# Patient Record
Sex: MALE | Race: WHITE | NOT HISPANIC OR LATINO | Employment: OTHER | URBAN - METROPOLITAN AREA
[De-identification: names, ages, dates, MRNs, and addresses within clinical notes are randomized per-mention and may not be internally consistent; named-entity substitution may affect disease eponyms.]

---

## 2018-09-02 ENCOUNTER — RESOLUTE PROFESSIONAL BILLING HOSPITAL PROF FEE (OUTPATIENT)
Dept: HOSPITALIST | Facility: MEDICAL CENTER | Age: 68
End: 2018-09-02
Payer: MEDICARE

## 2018-09-02 ENCOUNTER — APPOINTMENT (OUTPATIENT)
Dept: RADIOLOGY | Facility: MEDICAL CENTER | Age: 68
DRG: 472 | End: 2018-09-02
Attending: EMERGENCY MEDICINE
Payer: MEDICARE

## 2018-09-02 ENCOUNTER — APPOINTMENT (OUTPATIENT)
Dept: RADIOLOGY | Facility: MEDICAL CENTER | Age: 68
DRG: 472 | End: 2018-09-02
Attending: NEUROLOGICAL SURGERY
Payer: MEDICARE

## 2018-09-02 ENCOUNTER — HOSPITAL ENCOUNTER (INPATIENT)
Facility: MEDICAL CENTER | Age: 68
LOS: 3 days | DRG: 472 | End: 2018-09-05
Attending: EMERGENCY MEDICINE | Admitting: SURGERY
Payer: MEDICARE

## 2018-09-02 DIAGNOSIS — S12.401A CLOSED NONDISPLACED FRACTURE OF FIFTH CERVICAL VERTEBRA, UNSPECIFIED FRACTURE MORPHOLOGY, INITIAL ENCOUNTER (HCC): ICD-10-CM

## 2018-09-02 DIAGNOSIS — S01.81XA LACERATION OF FOREHEAD, INITIAL ENCOUNTER: ICD-10-CM

## 2018-09-02 DIAGNOSIS — S12.9XXA CLOSED FRACTURE OF CERVICAL VERTEBRA, UNSPECIFIED CERVICAL VERTEBRAL LEVEL, INITIAL ENCOUNTER (HCC): ICD-10-CM

## 2018-09-02 DIAGNOSIS — F10.920 ALCOHOLIC INTOXICATION WITHOUT COMPLICATION (HCC): ICD-10-CM

## 2018-09-02 PROBLEM — D68.9 COAGULOPATHY (HCC): Status: ACTIVE | Noted: 2018-09-02

## 2018-09-02 PROBLEM — D64.9 ANEMIA: Status: ACTIVE | Noted: 2018-09-02

## 2018-09-02 PROBLEM — F10.929 ALCOHOL INTOXICATION (HCC): Status: ACTIVE | Noted: 2018-09-02

## 2018-09-02 PROBLEM — E87.1 HYPONATREMIA: Status: ACTIVE | Noted: 2018-09-02

## 2018-09-02 LAB
ABO GROUP BLD: NORMAL
ALBUMIN SERPL BCP-MCNC: 3.3 G/DL (ref 3.2–4.9)
ALBUMIN/GLOB SERPL: 1.2 G/DL
ALP SERPL-CCNC: 50 U/L (ref 30–99)
ALT SERPL-CCNC: 19 U/L (ref 2–50)
ANION GAP SERPL CALC-SCNC: 10 MMOL/L (ref 0–11.9)
APTT PPP: 27.6 SEC (ref 24.7–36)
AST SERPL-CCNC: 58 U/L (ref 12–45)
BILIRUB SERPL-MCNC: 0.4 MG/DL (ref 0.1–1.5)
BLD GP AB SCN SERPL QL: NORMAL
BUN SERPL-MCNC: 4 MG/DL (ref 8–22)
CALCIUM SERPL-MCNC: 8.1 MG/DL (ref 8.5–10.5)
CHLORIDE SERPL-SCNC: 101 MMOL/L (ref 96–112)
CO2 SERPL-SCNC: 22 MMOL/L (ref 20–33)
CREAT SERPL-MCNC: 0.51 MG/DL (ref 0.5–1.4)
ERYTHROCYTE [DISTWIDTH] IN BLOOD BY AUTOMATED COUNT: 46.7 FL (ref 35.9–50)
ETHANOL BLD-MCNC: 0.29 G/DL
GLOBULIN SER CALC-MCNC: 2.7 G/DL (ref 1.9–3.5)
GLUCOSE SERPL-MCNC: 77 MG/DL (ref 65–99)
HCT VFR BLD AUTO: 30.9 % (ref 42–52)
HGB BLD-MCNC: 10.5 G/DL (ref 14–18)
INR PPP: 1.23 (ref 0.87–1.13)
MCH RBC QN AUTO: 34.5 PG (ref 27–33)
MCHC RBC AUTO-ENTMCNC: 34 G/DL (ref 33.7–35.3)
MCV RBC AUTO: 101.6 FL (ref 81.4–97.8)
PLATELET # BLD AUTO: 140 K/UL (ref 164–446)
PMV BLD AUTO: 9.9 FL (ref 9–12.9)
POTASSIUM SERPL-SCNC: 3.9 MMOL/L (ref 3.6–5.5)
PROT SERPL-MCNC: 6 G/DL (ref 6–8.2)
PROTHROMBIN TIME: 15.2 SEC (ref 12–14.6)
RBC # BLD AUTO: 3.04 M/UL (ref 4.7–6.1)
RH BLD: NORMAL
SODIUM SERPL-SCNC: 133 MMOL/L (ref 135–145)
WBC # BLD AUTO: 4.1 K/UL (ref 4.8–10.8)

## 2018-09-02 PROCEDURE — 160002 HCHG RECOVERY MINUTES (STAT): Performed by: NEUROLOGICAL SURGERY

## 2018-09-02 PROCEDURE — 72040 X-RAY EXAM NECK SPINE 2-3 VW: CPT

## 2018-09-02 PROCEDURE — 304999 HCHG REPAIR-SIMPLE/INTERMED LEVEL 1

## 2018-09-02 PROCEDURE — 72141 MRI NECK SPINE W/O DYE: CPT

## 2018-09-02 PROCEDURE — 500371 HCHG DRAIN, BLAKE 10MM: Performed by: NEUROLOGICAL SURGERY

## 2018-09-02 PROCEDURE — 71045 X-RAY EXAM CHEST 1 VIEW: CPT

## 2018-09-02 PROCEDURE — 303747 HCHG EXTRA SUTURE

## 2018-09-02 PROCEDURE — 96375 TX/PRO/DX INJ NEW DRUG ADDON: CPT

## 2018-09-02 PROCEDURE — 99291 CRITICAL CARE FIRST HOUR: CPT

## 2018-09-02 PROCEDURE — 96365 THER/PROPH/DIAG IV INF INIT: CPT

## 2018-09-02 PROCEDURE — 74018 RADEX ABDOMEN 1 VIEW: CPT

## 2018-09-02 PROCEDURE — 86900 BLOOD TYPING SEROLOGIC ABO: CPT

## 2018-09-02 PROCEDURE — 502000 HCHG MISC OR IMPLANTS RC 0278: Performed by: NEUROLOGICAL SURGERY

## 2018-09-02 PROCEDURE — 770022 HCHG ROOM/CARE - ICU (200)

## 2018-09-02 PROCEDURE — 500864 HCHG NEEDLE, SPINAL 18G: Performed by: NEUROLOGICAL SURGERY

## 2018-09-02 PROCEDURE — 0HQ1XZZ REPAIR FACE SKIN, EXTERNAL APPROACH: ICD-10-PCS | Performed by: EMERGENCY MEDICINE

## 2018-09-02 PROCEDURE — C1713 ANCHOR/SCREW BN/BN,TIS/BN: HCPCS | Performed by: NEUROLOGICAL SURGERY

## 2018-09-02 PROCEDURE — 700111 HCHG RX REV CODE 636 W/ 250 OVERRIDE (IP): Performed by: EMERGENCY MEDICINE

## 2018-09-02 PROCEDURE — 86901 BLOOD TYPING SEROLOGIC RH(D): CPT

## 2018-09-02 PROCEDURE — 90471 IMMUNIZATION ADMIN: CPT

## 2018-09-02 PROCEDURE — 86850 RBC ANTIBODY SCREEN: CPT

## 2018-09-02 PROCEDURE — 110454 HCHG SHELL REV 250: Performed by: NEUROLOGICAL SURGERY

## 2018-09-02 PROCEDURE — G0390 TRAUMA RESPONS W/HOSP CRITI: HCPCS

## 2018-09-02 PROCEDURE — 500389 HCHG DRAIN, RESERVOIR SUCT JP 100CC: Performed by: NEUROLOGICAL SURGERY

## 2018-09-02 PROCEDURE — 85027 COMPLETE CBC AUTOMATED: CPT

## 2018-09-02 PROCEDURE — 500445 HCHG HEMOSTAT, SURGICEL 4X8: Performed by: NEUROLOGICAL SURGERY

## 2018-09-02 PROCEDURE — 700101 HCHG RX REV CODE 250

## 2018-09-02 PROCEDURE — 80307 DRUG TEST PRSMV CHEM ANLYZR: CPT

## 2018-09-02 PROCEDURE — 73090 X-RAY EXAM OF FOREARM: CPT | Mod: LT

## 2018-09-02 PROCEDURE — 85610 PROTHROMBIN TIME: CPT

## 2018-09-02 PROCEDURE — 85730 THROMBOPLASTIN TIME PARTIAL: CPT

## 2018-09-02 PROCEDURE — 160009 HCHG ANES TIME/MIN: Performed by: NEUROLOGICAL SURGERY

## 2018-09-02 PROCEDURE — 93005 ELECTROCARDIOGRAM TRACING: CPT | Performed by: EMERGENCY MEDICINE

## 2018-09-02 PROCEDURE — 501838 HCHG SUTURE GENERAL: Performed by: NEUROLOGICAL SURGERY

## 2018-09-02 PROCEDURE — 90715 TDAP VACCINE 7 YRS/> IM: CPT | Performed by: EMERGENCY MEDICINE

## 2018-09-02 PROCEDURE — 72146 MRI CHEST SPINE W/O DYE: CPT

## 2018-09-02 PROCEDURE — 160048 HCHG OR STATISTICAL LEVEL 1-5: Performed by: NEUROLOGICAL SURGERY

## 2018-09-02 PROCEDURE — 700105 HCHG RX REV CODE 258: Performed by: SURGERY

## 2018-09-02 PROCEDURE — 700111 HCHG RX REV CODE 636 W/ 250 OVERRIDE (IP): Performed by: NURSE PRACTITIONER

## 2018-09-02 PROCEDURE — 160041 HCHG SURGERY MINUTES - EA ADDL 1 MIN LEVEL 4: Performed by: NEUROLOGICAL SURGERY

## 2018-09-02 PROCEDURE — 160035 HCHG PACU - 1ST 60 MINS PHASE I: Performed by: NEUROLOGICAL SURGERY

## 2018-09-02 PROCEDURE — 700111 HCHG RX REV CODE 636 W/ 250 OVERRIDE (IP)

## 2018-09-02 PROCEDURE — 0PS304Z REPOSITION CERVICAL VERTEBRA WITH INTERNAL FIXATION DEVICE, OPEN APPROACH: ICD-10-PCS | Performed by: NEUROLOGICAL SURGERY

## 2018-09-02 PROCEDURE — 72125 CT NECK SPINE W/O DYE: CPT

## 2018-09-02 PROCEDURE — 70450 CT HEAD/BRAIN W/O DYE: CPT

## 2018-09-02 PROCEDURE — 160029 HCHG SURGERY MINUTES - 1ST 30 MINS LEVEL 4: Performed by: NEUROLOGICAL SURGERY

## 2018-09-02 PROCEDURE — 700101 HCHG RX REV CODE 250: Performed by: EMERGENCY MEDICINE

## 2018-09-02 PROCEDURE — 0RG10A0 FUSION OF CERVICAL VERTEBRAL JOINT WITH INTERBODY FUSION DEVICE, ANTERIOR APPROACH, ANTERIOR COLUMN, OPEN APPROACH: ICD-10-PCS | Performed by: NEUROLOGICAL SURGERY

## 2018-09-02 PROCEDURE — 304217 HCHG IRRIGATION SYSTEM

## 2018-09-02 PROCEDURE — 80053 COMPREHEN METABOLIC PANEL: CPT

## 2018-09-02 RX ORDER — HYDROMORPHONE HYDROCHLORIDE 2 MG/ML
0.5 INJECTION, SOLUTION INTRAMUSCULAR; INTRAVENOUS; SUBCUTANEOUS
Status: DISCONTINUED | OUTPATIENT
Start: 2018-09-02 | End: 2018-09-05 | Stop reason: HOSPADM

## 2018-09-02 RX ORDER — AMOXICILLIN 250 MG
1 CAPSULE ORAL NIGHTLY
Status: DISCONTINUED | OUTPATIENT
Start: 2018-09-02 | End: 2018-09-05 | Stop reason: HOSPADM

## 2018-09-02 RX ORDER — CEFAZOLIN SODIUM 2 G/100ML
2 INJECTION, SOLUTION INTRAVENOUS ONCE
Status: COMPLETED | OUTPATIENT
Start: 2018-09-02 | End: 2018-09-02

## 2018-09-02 RX ORDER — BUPIVACAINE HYDROCHLORIDE AND EPINEPHRINE 5; 5 MG/ML; UG/ML
INJECTION, SOLUTION PERINEURAL
Status: DISCONTINUED | OUTPATIENT
Start: 2018-09-02 | End: 2018-09-02 | Stop reason: HOSPADM

## 2018-09-02 RX ORDER — ONDANSETRON 2 MG/ML
4 INJECTION INTRAMUSCULAR; INTRAVENOUS EVERY 4 HOURS PRN
Status: DISCONTINUED | OUTPATIENT
Start: 2018-09-02 | End: 2018-09-05 | Stop reason: HOSPADM

## 2018-09-02 RX ORDER — BACITRACIN ZINC AND POLYMYXIN B SULFATE 500; 1000 [USP'U]/G; [USP'U]/G
OINTMENT TOPICAL 3 TIMES DAILY
Status: DISCONTINUED | OUTPATIENT
Start: 2018-09-02 | End: 2018-09-05 | Stop reason: HOSPADM

## 2018-09-02 RX ORDER — CEFAZOLIN SODIUM 2 G/100ML
2 INJECTION, SOLUTION INTRAVENOUS EVERY 8 HOURS
Status: COMPLETED | OUTPATIENT
Start: 2018-09-02 | End: 2018-09-03

## 2018-09-02 RX ORDER — MIDAZOLAM HYDROCHLORIDE 1 MG/ML
INJECTION INTRAMUSCULAR; INTRAVENOUS
Status: COMPLETED
Start: 2018-09-02 | End: 2018-09-02

## 2018-09-02 RX ORDER — POLYETHYLENE GLYCOL 3350 17 G/17G
1 POWDER, FOR SOLUTION ORAL 2 TIMES DAILY
Status: DISCONTINUED | OUTPATIENT
Start: 2018-09-02 | End: 2018-09-05 | Stop reason: HOSPADM

## 2018-09-02 RX ORDER — OXYCODONE HYDROCHLORIDE 10 MG/1
10 TABLET ORAL
Status: DISCONTINUED | OUTPATIENT
Start: 2018-09-02 | End: 2018-09-05 | Stop reason: HOSPADM

## 2018-09-02 RX ORDER — ONDANSETRON 2 MG/ML
4 INJECTION INTRAMUSCULAR; INTRAVENOUS ONCE
Status: COMPLETED | OUTPATIENT
Start: 2018-09-02 | End: 2018-09-02

## 2018-09-02 RX ORDER — ACETAMINOPHEN 650 MG/1
650 SUPPOSITORY RECTAL EVERY 6 HOURS
Status: DISCONTINUED | OUTPATIENT
Start: 2018-09-02 | End: 2018-09-03

## 2018-09-02 RX ORDER — OXYCODONE HYDROCHLORIDE 5 MG/1
5 TABLET ORAL
Status: DISCONTINUED | OUTPATIENT
Start: 2018-09-02 | End: 2018-09-05 | Stop reason: HOSPADM

## 2018-09-02 RX ORDER — DOCUSATE SODIUM 100 MG/1
100 CAPSULE, LIQUID FILLED ORAL 2 TIMES DAILY
Status: DISCONTINUED | OUTPATIENT
Start: 2018-09-02 | End: 2018-09-05 | Stop reason: HOSPADM

## 2018-09-02 RX ORDER — BACITRACIN 50000 [IU]/1
INJECTION, POWDER, FOR SOLUTION INTRAMUSCULAR
Status: DISCONTINUED | OUTPATIENT
Start: 2018-09-02 | End: 2018-09-02 | Stop reason: HOSPADM

## 2018-09-02 RX ORDER — AMOXICILLIN 250 MG
1 CAPSULE ORAL
Status: DISCONTINUED | OUTPATIENT
Start: 2018-09-02 | End: 2018-09-05 | Stop reason: HOSPADM

## 2018-09-02 RX ORDER — SODIUM CHLORIDE 9 MG/ML
INJECTION, SOLUTION INTRAVENOUS CONTINUOUS
Status: DISCONTINUED | OUTPATIENT
Start: 2018-09-02 | End: 2018-09-05

## 2018-09-02 RX ORDER — BISACODYL 10 MG
10 SUPPOSITORY, RECTAL RECTAL
Status: DISCONTINUED | OUTPATIENT
Start: 2018-09-02 | End: 2018-09-05 | Stop reason: HOSPADM

## 2018-09-02 RX ORDER — FAMOTIDINE 20 MG/1
20 TABLET, FILM COATED ORAL 2 TIMES DAILY
Status: DISCONTINUED | OUTPATIENT
Start: 2018-09-02 | End: 2018-09-05

## 2018-09-02 RX ORDER — ENEMA 19; 7 G/133ML; G/133ML
1 ENEMA RECTAL
Status: DISCONTINUED | OUTPATIENT
Start: 2018-09-02 | End: 2018-09-05 | Stop reason: HOSPADM

## 2018-09-02 RX ORDER — LIDOCAINE HYDROCHLORIDE AND EPINEPHRINE 10; 10 MG/ML; UG/ML
20 INJECTION, SOLUTION INFILTRATION; PERINEURAL ONCE
Status: COMPLETED | OUTPATIENT
Start: 2018-09-02 | End: 2018-09-02

## 2018-09-02 RX ADMIN — FENTANYL CITRATE: 50 INJECTION, SOLUTION INTRAMUSCULAR; INTRAVENOUS at 20:45

## 2018-09-02 RX ADMIN — CEFAZOLIN SODIUM 2 G: 2 INJECTION, SOLUTION INTRAVENOUS at 14:30

## 2018-09-02 RX ADMIN — CLOSTRIDIUM TETANI TOXOID ANTIGEN (FORMALDEHYDE INACTIVATED), CORYNEBACTERIUM DIPHTHERIAE TOXOID ANTIGEN (FORMALDEHYDE INACTIVATED), BORDETELLA PERTUSSIS TOXOID ANTIGEN (GLUTARALDEHYDE INACTIVATED), BORDETELLA PERTUSSIS FILAMENTOUS HEMAGGLUTININ ANTIGEN (FORMALDEHYDE INACTIVATED), BORDETELLA PERTUSSIS PERTACTIN ANTIGEN, AND BORDETELLA PERTUSSIS FIMBRIAE 2/3 ANTIGEN 0.5 ML: 5; 2; 2.5; 5; 3; 5 INJECTION, SUSPENSION INTRAMUSCULAR at 14:25

## 2018-09-02 RX ADMIN — FENTANYL CITRATE 50 MCG: 50 INJECTION, SOLUTION INTRAMUSCULAR; INTRAVENOUS at 18:26

## 2018-09-02 RX ADMIN — ONDANSETRON 4 MG: 2 INJECTION INTRAMUSCULAR; INTRAVENOUS at 18:26

## 2018-09-02 RX ADMIN — LIDOCAINE HYDROCHLORIDE,EPINEPHRINE BITARTRATE 20 ML: 10; .01 INJECTION, SOLUTION INFILTRATION; PERINEURAL at 15:00

## 2018-09-02 RX ADMIN — SODIUM CHLORIDE: 9 INJECTION, SOLUTION INTRAVENOUS at 23:27

## 2018-09-02 RX ADMIN — MIDAZOLAM: 1 INJECTION INTRAMUSCULAR; INTRAVENOUS at 20:45

## 2018-09-02 RX ADMIN — FENTANYL CITRATE 50 MCG: 50 INJECTION, SOLUTION INTRAMUSCULAR; INTRAVENOUS at 22:45

## 2018-09-02 RX ADMIN — FENTANYL CITRATE 50 MCG: 50 INJECTION, SOLUTION INTRAMUSCULAR; INTRAVENOUS at 23:00

## 2018-09-02 ASSESSMENT — PAIN SCALES - GENERAL
PAINLEVEL_OUTOF10: 10
PAINLEVEL_OUTOF10: 2
PAINLEVEL_OUTOF10: 4

## 2018-09-02 NOTE — ED NOTES
Traction at bedside for [place] J placement, screening sheet for MRI complete and faxed, ERP at bedside for lac repair

## 2018-09-03 ENCOUNTER — APPOINTMENT (OUTPATIENT)
Dept: RADIOLOGY | Facility: MEDICAL CENTER | Age: 68
DRG: 472 | End: 2018-09-03
Attending: NEUROLOGICAL SURGERY
Payer: MEDICARE

## 2018-09-03 PROBLEM — T14.90XA TRAUMA: Status: ACTIVE | Noted: 2018-09-03

## 2018-09-03 LAB
ABO GROUP BLD: NORMAL
ALBUMIN SERPL BCP-MCNC: 3.4 G/DL (ref 3.2–4.9)
ALBUMIN/GLOB SERPL: 1.2 G/DL
ALP SERPL-CCNC: 63 U/L (ref 30–99)
ALT SERPL-CCNC: 18 U/L (ref 2–50)
ANION GAP SERPL CALC-SCNC: 8 MMOL/L (ref 0–11.9)
AST SERPL-CCNC: 63 U/L (ref 12–45)
BASOPHILS # BLD AUTO: 0 % (ref 0–1.8)
BASOPHILS # BLD: 0 K/UL (ref 0–0.12)
BILIRUB SERPL-MCNC: 0.5 MG/DL (ref 0.1–1.5)
BUN SERPL-MCNC: 4 MG/DL (ref 8–22)
CALCIUM SERPL-MCNC: 8.8 MG/DL (ref 8.5–10.5)
CHLORIDE SERPL-SCNC: 103 MMOL/L (ref 96–112)
CO2 SERPL-SCNC: 24 MMOL/L (ref 20–33)
CREAT SERPL-MCNC: 0.54 MG/DL (ref 0.5–1.4)
EOSINOPHIL # BLD AUTO: 0 K/UL (ref 0–0.51)
EOSINOPHIL NFR BLD: 0 % (ref 0–6.9)
ERYTHROCYTE [DISTWIDTH] IN BLOOD BY AUTOMATED COUNT: 46 FL (ref 35.9–50)
GLOBULIN SER CALC-MCNC: 2.8 G/DL (ref 1.9–3.5)
GLUCOSE SERPL-MCNC: 108 MG/DL (ref 65–99)
HCT VFR BLD AUTO: 33.5 % (ref 42–52)
HGB BLD-MCNC: 11.6 G/DL (ref 14–18)
IMM GRANULOCYTES # BLD AUTO: 0.03 K/UL (ref 0–0.11)
IMM GRANULOCYTES NFR BLD AUTO: 0.6 % (ref 0–0.9)
LYMPHOCYTES # BLD AUTO: 0.2 K/UL (ref 1–4.8)
LYMPHOCYTES NFR BLD: 3.7 % (ref 22–41)
MAGNESIUM SERPL-MCNC: 1.6 MG/DL (ref 1.5–2.5)
MCH RBC QN AUTO: 35 PG (ref 27–33)
MCHC RBC AUTO-ENTMCNC: 34.6 G/DL (ref 33.7–35.3)
MCV RBC AUTO: 101.2 FL (ref 81.4–97.8)
MONOCYTES # BLD AUTO: 0.03 K/UL (ref 0–0.85)
MONOCYTES NFR BLD AUTO: 0.6 % (ref 0–13.4)
NEUTROPHILS # BLD AUTO: 5.12 K/UL (ref 1.82–7.42)
NEUTROPHILS NFR BLD: 95.1 % (ref 44–72)
NRBC # BLD AUTO: 0 K/UL
NRBC BLD-RTO: 0 /100 WBC
PHOSPHATE SERPL-MCNC: 3.3 MG/DL (ref 2.5–4.5)
PLATELET # BLD AUTO: 102 K/UL (ref 164–446)
PMV BLD AUTO: 10.6 FL (ref 9–12.9)
POTASSIUM SERPL-SCNC: 4.2 MMOL/L (ref 3.6–5.5)
PROT SERPL-MCNC: 6.2 G/DL (ref 6–8.2)
RBC # BLD AUTO: 3.31 M/UL (ref 4.7–6.1)
RH BLD: NORMAL
SODIUM SERPL-SCNC: 135 MMOL/L (ref 135–145)
WBC # BLD AUTO: 5.4 K/UL (ref 4.8–10.8)

## 2018-09-03 PROCEDURE — 94760 N-INVAS EAR/PLS OXIMETRY 1: CPT

## 2018-09-03 PROCEDURE — 700101 HCHG RX REV CODE 250: Performed by: SURGERY

## 2018-09-03 PROCEDURE — 94668 MNPJ CHEST WALL SBSQ: CPT

## 2018-09-03 PROCEDURE — 80053 COMPREHEN METABOLIC PANEL: CPT

## 2018-09-03 PROCEDURE — 72125 CT NECK SPINE W/O DYE: CPT

## 2018-09-03 PROCEDURE — A9270 NON-COVERED ITEM OR SERVICE: HCPCS | Performed by: SURGERY

## 2018-09-03 PROCEDURE — 700102 HCHG RX REV CODE 250 W/ 637 OVERRIDE(OP): Performed by: SURGERY

## 2018-09-03 PROCEDURE — 770001 HCHG ROOM/CARE - MED/SURG/GYN PRIV*

## 2018-09-03 PROCEDURE — A9270 NON-COVERED ITEM OR SERVICE: HCPCS | Performed by: PHYSICIAN ASSISTANT

## 2018-09-03 PROCEDURE — 84100 ASSAY OF PHOSPHORUS: CPT

## 2018-09-03 PROCEDURE — 85025 COMPLETE CBC W/AUTO DIFF WBC: CPT

## 2018-09-03 PROCEDURE — 83735 ASSAY OF MAGNESIUM: CPT

## 2018-09-03 PROCEDURE — 94667 MNPJ CHEST WALL 1ST: CPT

## 2018-09-03 PROCEDURE — A9270 NON-COVERED ITEM OR SERVICE: HCPCS | Performed by: NEUROLOGICAL SURGERY

## 2018-09-03 PROCEDURE — 700112 HCHG RX REV CODE 229: Performed by: SURGERY

## 2018-09-03 PROCEDURE — 700102 HCHG RX REV CODE 250 W/ 637 OVERRIDE(OP): Performed by: NEUROLOGICAL SURGERY

## 2018-09-03 PROCEDURE — 700111 HCHG RX REV CODE 636 W/ 250 OVERRIDE (IP): Performed by: NEUROLOGICAL SURGERY

## 2018-09-03 PROCEDURE — 700102 HCHG RX REV CODE 250 W/ 637 OVERRIDE(OP): Performed by: PHYSICIAN ASSISTANT

## 2018-09-03 PROCEDURE — 700111 HCHG RX REV CODE 636 W/ 250 OVERRIDE (IP): Performed by: SURGERY

## 2018-09-03 PROCEDURE — 99233 SBSQ HOSP IP/OBS HIGH 50: CPT | Performed by: SURGERY

## 2018-09-03 RX ORDER — CELECOXIB 100 MG/1
30 CAPSULE ORAL EVERY 8 HOURS PRN
COMMUNITY
End: 2018-10-18

## 2018-09-03 RX ORDER — METHOCARBAMOL 750 MG/1
750 TABLET, FILM COATED ORAL 4 TIMES DAILY
Status: DISCONTINUED | OUTPATIENT
Start: 2018-09-03 | End: 2018-09-05 | Stop reason: HOSPADM

## 2018-09-03 RX ORDER — DEXAMETHASONE 4 MG/1
4 TABLET ORAL EVERY 12 HOURS
Status: DISCONTINUED | OUTPATIENT
Start: 2018-09-03 | End: 2018-09-05

## 2018-09-03 RX ORDER — GABAPENTIN 300 MG/1
300 CAPSULE ORAL 3 TIMES DAILY
Status: DISCONTINUED | OUTPATIENT
Start: 2018-09-03 | End: 2018-09-05 | Stop reason: HOSPADM

## 2018-09-03 RX ORDER — ASPIRIN 81 MG/1
81 TABLET, CHEWABLE ORAL DAILY
Status: ON HOLD | COMMUNITY
End: 2018-09-05

## 2018-09-03 RX ORDER — ALLOPURINOL 300 MG/1
300 TABLET ORAL DAILY
COMMUNITY
End: 2018-12-05 | Stop reason: CLARIF

## 2018-09-03 RX ORDER — LISINOPRIL 20 MG/1
30 TABLET ORAL DAILY
COMMUNITY
End: 2018-12-05 | Stop reason: CLARIF

## 2018-09-03 RX ORDER — GABAPENTIN 100 MG/1
CAPSULE ORAL
COMMUNITY
End: 2018-12-05 | Stop reason: CLARIF

## 2018-09-03 RX ADMIN — HYDROMORPHONE HYDROCHLORIDE 0.5 MG: 2 INJECTION INTRAMUSCULAR; INTRAVENOUS; SUBCUTANEOUS at 11:11

## 2018-09-03 RX ADMIN — CEFAZOLIN SODIUM 2 G: 2 INJECTION, SOLUTION INTRAVENOUS at 05:38

## 2018-09-03 RX ADMIN — CEFAZOLIN SODIUM 2 G: 2 INJECTION, SOLUTION INTRAVENOUS at 15:05

## 2018-09-03 RX ADMIN — GABAPENTIN 300 MG: 300 CAPSULE ORAL at 17:02

## 2018-09-03 RX ADMIN — SENNOSIDES AND DOCUSATE SODIUM 1 TABLET: 8.6; 5 TABLET ORAL at 19:39

## 2018-09-03 RX ADMIN — OXYCODONE HYDROCHLORIDE 5 MG: 5 TABLET ORAL at 05:52

## 2018-09-03 RX ADMIN — DEXAMETHASONE 4 MG: 4 TABLET ORAL at 17:03

## 2018-09-03 RX ADMIN — DEXAMETHASONE 4 MG: 4 TABLET ORAL at 11:12

## 2018-09-03 RX ADMIN — HYDROMORPHONE HYDROCHLORIDE 0.5 MG: 2 INJECTION INTRAMUSCULAR; INTRAVENOUS; SUBCUTANEOUS at 07:33

## 2018-09-03 RX ADMIN — OXYCODONE HYDROCHLORIDE 10 MG: 10 TABLET ORAL at 19:39

## 2018-09-03 RX ADMIN — OXYCODONE HYDROCHLORIDE 10 MG: 10 TABLET ORAL at 16:32

## 2018-09-03 RX ADMIN — GABAPENTIN 300 MG: 300 CAPSULE ORAL at 06:46

## 2018-09-03 RX ADMIN — Medication 1 EACH: at 17:02

## 2018-09-03 RX ADMIN — GABAPENTIN 300 MG: 300 CAPSULE ORAL at 11:11

## 2018-09-03 RX ADMIN — HYDROMORPHONE HYDROCHLORIDE 0.5 MG: 2 INJECTION INTRAMUSCULAR; INTRAVENOUS; SUBCUTANEOUS at 15:04

## 2018-09-03 RX ADMIN — OXYCODONE HYDROCHLORIDE 10 MG: 10 TABLET ORAL at 10:02

## 2018-09-03 RX ADMIN — Medication 1 EACH: at 11:12

## 2018-09-03 RX ADMIN — FAMOTIDINE 20 MG: 20 TABLET ORAL at 17:02

## 2018-09-03 RX ADMIN — Medication 1 EACH: at 05:23

## 2018-09-03 RX ADMIN — DOCUSATE SODIUM 100 MG: 100 CAPSULE, LIQUID FILLED ORAL at 19:39

## 2018-09-03 RX ADMIN — METHOCARBAMOL 750 MG: 750 TABLET, FILM COATED ORAL at 22:31

## 2018-09-03 RX ADMIN — DOCUSATE SODIUM 100 MG: 100 CAPSULE, LIQUID FILLED ORAL at 09:00

## 2018-09-03 RX ADMIN — OXYCODONE HYDROCHLORIDE 10 MG: 10 TABLET ORAL at 13:05

## 2018-09-03 RX ADMIN — CEFAZOLIN SODIUM 2 G: 2 INJECTION, SOLUTION INTRAVENOUS at 00:13

## 2018-09-03 RX ADMIN — METHOCARBAMOL 750 MG: 750 TABLET, FILM COATED ORAL at 11:13

## 2018-09-03 RX ADMIN — OXYCODONE HYDROCHLORIDE 10 MG: 10 TABLET ORAL at 23:59

## 2018-09-03 RX ADMIN — HYDROMORPHONE HYDROCHLORIDE 0.5 MG: 2 INJECTION INTRAMUSCULAR; INTRAVENOUS; SUBCUTANEOUS at 21:24

## 2018-09-03 RX ADMIN — METHOCARBAMOL 750 MG: 750 TABLET, FILM COATED ORAL at 17:02

## 2018-09-03 RX ADMIN — FAMOTIDINE 20 MG: 10 INJECTION, SOLUTION INTRAVENOUS at 05:23

## 2018-09-03 RX ADMIN — POLYETHYLENE GLYCOL 3350 1 PACKET: 17 POWDER, FOR SOLUTION ORAL at 17:02

## 2018-09-03 RX ADMIN — METHOCARBAMOL 750 MG: 750 TABLET, FILM COATED ORAL at 13:05

## 2018-09-03 RX ADMIN — HYDROMORPHONE HYDROCHLORIDE 0.5 MG: 2 INJECTION INTRAMUSCULAR; INTRAVENOUS; SUBCUTANEOUS at 03:00

## 2018-09-03 ASSESSMENT — PAIN SCALES - GENERAL
PAINLEVEL_OUTOF10: 8
PAINLEVEL_OUTOF10: 7
PAINLEVEL_OUTOF10: 8
PAINLEVEL_OUTOF10: 8
PAINLEVEL_OUTOF10: 4
PAINLEVEL_OUTOF10: 6
PAINLEVEL_OUTOF10: 4
PAINLEVEL_OUTOF10: 8
PAINLEVEL_OUTOF10: 6
PAINLEVEL_OUTOF10: 8
PAINLEVEL_OUTOF10: 8
PAINLEVEL_OUTOF10: 4
PAINLEVEL_OUTOF10: 7
PAINLEVEL_OUTOF10: 6
PAINLEVEL_OUTOF10: 6
PAINLEVEL_OUTOF10: 8
PAINLEVEL_OUTOF10: 6
PAINLEVEL_OUTOF10: 6
PAINLEVEL_OUTOF10: 4

## 2018-09-03 ASSESSMENT — COGNITIVE AND FUNCTIONAL STATUS - GENERAL
DRESSING REGULAR UPPER BODY CLOTHING: A LOT
STANDING UP FROM CHAIR USING ARMS: A LOT
TURNING FROM BACK TO SIDE WHILE IN FLAT BAD: A LITTLE
SUGGESTED CMS G CODE MODIFIER MOBILITY: CL
DRESSING REGULAR LOWER BODY CLOTHING: TOTAL
CLIMB 3 TO 5 STEPS WITH RAILING: A LOT
WALKING IN HOSPITAL ROOM: A LOT
TOILETING: A LOT
HELP NEEDED FOR BATHING: A LOT
MOVING FROM LYING ON BACK TO SITTING ON SIDE OF FLAT BED: A LOT
PERSONAL GROOMING: A LOT
EATING MEALS: A LOT
MOBILITY SCORE: 13
MOVING TO AND FROM BED TO CHAIR: A LOT

## 2018-09-03 ASSESSMENT — LIFESTYLE VARIABLES
EVER_SMOKED: YES
DOES PATIENT WANT TO STOP DRINKING: YES
ALCOHOL_USE: YES
TOTAL SCORE: 4
HOW MANY TIMES IN THE PAST YEAR HAVE YOU HAD 5 OR MORE DRINKS IN A DAY: 200
EVER FELT BAD OR GUILTY ABOUT YOUR DRINKING: YES
HAVE PEOPLE ANNOYED YOU BY CRITICIZING YOUR DRINKING: YES
AVERAGE NUMBER OF DAYS PER WEEK YOU HAVE A DRINK CONTAINING ALCOHOL: 4
ON A TYPICAL DAY WHEN YOU DRINK ALCOHOL HOW MANY DRINKS DO YOU HAVE: 4
TOTAL SCORE: 4
SUBSTANCE_ABUSE: 1
DOES PATIENT WANT TO TALK TO SOMEONE ABOUT QUITTING: YES
CONSUMPTION TOTAL: POSITIVE
EVER HAD A DRINK FIRST THING IN THE MORNING TO STEADY YOUR NERVES TO GET RID OF A HANGOVER: YES
HAVE YOU EVER FELT YOU SHOULD CUT DOWN ON YOUR DRINKING: YES
TOTAL SCORE: 4

## 2018-09-03 ASSESSMENT — PATIENT HEALTH QUESTIONNAIRE - PHQ9
9. THOUGHTS THAT YOU WOULD BE BETTER OFF DEAD, OR OF HURTING YOURSELF: SEVERAL DAYS
8. MOVING OR SPEAKING SO SLOWLY THAT OTHER PEOPLE COULD HAVE NOTICED. OR THE OPPOSITE, BEING SO FIGETY OR RESTLESS THAT YOU HAVE BEEN MOVING AROUND A LOT MORE THAN USUAL: SEVERAL DAYS
5. POOR APPETITE OR OVEREATING: NOT AT ALL
4. FEELING TIRED OR HAVING LITTLE ENERGY: NEARLY EVERY DAY
SUM OF ALL RESPONSES TO PHQ9 QUESTIONS 1 AND 2: 4
2. FEELING DOWN, DEPRESSED, IRRITABLE, OR HOPELESS: NEARLY EVERY DAY
SUM OF ALL RESPONSES TO PHQ QUESTIONS 1-9: 17
6. FEELING BAD ABOUT YOURSELF - OR THAT YOU ARE A FAILURE OR HAVE LET YOURSELF OR YOUR FAMILY DOWN: NEARLY EVERY DAY
7. TROUBLE CONCENTRATING ON THINGS, SUCH AS READING THE NEWSPAPER OR WATCHING TELEVISION: MORE THAN HALF THE DAYS
3. TROUBLE FALLING OR STAYING ASLEEP OR SLEEPING TOO MUCH: NEARLY EVERY DAY
1. LITTLE INTEREST OR PLEASURE IN DOING THINGS: SEVERAL DAYS

## 2018-09-03 ASSESSMENT — ENCOUNTER SYMPTOMS
DOUBLE VISION: 0
ABDOMINAL PAIN: 0
FEVER: 0
NECK PAIN: 1
SHORTNESS OF BREATH: 1
HEADACHES: 0
SENSORY CHANGE: 0
SPEECH CHANGE: 0
NAUSEA: 0
MYALGIAS: 1
POLYDIPSIA: 0

## 2018-09-03 ASSESSMENT — COPD QUESTIONNAIRES
HAVE YOU SMOKED AT LEAST 100 CIGARETTES IN YOUR ENTIRE LIFE: YES
DURING THE PAST 4 WEEKS HOW MUCH DID YOU FEEL SHORT OF BREATH: NONE/LITTLE OF THE TIME
DO YOU EVER COUGH UP ANY MUCUS OR PHLEGM?: YES, EVERY DAY
COPD SCREENING SCORE: 6

## 2018-09-03 NOTE — PROGRESS NOTES
Pt arrived on unit. A&Ox4 and on 2L nc. Pt is very unsteady walking and moving. Has complaints of RUE weakness and hypersensitivity. Pt denies n/v and diet was changed to a regular diet. POC reviewed, bed alarm is on and call light within reach.

## 2018-09-03 NOTE — CARE PLAN
Problem: Pain Management  Goal: Pain level will decrease to patient's comfort goal  Outcome: PROGRESSING AS EXPECTED  Assessing pain q2h. Medicated as needed with PRNs.    Problem: Respiratory:  Goal: Respiratory status will improve  Outcome: PROGRESSING AS EXPECTED  Patient on 4L NC SpO2 98%

## 2018-09-03 NOTE — PROGRESS NOTES
Pt arrived to S120 on 4L NC, vs stable. A&Ox4 and denies pain.     2 RN skin check complete. Possible old pressure ulcer to coccyx. Photo taken and uploaded. Mepilex applied. Wound consult initiated.Scattered abrasion throughout rest of body.

## 2018-09-03 NOTE — PROGRESS NOTES
"  Trauma/Surgical Progress Note    Author: Shun Cota Date & Time created: 9/3/2018   11:58 AM     Interval Events:  Admitted after ground-level fall with cervical cord injury  Bilateral upper extremity weakness/paresthesia  ACDF done after admission  No plan for hyperperfusion protocol  Starting diet  Reviewing home medications      Review of Systems   Constitutional: Negative for fever.   Eyes: Negative for double vision.   Respiratory: Positive for shortness of breath.         Supplemental O2   Cardiovascular: Negative for chest pain.   Gastrointestinal: Negative for abdominal pain and nausea.   Genitourinary: Negative for dysuria.   Musculoskeletal: Positive for myalgias and neck pain.   Neurological: Negative for sensory change, speech change and headaches.   Endo/Heme/Allergies: Negative for polydipsia.   Psychiatric/Behavioral: Positive for substance abuse.        ETOH       Hemodynamics:  Blood pressure 113/83, pulse 87, temperature 36.9 °C (98.5 °F), resp. rate 16, height 1.778 m (5' 10\"), weight 101.1 kg (222 lb 14.2 oz), SpO2 95 %.     Respiratory:    Respiration: 16, Pulse Oximetry: 95 %        RUL Breath Sounds: Clear, RML Breath Sounds: Clear, RLL Breath Sounds: Diminished, TOD Breath Sounds: Clear, LLL Breath Sounds: Diminished  Fluids:    Intake/Output Summary (Last 24 hours) at 09/03/18 1158  Last data filed at 09/03/18 1000   Gross per 24 hour   Intake             4400 ml   Output             2585 ml   Net             1815 ml     Admit Weight: 72.6 kg (160 lb)  Current Weight: 101.1 kg (222 lb 14.2 oz)    Physical Exam   Constitutional: He is oriented to person, place, and time. He appears well-developed. He is active and cooperative. No distress. Cervical collar and nasal cannula in place.   HENT:   Head: Normocephalic and atraumatic.   Mouth/Throat: Oropharynx is clear and moist.   Eyes: Pupils are equal, round, and reactive to light. Conjunctivae and EOM are normal.   Neck: No tracheal " deviation present.   C-collar in place   Cardiovascular: Normal rate, regular rhythm and intact distal pulses.   No extrasystoles are present.   Pulmonary/Chest: No stridor. No respiratory distress. He has no decreased breath sounds. He has no wheezes. He has no rhonchi.   Abdominal: Soft. He exhibits no distension. There is no tenderness.   Musculoskeletal: He exhibits no edema or deformity.   Moves all extremities   Neurological: He is alert and oriented to person, place, and time.   Bilateral upper extremity weakness, strength better on right  Lower extremities work against gravity  Some blunting of sensation bilateral upper extremities distally   Skin: Skin is warm and dry. No pallor.       Medical Decision Making/Problem List:    Active Hospital Problems    Diagnosis   • Cervical spine fracture (HCC) [S12.9XXA]     Priority: High     Fractures of the C4 and C5 spinous processes   Fracture of the anterior/superior vertebral body and C6 with a small avulsed fragment. There is also fracture of the right uncinate process.  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 Neurosurgery.       • Anemia [D64.9]     Priority: Medium     Transfusion for Hgb less than 7.0  Trend Hgb   9/3 trending up.  No indication for transfusion.      • Hyponatremia [E87.1]     Priority: Medium     Trend   9/3 135      • Coagulopathy (HCC) [D68.9]     Priority: Medium     Trend labs.  9/3 INR 1.23      • Alcohol intoxication ([facility]) [F10.929]     Priority: Medium     Monitor for signs withdrawal  SBIRT      • Trauma [T14.90XA]     Priority: Low     Trauma Red activation   GLF, ETOH intoxication.  Dr. Velez Trauma physician.          Appropriate for transfer to the neuroscience unit    Core Measures & Quality Metrics:  Labs reviewed and Radiology images reviewed  Roca catheter: No Roca      DVT Prophylaxis: Contraindicated -  High bleeding risk  DVT prophylaxis - mechanical: SCDs      Assessed for rehab: Patient was assess for and/or received rehabilitation services during this hospitalization    DELL Score  Discussed patient condition with RN, RT, Pharmacy and Patient.

## 2018-09-03 NOTE — ED NOTES
Pt resting in bed, c-spine precautions in place, pt is alert to verbal, pt oriented to person, place, time and situation , c-collar in place

## 2018-09-03 NOTE — OR SURGEON
Immediate Post OP Note    PreOp Diagnosis: C5-6 fracture dislocation    PostOp Diagnosis: C5-6 fracture dislocation    Procedure(s):  CERVICAL DISK AND FUSION ANTERIOR C5-C6 - Wound Class: Clean    Surgeon(s):  Varghese Wilkins M.D.    Anesthesiologist/Type of Anesthesia:  Anesthesiologist: South Chavez M.D.  Anesthesia Technician: Arnaud Brandon/General    Surgical Staff:  Circulator: Kamar Oh R.N.  Scrub Person: Saran Melgar R.N.  Radiology Technologist: Emre Lyon    Specimens removed if any:  * No specimens in log *    Estimated Blood Loss: 25    Findings: see dictation    Complications: none noted        9/2/2018 10:10 PM Varghese Wilkins M.D.

## 2018-09-03 NOTE — CONSULTS
Date: 9/2/2018    Requesting physician: Dr. Thompson    Reason for consultation: Cervical spine fractures, upper extremity numbness and weakness    HPI  This is a 68-year-old male that was found down outside on the street. From discussion with him, it seems that he is likely homeless since his brother passed away. He is positive for EtOH. He is complaining of severe bilateral upper extremity burning paresthesias, neck pain and some weakness in his hands.    He had a CT of his cervical spine which showed C4 and C5 spinous process fractures as well as an anterior C6 avulsion fracture and right uncinate process fracture at C6 which could be consistent with a ligamentous injury. 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 Cervical collar/[place] J placed  Neurologic:  strength 3/5 bilaterally. His proximal strength is 4 minus/5 bilaterally. He has moderate to severe hyper paresthetic pain to light touch in his bilateral upper extremities. His bilateral lower extremities seem to be 4/5 and symmetric without dysesthetic pain    Imaging    He had a CT of his cervical spine which showed C4 and C5 spinous process fractures as well as an anterior C6 avulsion fracture and right uncinate process fracture at C6 which could be consistent with a ligamentous injury. 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 He does have clear instability on his CT and cervical MRI as well as neurologic deficits. I recommended an urgent decompression and stabilization and discussed risks, benefits including but not limited to injury to structures in the neck, bleeding, dysphagia, dysphonia, hardware failure, need for further surgery, neurologic worsening and declined, hematoma, infection    He consents and wishes to proceed    Plan  C5-6 anterior cervical discectomy and fusion  He'll be admitted by trauma postoperatively  Given his other traumatic injuries and ligamentous injuries posteriorly from C3-5, he will need to wear his cervical collar for a total of 3 months     I spent a total of 72 minutes evaluating him at bedside, reviewing his history and physical examination, reviewing his electronic medical records and his imaging and arranging for his operative intervention

## 2018-09-03 NOTE — CARE PLAN
Problem: Venous Thromboembolism (VTW)/Deep Vein Thrombosis (DVT) Prevention:  Goal: Patient will participate in Venous Thrombosis (VTE)/Deep Vein Thrombosis (DVT)Prevention Measures    Intervention: Ensure patient wears graduated elastic stockings (BRENDON hose) and/or SCDs, if ordered, when in bed or chair (Remove at least once per shift for skin check)  Intermittent sequential compression device in place 23 hours per day   Patient educated on importance of SCD's. SCD place bilaterally on lower extremities. Alternative interventions in use. PT/OT ordered. Will mobilize patient this shitf. Will continue to assess and monitor.        Problem: Respiratory:  Goal: Respiratory status will improve    Intervention: Educate and encourage coughing and deep breathing  Patient educated on importance of Cough, turn and deep breath.  Assess patient efforts and effectives.  Alternate interventions also in use.  Patient educated on use of IS and physical activity/mobilty

## 2018-09-03 NOTE — PROGRESS NOTES
Neurosurgery Progress Note    Subjective:  POD #1  ACDF C5-6  S/p C6 anterior Avulsion fx with Severe Canal stenosis with C4 and c5 Spinous process fx s/p fall  Drinking and speaking well.   Wound C/D/I  In [place] J Collar--will need collar x 3 months  Both UE 3/5 with Left Bicep and Right hand  slightly better than opposite side  Moving legs well.  Pain in arms and posterior neck--will adjust meds (Robaxin and decadron added)  Drain at 20 cc/8 hours--will D/c drain this AM  OK to transfer to regular floor when ok with Trauma.  Will need full Assist with meals and ADLS  Rehab consult initiated.      Exam:  Wound C/D/I  UE Motor 3/5.  LE 5/5  Sensory intact to hyper in hands  Normal sensation in legs  +trace lopez's noted   Swallowing well    BP  Min: 101/76  Max: 147/83  Pulse  Av.6  Min: 53  Max: 92  Resp  Av.4  Min: 11  Max: 63  Temp  Av.7 °C (98 °F)  Min: 36.2 °C (97.2 °F)  Max: 37.1 °C (98.7 °F)  SpO2  Av.6 %  Min: 92 %  Max: 100 %    No Data Recorded    Recent Labs      18   1359  18   0420   WBC  4.1*  5.4   RBC  3.04*  3.31*   HEMOGLOBIN  10.5*  11.6*   HEMATOCRIT  30.9*  33.5*   MCV  101.6*  101.2*   MCH  34.5*  35.0*   MCHC  34.0  34.6   RDW  46.7  46.0   PLATELETCT  140*  102*   MPV  9.9  10.6     Recent Labs      18   1359  18   0420   SODIUM  133*  135   POTASSIUM  3.9  4.2   CHLORIDE  101  103   CO2  22  24   GLUCOSE  77  108*   BUN  4*  4*   CREATININE  0.51  0.54   CALCIUM  8.1*  8.8     Recent Labs      18   1359   APTT  27.6   INR  1.23*           Intake/Output       18 0700 - 18 0659 18 0700 - 18 0659      4311-3023 6872-2047 Total [id number] 8550-6668 Total       Intake    P.O.  --  150 150  125  -- 125    P.O. -- 150 150 125 -- 125    I.V.  600  3000 3600  200  -- 200    Crystalloid Intake -- 2100 -- -- --    Pre-Hospital Volume 300 -- 300 -- -- --    Trauma Resuscitation Volume 300 -- 300 -- -- --    IV Piggyback  Volume (IV Piggyback) -- 200 200 -- -- --    IV Volume (NS) -- 700 700 200 -- 200    Blood  0  -- 0  --  -- --    PRBC Total Volume (Non-Barcoded) 0 -- 0 -- -- --    FFP Total Volume (Non-Barcoded) 0 -- 0 -- -- --    Platelets Total Volume (Non-Barcoded) 0 -- 0 -- -- --    Cryoprecipitate (Pooled) Total Volume (Non-Barcoded) 0 -- 0 -- -- --    Cryoprecipitate (Single) Total Volume (Non-Barcoded) 0 -- 0 -- -- --    Total Intake 600 3150 3750 325 -- 325       Output    Urine  950  925 1875  300  -- 300    Number of Times Voided -- -- -- 1 x -- 1 x    Urine Void (mL) (non-catheter)  300 -- 300    Drains  --  30 30  --  -- --    Output (ml) (Surgical Drain Group Right;Neck Ananth Carrasco 1 (A)) -- 30 30 -- -- --    Other  0  -- 0  --  -- --    Pre-Hospital Output 0 -- 0 -- -- --    Trauma Resuscitation Output 0 -- 0 -- -- --    Blood  0  80 80  --  -- --    Est. Blood Loss (mL) 0 80 80 -- -- --    Total Output 950 1035 1985 300 -- 300       Net I/O     -350 2115 1765 25 -- 25            Intake/Output Summary (Last 24 hours) at 09/03/18 0915  Last data filed at 09/03/18 0800   Gross per 24 hour   Intake             4075 ml   Output             2285 ml   Net             1790 ml            • gabapentin  300 mg TID   • Respiratory Care per Protocol   Continuous RT   • Pharmacy Consult Request  1 Each PRN   • docusate sodium  100 mg BID   • senna-docusate  1 Tab Nightly   • senna-docusate  1 Tab Q24HRS PRN   • polyethylene glycol/lytes  1 Packet BID   • magnesium hydroxide  30 mL DAILY   • bisacodyl  10 mg Q24HRS PRN   • fleet  1 Each Once PRN   • NS   Continuous   • acetaminophen  650 mg Q6HRS   • oxyCODONE immediate-release  5 mg Q3HRS PRN   • oxyCODONE immediate release  10 mg Q3HRS PRN   • HYDROmorphone  0.5 mg Q3HRS PRN   • ondansetron  4 mg Q4HRS PRN   • famotidine  20 mg BID    Or   • famotidine  20 mg BID   • bacitracin-polymyxin b   TID   • ceFAZolin  2 g Q8HRS       Assessment and Plan:  POD #1  ACDF  C5-6  S/p C6 anterior Avulsion fx with Severe Canal stenosis with C4 and c5 Spinous process fx s/p fall.with Myelopathy.  PT/OT consulted to work on UE weakness.  Meds adjusted for pain. Added Robaxin and Decadron.  On Neurontin and Narcotics.  Advance diet as tolerated.  D/c drain today.  Will Need collar x 3 months at all times except showering and meals  OK to transfer to regular floor when OK with Trauma Service  Prophylactic anticoagulation: yes         Start date/time: after 2200 today

## 2018-09-03 NOTE — OP REPORT
DATE OF SERVICE:  09/02/2018    SURGEON:  Varghese Wilkins MD    ANESTHESIOLOGIST:  South Chavez MD    PREOPERATIVE DIAGNOSIS:  C5-C6 fracture dislocation.    POSTOPERATIVE DIAGNOSIS:  C5-C6 fracture dislocation.    PROCEDURE PERFORMED:  1.  Partial corpectomy, greater than 50% C5.  2.  Partial corpectomy, greater than 50% C6.  3.  Open reduction and internal fixation, C5-C6 fracture dislocation and   correction of hyperextension deformity.  4.  Placement of Titan anterior cervical intervertebral disk spacer, 8 mm at   C5-C6.  5.  [place] local autograft, same incision for the purpose of arthrodesis.  6.  Anterior cervical arthrodesis, C5-C6.  7.  Placement of NeuroStructures anterior cervical plate, Transom, 14 mm   affixed to the anterior vertebral bodies at C5 and C6 and 16 mm self-drilling   screws x4.  8.  Microscope microscopic dissection.  9.  Modifier-22 -- due to the patient's distraction and hyperextension   deformity as well as the very severe anterior arthrosis and very large   anterior osteophytes, the anterior vertebral bodies needed extensive   remodeling and corpectomies in order to access both the disk space in order to   reshape the vertebral bodies in order to accept a graft in place, the   anterior cervical plate and screws to provide a solid surface for arthrodesis.    This complicated the case significantly requiring dramatically extra   surgical expertise, mental concentration, and increasing complexity   significantly.  This added over 45 minutes of time needed for the surgical   procedure, doubling the time needed for surgery.    INDICATION FOR PROCEDURE:  This is a 68-year-old male who was found down near   a homeless shelter and was brought in by EMS complaining of severe neck pain   with bilateral upper extremity weakness and hyperparesthesias in the bilateral   hands.  He was noted to have C4 and C5 spinous process fractures with a right   C6 uncinate process fracture.  MRI showed  anterior longitudinal ligament   rupture along with disk space rupture at C5-C6 and severe central stenosis at   C5-C6 with hyperextension injury.  Surgery was indicated for decompression and   stabilization at C5-C6 due the severity of his fracture dislocation, severity   of the central stenosis and neurologic exam.  He understood the risks,   benefits, alternatives to procedure and agrees to proceed.    PROCEDURE IN DETAIL:  Patient was brought to the operating room and intubated   by the anesthesiologist.  Collar was removed.  His head was placed, resting in   a donut and a gentle bump under his shoulders.  His shoulders were gently   fixed to the operating room table with tape.  Preoperative fluoroscopy was   used to identify the appropriate surgical level.  He was marked, prepped,   draped in the usual sterile fashion.  A preprocedure timeout was performed.    Marcaine 0.5% with epinephrine was infiltrated in the skin.  A template was   used to sharply incise the skin and subcutaneous tissue.  Blunt dissection was   used to create a subplatysmal plane rostrally and caudally.  The fascia   medial to the medial border of sternocleidomastoid was opened with monopolar   electrocautery and superior to the anterior belly of the omohyoid, the   prevertebral plane was found.  The carotid sheath and its contents were   mobilized laterally.  There was dramatic hemorrhagic tissue and this was   bluntly dissected.  The prevertebral fascia had been ruptured from the   patient's trauma and immediate hyperextension distraction was noted at C5-C6.    A needle was placed into this disk space.  This was localized under   fluoroscopy.  Microscope was brought into the field.  Self-retaining   retractors were placed.  The longus colli were stripped off the anterior   lateral borders at C5 and C6 with monopolar electrocautery.  Ezel pins were   distracted at C5 and C6.  There was very dramatic anterior ossification, which   was  noted on his fluoroscopy and this was resected with a Kerrison 3.  Very   generous corpectomies were completed with an 8 mm drilling miguel ángel, greater than   50% at C5 and greater than 50% at C6.  This was necessary in order to access   the disk space without creating too much distraction or further   hyperextension, which placed his spinal cord at further risk, so this was   completed with an 8 mm drilling miguel ángel.  Bone chips were saved.  This was   completed down to the posterior longitudinal ligament, which was opened and   resected with a 1 mm Kerrison and subsequently a 2 mm Kerrison.  Further   posterior corpectomies created with a 2 mm Kerrison, both at C5 and C6.  I was   able to fully resect the posterior longitudinal ligament, creating a good   central and good foraminal decompression bilaterally.  An 8 mm intervertebral   disk spacer was noted to fit this disk space well.  There was a little bit of   laxity and I did not feel like that placing a 9 or 10 mm cage would be   anatomic whatsoever.  Fluoroscopy did confirm appropriate sizing.  An 8 mm   Titan anterior cervical intervertebral disk space was packed with locally   harvested autograft and placed into the C5-C6 disk space.  Surgiflo was used   in the epidural space for hemostasis.  The microscope was removed.  [place] pin   distractors were removed and the void filled with Surgiflo.  X-ray was taken   and there were still significant anterior osteophytes.  I was quite aggressive   for Aries you in removing these, which was necessary in order to place   the anterior cervical plate.  All the extra bone work for the corpectomies as   well as to remodel the anterior body, specifically at C5 added well over 45   minutes of time in the surgical case, dramatically increasing complexity as   well as risks.  This is all completed without evidence of intraoperative   complication.  Eventually, I was able to remodel the anterior vertebral body   at C5 and  placed a 14 mm anterior cervical plate, NeuroStructures, Transom   plate affixed to the anterior vertebral bodies at C5 and C6 with 16 mm   self-drilling screws x4.  Secondary locking mechanisms were utilized.  Final   AP and lateral fluoroscopy confirmed good hardware placement at the   appropriate surgical level.  The wound was copiously irrigated.  Due to the   hemorrhagic tissue, there was significant oozing from the bone and other   surfaces.  Surgiflo and bipolar electrocautery was used for extensive   hemostasis.  A drain was placed in the prevertebral space, tunneled through a   separate stab incision and affixed to the skin.  The wound was closed in   layers.  Steri-Strips were applied to skin edges.  Sterile dressing was   applied.  The patient was placed back in a cervical collar, extubated in the   operating room and taken to PACU in serious but stable condition.    ESTIMATED BLOOD LOSS:  25 mL.    COMPLICATIONS:  None noted.       ____________________________________     Varghese Wilkins MD SA / FILI    DD:  09/02/2018 22:20:02  DT:  09/02/2018 23:06:38    D#:  [id number]  Job#:  [id number]

## 2018-09-03 NOTE — H&P
Trauma  9/2/2018      CC: Trauma The patient was triaged as a Trauma Red in accordance with established pre hospital protols. An expeditious primary and secondary survey with required adjuncts was conducted. See Trauma Narrator for full details.    HPI: This is a male.  Report fall from standing he reports consuming alcohol prior to the fall he states he did not did not lose   consciousness.   He states no chest pain no difficulty breathing no abdominal pain no pain in his extremities  No past medical history on file.    No past surgical history on file.    Current Facility-Administered Medications   Medication Dose Route Frequency Provider Last Rate Last Dose   • Respiratory Care per Protocol   Nebulization Continuous RT Ariel Velez M.D.       • Pharmacy Consult Request ...Pain Management Review 1 Each  1 Each Other PRN Ariel Velez M.D.       • docusate sodium (COLACE) capsule 100 mg  100 mg Oral BID Ariel Velez M.D.       • senna-docusate (PERICOLACE or SENOKOT S) 8.6-50 MG per tablet 1 Tab  1 Tab Oral Nightly Ariel Velez M.D.       • senna-docusate (PERICOLACE or SENOKOT S) 8.6-50 MG per tablet 1 Tab  1 Tab Oral Q24HRS PRN Ariel Velez M.D.       • polyethylene glycol/lytes (MIRALAX) PACKET 1 Packet  1 Packet Oral BID Ariel Velez M.D.       • [START ON 9/3/2018] magnesium hydroxide (MILK OF MAGNESIA) suspension 30 mL  30 mL Oral DAILY Ariel Velez M.D.       • bisacodyl (DULCOLAX) suppository 10 mg  10 mg Rectal Q24HRS PRN Ariel Velez M.D.       • fleet enema 133 mL  1 Each Rectal Once PRN Ariel Velez M.D.       • NS infusion   Intravenous Continuous Ariel Velez M.D.       • acetaminophen (TYLENOL) suppository 650 mg  650 mg Rectal Q6HRS Ariel Velez M.D.       • oxyCODONE immediate-release (ROXICODONE) tablet 5 mg  5 mg Oral Q3HRS PRN Ariel Velez M.D.       • oxyCODONE immediate-release (ROXICODONE) tablet 10 mg  10 mg Oral Q3HRS PRN Ariel HAND  "RODOLFO Velez       • HYDROmorphone (DILAUDID) injection 0.5 mg  0.5 mg Intravenous Q3HRS PRN Ariel Velez M.D.       • ondansetron (ZOFRAN) syringe/vial injection 4 mg  4 mg Intravenous Q4HRS PRN Ariel Velez M.D.       • famotidine (PEPCID) tablet 20 mg  20 mg Oral BID Ariel Velez M.D.        Or   • famotidine (PEPCID) injection 20 mg  20 mg Intravenous BID Ariel Velez M.D.       • bacitracin-polymyxin b (POLYSPORIN) 500-99998 UNIT/GM ointment   Topical TID Ariel Velez M.D.         No current outpatient prescriptions on file.       Social History     Social History   • Marital status: Single     Spouse name: N/A   • Number of children: N/A   • Years of education: N/A     Occupational History   • Not on file.     Social History Main Topics   • Smoking status: Unknown If Ever Smoked   • Smokeless tobacco: Not on file   • Alcohol use Not on file   • Drug use: Unknown   • Sexual activity: Not on file     Other Topics Concern   • Not on file     Social History Narrative   • No narrative on file       No family history on file.    Allergies:  Unable to obtain    Review of Systems:  Positive noted above otherwise negative    Physical Exam:  Blood pressure 114/73, pulse 77, temperature 36.6 °C (97.9 °F), resp. rate 15, height 1.778 m (5' 10\"), weight 72.6 kg (160 lb), SpO2 100 %.    Constitutional: Somnolent but awakens able to give name states he fell after drinking beer No acute distress.   Head: Cephalohematoma laceration. Pupils reactive .  No bleeding ears nose or mouth.   Neck: No tracheal deviation.midline cervical spine tenderness. C-collar in place.   Cardiovascular: Normal rate, skin warm brisk capillary refill   pulmonary/Chest: Clavicles nontender to palpation. There is not any chest wall tenderness bilaterally.  No crepitus. Positive breath sounds bilaterally.   Abdominal: Soft, nondistended. Nontender to palpation. Pelvis is stable to anterior-posterior compression. No guarding or " rebound   musculoskeletal: Warm dry.    States no tenderness at hands wrists elbows shoulders ankles knees or hips   back: Midline thoracic and lumbar spines are nontender to palpation. No step-offs. Mild sacral erythema present.  Neurological:GCS 14. E3 V5 M6.  Skin: Skin is warm and dry.    Psychiatric:  Normal mood and affect.  Behavior is appropriate.       Labs:  Recent Labs      09/02/18   1359   WBC  4.1*   RBC  3.04*   HEMOGLOBIN  10.5*   HEMATOCRIT  30.9*   MCV  101.6*   MCH  34.5*   MCHC  34.0   RDW  46.7   PLATELETCT  140*   MPV  9.9     Recent Labs      09/02/18   1359   SODIUM  133*   POTASSIUM  3.9   CHLORIDE  101   CO2  22   GLUCOSE  77   BUN  4*   CREATININE  0.51   CALCIUM  8.1*     Recent Labs      09/02/18   1359   APTT  27.6   INR  1.23*     Recent Labs      09/02/18   1359   ASTSGOT  58*   ALTSGPT  19   TBILIRUBIN  0.4   ALKPHOSPHAT  50   GLOBULIN  2.7   INR  1.23*       Radiology:  [id number] VIEW   Final Result      No evidence of implanted electronic device.      CT-CSPINE WITHOUT PLUS RECONS   Final Result      1.  Fractures of the C4 and C5 spinous processes which are acute in nature.      2.  Fracture of the anterior/superior vertebral body and C6 with a small avulsed fragment. There is also fracture of the right uncinate process.      3.  Multilevel degenerative disc disease.      This was discussed with ESTEFANY STRONG at 2:30 PM on 9/2/2018.      CT-HEAD W/O   Final Result      1.  No evidence of acute intracranial process.      2.  Cerebral atrophy as well as periventricular chronic small vessel ischemic change.      3.  Nasal fracture.      DX-FOREARM LEFT   Final Result      Limited study due to noninclusion of the proximal forearm on lateral view. No fracture seen within these limitations.      DX-CHEST-LIMITED (1 VIEW)   Final Result      No evidence of acute cardiopulmonary process.      MR-CERVICAL SPINE-W/O    (Results Pending)   MR-THORACIC SPINE-W/O    (Results Pending)    DX-PORTABLE FLUORO > 1 HOUR    (Results Pending)   DX-CERVICAL SPINE-2 OR 3 VIEWS    (Results Pending)         Assessment: This is a male report fall from standing cervical spine injury  Plan:   MRI  neurosurgery requesting admission icu trauma  Planning for intervention  continue spine precautions  Follow up post op needs    Active Hospital Problems    Diagnosis   • Cervical spine fracture (HCC) [S12.9XXA]     Fractures of the C4 and C5 spinous processes   Fracture of the anterior/superior vertebral body and C6 with a small avulsed fragment. There is also fracture of the right uncinate process.    Central cord  Neurosurgery Operative     • Anemia [D64.9]     Monitor signs bleeding  Replace as indicated     • Hyponatremia [E87.1]     Monitor volume  Follow up labs     • Coagulopathy (HCC) [D68.9]     monitor follow up labs     • Alcohol intoxication (HCC) [F10.929]     monitor for signs withdrawal  Counseling  evalution intervetnion         Pain control  IV hydration   pulmonary hygiene  Close monitoring and support    Critical care time Time spent: 55 min  Ariel Velez MD, FACS  [facility]  [phone]

## 2018-09-03 NOTE — ED PROVIDER NOTES
"ED Provider Note    CHIEF COMPLAINT  Chief Complaint   Patient presents with   • Trauma Red       LAKISHA Anglin is a 68 y.o. male who presents to the emergency department brought in by ambulance after a ground-level fall and striking his head.  The patient was reportedly drinking alcohol and fell and struck his head on the ground.  He was found with borderline hypotension and was given intravenous fluids prior to arrival according to the EMS crew.  His initial GCS was 10.  The patient is intoxicated at the time of arrival and is an extremely poor historian and is only able to provide minimal history he complains of head and neck discomfort.  The patient also complained of numbness and tingling in both hands.    REVIEW OF SYSTEMS no shortness of breath no numbness or weakness in the lower extremities he is able to move all his extremities.  There is no other mechanism of injury.  Review of systems is limited due to the patient's intoxication.    PAST MEDICAL HISTORY  No past medical history on file.    FAMILY HISTORY  No family history on file.    Social history  The patient has been drinking alcohol today no additional history of present illness could be obtained due to his intoxication    SURGICAL HISTORY  No past surgical history on file.    CURRENT MEDICATIONS  Home Medications    **Home medications have not yet been reviewed for this encounter**         ALLERGIES  Allergies   Allergen Reactions   • Unable To Obtain        PHYSICAL EXAM  VITAL SIGNS: /73   Pulse 77   Temp 36.6 °C (97.9 °F)   Resp 15   Ht 1.778 m (5' 10\")   Wt 72.6 kg (160 lb)   SpO2 100%   BMI 22.96 kg/m²    Oxygen saturation is interpreted as adequate  Constitutional: The patient is awake but somewhat somnolent and smells strongly of alcohol and is extremely poor historian at the time of arrival  HENT: There is an obvious laceration and contusion to the left side of the forehead  Eyes: Pupils round extraocular motion " present  Neck: Trachea midline no JVD the patient did not arrive with a c-collar in place and he was moving his head and neck extensively at the time of arrival until placed in a cervical collar.  Cardiovascular: Regular rate and rhythm  Lungs: Clear and equal bilaterally  Abdomen/Back: Soft nontender nondistended no rebound guarding or peritoneal findings  Skin: Warm and dry  Musculoskeletal: There is a slight abrasion on the left forearm otherwise no acute bony deformity  Neurologic: The patient is able to verbalize he smells strongly of alcohol he is protecting his airway and he is moving all extremities    Laboratory  CBC shows white blood cell count of 4.1 hemoglobin is adequate at 10.5 basic metabolic panel is unremarkable the AST is minimally elevated at 58 LFTs are otherwise unremarkable blood alcohol level is elevated at 0.29 the INR is elevated at 1.23    EKG interpretation  Twelve-lead EKG shows sinus rhythm 76 bpm there is generally low voltage throughout the cardiogram there is no pathologic ST elevation or depression    Radiology  [id number] VIEW   Final Result      No evidence of implanted electronic device.      CT-CSPINE WITHOUT PLUS RECONS   Final Result      1.  Fractures of the C4 and C5 spinous processes which are acute in nature.      2.  Fracture of the anterior/superior vertebral body and C6 with a small avulsed fragment. There is also fracture of the right uncinate process.      3.  Multilevel degenerative disc disease.      This was discussed with ESTEFANY STRONG at 2:30 PM on 9/2/2018.      CT-HEAD W/O   Final Result      1.  No evidence of acute intracranial process.      2.  Cerebral atrophy as well as periventricular chronic small vessel ischemic change.      3.  Nasal fracture.      DX-FOREARM LEFT   Final Result      Limited study due to noninclusion of the proximal forearm on lateral view. No fracture seen within these limitations.      DX-CHEST-LIMITED (1 VIEW)   Final Result      No  evidence of acute cardiopulmonary process.      MR-CERVICAL SPINE-W/O    (Results Pending)   MR-THORACIC SPINE-W/O    (Results Pending)   DX-PORTABLE FLUORO > 1 HOUR    (Results Pending)         PROCEDURES  The patient had 2 wounds on the face the first is a 2.5 cm laceration to the forehead on the left side which was locally infiltrated with lidocaine to achieve adequate anesthesia and then irrigated and scrubbed with Betadine and using sterile technique and feel that closed the wound with 5, 5-0 nylon sutures.  There was another smaller wound 1.5 cm in length just lateral to the left upper eyelid it was not full-thickness however did require closure and the wound was infiltrated with lidocaine to achieve adequate anesthesia and cleansed and closed with 3, 5-0 nylon sutures.    MEDICAL DECISION MAKING and DISPOSITION  The patient arrived as a trauma red patient was seen by myself and the trauma surgeon.  The patient was given intravenous antibiotics and his tetanus booster was updated.  The patient was placed in a cervical collar in the trauma room and this was later changed to a Reynolds J collar.  The wound on the forehead has been closed.  The patient is intoxicated he is a very difficult historian but remains hemodynamically stable.  Neurosurgical consultation was obtained and the patient has been seen by the neurosurgeon and will be taken to the operating room for further care.  The patient will be admitted to the trauma service    IMPRESSION  1.  Multiple C-spine fractures with spinal cord injury  2.  2.5 cm laceration to the forehead sutured in the emergency department  3.  1.5 cm laceration to the face sutured in the emergency department  4.  Alcohol intoxication  5.  Critical care time with this patient is 35 minutes      Electronically signed by: Cb Thompson, 9/2/2018 7:49 PM

## 2018-09-04 PROCEDURE — 700111 HCHG RX REV CODE 636 W/ 250 OVERRIDE (IP): Performed by: SURGERY

## 2018-09-04 PROCEDURE — G8978 MOBILITY CURRENT STATUS: HCPCS | Mod: CK

## 2018-09-04 PROCEDURE — 97162 PT EVAL MOD COMPLEX 30 MIN: CPT

## 2018-09-04 PROCEDURE — 97166 OT EVAL MOD COMPLEX 45 MIN: CPT

## 2018-09-04 PROCEDURE — A9270 NON-COVERED ITEM OR SERVICE: HCPCS | Performed by: NEUROLOGICAL SURGERY

## 2018-09-04 PROCEDURE — A9270 NON-COVERED ITEM OR SERVICE: HCPCS | Performed by: SURGERY

## 2018-09-04 PROCEDURE — 700102 HCHG RX REV CODE 250 W/ 637 OVERRIDE(OP): Performed by: NEUROLOGICAL SURGERY

## 2018-09-04 PROCEDURE — A9270 NON-COVERED ITEM OR SERVICE: HCPCS | Performed by: PHYSICIAN ASSISTANT

## 2018-09-04 PROCEDURE — 94668 MNPJ CHEST WALL SBSQ: CPT

## 2018-09-04 PROCEDURE — 700112 HCHG RX REV CODE 229: Performed by: SURGERY

## 2018-09-04 PROCEDURE — G8979 MOBILITY GOAL STATUS: HCPCS | Mod: CI

## 2018-09-04 PROCEDURE — 700102 HCHG RX REV CODE 250 W/ 637 OVERRIDE(OP): Performed by: PHYSICIAN ASSISTANT

## 2018-09-04 PROCEDURE — 700111 HCHG RX REV CODE 636 W/ 250 OVERRIDE (IP): Performed by: PHYSICIAN ASSISTANT

## 2018-09-04 PROCEDURE — 700102 HCHG RX REV CODE 250 W/ 637 OVERRIDE(OP): Performed by: SURGERY

## 2018-09-04 PROCEDURE — G8988 SELF CARE GOAL STATUS: HCPCS | Mod: CI

## 2018-09-04 PROCEDURE — 94760 N-INVAS EAR/PLS OXIMETRY 1: CPT

## 2018-09-04 PROCEDURE — 700101 HCHG RX REV CODE 250: Performed by: SURGERY

## 2018-09-04 PROCEDURE — G8987 SELF CARE CURRENT STATUS: HCPCS | Mod: CK

## 2018-09-04 PROCEDURE — 770001 HCHG ROOM/CARE - MED/SURG/GYN PRIV*

## 2018-09-04 RX ADMIN — FAMOTIDINE 20 MG: 20 TABLET ORAL at 04:42

## 2018-09-04 RX ADMIN — OXYCODONE HYDROCHLORIDE 10 MG: 10 TABLET ORAL at 12:00

## 2018-09-04 RX ADMIN — DEXAMETHASONE 4 MG: 4 TABLET ORAL at 17:01

## 2018-09-04 RX ADMIN — HYDROMORPHONE HYDROCHLORIDE 0.5 MG: 2 INJECTION INTRAMUSCULAR; INTRAVENOUS; SUBCUTANEOUS at 01:39

## 2018-09-04 RX ADMIN — POLYETHYLENE GLYCOL 3350 1 PACKET: 17 POWDER, FOR SOLUTION ORAL at 04:42

## 2018-09-04 RX ADMIN — METHOCARBAMOL 750 MG: 750 TABLET, FILM COATED ORAL at 17:01

## 2018-09-04 RX ADMIN — POLYETHYLENE GLYCOL 3350 1 PACKET: 17 POWDER, FOR SOLUTION ORAL at 17:02

## 2018-09-04 RX ADMIN — OXYCODONE HYDROCHLORIDE 10 MG: 10 TABLET ORAL at 08:26

## 2018-09-04 RX ADMIN — ENOXAPARIN SODIUM 40 MG: 100 INJECTION SUBCUTANEOUS at 08:28

## 2018-09-04 RX ADMIN — GABAPENTIN 300 MG: 300 CAPSULE ORAL at 12:00

## 2018-09-04 RX ADMIN — SENNOSIDES AND DOCUSATE SODIUM 1 TABLET: 8.6; 5 TABLET ORAL at 20:57

## 2018-09-04 RX ADMIN — OXYCODONE HYDROCHLORIDE 10 MG: 10 TABLET ORAL at 17:01

## 2018-09-04 RX ADMIN — OXYCODONE HYDROCHLORIDE 10 MG: 10 TABLET ORAL at 20:57

## 2018-09-04 RX ADMIN — METHOCARBAMOL 750 MG: 750 TABLET, FILM COATED ORAL at 20:57

## 2018-09-04 RX ADMIN — METHOCARBAMOL 750 MG: 750 TABLET, FILM COATED ORAL at 12:01

## 2018-09-04 RX ADMIN — DEXAMETHASONE 4 MG: 4 TABLET ORAL at 04:42

## 2018-09-04 RX ADMIN — Medication 1 EACH: at 04:42

## 2018-09-04 RX ADMIN — DOCUSATE SODIUM 100 MG: 100 CAPSULE, LIQUID FILLED ORAL at 08:27

## 2018-09-04 RX ADMIN — GABAPENTIN 300 MG: 300 CAPSULE ORAL at 17:01

## 2018-09-04 RX ADMIN — GABAPENTIN 300 MG: 300 CAPSULE ORAL at 04:42

## 2018-09-04 RX ADMIN — DOCUSATE SODIUM 100 MG: 100 CAPSULE, LIQUID FILLED ORAL at 17:01

## 2018-09-04 RX ADMIN — FAMOTIDINE 20 MG: 20 TABLET ORAL at 17:01

## 2018-09-04 RX ADMIN — Medication: at 18:00

## 2018-09-04 RX ADMIN — METHOCARBAMOL 750 MG: 750 TABLET, FILM COATED ORAL at 08:27

## 2018-09-04 RX ADMIN — Medication: at 12:00

## 2018-09-04 ASSESSMENT — PAIN SCALES - GENERAL
PAINLEVEL_OUTOF10: 7
PAINLEVEL_OUTOF10: 9
PAINLEVEL_OUTOF10: 8
PAINLEVEL_OUTOF10: 7
PAINLEVEL_OUTOF10: 4
PAINLEVEL_OUTOF10: 2
PAINLEVEL_OUTOF10: 7
PAINLEVEL_OUTOF10: 8

## 2018-09-04 ASSESSMENT — COGNITIVE AND FUNCTIONAL STATUS - GENERAL
MOVING FROM LYING ON BACK TO SITTING ON SIDE OF FLAT BED: UNABLE
CLIMB 3 TO 5 STEPS WITH RAILING: A LOT
TURNING FROM BACK TO SIDE WHILE IN FLAT BAD: UNABLE
PERSONAL GROOMING: A LITTLE
SUGGESTED CMS G CODE MODIFIER MOBILITY: CM
MOVING FROM LYING ON BACK TO SITTING ON SIDE OF FLAT BED: A LOT
DAILY ACTIVITIY SCORE: 19
DRESSING REGULAR LOWER BODY CLOTHING: A LITTLE
HELP NEEDED FOR BATHING: A LOT
SUGGESTED CMS G CODE MODIFIER DAILY ACTIVITY: CK
CLIMB 3 TO 5 STEPS WITH RAILING: TOTAL
WALKING IN HOSPITAL ROOM: A LOT
MOVING TO AND FROM BED TO CHAIR: UNABLE
DAILY ACTIVITIY SCORE: 17
TOILETING: A LITTLE
MOVING TO AND FROM BED TO CHAIR: A LITTLE
PERSONAL GROOMING: A LITTLE
DRESSING REGULAR UPPER BODY CLOTHING: A LITTLE
TOILETING: A LITTLE
SUGGESTED CMS G CODE MODIFIER DAILY ACTIVITY: CK
HELP NEEDED FOR BATHING: A LITTLE
STANDING UP FROM CHAIR USING ARMS: A LOT
DRESSING REGULAR UPPER BODY CLOTHING: A LITTLE
EATING MEALS: A LITTLE
WALKING IN HOSPITAL ROOM: TOTAL
STANDING UP FROM CHAIR USING ARMS: A LOT
DRESSING REGULAR LOWER BODY CLOTHING: A LITTLE
MOBILITY SCORE: 7

## 2018-09-04 ASSESSMENT — ENCOUNTER SYMPTOMS
SHORTNESS OF BREATH: 1
ABDOMINAL PAIN: 0
NECK PAIN: 1
DOUBLE VISION: 0
POLYDIPSIA: 0
HEADACHES: 0
SPEECH CHANGE: 0
MYALGIAS: 1
FEVER: 0
NAUSEA: 0
SENSORY CHANGE: 0

## 2018-09-04 ASSESSMENT — LIFESTYLE VARIABLES: SUBSTANCE_ABUSE: 1

## 2018-09-04 ASSESSMENT — ACTIVITIES OF DAILY LIVING (ADL): TOILETING: INDEPENDENT

## 2018-09-04 ASSESSMENT — GAIT ASSESSMENTS: GAIT LEVEL OF ASSIST: UNABLE TO PARTICIPATE

## 2018-09-04 NOTE — PROGRESS NOTES
Pt A/Ox4. Pt complaining of 8/10 pain in arms bilaterally. Pt also stating he has mild back pain and anterior neck pain. Pt is on 2L NC. Pt has 2 IVs present and patent. Pt wearing SCDs. Pt demonstrates that he knows how to us IS and has reached 2500mL. Pt has a mepilex applied to saccrum. Pt has an abrasion to left forearm that is MARGARITO, as well as a laceration to the left eye and forehead that are also MARGARITO. PT is alin a hard collar and has been educated to keep it on at all times. Call light and belongings within reach. Bed locked and in the lowest position, bed alarm is on.

## 2018-09-04 NOTE — DISCHARGE PLANNING
Anticipated Discharge Disposition: SNF    Action: Reviewed available medical records, discussed pt's case with bedside RN. Met with pt at bedside and OT who was working with pt at bedside for therapy. Completed assessment with pt at bedside and discussed d/c planning. Pt is a very pleasant 68 y.o. Man admitted for GLF and underwent C5-6 ACDF. Pt states he originates from [facility] and came to [place] to visit and was staying at the [employer] when he lost his walett but he was able to retrieve it. Pt states [facility] and did not like it there. Pt states he makes approx. $106 month from [employer] and approx. $930 from [employer]. Pt states he has [employer] and [employer] and his prescription coverage is through [employer]. Emailed [facility] requesting they look into secondary insurance. Pt states he has been trying to get into [facility] but there are wait lists. Pt states he drinks alcohol and smokes but is planning on quitting after hospitalization. Pt denies any MH/psych hx and states he has never been in a inpatient psych facility. Pt states he has no more living relatives as his brother just recently . 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 Pt states his mother passed away in a SNF and she had Alzheimer's and pancreatic cancer. Discussed POLST and pt states he might be interested in completing one with his MD. Pt states he was IPTA, does not use any DME, has no PCP currently.     Discussed d/c planning and SNF. Pt states he understands he will need continued therapy and is agreeable to SNF. Provided SNF choice form and pt would like to send to all local SNF's. Faxed choice form to [facility] to process SNF referrals. Reviewed H/P and per pt, no indication of MH/psych dx. Completed PASRR ID#[id number].     Barriers to Discharge: N/A.     Plan: As above, will await acceptance from SNF and medical clearance.           Care Transition Team Assessment    Information  Source  Orientation : Oriented x 4  Information Given By: Patient  Informant's Name: Chris  Who is responsible for making decisions for patient? : Patient    Readmission Evaluation  Is this a readmission?: No    Elopement Risk  Legal Hold: No  Ambulatory or Self Mobile in Wheelchair: No-Not an Elopement Risk  Elopement Risk: Not at Risk for Elopement    Interdisciplinary Discharge Planning  Primary Care Physician: N/A  Lives with - Patient's Self Care Capacity: Alone and Able to Care For Self  Patient or legal guardian wants to designate a caregiver (see row info): No  Support Systems: None  Housing / Facility: Homeless  Prior Services: None  Durable Medical Equipment: Not Applicable    Discharge Preparedness  What is your plan after discharge?: Skilled nursing facility  What are your discharge supports?: Other (comment) (limited support, pt is homeless)  Prior Functional Level: Ambulatory, Independent with Activities of Daily Living, Independent with Medication Management  Difficulity with ADLs: Walking  Difficulty with ADLs Comment: pt had a GLF  Difficulity with IADLs: Cooking, Shopping, Managing medication, Keeping track of finances  Difficulity with IADL Comments: pt had a GLF, underwent surgery and is homeless.     Functional Assesment  Prior Functional Level: Ambulatory, Independent with Activities of Daily Living, Independent with Medication Management    Finances  Financial Barriers to Discharge: No (per pt, he makes $106 from  [employer] and $930 from [employer])  Prescription Coverage: Yes    Vision / Hearing Impairment  Vision Impairment : No  Hearing Impairment : No    Values / Beliefs / Concerns  Values / Beliefs Concerns : No    Advance Directive  Advance Directive?: None  Advance Directive offered?:  (pt is interested in completing a POLST)    Domestic Abuse  Have you ever been the victim of abuse or violence?: No  Physical Abuse or Sexual Abuse: No  Verbal Abuse or Emotional Abuse: No    Psychological  Assessment  History of Substance Abuse: Alcohol  Date Last Used - Alcohol: 09/02/2018  Substance Abuse Comments: pt states ETOH and smoking  History of Psychiatric Problems: No (per pt, he does not have hx of MH or psych dx. )    Discharge Risks or Barriers  Discharge risks or barriers?: No PCP, Substance abuse, Lives alone, no community support, Complex medical needs, Homeless / couch surfing  Patient risk factors: Homeless, Complex medical needs, Lack of outside supports, Lives alone and no community support, No PCP, Recent loss (pt states he lost his brother recently)    Anticipated Discharge Information  Anticipated discharge disposition: SNF  Discharge Address: D

## 2018-09-04 NOTE — THERAPY
"Occupational Therapy Evaluation completed.   Functional Status:    Sup>sit: SBA  Sit><stand: Min A, Mod A to remain standing  Side stepped to chair with FWW: CGA  Seated grooming: Min A     Plan of Care: Will benefit from Occupational Therapy 3 times per week  Discharge Recommendations:  Equipment: Will Continue to Assess for Equipment Needs. Post-acute therapy Discharge to a transitional care facility for continued skilled therapy services.    See \"Rehab Therapy-Acute\" Patient Summary Report for complete documentation.    67 y/o male s/p C5-6 ORIF and partial corpectomy. Pt has c/o pain, BUE altered sensation, and BLE weakness. Pt expresses fear about standing and thinks he will fall. Pt able to stand today, with two trials he was able to remain standing for approx 10-15 seconds each. On third try, he was able to sidestep to chair with mod A and FWW. Brushed teeth in seating, min A for set-up and cues. Pt has diminished coordination with functional tasks and during formal testing. Pt very jittery throughout session, also became emotional when discussing brother's recent death. OT will continue working with pt in this setting, recommend post-acute rehab.   "

## 2018-09-04 NOTE — THERAPY
"Physical Therapy Evaluation completed.   Bed Mobility:  Supine to Sit:  (pt was up in bedside chair)  Transfers: Sit to Stand: Moderate Assist (leaning posterior, \"jerking\" motion/ ataxic)  Gait: Level Of Assist: Unable to Participate with No Equipment Needed       Plan of Care: Will benefit from Physical Therapy 4 times per week  Discharge Recommendations: Equipment: Will Continue to Assess for Equipment Needs. Post-acute therapy Discharge to a transitional care facility for continued skilled therapy services/  SNF    See \"Rehab Therapy-Acute\" Patient Summary Report for complete documentation.     "

## 2018-09-04 NOTE — CARE PLAN
Problem: Safety  Goal: Will remain free from injury  Outcome: PROGRESSING AS EXPECTED  Pt has remained free from injury this visit. Pt calls for help appropriately.    Problem: Infection  Goal: Will remain free from infection  Outcome: PROGRESSING AS EXPECTED  No s/s of infection.

## 2018-09-04 NOTE — PROGRESS NOTES
"  Trauma/Surgical Progress Note    Author: Shun Cota Date & Time created: 9/4/2018   8:11 AM     Interval Events:  No new events over night  Pt states he is unable to walk, he is able to move all extremities    Awaiting PT/OT evaluation  Will need disposition assistance from .  Pt is currently homeless.     Review of Systems   Constitutional: Negative for fever.   Eyes: Negative for double vision.   Respiratory: Positive for shortness of breath.         Supplemental O2   Cardiovascular: Negative for chest pain.   Gastrointestinal: Negative for abdominal pain and nausea.   Genitourinary: Negative for dysuria.   Musculoskeletal: Positive for myalgias and neck pain.   Neurological: Negative for sensory change, speech change and headaches.   Endo/Heme/Allergies: Negative for polydipsia.   Psychiatric/Behavioral: Positive for substance abuse.        ETOH       Hemodynamics:  Blood pressure 134/91, pulse 73, temperature 37.2 °C (98.9 °F), resp. rate 16, height 1.778 m (5' 10\"), weight 101.1 kg (222 lb 14.2 oz), SpO2 96 %.     Respiratory:    Respiration: 16, Pulse Oximetry: 96 %, O2 Daily Delivery Respiratory : Silicone Nasal Cannula     PEP/CPT Method: Positive Airway Pressure Device  RUL Breath Sounds: Clear, RML Breath Sounds: Clear, RLL Breath Sounds: Diminished, TOD Breath Sounds: Clear, LLL Breath Sounds: Diminished  Fluids:    Intake/Output Summary (Last 24 hours) at 09/04/18 0811  Last data filed at 09/04/18 0600   Gross per 24 hour   Intake             3265 ml   Output             4070 ml   Net             -805 ml     Admit Weight: 72.6 kg (160 lb)  Current      Physical Exam   Constitutional: He is oriented to person, place, and time. He appears well-developed. He is active and cooperative. No distress. Cervical collar and nasal cannula in place.   HENT:   Head: Normocephalic and atraumatic.   Mouth/Throat: Oropharynx is clear and moist.   Eyes: Pupils are equal, round, and reactive to light. " Conjunctivae and EOM are normal.   Neck: No tracheal deviation present.   C-collar in place   Cardiovascular: Normal rate, regular rhythm and intact distal pulses.   No extrasystoles are present.   Pulmonary/Chest: No stridor. No respiratory distress. He has no decreased breath sounds. He has no wheezes. He has no rhonchi.   Abdominal: Soft. He exhibits no distension. There is no tenderness.   Musculoskeletal: He exhibits no edema or deformity.   Moves all extremities   Neurological: He is alert and oriented to person, place, and time.   Bilateral upper extremity weakness, strength better on right.  Lower extremities work against gravity  Some blunting of sensation bilateral upper extremities distally   Skin: Skin is warm and dry. No pallor.       Medical Decision Making/Problem List:    Active Hospital Problems    Diagnosis   • Cervical spine fracture (HCC) [S12.9XXA]     Priority: High     Fractures of the C4 and C5 spinous processes   Fracture of the anterior/superior vertebral body and C6 with a small avulsed fragment. There is also fracture of the right uncinate process.  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 Neurosurgery       • Anemia [D64.9]     Priority: Medium     Transfusion for Hgb less than 7.0  Trend Hgb   9/3 trending up.  No indication for transfusion.      • Hyponatremia [E87.1]     Priority: Medium     Trend   9/3 135      • Coagulopathy (HCC) [D68.9]     Priority: Medium     Trend labs.  9/3 INR 1.23      • Alcohol intoxication ([facility]) [F10.929]     Priority: Medium     Monitor for signs withdrawal  SBIRT      • Trauma [T14.90XA]     Priority: Low     Trauma Red activation   GLF, ETOH intoxication.  Dr. Velez Trauma physician.          Core Measures & Quality Metrics:  Labs reviewed, Medications reviewed and Radiology images reviewed  Roca catheter: No Roca      DVT  Prophylaxis: Enoxaparin (Lovenox)    Ulcer prophylaxis: Not indicated    Assessed for rehab: Patient was assess for and/or received rehabilitation services during this hospitalization    DELL Score   ETOH Screening  CAGE Score: 4  Intervention complete date: 9/4/2018  Patient response to intervention: I would like to stop drinking .   Patient demonstrats understanding of intervention.Plan of care: resources for pt     has been contacted.Follow up with: Clinic  Total ETOH intervention time: greater than 30 minutes    Discussed patient condition with RN, Patient and trauma surgery. Dr. Velez.

## 2018-09-04 NOTE — PROGRESS NOTES
2 RN skin check done with Irina TSANG. Abrasions on forehead, left forearm, nose  and surgical incision on neck. No pressure sores noted at this time

## 2018-09-04 NOTE — DISCHARGE PLANNING
Received Choice form at 1120 from Yaneli(LAURA)  Agency/Facility Name: [facility]  Referral sent per Choice form @ 1120

## 2018-09-04 NOTE — PROGRESS NOTES
Neurosurgery Progress Note    Subjective:  POD#2 C5-6 ACDF   C/o persistent pain/ tingling to both arms, diffuse and lesser posterior neck pain  No issues eating/ drinking  Has not worked with PT/OT yet   Drain out     Exam:  Wearing [place] J collar appropriately  Motor:  Right deltoid 3/5, left deltoid 4/5,  Right bicep 3+/5, left bicep 4/5, bilateral triceps 3+/5, pain limited on wrist extensors/ flexors but grossly 3/5, finger extensors 3/5  5/5 in BLEs  Hyperesthetic diffusely in BUEs  Sensory intact in BLEs      BP  Min: 134/91  Max: 150/80  Pulse  Av.1  Min: 61  Max: 105  Resp  Av.3  Min: 9  Max: 118  Temp  Av.9 °C (98.5 °F)  Min: 36.7 °C (98 °F)  Max: 37.2 °C (98.9 °F)  SpO2  Av.4 %  Min: 92 %  Max: 98 %    No Data Recorded    Recent Labs      18   1359  18   0420   WBC  4.1*  5.4   RBC  3.04*  3.31*   HEMOGLOBIN  10.5*  11.6*   HEMATOCRIT  30.9*  33.5*   MCV  101.6*  101.2*   MCH  34.5*  35.0*   MCHC  34.0  34.6   RDW  46.7  46.0   PLATELETCT  140*  102*   MPV  9.9  10.6     Recent Labs      18   1359  18   0420   SODIUM  133*  135   POTASSIUM  3.9  4.2   CHLORIDE  101  103   CO2  22  24   GLUCOSE  77  108*   BUN  4*  4*   CREATININE  0.51  0.54   CALCIUM  8.1*  8.8     Recent Labs      18   1359   APTT  27.6   INR  1.23*           Intake/Output       18 - 18 0659 18 - 18 0659       Total  Total       Intake    P.O.  1250  1440 2690  --  -- --    P.O. 1250 1440 2690 -- -- --    I.V.  900  -- 900  --  -- --    IV Piggyback Volume (IV Piggyback) 100 -- 100 -- -- --    IV Volume (NS) 800 -- 800 -- -- --    Total Intake 2150 1440 3590 -- -- --       Output    Urine  1400  2950 4350  --  -- --    Number of Times Voided 4 x -- 4 x -- -- --    Urine Void (mL) (non-catheter) 1400 2950 4350 -- -- --    Drains  20  -- 20  --  -- --    Output (ml) ([REMOVED] Surgical Drain [facility] Right;Neck Ananth  Luna 1 (A)) 20 -- 20 -- -- --    Total Output 1420 2950 4370 -- -- --       Net I/O     690 -4475 -946 -- -- --            Intake/Output Summary (Last 24 hours) at 09/04/18 0824  Last data filed at 09/04/18 0600   Gross per 24 hour   Intake             3265 ml   Output             4070 ml   Net             -805 ml            • enoxaparin (LOVENOX) injection  40 mg DAILY   • gabapentin  300 mg TID   • methocarbamol  750 mg 4X/DAY   • dexamethasone  4 mg Q12HRS   • Respiratory Care per Protocol   Continuous RT   • Pharmacy Consult Request  1 Each PRN   • docusate sodium  100 mg BID   • senna-docusate  1 Tab Nightly   • senna-docusate  1 Tab Q24HRS PRN   • polyethylene glycol/lytes  1 Packet BID   • magnesium hydroxide  30 mL DAILY   • bisacodyl  10 mg Q24HRS PRN   • fleet  1 Each Once PRN   • NS   Continuous   • oxyCODONE immediate-release  5 mg Q3HRS PRN   • oxyCODONE immediate release  10 mg Q3HRS PRN   • HYDROmorphone  0.5 mg Q3HRS PRN   • ondansetron  4 mg Q4HRS PRN   • famotidine  20 mg BID    Or   • famotidine  20 mg BID   • bacitracin-polymyxin b   TID       Assessment and Plan:  POD #2  Lorraine J collar x 3 months AAT  PT/OT  Encourage PO pain meds (vs IV)    Prophylactic anticoagulation: yes         Start date/time: today

## 2018-09-05 VITALS
BODY MASS INDEX: 31.91 KG/M2 | SYSTOLIC BLOOD PRESSURE: 146 MMHG | OXYGEN SATURATION: 97 % | WEIGHT: 222.88 LBS | HEART RATE: 84 BPM | RESPIRATION RATE: 18 BRPM | HEIGHT: 70 IN | TEMPERATURE: 98.8 F | DIASTOLIC BLOOD PRESSURE: 89 MMHG

## 2018-09-05 PROBLEM — S01.81XA FOREHEAD LACERATION: Status: ACTIVE | Noted: 2018-09-05

## 2018-09-05 PROCEDURE — 700102 HCHG RX REV CODE 250 W/ 637 OVERRIDE(OP): Performed by: PHYSICIAN ASSISTANT

## 2018-09-05 PROCEDURE — 700112 HCHG RX REV CODE 229: Performed by: SURGERY

## 2018-09-05 PROCEDURE — A9270 NON-COVERED ITEM OR SERVICE: HCPCS | Performed by: SURGERY

## 2018-09-05 PROCEDURE — 700101 HCHG RX REV CODE 250: Performed by: SURGERY

## 2018-09-05 PROCEDURE — A9270 NON-COVERED ITEM OR SERVICE: HCPCS | Performed by: PHYSICIAN ASSISTANT

## 2018-09-05 PROCEDURE — A9270 NON-COVERED ITEM OR SERVICE: HCPCS | Performed by: NEUROLOGICAL SURGERY

## 2018-09-05 PROCEDURE — 97530 THERAPEUTIC ACTIVITIES: CPT

## 2018-09-05 PROCEDURE — 700111 HCHG RX REV CODE 636 W/ 250 OVERRIDE (IP): Performed by: PHYSICIAN ASSISTANT

## 2018-09-05 PROCEDURE — 700102 HCHG RX REV CODE 250 W/ 637 OVERRIDE(OP): Performed by: SURGERY

## 2018-09-05 PROCEDURE — 700102 HCHG RX REV CODE 250 W/ 637 OVERRIDE(OP): Performed by: NEUROLOGICAL SURGERY

## 2018-09-05 RX ORDER — METHYLPREDNISOLONE 4 MG/1
TABLET ORAL
Qty: 1 KIT | Refills: 0
Start: 2018-09-05 | End: 2018-12-05 | Stop reason: CLARIF

## 2018-09-05 RX ORDER — METHYLPREDNISOLONE 4 MG/1
8 TABLET ORAL
Status: COMPLETED | OUTPATIENT
Start: 2018-09-05 | End: 2018-09-05

## 2018-09-05 RX ORDER — METHYLPREDNISOLONE 4 MG/1
4 TABLET ORAL
Status: DISCONTINUED | OUTPATIENT
Start: 2018-09-06 | End: 2018-09-05 | Stop reason: HOSPADM

## 2018-09-05 RX ORDER — METHYLPREDNISOLONE 4 MG/1
4 TABLET ORAL
Status: DISCONTINUED | OUTPATIENT
Start: 2018-09-05 | End: 2018-09-05 | Stop reason: HOSPADM

## 2018-09-05 RX ORDER — OXYCODONE HYDROCHLORIDE 10 MG/1
10 TABLET ORAL EVERY 4 HOURS PRN
Qty: 18 TAB | Refills: 0 | Status: SHIPPED | OUTPATIENT
Start: 2018-09-05 | End: 2018-09-05

## 2018-09-05 RX ORDER — OXYCODONE HYDROCHLORIDE 10 MG/1
10 TABLET ORAL EVERY 4 HOURS PRN
Qty: 18 TAB | Refills: 0 | Status: SHIPPED | OUTPATIENT
Start: 2018-09-05 | End: 2018-09-08

## 2018-09-05 RX ORDER — METHOCARBAMOL 750 MG/1
750 TABLET, FILM COATED ORAL 4 TIMES DAILY
Qty: 12 TAB | Refills: 0 | Status: SHIPPED | OUTPATIENT
Start: 2018-09-05 | End: 2018-09-05

## 2018-09-05 RX ORDER — METHYLPREDNISOLONE 4 MG/1
8 TABLET ORAL
Status: DISCONTINUED | OUTPATIENT
Start: 2018-09-05 | End: 2018-09-05 | Stop reason: HOSPADM

## 2018-09-05 RX ORDER — METHOCARBAMOL 750 MG/1
750 TABLET, FILM COATED ORAL 4 TIMES DAILY
Qty: 12 TAB | Refills: 0 | Status: SHIPPED | OUTPATIENT
Start: 2018-09-05 | End: 2018-09-08

## 2018-09-05 RX ORDER — BACITRACIN ZINC AND POLYMYXIN B SULFATE 500; 1000 [USP'U]/G; [USP'U]/G
1 OINTMENT TOPICAL 3 TIMES DAILY
Qty: 1 TUBE | Refills: 0
Start: 2018-09-05 | End: 2018-12-05 | Stop reason: CLARIF

## 2018-09-05 RX ORDER — METHYLPREDNISOLONE 4 MG/1
4 TABLET ORAL
Status: DISCONTINUED | OUTPATIENT
Start: 2018-09-07 | End: 2018-09-05 | Stop reason: HOSPADM

## 2018-09-05 RX ADMIN — GABAPENTIN 300 MG: 300 CAPSULE ORAL at 11:44

## 2018-09-05 RX ADMIN — OXYCODONE HYDROCHLORIDE 10 MG: 10 TABLET ORAL at 11:44

## 2018-09-05 RX ADMIN — DOCUSATE SODIUM 100 MG: 100 CAPSULE, LIQUID FILLED ORAL at 08:27

## 2018-09-05 RX ADMIN — OXYCODONE HYDROCHLORIDE 10 MG: 10 TABLET ORAL at 05:41

## 2018-09-05 RX ADMIN — METHYLPREDNISOLONE 4 MG: 4 TABLET ORAL at 18:00

## 2018-09-05 RX ADMIN — METHOCARBAMOL 750 MG: 750 TABLET, FILM COATED ORAL at 08:27

## 2018-09-05 RX ADMIN — DEXAMETHASONE 4 MG: 4 TABLET ORAL at 05:41

## 2018-09-05 RX ADMIN — METHOCARBAMOL 750 MG: 750 TABLET, FILM COATED ORAL at 17:00

## 2018-09-05 RX ADMIN — METHYLPREDNISOLONE 4 MG: 4 TABLET ORAL at 14:36

## 2018-09-05 RX ADMIN — Medication: at 12:00

## 2018-09-05 RX ADMIN — Medication 1 EACH: at 05:40

## 2018-09-05 RX ADMIN — ENOXAPARIN SODIUM 40 MG: 100 INJECTION SUBCUTANEOUS at 05:41

## 2018-09-05 RX ADMIN — GABAPENTIN 300 MG: 300 CAPSULE ORAL at 18:00

## 2018-09-05 RX ADMIN — GABAPENTIN 300 MG: 300 CAPSULE ORAL at 05:40

## 2018-09-05 RX ADMIN — OXYCODONE HYDROCHLORIDE 10 MG: 10 TABLET ORAL at 17:12

## 2018-09-05 RX ADMIN — METHYLPREDNISOLONE 8 MG: 4 TABLET ORAL at 11:46

## 2018-09-05 RX ADMIN — METHOCARBAMOL 750 MG: 750 TABLET, FILM COATED ORAL at 13:45

## 2018-09-05 RX ADMIN — FAMOTIDINE 20 MG: 20 TABLET ORAL at 05:40

## 2018-09-05 ASSESSMENT — ENCOUNTER SYMPTOMS
CONSTITUTIONAL NEGATIVE: 1
RESPIRATORY NEGATIVE: 1
SENSORY CHANGE: 1
MYALGIAS: 1
GASTROINTESTINAL NEGATIVE: 1
FOCAL WEAKNESS: 1

## 2018-09-05 ASSESSMENT — COGNITIVE AND FUNCTIONAL STATUS - GENERAL
MOVING TO AND FROM BED TO CHAIR: UNABLE
TURNING FROM BACK TO SIDE WHILE IN FLAT BAD: UNABLE
STANDING UP FROM CHAIR USING ARMS: A LOT
MOVING FROM LYING ON BACK TO SITTING ON SIDE OF FLAT BED: UNABLE
SUGGESTED CMS G CODE MODIFIER MOBILITY: CM
CLIMB 3 TO 5 STEPS WITH RAILING: TOTAL
MOBILITY SCORE: 7
WALKING IN HOSPITAL ROOM: TOTAL

## 2018-09-05 ASSESSMENT — GAIT ASSESSMENTS: GAIT LEVEL OF ASSIST: UNABLE TO PARTICIPATE

## 2018-09-05 NOTE — DISCHARGE PLANNING
Anticipated Discharge Disposition: [facility]    Action: LSW met with patient at bedside and assisted patient in calling [facility] [address].  Patient informed that his court date was scheduled for today, 9/5/18 at 0815.  LSW provided patient with current admission letter to provide to court.    Barriers to Discharge: None.    Plan: LSW to continue to assist with d/c as needed.

## 2018-09-05 NOTE — DISCHARGE PLANNING
Received Transport Form @ 3246 from Martin)  Spoke to Latha @ [facility]    Transport is scheduled for 9/5 @ 1730 going to [facility]. Martin) and Latha notified of transport time.

## 2018-09-05 NOTE — DISCHARGE INSTRUCTIONS
Discharge Instructions    Discharged to home by car with escort. Discharged via wheelchair, hospital escort: Yes.  Special equipment needed: Wheelchair    Be sure to schedule a follow-up appointment with your primary care doctor or any specialists as instructed.     Discharge Plan:   Smoking Cessation Offered: Patient Refused  Pneumococcal Vaccine Administered/Refused: Not given - Patient refused pneumococcal vaccine (Pt stated he recieved vaccine prior to admission)  Influenza Vaccine Indication: Not indicated: Previously immunized this influenza season and > 8 years of age    I understand that a diet low in cholesterol, fat, and sodium is recommended for good health. Unless I have been given specific instructions below for another diet, I accept this instruction as my diet prescription.   Other diet: regular    Special Instructions: None    · Is patient discharged on Warfarin / Coumadin?   No     Depression / Suicide Risk    As you are discharged from this [facility] facility, it is important to learn how to keep safe from harming yourself.    Recognize the warning signs:  · Abrupt changes in personality, positive or negative- including increase in energy   · Giving away possessions  · Change in eating patterns- significant weight changes-  positive or negative  · Change in sleeping patterns- unable to sleep or sleeping all the time   · Unwillingness or inability to communicate  · Depression  · Unusual sadness, discouragement and loneliness  · Talk of wanting to die  · Neglect of personal appearance   · Rebelliousness- reckless behavior  · Withdrawal from people/activities they love  · Confusion- inability to concentrate     If you or a loved one observes any of these behaviors or has concerns about self-harm, here's what you can do:  · Talk about it- your feelings and reasons for harming yourself  · Remove any means that you might use to hurt yourself (examples: pills, rope, extension cords, firearm)  · Get  professional help from the community (Mental Health, Substance Abuse, psychological counseling)  · Do not be alone:Call your Safe Contact- someone whom you trust who will be there for you.  · Call your local CRISIS HOTLINE 745-0445 or 385-871-1934  · Call your local Children's Mobile Crisis Response Team [place] (925) 583-0923 or www.Medmonk  · Call the toll free National Suicide Prevention Hotlines   · National Suicide Prevention Lifeline 129-206-XLPH (8142)  · National Hope Line Network 800-SUICIDE (220-7073)    Follow up with neurosurgeon in 1-2 weeks  Remove sutures to forehead 9/7-9/10

## 2018-09-05 NOTE — DISCHARGE PLANNING
Anticipated Discharge Disposition: [facility]    Action: LSW faxed transport communication form to Tiffanie CARSON.    Barriers to Discharge: Transportation.    Plan: LSW to continue to assist with d/c as needed.

## 2018-09-05 NOTE — PROGRESS NOTES
Neurosurgery Progress Note    Subjective:  POD#3 C5-6 ACDF   Continues to report painful paresthesias in upper extremities, and this morning he feels a little more clumsy in left hand  Denies other symptoms  No issues eating/ drinking    Exam:  Wearing [place] J collar appropriately after adjustment  Motor:  3-/5 right deltoid/ bicep/tricep, 3+/5 left deltoid/ bicep/tricep, 3-/5 bilateral wrist extensors, 3-/5 finger extensors  Sensory intact in BUEs  Dressing is c/d/i  Anterior neck is flat on palpation     BP  Min: 142/89  Max: 159/97  Pulse  Av.7  Min: 69  Max: 95  Resp  Av.3  Min: 17  Max: 18  Temp  Av.8 °C (98.3 °F)  Min: 36.5 °C (97.7 °F)  Max: 37.1 °C (98.8 °F)  SpO2  Av.7 %  Min: 94 %  Max: 97 %    No Data Recorded    Recent Labs      18   1359  18   0420   WBC  4.1*  5.4   RBC  3.04*  3.31*   HEMOGLOBIN  10.5*  11.6*   HEMATOCRIT  30.9*  33.5*   MCV  101.6*  101.2*   MCH  34.5*  35.0*   MCHC  34.0  34.6   RDW  46.7  46.0   PLATELETCT  140*  102*   MPV  9.9  10.6     Recent Labs      18   1359  18   0420   SODIUM  133*  135   POTASSIUM  3.9  4.2   CHLORIDE  101  103   CO2  22  24   GLUCOSE  77  108*   BUN  4*  4*   CREATININE  0.51  0.54   CALCIUM  8.1*  8.8     Recent Labs      18   1359   APTT  27.6   INR  1.23*           Intake/Output       18 - 18 0659 18 - 18 0659       Total  Total       Intake    Total Intake -- -- -- -- -- --       Output    Urine  1000  2925 3925  --  -- --    Urine Void (mL) (non-catheter) 1000 2925 3925 -- -- --    Total Output 1000 2925 3925 -- -- --       Net I/O     -1000 -2924 -3925 -- -- --            Intake/Output Summary (Last 24 hours) at 18 0835  Last data filed at 18 0600   Gross per 24 hour   Intake                0 ml   Output             3925 ml   Net            -3925 ml            • enoxaparin (LOVENOX) injection  40 mg DAILY   •  gabapentin  300 mg TID   • methocarbamol  750 mg 4X/DAY   • dexamethasone  4 mg Q12HRS   • Respiratory Care per Protocol   Continuous RT   • Pharmacy Consult Request  1 Each PRN   • docusate sodium  100 mg BID   • senna-docusate  1 Tab Nightly   • senna-docusate  1 Tab Q24HRS PRN   • polyethylene glycol/lytes  1 Packet BID   • magnesium hydroxide  30 mL DAILY   • bisacodyl  10 mg Q24HRS PRN   • fleet  1 Each Once PRN   • NS   Continuous   • oxyCODONE immediate-release  5 mg Q3HRS PRN   • oxyCODONE immediate release  10 mg Q3HRS PRN   • HYDROmorphone  0.5 mg Q3HRS PRN   • ondansetron  4 mg Q4HRS PRN   • famotidine  20 mg BID    Or   • famotidine  20 mg BID   • bacitracin-polymyxin b   TID       Assessment and Plan:    POD #3  Patient will likely need post-acute inpatient placement for continued PT/OT  PT/OT while here  D/c'd decadron and starting medrol dosepak today  Patient should follow up at [facility] for 2 week postoperative visit  Will sign off, please call for questions       Prophylactic anticoagulation: yes         Start date/time: 24 hrs postop

## 2018-09-05 NOTE — DISCHARGE SUMMARY
DATE OF ADMISSION:  09/02/2018    DATE OF TRANSFER:  09/05/2018    ATTENDING PHYSICIAN:  Ariel Velez MD    CONSULTING PHYSICIAN:  Varghese Wilkins MD, neurosurgery.    PROCEDURES:  On 09/02/2018, Dr. Wilkins performed a partial corpectomy   greater than 50% of C5, partial corpectomy greater than 50% of C6, open   reduction and internal fixation of C5-C6 fracture dislocation and correlation   of hyperextension deformity, placement of Titan anterior cervical   intravertebral disk spacer, 8 mm at C5-C6, anterior cervical arthrodesis, C5   through C6, placement of anterior cervical plate.  Please see Epic for full   procedural details.    DISCHARGE DIAGNOSES:  1.  Cervical spine fracture.  2.  Coagulopathy.  3.  Anemia.  4.  Hyponatremia.  5.  Alcohol intoxication.    HISTORY OF PRESENT ILLNESS:  This is a 68-year-old gentleman who was   reportedly standing when he fell.  He does admit to consuming alcohol prior to   his fall.  He denies any loss of consciousness.  He was found to be   borderline hypotensive.  Initial GCS was 10.  He was triaged as a trauma red   in accordance with the pre-established hospital guidelines.    HOSPITAL COURSE:  On arrival, he underwent extensive radiographic and   laboratory studies and was admitted to the critical care team under the   direction and supervision of Dr. Ariel Velez.  He sustained the above   injuries and incurred the above diagnoses during his stay.    He was admitted to the intensive care unit, where he underwent neurosurgical   evaluation and intervention.  He remained in the intensive care unit for   approximately 2 days when he was transferred out to the neurosurgical unit.    He has remained here for his stay undergoing both PT and OT evaluations.    Admit imaging noted fractures of the C4-C5 spinous processes as well as a   fracture of the anterior/superior vertebral body and C6 with a small avulsed   fragment.  There was also a fracture of the right  uncinate process.    Additionally, there is disruption of the anterior longitudinal ligament and   disc at C5-C6 with severe central stenosis.  He underwent a cervical disc   fusion, anterior C5 and C6 with Dr. Wilkins and team.  Please see Epic for   full procedural details.  He currently has weak upper extremities, right   weaker than left.  He has sensory intact to both of these extremities.  He has   general lower extremity weakness; however, this may be more related to   deconditioning.  He is to follow up with [facility] in 2 weeks.  He needs to   wear his collar at all times.  He will need to complete a Medrol Dosepak.    He was noted to have alcohol intoxication upon arrival.  He does admit that he   would like to stop drinking.  He will need to follow up as outpatient with   any clinics.  He has not had any signs or symptoms of detoxing while in the   hospital.    He also suffered coagulopathy as well as hyponatremia.  As of today, his   sodium is 135 and his INR is stable at 1.23.    As of today, he is stable for transfer to a skilled nursing facility where he   will continue to regain strength and then eventually return to his activities   of daily living.  He will benefit from continued physical and occupational   therapy.    DISCHARGE PHYSICAL EXAMINATION:  Please see [facility] tertiary exam dated day of   discharge.    DISCHARGE MEDICATIONS:  1.  Polysporin to abrasions 3 times a day.  2.  Bowel protocol of choice.  3.  Lovenox 40 mg subcutaneous daily.  4.  Neurontin ____ times daily.  5.  Medrol Dosepak taper dose.  6.  Robaxin 750 mg 4 times a day.  7.  Oxycodone 5-10 mg every 3 hours as needed for pain.    DIET:  Regular.    DISPOSITION:  The patient will be transferred to a skilled nursing facility in   stable condition.  He will continue with therapies to become stronger and be   able to return to his normal activities of daily living.  He will need to   follow up with Dr. Wilkins and team in the  next 1-2 weeks.  He will need to   complete the Medrol Dosepak, which he just started today.  He has been   extensively counseled on when to seek emergency treatment such as changing   condition, worsening condition, fever, signs and symptoms of infection, or any   other changes in condition.       ____________________________________     ALEX MILLER DO / FILI    DD:  09/05/2018 13:56:53  DT:  09/05/2018 14:40:46    D#:  [id number]  Job#:  [id number]    cc: Ariel Velez MD, Varghese Wilkins MD

## 2018-09-05 NOTE — PROGRESS NOTES
"  Trauma/Surgical Progress Note    Author: Kailyn Enrique Date & Time created: 9/5/2018   1:57 PM     Interval Events:  Medrol dose pack started per neuro  Neuro signed off  Adequate pain control  BUE weak R>L  Accepted to SNF  Medically cleared to attend  [facility] complete - no further findings  Dictated [id number]    Review of Systems   Constitutional: Negative.    HENT: Negative.    Respiratory: Negative.    Gastrointestinal: Negative.    Genitourinary: Negative.         Voiding   Musculoskeletal: Positive for myalgias.   Neurological: Positive for sensory change and focal weakness.   All other systems reviewed and are negative.    Hemodynamics:  Blood pressure 136/85, pulse 94, temperature 37 °C (98.6 °F), resp. rate 17, height 1.778 m (5' 10\"), weight 101.1 kg (222 lb 14.2 oz), SpO2 94 %.     Respiratory:    Respiration: 17, Pulse Oximetry: 94 %        RUL Breath Sounds: Clear, RML Breath Sounds: Clear, RLL Breath Sounds: Diminished, TOD Breath Sounds: Clear, LLL Breath Sounds: Diminished  Fluids:    Intake/Output Summary (Last 24 hours) at 09/05/18 1357  Last data filed at 09/05/18 0800   Gross per 24 hour   Intake                0 ml   Output             4125 ml   Net            -4125 ml     Admit Weight: 72.6 kg (160 lb)  Current      Physical Exam   Constitutional: He is oriented to person, place, and time. He appears well-developed. No distress.   HENT:   Abrasions noted  Sutures forehead    Eyes: Conjunctivae are normal.   Neck:   Cervical collar in place   Pulmonary/Chest: Effort normal and breath sounds normal. No respiratory distress.   Abdominal: Soft. There is no tenderness.   Musculoskeletal:   BUE weak R>L  Overall reported weakness of LE - gross movement intact, strength equal 4/5   Neurological: He is alert and oriented to person, place, and time.   Skin: Skin is warm and dry.   Psychiatric: He has a normal mood and affect.   Nursing note and vitals reviewed.      Medical Decision Making/Problem " List:    Active Hospital Problems    Diagnosis   • Cervical spine fracture (HCC) [S12.9XXA]     Priority: High     Fractures of the C4 and C5 spinous processes   Fracture of the anterior/superior vertebral body and C6 with a small avulsed fragment. There is also fracture of the right uncinate process.  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 Neurosurgery     • Anemia [D64.9]     Priority: Medium     Transfusion for Hgb less than 7.0  Trend Hgb   No indication for transfusion.      • Coagulopathy (HCC) [D68.9]     Priority: Medium     Trend labs.  9/3 INR 1.23       • Alcohol intoxication (HCC) [F10.929]     Priority: Medium     Monitor for signs withdrawal  SBIRT      • Trauma [T14.90XA]     Priority: Low     Trauma Red activation   GLF, ETOH intoxication.  Dr. Velez Trauma physician.        • Hyponatremia [E87.1]     Priority: Low     Trend   9/3 135         Core Measures & Quality Metrics:  Labs reviewed, Medications reviewed and Radiology images reviewed  Roca catheter: No Roca      DVT Prophylaxis: Enoxaparin (Lovenox)  DVT prophylaxis - mechanical: SCDs  Ulcer prophylaxis: Not indicated    Assessed for rehab: Patient was assess for and/or received rehabilitation services during this hospitalization    Total Score: 9  ETOH Screening  CAGE Score: 4  Intervention complete date: 9/4/2018  Patient response to intervention: I would like to stop drinking .   Patient demonstrats understanding of intervention.Plan of care: resources for pt     has been contacted.Follow up with: Clinic  Total ETOH intervention time: greater than 30 minutes  Discussed patient condition with RN, Patient and trauma surgery. Dr. Velez

## 2018-09-05 NOTE — CARE PLAN
Problem: Communication  Goal: The ability to communicate needs accurately and effectively will improve    Intervention: Use communication aids and/or /Language Line as appropriate  Discussed POC, pt communicates questions and poc well. Pt and family understand poc.      Problem: Safety  Goal: Will remain free from injury  Educated on safety measures, using call light ect

## 2018-09-05 NOTE — DISCHARGE PLANNING
Agency/Facility Name: [facility]  Spoke To: Melina  Outcome: Patient accepted.    Agency/Facility Name: [facility]  Spoke To: Jerry  Outcome: Patient accepted.

## 2018-09-05 NOTE — DISCHARGE PLANNING
Agency/Facility Name: [facility]  Spoke To: Latha(Admissions)  Outcome: Patient accepted.    Agency/Facility Name: [facility]  Outcome: Attempted to follow up, however, no answer. Voicemail left for Donny.    Agency/Facility Name: [facility]  Spoke To: Melina  Outcome: Most recent therapy notes faxed to [facility].    Agency/Facility Name: [facility]  Spoke To: Jerry  Outcome: Most recent therapy notes faxed to [facility].    Agency/Facility Name: [facility]  Outcome: Attempted to follow up, however, no answer. Voicemail left for Anahi.

## 2018-09-05 NOTE — PROGRESS NOTES
Assumed pt care at 0700pm, received bedside report from doris carranza. Pt is alert oriented x4, pain present and pt requests pain med before bedtime, pt resting in bed, on room air, vs stable, pt wearing cervical brace, scds on, education given about poc/pain management/safety measures. Will cont to monitor pt throughout night

## 2018-09-05 NOTE — CARE PLAN
Problem: Bowel/Gastric:  Goal: Normal bowel function is maintained or improved  Outcome: PROGRESSING AS EXPECTED  No BM today, given stool softeners.     Problem: Mobility  Goal: Risk for activity intolerance will decrease  Outcome: PROGRESSING AS EXPECTED  Up to chair with PT.     Problem: Skin Integrity  Goal: Risk for impaired skin integrity will decrease  Outcome: PROGRESSING AS EXPECTED  Wound care saw pt today. Bacitracin to sutures on face and skin tear on left forearm. Redness in buttcrack. Mepliex placed by wound care.     Problem: Urinary Elimination:  Goal: Ability to reestablish a normal urinary elimination pattern will improve  Outcome: PROGRESSING AS EXPECTED  Using urinal. Good UO

## 2018-09-05 NOTE — THERAPY
"Physical Therapy Treatment completed.   Bed Mobility:  Supine to Sit: Minimal Assist  Transfers: Sit to Stand: Moderate Assist (posterior lean \"jerking\" motions )  Gait: Level Of Assist: Unable to Participate     Plan of Care: Will benefit from Physical Therapy 4 times per week  Discharge Recommendations: Equipment: Will Continue to Assess for Equipment Needs. Post-acute therapy Discharge to a transitional care facility for continued skilled therapy services.     See \"Rehab Therapy-Acute\" Patient Summary Report for complete documentation.     Pt is s/p ACDF c5-6 s/p c6 anterior avulsion fx with servere canal stenosis. Pt reports being homeless prior to admissoin. Pt required cecilia for bed mobility and cues for spinal precautions.  Pt required modA for sit to stands with fww. Pt presents with \" jerking\" movements once standing requiring modA for balance but than progresses to Cecilia for static balance. Performed mulstiple times for balance, unable to transfer today due to weakness. Pt would benefit from further skilled therapy to progress toward goals. Recommend post acute services prior to dc home.   "

## 2018-09-09 LAB — EKG IMPRESSION: NORMAL

## 2018-10-18 ENCOUNTER — HOSPITAL ENCOUNTER (EMERGENCY)
Facility: MEDICAL CENTER | Age: 68
End: 2018-10-18
Attending: EMERGENCY MEDICINE
Payer: MEDICARE

## 2018-10-18 ENCOUNTER — APPOINTMENT (OUTPATIENT)
Dept: RADIOLOGY | Facility: MEDICAL CENTER | Age: 68
End: 2018-10-18
Attending: EMERGENCY MEDICINE
Payer: MEDICARE

## 2018-10-18 VITALS
HEART RATE: 52 BPM | RESPIRATION RATE: 16 BRPM | TEMPERATURE: 98.1 F | OXYGEN SATURATION: 95 % | BODY MASS INDEX: 30.94 KG/M2 | WEIGHT: 221 LBS | HEIGHT: 71 IN | DIASTOLIC BLOOD PRESSURE: 95 MMHG | SYSTOLIC BLOOD PRESSURE: 146 MMHG

## 2018-10-18 DIAGNOSIS — S16.1XXA STRAIN OF NECK MUSCLE, INITIAL ENCOUNTER: ICD-10-CM

## 2018-10-18 DIAGNOSIS — M54.10 RADICULOPATHY, UNSPECIFIED SPINAL REGION: ICD-10-CM

## 2018-10-18 PROCEDURE — 700102 HCHG RX REV CODE 250 W/ 637 OVERRIDE(OP): Performed by: EMERGENCY MEDICINE

## 2018-10-18 PROCEDURE — 99285 EMERGENCY DEPT VISIT HI MDM: CPT

## 2018-10-18 PROCEDURE — A9270 NON-COVERED ITEM OR SERVICE: HCPCS | Performed by: EMERGENCY MEDICINE

## 2018-10-18 PROCEDURE — 72125 CT NECK SPINE W/O DYE: CPT

## 2018-10-18 RX ORDER — LIDOCAINE 50 MG/G
1 PATCH TOPICAL EVERY 24 HOURS
Qty: 10 PATCH | Refills: 0 | Status: SHIPPED | OUTPATIENT
Start: 2018-10-18 | End: 2018-12-05 | Stop reason: CLARIF

## 2018-10-18 RX ORDER — CYCLOBENZAPRINE HCL 10 MG
10 TABLET ORAL 3 TIMES DAILY PRN
Qty: 30 TAB | Refills: 0 | Status: SHIPPED | OUTPATIENT
Start: 2018-10-18 | End: 2018-12-05 | Stop reason: CLARIF

## 2018-10-18 RX ORDER — HYDROCODONE BITARTRATE AND ACETAMINOPHEN 5; 325 MG/1; MG/1
2 TABLET ORAL ONCE
Status: COMPLETED | OUTPATIENT
Start: 2018-10-18 | End: 2018-10-18

## 2018-10-18 RX ORDER — NAPROXEN 500 MG/1
500 TABLET ORAL 2 TIMES DAILY WITH MEALS
Qty: 60 TAB | Refills: 0 | Status: SHIPPED | OUTPATIENT
Start: 2018-10-18 | End: 2018-12-05 | Stop reason: CLARIF

## 2018-10-18 RX ADMIN — HYDROCODONE BITARTRATE AND ACETAMINOPHEN 2 TABLET: 5; 325 TABLET ORAL at 04:02

## 2018-10-18 ASSESSMENT — PAIN DESCRIPTION - DESCRIPTORS: DESCRIPTORS: ACHING;SHARP

## 2018-10-18 ASSESSMENT — PAIN SCALES - GENERAL
PAINLEVEL_OUTOF10: 6
PAINLEVEL_OUTOF10: 6

## 2018-10-18 NOTE — ED NOTES
"Pt placed in room BLUE 13. Pt connected to monitor. Pt reports pain and numbness/tingling persists.   Pt states, \"I can't live on the streets anymore, I need to speak to .\" SW notified.   "

## 2018-10-18 NOTE — ED NOTES
Patient able to stand on his own and ambulate to the wheel chair. Patient taken to decon shower for possible bed bugs prior to CT scan. Patient A&O x4 in no apparent distress. VSS. Patient will be transferred to room 13 after shower. Handoff report given to SHARAN Brandon who will assume care of the patient. Patient currently in the shower with SUAD Catherine

## 2018-10-18 NOTE — ED NOTES
ERP okay with patient discharging.   D/C instructions provided to patient at bedside, verbalizes understanding and states plans for follow-up with living facilities as recommended.   Scripts given  All belongings accounted for, all questions answered at this time.   Security called for clothes. Taxi voucher given to patient.

## 2018-10-18 NOTE — ED PROVIDER NOTES
ED Provider Note    CHIEF COMPLAINT  Chief Complaint   Patient presents with   • Low Back Pain     Chronic back pains. NO fall or trauma, pt ran out of pain medications.    • Neck Pain     Sx 1 month ago with fusion. Pt states neck collar broke. Complains of paresthesia in bilat arms.        HPI  Chris Leggett is a 68 y.o. male w/ longstanding hx of neck and back pain.  Patient with recent cervical spine fracture which was managed via internal fixation and reduction of C5-C6 by .  Patient reports that a few days after surgery he was drinking and slipped, he reports he did not fall to the ground or strike his neck but he did turn it rapidly and reports that since that time his pain is worsened.  He reports mild paresthesias bilateral upper extremities but denies tala numbness.  He denies any weakness.  Patient denies any saddle paresthesia or anesthesia or any bowel or bladder incontinence.  Patient denies any worsening low back pain.  Patient reports he ran out of his pain medication and this is why he presented to the emergency department.    REVIEW OF SYSTEMS  Review of Systems   All other systems reviewed and are negative.    See HPI for further details. All other systems are negative.     PAST MEDICAL HISTORY   has a past medical history of Hypertension.    SOCIAL HISTORY  Social History     Social History Main Topics   • Smoking status: Current Every Day Smoker     Packs/day: 0.50     Years: 5.00     Types: Cigarettes   • Smokeless tobacco: Current User   • Alcohol use Yes   • Drug use: No   • Sexual activity: Not on file       SURGICAL HISTORY   has a past surgical history that includes cervical disk and fusion anterior (9/2/2018) and other orthopedic surgery.    CURRENT MEDICATIONS  Home Medications     Reviewed by Naa Almonte R.N. (Registered Nurse) on 10/18/18 at 0241  Med List Status: Partial   Medication Last Dose Status   allopurinol (ZYLOPRIM) 300 MG Tab 10/12/2018 Active    bacitracin-polymyxin b (POLYSPORIN) 500-41739 UNIT/GM Ointment  Active   celecoxib (CELEBREX) 100 MG Cap  Active   enoxaparin (LOVENOX) 40 MG/0.4ML Solution inj 10/12/2018 Active   gabapentin (NEURONTIN) 100 MG Cap 10/12/2018 Active   lisinopril (PRINIVIL) 20 MG Tab 10/12/2018 Active   MethylPREDNISolone (MEDROL DOSEPAK) 4 MG Tablet Therapy Pack  Active                ALLERGIES  No Known Allergies    PHYSICAL EXAM  Physical Exam   Constitutional: He is oriented to person, place, and time. He appears well-developed and well-nourished.   HENT:   Head: Normocephalic.   Eyes: Pupils are equal, round, and reactive to light. Conjunctivae are normal.   Neck: Normal range of motion. Neck supple.   Cardiovascular: Normal rate and regular rhythm.    Pulmonary/Chest: Effort normal and breath sounds normal. No respiratory distress. He has no wheezes. He has no rales.   Abdominal: Soft. Bowel sounds are normal. He exhibits no distension. There is no tenderness. There is no rebound.   Musculoskeletal:   Mild paraspinal pain along the cervical spine C4 through C7, no associated bony abnormality.  Surgical scars are unremarkable.  Bilateral upper extremity strength is 5 out of 5 in distal and proximal muscle groups, reflexes are normal, sensation is intact throughout ulnar radial and median distribution.  Distal pulses are 2+.   Neurological: He is alert and oriented to person, place, and time.   Skin: Skin is warm and dry. No rash noted.   Psychiatric: He has a normal mood and affect. His behavior is normal.     CT-CSPINE WITHOUT PLUS RECONS   Final Result         1.  Multilevel degenerative changes of the cervical spine limit diagnostic sensitivity of this examination, otherwise no acute traumatic bony injury of the cervical spine is apparent.   2.  Atherosclerosis            COURSE & MEDICAL DECISION MAKING  Pertinent Labs & Imaging studies reviewed. (See chart for details)  Patient here with ongoing neck pain in the setting  of a recent fracture.  Will CT patient to ensure that bony alignment is unchanged.  Patient's neurologic exam is unremarkable.  Will discuss the case with Dr. Wilkins.  Patient's exam is not consistent with acute cord compression.  CT is unremarkable.  Patient will be discharged with follow-up with Dr. Lewis.  Patient will be sent home with supportive care.  Patient's creatinine is normal, NSAIDs and muscle relaxers are appropriate.  I discussed the case with patient's neurosurgeon who does not recommend any further testing and will see patient as an outpatient  The patient will not drink alcohol nor drive with prescribed medications. The patient will return for worsening symptoms and is stable at the time of discharge. The patient verbalizes understanding and will comply.  Social work is working with patient to open find sustainable housing, he will be discharged to the homeless shelter with these resources.  I have also provided patient with cash so he can make a call to the primary care visit and evaluate possible housing options.    FINAL IMPRESSION  1.  Cervical strain, homelessness         Electronically signed by: Rohan Prakash, 10/18/2018 3:46 AM

## 2018-10-18 NOTE — ED NOTES
Pt given clothes by security and escorted out. NAD. Belongings and D/C instructions with patient.

## 2018-10-18 NOTE — ED TRIAGE NOTES
"Chris Pruitt Oleksandr  68 y.o.  /95   Pulse 66   Temp 36.7 °C (98.1 °F)   Resp 18   Ht 1.803 m (5' 11\")   Wt 100.2 kg (221 lb)   SpO2 99%   BMI 30.82 kg/m²   Chief Complaint   Patient presents with   • Low Back Pain     Chronic back pains. NO fall or trauma, pt ran out of pain medications.    • Neck Pain     Sx 1 month ago with fusion. Pt states neck collar broke. Complains of paresthesia in bilat arms.        Pt BIB [facility] from drop in shelter for Lower chronic back pains. Pt complains of neck pains 6/10 with paresthesia after 1 month post op. Hx HTN and MI > 10 years ago. Pt VSS. Placed on contact iso for LICE infestation. Chart up for ERP, no PIV placed pta. Pt appears disheveled, denies SI/HI    "

## 2018-10-18 NOTE — DISCHARGE PLANNING
"SW met with Pt. Pt states he needs help with housing.   Pt states he was discharged from [facility] -He was discharged to [facility], he stayed there a few days has \"money\" issues and was kicked out. Pt went to shelter, he does not like it and he says he can not stay there any more.   SW discussed with Pt discharge plans that were made for him at [facility]. Pt states he is trying to get into [facility]-he had an interview and he is waiting to hear from them. Pt encouraged to contact [facility] with phones at Shelter to discuss his placement into the program.   Pt was medically cleared by ERP.  Pt was given a cab voucher [id number] to return to shelter and encouraged to follow up with [facility] and  [place] Medicaid.   "

## 2018-10-18 NOTE — ED NOTES
Report received from Victor Manuel TSANG.   Pt taking a shower. Pt's belongings are double bagged and in the pt's new room, BLUE 13.

## 2018-11-05 ENCOUNTER — HOSPITAL ENCOUNTER (EMERGENCY)
Facility: MEDICAL CENTER | Age: 68
End: 2018-11-06
Attending: EMERGENCY MEDICINE
Payer: MEDICARE

## 2018-11-05 ENCOUNTER — APPOINTMENT (OUTPATIENT)
Dept: RADIOLOGY | Facility: MEDICAL CENTER | Age: 68
End: 2018-11-05
Attending: EMERGENCY MEDICINE
Payer: MEDICARE

## 2018-11-05 DIAGNOSIS — R45.851 SUICIDAL IDEATION: ICD-10-CM

## 2018-11-05 DIAGNOSIS — R19.7 DIARRHEA, UNSPECIFIED TYPE: ICD-10-CM

## 2018-11-05 DIAGNOSIS — J40 BRONCHITIS: ICD-10-CM

## 2018-11-05 DIAGNOSIS — F32.A DEPRESSION, UNSPECIFIED DEPRESSION TYPE: ICD-10-CM

## 2018-11-05 LAB
ALBUMIN SERPL BCP-MCNC: 3.9 G/DL (ref 3.2–4.9)
ALBUMIN/GLOB SERPL: 1.5 G/DL
ALP SERPL-CCNC: 77 U/L (ref 30–99)
ALT SERPL-CCNC: 19 U/L (ref 2–50)
AMPHET UR QL SCN: NEGATIVE
ANION GAP SERPL CALC-SCNC: 7 MMOL/L (ref 0–11.9)
APTT PPP: 28.3 SEC (ref 24.7–36)
AST SERPL-CCNC: 54 U/L (ref 12–45)
BARBITURATES UR QL SCN: NEGATIVE
BASOPHILS # BLD AUTO: 0.4 % (ref 0–1.8)
BASOPHILS # BLD: 0.02 K/UL (ref 0–0.12)
BENZODIAZ UR QL SCN: NEGATIVE
BILIRUB SERPL-MCNC: 0.8 MG/DL (ref 0.1–1.5)
BNP SERPL-MCNC: 121 PG/ML (ref 0–100)
BUN SERPL-MCNC: 6 MG/DL (ref 8–22)
BZE UR QL SCN: NEGATIVE
C DIFF DNA SPEC QL NAA+PROBE: NEGATIVE
C DIFF TOX GENS STL QL NAA+PROBE: NEGATIVE
CALCIUM SERPL-MCNC: 9.2 MG/DL (ref 8.5–10.5)
CANNABINOIDS UR QL SCN: POSITIVE
CHLORIDE SERPL-SCNC: 101 MMOL/L (ref 96–112)
CO2 SERPL-SCNC: 26 MMOL/L (ref 20–33)
CREAT SERPL-MCNC: 0.55 MG/DL (ref 0.5–1.4)
EOSINOPHIL # BLD AUTO: 0.12 K/UL (ref 0–0.51)
EOSINOPHIL NFR BLD: 2.3 % (ref 0–6.9)
ERYTHROCYTE [DISTWIDTH] IN BLOOD BY AUTOMATED COUNT: 46.5 FL (ref 35.9–50)
ETHANOL BLD-MCNC: 0 G/DL
FLUAV RNA SPEC QL NAA+PROBE: NEGATIVE
FLUBV RNA SPEC QL NAA+PROBE: NEGATIVE
GLOBULIN SER CALC-MCNC: 2.6 G/DL (ref 1.9–3.5)
GLUCOSE SERPL-MCNC: 87 MG/DL (ref 65–99)
HCT VFR BLD AUTO: 38 % (ref 42–52)
HGB BLD-MCNC: 12.7 G/DL (ref 14–18)
IMM GRANULOCYTES # BLD AUTO: 0.01 K/UL (ref 0–0.11)
IMM GRANULOCYTES NFR BLD AUTO: 0.2 % (ref 0–0.9)
INR PPP: 1.24 (ref 0.87–1.13)
LACTATE BLD-SCNC: 0.8 MMOL/L (ref 0.5–2)
LIPASE SERPL-CCNC: 32 U/L (ref 11–82)
LYMPHOCYTES # BLD AUTO: 0.68 K/UL (ref 1–4.8)
LYMPHOCYTES NFR BLD: 12.8 % (ref 22–41)
MCH RBC QN AUTO: 32.6 PG (ref 27–33)
MCHC RBC AUTO-ENTMCNC: 33.4 G/DL (ref 33.7–35.3)
MCV RBC AUTO: 97.7 FL (ref 81.4–97.8)
METHADONE UR QL SCN: NEGATIVE
MONOCYTES # BLD AUTO: 0.25 K/UL (ref 0–0.85)
MONOCYTES NFR BLD AUTO: 4.7 % (ref 0–13.4)
NEUTROPHILS # BLD AUTO: 4.24 K/UL (ref 1.82–7.42)
NEUTROPHILS NFR BLD: 79.6 % (ref 44–72)
NRBC # BLD AUTO: 0 K/UL
NRBC BLD-RTO: 0 /100 WBC
OPIATES UR QL SCN: NEGATIVE
OXYCODONE UR QL SCN: NEGATIVE
PCP UR QL SCN: NEGATIVE
PLATELET # BLD AUTO: 105 K/UL (ref 164–446)
PMV BLD AUTO: 11.4 FL (ref 9–12.9)
POC BREATHALIZER: 0 PERCENT (ref 0–0.01)
POTASSIUM SERPL-SCNC: 3.7 MMOL/L (ref 3.6–5.5)
PROPOXYPH UR QL SCN: NEGATIVE
PROT SERPL-MCNC: 6.5 G/DL (ref 6–8.2)
PROTHROMBIN TIME: 15.7 SEC (ref 12–14.6)
RBC # BLD AUTO: 3.89 M/UL (ref 4.7–6.1)
SODIUM SERPL-SCNC: 134 MMOL/L (ref 135–145)
T4 FREE SERPL-MCNC: 1.15 NG/DL (ref 0.53–1.43)
TROPONIN I SERPL-MCNC: 0.01 NG/ML (ref 0–0.04)
TSH SERPL DL<=0.005 MIU/L-ACNC: 0.99 UIU/ML (ref 0.38–5.33)
WBC # BLD AUTO: 5.3 K/UL (ref 4.8–10.8)
WBC STL QL MICRO: NORMAL

## 2018-11-05 PROCEDURE — 90791 PSYCH DIAGNOSTIC EVALUATION: CPT

## 2018-11-05 PROCEDURE — 83605 ASSAY OF LACTIC ACID: CPT

## 2018-11-05 PROCEDURE — 93005 ELECTROCARDIOGRAM TRACING: CPT | Performed by: EMERGENCY MEDICINE

## 2018-11-05 PROCEDURE — 85730 THROMBOPLASTIN TIME PARTIAL: CPT

## 2018-11-05 PROCEDURE — A9270 NON-COVERED ITEM OR SERVICE: HCPCS | Performed by: EMERGENCY MEDICINE

## 2018-11-05 PROCEDURE — 85025 COMPLETE CBC W/AUTO DIFF WBC: CPT

## 2018-11-05 PROCEDURE — 84439 ASSAY OF FREE THYROXINE: CPT

## 2018-11-05 PROCEDURE — 89055 LEUKOCYTE ASSESSMENT FECAL: CPT

## 2018-11-05 PROCEDURE — 87502 INFLUENZA DNA AMP PROBE: CPT

## 2018-11-05 PROCEDURE — 700102 HCHG RX REV CODE 250 W/ 637 OVERRIDE(OP): Performed by: EMERGENCY MEDICINE

## 2018-11-05 PROCEDURE — 80053 COMPREHEN METABOLIC PANEL: CPT

## 2018-11-05 PROCEDURE — 87046 STOOL CULTR AEROBIC BACT EA: CPT

## 2018-11-05 PROCEDURE — 80307 DRUG TEST PRSMV CHEM ANLYZR: CPT

## 2018-11-05 PROCEDURE — 87899 AGENT NOS ASSAY W/OPTIC: CPT

## 2018-11-05 PROCEDURE — 302970 POC BREATHALIZER: Performed by: EMERGENCY MEDICINE

## 2018-11-05 PROCEDURE — 87045 FECES CULTURE AEROBIC BACT: CPT

## 2018-11-05 PROCEDURE — 83690 ASSAY OF LIPASE: CPT

## 2018-11-05 PROCEDURE — 83880 ASSAY OF NATRIURETIC PEPTIDE: CPT

## 2018-11-05 PROCEDURE — 302970 POC BREATHALIZER

## 2018-11-05 PROCEDURE — 84484 ASSAY OF TROPONIN QUANT: CPT

## 2018-11-05 PROCEDURE — 87493 C DIFF AMPLIFIED PROBE: CPT

## 2018-11-05 PROCEDURE — 99285 EMERGENCY DEPT VISIT HI MDM: CPT

## 2018-11-05 PROCEDURE — 84443 ASSAY THYROID STIM HORMONE: CPT

## 2018-11-05 PROCEDURE — 71045 X-RAY EXAM CHEST 1 VIEW: CPT

## 2018-11-05 PROCEDURE — 85610 PROTHROMBIN TIME: CPT

## 2018-11-05 RX ORDER — CYCLOBENZAPRINE HCL 10 MG
10 TABLET ORAL ONCE
Status: COMPLETED | OUTPATIENT
Start: 2018-11-05 | End: 2018-11-05

## 2018-11-05 RX ORDER — IBUPROFEN 600 MG/1
600 TABLET ORAL ONCE
Status: COMPLETED | OUTPATIENT
Start: 2018-11-05 | End: 2018-11-05

## 2018-11-05 RX ADMIN — IBUPROFEN 600 MG: 600 TABLET, FILM COATED ORAL at 22:45

## 2018-11-05 RX ADMIN — CYCLOBENZAPRINE HYDROCHLORIDE 10 MG: 10 TABLET, FILM COATED ORAL at 22:45

## 2018-11-05 ASSESSMENT — PAIN SCALES - GENERAL: PAINLEVEL_OUTOF10: 7

## 2018-11-05 NOTE — ED NOTES
"Pt BIBA due to SI.  Pt states \"Ill jump in front of a train or truck.\"  Pt states hx of depression, recent lost of last sibling has further depressed pt.  Pt also states having chronic back and neck pain.  Pt states having neck procedure done 3 months ago.  Pt states dealing with chronic pain prevents pt from having adequate sleep.  "

## 2018-11-05 NOTE — ED PROVIDER NOTES
"ED Provider Note    CHIEF COMPLAINT  Chief Complaint   Patient presents with   • Suicidal Ideation     [facility]/ [facility] on legal 2000 due to depression and SI.  States \"i will jump in front of a train.\"  Calm and cooperaitve        [facility]  Chris Leggett is a 68 y.o. male who presents for evaluation of suicidal ideation.  The patient was brought in by EMS from [facility] after he exhibited depression with suicidal thoughts.  Patient states he was going to jump in front of a train to kill himself.  Patient states he is frustrated due to the fact that he is having chronic pain from multiple conditions.  The patient was seen in early September a diagnosed and treated for a C5-C6 cervical spine fracture.  The patient states he also lost his brother a few months ago which is making him depressed.  Patient states he has been having: Fever, chills, cough, yellow sputum, chest pain which is sharp over the left side of his chest with coughing, nausea, generalized malaise and weak this, neck pain.  There is been no associated: Vomiting, hematemesis, melena hematochezia, hematuria, dysuria, loss of bowel or bladder function, hemoptysis.  No other acute symptomatology or complaints.    REVIEW OF SYSTEMS  See HPI for further details. All other systems negative.    PAST MEDICAL HISTORY  Past Medical History:   Diagnosis Date   • Hypertension        FAMILY HISTORY  No family history on file.    SOCIAL HISTORY  Positive tobacco use; positive alcohol use; positive marijuana use;    SURGICAL HISTORY  Past Surgical History:   Procedure Laterality Date   • CERVICAL DISK AND FUSION ANTERIOR  9/2/2018    Procedure: CERVICAL DISK AND FUSION ANTERIOR C5-C6;  Surgeon: Varghese Wilkins M.D.;  Location: SURGERY [facility];  Service: Neurosurgery   • OTHER ORTHOPEDIC SURGERY      fision with disc removal       CURRENT MEDICATIONS  See nurse's notes    ALLERGIES  No Known Allergies    PHYSICAL EXAM  VITAL " SIGNS: /98   Pulse 67   Temp 35.9 °C (96.7 °F)   Resp 16   Wt 88.5 kg (195 lb)   SpO2 98%   BMI 27.20 kg/m²    Constitutional: 68-year-old male, appears older than his stated age, mildly weak, oriented x3  HENT: Normocephalic, Atraumatic, Nares:Clear, Oropharynx: moist, well hydrated, posterior pharynx:clear   Eyes: PERRL, EOMI, Conjunctiva normal, No discharge.   Neck: Normal range of motion, No tenderness, Supple, No stridor.   Lymphatic: No lymphadenopathy noted.   Cardiovascular: Regular rate and rhythm without mumurs, gallups, rubs   Thorax & Lungs: Mild bilateral rhonchi, No respiratory distress, No wheezing, no stridor, no rales.  Tenderness to the left anterior lateral chest which will exacerbate his chest pain with gentle palpation;.   Abdomen: Soft, nontender, nondistended, no organomegaly, positive bowel sounds normal in quality. No guarding or rebound.  Skin: Good skin turgor, pink, warm, dry. No rashes, petechiae, purpura. Normal capillary refill.   Back: No tenderness, No CVA tenderness.   Extremities: Intact distal pulses, No edema, No tenderness, No cyanosis,  Vascular: Pulses are 2+, symmetric in the upper and lower extremities.  Musculoskeletal: Diffuse arthritic changes with diffuse muscular atrophy. No tenderness to palpation or major deformities noted.   Neurologic: Alert & oriented x 3,  No gross focal deficits noted.   Psychiatric: Affect flat, Judgment normal, Mood: Depressed with suicidal ideation      RADIOLOGY/PROCEDURES  DX-CHEST-PORTABLE (1 VIEW)   Final Result      No pneumothorax.            COURSE & MEDICAL DECISION MAKING  Pertinent Labs & Imaging studies reviewed. (See chart for details)  1.  Saline lock    Laboratory studies: CBC shows white count 5.3, 79% neutrophils, 12% lymphocytes, 4% monocytes, hemoglobin 12.7, hematocrit 38.0, platelet count 105,000; PT 15.7/INR 1.24, PTT 28; rapid influenza is negative; troponin 0 0.01; lactic acid 0.8; urinalysis negative; urine  drug screen is positive for cannabinoids;    EKG I have interpreted: Rate 60, rhythm sinus, axis normal, WI QRS QT intervals normal, no acute STEMI, 12-lead EKG, no acute change compared to previous tracing of 9/2/18;    Discussion/consultation: At this time, the patient presents for evaluation of depression with suicidal ideation.  The patient is high risk for suicide.  Patient meets criteria for an involuntary committal under legal 2000.  Patient was evaluated by the [facility] counselor who concurs with this assessment.  Patient also had upper respiratory symptoms and workup was performed which showed no evidence of pneumonia.  Patient's findings are consistent with bronchitis.  He has chronic pain syndrome also.  Patient remained stable in the emerge department.  The patient will be transferred to a psychiatric facility for further treatment and care.    FINAL IMPRESSION  1. Suicidal ideation    2. Depression, unspecified depression type    3. Bronchitis    4. Diarrhea, unspecified type           PLAN  1.  The patient will be transferred to a psychiatric facility for further monitoring, treatment, and care.    Electronically signed by: Guy G Gansert, 11/5/2018 11:12 AM

## 2018-11-05 NOTE — CONSULTS
"[facility]   TRIAGE ASSESSMENT    Name: Chris Leggett  MRN: [id number]  : 1950  Age: 68 y.o.  Date of assessment: 2018  PCP: Pcp Pt States None  Persons in attendance: Patient    CHIEF COMPLAINT/PRESENTING ISSUE (as stated by pt):   Chief Complaint   Patient presents with   • Suicidal Ideation     [facility]/ [facility] on legal  due to depression and SI.  States \"i will jump in front of a train.\"  Calm and cooperaitve         CURRENT LIVING SITUATION/SOCIAL SUPPORT: Pt a [place] resident.  Came here to [place] in September with his brother, who was dying.  They returned to [place] and brother .  Pt returned to [place] shortly after to retrieve his wallet he lost when here last.  During this visit, pt was intoxicated on alcohol and fell, fracturing his neck.  Pt had fixation here at [facility] and was sent to [facility]; there for 6 weeks before dc'd to self.  Pt has been here in [place] ever since; homeless here and in [place], though his legal address is [place].  He reports he has tried to go to [facility] and others, but they require the patients be able to do physical work as part of treatment.  Pt reports he can no longer do that, \"I'm on SSI.\"  He repeats \"I'm scared\" several times during the assessment.  He says he hasn't been able to sleep in about a week.  Reports it is due to both physical and psychic pain.  He reports nightmares that his mother, whom he was close to, is trying to kill him.  He is despondent, reporting feeling like \"this might just be it for me.\"  He has no concrete plans for suicide, saying he doesn't think he could, unless he was desperate, but then says \"But I am feeling pretty desperate.\"  He reports auditory hallucinations, but says \"that's normal for me.\"      BEHAVIORAL HEALTH TREATMENT HISTORY  Does patient/parent report a history of prior behavioral health treatment for patient?   No:  Pt denies previous psych treatment or diagnosis, " though states he was put on a antidepressant by his PCP a few times in the past.  Never stayed on for long.    SAFETY ASSESSMENT - SELF  Does patient acknowledge current or past symptoms of dangerousness to self? yes  Does parent/significant other report patient has current or past symptoms of dangerousness to self? N\A  Does presenting problem suggest symptoms of dangerousness to self? Yes:     Past Current    Suicidal Thoughts: [x]  [x]    Suicidal Plans: []  []    Suicidal Intent: []  []    Suicide Attempts: []  []    Self-Injury []  []      For any boxes checked above, provide detail: No previous SA.  Pt reports vague SI.  Pt hopeless and sad, tearful.    History of suicide by family member: no  History of suicide by friend/significant other: no  Recent change in frequency/specificity/intensity of suicidal thoughts or self-harm behavior? yes - increased in past week  Current access to firearms, medications, or other identified means of suicide/self-harm? no  If yes, willing to restrict access to means of suicide/self-harm? n/a  Protective factors present:  Fear of suicide    SAFETY ASSESSMENT - OTHERS  Does patient acknowledge current or past symptoms of aggressive behavior or risk to others? no  Does parent/significant other report patient has current or past symptoms of aggressive behavior or risk to others?  N\A  Does presenting problem suggest symptoms of dangerousness to others? No    Crisis Safety Plan completed and copy given to patient? N\A    ABUSE/NEGLECT SCREENING  Does patient report feeling “unsafe” in his/her home, or afraid of anyone?  no  Does patient report any history of physical, sexual, or emotional abuse?  no  Does parent or significant other report any of the above? N\A  Is there evidence of neglect by self?  no  Is there evidence of neglect by a caregiver? no  Does the patient/parent report any history of CPS/APS/police involvement related to suspected abuse/neglect or domestic violence?  "no  Based on the information provided during the current assessment, is a mandated report of suspected abuse/neglect being made?  No    SUBSTANCE USE SCREENING  Yes:  Darnell all substances used in the past 30 days:      Last Use Amount   [x]   Alcohol Early september    [x]   Marijuana  \"a couple puffs\"   []   Heroin     []   Prescription Opioids  (used without prescription, for    recreation, or in excess of prescribed amount)     []   Other Prescription  (used without prescription, for    recreation, or in excess of prescribed amount)     []   Cocaine      []   Methamphetamine     []   \"\" drugs (ectasy, MDMA)     []   Other substances        UDS results: +canna  Breathalyzer results: 0.0  Pt denies recent etoh use since falling while intoxicated in September.    What consequences does the patient associate with any of the above substance use and or addictive behaviors? Health problems: falls    Risk factors for detox (check all that apply):  []  Seizures   []  Diaphoretic (sweating)   []  Tremors   []  Hallucinations   []  Increased blood pressure   []  Decreased blood pressure   []  Other   []  None      [] Patient education on risk factors for detoxification and instructed to return to ER as needed.      MENTAL STATUS   Participation: Active verbal participation and Attentive  Grooming: Casual  Orientation: Alert and Fully Oriented  Behavior: Calm  Eye contact: Poor  Mood: Depressed  Affect: Flexible, Sad and Tearful  Thought process: Goal-directed  Thought content: Within normal limits  Speech: Rate within normal limits and Volume within normal limits  Perception: Within normal limits  Memory:  No gross evidence of memory deficits  Insight: Adequate  Judgment:  Limited  Other:    Collateral information: past visits  Source:  [] Significant other present in person:   [] Significant other by telephone  [] [facility]   [] [facility] Nursing Staff  [x] [facility]  [] Other:     [] Unable to " complete full assessment due to:  [] Acute intoxication  [] Patient declined to participate/engage  [] Patient verbally unresponsive  [] Significant cognitive deficits  [] Significant perceptual distortions or behavioral disorganization  [] Other:      CLINICAL IMPRESSIONS:  Primary:  SI  Secondary:  Pain       IDENTIFIED NEEDS/PLAN:  [Trigger DISPOSITION list for any items marked]    []  Imminent safety risk - self [] Imminent safety risk - others   []  Acute substance withdrawal []  Psychosis/Impaired reality testing   [x]  Mood/anxiety [x]  Substance use/Addictive behavior   [x]  Maladaptive behaviro []  Parent/child conflict   []  Family/Couples conflict [x]  Biomedical   [x]  Housing []  Financial   []   Legal  Occupational/Educational   []  Domestic violence []  Other:     Disposition: Actively being addressed by Legal Hold and [facility]    Does patient express agreement with the above plan? yes    Referral appointment(s) scheduled? N\A    Alert team only: Pt placed on legal hold for SI.  Pt is elderly, white male with SI, which is one of the highest risk groups.  He feels he has little to live for.  He also has numerous reports of physical pain, which is a significant trigger for his SI.    I have discussed findings and recommendations with Dr. Gansert, who is in agreement with these recommendations.     Referral information sent to the following community providers : all    If applicable : Referred  to : [facility] for legal hold follow up at (time): pending ERP certification.      Sindy Chacon R.N.  11/5/2018

## 2018-11-05 NOTE — ED NOTES
Pt walked to shower hygiene products given, new gown, socks and disposable undergarments given. New linens placed on bed

## 2018-11-06 VITALS
TEMPERATURE: 98.9 F | DIASTOLIC BLOOD PRESSURE: 88 MMHG | OXYGEN SATURATION: 93 % | SYSTOLIC BLOOD PRESSURE: 140 MMHG | HEART RATE: 66 BPM | BODY MASS INDEX: 27.2 KG/M2 | RESPIRATION RATE: 16 BRPM | WEIGHT: 195 LBS

## 2018-11-06 LAB
E COLI SXT1+2 STL IA: NORMAL
SIGNIFICANT IND 70042: NORMAL
SITE SITE: NORMAL
SOURCE SOURCE: NORMAL

## 2018-11-06 NOTE — DISCHARGE PLANNING
Medical Social Work    Referral: Legal Hold    Intervention: Legal Hold Paperwork given to SW by Life Skills RN: Sindy    Legal Hold Initiated: Date: 11/05/2018  Time: 0940    Legal Hold faxed: Date: 11/05/2018  Time: 1925    Patient’s Insurance Listed on Face Sheet: Medicare and [employer]    Referrals sent to: [facility], [facility]    Plan: Patient will transfer to mental health facility once acceptance is obtained.     Confirmation of receipt of referral via fax confirmation

## 2018-11-06 NOTE — ED NOTES
Report to STEVE, pt to be transported to [facility]. PT in hospital gown and [facility] crew given pt's belongings. Pt ambulatory to ambulance with a steady gait.

## 2018-11-06 NOTE — ED NOTES
Pt reporting pain in his back and neck, pt has chronic pain in these areas. ERP contacted for orders and RN received orders for pain medications. Pt medicated per mar.

## 2018-11-06 NOTE — ED NOTES
Pt remains have small but watery/loose stools.  MD aware, however waiting on lab results on stool sample.

## 2018-11-06 NOTE — ED NOTES
Pt updated on plan of care, pt verbalized understanding. Pt stating he has no needs a this time after ambulating to the restroom. Pt back to bed and is resting. Sitter in place.

## 2018-11-06 NOTE — DISCHARGE PLANNING
Medical Social Work    Referral: Legal hold Transfer to Mental Health Facility    Intervention: LAURA received call from Evonne at [facility] stating that Dr. Nixon has accepted the patient for admission.     LAURA arranged for transportation to be set up through [facility] with STEVE.    The pt will be picked up at 0345.     LAURA notified the RN of the departure time as well as accepting facility.     LAURA created transfer packet and placed on chart. Original Legal Hold placed in packet.     Plan: Pt will transfer to [facility] at 0345.

## 2018-11-08 LAB
BACTERIA STL CULT: NORMAL
E COLI SXT1+2 STL IA: NORMAL
SIGNIFICANT IND 70042: NORMAL
SITE SITE: NORMAL
SOURCE SOURCE: NORMAL

## 2018-12-05 ENCOUNTER — HOSPITAL ENCOUNTER (OUTPATIENT)
Facility: MEDICAL CENTER | Age: 68
End: 2018-12-14
Attending: EMERGENCY MEDICINE | Admitting: INTERNAL MEDICINE
Payer: MEDICARE

## 2018-12-05 DIAGNOSIS — M79.89 PAIN AND SWELLING OF RIGHT LOWER EXTREMITY: ICD-10-CM

## 2018-12-05 DIAGNOSIS — R45.851 SUICIDAL IDEATION: ICD-10-CM

## 2018-12-05 DIAGNOSIS — M54.16 LUMBAR RADICULOPATHY: ICD-10-CM

## 2018-12-05 DIAGNOSIS — M54.31 SCIATICA OF RIGHT SIDE: ICD-10-CM

## 2018-12-05 DIAGNOSIS — S12.401A CLOSED NONDISPLACED FRACTURE OF FIFTH CERVICAL VERTEBRA, UNSPECIFIED FRACTURE MORPHOLOGY, INITIAL ENCOUNTER (HCC): ICD-10-CM

## 2018-12-05 DIAGNOSIS — R26.2 UNABLE TO WALK: ICD-10-CM

## 2018-12-05 DIAGNOSIS — M79.604 PAIN AND SWELLING OF RIGHT LOWER EXTREMITY: ICD-10-CM

## 2018-12-05 LAB
ALBUMIN SERPL BCP-MCNC: 3.7 G/DL (ref 3.2–4.9)
ALBUMIN/GLOB SERPL: 1.3 G/DL
ALP SERPL-CCNC: 64 U/L (ref 30–99)
ALT SERPL-CCNC: 17 U/L (ref 2–50)
AMPHET UR QL SCN: NEGATIVE
ANION GAP SERPL CALC-SCNC: 10 MMOL/L (ref 0–11.9)
AST SERPL-CCNC: 46 U/L (ref 12–45)
BARBITURATES UR QL SCN: NEGATIVE
BASOPHILS # BLD AUTO: 0.2 % (ref 0–1.8)
BASOPHILS # BLD: 0.02 K/UL (ref 0–0.12)
BENZODIAZ UR QL SCN: NEGATIVE
BILIRUB SERPL-MCNC: 0.7 MG/DL (ref 0.1–1.5)
BUN SERPL-MCNC: 10 MG/DL (ref 8–22)
BZE UR QL SCN: NEGATIVE
CALCIUM SERPL-MCNC: 9 MG/DL (ref 8.5–10.5)
CANNABINOIDS UR QL SCN: POSITIVE
CHLORIDE SERPL-SCNC: 99 MMOL/L (ref 96–112)
CO2 SERPL-SCNC: 22 MMOL/L (ref 20–33)
CREAT SERPL-MCNC: 0.59 MG/DL (ref 0.5–1.4)
EOSINOPHIL # BLD AUTO: 0 K/UL (ref 0–0.51)
EOSINOPHIL NFR BLD: 0 % (ref 0–6.9)
ERYTHROCYTE [DISTWIDTH] IN BLOOD BY AUTOMATED COUNT: 45.2 FL (ref 35.9–50)
GLOBULIN SER CALC-MCNC: 2.9 G/DL (ref 1.9–3.5)
GLUCOSE SERPL-MCNC: 94 MG/DL (ref 65–99)
HCT VFR BLD AUTO: 36.6 % (ref 42–52)
HGB BLD-MCNC: 12.6 G/DL (ref 14–18)
IMM GRANULOCYTES # BLD AUTO: 0.02 K/UL (ref 0–0.11)
IMM GRANULOCYTES NFR BLD AUTO: 0.2 % (ref 0–0.9)
LYMPHOCYTES # BLD AUTO: 0.83 K/UL (ref 1–4.8)
LYMPHOCYTES NFR BLD: 9.7 % (ref 22–41)
MCH RBC QN AUTO: 32.3 PG (ref 27–33)
MCHC RBC AUTO-ENTMCNC: 34.4 G/DL (ref 33.7–35.3)
MCV RBC AUTO: 93.8 FL (ref 81.4–97.8)
METHADONE UR QL SCN: NEGATIVE
MONOCYTES # BLD AUTO: 0.62 K/UL (ref 0–0.85)
MONOCYTES NFR BLD AUTO: 7.3 % (ref 0–13.4)
NEUTROPHILS # BLD AUTO: 7.04 K/UL (ref 1.82–7.42)
NEUTROPHILS NFR BLD: 82.6 % (ref 44–72)
NRBC # BLD AUTO: 0 K/UL
NRBC BLD-RTO: 0 /100 WBC
OPIATES UR QL SCN: NEGATIVE
OXYCODONE UR QL SCN: NEGATIVE
PCP UR QL SCN: NEGATIVE
PLATELET # BLD AUTO: 136 K/UL (ref 164–446)
PMV BLD AUTO: 10.9 FL (ref 9–12.9)
POC BREATHALIZER: 0.04 PERCENT (ref 0–0.01)
POTASSIUM SERPL-SCNC: 4.2 MMOL/L (ref 3.6–5.5)
PROPOXYPH UR QL SCN: NEGATIVE
PROT SERPL-MCNC: 6.6 G/DL (ref 6–8.2)
RBC # BLD AUTO: 3.9 M/UL (ref 4.7–6.1)
SODIUM SERPL-SCNC: 131 MMOL/L (ref 135–145)
WBC # BLD AUTO: 8.5 K/UL (ref 4.8–10.8)

## 2018-12-05 PROCEDURE — 700102 HCHG RX REV CODE 250 W/ 637 OVERRIDE(OP): Performed by: INTERNAL MEDICINE

## 2018-12-05 PROCEDURE — 80307 DRUG TEST PRSMV CHEM ANLYZR: CPT

## 2018-12-05 PROCEDURE — G0378 HOSPITAL OBSERVATION PER HR: HCPCS

## 2018-12-05 PROCEDURE — 85025 COMPLETE CBC W/AUTO DIFF WBC: CPT

## 2018-12-05 PROCEDURE — A9270 NON-COVERED ITEM OR SERVICE: HCPCS | Performed by: INTERNAL MEDICINE

## 2018-12-05 PROCEDURE — 99285 EMERGENCY DEPT VISIT HI MDM: CPT

## 2018-12-05 PROCEDURE — 80053 COMPREHEN METABOLIC PANEL: CPT

## 2018-12-05 PROCEDURE — 700105 HCHG RX REV CODE 258: Performed by: EMERGENCY MEDICINE

## 2018-12-05 PROCEDURE — 81001 URINALYSIS AUTO W/SCOPE: CPT | Mod: XU

## 2018-12-05 PROCEDURE — 302970 POC BREATHALIZER: Performed by: EMERGENCY MEDICINE

## 2018-12-05 PROCEDURE — 36415 COLL VENOUS BLD VENIPUNCTURE: CPT

## 2018-12-05 RX ORDER — CELECOXIB 100 MG/1
100 CAPSULE ORAL 2 TIMES DAILY
Status: DISCONTINUED | OUTPATIENT
Start: 2018-12-05 | End: 2018-12-14 | Stop reason: HOSPADM

## 2018-12-05 RX ORDER — AMOXICILLIN 250 MG
2 CAPSULE ORAL 2 TIMES DAILY
Status: DISCONTINUED | OUTPATIENT
Start: 2018-12-05 | End: 2018-12-14 | Stop reason: HOSPADM

## 2018-12-05 RX ORDER — GABAPENTIN 300 MG/1
300 CAPSULE ORAL 3 TIMES DAILY
Status: ON HOLD | COMMUNITY
End: 2018-12-14

## 2018-12-05 RX ORDER — QUETIAPINE FUMARATE 100 MG/1
125 TABLET, FILM COATED ORAL
Status: ON HOLD | COMMUNITY
End: 2018-12-14

## 2018-12-05 RX ORDER — OMEPRAZOLE 20 MG/1
20 CAPSULE, DELAYED RELEASE ORAL DAILY
Status: DISCONTINUED | OUTPATIENT
Start: 2018-12-06 | End: 2018-12-14 | Stop reason: HOSPADM

## 2018-12-05 RX ORDER — PRAZOSIN HYDROCHLORIDE 1 MG/1
3 CAPSULE ORAL NIGHTLY
Status: ON HOLD | COMMUNITY
End: 2018-12-14

## 2018-12-05 RX ORDER — LISINOPRIL 20 MG/1
20 TABLET ORAL DAILY
Status: ON HOLD | COMMUNITY
End: 2018-12-14

## 2018-12-05 RX ORDER — ACETAMINOPHEN 500 MG
1000 TABLET ORAL 3 TIMES DAILY
Status: DISCONTINUED | OUTPATIENT
Start: 2018-12-05 | End: 2018-12-13

## 2018-12-05 RX ORDER — OMEPRAZOLE 20 MG/1
20 CAPSULE, DELAYED RELEASE ORAL ONCE
Status: COMPLETED | OUTPATIENT
Start: 2018-12-05 | End: 2018-12-05

## 2018-12-05 RX ORDER — POLYETHYLENE GLYCOL 3350 17 G/17G
1 POWDER, FOR SOLUTION ORAL
Status: DISCONTINUED | OUTPATIENT
Start: 2018-12-05 | End: 2018-12-14 | Stop reason: HOSPADM

## 2018-12-05 RX ORDER — ACETAMINOPHEN 325 MG/1
650 TABLET ORAL EVERY 6 HOURS PRN
Status: DISCONTINUED | OUTPATIENT
Start: 2018-12-05 | End: 2018-12-05

## 2018-12-05 RX ORDER — DULOXETIN HYDROCHLORIDE 30 MG/1
90 CAPSULE, DELAYED RELEASE ORAL DAILY
Status: ON HOLD | COMMUNITY
End: 2018-12-14

## 2018-12-05 RX ORDER — SODIUM CHLORIDE 9 MG/ML
1000 INJECTION, SOLUTION INTRAVENOUS ONCE
Status: COMPLETED | OUTPATIENT
Start: 2018-12-05 | End: 2018-12-05

## 2018-12-05 RX ORDER — BISACODYL 10 MG
10 SUPPOSITORY, RECTAL RECTAL
Status: DISCONTINUED | OUTPATIENT
Start: 2018-12-05 | End: 2018-12-14 | Stop reason: HOSPADM

## 2018-12-05 RX ORDER — OXYCODONE HYDROCHLORIDE 5 MG/1
5 TABLET ORAL EVERY 4 HOURS PRN
Status: DISCONTINUED | OUTPATIENT
Start: 2018-12-05 | End: 2018-12-13

## 2018-12-05 RX ORDER — NICOTINE 21 MG/24HR
14 PATCH, TRANSDERMAL 24 HOURS TRANSDERMAL
Status: DISCONTINUED | OUTPATIENT
Start: 2018-12-06 | End: 2018-12-14 | Stop reason: HOSPADM

## 2018-12-05 RX ADMIN — SODIUM CHLORIDE 1000 ML: 9 INJECTION, SOLUTION INTRAVENOUS at 19:15

## 2018-12-05 RX ADMIN — OXYCODONE HYDROCHLORIDE 5 MG: 5 TABLET ORAL at 23:10

## 2018-12-05 RX ADMIN — OMEPRAZOLE 20 MG: 20 CAPSULE, DELAYED RELEASE ORAL at 23:18

## 2018-12-05 ASSESSMENT — PAIN SCALES - GENERAL
PAINLEVEL_OUTOF10: 10
PAINLEVEL_OUTOF10: 10

## 2018-12-06 ENCOUNTER — HOSPITAL ENCOUNTER (OUTPATIENT)
Dept: RADIOLOGY | Facility: MEDICAL CENTER | Age: 68
End: 2018-12-06
Attending: STUDENT IN AN ORGANIZED HEALTH CARE EDUCATION/TRAINING PROGRAM

## 2018-12-06 ENCOUNTER — APPOINTMENT (OUTPATIENT)
Dept: RADIOLOGY | Facility: MEDICAL CENTER | Age: 68
End: 2018-12-06
Attending: STUDENT IN AN ORGANIZED HEALTH CARE EDUCATION/TRAINING PROGRAM
Payer: MEDICARE

## 2018-12-06 PROBLEM — M54.16 LUMBAR RADICULOPATHY: Status: ACTIVE | Noted: 2018-12-06

## 2018-12-06 PROBLEM — F39 MOOD DISORDER (HCC): Status: ACTIVE | Noted: 2018-12-06

## 2018-12-06 PROBLEM — M79.604 PAIN AND SWELLING OF RIGHT LOWER EXTREMITY: Status: ACTIVE | Noted: 2018-12-06

## 2018-12-06 PROBLEM — R30.0 DYSURIA: Status: ACTIVE | Noted: 2018-12-06

## 2018-12-06 PROBLEM — R45.851 SUICIDAL IDEATION: Status: ACTIVE | Noted: 2018-12-06

## 2018-12-06 PROBLEM — M25.471 RIGHT ANKLE SWELLING: Status: ACTIVE | Noted: 2018-12-06

## 2018-12-06 PROBLEM — I10 HYPERTENSION: Status: ACTIVE | Noted: 2018-12-06

## 2018-12-06 PROBLEM — M79.89 PAIN AND SWELLING OF RIGHT LOWER EXTREMITY: Status: ACTIVE | Noted: 2018-12-06

## 2018-12-06 LAB
ALBUMIN SERPL BCP-MCNC: 3.1 G/DL (ref 3.2–4.9)
ALBUMIN/GLOB SERPL: 1.2 G/DL
ALP SERPL-CCNC: 54 U/L (ref 30–99)
ALT SERPL-CCNC: 14 U/L (ref 2–50)
ANION GAP SERPL CALC-SCNC: 5 MMOL/L (ref 0–11.9)
APPEARANCE UR: ABNORMAL
AST SERPL-CCNC: 38 U/L (ref 12–45)
BACTERIA #/AREA URNS HPF: NEGATIVE /HPF
BASOPHILS # BLD AUTO: 0.3 % (ref 0–1.8)
BASOPHILS # BLD: 0.02 K/UL (ref 0–0.12)
BILIRUB SERPL-MCNC: 0.8 MG/DL (ref 0.1–1.5)
BILIRUB UR QL STRIP.AUTO: NEGATIVE
BUN SERPL-MCNC: 12 MG/DL (ref 8–22)
CALCIUM SERPL-MCNC: 8.3 MG/DL (ref 8.5–10.5)
CAOX CRY #/AREA URNS HPF: ABNORMAL /HPF
CHLORIDE SERPL-SCNC: 101 MMOL/L (ref 96–112)
CO2 SERPL-SCNC: 25 MMOL/L (ref 20–33)
COLOR UR: YELLOW
CREAT SERPL-MCNC: 0.48 MG/DL (ref 0.5–1.4)
EOSINOPHIL # BLD AUTO: 0.13 K/UL (ref 0–0.51)
EOSINOPHIL NFR BLD: 2.1 % (ref 0–6.9)
EPI CELLS #/AREA URNS HPF: NEGATIVE /HPF
ERYTHROCYTE [DISTWIDTH] IN BLOOD BY AUTOMATED COUNT: 46 FL (ref 35.9–50)
ERYTHROCYTE [SEDIMENTATION RATE] IN BLOOD BY WESTERGREN METHOD: 21 MM/HOUR (ref 0–20)
FERRITIN SERPL-MCNC: 224.1 NG/ML (ref 22–322)
GLOBULIN SER CALC-MCNC: 2.5 G/DL (ref 1.9–3.5)
GLUCOSE SERPL-MCNC: 101 MG/DL (ref 65–99)
GLUCOSE UR STRIP.AUTO-MCNC: NEGATIVE MG/DL
HCT VFR BLD AUTO: 31.7 % (ref 42–52)
HEMOCCULT SP1 STL QL: NEGATIVE
HGB BLD-MCNC: 11.1 G/DL (ref 14–18)
HYALINE CASTS #/AREA URNS LPF: >20 /LPF
IMM GRANULOCYTES # BLD AUTO: 0.01 K/UL (ref 0–0.11)
IMM GRANULOCYTES NFR BLD AUTO: 0.2 % (ref 0–0.9)
IRON SATN MFR SERPL: 15 % (ref 15–55)
IRON SERPL-MCNC: 39 UG/DL (ref 50–180)
KETONES UR STRIP.AUTO-MCNC: 15 MG/DL
LEUKOCYTE ESTERASE UR QL STRIP.AUTO: NEGATIVE
LYMPHOCYTES # BLD AUTO: 1.02 K/UL (ref 1–4.8)
LYMPHOCYTES NFR BLD: 16.7 % (ref 22–41)
MAGNESIUM SERPL-MCNC: 1.7 MG/DL (ref 1.5–2.5)
MCH RBC QN AUTO: 32.7 PG (ref 27–33)
MCHC RBC AUTO-ENTMCNC: 35 G/DL (ref 33.7–35.3)
MCV RBC AUTO: 93.5 FL (ref 81.4–97.8)
MICRO URNS: ABNORMAL
MONOCYTES # BLD AUTO: 0.79 K/UL (ref 0–0.85)
MONOCYTES NFR BLD AUTO: 12.9 % (ref 0–13.4)
NEUTROPHILS # BLD AUTO: 4.15 K/UL (ref 1.82–7.42)
NEUTROPHILS NFR BLD: 67.8 % (ref 44–72)
NITRITE UR QL STRIP.AUTO: NEGATIVE
NRBC # BLD AUTO: 0 K/UL
NRBC BLD-RTO: 0 /100 WBC
PH UR STRIP.AUTO: 5.5 [PH]
PLATELET # BLD AUTO: 105 K/UL (ref 164–446)
PMV BLD AUTO: 11.3 FL (ref 9–12.9)
POTASSIUM SERPL-SCNC: 4.2 MMOL/L (ref 3.6–5.5)
PROT SERPL-MCNC: 5.6 G/DL (ref 6–8.2)
PROT UR QL STRIP: NEGATIVE MG/DL
RBC # BLD AUTO: 3.39 M/UL (ref 4.7–6.1)
RBC # URNS HPF: ABNORMAL /HPF
RBC UR QL AUTO: NEGATIVE
SODIUM SERPL-SCNC: 131 MMOL/L (ref 135–145)
SP GR UR STRIP.AUTO: 1.02
TIBC SERPL-MCNC: 255 UG/DL (ref 250–450)
URATE SERPL-MCNC: 5.3 MG/DL (ref 2.5–8.3)
UROBILINOGEN UR STRIP.AUTO-MCNC: 0.2 MG/DL
WBC # BLD AUTO: 6.1 K/UL (ref 4.8–10.8)
WBC #/AREA URNS HPF: ABNORMAL /HPF

## 2018-12-06 PROCEDURE — G0378 HOSPITAL OBSERVATION PER HR: HCPCS

## 2018-12-06 PROCEDURE — 93970 EXTREMITY STUDY: CPT

## 2018-12-06 PROCEDURE — 83540 ASSAY OF IRON: CPT

## 2018-12-06 PROCEDURE — 84550 ASSAY OF BLOOD/URIC ACID: CPT

## 2018-12-06 PROCEDURE — 99220 PR INITIAL OBSERVATION CARE,LEVL III: CPT | Mod: GC | Performed by: INTERNAL MEDICINE

## 2018-12-06 PROCEDURE — 82728 ASSAY OF FERRITIN: CPT

## 2018-12-06 PROCEDURE — 700102 HCHG RX REV CODE 250 W/ 637 OVERRIDE(OP): Performed by: INTERNAL MEDICINE

## 2018-12-06 PROCEDURE — A9270 NON-COVERED ITEM OR SERVICE: HCPCS | Performed by: STUDENT IN AN ORGANIZED HEALTH CARE EDUCATION/TRAINING PROGRAM

## 2018-12-06 PROCEDURE — 73600 X-RAY EXAM OF ANKLE: CPT | Mod: RT

## 2018-12-06 PROCEDURE — 80053 COMPREHEN METABOLIC PANEL: CPT

## 2018-12-06 PROCEDURE — A9270 NON-COVERED ITEM OR SERVICE: HCPCS | Performed by: NURSE PRACTITIONER

## 2018-12-06 PROCEDURE — 96372 THER/PROPH/DIAG INJ SC/IM: CPT

## 2018-12-06 PROCEDURE — 700102 HCHG RX REV CODE 250 W/ 637 OVERRIDE(OP): Performed by: STUDENT IN AN ORGANIZED HEALTH CARE EDUCATION/TRAINING PROGRAM

## 2018-12-06 PROCEDURE — 85025 COMPLETE CBC W/AUTO DIFF WBC: CPT

## 2018-12-06 PROCEDURE — 700111 HCHG RX REV CODE 636 W/ 250 OVERRIDE (IP): Performed by: INTERNAL MEDICINE

## 2018-12-06 PROCEDURE — 700111 HCHG RX REV CODE 636 W/ 250 OVERRIDE (IP): Performed by: STUDENT IN AN ORGANIZED HEALTH CARE EDUCATION/TRAINING PROGRAM

## 2018-12-06 PROCEDURE — 85652 RBC SED RATE AUTOMATED: CPT

## 2018-12-06 PROCEDURE — 99215 OFFICE O/P EST HI 40 MIN: CPT | Performed by: PSYCHIATRY & NEUROLOGY

## 2018-12-06 PROCEDURE — 83550 IRON BINDING TEST: CPT

## 2018-12-06 PROCEDURE — 700102 HCHG RX REV CODE 250 W/ 637 OVERRIDE(OP): Performed by: NURSE PRACTITIONER

## 2018-12-06 PROCEDURE — 700105 HCHG RX REV CODE 258: Performed by: STUDENT IN AN ORGANIZED HEALTH CARE EDUCATION/TRAINING PROGRAM

## 2018-12-06 PROCEDURE — A9270 NON-COVERED ITEM OR SERVICE: HCPCS | Performed by: INTERNAL MEDICINE

## 2018-12-06 PROCEDURE — 83735 ASSAY OF MAGNESIUM: CPT

## 2018-12-06 PROCEDURE — 82270 OCCULT BLOOD FECES: CPT

## 2018-12-06 RX ORDER — LISINOPRIL 20 MG/1
20 TABLET ORAL DAILY
Status: DISCONTINUED | OUTPATIENT
Start: 2018-12-06 | End: 2018-12-14 | Stop reason: HOSPADM

## 2018-12-06 RX ORDER — PREDNISONE 20 MG/1
20 TABLET ORAL DAILY
Status: DISCONTINUED | OUTPATIENT
Start: 2018-12-06 | End: 2018-12-07

## 2018-12-06 RX ORDER — ALLOPURINOL 100 MG/1
100 TABLET ORAL DAILY
Status: DISCONTINUED | OUTPATIENT
Start: 2018-12-06 | End: 2018-12-14 | Stop reason: HOSPADM

## 2018-12-06 RX ORDER — SODIUM CHLORIDE 9 MG/ML
INJECTION, SOLUTION INTRAVENOUS CONTINUOUS
Status: DISCONTINUED | OUTPATIENT
Start: 2018-12-06 | End: 2018-12-08

## 2018-12-06 RX ORDER — PRAZOSIN HYDROCHLORIDE 1 MG/1
3 CAPSULE ORAL NIGHTLY
Status: DISCONTINUED | OUTPATIENT
Start: 2018-12-06 | End: 2018-12-14 | Stop reason: HOSPADM

## 2018-12-06 RX ORDER — GABAPENTIN 300 MG/1
300 CAPSULE ORAL 3 TIMES DAILY
Status: DISCONTINUED | OUTPATIENT
Start: 2018-12-06 | End: 2018-12-13

## 2018-12-06 RX ORDER — QUETIAPINE FUMARATE 25 MG/1
125 TABLET, FILM COATED ORAL
Status: DISCONTINUED | OUTPATIENT
Start: 2018-12-06 | End: 2018-12-14 | Stop reason: HOSPADM

## 2018-12-06 RX ORDER — LORAZEPAM 1 MG/1
1 TABLET ORAL ONCE
Status: COMPLETED | OUTPATIENT
Start: 2018-12-06 | End: 2018-12-06

## 2018-12-06 RX ORDER — DULOXETIN HYDROCHLORIDE 30 MG/1
90 CAPSULE, DELAYED RELEASE ORAL DAILY
Status: DISCONTINUED | OUTPATIENT
Start: 2018-12-06 | End: 2018-12-14 | Stop reason: HOSPADM

## 2018-12-06 RX ADMIN — ACETAMINOPHEN 1000 MG: 500 TABLET ORAL at 18:32

## 2018-12-06 RX ADMIN — CELECOXIB 100 MG: 100 CAPSULE ORAL at 19:28

## 2018-12-06 RX ADMIN — ENOXAPARIN SODIUM 40 MG: 100 INJECTION SUBCUTANEOUS at 06:00

## 2018-12-06 RX ADMIN — LISINOPRIL 20 MG: 20 TABLET ORAL at 06:00

## 2018-12-06 RX ADMIN — DULOXETINE HYDROCHLORIDE 90 MG: 60 CAPSULE, DELAYED RELEASE ORAL at 06:53

## 2018-12-06 RX ADMIN — PREDNISONE 20 MG: 20 TABLET ORAL at 17:45

## 2018-12-06 RX ADMIN — SODIUM CHLORIDE: 9 INJECTION, SOLUTION INTRAVENOUS at 14:27

## 2018-12-06 RX ADMIN — OXYCODONE HYDROCHLORIDE 5 MG: 5 TABLET ORAL at 22:43

## 2018-12-06 RX ADMIN — GABAPENTIN 300 MG: 300 CAPSULE ORAL at 18:31

## 2018-12-06 RX ADMIN — PRAZOSIN HYDROCHLORIDE 3 MG: 1 CAPSULE ORAL at 22:43

## 2018-12-06 RX ADMIN — ALLOPURINOL 100 MG: 100 TABLET ORAL at 18:37

## 2018-12-06 RX ADMIN — CELECOXIB 100 MG: 100 CAPSULE ORAL at 03:12

## 2018-12-06 RX ADMIN — GABAPENTIN 300 MG: 300 CAPSULE ORAL at 07:00

## 2018-12-06 RX ADMIN — LORAZEPAM 1 MG: 1 TABLET ORAL at 12:58

## 2018-12-06 RX ADMIN — GABAPENTIN 300 MG: 300 CAPSULE ORAL at 12:58

## 2018-12-06 RX ADMIN — OMEPRAZOLE 20 MG: 20 CAPSULE, DELAYED RELEASE ORAL at 06:58

## 2018-12-06 RX ADMIN — ACETAMINOPHEN 1000 MG: 500 TABLET ORAL at 12:58

## 2018-12-06 RX ADMIN — STANDARDIZED SENNA CONCENTRATE AND DOCUSATE SODIUM 2 TABLET: 8.6; 5 TABLET, FILM COATED ORAL at 06:57

## 2018-12-06 RX ADMIN — OXYCODONE HYDROCHLORIDE 5 MG: 5 TABLET ORAL at 06:47

## 2018-12-06 RX ADMIN — QUETIAPINE FUMARATE 125 MG: 100 TABLET ORAL at 23:08

## 2018-12-06 RX ADMIN — MAGNESIUM GLUCONATE 500 MG ORAL TABLET 400 MG: 500 TABLET ORAL at 07:00

## 2018-12-06 RX ADMIN — ACETAMINOPHEN 1000 MG: 500 TABLET ORAL at 07:22

## 2018-12-06 ASSESSMENT — ENCOUNTER SYMPTOMS
FEVER: 0
PND: 0
SEIZURES: 0
POLYDIPSIA: 0
TREMORS: 0
BLURRED VISION: 0
HEMOPTYSIS: 0
CHILLS: 0
SPEECH CHANGE: 0
STRIDOR: 0
EYE DISCHARGE: 0
WEAKNESS: 0
NECK PAIN: 1
WHEEZING: 0
COUGH: 0
NAUSEA: 0
SINUS PAIN: 0
CLAUDICATION: 0
VOMITING: 0
CONSTIPATION: 0
SORE THROAT: 0
DOUBLE VISION: 0
EYE REDNESS: 0
SENSORY CHANGE: 1
HEADACHES: 0
LOSS OF CONSCIOUSNESS: 0
MYALGIAS: 0
SENSORY CHANGE: 0
BLOOD IN STOOL: 0
MEMORY LOSS: 0
SPUTUM PRODUCTION: 0
WEIGHT LOSS: 0
NERVOUS/ANXIOUS: 1
DIAPHORESIS: 0
DIARRHEA: 0
HALLUCINATIONS: 0
EYE PAIN: 0
HEARTBURN: 0
BACK PAIN: 1
PALPITATIONS: 0
SHORTNESS OF BREATH: 0
FALLS: 1
FALLS: 0
ORTHOPNEA: 0
TINGLING: 1
INSOMNIA: 1
DEPRESSION: 1
WEAKNESS: 1
FOCAL WEAKNESS: 0
NERVOUS/ANXIOUS: 0
ABDOMINAL PAIN: 0
DIZZINESS: 0

## 2018-12-06 ASSESSMENT — COPD QUESTIONNAIRES
DURING THE PAST 4 WEEKS HOW MUCH DID YOU FEEL SHORT OF BREATH: SOME OF THE TIME
DO YOU EVER COUGH UP ANY MUCUS OR PHLEGM?: NO/ONLY WITH OCCASIONAL COLDS OR INFECTIONS
COPD SCREENING SCORE: 5
HAVE YOU SMOKED AT LEAST 100 CIGARETTES IN YOUR ENTIRE LIFE: YES

## 2018-12-06 ASSESSMENT — PAIN SCALES - GENERAL
PAINLEVEL_OUTOF10: 0
PAINLEVEL_OUTOF10: 4
PAINLEVEL_OUTOF10: 6

## 2018-12-06 ASSESSMENT — LIFESTYLE VARIABLES
SUBSTANCE_ABUSE: 0
EVER_SMOKED: YES

## 2018-12-06 NOTE — ASSESSMENT & PLAN NOTE
Resolved  Patient presenting with a couple day history of urinary frequency and burning with urination.   - Check UA

## 2018-12-06 NOTE — ED NOTES
Working on [place], patient left [facility] 3 days ago and hasn't had any medications since leaving there, he was there for 27 days.  Waiting on MAR

## 2018-12-06 NOTE — ED NOTES
Report rec'd, patient care assumed at this time.  Patient resting comfortably.  Medicated per MAR.  Requested new armband from registration.  All needs met.  Sitter in place for 1:1 observation.

## 2018-12-06 NOTE — ED TRIAGE NOTES
"BIB [facility] from [facility]. C/o chronic R back pain radiating down leg and depression. Was just released from [facility] 3 days ago after 27 day stay. Denies HI or drug use. + etoh use today. Pt states he wants to hurt himself, though states \"I just want the pain to end\". Denies plan  "

## 2018-12-06 NOTE — ASSESSMENT & PLAN NOTE
Right ankle noted to be edematous on presentation with ecchymosis.     Plan  - Assymmetric RLE swelling, TTP  - Right ankle X-Ray- Old appearing transverse fracture through the tip of the distal right fibula. No acute right ankle fracture or dislocation. There is no  soft tissue swelling.  - B/l LE Doppler US- Bilateral lower extremities - No evidence of superficial or deep venous thrombosis  - Pain management: scheduled Tylenol 1g TID and Celebrex 100mg BID with PRN oxycodone 5mg q4 hours  - X-ray left lower extremity negative for acute injury  - Important to r/o other causes such as gout  - Uric acid level within normal.    - ESR 21 elevated  - Prednisone discontinued 12/10/18

## 2018-12-06 NOTE — ED NOTES
IV fluids initiated.  Pt resting comfortably.  Lights dimmed per request.  Sitter remains in place for 1:1 observation.  All needs met.  Will continue to monitor.

## 2018-12-06 NOTE — ASSESSMENT & PLAN NOTE
POC Breathalizer 0.037 in ED. - Patient states he is not a heavy drinker.    Plan  - Low threshold to start CIWA

## 2018-12-06 NOTE — ASSESSMENT & PLAN NOTE
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  He does not have cauda equina syndrome.  I do think it would be best for him to get his medical parameters corrected which will admittedly take some effort on his part.  I am happy to take care of this but with his medical parameters now, his risk is dramatically higher than what is normally acceptable for this surgery. Check L-spine flex/ex and cervical spine flex/ex films.  - Cervical and lumbar spine flexion/extension Xrays - post surgical changes without evidence of hardware failure or loosening, disc space narrowing and uncovertebral joint arthropathy, no evidence of malalignment or abnormal motion  - SW- Discharge to [facility] [address] [facility]  - Neurosurgery office ([phone]) contacted regarding clearance and if patient can be followed outpatient, they scheduled follow-up appointment at [facility] on Wednesday Jan 16 2019 at 2 pm ([address] [facility]) with Dr Wilkins  - Encourage ambulaton as tolerated

## 2018-12-06 NOTE — ED NOTES
Pt up to the bedside commode w/ 1-2 assist.  Pt had large loose light brown BM.  Stool sample collected and sent to the lab.

## 2018-12-06 NOTE — H&P
Internal Medicine Admitting History and Physical    Note Author: Asa Ceballos D.O.       Name Chris Leggett     1950   Age/Sex 68 y.o. male   MRN [id number]   Code Status Full Code     After 5PM or if no immediate response to page, please call for cross-coverage  Attending/Team: Dr. Lundberg / [employer] [facility] See Patient List for primary contact information  Call [phone] to page    1st Call - Day Intern (R1):   Dr. Ramires 2nd Call - Day Sr. Resident (R2/R3):   Dr. Callahan       Chief Complaint:   Back pain with thought to hurt himself    HPI:  Mr. Leggett is a [age] year old female with past medical history of neck pain status post internal fixation and reduction of C5-C6 on 2018, Mood disorder and Hypertension who presents to the hospital with a recent exacerbation of his lower back pain. Patient states a couple of weeks ago he had back pain from his lower lumbar spine radiating down his right leg into his foot for the last couple of weeks. He describes the pain as sharp. He has had similar pain in the past, but to this extent. Of note, he was recently admitted at [facility] where he stayed for about a month and was discharged three days ago and has been off of his medications since then. He stated he stayed at a local motel, but then his pain worsened and he was unable to walk and tripped off the bus. Patient denies any past medical history of cancer, recent trauma, saddle anesthesia or muscle weakness. He does admit to one episode of bowel incontinence which he relates to being unable to get to the bathroom in time with all the stool softeners he's had. Patient denies urinary incontinence and suprapubic tenderness, but states he's had burning with urination for the past couple of days.  Patient's right ankle was noted to be swollen on exam which patient stated has been present since he was at the behavioral facility.     On arrival to the ED, patient stated he has no desire  "to live but denies any current plans. His symptoms have worsened since the death of his brother a couple of months ago. He states \"he has no family.\" He has been having nightmares of his mother trying to kill him.     In the ED, patient was found to be afebrile, tachycardic at 115 with BP of 131/83, and saturating well on room air. Labs were significant for normocytic anemia of 12.6, UDS was positive for cannabinoids and POC Breathalizer was 0.037. Patient received one liter of IV fluid bolus. Legal Hold was initiated in the ED. Patient refused a digital rectal exam, despite informing him of the clinical benefit.       Review of Systems   Constitutional: Negative for chills, diaphoresis, fever, malaise/fatigue and weight loss.   HENT: Negative for congestion, ear discharge, ear pain, hearing loss, nosebleeds, sore throat and tinnitus.    Eyes: Negative for blurred vision and double vision.   Respiratory: Negative for cough, hemoptysis, sputum production, shortness of breath and wheezing.    Cardiovascular: Negative for chest pain, palpitations, orthopnea, claudication, leg swelling and PND.   Gastrointestinal: Negative for abdominal pain, blood in stool, constipation, diarrhea (loose stools recently), heartburn, melena, nausea and vomiting.   Genitourinary: Positive for dysuria, frequency and urgency.   Musculoskeletal: Positive for back pain, falls and neck pain. Negative for joint pain and myalgias.   Skin: Negative for itching and rash.   Neurological: Positive for tingling (in bilateral hands since neck surgery) and weakness. Negative for dizziness, tremors, sensory change, speech change, focal weakness, seizures, loss of consciousness and headaches.        Denies saddle anesthesia or muscle weakness   Psychiatric/Behavioral: Positive for depression and suicidal ideas. Negative for hallucinations, memory loss and substance abuse. The patient is nervous/anxious and has insomnia.              Past Medical History " "(Chronic medical problem, known complications and current treatment)    Status post internal fixation and reduction of C5-C6 on 9/2/2018  Mood disorder   Hypertension     Past Surgical History:  Past Surgical History:   Procedure Laterality Date   • CERVICAL DISK AND FUSION ANTERIOR  9/2/2018    Procedure: CERVICAL DISK AND FUSION ANTERIOR C5-C6;  Surgeon: Varghese Wilkins M.D.;  Location: SURGERY [facility];  Service: Neurosurgery   • OTHER ORTHOPEDIC SURGERY      fision with disc removal       Current Outpatient Medications:  Home Medications     Reviewed by Yusra Tovar (Pharmacy Tech) on 12/05/18 at 2220  Med List Status: Complete   Medication Last Dose Status   DULoxetine (CYMBALTA) 30 MG Cap DR Particles 3 DAYS Active   gabapentin (NEURONTIN) 300 MG Cap 3 DAYS Active   lisinopril (PRINIVIL) 20 MG Tab 3 DAYS Active   prazosin (MINIPRESS) 1 MG Cap 3 DAYS Active   QUEtiapine (SEROQUEL) 100 MG Tab 3 DAYS Active                Medication Allergy/Sensitivities:  No Known Allergies      Family History (mandatory)   History reviewed. No pertinent family history.  Mother: TB   Father Prostate Cancer, Parkinson's and Alzheimers     Social History (mandatory)   Social History     Social History   • Marital status: Single     Spouse name: N/A   • Number of children: N/A   • Years of education: N/A     Occupational History   • Not on file.     Social History Main Topics   • Smoking status: Current Every Day Smoker     Packs/day: 0.50     Years: 5.00     Types: Cigarettes   • Smokeless tobacco: Current User   • Alcohol use Yes   • Drug use: No   • Sexual activity: Not on file     Other Topics Concern   • Not on file     Social History Narrative   • No narrative on file     [facility] Before [facility] at [facility], stayed at a [facility].   Smokes 1 pack per week since 55yo   ETOH: 6 packs \"once in a while\" unable to quantify   Recreational drugs: Marijuana " "    PCP : Pcp Pt States None    Physical Exam     Vitals:    12/05/18 1654 12/05/18 1655 12/05/18 2017   BP:  131/83 113/88   Pulse:  (!) 115 (!) 106   Resp:  18 16   Temp:  37.8 °C (100 °F)    TempSrc:  Oral    SpO2:  98% 98%   Weight: 88.5 kg (195 lb)     Height: 1.803 m (5' 11\")       Body mass index is 27.2 kg/m².  /88   Pulse (!) 106   Temp 37.8 °C (100 °F) (Oral)   Resp 16   Ht 1.803 m (5' 11\")   Wt 88.5 kg (195 lb)   SpO2 98%   BMI 27.20 kg/m²   O2 therapy: Pulse Oximetry: 98 %    Physical Exam   Constitutional: He is oriented to person, place, and time.    male laying in bed, tearful, appears older than stated age   HENT:   Head: Normocephalic and atraumatic.   Right Ear: External ear normal.   Left Ear: External ear normal.   Mouth/Throat: Oropharynx is clear and moist. No oropharyngeal exudate.   Mallampati Score: II    Eyes: EOM are normal. No scleral icterus.   Neck: Normal range of motion.   Cardiovascular: Normal rate, regular rhythm and intact distal pulses.  Exam reveals no friction rub.    No murmur heard.  Pulses:       Dorsalis pedis pulses are 2+ on the right side, and 2+ on the left side.   Pulmonary/Chest: Effort normal and breath sounds normal. No respiratory distress. He has no wheezes. He has no rales. He exhibits no tenderness.   Coarse breath sounds bilaterally   Abdominal: Soft. Bowel sounds are normal. He exhibits no distension. There is no tenderness. There is no rebound and no guarding.   Genitourinary:   Genitourinary Comments: Refused SAVI  External Rectum-normal appearing  No soiling noted   Musculoskeletal: He exhibits no edema.   Right Index finger: stump   Right ankle: swollen with ecchymosis noted on lateral aspect of ankle. No tenderness to palpation     Back: no pinpoint spinal tenderness   Tenderness to palpation in the paravertebral musculature of L4/L5, hypertonicity noted bilaterally  Back range of motion restriction in extension and rotation, normal " in flexion  Positive Piriformis test   No lower extremity edema    Neurological: He is alert and oriented to person, place, and time. He has intact cranial nerves. He displays facial symmetry and normal speech. He exhibits normal muscle tone. He displays no Babinski's sign on the right side. He displays no Babinski's sign on the left side.   Reflex Scores:       Patellar reflexes are 2+ on the right side and 2+ on the left side.       Achilles reflexes are 2+ on the right side and 2+ on the left side.  Muscle strength 4/5 bilaterally      Skin: Skin is warm and dry. No erythema.   Psychiatric: His mood appears anxious. He exhibits a depressed mood. He expresses suicidal ideation. He expresses no homicidal ideation. He expresses no suicidal plans. He has a flat affect.         Data Review       Old Records Request:   Completed  Current Records review/summary: Completed    Lab Data Review:  Recent Results (from the past 24 hour(s))   POC BREATHALIZER    Collection Time: 12/05/18  4:58 PM   Result Value Ref Range    POC Breathalizer 0.037 (A) 0.00 - 0.01 Percent   URINE DRUG SCREEN    Collection Time: 12/05/18  5:22 PM   Result Value Ref Range    Amphetamines Urine Negative Negative    Barbiturates Negative Negative    Benzodiazepines Negative Negative    Cocaine Metabolite Negative Negative    Methadone Negative Negative    Opiates Negative Negative    Oxycodone Negative Negative    Phencyclidine -Pcp Negative Negative    Propoxyphene Negative Negative    Cannabinoid Metab Positive (A) Negative   CBC WITH DIFFERENTIAL    Collection Time: 12/05/18  6:10 PM   Result Value Ref Range    WBC 8.5 4.8 - 10.8 K/uL    RBC 3.90 (L) 4.70 - 6.10 M/uL    Hemoglobin 12.6 (L) 14.0 - 18.0 g/dL    Hematocrit 36.6 (L) 42.0 - 52.0 %    MCV 93.8 81.4 - 97.8 fL    MCH 32.3 27.0 - 33.0 pg    MCHC 34.4 33.7 - 35.3 g/dL    RDW 45.2 35.9 - 50.0 fL    Platelet Count 136 (L) 164 - 446 K/uL    MPV 10.9 9.0 - 12.9 fL    Neutrophils-Polys 82.60  (H) 44.00 - 72.00 %    Lymphocytes 9.70 (L) 22.00 - 41.00 %    Monocytes 7.30 0.00 - 13.40 %    Eosinophils 0.00 0.00 - 6.90 %    Basophils 0.20 0.00 - 1.80 %    Immature Granulocytes 0.20 0.00 - 0.90 %    Nucleated RBC 0.00 /100 WBC    Neutrophils (Absolute) 7.04 1.82 - 7.42 K/uL    Lymphs (Absolute) 0.83 (L) 1.00 - 4.80 K/uL    Monos (Absolute) 0.62 0.00 - 0.85 K/uL    Eos (Absolute) 0.00 0.00 - 0.51 K/uL    Baso (Absolute) 0.02 0.00 - 0.12 K/uL    Immature Granulocytes (abs) 0.02 0.00 - 0.11 K/uL    NRBC (Absolute) 0.00 K/uL   COMP METABOLIC PANEL    Collection Time: 12/05/18  6:10 PM   Result Value Ref Range    Sodium 131 (L) 135 - 145 mmol/L    Potassium 4.2 3.6 - 5.5 mmol/L    Chloride 99 96 - 112 mmol/L    Co2 22 20 - 33 mmol/L    Anion Gap 10.0 0.0 - 11.9    Glucose 94 65 - 99 mg/dL    Bun 10 8 - 22 mg/dL    Creatinine 0.59 0.50 - 1.40 mg/dL    Calcium 9.0 8.5 - 10.5 mg/dL    AST(SGOT) 46 (H) 12 - 45 U/L    ALT(SGPT) 17 2 - 50 U/L    Alkaline Phosphatase 64 30 - 99 U/L    Total Bilirubin 0.7 0.1 - 1.5 mg/dL    Albumin 3.7 3.2 - 4.9 g/dL    Total Protein 6.6 6.0 - 8.2 g/dL    Globulin 2.9 1.9 - 3.5 g/dL    A-G Ratio 1.3 g/dL   ESTIMATED GFR    Collection Time: 12/05/18  6:10 PM   Result Value Ref Range    GFR If African American >60 >60 mL/min/1.73 m 2    GFR If Non African American >60 >60 mL/min/1.73 m 2       Imaging/Procedures Review:    Independant Imaging Review: Completed  MR-LUMBAR SPINE-WITH & W/O    (Results Pending)   DX-ANKLE 2- VIEWS RIGHT    (Results Pending)          Records reviewed and summarized in current documentation :  Yes  [employer] teaching service handout given to patient:  No         Assessment/Plan     * Lumbar radiculopathy   Assessment & Plan    [age] year old male with three week history of worsening right lower sharp back pain radiating into his leg and foot without saddle anesthesia, persistent urinary/bowel incontinence or focal neurological deficits. On exam, DTRs were 2+  bilaterally with 4/5 muscle strength. Low suspicion for cauda equina syndrome. Likely to be secondary to vertebral abnormality.   Plan   - Admit patient   - Obtain Lumbar W/WO Contrast MRI   - Scheduled Tylenol   - Celebrex with PPI   - PRN Oxycodone   - PT/OT consult      Suicidal ideation   Assessment & Plan    Patient currently expressing suicidal thoughts with no active plan in place. Likely to have been triggered from being off of his medications for three days.   - Legal Hold in Place   - Q15 checks   - Consult Psychiatry      Dysuria   Assessment & Plan    Patient presenting with a couple day history of urinary frequency and burning with urination.   - Check UA      Right ankle swelling   Assessment & Plan    Right ankle noted to be edematous on presentation with ecchymosis.   - Right ankle X-Ray     Alcohol intoxication (HCC)- (present on admission)   Assessment & Plan    POC Breathalizer 0.037 in ED  - Patient states he is not a heavy drinker   - Low threshold to start CIWA     Hyponatremia- (present on admission)   Assessment & Plan    On admission, noted to have  Sodium of 131 likely secondary to poor oral intake   - Continue to monitor      Normocytic anemia- (present on admission)   Assessment & Plan    Patient found to have normocytic anemia on admission   - Check Iron studies/Ferritin   - Refused SAVI  - Check FOBT   - Outpatient Colonoscopy, if no recent screening     Cervical spine fracture (HCC)- (present on admission)   Assessment & Plan    History of neck pain status post internal fixation and reduction of C5-C6 on 9/2/2018  - Continue Gabapentin for tingling of hands     Mood disorder (HCC)   Assessment & Plan    - Continue Duloxetine   - Continue Gabapentin   - Continue Prazosin   - Continue Seroquel   - Psychiatry consult      Hypertension   Assessment & Plan    - Currently stable   - Continue Lisinopril          Anticipated Hospital stay:  >2 midnights        Quality Measures  Quality-Core  Measures   Reviewed items::  Labs reviewed and Medications reviewed  Roca catheter::  No Roca  DVT prophylaxis pharmacological::  Enoxaparin (Lovenox)    PCP: Pcp Pt States None

## 2018-12-06 NOTE — PSYCHIATRY
"PSYCHIATRIC CONSULTATION:  Reason for admission: Suicidal ideation  Reason for consult: Suicidal Ideation  Requesting Physician: Asa Ceballos D.O.  Legal status: Not Applicable    Chief Complaint: \" I feel like im Lost\"    HPI: Mr. Leggett is a 68 year old male with past medical history of Mood disorder who presented to the hospital with a recent exacerbation of his lower back pain. He was recently admitted at [facility] where he stayed for about a month and was discharged three days ago and has been off of his medications since then. Patient stated he has no desire to live but denies any current plans. Today the patient is visibly upset and crying during the interview. He states he has been depressed for months now and says he doesn't want to die but he has no purpose anymore. He states he lost his wife, mother, and brother in the last few years and that the holidays get him very depressed. He has no plan to commit suicide. He recently was treated at [facility] for depression and suicidal ideations. He states it went very well and he liked the medication regimen he was on there. He immediately went off his mediations once discharged from [facility]. He also states his medical problems, financial difficulties, and housing issues contribute to his depression. He said that he has never had any history of hallucinations but states that he thought people were in his room earlier. The patient tyra mentions he uses THC and alcohol regularly and was treated for substance abuse a few times decades ago. He denies suicidal ideations, homicidal ideations, and delusions.     Review of Systems:  Psychiatric:  Depression: Positive       Lela: Negative  Anxiety/Panic Attacks Positive  PTSD symptom:Negative  Psychosis: Rule out  Other:  Neurologic: WNL  Eyes: WNL   Skin: WNL   Musculoskeletal:  Right ankle swelling. HX of c5-c6 surgery  CV:  WNL  Lungs: WNL   GI: WNL   :  WNL  Endocrine:  WNL  All " "other systems reviewed and are negative.     Psychiatric Examination: observed phenomenon:  Vitals: /65   Pulse 100   Temp 37.8 °C (100 °F) (Oral)   Resp 16   Ht 1.803 m (5' 11\")   Wt 88.5 kg (195 lb)   SpO2 98%   BMI 27.20 kg/m²  Body mass index is 27.2 kg/m².  Musculoskeletal: Right ankle swelling. HX of c5-c6 surgery  Appearance: Patient is a thin,  man, with missing teeth.   Thought Process: [place]  Thought Content: WNL  Speech:Normal rate rhythm, and tone  Mood: Depressd            Affect: Congruent with mood        SI/HI: Denies  Attention/Alertness: Attentive and alert  Memory: Intact   Orientation: Fully oriented        Cognition:  WNL  Insight/Judgement into symptoms: poor/poor  Neurological Testing: None    Past Psychiatric Hx:   Recent admission to [facility]    Per patient- Major Depressive Disorder    Family Psychiatric Hx:  Denies    Social Hx:  Patient was living a motel prior to arrival. He is unemployed and receives social security. He was recently treated at [facility] for psychiatric issues. He has no immediate family. His mother brother and wife all have passed away over the last several years.    Drug/Alcohol/Tobacco Hx:   Drugs:THC   Alcohol: Current   Tobacco: Smoker    Medical Hx: labs, MARS, medications, etc were reviewed. Only those findings of potential interest to psychiatry are noted below:  Neurological:    TBIs: Denies   SZs: Denies   Strokes: Denies   Other:  Other medical:   Thyroid: Denies   Diabetes:Denies   Cardiovascular disease: Hypertension  Past Medical History:   Diagnosis Date   • Hypertension      Past Surgical History:   Procedure Laterality Date   • CERVICAL DISK AND FUSION ANTERIOR  9/2/2018    Procedure: CERVICAL DISK AND FUSION ANTERIOR C5-C6;  Surgeon: Varghese Wilkins M.D.;  Location: SURGERY [facility];  Service: Neurosurgery   • OTHER ORTHOPEDIC SURGERY      fision with disc removal     Allergies:   No Known " Allergies    Medications:  Current Facility-Administered Medications   Medication Dose Route Frequency Provider Last Rate Last Dose   • DULoxetine (CYMBALTA) capsule 90 mg  90 mg Oral DAILY Asa Ceballos D.O.   90 mg at 12/06/18 0653   • gabapentin (NEURONTIN) capsule 300 mg  300 mg Oral TID Asa Ceballos D.O.   300 mg at 12/06/18 0700   • lisinopril (PRINIVIL) tablet 20 mg  20 mg Oral DAILY Asa Ceballos D.O.   20 mg at 12/06/18 0600   • prazosin (MINIPRESS) capsule 3 mg  3 mg Oral Nightly CINDY Romero.O.       • QUEtiapine (SEROQUEL) tablet 125 mg  125 mg Oral QHS CelytCINDY Vigil.O.       • senna-docusate (PERICOLACE or SENOKOT S) 8.6-50 MG per tablet 2 Tab  2 Tab Oral BID CINDY Romero.O.   2 Tab at 12/06/18 0657    And   • polyethylene glycol/lytes (MIRALAX) PACKET 1 Packet  1 Packet Oral QDAY PRN CINDY Romero.O.        And   • magnesium hydroxide (MILK OF MAGNESIA) suspension 30 mL  30 mL Oral QDAY PRN Asa Ceballos D.O.        And   • bisacodyl (DULCOLAX) suppository 10 mg  10 mg Rectal QDAY PRN Asa Ceballos D.O.       • Respiratory Care per Protocol   Nebulization Continuous RT CINDY Romero.O.       • enoxaparin (LOVENOX) inj 40 mg  40 mg Subcutaneous DAILY Asa Ceballos D.O.   40 mg at 12/06/18 0600   • nicotine (NICODERM) 14 MG/24HR 14 mg  14 mg Transdermal Daily-0600 CINDY Romero.O.       • celecoxib (CELEBREX) capsule 100 mg  100 mg Oral BID Mark Lundberg M.D.   Stopped at 12/06/18 0600   • omeprazole (PRILOSEC) capsule 20 mg  20 mg Oral DAILY Mark Lundberg M.D.   20 mg at 12/06/18 0658   • oxyCODONE immediate-release (ROXICODONE) tablet 5 mg  5 mg Oral Q4HRS PRN Mark Lundberg M.D.   5 mg at 12/06/18 0647   • magnesium oxide (MAG-OX) tablet 400 mg  400 mg Oral DAILY Mark Lundberg M.D.   400 mg at 12/06/18 0700   • acetaminophen (TYLENOL) tablet 1,000 mg  1,000 mg Oral TID Mark Lundberg M.D.   1,000 mg at 12/06/18 0722     Current Outpatient Prescriptions   Medication Sig Dispense Refill   •  gabapentin (NEURONTIN) 300 MG Cap Take 300 mg by mouth 3 times a day.     • QUEtiapine (SEROQUEL) 100 MG Tab Take 125 mg by mouth every bedtime.     • prazosin (MINIPRESS) 1 MG Cap Take 3 mg by mouth every evening.     • DULoxetine (CYMBALTA) 30 MG Cap DR Particles Take 90 mg by mouth every day.     • lisinopril (PRINIVIL) 20 MG Tab Take 20 mg by mouth every day.         Labs/ Testing:  Recent Results (from the past 48 hour(s))   POC BREATHALIZER    Collection Time: 12/05/18  4:58 PM   Result Value Ref Range    POC Breathalizer 0.037 (A) 0.00 - 0.01 Percent   URINE DRUG SCREEN    Collection Time: 12/05/18  5:22 PM   Result Value Ref Range    Amphetamines Urine Negative Negative    Barbiturates Negative Negative    Benzodiazepines Negative Negative    Cocaine Metabolite Negative Negative    Methadone Negative Negative    Opiates Negative Negative    Oxycodone Negative Negative    Phencyclidine -Pcp Negative Negative    Propoxyphene Negative Negative    Cannabinoid Metab Positive (A) Negative   URINALYSIS    Collection Time: 12/05/18  5:22 PM   Result Value Ref Range    Color Yellow     Character Cloudy (A)     Specific Gravity 1.019 <1.035    Ph 5.5 5.0 - 8.0    Glucose Negative Negative mg/dL    Ketones 15 (A) Negative mg/dL    Protein Negative Negative mg/dL    Bilirubin Negative Negative    Urobilinogen, Urine 0.2 Negative    Nitrite Negative Negative    Leukocyte Esterase Negative Negative    Occult Blood Negative Negative    Micro Urine Req Microscopic    URINE MICROSCOPIC (W/UA)    Collection Time: 12/05/18  5:22 PM   Result Value Ref Range    WBC 0-2 (A) /hpf    RBC 0-2 (A) /hpf    Bacteria Negative None /hpf    Epithelial Cells Negative /hpf    Ca Oxalate Crystal Moderate /hpf    Hyaline Cast >20 (A) /lpf   CBC WITH DIFFERENTIAL    Collection Time: 12/05/18  6:10 PM   Result Value Ref Range    WBC 8.5 4.8 - 10.8 K/uL    RBC 3.90 (L) 4.70 - 6.10 M/uL    Hemoglobin 12.6 (L) 14.0 - 18.0 g/dL    Hematocrit 36.6  (L) 42.0 - 52.0 %    MCV 93.8 81.4 - 97.8 fL    MCH 32.3 27.0 - 33.0 pg    MCHC 34.4 33.7 - 35.3 g/dL    RDW 45.2 35.9 - 50.0 fL    Platelet Count 136 (L) 164 - 446 K/uL    MPV 10.9 9.0 - 12.9 fL    Neutrophils-Polys 82.60 (H) 44.00 - 72.00 %    Lymphocytes 9.70 (L) 22.00 - 41.00 %    Monocytes 7.30 0.00 - 13.40 %    Eosinophils 0.00 0.00 - 6.90 %    Basophils 0.20 0.00 - 1.80 %    Immature Granulocytes 0.20 0.00 - 0.90 %    Nucleated RBC 0.00 /100 WBC    Neutrophils (Absolute) 7.04 1.82 - 7.42 K/uL    Lymphs (Absolute) 0.83 (L) 1.00 - 4.80 K/uL    Monos (Absolute) 0.62 0.00 - 0.85 K/uL    Eos (Absolute) 0.00 0.00 - 0.51 K/uL    Baso (Absolute) 0.02 0.00 - 0.12 K/uL    Immature Granulocytes (abs) 0.02 0.00 - 0.11 K/uL    NRBC (Absolute) 0.00 K/uL   COMP METABOLIC PANEL    Collection Time: 12/05/18  6:10 PM   Result Value Ref Range    Sodium 131 (L) 135 - 145 mmol/L    Potassium 4.2 3.6 - 5.5 mmol/L    Chloride 99 96 - 112 mmol/L    Co2 22 20 - 33 mmol/L    Anion Gap 10.0 0.0 - 11.9    Glucose 94 65 - 99 mg/dL    Bun 10 8 - 22 mg/dL    Creatinine 0.59 0.50 - 1.40 mg/dL    Calcium 9.0 8.5 - 10.5 mg/dL    AST(SGOT) 46 (H) 12 - 45 U/L    ALT(SGPT) 17 2 - 50 U/L    Alkaline Phosphatase 64 30 - 99 U/L    Total Bilirubin 0.7 0.1 - 1.5 mg/dL    Albumin 3.7 3.2 - 4.9 g/dL    Total Protein 6.6 6.0 - 8.2 g/dL    Globulin 2.9 1.9 - 3.5 g/dL    A-G Ratio 1.3 g/dL   ESTIMATED GFR    Collection Time: 12/05/18  6:10 PM   Result Value Ref Range    GFR If African American >60 >60 mL/min/1.73 m 2    GFR If Non African American >60 >60 mL/min/1.73 m 2   CBC with Differential    Collection Time: 12/06/18  4:58 AM   Result Value Ref Range    WBC 6.1 4.8 - 10.8 K/uL    RBC 3.39 (L) 4.70 - 6.10 M/uL    Hemoglobin 11.1 (L) 14.0 - 18.0 g/dL    Hematocrit 31.7 (L) 42.0 - 52.0 %    MCV 93.5 81.4 - 97.8 fL    MCH 32.7 27.0 - 33.0 pg    MCHC 35.0 33.7 - 35.3 g/dL    RDW 46.0 35.9 - 50.0 fL    Platelet Count 105 (L) 164 - 446 K/uL    MPV 11.3 9.0  - 12.9 fL    Neutrophils-Polys 67.80 44.00 - 72.00 %    Lymphocytes 16.70 (L) 22.00 - 41.00 %    Monocytes 12.90 0.00 - 13.40 %    Eosinophils 2.10 0.00 - 6.90 %    Basophils 0.30 0.00 - 1.80 %    Immature Granulocytes 0.20 0.00 - 0.90 %    Nucleated RBC 0.00 /100 WBC    Neutrophils (Absolute) 4.15 1.82 - 7.42 K/uL    Lymphs (Absolute) 1.02 1.00 - 4.80 K/uL    Monos (Absolute) 0.79 0.00 - 0.85 K/uL    Eos (Absolute) 0.13 0.00 - 0.51 K/uL    Baso (Absolute) 0.02 0.00 - 0.12 K/uL    Immature Granulocytes (abs) 0.01 0.00 - 0.11 K/uL    NRBC (Absolute) 0.00 K/uL   Magnesium    Collection Time: 12/06/18  4:58 AM   Result Value Ref Range    Magnesium 1.7 1.5 - 2.5 mg/dL   Comp Metabolic Panel (CMP)    Collection Time: 12/06/18  4:58 AM   Result Value Ref Range    Sodium 131 (L) 135 - 145 mmol/L    Potassium 4.2 3.6 - 5.5 mmol/L    Chloride 101 96 - 112 mmol/L    Co2 25 20 - 33 mmol/L    Anion Gap 5.0 0.0 - 11.9    Glucose 101 (H) 65 - 99 mg/dL    Bun 12 8 - 22 mg/dL    Creatinine 0.48 (L) 0.50 - 1.40 mg/dL    Calcium 8.3 (L) 8.5 - 10.5 mg/dL    AST(SGOT) 38 12 - 45 U/L    ALT(SGPT) 14 2 - 50 U/L    Alkaline Phosphatase 54 30 - 99 U/L    Total Bilirubin 0.8 0.1 - 1.5 mg/dL    Albumin 3.1 (L) 3.2 - 4.9 g/dL    Total Protein 5.6 (L) 6.0 - 8.2 g/dL    Globulin 2.5 1.9 - 3.5 g/dL    A-G Ratio 1.2 g/dL   IRON/TOTAL IRON BIND    Collection Time: 12/06/18  4:58 AM   Result Value Ref Range    Iron 39 (L) 50 - 180 ug/dL    Total Iron Binding 255 250 - 450 ug/dL    % Saturation 15 15 - 55 %   FERRITIN    Collection Time: 12/06/18  4:58 AM   Result Value Ref Range    Ferritin 224.1 22.0 - 322.0 ng/mL   ESTIMATED GFR    Collection Time: 12/06/18  4:58 AM   Result Value Ref Range    GFR If African American >60 >60 mL/min/1.73 m 2    GFR If Non African American >60 >60 mL/min/1.73 m 2       OUTSIDE IMAGES-DX LOWER EXTREMITY, RIGHT   Final Result      US-EXTREMITY VENOUS LOWER BILAT   Final Result      DX-ANKLE 2- VIEWS RIGHT   Final  Result      1.  Old appearing transverse fracture through the tip of the distal right fibula.      2.  No acute right ankle fracture or dislocation.      MR-LUMBAR SPINE-WITH & W/O    (Results Pending)   MR-CERVICAL SPINE-W/O    (Results Pending)   MR-THORACIC SPINE-W/O    (Results Pending)   DX-ANKLE 3+ VIEWS RIGHT    (Results Pending)     ECG: [id number]    ASSESSMENT:  Patient is visibly anxious and depressed. He also has camryn using alcohol and THC. The patient denies suicidal intent but states he doesn't see the purpose of life anymore. He stopped taking medications after he left [facility] 3-4 days ago. [facility] restarted his medications from his stay there and re-assess with him tomorrow. I prescribed him a one time dose of Ativan for due to how visibly upset he was.     Depressive Disorder Unspecified  Anxiety Disorder Unspecified  Alcohol use disorder  THC use disorder  RO Major depressive disorder  RO Acute Psychosis    PLAN:  Continue Cymbalta, Prazosin, and Seroquel  One time dose of Ativan  Legal status: Not Applicable   Anticipate F/U within 24 hours.   May have phone.     Héctor Candelaria, PMHNP-BC

## 2018-12-06 NOTE — ASSESSMENT & PLAN NOTE
[age] year old male with three week history of worsening right lower sharp back pain radiating into his leg and foot without saddle anesthesia, persistent urinary/bowel incontinence or focal neurological deficits. On exam, DTRs were 2+ bilaterally with 4/5 muscle strength. Low suspicion for cauda equina syndrome. Likely to be secondary to vertebral abnormality.     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  He does not have cauda equina syndrome.  I do think it would be best for him to get his medical parameters corrected which will admittedly take some effort on his part.  I am happy to take care of this but with his medical parameters now, his risk is dramatically higher than what is normally acceptable for this surgery. Check L-spine flex/ex and cervical spine flex/ex films.  - Cervical and lumbar spine flexion/extension Xrays - post surgical changes without evidence of hardware failure or loosening, disc space narrowing and uncovertebral joint arthropathy, no evidence of malalignment or abnormal motion  - SW- Discharge to [address]  - Neurosurgery office ([phone]) contacted regarding clearance and if patient can be followed outpatient, they scheduled  follow-[facility] appointment at [facility] on Wednesday Jan 16 2019 at 2 pm ([address] [place]) with Dr Wilkins  - Encourage ambulaton as tolerated

## 2018-12-06 NOTE — ASSESSMENT & PLAN NOTE
Patient currently expressing suicidal thoughts with no active plan in place. Likely to have been triggered from being off of his medications for three days.     Plan-  - Psychiatry- Psychotropic medications: start Trazdone 50mg PO at bedtime for adjunct treatment of depression, continue Cymbalta 90mg PO daily for depression, Prazosin 3mg PO at bedtime and seroquel 125mg Po at bedtime. Monitor sodium levels. If continues to drop, will discontinue Trazodone.  Upon discharge, patient should receive an appointment with PCP and outpatient psychiatrist for continue follow up.

## 2018-12-06 NOTE — ED PROVIDER NOTES
"ED Provider Note    Scribed for Stephen Harris M.D. by Shelia Garrett. 12/5/2018  5:03 PM    Primary care provider: Pcp Pt States None  Means of arrival: EMS  History obtained from: Patient  History limited by: None    CHIEF COMPLAINT  Chief Complaint   Patient presents with   • Back Pain   • Leg Pain   • Alcohol Intoxication   • Depression       HPI  Chris Leggett is a 68 y.o. male with a history of hypertension who presents to the Emergency Department via EMS from the [facility] with chronic back pain today. Patient reports associated diffuse pain. Patient is complaining of right sided back pain that radiates down his right leg which is chronic for him. He reports that he is unable to ambulate secondary to his symptoms. Patient was recently released 3 days ago from [facility] after a 27 day stay and states he has been in a hotel room since. His pain is so severe he is unable to ambulate to the restroom, therefore, soiling himself. Patient endorses to drinking alcohol today and reports \"he just wants to die\" secondary to his severe pain. He denies a plan but does report prior suicide attempts.  Additionally, he reports some dysuria and frequent urination. There are no exacerbating or alleviating factors identified at this time. He does smoke marijuana daily. No fever, difficulty breathing, chest pain, and abdominal pain.    REVIEW OF SYSTEMS  Pertinent negatives include no fever, difficulty breathing, chest pain, abdominal pain. As above, all other systems reviewed and are negative.   See HPI for further details.     PAST MEDICAL HISTORY   has a past medical history of Hypertension.    SURGICAL HISTORY   has a past surgical history that includes cervical disk and fusion anterior (9/2/2018) and other orthopedic surgery.    SOCIAL HISTORY  Social History   Substance Use Topics   • Smoking status: Current Every Day Smoker     Packs/day: 0.50     Years: 5.00     Types: Cigarettes   • Smokeless tobacco: " "Current User   • Alcohol use Yes      History   Drug Use No   Marijuana daily.     FAMILY HISTORY  History reviewed. No pertinent family history.    CURRENT MEDICATIONS  Home Medications     Reviewed by Anjali Johnson R.N. (Registered Nurse) on 12/05/18 at 1657  Med List Status: Partial   Medication Last Dose Status   allopurinol (ZYLOPRIM) 300 MG Tab 10/12/2018 Active   bacitracin-polymyxin b (POLYSPORIN) 500-75990 UNIT/GM Ointment  Active   cyclobenzaprine (FLEXERIL) 10 MG Tab  Active   enoxaparin (LOVENOX) 40 MG/0.4ML Solution inj 10/12/2018 Active   gabapentin (NEURONTIN) 100 MG Cap 10/12/2018 Active   lidocaine (LIDODERM) 5 % Patch  Active   lisinopril (PRINIVIL) 20 MG Tab 10/12/2018 Active   MethylPREDNISolone (MEDROL DOSEPAK) 4 MG Tablet Therapy Pack  Active   naproxen (NAPROSYN) 500 MG Tab  Active                ALLERGIES  No Known Allergies    PHYSICAL EXAM  VITAL SIGNS: /83   Pulse (!) 115   Temp 37.8 °C (100 °F) (Oral)   Resp 18   Ht 1.803 m (5' 11\")   Wt 88.5 kg (195 lb)   SpO2 98%   BMI 27.20 kg/m²     Constitutional: Well developed, Well nourished, mild distress, Non-toxic appearance.   HENT: Normocephalic, Atraumatic, Bilateral external ears normal, Oropharynx is clear mucous membranes are moist. No oral exudates or nasal discharge.   Eyes: Pupils are equal round and reactive, EOMI, Conjunctiva normal, No discharge.   Neck: Normal range of motion, No tenderness, Supple, No stridor. No meningismus.  Lymphatic: No lymphadenopathy noted.   Cardiovascular: Tachycardic. Regular rhythm without murmur rub or gallop.  Thorax & Lungs: Clear breath sounds bilaterally without wheezes, rhonchi or rales. There is no chest wall tenderness.   Abdomen: Soft non-tender non-distended. There is no rebound or guarding. No organomegaly is appreciated. Bowel sounds are normal.  Skin: Normal without rash.   Back: No CVA or spinal tenderness.   Extremities: Intact distal pulses, No edema, No cyanosis, No " clubbing. Capillary refill is less than 2 seconds.  Musculoskeletal: Range of motion to bilateral knees and hips are limited due to the patient's pain. No major deformities noted.   Neurologic: Alert & oriented x 3, Normal motor function, Normal sensory function, No focal deficits noted. Reflexes are normal.  Psychiatric: Tearful, states suicidal ideas.     DIAGNOSTIC STUDIES / PROCEDURES    LABS  Labs Reviewed   URINE DRUG SCREEN - Abnormal; Notable for the following:        Result Value    Cannabinoid Metab Positive (*)     All other components within normal limits   CBC WITH DIFFERENTIAL - Abnormal; Notable for the following:     RBC 3.90 (*)     Hemoglobin 12.6 (*)     Hematocrit 36.6 (*)     Platelet Count 136 (*)     Neutrophils-Polys 82.60 (*)     Lymphocytes 9.70 (*)     Lymphs (Absolute) 0.83 (*)     All other components within normal limits   COMP METABOLIC PANEL - Abnormal; Notable for the following:     Sodium 131 (*)     AST(SGOT) 46 (*)     All other components within normal limits   POC BREATHALIZER - Abnormal; Notable for the following:     POC Breathalizer 0.037 (*)     All other components within normal limits   ESTIMATED GFR      All labs reviewed by me.    COURSE & MEDICAL DECISION MAKING  Nursing notes, VS, PMSFHx reviewed in chart.    5:03 PM- 5:46 PM - The patient was initially evaluated by the resident and her findings were discussed. Ordered for labs to evaluate.     6:51 PM- Reviewed the patient's lab results. The patient will be resuscitated with 1L NS IV for his tachycardia.     7:02 PM- Reviewed the patient's lab results, which are shown above.  White blood cell count is unremarkable with 83% neutrophil shift and slightly low sodium but otherwise no electrolyte derangements or renal dysfunction.  The patient is unable to walk.  I am not concerned about cauda equina syndrome but am concerned that he is a significant fall risk.    7:03 PM- Paged [facility] to admit.     At approximately 7:20  PM- Spoke with [facility] concerning patient case. Agreed to admit patient for further treatment and evaluation.     7:30 PM- Patient was reevaluated at bedside. There was a limited response to IV fluids and slight improvement of heart rate.  Discussed lab results with the patient and informed them that he will be admitted for further management and care.  He needs MRI of the lumbar spine, routine.  Patient understands.      HYDRATION: Based on the patient's presentation of Tachycardia the patient was given IV fluids. IV Hydration was used because oral hydration was not adequate alone. Upon recheck following hydration, the patient was improved with stable vital signs.    DISPOSITION:  Patient will be admitted to [facility] in stable condition.    FINAL IMPRESSION  1. Suicidal ideation    2. Unable to walk    3. Sciatica of right side         Shelia PERSAUD (Delilah), am scribing for, and in the presence of, Stephen Harris M.D..    Electronically signed by: Shelia Garrett (Shilpiibe), 12/5/2018    Stephen PERSAUD M.D. personally performed the services described in this documentation, as scribed by Shelia Garrett in my presence, and it is both accurate and complete. C.     The note accurately reflects work and decisions made by me.  Stephen Harris  12/5/2018  7:21 PM

## 2018-12-06 NOTE — ED NOTES
Med rec complete per MAR from [facility]    Patient stated he was discharged 3 days ago and hasn't taken medications since he left facility   Medications listed are from when he was there

## 2018-12-06 NOTE — ASSESSMENT & PLAN NOTE
Patient found to have normocytic anemia on admission     Plan  - Ferritin 224.1  - FOBT negative  - Outpatient Colonoscopy, if no recent screening

## 2018-12-06 NOTE — ASSESSMENT & PLAN NOTE
On admission, noted to have  Sodium of 131 likely secondary to poor oral intake     Plan  - Na 129  - Continue to monitor

## 2018-12-06 NOTE — ED NOTES
Water and snack given prior to medication administration. Pt resting comfortably. Sitter in direct observation of pt

## 2018-12-06 NOTE — ASSESSMENT & PLAN NOTE
- Psychiatry- Psychotropic medications: start Trazdone 50mg PO at bedtime for adjunct treatment of depression, continue Cymbalta 90mg PO daily for depression, Prazosin 3mg PO at bedtime and seroquel 125mg Po at bedtime. Monitor sodium levels. If continues to drop, will discontinue Trazodone.  Upon discharge, patient should receive an appointment with PCP and outpatient psychiatrist for continue follow up.

## 2018-12-06 NOTE — PROGRESS NOTES
Internal Medicine Interval Note  Note Author: Diann Ramires M.D.     Name Chris Leggett     1950   Age/Sex 68 y.o. male   MRN [id number]   Code Status FULL     After 5PM or if no immediate response to page, please call for cross-coverage  Attending/Team: Dr Lundberg / Nii Team See Patient List for primary contact information  Call [phone] to page    1st Call - Day Intern (R1):   Dr Ramires 2nd Call - Day Sr. Resident (R2/R3):   Dr Callahan         Reason for interval visit  (Principal Problem)   Back pain with suicidal thoughts      Interval Problem Daily Status Update  (24 hours, problem oriented, brief subjective history, new lab/imaging data pertinent to that problem)     Patient was seen and examined at bedside is resting comfortably in no acute distress. States he has been through a hard time with the deaths of many of his close family members recently and that he has thoughts of hurting himself, however no plan, and no plan to hurt others. States that he has been going through a tough time and that being homeless doesn't help. In terms of active complaints, he states right lower leg pain on movement and also increased swelling in the right leg, states numbness and shooting electrical like pains into his right leg from his upper back to the right leg and sometimes upwards as well. States numbness and tingling in his arms and hands bilaterally since his cervical spine surgery in September. He states these symptoms have persisted since his cervical spine surgery in September and that he uses a cane to ambulate. Patient also states pain with urination and increased frequency for the past couple days.     - UDS + for cannabinoids  - MRI spine- cervical, throacic, lumbar - pending  - Xray R ankle- Old appearing transverse fracture through the tip of the distal right fibula. No acute right ankle fracture or dislocation. There is no  soft tissue swelling.  - B/l LE Doppler US- Bilateral  lower extremities - No evidence of superficial or deep venous thrombosis  - F/u RLE XRay  - Pain management: scheduled Tylenol 1g TID and Celebrex 100mg BID with PRN oxycodone 5mg q4 hours  - PPI omeprazole 20 mg  - Important to r/o other causes such as gout  - Uric acid level pending  - ESR 21 elevated  - U/A- f/u  - PT/OT f/u  - Per Psych- Continue Cymbalta, Prazosin, and Seroquel. One time dose of Ativan. Legal status: Not Applicable . Anticipate F/U within 24 hours.       Review of Systems   Constitutional: Negative for chills, diaphoresis, fever and weight loss.   HENT: Negative for congestion, sinus pain and sore throat.    Eyes: Negative for pain, discharge and redness.   Respiratory: Negative for cough, hemoptysis, sputum production, shortness of breath, wheezing and stridor.    Cardiovascular: Positive for leg swelling (States RLE swelling). Negative for chest pain, palpitations, orthopnea and claudication.   Gastrointestinal: Negative for abdominal pain, constipation, diarrhea, nausea and vomiting.   Genitourinary: Positive for dysuria, frequency and urgency.   Musculoskeletal: Positive for back pain, joint pain and neck pain. Negative for falls and myalgias.   Skin: Negative for rash.   Neurological: Positive for tingling and sensory change (Numbness in upper extremities bilterally, and RLE). Negative for dizziness, tremors, speech change, focal weakness, seizures, loss of consciousness, weakness and headaches.        States numbness and tingling in his arms/hands bilaterally. Numbness in his RLE/foot.   Endo/Heme/Allergies: Negative for polydipsia.   Psychiatric/Behavioral: Positive for depression and suicidal ideas. Negative for hallucinations, memory loss and substance abuse. The patient has insomnia. The patient is not nervous/anxious.    All other systems reviewed and are negative.        Disposition/Barriers to discharge:   None    Consultants/Specialty  PCP: Pcp Pt States None      Quality  Measures  Quality-Core Measures   Reviewed items::  EKG reviewed, Labs reviewed, Medications reviewed and Radiology images reviewed  Roca catheter::  No Roca  DVT prophylaxis pharmacological::  Enoxaparin (Lovenox)          Physical Exam       Vitals:    12/05/18 2017 12/06/18 0530 12/06/18 0600 12/06/18 1030   BP: 113/88  135/65 115/77   Pulse: (!) 106 100  77   Resp: 16 16  17   Temp:    36.3 °C (97.3 °F)   TempSrc:    Temporal   SpO2: 98% 98%  94%   Weight:       Height:         Body mass index is 27.2 kg/m². Weight: 88.5 kg (195 lb)  Oxygen Therapy:  Pulse Oximetry: 94 %, O2 Delivery: None (Room Air)    Physical Exam   Constitutional: He is oriented to person, place, and time and well-developed, well-nourished, and in no distress. No distress.   HENT:   Head: Normocephalic and atraumatic.   Right Ear: External ear normal.   Left Ear: External ear normal.   Nose: Nose normal.   Mouth/Throat: Oropharynx is clear and moist.   Eyes: Pupils are equal, round, and reactive to light. Conjunctivae and EOM are normal. Right eye exhibits no discharge. Left eye exhibits no discharge.   Neck: Normal range of motion. Neck supple. No tracheal deviation present. No thyromegaly present.   Cardiovascular: Normal rate, regular rhythm, normal heart sounds and intact distal pulses.    No murmur heard.  Pulmonary/Chest: Effort normal and breath sounds normal. No respiratory distress. He has no wheezes. He has no rales.   Abdominal: Soft. Bowel sounds are normal. He exhibits no distension. There is no tenderness. There is no rebound and no guarding.   Musculoskeletal: He exhibits edema (RLE swelling, feels warmer to touch, mildly erythamatous, no evidence of wound) and tenderness (RLE TTP). He exhibits no deformity.   Cannot move arms above shoulder height on active/passive ROM, states since surgery   Neurological: He is alert and oriented to person, place, and time. No cranial nerve deficit. Coordination normal. GCS score is 15.    Numbness in RLE/foot, numbness and tingling in upper extremities, no FNDs,   Skin: No rash noted. He is not diaphoretic. No erythema.   Psychiatric: Mood, memory, affect and judgment normal.   Nursing note and vitals reviewed.            Assessment/Plan     * Lumbar radiculopathy   Assessment & Plan    [age] year old male with three week history of worsening right lower sharp back pain radiating into his leg and foot without saddle anesthesia, persistent urinary/bowel incontinence or focal neurological deficits. On exam, DTRs were 2+ bilaterally with 4/5 muscle strength. Low suspicion for cauda equina syndrome. Likely to be secondary to vertebral abnormality.     Plan   - MRI spine- cervical, throacic, lumbar - pending  - Pain management: scheduled Tylenol 1g TID and Celebrex 100mg BID with PRN oxycodone 5mg q4 hours  - PPI- omeprazole 20mg   - PT/OT consult      Pain and swelling of right lower extremity   Assessment & Plan    Right ankle noted to be edematous on presentation with ecchymosis.     Plan  - Assymmetric RLE swelling, TTP  - Right ankle X-Ray- Old appearing transverse fracture through the tip of the distal right fibula. No acute right ankle fracture or dislocation. There is no  soft tissue swelling.  - B/l LE Doppler US- Bilateral lower extremities - No evidence of superficial or deep venous thrombosis  - Pain management: scheduled Tylenol 1g TID and Celebrex 100mg BID with PRN oxycodone 5mg q4 hours  - RLE Xray- pending  - Important to r/o other causes such as gout  - Uric acid level pending  - ESR 21 elevated          Suicidal ideation   Assessment & Plan    Patient currently expressing suicidal thoughts with no active plan in place. Likely to have been triggered from being off of his medications for three days.     Plan-  - Per Psych- Continue Cymbalta, Prazosin, and Seroquel. One time dose of Ativan. Legal status: Not Applicable . Anticipate F/U within 24 hours.       Dysuria   Assessment &  Plan    Patient presenting with a couple day history of urinary frequency and burning with urination.   - Check UA      Alcohol intoxication (HCC)- (present on admission)   Assessment & Plan    POC Breathalizer 0.037 in ED. - Patient states he is not a heavy drinker.    Plan  - Low threshold to start CIWA     Hyponatremia- (present on admission)   Assessment & Plan    On admission, noted to have  Sodium of 131 likely secondary to poor oral intake   - Continue to monitor      Normocytic anemia- (present on admission)   Assessment & Plan    Patient found to have normocytic anemia on admission     Plan  - Ferritin 224.1  - FOBT negative  - Outpatient Colonoscopy, if no recent screening     Cervical spine fracture (HCC)- (present on admission)   Assessment & Plan    History of neck pain status post internal fixation and reduction of C5-C6 on 9/2/2018  - Continue Gabapentin for tingling of hands     Mood disorder (HCC)   Assessment & Plan    - Continue Duloxetine   - Continue Gabapentin   - Continue Prazosin   - Continue Seroquel   - Psychiatry consult- Continue Cymbalta, Prazosin, and Seroquel. One time dose of Ativan. Legal status: Not Applicable . Anticipate F/U within 24 hours.       Hypertension   Assessment & Plan    - Currently stable   - Continue Lisinopril

## 2018-12-06 NOTE — ASSESSMENT & PLAN NOTE
Right ankle noted to be edematous on presentation with ecchymosis.     Plan  - Right ankle X-Ray- Old appearing transverse fracture through the tip of the distal right fibula. No acute right ankle fracture or dislocation. There is no  soft tissue swelling.

## 2018-12-07 ENCOUNTER — APPOINTMENT (OUTPATIENT)
Dept: RADIOLOGY | Facility: MEDICAL CENTER | Age: 68
End: 2018-12-07
Attending: STUDENT IN AN ORGANIZED HEALTH CARE EDUCATION/TRAINING PROGRAM
Payer: MEDICARE

## 2018-12-07 LAB
ALBUMIN SERPL BCP-MCNC: 2.9 G/DL (ref 3.2–4.9)
ALBUMIN/GLOB SERPL: 1.3 G/DL
ALP SERPL-CCNC: 53 U/L (ref 30–99)
ALT SERPL-CCNC: 15 U/L (ref 2–50)
ANION GAP SERPL CALC-SCNC: 6 MMOL/L (ref 0–11.9)
AST SERPL-CCNC: 35 U/L (ref 12–45)
BASOPHILS # BLD AUTO: 0.3 % (ref 0–1.8)
BASOPHILS # BLD: 0.01 K/UL (ref 0–0.12)
BILIRUB SERPL-MCNC: 0.6 MG/DL (ref 0.1–1.5)
BUN SERPL-MCNC: 10 MG/DL (ref 8–22)
CALCIUM SERPL-MCNC: 8.5 MG/DL (ref 8.5–10.5)
CHLORIDE SERPL-SCNC: 100 MMOL/L (ref 96–112)
CO2 SERPL-SCNC: 24 MMOL/L (ref 20–33)
CREAT SERPL-MCNC: 0.47 MG/DL (ref 0.5–1.4)
EKG IMPRESSION: NORMAL
EOSINOPHIL # BLD AUTO: 0.02 K/UL (ref 0–0.51)
EOSINOPHIL NFR BLD: 0.7 % (ref 0–6.9)
ERYTHROCYTE [DISTWIDTH] IN BLOOD BY AUTOMATED COUNT: 44.6 FL (ref 35.9–50)
FOLATE SERPL-MCNC: 12.4 NG/ML
GLOBULIN SER CALC-MCNC: 2.3 G/DL (ref 1.9–3.5)
GLUCOSE SERPL-MCNC: 130 MG/DL (ref 65–99)
HCT VFR BLD AUTO: 29.9 % (ref 42–52)
HGB BLD-MCNC: 10.2 G/DL (ref 14–18)
IMM GRANULOCYTES # BLD AUTO: 0.01 K/UL (ref 0–0.11)
IMM GRANULOCYTES NFR BLD AUTO: 0.3 % (ref 0–0.9)
LYMPHOCYTES # BLD AUTO: 0.36 K/UL (ref 1–4.8)
LYMPHOCYTES NFR BLD: 11.8 % (ref 22–41)
MAGNESIUM SERPL-MCNC: 1.6 MG/DL (ref 1.5–2.5)
MCH RBC QN AUTO: 32.1 PG (ref 27–33)
MCHC RBC AUTO-ENTMCNC: 34.1 G/DL (ref 33.7–35.3)
MCV RBC AUTO: 94 FL (ref 81.4–97.8)
MONOCYTES # BLD AUTO: 0.18 K/UL (ref 0–0.85)
MONOCYTES NFR BLD AUTO: 5.9 % (ref 0–13.4)
NEUTROPHILS # BLD AUTO: 2.47 K/UL (ref 1.82–7.42)
NEUTROPHILS NFR BLD: 81 % (ref 44–72)
NRBC # BLD AUTO: 0 K/UL
NRBC BLD-RTO: 0 /100 WBC
PHOSPHATE SERPL-MCNC: 3.8 MG/DL (ref 2.5–4.5)
PLATELET # BLD AUTO: 80 K/UL (ref 164–446)
PMV BLD AUTO: 10.2 FL (ref 9–12.9)
POTASSIUM SERPL-SCNC: 4.5 MMOL/L (ref 3.6–5.5)
PROT SERPL-MCNC: 5.2 G/DL (ref 6–8.2)
PSA SERPL-MCNC: 1.69 NG/ML (ref 0–4)
RBC # BLD AUTO: 3.18 M/UL (ref 4.7–6.1)
SODIUM SERPL-SCNC: 130 MMOL/L (ref 135–145)
VIT B12 SERPL-MCNC: 375 PG/ML (ref 211–911)
WBC # BLD AUTO: 3.1 K/UL (ref 4.8–10.8)

## 2018-12-07 PROCEDURE — 72148 MRI LUMBAR SPINE W/O DYE: CPT

## 2018-12-07 PROCEDURE — 700111 HCHG RX REV CODE 636 W/ 250 OVERRIDE (IP): Performed by: INTERNAL MEDICINE

## 2018-12-07 PROCEDURE — G0378 HOSPITAL OBSERVATION PER HR: HCPCS

## 2018-12-07 PROCEDURE — A9270 NON-COVERED ITEM OR SERVICE: HCPCS | Performed by: INTERNAL MEDICINE

## 2018-12-07 PROCEDURE — 72141 MRI NECK SPINE W/O DYE: CPT

## 2018-12-07 PROCEDURE — G8979 MOBILITY GOAL STATUS: HCPCS | Mod: CI

## 2018-12-07 PROCEDURE — 82607 VITAMIN B-12: CPT

## 2018-12-07 PROCEDURE — 93010 ELECTROCARDIOGRAM REPORT: CPT | Performed by: INTERNAL MEDICINE

## 2018-12-07 PROCEDURE — 85025 COMPLETE CBC W/AUTO DIFF WBC: CPT

## 2018-12-07 PROCEDURE — 97161 PT EVAL LOW COMPLEX 20 MIN: CPT

## 2018-12-07 PROCEDURE — 93005 ELECTROCARDIOGRAM TRACING: CPT | Performed by: STUDENT IN AN ORGANIZED HEALTH CARE EDUCATION/TRAINING PROGRAM

## 2018-12-07 PROCEDURE — 83036 HEMOGLOBIN GLYCOSYLATED A1C: CPT

## 2018-12-07 PROCEDURE — A9270 NON-COVERED ITEM OR SERVICE: HCPCS | Performed by: STUDENT IN AN ORGANIZED HEALTH CARE EDUCATION/TRAINING PROGRAM

## 2018-12-07 PROCEDURE — 96372 THER/PROPH/DIAG INJ SC/IM: CPT

## 2018-12-07 PROCEDURE — 700102 HCHG RX REV CODE 250 W/ 637 OVERRIDE(OP): Performed by: INTERNAL MEDICINE

## 2018-12-07 PROCEDURE — 700102 HCHG RX REV CODE 250 W/ 637 OVERRIDE(OP): Performed by: STUDENT IN AN ORGANIZED HEALTH CARE EDUCATION/TRAINING PROGRAM

## 2018-12-07 PROCEDURE — 80053 COMPREHEN METABOLIC PANEL: CPT

## 2018-12-07 PROCEDURE — 700111 HCHG RX REV CODE 636 W/ 250 OVERRIDE (IP): Performed by: STUDENT IN AN ORGANIZED HEALTH CARE EDUCATION/TRAINING PROGRAM

## 2018-12-07 PROCEDURE — 83735 ASSAY OF MAGNESIUM: CPT

## 2018-12-07 PROCEDURE — 72146 MRI CHEST SPINE W/O DYE: CPT

## 2018-12-07 PROCEDURE — 82746 ASSAY OF FOLIC ACID SERUM: CPT

## 2018-12-07 PROCEDURE — 36415 COLL VENOUS BLD VENIPUNCTURE: CPT

## 2018-12-07 PROCEDURE — 700105 HCHG RX REV CODE 258: Performed by: STUDENT IN AN ORGANIZED HEALTH CARE EDUCATION/TRAINING PROGRAM

## 2018-12-07 PROCEDURE — 84153 ASSAY OF PSA TOTAL: CPT

## 2018-12-07 PROCEDURE — G8978 MOBILITY CURRENT STATUS: HCPCS | Mod: CK

## 2018-12-07 PROCEDURE — 99225 PR SUBSEQUENT OBSERVATION CARE,LEVEL II: CPT | Mod: GC | Performed by: INTERNAL MEDICINE

## 2018-12-07 PROCEDURE — 84100 ASSAY OF PHOSPHORUS: CPT

## 2018-12-07 RX ORDER — FERROUS GLUCONATE 324(38)MG
324 TABLET ORAL
Status: DISCONTINUED | OUTPATIENT
Start: 2018-12-07 | End: 2018-12-13

## 2018-12-07 RX ORDER — PREDNISONE 20 MG/1
40 TABLET ORAL DAILY
Status: DISCONTINUED | OUTPATIENT
Start: 2018-12-08 | End: 2018-12-10

## 2018-12-07 RX ADMIN — OXYCODONE HYDROCHLORIDE 5 MG: 5 TABLET ORAL at 15:45

## 2018-12-07 RX ADMIN — CELECOXIB 100 MG: 100 CAPSULE ORAL at 20:00

## 2018-12-07 RX ADMIN — ACETAMINOPHEN 1000 MG: 500 TABLET ORAL at 12:14

## 2018-12-07 RX ADMIN — CELECOXIB 100 MG: 100 CAPSULE ORAL at 05:21

## 2018-12-07 RX ADMIN — OXYCODONE HYDROCHLORIDE 5 MG: 5 TABLET ORAL at 20:07

## 2018-12-07 RX ADMIN — STANDARDIZED SENNA CONCENTRATE AND DOCUSATE SODIUM 2 TABLET: 8.6; 5 TABLET, FILM COATED ORAL at 18:40

## 2018-12-07 RX ADMIN — PREDNISONE 20 MG: 20 TABLET ORAL at 05:11

## 2018-12-07 RX ADMIN — FERROUS GLUCONATE 324 MG: 324 TABLET ORAL at 09:53

## 2018-12-07 RX ADMIN — MAGNESIUM GLUCONATE 500 MG ORAL TABLET 400 MG: 500 TABLET ORAL at 05:10

## 2018-12-07 RX ADMIN — OXYCODONE HYDROCHLORIDE 5 MG: 5 TABLET ORAL at 09:56

## 2018-12-07 RX ADMIN — SODIUM CHLORIDE: 9 INJECTION, SOLUTION INTRAVENOUS at 02:36

## 2018-12-07 RX ADMIN — ACETAMINOPHEN 1000 MG: 500 TABLET ORAL at 05:10

## 2018-12-07 RX ADMIN — GABAPENTIN 300 MG: 300 CAPSULE ORAL at 05:11

## 2018-12-07 RX ADMIN — QUETIAPINE FUMARATE 125 MG: 100 TABLET ORAL at 20:04

## 2018-12-07 RX ADMIN — ENOXAPARIN SODIUM 40 MG: 100 INJECTION SUBCUTANEOUS at 05:12

## 2018-12-07 RX ADMIN — DULOXETINE HYDROCHLORIDE 90 MG: 60 CAPSULE, DELAYED RELEASE ORAL at 05:11

## 2018-12-07 RX ADMIN — ALLOPURINOL 100 MG: 100 TABLET ORAL at 05:11

## 2018-12-07 RX ADMIN — PRAZOSIN HYDROCHLORIDE 3 MG: 1 CAPSULE ORAL at 20:04

## 2018-12-07 RX ADMIN — GABAPENTIN 300 MG: 300 CAPSULE ORAL at 12:14

## 2018-12-07 RX ADMIN — LISINOPRIL 20 MG: 20 TABLET ORAL at 05:12

## 2018-12-07 RX ADMIN — ACETAMINOPHEN 1000 MG: 500 TABLET ORAL at 18:41

## 2018-12-07 RX ADMIN — GABAPENTIN 300 MG: 300 CAPSULE ORAL at 18:41

## 2018-12-07 RX ADMIN — OMEPRAZOLE 20 MG: 20 CAPSULE, DELAYED RELEASE ORAL at 05:12

## 2018-12-07 ASSESSMENT — LIFESTYLE VARIABLES
HOW MANY TIMES IN THE PAST YEAR HAVE YOU HAD 5 OR MORE DRINKS IN A DAY: 0
HAVE PEOPLE ANNOYED YOU BY CRITICIZING YOUR DRINKING: NO
CONSUMPTION TOTAL: INCOMPLETE
HAVE YOU EVER FELT YOU SHOULD CUT DOWN ON YOUR DRINKING: YES
DOES PATIENT WANT TO STOP DRINKING: NO
TOTAL SCORE: 2
TOTAL SCORE: 2
ON A TYPICAL DAY WHEN YOU DRINK ALCOHOL HOW MANY DRINKS DO YOU HAVE: 2
CONSUMPTION TOTAL: INCOMPLETE
EVER HAD A DRINK FIRST THING IN THE MORNING TO STEADY YOUR NERVES TO GET RID OF A HANGOVER: NO
TOTAL SCORE: 2
SUBSTANCE_ABUSE: 0
EVER FELT BAD OR GUILTY ABOUT YOUR DRINKING: YES
ALCOHOL_USE: YES
AVERAGE NUMBER OF DAYS PER WEEK YOU HAVE A DRINK CONTAINING ALCOHOL: 7

## 2018-12-07 ASSESSMENT — ENCOUNTER SYMPTOMS
VOMITING: 0
SPEECH CHANGE: 0
HALLUCINATIONS: 0
HEADACHES: 0
PALPITATIONS: 0
WEAKNESS: 0
EYE REDNESS: 0
SINUS PAIN: 0
MEMORY LOSS: 0
DIZZINESS: 0
DIARRHEA: 0
SORE THROAT: 0
SHORTNESS OF BREATH: 0
INSOMNIA: 1
STRIDOR: 0
DEPRESSION: 1
CHILLS: 0
LOSS OF CONSCIOUSNESS: 0
POLYDIPSIA: 0
WEIGHT LOSS: 0
FEVER: 0
FALLS: 0
NECK PAIN: 1
CLAUDICATION: 0
SEIZURES: 0
NAUSEA: 0
ORTHOPNEA: 0
CONSTIPATION: 0
SPUTUM PRODUCTION: 0
TREMORS: 0
ABDOMINAL PAIN: 0
BACK PAIN: 1
MYALGIAS: 0
DIAPHORESIS: 0
HEMOPTYSIS: 0
WHEEZING: 0
EYE DISCHARGE: 0
EYE PAIN: 0
SENSORY CHANGE: 1
COUGH: 0
NERVOUS/ANXIOUS: 0
TINGLING: 1
FOCAL WEAKNESS: 0

## 2018-12-07 ASSESSMENT — PATIENT HEALTH QUESTIONNAIRE - PHQ9
7. TROUBLE CONCENTRATING ON THINGS, SUCH AS READING THE NEWSPAPER OR WATCHING TELEVISION: NEARLY EVERY DAY
3. TROUBLE FALLING OR STAYING ASLEEP OR SLEEPING TOO MUCH: NEARLY EVERY DAY
SUM OF ALL RESPONSES TO PHQ9 QUESTIONS 1 AND 2: 6
5. POOR APPETITE OR OVEREATING: NEARLY EVERY DAY
9. THOUGHTS THAT YOU WOULD BE BETTER OFF DEAD, OR OF HURTING YOURSELF: NEARLY EVERY DAY
1. LITTLE INTEREST OR PLEASURE IN DOING THINGS: NEARLY EVERY DAY
8. MOVING OR SPEAKING SO SLOWLY THAT OTHER PEOPLE COULD HAVE NOTICED. OR THE OPPOSITE, BEING SO FIGETY OR RESTLESS THAT YOU HAVE BEEN MOVING AROUND A LOT MORE THAN USUAL: MORE THAN HALF THE DAYS
SUM OF ALL RESPONSES TO PHQ QUESTIONS 1-9: 26
2. FEELING DOWN, DEPRESSED, IRRITABLE, OR HOPELESS: NEARLY EVERY DAY
4. FEELING TIRED OR HAVING LITTLE ENERGY: NEARLY EVERY DAY
6. FEELING BAD ABOUT YOURSELF - OR THAT YOU ARE A FAILURE OR HAVE LET YOURSELF OR YOUR FAMILY DOWN: NEARLY EVERY DAY

## 2018-12-07 ASSESSMENT — COGNITIVE AND FUNCTIONAL STATUS - GENERAL
SUGGESTED CMS G CODE MODIFIER DAILY ACTIVITY: CJ
STANDING UP FROM CHAIR USING ARMS: A LITTLE
SUGGESTED CMS G CODE MODIFIER MOBILITY: CK
MOVING FROM LYING ON BACK TO SITTING ON SIDE OF FLAT BED: A LITTLE
DAILY ACTIVITIY SCORE: 22
CLIMB 3 TO 5 STEPS WITH RAILING: A LOT
SUGGESTED CMS G CODE MODIFIER MOBILITY: CK
WALKING IN HOSPITAL ROOM: A LOT
MOBILITY SCORE: 19
MOBILITY SCORE: 16
DRESSING REGULAR UPPER BODY CLOTHING: A LITTLE
WALKING IN HOSPITAL ROOM: A LITTLE
STANDING UP FROM CHAIR USING ARMS: A LITTLE
DRESSING REGULAR LOWER BODY CLOTHING: A LITTLE
CLIMB 3 TO 5 STEPS WITH RAILING: A LITTLE
TURNING FROM BACK TO SIDE WHILE IN FLAT BAD: A LITTLE
MOVING FROM LYING ON BACK TO SITTING ON SIDE OF FLAT BED: UNABLE

## 2018-12-07 ASSESSMENT — GAIT ASSESSMENTS
ASSISTIVE DEVICE: FRONT WHEEL WALKER
GAIT LEVEL OF ASSIST: MODERATE ASSIST
DEVIATION: ANTALGIC;BRADYKINETIC;SHUFFLED GAIT
DISTANCE (FEET): 2

## 2018-12-07 ASSESSMENT — PAIN SCALES - GENERAL
PAINLEVEL_OUTOF10: 8
PAINLEVEL_OUTOF10: 8
PAINLEVEL_OUTOF10: 6
PAINLEVEL_OUTOF10: 8
PAINLEVEL_OUTOF10: 7

## 2018-12-07 NOTE — ED NOTES
1st contact c pt. Resting on cart. Calm & cooperative. Sitter outside of room. Will ctm. Denies any needs. meds per mar.

## 2018-12-07 NOTE — ED NOTES
Pt medicated per MAR.  Missing med note to pharmacy for celebrex.  Pt eating dinner, all needs met.  Sitter remains outside room for 1:1 observation.

## 2018-12-07 NOTE — PROGRESS NOTES
PT A&Ox4, medicated for pain, ambulates very small distance with 1 person assist   PT tearful, upset throughout shift talking about relatives who have passed away recently, sad about being alone for upcoming holidays  PT says he has no family/friends/support system anymore  Emotional support provided   PT w/1:1 safety sitter   Fall precautions in place, hourly rounding done, updated on plan of care

## 2018-12-07 NOTE — THERAPY
"Physical Therapy Evaluation completed.   Bed Mobility:  Supine to Sit: Supervised  Transfers: Sit to Stand: Minimal Assist  Gait: Level Of Assist: Moderate Assist with Front-Wheel Walker       Plan of Care: Will benefit from Physical Therapy 3 times per week and Plan to complete next treatment by Monday 12/10  Discharge Recommendations: Equipment: Will Continue to Assess for Equipment Needs. Post-acute therapy Discharge to a transitional care facility for continued skilled therapy services.    Pt is a 68 year old male admitted to the hospital for Chronic low back pain, sciatica and suicidal ideations. Pt with history of neck surgery in September of this year following fall. Pt also recently DC'd from [facility]. Pt reports multiple falls since DC from [facility] earlier this week. At time of initial evaluation, pt required SBA to moderate assist for mobility tasks. Pt able to come to EOB with SPV. Pt then required minimal assist for sit to stand and moderate assist for side steps to EOB. Pt with R LE swelling, pain and weakness limiting mobility. Pt would benefit from skilled PT intervention while in the acute care setting to address the listed deficits and improve mobility prior to DC home. Based on current level of function, pt would benefit from post acute placement as pt has had multiple falls and has demonstrated an inability to care for self    See \"Rehab Therapy-Acute\" Patient Summary Report for complete documentation.     "

## 2018-12-07 NOTE — CARE PLAN
Problem: Safety  Goal: Will remain free from falls  Outcome: PROGRESSING AS EXPECTED  Pt has c/o chronic back and leg pain and is a one assist when ambulates.  1:1 sitter in direct observation of patient

## 2018-12-07 NOTE — PROGRESS NOTES
Internal Medicine Interval Note  Note Author: Abel Callahan M.D.     Name Chris Leggett 1950   Age/Sex 68 y.o. male   MRN [id number]   Code Status FULL     After 5PM or if no immediate response to page, please call for cross-coverage  Attending/Team: Dr Lundberg / Nii Team See Patient List for primary contact information  Call [phone] to page    1st Call - Day Intern (R1):   Dr Ramires 2nd Call - Day Sr. Resident (R2/R3):   Dr Callahan         Reason for interval visit  (Principal Problem)   Back pain with suicidal thoughts      Interval Problem Daily Status Update  (24 hours, problem oriented, brief subjective history, new lab/imaging data pertinent to that problem)     No events overnight, x-ray left lower extremity did not show any acute injury, negative Doppler study for DVT, ESR elevated, occult blood was negative, iron panel on lower border of normal, started on replacement, ordered folate and B12 level.  -Pending psych evaluation.  -PT OT evaluation ordered today, recommended transitional care facility for continued physical therapy services.  -MRI still pending.  -We will touch base with neurosurgery for further evaluation.    Review of Systems   Constitutional: Negative for chills, diaphoresis, fever and weight loss.   HENT: Negative for congestion, sinus pain and sore throat.    Eyes: Negative for pain, discharge and redness.   Respiratory: Negative for cough, hemoptysis, sputum production, shortness of breath, wheezing and stridor.    Cardiovascular: Positive for leg swelling. Negative for chest pain, palpitations, orthopnea and claudication.   Gastrointestinal: Negative for abdominal pain, constipation, diarrhea, nausea and vomiting.   Genitourinary: Negative for dysuria, frequency and urgency.   Musculoskeletal: Positive for back pain, joint pain and neck pain. Negative for falls and myalgias.   Skin: Negative for rash.   Neurological: Positive for tingling and sensory  change (Numbness in upper extremities bilterally, and RLE). Negative for dizziness, tremors, speech change, focal weakness, seizures, loss of consciousness, weakness and headaches.        States numbness and tingling in his arms/hands bilaterally. Numbness in his RLE/foot.   Endo/Heme/Allergies: Negative for polydipsia.   Psychiatric/Behavioral: Positive for depression. Negative for hallucinations, memory loss, substance abuse and suicidal ideas. The patient has insomnia. The patient is not nervous/anxious.    All other systems reviewed and are negative.        Disposition/Barriers to discharge:   Patient will need significant placement.  We will keep following up PT OT recommendations.      Consultants/Specialty  PCP: Pcp Pt States None      Quality Measures  Quality-Core Measures   Reviewed items::  EKG reviewed, Labs reviewed, Medications reviewed and Radiology images reviewed  Roca catheter::  No Roca  DVT prophylaxis pharmacological::  Enoxaparin (Lovenox)          Physical Exam       Vitals:    12/06/18 2200 12/07/18 0355 12/07/18 0700 12/07/18 1500   BP: 157/94 106/71 152/80 131/80   Pulse: 61 64 65 62   Resp: 16 16 18 20   Temp: 36.2 °C (97.2 °F) 36.1 °C (96.9 °F) 36.2 °C (97.2 °F) 36.3 °C (97.4 °F)   TempSrc: Temporal Temporal Temporal Temporal   SpO2: 97% 95% 94% 96%   Weight: 89 kg (196 lb 3.4 oz)      Height:         Body mass index is 27.37 kg/m². Weight: 89 kg (196 lb 3.4 oz)  Oxygen Therapy:  Pulse Oximetry: 96 %, O2 Delivery: None (Room Air)    Physical Exam   Constitutional: He is oriented to person, place, and time and well-developed, well-nourished, and in no distress. No distress.   HENT:   Head: Normocephalic and atraumatic.   Right Ear: External ear normal.   Left Ear: External ear normal.   Nose: Nose normal.   Mouth/Throat: Oropharynx is clear and moist.   Eyes: Pupils are equal, round, and reactive to light. Conjunctivae and EOM are normal. Right eye exhibits no discharge. Left eye  exhibits no discharge.   Neck: Normal range of motion. Neck supple. No tracheal deviation present. No thyromegaly present.   Cardiovascular: Normal rate, regular rhythm, normal heart sounds and intact distal pulses.    No murmur heard.  Pulmonary/Chest: Effort normal and breath sounds normal. No respiratory distress. He has no wheezes. He has no rales.   Abdominal: Soft. Bowel sounds are normal. He exhibits no distension. There is no tenderness. There is no rebound and no guarding.   Musculoskeletal: He exhibits no tenderness or deformity.   Neurological: He is alert and oriented to person, place, and time. No cranial nerve deficit. Coordination normal. GCS score is 15.   Decreased range of motion in shoulders bilaterally   Skin: No rash noted. He is not diaphoretic. No erythema.   Psychiatric: Mood, memory, affect and judgment normal.   Nursing note and vitals reviewed.            Assessment/Plan     * Lumbar radiculopathy   Assessment & Plan    [age] year old male with three week history of worsening right lower sharp back pain radiating into his leg and foot without saddle anesthesia, persistent urinary/bowel incontinence or focal neurological deficits. On exam, DTRs were 2+ bilaterally with 4/5 muscle strength. Low suspicion for cauda equina syndrome. Likely to be secondary to vertebral abnormality.     Plan   - MRI spine- cervical, throacic, lumbar - pending  - Pain management: scheduled Tylenol 1g TID and Celebrex 100mg BID with PRN oxycodone 5mg q4 hours  - PPI- omeprazole 20mg   - PT/OT consult      Pain and swelling of right lower extremity   Assessment & Plan    Right ankle noted to be edematous on presentation with ecchymosis.     Plan  - Assymmetric RLE swelling, TTP  - Right ankle X-Ray- Old appearing transverse fracture through the tip of the distal right fibula. No acute right ankle fracture or dislocation. There is no  soft tissue swelling.  - B/l LE Doppler US- Bilateral lower extremities - No  evidence of superficial or deep venous thrombosis  - Pain management: scheduled Tylenol 1g TID and Celebrex 100mg BID with PRN oxycodone 5mg q4 hours  - RLE Xray- pending  - Important to r/o other causes such as gout  - Uric acid level within normal.    - ESR 21 elevated  -On  Prednisone day 2 .          Suicidal ideation- (present on admission)   Assessment & Plan    Patient currently expressing suicidal thoughts with no active plan in place. Likely to have been triggered from being off of his medications for three days.     Plan-  - Per Psych- Continue Cymbalta, Prazosin, and Seroquel. One time dose of Ativan. Legal status: Not Applicable . Anticipate F/U within 24 hours.       Dysuria- (present on admission)   Assessment & Plan    Resolved  Patient presenting with a couple day history of urinary frequency and burning with urination.   - Check UA      Alcohol intoxication (HCC)- (present on admission)   Assessment & Plan    POC Breathalizer 0.037 in ED. - Patient states he is not a heavy drinker.    Plan  - Low threshold to start CIWA     Hyponatremia- (present on admission)   Assessment & Plan    On admission, noted to have  Sodium of 131 likely secondary to poor oral intake   - Continue to monitor      Normocytic anemia- (present on admission)   Assessment & Plan    Patient found to have normocytic anemia on admission     Plan  - Ferritin 224.1  - FOBT negative  - Outpatient Colonoscopy, if no recent screening     Cervical spine fracture (HCC)- (present on admission)   Assessment & Plan    History of neck pain status post internal fixation and reduction of C5-C6 on 9/2/2018  - Continue Gabapentin for tingling of hands  -MRI pending, will consult neurosurgery.     Mood disorder (HCC)- (present on admission)   Assessment & Plan    - Continue Duloxetine   - Continue Gabapentin   - Continue Prazosin   - Continue Seroquel   - Psychiatry consult- Continue Cymbalta, Prazosin, and Seroquel. One time dose of Ativan.  Legal status: Not Applicable . Anticipate F/U within 24 hours.       Hypertension- (present on admission)   Assessment & Plan    - Currently stable   - Continue Lisinopril

## 2018-12-08 LAB
ALBUMIN SERPL BCP-MCNC: 3.2 G/DL (ref 3.2–4.9)
ALBUMIN SERPL BCP-MCNC: 3.2 G/DL (ref 3.2–4.9)
ALBUMIN/GLOB SERPL: 1.2 G/DL
ALBUMIN/GLOB SERPL: 1.5 G/DL
ALP SERPL-CCNC: 52 U/L (ref 30–99)
ALP SERPL-CCNC: 53 U/L (ref 30–99)
ALT SERPL-CCNC: 15 U/L (ref 2–50)
ALT SERPL-CCNC: 16 U/L (ref 2–50)
ANION GAP SERPL CALC-SCNC: 6 MMOL/L (ref 0–11.9)
ANION GAP SERPL CALC-SCNC: 6 MMOL/L (ref 0–11.9)
AST SERPL-CCNC: 34 U/L (ref 12–45)
AST SERPL-CCNC: 36 U/L (ref 12–45)
BASOPHILS # BLD AUTO: 0.2 % (ref 0–1.8)
BASOPHILS # BLD AUTO: 0.3 % (ref 0–1.8)
BASOPHILS # BLD: 0.01 K/UL (ref 0–0.12)
BASOPHILS # BLD: 0.01 K/UL (ref 0–0.12)
BILIRUB SERPL-MCNC: 0.4 MG/DL (ref 0.1–1.5)
BILIRUB SERPL-MCNC: 0.5 MG/DL (ref 0.1–1.5)
BUN SERPL-MCNC: 10 MG/DL (ref 8–22)
BUN SERPL-MCNC: 9 MG/DL (ref 8–22)
CALCIUM SERPL-MCNC: 8.8 MG/DL (ref 8.5–10.5)
CALCIUM SERPL-MCNC: 8.8 MG/DL (ref 8.5–10.5)
CHLORIDE SERPL-SCNC: 96 MMOL/L (ref 96–112)
CHLORIDE SERPL-SCNC: 99 MMOL/L (ref 96–112)
CO2 SERPL-SCNC: 26 MMOL/L (ref 20–33)
CO2 SERPL-SCNC: 26 MMOL/L (ref 20–33)
CREAT SERPL-MCNC: 0.57 MG/DL (ref 0.5–1.4)
CREAT SERPL-MCNC: 0.61 MG/DL (ref 0.5–1.4)
EOSINOPHIL # BLD AUTO: 0.02 K/UL (ref 0–0.51)
EOSINOPHIL # BLD AUTO: 0.09 K/UL (ref 0–0.51)
EOSINOPHIL NFR BLD: 0.4 % (ref 0–6.9)
EOSINOPHIL NFR BLD: 3.1 % (ref 0–6.9)
ERYTHROCYTE [DISTWIDTH] IN BLOOD BY AUTOMATED COUNT: 44.5 FL (ref 35.9–50)
ERYTHROCYTE [DISTWIDTH] IN BLOOD BY AUTOMATED COUNT: 45.9 FL (ref 35.9–50)
GLOBULIN SER CALC-MCNC: 2.2 G/DL (ref 1.9–3.5)
GLOBULIN SER CALC-MCNC: 2.6 G/DL (ref 1.9–3.5)
GLUCOSE SERPL-MCNC: 108 MG/DL (ref 65–99)
GLUCOSE SERPL-MCNC: 95 MG/DL (ref 65–99)
HCT VFR BLD AUTO: 29.9 % (ref 42–52)
HCT VFR BLD AUTO: 31.6 % (ref 42–52)
HGB BLD-MCNC: 10.3 G/DL (ref 14–18)
HGB BLD-MCNC: 10.6 G/DL (ref 14–18)
IMM GRANULOCYTES # BLD AUTO: 0 K/UL (ref 0–0.11)
IMM GRANULOCYTES # BLD AUTO: 0.01 K/UL (ref 0–0.11)
IMM GRANULOCYTES NFR BLD AUTO: 0 % (ref 0–0.9)
IMM GRANULOCYTES NFR BLD AUTO: 0.2 % (ref 0–0.9)
LYMPHOCYTES # BLD AUTO: 0.68 K/UL (ref 1–4.8)
LYMPHOCYTES # BLD AUTO: 0.84 K/UL (ref 1–4.8)
LYMPHOCYTES NFR BLD: 14.8 % (ref 22–41)
LYMPHOCYTES NFR BLD: 29.2 % (ref 22–41)
MCH RBC QN AUTO: 31.9 PG (ref 27–33)
MCH RBC QN AUTO: 32.5 PG (ref 27–33)
MCHC RBC AUTO-ENTMCNC: 33.5 G/DL (ref 33.7–35.3)
MCHC RBC AUTO-ENTMCNC: 34.4 G/DL (ref 33.7–35.3)
MCV RBC AUTO: 94.3 FL (ref 81.4–97.8)
MCV RBC AUTO: 95.2 FL (ref 81.4–97.8)
MONOCYTES # BLD AUTO: 0.18 K/UL (ref 0–0.85)
MONOCYTES # BLD AUTO: 0.3 K/UL (ref 0–0.85)
MONOCYTES NFR BLD AUTO: 6.3 % (ref 0–13.4)
MONOCYTES NFR BLD AUTO: 6.5 % (ref 0–13.4)
NEUTROPHILS # BLD AUTO: 1.76 K/UL (ref 1.82–7.42)
NEUTROPHILS # BLD AUTO: 3.59 K/UL (ref 1.82–7.42)
NEUTROPHILS NFR BLD: 61.1 % (ref 44–72)
NEUTROPHILS NFR BLD: 77.9 % (ref 44–72)
NRBC # BLD AUTO: 0 K/UL
NRBC # BLD AUTO: 0 K/UL
NRBC BLD-RTO: 0 /100 WBC
NRBC BLD-RTO: 0 /100 WBC
PLATELET # BLD AUTO: 78 K/UL (ref 164–446)
PLATELET # BLD AUTO: 82 K/UL (ref 164–446)
PMV BLD AUTO: 10.3 FL (ref 9–12.9)
PMV BLD AUTO: 11.3 FL (ref 9–12.9)
POTASSIUM SERPL-SCNC: 3.8 MMOL/L (ref 3.6–5.5)
POTASSIUM SERPL-SCNC: 4.8 MMOL/L (ref 3.6–5.5)
PROT SERPL-MCNC: 5.4 G/DL (ref 6–8.2)
PROT SERPL-MCNC: 5.8 G/DL (ref 6–8.2)
RBC # BLD AUTO: 3.17 M/UL (ref 4.7–6.1)
RBC # BLD AUTO: 3.32 M/UL (ref 4.7–6.1)
SODIUM SERPL-SCNC: 128 MMOL/L (ref 135–145)
SODIUM SERPL-SCNC: 131 MMOL/L (ref 135–145)
WBC # BLD AUTO: 2.9 K/UL (ref 4.8–10.8)
WBC # BLD AUTO: 4.6 K/UL (ref 4.8–10.8)

## 2018-12-08 PROCEDURE — 96372 THER/PROPH/DIAG INJ SC/IM: CPT

## 2018-12-08 PROCEDURE — 700105 HCHG RX REV CODE 258: Performed by: STUDENT IN AN ORGANIZED HEALTH CARE EDUCATION/TRAINING PROGRAM

## 2018-12-08 PROCEDURE — 36415 COLL VENOUS BLD VENIPUNCTURE: CPT

## 2018-12-08 PROCEDURE — 700102 HCHG RX REV CODE 250 W/ 637 OVERRIDE(OP): Performed by: STUDENT IN AN ORGANIZED HEALTH CARE EDUCATION/TRAINING PROGRAM

## 2018-12-08 PROCEDURE — A9270 NON-COVERED ITEM OR SERVICE: HCPCS | Performed by: STUDENT IN AN ORGANIZED HEALTH CARE EDUCATION/TRAINING PROGRAM

## 2018-12-08 PROCEDURE — 85025 COMPLETE CBC W/AUTO DIFF WBC: CPT

## 2018-12-08 PROCEDURE — 700111 HCHG RX REV CODE 636 W/ 250 OVERRIDE (IP): Performed by: STUDENT IN AN ORGANIZED HEALTH CARE EDUCATION/TRAINING PROGRAM

## 2018-12-08 PROCEDURE — A9270 NON-COVERED ITEM OR SERVICE: HCPCS | Performed by: INTERNAL MEDICINE

## 2018-12-08 PROCEDURE — 99225 PR SUBSEQUENT OBSERVATION CARE,LEVEL II: CPT | Mod: GC | Performed by: INTERNAL MEDICINE

## 2018-12-08 PROCEDURE — G0378 HOSPITAL OBSERVATION PER HR: HCPCS

## 2018-12-08 PROCEDURE — 80053 COMPREHEN METABOLIC PANEL: CPT

## 2018-12-08 PROCEDURE — 700102 HCHG RX REV CODE 250 W/ 637 OVERRIDE(OP): Performed by: INTERNAL MEDICINE

## 2018-12-08 RX ORDER — SODIUM CHLORIDE 9 MG/ML
INJECTION, SOLUTION INTRAVENOUS CONTINUOUS
Status: DISCONTINUED | OUTPATIENT
Start: 2018-12-08 | End: 2018-12-09

## 2018-12-08 RX ADMIN — CELECOXIB 100 MG: 100 CAPSULE ORAL at 17:48

## 2018-12-08 RX ADMIN — GABAPENTIN 300 MG: 300 CAPSULE ORAL at 17:49

## 2018-12-08 RX ADMIN — ACETAMINOPHEN 1000 MG: 500 TABLET ORAL at 12:08

## 2018-12-08 RX ADMIN — PREDNISONE 40 MG: 20 TABLET ORAL at 05:20

## 2018-12-08 RX ADMIN — OXYCODONE HYDROCHLORIDE 5 MG: 5 TABLET ORAL at 19:51

## 2018-12-08 RX ADMIN — QUETIAPINE FUMARATE 125 MG: 100 TABLET ORAL at 19:56

## 2018-12-08 RX ADMIN — PRAZOSIN HYDROCHLORIDE 3 MG: 1 CAPSULE ORAL at 19:55

## 2018-12-08 RX ADMIN — OMEPRAZOLE 20 MG: 20 CAPSULE, DELAYED RELEASE ORAL at 05:22

## 2018-12-08 RX ADMIN — SODIUM CHLORIDE: 9 INJECTION, SOLUTION INTRAVENOUS at 09:20

## 2018-12-08 RX ADMIN — OXYCODONE HYDROCHLORIDE 5 MG: 5 TABLET ORAL at 13:54

## 2018-12-08 RX ADMIN — CELECOXIB 100 MG: 100 CAPSULE ORAL at 06:00

## 2018-12-08 RX ADMIN — ACETAMINOPHEN 1000 MG: 500 TABLET ORAL at 05:21

## 2018-12-08 RX ADMIN — OXYCODONE HYDROCHLORIDE 5 MG: 5 TABLET ORAL at 05:21

## 2018-12-08 RX ADMIN — MAGNESIUM GLUCONATE 500 MG ORAL TABLET 400 MG: 500 TABLET ORAL at 05:21

## 2018-12-08 RX ADMIN — ACETAMINOPHEN 1000 MG: 500 TABLET ORAL at 17:49

## 2018-12-08 RX ADMIN — DULOXETINE HYDROCHLORIDE 90 MG: 60 CAPSULE, DELAYED RELEASE ORAL at 05:21

## 2018-12-08 RX ADMIN — LISINOPRIL 20 MG: 20 TABLET ORAL at 05:22

## 2018-12-08 RX ADMIN — ENOXAPARIN SODIUM 40 MG: 100 INJECTION SUBCUTANEOUS at 05:20

## 2018-12-08 RX ADMIN — GABAPENTIN 300 MG: 300 CAPSULE ORAL at 12:08

## 2018-12-08 RX ADMIN — ALLOPURINOL 100 MG: 100 TABLET ORAL at 05:22

## 2018-12-08 RX ADMIN — OXYCODONE HYDROCHLORIDE 5 MG: 5 TABLET ORAL at 09:21

## 2018-12-08 RX ADMIN — FERROUS GLUCONATE 324 MG: 324 TABLET ORAL at 08:54

## 2018-12-08 RX ADMIN — GABAPENTIN 300 MG: 300 CAPSULE ORAL at 05:20

## 2018-12-08 RX ADMIN — OXYCODONE HYDROCHLORIDE 5 MG: 5 TABLET ORAL at 00:12

## 2018-12-08 ASSESSMENT — ENCOUNTER SYMPTOMS
INSOMNIA: 1
SINUS PAIN: 0
SENSORY CHANGE: 1
NERVOUS/ANXIOUS: 0
VOMITING: 0
POLYDIPSIA: 0
HALLUCINATIONS: 0
PALPITATIONS: 0
SPUTUM PRODUCTION: 0
SPEECH CHANGE: 0
STRIDOR: 0
DIAPHORESIS: 0
EYE REDNESS: 0
EYE DISCHARGE: 0
DIARRHEA: 0
ABDOMINAL PAIN: 0
TREMORS: 0
BACK PAIN: 1
NAUSEA: 0
WEAKNESS: 1
WEIGHT LOSS: 0
CONSTIPATION: 0
ORTHOPNEA: 0
EYE PAIN: 0
DEPRESSION: 1
FOCAL WEAKNESS: 0
MYALGIAS: 0
DOUBLE VISION: 0
HEMOPTYSIS: 0
SEIZURES: 0
NECK PAIN: 1
SORE THROAT: 0
COUGH: 0
CHILLS: 0
PHOTOPHOBIA: 0
DIZZINESS: 0
LOSS OF CONSCIOUSNESS: 0
FEVER: 0
HEADACHES: 0
TINGLING: 1
SHORTNESS OF BREATH: 0

## 2018-12-08 ASSESSMENT — PAIN SCALES - GENERAL
PAINLEVEL_OUTOF10: 5
PAINLEVEL_OUTOF10: 6
PAINLEVEL_OUTOF10: 2
PAINLEVEL_OUTOF10: 5
PAINLEVEL_OUTOF10: 8
PAINLEVEL_OUTOF10: 6
PAINLEVEL_OUTOF10: 6

## 2018-12-08 NOTE — PROGRESS NOTES
Internal Medicine Interval Note  Note Author: Diann Ramires M.D.     Name Chris Leggett     1950   Age/Sex 68 y.o. male   MRN [id number]   Code Status FULL     After 5PM or if no immediate response to page, please call for cross-coverage  Attending/Team: Dr Lundberg / Nii Team See Patient List for primary contact information  Call [phone] to page    1st Call - Day Intern (R1):   Dr Ramires 2nd Call - Day Sr. Resident (R2/R3):   Dr Callahan         Reason for interval visit  (Principal Problem)   Back pain with suicidal thoughts      Interval Problem Daily Status Update  (24 hours, problem oriented, brief subjective history, new lab/imaging data pertinent to that problem)     Patient had no events overnight per RN, patient resting comfortably in no acute distress, states same lower left extremity shooting pain from his back into right lower extremity, states numbness and tingling bilaterally in his hands, states suicidal thoughts have mildly improved but still having depressive thoughts due to his personal situation, denies homicidal ideations.    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  - MRI lumbar demonstrated grade 1 spondylolysis of L5 on S1, some bilateral L5 neuroforaminal stenosis  - MRI thoracic demonstrated mild degenerative disease  - On Prednisone day 3   - Hyponatremia sodium  131 increase IV fluid normal saline to 125/hr  - F/U CMP and d/c IVFs if sodium normal, and if patient is tolerating PO well  - Touch base with neurosurgery for further evaluation and any surgical plans- attempted contacting but still waiting to hear back  - Watch for MANUEL if platelets continue to drop for no other apparent reason         Review of Systems   Constitutional: Negative for chills, diaphoresis, fever, malaise/fatigue and weight loss.   HENT: Negative for congestion, sinus pain and sore throat.    Eyes: Negative for double vision, photophobia, pain, discharge and redness.   Respiratory: Negative for cough, hemoptysis, sputum production, shortness of breath and stridor.    Cardiovascular: Positive for leg swelling. Negative for chest pain, palpitations and orthopnea.   Gastrointestinal: Negative for abdominal pain, constipation, diarrhea, nausea and vomiting.   Genitourinary: Negative for dysuria, frequency and urgency.   Musculoskeletal: Positive for back pain, joint pain and neck pain. Negative for myalgias.   Skin: Negative for itching and rash.   Neurological: Positive for tingling, sensory change and weakness. Negative for dizziness, tremors, speech change, focal weakness, seizures, loss of consciousness and headaches.        States numbness and tingling in his arms/hands bilaterally. Numbness in his RLE/foot.    Endo/Heme/Allergies: Negative for polydipsia.   Psychiatric/Behavioral: Positive for depression. Negative for hallucinations and suicidal ideas. The patient has insomnia. The patient is not nervous/anxious.    All other systems reviewed and are negative.      Disposition/Barriers to discharge:   Patient will need significant placement to transitional care facility for continued skilled therapy  Continue to follow-up PT/OT recommendations    Consultants/Specialty  PCP: Pcp Pt States None      Quality Measures  Quality-Core Measures   Reviewed items::  EKG reviewed, Labs reviewed, Medications  reviewed and Radiology images reviewed  Roca catheter::  No Roca  DVT prophylaxis pharmacological::  Enoxaparin (Lovenox)          Physical Exam       Vitals:    12/07/18 1950 12/08/18 0340 12/08/18 0738 12/08/18 1450   BP: 134/85 119/75 139/95 123/96   Pulse: 63 60 65 96   Resp: 16 16 18 20   Temp: 36.1 °C (97 °F) 36.2 °C (97.2 °F) 36.1 °C (97 °F) 36.6 °C (97.8 °F)   TempSrc: Temporal Temporal Temporal Temporal   SpO2: 98% 96% 95% 98%   Weight:       Height:         Body mass index is 27.37 kg/m².    Oxygen Therapy:  Pulse Oximetry: 98 %, O2 Delivery: None (Room Air)    Physical Exam   Constitutional: He is oriented to person, place, and time and well-developed, well-nourished, and in no distress. No distress.   HENT:   Head: Normocephalic and atraumatic.   Right Ear: External ear normal.   Left Ear: External ear normal.   Nose: Nose normal.   Mouth/Throat: Oropharynx is clear and moist. No oropharyngeal exudate.   Eyes: Pupils are equal, round, and reactive to light. Conjunctivae and EOM are normal. Right eye exhibits no discharge. Left eye exhibits no discharge. No scleral icterus.   Neck: Normal range of motion. Neck supple. No tracheal deviation present. No thyromegaly present.   Cardiovascular: Normal rate, regular rhythm, normal heart sounds and intact distal pulses.    No murmur heard.  Pulmonary/Chest: Effort normal and breath sounds normal. No respiratory distress. He has no wheezes. He has no rales. He exhibits no tenderness.   Abdominal: Soft. Bowel sounds are normal. He exhibits no distension. There is no tenderness. There is no rebound.   Musculoskeletal: He exhibits edema (RLE swelling vs LLE asymmetry ). He exhibits no tenderness.   Decreased range of motion in shoulders bilaterally    Neurological: He is alert and oriented to person, place, and time. He has normal reflexes. No cranial nerve deficit. Gait normal. Coordination normal. GCS score is 15.   Skin: No rash noted. He is not diaphoretic. No  erythema.   Psychiatric: Memory, affect and judgment normal.   Nursing note and vitals reviewed.            Assessment/Plan     * Lumbar radiculopathy   Assessment & Plan    [age] year old male with three week history of worsening right lower sharp back pain radiating into his leg and foot without saddle anesthesia, persistent urinary/bowel incontinence or focal neurological deficits. On exam, DTRs were 2+ bilaterally with 4/5 muscle strength. Low suspicion for cauda equina syndrome. Likely to be secondary to vertebral abnormality.     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     Plan  - Assymmetric RLE swelling, TTP  - Right ankle X-Ray- Old appearing transverse fracture through the tip of the distal right fibula. No acute right ankle fracture or dislocation. There is no  soft tissue swelling.  - B/l LE Doppler US- Bilateral lower extremities - No evidence of superficial or deep venous thrombosis  - Pain management: scheduled Tylenol 1g TID and Celebrex 100mg BID with PRN oxycodone 5mg q4 hours  - X-ray left lower extremity negative  for acute injury  - Important to r/o other causes such as gout  - Uric acid level within normal.    - ESR 21 elevated  -On  Prednisone day 3 .          Suicidal ideation- (present on admission)   Assessment & Plan    Patient currently expressing suicidal thoughts with no active plan in place. Likely to have been triggered from being off of his medications for three days.     Plan-  - Per Psych- Continue Cymbalta, Prazosin, and Seroquel. One time dose of Ativan. Legal status: Not Applicable . Anticipate F/U within 24 hours.       Dysuria- (present on admission)   Assessment & Plan    Resolved  Patient presenting with a couple day history of urinary frequency and burning with urination.   - Check UA      Alcohol intoxication (HCC)- (present on admission)   Assessment & Plan    POC Breathalizer 0.037 in ED. - Patient states he is not a heavy drinker.    Plan  - Low threshold to start CIWA     Hyponatremia- (present on admission)   Assessment & Plan    On admission, noted to have  Sodium of 131 likely secondary to poor oral intake   - Increased  cc/hr  - F/U CMP and d/c IVFs if sodium normal, and if patient is tolerating PO well  - Continue to monitor      Normocytic anemia- (present on admission)   Assessment & Plan    Patient found to have normocytic anemia on admission     Plan  - Ferritin 224.1  - FOBT negative  - Outpatient Colonoscopy, if no recent screening     Cervical spine fracture (HCC)- (present on admission)   Assessment & Plan    History of neck pain status post internal fixation and reduction of C5-C6 on 9/2/2018  - Continue Gabapentin for tingling of hands  - MRI cervical demonstrated multilevel degenerative disease, moderate central canal stenosis at C2-3, C5-6, C6-7, intramedullary T2 signal intensity at C5-6 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     Mood disorder (HCC)- (present on admission)   Assessment & Plan    - Continue Duloxetine   - Continue Gabapentin   - Continue Prazosin   - Continue Seroquel   - Psychiatry consult- Continue Cymbalta, Prazosin, and Seroquel. One time dose of Ativan. Legal status: Not Applicable . Anticipate F/U within 24 hours.       Hypertension- (present on admission)   Assessment & Plan    - Currently stable   - Continue Lisinopril

## 2018-12-08 NOTE — CARE PLAN
Problem: Safety  Goal: Will remain free from injury  Outcome: PROGRESSING AS EXPECTED  Fall precautions in place. Treaded socks on pt. Appropriate signs on doorway. IV pole on same side as bathroom. Bedrails up. Bed in lowest position and locked.  Call light and phone within reach. Patient educated on importance of calling nurses before getting out of bed, verbalizes understanding.     Problem: Pain Management  Goal: Pain level will decrease to patient's comfort goal  Outcome: PROGRESSING AS EXPECTED  Educated patient about 0-10 pain scale, regular pain assessments, and available pharmaciological and non-pharmacological pain relief. Medicated per MAR and utilized oxycodone 5mg q.4hr for 8/10 back and leg pain.

## 2018-12-08 NOTE — PROGRESS NOTES
Report received by SHARAN Lambert. Assumed care of pt. Assessment complete. Whiteboard updated. Safety sitter at bedside. Pt A&Ox4, VSS. Pt in no apparent signs of distress. Plan of care discussed. Call light within reach, bed in lowest position, and pt has no further questions at this time.

## 2018-12-08 NOTE — PROGRESS NOTES
PT A&Ox4, medicated for pain, ambulates very small distance with 1 person assist   Moved bowels this shift   Emotional support provided   PT w/1:1 safety sitter   Fall precautions in place, hourly rounding done, updated on plan of care

## 2018-12-09 LAB
ALBUMIN SERPL BCP-MCNC: 2.7 G/DL (ref 3.2–4.9)
ALBUMIN/GLOB SERPL: 1.1 G/DL
ALP SERPL-CCNC: 51 U/L (ref 30–99)
ALT SERPL-CCNC: 15 U/L (ref 2–50)
ANION GAP SERPL CALC-SCNC: 5 MMOL/L (ref 0–11.9)
AST SERPL-CCNC: 31 U/L (ref 12–45)
BASOPHILS # BLD AUTO: 0.7 % (ref 0–1.8)
BASOPHILS # BLD: 0.02 K/UL (ref 0–0.12)
BILIRUB SERPL-MCNC: 0.3 MG/DL (ref 0.1–1.5)
BUN SERPL-MCNC: 10 MG/DL (ref 8–22)
CALCIUM SERPL-MCNC: 8.4 MG/DL (ref 8.5–10.5)
CHLORIDE SERPL-SCNC: 102 MMOL/L (ref 96–112)
CO2 SERPL-SCNC: 26 MMOL/L (ref 20–33)
CREAT SERPL-MCNC: 0.53 MG/DL (ref 0.5–1.4)
EOSINOPHIL # BLD AUTO: 0.08 K/UL (ref 0–0.51)
EOSINOPHIL NFR BLD: 2.8 % (ref 0–6.9)
ERYTHROCYTE [DISTWIDTH] IN BLOOD BY AUTOMATED COUNT: 44 FL (ref 35.9–50)
GLOBULIN SER CALC-MCNC: 2.4 G/DL (ref 1.9–3.5)
GLUCOSE SERPL-MCNC: 106 MG/DL (ref 65–99)
HCT VFR BLD AUTO: 30.4 % (ref 42–52)
HGB BLD-MCNC: 10.5 G/DL (ref 14–18)
IMM GRANULOCYTES # BLD AUTO: 0.01 K/UL (ref 0–0.11)
IMM GRANULOCYTES NFR BLD AUTO: 0.3 % (ref 0–0.9)
LYMPHOCYTES # BLD AUTO: 0.86 K/UL (ref 1–4.8)
LYMPHOCYTES NFR BLD: 29.7 % (ref 22–41)
MAGNESIUM SERPL-MCNC: 1.7 MG/DL (ref 1.5–2.5)
MCH RBC QN AUTO: 32.4 PG (ref 27–33)
MCHC RBC AUTO-ENTMCNC: 34.5 G/DL (ref 33.7–35.3)
MCV RBC AUTO: 93.8 FL (ref 81.4–97.8)
MONOCYTES # BLD AUTO: 0.2 K/UL (ref 0–0.85)
MONOCYTES NFR BLD AUTO: 6.9 % (ref 0–13.4)
NEUTROPHILS # BLD AUTO: 1.73 K/UL (ref 1.82–7.42)
NEUTROPHILS NFR BLD: 59.6 % (ref 44–72)
NRBC # BLD AUTO: 0 K/UL
NRBC BLD-RTO: 0 /100 WBC
OSMOLALITY UR: 289 MOSM/KG H2O (ref 300–900)
PHOSPHATE SERPL-MCNC: 4 MG/DL (ref 2.5–4.5)
PLATELET # BLD AUTO: 82 K/UL (ref 164–446)
PMV BLD AUTO: 10.9 FL (ref 9–12.9)
POTASSIUM SERPL-SCNC: 4.1 MMOL/L (ref 3.6–5.5)
PROT SERPL-MCNC: 5.1 G/DL (ref 6–8.2)
RBC # BLD AUTO: 3.24 M/UL (ref 4.7–6.1)
SODIUM SERPL-SCNC: 133 MMOL/L (ref 135–145)
SP GR UR STRIP.AUTO: 1.01
WBC # BLD AUTO: 2.9 K/UL (ref 4.8–10.8)

## 2018-12-09 PROCEDURE — 84100 ASSAY OF PHOSPHORUS: CPT

## 2018-12-09 PROCEDURE — 85025 COMPLETE CBC W/AUTO DIFF WBC: CPT

## 2018-12-09 PROCEDURE — 700102 HCHG RX REV CODE 250 W/ 637 OVERRIDE(OP): Performed by: STUDENT IN AN ORGANIZED HEALTH CARE EDUCATION/TRAINING PROGRAM

## 2018-12-09 PROCEDURE — 700102 HCHG RX REV CODE 250 W/ 637 OVERRIDE(OP): Performed by: INTERNAL MEDICINE

## 2018-12-09 PROCEDURE — G0378 HOSPITAL OBSERVATION PER HR: HCPCS

## 2018-12-09 PROCEDURE — 700111 HCHG RX REV CODE 636 W/ 250 OVERRIDE (IP): Performed by: STUDENT IN AN ORGANIZED HEALTH CARE EDUCATION/TRAINING PROGRAM

## 2018-12-09 PROCEDURE — A9270 NON-COVERED ITEM OR SERVICE: HCPCS | Performed by: STUDENT IN AN ORGANIZED HEALTH CARE EDUCATION/TRAINING PROGRAM

## 2018-12-09 PROCEDURE — 81002 URINALYSIS NONAUTO W/O SCOPE: CPT

## 2018-12-09 PROCEDURE — A9270 NON-COVERED ITEM OR SERVICE: HCPCS | Performed by: INTERNAL MEDICINE

## 2018-12-09 PROCEDURE — 99225 PR SUBSEQUENT OBSERVATION CARE,LEVEL II: CPT | Mod: GC | Performed by: INTERNAL MEDICINE

## 2018-12-09 PROCEDURE — 80053 COMPREHEN METABOLIC PANEL: CPT

## 2018-12-09 PROCEDURE — 96372 THER/PROPH/DIAG INJ SC/IM: CPT

## 2018-12-09 PROCEDURE — 36415 COLL VENOUS BLD VENIPUNCTURE: CPT

## 2018-12-09 PROCEDURE — 700105 HCHG RX REV CODE 258: Performed by: STUDENT IN AN ORGANIZED HEALTH CARE EDUCATION/TRAINING PROGRAM

## 2018-12-09 PROCEDURE — 83935 ASSAY OF URINE OSMOLALITY: CPT

## 2018-12-09 PROCEDURE — 83735 ASSAY OF MAGNESIUM: CPT

## 2018-12-09 RX ADMIN — GABAPENTIN 300 MG: 300 CAPSULE ORAL at 13:04

## 2018-12-09 RX ADMIN — OXYCODONE HYDROCHLORIDE 5 MG: 5 TABLET ORAL at 21:51

## 2018-12-09 RX ADMIN — OXYCODONE HYDROCHLORIDE 5 MG: 5 TABLET ORAL at 04:32

## 2018-12-09 RX ADMIN — PRAZOSIN HYDROCHLORIDE 3 MG: 1 CAPSULE ORAL at 20:46

## 2018-12-09 RX ADMIN — ENOXAPARIN SODIUM 40 MG: 100 INJECTION SUBCUTANEOUS at 04:32

## 2018-12-09 RX ADMIN — OXYCODONE HYDROCHLORIDE 5 MG: 5 TABLET ORAL at 13:04

## 2018-12-09 RX ADMIN — LISINOPRIL 20 MG: 20 TABLET ORAL at 04:33

## 2018-12-09 RX ADMIN — QUETIAPINE FUMARATE 125 MG: 100 TABLET ORAL at 20:46

## 2018-12-09 RX ADMIN — ACETAMINOPHEN 1000 MG: 500 TABLET ORAL at 17:37

## 2018-12-09 RX ADMIN — SODIUM CHLORIDE: 9 INJECTION, SOLUTION INTRAVENOUS at 04:41

## 2018-12-09 RX ADMIN — OXYCODONE HYDROCHLORIDE 5 MG: 5 TABLET ORAL at 08:34

## 2018-12-09 RX ADMIN — FERROUS GLUCONATE 324 MG: 324 TABLET ORAL at 08:34

## 2018-12-09 RX ADMIN — OXYCODONE HYDROCHLORIDE 5 MG: 5 TABLET ORAL at 17:37

## 2018-12-09 RX ADMIN — STANDARDIZED SENNA CONCENTRATE AND DOCUSATE SODIUM 2 TABLET: 8.6; 5 TABLET, FILM COATED ORAL at 17:37

## 2018-12-09 RX ADMIN — ALLOPURINOL 100 MG: 100 TABLET ORAL at 04:32

## 2018-12-09 RX ADMIN — OMEPRAZOLE 20 MG: 20 CAPSULE, DELAYED RELEASE ORAL at 04:33

## 2018-12-09 RX ADMIN — DULOXETINE HYDROCHLORIDE 90 MG: 60 CAPSULE, DELAYED RELEASE ORAL at 04:32

## 2018-12-09 RX ADMIN — ACETAMINOPHEN 1000 MG: 500 TABLET ORAL at 04:32

## 2018-12-09 RX ADMIN — MAGNESIUM GLUCONATE 500 MG ORAL TABLET 400 MG: 500 TABLET ORAL at 04:33

## 2018-12-09 RX ADMIN — CELECOXIB 100 MG: 100 CAPSULE ORAL at 17:42

## 2018-12-09 RX ADMIN — GABAPENTIN 300 MG: 300 CAPSULE ORAL at 17:37

## 2018-12-09 RX ADMIN — CELECOXIB 100 MG: 100 CAPSULE ORAL at 06:00

## 2018-12-09 RX ADMIN — PREDNISONE 40 MG: 20 TABLET ORAL at 04:33

## 2018-12-09 RX ADMIN — ACETAMINOPHEN 1000 MG: 500 TABLET ORAL at 13:04

## 2018-12-09 RX ADMIN — GABAPENTIN 300 MG: 300 CAPSULE ORAL at 04:32

## 2018-12-09 ASSESSMENT — PAIN SCALES - GENERAL
PAINLEVEL_OUTOF10: 4
PAINLEVEL_OUTOF10: 4
PAINLEVEL_OUTOF10: 6
PAINLEVEL_OUTOF10: 5
PAINLEVEL_OUTOF10: 6
PAINLEVEL_OUTOF10: 6
PAINLEVEL_OUTOF10: 4
PAINLEVEL_OUTOF10: 8

## 2018-12-09 ASSESSMENT — ENCOUNTER SYMPTOMS
FOCAL WEAKNESS: 0
PALPITATIONS: 0
EYE REDNESS: 0
FEVER: 0
HEMOPTYSIS: 0
SHORTNESS OF BREATH: 0
STRIDOR: 0
DIZZINESS: 0
SEIZURES: 0
SPUTUM PRODUCTION: 0
DOUBLE VISION: 0
BLURRED VISION: 0
DIARRHEA: 0
BACK PAIN: 1
CHILLS: 0
TREMORS: 0
SINUS PAIN: 0
WEIGHT LOSS: 0
ORTHOPNEA: 0
WEAKNESS: 0
DEPRESSION: 1
ABDOMINAL PAIN: 0
EYE PAIN: 0
INSOMNIA: 1
VOMITING: 0
CLAUDICATION: 0
HEADACHES: 0
POLYDIPSIA: 0
COUGH: 0
NECK PAIN: 1
HALLUCINATIONS: 0
EYE DISCHARGE: 0
SENSORY CHANGE: 1
SORE THROAT: 0
DIAPHORESIS: 0
TINGLING: 1
NERVOUS/ANXIOUS: 0
CONSTIPATION: 0
NAUSEA: 0

## 2018-12-09 ASSESSMENT — LIFESTYLE VARIABLES: SUBSTANCE_ABUSE: 0

## 2018-12-09 NOTE — PROGRESS NOTES
"A&Ox4, pleasant. Reports moderate pain in RLE, medicated per MAR. Remains on legal hold for SI, sitter at bedside at all times, room assessed: verified no personal belongings, no call light, no unneccessary medical equipment and no sharp objects in room. L PIV appears to be infiltrated, PIV removed. POC discussed with pt, including safety and need to start a new PIV, all questions and concerns were addressed. VSS    Blood pressure 131/89, pulse 72, temperature 36.4 °C (97.6 °F), temperature source Temporal, resp. rate 16, height 1.803 m (5' 11\"), weight 89 kg (196 lb 3.4 oz), SpO2 95 %.      "

## 2018-12-09 NOTE — NON-PROVIDER
Internal Medicine Medical Student Note  Note Author: Munira Cason, Student    Name Chris Leggett     1950   Age/Sex 68 y.o. male   MRN [id number]   Code Status FULL              Reason for interval visit  (Principal Problem)   Lumbar radiculopathy    Interval Problem Daily Status Update  (problem status, last 24 hours, new history, new data )   Patient did not experience significant events overnight. His sodium initially dropped to 128 but was repleted with fluids; however, per patient and physical exam, his right foot is still edematous with new onset swelling of the right. After AM rounds, decision to d/c fluids was made as the fluctuations in sodium levels may have to do with patient's radiculopathy and pain causing CNS disruption. PE revealed unchanged numbness in the right upper extremity and lower extremity, numbness in the left lower leg, and new onset swelling of the left dorsum of the foot with unchanged right dorsum of the foot swelling.     Neurosurgery will provide consultation sometime for today. Their input will be greatly appreciated.         Physical Exam       Vitals:    18 1450 18 1900 18 0252 18 0750   BP: 123/96 143/84 112/64 131/89   Pulse: 96 70 98 72   Resp: 20 18 16 16   Temp: 36.6 °C (97.8 °F) 37.2 °C (98.9 °F) 36.6 °C (97.9 °F) 36.4 °C (97.6 °F)   TempSrc: Temporal Temporal Temporal Temporal   SpO2: 98% 100% 98% 95%   Weight:       Height:         Body mass index is 27.37 kg/m².    Oxygen Therapy:  Pulse Oximetry: 95 %, O2 (LPM): 0, O2 Delivery: None (Room Air)    Physical Exam   Constitutional: He is oriented to person, place, and time and well-developed, well-nourished, and in no distress.   HENT:   Head: Normocephalic and atraumatic.   Eyes: Pupils are equal, round, and reactive to light.   Neck:   Limited ROM due to pain     Cardiovascular: Normal rate, regular rhythm and normal heart sounds.    Pulmonary/Chest: Effort normal and breath  sounds normal.   Abdominal: Soft. Bowel sounds are normal. He exhibits no distension. There is tenderness.   Musculoskeletal: He exhibits edema (right dorsum of foot and some of left dorsum of foot ).   Neurological: He is alert and oriented to person, place, and time. A sensory deficit (right upper extremity, right lower extremity, left lower leg decreased sensation) is present.   Skin: Skin is warm and dry.             Assessment/Plan    This is a 69 y/o male with a 3wk history of worsening right lower leg pain radiating to his back without saddle anesthesia, persistent urinary/bowel incontinence or focal/neurological deficits. There is a low suspcion for cauda equina at this time. This may be secondary to a vertebral abnormality.     # lumbar radiculopathy  # cervical spine fracture  -Pt/OT follow up  -history of neck pain post internal fixation and reduction of C5-6 on 9/2/18; MRI cervical demonstrated multilevel degenerative disease, moderate central canal stenosis at C2-3, C5-6, C6-7, intramedulllary T2 signal intensity at C5-6 indicative of myelomalacia secondary to prior cord injury  -MRI lumbar demonstrated grade 1 spondylolysis of L5 on S1, some bilateral neuroforaminal stenosis  -MRI thoracic showed mild degenerative disease  -will continue gabapentin for neuropathic pain and numbness  -pain management w/scheduled tylenol 1g TID and celebrex 100mg BID w/prn oxycodone 5mg q4hrs  -ppi  -on prednisolone day 3  -neurosurgery will meet with patient     # right lower extremity pain and edema  - Assymmetric RLE swelling, TTP  - Right ankle X-Ray- Old appearing transverse fracture through the tip of the distal right fibula. No acute right ankle fracture or dislocation. There is no  soft tissue swelling.  - B/l LE Doppler US- Bilateral lower extremities - No evidence of superficial or deep venous thrombosis  - Pain management: scheduled Tylenol 1g TID and Celebrex 100mg BID with PRN oxycodone 5mg q4 hours  - X-ray  left lower extremity negative for acute injury  - Important to r/o other causes such as gout  - Uric acid level within normal.    - ESR 21 elevated  -On  Prednisone day 3    # hyponatremia  -Na levels have varied; overnight patient's level was 128 but asymptomatic  -new onset left leg swelling; patient may be getting overloaded with fluid and RAAS system may not be the issue with his Na  -Na levels may be fluctuating due to CNS disruption secondary to the cervical and lumbar abnormalities and pain thereof  -we will D/C fluids and work on pain control for now    # normocytic anemia  -providing ferrous gluconate in the morning    # mood disorder  -continue duloxetine, gabapentin, prazosin, seroquel  -patient had suicidal thoughts on admission; psych consult was appreciated and recommended continuing patient's home meds for management; they will follow up likely Monday    # hypertension   -continue patient's lisinopril as levels have remained stable

## 2018-12-09 NOTE — CARE PLAN
Problem: Safety  Goal: Will remain free from injury  Outcome: PROGRESSING AS EXPECTED  1:1 safety sitter at bedside. Fall precautions in place. Treaded socks on pt. Appropriate signs on doorway. IV pole on same side as bathroom. Bedrails up. Bed in lowest position and locked.  Call light and phone within reach. Patient educated on importance of calling nurses before getting out of bed, verbalizes understanding. Bed alarm on.    Problem: Pain Management  Goal: Pain level will decrease to patient's comfort goal  Outcome: PROGRESSING AS EXPECTED  Educated patient about 0-10 pain scale, regular pain assessments, and available pharmaciological and non-pharmacological pain relief.  Provided PRN pain relief as ordered and utilized oxycodone 5mg PO for right leg pain.

## 2018-12-09 NOTE — PROGRESS NOTES
Internal Medicine Interval Note  Note Author: Diann Ramires M.D.     Name Chris Leggett     1950   Age/Sex 68 y.o. male   MRN [id number]   Code Status FULL     After 5PM or if no immediate response to page, please call for cross-coverage  Attending/Team: Dr Lundberg / [facility] Team See Patient List for primary contact information  Call [phone] to page    1st Call - Day Intern (R1):   Dr Ramires 2nd Call - Day Sr. Resident (R2/R3):   Dr Callahan         Reason for interval visit  (Principal Problem)   Back pain with suicidal thoughts      Interval Problem Daily Status Update  (24 hours, problem oriented, brief subjective history, new lab/imaging data pertinent to that problem)     Patient had no events overnight per RN except that patient kept asking for caffeine drinks, patient resting comfortably in no acute distress, states same lower left extremity shooting pain from his back into right lower extremity consistent with previous days, states numbness and tingling bilaterally in his hands, states suicidal thoughts have mildly improved but still having depressive thoughts due to his personal situation, denies homicidal ideations consistent with previous days.     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   - Watch for MANUEL if platelets continue to drop for no other apparent reason        Review of Systems   Constitutional: Negative for chills, diaphoresis, fever, malaise/fatigue and weight loss.   HENT: Negative for congestion, hearing loss, sinus pain, sore throat and tinnitus.    Eyes: Negative for blurred vision, double vision, pain, discharge and redness.   Respiratory: Negative for cough, hemoptysis, sputum production, shortness of breath and stridor.    Cardiovascular: Positive for leg swelling. Negative for chest pain, palpitations, orthopnea and claudication.   Gastrointestinal: Negative for abdominal pain, constipation, diarrhea, nausea and vomiting.   Genitourinary: Negative for dysuria, frequency, hematuria and urgency.   Musculoskeletal: Positive for back pain, joint pain and neck pain.   Skin: Negative for itching and rash.   Neurological: Positive for tingling and sensory change. Negative for dizziness, tremors, focal weakness, seizures, weakness and headaches.        States numbness and tingling in his arms/hands bilaterally. Numbness in his RLE/foot.     Endo/Heme/Allergies: Negative for polydipsia.   Psychiatric/Behavioral: Positive for depression and suicidal ideas. Negative for hallucinations and substance abuse. The patient has insomnia. The patient is not nervous/anxious.    All other systems reviewed and are negative.      Disposition/Barriers to discharge:   Patient will need significant placement to transitional care facility for continued skilled therapy  Continue to follow-up PT/OT recommendations    Consultants/Specialty  PCP: Pcp Pt States None      Quality Measures  Quality-Core  Measures   Reviewed items::  EKG reviewed, Labs reviewed, Medications reviewed and Radiology images reviewed  Roca catheter::  No Roca  DVT prophylaxis pharmacological::  Enoxaparin (Lovenox)          Physical Exam       Vitals:    12/08/18 1450 12/08/18 1900 12/09/18 0252 12/09/18 0750   BP: 123/96 143/84 112/64 131/89   Pulse: 96 70 98 72   Resp: 20 18 16 16   Temp: 36.6 °C (97.8 °F) 37.2 °C (98.9 °F) 36.6 °C (97.9 °F) 36.4 °C (97.6 °F)   TempSrc: Temporal Temporal Temporal Temporal   SpO2: 98% 100% 98% 95%   Weight:       Height:         Body mass index is 27.37 kg/m².    Oxygen Therapy:  Pulse Oximetry: 95 %, O2 (LPM): 0, O2 Delivery: None (Room Air)    Physical Exam   Constitutional: He is oriented to person, place, and time and well-developed, well-nourished, and in no distress. No distress.   HENT:   Head: Normocephalic and atraumatic.   Right Ear: External ear normal.   Left Ear: External ear normal.   Nose: Nose normal.   Mouth/Throat: Oropharynx is clear and moist.   Eyes: Pupils are equal, round, and reactive to light. Conjunctivae and EOM are normal. Right eye exhibits no discharge. Left eye exhibits no discharge.   Neck: Normal range of motion. Neck supple. No tracheal deviation present. No thyromegaly present.   Cardiovascular: Normal rate, regular rhythm, normal heart sounds and intact distal pulses.    No murmur heard.  Pulmonary/Chest: Effort normal and breath sounds normal. No respiratory distress. He has no wheezes. He has no rales.   Abdominal: Soft. Bowel sounds are normal. He exhibits no distension. There is no tenderness. There is no rebound.   Musculoskeletal: He exhibits edema (RLE swelling vs LLE asymmetry). He exhibits no tenderness or deformity.   Decreased range of motion in shoulders bilaterally  , distal peripheral pulses 2+ bilaterally in all extremities, no evidence of neuro vascular compromise   Neurological: He is alert and oriented to person, place, and time. He has normal  reflexes. No cranial nerve deficit. Gait normal. Coordination normal. GCS score is 15.   Skin: No rash noted. He is not diaphoretic. No erythema.   Psychiatric: Mood, memory, affect and judgment normal.   Nursing note and vitals reviewed.            Assessment/Plan     * Lumbar radiculopathy   Assessment & Plan    [age] old male with three week history of worsening right lower sharp back pain radiating into his leg and foot without saddle anesthesia, persistent urinary/bowel incontinence or focal neurological deficits. On exam, DTRs were 2+ bilaterally with 4/5 muscle strength. Low suspicion for cauda equina syndrome. Likely to be secondary to vertebral abnormality.     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      Pain and swelling of right lower extremity   Assessment & Plan    Right ankle noted to be edematous on presentation with ecchymosis.     Plan  - Assymmetric RLE swelling, TTP  - Right ankle X-Ray- Old appearing transverse fracture through the tip of the distal right fibula. No acute right ankle fracture or dislocation. There is no  soft tissue swelling.  - B/l LE Doppler US- Bilateral lower  extremities - No evidence of superficial or deep venous thrombosis  - Pain management: scheduled Tylenol 1g TID and Celebrex 100mg BID with PRN oxycodone 5mg q4 hours  - X-ray left lower extremity negative for acute injury  - Important to r/o other causes such as gout  - Uric acid level within normal.    - ESR 21 elevated  -On  Prednisone day 3 .          Suicidal ideation- (present on admission)   Assessment & Plan    Patient currently expressing suicidal thoughts with no active plan in place. Likely to have been triggered from being off of his medications for three days.     Plan-  - Per Psych- Continue Cymbalta, Prazosin, and Seroquel. One time dose of Ativan. Legal status: Not Applicable . Anticipate F/U within 24 hours.       Dysuria- (present on admission)   Assessment & Plan    Resolved  Patient presenting with a couple day history of urinary frequency and burning with urination.   - Check UA      Alcohol intoxication (HCC)- (present on admission)   Assessment & Plan    POC Breathalizer 0.037 in ED. - Patient states he is not a heavy drinker.    Plan  - Low threshold to start CIWA     Hyponatremia- (present on admission)   Assessment & Plan    On admission, noted to have  Sodium of 131 likely secondary to poor oral intake     Plan  - D/C'ed IVFs Na 133  - Continue to monitor      Normocytic anemia- (present on admission)   Assessment & Plan    Patient found to have normocytic anemia on admission     Plan  - Ferritin 224.1  - FOBT negative  - Outpatient Colonoscopy, if no recent screening     Cervical spine fracture (HCC)- (present on admission)   Assessment & Plan    History of neck pain status post internal fixation and reduction of C5-C6 on 9/2/2018  - Continue Gabapentin for tingling of hands  - MRI cervical demonstrated multilevel degenerative disease, moderate central canal stenosis at C2-3, C5-6, C6-7, intramedullary T2 signal intensity at C5-6 indicative of myelomalacia likely secondary to previous  cords injury  - MRI lumbar demonstrated grade 1 spondylolysis of L5 on S1, some bilateral L5 neuroforaminal stenosis  - MRI thoracic demonstrated mild degenerative disease  - On Prednisone day 3   - Consult neurosurgery.     Mood disorder (HCC)- (present on admission)   Assessment & Plan    - Continue Duloxetine   - Continue Gabapentin   - Continue Prazosin   - Continue Seroquel   - Psychiatry consult- Continue Cymbalta, Prazosin, and Seroquel. One time dose of Ativan. Legal status: Not Applicable . Anticipate F/U within 24 hours.       Hypertension- (present on admission)   Assessment & Plan    - Currently stable   - Continue Lisinopril

## 2018-12-09 NOTE — PROGRESS NOTES
Notified [facility] blue team regarding pt's recent sodium level of 128 from 131. No orders received at this time. Pt isn't exhibiting any signs of confusion, but to just monitor for now.

## 2018-12-09 NOTE — DISCHARGE SUMMARY
Internal Medicine Discharge Summary  Note Author: Hollie Suárez M.D.       Name Chris Leggett     1950   Age/Sex 68 y.o. male   MRN [id number]       LEFT AGAINST MEDICAL ADVICE (AMA)      Admit Date:  2018       Discharge Date:   2018    Service:   [facility] Internal Medicine Blue Team  Attending Physician(s):  Dr. Lundberg  Senior Resident(s):  Dr. Suárez  Valentino Resident(s):   Dr. Ramires  PCP: Pcp Pt States None      Primary Diagnosis:   Lumbar radioculopathy    Secondary Diagnoses:                Principal Problem:    Lumbar radiculopathy POA: Unknown  Active Problems:    Pain and swelling of right lower extremity POA: Unknown    Cervical spine fracture (HCC) POA: Yes      Overview: Fractures of the C4 and C5 spinous processes       Fracture of the anterior/superior vertebral body and C6 with a small       avulsed fragment. There is also fracture of the right uncinate process.      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 Neurosurgery    Normocytic anemia POA: Yes    Hyponatremia POA: Yes      Overview: Trend       9/3 135      Hypertension POA: Yes    Mood disorder (HCC) POA: Yes  Resolved Problems:    Suicidal ideation POA: Yes    Alcohol intoxication (HCC) POA: Yes      Overview: Monitor for signs withdrawal          Dysuria POA: Yes      Hospital Summary (Brief Narrative):       68 years old male with past medical history of neck pain status post internal fixation recently done in 2018, chronic back pain, chronic right hip pain presented with exacerbation of lower back pain.     Patient was recently discharged from [facility] however was non-adherent to medication management. His lower back pain subsequently worsened. He had a mechanical fall while walking therefore presented to the hospital. On admission patient was noticed to have  "swelling over the right lower extremity. DVT workup was negative. His spine was evaluated. MRI of spine completed and neurosurgery consulted. Per recommendations, plan for symptomatic management and to follow-up as outpatient for potential surgical intervention    During hospitalization, patient endorsed feelings of depression. Psychiatry was consulted. Legal hold was not initated. His psychiatric medications were titrated to effect.     On 12/14/2018 around 9 to 9:30 am, patient became increasing agitated, claiming the \"CNAs are treating me like a dog\". When asked patient to elaborate he refused to answer and requested for his clothes so he can leave. Patient was recommended to continue to stay however patient was adamant for discharge. We then offered patient that he should wait a few hours so we can arrange discharge with psychiatric medications. However patient did not wait for these medications therefore left against medical advice.     At the time that patient left against medical advice, he was ambulatory, had capacity.      Other Medical Issues  - Hyponatremia: appears to be chronic, asymptomatic. May be contributed due to psychotropic medications  - Hypertension: stable on lisinopril  - Normocytic anemia: Fecal occult was negative, will likely need outpatient colonoscopy if not already done      Consultants:     Psychiatry:   Neurosurgery:     Procedures:        None    Imaging/ Testing:      DX-LUMBAR SPINE-BEND OR FLEX-EXT   Final Result      Multilevel degenerative changes.   Grade 1 L5 anterolisthesis.   No instability on lateral flexion-extension views.      DX-CERVICAL SPINE-2 OR 3 VIEWS   Final Result      Postsurgical change without evidence of hardware failure or loosening.      Disc space narrowing and uncovertebral joint arthropathy as described above.      No evidence of malalignment or abnormal motion.      DX-CERVICAL SPINE-FLX-EXT ONLY   Final Result      Postsurgical change without evidence " of hardware failure or loosening.      Disc space narrowing and uncovertebral joint arthropathy as described above.      No evidence of malalignment or abnormal motion.      MR-LUMBAR SPINE-W/O   Final Result      1.  Grade 1 spondylolysis of L5 on S1.   2.  Severe bilateral L5 neural foraminal stenosis.   3.  Multifocal degenerative disease as described above.      MR-THORACIC SPINE-W/O   Final Result      1.  Mild degenerative disease in the thoracic spine as described above.   2.  The previously seen prevertebral hematoma/edema is resolved.      MR-CERVICAL SPINE-W/O   Final Result      1.  Multilevel degenerative disease as described above.   2.  Moderate central canal stenosis at C2-3, C5-6 and C6-7.   3.  There is increased intramedullary T2 signal intensity at the level of C5-6 likely representing myelomalacia. This may be secondary to the previous cord injury.   4.  Post surgical changes as described above.      OUTSIDE IMAGES-DX LOWER EXTREMITY, RIGHT   Final Result      US-EXTREMITY VENOUS LOWER BILAT   Final Result      DX-ANKLE 2- VIEWS RIGHT   Final Result      1.  Old appearing transverse fracture through the tip of the distal right fibula.      2.  No acute right ankle fracture or dislocation.            Discharge Medications:         Medication Reconciliation: Completed       Medication List      START taking these medications      Instructions   acetaminophen 325 MG Tabs  Commonly known as:  TYLENOL   Take 1 Tab by mouth 3 times a day.  Dose:  325 mg     allopurinol 100 MG Tabs  Start taking on:  12/15/2018  Commonly known as:  ZYLOPRIM   Take 1 Tab by mouth every day.  Dose:  100 mg     magnesium oxide 400 (241.3 Mg) MG Tabs tablet  Commonly known as:  MAG-OX   Take 1 Tab by mouth 2 Times a Day.  Dose:  400 mg        CHANGE how you take these medications      Instructions   gabapentin 400 MG Caps  What changed:  · medication strength  · how much to take  · when to take this  Commonly known as:   NEURONTIN   Take 1 Cap by mouth 3 times a day for 7 days.  Dose:  400 mg     prazosin 1 MG Caps  What changed:  when to take this  Commonly known as:  MINIPRESS   Take 3 Caps by mouth every evening for 7 days.  Dose:  3 mg     QUEtiapine 25 MG Tabs  What changed:  medication strength  Commonly known as:  SEROQUEL   Take 5 Tabs by mouth every bedtime for 7 days.  Dose:  125 mg        CONTINUE taking these medications      Instructions   DULoxetine 30 MG Cpep  Start taking on:  12/15/2018  Commonly known as:  CYMBALTA   Take 3 Caps by mouth every day for 7 days.  Dose:  90 mg     lisinopril 20 MG Tabs  Start taking on:  12/15/2018  Commonly known as:  PRINIVIL   Take 1 Tab by mouth every day for 7 days.  Dose:  20 mg            Med rec was completed however patient did not wait for the medications to be provided for him, thus leaving against medical advice       Disposition:  Left against medical advice    Diet:   As tolerated    Activity:   As tolerated    Instructions:      Follow-up with psychiatry and neurosurgery as outpatient    The patient was instructed to return to the ER in the event of worsening symptoms. I have counseled the patient on the importance of compliance and the patient has agreed to proceed with all medical recommendations and follow up plan indicated above.   The patient understands that all medications come with benefits and risks. Risks may include permanent injury or death and these risks can be minimized with close reassessment and monitoring.        Primary Care Provider:   None, unable to establish with primary care as patient left against medical advice  Discharge summary faxed to primary care provider:  Deferred  Copy of discharge summary given to the patient: Deferred      Follow up appointment details :      Patient left against medical advice    Pending Studies:        HIV testing for persistent cytopenias      Summary of follow up issues:   HIV testing for persistent  cytopenias  Follow-up with psychiatry and neurosurgery as outpatient for further management  Establish with primary care provider  Follow-up with gastroenterology for colonoscopy if not yet done for normocytic anemia    Discharge Time (Minutes) :    40 minutes  Hospital Course Type: left against medical advice      Condition on Discharge  Left against medical advice  ______________________________________________________________________    Interval history/exam for day of discharge:    Patient was seen and examined prior to earlier in the morning, prior to patient leaving against medical advice. Patient was resting comfortable with no specific complaints. Continued to endorse ankle pain    Review of Systems  Constitutional: Denies fevers or chills   Cardiovascular: Denies chest pain or palpitations   Respiratory: Denies shortness of breath , Denies cough  Gastrointestinal/Hepatic: Denies abdominal pain, nausea, vomiting  Genitourinary: Denies bladder dysfunction, dysuria or frequency  Musculoskeletal/Rheum: complains of ankle pain, pain is tolerable with current pain regimen  Skin/Breast: Denies rash   Neurological: Denies headache.     Physical Exam: done prior to the time of patient leaving against medical advice  HEENT: grossly normal. Conjunctiva normal. Oropharynx moist, poor dentition  Cardiovascular: Normal heart rate, Normal rhythm   Lungs: Respiratory effort is normal. Normal breath sounds  Abdomen: Bowel sounds normal, Soft, No tenderness  Skin: No erythema, No rash. Continues to have hematoma over the right ankle  Lower limbs: hematoma over right ankle, left ankle is normal   Neurologic: Alert & oriented x 3, Normal motor function, No focal deficits noted, cranial nerves II through XII are normal  PSY: normal affect      Most Recent Labs:    Lab Results   Component Value Date/Time    WBC 2.6 (L) 12/14/2018 01:49 AM    RBC 3.28 (L) 12/14/2018 01:49 AM    HEMOGLOBIN 10.3 (L) 12/14/2018 01:49 AM     HEMATOCRIT 31.6 (L) 12/14/2018 01:49 AM    MCV 96.3 12/14/2018 01:49 AM    MCH 31.4 12/14/2018 01:49 AM    MCHC 32.6 (L) 12/14/2018 01:49 AM    MPV 11.5 12/14/2018 01:49 AM    NEUTSPOLYS 48.40 12/14/2018 01:49 AM    LYMPHOCYTES 32.20 12/14/2018 01:49 AM    MONOCYTES 13.30 12/14/2018 01:49 AM    EOSINOPHILS 5.30 12/14/2018 01:49 AM    BASOPHILS 0.80 12/14/2018 01:49 AM      Lab Results   Component Value Date/Time    SODIUM 130 (L) 12/14/2018 01:49 AM    POTASSIUM 4.5 12/14/2018 01:49 AM    CHLORIDE 99 12/14/2018 01:49 AM    CO2 27 12/14/2018 01:49 AM    GLUCOSE 100 (H) 12/14/2018 01:49 AM    BUN 12 12/14/2018 01:49 AM    CREATININE 0.58 12/14/2018 01:49 AM      Lab Results   Component Value Date/Time    ALTSGPT 20 12/14/2018 01:49 AM    ASTSGOT 39 12/14/2018 01:49 AM    ALKPHOSPHAT 54 12/14/2018 01:49 AM    TBILIRUBIN 0.3 12/14/2018 01:49 AM    LIPASE 32 11/05/2018 12:20 PM    ALBUMIN 3.1 (L) 12/14/2018 01:49 AM    GLOBULIN 2.1 12/14/2018 01:49 AM    INR 1.24 (H) 11/05/2018 12:20 PM     Lab Results   Component Value Date/Time    PROTHROMBTM 15.7 (H) 11/05/2018 12:20 PM    INR 1.24 (H) 11/05/2018 12:20 PM

## 2018-12-10 ENCOUNTER — APPOINTMENT (OUTPATIENT)
Dept: RADIOLOGY | Facility: MEDICAL CENTER | Age: 68
End: 2018-12-10
Attending: PHYSICIAN ASSISTANT
Payer: MEDICARE

## 2018-12-10 ENCOUNTER — APPOINTMENT (OUTPATIENT)
Dept: RADIOLOGY | Facility: MEDICAL CENTER | Age: 68
End: 2018-12-10
Attending: STUDENT IN AN ORGANIZED HEALTH CARE EDUCATION/TRAINING PROGRAM
Payer: MEDICARE

## 2018-12-10 PROBLEM — F10.929 ALCOHOL INTOXICATION (HCC): Status: RESOLVED | Noted: 2018-09-02 | Resolved: 2018-12-10

## 2018-12-10 PROBLEM — R30.0 DYSURIA: Status: RESOLVED | Noted: 2018-12-06 | Resolved: 2018-12-10

## 2018-12-10 LAB
ALBUMIN SERPL BCP-MCNC: 2.9 G/DL (ref 3.2–4.9)
ALBUMIN/GLOB SERPL: 1.2 G/DL
ALP SERPL-CCNC: 50 U/L (ref 30–99)
ALT SERPL-CCNC: 18 U/L (ref 2–50)
ANION GAP SERPL CALC-SCNC: 4 MMOL/L (ref 0–11.9)
AST SERPL-CCNC: 35 U/L (ref 12–45)
BASOPHILS # BLD AUTO: 0.4 % (ref 0–1.8)
BASOPHILS # BLD: 0.01 K/UL (ref 0–0.12)
BILIRUB SERPL-MCNC: 0.3 MG/DL (ref 0.1–1.5)
BUN SERPL-MCNC: 9 MG/DL (ref 8–22)
CALCIUM SERPL-MCNC: 8.8 MG/DL (ref 8.5–10.5)
CHLORIDE SERPL-SCNC: 99 MMOL/L (ref 96–112)
CO2 SERPL-SCNC: 29 MMOL/L (ref 20–33)
CREAT SERPL-MCNC: 0.51 MG/DL (ref 0.5–1.4)
EOSINOPHIL # BLD AUTO: 0.08 K/UL (ref 0–0.51)
EOSINOPHIL NFR BLD: 3.2 % (ref 0–6.9)
ERYTHROCYTE [DISTWIDTH] IN BLOOD BY AUTOMATED COUNT: 44.2 FL (ref 35.9–50)
GLOBULIN SER CALC-MCNC: 2.4 G/DL (ref 1.9–3.5)
GLUCOSE SERPL-MCNC: 105 MG/DL (ref 65–99)
HCT VFR BLD AUTO: 28.5 % (ref 42–52)
HGB BLD-MCNC: 10 G/DL (ref 14–18)
IMM GRANULOCYTES # BLD AUTO: 0.01 K/UL (ref 0–0.11)
IMM GRANULOCYTES NFR BLD AUTO: 0.4 % (ref 0–0.9)
LYMPHOCYTES # BLD AUTO: 0.81 K/UL (ref 1–4.8)
LYMPHOCYTES NFR BLD: 32.4 % (ref 22–41)
MAGNESIUM SERPL-MCNC: 1.7 MG/DL (ref 1.5–2.5)
MCH RBC QN AUTO: 32.9 PG (ref 27–33)
MCHC RBC AUTO-ENTMCNC: 35.1 G/DL (ref 33.7–35.3)
MCV RBC AUTO: 93.8 FL (ref 81.4–97.8)
MONOCYTES # BLD AUTO: 0.22 K/UL (ref 0–0.85)
MONOCYTES NFR BLD AUTO: 8.8 % (ref 0–13.4)
NEUTROPHILS # BLD AUTO: 1.37 K/UL (ref 1.82–7.42)
NEUTROPHILS NFR BLD: 54.8 % (ref 44–72)
NRBC # BLD AUTO: 0 K/UL
NRBC BLD-RTO: 0 /100 WBC
PHOSPHATE SERPL-MCNC: 3.6 MG/DL (ref 2.5–4.5)
PLATELET # BLD AUTO: 84 K/UL (ref 164–446)
PMV BLD AUTO: 11 FL (ref 9–12.9)
POTASSIUM SERPL-SCNC: 3.8 MMOL/L (ref 3.6–5.5)
PROT SERPL-MCNC: 5.3 G/DL (ref 6–8.2)
RBC # BLD AUTO: 3.04 M/UL (ref 4.7–6.1)
SODIUM SERPL-SCNC: 132 MMOL/L (ref 135–145)
WBC # BLD AUTO: 2.7 K/UL (ref 4.8–10.8)

## 2018-12-10 PROCEDURE — A9270 NON-COVERED ITEM OR SERVICE: HCPCS | Performed by: STUDENT IN AN ORGANIZED HEALTH CARE EDUCATION/TRAINING PROGRAM

## 2018-12-10 PROCEDURE — 97165 OT EVAL LOW COMPLEX 30 MIN: CPT

## 2018-12-10 PROCEDURE — 84100 ASSAY OF PHOSPHORUS: CPT

## 2018-12-10 PROCEDURE — 700102 HCHG RX REV CODE 250 W/ 637 OVERRIDE(OP): Performed by: STUDENT IN AN ORGANIZED HEALTH CARE EDUCATION/TRAINING PROGRAM

## 2018-12-10 PROCEDURE — 83735 ASSAY OF MAGNESIUM: CPT

## 2018-12-10 PROCEDURE — G8987 SELF CARE CURRENT STATUS: HCPCS | Mod: CI

## 2018-12-10 PROCEDURE — G0378 HOSPITAL OBSERVATION PER HR: HCPCS

## 2018-12-10 PROCEDURE — 700102 HCHG RX REV CODE 250 W/ 637 OVERRIDE(OP): Performed by: INTERNAL MEDICINE

## 2018-12-10 PROCEDURE — 36415 COLL VENOUS BLD VENIPUNCTURE: CPT

## 2018-12-10 PROCEDURE — 72040 X-RAY EXAM NECK SPINE 2-3 VW: CPT

## 2018-12-10 PROCEDURE — 85025 COMPLETE CBC W/AUTO DIFF WBC: CPT

## 2018-12-10 PROCEDURE — 80053 COMPREHEN METABOLIC PANEL: CPT

## 2018-12-10 PROCEDURE — 99225 PR SUBSEQUENT OBSERVATION CARE,LEVEL II: CPT | Mod: GC | Performed by: INTERNAL MEDICINE

## 2018-12-10 PROCEDURE — 96372 THER/PROPH/DIAG INJ SC/IM: CPT

## 2018-12-10 PROCEDURE — 83036 HEMOGLOBIN GLYCOSYLATED A1C: CPT

## 2018-12-10 PROCEDURE — A9270 NON-COVERED ITEM OR SERVICE: HCPCS | Performed by: INTERNAL MEDICINE

## 2018-12-10 PROCEDURE — 700111 HCHG RX REV CODE 636 W/ 250 OVERRIDE (IP): Performed by: STUDENT IN AN ORGANIZED HEALTH CARE EDUCATION/TRAINING PROGRAM

## 2018-12-10 PROCEDURE — G8988 SELF CARE GOAL STATUS: HCPCS | Mod: CI

## 2018-12-10 PROCEDURE — 72100 X-RAY EXAM L-S SPINE 2/3 VWS: CPT

## 2018-12-10 RX ORDER — HYDRALAZINE HYDROCHLORIDE 10 MG/1
10 TABLET, FILM COATED ORAL EVERY 6 HOURS PRN
Status: DISCONTINUED | OUTPATIENT
Start: 2018-12-10 | End: 2018-12-14 | Stop reason: HOSPADM

## 2018-12-10 RX ORDER — POTASSIUM CHLORIDE 20 MEQ/1
40 TABLET, EXTENDED RELEASE ORAL ONCE
Status: COMPLETED | OUTPATIENT
Start: 2018-12-10 | End: 2018-12-10

## 2018-12-10 RX ADMIN — OXYCODONE HYDROCHLORIDE 5 MG: 5 TABLET ORAL at 05:17

## 2018-12-10 RX ADMIN — ACETAMINOPHEN 1000 MG: 500 TABLET ORAL at 12:06

## 2018-12-10 RX ADMIN — GABAPENTIN 300 MG: 300 CAPSULE ORAL at 12:06

## 2018-12-10 RX ADMIN — OXYCODONE HYDROCHLORIDE 5 MG: 5 TABLET ORAL at 14:05

## 2018-12-10 RX ADMIN — MAGNESIUM GLUCONATE 500 MG ORAL TABLET 400 MG: 500 TABLET ORAL at 06:00

## 2018-12-10 RX ADMIN — OXYCODONE HYDROCHLORIDE 5 MG: 5 TABLET ORAL at 20:24

## 2018-12-10 RX ADMIN — QUETIAPINE FUMARATE 125 MG: 100 TABLET ORAL at 20:24

## 2018-12-10 RX ADMIN — ACETAMINOPHEN 1000 MG: 500 TABLET ORAL at 17:36

## 2018-12-10 RX ADMIN — CELECOXIB 100 MG: 100 CAPSULE ORAL at 17:36

## 2018-12-10 RX ADMIN — ENOXAPARIN SODIUM 40 MG: 100 INJECTION SUBCUTANEOUS at 05:17

## 2018-12-10 RX ADMIN — PRAZOSIN HYDROCHLORIDE 3 MG: 1 CAPSULE ORAL at 20:24

## 2018-12-10 RX ADMIN — POTASSIUM CHLORIDE 40 MEQ: 1500 TABLET, EXTENDED RELEASE ORAL at 08:01

## 2018-12-10 RX ADMIN — FERROUS GLUCONATE 324 MG: 324 TABLET ORAL at 08:00

## 2018-12-10 RX ADMIN — GABAPENTIN 300 MG: 300 CAPSULE ORAL at 05:16

## 2018-12-10 RX ADMIN — OXYCODONE HYDROCHLORIDE 5 MG: 5 TABLET ORAL at 09:45

## 2018-12-10 RX ADMIN — MAGNESIUM GLUCONATE 500 MG ORAL TABLET 400 MG: 500 TABLET ORAL at 17:36

## 2018-12-10 RX ADMIN — DULOXETINE HYDROCHLORIDE 90 MG: 60 CAPSULE, DELAYED RELEASE ORAL at 05:17

## 2018-12-10 RX ADMIN — HYDRALAZINE HYDROCHLORIDE 10 MG: 10 TABLET, FILM COATED ORAL at 17:36

## 2018-12-10 RX ADMIN — CELECOXIB 100 MG: 100 CAPSULE ORAL at 06:00

## 2018-12-10 RX ADMIN — PREDNISONE 40 MG: 20 TABLET ORAL at 06:00

## 2018-12-10 RX ADMIN — ACETAMINOPHEN 1000 MG: 500 TABLET ORAL at 05:17

## 2018-12-10 RX ADMIN — GABAPENTIN 300 MG: 300 CAPSULE ORAL at 17:36

## 2018-12-10 RX ADMIN — OMEPRAZOLE 20 MG: 20 CAPSULE, DELAYED RELEASE ORAL at 06:00

## 2018-12-10 RX ADMIN — ALLOPURINOL 100 MG: 100 TABLET ORAL at 05:17

## 2018-12-10 ASSESSMENT — ACTIVITIES OF DAILY LIVING (ADL): TOILETING: INDEPENDENT

## 2018-12-10 ASSESSMENT — COGNITIVE AND FUNCTIONAL STATUS - GENERAL
DRESSING REGULAR LOWER BODY CLOTHING: A LITTLE
HELP NEEDED FOR BATHING: A LITTLE
DAILY ACTIVITIY SCORE: 21
SUGGESTED CMS G CODE MODIFIER DAILY ACTIVITY: CJ
TOILETING: A LITTLE

## 2018-12-10 ASSESSMENT — PAIN SCALES - GENERAL
PAINLEVEL_OUTOF10: 7
PAINLEVEL_OUTOF10: 5
PAINLEVEL_OUTOF10: 5

## 2018-12-10 NOTE — THERAPY
"Occupational Therapy Evaluation completed.   Functional Status: Pt is a 69 y/o male admitted with low back pain and RLE sciatica and SI, just recently d/c'd from [facility]. Prior to that he had c-spine surgery in Sept 2018, in which he was d/c'd to SNF. He was pleasant and cooperative, with impaired attention but was able to follow direction appropriately. He was able to demonstrate functional mobility to the bathroom with SBA and use of IV pole, and demo toilet txf with SBA. Donned socks and voided with urinal with supv. Groomed in stance with supv. He appears to be at functional baseline, most limited by RLE pain - pt drags it when he walks. He demo'd basic self care tasks today with supv and appears to be near baseline.   Plan of Care: Will not actively follow but will remain available for d/c planning purposes.  Discharge Recommendations:  Equipment: Will Continue to Assess for Equipment Needs, patient owns SPC. May benefit from post acute placement to address higher level IADLs.    See \"Rehab Therapy-Acute\" Patient Summary Report for complete documentation.    "

## 2018-12-10 NOTE — DISCHARGE PLANNING
"Care Transition Team Assessment      Patient was difficult to keep focused initially as all patient wanted to talk about is how bad his CNA was last night.  Stating he wants to call the obdusdman and make a compliant against her. Stating he isn't a dog, no one needs to treat him like that.  Patient would not clarify any of his complaints.  Saying he can't walk and he is tired of living.  When questioned about tired of living, patient would not stay focused.  First stating he wants to get the hell out of here and go home, [place].  Then saying he was thrown out of [facility] and sent to a motel.  SW asked if patient knows why he was \"thrown out of [facility].\"  Stated because they thought he was okay to go, \"I couldn't walk.\"      Patient has [employer] for Rx coverage. Patient is aware that this insurance is very limited in coverage.  \"It wont coverage all the psych medications you want me to take.\"    PT/OT notes indicate patient can walk with moderate assist    Information Source  Information Given By: Patient    Readmission Evaluation  Is this a readmission?: Yes - unplanned readmission  Why do you think you were readmitted?:  (fell)  Was an appointment arranged for you prior to discharge?: No  Were there new prescriptions you were supposed to fill after you were discharged?: No  Did you understand your discharge instructions?: Yes  Did you have enough support after your last discharge?: Yes    Elopement Risk  Legal Hold: No  Ambulatory or Self Mobile in Wheelchair: Yes  Disoriented: No  Psychiatric Symptoms: None  History of Wandering: No  Elopement this Admit: No  Vocalizing Wanting to Leave: No  Displays Behaviors, Body Language Wanting to Leave: No-Not at Risk for Elopement  Elopement Risk: Not at Risk for Elopement    Interdisciplinary Discharge Planning  Lives with - Patient's Self Care Capacity: Alone and Unable to Care For Self  Patient or legal guardian wants to designate a caregiver (see row info): " No  Housing / Facility: [facility]  Prior Services: Other (Comments)    Discharge Preparedness  What is your plan after discharge?: Other (comment) (wants to go back to [facility])  What are your discharge supports?:  (patient states no one, cant trust anyone)  Prior Functional Level: Other (Comments) (states he can't walk)  Difficulity with ADLs: Walking  Difficulty with ADLs Comment:  (broke m y neck)  Difficulity with IADLs: Keeping track of finances, Managing medication    Functional Assesment  Prior Functional Level: Other (Comments) (states he can't walk)    Finances  Financial Barriers to Discharge: No ([employer] , would not say how much he was making.)  Prescription Coverage: Yes ([facility])    Vision / Hearing Impairment  Vision Impairment : Yes  Hearing Impairment : No    Values / Beliefs / Concerns  Values / Beliefs Concerns : No         Domestic Abuse  Have you ever been the victim of abuse or violence?: No  Physical Abuse or Sexual Abuse: No  Verbal Abuse or Emotional Abuse: No  Possible Abuse Reported to:: Not Applicable    Psychological Assessment  History of Substance Abuse: None (patient denied any drug/alcohol issues)  History of Psychiatric Problems: Yes  Non-compliant with Treatment: Yes    Discharge Risks or Barriers  Discharge risks or barriers?: No PCP, Uninsured / underinsured, Mental health, Non-adherence to medication or treatment, Homeless / couch surfing    Anticipated Discharge Information  Anticipated discharge disposition: Shelter  Discharge Address:  ([address] [facility])

## 2018-12-10 NOTE — PROGRESS NOTES
"       Internal Medicine Interval Note  Note Author: Diann Ramires M.D.     Name Chris Leggett     1950   Age/Sex 68 y.o. male   MRN [id number]   Code Status FULL     After 5PM or if no immediate response to page, please call for cross-coverage  Attending/Team: Dr Lundberg / Nii Team See Patient List for primary contact information  Call [phone] to page    1st Call - Day Intern (R1):   Dr Ramires 2nd Call - Day Sr. Resident (R2/R3):   Dr Suárez         Reason for interval visit  (Principal Problem)   Back pain with suicidal thoughts      Interval Problem Daily Status Update  (24 hours, problem oriented, brief subjective history, new lab/imaging data pertinent to that problem)     Patient had no events overnight per RN, patient resting comfortably in no acute distress, states same lower left extremity shooting pain from his back into right lower extremity consistent with previous days, states numbness and tingling bilaterally in his hands. This AM patient reported initially some thoughts of hurting CNA per him who was causing so much stress to patient, but later patient back-tracked and stated \"of course not, I don't have any thoughts of hurting anybody.\" Patient states persistent suicidal thoughts and depressive thoughts due to his personal situation.     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  He does not have cauda equina syndrome.  I do think it would be best for him to get his medical parameters corrected which will admittedly take some effort on his part.  I am happy to take care of this but with his medical parameters now, his risk is dramatically higher than what is normally acceptable for this surgery. Check L-spine flex/ex and cervical spine flex/ex films.  - Cervical and lumbar spine flexion/extension Xrays ordered  - OT- Will continue to assess for equipment Needs, patient owns SPC. May benefit from post acute placement to address higher level IADLs.  - PT- Post-acute therapy Discharge to a transitional care facility for continued skilled therapy services.  - SNF outpatient referral placed  - Will appreciate Psych recommendations on patient's d/c medications  - Encourage ambulaton as tolerated          ROS  Review of Systems   Constitutional: Negative for chills, diaphoresis, fever, malaise/fatigue and weight loss.   HENT: Negative for congestion, hearing loss, sinus pain, sore throat and tinnitus.    Eyes: Negative for blurred vision, double vision, pain, discharge and redness.   Respiratory: Negative for cough, hemoptysis, sputum production, shortness of breath and stridor.    Cardiovascular: Positive for leg swelling. Negative for chest pain, palpitations, orthopnea and claudication.   Gastrointestinal: Negative for abdominal pain, constipation, diarrhea, nausea and vomiting.   Genitourinary: Negative for dysuria, frequency, hematuria and urgency.   Musculoskeletal: Positive for back pain, joint pain and neck pain.   Skin: Negative for itching and rash.   Neurological: Positive for tingling and sensory change. Negative for dizziness,  tremors, focal weakness, seizures, weakness and headaches.        States numbness and tingling in his arms/hands bilaterally. Numbness in his RLE/foot.     Endo/Heme/Allergies: Negative for polydipsia.   Psychiatric/Behavioral: Positive for depression and suicidal ideas. Negative for hallucinations and substance abuse. The patient has insomnia. The patient is not nervous/anxious.    All other systems reviewed and are negative.    Disposition/Barriers to discharge:   Patient will need significant placement to transitional care facility for continued skilled therapy  Continue to follow-up PT/OT recommendations  Appreciated neurosurgery recommendations    Consultants/Specialty  Neurosurgery  PCP: Pcp Pt States None      Quality Measures  Quality-Core Measures  Reviewed items::  EKG reviewed, Labs reviewed, Medications reviewed and Radiology images reviewed  Roca catheter::  No Roca  DVT prophylaxis pharmacological::  Enoxaparin (Lovenox)      Physical Exam       Vitals:    12/09/18 1551 12/09/18 2013 12/10/18 0314 12/10/18 0759   BP: 144/96 141/94 100/65 126/90   Pulse: 74 78 66 72   Resp: 18 20 18 15   Temp: 36.7 °C (98.1 °F) 37 °C (98.6 °F) 36.8 °C (98.2 °F) 36.8 °C (98.3 °F)   TempSrc: Temporal Temporal Temporal Temporal   SpO2: 99% 98% 95% 96%   Weight:       Height:         Body mass index is 27.37 kg/m².    Oxygen Therapy:  Pulse Oximetry: 96 %, O2 (LPM): 0, O2 Delivery: None (Room Air)    Physical Exam  Physical Exam   Constitutional: He is oriented to person, place, and time and well-developed, well-nourished, and in no distress. No distress.   HENT:   Head: Normocephalic and atraumatic.   Right Ear: External ear normal.   Left Ear: External ear normal.   Nose: Nose normal.   Mouth/Throat: Oropharynx is clear and moist.   Eyes: Pupils are equal, round, and reactive to light. Conjunctivae and EOM are normal. Right eye exhibits no discharge. Left eye exhibits no discharge.   Neck: Normal range of motion. Neck  supple. No tracheal deviation present. No thyromegaly present.   Cardiovascular: Normal rate, regular rhythm, normal heart sounds and intact distal pulses.    No murmur heard.  Pulmonary/Chest: Effort normal and breath sounds normal. No respiratory distress. He has no wheezes. He has no rales.   Abdominal: Soft. Bowel sounds are normal. He exhibits no distension. There is no tenderness. There is no rebound.   Musculoskeletal: He exhibits edema (RLE swelling vs LLE asymmetry). He exhibits no tenderness or deformity.   Decreased range of motion in shoulders bilaterally  , distal peripheral pulses 2+ bilaterally in all extremities, no evidence of neuro vascular compromise   Neurological: He is alert and oriented to person, place, and time. He has normal reflexes. No cranial nerve deficit. Gait normal. Coordination normal. GCS score is 15.   Skin: No rash noted. He is not diaphoretic. No erythema.   Psychiatric: Mood, memory, affect and judgment normal.   Nursing note and vitals reviewed.           Assessment/Plan     * Lumbar radiculopathy   Assessment & Plan    [age] year old male with three week history of worsening right lower sharp back pain radiating into his leg and foot without saddle anesthesia, persistent urinary/bowel incontinence or focal neurological deficits. On exam, DTRs were 2+ bilaterally with 4/5 muscle strength. Low suspicion for cauda equina syndrome. Likely to be secondary to vertebral abnormality.     Plan   - Pain management: scheduled Tylenol 1g TID and Celebrex 100mg BID with PRN oxycodone 5mg q4 hours  - PPI- omeprazole 20mg   - MRI cervical demonstrated multilevel degenerative disease, moderate central canal stenosis at C2-3, C5-6, C6-7, intramedullary T2 signal intensity at C5-6 indicative of myelomalacia likely secondary to previous cords injury  - MRI lumbar demonstrated grade 1 spondylolysis of L5 on S1, some bilateral L5 neuroforaminal stenosis  - MRI thoracic demonstrated mild  degenerative disease  - Prednisone discontinued 12/10/18  - Per Neurosurgery, Dr Wilkins, patient could benefit from an L5-S1 ALIF and PSF, he is currently pancytopenic with hyponatremia and having psychiatric symptoms.  He does not have cauda equina syndrome.  I do think it would be best for him to get his medical parameters corrected which will admittedly take some effort on his part.  I am happy to take care of this but with his medical parameters now, his risk is dramatically higher than what is normally acceptable for this surgery. Check L-spine flex/ex and cervical spine flex/ex films.  - Cervical and lumbar spine flexion/extension Xrays ordered  - OT- Will continue to assess for equipment Needs, patient owns SPC. May benefit from post acute placement to address higher level IADLs.  - PT- Post-acute therapy Discharge to a transitional care facility for continued skilled therapy services.  - SNF outpatient referral placed  - Encourage ambulaton as tolerated     Pain and swelling of right lower extremity   Assessment & Plan    Right ankle noted to be edematous on presentation with ecchymosis.     Plan  - Assymmetric RLE swelling, TTP  - Right ankle X-Ray- Old appearing transverse fracture through the tip of the distal right fibula. No acute right ankle fracture or dislocation. There is no  soft tissue swelling.  - B/l LE Doppler US- Bilateral lower extremities - No evidence of superficial or deep venous thrombosis  - Pain management: scheduled Tylenol 1g TID and Celebrex 100mg BID with PRN oxycodone 5mg q4 hours  - X-ray left lower extremity negative for acute injury  - Important to r/o other causes such as gout  - Uric acid level within normal.    - ESR 21 elevated  - Prednisone discontinued 12/10/18          Suicidal ideation- (present on admission)   Assessment & Plan    Patient currently expressing suicidal thoughts with no active plan in place. Likely to have been triggered from being off of his  medications for three days.     Plan-  - Per Psych- Continue Cymbalta, Prazosin, and Seroquel. One time dose of Ativan. Legal status: Not Applicable.  - Will appreciate Psych recommendations on patient's d/c medications     Hyponatremia- (present on admission)   Assessment & Plan    On admission, noted to have  Sodium of 131 likely secondary to poor oral intake     Plan  - Na 132  - Continue to monitor      Normocytic anemia- (present on admission)   Assessment & Plan    Patient found to have normocytic anemia on admission     Plan  - Ferritin 224.1  - FOBT negative  - Outpatient Colonoscopy, if no recent screening     Cervical spine fracture (HCC)- (present on admission)   Assessment & Plan    History of neck pain status post internal fixation and reduction of C5-C6 on 9/2/2018  - Continue Gabapentin for tingling of hands  - MRI cervical demonstrated multilevel degenerative disease, moderate central canal stenosis at C2-3, C5-6, C6-7, intramedullary T2 signal intensity at C5-6 indicative of myelomalacia likely secondary to previous cords injury  - MRI lumbar demonstrated grade 1 spondylolysis of L5 on S1, some bilateral L5 neuroforaminal stenosis  - MRI thoracic demonstrated mild degenerative disease  - Prednisone discontinued 12/10/18  - Per Neurosurgery, Dr Wilkins, patient could benefit from an L5-S1 ALIF and PSF, he is currently pancytopenic with hyponatremia and having psychiatric symptoms.  He does not have cauda equina syndrome.  I do think it would be best for him to get his medical parameters corrected which will admittedly take some effort on his part.  I am happy to take care of this but with his medical parameters now, his risk is dramatically higher than what is normally acceptable for this surgery  - OT- Will continue to assess for equipment Needs, patient owns SPC. May benefit from post acute placement to address higher level IADLs.  - PT- Post-acute therapy Discharge to a transitional care facility  for continued skilled therapy services.  - SNF outpatient referral placed  - Encourage ambulaton as tolerated       Mood disorder (HCC)- (present on admission)   Assessment & Plan    - Continue Duloxetine   - Continue Gabapentin   - Continue Prazosin   - Continue Seroquel   - Psychiatry consult- Continue Cymbalta, Prazosin, and Seroquel. One time dose of Ativan. Legal status: Not Applicable.  - Will appreciate Psych recommendations on patient's d/c medications     Hypertension- (present on admission)   Assessment & Plan    - Currently stable   - Continue Lisinopril

## 2018-12-10 NOTE — PROGRESS NOTES
Bedside report received, pt care assumed, assessment completed, VSS. 1:1 safety sitter at bedside. Pt is requesting PRN oxy for right leg pain 6/10, but is still a bit too early to give. Will reassess at 2130. Bed in lowest position, bed alarm on pt educated on fall risk and verbalized understanding, call light within reach.

## 2018-12-10 NOTE — PROGRESS NOTES
Md at bedside this AM speaking with pt who stated that would like to hurt CNA from last night. MD asking more questions about why and pt backtracked to say does not want to hurt CNA nor himself.

## 2018-12-10 NOTE — CARE PLAN
Problem: Safety  Goal: Will remain free from falls  Outcome: PROGRESSING AS EXPECTED  Pt ambulates with a standby assist.    Problem: Discharge Barriers/Planning  Goal: Patient's continuum of care needs will be met  Outcome: PROGRESSING AS EXPECTED  Awaiting placement.

## 2018-12-10 NOTE — CARE PLAN
Problem: Safety  Goal: Will remain free from injury  Remains on legal hold for SI, sitter at bedside at all times, room assessed: verified no personal belongings, no call light, no unneccessary medical equipment and no sharp objects in room.    Problem: Pain Management  Goal: Pain level will decrease to patient's comfort goal  Outcome: PROGRESSING AS EXPECTED  Pain well controlled at this time with PO pain medication per pt

## 2018-12-10 NOTE — PROGRESS NOTES
Neurosurgery Progress Note    Subjective:  Patient admitted via ER on  with c/o persistent neck and LBP associated with b/l R>L LE pain and weakness  Notes severe pain and swelling in ankle  Has remote h/o C5-6 ACDF with Dr. Wilkins in September for fx/ligamentous injury  Has essentially been in SNF until discharge several days ago  Was also admitted with suicidal ideation  Reports pain that radiates from right side of LB to RLE in L5,S1 distribution  Appears to be homeless  MRI C/T/L reviewed    Exam:  Significant swelling/erythema to right ankle which is TTP  Motor: 5/5 LLE, 4/5 right AT, EHL, gastroc.  UE 4/5 deltoid, 4/5 entire RUE, 5/5 LUE  Sensation diminished in L5, S1 distribution  DTRs 1+    BP  Min: 100/65  Max: 144/96  Pulse  Av.5  Min: 66  Max: 78  Resp  Av.8  Min: 15  Max: 20  Temp  Av.8 °C (98.3 °F)  Min: 36.7 °C (98.1 °F)  Max: 37 °C (98.6 °F)  SpO2  Av %  Min: 95 %  Max: 99 %    No Data Recorded    Recent Labs      188  12/10/18   0233   WBC  4.6*  2.9*  2.7*   RBC  3.32*  3.24*  3.04*   HEMOGLOBIN  10.6*  10.5*  10.0*   HEMATOCRIT  31.6*  30.4*  28.5*   MCV  95.2  93.8  93.8   MCH  31.9  32.4  32.9   MCHC  33.5*  34.5  35.1   RDW  45.9  44.0  44.2   PLATELETCT  82*  82*  84*   MPV  10.3  10.9  11.0     Recent Labs      188  12/10/18   0233   SODIUM  128*  133*  132*   POTASSIUM  4.8  4.1  3.8   CHLORIDE  96  102  99   CO2  26  26  29   GLUCOSE  108*  106*  105*   BUN  10  10  9   CREATININE  0.61  0.53  0.51   CALCIUM  8.8  8.4*  8.8               Intake/Output       18 - 12/10/18 0659 12/10/18 0700 - 1859       Total  Total       Intake    P.O.  2380  400 2780  --  -- --    P.O. 2380 400 2780 -- -- --    Total Intake 2380 400 2780 -- -- --       Output    Urine  3010  300 3310  --  -- --    Number of Times Voided 6 x -- 6 x -- -- --    Urine Void (mL) 3010 300  3310 -- -- --    Stool  --  -- --  --  -- --    Number of Times Stooled 1 x -- 1 x -- -- --    Total Output 3010 300 3310 -- -- --       Net I/O     -630 100 -530 -- -- --            Intake/Output Summary (Last 24 hours) at 12/10/18 0843  Last data filed at 12/09/18 2013   Gross per 24 hour   Intake             2300 ml   Output             2585 ml   Net             -285 ml            • ferrous gluconate  324 mg QDAY with Breakfast   • predniSONE  40 mg DAILY   • DULoxetine  90 mg DAILY   • gabapentin  300 mg TID   • lisinopril  20 mg DAILY   • prazosin  3 mg Nightly   • QUEtiapine  125 mg QHS   • allopurinol  100 mg DAILY   • senna-docusate  2 Tab BID    And   • polyethylene glycol/lytes  1 Packet QDAY PRN    And   • magnesium hydroxide  30 mL QDAY PRN    And   • bisacodyl  10 mg QDAY PRN   • Respiratory Care per Protocol   Continuous RT   • enoxaparin  40 mg DAILY   • nicotine  14 mg Daily-0600   • celecoxib  100 mg BID   • omeprazole  20 mg DAILY   • oxyCODONE immediate-release  5 mg Q4HRS PRN   • magnesium oxide  400 mg DAILY   • acetaminophen  1,000 mg TID       Assessment and Plan:  MRI L-spine shows grade 1 L5-S1 spondylolisthesis with severe right>left foraminal stenosis.  Right L4-5 lateral recess stenosis with right L4-5 foraminal stenosis which is severe.  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 Wilkins  Check L-spine flex/ex and cervical spine flex/ex films.  May require additional imaging of ankle given swelling and erythema without diagnosis.

## 2018-12-11 LAB
ALBUMIN SERPL BCP-MCNC: 2.8 G/DL (ref 3.2–4.9)
ALBUMIN/GLOB SERPL: 1.3 G/DL
ALP SERPL-CCNC: 47 U/L (ref 30–99)
ALT SERPL-CCNC: 17 U/L (ref 2–50)
ANION GAP SERPL CALC-SCNC: 5 MMOL/L (ref 0–11.9)
AST SERPL-CCNC: 33 U/L (ref 12–45)
BASOPHILS # BLD AUTO: 0.7 % (ref 0–1.8)
BASOPHILS # BLD: 0.02 K/UL (ref 0–0.12)
BILIRUB SERPL-MCNC: 0.3 MG/DL (ref 0.1–1.5)
BUN SERPL-MCNC: 10 MG/DL (ref 8–22)
CALCIUM SERPL-MCNC: 8.7 MG/DL (ref 8.5–10.5)
CHLORIDE SERPL-SCNC: 96 MMOL/L (ref 96–112)
CO2 SERPL-SCNC: 28 MMOL/L (ref 20–33)
CREAT SERPL-MCNC: 0.52 MG/DL (ref 0.5–1.4)
EOSINOPHIL # BLD AUTO: 0.06 K/UL (ref 0–0.51)
EOSINOPHIL NFR BLD: 2.1 % (ref 0–6.9)
ERYTHROCYTE [DISTWIDTH] IN BLOOD BY AUTOMATED COUNT: 45 FL (ref 35.9–50)
EST. AVERAGE GLUCOSE BLD GHB EST-MCNC: 108 MG/DL
GLOBULIN SER CALC-MCNC: 2.2 G/DL (ref 1.9–3.5)
GLUCOSE SERPL-MCNC: 119 MG/DL (ref 65–99)
HBA1C MFR BLD: 5.4 % (ref 0–5.6)
HCT VFR BLD AUTO: 29.6 % (ref 42–52)
HGB BLD-MCNC: 10.2 G/DL (ref 14–18)
IMM GRANULOCYTES # BLD AUTO: 0.01 K/UL (ref 0–0.11)
IMM GRANULOCYTES NFR BLD AUTO: 0.4 % (ref 0–0.9)
LYMPHOCYTES # BLD AUTO: 0.84 K/UL (ref 1–4.8)
LYMPHOCYTES NFR BLD: 30 % (ref 22–41)
MAGNESIUM SERPL-MCNC: 1.7 MG/DL (ref 1.5–2.5)
MCH RBC QN AUTO: 32.5 PG (ref 27–33)
MCHC RBC AUTO-ENTMCNC: 34.5 G/DL (ref 33.7–35.3)
MCV RBC AUTO: 94.3 FL (ref 81.4–97.8)
MONOCYTES # BLD AUTO: 0.27 K/UL (ref 0–0.85)
MONOCYTES NFR BLD AUTO: 9.6 % (ref 0–13.4)
NEUTROPHILS # BLD AUTO: 1.6 K/UL (ref 1.82–7.42)
NEUTROPHILS NFR BLD: 57.2 % (ref 44–72)
NRBC # BLD AUTO: 0 K/UL
NRBC BLD-RTO: 0 /100 WBC
PHOSPHATE SERPL-MCNC: 4.3 MG/DL (ref 2.5–4.5)
PLATELET # BLD AUTO: 83 K/UL (ref 164–446)
PMV BLD AUTO: 10.9 FL (ref 9–12.9)
POTASSIUM SERPL-SCNC: 4 MMOL/L (ref 3.6–5.5)
PROT SERPL-MCNC: 5 G/DL (ref 6–8.2)
RBC # BLD AUTO: 3.14 M/UL (ref 4.7–6.1)
SODIUM SERPL-SCNC: 129 MMOL/L (ref 135–145)
WBC # BLD AUTO: 2.8 K/UL (ref 4.8–10.8)

## 2018-12-11 PROCEDURE — 80053 COMPREHEN METABOLIC PANEL: CPT

## 2018-12-11 PROCEDURE — A9270 NON-COVERED ITEM OR SERVICE: HCPCS | Performed by: STUDENT IN AN ORGANIZED HEALTH CARE EDUCATION/TRAINING PROGRAM

## 2018-12-11 PROCEDURE — 96365 THER/PROPH/DIAG IV INF INIT: CPT

## 2018-12-11 PROCEDURE — 99225 PR SUBSEQUENT OBSERVATION CARE,LEVEL II: CPT | Mod: GC | Performed by: INTERNAL MEDICINE

## 2018-12-11 PROCEDURE — 700102 HCHG RX REV CODE 250 W/ 637 OVERRIDE(OP): Performed by: PSYCHIATRY & NEUROLOGY

## 2018-12-11 PROCEDURE — 97530 THERAPEUTIC ACTIVITIES: CPT

## 2018-12-11 PROCEDURE — G8979 MOBILITY GOAL STATUS: HCPCS | Mod: CI

## 2018-12-11 PROCEDURE — 700102 HCHG RX REV CODE 250 W/ 637 OVERRIDE(OP): Performed by: STUDENT IN AN ORGANIZED HEALTH CARE EDUCATION/TRAINING PROGRAM

## 2018-12-11 PROCEDURE — G8980 MOBILITY D/C STATUS: HCPCS | Mod: CI

## 2018-12-11 PROCEDURE — 85025 COMPLETE CBC W/AUTO DIFF WBC: CPT

## 2018-12-11 PROCEDURE — 96366 THER/PROPH/DIAG IV INF ADDON: CPT

## 2018-12-11 PROCEDURE — A9270 NON-COVERED ITEM OR SERVICE: HCPCS | Performed by: PSYCHIATRY & NEUROLOGY

## 2018-12-11 PROCEDURE — 700102 HCHG RX REV CODE 250 W/ 637 OVERRIDE(OP): Performed by: INTERNAL MEDICINE

## 2018-12-11 PROCEDURE — 96372 THER/PROPH/DIAG INJ SC/IM: CPT | Mod: XU

## 2018-12-11 PROCEDURE — 700111 HCHG RX REV CODE 636 W/ 250 OVERRIDE (IP): Performed by: STUDENT IN AN ORGANIZED HEALTH CARE EDUCATION/TRAINING PROGRAM

## 2018-12-11 PROCEDURE — 36415 COLL VENOUS BLD VENIPUNCTURE: CPT

## 2018-12-11 PROCEDURE — 84100 ASSAY OF PHOSPHORUS: CPT

## 2018-12-11 PROCEDURE — 97116 GAIT TRAINING THERAPY: CPT | Mod: XU

## 2018-12-11 PROCEDURE — 83735 ASSAY OF MAGNESIUM: CPT

## 2018-12-11 PROCEDURE — A9270 NON-COVERED ITEM OR SERVICE: HCPCS | Performed by: INTERNAL MEDICINE

## 2018-12-11 PROCEDURE — G0378 HOSPITAL OBSERVATION PER HR: HCPCS

## 2018-12-11 RX ORDER — MAGNESIUM SULFATE HEPTAHYDRATE 40 MG/ML
2 INJECTION, SOLUTION INTRAVENOUS ONCE
Status: COMPLETED | OUTPATIENT
Start: 2018-12-11 | End: 2018-12-11

## 2018-12-11 RX ORDER — TRAZODONE HYDROCHLORIDE 50 MG/1
50 TABLET ORAL
Status: DISCONTINUED | OUTPATIENT
Start: 2018-12-11 | End: 2018-12-14 | Stop reason: HOSPADM

## 2018-12-11 RX ADMIN — OXYCODONE HYDROCHLORIDE 5 MG: 5 TABLET ORAL at 05:23

## 2018-12-11 RX ADMIN — ALLOPURINOL 100 MG: 100 TABLET ORAL at 05:18

## 2018-12-11 RX ADMIN — OXYCODONE HYDROCHLORIDE 5 MG: 5 TABLET ORAL at 09:46

## 2018-12-11 RX ADMIN — DULOXETINE HYDROCHLORIDE 90 MG: 60 CAPSULE, DELAYED RELEASE ORAL at 05:17

## 2018-12-11 RX ADMIN — ENOXAPARIN SODIUM 40 MG: 100 INJECTION SUBCUTANEOUS at 05:18

## 2018-12-11 RX ADMIN — GABAPENTIN 300 MG: 300 CAPSULE ORAL at 17:03

## 2018-12-11 RX ADMIN — PRAZOSIN HYDROCHLORIDE 3 MG: 1 CAPSULE ORAL at 21:19

## 2018-12-11 RX ADMIN — FERROUS GLUCONATE 324 MG: 324 TABLET ORAL at 07:55

## 2018-12-11 RX ADMIN — GABAPENTIN 300 MG: 300 CAPSULE ORAL at 12:20

## 2018-12-11 RX ADMIN — ACETAMINOPHEN 1000 MG: 500 TABLET ORAL at 12:20

## 2018-12-11 RX ADMIN — OXYCODONE HYDROCHLORIDE 5 MG: 5 TABLET ORAL at 17:02

## 2018-12-11 RX ADMIN — LISINOPRIL 20 MG: 20 TABLET ORAL at 05:18

## 2018-12-11 RX ADMIN — OXYCODONE HYDROCHLORIDE 5 MG: 5 TABLET ORAL at 21:26

## 2018-12-11 RX ADMIN — TRAZODONE HYDROCHLORIDE 50 MG: 50 TABLET ORAL at 21:19

## 2018-12-11 RX ADMIN — MAGNESIUM GLUCONATE 500 MG ORAL TABLET 400 MG: 500 TABLET ORAL at 17:02

## 2018-12-11 RX ADMIN — CELECOXIB 100 MG: 100 CAPSULE ORAL at 17:02

## 2018-12-11 RX ADMIN — QUETIAPINE FUMARATE 125 MG: 100 TABLET ORAL at 21:20

## 2018-12-11 RX ADMIN — MAGNESIUM SULFATE 2 G: 2 INJECTION INTRAVENOUS at 09:46

## 2018-12-11 RX ADMIN — ACETAMINOPHEN 1000 MG: 500 TABLET ORAL at 05:18

## 2018-12-11 RX ADMIN — MAGNESIUM GLUCONATE 500 MG ORAL TABLET 400 MG: 500 TABLET ORAL at 05:17

## 2018-12-11 RX ADMIN — ACETAMINOPHEN 1000 MG: 500 TABLET ORAL at 17:02

## 2018-12-11 RX ADMIN — STANDARDIZED SENNA CONCENTRATE AND DOCUSATE SODIUM 2 TABLET: 8.6; 5 TABLET, FILM COATED ORAL at 05:18

## 2018-12-11 RX ADMIN — CELECOXIB 100 MG: 100 CAPSULE ORAL at 05:17

## 2018-12-11 RX ADMIN — GABAPENTIN 300 MG: 300 CAPSULE ORAL at 05:18

## 2018-12-11 RX ADMIN — OMEPRAZOLE 20 MG: 20 CAPSULE, DELAYED RELEASE ORAL at 05:18

## 2018-12-11 ASSESSMENT — ENCOUNTER SYMPTOMS
BACK PAIN: 1
WEAKNESS: 0
WEIGHT LOSS: 0
FEVER: 0
TREMORS: 0
SHORTNESS OF BREATH: 0
MEMORY LOSS: 0
EYE REDNESS: 0
ABDOMINAL PAIN: 0
SEIZURES: 0
NAUSEA: 0
VOMITING: 0
FOCAL WEAKNESS: 0
SPEECH CHANGE: 0
SORE THROAT: 0
BLURRED VISION: 0
ORTHOPNEA: 0
TINGLING: 1
HEADACHES: 0
DOUBLE VISION: 0
SENSORY CHANGE: 1
CLAUDICATION: 0
DEPRESSION: 1
STRIDOR: 0
DIAPHORESIS: 0
DIZZINESS: 0
CHILLS: 0
HEMOPTYSIS: 0
EYE DISCHARGE: 0
NECK PAIN: 1
NERVOUS/ANXIOUS: 0
PALPITATIONS: 0
SINUS PAIN: 0
HALLUCINATIONS: 0
EYE PAIN: 0
SPUTUM PRODUCTION: 0
COUGH: 0
DIARRHEA: 0
LOSS OF CONSCIOUSNESS: 0
POLYDIPSIA: 0

## 2018-12-11 ASSESSMENT — GAIT ASSESSMENTS
DEVIATION: ANTALGIC
DISTANCE (FEET): 150
ASSISTIVE DEVICE: FRONT WHEEL WALKER
GAIT LEVEL OF ASSIST: SUPERVISED

## 2018-12-11 ASSESSMENT — COGNITIVE AND FUNCTIONAL STATUS - GENERAL
MOBILITY SCORE: 24
SUGGESTED CMS G CODE MODIFIER MOBILITY: CH

## 2018-12-11 ASSESSMENT — LIFESTYLE VARIABLES: SUBSTANCE_ABUSE: 0

## 2018-12-11 ASSESSMENT — PAIN SCALES - GENERAL
PAINLEVEL_OUTOF10: 9
PAINLEVEL_OUTOF10: 7

## 2018-12-11 NOTE — PROGRESS NOTES
Alert and able to let his needs known, legal hold and sitter at the bedside 1:1. Denies SI at present. Took his medication without a problem. Cont plan of care, call light within reach and visual checks through the night

## 2018-12-11 NOTE — PROGRESS NOTES
"RN was in patient's room giving pain meds while patient was mumbling to himself about \"Full Code\" being written on his board. Pt stated, \"Full code? I don't want to be a full code. When it's my time to go, I want to go.\" RN questioned pt more about this and stated would call MD to come talk to pt about changing code status and pt immediately backpedaled stating it was fine to stay full code but again repeating that when it was his time to go he'd like to go and was going back and forth while RN left the room to call. MD paged.  "

## 2018-12-11 NOTE — PROGRESS NOTES
Pt A&Ox4. Pt able to ambulate independently. VSS. Bed in lowest position with call light in reach. Pt belongings within reach. Will continue to monitor. Safety sitter at bedside.

## 2018-12-11 NOTE — CARE PLAN
Problem: Safety  Goal: Will remain free from falls  Outcome: PROGRESSING AS EXPECTED  Pt shuffles to bathroom and is safe to walk to bathroom by self.

## 2018-12-11 NOTE — PSYCHIATRY
"PSYCHIATRIC FOLLOW-UP:(established)  *Reason for admission: lumbar radiculopathy  Legal Hold Status on Admission: no on hold      *HPI: Patient reported he is feeling depressed because of the holiday season, and for not having any friends or family. He stated his depression is 7/10 (0 being not depressed, 10 very depressed with SI). Patient reported his brother  in January, and that his mother and wife passed away as well a few years ago. 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 He does not have children. Patient denied any anhedonia, change in appetite or energy. He reported feeling guilty at times (didn't want to specify the reason for his guilty feelings). He has been having poor sleep, but stated sleeping well last night. He reported he wishes he was not alive anymore, however denied any SI, method, plan or intention. Patient denied any AVH. Patient during evaluation was tearful, and stated he has been feeling mistreated by the CNAs, who have yelled at him, and not attended to his needs. Patient also is feeling upset as social workers have referred to him as homeless, and he insisited during evaluation of not being homeless and having a place to live. \"I don;t have to tell where I live, he said\". He stated he wants to get better physically and emotionally to improve his overall life. 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    Medical ROS (as pertinent):      *Psychiatric Examination:  Vitals:   Vitals:    18 0756   BP: 121/71   Pulse: 83   Resp: 15   Temp: 36.3 °C (97.4 °F)   SpO2: 96%       General Appearance:  man, lying down in bed wearing hospital gown, fair hygiene and grooming  Behavior: calm and cooperative  Good eye contact  Abnormal Movements: no psychomotor or agitation   Gait and Posture: not tested  Speech: normal volume, tone and rhythm  Thought process: " "tangential at times  Thought content: perseverant on speaking about being mistreated by CNAs, unclear if paranoia at this time  Judgement and Insight: fair  Orientation: grossly oriented  Attention Span and Concentration: intact  Mood and Affect: mood \"Im depressed\"; affect depressed   SI/HI: denies any SI/HI        Current Facility-Administered Medications   Medication Dose Route Frequency Provider Last Rate Last Dose   • magnesium oxide (MAG-OX) tablet 400 mg  400 mg Oral BID Hollie Suárez M.D.   400 mg at 12/11/18 0517   • hydrALAZINE (APRESOLINE) tablet 10 mg  10 mg Oral Q6HRS PRN Diann Ramires M.D.   10 mg at 12/10/18 1736   • ferrous gluconate (FERGON) tablet 324 mg  324 mg Oral QDAY with Breakfast Abel Callahan M.D.   324 mg at 12/11/18 0755   • DULoxetine (CYMBALTA) capsule 90 mg  90 mg Oral DAILY Irtqa Ilyas, D.O.   90 mg at 12/11/18 0517   • gabapentin (NEURONTIN) capsule 300 mg  300 mg Oral TID Irtqa Ilyas, D.O.   300 mg at 12/11/18 1220   • lisinopril (PRINIVIL) tablet 20 mg  20 mg Oral DAILY Irtqa Ilyas, D.O.   20 mg at 12/11/18 0518   • prazosin (MINIPRESS) capsule 3 mg  3 mg Oral Nightly Irtqa Ilyas, D.O.   3 mg at 12/10/18 2024   • QUEtiapine (SEROQUEL) tablet 125 mg  125 mg Oral QHS Irtqa Ilyas, D.O.   125 mg at 12/10/18 2024   • allopurinol (ZYLOPRIM) tablet 100 mg  100 mg Oral DAILY Mark Lundberg M.D.   100 mg at 12/11/18 0518   • senna-docusate (PERICOLACE or SENOKOT S) 8.6-50 MG per tablet 2 Tab  2 Tab Oral BID Irtqa Ilyas, D.O.   2 Tab at 12/11/18 0518    And   • polyethylene glycol/lytes (MIRALAX) PACKET 1 Packet  1 Packet Oral QDAY PRN Irtqa Ilyas, D.O.        And   • magnesium hydroxide (MILK OF MAGNESIA) suspension 30 mL  30 mL Oral QDAY PRN CINDY Romero.O.        And   • bisacodyl (DULCOLAX) suppository 10 mg  10 mg Rectal QDAY PRN CINDY Romero.O.       • Respiratory Care per Protocol   Nebulization Continuous RT CelytCINDY Vigil.O.       • enoxaparin (LOVENOX) inj " 40 mg  40 mg Subcutaneous DAILY Asa Ceballos D.OChika   40 mg at 12/11/18 0518   • nicotine (NICODERM) 14 MG/24HR 14 mg  14 mg Transdermal Daily-0600 Asa Cbeallos D.O.       • celecoxib (CELEBREX) capsule 100 mg  100 mg Oral BID Mark Lundberg M.D.   100 mg at 12/11/18 0517   • omeprazole (PRILOSEC) capsule 20 mg  20 mg Oral DAILY Mark Lundberg M.D.   20 mg at 12/11/18 0518   • oxyCODONE immediate-release (ROXICODONE) tablet 5 mg  5 mg Oral Q4HRS PRN Mark Lundberg M.D.   5 mg at 12/11/18 0946   • acetaminophen (TYLENOL) tablet 1,000 mg  1,000 mg Oral TID Mark Lundberg M.D.   1,000 mg at 12/11/18 1220         Recent Results (from the past 24 hour(s))   CBC WITH DIFFERENTIAL    Collection Time: 12/11/18 12:57 AM   Result Value Ref Range    WBC 2.8 (L) 4.8 - 10.8 K/uL    RBC 3.14 (L) 4.70 - 6.10 M/uL    Hemoglobin 10.2 (L) 14.0 - 18.0 g/dL    Hematocrit 29.6 (L) 42.0 - 52.0 %    MCV 94.3 81.4 - 97.8 fL    MCH 32.5 27.0 - 33.0 pg    MCHC 34.5 33.7 - 35.3 g/dL    RDW 45.0 35.9 - 50.0 fL    Platelet Count 83 (L) 164 - 446 K/uL    MPV 10.9 9.0 - 12.9 fL    Neutrophils-Polys 57.20 44.00 - 72.00 %    Lymphocytes 30.00 22.00 - 41.00 %    Monocytes 9.60 0.00 - 13.40 %    Eosinophils 2.10 0.00 - 6.90 %    Basophils 0.70 0.00 - 1.80 %    Immature Granulocytes 0.40 0.00 - 0.90 %    Nucleated RBC 0.00 /100 WBC    Neutrophils (Absolute) 1.60 (L) 1.82 - 7.42 K/uL    Lymphs (Absolute) 0.84 (L) 1.00 - 4.80 K/uL    Monos (Absolute) 0.27 0.00 - 0.85 K/uL    Eos (Absolute) 0.06 0.00 - 0.51 K/uL    Baso (Absolute) 0.02 0.00 - 0.12 K/uL    Immature Granulocytes (abs) 0.01 0.00 - 0.11 K/uL    NRBC (Absolute) 0.00 K/uL   COMP METABOLIC PANEL    Collection Time: 12/11/18 12:57 AM   Result Value Ref Range    Sodium 129 (L) 135 - 145 mmol/L    Potassium 4.0 3.6 - 5.5 mmol/L    Chloride 96 96 - 112 mmol/L    Co2 28 20 - 33 mmol/L    Anion Gap 5.0 0.0 - 11.9    Glucose 119 (H) 65 - 99 mg/dL    Bun 10 8 - 22 mg/dL    Creatinine 0.52 0.50 - 1.40  mg/dL    Calcium 8.7 8.5 - 10.5 mg/dL    AST(SGOT) 33 12 - 45 U/L    ALT(SGPT) 17 2 - 50 U/L    Alkaline Phosphatase 47 30 - 99 U/L    Total Bilirubin 0.3 0.1 - 1.5 mg/dL    Albumin 2.8 (L) 3.2 - 4.9 g/dL    Total Protein 5.0 (L) 6.0 - 8.2 g/dL    Globulin 2.2 1.9 - 3.5 g/dL    A-G Ratio 1.3 g/dL   MAGNESIUM    Collection Time: 12/11/18 12:57 AM   Result Value Ref Range    Magnesium 1.7 1.5 - 2.5 mg/dL   PHOSPHORUS    Collection Time: 12/11/18 12:57 AM   Result Value Ref Range    Phosphorus 4.3 2.5 - 4.5 mg/dL   ESTIMATED GFR    Collection Time: 12/11/18 12:57 AM   Result Value Ref Range    GFR If African American >60 >60 mL/min/1.73 m 2    GFR If Non African American >60 >60 mL/min/1.73 m 2        Assessment: Pt with hx of depression, cannabis use dso and alcohol use dso, on psychotropic medications, with hx of 1 remote SA, with recent psychiatric hospitalization at [facility]. Pt was not compliant with his medications after discharge. Patient was initially seen in the ED for depression and medication were restarted. Patient is endorsing worsening of depression int he context of poor social and family support. Patient feels lonely, with depression getting worse due to the holiday season. He is endorsing poor sleep and vague guilty feelings, but no other associative depressive symptoms. Patient denies any SI, method or plan. He is projecting into the future and making plans. Patient at this time is at low risk of imminent danger to self or others.     Dx: adjustment disorder with depressed mood  Anxiety Disorder Unspecified  Alcohol use disorder  THC use disorder  R/o psychosis    Plan:    Legal hold: not applicable  Psychotropic medications: start Trazdone 50mg PO at bedtime for adjunct treatment of depression, continue Cymbalta 90mg PO daily for depression, Prazosin 3mg PO at bedtime and seroquel 125mg Po at bedtime.   Monitor sodium levels. If continues to drop, will discontinue Trazodone.   Upon  discharge, patient should receive an appointment with PCP and outpatient psychiatrist for continue follow up.   Will follow    Thank you for the consult.

## 2018-12-11 NOTE — PROGRESS NOTES
Internal Medicine Interval Note  Note Author: Diann Ramires M.D.     Name Chris Leggett     1950   Age/Sex 68 y.o. male   MRN [id number]   Code Status FULL     After 5PM or if no immediate response to page, please call for cross-coverage  Attending/Team: Dr Lundberg / Nii Team See Patient List for primary contact information  Call [phone] to page    1st Call - Day Intern (R1):   Dr Ramires 2nd Call - Day Sr. Resident (R2/R3):   Dr Suárez         Reason for interval visit  (Principal Problem)   Back pain with suicidal thoughts      Interval Problem Daily Status Update  (24 hours, problem oriented, brief subjective history, new lab/imaging data pertinent to that problem)     Patient had no events overnight per RN, patient resting comfortably in no acute distress, states same lower left extremity shooting pain from his back into right lower extremity consistent with previous days, states numbness and tingling bilaterally in his hands. This AM patient reported no other events overnight. Patient states persistent suicidal thoughts and depressive thoughts due to his personal situation.     - Per Neurosurgery, Dr Wilkins, patient could benefit from an L5-S1 ALIF and PSF, he is currently pancytopenic with hyponatremia and having psychiatric symptoms.  He does not have cauda equina syndrome.  I do think it would be best for him to get his medical parameters corrected which will admittedly take some effort on his part.  I am happy to take care of this but with his medical parameters now, his risk is dramatically higher than what is normally acceptable for this surgery. Check L-spine flex/ex and cervical spine flex/ex films.  - Cervical and lumbar spine flexion/extension Xrays - post surgical changes without evidence of hardware failure or loosening, disc space narrowing and uncovertebral joint arthropathy, no evidence of malalignment or abnormal motion  - SW- Discharge to [address]  [place]  - Neurosurgery office ([phone]) contacted regarding clearance and if patient can be followed outpatient, they scheduled follow-up appointment at [facility] on Wednesday Jan 16 2019 at 2 pm ([address] [place]) with Dr Wilkins  - Psychiatry- Psychotropic medications: start Trazdone 50mg PO at bedtime for adjunct treatment of depression, continue Cymbalta 90mg PO daily for depression, Prazosin 3mg PO at bedtime and seroquel 125mg Po at bedtime. Monitor sodium levels. If continues to drop, will discontinue Trazodone.  Upon discharge, patient should receive an appointment with PCP and outpatient psychiatrist for continue follow up.          Review of Systems   Constitutional: Negative for chills, diaphoresis, fever, malaise/fatigue and weight loss.   HENT: Negative for congestion, hearing loss, sinus pain, sore throat and tinnitus.    Eyes: Negative for blurred vision, double vision, pain, discharge and redness.   Respiratory: Negative for cough, hemoptysis, sputum production, shortness of breath and stridor.    Cardiovascular: Positive for leg swelling. Negative for chest pain, palpitations, orthopnea and claudication.   Gastrointestinal: Negative for abdominal pain, diarrhea, nausea and vomiting.   Genitourinary: Negative for dysuria, frequency, hematuria and urgency.   Musculoskeletal: Positive for back pain, joint pain and neck pain.   Skin: Negative for itching and rash.   Neurological: Positive for tingling and sensory change. Negative for dizziness, tremors, speech change, focal weakness, seizures, loss of consciousness, weakness and headaches.   Endo/Heme/Allergies: Negative for environmental allergies and polydipsia.   Psychiatric/Behavioral: Positive for depression and suicidal ideas. Negative for hallucinations, memory loss and substance abuse. The patient is not nervous/anxious.    All other systems reviewed and are negative.      Disposition/Barriers to discharge:   Clearance per  Neurosurgery, recommendations form Psych for D/C meds, D/c to shelter, medications upon discharge, SW exploring patient's options     Consultants/Specialty  Psychiatry  Neurosurgery  PCP: Pcp Pt States None      Quality Measures  Quality-Core Measures   Reviewed items::  Labs reviewed, EKG reviewed, Medications reviewed and Radiology images reviewed  Roca catheter::  No Roca  DVT prophylaxis pharmacological::  Enoxaparin (Lovenox)          Physical Exam       Vitals:    12/10/18 1935 12/11/18 0350 12/11/18 0756 12/11/18 1600   BP: 144/91 117/71 121/71 123/88   Pulse: 74 76 83 62   Resp: 18 16 15 14   Temp: 36.5 °C (97.7 °F) 36.4 °C (97.5 °F) 36.3 °C (97.4 °F) 37 °C (98.6 °F)   TempSrc: Temporal Temporal Temporal Temporal   SpO2: 97% 94% 96% 97%   Weight:       Height:         Body mass index is 27.37 kg/m².    Oxygen Therapy:  Pulse Oximetry: 97 %, O2 (LPM): 0, O2 Delivery: None (Room Air)    Physical Exam   Constitutional: He is oriented to person, place, and time and well-developed, well-nourished, and in no distress. No distress.   HENT:   Head: Normocephalic and atraumatic.   Right Ear: External ear normal.   Left Ear: External ear normal.   Nose: Nose normal.   Mouth/Throat: Oropharynx is clear and moist.   Eyes: Pupils are equal, round, and reactive to light. Conjunctivae and EOM are normal. Right eye exhibits no discharge. Left eye exhibits no discharge.   Neck: Normal range of motion. Neck supple. No tracheal deviation present. No thyromegaly present.   Cardiovascular: Normal rate, regular rhythm, normal heart sounds and intact distal pulses.    No murmur heard.  Pulmonary/Chest: Effort normal and breath sounds normal. No respiratory distress. He has no wheezes. He has no rales.   Abdominal: Soft. Bowel sounds are normal. He exhibits no distension. There is no tenderness. There is no rebound.   Musculoskeletal: He exhibits edema (RLE vs LLE swelling). He exhibits no tenderness or deformity.   Decreased  range of motion in shoulders bilaterally  , distal peripheral pulses 2+ bilaterally in all extremities, no evidence of neuro vascular compromise    Neurological: He is alert and oriented to person, place, and time. He has normal reflexes. No cranial nerve deficit. Gait normal. Coordination normal. GCS score is 15.   Skin: No rash noted. He is not diaphoretic. No erythema.   Psychiatric: Memory and judgment normal.   Nursing note and vitals reviewed.            Assessment/Plan     * Lumbar radiculopathy   Assessment & Plan    [age] year old male with three week history of worsening right lower sharp back pain radiating into his leg and foot without saddle anesthesia, persistent urinary/bowel incontinence or focal neurological deficits. On exam, DTRs were 2+ bilaterally with 4/5 muscle strength. Low suspicion for cauda equina syndrome. Likely to be secondary to vertebral abnormality.     Plan   - Pain management: scheduled Tylenol 1g TID and Celebrex 100mg BID with PRN oxycodone 5mg q4 hours  - PPI- omeprazole 20mg   - MRI cervical demonstrated multilevel degenerative disease, moderate central canal stenosis at C2-3, C5-6, C6-7, intramedullary T2 signal intensity at C5-6 indicative of myelomalacia likely secondary to previous cords injury  - MRI lumbar demonstrated grade 1 spondylolysis of L5 on S1, some bilateral L5 neuroforaminal stenosis  - MRI thoracic demonstrated mild degenerative disease  - Prednisone discontinued 12/10/18  - Per Neurosurgery, Dr Wilkins, patient could benefit from an L5-S1 ALIF and PSF, he is currently pancytopenic with hyponatremia and having psychiatric symptoms.  He does not have cauda equina syndrome.  I do think it would be best for him to get his medical parameters corrected which will admittedly take some effort on his part.  I am happy to take care of this but with his medical parameters now, his risk is dramatically higher than what is normally acceptable for this surgery.  Check L-spine flex/ex and cervical spine flex/ex films.  - Cervical and lumbar spine flexion/extension Xrays - post surgical changes without evidence of hardware failure or loosening, disc space narrowing and uncovertebral joint arthropathy, no evidence of malalignment or abnormal motion  - SW- Discharge to [facility] [address] [place]  - Neurosurgery office ([phone]) contacted regarding clearance and if patient can be followed outpatient, they scheduled follow-up appointment at [facility] on Wednesday Jan 16 2019 at 2 pm ([address] [place]) with Dr Wilkins  - Encourage ambulaton as tolerated     Pain and swelling of right lower extremity   Assessment & Plan    Right ankle noted to be edematous on presentation with ecchymosis.     Plan  - Assymmetric RLE swelling, TTP  - Right ankle X-Ray- Old appearing transverse fracture through the tip of the distal right fibula. No acute right ankle fracture or dislocation. There is no  soft tissue swelling.  - B/l LE Doppler US- Bilateral lower extremities - No evidence of superficial or deep venous thrombosis  - Pain management: scheduled Tylenol 1g TID and Celebrex 100mg BID with PRN oxycodone 5mg q4 hours  - X-ray left lower extremity negative for acute injury  - Important to r/o other causes such as gout  - Uric acid level within normal.    - ESR 21 elevated  - Prednisone discontinued 12/10/18          Suicidal ideation- (present on admission)   Assessment & Plan    Patient currently expressing suicidal thoughts with no active plan in place. Likely to have been triggered from being off of his medications for three days.     Plan-  - Per Psych- Continue Cymbalta, Prazosin, and Seroquel. One time dose of Ativan. Legal status: Not Applicable.  - Will appreciate Psych recommendations on patient's d/c medications     Hyponatremia- (present on admission)   Assessment & Plan    On admission, noted to have  Sodium of 131 likely secondary to poor oral intake      Plan  - Na 129  - Continue to monitor      Normocytic anemia- (present on admission)   Assessment & Plan    Patient found to have normocytic anemia on admission     Plan  - Ferritin 224.1  - FOBT negative  - Outpatient Colonoscopy, if no recent screening     Cervical spine fracture (HCC)- (present on admission)   Assessment & Plan    History of neck pain status post internal fixation and reduction of C5-C6 on 9/2/2018  - Continue Gabapentin for tingling of hands  - MRI cervical demonstrated multilevel degenerative disease, moderate central canal stenosis at C2-3, C5-6, C6-7, intramedullary T2 signal intensity at C5-6 indicative of myelomalacia likely secondary to previous cords injury  - MRI lumbar demonstrated grade 1 spondylolysis of L5 on S1, some bilateral L5 neuroforaminal stenosis  - MRI thoracic demonstrated mild degenerative disease  - Prednisone discontinued 12/10/18  - Per Neurosurgery, Dr Wilkins, patient could benefit from an L5-S1 ALIF and PSF, he is currently pancytopenic with hyponatremia and having psychiatric symptoms.  He does not have cauda equina syndrome.  I do think it would be best for him to get his medical parameters corrected which will admittedly take some effort on his part.  I am happy to take care of this but with his medical parameters now, his risk is dramatically higher than what is normally acceptable for this surgery. Check L-spine flex/ex and cervical spine flex/ex films.  - Cervical and lumbar spine flexion/extension Xrays - post surgical changes without evidence of hardware failure or loosening, disc space narrowing and uncovertebral joint arthropathy, no evidence of malalignment or abnormal motion  - SW- Discharge to [facility] [address] [facility]  - Neurosurgery office ([phone]) contacted regarding clearance and if patient can be followed outpatient, they scheduled follow-up appointment at [facility] on Wednesday Jan 16 2019 at 2 pm ([address] [place])  with Dr Wilkins  - Encourage ambulaton as tolerated       Mood disorder (HCC)- (present on admission)   Assessment & Plan      - Psychiatry- Psychotropic medications: start Trazdone 50mg PO at bedtime for adjunct treatment of depression, continue Cymbalta 90mg PO daily for depression, Prazosin 3mg PO at bedtime and seroquel 125mg Po at bedtime. Monitor sodium levels. If continues to drop, will discontinue Trazodone.  Upon discharge, patient should receive an appointment with PCP and outpatient psychiatrist for continue follow up.        Hypertension- (present on admission)   Assessment & Plan    - Currently stable   - Continue Lisinopril

## 2018-12-11 NOTE — CARE PLAN
Problem: Discharge Barriers/Planning  Goal: Patient's continuum of care needs will be met  Outcome: PROGRESSING AS EXPECTED  Awaiting placement.

## 2018-12-11 NOTE — THERAPY
PT goals met.  Pt able to get in/out of bed w/o assist.  Able to stand w/o assist.  He initially ambulated while pushing his IV pole.  Then attempted a cane, which was not relieving his pain.  He then was able to ambulate w/ a fww, w/o assist and w/o loss of balance.  His gait is antalgic, except when he is distracted, he can then ambulate w/ a normal gait pattern.  Recommend fww for d/c.  No longer presents w/ a PT need.

## 2018-12-12 LAB
ALBUMIN SERPL BCP-MCNC: 3 G/DL (ref 3.2–4.9)
ALBUMIN/GLOB SERPL: 1.4 G/DL
ALP SERPL-CCNC: 51 U/L (ref 30–99)
ALT SERPL-CCNC: 16 U/L (ref 2–50)
ANION GAP SERPL CALC-SCNC: 3 MMOL/L (ref 0–11.9)
AST SERPL-CCNC: 34 U/L (ref 12–45)
BASOPHILS # BLD AUTO: 0.9 % (ref 0–1.8)
BASOPHILS # BLD: 0.02 K/UL (ref 0–0.12)
BILIRUB SERPL-MCNC: 0.3 MG/DL (ref 0.1–1.5)
BUN SERPL-MCNC: 9 MG/DL (ref 8–22)
CALCIUM SERPL-MCNC: 8.3 MG/DL (ref 8.5–10.5)
CHLORIDE SERPL-SCNC: 96 MMOL/L (ref 96–112)
CO2 SERPL-SCNC: 30 MMOL/L (ref 20–33)
CREAT SERPL-MCNC: 0.68 MG/DL (ref 0.5–1.4)
EOSINOPHIL # BLD AUTO: 0.12 K/UL (ref 0–0.51)
EOSINOPHIL NFR BLD: 5.6 % (ref 0–6.9)
ERYTHROCYTE [DISTWIDTH] IN BLOOD BY AUTOMATED COUNT: 45.3 FL (ref 35.9–50)
EST. AVERAGE GLUCOSE BLD GHB EST-MCNC: 111 MG/DL
GLOBULIN SER CALC-MCNC: 2.2 G/DL (ref 1.9–3.5)
GLUCOSE SERPL-MCNC: 120 MG/DL (ref 65–99)
HBA1C MFR BLD: 5.5 % (ref 0–5.6)
HCT VFR BLD AUTO: 32.1 % (ref 42–52)
HGB BLD-MCNC: 11.2 G/DL (ref 14–18)
IMM GRANULOCYTES # BLD AUTO: 0.01 K/UL (ref 0–0.11)
IMM GRANULOCYTES NFR BLD AUTO: 0.5 % (ref 0–0.9)
LYMPHOCYTES # BLD AUTO: 0.57 K/UL (ref 1–4.8)
LYMPHOCYTES NFR BLD: 26.8 % (ref 22–41)
MAGNESIUM SERPL-MCNC: 2 MG/DL (ref 1.5–2.5)
MCH RBC QN AUTO: 32.9 PG (ref 27–33)
MCHC RBC AUTO-ENTMCNC: 34.9 G/DL (ref 33.7–35.3)
MCV RBC AUTO: 94.4 FL (ref 81.4–97.8)
MONOCYTES # BLD AUTO: 0.16 K/UL (ref 0–0.85)
MONOCYTES NFR BLD AUTO: 7.5 % (ref 0–13.4)
NEUTROPHILS # BLD AUTO: 1.25 K/UL (ref 1.82–7.42)
NEUTROPHILS NFR BLD: 58.7 % (ref 44–72)
NRBC # BLD AUTO: 0 K/UL
NRBC BLD-RTO: 0 /100 WBC
PHOSPHATE SERPL-MCNC: 4.2 MG/DL (ref 2.5–4.5)
PLATELET # BLD AUTO: 87 K/UL (ref 164–446)
PMV BLD AUTO: 11.1 FL (ref 9–12.9)
POTASSIUM SERPL-SCNC: 4.3 MMOL/L (ref 3.6–5.5)
PROT SERPL-MCNC: 5.2 G/DL (ref 6–8.2)
RBC # BLD AUTO: 3.4 M/UL (ref 4.7–6.1)
SODIUM SERPL-SCNC: 129 MMOL/L (ref 135–145)
WBC # BLD AUTO: 2.2 K/UL (ref 4.8–10.8)

## 2018-12-12 PROCEDURE — 83735 ASSAY OF MAGNESIUM: CPT

## 2018-12-12 PROCEDURE — 96372 THER/PROPH/DIAG INJ SC/IM: CPT

## 2018-12-12 PROCEDURE — 700102 HCHG RX REV CODE 250 W/ 637 OVERRIDE(OP): Performed by: STUDENT IN AN ORGANIZED HEALTH CARE EDUCATION/TRAINING PROGRAM

## 2018-12-12 PROCEDURE — 80053 COMPREHEN METABOLIC PANEL: CPT

## 2018-12-12 PROCEDURE — A9270 NON-COVERED ITEM OR SERVICE: HCPCS | Performed by: INTERNAL MEDICINE

## 2018-12-12 PROCEDURE — 700111 HCHG RX REV CODE 636 W/ 250 OVERRIDE (IP): Performed by: STUDENT IN AN ORGANIZED HEALTH CARE EDUCATION/TRAINING PROGRAM

## 2018-12-12 PROCEDURE — 85025 COMPLETE CBC W/AUTO DIFF WBC: CPT

## 2018-12-12 PROCEDURE — 99225 PR SUBSEQUENT OBSERVATION CARE,LEVEL II: CPT | Mod: GC | Performed by: INTERNAL MEDICINE

## 2018-12-12 PROCEDURE — A9270 NON-COVERED ITEM OR SERVICE: HCPCS | Performed by: PSYCHIATRY & NEUROLOGY

## 2018-12-12 PROCEDURE — 700102 HCHG RX REV CODE 250 W/ 637 OVERRIDE(OP): Performed by: INTERNAL MEDICINE

## 2018-12-12 PROCEDURE — A9270 NON-COVERED ITEM OR SERVICE: HCPCS | Performed by: STUDENT IN AN ORGANIZED HEALTH CARE EDUCATION/TRAINING PROGRAM

## 2018-12-12 PROCEDURE — 700102 HCHG RX REV CODE 250 W/ 637 OVERRIDE(OP): Performed by: PSYCHIATRY & NEUROLOGY

## 2018-12-12 PROCEDURE — G0378 HOSPITAL OBSERVATION PER HR: HCPCS

## 2018-12-12 PROCEDURE — 36415 COLL VENOUS BLD VENIPUNCTURE: CPT

## 2018-12-12 PROCEDURE — 84100 ASSAY OF PHOSPHORUS: CPT

## 2018-12-12 RX ADMIN — FERROUS GLUCONATE 324 MG: 324 TABLET ORAL at 07:48

## 2018-12-12 RX ADMIN — GABAPENTIN 300 MG: 300 CAPSULE ORAL at 17:59

## 2018-12-12 RX ADMIN — QUETIAPINE FUMARATE 125 MG: 100 TABLET ORAL at 22:11

## 2018-12-12 RX ADMIN — ALLOPURINOL 100 MG: 100 TABLET ORAL at 05:23

## 2018-12-12 RX ADMIN — CELECOXIB 100 MG: 100 CAPSULE ORAL at 05:24

## 2018-12-12 RX ADMIN — PRAZOSIN HYDROCHLORIDE 3 MG: 1 CAPSULE ORAL at 22:10

## 2018-12-12 RX ADMIN — GABAPENTIN 300 MG: 300 CAPSULE ORAL at 05:23

## 2018-12-12 RX ADMIN — ENOXAPARIN SODIUM 40 MG: 100 INJECTION SUBCUTANEOUS at 06:00

## 2018-12-12 RX ADMIN — ACETAMINOPHEN 1000 MG: 500 TABLET ORAL at 05:23

## 2018-12-12 RX ADMIN — MAGNESIUM GLUCONATE 500 MG ORAL TABLET 400 MG: 500 TABLET ORAL at 18:00

## 2018-12-12 RX ADMIN — ACETAMINOPHEN 1000 MG: 500 TABLET ORAL at 12:18

## 2018-12-12 RX ADMIN — OXYCODONE HYDROCHLORIDE 5 MG: 5 TABLET ORAL at 03:41

## 2018-12-12 RX ADMIN — OXYCODONE HYDROCHLORIDE 5 MG: 5 TABLET ORAL at 22:13

## 2018-12-12 RX ADMIN — TRAZODONE HYDROCHLORIDE 50 MG: 50 TABLET ORAL at 22:12

## 2018-12-12 RX ADMIN — OMEPRAZOLE 20 MG: 20 CAPSULE, DELAYED RELEASE ORAL at 05:24

## 2018-12-12 RX ADMIN — OXYCODONE HYDROCHLORIDE 5 MG: 5 TABLET ORAL at 12:18

## 2018-12-12 RX ADMIN — GABAPENTIN 300 MG: 300 CAPSULE ORAL at 12:18

## 2018-12-12 RX ADMIN — STANDARDIZED SENNA CONCENTRATE AND DOCUSATE SODIUM 2 TABLET: 8.6; 5 TABLET, FILM COATED ORAL at 05:23

## 2018-12-12 RX ADMIN — OXYCODONE HYDROCHLORIDE 5 MG: 5 TABLET ORAL at 07:48

## 2018-12-12 RX ADMIN — OXYCODONE HYDROCHLORIDE 5 MG: 5 TABLET ORAL at 18:00

## 2018-12-12 RX ADMIN — CELECOXIB 100 MG: 100 CAPSULE ORAL at 22:11

## 2018-12-12 RX ADMIN — ACETAMINOPHEN 1000 MG: 500 TABLET ORAL at 17:59

## 2018-12-12 RX ADMIN — DULOXETINE HYDROCHLORIDE 90 MG: 60 CAPSULE, DELAYED RELEASE ORAL at 05:24

## 2018-12-12 RX ADMIN — MAGNESIUM GLUCONATE 500 MG ORAL TABLET 400 MG: 500 TABLET ORAL at 05:24

## 2018-12-12 ASSESSMENT — PAIN SCALES - GENERAL
PAINLEVEL_OUTOF10: 3
PAINLEVEL_OUTOF10: 5
PAINLEVEL_OUTOF10: 6

## 2018-12-12 NOTE — PROGRESS NOTES
Internal Medicine Interval Note  Note Author: Diann Ramires M.D.     Name Chris Leggett     1950   Age/Sex 68 y.o. male   MRN [id number]   Code Status FULL     After 5PM or if no immediate response to page, please call for cross-coverage  Attending/Team: Dr Lundberg / Nii Team See Patient List for primary contact information  Call [phone] to page    1st Call - Day Intern (R1):   Dr Ramires 2nd Call - Day Sr. Resident (R2/R3):   Dr Suárez         Reason for interval visit  (Principal Problem)   Back pain with suicidal thoughts      Interval Problem Daily Status Update  (24 hours, problem oriented, brief subjective history, new lab/imaging data pertinent to that problem)        Patient had no events overnight per RN, patient resting comfortably in no acute distress, states same lower left extremity shooting pain from his back into right lower extremity consistent with previous days, states numbness and tingling bilaterally in his hands. This AM patient reported no other events overnight. Patient states persistent suicidal thoughts and depressive thoughts due to his personal situation. Tolerating PO intake and voiding adequately.       - LAURA- Discharge to [address]  - Neurosurgery office ([phone]) contacted regarding clearance and if patient can be followed outpatient, they scheduled follow-up appointment at [facility] [place] on  at 2 pm ([address] [facility]) with Dr Wilkins  - Psychiatry- Psychotropic medications: start Trazdone 50mg PO at bedtime for adjunct treatment of depression, continue Cymbalta 90mg PO daily for depression, Prazosin 3mg PO at bedtime and seroquel 125mg Po at bedtime. Monitor sodium levels. If continues to drop, will discontinue Trazodone.  Upon discharge, patient should receive an appointment with PCP and outpatient psychiatrist for continue follow up.   - LAURA Baldwin aggressively working on options for patient regarding  insurance options and medication access       ROS   Review of Systems   Constitutional: Negative for chills, diaphoresis, fever, malaise/fatigue and weight loss.   HENT: Negative for congestion, hearing loss, sinus pain, sore throat and tinnitus.    Eyes: Negative for blurred vision, double vision, pain, discharge and redness.   Respiratory: Negative for cough, hemoptysis, sputum production, shortness of breath and stridor.    Cardiovascular: Positive for leg swelling. Negative for chest pain, palpitations, orthopnea and claudication.   Gastrointestinal: Negative for abdominal pain, diarrhea, nausea and vomiting.   Genitourinary: Negative for dysuria, frequency, hematuria and urgency.   Musculoskeletal: Positive for back pain, joint pain and neck pain.   Skin: Negative for itching and rash.   Neurological: Positive for tingling and sensory change. Negative for dizziness, tremors, speech change, focal weakness, seizures, loss of consciousness, weakness and headaches.   Endo/Heme/Allergies: Negative for environmental allergies and polydipsia.   Psychiatric/Behavioral: Positive for depression and suicidal ideas. Negative for hallucinations, memory loss and substance abuse. The patient is not nervous/anxious.    All other systems reviewed and are negative.       Disposition/Barriers to discharge:   - LAURA Baldwin aggressively working on options for patient regarding insurance options and medication access    Consultants/Specialty  Neurosurgery  Psychiatry  PCP: Pcp Pt States None      Quality Measures  Quality-Core Measures  Reviewed items::  Labs reviewed, EKG reviewed, Medications reviewed and Radiology images reviewed  Roca catheter::  No Roca  DVT prophylaxis pharmacological::  Enoxaparin (Lovenox)      Physical Exam       Vitals:    12/11/18 1600 12/11/18 1850 12/12/18 0410 12/12/18 0715   BP: 123/88 145/94 (!) 90/53 120/83   Pulse: 62 60 61    Resp: 14 16 16    Temp: 37 °C (98.6 °F) 36.5 °C (97.7 °F) 36.1 °C (97 °F)     TempSrc: Temporal Temporal Temporal    SpO2: 97% 94% 93%    Weight:       Height:         Body mass index is 27.37 kg/m².    Oxygen Therapy:  Pulse Oximetry: 93 %, O2 (LPM): 0, O2 Delivery: None (Room Air)    Physical Exam  Physical Exam   Constitutional: He is oriented to person, place, and time and well-developed, well-nourished, and in no distress. No distress.   HENT:   Head: Normocephalic and atraumatic.   Right Ear: External ear normal.   Left Ear: External ear normal.   Nose: Nose normal.   Mouth/Throat: Oropharynx is clear and moist.   Eyes: Pupils are equal, round, and reactive to light. Conjunctivae and EOM are normal. Right eye exhibits no discharge. Left eye exhibits no discharge.   Neck: Normal range of motion. Neck supple. No tracheal deviation present. No thyromegaly present.   Cardiovascular: Normal rate, regular rhythm, normal heart sounds and intact distal pulses.    No murmur heard.  Pulmonary/Chest: Effort normal and breath sounds normal. No respiratory distress. He has no wheezes. He has no rales.   Abdominal: Soft. Bowel sounds are normal. He exhibits no distension. There is no tenderness. There is no rebound.   Musculoskeletal: He exhibits edema (RLE vs LLE swelling). He exhibits no tenderness or deformity.   Decreased range of motion in shoulders bilaterally  , distal peripheral pulses 2+ bilaterally in all extremities, no evidence of neuro vascular compromise    Neurological: He is alert and oriented to person, place, and time. He has normal reflexes. No cranial nerve deficit. Gait normal. Coordination normal. GCS score is 15.   Skin: No rash noted. He is not diaphoretic. No erythema.   Psychiatric: Memory and judgment normal.   Nursing note and vitals reviewed.           Assessment/Plan     * Lumbar radiculopathy   Assessment & Plan    [age] year old male with three week history of worsening right lower sharp back pain radiating into his leg and foot without saddle anesthesia,  persistent urinary/bowel incontinence or focal neurological deficits. On exam, DTRs were 2+ bilaterally with 4/5 muscle strength. Low suspicion for cauda equina syndrome. Likely to be secondary to vertebral abnormality.     Plan   - Pain management: scheduled Tylenol 1g TID and Celebrex 100mg BID with PRN oxycodone 5mg q4 hours  - PPI- omeprazole 20mg   - MRI cervical demonstrated multilevel degenerative disease, moderate central canal stenosis at C2-3, C5-6, C6-7, intramedullary T2 signal intensity at C5-6 indicative of myelomalacia likely secondary to previous cords injury  - MRI lumbar demonstrated grade 1 spondylolysis of L5 on S1, some bilateral L5 neuroforaminal stenosis  - MRI thoracic demonstrated mild degenerative disease  - Prednisone discontinued 12/10/18  - Per Neurosurgery, Dr Wilkins, patient could benefit from an L5-S1 ALIF and PSF, he is currently pancytopenic with hyponatremia and having psychiatric symptoms.  He does not have cauda equina syndrome.  I do think it would be best for him to get his medical parameters corrected which will admittedly take some effort on his part.  I am happy to take care of this but with his medical parameters now, his risk is dramatically higher than what is normally acceptable for this surgery. Check L-spine flex/ex and cervical spine flex/ex films.  - Cervical and lumbar spine flexion/extension Xrays - post surgical changes without evidence of hardware failure or loosening, disc space narrowing and uncovertebral joint arthropathy, no evidence of malalignment or abnormal motion  - SW- Discharge to [address]  - Neurosurgery office ([phone]) contacted regarding clearance and if patient can be followed outpatient, they scheduled follow-up appointment at [facility] on Wednesday Jan 16 2019 at 2 pm ([address] [facility]) with Dr Wilkins  - Encourage ambulaton as tolerated     Pain and swelling of right lower extremity   Assessment &  Plan    Right ankle noted to be edematous on presentation with ecchymosis.     Plan  - Assymmetric RLE swelling, TTP  - Right ankle X-Ray- Old appearing transverse fracture through the tip of the distal right fibula. No acute right ankle fracture or dislocation. There is no  soft tissue swelling.  - B/l LE Doppler US- Bilateral lower extremities - No evidence of superficial or deep venous thrombosis  - Pain management: scheduled Tylenol 1g TID and Celebrex 100mg BID with PRN oxycodone 5mg q4 hours  - X-ray left lower extremity negative for acute injury  - Important to r/o other causes such as gout  - Uric acid level within normal.    - ESR 21 elevated  - Prednisone discontinued 12/10/18          Suicidal ideation- (present on admission)   Assessment & Plan    Patient currently expressing suicidal thoughts with no active plan in place. Likely to have been triggered from being off of his medications for three days.     Plan-  - Per Psych- Continue Cymbalta, Prazosin, and Seroquel. One time dose of Ativan. Legal status: Not Applicable.  - Will appreciate Psych recommendations on patient's d/c medications     Hyponatremia- (present on admission)   Assessment & Plan    On admission, noted to have  Sodium of 131 likely secondary to poor oral intake     Plan  - Na 129  - Continue to monitor      Normocytic anemia- (present on admission)   Assessment & Plan    Patient found to have normocytic anemia on admission     Plan  - Ferritin 224.1  - FOBT negative  - Outpatient Colonoscopy, if no recent screening     Cervical spine fracture (HCC)- (present on admission)   Assessment & Plan    History of neck pain status post internal fixation and reduction of C5-C6 on 9/2/2018  - Continue Gabapentin for tingling of hands  - MRI cervical demonstrated multilevel degenerative disease, moderate central canal stenosis at C2-3, C5-6, C6-7, intramedullary T2 signal intensity at C5-6 indicative of myelomalacia likely secondary to previous  cords injury  - MRI lumbar demonstrated grade 1 spondylolysis of L5 on S1, some bilateral L5 neuroforaminal stenosis  - MRI thoracic demonstrated mild degenerative disease  - Prednisone discontinued 12/10/18  - Per Neurosurgery, Dr Wilkins, patient could benefit from an L5-S1 ALIF and PSF, he is currently pancytopenic with hyponatremia and having psychiatric symptoms.  He does not have cauda equina syndrome.  I do think it would be best for him to get his medical parameters corrected which will admittedly take some effort on his part.  I am happy to take care of this but with his medical parameters now, his risk is dramatically higher than what is normally acceptable for this surgery. Check L-spine flex/ex and cervical spine flex/ex films.  - Cervical and lumbar spine flexion/extension Xrays - post surgical changes without evidence of hardware failure or loosening, disc space narrowing and uncovertebral joint arthropathy, no evidence of malalignment or abnormal motion  - SW- Discharge to [address]  - Neurosurgery office ([phone]) contacted regarding clearance and if patient can be followed outpatient, they scheduled follow-up appointment at [facility] on Wednesday Jan 16 2019 at 2 pm ([address] [facility]) with Dr Wilkins  - Encourage ambulaton as tolerated       Mood disorder (HCC)- (present on admission)   Assessment & Plan      - Psychiatry- Psychotropic medications: start Trazdone 50mg PO at bedtime for adjunct treatment of depression, continue Cymbalta 90mg PO daily for depression, Prazosin 3mg PO at bedtime and seroquel 125mg Po at bedtime. Monitor sodium levels. If continues to drop, will discontinue Trazodone.  Upon discharge, patient should receive an appointment with PCP and outpatient psychiatrist for continue follow up.        Hypertension- (present on admission)   Assessment & Plan    - Currently stable   - Continue Lisinopril

## 2018-12-12 NOTE — CARE PLAN
Problem: Communication  Goal: The ability to communicate needs accurately and effectively will improve  Outcome: PROGRESSING AS EXPECTED  Pt able to verbalize needs.     Problem: Pain Management  Goal: Pain level will decrease to patient's comfort goal  Outcome: PROGRESSING AS EXPECTED  PRN pain medication given. Will continue to monitor.

## 2018-12-12 NOTE — PROGRESS NOTES
Pt is irritated. Spoke to pt about why he's in the hospital. Pt states he understands then asks why he's here multiple times. 1:1 in the room. Pt shows no suicidal ideation and states that he does not want to hurt himself. Fall precaution in place. Call light and belongings within reach. PRN medication needed for pain.

## 2018-12-12 NOTE — CARE PLAN
Problem: Safety  Goal: Will remain free from injury  Outcome: PROGRESSING AS EXPECTED  Pt has 1:1 in room. Free from injury, no ideation to harm self.     Problem: Pain Management  Goal: Pain level will decrease to patient's comfort goal  Outcome: PROGRESSING SLOWER THAN EXPECTED  Pt is constantly complaining of back pain, see pain assessment & mar.

## 2018-12-12 NOTE — PROGRESS NOTES
1:1 sitter in place. Pt resting in bed throughout shift. PRN pain medication given for pain. Pt took 2100 meds. No other complaints at this time. Fall precautions in place. Belongings and bedside table within reach. Will continue to monitor.

## 2018-12-12 NOTE — PROGRESS NOTES
Pt pulled out IV at this time. Pt stated that he does not want another IV at this time. He would not allow this nurse to place IV.

## 2018-12-13 LAB
ALBUMIN SERPL BCP-MCNC: 3.1 G/DL (ref 3.2–4.9)
ALBUMIN/GLOB SERPL: 1.5 G/DL
ALP SERPL-CCNC: 51 U/L (ref 30–99)
ALT SERPL-CCNC: 19 U/L (ref 2–50)
ANION GAP SERPL CALC-SCNC: 5 MMOL/L (ref 0–11.9)
AST SERPL-CCNC: 41 U/L (ref 12–45)
BASOPHILS # BLD AUTO: 0.9 % (ref 0–1.8)
BASOPHILS # BLD: 0.02 K/UL (ref 0–0.12)
BILIRUB SERPL-MCNC: 0.3 MG/DL (ref 0.1–1.5)
BUN SERPL-MCNC: 10 MG/DL (ref 8–22)
CALCIUM SERPL-MCNC: 8.3 MG/DL (ref 8.5–10.5)
CHLORIDE SERPL-SCNC: 100 MMOL/L (ref 96–112)
CO2 SERPL-SCNC: 27 MMOL/L (ref 20–33)
CREAT SERPL-MCNC: 0.56 MG/DL (ref 0.5–1.4)
EOSINOPHIL # BLD AUTO: 0.13 K/UL (ref 0–0.51)
EOSINOPHIL NFR BLD: 5.7 % (ref 0–6.9)
ERYTHROCYTE [DISTWIDTH] IN BLOOD BY AUTOMATED COUNT: 44.9 FL (ref 35.9–50)
GLOBULIN SER CALC-MCNC: 2.1 G/DL (ref 1.9–3.5)
GLUCOSE SERPL-MCNC: 102 MG/DL (ref 65–99)
HCT VFR BLD AUTO: 29.5 % (ref 42–52)
HGB BLD-MCNC: 10.3 G/DL (ref 14–18)
IMM GRANULOCYTES # BLD AUTO: 0.02 K/UL (ref 0–0.11)
IMM GRANULOCYTES NFR BLD AUTO: 0.9 % (ref 0–0.9)
LYMPHOCYTES # BLD AUTO: 0.64 K/UL (ref 1–4.8)
LYMPHOCYTES NFR BLD: 27.8 % (ref 22–41)
MAGNESIUM SERPL-MCNC: 1.8 MG/DL (ref 1.5–2.5)
MCH RBC QN AUTO: 32.9 PG (ref 27–33)
MCHC RBC AUTO-ENTMCNC: 34.9 G/DL (ref 33.7–35.3)
MCV RBC AUTO: 94.2 FL (ref 81.4–97.8)
MONOCYTES # BLD AUTO: 0.26 K/UL (ref 0–0.85)
MONOCYTES NFR BLD AUTO: 11.3 % (ref 0–13.4)
NEUTROPHILS # BLD AUTO: 1.23 K/UL (ref 1.82–7.42)
NEUTROPHILS NFR BLD: 53.4 % (ref 44–72)
NRBC # BLD AUTO: 0 K/UL
NRBC BLD-RTO: 0 /100 WBC
PLATELET # BLD AUTO: 92 K/UL (ref 164–446)
PMV BLD AUTO: 11.4 FL (ref 9–12.9)
POTASSIUM SERPL-SCNC: 4.1 MMOL/L (ref 3.6–5.5)
PROT SERPL-MCNC: 5.2 G/DL (ref 6–8.2)
RBC # BLD AUTO: 3.13 M/UL (ref 4.7–6.1)
SODIUM SERPL-SCNC: 132 MMOL/L (ref 135–145)
WBC # BLD AUTO: 2.3 K/UL (ref 4.8–10.8)

## 2018-12-13 PROCEDURE — 96372 THER/PROPH/DIAG INJ SC/IM: CPT

## 2018-12-13 PROCEDURE — A9270 NON-COVERED ITEM OR SERVICE: HCPCS | Performed by: STUDENT IN AN ORGANIZED HEALTH CARE EDUCATION/TRAINING PROGRAM

## 2018-12-13 PROCEDURE — 83735 ASSAY OF MAGNESIUM: CPT

## 2018-12-13 PROCEDURE — 36415 COLL VENOUS BLD VENIPUNCTURE: CPT

## 2018-12-13 PROCEDURE — A9270 NON-COVERED ITEM OR SERVICE: HCPCS | Performed by: PSYCHIATRY & NEUROLOGY

## 2018-12-13 PROCEDURE — G0378 HOSPITAL OBSERVATION PER HR: HCPCS

## 2018-12-13 PROCEDURE — 700102 HCHG RX REV CODE 250 W/ 637 OVERRIDE(OP): Performed by: STUDENT IN AN ORGANIZED HEALTH CARE EDUCATION/TRAINING PROGRAM

## 2018-12-13 PROCEDURE — A9270 NON-COVERED ITEM OR SERVICE: HCPCS | Performed by: INTERNAL MEDICINE

## 2018-12-13 PROCEDURE — 99225 PR SUBSEQUENT OBSERVATION CARE,LEVEL II: CPT | Mod: GC | Performed by: INTERNAL MEDICINE

## 2018-12-13 PROCEDURE — 80053 COMPREHEN METABOLIC PANEL: CPT

## 2018-12-13 PROCEDURE — 700111 HCHG RX REV CODE 636 W/ 250 OVERRIDE (IP): Performed by: STUDENT IN AN ORGANIZED HEALTH CARE EDUCATION/TRAINING PROGRAM

## 2018-12-13 PROCEDURE — 700102 HCHG RX REV CODE 250 W/ 637 OVERRIDE(OP): Performed by: INTERNAL MEDICINE

## 2018-12-13 PROCEDURE — 700102 HCHG RX REV CODE 250 W/ 637 OVERRIDE(OP): Performed by: PSYCHIATRY & NEUROLOGY

## 2018-12-13 PROCEDURE — 85025 COMPLETE CBC W/AUTO DIFF WBC: CPT

## 2018-12-13 RX ORDER — ACETAMINOPHEN 325 MG/1
325 TABLET ORAL 3 TIMES DAILY
Status: DISCONTINUED | OUTPATIENT
Start: 2018-12-13 | End: 2018-12-14 | Stop reason: HOSPADM

## 2018-12-13 RX ORDER — GABAPENTIN 400 MG/1
400 CAPSULE ORAL 3 TIMES DAILY
Status: DISCONTINUED | OUTPATIENT
Start: 2018-12-13 | End: 2018-12-14 | Stop reason: HOSPADM

## 2018-12-13 RX ORDER — HYDROCODONE BITARTRATE AND ACETAMINOPHEN 5; 325 MG/1; MG/1
1 TABLET ORAL EVERY 4 HOURS PRN
Status: DISCONTINUED | OUTPATIENT
Start: 2018-12-13 | End: 2018-12-14 | Stop reason: HOSPADM

## 2018-12-13 RX ADMIN — CELECOXIB 100 MG: 100 CAPSULE ORAL at 17:25

## 2018-12-13 RX ADMIN — TRAZODONE HYDROCHLORIDE 50 MG: 50 TABLET ORAL at 21:32

## 2018-12-13 RX ADMIN — ACETAMINOPHEN 1000 MG: 500 TABLET ORAL at 05:54

## 2018-12-13 RX ADMIN — ACETAMINOPHEN 325 MG: 325 TABLET, FILM COATED ORAL at 12:30

## 2018-12-13 RX ADMIN — GABAPENTIN 400 MG: 400 CAPSULE ORAL at 17:25

## 2018-12-13 RX ADMIN — MAGNESIUM GLUCONATE 500 MG ORAL TABLET 400 MG: 500 TABLET ORAL at 05:55

## 2018-12-13 RX ADMIN — GABAPENTIN 400 MG: 400 CAPSULE ORAL at 12:30

## 2018-12-13 RX ADMIN — STANDARDIZED SENNA CONCENTRATE AND DOCUSATE SODIUM 2 TABLET: 8.6; 5 TABLET, FILM COATED ORAL at 17:25

## 2018-12-13 RX ADMIN — DULOXETINE HYDROCHLORIDE 90 MG: 60 CAPSULE, DELAYED RELEASE ORAL at 05:53

## 2018-12-13 RX ADMIN — HYDROCODONE BITARTRATE AND ACETAMINOPHEN 1 TABLET: 5; 325 TABLET ORAL at 09:24

## 2018-12-13 RX ADMIN — ALLOPURINOL 100 MG: 100 TABLET ORAL at 05:54

## 2018-12-13 RX ADMIN — OXYCODONE HYDROCHLORIDE 5 MG: 5 TABLET ORAL at 05:55

## 2018-12-13 RX ADMIN — PRAZOSIN HYDROCHLORIDE 3 MG: 1 CAPSULE ORAL at 21:32

## 2018-12-13 RX ADMIN — HYDROCODONE BITARTRATE AND ACETAMINOPHEN 1 TABLET: 5; 325 TABLET ORAL at 17:25

## 2018-12-13 RX ADMIN — OMEPRAZOLE 20 MG: 20 CAPSULE, DELAYED RELEASE ORAL at 05:55

## 2018-12-13 RX ADMIN — HYDROCODONE BITARTRATE AND ACETAMINOPHEN 1 TABLET: 5; 325 TABLET ORAL at 13:29

## 2018-12-13 RX ADMIN — LISINOPRIL 20 MG: 20 TABLET ORAL at 05:55

## 2018-12-13 RX ADMIN — MAGNESIUM GLUCONATE 500 MG ORAL TABLET 400 MG: 500 TABLET ORAL at 17:25

## 2018-12-13 RX ADMIN — CELECOXIB 100 MG: 100 CAPSULE ORAL at 06:18

## 2018-12-13 RX ADMIN — ACETAMINOPHEN 325 MG: 325 TABLET, FILM COATED ORAL at 17:25

## 2018-12-13 RX ADMIN — ENOXAPARIN SODIUM 40 MG: 100 INJECTION SUBCUTANEOUS at 05:54

## 2018-12-13 RX ADMIN — HYDROCODONE BITARTRATE AND ACETAMINOPHEN 1 TABLET: 5; 325 TABLET ORAL at 22:15

## 2018-12-13 RX ADMIN — QUETIAPINE FUMARATE 125 MG: 100 TABLET ORAL at 21:32

## 2018-12-13 ASSESSMENT — PAIN SCALES - GENERAL
PAINLEVEL_OUTOF10: 7
PAINLEVEL_OUTOF10: 8
PAINLEVEL_OUTOF10: 7
PAINLEVEL_OUTOF10: 5
PAINLEVEL_OUTOF10: 7

## 2018-12-13 NOTE — CARE PLAN
Problem: Safety  Goal: Will remain free from injury  Outcome: PROGRESSING AS EXPECTED  1:1 safety sitter at bedside. Pt ask for assistance appropriately.     Problem: Pain Management  Goal: Pain level will decrease to patient's comfort goal  Outcome: PROGRESSING AS EXPECTED  Pt medicated per MAR, pt complains of pain in neck and back.

## 2018-12-13 NOTE — PROGRESS NOTES
Spoke to resident and charge about verification of patients order about patient's 1:1 sitter or if pt is on a legal hold. Resident states to clarify with psych. Instructed to call the alert team [phone] to get psych consult. Redirected by [phone] to call Dr. Ruth [phone], left a message by this RN.

## 2018-12-13 NOTE — PROGRESS NOTES
Pt is calm. 1:1 sitter in place. A&Ox4. Pt states pain medication is helping better. Pt ambulates in the kwong. Pt states that the MD looked at his bruised ankle but did not say anything. MD did put new orders for hydrocodone. Clarified with MD & resident about pt's status for 1:1 sitter or legal hold, I was told to call psych but still did not received a call back. No new orders received. Pt continues to be on a 1:1 sitter.

## 2018-12-13 NOTE — CARE PLAN
"Problem: Safety  Goal: Will remain free from injury  Outcome: PROGRESSING AS EXPECTED  Pt tells me no SI. Continues 1:1 sitter.     Problem: Pain Management  Goal: Pain level will decrease to patient's comfort goal  Outcome: PROGRESSING AS EXPECTED  Pt states new medication of hydrocodone is working. Pt states \"i feel slightly better\". Hot packs helps with pain.       "

## 2018-12-13 NOTE — PROGRESS NOTES
Tete from lab called with critical lab result of WBC 2.3. [employer] resident Danuta notified. No new orders received.

## 2018-12-13 NOTE — PROGRESS NOTES
Pt alert and oriented. 1:1 safety sitter at bedside. Pt tolerated night time medications. Pt medicated for pain in back/neck, per MAR. Some bruising and swelling noted to right foot, pt denies pain in foot. Pt ambulates independently, steady gait. All needs met at this time.

## 2018-12-13 NOTE — PROGRESS NOTES
Internal Medicine Interval Note  Note Author: Diann Ramires M.D.     Name Chris Leggett     1950   Age/Sex 68 y.o. male   MRN [id number]   Code Status FULL     After 5PM or if no immediate response to page, please call for cross-coverage  Attending/Team: Dr Lundberg / Nii Team See Patient List for primary contact information  Call [phone] to page    1st Call - Day Intern (R1):   Dr Ramires 2nd Call - Day Sr. Resident (R2/R3):   Dr Suárez         Reason for interval visit  (Principal Problem)   Back pain with suicidal thoughts      Interval Problem Daily Status Update  (24 hours, problem oriented, brief subjective history, new lab/imaging data pertinent to that problem)     Per overnight RN patient was urinating with more frequency but no other events. Patient resting comfortably in no acute distress, states same lower left extremity shooting pain from his back into right lower extremity consistent with previous days, states numbness and tingling bilaterally in his hands. He does state like a worsening pulled muscle (right paraspinal in lower back) and also worsening right foot pain as it appear more red to him, and also state nightmares and suicidal thoughts overnight.Tolerating PO intake and voiding adequately. SW working on placement options for patient.         - SW- Discharge to [address]  - Neurosurgery office ([phone]) contacted regarding clearance and if patient can be followed outpatient, they scheduled follow-up appointment at [facility] on  at 2 pm ([address] [facility]) with Dr Wilkins  - Psychiatry- Psychotropic medications: start Trazdone 50mg PO at bedtime for adjunct treatment of depression, continue Cymbalta 90mg PO daily for depression, Prazosin 3mg PO at bedtime and seroquel 125mg Po at bedtime. Monitor sodium levels. If continues to drop, will discontinue Trazodone.  Upon discharge, patient should receive an  appointment with PCP and outpatient psychiatrist for continue follow up.   - U/A f/u  - Pain management: scheduled Tylenol 1g TID and Celebrex 100mg BID and Norco 5-325 1 tab q4 hours PRN to reduce MME  - Gabapentin increased to 400 mg TID  - F/u with psych regarding legal hold vs 1:1 sitter order  - LAURA Collado ([phone]) to discuss with patient options regarding his plans for residence and then explore d/c options, will likely be limited d/t being homeless, medications options    ROS  Review of Systems   Constitutional: Negative for chills, diaphoresis, fever, malaise/fatigue and weight loss.   HENT: Negative for congestion, hearing loss, sinus pain, sore throat and tinnitus.    Eyes: Negative for blurred vision, double vision, pain, discharge and redness.   Respiratory: Negative for cough, hemoptysis, sputum production, shortness of breath and stridor.    Cardiovascular: Positive for leg swelling. Negative for chest pain, palpitations, orthopnea and claudication.   Gastrointestinal: Negative for abdominal pain, diarrhea, nausea and vomiting.   Genitourinary: Negative for dysuria, frequency, hematuria and urgency.   Musculoskeletal: Positive for back pain, joint pain and neck pain.   Skin: Negative for itching and rash.   Neurological: Positive for tingling and sensory change. Negative for dizziness, tremors, speech change, focal weakness, seizures, loss of consciousness, weakness and headaches.   Endo/Heme/Allergies: Negative for environmental allergies and polydipsia.   Psychiatric/Behavioral: Positive for depression and suicidal ideas. Negative for hallucinations, memory loss and substance abuse. The patient is not nervous/anxious.    All other systems reviewed and are negative.    Disposition/Barriers to discharge:   - LAURA Collado ([phone]) to discuss with patient options regarding his plans for residence and then explore d/c options, will likely be limited d/t being homeless, medications  options    Consultants/Specialty  Neurosurgery  Psychiatry  PCP: Pcp Pt States None      Quality Measures  Quality-Core Measures  Reviewed items::  Labs reviewed, EKG reviewed, Medications reviewed and Radiology images reviewed  Roca catheter::  No Roca  DVT prophylaxis pharmacological::  Enoxaparin (Lovenox)      Physical Exam       Vitals:    12/12/18 1620 12/12/18 2100 12/13/18 0355 12/13/18 0800   BP: 150/99 121/92 103/75 127/91   Pulse: 65 84 78 86   Resp: 16 16 16 15   Temp: 36.4 °C (97.5 °F) 36.2 °C (97.1 °F) 36.6 °C (97.8 °F) 36.7 °C (98.1 °F)   TempSrc: Temporal Temporal Temporal Temporal   SpO2:   96% 96%   Weight:       Height:         Body mass index is 27.37 kg/m².    Oxygen Therapy:  Pulse Oximetry: 96 %, O2 (LPM): 0, O2 Delivery: None (Room Air)    Physical Exam   Constitutional: He is oriented to person, place, and time and well-developed, well-nourished, and in no distress. No distress.   HENT:   Head: Normocephalic and atraumatic.   Right Ear: External ear normal.   Left Ear: External ear normal.   Nose: Nose normal.   Mouth/Throat: Oropharynx is clear and moist.   Eyes: Pupils are equal, round, and reactive to light. Conjunctivae and EOM are normal. Right eye exhibits no discharge. Left eye exhibits no discharge. No scleral icterus.   Neck: Normal range of motion. Neck supple. No tracheal deviation present. No thyromegaly present.   Cardiovascular: Normal rate, regular rhythm, normal heart sounds and intact distal pulses.    No murmur heard.  Pulmonary/Chest: Effort normal and breath sounds normal. No respiratory distress. He has no wheezes. He has no rales. He exhibits no tenderness.   Abdominal: Soft. Bowel sounds are normal. He exhibits no distension. There is no tenderness. There is no rebound and no guarding.   Musculoskeletal: He exhibits edema (RLE swelling vs LLE) and tenderness (RLE foot TTP). He exhibits no deformity.   Decreased range of motion in shoulders bilaterally  , distal  peripheral pulses 2+ bilaterally in all extremities, no evidence of neuro vascular compromise      Right CVA tenderness elicited, right pinpoint paraspinal tenderness to palpation in lumbar region   Neurological: He is alert and oriented to person, place, and time. He has normal reflexes. No cranial nerve deficit. Gait normal. Coordination normal. GCS score is 15.   Skin: He is not diaphoretic. There is erythema (RLE foot hematoma).   Psychiatric: Memory and affect normal.   Nursing note and vitals reviewed.            Assessment/Plan     * Lumbar radiculopathy   Assessment & Plan    [age] year old male with three week history of worsening right lower sharp back pain radiating into his leg and foot without saddle anesthesia, persistent urinary/bowel incontinence or focal neurological deficits. On exam, DTRs were 2+ bilaterally with 4/5 muscle strength. Low suspicion for cauda equina syndrome. Likely to be secondary to vertebral abnormality.     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  He does not have cauda equina syndrome.  I do think it would be best for him to get his medical parameters corrected which will admittedly take some effort on his part.  I am happy to take care of this but with  his medical parameters now, his risk is dramatically higher than what is normally acceptable for this surgery. Check L-spine flex/ex and cervical spine flex/ex films.  - Cervical and lumbar spine flexion/extension Xrays - post surgical changes without evidence of hardware failure or loosening, disc space narrowing and uncovertebral joint arthropathy, no evidence of malalignment or abnormal motion  - SW- Discharge to [place] [address] [address] [facility]  - Neurosurgery office ([phone]) contacted regarding clearance and if patient can be followed outpatient, they scheduled follow-up appointment at [facility] on Wednesday Jan 16 2019 at 2 pm ([address] [place]) with Dr Wilkins  - Encourage ambulaton as tolerated     Pain and swelling of right lower extremity   Assessment & Plan    Right ankle noted to be edematous on presentation with ecchymosis.     Plan  - Assymmetric RLE swelling, TTP  - Right ankle X-Ray- Old appearing transverse fracture through the tip of the distal right fibula. No acute right ankle fracture or dislocation. There is no  soft tissue swelling.  - B/l LE Doppler US- Bilateral lower extremities - No evidence of superficial or deep venous thrombosis  - Pain management: scheduled Tylenol 1g TID and Celebrex 100mg BID with PRN oxycodone 5mg q4 hours  - X-ray left lower extremity negative for acute injury  - Important to r/o other causes such as gout  - Uric acid level within normal.    - ESR 21 elevated  - Prednisone discontinued 12/10/18          Suicidal ideation- (present on admission)   Assessment & Plan    Patient currently expressing suicidal thoughts with no active plan in place. Likely to have been triggered from being off of his medications for three days.     Plan-  - Psychiatry- Psychotropic medications: start Trazdone 50mg PO at bedtime for adjunct treatment of depression, continue Cymbalta 90mg PO daily for depression, Prazosin 3mg PO at bedtime and seroquel 125mg Po at  bedtime. Monitor sodium levels. If continues to drop, will discontinue Trazodone.  Upon discharge, patient should receive an appointment with PCP and outpatient psychiatrist for continue follow up.        Hyponatremia- (present on admission)   Assessment & Plan    On admission, noted to have  Sodium of 131 likely secondary to poor oral intake     Plan  - Na 129  - Continue to monitor      Normocytic anemia- (present on admission)   Assessment & Plan    Patient found to have normocytic anemia on admission     Plan  - Ferritin 224.1  - FOBT negative  - Outpatient Colonoscopy, if no recent screening     Cervical spine fracture (HCC)- (present on admission)   Assessment & Plan    History of neck pain status post internal fixation and reduction of C5-C6 on 9/2/2018  - Continue Gabapentin for tingling of hands  - MRI cervical demonstrated multilevel degenerative disease, moderate central canal stenosis at C2-3, C5-6, C6-7, intramedullary T2 signal intensity at C5-6 indicative of myelomalacia likely secondary to previous cords injury  - MRI lumbar demonstrated grade 1 spondylolysis of L5 on S1, some bilateral L5 neuroforaminal stenosis  - MRI thoracic demonstrated mild degenerative disease  - Prednisone discontinued 12/10/18  - Per Neurosurgery, Dr Wilkins, patient could benefit from an L5-S1 ALIF and PSF, he is currently pancytopenic with hyponatremia and having psychiatric symptoms.  He does not have cauda equina syndrome.  I do think it would be best for him to get his medical parameters corrected which will admittedly take some effort on his part.  I am happy to take care of this but with his medical parameters now, his risk is dramatically higher than what is normally acceptable for this surgery. Check L-spine flex/ex and cervical spine flex/ex films.  - Cervical and lumbar spine flexion/extension Xrays - post surgical changes without evidence of hardware failure or loosening, disc space narrowing and uncovertebral  joint arthropathy, no evidence of malalignment or abnormal motion  - SW- Discharge to [facility] [address] [facility]  - Neurosurgery office ([phone]) contacted regarding clearance and if patient can be followed outpatient, they scheduled [facility] Wednesday Jan 16 2019 at 2 pm [facility] [facility] Dr [facility]kins  - Encourage ambulaton as tolerated       Mood disorder (HCC)- (present on admission)   Assessment & Plan      - Psychiatry- Psychotropic medications: start Trazdone 50mg PO at bedtime for adjunct treatment of depression, continue Cymbalta 90mg PO daily for depression, Prazosin 3mg PO at bedtime and seroquel 125mg Po at bedtime. Monitor sodium levels. If continues to drop, will discontinue Trazodone.  Upon discharge, patient should receive an appointment with PCP and outpatient psychiatrist for continue follow up.        Hypertension- (present on admission)   Assessment & Plan    - Currently stable   - Continue Lisinopril

## 2018-12-14 ENCOUNTER — APPOINTMENT (OUTPATIENT)
Dept: RADIOLOGY | Facility: MEDICAL CENTER | Age: 68
End: 2018-12-14
Attending: EMERGENCY MEDICINE
Payer: MEDICARE

## 2018-12-14 ENCOUNTER — HOSPITAL ENCOUNTER (EMERGENCY)
Facility: MEDICAL CENTER | Age: 68
End: 2018-12-14
Attending: EMERGENCY MEDICINE
Payer: MEDICARE

## 2018-12-14 VITALS
OXYGEN SATURATION: 96 % | BODY MASS INDEX: 27.47 KG/M2 | WEIGHT: 196.21 LBS | HEART RATE: 65 BPM | TEMPERATURE: 97 F | HEIGHT: 71 IN | SYSTOLIC BLOOD PRESSURE: 119 MMHG | DIASTOLIC BLOOD PRESSURE: 85 MMHG | RESPIRATION RATE: 15 BRPM

## 2018-12-14 VITALS
OXYGEN SATURATION: 95 % | HEIGHT: 70 IN | SYSTOLIC BLOOD PRESSURE: 189 MMHG | BODY MASS INDEX: 26.48 KG/M2 | DIASTOLIC BLOOD PRESSURE: 90 MMHG | RESPIRATION RATE: 20 BRPM | WEIGHT: 185 LBS | TEMPERATURE: 98.4 F | HEART RATE: 87 BPM

## 2018-12-14 DIAGNOSIS — Z91.199 POOR COMPLIANCE: ICD-10-CM

## 2018-12-14 DIAGNOSIS — G89.29 OTHER CHRONIC PAIN: ICD-10-CM

## 2018-12-14 DIAGNOSIS — S82.831A OTHER CLOSED FRACTURE OF DISTAL END OF RIGHT FIBULA, INITIAL ENCOUNTER: ICD-10-CM

## 2018-12-14 DIAGNOSIS — F32.A DEPRESSION, UNSPECIFIED DEPRESSION TYPE: ICD-10-CM

## 2018-12-14 PROBLEM — R45.851 SUICIDAL IDEATION: Status: RESOLVED | Noted: 2018-12-06 | Resolved: 2018-12-14

## 2018-12-14 LAB
ALBUMIN SERPL BCP-MCNC: 3.1 G/DL (ref 3.2–4.9)
ALBUMIN/GLOB SERPL: 1.5 G/DL
ALP SERPL-CCNC: 54 U/L (ref 30–99)
ALT SERPL-CCNC: 20 U/L (ref 2–50)
ANION GAP SERPL CALC-SCNC: 4 MMOL/L (ref 0–11.9)
AST SERPL-CCNC: 39 U/L (ref 12–45)
BASOPHILS # BLD AUTO: 0.8 % (ref 0–1.8)
BASOPHILS # BLD: 0.02 K/UL (ref 0–0.12)
BILIRUB SERPL-MCNC: 0.3 MG/DL (ref 0.1–1.5)
BUN SERPL-MCNC: 12 MG/DL (ref 8–22)
CALCIUM SERPL-MCNC: 8.9 MG/DL (ref 8.5–10.5)
CHLORIDE SERPL-SCNC: 99 MMOL/L (ref 96–112)
CO2 SERPL-SCNC: 27 MMOL/L (ref 20–33)
CREAT SERPL-MCNC: 0.58 MG/DL (ref 0.5–1.4)
EOSINOPHIL # BLD AUTO: 0.14 K/UL (ref 0–0.51)
EOSINOPHIL NFR BLD: 5.3 % (ref 0–6.9)
ERYTHROCYTE [DISTWIDTH] IN BLOOD BY AUTOMATED COUNT: 48 FL (ref 35.9–50)
GLOBULIN SER CALC-MCNC: 2.1 G/DL (ref 1.9–3.5)
GLUCOSE SERPL-MCNC: 100 MG/DL (ref 65–99)
HCT VFR BLD AUTO: 31.6 % (ref 42–52)
HGB BLD-MCNC: 10.3 G/DL (ref 14–18)
HIV 1+2 AB+HIV1 P24 AG SERPL QL IA: NON REACTIVE
IMM GRANULOCYTES # BLD AUTO: 0 K/UL (ref 0–0.11)
IMM GRANULOCYTES NFR BLD AUTO: 0 % (ref 0–0.9)
LYMPHOCYTES # BLD AUTO: 0.85 K/UL (ref 1–4.8)
LYMPHOCYTES NFR BLD: 32.2 % (ref 22–41)
MAGNESIUM SERPL-MCNC: 1.9 MG/DL (ref 1.5–2.5)
MCH RBC QN AUTO: 31.4 PG (ref 27–33)
MCHC RBC AUTO-ENTMCNC: 32.6 G/DL (ref 33.7–35.3)
MCV RBC AUTO: 96.3 FL (ref 81.4–97.8)
MONOCYTES # BLD AUTO: 0.35 K/UL (ref 0–0.85)
MONOCYTES NFR BLD AUTO: 13.3 % (ref 0–13.4)
NEUTROPHILS # BLD AUTO: 1.28 K/UL (ref 1.82–7.42)
NEUTROPHILS NFR BLD: 48.4 % (ref 44–72)
NRBC # BLD AUTO: 0 K/UL
NRBC BLD-RTO: 0 /100 WBC
PHOSPHATE SERPL-MCNC: 3.7 MG/DL (ref 2.5–4.5)
PLATELET # BLD AUTO: 100 K/UL (ref 164–446)
PMV BLD AUTO: 11.5 FL (ref 9–12.9)
POTASSIUM SERPL-SCNC: 4.5 MMOL/L (ref 3.6–5.5)
PROT SERPL-MCNC: 5.2 G/DL (ref 6–8.2)
RBC # BLD AUTO: 3.28 M/UL (ref 4.7–6.1)
SODIUM SERPL-SCNC: 130 MMOL/L (ref 135–145)
WBC # BLD AUTO: 2.6 K/UL (ref 4.8–10.8)

## 2018-12-14 PROCEDURE — 36415 COLL VENOUS BLD VENIPUNCTURE: CPT

## 2018-12-14 PROCEDURE — 700102 HCHG RX REV CODE 250 W/ 637 OVERRIDE(OP): Performed by: STUDENT IN AN ORGANIZED HEALTH CARE EDUCATION/TRAINING PROGRAM

## 2018-12-14 PROCEDURE — 85025 COMPLETE CBC W/AUTO DIFF WBC: CPT

## 2018-12-14 PROCEDURE — G0475 HIV COMBINATION ASSAY: HCPCS

## 2018-12-14 PROCEDURE — 700102 HCHG RX REV CODE 250 W/ 637 OVERRIDE(OP): Performed by: INTERNAL MEDICINE

## 2018-12-14 PROCEDURE — 99284 EMERGENCY DEPT VISIT MOD MDM: CPT

## 2018-12-14 PROCEDURE — A9270 NON-COVERED ITEM OR SERVICE: HCPCS | Performed by: STUDENT IN AN ORGANIZED HEALTH CARE EDUCATION/TRAINING PROGRAM

## 2018-12-14 PROCEDURE — 99217 PR OBSERVATION CARE DISCHARGE: CPT | Performed by: INTERNAL MEDICINE

## 2018-12-14 PROCEDURE — 96372 THER/PROPH/DIAG INJ SC/IM: CPT

## 2018-12-14 PROCEDURE — 73610 X-RAY EXAM OF ANKLE: CPT | Mod: RT

## 2018-12-14 PROCEDURE — 700111 HCHG RX REV CODE 636 W/ 250 OVERRIDE (IP): Performed by: STUDENT IN AN ORGANIZED HEALTH CARE EDUCATION/TRAINING PROGRAM

## 2018-12-14 PROCEDURE — 84100 ASSAY OF PHOSPHORUS: CPT

## 2018-12-14 PROCEDURE — 80053 COMPREHEN METABOLIC PANEL: CPT

## 2018-12-14 PROCEDURE — A9270 NON-COVERED ITEM OR SERVICE: HCPCS | Performed by: INTERNAL MEDICINE

## 2018-12-14 PROCEDURE — G0378 HOSPITAL OBSERVATION PER HR: HCPCS

## 2018-12-14 PROCEDURE — 83735 ASSAY OF MAGNESIUM: CPT

## 2018-12-14 RX ORDER — ACETAMINOPHEN 325 MG/1
325 TABLET ORAL 3 TIMES DAILY
Qty: 30 TAB | Refills: 0 | COMMUNITY
Start: 2018-12-14 | End: 2019-01-25 | Stop reason: CLARIF

## 2018-12-14 RX ORDER — QUETIAPINE FUMARATE 25 MG/1
125 TABLET, FILM COATED ORAL
Qty: 35 TAB | Refills: 0 | Status: SHIPPED | OUTPATIENT
Start: 2018-12-14 | End: 2018-12-21

## 2018-12-14 RX ORDER — GABAPENTIN 400 MG/1
400 CAPSULE ORAL 3 TIMES DAILY
Qty: 21 CAP | Refills: 0 | Status: SHIPPED | OUTPATIENT
Start: 2018-12-14 | End: 2018-12-21

## 2018-12-14 RX ORDER — ALLOPURINOL 100 MG/1
100 TABLET ORAL DAILY
Qty: 7 TAB | Refills: 0 | Status: SHIPPED | OUTPATIENT
Start: 2018-12-15 | End: 2019-03-07 | Stop reason: CLARIF

## 2018-12-14 RX ORDER — LISINOPRIL 20 MG/1
20 TABLET ORAL DAILY
Qty: 7 TAB | Refills: 0 | Status: SHIPPED | OUTPATIENT
Start: 2018-12-15 | End: 2018-12-22

## 2018-12-14 RX ORDER — DULOXETIN HYDROCHLORIDE 30 MG/1
90 CAPSULE, DELAYED RELEASE ORAL DAILY
Qty: 21 CAP | Refills: 0 | Status: SHIPPED | OUTPATIENT
Start: 2018-12-15 | End: 2018-12-22

## 2018-12-14 RX ORDER — PRAZOSIN HYDROCHLORIDE 1 MG/1
3 CAPSULE ORAL EVERY EVENING
Qty: 21 CAP | Refills: 0 | Status: SHIPPED | OUTPATIENT
Start: 2018-12-14 | End: 2018-12-21

## 2018-12-14 RX ADMIN — HYDROCODONE BITARTRATE AND ACETAMINOPHEN 1 TABLET: 5; 325 TABLET ORAL at 04:27

## 2018-12-14 RX ADMIN — HYDROCODONE BITARTRATE AND ACETAMINOPHEN 1 TABLET: 5; 325 TABLET ORAL at 08:30

## 2018-12-14 RX ADMIN — ACETAMINOPHEN 325 MG: 325 TABLET, FILM COATED ORAL at 04:27

## 2018-12-14 RX ADMIN — ENOXAPARIN SODIUM 40 MG: 100 INJECTION SUBCUTANEOUS at 04:29

## 2018-12-14 RX ADMIN — ALLOPURINOL 100 MG: 100 TABLET ORAL at 04:27

## 2018-12-14 RX ADMIN — MAGNESIUM GLUCONATE 500 MG ORAL TABLET 400 MG: 500 TABLET ORAL at 04:27

## 2018-12-14 RX ADMIN — GABAPENTIN 400 MG: 400 CAPSULE ORAL at 04:27

## 2018-12-14 RX ADMIN — OMEPRAZOLE 20 MG: 20 CAPSULE, DELAYED RELEASE ORAL at 04:29

## 2018-12-14 RX ADMIN — DULOXETINE HYDROCHLORIDE 90 MG: 60 CAPSULE, DELAYED RELEASE ORAL at 04:28

## 2018-12-14 RX ADMIN — CELECOXIB 100 MG: 100 CAPSULE ORAL at 04:29

## 2018-12-14 ASSESSMENT — PAIN SCALES - GENERAL
PAINLEVEL_OUTOF10: 7
PAINLEVEL_OUTOF10: 6
PAINLEVEL_OUTOF10: 5
PAINLEVEL_OUTOF10: 5

## 2018-12-14 ASSESSMENT — ENCOUNTER SYMPTOMS
SHORTNESS OF BREATH: 0
VOMITING: 0
NAUSEA: 0
BACK PAIN: 1
DEPRESSION: 1
DIARRHEA: 0
CHILLS: 0

## 2018-12-14 NOTE — PROGRESS NOTES
"0945 Pt states he wants to leave, \"Give me my coat, im leaving, I'm done with this place, they treat me like a dog\". I asked the patient where he's going and he says \"You don't need to know, I got a home\". Pt is angry but states he will not hurt himself. Pt was calm as of this morning and was not threatening to leave.     0950 Paged MD Resident.Updated  & charge.     1012 MD called back and they said they'll come and see the patient.     1027 MD is in the room talking to the patient, patient yells at the doctor \"give me my clothes\"    1030 spoke to MD and will follow up with .    1045 Called guest relations about his belongings.     1115 pt is looking for his jacket. Called guest relations again. Pt is wanting to leave now. Pt is frustrated.     1120 safekeeping returned pt's money. Security states they don't have any belongings for this patient.    1130 pt states he still has a jacket and wallet, no documentation was noted on personal belongings.     1207 returned patient belongings.     1215 educated pt on making sure he gets his medication before leaving. Pt asked \"what is it?\" This nurse states that its his blood pressure pills, seroquel, etc\" He states \"i dont care i'll get my own medications\"    1244 pt left, RN unaware. Charge nurse and  informed.   "

## 2018-12-14 NOTE — CARE PLAN
"Problem: Safety  Goal: Will remain free from injury  Outcome: PROGRESSING AS EXPECTED  Pt is free from harm or any injury. Provided safety instruction. Pt is steady on feet.     Problem: Discharge Barriers/Planning  Goal: Patient's continuum of care needs will be met  Outcome: PROGRESSING AS EXPECTED      Problem: Pain Management  Goal: Pain level will decrease to patient's comfort goal  Outcome: PROGRESSING AS EXPECTED  Pt states \" I feel a lot better, better than before\". Medication has been helping with his back pain and ambulation as well.       "

## 2018-12-14 NOTE — DISCHARGE PLANNING
Medical Social Work  PC from [facility]  Gabapentin  Seroquel  Lisinopril  Duloxetine  Prazosin    Faxed approved services to [facility]

## 2018-12-14 NOTE — DISCHARGE PLANNING
Medical Social Work  Patient is wanting to leave now.  Physician is requesting [facility] fill patients Rx  [facility]

## 2018-12-14 NOTE — PROGRESS NOTES
Chris Leggett patient has chosen to leave the hospital against medical advice. The attending physician has not discharged the patient without his medications. Patient is not a risk to himself or others. I have discussed with the patient the following:  Risks and consequences of leaving the hospital too soon.      Discharge against medical advice form has been Patient left abruptly - no chance to sign.      Patient is a greater than 60 years old  and a referral to  was made.      Attending physician has been notified.      RN unaware of patient leaving the floor.

## 2018-12-14 NOTE — PROGRESS NOTES
"Pt alert and oriented. Pt denies suicidal ideation at this time. Pt states \"I feel much better now that no one is staring at me all day\" 1:1 sitter discontinued earlier this evening. Pt medicated for pain per MAR. Pt tolerated night time medications. Call light in reach, all needs met at this time.   "

## 2018-12-14 NOTE — CARE PLAN
Problem: Pain Management  Goal: Pain level will decrease to patient's comfort goal  Outcome: PROGRESSING AS EXPECTED  Pt medicated for pain per MAR. Pt having pain in back, expresses pain relief with medication.     Problem: Psychosocial Needs:  Goal: Level of anxiety will decrease  Outcome: PROGRESSING AS EXPECTED  Pt denies suicidal ideation at this time.

## 2018-12-15 LAB — EKG IMPRESSION: NORMAL

## 2018-12-15 NOTE — ED NOTES
"Pt refusing to take clothes off for gown placement, states \"yawl lost all my close and my marijuana last time I was here \"  "

## 2018-12-15 NOTE — DISCHARGE PLANNING
"LSW received PC from RN that ERP requested LSW to meet w/ pt to discuss dc plan. LSW met w/ pt at bedside and pt immediately started saying, \"I'm tired of living, I have no family, no one fucking cares about me\". LSW asked pt if he had thoughts of suicide and pt stated, \"yes\". LSW asked the pt if he had a plan and pt stated, \"no I don't have a plan but I don't want to live anymore\". Pt repeatedly stated, \"I want to die but I don't want to die\". Pt reports that he watched his family members die as he was their personal caregiver. 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 Pt lived in [facility] all his life and has a nephew, Darío, who is a  but pt requests that Darío not be notified. LSW questioned pt about leaving AMA early today and pt stated he left because \"they weren't treating me right\". Pt admitted to walking out of the hospital but reports that he came back to the [facility] because he couldn't walk. LSW asked the pt where he wanted to go and the pt stated, \"I want to go back to [facility], they treated me good there\". Pt reports a monthly income of $824.00.     LSW provided update to RN and ERP.   "

## 2018-12-15 NOTE — PROGRESS NOTES
See 12/14 resident note  Left AMA    [facility] ATTENDING NOTE:   Mark Lundberg MD      Visit Time:   Attending/resident bedside rounds 9-11:30 AM     PATIENT ID  Name:             Chris Leggett   YOB: 1950  Age:                 68 y.o.  male   MRN:               [id number]  Admit:  12/5/2018     I saw and examined the patient and discussed the management with the resident staff.  I reviewed the resident's note and agree with the resident's findings and plan as documented in the resident's note except as documented in the attending note. Please reference resident daily note for complete information.    The chart was reviewed and summarized.  Available labs, imaging, O2 sats ,  EKGs were reviewed. Available nursing, consultant, and resident notes were reviewed. I am actively involved in the patient's care.                                                                            ______________________________________________________________________            68(   )  INTERVAL:  Chart reviewed/summarized,            Dec 14AM: BP, HSR stable, LEFT AMA , has capacity, recommended further hospitalization while we are working on getting him outpatent meds and followup, plans to return to [place], left w/o waiting for meds or social service help, no immediate life threathing medical problems,  Is NOT on legal hold per psychiatry though meds and outpatient followup recommended   WBC 2.6, HB 10, ANC  1280,       Dec 13AM: AF, H R86, , 96% RA - continued various pain complaints, fucntion is improved, better on current regimen generally, needs psych medds, legal hold (clarify ?) -- ideally SNF as patient will likey decompensate of his current regimen  - ? Urine complaints since admission, flomax trial, no sepsis, UA on admission was normal , doubt this is UTI, prob BPH, back pain likley MSK unless SIRS/sepsis findings   DATA: WBC 2.3, HB 10 (11), PTL 92 (87) , ANC 1230, ALC  640, Na 132, K 4.1, CO2 27, , CR 0.56, LT wnl, ALB 3.1,   NURSING: chronic refractory pain, mobiliyt 24 /24   Plan: alloupurinol, celebex, cymbalta, FE, gabapentin, MG, PPI, prazosin /seroquel /trazodone (PSYCH) , MG , is on oxycodone 5 /day (MSE 35+)/chronic use not an option here w/o f/u (function has improved, not so much pain) , use 8 days now (start reducing) --> hydrocodone Q4h (MSE = 30MSE)          Impression:       * intractable back /diffuse pain, SI, chronic pain, ETOH abuse,  hx Cspine ORIF Sept 2018 - - MRI: diffuse vertebral DJD, myelomalacia Cspine cord, L5 neural forminal narrowing,  not doing well, uncontrolled pain, falls, significant psychosocial stressors not helping  Plan: pain mangagment, PT, OT, Rehab, neurosurg f/u  (per NS no acute options)   * SI: no legal hold per psych, trazadone QHS, cymbalata 90mg, prazosin 3mp PO Qhs, seroquel 125 QHS  * ETOh use disorder, no CHRISSIE      * chronic HTN, ETOH: ACE, volume, lyte repair  - improved   * tobacco, ETOH, THC use disorder   * pancytopenia, prob ETOH /med /nutritional related, observe, no bleeding noted , replace B12, HIV neg  * chronic HypoNa, normal GFR/fluid status, prob pain related ADH, high UOSM c/w SIADH/pain/ETOH, seroquel  ?  , observe if not s/s , free water restriction if tolerated   * Barriers to DC, chronic pain, chronic psych disorder, homeless, no insurance, noncompliant with meds when not in hospital ($$$)      MEDS: , enox LD, MG BID, , RT , Pain/CNS (tylenol, celebrex, PPI, oxycodone, neurontin, prazosin, seroquel ), CV (ACE< nicoderm)     CORE:  Code Status (  FULL  --------------------------------------------------------------------------------------------------  Hospital Summary/ Patient System Review      NP:   *admit(  UTOX: THC, chronic ETOH abuse, SI due to pain? ,  ER:  nonfocal, no myelopathy , BA 0.037 , able to raise arms to 90 degrees but no further, subjective parathesias postop, RLE swollen > LLE, pain related  decreased movement  gabapentin, seroquel, prazosin, duloxetine on recent DC ?  -  apparently of no benefit   Psych: MDD, ANNIE, ETOH/THC, adjustement disorder -- restart meds (CYMBALTA,Prazosin, Seroquel)   Impression: SI, ETOH use disorder (acute/chronic), no CHRISSIE currently   -  radiculomyopathy/surgical needs to be excluded   Plan: legal hold, CHRISSIE precautions, thiamine, restart chronic CNS meds when appropriate      EENT:   *admit(  MP2     MSK/PAIN:   *admit(  chronic gout, DJD spine, hx C5C6 ORKFacute/chronic LBP, acute pain crisis post GLF, Narchex: no data, ER: ROM exam limite by pain, mostly soft tissue reproducible pain, no meylopathy , swelling of right knee, ankle, decreased ROM due to pain, pulses OK , ESR 21, Uric acid 5.3   CT Cspine (Oct 2018: DJD, ASVD) , MRI: no myelopathy  Dec 6 ankle: old nondisplaced transverse fx tip of distal fibula ,  no acute fracture repeat on DEC 14; now displaced/communited  Impression: intractable vertebral, back, leg  pain, failed rehab /pain management, complicated by ETOH use,  RLE knee, leg, ankle edema gout ? vs trauma, repeat films shows reinjury, fracture new or reinjury of old ankle injury , ESR somewhat against gout, low uric acid does not preclude acute gout   Plan: tylenol, celebrex, prn narcotics, chronic allopurinol, orthopedic boot, ortho input      CVS:   *admit(  tach, , no cardiomyopathy  US LE: negative for DVT     PULM:   *admit(  smoker,  RA > 90%< no rales/wheezing  Impression: tobacco use disorder, no CLD apparent     GI:   *admit(  no NVDC/no GIB, abd soft,  AST 46, ALT 17, ALP 64, BR 0.7, ALB 3.7 , ER: abd soft, no cirrhosis, refusing SAVI, no incontience, , stool heme NEGATIVE      '  * admit (dysuria, neg UA, PSA 1.69) , observe     RENAL:   *admit( dysuria,  Na 131, K 4.2, CO2 22, BS 94, BUN 10, c R0.59, CA 9, AlB 3.7, UA: 0-2 WBc, 0-2 RBC  Impression: neg UA, r/o BPH  Plan: BSA, alpha blocker (is on prazosin)      HEME:   *admit(  WBC 8.5,  HB 12, ,  , Ferritn 224, IRON (39, TIBC 255, 15%) , MG 1.7 , B12 375 (replace PO) , folate 12  Impression: NN anemia, is not FE def     ENDO:   *admit(    Impression: preDM?      DERM:   *admit(       ID:   *admit(  no SIRS

## 2018-12-15 NOTE — ED TRIAGE NOTES
Pt to rm 24 per ems, pt found at local [facility] loitering refusing to walk due to chronic pains, pt states AMA from [facility] earlier today, resp even/unlabored, denies c/p, fever, pt noncompliant with htn hx, pt admits to smoking/etoh use/marijuana use

## 2018-12-15 NOTE — ED NOTES
"Pt refusing crutches, states \"i will just use my cane\", amb with steady gait in room to get dressed  "

## 2018-12-15 NOTE — ED PROVIDER NOTES
ED Provider Note    Scribed for Tony Andrade M.D. by Darío Mclain. 2018, 7:38 PM.    Primary care provider: Pcp Pt States None  Means of arrival: EMS  History obtained from: Patient  History limited by: None    CHIEF COMPLAINT  Chief Complaint   Patient presents with   • Leg Pain     chronic       HPI  Chris Leggett is a 68 y.o. male with a history of spinal fracture who presents to the Emergency Department complaining of right leg pain which has been chronic since 2018 after having neck surgery. Patient states he was in the hospital over the last 10 days for leg pain and SI. He reports being treated with trazodone and other medications to help him sleep during the hospitalization. Patient left against medical advice this morning. He was able to walk to the bus and [place]. He is in the ED today complaining of the same chronic right leg pain. He also reports chronic back pain, right ankle pain, and feet pain. Patient states he was put in a nursing home for two months after the neck surgery in September. He notes he has been falling frequently since the surgery. He reports chronic bilateral arm numbness since having the surgery. Negative SI. No complaints of nausea, vomiting, diarrhea, chills, chest pain, or shortness of breath. He currently does not have a home. He reports having ongoing depression from social issues. States his wife and child  in his arms.      REVIEW OF SYSTEMS  Review of Systems   Constitutional: Negative for chills.   Respiratory: Negative for shortness of breath.    Cardiovascular: Negative for chest pain.   Gastrointestinal: Negative for diarrhea, nausea and vomiting.   Musculoskeletal: Positive for back pain.        Positive chronic right leg pain, feet pain, right ankle pain   Neurological:        Positive chronic bilateral arm numbness   Psychiatric/Behavioral: Positive for depression. Negative for suicidal ideas.   All other systems  "reviewed and are negative.      PAST MEDICAL HISTORY   has a past medical history of Hypertension.    SURGICAL HISTORY   has a past surgical history that includes cervical disk and fusion anterior (9/2/2018) and other orthopedic surgery.    SOCIAL HISTORY  Social History   Substance Use Topics   • Smoking status: Current Every Day Smoker     Packs/day: 0.50     Years: 5.00     Types: Cigarettes   • Smokeless tobacco: Current User   • Alcohol use Yes      History   Drug Use No       FAMILY HISTORY  History reviewed. No pertinent family history.    CURRENT MEDICATIONS  No current facility-administered medications on file prior to encounter.      Current Outpatient Prescriptions on File Prior to Encounter   Medication Sig Dispense Refill   • acetaminophen (TYLENOL) 325 MG Tab Take 1 Tab by mouth 3 times a day. 30 Tab 0   • gabapentin (NEURONTIN) 400 MG Cap Take 1 Cap by mouth 3 times a day for 7 days. 21 Cap 0   • [START ON 12/15/2018] DULoxetine (CYMBALTA) 30 MG Cap DR Particles Take 3 Caps by mouth every day for 7 days. 21 Cap 0   • [START ON 12/15/2018] lisinopril (PRINIVIL) 20 MG Tab Take 1 Tab by mouth every day for 7 days. 7 Tab 0   • prazosin (MINIPRESS) 1 MG Cap Take 3 Caps by mouth every evening for 7 days. 21 Cap 0   • QUEtiapine (SEROQUEL) 25 MG Tab Take 5 Tabs by mouth every bedtime for 7 days. 35 Tab 0   • [START ON 12/15/2018] allopurinol (ZYLOPRIM) 100 MG Tab Take 1 Tab by mouth every day. 7 Tab 0   • magnesium oxide (MAG-OX) 400 (241.3 Mg) MG Tab tablet Take 1 Tab by mouth 2 Times a Day. 7 Tab 0      ALLERGIES  No Known Allergies    PHYSICAL EXAM  VITAL SIGNS: BP (!) 182/105   Pulse 88   Temp 36.9 °C (98.4 °F)   Resp (!) 24   Ht 1.778 m (5' 10\")   Wt 83.9 kg (185 lb)   SpO2 96%   BMI 26.54 kg/m²   Constitutional: Well developed, Well nourished, No acute distress, Non-toxic appearance.   HENT: Normocephalic, Atraumatic, Bilateral external ears normal, dry mucus membranes. No oral exudates, Nose " normal.   Eyes: Pupils are equal. conjunctiva is normal, there are no signs of exudate.   Neck: Supple, no meningeal signs.  Lymphatic: No lymphadenopathy noted.   Cardiovascular: Regular rate and rhythm without murmurs gallops or rubs.   Thorax & Lungs: No respiratory distress. Breathing comfortably. Lungs are diminished but clear to auscultation bilaterally, there are no wheezes no rales. Chest wall is nontender.  Abdomen: Soft, nontender, nondistended. Bowel sounds are present.   Skin: Warm, Dry, No erythema, See Musculoskeletal section.  Back: Back tenderness throughout but able to sit up without assistance, No CVA tenderness.  Musculoskeletal: Good range of motion in all major joints. Right ankle tenderness, ecchymosis to anterior aspect of right ankle. 0-1+ pitting edema to bilateral lower extremities. No major deformities noted. Intact distal pulses, no clubbing, no cyanosis  Neurologic: Alert & oriented x 3, Moving all extremities. No gross abnormalities.  Does have strength 5/5 throughout.  Psychiatric: As above      LABS    All labs reviewed by me.    RADIOLOGY  DX-ANKLE 3+ VIEWS RIGHT   Final Result      1.  Comminuted and mildly displaced distal RIGHT fibular fracture involving the metaphysis and lateral malleolus.   2.  Mild widening of medial mortise.   3.  Moderate to severe degenerative changes.        The radiologist's interpretation of all radiological studies have been reviewed by me.    COURSE & MEDICAL DECISION MAKING  Pertinent Labs & Imaging studies reviewed. (See chart for details)    Obtained and reviewed past medical records. Reviewed back xrays, MRI, and other imaging from most recent hospitalization on 12/5/18.    7:38 PM - Patient seen and examined at bedside. Patient presents to the ED with ongoing chronic pain after leaving the hospital against medical advice. Discussed the consequences of leaving against medical advice. Discussed plan of care which includes repeat xray evaluation and  "consult with social work. Patient understands and agrees to plan. Ordered DX ankle right to evaluate his symptoms. The differential diagnoses include but are not limited to: malingering, ankle contusion, fracture.    9:43 PM -social workers evaluated the patient please refer to their note.  Patient states he wants to go to [facility] and when I told him that he needs to be able to walk to go there he states \"I can walk\".      Decision Making:  Patient presents ED for evaluation.  Clinically the patient appears well.  The patient is very depressed states that he is suicidal but then states that he does not want to die.  Patient had spent a long time in the hospital I did evaluate all of the medical records the patient had MRIs of his whole spine there is no acute findings there is nonspecific degenerative changes.  He did have ultrasounds of his legs there is no signs of DVTs.  X-ray does show an old fracture of the distal fibula.  X-ray today is as above.  Comparing it to the prior x-ray did have a fracture on the prior x-ray this is most likely the same fracture.  I will treated as if it is a fresh new fracture placed the patient into a walking boot with crutches.  The patient is to follow-up with orthopedics.  Patient however is very depressed he has \"no place to go.\"  Initially stated that he was suicidal then he stated I do not want to die.  At this point the patient is not suicidal does not have a plan.  I did have social work evaluate the patient he states that he has been at [facility] before and he would like to go there.  Currently I do not feel the patient is a threat to himself because he came to the hospital so we will attempt to transfer the patient to read at [facility] for an evaluation.  The patient is medically cleared for discharge.  Again I reviewed all the patient's laboratory studies MRIs and I see no current major concerns other than his social situation.  I " did have a discussion with him and was very direct with the patient about his situation and in all honesty this is socially does not have a home or a place to stay.  Currently the patient does feel very depressed he does not wish to die at this time he is currently not suicidal does not have a plan but he states that if he cannot find a place or if he is having too much pain he might become so.         FINAL IMPRESSION  1. Other closed fracture of distal end of right fibula, initial encounter    2. Depression, unspecified depression type    3. Other chronic pain          Darío PERSAUD (Shilpiibedna), am scribing for, and in the presence of, Tony Andrade M.D..    Electronically signed by: Darío Mclain (Delilah), 12/14/2018    ITony M.D. personally performed the services described in this documentation, as scribed by Darío Mclain in my presence, and it is both accurate and complete. C    The note accurately reflects work and decisions made by me.  Tony Andrade  12/14/2018  9:59 PM

## 2018-12-20 NOTE — DISCHARGE SUMMARY
DC summary by Hollie Herrera    [facility] [place] INTERNAL MEDICINE ATTENDING NOTE:   Mark Lundberg MD        Visit Time:   Attending/resident bedside rounds 9-11:30 AM     PATIENT ID    Name:             Chris Leggett   YOB: 1950  Age:                 68 y.o.  male     MRN:               [id number]  Admit:              12/5/2018      I saw and examined the patient and discussed the management with the resident staff.  I reviewed the resident's note and agree with the resident's findings and plan as documented in the resident's note except as documented in the attending note. Please reference resident daily note for complete information.     The chart was reviewed and summarized.  Available labs, imaging, O2 sats ,  EKGs were reviewed. Available nursing, consultant, and resident notes were reviewed. I am actively involved in the patient's care.                   DC time 45 minutes                                                           ______________________________________________________________________                 68(   )  INTERVAL:  Chart reviewed/summarized,             Dec 14AM: BP, HSR stable, LEFT AMA , has capacity, recommended further hospitalization while we are working on getting him outpatent meds and followup, plans to return to [place], left w/o waiting for meds or social service help, no immediate life threathing medical problems,  Is NOT on legal hold per psychiatry though meds and outpatient followup recommended   WBC 2.6, HB 10, ANC  1280,       Dec 13AM: AF, H R86, , 96% RA - continued various pain complaints, fucntion is improved, better on current regimen generally, needs psych medds, legal hold (clarify ?) -- ideally SNF as patient will likey decompensate of his current regimen  - ? Urine complaints since admission, flomax trial, no sepsis, UA on admission was normal , doubt this is UTI, prob BPH, back pain likley MSK unless SIRS/sepsis findings   DATA: WBC 2.3,  HB 10 (11), PTL 92 (87) , ANC 1230, , Na 132, K 4.1, CO2 27, , CR 0.56, LT wnl, ALB 3.1,   NURSING: chronic refractory pain, mobiliyt 24 /24   Plan: alloupurinol, celebex, cymbalta, FE, gabapentin, MG, PPI, prazosin /seroquel /trazodone (PSYCH) , MG , is on oxycodone 5 /day (MSE 35+)/chronic use not an option here w/o f/u (function has improved, not so much pain) , use 8 days now (start reducing) --> hydrocodone Q4h (MSE = 30MSE)          Impression:        * intractable back /diffuse pain, SI, chronic pain, ETOH abuse,  hx Cspine ORIF Sept 2018 - - MRI: diffuse vertebral DJD, myelomalacia Cspine cord, L5 neural forminal narrowing,  not doing well, uncontrolled pain, falls, significant psychosocial stressors not helping  Plan: pain mangagment, PT, OT, Rehab, neurosurg f/u  (per NS no acute options)   * SI: no legal hold per psych, trazadone QHS, cymbalata 90mg, prazosin 3mp PO Qhs, seroquel 125 QHS  * ETOh use disorder, no CHRISSIE      * chronic HTN, ETOH: ACE, volume, lyte repair  - improved   * tobacco, ETOH, THC use disorder   * pancytopenia, prob ETOH /med /nutritional related, observe, no bleeding noted , replace B12, HIV neg  * chronic HypoNa, normal GFR/fluid status, prob pain related ADH, high UOSM c/w SIADH/pain/ETOH, seroquel  ?  , observe if not s/s , free water restriction if tolerated   * Barriers to DC, chronic pain, chronic psych disorder, homeless, no insurance, noncompliant with meds when not in hospital ($$$)      MEDS: , enox LD, MG BID, , RT , Pain/CNS (tylenol, celebrex, PPI, oxycodone, neurontin, prazosin, seroquel ), CV (ACE< nicoderm)     CORE:  Code Status (  FULL  --------------------------------------------------------------------------------------------------  Hospital Summary/ Patient System Review      NP:   *admit(  UTOX: THC, chronic ETOH abuse, SI due to pain? ,  ER:  nonfocal, no myelopathy , BA 0.037 , able to raise arms to 90 degrees but no further, subjective parathesias  postop, RLE swollen > LLE, pain related decreased movement  gabapentin, seroquel, prazosin, duloxetine on recent DC ?  -  apparently of no benefit   Psych: MDD, ANNIE, ETOH/THC, adjustement disorder -- restart meds (CYMBALTA,Prazosin, Seroquel)   Impression: SI, ETOH use disorder (acute/chronic), no CHRISSIE currently   -  radiculomyopathy/surgical needs to be excluded   Plan: legal hold, CHRISSIE precautions, thiamine, restart chronic CNS meds when appropriate      EENT:   *admit(  MP2     MSK/PAIN:   *admit(  chronic gout, DJD spine, hx C5C6 ORKFacute/chronic LBP, acute pain crisis post GLF, Narchex: no data, ER: ROM exam limite by pain, mostly soft tissue reproducible pain, no meylopathy , swelling of right knee, ankle, decreased ROM due to pain, pulses OK , ESR 21, Uric acid 5.3   CT Cspine (Oct 2018: DJD, ASVD) , MRI: no myelopathy  Dec 6 ankle: old nondisplaced transverse fx tip of distal fibula ,  no acute fracture repeat on DEC 14; now displaced/communited  Impression: intractable vertebral, back, leg  pain, failed rehab /pain management, complicated by ETOH use,  RLE knee, leg, ankle edema gout ? vs trauma, repeat films shows reinjury, fracture new or reinjury of old ankle injury , ESR somewhat against gout, low uric acid does not preclude acute gout   Plan: tylenol, celebrex, prn narcotics, chronic allopurinol, orthopedic boot, ortho input      CVS:   *admit(  tach, , no cardiomyopathy  US LE: negative for DVT     PULM:   *admit(  smoker,  RA > 90%< no rales/wheezing  Impression: tobacco use disorder, no CLD apparent     GI:   *admit(  no NVDC/no GIB, abd soft,  AST 46, ALT 17, ALP 64, BR 0.7, ALB 3.7 , ER: abd soft, no cirrhosis, refusing SAVI, no incontience, , stool heme NEGATIVE      '  * admit (dysuria, neg UA, PSA 1.69) , observe     RENAL:   *admit( dysuria,  Na 131, K 4.2, CO2 22, BS 94, BUN 10, c R0.59, CA 9, AlB 3.7, UA: 0-2 WBc, 0-2 RBC  Impression: neg UA, r/o BPH  Plan: BSA, alpha blocker (is on  prazosin)      HEME:   *admit(  WBC 8.5, HB 12, ,  , Ferritn 224, IRON (39, TIBC 255, 15%) , MG 1.7 , B12 375 (replace PO) , folate 12  Impression: NN anemia, is not FE def     ENDO:   *admit(    Impression: preDM?      DERM:   *admit(       ID:   *admit(  no SIRS                              Internal Medicine Discharge Summary  Note Author: Mark Lundberg M.D.       Name Chris Leggett     1950   Age/Sex 68 y.o. male   MRN [id number]       LEFT AGAINST MEDICAL ADVICE (AMA)      Admit Date:  2018       Discharge Date:   2018    Service:   [facility] Internal Medicine Blue Team  Attending Physician(s):  Dr. Lundberg  Senior Resident(s):  Dr. Suárez  [place]ent[place] Resident(s):   Dr. Ramires  PCP: Pcp Pt States None      Primary Diagnosis:   Lumbar radioculopathy    Secondary Diagnoses:                Principal Problem:    Lumbar radiculopathy POA: Unknown  Active Problems:    Pain and swelling of right lower extremity POA: Unknown    Cervical spine fracture (HCC) POA: Yes      Overview: Fractures of the C4 and C5 spinous processes       Fracture of the anterior/superior vertebral body and C6 with a small       avulsed fragment. There is also fracture of the right uncinate process.      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 Neurosurgery    Normocytic anemia POA: Yes    Hyponatremia POA: Yes      Overview: Trend       9/3 135      Hypertension POA: Yes    Mood disorder (HCC) POA: Yes  Resolved Problems:    Suicidal ideation POA: Yes    Alcohol intoxication (HCC) POA: Yes      Overview: Monitor for signs withdrawal          Dysuria POA: Yes      Hospital Summary (Brief Narrative):       68 years old male with past medical history of neck pain status post internal fixation recently done in 2018, chronic back pain, chronic right hip pain presented with  "exacerbation of lower back pain.     Patient was recently discharged from [facility] however was non-adherent to medication management. His lower back pain subsequently worsened. He had a mechanical fall while walking therefore presented to the hospital. On admission patient was noticed to have swelling over the right lower extremity. DVT workup was negative. His spine was evaluated. MRI of spine completed and neurosurgery consulted. Per recommendations, plan for symptomatic management and to follow-up as outpatient for potential surgical intervention    During hospitalization, patient endorsed feelings of depression. Psychiatry was consulted. Legal hold was not initated. His psychiatric medications were titrated to effect.     On 12/14/2018 around 9 to 9:30 am, patient became increasing agitated, claiming the \"CNAs are treating me like a dog\". When asked patient to elaborate he refused to answer and requested for his clothes so he can leave. Patient was recommended to continue to stay however patient was adamant for discharge. We then offered patient that he should wait a few hours so we can arrange discharge with psychiatric medications. However patient did not wait for these medications therefore left against medical advice.     At the time that patient left against medical advice, he was ambulatory, had capacity.      Other Medical Issues  - Hyponatremia: appears to be chronic, asymptomatic. May be contributed due to psychotropic medications  - Hypertension: stable on lisinopril  - Normocytic anemia: Fecal occult was negative, will likely need outpatient colonoscopy if not already done      Consultants:     Psychiatry:   Neurosurgery:     Procedures:        None    Imaging/ Testing:      DX-LUMBAR SPINE-BEND OR FLEX-EXT   Final Result      Multilevel degenerative changes.   Grade 1 L5 anterolisthesis.   No instability on lateral flexion-extension views.      DX-CERVICAL SPINE-2 OR 3 VIEWS   Final " Result      Postsurgical change without evidence of hardware failure or loosening.      Disc space narrowing and uncovertebral joint arthropathy as described above.      No evidence of malalignment or abnormal motion.      DX-CERVICAL SPINE-FLX-EXT ONLY   Final Result      Postsurgical change without evidence of hardware failure or loosening.      Disc space narrowing and uncovertebral joint arthropathy as described above.      No evidence of malalignment or abnormal motion.      MR-LUMBAR SPINE-W/O   Final Result      1.  Grade 1 spondylolysis of L5 on S1.   2.  Severe bilateral L5 neural foraminal stenosis.   3.  Multifocal degenerative disease as described above.      MR-THORACIC SPINE-W/O   Final Result      1.  Mild degenerative disease in the thoracic spine as described above.   2.  The previously seen prevertebral hematoma/edema is resolved.      MR-CERVICAL SPINE-W/O   Final Result      1.  Multilevel degenerative disease as described above.   2.  Moderate central canal stenosis at C2-3, C5-6 and C6-7.   3.  There is increased intramedullary T2 signal intensity at the level of C5-6 likely representing myelomalacia. This may be secondary to the previous cord injury.   4.  Post surgical changes as described above.      OUTSIDE IMAGES-DX LOWER EXTREMITY, RIGHT   Final Result      US-EXTREMITY VENOUS LOWER BILAT   Final Result      DX-ANKLE 2- VIEWS RIGHT   Final Result      1.  Old appearing transverse fracture through the tip of the distal right fibula.      2.  No acute right ankle fracture or dislocation.            Discharge Medications:         Medication Reconciliation: Completed       Medication List      START taking these medications      Instructions   acetaminophen 325 MG Tabs  Commonly known as:  TYLENOL   Take 1 Tab by mouth 3 times a day.  Dose:  325 mg     allopurinol 100 MG Tabs  Commonly known as:  ZYLOPRIM   Take 1 Tab by mouth every day.  Dose:  100 mg     magnesium oxide 400 (241.3 Mg) MG  Tabs tablet  Commonly known as:  MAG-OX   Take 1 Tab by mouth 2 Times a Day.  Dose:  400 mg        CHANGE how you take these medications      Instructions   gabapentin 400 MG Caps  What changed:  · medication strength  · how much to take  · when to take this  Commonly known as:  NEURONTIN   Take 1 Cap by mouth 3 times a day for 7 days.  Dose:  400 mg     prazosin 1 MG Caps  What changed:  when to take this  Commonly known as:  MINIPRESS   Take 3 Caps by mouth every evening for 7 days.  Dose:  3 mg     QUEtiapine 25 MG Tabs  What changed:  medication strength  Commonly known as:  SEROQUEL   Take 5 Tabs by mouth every bedtime for 7 days.  Dose:  125 mg        CONTINUE taking these medications      Instructions   DULoxetine 30 MG Cpep  Commonly known as:  CYMBALTA   Take 3 Caps by mouth every day for 7 days.  Dose:  90 mg     lisinopril 20 MG Tabs  Commonly known as:  PRINIVIL   Take 1 Tab by mouth every day for 7 days.  Dose:  20 mg            Med rec was completed however patient did not wait for the medications to be provided for him, thus leaving against medical advice       Disposition:  Left against medical advice    Diet:   As tolerated    Activity:   As tolerated    Instructions:      Follow-up with psychiatry and neurosurgery as outpatient    The patient was instructed to return to the ER in the event of worsening symptoms. I have counseled the patient on the importance of compliance and the patient has agreed to proceed with all medical recommendations and follow up plan indicated above.   The patient understands that all medications come with benefits and risks. Risks may include permanent injury or death and these risks can be minimized with close reassessment and monitoring.        Primary Care Provider:   None, unable to establish with primary care as patient left against medical advice  Discharge summary faxed to primary care provider:  Deferred  Copy of discharge summary given to the patient:  Deferred      Follow up appointment details :      Patient left against medical advice    Pending Studies:        HIV testing for persistent cytopenias      Summary of follow up issues:   HIV testing for persistent cytopenias  Follow-up with psychiatry and neurosurgery as outpatient for further management  Establish with primary care provider  Follow-up with gastroenterology for colonoscopy if not yet done for normocytic anemia    Discharge Time (Minutes) :    40 minutes  Hospital Course Type: left against medical advice      Condition on Discharge  Left against medical advice  ______________________________________________________________________    Interval history/exam for day of discharge:    Patient was seen and examined prior to earlier in the morning, prior to patient leaving against medical advice. Patient was resting comfortable with no specific complaints. Continued to endorse ankle pain    Review of Systems  Constitutional: Denies fevers or chills   Cardiovascular: Denies chest pain or palpitations   Respiratory: Denies shortness of breath , Denies cough  Gastrointestinal/Hepatic: Denies abdominal pain, nausea, vomiting  Genitourinary: Denies bladder dysfunction, dysuria or frequency  Musculoskeletal/Rheum: complains of ankle pain, pain is tolerable with current pain regimen  Skin/Breast: Denies rash   Neurological: Denies headache.     Physical Exam: done prior to the time of patient leaving against medical advice  HEENT: grossly normal. Conjunctiva normal. Oropharynx moist, poor dentition  Cardiovascular: Normal heart rate, Normal rhythm   Lungs: Respiratory effort is normal. Normal breath sounds  Abdomen: Bowel sounds normal, Soft, No tenderness  Skin: No erythema, No rash. Continues to have hematoma over the right ankle  Lower limbs: hematoma over right ankle, left ankle is normal   Neurologic: Alert & oriented x 3, Normal motor function, No focal deficits noted, cranial nerves II through XII are  normal  PSY: normal affect      Most Recent Labs:    Lab Results   Component Value Date/Time    WBC 2.6 (L) 12/14/2018 01:49 AM    RBC 3.28 (L) 12/14/2018 01:49 AM    HEMOGLOBIN 10.3 (L) 12/14/2018 01:49 AM    HEMATOCRIT 31.6 (L) 12/14/2018 01:49 AM    MCV 96.3 12/14/2018 01:49 AM    MCH 31.4 12/14/2018 01:49 AM    MCHC 32.6 (L) 12/14/2018 01:49 AM    MPV 11.5 12/14/2018 01:49 AM    NEUTSPOLYS 48.40 12/14/2018 01:49 AM    LYMPHOCYTES 32.20 12/14/2018 01:49 AM    MONOCYTES 13.30 12/14/2018 01:49 AM    EOSINOPHILS 5.30 12/14/2018 01:49 AM    BASOPHILS 0.80 12/14/2018 01:49 AM      Lab Results   Component Value Date/Time    SODIUM 130 (L) 12/14/2018 01:49 AM    POTASSIUM 4.5 12/14/2018 01:49 AM    CHLORIDE 99 12/14/2018 01:49 AM    CO2 27 12/14/2018 01:49 AM    GLUCOSE 100 (H) 12/14/2018 01:49 AM    BUN 12 12/14/2018 01:49 AM    CREATININE 0.58 12/14/2018 01:49 AM      Lab Results   Component Value Date/Time    ALTSGPT 20 12/14/2018 01:49 AM    ASTSGOT 39 12/14/2018 01:49 AM    ALKPHOSPHAT 54 12/14/2018 01:49 AM    TBILIRUBIN 0.3 12/14/2018 01:49 AM    LIPASE 32 11/05/2018 12:20 PM    ALBUMIN 3.1 (L) 12/14/2018 01:49 AM    GLOBULIN 2.1 12/14/2018 01:49 AM    INR 1.24 (H) 11/05/2018 12:20 PM     Lab Results   Component Value Date/Time    PROTHROMBTM 15.7 (H) 11/05/2018 12:20 PM    INR 1.24 (H) 11/05/2018 12:20 PM

## 2019-01-03 ENCOUNTER — HOSPITAL ENCOUNTER (EMERGENCY)
Facility: MEDICAL CENTER | Age: 69
End: 2019-01-03
Attending: EMERGENCY MEDICINE
Payer: MEDICARE

## 2019-01-03 VITALS
RESPIRATION RATE: 18 BRPM | HEART RATE: 85 BPM | SYSTOLIC BLOOD PRESSURE: 148 MMHG | DIASTOLIC BLOOD PRESSURE: 88 MMHG | OXYGEN SATURATION: 94 % | TEMPERATURE: 98.2 F

## 2019-01-03 DIAGNOSIS — F10.920 ALCOHOLIC INTOXICATION WITHOUT COMPLICATION (HCC): ICD-10-CM

## 2019-01-03 LAB
AMPHET UR QL SCN: NEGATIVE
BARBITURATES UR QL SCN: NEGATIVE
BENZODIAZ UR QL SCN: NEGATIVE
BZE UR QL SCN: NEGATIVE
CANNABINOIDS UR QL SCN: POSITIVE
METHADONE UR QL SCN: NEGATIVE
OPIATES UR QL SCN: NEGATIVE
OXYCODONE UR QL SCN: NEGATIVE
PCP UR QL SCN: NEGATIVE
POC BREATHALIZER: 0.01 PERCENT (ref 0–0.01)
PROPOXYPH UR QL SCN: ABNORMAL

## 2019-01-03 PROCEDURE — 99285 EMERGENCY DEPT VISIT HI MDM: CPT

## 2019-01-03 PROCEDURE — 90791 PSYCH DIAGNOSTIC EVALUATION: CPT

## 2019-01-03 PROCEDURE — 80307 DRUG TEST PRSMV CHEM ANLYZR: CPT

## 2019-01-03 PROCEDURE — 302970 POC BREATHALIZER: Performed by: EMERGENCY MEDICINE

## 2019-01-03 ASSESSMENT — PAIN SCALES - GENERAL: PAINLEVEL_OUTOF10: 0

## 2019-01-04 ENCOUNTER — HOSPITAL ENCOUNTER (EMERGENCY)
Facility: MEDICAL CENTER | Age: 69
End: 2019-01-04
Attending: EMERGENCY MEDICINE
Payer: MEDICARE

## 2019-01-04 VITALS
OXYGEN SATURATION: 100 % | BODY MASS INDEX: 28.7 KG/M2 | HEART RATE: 70 BPM | SYSTOLIC BLOOD PRESSURE: 141 MMHG | TEMPERATURE: 99 F | WEIGHT: 200 LBS | DIASTOLIC BLOOD PRESSURE: 96 MMHG | RESPIRATION RATE: 18 BRPM

## 2019-01-04 DIAGNOSIS — G89.29 OTHER CHRONIC PAIN: ICD-10-CM

## 2019-01-04 DIAGNOSIS — Z59.00 HOMELESSNESS: ICD-10-CM

## 2019-01-04 LAB
AMPHET UR QL SCN: NEGATIVE
BARBITURATES UR QL SCN: NEGATIVE
BENZODIAZ UR QL SCN: NEGATIVE
BZE UR QL SCN: NEGATIVE
CANNABINOIDS UR QL SCN: POSITIVE
METHADONE UR QL SCN: NEGATIVE
OPIATES UR QL SCN: NEGATIVE
OXYCODONE UR QL SCN: NEGATIVE
PCP UR QL SCN: NEGATIVE
POC BREATHALIZER: 0 PERCENT (ref 0–0.01)
PROPOXYPH UR QL SCN: NEGATIVE

## 2019-01-04 PROCEDURE — 80307 DRUG TEST PRSMV CHEM ANLYZR: CPT

## 2019-01-04 PROCEDURE — 302970 POC BREATHALIZER: Performed by: EMERGENCY MEDICINE

## 2019-01-04 PROCEDURE — 302970 POC BREATHALIZER

## 2019-01-04 PROCEDURE — 99285 EMERGENCY DEPT VISIT HI MDM: CPT

## 2019-01-04 SDOH — ECONOMIC STABILITY - HOUSING INSECURITY: HOMELESSNESS UNSPECIFIED: Z59.00

## 2019-01-04 ASSESSMENT — PAIN SCALES - GENERAL: PAINLEVEL_OUTOF10: 4

## 2019-01-04 NOTE — ED NOTES
Pt resting in bed, breathing equal/unlabored. No distress noted. Direct observation from sitter. Will continue to monitor.

## 2019-01-04 NOTE — ED NOTES
Report received from Evonne TSANG.  Pt belongings retrieved from locker 13 to send with patient to [facility].  Pt sitting comfortably on bed awaiting transport.  Sitter outside room. Will continue to monitor.

## 2019-01-04 NOTE — ED NOTES
Patient released form [facility] this am, [place] got intoxicated and then went to security stating SI.  Placed on a legal hold.  Patient is currently intoxicated.

## 2019-01-04 NOTE — CONSULTS
"[facility]   TRIAGE ASSESSMENT    Name: Chris Leggett  MRN: [id number]  : 1950  Age: 68 y.o.  Date of assessment: 1/3/2019  PCP: Pcp Pt States None  Persons in attendance: Patient    CHIEF COMPLAINT/PRESENTING ISSUE (as stated by patient): 68 year old male, BIB EMS today d/t SI, at a casino, drinking ETOH, and verbalizing SI; pt. States \"I had quite a bit to drink today, 8 beers, and I am suicidal\"; pt. States DC 'd from [facility] ([facility]) this AM, did not f/u with DC plan to go to men's shelter; states was chronically suicidal during 16 day stay at [facility], state \"I ain't going to the shelter\"; with inpt.  at [facility] 2018; states he can stay with his nephew in a few weeks; pt denies suicide plan or intent; denies HI or h/o aggressive behaviors; alert, oriented x 4; no delusions, paranoia, or hallucinations present; irritable; resistant to accept feedback or positive encouragement from writer RN; previous [place] ED consult 18 states \"Pt a [place] resident.  Came here to [place] in September with his brother, who was dying.; During this visit, pt was intoxicated on alcohol and fell, fracturing his neck.  Pt had fixation here at [facility] and was sent to SNF 18; there for 6 weeks before dc'd to self\"; currently homeless; collects \"824/monthyl , [facility] does not have money left for the month  writer RN found DC instructions from [facility] dated yesterday which includes referral placement to [facility], med list and f/u at [facility] IOP today at 1300 and psychiatry and therapy phone screening appt with [facility] today at 0900    Chief Complaint   Patient presents with   • Suicidal Ideation   • Alcohol Intoxication        CURRENT LIVING SITUATION/SOCIAL SUPPORT: homeless, has family in [place], [place]    BEHAVIORAL HEALTH TREATMENT HISTORY  Does patient/parent report a history of prior behavioral health treatment for patient?   Yes:    Dates Level of Care Facilty/Provider " Diagnosis/Problem Medications   12/2018 x 16 days inpt [facility], DC's 1/3/19 SI, depression Cymbalta 90 mg PO Q AM, Topamax 50 mg PO BID, Seroquel 100 mg PO QHS   11/5/18 inpt [facility] SI, depression Gabapentin  Seroquel  Lisinopril  Duloxetine  Prazosin         SAFETY ASSESSMENT - SELF  Does patient acknowledge current or past symptoms of dangerousness to self? Yes-states SI, no plan, today, as does not want to go the shelter, with c/o chronic SI for 2 months  Does parent/significant other report patient has current or past symptoms of dangerousness to self? N\A  Does presenting problem suggest symptoms of dangerousness to self? Yes:     Past Current    Suicidal Thoughts: [x]  [x]    Suicidal Plans: []  []    Suicidal Intent: []  []    Suicide Attempts: []  []    Self-Injury []  []      For any boxes checked above, provide detail: states SI, no plan, today, as does not want to go the shelter, with c/o chronic SI for 2 months      History of suicide by family member: no  History of suicide by friend/significant other: no  Recent change in frequency/specificity/intensity of suicidal thoughts or self-harm behavior? no  Current access to firearms, medications, or other identified means of suicide/self-harm? no  If yes, willing to restrict access to means of suicide/self-harm? no  Protective factors present:  Future-oriented and Willing to address in treatment    SAFETY ASSESSMENT - OTHERS  Does patient acknowledge current or past symptoms of aggressive behavior or risk to others? no  Does parent/significant other report patient has current or past symptoms of aggressive behavior or risk to others?  no  Does presenting problem suggest symptoms of dangerousness to others? No    Crisis Safety Plan completed and copy given to patient? No-pt refused    ABUSE/NEGLECT SCREENING  Does patient report feeling “unsafe” in his/her home, or afraid of anyone?  no  Does patient report any history of  "physical, sexual, or emotional abuse?  no  Does parent or significant other report any of the above? no  Is there evidence of neglect by self?  yes  Is there evidence of neglect by a caregiver? no  Does the patient/parent report any history of CPS/APS/police involvement related to suspected abuse/neglect or domestic violence? no  Based on the information provided during the current assessment, is a mandated report of suspected abuse/neglect being made?  No    SUBSTANCE USE SCREENING  Yes:  [place] all substances used in the past 30 days:      Last Use Amount   [x]   Alcohol 1/3/19 8 beers   [x]   Marijuana UDS + for THC Found a cannabis pipe in his pocked   []   Heroin     []   Prescription Opioids  (used without prescription, for    recreation, or in excess of prescribed amount)     []   Other Prescription  (used without prescription, for    recreation, or in excess of prescribed amount)     []   Cocaine      []   Methamphetamine     []   \"\" drugs (ectasy, MDMA)     []   Other substances        UDS results: + THC  Breathalyzer results: 0..013    What consequences does the patient associate with any of the above substance use and or addictive behaviors? None    Risk factors for detox (check all that apply):  []  Seizures   []  Diaphoretic (sweating)   []  Tremors   []  Hallucinations   []  Increased blood pressure   []  Decreased blood pressure   []  Other   []  None      [] Patient education on risk factors for detoxification and instructed to return to ER as needed.      MENTAL STATUS   Participation: Limited verbal participation, Defensive and Resistant  Grooming: Disheveled  Orientation: Alert and Fully Oriented  Behavior: Agitated and resistant  Eye contact: Limited  Mood: Angry and Irritable  Affect: Constricted and Blunted  Thought process: Circumstantial  Thought content: Within normal limits  Speech: Volume within normal limits and slurred  Perception: Within normal limits  Memory:  No gross evidence " of memory deficits  Insight: Adequate  Judgment:  Adequate  Other:    Collateral information:   Source:  [] Significant other present in person:   [] Significant other by telephone  [] [facility]   [] [facility]  [x] [facility]  [x] Other: [facility] DC instructions    [] Unable to complete full assessment due to:  [] Acute intoxication  [] Patient declined to participate/engage  [] Patient verbally unresponsive  [] Significant cognitive deficits  [] Significant perceptual distortions or behavioral disorganization  [] Other:      CLINICAL IMPRESSIONS:  Primary:  Mood disturbance secondary to alcohol use  Secondary:  Alcohol use disorder       IDENTIFIED NEEDS/PLAN:  [Trigger DISPOSITION list for any items marked]    []  Imminent safety risk - self [] Imminent safety risk - others   []  Acute substance withdrawal []  Psychosis/Impaired reality testing   [x]  Mood/anxiety [x]  Substance use/Addictive behavior   [x]  Maladaptive behaviro []  Parent/child conflict   []  Family/Couples conflict []  Biomedical   [x]  Housing [x]  Financial   []   Legal  Occupational/Educational   []  Domestic violence []  Other:     Disposition: Refer to [facility], [facility], 12 Step program: [facility] and homeless shelter ; pt DC's to self with [facility] DC instructions, homeless shelter resource list, and chemical dependency resource list    Does patient express agreement with the above plan? yes    Referral appointment(s) scheduled? No-referral appts made at DC from [facility] 1/2/19 with [facility] IOP 12/3/19 and [facility] phone screening appt 1/3/19 at 0900; writer RN reviewed this with pt    Alert team only:   I have discussed findings and recommendations with Dr. Meyer  who is in agreement with these recommendations.and completed legal hold DC certification form     Referral information sent to the following community providers :NA    If  applicable : Referred  to :  for legal hold follow up at (time): PHIL Smith R.N.  1/3/2019

## 2019-01-04 NOTE — ED PROVIDER NOTES
ED Provider Note    CHIEF COMPLAINT  Chief Complaint   Patient presents with   • Suicidal Ideation     Pt reports he is SI and wants to jump in front of a train. Pt DC from [place] 2 days ago.     Seen at 1:08 PM    HPI  Chris Leggett is a 68 y.o. male who presents with pain, homelessness and possible suicidal ideation patient states he was walking to attempt to get in front of a train today when he lost his balance and fell.  As result of the fall he has exacerbation of his diffuse body pain.    He has been in the [facility] for 6 months and has been in the emergency department or mental health facilities for the majority of that time.  He states he moved here to bring his brother here to [facility].  His brother then since .  He deals with depression.  He lives in [place] prior to moving here.    REVIEW OF SYSTEMS  See HPI  PAST MEDICAL HISTORY   has a past medical history of Hypertension.    SOCIAL HISTORY  Social History     Social History Main Topics   • Smoking status: Current Every Day Smoker     Packs/day: 0.50     Years: 5.00     Types: Cigarettes   • Smokeless tobacco: Current User   • Alcohol use Yes   • Drug use: No   • Sexual activity: Not on file       SURGICAL HISTORY   has a past surgical history that includes cervical disk and fusion anterior (2018) and other orthopedic surgery.    CURRENT MEDICATIONS  Reviewed.  See Encounter Summary.     ALLERGIES  No Known Allergies    PHYSICAL EXAM  VITAL SIGNS: /96   Pulse 70   Temp 37.2 °C (99 °F) (Oral)   Resp 18   Wt 90.7 kg (200 lb)   SpO2 100%   BMI 28.70 kg/m²   Constitutional: Awake, alert in no apparent distress.  HENT: Normocephalic, Bilateral external ears normal. Nose normal.   Eyes: Conjunctiva normal, non-icteric, EOMI.    Thorax & Lungs: Easy unlabored respirations  Cardiovascular:    Abdomen:  No distention  Skin: Visualized skin is  Dry, No erythema, No rash.   Extremities:   atraumatic   Neurologic: Alert, Grossly  non-focal.   Psychiatric: Mildly flat affect, poor eye contact.    RADIOLOGY  No orders to display       Nursing notes and vital signs were reviewed. (See chart for details)    1:08 PM -medical record reviewed, the patient has a history of homelessness, THC dependence, alcohol dependence, chronic pain, depression.  He has been in the hospital multiple times for pain and suicidal ideation.  He left [facility] 48 hours ago and proceeded to get drunk and presented back to the emergency department yesterday.  He was cleared by [facility] yesterday.    2:52 PM-I discussed case with Selina from [employer] [facility].  She evaluated the patient less than 24 hours ago.  She relates a similar history of manipulative behavior and attempt to obtain admission.  He gets $700 a month for assistance, the patient states that he spent all his money.  He is noncompliant with medications and follow-up.  He was because a suicidal on his last 2 weeks day at [place].  He went from discharge immediately to get intoxicated and then presented to the emergency department yesterday.      Decision Making:  This is a 68 y.o. year old male who presents with chronic pain and vague suicidal ideation.  I did extensive chart review on the patient as well as talk with life marlen was from a with this patient.  He has some manipulative behavior.  The patient has been only a resident of [place] for the past 5-6 months and over this time he has frequently used the ER both here and at [place] for residence.  He does not have any specific plan other than his vague attempt to walk in front of a train, this is unchanged from months ago.  He appears refractory to any treatment and does not appear to want any significant psychiatric therapy.  He appears to be manipulative and attempt to obtain a shelter.  At this time I see no indication for legal hold, the patient was discharged, he was given resources and directions to the local  shelter.    DISPOSITION:  Patient will be discharged home in good condition.    Discharge Medications:  New Prescriptions    No medications on file       The patient was discharged home (see d/c instructions) and told to return immediately for any signs or symptoms listed, or any worsening at all.  The patient verbally agreed to the discharge precautions and follow-up plan which is documented in EPIC.    The patient's blood pressure is elevated today. >120/80. I have referred them to primary care for follow up.         FINAL IMPRESSION  1. Homelessness    2. Other chronic pain

## 2019-01-04 NOTE — ED NOTES
Pt reports he is SI and wants to jump in front of a train. Pt DC from [place] 2 days ago. Pt wearing hospital gown on under street clothes. Belongings checked by security and placed in locker #1.

## 2019-01-04 NOTE — ED PROVIDER NOTES
"ED Provider Note    Scribed for Mena Olmos M.D. by Gricel Reynolds. 1/3/2019, 6:01 PM.    Primary care provider: Pcp Pt States None  Means of arrival: EMS  History obtained from: patient  History limited by: patient's level of intoxication    CHIEF COMPLAINT  Chief Complaint   Patient presents with   • Suicidal Ideation   • Alcohol Intoxication       HPI  Chris Leggett is a 68 y.o. male with a history of suicidal ideation who presents to the Emergency Department for evaluation of suicidal ideation onset this morning. He explains that he does not have any family and \"doesn't want to live anymore.\" Patient was released from [facility] this morning and immediately went to a casino and got intoxicated. Patient is currently intoxicated at this time. He proceeded to tell a  at the casino that he was suicidal. Patient does not have a plan to kill himself at this time. Patient has a history of alcohol abuse. He is afebrile at this time.     Further ROS limited secondary to the patient's level of intoxication.    REVIEW OF SYSTEMS  Pertinent positives include suicidal ideation, intoxication. Pertinent negatives include no fever.      Further ROS limited secondary to the patient's level of intoxication.    PAST MEDICAL HISTORY   has a past medical history of Hypertension.    SURGICAL HISTORY   has a past surgical history that includes cervical disk and fusion anterior (9/2/2018) and other orthopedic surgery.    SOCIAL HISTORY  Social History   Substance Use Topics   • Smoking status: Current Every Day Smoker     Packs/day: 0.50     Years: 5.00     Types: Cigarettes   • Smokeless tobacco: Current User   • Alcohol use Yes      History   Drug Use No       FAMILY HISTORY  None noted.    CURRENT MEDICATIONS  No current facility-administered medications for this encounter.     Current Outpatient Prescriptions:   •  acetaminophen (TYLENOL) 325 MG Tab, Take 1 Tab by mouth 3 times a day., Disp: 30 Tab, Rfl: 0  •  " allopurinol (ZYLOPRIM) 100 MG Tab, Take 1 Tab by mouth every day., Disp: 7 Tab, Rfl: 0  •  magnesium oxide (MAG-OX) 400 (241.3 Mg) MG Tab tablet, Take 1 Tab by mouth 2 Times a Day., Disp: 7 Tab, Rfl: 0    ALLERGIES  No Known Allergies    PHYSICAL EXAM  VITAL SIGNS: /101   Pulse 82   Temp 36.8 °C (98.2 °F) (Temporal)   Resp 18   SpO2 98%     Constitutional: Alert in no apparent distress. Disheveled  HENT: Normocephalic, Atraumatic, Bilateral external ears normal. Nose normal. Poor dentition  Eyes:  Conjunctiva normal, non-icteric.   Lungs: Non-labored respirations  Skin: Warm, Dry, No erythema, No rash.   Neurologic: Alert, Grossly non-focal.   Psychiatric: Flat affect, appears intoxicated.     LABS  Labs Reviewed   POC BREATHALIZER - Abnormal; Notable for the following:        Result Value    POC Breathalizer 0.013 (*)     All other components within normal limits   URINE DRUG SCREEN     All labs reviewed by me.    COURSE & MEDICAL DECISION MAKING  Pertinent Labs & Imaging studies reviewed. (See chart for details)    6:01 PM - Patient seen and examined at bedside. Ordered urine drug screen to evaluate his symptoms. Informed patient that he will need to consult with life [facility] and will be reevaluated after he is more sober.     6:59 PM Spoke to [facility] who does not think that he is a danger to himself or other and does not need to be placed on a legal hold at this time.  He has discharge instructions from [place] morning on with him and is agreeable to going there for additional assistance as he needs it as an outpatient.  However at this time he is not at risk of hurting himself.  He will be discharged to the homeless shelter.      The patient will return for new or worsening symptoms and is stable at the time of discharge. Patient was given return precautions. Patient and/or family member verbalizes understanding and will comply.    DISPOSITION:  Patient will be discharged home in stable  condition.    FOLLOW UP:  [facility], Emergency Dept  [address]  [place] [id number] [phone]    Return for worsneing thoughts, depression or other concerns      OUTPATIENT MEDICATIONS:  New Prescriptions    No medications on file       FINAL IMPRESSION  1. Alcoholic intoxication without complication (HCC)        This dictation has been created using voice recognition software and/or scribes. The accuracy of the dictation is limited by the abilities of the software and the expertise of the scribes. I expect there may be some errors of grammar and possibly content. I made every attempt to manually correct the errors within my dictation. However, errors related to voice recognition software and/or scribes may still exist and should be interpreted within the appropriate context.     I, Gricel Reynolds (Scribe), am scribing for, and in the presence of, Mena Olmos M.D..    Electronically signed by: Gricel Reynolds (Scribe), 1/3/2019    IMena M.D. personally performed the services described in this documentation, as scribed by Gricel Reynolds in my presence, and it is both accurate and complete. E    The note accurately reflects work and decisions made by me.  Mena Olmos  1/3/2019  9:37 PM

## 2019-01-05 LAB — TEST NAME 95000: NORMAL

## 2019-01-25 ENCOUNTER — APPOINTMENT (OUTPATIENT)
Dept: RADIOLOGY | Facility: MEDICAL CENTER | Age: 69
End: 2019-01-25
Attending: EMERGENCY MEDICINE
Payer: MEDICARE

## 2019-01-25 ENCOUNTER — HOSPITAL ENCOUNTER (EMERGENCY)
Facility: MEDICAL CENTER | Age: 69
End: 2019-01-25
Attending: EMERGENCY MEDICINE
Payer: MEDICARE

## 2019-01-25 VITALS
BODY MASS INDEX: 27.75 KG/M2 | OXYGEN SATURATION: 97 % | TEMPERATURE: 98.5 F | HEIGHT: 71 IN | HEART RATE: 58 BPM | DIASTOLIC BLOOD PRESSURE: 85 MMHG | RESPIRATION RATE: 16 BRPM | SYSTOLIC BLOOD PRESSURE: 132 MMHG | WEIGHT: 198.19 LBS

## 2019-01-25 DIAGNOSIS — R05.9 COUGH: ICD-10-CM

## 2019-01-25 DIAGNOSIS — R45.851 SUICIDAL IDEATION: ICD-10-CM

## 2019-01-25 LAB
ALBUMIN SERPL BCP-MCNC: 3.1 G/DL (ref 3.2–4.9)
ALBUMIN/GLOB SERPL: 1.2 G/DL
ALP SERPL-CCNC: 58 U/L (ref 30–99)
ALT SERPL-CCNC: 18 U/L (ref 2–50)
AMPHET UR QL SCN: NEGATIVE
ANION GAP SERPL CALC-SCNC: 7 MMOL/L (ref 0–11.9)
APPEARANCE UR: CLEAR
AST SERPL-CCNC: 43 U/L (ref 12–45)
BARBITURATES UR QL SCN: NEGATIVE
BASOPHILS # BLD AUTO: 0.2 % (ref 0–1.8)
BASOPHILS # BLD: 0.01 K/UL (ref 0–0.12)
BENZODIAZ UR QL SCN: NEGATIVE
BILIRUB SERPL-MCNC: 0.2 MG/DL (ref 0.1–1.5)
BILIRUB UR QL STRIP.AUTO: NEGATIVE
BUN SERPL-MCNC: 11 MG/DL (ref 8–22)
BZE UR QL SCN: NEGATIVE
CALCIUM SERPL-MCNC: 8.5 MG/DL (ref 8.5–10.5)
CANNABINOIDS UR QL SCN: NEGATIVE
CHLORIDE SERPL-SCNC: 105 MMOL/L (ref 96–112)
CO2 SERPL-SCNC: 25 MMOL/L (ref 20–33)
COLOR UR: YELLOW
CREAT SERPL-MCNC: 0.65 MG/DL (ref 0.5–1.4)
EOSINOPHIL # BLD AUTO: 0.18 K/UL (ref 0–0.51)
EOSINOPHIL NFR BLD: 4.2 % (ref 0–6.9)
ERYTHROCYTE [DISTWIDTH] IN BLOOD BY AUTOMATED COUNT: 49 FL (ref 35.9–50)
FLUAV RNA SPEC QL NAA+PROBE: NEGATIVE
FLUBV RNA SPEC QL NAA+PROBE: NEGATIVE
GLOBULIN SER CALC-MCNC: 2.5 G/DL (ref 1.9–3.5)
GLUCOSE SERPL-MCNC: 130 MG/DL (ref 65–99)
GLUCOSE UR STRIP.AUTO-MCNC: NEGATIVE MG/DL
HCT VFR BLD AUTO: 29.9 % (ref 42–52)
HGB BLD-MCNC: 10 G/DL (ref 14–18)
IMM GRANULOCYTES # BLD AUTO: 0.01 K/UL (ref 0–0.11)
IMM GRANULOCYTES NFR BLD AUTO: 0.2 % (ref 0–0.9)
KETONES UR STRIP.AUTO-MCNC: NEGATIVE MG/DL
LACTATE BLD-SCNC: 1.4 MMOL/L (ref 0.5–2)
LACTATE BLD-SCNC: 2.2 MMOL/L (ref 0.5–2)
LEUKOCYTE ESTERASE UR QL STRIP.AUTO: NEGATIVE
LYMPHOCYTES # BLD AUTO: 0.73 K/UL (ref 1–4.8)
LYMPHOCYTES NFR BLD: 17.2 % (ref 22–41)
MCH RBC QN AUTO: 32.4 PG (ref 27–33)
MCHC RBC AUTO-ENTMCNC: 33.4 G/DL (ref 33.7–35.3)
MCV RBC AUTO: 96.8 FL (ref 81.4–97.8)
METHADONE UR QL SCN: NEGATIVE
MICRO URNS: NORMAL
MONOCYTES # BLD AUTO: 0.45 K/UL (ref 0–0.85)
MONOCYTES NFR BLD AUTO: 10.6 % (ref 0–13.4)
NEUTROPHILS # BLD AUTO: 2.86 K/UL (ref 1.82–7.42)
NEUTROPHILS NFR BLD: 67.6 % (ref 44–72)
NITRITE UR QL STRIP.AUTO: NEGATIVE
NRBC # BLD AUTO: 0 K/UL
NRBC BLD-RTO: 0 /100 WBC
OPIATES UR QL SCN: NEGATIVE
OXYCODONE UR QL SCN: NEGATIVE
PCP UR QL SCN: NEGATIVE
PH UR STRIP.AUTO: 5.5 [PH]
PLATELET # BLD AUTO: 104 K/UL (ref 164–446)
PMV BLD AUTO: 10.4 FL (ref 9–12.9)
POC BREATHALIZER: 0 PERCENT (ref 0–0.01)
POTASSIUM SERPL-SCNC: 4.1 MMOL/L (ref 3.6–5.5)
PROPOXYPH UR QL SCN: NEGATIVE
PROT SERPL-MCNC: 5.6 G/DL (ref 6–8.2)
PROT UR QL STRIP: NEGATIVE MG/DL
RBC # BLD AUTO: 3.09 M/UL (ref 4.7–6.1)
RBC UR QL AUTO: NEGATIVE
SODIUM SERPL-SCNC: 137 MMOL/L (ref 135–145)
SP GR UR STRIP.AUTO: 1.01
UROBILINOGEN UR STRIP.AUTO-MCNC: 0.2 MG/DL
WBC # BLD AUTO: 4.2 K/UL (ref 4.8–10.8)

## 2019-01-25 PROCEDURE — 80307 DRUG TEST PRSMV CHEM ANLYZR: CPT

## 2019-01-25 PROCEDURE — A9270 NON-COVERED ITEM OR SERVICE: HCPCS | Performed by: NURSE PRACTITIONER

## 2019-01-25 PROCEDURE — 87502 INFLUENZA DNA AMP PROBE: CPT

## 2019-01-25 PROCEDURE — 81003 URINALYSIS AUTO W/O SCOPE: CPT

## 2019-01-25 PROCEDURE — 83605 ASSAY OF LACTIC ACID: CPT | Mod: 91

## 2019-01-25 PROCEDURE — 302970 POC BREATHALIZER

## 2019-01-25 PROCEDURE — 71045 X-RAY EXAM CHEST 1 VIEW: CPT

## 2019-01-25 PROCEDURE — 99284 EMERGENCY DEPT VISIT MOD MDM: CPT | Performed by: PSYCHIATRY & NEUROLOGY

## 2019-01-25 PROCEDURE — 85025 COMPLETE CBC W/AUTO DIFF WBC: CPT

## 2019-01-25 PROCEDURE — 80053 COMPREHEN METABOLIC PANEL: CPT

## 2019-01-25 PROCEDURE — 700102 HCHG RX REV CODE 250 W/ 637 OVERRIDE(OP): Performed by: NURSE PRACTITIONER

## 2019-01-25 PROCEDURE — 87040 BLOOD CULTURE FOR BACTERIA: CPT

## 2019-01-25 PROCEDURE — 90791 PSYCH DIAGNOSTIC EVALUATION: CPT

## 2019-01-25 PROCEDURE — 87086 URINE CULTURE/COLONY COUNT: CPT

## 2019-01-25 PROCEDURE — 700105 HCHG RX REV CODE 258: Performed by: EMERGENCY MEDICINE

## 2019-01-25 PROCEDURE — 99285 EMERGENCY DEPT VISIT HI MDM: CPT

## 2019-01-25 RX ORDER — QUETIAPINE FUMARATE 100 MG/1
100 TABLET, FILM COATED ORAL
COMMUNITY
End: 2019-03-07 | Stop reason: CLARIF

## 2019-01-25 RX ORDER — TRAZODONE HYDROCHLORIDE 100 MG/1
100 TABLET ORAL NIGHTLY
COMMUNITY
End: 2019-03-07 | Stop reason: CLARIF

## 2019-01-25 RX ORDER — SODIUM CHLORIDE 9 MG/ML
30 INJECTION, SOLUTION INTRAVENOUS ONCE
Status: COMPLETED | OUTPATIENT
Start: 2019-01-25 | End: 2019-01-25

## 2019-01-25 RX ORDER — TRAZODONE HYDROCHLORIDE 50 MG/1
50 TABLET ORAL
Status: DISCONTINUED | OUTPATIENT
Start: 2019-01-25 | End: 2019-01-25

## 2019-01-25 RX ADMIN — DULOXETINE HYDROCHLORIDE 90 MG: 60 CAPSULE, DELAYED RELEASE ORAL at 09:21

## 2019-01-25 RX ADMIN — SODIUM CHLORIDE 2697 ML: 9 INJECTION, SOLUTION INTRAVENOUS at 05:15

## 2019-01-25 ASSESSMENT — ENCOUNTER SYMPTOMS
MUSCULOSKELETAL NEGATIVE: 1
GASTROINTESTINAL NEGATIVE: 1
CARDIOVASCULAR NEGATIVE: 1
EYES NEGATIVE: 1
SPUTUM PRODUCTION: 1
DEPRESSION: 1
COUGH: 1
CONSTITUTIONAL NEGATIVE: 1
NEUROLOGICAL NEGATIVE: 1

## 2019-01-25 ASSESSMENT — LIFESTYLE VARIABLES
DO YOU DRINK ALCOHOL: NO
SUBSTANCE_ABUSE: 1

## 2019-01-25 NOTE — ED NOTES
Med rec completed per pt  Allergies reviewed  No PO antibiotics in the last 30 days    Pt states that he gets his meds from a homeless shelter and does not go to a pharmacy here    Pt states that he thinks his Lisinopril dosage is 30 mg but I am unable to verify that information

## 2019-01-25 NOTE — ED PROVIDER NOTES
ED Provider Note    ED Provider Note      Primary care provider: Pcp Pt States None    CHIEF COMPLAINT  Chief Complaint   Patient presents with   • Cough   • Generalized Body Aches       HPI  Chris Leggett is a 68 y.o. male who presents to the Emergency Department with chief complaint of cough.  Patient reports ongoing cough times 2 weeks worse over the last couple days.  Elevate chest pain at night throughout his chest with cough only.  No exertional chest pain he has minor shortness of breath.  Has had subjective chills without measured fever minor nausea without emesis he denies headache altered mental status he denies any patient diarrhea urinary complaints no abdominal pain no skin changes, patient does report diffuse myalgias that he states go from his head.  Pain is rated as moderate no alleviating or does state that he got an influenza vaccine this year.    Per nursing patient stated that due to the symptoms as well as other life stresses he is I requestion the patient about this and states that he has no friends no family is felt extremely down and is thought about jumping in front of a train or taking large amounts of pills.  He denies any homicidal ideation denies any visual or audio hallucination.      REVIEW OF SYSTEMS  10 systems reviewed and otherwise negative, pertinent positives and negatives listed in the history of present illness.    PAST MEDICAL HISTORY   has a past medical history of Hypertension.    SURGICAL HISTORY   has a past surgical history that includes cervical disk and fusion anterior (9/2/2018) and other orthopedic surgery.    SOCIAL HISTORY  Social History   Substance Use Topics   • Smoking status: Former Smoker     Packs/day: 0.50     Years: 5.00     Types: Cigarettes     Quit date: 1/18/2019   • Smokeless tobacco: Never Used   • Alcohol use Yes      Comment: occass      History   Drug Use   • Types: Inhaled     Comment: marijuana       FAMILY  "HISTORY  Non-Contributory    CURRENT MEDICATIONS  Home Medications    **Home medications have not yet been reviewed for this encounter**         ALLERGIES  No Known Allergies    PHYSICAL EXAM  VITAL SIGNS: /92   Pulse (!) 106   Temp 36.2 °C (97.2 °F) (Temporal)   Resp 18   Ht 1.803 m (5' 11\")   Wt 89.9 kg (198 lb 3.1 oz)   SpO2 95%   BMI 27.64 kg/m²   Pulse ox interpretation: I interpret this pulse ox as normal.  Constitutional: Alert and oriented x 3, minimal distress  HEENT: Atraumatic normocephalic, pupils are equal round reactive to light extraocular movements are intact. The nares is clear, external ears are normal, mouth shows dry mucous membranes  Neck: Supple, no JVD no tracheal deviation  Cardiovascular: Tachycardic no murmur rub or gallop 2+ pulses peripherally x4  Thorax & Lungs: No respiratory distress, no wheezes rales or rhonchi, No chest tenderness.   GI: Soft nontender nondistended positive bowel sounds, no peritoneal signs  Skin: Warm dry no acute rash or lesion  Musculoskeletal: Moving all extremities with full range and 5 of 5 strength, no acute  deformity  Neurologic: Cranial nerves III through XII are grossly intact, no sensory deficit, no cerebellar dysfunction   Psychiatric: Flat affect avoids direct eye contact      DIAGNOSTIC STUDIES / PROCEDURES  LABS      Results for orders placed or performed during the hospital encounter of 01/25/19   LACTIC ACID   Result Value Ref Range    Lactic Acid 2.2 (H) 0.5 - 2.0 mmol/L   CBC WITH DIFFERENTIAL   Result Value Ref Range    WBC 4.2 (L) 4.8 - 10.8 K/uL    RBC 3.09 (L) 4.70 - 6.10 M/uL    Hemoglobin 10.0 (L) 14.0 - 18.0 g/dL    Hematocrit 29.9 (L) 42.0 - 52.0 %    MCV 96.8 81.4 - 97.8 fL    MCH 32.4 27.0 - 33.0 pg    MCHC 33.4 (L) 33.7 - 35.3 g/dL    RDW 49.0 35.9 - 50.0 fL    Platelet Count 104 (L) 164 - 446 K/uL    MPV 10.4 9.0 - 12.9 fL    Neutrophils-Polys 67.60 44.00 - 72.00 %    Lymphocytes 17.20 (L) 22.00 - 41.00 %    Monocytes " 10.60 0.00 - 13.40 %    Eosinophils 4.20 0.00 - 6.90 %    Basophils 0.20 0.00 - 1.80 %    Immature Granulocytes 0.20 0.00 - 0.90 %    Nucleated RBC 0.00 /100 WBC    Neutrophils (Absolute) 2.86 1.82 - 7.42 K/uL    Lymphs (Absolute) 0.73 (L) 1.00 - 4.80 K/uL    Monos (Absolute) 0.45 0.00 - 0.85 K/uL    Eos (Absolute) 0.18 0.00 - 0.51 K/uL    Baso (Absolute) 0.01 0.00 - 0.12 K/uL    Immature Granulocytes (abs) 0.01 0.00 - 0.11 K/uL    NRBC (Absolute) 0.00 K/uL   COMP METABOLIC PANEL   Result Value Ref Range    Sodium 137 135 - 145 mmol/L    Potassium 4.1 3.6 - 5.5 mmol/L    Chloride 105 96 - 112 mmol/L    Co2 25 20 - 33 mmol/L    Anion Gap 7.0 0.0 - 11.9    Glucose 130 (H) 65 - 99 mg/dL    Bun 11 8 - 22 mg/dL    Creatinine 0.65 0.50 - 1.40 mg/dL    Calcium 8.5 8.5 - 10.5 mg/dL    AST(SGOT) 43 12 - 45 U/L    ALT(SGPT) 18 2 - 50 U/L    Alkaline Phosphatase 58 30 - 99 U/L    Total Bilirubin 0.2 0.1 - 1.5 mg/dL    Albumin 3.1 (L) 3.2 - 4.9 g/dL    Total Protein 5.6 (L) 6.0 - 8.2 g/dL    Globulin 2.5 1.9 - 3.5 g/dL    A-G Ratio 1.2 g/dL   Influenza A/B By PCR   Result Value Ref Range    Influenza virus A RNA Negative Negative    Influenza virus B, PCR Negative Negative   ESTIMATED GFR   Result Value Ref Range    GFR If African American >60 >60 mL/min/1.73 m 2    GFR If Non African American >60 >60 mL/min/1.73 m 2   POC BREATHALIZER   Result Value Ref Range    POC Breathalizer 0.004 0.00 - 0.01 Percent       All labs reviewed by me.      RADIOLOGY  DX-CHEST-PORTABLE (1 VIEW)   Final Result      No acute cardiac or pulmonary abnormality is noted.        The radiologist's interpretation of all radiological studies have been reviewed by me.    COURSE & MEDICAL DECISION MAKING  Pertinent Labs & Imaging studies reviewed. (See chart for details)    2:51 AM - Patient seen and examined at bedside.         Patient noted to have slightly elevated blood pressure likely circumstantial secondary to presenting complaint. Referred to  "primary care physician for further evaluation.     Patient was given IV fluids based on tachycardia dry mucous membranes concern for possible sepsis, oral hydration was not attempted due to inability to tolerate p.o. and insufficiency for hydration, after fluids had resolution of tachycardia and improvement of symptoms    Medical Decision Making: Patient's medical workup as above grossly unremarkable.  Slight elevation of lactic acid is been corrected with fluids at this time.  Patient reexamined his vital signs are totally normal no voices ongoing suicidal ideation.  States that he wants to jump in front of a train but he is afraid that this may not actually kill him and will leave him in pain he is also voiced ideas of overdosing on prescription medication.  At this point he is medically cleared but will be held on a psychiatric basis for evaluation by behavioral health.  He is pending this evaluation is been discussed with my partner Dr. Aggarwal who will follow up on behavioral health evaluation appropriately disposition.    /78   Pulse 65   Temp 37 °C (98.6 °F) (Temporal)   Resp 16   Ht 1.803 m (5' 11\")   Wt 89.9 kg (198 lb 3.1 oz)   SpO2 93%   BMI 27.64 kg/m²             FINAL IMPRESSION  1. Cough    2. Suicidal ideation    3.  Minor dehydration  4.  History of chronic depression      This dictation has been created using voice recognition software and/or scribes. The accuracy of the dictation is limited by the abilities of the software and the expertise of the scribes. I expect there may be some errors of grammar and possibly content. I made every attempt to manually correct the errors within my dictation. However, errors related to voice recognition software and/or scribes may still exist and should be interpreted within the appropriate context.            "

## 2019-01-25 NOTE — ED PROVIDER NOTES
ED Provider Note    Patient was seen by behavioral health, found to be at risk to self.  Current suicidal ideation with plan includes stepping in front of a train or taking an overdose of medication.

## 2019-01-25 NOTE — ED NOTES
RN providing direct obs to pt due to SI.  All belongings removed from room. Pt unable to provide urine sample at this time.

## 2019-01-25 NOTE — CONSULTS
"[facility]   TRIAGE ASSESSMENT    Name: Chris Leggett  MRN: [id number]  : 1950  Age: 68 y.o.  Date of assessment: 2019  PCP: Pcp Pt States None  Persons in attendance: Patient    CHIEF COMPLAINT/PRESENTING ISSUE (as stated by Chris Leggett):   Chief Complaint   Patient presents with   • Cough   • Generalized Body Aches        CURRENT LIVING SITUATION/SOCIAL SUPPORT: Living in the shelter since approx December; broke his neck is September.  He is suicidal with a plan to overdose on medication or step in front of a train.  Mr Leggett has no family or friends in [place]....he has been saying he was moving back to [place] to possibly live with a nephew who is a .  He has physical pain on a 7 to 8 level and chest congestion with a moderate cough; he is currently wearing a mask.  When his wife  abut 2 years ago, he stats his social security check was not enough to cover the cost of a residence on his own....he is still grieving his wife's death.  Mr Leggett is seeing a psychiatrist at the shelter and is taking Seroquel and Trazodone...\"but I'm still not sleeping\".  This referral is for inpatient hospitalization for his safety and tx. His states he hopes his discharge plan would be moving back to [place] to stay with his nephew.    BEHAVIORAL HEALTH TREATMENT HISTORY  Does patient/parent report a history of prior behavioral health treatment for patient?   Yes:    Dates Level of Care Facilty/Provider Diagnosis/Problem Medications   current OP Shelter psychiatrist R/O MDD Seroquel/Trazodone   Couple of weeks ago [facility]    \"                                      \"                                                                 SAFETY ASSESSMENT - SELF  Does patient acknowledge current or past symptoms of dangerousness to self? yes  Does parent/significant other report patient has current or past symptoms of dangerousness to self? N\A  Does presenting " problem suggest symptoms of dangerousness to self? Yes:     Past Current    Suicidal Thoughts: [x]  [x]    Suicidal Plans: [x]  [x]    Suicidal Intent: [x]  []    Suicide Attempts: [x]  []    Self-Injury []  []      For any boxes checked above, provide detail: Reports overdosing; is thinking the same now                                                                                                                                                                                                                                                                                                                                                                                                                                                                                                                                                                                                                                                                                                                                                                                                                                                                                                                                                                                                                                                                                                                                                      History of suicide by family member: no  History of suicide by friend/significant other: no  Recent change in frequency/specificity/intensity of suicidal thoughts or self-harm behavior? yes - for several months  Current access to firearms, medications, or other identified means of suicide/self-harm? yes - pills, trains  If yes, willing to restrict access to means of suicide/self-harm? no  Protective factors present:  Actively engaged in treatment and Willing to address in treatment    SAFETY ASSESSMENT - OTHERS  Does patient  "acknowledge current or past symptoms of aggressive behavior or risk to others? no  Does parent/significant other report patient has current or past symptoms of aggressive behavior or risk to others?  N\A  Does presenting problem suggest symptoms of dangerousness to others? No    Crisis Safety Plan completed and copy given to patient? no    ABUSE/NEGLECT SCREENING  Does patient report feeling “unsafe” in his/her home, or afraid of anyone?  no  Does patient report any history of physical, sexual, or emotional abuse?  no  Does parent or significant other report any of the above? N\A  Is there evidence of neglect by self?  no  Is there evidence of neglect by a caregiver? no  Does the patient/parent report any history of CPS/APS/police involvement related to suspected abuse/neglect or domestic violence? no  Based on the information provided during the current assessment, is a mandated report of suspected abuse/neglect being made?  No    SUBSTANCE USE SCREENING  Yes:  Darnell all substances used in the past 30 days:      Last Use Amount   [x]   Alcohol Last month 1 beer   [x]   Marijuana 4 to 5 x week \"I have the card, it helps with my pain\"   []   Heroin     []   Prescription Opioids  (used without prescription, for    recreation, or in excess of prescribed amount)     []   Other Prescription  (used without prescription, for    recreation, or in excess of prescribed amount)     []   Cocaine      []   Methamphetamine     []   \"\" drugs (ectasy, MDMA)     []   Other substances        UDS results:   Breathalyzer results: 0.0    What consequences does the patient associate with any of the above substance use and or addictive behaviors? None    Risk factors for detox (check all that apply):  []  Seizures   []  Diaphoretic (sweating)   []  Tremors   []  Hallucinations   []  Increased blood pressure   []  Decreased blood pressure   []  Other   [x]  None      [] Patient education on risk factors for detoxification and " instructed to return to ER as needed.      MENTAL STATUS   Participation: Active verbal participation, Attentive and Engaged  Grooming: Neat  Orientation: Alert and Fully Oriented  Behavior: Tense  Eye contact: Good  Mood: Depressed and Anxious  Affect: Full range, Congruent with content, Anxious and Tearful  Thought process: Logical and Goal-directed  Thought content: Within normal limits  Speech: Rate within normal limits  Perception: Within normal limits  Memory:  No gross evidence of memory deficits  Insight: Adequate  Judgment:  Adequate  Other:    Collateral information:   Source:  [] Significant other present in person:   [] Significant other by telephone  [] [facility]   [x] [facility]  [x] [facility]  [] Other:     [] Unable to complete full assessment due to:  [] Acute intoxication  [] Patient declined to participate/engage  [] Patient verbally unresponsive  [] Significant cognitive deficits  [] Significant perceptual distortions or behavioral disorganization  [] Other:      CLINICAL IMPRESSIONS:  Primary:  R/O MDD  Secondary:  R/O Unresolved grief reaction       IDENTIFIED NEEDS/PLAN:  [Trigger DISPOSITION list for any items marked]    []  Imminent safety risk - self [] Imminent safety risk - others   []  Acute substance withdrawal []  Psychosis/Impaired reality testing   [x]  Mood/anxiety []  Substance use/Addictive behavior   [x]  Maladaptive behaviro []  Parent/child conflict   []  Family/Couples conflict [x]  Biomedical   [x]  Housing [x]  Financial   []   Legal  Occupational/Educational   []  Domestic violence []  Other:     Disposition: Ref[facility] to [facility]    Does patient express agreement with the above plan? yes    Referral appointment(s) scheduled? no    Alert team only:   I have discussed findings and recommendations with Dr. Aggarwal who is in agreement with these recommendations.     Referral information sent to the  following community providers :    If applicable : Referred  to : [facility] for legal hold follow up at (time): 2550      Jazzy Arndt R.N.  1/25/2019

## 2019-01-25 NOTE — ED TRIAGE NOTES
Pt BIB [facility] from drop in shelter, ambulatory to triage with cane. Pt c/o intermittent productive cough/ SOB x 2 weeks and all over body aches x 1 week. Pt wearing mask in triage. Pt denies chest discomfort or sore throat. Educated on triage process, encouraged to inform staff of any changes.

## 2019-01-25 NOTE — PSYCHIATRY
"PSYCHIATRIC INTAKE EVALUATION     *Reason for admission: Cough/Body Aches           *Reason for consult: Suicidal Ideation      *Requesting Physician/APN: Evan Ely M.D.        Supervising Psychiatrist: Mira Johnson MD         Legal Hold on admission: Extended         *Chief Complaint: \"I cant live like this anymore\"    *HPI (includes Psychiatric ROS):       Edmond Leggett is a 68 y.o. male who presents to the Emergency Department with chief complaint of cough. During his stay here he stated that he is thinking about jumping in front of a train or taking a large amount of pills. Psychiatry has seen this patient  two other times in December including myself. He states that because he is so sick he feels that he is useless and weak. He states that he does not want to live like this so he wishes he was dead. He has ideas on how to kill himself but no plan. He  Has been depressed ongoing for several months now. He also has had several close family members pass away over time that makes him feel alone and very depressed that some  painful deaths. He said he does seek out AA for help and that makes him feel better. He says lately his energy has been low, he's been hopeless, and has had difficulty sleeping. He denies delusions, hallucinations, and homicidal ideations.     *Medical Review Of Symptoms (not dx conditions):  Review of Systems   Constitutional: Negative.    HENT: Negative.    Eyes: Negative.    Respiratory: Positive for cough and sputum production.    Cardiovascular: Negative.    Gastrointestinal: Negative.    Genitourinary: Negative.    Musculoskeletal: Negative.    Skin: Negative.    Neurological: Negative.    Endo/Heme/Allergies: Negative.    Psychiatric/Behavioral: Positive for depression, substance abuse and suicidal ideas.     All other systems reviewed and are negative.       *Psychiatric Examination:   Vitals:   0700   0659  0700   1047  Most Recent     Temperature (°C) " 36.2-37    37 (98.6)    Pulse     65    Respiration 16-18    16    Blood Pressure  126//92    126/78    Pulse Oximetry (%) 93-96    93      Musculoskeletal: HX of c5-c6 surgery  Appearance: Patient is a thin,  man, with missing teeth.   Thought Process: [place]  Thought Content: WNL  Speech:Normal rate rhythm, and tone  Mood: Depressd            Affect: Congruent with mood        SI/HI: Denies  Attention/Alertness: Attentive and alert  Memory: Intact   Orientation: Fully oriented        Cognition:  WNL  Insight/Judgement into symptoms: poor/poor  Neurological Testing: None     *PAST MEDICAL/PSYCH/FAMILY/SOCIAL(as reported by patient):      NA     *medical hx:        TBI:Denies    SZ:Denies    Stroke: Denies    Past Medical History:   Diagnosis Date   • Hypertension      Past Surgical History:   Procedure Laterality Date   • CERVICAL DISK AND FUSION ANTERIOR  9/2/2018    Procedure: CERVICAL DISK AND FUSION ANTERIOR C5-C6;  Surgeon: Varghese Wilkins M.D.;  Location: SURGERY [facility];  Service: Neurosurgery   • OTHER ORTHOPEDIC SURGERY      fision with disc removal     *psychiatric hx:   12/6/18    Mr. Leggett is a 68 year old male with past medical history of Mood disorder who presented to the hospital with a recent exacerbation of his lower back pain. He was recently admitted at [facility] where he stayed for about a month and was discharged three days ago and has been off of his medications since then. Patient stated he has no desire to live but denies any current plans. Today the patient is visibly upset and crying during the interview. He states he has been depressed for months now and says he doesn't want to die but he has no purpose anymore. He states he lost his wife, mother, and brother in the last few years and that the holidays get him very depressed. He has no plan to commit suicide. He recently was treated at [facility] for depression and suicidal  "ideations. He states it went very well and he liked the medication regimen he was on there. He immediately went off his mediations once discharged from [facility]. He also states his medical problems, financial difficulties, and housing issues contribute to his depression. He said that he has never had any history of hallucinations but states that he thought people were in his room earlier. The patient tyra mentions he uses THC and alcohol regularly and was treated for substance abuse a few times decades ago. He denies suicidal ideations, homicidal ideations, and delusions.     18  Patient reported he is feeling depressed because of the holiday season, and for not having any friends or family. He stated his depression is 7/10 (0 being not depressed, 10 very depressed with SI). Patient reported his brother  in January, and that his mother and wife passed away as well a few years ago. 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 He does not have children. Patient denied any anhedonia, change in appetite or energy. He reported feeling guilty at times (didn't want to specify the reason for his guilty feelings). He has been having poor sleep, but stated sleeping well last night. He reported he wishes he was not alive anymore, however denied any SI, method, plan or intention. Patient denied any AVH. Patient during evaluation was tearful, and stated he has been feeling mistreated by the CNAs, who have yelled at him, and not attended to his needs. Patient also is feeling upset as social workers have referred to him as homeless, and he insisited during evaluation of not being homeless and having a place to live. \"I don;t have to tell where I live, he said\". He stated he wants to get better physically and emotionally to improve his overall life. 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    SAs: 1 In " the past   Guns: No access  Hx of Violence: Denies    *family Psych hx:     Denies    *social hx:  Patient is from [place]. Since moving here he has been in [facility], [facility], a nursing home, and the shelter. He has no children and wife is . He receives SSI for income.    Alcohol: Past heavy use  Drugs: Past THC       *MEDICAL HX: labs, MARS, medications, etc were reviewed. Only those findings of potential interest to psychiatry are noted below:    *Current Medical issues:      Cough with sputum production     *Allergies:  No Known Allergies  *Current Medications:  No current facility-administered medications for this encounter.     Current Outpatient Prescriptions:   •  acetaminophen (TYLENOL) 325 MG Tab, Take 1 Tab by mouth 3 times a day., Disp: 30 Tab, Rfl: 0  •  allopurinol (ZYLOPRIM) 100 MG Tab, Take 1 Tab by mouth every day., Disp: 7 Tab, Rfl: 0  •  magnesium oxide (MAG-OX) 400 (241.3 Mg) MG Tab tablet, Take 1 Tab by mouth 2 Times a Day., Disp: 7 Tab, Rfl: 0     *EKG: Personally Reviewed from 18  439    *Imaging: NA     EEG:NA     *Labs:  Recent Labs      19   0405   WBC  4.2*   RBC  3.09*   HEMOGLOBIN  10.0*   HEMATOCRIT  29.9*   MCV  96.8   MCH  32.4   RDW  49.0   PLATELETCT  104*   MPV  10.4   NEUTSPOLYS  67.60   LYMPHOCYTES  17.20*   MONOCYTES  10.60   EOSINOPHILS  4.20   BASOPHILS  0.20     Lab Results   Component Value Date/Time    SODIUM 137 2019 04:05 AM    POTASSIUM 4.1 2019 04:05 AM    CHLORIDE 105 2019 04:05 AM    CO2 25 2019 04:05 AM    GLUCOSE 130 (H) 2019 04:05 AM    BUN 11 2019 04:05 AM    CREATININE 0.65 2019 04:05 AM           Lab Results  Component Value Date/Time   BREATHALIZER 0.004 2019 0326     No components found for: BLOODALCOHOL     Lab Results  Component Value Date/Time   AMPHUR Negative 2019 0630   BARBSURINE Negative 2019 0630   BENZODIAZU Negative 2019 0630   COCAINEMET Negative 2019 0630    METHADONE Negative 01/25/2019 0630   OPIATES Negative 01/25/2019 0630   OXYCODN Negative 01/25/2019 0630   PCPURINE Negative 01/25/2019 0630   PROPOXY Negative 01/25/2019 0630   CANNABINOID Negative 01/25/2019 0630       Lab Results  Component Value Date/Time   FREET4 1.15 11/05/2018 1220         *ASSESSMENT/PLAN:  1. Depressive Disorder Unspecified  -   Cymbalta       2. Anxiety Disorder Unspecified     -       3.  HX of THC use       Legal hold: Extended  Recommend psychiatric inpatient treatment  Discussed with Mira Johnson MD  Records reviewed  *Will Follow     KAMRAN WuP-BC

## 2019-01-25 NOTE — ED NOTES
Patient's home medications have been reviewed by the pharmacy team.     Past Medical History:   Diagnosis Date   • Hypertension        Patient's Medications   New Prescriptions    No medications on file   Previous Medications    ALLOPURINOL (ZYLOPRIM) 100 MG TAB    Take 1 Tab by mouth every day.    LISINOPRIL PO    Take 1 Tab by mouth every day.    QUETIAPINE (SEROQUEL) 100 MG TAB    Take 100 mg by mouth every bedtime.    TRAZODONE (DESYREL) 100 MG TAB    Take 100 mg by mouth every evening.   Modified Medications    No medications on file   Discontinued Medications    ACETAMINOPHEN (TYLENOL) 325 MG TAB    Take 1 Tab by mouth 3 times a day.    MAGNESIUM OXIDE (MAG-OX) 400 (241.3 MG) MG TAB TABLET    Take 1 Tab by mouth 2 Times a Day.          A:  The following pharmacotherapy concerns may be contributing to current complaints:    - non-compliance with medications; unclear where/if pt is receiving medications    P:    1. Unknown dose of lisinopril. BP appears stable at this time. No recommendation to start at this time.   2. Allopurinol has been taken in several days, no c/o of gout at this time. No recommendation to start.   2. No further recommendations at this time. Pt has been seen by psychiatry and medications have been started per their order.       Ana Shell, PharmD., BCPS

## 2019-01-25 NOTE — ED NOTES
"Pt reporting to RN that \"He is considering taking an overdose of pills\" as he is not able to control his coughing, having a hard time breathing and is upsetting the other people at the homeless shelter with his coughing.  He does have a history of suicide attempt and reports recent hospital at [facility].  "

## 2019-01-25 NOTE — ED NOTES
Pt transported to [facility].  Urine specimen collected and sent to lab.

## 2019-01-25 NOTE — DISCHARGE PLANNING
MSW received call from Gail at [facility]. Per their doctor, pt has maxed his therapeutic benefit. They are declining at this time.

## 2019-01-25 NOTE — DISCHARGE PLANNING
"68 year old male admitted today, on legal hold, SI, to jump off of a bridge; pt with Medicare insurance and per [facility] [place] [facility], [facility]. \"Per their doctor, pt has maxed his therapeutic benefit. They are declining at this time\"; pt's primary complaint today is medical, c/o increased coughing, URI; states \"I don't want to live anymore, I am really sick, coughing\"; future oriented; states his plan is to return to [place], [place], where he left last year to come to [place]; states he has priced out a one-way [employer] ticket at $51 and has a nephew there he may be able to stay with; pt states he is now sober from alcohol (presented to [facility] [place] 1/3/19 with ETOH use after DC from [facility]), states he does not want to drink anymore; received Duloxetine 90 mg PO this AM; ineffective individual coping with depressed mood, cont; pt to transfer to community in  pt  facility [facility]  "

## 2019-01-25 NOTE — ED NOTES
Alerted Security that pt is being placed on legal hold and will need nbelongings searched.  RN providing direct obs to pt.

## 2019-01-25 NOTE — DISCHARGE PLANNING
Medical Social Work    Referral: Legal Hold    Intervention: Legal Hold Paperwork given to SW by [employer] RN Jazzy    Legal Hold Initiated: Date: 1/25/19 Time: 0520    Patient’s Insurance Listed on Face Sheet: Medicare    Referrals sent to: [facility], [facility], [facility], [facility], [facility]    This referral contains the following information:  1) Face sheet __X__  2) Page 1 and Page 2 of Legal Hold __X__  3) Alert Team Assessment/Psych Assessment _X___  4) Head to toe physical exam X____  5) Urine Drug Screen _X___  6) Blood Alcohol _X___  7) Vital signs _X___  8) Pregnancy test when applicable _NA__  9) Medications list __X__    Plan: Patient will transfer to mental health facility once acceptance is obtained    Reviewed by JERILYN Jurado

## 2019-01-25 NOTE — ED NOTES
Sleeping, awakens appropriately to soft verbal stimuli.  Resp unlabored, IV bolus infusing.  Urinal given.

## 2019-01-26 NOTE — DISCHARGE PLANNING
Medical Social Work    Referral: Legal hold Transfer to Mental Health Facility    Intervention: SW received call from Kali at [facility] stating that they have accepted the patient for admission.     Pt's accepting physcian is Dr. Kathy SANCHEZ arranged for transportation to be set up through [facility]    The pt will be picked up at 2030.     LAURA notified the RN of the departure time as well as accepting facility.     LAURA created transfer packet and placed on chart.     Plan: Pt will transfer back to [facility] at 2030

## 2019-01-26 NOTE — ED NOTES
Pt transported to facility via [facility]. All belongings at bedside and accounted for during transport

## 2019-01-26 NOTE — ED NOTES
Call received from [facility] staff regarding transfer prospect.  Will call back with MD acceptance when received.

## 2019-01-27 LAB
BACTERIA UR CULT: NORMAL
SIGNIFICANT IND 70042: NORMAL
SITE SITE: NORMAL
SOURCE SOURCE: NORMAL

## 2019-01-28 NOTE — DISCHARGE PLANNING
Notice of transfer filed with the court via [employer], scanned copy of verified notice onto .

## 2019-01-30 LAB
BACTERIA BLD CULT: NORMAL
BACTERIA BLD CULT: NORMAL
SIGNIFICANT IND 70042: NORMAL
SIGNIFICANT IND 70042: NORMAL
SITE SITE: NORMAL
SITE SITE: NORMAL
SOURCE SOURCE: NORMAL
SOURCE SOURCE: NORMAL

## 2019-03-01 ENCOUNTER — HOSPITAL ENCOUNTER (EMERGENCY)
Facility: MEDICAL CENTER | Age: 69
End: 2019-03-01
Attending: EMERGENCY MEDICINE
Payer: MEDICARE

## 2019-03-01 VITALS
RESPIRATION RATE: 16 BRPM | DIASTOLIC BLOOD PRESSURE: 64 MMHG | TEMPERATURE: 98.2 F | OXYGEN SATURATION: 97 % | SYSTOLIC BLOOD PRESSURE: 105 MMHG | WEIGHT: 222.66 LBS | HEART RATE: 69 BPM | BODY MASS INDEX: 31.17 KG/M2 | HEIGHT: 71 IN

## 2019-03-01 LAB
POC BREATHALIZER: 0.04 PERCENT (ref 0–0.01)
POC BREATHALIZER: 0.28 PERCENT (ref 0–0.01)

## 2019-03-01 PROCEDURE — 99285 EMERGENCY DEPT VISIT HI MDM: CPT

## 2019-03-01 PROCEDURE — 302970 POC BREATHALIZER: Performed by: EMERGENCY MEDICINE

## 2019-03-01 PROCEDURE — 90791 PSYCH DIAGNOSTIC EVALUATION: CPT

## 2019-03-01 NOTE — ED NOTES
"Pt lying in stretcher, states he drank \"a lot\" last night. States it has been awhile since he has drank alcohol as he was at [facility] for SI. Pt states he signed out AMA because \"all they do is have fun there.\" pt states he is also SI, with no specific plan mentioned to this RN however triage RN states he mentioned jumping off a bridge to commit suicide. When this is mentioned to pt by this RN pt states \"yea but the bridge isn't high enough.\"     Pt a/o x4, speaking in full sentences. Appears drowsy. Pt to move to room 35 d/t moderate risk SI precautions and needing sitter. All belongings removed from pt. Security called to check belongings.     Report to Henry TSANG.   " no

## 2019-03-01 NOTE — ED NOTES
"Received report from RN Vanessa,  Pt transferred to room 35.  Pt denies any SI to this RN.  When asked patient why he cant stay awake, he stated, \"I drank a lot last night.\"  Pt is drowsy, but talking in complete sentences and maintaining airway without difficulty.      Sitter in direct line of sight of patient.  Belongings searched by security and placed in locker.    "

## 2019-03-01 NOTE — ED TRIAGE NOTES
"Chief Complaint   Patient presents with   • Alcohol Intoxication     Pt has been at the [facility] all night, states \" I drank a LOT\".    • Suicidal Ideation     with a plan to jump off of the freeway.      Pt left care from [facility] yesterday, where he was being treated for SI. Pt drank all night, when asked if he had more than 10 drinks, he states \"OH yea.\" With ETOH pt became more suicidal, and eventually developed a plan to jump off of the freeway. Denies any further medical issues or complaints at this time.  "

## 2019-03-01 NOTE — ED NOTES
Pt remains resting with eyes closed.  Continue to monitor.  Sitter remains in direct line of sight of patient. Continue to monitor

## 2019-03-01 NOTE — DISCHARGE PLANNING
Medical Social Work    Referral: Legal Hold    Intervention: Legal Hold Paperwork given to SW by [employer] RN Zandra    Legal Hold Initiated: Date: 3/1/19 Time: 1400    Patient’s Insurance Listed on Face Sheet: Medicare    Referrals sent to: [facility]    This referral contains the following information:  1) Face sheet _X___  2) Page 1 and Page 2 of Legal Hold __X__  3) Alert Team Assessment/Psych Assessment __X__  4) Head to toe physical exam __X__  5) Urine Drug Screen ____  6) Blood Alcohol __N/A__  7) Vital signs __X__  8) Pregnancy test when applicable _N/A__  9) Medications list __X__    Plan: Patient will transfer to mental health facility once acceptance is obtained

## 2019-03-01 NOTE — CONSULTS
"[facility]   TRIAGE ASSESSMENT    Name: Chris Leggett  MRN: [id number]  : 1950  Age: 68 y.o.  Date of assessment: 3/1/2019  PCP: Pcp Pt States None  Persons in attendance: Patient    CHIEF COMPLAINT/PRESENTING ISSUE (as stated by ): Came in drunk threatening suicide. During this assessment his POC is 0.04. And he is still threatening suicide by jumping off the freeway overpass.  He has been to our ED 4 times in the last 2 months with same complaint.  He is verbalizing a desire to get help and states he is willing to stop drinking.  He does have a history of not following up on the help we have given him in the past.    Chief Complaint   Patient presents with   • Alcohol Intoxication     Pt has been at the [place] all night, states \" I drank a LOT\".    • Suicidal Ideation     with a plan to jump off of the freeway.        CURRENT LIVING SITUATION/SOCIAL SUPPORT: homeless    BEHAVIORAL HEALTH TREATMENT HISTORY  Does patient/parent report a history of prior behavioral health treatment for patient?   Yes:    Dates Level of Care Facilty/Provider Diagnosis/Problem Medications   2018 IP [facility] S/I depression etoh Cymbalta 90 mg PO q am Topamaz 50 mg PO Bid Seroquel 100mg PO HS   11/15/2018 IP [facility] S/I etoh Gabapentin and as above.     SAFETY ASSESSMENT - SELF  Does patient acknowledge current or past symptoms of dangerousness to self? yes  Does parent/significant other report patient has current or past symptoms of dangerousness to self? N\A  Does presenting problem suggest symptoms of dangerousness to self? Yes:     Past Current    Suicidal Thoughts: [x]  [x]    Suicidal Plans: [x]  [x]    Suicidal Intent: []  []    Suicide Attempts: []  []    Self-Injury []  []      For any boxes checked above, provide detail: threatening to jump off a freeway overpass.    History of suicide by family member: no  History of suicide by friend/significant other: no  Recent change in frequency/specificity/intensity " "of suicidal thoughts or self-harm behavior? no  Current access to firearms, medications, or other identified means of suicide/self-harm? no  If yes, willing to restrict access to means of suicide/self-harm? no  Protective factors present:  Willing to address in treatment    SAFETY ASSESSMENT - OTHERS  Does patient acknowledge current or past symptoms of aggressive behavior or risk to others? no  Does parent/significant other report patient has current or past symptoms of aggressive behavior or risk to others?  no  Does presenting problem suggest symptoms of dangerousness to others? No    Crisis Safety Plan completed and copy given to patient? no    ABUSE/NEGLECT SCREENING  Does patient report feeling “unsafe” in his/her home, or afraid of anyone?  no  Does patient report any history of physical, sexual, or emotional abuse?  no  Does parent or significant other report any of the above? no  Is there evidence of neglect by self?  no  Is there evidence of neglect by a caregiver? no  Does the patient/parent report any history of CPS/APS/police involvement related to suspected abuse/neglect or domestic violence? no  Based on the information provided during the current assessment, is a mandated report of suspected abuse/neglect being made?  No    SUBSTANCE USE SCREENING  Yes:  D[place]ell all substances used in the past 30 days:      Last Use Amount   []   Alcohol     []   Marijuana     []   Heroin     []   Prescription Opioids  (used without prescription, for    recreation, or in excess of prescribed amount)     []   Other Prescription  (used without prescription, for    recreation, or in excess of prescribed amount)     []   Cocaine      []   Methamphetamine     []   \"\" drugs (ectasy, MDMA)     []   Other substances        UDS results: negative  Breathalyzer results: 0.02    What consequences does the patient associate with any of the above substance use and or addictive behaviors? Work problems or losses: , " Relationship problems: , Family problems: , Health problems:     Risk factors for detox (check all that apply):  []  Seizures   []  Diaphoretic (sweating)   []  Tremors   []  Hallucinations   []  Increased blood pressure   []  Decreased blood pressure   []  Other   []  None      [x] Patient education on risk factors for detoxification and instructed to return to ER as needed.      MENTAL STATUS   Participation: Limited verbal participation  Grooming: Casual  Orientation: Alert  Behavior: Tense  Eye contact: Limited  Mood: Depressed  Affect: Flat and Sad  Thought process: Logical and Goal-directed  Thought content: Within normal limits  Speech: Rate within normal limits  Perception: Within normal limits  Memory:  Recent:  Limited  Insight: Limited  Judgment:  Limited  Other:    Collateral information:   Source:  [] Significant other present in person:   [] Significant other by telephone  [] [facility]   [] [facility]  [x] [facility]  [] Other:     [] Unable to complete full assessment due to:  [] Acute intoxication  [] Patient declined to participate/engage  [] Patient verbally unresponsive  [] Significant cognitive deficits  [] Significant perceptual distortions or behavioral disorganization  [] Other:      CLINICAL IMPRESSIONS:  Primary:  Suicidal ideation with a plan to jump off a freeway overpass.   Secondary:  Alcohol use disorder.       IDENTIFIED NEEDS/PLAN:  [Trigger DISPOSITION list for any items marked]    [x]  Imminent safety risk - self [] Imminent safety risk - others   []  Acute substance withdrawal []  Psychosis/Impaired reality testing   [x]  Mood/anxiety [x]  Substance use/Addictive behavior   []  Maladaptive behaviro []  Parent/child conflict   []  Family/Couples conflict []  Biomedical   []  Housing []  Financial   []   Legal  Occupational/Educational   []  Domestic violence []  Other:     Disposition: Refer to Legal Hold, [facility], [facility]  [facility]    Does patient express agreement with the above plan? yes    Referral appointment(s) scheduled? no    Alert team only:   I have discussed findings and recommendations with Dr. Ogden who is in agreement with these recommendations.     Referral information sent to the following community providers : [facility]    If applicable : Referred  to : [facility] for legal hold follow up at (time): 1400      Zandra Vila R.N.  3/1/2019

## 2019-03-01 NOTE — ED PROVIDER NOTES
"ED Provider Note    CHIEF COMPLAINT  Chief Complaint   Patient presents with   • Alcohol Intoxication     Pt has been at the [place] all night, states \" I drank a LOT\".    • Suicidal Ideation     with a plan to jump off of the freeway.       HPI  Chris Leggett is a 68 y.o. male who presents presents to the ER reporting the knees feeling suicidal.  He thought about jumping off of a bridge.  He has not done anything to harm himself yet however these feelings are severe and gradually getting worse.  He cannot say why he feels this way..  He denies hearing voices or any hallucinations.  He denies drugs, but he has been drinking alcohol all night long.  He denies headache, abdominal pain, chest pain, shortness of breath, fevers.    Medical record was reviewed.  Patient was in this emergency department with suicidal thoughts at the end of last month.  He ultimately was transferred for psychiatric care.    REVIEW OF SYSTEMS  As per HPI, otherwise a 10 point review of systems is negative    PAST MEDICAL HISTORY  Past Medical History:   Diagnosis Date   • Gout    • Hypertension        SOCIAL HISTORY  Social History   Substance Use Topics   • Smoking status: Former Smoker     Packs/day: 0.50     Years: 5.00     Types: Cigarettes     Quit date: 1/18/2019   • Smokeless tobacco: Never Used   • Alcohol use Yes      Comment: many last night       SURGICAL HISTORY  Past Surgical History:   Procedure Laterality Date   • CERVICAL DISK AND FUSION ANTERIOR  9/2/2018    Procedure: CERVICAL DISK AND FUSION ANTERIOR C5-C6;  Surgeon: Varghese Wilkins M.D.;  Location: SURGERY [facility];  Service: Neurosurgery   • OTHER ORTHOPEDIC SURGERY      fision with disc removal       CURRENT MEDICATIONS  Home Medications     Reviewed by Yusra Rey (Pharmacy Tech) on 03/01/19 at 1113  Med List Status: Unable to Obtain   Medication Last Dose Status   allopurinol (ZYLOPRIM) 100 MG Tab  Active   LISINOPRIL PO  Active   QUEtiapine " "(SEROQUEL) 100 MG Tab  Active   traZODone (DESYREL) 100 MG Tab  Active                ALLERGIES  No Known Allergies    PHYSICAL EXAM  VITAL SIGNS: BP (!) 99/65   Pulse 69   Temp 36.4 °C (97.5 °F) (Temporal)   Resp 16   Ht 1.803 m (5' 11\")   Wt 101 kg (222 lb 10.6 oz)   SpO2 97%   BMI 31.06 kg/m²    Constitutional: Awake and alert  HENT:  Atraumatic, Normocephalic.Oropharynx dry mucus membranes, Nose normal inspection.   Eyes: Normal inspection  Neck: Supple  Cardiovascular: Normal heart rate, Normal rhythm.  Symmetric peripheral pulses.   Thorax & Lungs: No respiratory distress, No wheezing, No rales, No rhonchi, No chest tenderness.   Abdomen: Bowel sounds normal, soft, non-distended, nontender, no mass  Skin: Warm, Dry, No rash.   Back: No tenderness, No CVA tenderness.   Extremities: No clubbing, cyanosis, edema, no Homans or cords   Neurologic: Grossly normal   Psychiatric: Anxious appearing        Labs:  Results for orders placed or performed during the hospital encounter of 03/01/19   POC BREATHALIZER   Result Value Ref Range    POC Breathalizer 0.28 (A) 0.00 - 0.01 Percent   POC BREATHALIZER   Result Value Ref Range    POC Breathalizer 0.04 (A) 0.00 - 0.01 Percent          COURSE & MEDICAL DECISION MAKING  Patient presents with suicidal ideation.  He was intoxicated on arrival.  He metabolized in the emergency department.  When he is sober he was persistently suicidal.  I consulted lifeskills.  Legal hold is completed.  He has no medical complaints or alarming findings on examination.  He is medically cleared for psychiatric referral.    FINAL IMPRESSION  1.  Suicidal ideation      This dictation was created using voice recognition software. The accuracy of the dictation is limited to the abilities of the software.  The nursing notes were reviewed and certain aspects of this information were incorporated into this note.      Electronically signed by: Chuck Ogden, 3/1/2019 1:52 PM    "

## 2019-03-01 NOTE — ED NOTES
Pt continues to sleep. NADN.  Pt opens eyes to verbal requests and follows commands.  Continue to monitor

## 2019-03-02 NOTE — ED NOTES
Pt taken by STEVE via ambulance to [facility]. Per day shift RN, report previously called. Pt amb with steady gait.

## 2019-03-02 NOTE — DISCHARGE PLANNING
Medical Social Work    Referral: Legal hold Transfer to Mental Health Facility    Intervention: LAURA received call from Gail at [facility] stating that they have accepted the patient for admission.     Pt's accepting physcian is Dr. Sheldon SANCHEZ arranged for transportation to be set up through [facility]    The pt will be picked up at 2100.     LAURA notified the RN of the departure time as well as accepting facility.     LAURA created transfer packet and placed on chart.     Plan: Pt will transfer back to [facility] at 2100

## 2019-03-02 NOTE — ED NOTES
Report called top SHARAN Flores at [facility].  PT is scheduled to be picked up at 2100 today.

## 2019-03-07 ENCOUNTER — APPOINTMENT (OUTPATIENT)
Dept: RADIOLOGY | Facility: MEDICAL CENTER | Age: 69
DRG: 897 | End: 2019-03-07
Attending: EMERGENCY MEDICINE
Payer: MEDICARE

## 2019-03-07 ENCOUNTER — HOSPITAL ENCOUNTER (INPATIENT)
Facility: MEDICAL CENTER | Age: 69
LOS: 5 days | DRG: 897 | End: 2019-03-12
Attending: EMERGENCY MEDICINE | Admitting: HOSPITALIST
Payer: MEDICARE

## 2019-03-07 DIAGNOSIS — E87.1 HYPONATREMIA: ICD-10-CM

## 2019-03-07 DIAGNOSIS — Z86.59 HISTORY OF SUICIDAL IDEATION: ICD-10-CM

## 2019-03-07 DIAGNOSIS — E86.0 DEHYDRATION: ICD-10-CM

## 2019-03-07 DIAGNOSIS — D64.9 NORMOCYTIC ANEMIA: ICD-10-CM

## 2019-03-07 DIAGNOSIS — R45.89 DEPRESSED MOOD: ICD-10-CM

## 2019-03-07 DIAGNOSIS — F10.20 ALCOHOLISM (HCC): ICD-10-CM

## 2019-03-07 DIAGNOSIS — F10.929 ACUTE ALCOHOLIC INTOXICATION WITH COMPLICATION (HCC): ICD-10-CM

## 2019-03-07 DIAGNOSIS — M54.16 LUMBAR RADICULOPATHY: ICD-10-CM

## 2019-03-07 LAB
ALBUMIN SERPL BCP-MCNC: 3.8 G/DL (ref 3.2–4.9)
ALBUMIN/GLOB SERPL: 1.6 G/DL
ALP SERPL-CCNC: 69 U/L (ref 30–99)
ALT SERPL-CCNC: 26 U/L (ref 2–50)
AMPHET UR QL SCN: NEGATIVE
ANION GAP SERPL CALC-SCNC: 9 MMOL/L (ref 0–11.9)
APAP SERPL-MCNC: <10 UG/ML (ref 10–30)
AST SERPL-CCNC: 77 U/L (ref 12–45)
BARBITURATES UR QL SCN: NEGATIVE
BASOPHILS # BLD AUTO: 0.5 % (ref 0–1.8)
BASOPHILS # BLD: 0.03 K/UL (ref 0–0.12)
BENZODIAZ UR QL SCN: POSITIVE
BILIRUB SERPL-MCNC: 0.5 MG/DL (ref 0.1–1.5)
BUN SERPL-MCNC: 13 MG/DL (ref 8–22)
BZE UR QL SCN: NEGATIVE
CALCIUM SERPL-MCNC: 9.3 MG/DL (ref 8.5–10.5)
CANNABINOIDS UR QL SCN: POSITIVE
CHLORIDE SERPL-SCNC: 92 MMOL/L (ref 96–112)
CO2 SERPL-SCNC: 23 MMOL/L (ref 20–33)
CREAT SERPL-MCNC: 0.77 MG/DL (ref 0.5–1.4)
EKG IMPRESSION: NORMAL
EOSINOPHIL # BLD AUTO: 0.53 K/UL (ref 0–0.51)
EOSINOPHIL NFR BLD: 8.3 % (ref 0–6.9)
ERYTHROCYTE [DISTWIDTH] IN BLOOD BY AUTOMATED COUNT: 50 FL (ref 35.9–50)
ETHANOL BLD-MCNC: 0.18 G/DL
GLOBULIN SER CALC-MCNC: 2.4 G/DL (ref 1.9–3.5)
GLUCOSE SERPL-MCNC: 99 MG/DL (ref 65–99)
HCT VFR BLD AUTO: 34.3 % (ref 42–52)
HGB BLD-MCNC: 11.8 G/DL (ref 14–18)
IMM GRANULOCYTES # BLD AUTO: 0.02 K/UL (ref 0–0.11)
IMM GRANULOCYTES NFR BLD AUTO: 0.3 % (ref 0–0.9)
LYMPHOCYTES # BLD AUTO: 1.43 K/UL (ref 1–4.8)
LYMPHOCYTES NFR BLD: 22.4 % (ref 22–41)
MAGNESIUM SERPL-MCNC: 1.6 MG/DL (ref 1.5–2.5)
MCH RBC QN AUTO: 33 PG (ref 27–33)
MCHC RBC AUTO-ENTMCNC: 34.4 G/DL (ref 33.7–35.3)
MCV RBC AUTO: 95.8 FL (ref 81.4–97.8)
METHADONE UR QL SCN: NEGATIVE
MONOCYTES # BLD AUTO: 0.42 K/UL (ref 0–0.85)
MONOCYTES NFR BLD AUTO: 6.6 % (ref 0–13.4)
NEUTROPHILS # BLD AUTO: 3.96 K/UL (ref 1.82–7.42)
NEUTROPHILS NFR BLD: 61.9 % (ref 44–72)
NRBC # BLD AUTO: 0 K/UL
NRBC BLD-RTO: 0 /100 WBC
OPIATES UR QL SCN: NEGATIVE
OXYCODONE UR QL SCN: NEGATIVE
PCP UR QL SCN: NEGATIVE
PHOSPHATE SERPL-MCNC: 4.3 MG/DL (ref 2.5–4.5)
PLATELET # BLD AUTO: 173 K/UL (ref 164–446)
PMV BLD AUTO: 10 FL (ref 9–12.9)
POTASSIUM SERPL-SCNC: 4.3 MMOL/L (ref 3.6–5.5)
PROPOXYPH UR QL SCN: NEGATIVE
PROT SERPL-MCNC: 6.2 G/DL (ref 6–8.2)
RBC # BLD AUTO: 3.58 M/UL (ref 4.7–6.1)
SALICYLATES SERPL-MCNC: 0 MG/DL (ref 15–25)
SODIUM SERPL-SCNC: 124 MMOL/L (ref 135–145)
WBC # BLD AUTO: 6.4 K/UL (ref 4.8–10.8)

## 2019-03-07 PROCEDURE — 84100 ASSAY OF PHOSPHORUS: CPT

## 2019-03-07 PROCEDURE — 85025 COMPLETE CBC W/AUTO DIFF WBC: CPT

## 2019-03-07 PROCEDURE — 80307 DRUG TEST PRSMV CHEM ANLYZR: CPT

## 2019-03-07 PROCEDURE — 770020 HCHG ROOM/CARE - TELE (206)

## 2019-03-07 PROCEDURE — 99223 1ST HOSP IP/OBS HIGH 75: CPT | Performed by: PSYCHIATRY & NEUROLOGY

## 2019-03-07 PROCEDURE — 83735 ASSAY OF MAGNESIUM: CPT

## 2019-03-07 PROCEDURE — 96372 THER/PROPH/DIAG INJ SC/IM: CPT

## 2019-03-07 PROCEDURE — 700105 HCHG RX REV CODE 258: Performed by: HOSPITALIST

## 2019-03-07 PROCEDURE — A9270 NON-COVERED ITEM OR SERVICE: HCPCS | Performed by: NURSE PRACTITIONER

## 2019-03-07 PROCEDURE — 700102 HCHG RX REV CODE 250 W/ 637 OVERRIDE(OP): Performed by: NURSE PRACTITIONER

## 2019-03-07 PROCEDURE — 99223 1ST HOSP IP/OBS HIGH 75: CPT | Mod: AI | Performed by: HOSPITALIST

## 2019-03-07 PROCEDURE — 71045 X-RAY EXAM CHEST 1 VIEW: CPT

## 2019-03-07 PROCEDURE — 93005 ELECTROCARDIOGRAM TRACING: CPT | Performed by: EMERGENCY MEDICINE

## 2019-03-07 PROCEDURE — 36415 COLL VENOUS BLD VENIPUNCTURE: CPT

## 2019-03-07 PROCEDURE — HZ2ZZZZ DETOXIFICATION SERVICES FOR SUBSTANCE ABUSE TREATMENT: ICD-10-PCS | Performed by: HOSPITALIST

## 2019-03-07 PROCEDURE — 99285 EMERGENCY DEPT VISIT HI MDM: CPT

## 2019-03-07 PROCEDURE — A9270 NON-COVERED ITEM OR SERVICE: HCPCS | Performed by: HOSPITALIST

## 2019-03-07 PROCEDURE — 700111 HCHG RX REV CODE 636 W/ 250 OVERRIDE (IP): Performed by: HOSPITALIST

## 2019-03-07 PROCEDURE — 80053 COMPREHEN METABOLIC PANEL: CPT

## 2019-03-07 PROCEDURE — 700101 HCHG RX REV CODE 250: Performed by: EMERGENCY MEDICINE

## 2019-03-07 PROCEDURE — 700102 HCHG RX REV CODE 250 W/ 637 OVERRIDE(OP): Performed by: HOSPITALIST

## 2019-03-07 PROCEDURE — 96365 THER/PROPH/DIAG IV INF INIT: CPT

## 2019-03-07 RX ORDER — ACETAMINOPHEN 325 MG/1
650 TABLET ORAL EVERY 6 HOURS PRN
Status: DISCONTINUED | OUTPATIENT
Start: 2019-03-07 | End: 2019-03-12 | Stop reason: HOSPADM

## 2019-03-07 RX ORDER — ONDANSETRON 2 MG/ML
4 INJECTION INTRAMUSCULAR; INTRAVENOUS EVERY 4 HOURS PRN
Status: DISCONTINUED | OUTPATIENT
Start: 2019-03-07 | End: 2019-03-12 | Stop reason: HOSPADM

## 2019-03-07 RX ORDER — LORAZEPAM 2 MG/1
2 TABLET ORAL
Status: DISCONTINUED | OUTPATIENT
Start: 2019-03-07 | End: 2019-03-11

## 2019-03-07 RX ORDER — AMOXICILLIN 250 MG
2 CAPSULE ORAL 2 TIMES DAILY
Status: DISCONTINUED | OUTPATIENT
Start: 2019-03-07 | End: 2019-03-12 | Stop reason: HOSPADM

## 2019-03-07 RX ORDER — DULOXETIN HYDROCHLORIDE 30 MG/1
30 CAPSULE, DELAYED RELEASE ORAL DAILY
Status: DISCONTINUED | OUTPATIENT
Start: 2019-03-07 | End: 2019-03-12

## 2019-03-07 RX ORDER — LORAZEPAM 1 MG/1
1 TABLET ORAL EVERY 4 HOURS PRN
Status: DISCONTINUED | OUTPATIENT
Start: 2019-03-07 | End: 2019-03-11

## 2019-03-07 RX ORDER — QUETIAPINE FUMARATE 100 MG/1
100 TABLET, FILM COATED ORAL 2 TIMES DAILY
Status: ON HOLD | COMMUNITY
End: 2019-03-12

## 2019-03-07 RX ORDER — THIAMINE MONONITRATE (VIT B1) 100 MG
100 TABLET ORAL DAILY
Status: COMPLETED | OUTPATIENT
Start: 2019-03-07 | End: 2019-03-10

## 2019-03-07 RX ORDER — LORAZEPAM 2 MG/1
4 TABLET ORAL
Status: DISCONTINUED | OUTPATIENT
Start: 2019-03-07 | End: 2019-03-11

## 2019-03-07 RX ORDER — DULOXETIN HYDROCHLORIDE 60 MG/1
60 CAPSULE, DELAYED RELEASE ORAL DAILY
Status: ON HOLD | COMMUNITY
End: 2019-03-12

## 2019-03-07 RX ORDER — ONDANSETRON 4 MG/1
4 TABLET, ORALLY DISINTEGRATING ORAL EVERY 4 HOURS PRN
Status: DISCONTINUED | OUTPATIENT
Start: 2019-03-07 | End: 2019-03-12 | Stop reason: HOSPADM

## 2019-03-07 RX ORDER — FOLIC ACID 1 MG/1
1 TABLET ORAL DAILY
Status: COMPLETED | OUTPATIENT
Start: 2019-03-07 | End: 2019-03-10

## 2019-03-07 RX ORDER — LORAZEPAM 2 MG/ML
2 INJECTION INTRAMUSCULAR
Status: DISCONTINUED | OUTPATIENT
Start: 2019-03-07 | End: 2019-03-11

## 2019-03-07 RX ORDER — LORAZEPAM 2 MG/ML
1 INJECTION INTRAMUSCULAR
Status: DISCONTINUED | OUTPATIENT
Start: 2019-03-07 | End: 2019-03-11

## 2019-03-07 RX ORDER — LORAZEPAM 2 MG/ML
1.5 INJECTION INTRAMUSCULAR
Status: DISCONTINUED | OUTPATIENT
Start: 2019-03-07 | End: 2019-03-11

## 2019-03-07 RX ORDER — LORAZEPAM 2 MG/ML
0.5 INJECTION INTRAMUSCULAR EVERY 4 HOURS PRN
Status: DISCONTINUED | OUTPATIENT
Start: 2019-03-07 | End: 2019-03-11

## 2019-03-07 RX ORDER — SODIUM CHLORIDE 9 MG/ML
INJECTION, SOLUTION INTRAVENOUS CONTINUOUS
Status: DISCONTINUED | OUTPATIENT
Start: 2019-03-07 | End: 2019-03-08

## 2019-03-07 RX ORDER — LISINOPRIL 20 MG/1
20 TABLET ORAL DAILY
Status: ON HOLD | COMMUNITY
End: 2019-03-12

## 2019-03-07 RX ORDER — QUETIAPINE FUMARATE 25 MG/1
100 TABLET, FILM COATED ORAL
Status: DISCONTINUED | OUTPATIENT
Start: 2019-03-07 | End: 2019-03-12 | Stop reason: HOSPADM

## 2019-03-07 RX ORDER — POLYETHYLENE GLYCOL 3350 17 G/17G
1 POWDER, FOR SOLUTION ORAL
Status: DISCONTINUED | OUTPATIENT
Start: 2019-03-07 | End: 2019-03-12 | Stop reason: HOSPADM

## 2019-03-07 RX ORDER — BISACODYL 10 MG
10 SUPPOSITORY, RECTAL RECTAL
Status: DISCONTINUED | OUTPATIENT
Start: 2019-03-07 | End: 2019-03-12 | Stop reason: HOSPADM

## 2019-03-07 RX ORDER — GABAPENTIN 600 MG/1
600 TABLET ORAL 3 TIMES DAILY
Status: ON HOLD | COMMUNITY
End: 2019-03-12

## 2019-03-07 RX ORDER — TRAZODONE HYDROCHLORIDE 100 MG/1
100 TABLET ORAL NIGHTLY
Status: DISCONTINUED | OUTPATIENT
Start: 2019-03-07 | End: 2019-03-12 | Stop reason: HOSPADM

## 2019-03-07 RX ORDER — LORAZEPAM 1 MG/1
0.5 TABLET ORAL EVERY 4 HOURS PRN
Status: DISCONTINUED | OUTPATIENT
Start: 2019-03-07 | End: 2019-03-11

## 2019-03-07 RX ORDER — TRAZODONE HYDROCHLORIDE 100 MG/1
100 TABLET ORAL NIGHTLY PRN
Status: ON HOLD | COMMUNITY
End: 2019-03-12

## 2019-03-07 RX ORDER — CLONIDINE HYDROCHLORIDE 0.2 MG/1
0.2 TABLET ORAL
Status: ON HOLD | COMMUNITY
End: 2019-04-09

## 2019-03-07 RX ORDER — PRAZOSIN HYDROCHLORIDE 1 MG/1
1 CAPSULE ORAL EVERY EVENING
Status: DISCONTINUED | OUTPATIENT
Start: 2019-03-07 | End: 2019-03-12 | Stop reason: HOSPADM

## 2019-03-07 RX ADMIN — ENOXAPARIN SODIUM 40 MG: 100 INJECTION SUBCUTANEOUS at 06:09

## 2019-03-07 RX ADMIN — QUETIAPINE FUMARATE 100 MG: 25 TABLET ORAL at 21:06

## 2019-03-07 RX ADMIN — SODIUM CHLORIDE: 9 INJECTION, SOLUTION INTRAVENOUS at 06:08

## 2019-03-07 RX ADMIN — LORAZEPAM 1 MG: 1 TABLET ORAL at 12:00

## 2019-03-07 RX ADMIN — FOLIC ACID 1 MG: 1 TABLET ORAL at 06:09

## 2019-03-07 RX ADMIN — SODIUM CHLORIDE: 9 INJECTION, SOLUTION INTRAVENOUS at 12:07

## 2019-03-07 RX ADMIN — Medication 100 MG: at 06:09

## 2019-03-07 RX ADMIN — THIAMINE HYDROCHLORIDE: 100 INJECTION, SOLUTION INTRAMUSCULAR; INTRAVENOUS at 01:54

## 2019-03-07 RX ADMIN — SENNOSIDES AND DOCUSATE SODIUM 2 TABLET: 8.6; 5 TABLET ORAL at 06:12

## 2019-03-07 RX ADMIN — TRAZODONE HYDROCHLORIDE 100 MG: 100 TABLET ORAL at 21:06

## 2019-03-07 RX ADMIN — ACETAMINOPHEN 650 MG: 325 TABLET, FILM COATED ORAL at 18:15

## 2019-03-07 RX ADMIN — THERA TABS 1 TABLET: TAB at 06:09

## 2019-03-07 RX ADMIN — DULOXETINE HYDROCHLORIDE 30 MG: 30 CAPSULE, DELAYED RELEASE ORAL at 11:57

## 2019-03-07 RX ADMIN — SODIUM CHLORIDE: 9 INJECTION, SOLUTION INTRAVENOUS at 21:06

## 2019-03-07 RX ADMIN — LORAZEPAM 1 MG: 1 TABLET ORAL at 18:16

## 2019-03-07 RX ADMIN — PRAZOSIN HYDROCHLORIDE 1 MG: 1 CAPSULE ORAL at 18:17

## 2019-03-07 ASSESSMENT — LIFESTYLE VARIABLES
TREMOR: TREMOR NOT VISIBLE BUT CAN BE FELT, FINGERTIP TO FINGERTIP
TREMOR: NO TREMOR
TOTAL SCORE: 4
EVER_SMOKED: YES
AUDITORY DISTURBANCES: NOT PRESENT
SUBSTANCE_ABUSE: 1
HAVE YOU EVER FELT YOU SHOULD CUT DOWN ON YOUR DRINKING: YES
ANXIETY: MODERATELY ANXIOUS OR GUARDED, SO ANXIETY IS INFERRED
EVER HAD A DRINK FIRST THING IN THE MORNING TO STEADY YOUR NERVES TO GET RID OF A HANGOVER: YES
TREMOR: NO TREMOR
TOTAL SCORE: 7
NAUSEA AND VOMITING: NO NAUSEA AND NO VOMITING
HEADACHE, FULLNESS IN HEAD: VERY MILD
PAROXYSMAL SWEATS: NO SWEAT VISIBLE
AGITATION: NORMAL ACTIVITY
ORIENTATION AND CLOUDING OF SENSORIUM: DATE DISORIENTATION BY MORE THAN TWO CALENDAR DAYS
ANXIETY: NO ANXIETY (AT EASE)
ANXIETY: NO ANXIETY (AT EASE)
HEADACHE, FULLNESS IN HEAD: VERY MILD
PAROXYSMAL SWEATS: BARELY PERCEPTIBLE SWEATING. PALMS MOIST
PAROXYSMAL SWEATS: NO SWEAT VISIBLE
HOW MANY TIMES IN THE PAST YEAR HAVE YOU HAD 5 OR MORE DRINKS IN A DAY: 10
ALCOHOL_USE: YES
TOTAL SCORE: 4
VISUAL DISTURBANCES: NOT PRESENT
DOES PATIENT WANT TO TALK TO SOMEONE ABOUT QUITTING: YES
AVERAGE NUMBER OF DAYS PER WEEK YOU HAVE A DRINK CONTAINING ALCOHOL: 4
TOTAL SCORE: 7
AGITATION: NORMAL ACTIVITY
TOTAL SCORE: 5
AGITATION: NORMAL ACTIVITY
AUDITORY DISTURBANCES: NOT PRESENT
NAUSEA AND VOMITING: NO NAUSEA AND NO VOMITING
TOTAL SCORE: MILD ITCHING, PINS AND NEEDLES SENSATION, BURNING OR NUMBNESS
ALCOHOL_USE: YES
TREMOR: NO TREMOR
AUDITORY DISTURBANCES: NOT PRESENT
NAUSEA AND VOMITING: NO NAUSEA AND NO VOMITING
TOTAL SCORE: 4
CONSUMPTION TOTAL: POSITIVE
DOES PATIENT WANT TO TALK TO SOMEONE ABOUT QUITTING: YES
PAROXYSMAL SWEATS: NO SWEAT VISIBLE
VISUAL DISTURBANCES: NOT PRESENT
TREMOR: TREMOR NOT VISIBLE BUT CAN BE FELT, FINGERTIP TO FINGERTIP
HEADACHE, FULLNESS IN HEAD: MODERATE
AGITATION: NORMAL ACTIVITY
VISUAL DISTURBANCES: NOT PRESENT
HAVE PEOPLE ANNOYED YOU BY CRITICIZING YOUR DRINKING: YES
HEADACHE, FULLNESS IN HEAD: VERY MILD
ON A TYPICAL DAY WHEN YOU DRINK ALCOHOL HOW MANY DRINKS DO YOU HAVE: 6
TOTAL SCORE: MILD ITCHING, PINS AND NEEDLES SENSATION, BURNING OR NUMBNESS
AUDITORY DISTURBANCES: NOT PRESENT
AUDITORY DISTURBANCES: NOT PRESENT
EVER HAD A DRINK FIRST THING IN THE MORNING TO STEADY YOUR NERVES TO GET RID OF A HANGOVER: YES
TOTAL SCORE: 4
ANXIETY: NO ANXIETY (AT EASE)
TOTAL SCORE: 4
DOES PATIENT WANT TO STOP DRINKING: YES
EVER FELT BAD OR GUILTY ABOUT YOUR DRINKING: YES
ORIENTATION AND CLOUDING OF SENSORIUM: DATE DISORIENTATION BY MORE THAN TWO CALENDAR DAYS
VISUAL DISTURBANCES: NOT PRESENT
TOTAL SCORE: 9
HAVE YOU EVER FELT YOU SHOULD CUT DOWN ON YOUR DRINKING: YES
ORIENTATION AND CLOUDING OF SENSORIUM: DATE DISORIENTATION BY MORE THAN TWO CALENDAR DAYS
ANXIETY: NO ANXIETY (AT EASE)
ORIENTATION AND CLOUDING OF SENSORIUM: ORIENTED AND CAN DO SERIAL ADDITIONS
HEADACHE, FULLNESS IN HEAD: MODERATE
NAUSEA AND VOMITING: NO NAUSEA AND NO VOMITING
PAROXYSMAL SWEATS: NO SWEAT VISIBLE
TOTAL SCORE: 4
ORIENTATION AND CLOUDING OF SENSORIUM: ORIENTED AND CAN DO SERIAL ADDITIONS
EVER FELT BAD OR GUILTY ABOUT YOUR DRINKING: YES
TREMOR: NO TREMOR
PAROXYSMAL SWEATS: BARELY PERCEPTIBLE SWEATING. PALMS MOIST
AGITATION: NORMAL ACTIVITY
AUDITORY DISTURBANCES: NOT PRESENT
NAUSEA AND VOMITING: NO NAUSEA AND NO VOMITING
ORIENTATION AND CLOUDING OF SENSORIUM: ORIENTED AND CAN DO SERIAL ADDITIONS
VISUAL DISTURBANCES: NOT PRESENT
AGITATION: NORMAL ACTIVITY
VISUAL DISTURBANCES: NOT PRESENT
TOTAL SCORE: 2
HAVE PEOPLE ANNOYED YOU BY CRITICIZING YOUR DRINKING: YES
ANXIETY: MILDLY ANXIOUS
DOES PATIENT WANT TO STOP DRINKING: YES
NAUSEA AND VOMITING: NO NAUSEA AND NO VOMITING
CONSUMPTION TOTAL: INCOMPLETE
TOTAL SCORE: 4
HEADACHE, FULLNESS IN HEAD: MODERATE

## 2019-03-07 ASSESSMENT — COPD QUESTIONNAIRES
IN THE PAST 12 MONTHS DO YOU DO LESS THAN YOU USED TO BECAUSE OF YOUR BREATHING PROBLEMS: DISAGREE/UNSURE
DO YOU EVER COUGH UP ANY MUCUS OR PHLEGM?: YES, A FEW DAYS A WEEK OR MONTH
COPD SCREENING SCORE: 5
HAVE YOU SMOKED AT LEAST 100 CIGARETTES IN YOUR ENTIRE LIFE: YES
DURING THE PAST 4 WEEKS HOW MUCH DID YOU FEEL SHORT OF BREATH: NONE/LITTLE OF THE TIME

## 2019-03-07 ASSESSMENT — PATIENT HEALTH QUESTIONNAIRE - PHQ9
SUM OF ALL RESPONSES TO PHQ9 QUESTIONS 1 AND 2: 6
2. FEELING DOWN, DEPRESSED, IRRITABLE, OR HOPELESS: NEARLY EVERY DAY
8. MOVING OR SPEAKING SO SLOWLY THAT OTHER PEOPLE COULD HAVE NOTICED. OR THE OPPOSITE, BEING SO FIGETY OR RESTLESS THAT YOU HAVE BEEN MOVING AROUND A LOT MORE THAN USUAL: NEARLY EVERY DAY
9. THOUGHTS THAT YOU WOULD BE BETTER OFF DEAD, OR OF HURTING YOURSELF: NEARLY EVERY DAY
7. TROUBLE CONCENTRATING ON THINGS, SUCH AS READING THE NEWSPAPER OR WATCHING TELEVISION: NEARLY EVERY DAY
SUM OF ALL RESPONSES TO PHQ QUESTIONS 1-9: 25
6. FEELING BAD ABOUT YOURSELF - OR THAT YOU ARE A FAILURE OR HAVE LET YOURSELF OR YOUR FAMILY DOWN: NEARLY EVERY DAY
5. POOR APPETITE OR OVEREATING: SEVERAL DAYS
3. TROUBLE FALLING OR STAYING ASLEEP OR SLEEPING TOO MUCH: NEARLY EVERY DAY
4. FEELING TIRED OR HAVING LITTLE ENERGY: NEARLY EVERY DAY
1. LITTLE INTEREST OR PLEASURE IN DOING THINGS: NEARLY EVERY DAY

## 2019-03-07 ASSESSMENT — COGNITIVE AND FUNCTIONAL STATUS - GENERAL
MOBILITY SCORE: 21
CLIMB 3 TO 5 STEPS WITH RAILING: A LITTLE
STANDING UP FROM CHAIR USING ARMS: A LITTLE
SUGGESTED CMS G CODE MODIFIER MOBILITY: CJ
DAILY ACTIVITIY SCORE: 21
DRESSING REGULAR LOWER BODY CLOTHING: A LITTLE
SUGGESTED CMS G CODE MODIFIER DAILY ACTIVITY: CJ
HELP NEEDED FOR BATHING: A LITTLE
PERSONAL GROOMING: A LITTLE
WALKING IN HOSPITAL ROOM: A LITTLE

## 2019-03-07 ASSESSMENT — ENCOUNTER SYMPTOMS
GASTROINTESTINAL NEGATIVE: 1
DEPRESSION: 1
NEUROLOGICAL NEGATIVE: 1
CARDIOVASCULAR NEGATIVE: 1
CONSTITUTIONAL NEGATIVE: 1
RESPIRATORY NEGATIVE: 1
EYES NEGATIVE: 1
MUSCULOSKELETAL NEGATIVE: 1

## 2019-03-07 NOTE — ED NOTES
Dr. AMEYA Lyles will be this patient's attending physician starting at 0900.  Dr. Crooks will answer pages between now and 0900, after 0900 please page Dr. Lyles

## 2019-03-07 NOTE — PROGRESS NOTES
2 RN skin assessment Completed   Patient's heels are dry/ boggy and flaky   Otherwise skin is intact

## 2019-03-07 NOTE — PSYCHIATRY
"PSYCHIATRIC INTAKE EVALUATION     *Reason for admission: Hyponatremia/Alcohol Abuse          *Reason for consult: Suicidal Ideation        *Requesting Physician/APN: Gaston Crooks M.D.        Supervising Psychiatrist: Mira Johnson MD          Legal Hold on admission: Extended            *Chief Complaint: \" I want to kill myself for sure this time\"    *HPI (includes Psychiatric ROS):      Patient was admitted for hyponatremia and alcohol intoxication. He made suicidal statements about jumping off the bridge. He states he is still suicidal and wants to jump off a bridge. He states he is hopeless and has nothing to live for. This is an ongoing theme with Mr. Leggett who is well known to psychiatry. He states he has no one is his life and is very lonely. He was recently released from [facility] and relapsed yesterday. He states he drank 12 large beers. He denies halluycinations, homicidal ideations, and delusions.     *Medical Review Of Symptoms (not dx conditions):   Review of Systems   Constitutional: Negative.    HENT: Negative.    Eyes: Negative.    Respiratory: Negative.    Cardiovascular: Negative.    Gastrointestinal: Negative.    Genitourinary: Negative.    Musculoskeletal: Negative.    Skin: Negative.    Neurological: Negative.    Endo/Heme/Allergies: Negative.    Psychiatric/Behavioral: Positive for depression, substance abuse and suicidal ideas.       All other systems reviewed and are negative.       *Psychiatric Examination:   Vitals:   Vitals:    03/07/19 1130   BP:    Pulse: 68   Resp: 12   Temp:    SpO2: 96%     General Appearance: Patient is a thin,  man with a bald head.   Abnormal Movements: No tics, tremors, or abnormal psychomotor movements.   Gait and Posture: WNL  Speech: Mumbled   Though Process: Linear and logical   Associations: WNL  Abnormal or Psychotic Thoughts: WNL  Judgement and Insight:Poor/Poor  Orientation: Fully Oriented  Recent and Remote Memory: Intact "   Attention Span and Concentration: Attentive and able to focus   Language: English   Fund of Knowledge: Adequate   Mood and Affect: Depressed, congruent with mood  SI/HI: Denies     *PAST MEDICAL/PSYCH/FAMILY/SOCIAL(as reported by patient):         *medical hx:        TBI: Denies  SZ: Denies  Stroke: Denies  Past Medical History:   Diagnosis Date   • Gout    • Hypertension      Past Surgical History:   Procedure Laterality Date   • CERVICAL DISK AND FUSION ANTERIOR  2018    Procedure: CERVICAL DISK AND FUSION ANTERIOR C5-C6;  Surgeon: Varghese Wilkins M.D.;  Location: SURGERY [facility];  Service: Neurosurgery   • OTHER ORTHOPEDIC SURGERY      fision with disc removal        *psychiatric hx:   19  Edmond Leggett is a 68 y.o. male who presents to the Emergency Department with chief complaint of cough. During his stay here he stated that he is thinking about jumping in front of a train or taking a large amount of pills. Psychiatry has seen this patient  two other times in December including myself. He states that because he is so sick he feels that he is useless and weak. He states that he does not want to live like this so he wishes he was dead. He has ideas on how to kill himself but no plan. He  Has been depressed ongoing for several months now. He also has had several close family members pass away over time that makes him feel alone and very depressed that some  painful deaths. He said he does seek out AA for help and that makes him feel better. He says lately his energy has been low, he's been hopeless, and has had difficulty sleeping. He denies delusions, hallucinations, and homicidal ideations.     18  Patient reported he is feeling depressed because of the holiday season, and for not having any friends or family. He stated his depression is 7/10 (0 being not depressed, 10 very depressed with SI). Patient reported his brother  in January, and that his mother and wife passed away as  "well a few years ago. 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 He does not have children. Patient denied any anhedonia, change in appetite or energy. He reported feeling guilty at times (didn't want to specify the reason for his guilty feelings). He has been having poor sleep, but stated sleeping well last night. He reported he wishes he was not alive anymore, however denied any SI, method, plan or intention. Patient denied any AVH. Patient during evaluation was tearful, and stated he has been feeling mistreated by the CNAs, who have yelled at him, and not attended to his needs. Patient also is feeling upset as social workers have referred to him as homeless, and he insisited during evaluation of not being homeless and having a place to live. \"I don;t have to tell where I live, he said\". He stated he wants to get better physically and emotionally to improve his overall life. 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 Leggett is a 68 year old male with past medical history of Mood disorder who presented to the hospital with a recent exacerbation of his lower back pain. He was recently admitted at [facility] where he stayed for about a month and was discharged three days ago and has been off of his medications since then. Patient stated he has no desire to live but denies any current plans. Today the patient is visibly upset and crying during the interview. He states he has been depressed for months now and says he doesn't want to die but he has no purpose anymore. He states he lost his wife, mother, and brother in the last few years and that the holidays get him very depressed. He has no plan to commit suicide. He recently was treated at [facility] for depression and suicidal ideations. He states it went very well and he liked the medication regimen he was on " there. He immediately went off his mediations once discharged from [facility]. He also states his medical problems, financial difficulties, and housing issues contribute to his depression. He said that he has never had any history of hallucinations but states that he thought people were in his room earlier. The patient tyra mentions he uses THC and alcohol regularly and was treated for substance abuse a few times decades ago. He denies suicidal ideations, homicidal ideations, and delusions    SAs: 1 in past  Guns: Denies  Hx of Violence: denies  Hospitalizations: denies  Med Hx: Cymbalta, Seroquel, Trazodone, Prazosin  Dx Hx: Depression and anxiety   Other:     *family Psych hx:      Denies    *social hx:  Patient is from [place]. Since moving here he has been in [place], [facility], a nursing home, and the shelter. He has no children and wife is . He receives SSI for income.    Alcohol: Heavy chronic use    Drugs: Negative      *MEDICAL HX: labs, MARS, medications, etc were reviewed. Only those findings of potential interest to psychiatry are noted below:    *Current Medical issues:      NA    *Allergies:  No Known Allergies  *Current Medications:    Current Facility-Administered Medications:   •  QUEtiapine (SEROQUEL) tablet 100 mg, 100 mg, Oral, QHS, Gaston Crooks M.D.  •  traZODone (DESYREL) tablet 100 mg, 100 mg, Oral, Nightly, Gaston Crooks M.D., Stopped at 19 0315  •  NS infusion, , Intravenous, Continuous, Gaston Crooks M.D., Last Rate: 100 mL/hr at 19 0608  •  acetaminophen (TYLENOL) tablet 650 mg, 650 mg, Oral, Q6HRS PRN, Gaston Crooks M.D.  •  ondansetron (ZOFRAN) syringe/vial injection 4 mg, 4 mg, Intravenous, Q4HRS PRN, Gaston Crooks M.D.  •  ondansetron (ZOFRAN ODT) dispertab 4 mg, 4 mg, Oral, Q4HRS PRN, Gaston Crooks M.D.  •  senna-docusate (PERICOLACE or SENOKOT S) 8.6-50 MG per tablet 2 Tab, 2 Tab, Oral, BID, 2 Tab at 19 0612 **AND** polyethylene glycol/lytes  (MIRALAX) PACKET 1 Packet, 1 Packet, Oral, QDAY PRN **AND** magnesium hydroxide (MILK OF MAGNESIA) suspension 30 mL, 30 mL, Oral, QDAY PRN **AND** bisacodyl (DULCOLAX) suppository 10 mg, 10 mg, Rectal, QDAY PRN, Gaston Crooks M.D.  •  enoxaparin (LOVENOX) inj 40 mg, 40 mg, Subcutaneous, DAILY, Gaston Crooks M.D., 40 mg at 19  •  LORazepam (ATIVAN) tablet 0.5 mg, 0.5 mg, Oral, Q4HRS PRN, Gaston Crooks M.D.  •  LORazepam (ATIVAN) tablet 1 mg, 1 mg, Oral, Q4HRS PRN **OR** LORazepam (ATIVAN) injection 0.5 mg, 0.5 mg, Intravenous, Q4HRS PRN, Gaston Crooks M.D.  •  LORazepam (ATIVAN) tablet 2 mg, 2 mg, Oral, Q2HRS PRN **OR** LORazepam (ATIVAN) injection 1 mg, 1 mg, Intravenous, Q2HRS PRN, Gaston Crooks M.D.  •  LORazepam (ATIVAN) tablet 3 mg, 3 mg, Oral, Q HOUR PRN **OR** LORazepam (ATIVAN) injection 1.5 mg, 1.5 mg, Intravenous, Q HOUR PRN, Gaston Crooks M.D.  •  LORazepam (ATIVAN) tablet 4 mg, 4 mg, Oral, Q15 MIN PRN **OR** LORazepam (ATIVAN) injection 2 mg, 2 mg, Intravenous, Q15 MIN PRN, Gaston Crooks M.D.  •  thiamine tablet 100 mg, 100 mg, Oral, DAILY, 100 mg at 19 **AND** multivitamin (THERAGRAN) tablet 1 Tab, 1 Tab, Oral, DAILY, 1 Tab at 19 **AND** folic acid (FOLVITE) tablet 1 mg, 1 mg, Oral, DAILY, Gaston Crooks M.D., 1 mg at 19 0609    Current Outpatient Prescriptions:   •  traZODone (DESYREL) 100 MG Tab, Take 100 mg by mouth every evening., Disp: , Rfl:   •  QUEtiapine (SEROQUEL) 100 MG Tab, Take 100 mg by mouth every bedtime., Disp: , Rfl:   •  LISINOPRIL PO, Take 1 Tab by mouth every day., Disp: , Rfl:   •  allopurinol (ZYLOPRIM) 100 MG Tab, Take 1 Tab by mouth every day., Disp: 7 Tab, Rfl: 0     *EK- Personally reviewed     *Imaging: NA     EEG:  NA    *Labs:  Recent Labs      19   0140   WBC  6.4   RBC  3.58*   HEMOGLOBIN  11.8*   HEMATOCRIT  34.3*   MCV  95.8   MCH  33.0   RDW  50.0   PLATELETCT  173   MPV  10.0   NEUTSPOLYS  61.90    LYMPHOCYTES  22.40   MONOCYTES  6.60   EOSINOPHILS  8.30*   BASOPHILS  0.50     Lab Results   Component Value Date/Time    SODIUM 124 (L) 03/07/2019 01:40 AM    POTASSIUM 4.3 03/07/2019 01:40 AM    CHLORIDE 92 (L) 03/07/2019 01:40 AM    CO2 23 03/07/2019 01:40 AM    GLUCOSE 99 03/07/2019 01:40 AM    BUN 13 03/07/2019 01:40 AM    CREATININE 0.77 03/07/2019 01:40 AM           Lab Results  Component Value Date/Time   BREATHALIZER 0.04 (A) 03/01/2019 1313     No components found for: BLOODALCOHOL     Lab Results  Component Value Date/Time   AMPHUR Negative 03/07/2019 0227   BARBSURINE Negative 03/07/2019 0227   BENZODIAZU Positive (A) 03/07/2019 0227   COCAINEMET Negative 03/07/2019 0227   METHADONE Negative 03/07/2019 0227   OPIATES Negative 03/07/2019 0227   OXYCODN Negative 03/07/2019 0227   PCPURINE Negative 03/07/2019 0227   PROPOXY Negative 03/07/2019 0227   CANNABINOID Positive (A) 03/07/2019 0227       Lab Results  Component Value Date/Time   FREET4 1.15 11/05/2018 1220         *ASSESSMENT/PLAN:  1. Depressive Disorder Unspecified  -   Cymbalta       2. Alcohol Use disorder, Severe     -  Inpatient treatment recommended   3. Anxiety Disorder Unspecified       Legal hold: Extended  Discussed with Mira Johnson MD  Records reviewed   *Will Follow     Pernell Candelaria, PMP-BC

## 2019-03-07 NOTE — ED NOTES
Med rec completed per [facility]  Allergies reviewed  No PO antibiotics in the last 30 days    Pt states that he has not taken any medications since her was at [facility]. Pt got discharged on 2/28/19

## 2019-03-07 NOTE — ED PROVIDER NOTES
"ED PROVIDER NOTE     Scribed for Nba Man M.D. by Matthew Hui. 3/7/2019, 1:24 AM.    CHIEF COMPLAINT  Chief Complaint   Patient presents with   • Suicidal Ideation     Reports that he was at a Bar tonight drinking, starting feeling SI, Frequent Hx of the same. recently DC'd from [facility]   • Alcohol Intoxication       HPI    Primary care provider: None  Means of arrival: Ambulance  History obtained from: Patient  History limited by: Patient's altered level of consciousness.    Chris Leggett is a 68 y.o. male who presents for evaluation of suicidal ideations. He states \"I don't want to live anymore\" and reports he has been suicidal since all of his family  one year ago. The patient had previously been seeking help regarding his suicidal ideations at [facility] and was admitted for 6 days, but was recently discharged earlier yesterday. He reports he should have not left the psychiatric facility as his suicidal ideations have continued. The patient currently reports a plan to jump off a bridge, but states he has no prior suicide attempts. He admits to have \"taken a hit of crank\" and consumed alcohol at a bar tonight after missing his train to [place]. EMS was contacted at that time as patient was heard making suicidal comments. The patient is a daily drinker. He states he is currently homeless.     HPI limited secondary to patient's altered level of consciousness.    REVIEW OF SYSTEMS.   Psychiatric/Behavioral: Positive for suicidal ideation and depressed mood.  ROS limited secondary to patient's altered level of consciousness.    PAST MEDICAL HISTORY   has a past medical history of Gout and Hypertension.    PAST FAMILY HISTORY  No pertinent family history noted.    SOCIAL HISTORY  Social History     Social History Main Topics   • Smoking status: Former Smoker     Packs/day: 0.50     Years: 5.00     Types: Cigarettes     Quit date: 2019   • Smokeless tobacco: Never Used   • " "Alcohol use Yes      Comment: many last night   • Drug use: Yes     Types: Inhaled      Comment: marijuana       SURGICAL HISTORY   has a past surgical history that includes cervical disk and fusion anterior (9/2/2018) and other orthopedic surgery.    CURRENT MEDICATIONS  Home Medications     Reviewed by Anamika Ordonez R.N. (Registered Nurse) on 03/07/19 at 1355  Med List Status: Complete   Medication Last Dose Status   cloNIDine (CATAPRESS) 0.2 MG Tab 1 WEEK AGO Active   DULoxetine (CYMBALTA) 60 MG Cap DR Particles delayed-release capsule 1 WEEK AGO Active   gabapentin (NEURONTIN) 600 MG tablet 1 WEEK AGO Active   lisinopril (PRINIVIL) 20 MG Tab 1 WEEK AGO Active   QUEtiapine (SEROQUEL) 100 MG Tab  Active   traZODone (DESYREL) 100 MG Tab 1 WEEK AGO Active                ALLERGIES  No Known Allergies    PHYSICAL EXAM  VITAL SIGNS: BP (!) 96/56   Pulse 78   Temp 36.2 °C (97.2 °F) (Temporal)   Resp 16   Ht 1.803 m (5' 11\")   Wt 99.8 kg (220 lb)   BMI 30.68 kg/m²    Constitutional: Chronically ill appearing. Lying on stretcher in mild distress.   HEENT: Normocephalic, atraumatic. Posterior pharynx clear, mucous membranes dry.  Eyes:  EOMI. Normal sclerae.  Neck: Supple, nontender.  Chest/Pulmonary: Slightly diminished at bases, no wheezes or rhonchi.  Cardiovascular: Regular rate and rhythm, no murmur.   Abdomen: Soft, nontender; no rebound, guarding, or masses.  Back: No CVA or midline tenderness.   Musculoskeletal: No tenderness or deformities, but 1+ symmetric lower extremity edema.  Neuro: Slightly slurred speech but no focal asymmetry  Psych: Sad, suicidal with plan to jump over bridge.   Skin: No rashes, warm and dry.    DIAGNOSTIC STUDIES / PROCEDURES    LABS & EKG  Results for orders placed or performed during the hospital encounter of 03/07/19   CBC WITH DIFFERENTIAL   Result Value Ref Range    WBC 6.4 4.8 - 10.8 K/uL    RBC 3.58 (L) 4.70 - 6.10 M/uL    Hemoglobin 11.8 (L) 14.0 - 18.0 g/dL    " Hematocrit 34.3 (L) 42.0 - 52.0 %    MCV 95.8 81.4 - 97.8 fL    MCH 33.0 27.0 - 33.0 pg    MCHC 34.4 33.7 - 35.3 g/dL    RDW 50.0 35.9 - 50.0 fL    Platelet Count 173 164 - 446 K/uL    MPV 10.0 9.0 - 12.9 fL    Neutrophils-Polys 61.90 44.00 - 72.00 %    Lymphocytes 22.40 22.00 - 41.00 %    Monocytes 6.60 0.00 - 13.40 %    Eosinophils 8.30 (H) 0.00 - 6.90 %    Basophils 0.50 0.00 - 1.80 %    Immature Granulocytes 0.30 0.00 - 0.90 %    Nucleated RBC 0.00 /100 WBC    Neutrophils (Absolute) 3.96 1.82 - 7.42 K/uL    Lymphs (Absolute) 1.43 1.00 - 4.80 K/uL    Monos (Absolute) 0.42 0.00 - 0.85 K/uL    Eos (Absolute) 0.53 (H) 0.00 - 0.51 K/uL    Baso (Absolute) 0.03 0.00 - 0.12 K/uL    Immature Granulocytes (abs) 0.02 0.00 - 0.11 K/uL    NRBC (Absolute) 0.00 K/uL   CMP   Result Value Ref Range    Sodium 124 (L) 135 - 145 mmol/L    Potassium 4.3 3.6 - 5.5 mmol/L    Chloride 92 (L) 96 - 112 mmol/L    Co2 23 20 - 33 mmol/L    Anion Gap 9.0 0.0 - 11.9    Glucose 99 65 - 99 mg/dL    Bun 13 8 - 22 mg/dL    Creatinine 0.77 0.50 - 1.40 mg/dL    Calcium 9.3 8.5 - 10.5 mg/dL    AST(SGOT) 77 (H) 12 - 45 U/L    ALT(SGPT) 26 2 - 50 U/L    Alkaline Phosphatase 69 30 - 99 U/L    Total Bilirubin 0.5 0.1 - 1.5 mg/dL    Albumin 3.8 3.2 - 4.9 g/dL    Total Protein 6.2 6.0 - 8.2 g/dL    Globulin 2.4 1.9 - 3.5 g/dL    A-G Ratio 1.6 g/dL   ACETAMINOPHEN   Result Value Ref Range    Acetaminophen -Tylenol <10 10 - 30 ug/mL   Salicylate   Result Value Ref Range    Salicylates, Quant. 0 (L) 15 - 25 mg/dL   MAGNESIUM   Result Value Ref Range    Magnesium 1.6 1.5 - 2.5 mg/dL   DIAGNOSTIC ALCOHOL   Result Value Ref Range    Diagnostic Alcohol 0.18 (H) 0.00 g/dL   ESTIMATED GFR   Result Value Ref Range    GFR If African American >60 >60 mL/min/1.73 m 2    GFR If Non African American >60 >60 mL/min/1.73 m 2   URINE DRUG SCREEN   Result Value Ref Range    Amphetamines Urine Negative Negative    Barbiturates Negative Negative    Benzodiazepines Positive  (A) Negative    Cocaine Metabolite Negative Negative    Methadone Negative Negative    Opiates Negative Negative    Oxycodone Negative Negative    Phencyclidine -Pcp Negative Negative    Propoxyphene Negative Negative    Cannabinoid Metab Positive (A) Negative   PHOSPHORUS   Result Value Ref Range    Phosphorus 4.3 2.5 - 4.5 mg/dL   EKG   Result Value Ref Range    Report       [facility] Emergency Dept.    Test Date:  2019  Pt Name:    BRANDY HUTTON                 Department: ER  MRN:        [id number]                      Room:       T809  Gender:     Male                         Technician: [id number]  :        1950                   Requested By:NBA LEROY  Order #:    [id number]                    Reading MD: Nba Leroy MD    Measurements  Intervals                                Axis  Rate:       72                           P:          56  ID:         [id number]                          QRS:        51  QRSD:       90                           T:          55  QT:         416  QTc:        456    Interpretive Statements  SINUS RHYTHM  ATRIAL PREMATURE COMPLEX  Compared to ECG 2018 03:12:12  Atrial premature complex(es) now present  No STEMI or dysrhythmia  Electronically Signed On 3-7-2019 17:14:45 PST by Nba Leroy MD         RADIOLOGY  DX-CHEST-PORTABLE (1 VIEW)   Final Result         1.  Hazy reticular bilateral lung base density suggests atelectasis or interstitial infiltrate.          COURSE & MEDICAL DECISION MAKING    This is a 68 y.o. male who presents with alcohol intoxication and suicidal thoughts.    Differential Diagnosis includes but is not limited to:  intoxication, ingestion, suicidal ideation, depression, substance induced mood disorder     ED Course:  1:24 AM Patient presents to the ED with suicidal ideations. Patient will be treated with 1 L Detox IV fluids for intoxication and slightly dry mucous membranes. Hydration required as oral hydration was not  adequate and he needs to be monitored for any possible airway compromise or decline in clinical condition and must be kept nothing by mouth. Ordered for DX-Chest, Magnesium, Diagnostic Alcohol, CBC with differential, CMP, Acetaminophen, Salicylate, and EKG to evaluate his symptoms.      Hyponatremic likely secondary to hypovolemia and alcohol abuse.  This appears acute.  Alcohol level elevated.  Labs otherwise stable.    2:31 AM - Patient reevaluated at bedside. His hydration status appears improved, therefore having positive response to parenteral rehydration. He remains resting in bed. Discussed lab results with patient that indicate electrolyte abnormality and dehydration. He was informed he will be admitted for further rehydration and recheck of labs. The patient is understanding and agreeable to admit.  While he still has a sad mood he denies thoughts of hurting himself at this time and thus since he is still clinically and on lab work intoxicated I will not placed on a mental health hold until he may be reassessed when he is clinically sober.    2:32 AM - Consult with Dr. Crooks, Hospitalist, who kindly agrees to admit the patient.  The patient is hemodynamically stable for admission in guarded condition.  He is at risk for severe alcohol withdrawal given his long history of abuse.    Medications   QUEtiapine (SEROQUEL) tablet 100 mg (not administered)   traZODone (DESYREL) tablet 100 mg (0 mg Oral Held 3/7/19 0315)   NS infusion ( Intravenous New Bag 3/7/19 1207)   acetaminophen (TYLENOL) tablet 650 mg (not administered)   ondansetron (ZOFRAN) syringe/vial injection 4 mg (not administered)   ondansetron (ZOFRAN ODT) dispertab 4 mg (not administered)   senna-docusate (PERICOLACE or SENOKOT S) 8.6-50 MG per tablet 2 Tab (0 Tabs Oral Held 3/7/19 1800)     And   polyethylene glycol/lytes (MIRALAX) PACKET 1 Packet (not administered)     And   magnesium hydroxide (MILK OF MAGNESIA) suspension 30 mL (not administered)      And   bisacodyl (DULCOLAX) suppository 10 mg (not administered)   enoxaparin (LOVENOX) inj 40 mg (40 mg Subcutaneous Given 3/7/19 0609)   LORazepam (ATIVAN) tablet 0.5 mg (0 mg Oral Dose not Required 3/7/19 1159)   LORazepam (ATIVAN) tablet 1 mg (1 mg Oral Given 3/7/19 1200)     Or   LORazepam (ATIVAN) injection 0.5 mg ( Intravenous See Alternative 3/7/19 1200)   LORazepam (ATIVAN) tablet 2 mg (not administered)     Or   LORazepam (ATIVAN) injection 1 mg (not administered)   LORazepam (ATIVAN) tablet 3 mg (not administered)     Or   LORazepam (ATIVAN) injection 1.5 mg (not administered)   LORazepam (ATIVAN) tablet 4 mg (not administered)     Or   LORazepam (ATIVAN) injection 2 mg (not administered)   thiamine tablet 100 mg (100 mg Oral Given 3/7/19 0609)     And   multivitamin (THERAGRAN) tablet 1 Tab (1 Tab Oral Given 3/7/19 0609)     And   folic acid (FOLVITE) tablet 1 mg (1 mg Oral Given 3/7/19 0609)   prazosin (MINIPRESS) capsule 1 mg (not administered)   DULoxetine (CYMBALTA) capsule 30 mg (30 mg Oral Given 3/7/19 1157)   detox IV 1000 mL (D5LR + magnesium 1 g + thiamine 100 mg + folic acid 1 mg) infusion ( Intravenous Stopped 3/7/19 0316)     FINAL IMPRESSION  1. Acute alcoholic intoxication with complication (HCC)    2. Hyponatremia    3. Dehydration    4. Lumbar radiculopathy    5. Normocytic anemia    6. Depressed mood    7. History of suicidal ideation    8. Alcoholism (HCC)        -ADMIT-    Pertinent Labs & Imaging studies reviewed and verified by myself, as well as nursing notes and the patient's past medical, family, and social histories (See chart for details).    Matthew PERSAUD), am scribing for, and in the presence of, Nba Man M.D..    Electronically signed by: Matthew Montelongo), 3/7/2019    Nba PERSAUD M.D. personally performed the services described in this documentation, as scribed by Matthew Hui in my presence, and it is both accurate and  complete.    C.    Portions of this record were made with voice recognition software.  Despite my review, spelling/grammar/context errors may still remain.  Interpretation of this chart should be taken in this context.    The note accurately reflects work and decisions made by me.  Nba Man  3/7/2019  5:16 PM

## 2019-03-07 NOTE — ASSESSMENT & PLAN NOTE
Suspect due to chronic disease without no evidence of bleed. Transfuse if needed for hemoglobin less than 7 gm/dL

## 2019-03-07 NOTE — ED TRIAGE NOTES
Pt reports that he has a Frequent Hx of SI, recently seen DC'd from [facility]. Tonight was drinking and possibly doing Meth and Marijuana as well. At the bar he was at was making statements of wanting to kill himself by jumping off a bridge.

## 2019-03-07 NOTE — ED NOTES
Nurse spoke with patient, pt states at this time wants to jump off a bridge, pt does not care, ed doc notified, nurse placed pt on legal hold.

## 2019-03-07 NOTE — ED NOTES
Hourly rounding performed. Assessed patient complaints and Bathroom/comfort needs.  Patient sleeping. Sitter at bedside

## 2019-03-07 NOTE — H&P
"Hospital Medicine History & Physical Note    Date of Service  3/7/2019    Primary Care Physician  Pcp Pt States None    Consultants  [facility]    Code Status  Full code     Chief Complaint  Dont want to live anymore    History of Presenting Illness  68 y.o. Male with history of chronic alcoholism for which she was recently at a rehabilitation facility, was discharged, and had plans to go to [place], when he stopped by the casino, became drunk.  He reports that he \"does not want to live anymore\" he then repeats various versus from songs about being \"dust in the wind\" etc.  He reports multiple family members passing in the last several months, he denies any headache or vision changes, no chest pain or shortness of breath, no abdominal pain, nausea vomiting, diarrhea or constipation.  He has been drinking large amounts of alcohol.    Review of Systems  Review of Systems   Constitutional: Negative.    HENT: Negative.    Eyes: Negative.    Respiratory: Negative.    Cardiovascular: Negative.    Gastrointestinal: Negative.    Genitourinary: Negative.    Musculoskeletal: Negative.    Skin: Negative.    Neurological: Negative.    Endo/Heme/Allergies: Negative.    Psychiatric/Behavioral: Positive for substance abuse and suicidal ideas.       Past Medical History   has a past medical history of Gout and Hypertension.    Surgical History   has a past surgical history that includes cervical disk and fusion anterior (9/2/2018) and other orthopedic surgery.     Family History  family history includes Alzheimer's Disease in his father; Cancer in his father and mother; Heart Disease in his brother and sister.     Social History   reports that he quit smoking about 6 weeks ago. His smoking use included Cigarettes. He has a 2.50 pack-year smoking history. He has never used smokeless tobacco. He reports that he drinks alcohol. He reports that he uses drugs, including Inhaled.    Allergies  No Known Allergies    Medications  Prior to " Admission Medications   Prescriptions Last Dose Informant Patient Reported? Taking?   LISINOPRIL PO  Patient Yes No   Sig: Take 1 Tab by mouth every day.   QUEtiapine (SEROQUEL) 100 MG Tab  Patient Yes No   Sig: Take 100 mg by mouth every bedtime.   allopurinol (ZYLOPRIM) 100 MG Tab  Patient No No   Sig: Take 1 Tab by mouth every day.   traZODone (DESYREL) 100 MG Tab  Patient Yes No   Sig: Take 100 mg by mouth every evening.      Facility-Administered Medications: None       Physical Exam  Temp:  [36.2 °C (97.2 °F)] 36.2 °C (97.2 °F)  Pulse:  [78] 78  Resp:  [16] 16  BP: (96)/(56) 96/56    Physical Exam   Constitutional: He is oriented to person, place, and time. He appears well-developed and well-nourished. No distress.   HENT:   Head: Normocephalic and atraumatic.   Eyes: Pupils are equal, round, and reactive to light. Conjunctivae are normal.   Neck: Normal range of motion. Neck supple. No tracheal deviation present. No thyromegaly present.   Cardiovascular: Normal rate, regular rhythm and normal heart sounds.  Exam reveals no gallop and no friction rub.    No murmur heard.  Pulmonary/Chest: Effort normal and breath sounds normal. No respiratory distress. He has no wheezes.   Abdominal: Soft. Bowel sounds are normal. He exhibits no distension and no mass. There is no tenderness. There is no rebound and no guarding.   Musculoskeletal: Normal range of motion. He exhibits no edema.   Lymphadenopathy:     He has no cervical adenopathy.   Neurological: He is alert and oriented to person, place, and time. No cranial nerve deficit.   Skin: Skin is warm and dry. He is not diaphoretic.   Psychiatric: He has a normal mood and affect.   Nursing note and vitals reviewed.      Laboratory:  Recent Labs      03/07/19   0140   WBC  6.4   RBC  3.58*   HEMOGLOBIN  11.8*   HEMATOCRIT  34.3*   MCV  95.8   MCH  33.0   MCHC  34.4   RDW  50.0   PLATELETCT  173   MPV  10.0     Recent Labs      03/07/19   0140   SODIUM  124*   POTASSIUM   4.3   CHLORIDE  92*   CO2  23   GLUCOSE  99   BUN  13   CREATININE  0.77   CALCIUM  9.3     Recent Labs      03/07/19   0140   ALTSGPT  26   ASTSGOT  77*   ALKPHOSPHAT  69   TBILIRUBIN  0.5   GLUCOSE  99                 No results for input(s): TROPONINI in the last 72 hours.    Urinalysis:    No results found     Imaging:  DX-CHEST-PORTABLE (1 VIEW)   Final Result         1.  Hazy reticular bilateral lung base density suggests atelectasis or interstitial infiltrate.            Assessment/Plan:  I anticipate this patient will require at least two midnights for appropriate medical management, necessitating inpatient admission.    * Hyponatremia- (present on admission)   Assessment & Plan    Likely due to hypovolemia combined with decreased solute in the setting of chronic alcoholism.  Will monitor with IVF, regular diet.       Alcoholism (HCC)   Assessment & Plan    Ongoing.  Recently discharged from rehabilitation for the same.  High risk for withdrawal, for which patient will be monitored with CIWA protocol.       Normocytic anemia- (present on admission)   Assessment & Plan    Suspect due to chronic disease without no evidence of bleed. Transfuse if needed for hemoglobin less than 7 gm/dL           VTE prophylaxis: SCD, lovenox

## 2019-03-07 NOTE — CARE PLAN
Problem: Safety  Goal: Will remain free from injury    Intervention: Provide assistance with mobility  1:1 sitter in room, patient aware of reasoning

## 2019-03-07 NOTE — ASSESSMENT & PLAN NOTE
Likely secondary to beer proteinemia will monitor with IVF, regular diet.- resolved  Continue to monitor to avoid the risk of osmotic demyelination

## 2019-03-08 ENCOUNTER — APPOINTMENT (OUTPATIENT)
Dept: RADIOLOGY | Facility: MEDICAL CENTER | Age: 69
DRG: 897 | End: 2019-03-08
Attending: HOSPITALIST
Payer: MEDICARE

## 2019-03-08 PROBLEM — F33.2 SEVERE EPISODE OF RECURRENT MAJOR DEPRESSIVE DISORDER, WITHOUT PSYCHOTIC FEATURES (HCC): Status: ACTIVE | Noted: 2019-03-08

## 2019-03-08 PROBLEM — R45.851 SUICIDE IDEATION: Status: ACTIVE | Noted: 2019-03-08

## 2019-03-08 PROBLEM — M79.89 LOCALIZED SWELLING OF LOWER EXTREMITY: Status: ACTIVE | Noted: 2019-03-08

## 2019-03-08 PROBLEM — Z87.81 H/O CERVICAL FRACTURE: Status: ACTIVE | Noted: 2019-03-08

## 2019-03-08 PROBLEM — M54.16 LUMBAR BACK PAIN WITH RADICULOPATHY AFFECTING LOWER EXTREMITY: Status: ACTIVE | Noted: 2019-03-08

## 2019-03-08 LAB
ANION GAP SERPL CALC-SCNC: 6 MMOL/L (ref 0–11.9)
BASOPHILS # BLD AUTO: 1 % (ref 0–1.8)
BASOPHILS # BLD: 0.04 K/UL (ref 0–0.12)
BUN SERPL-MCNC: 16 MG/DL (ref 8–22)
CALCIUM SERPL-MCNC: 8.5 MG/DL (ref 8.5–10.5)
CHLORIDE SERPL-SCNC: 102 MMOL/L (ref 96–112)
CO2 SERPL-SCNC: 25 MMOL/L (ref 20–33)
CREAT SERPL-MCNC: 0.6 MG/DL (ref 0.5–1.4)
EOSINOPHIL # BLD AUTO: 0.33 K/UL (ref 0–0.51)
EOSINOPHIL NFR BLD: 8 % (ref 0–6.9)
ERYTHROCYTE [DISTWIDTH] IN BLOOD BY AUTOMATED COUNT: 50.1 FL (ref 35.9–50)
GLUCOSE SERPL-MCNC: 90 MG/DL (ref 65–99)
HCT VFR BLD AUTO: 33.1 % (ref 42–52)
HGB BLD-MCNC: 11.1 G/DL (ref 14–18)
IMM GRANULOCYTES # BLD AUTO: 0.01 K/UL (ref 0–0.11)
IMM GRANULOCYTES NFR BLD AUTO: 0.2 % (ref 0–0.9)
LYMPHOCYTES # BLD AUTO: 1.21 K/UL (ref 1–4.8)
LYMPHOCYTES NFR BLD: 29.5 % (ref 22–41)
MCH RBC QN AUTO: 32 PG (ref 27–33)
MCHC RBC AUTO-ENTMCNC: 33.5 G/DL (ref 33.7–35.3)
MCV RBC AUTO: 95.4 FL (ref 81.4–97.8)
MONOCYTES # BLD AUTO: 0.42 K/UL (ref 0–0.85)
MONOCYTES NFR BLD AUTO: 10.2 % (ref 0–13.4)
NEUTROPHILS # BLD AUTO: 2.09 K/UL (ref 1.82–7.42)
NEUTROPHILS NFR BLD: 51.1 % (ref 44–72)
NRBC # BLD AUTO: 0 K/UL
NRBC BLD-RTO: 0 /100 WBC
PLATELET # BLD AUTO: 117 K/UL (ref 164–446)
PMV BLD AUTO: 10.3 FL (ref 9–12.9)
POTASSIUM SERPL-SCNC: 4.3 MMOL/L (ref 3.6–5.5)
RBC # BLD AUTO: 3.47 M/UL (ref 4.7–6.1)
SODIUM SERPL-SCNC: 133 MMOL/L (ref 135–145)
WBC # BLD AUTO: 4.1 K/UL (ref 4.8–10.8)

## 2019-03-08 PROCEDURE — 80048 BASIC METABOLIC PNL TOTAL CA: CPT

## 2019-03-08 PROCEDURE — 36415 COLL VENOUS BLD VENIPUNCTURE: CPT

## 2019-03-08 PROCEDURE — A9270 NON-COVERED ITEM OR SERVICE: HCPCS | Performed by: HOSPITALIST

## 2019-03-08 PROCEDURE — 99233 SBSQ HOSP IP/OBS HIGH 50: CPT | Performed by: HOSPITALIST

## 2019-03-08 PROCEDURE — 700102 HCHG RX REV CODE 250 W/ 637 OVERRIDE(OP): Performed by: HOSPITALIST

## 2019-03-08 PROCEDURE — 700102 HCHG RX REV CODE 250 W/ 637 OVERRIDE(OP): Performed by: NURSE PRACTITIONER

## 2019-03-08 PROCEDURE — 302135 SEQUENTIAL COMPRESSION MACHINE: Performed by: HOSPITALIST

## 2019-03-08 PROCEDURE — A9270 NON-COVERED ITEM OR SERVICE: HCPCS | Performed by: NURSE PRACTITIONER

## 2019-03-08 PROCEDURE — 770020 HCHG ROOM/CARE - TELE (206)

## 2019-03-08 PROCEDURE — 93970 EXTREMITY STUDY: CPT

## 2019-03-08 PROCEDURE — 85025 COMPLETE CBC W/AUTO DIFF WBC: CPT

## 2019-03-08 PROCEDURE — 700105 HCHG RX REV CODE 258: Performed by: HOSPITALIST

## 2019-03-08 PROCEDURE — 700111 HCHG RX REV CODE 636 W/ 250 OVERRIDE (IP): Performed by: HOSPITALIST

## 2019-03-08 PROCEDURE — 93970 EXTREMITY STUDY: CPT | Mod: 26 | Performed by: SURGERY

## 2019-03-08 RX ADMIN — THERA TABS 1 TABLET: TAB at 06:12

## 2019-03-08 RX ADMIN — ENOXAPARIN SODIUM 40 MG: 100 INJECTION SUBCUTANEOUS at 06:12

## 2019-03-08 RX ADMIN — FOLIC ACID 1 MG: 1 TABLET ORAL at 06:12

## 2019-03-08 RX ADMIN — DULOXETINE HYDROCHLORIDE 30 MG: 30 CAPSULE, DELAYED RELEASE ORAL at 06:13

## 2019-03-08 RX ADMIN — PRAZOSIN HYDROCHLORIDE 1 MG: 1 CAPSULE ORAL at 17:53

## 2019-03-08 RX ADMIN — Medication 100 MG: at 06:13

## 2019-03-08 RX ADMIN — SODIUM CHLORIDE: 9 INJECTION, SOLUTION INTRAVENOUS at 10:44

## 2019-03-08 RX ADMIN — TRAZODONE HYDROCHLORIDE 100 MG: 100 TABLET ORAL at 22:27

## 2019-03-08 RX ADMIN — ACETAMINOPHEN 650 MG: 325 TABLET, FILM COATED ORAL at 17:53

## 2019-03-08 RX ADMIN — QUETIAPINE FUMARATE 100 MG: 25 TABLET ORAL at 22:27

## 2019-03-08 RX ADMIN — METOPROLOL TARTRATE 25 MG: 25 TABLET, FILM COATED ORAL at 22:27

## 2019-03-08 RX ADMIN — SENNOSIDES AND DOCUSATE SODIUM 2 TABLET: 8.6; 5 TABLET ORAL at 06:12

## 2019-03-08 ASSESSMENT — ENCOUNTER SYMPTOMS
CHILLS: 0
DOUBLE VISION: 0
STRIDOR: 0
HEMOPTYSIS: 0
SINUS PAIN: 0
EYE PAIN: 0
BRUISES/BLEEDS EASILY: 0
CLAUDICATION: 0
POLYDIPSIA: 0
ORTHOPNEA: 0
TINGLING: 0
DIAPHORESIS: 0
COUGH: 0
HALLUCINATIONS: 0
BLURRED VISION: 0
FALLS: 1
DIZZINESS: 0
VOMITING: 1
HEARTBURN: 0
BLOOD IN STOOL: 0
NECK PAIN: 1
PALPITATIONS: 0
PND: 0
TREMORS: 0
NAUSEA: 1
SORE THROAT: 0
SHORTNESS OF BREATH: 0
BACK PAIN: 1
DEPRESSION: 1
SPUTUM PRODUCTION: 0
HEADACHES: 0
PHOTOPHOBIA: 0
CONSTIPATION: 0
FEVER: 0
MYALGIAS: 0
ABDOMINAL PAIN: 0
DIARRHEA: 0
WEAKNESS: 0
WHEEZING: 0
FLANK PAIN: 0

## 2019-03-08 ASSESSMENT — LIFESTYLE VARIABLES
AGITATION: NORMAL ACTIVITY
TOTAL SCORE: 2
PAROXYSMAL SWEATS: NO SWEAT VISIBLE
ORIENTATION AND CLOUDING OF SENSORIUM: ORIENTED AND CAN DO SERIAL ADDITIONS
ORIENTATION AND CLOUDING OF SENSORIUM: ORIENTED AND CAN DO SERIAL ADDITIONS
PAROXYSMAL SWEATS: NO SWEAT VISIBLE
VISUAL DISTURBANCES: NOT PRESENT
NAUSEA AND VOMITING: NO NAUSEA AND NO VOMITING
PAROXYSMAL SWEATS: NO SWEAT VISIBLE
AGITATION: NORMAL ACTIVITY
AGITATION: NORMAL ACTIVITY
NAUSEA AND VOMITING: NO NAUSEA AND NO VOMITING
ORIENTATION AND CLOUDING OF SENSORIUM: ORIENTED AND CAN DO SERIAL ADDITIONS
TOTAL SCORE: 1
HEADACHE, FULLNESS IN HEAD: NOT PRESENT
TREMOR: NO TREMOR
TOTAL SCORE: 1
ORIENTATION AND CLOUDING OF SENSORIUM: ORIENTED AND CAN DO SERIAL ADDITIONS
TOTAL SCORE: 1
AUDITORY DISTURBANCES: NOT PRESENT
NAUSEA AND VOMITING: NO NAUSEA AND NO VOMITING
ANXIETY: MILDLY ANXIOUS
TREMOR: NO TREMOR
TREMOR: NO TREMOR
ANXIETY: NO ANXIETY (AT EASE)
TREMOR: NO TREMOR
PAROXYSMAL SWEATS: BARELY PERCEPTIBLE SWEATING. PALMS MOIST
AUDITORY DISTURBANCES: NOT PRESENT
HEADACHE, FULLNESS IN HEAD: VERY MILD
PAROXYSMAL SWEATS: NO SWEAT VISIBLE
AUDITORY DISTURBANCES: NOT PRESENT
ANXIETY: MILDLY ANXIOUS
AGITATION: NORMAL ACTIVITY
VISUAL DISTURBANCES: NOT PRESENT
VISUAL DISTURBANCES: NOT PRESENT
TOTAL SCORE: 1
VISUAL DISTURBANCES: NOT PRESENT
AUDITORY DISTURBANCES: NOT PRESENT
ANXIETY: MILDLY ANXIOUS
NAUSEA AND VOMITING: NO NAUSEA AND NO VOMITING
ANXIETY: NO ANXIETY (AT EASE)
NAUSEA AND VOMITING: NO NAUSEA AND NO VOMITING
SUBSTANCE_ABUSE: 1
VISUAL DISTURBANCES: NOT PRESENT
AGITATION: NORMAL ACTIVITY
HEADACHE, FULLNESS IN HEAD: NOT PRESENT
ORIENTATION AND CLOUDING OF SENSORIUM: ORIENTED AND CAN DO SERIAL ADDITIONS
HEADACHE, FULLNESS IN HEAD: VERY MILD
HEADACHE, FULLNESS IN HEAD: NOT PRESENT
AUDITORY DISTURBANCES: NOT PRESENT
TREMOR: NO TREMOR

## 2019-03-08 NOTE — PROGRESS NOTES
Seen and examined by me today states that he has headaches.  Psychiatry has extended a legal hold.  Continue [facility] protocol

## 2019-03-08 NOTE — CARE PLAN
Problem: Infection  Goal: Will remain free from infection  Outcome: PROGRESSING AS EXPECTED  Educated patient on hand washing. Standard precautions in place. Assessed patient for s/s of infection.        Problem: Pain Management  Goal: Pain level will decrease to patient's comfort goal  Outcome: PROGRESSING AS EXPECTED  Monitor pain per protocol, no complaints of pain at this time.

## 2019-03-08 NOTE — CARE PLAN
Problem: Communication  Goal: The ability to communicate needs accurately and effectively will improve  Outcome: PROGRESSING AS EXPECTED  Call light within reach. Pt educated to notify sitter/staff for assistance.     Problem: Safety  Goal: Will remain free from falls  Outcome: PROGRESSING AS EXPECTED  Bed locked and in lowest position. Side rails up x 2. Treaded socks on. Call light within reach. Assistive devices out of sight and reach. Sitter in room with pt.

## 2019-03-08 NOTE — PROGRESS NOTES
Assumed care at 1900. Pt A&O x 4. Pt SR 78 on monitor. First assessment completed, call light within reach, bed locked and in low position. All questions asked. Will continue to monitor.

## 2019-03-08 NOTE — PROGRESS NOTES
Assumed care from Steph TSANG. Received bedside report.  Updated patient on daily plan of care on white board. Patient denies any additional needs at this time.  Patient belongings are not at bedside, 1:1 in place.  Vitals stable. Bed alarm on and working appropriately.  Will continue to monitor.

## 2019-03-08 NOTE — DISCHARGE PLANNING
Contacted medical records to try and locate the legal hold for pt.   Legal hold was located at medical records.  Placed legal hold back onto pt's chart along with second page of legal hold. Will page Dr. Lyles regarding certification of legal hold.

## 2019-03-09 LAB
ANION GAP SERPL CALC-SCNC: 8 MMOL/L (ref 0–11.9)
BUN SERPL-MCNC: 10 MG/DL (ref 8–22)
CALCIUM SERPL-MCNC: 8.9 MG/DL (ref 8.5–10.5)
CHLORIDE SERPL-SCNC: 100 MMOL/L (ref 96–112)
CO2 SERPL-SCNC: 24 MMOL/L (ref 20–33)
CREAT SERPL-MCNC: 0.72 MG/DL (ref 0.5–1.4)
GLUCOSE SERPL-MCNC: 125 MG/DL (ref 65–99)
POTASSIUM SERPL-SCNC: 4.4 MMOL/L (ref 3.6–5.5)
SODIUM SERPL-SCNC: 132 MMOL/L (ref 135–145)

## 2019-03-09 PROCEDURE — 700111 HCHG RX REV CODE 636 W/ 250 OVERRIDE (IP): Performed by: HOSPITALIST

## 2019-03-09 PROCEDURE — 770001 HCHG ROOM/CARE - MED/SURG/GYN PRIV*

## 2019-03-09 PROCEDURE — A9270 NON-COVERED ITEM OR SERVICE: HCPCS | Performed by: NURSE PRACTITIONER

## 2019-03-09 PROCEDURE — 700102 HCHG RX REV CODE 250 W/ 637 OVERRIDE(OP): Performed by: NURSE PRACTITIONER

## 2019-03-09 PROCEDURE — 700102 HCHG RX REV CODE 250 W/ 637 OVERRIDE(OP): Performed by: HOSPITALIST

## 2019-03-09 PROCEDURE — A9270 NON-COVERED ITEM OR SERVICE: HCPCS | Performed by: HOSPITALIST

## 2019-03-09 PROCEDURE — 36415 COLL VENOUS BLD VENIPUNCTURE: CPT

## 2019-03-09 PROCEDURE — 80048 BASIC METABOLIC PNL TOTAL CA: CPT

## 2019-03-09 PROCEDURE — 99232 SBSQ HOSP IP/OBS MODERATE 35: CPT | Performed by: HOSPITALIST

## 2019-03-09 RX ORDER — GABAPENTIN 300 MG/1
600 CAPSULE ORAL 3 TIMES DAILY
Status: DISCONTINUED | OUTPATIENT
Start: 2019-03-09 | End: 2019-03-12 | Stop reason: HOSPADM

## 2019-03-09 RX ADMIN — ACETAMINOPHEN 650 MG: 325 TABLET, FILM COATED ORAL at 05:31

## 2019-03-09 RX ADMIN — QUETIAPINE FUMARATE 100 MG: 25 TABLET ORAL at 21:34

## 2019-03-09 RX ADMIN — Medication 100 MG: at 05:32

## 2019-03-09 RX ADMIN — SENNOSIDES AND DOCUSATE SODIUM 2 TABLET: 8.6; 5 TABLET ORAL at 05:28

## 2019-03-09 RX ADMIN — DULOXETINE HYDROCHLORIDE 30 MG: 30 CAPSULE, DELAYED RELEASE ORAL at 05:32

## 2019-03-09 RX ADMIN — THERA TABS 1 TABLET: TAB at 05:31

## 2019-03-09 RX ADMIN — METOPROLOL TARTRATE 12.5 MG: 25 TABLET, FILM COATED ORAL at 17:33

## 2019-03-09 RX ADMIN — METOPROLOL TARTRATE 25 MG: 25 TABLET, FILM COATED ORAL at 05:31

## 2019-03-09 RX ADMIN — PRAZOSIN HYDROCHLORIDE 1 MG: 1 CAPSULE ORAL at 17:34

## 2019-03-09 RX ADMIN — FOLIC ACID 1 MG: 1 TABLET ORAL at 05:31

## 2019-03-09 RX ADMIN — SENNOSIDES AND DOCUSATE SODIUM 2 TABLET: 8.6; 5 TABLET ORAL at 21:33

## 2019-03-09 RX ADMIN — GABAPENTIN 600 MG: 300 CAPSULE ORAL at 13:55

## 2019-03-09 RX ADMIN — GABAPENTIN 600 MG: 300 CAPSULE ORAL at 21:33

## 2019-03-09 RX ADMIN — ENOXAPARIN SODIUM 40 MG: 100 INJECTION SUBCUTANEOUS at 05:31

## 2019-03-09 RX ADMIN — TRAZODONE HYDROCHLORIDE 100 MG: 100 TABLET ORAL at 21:33

## 2019-03-09 ASSESSMENT — ENCOUNTER SYMPTOMS
PALPITATIONS: 0
FEVER: 0
SHORTNESS OF BREATH: 0
EYE PAIN: 0
TREMORS: 0
HALLUCINATIONS: 0
ORTHOPNEA: 0
SPUTUM PRODUCTION: 0
PND: 0
HEARTBURN: 0
DIZZINESS: 0
BLOOD IN STOOL: 0
HEMOPTYSIS: 0
CHILLS: 0
TINGLING: 0
CLAUDICATION: 0
DOUBLE VISION: 0
CONSTIPATION: 0
FLANK PAIN: 0
WHEEZING: 0
BACK PAIN: 1
FALLS: 1
STRIDOR: 0
DIAPHORESIS: 0
PHOTOPHOBIA: 0
BRUISES/BLEEDS EASILY: 0
BLURRED VISION: 0
WEAKNESS: 0
DIARRHEA: 0
NAUSEA: 1
NECK PAIN: 1
VOMITING: 1
MYALGIAS: 0
COUGH: 0
HEADACHES: 0
SORE THROAT: 0
DEPRESSION: 1
POLYDIPSIA: 0
SINUS PAIN: 0
ABDOMINAL PAIN: 0

## 2019-03-09 ASSESSMENT — LIFESTYLE VARIABLES
HEADACHE, FULLNESS IN HEAD: NOT PRESENT
ANXIETY: MILDLY ANXIOUS
TOTAL SCORE: 1
AUDITORY DISTURBANCES: NOT PRESENT
AUDITORY DISTURBANCES: NOT PRESENT
NAUSEA AND VOMITING: NO NAUSEA AND NO VOMITING
TREMOR: NO TREMOR
AGITATION: NORMAL ACTIVITY
PAROXYSMAL SWEATS: NO SWEAT VISIBLE
VISUAL DISTURBANCES: NOT PRESENT
ANXIETY: MILDLY ANXIOUS
VISUAL DISTURBANCES: NOT PRESENT
VISUAL DISTURBANCES: NOT PRESENT
PAROXYSMAL SWEATS: NO SWEAT VISIBLE
TREMOR: NO TREMOR
AGITATION: NORMAL ACTIVITY
ANXIETY: *
NAUSEA AND VOMITING: NO NAUSEA AND NO VOMITING
TREMOR: NO TREMOR
AUDITORY DISTURBANCES: NOT PRESENT
NAUSEA AND VOMITING: NO NAUSEA AND NO VOMITING
ANXIETY: MILDLY ANXIOUS
AUDITORY DISTURBANCES: NOT PRESENT
NAUSEA AND VOMITING: NO NAUSEA AND NO VOMITING
PAROXYSMAL SWEATS: NO SWEAT VISIBLE
AGITATION: NORMAL ACTIVITY
TREMOR: NO TREMOR
AGITATION: NORMAL ACTIVITY
VISUAL DISTURBANCES: NOT PRESENT
ORIENTATION AND CLOUDING OF SENSORIUM: ORIENTED AND CAN DO SERIAL ADDITIONS
ORIENTATION AND CLOUDING OF SENSORIUM: ORIENTED AND CAN DO SERIAL ADDITIONS
AGITATION: NORMAL ACTIVITY
TOTAL SCORE: 2
ORIENTATION AND CLOUDING OF SENSORIUM: ORIENTED AND CAN DO SERIAL ADDITIONS
NAUSEA AND VOMITING: NO NAUSEA AND NO VOMITING
TOTAL SCORE: 1
VISUAL DISTURBANCES: NOT PRESENT
PAROXYSMAL SWEATS: NO SWEAT VISIBLE
AUDITORY DISTURBANCES: NOT PRESENT
NAUSEA AND VOMITING: NO NAUSEA AND NO VOMITING
ORIENTATION AND CLOUDING OF SENSORIUM: ORIENTED AND CAN DO SERIAL ADDITIONS
ANXIETY: MILDLY ANXIOUS
AGITATION: NORMAL ACTIVITY
TOTAL SCORE: 1
TOTAL SCORE: 1
AUDITORY DISTURBANCES: NOT PRESENT
ORIENTATION AND CLOUDING OF SENSORIUM: ORIENTED AND CAN DO SERIAL ADDITIONS
ORIENTATION AND CLOUDING OF SENSORIUM: ORIENTED AND CAN DO SERIAL ADDITIONS
PAROXYSMAL SWEATS: NO SWEAT VISIBLE
SUBSTANCE_ABUSE: 1
HEADACHE, FULLNESS IN HEAD: NOT PRESENT
HEADACHE, FULLNESS IN HEAD: NOT PRESENT
TOTAL SCORE: 1
HEADACHE, FULLNESS IN HEAD: NOT PRESENT
TREMOR: NO TREMOR
TREMOR: NO TREMOR
ANXIETY: MILDLY ANXIOUS
HEADACHE, FULLNESS IN HEAD: NOT PRESENT
PAROXYSMAL SWEATS: NO SWEAT VISIBLE
VISUAL DISTURBANCES: NOT PRESENT
HEADACHE, FULLNESS IN HEAD: NOT PRESENT

## 2019-03-09 NOTE — PROGRESS NOTES
"Hospital Medicine Daily Progress Note    Date of Service  3/8/2019    Chief Complaint  68 y.o. male admitted 3/7/2019 with chronic alcoholism for which she was recently at a rehabilitation facility, was discharged, and had plans to go to [place], when he stopped by [employer], became drunk.  He reports that he \"does not want to live anymore\"    Hospital Course    Patient patient arrived to the hospital with a blood pressure of 96/56, pulse of 78.  He was treated with detox IV fluids and multivitamins.  Patient was placed on CIWA protocol.  He was placed on legal hold secondary to suicidal ideations      Interval Problem Update  3/8: Patient seen this morning seem to have loss of interest, fatigue.  He states that he feels lonely and has no family.  Psychiatry to extend legal hold.  Heart rate was 86 with normal sinus rhythm on telemetry monitor. Patient still requires CIWA protocol requiring IV Ativan.  Patient also states that his neck pain radiating down his arms.  He also states that his back pain radiating down the leg.    Consultants/Specialty  Psychiatry    Code Status  Full    Disposition  TBD    Review of Systems  Review of Systems   Constitutional: Negative for chills, diaphoresis, fever and malaise/fatigue.   HENT: Negative for congestion, ear discharge, ear pain, hearing loss, nosebleeds, sinus pain, sore throat and tinnitus.    Eyes: Negative for blurred vision, double vision, photophobia and pain.   Respiratory: Negative for cough, hemoptysis, sputum production, shortness of breath, wheezing and stridor.    Cardiovascular: Negative for chest pain, palpitations, orthopnea, claudication, leg swelling and PND.   Gastrointestinal: Positive for nausea and vomiting. Negative for abdominal pain, blood in stool, constipation, diarrhea, heartburn and melena.   Genitourinary: Negative for dysuria, flank pain, frequency, hematuria and urgency.   Musculoskeletal: Positive for back pain, falls, joint pain and " neck pain. Negative for myalgias.   Skin: Negative for itching and rash.   Neurological: Negative for dizziness, tingling, tremors, weakness and headaches.   Endo/Heme/Allergies: Negative for environmental allergies and polydipsia. Does not bruise/bleed easily.   Psychiatric/Behavioral: Positive for depression, substance abuse and suicidal ideas. Negative for hallucinations.        Physical Exam  Temp:  [36.2 °C (97.2 °F)-36.8 °C (98.3 °F)] 36.4 °C (97.5 °F)  Pulse:  [61-86] 61  Resp:  [16-18] 18  BP: (115-171)/() 156/95  SpO2:  [92 %-99 %] 92 %    Physical Exam   Constitutional: He is oriented to person, place, and time. He appears well-developed and well-nourished.   HENT:   Head: Normocephalic and atraumatic.   Right Ear: External ear normal.   Left Ear: External ear normal.   Mouth/Throat: No oropharyngeal exudate.   Eyes: Pupils are equal, round, and reactive to light. Conjunctivae and EOM are normal. Right eye exhibits no discharge. Left eye exhibits no discharge. No scleral icterus.   Neck: Normal range of motion. Neck supple. No JVD present. No tracheal deviation present. No thyromegaly present.   History of cervical fracture  Spurling sign is positive   Cardiovascular: Normal rate, regular rhythm and intact distal pulses.  Exam reveals no gallop and no friction rub.    No murmur heard.  Pulmonary/Chest: Effort normal and breath sounds normal. No stridor. No respiratory distress. He has no wheezes. He has no rales. He exhibits no tenderness.   Abdominal: Soft. Bowel sounds are normal. He exhibits no distension and no mass. There is no tenderness. There is no rebound and no guarding.   Musculoskeletal: Normal range of motion. He exhibits edema. He exhibits no tenderness or deformity.   Bilateral lower extremity swelling   Lymphadenopathy:     He has no cervical adenopathy.   Neurological: He is alert and oriented to person, place, and time. He has normal reflexes. No cranial nerve deficit. He exhibits  normal muscle tone. Coordination normal.   Right arm weakness   Skin: No rash noted. No erythema. No pallor.   Nursing note and vitals reviewed.      Fluids    Intake/Output Summary (Last 24 hours) at 03/08/19 2041  Last data filed at 03/08/19 0822   Gross per 24 hour   Intake                0 ml   Output              750 ml   Net             -750 ml       Laboratory  Recent Labs      03/07/19   0140  03/08/19   0049   WBC  6.4  4.1*   RBC  3.58*  3.47*   HEMOGLOBIN  11.8*  11.1*   HEMATOCRIT  34.3*  33.1*   MCV  95.8  95.4   MCH  33.0  32.0   MCHC  34.4  33.5*   RDW  50.0  50.1*   PLATELETCT  173  117*   MPV  10.0  10.3     Recent Labs      03/07/19 0140 03/08/19 0049   SODIUM  124*  133*   POTASSIUM  4.3  4.3   CHLORIDE  92*  102   CO2  23  25   GLUCOSE  99  90   BUN  13  16   CREATININE  0.77  0.60   CALCIUM  9.3  8.5                   Imaging  US-EXTREMITY VENOUS LOWER BILAT         DX-CHEST-PORTABLE (1 VIEW)   Final Result         1.  Hazy reticular bilateral lung base density suggests atelectasis or interstitial infiltrate.           Assessment/Plan  * Hyponatremia- (present on admission)   Assessment & Plan    Likely secondary to beer proteinemia will monitor with IVF, regular diet.  Continue to monitor to avoid the risk of osmotic demyelination     Severe episode of recurrent major depressive disorder, without psychotic features (HCC)   Assessment & Plan    On Cymbalta  Suicide ideation  Psychiatry consulted  Continue legal hold     Alcoholism (HCC)   Assessment & Plan    Ongoing.  Recently discharged from rehabilitation for the same.  High risk for withdrawal, for which patient will be monitored with CIWA protocol.    High risk  Requiring IV Ativan     Normocytic anemia- (present on admission)   Assessment & Plan    Suspect due to chronic disease without no evidence of bleed. Transfuse if needed for hemoglobin less than 7 gm/dL       Lumbar back pain with radiculopathy affecting lower extremity    Assessment & Plan    Complains of back pain radiating down the legs  Patient still has pain about back pain I will order a MRI of the back to assess     H/O cervical fracture   Assessment & Plan    Corpectomy of C5-C6  Pain control     Suicide ideation   Assessment & Plan    Has recurrent history of suicide ideation and currently states that he he wants to jump off a bridge     Localized swelling of lower extremity   Assessment & Plan    History of trauma and fracture lower extremity  Bilateral swelling currently we will order a ultrasound Doppler of lower treatment to rule out DVT          VTE prophylaxis: Lovenox

## 2019-03-09 NOTE — CARE PLAN
Problem: Infection  Goal: Will remain free from infection    Intervention: Implement standard precautions and perform hand washing before and after patient contact  Educated patient on hand washing. Standard precautions in place. Assessed patient for s/s of infection.          Problem: Pain Management  Goal: Pain level will decrease to patient's comfort goal  Outcome: PROGRESSING AS EXPECTED  Monitor pain per protocol, no complaints of pain at this time.

## 2019-03-09 NOTE — PROGRESS NOTES
Assumed care at 1900, bedside report received from day shift RN. Pt. Is SR on the monitor. Initial assessment completed, orders reviewed, call light within reach, bed alarm in use, and hourly rounding in place. POC addressed with patient, no additional questions at this time. Legal hold verified; 1 to 1 sitter in place; only plastic utensils with meals; room free from potentially dangerous items.

## 2019-03-09 NOTE — ASSESSMENT & PLAN NOTE
Has recurrent history of suicide ideation and currently states that he he wants to jump off a bridge

## 2019-03-09 NOTE — CARE PLAN
Problem: Safety  Goal: Will remain free from falls    Intervention: Assess risk factors for falls  Fall precautions in place. Bed in lowest position. Non-skid socks in place. Personal possessions within reach. Mobility sign on door. Call light within reach. Pt educated regarding fall prevention and states understanding. Legal hold verified; 1 to 1 sitter in place; only plastic utensils with meals; room free from potentially dangerous items.       Problem: Knowledge Deficit  Goal: Knowledge of disease process/condition, treatment plan, diagnostic tests, and medications will improve  Pt educated regarding plan of care and medications. All questions answered.

## 2019-03-09 NOTE — PROGRESS NOTES
Assumed care from Levi TSANG. Received bedside report.  Updated patient on daily plan of care on white board. Patient denies any additional needs at this time.  Patient call light with in reach.  Vitals stable. 1:1 at bedside.  Will continue to monitor..

## 2019-03-10 PROCEDURE — A9270 NON-COVERED ITEM OR SERVICE: HCPCS | Performed by: HOSPITALIST

## 2019-03-10 PROCEDURE — A9270 NON-COVERED ITEM OR SERVICE: HCPCS | Performed by: NURSE PRACTITIONER

## 2019-03-10 PROCEDURE — 700102 HCHG RX REV CODE 250 W/ 637 OVERRIDE(OP): Performed by: HOSPITALIST

## 2019-03-10 PROCEDURE — 700111 HCHG RX REV CODE 636 W/ 250 OVERRIDE (IP): Performed by: HOSPITALIST

## 2019-03-10 PROCEDURE — 700102 HCHG RX REV CODE 250 W/ 637 OVERRIDE(OP): Performed by: NURSE PRACTITIONER

## 2019-03-10 PROCEDURE — 99232 SBSQ HOSP IP/OBS MODERATE 35: CPT | Performed by: HOSPITALIST

## 2019-03-10 PROCEDURE — 770001 HCHG ROOM/CARE - MED/SURG/GYN PRIV*

## 2019-03-10 RX ORDER — TRAMADOL HYDROCHLORIDE 50 MG/1
50 TABLET ORAL EVERY 6 HOURS PRN
Status: DISCONTINUED | OUTPATIENT
Start: 2019-03-10 | End: 2019-03-12 | Stop reason: HOSPADM

## 2019-03-10 RX ADMIN — PRAZOSIN HYDROCHLORIDE 1 MG: 1 CAPSULE ORAL at 17:22

## 2019-03-10 RX ADMIN — METOPROLOL TARTRATE 12.5 MG: 25 TABLET, FILM COATED ORAL at 05:38

## 2019-03-10 RX ADMIN — DULOXETINE HYDROCHLORIDE 30 MG: 30 CAPSULE, DELAYED RELEASE ORAL at 05:38

## 2019-03-10 RX ADMIN — TRAMADOL HYDROCHLORIDE 50 MG: 50 TABLET, COATED ORAL at 12:43

## 2019-03-10 RX ADMIN — QUETIAPINE FUMARATE 100 MG: 25 TABLET ORAL at 21:58

## 2019-03-10 RX ADMIN — TRAZODONE HYDROCHLORIDE 100 MG: 100 TABLET ORAL at 21:57

## 2019-03-10 RX ADMIN — FOLIC ACID 1 MG: 1 TABLET ORAL at 05:38

## 2019-03-10 RX ADMIN — ACETAMINOPHEN 650 MG: 325 TABLET, FILM COATED ORAL at 16:23

## 2019-03-10 RX ADMIN — ENOXAPARIN SODIUM 40 MG: 100 INJECTION SUBCUTANEOUS at 05:38

## 2019-03-10 RX ADMIN — GABAPENTIN 600 MG: 300 CAPSULE ORAL at 12:43

## 2019-03-10 RX ADMIN — METOPROLOL TARTRATE 12.5 MG: 25 TABLET, FILM COATED ORAL at 17:22

## 2019-03-10 RX ADMIN — Medication 100 MG: at 05:38

## 2019-03-10 RX ADMIN — THERA TABS 1 TABLET: TAB at 05:38

## 2019-03-10 RX ADMIN — GABAPENTIN 600 MG: 300 CAPSULE ORAL at 21:57

## 2019-03-10 RX ADMIN — GABAPENTIN 600 MG: 300 CAPSULE ORAL at 05:38

## 2019-03-10 ASSESSMENT — LIFESTYLE VARIABLES
VISUAL DISTURBANCES: NOT PRESENT
AUDITORY DISTURBANCES: NOT PRESENT
ORIENTATION AND CLOUDING OF SENSORIUM: ORIENTED AND CAN DO SERIAL ADDITIONS
ANXIETY: NO ANXIETY (AT EASE)
NAUSEA AND VOMITING: NO NAUSEA AND NO VOMITING
PAROXYSMAL SWEATS: NO SWEAT VISIBLE
TOTAL SCORE: 0
AGITATION: NORMAL ACTIVITY
TOTAL SCORE: VERY MILD ITCHING, PINS AND NEEDLES SENSATION, BURNING OR NUMBNESS
AGITATION: SOMEWHAT MORE THAN NORMAL ACTIVITY
PAROXYSMAL SWEATS: NO SWEAT VISIBLE
NAUSEA AND VOMITING: NO NAUSEA AND NO VOMITING
SUBSTANCE_ABUSE: 1
AUDITORY DISTURBANCES: NOT PRESENT
TOTAL SCORE: 1
VISUAL DISTURBANCES: NOT PRESENT
NAUSEA AND VOMITING: NO NAUSEA AND NO VOMITING
PAROXYSMAL SWEATS: NO SWEAT VISIBLE
TOTAL SCORE: 1
TREMOR: NO TREMOR
AUDITORY DISTURBANCES: NOT PRESENT
HEADACHE, FULLNESS IN HEAD: NOT PRESENT
VISUAL DISTURBANCES: NOT PRESENT
ANXIETY: NO ANXIETY (AT EASE)
TREMOR: NO TREMOR
ANXIETY: NO ANXIETY (AT EASE)
HEADACHE, FULLNESS IN HEAD: NOT PRESENT
ORIENTATION AND CLOUDING OF SENSORIUM: ORIENTED AND CAN DO SERIAL ADDITIONS
AGITATION: NORMAL ACTIVITY
TREMOR: NO TREMOR
HEADACHE, FULLNESS IN HEAD: NOT PRESENT
ORIENTATION AND CLOUDING OF SENSORIUM: ORIENTED AND CAN DO SERIAL ADDITIONS

## 2019-03-10 ASSESSMENT — ENCOUNTER SYMPTOMS
HALLUCINATIONS: 0
BRUISES/BLEEDS EASILY: 0
BLURRED VISION: 0
CONSTIPATION: 0
BACK PAIN: 1
BLOOD IN STOOL: 0
FLANK PAIN: 0
HEMOPTYSIS: 0
PND: 0
CHILLS: 0
CLAUDICATION: 0
NAUSEA: 1
HEARTBURN: 0
FEVER: 0
EYE PAIN: 0
SINUS PAIN: 0
DEPRESSION: 1
WEAKNESS: 0
NECK PAIN: 1
SORE THROAT: 0
DOUBLE VISION: 0
PALPITATIONS: 0
HEADACHES: 0
TREMORS: 0
DIZZINESS: 0
TINGLING: 0
DIARRHEA: 0
COUGH: 0
FALLS: 1
SHORTNESS OF BREATH: 0
PHOTOPHOBIA: 0
VOMITING: 1
POLYDIPSIA: 0
ABDOMINAL PAIN: 0
WHEEZING: 0
SPUTUM PRODUCTION: 0
STRIDOR: 0
DIAPHORESIS: 0
MYALGIAS: 0
ORTHOPNEA: 0

## 2019-03-10 NOTE — PROGRESS NOTES
"Hospital Medicine Daily Progress Note    Date of Service  3/9/2019    Chief Complaint  68 y.o. male admitted 3/7/2019 with chronic alcoholism for which she was recently at a rehabilitation facility, was discharged, and had plans to go to [place], when he stopped by the [employer], became drunk.  He reports that he \"does not want to live anymore\"    Hospital Course    Patient patient arrived to the hospital with a blood pressure of 96/56, pulse of 78.  He was treated with detox IV fluids and multivitamins.  Patient was placed on CIWA protocol.  He was placed on legal hold secondary to suicidal ideations      Interval Problem Update  3/8: Patient seen this morning seem to have loss of interest, fatigue.  He states that he feels lonely and has no family.  Psychiatry to extend legal hold.  Heart rate was 86 with normal sinus rhythm on telemetry monitor. Patient still requires CIWA protocol requiring IV Ativan.  Patient also states that his neck pain radiating down his arms.  He also states that his back pain radiating down the leg.  3/9: Patient seen and examined by me today.  CIWA score is below.  Psych is continuing legal hold for now since he has suicidal ideations.    Consultants/Specialty  Psychiatry    Code Status  Full    Disposition  TBD    Review of Systems  Review of Systems   Constitutional: Negative for chills, diaphoresis, fever and malaise/fatigue.   HENT: Negative for congestion, ear discharge, ear pain, hearing loss, nosebleeds, sinus pain, sore throat and tinnitus.    Eyes: Negative for blurred vision, double vision, photophobia and pain.   Respiratory: Negative for cough, hemoptysis, sputum production, shortness of breath, wheezing and stridor.    Cardiovascular: Negative for chest pain, palpitations, orthopnea, claudication, leg swelling and PND.   Gastrointestinal: Positive for nausea and vomiting. Negative for abdominal pain, blood in stool, constipation, diarrhea, heartburn and melena. "   Genitourinary: Negative for dysuria, flank pain, frequency, hematuria and urgency.   Musculoskeletal: Positive for back pain, falls, joint pain and neck pain. Negative for myalgias.   Skin: Negative for itching and rash.   Neurological: Negative for dizziness, tingling, tremors, weakness and headaches.   Endo/Heme/Allergies: Negative for environmental allergies and polydipsia. Does not bruise/bleed easily.   Psychiatric/Behavioral: Positive for depression, substance abuse and suicidal ideas. Negative for hallucinations.        Physical Exam  Temp:  [36.2 °C (97.1 °F)-36.7 °C (98 °F)] 36.7 °C (98 °F)  Pulse:  [51-64] 64  Resp:  [14-20] 18  BP: (115-156)/(74-96) 149/96  SpO2:  [92 %-100 %] 96 %    Physical Exam   Constitutional: He is oriented to person, place, and time. He appears well-developed and well-nourished.   HENT:   Head: Normocephalic and atraumatic.   Right Ear: External ear normal.   Left Ear: External ear normal.   Mouth/Throat: No oropharyngeal exudate.   Eyes: Pupils are equal, round, and reactive to light. Conjunctivae and EOM are normal. Right eye exhibits no discharge. Left eye exhibits no discharge. No scleral icterus.   Neck: Normal range of motion. Neck supple. No JVD present. No tracheal deviation present. No thyromegaly present.   History of cervical fracture  Spurling sign is positive   Cardiovascular: Normal rate, regular rhythm and intact distal pulses.  Exam reveals no gallop and no friction rub.    No murmur heard.  Pulmonary/Chest: Effort normal and breath sounds normal. No stridor. No respiratory distress. He has no wheezes. He has no rales. He exhibits no tenderness.   Abdominal: Soft. Bowel sounds are normal. He exhibits no distension and no mass. There is no tenderness. There is no rebound and no guarding.   Musculoskeletal: Normal range of motion. He exhibits edema. He exhibits no tenderness or deformity.   Bilateral lower extremity swelling   Lymphadenopathy:     He has no  cervical adenopathy.   Neurological: He is alert and oriented to person, place, and time. He has normal reflexes. No cranial nerve deficit. He exhibits normal muscle tone. Coordination normal.   Right arm weakness   Skin: No rash noted. No erythema. No pallor.   Nursing note and vitals reviewed.      Fluids    Intake/Output Summary (Last 24 hours) at 03/1951  Last data filed at 03/09/19 0246   Gross per 24 hour   Intake                0 ml   Output              750 ml   Net             -750 ml       Laboratory  Recent Labs      03/07/19 0140 03/08/19   0049   WBC  6.4  4.1*   RBC  3.58*  3.47*   HEMOGLOBIN  11.8*  11.1*   HEMATOCRIT  34.3*  33.1*   MCV  95.8  95.4   MCH  33.0  32.0   MCHC  34.4  33.5*   RDW  50.0  50.1*   PLATELETCT  173  117*   MPV  10.0  10.3     Recent Labs      03/07/19   0140 03/08/19 0049 03/09/19   1249   SODIUM  124*  133*  132*   POTASSIUM  4.3  4.3  4.4   CHLORIDE  92*  102  100   CO2  23  25  24   GLUCOSE  99  90  125*   BUN  13  16  10   CREATININE  0.77  0.60  0.72   CALCIUM  9.3  8.5  8.9                   Imaging  US-EXTREMITY VENOUS LOWER BILAT   Final Result      DX-CHEST-PORTABLE (1 VIEW)   Final Result         1.  Hazy reticular bilateral lung base density suggests atelectasis or interstitial infiltrate.           Assessment/Plan  * Alcoholism (HCC)   Assessment & Plan    Ongoing.  Recently discharged from rehabilitation for the same.  High risk for withdrawal, for which patient will be monitored with [facility] protocol.    High risk  Requiring IV Ativan     Severe episode of recurrent major depressive disorder, without psychotic features (HCC)   Assessment & Plan    On Cymbalta  Suicide ideation  Psychiatry consulted  Continue legal hold     Hyponatremia- (present on admission)   Assessment & Plan    Likely secondary to beer proteinemia will monitor with IVF, regular diet.- resolved  Continue to monitor to avoid the risk of osmotic demyelination     Normocytic anemia-  (present on admission)   Assessment & Plan    Suspect due to chronic disease without no evidence of bleed. Transfuse if needed for hemoglobin less than 7 gm/dL       Lumbar back pain with radiculopathy affecting lower extremity   Assessment & Plan    Complains of back pain radiating down the legs       H/O cervical fracture   Assessment & Plan    Corpectomy of C5-C6  Pain control     Suicide ideation   Assessment & Plan    Has recurrent history of suicide ideation and currently states that he he wants to jump off a bridge     Localized swelling of lower extremity   Assessment & Plan    History of trauma and fracture lower extremity  US was negative for DVT          VTE prophylaxis: Lovenox

## 2019-03-10 NOTE — PROGRESS NOTES
Assumed care of patient, bedside report received from Levi. Updated on POC, call light within reach and fall precautions in place. Bed locked and in lowest position. Patient instructed to call for assistance before getting out of bed. All questions answered, no other needs at this time. Patient on legal hold, 1:1 sitter at bedside.

## 2019-03-10 NOTE — PROGRESS NOTES
Assumed care at 1900, bedside report received from day shift RN. Pt. Is medical on the monitor. Initial assessment completed, orders reviewed, call light within reach, and hourly rounding in place. POC addressed with patient, no additional questions at this time. Legal hold verified; 1 to 1 sitter in place; only plastic utensils with meals; room free from potentially dangerous items.

## 2019-03-10 NOTE — PROGRESS NOTES
"Patient made comment to this nurse that he was no longer suicidal.  Approx 1 hour later patient approached this nurse and stated \"nevermind I am still suicidal, I don't want to leave and I will be suicidal if I leave\".      Patient made remark to 1:1 safety sitter that if patient we to be discharged that he would throw him self on the floor.      Charge RN notified.   "

## 2019-03-11 PROCEDURE — 700111 HCHG RX REV CODE 636 W/ 250 OVERRIDE (IP): Performed by: HOSPITALIST

## 2019-03-11 PROCEDURE — A9270 NON-COVERED ITEM OR SERVICE: HCPCS | Performed by: HOSPITALIST

## 2019-03-11 PROCEDURE — 700102 HCHG RX REV CODE 250 W/ 637 OVERRIDE(OP): Performed by: HOSPITALIST

## 2019-03-11 PROCEDURE — 770001 HCHG ROOM/CARE - MED/SURG/GYN PRIV*

## 2019-03-11 PROCEDURE — A9270 NON-COVERED ITEM OR SERVICE: HCPCS | Performed by: NURSE PRACTITIONER

## 2019-03-11 PROCEDURE — 700102 HCHG RX REV CODE 250 W/ 637 OVERRIDE(OP): Performed by: NURSE PRACTITIONER

## 2019-03-11 PROCEDURE — 99232 SBSQ HOSP IP/OBS MODERATE 35: CPT | Performed by: HOSPITALIST

## 2019-03-11 RX ADMIN — GABAPENTIN 600 MG: 300 CAPSULE ORAL at 12:19

## 2019-03-11 RX ADMIN — TRAMADOL HYDROCHLORIDE 50 MG: 50 TABLET, COATED ORAL at 12:19

## 2019-03-11 RX ADMIN — ENOXAPARIN SODIUM 40 MG: 100 INJECTION SUBCUTANEOUS at 05:40

## 2019-03-11 RX ADMIN — TRAZODONE HYDROCHLORIDE 100 MG: 100 TABLET ORAL at 20:56

## 2019-03-11 RX ADMIN — TRAMADOL HYDROCHLORIDE 50 MG: 50 TABLET, COATED ORAL at 05:39

## 2019-03-11 RX ADMIN — METOPROLOL TARTRATE 12.5 MG: 25 TABLET, FILM COATED ORAL at 05:36

## 2019-03-11 RX ADMIN — TRAMADOL HYDROCHLORIDE 50 MG: 50 TABLET, COATED ORAL at 20:55

## 2019-03-11 RX ADMIN — QUETIAPINE FUMARATE 100 MG: 25 TABLET ORAL at 20:55

## 2019-03-11 RX ADMIN — METOPROLOL TARTRATE 12.5 MG: 25 TABLET, FILM COATED ORAL at 17:13

## 2019-03-11 RX ADMIN — DULOXETINE HYDROCHLORIDE 30 MG: 30 CAPSULE, DELAYED RELEASE ORAL at 05:34

## 2019-03-11 RX ADMIN — GABAPENTIN 600 MG: 300 CAPSULE ORAL at 05:35

## 2019-03-11 RX ADMIN — PRAZOSIN HYDROCHLORIDE 1 MG: 1 CAPSULE ORAL at 17:13

## 2019-03-11 RX ADMIN — GABAPENTIN 600 MG: 300 CAPSULE ORAL at 20:56

## 2019-03-11 ASSESSMENT — LIFESTYLE VARIABLES
AUDITORY DISTURBANCES: NOT PRESENT
TREMOR: NO TREMOR
ORIENTATION AND CLOUDING OF SENSORIUM: ORIENTED AND CAN DO SERIAL ADDITIONS
ANXIETY: MILDLY ANXIOUS
VISUAL DISTURBANCES: NOT PRESENT
NAUSEA AND VOMITING: NO NAUSEA AND NO VOMITING
TOTAL SCORE: 1
AGITATION: NORMAL ACTIVITY
NAUSEA AND VOMITING: NO NAUSEA AND NO VOMITING
PAROXYSMAL SWEATS: NO SWEAT VISIBLE
PAROXYSMAL SWEATS: NO SWEAT VISIBLE
HEADACHE, FULLNESS IN HEAD: NOT PRESENT
PAROXYSMAL SWEATS: NO SWEAT VISIBLE
AGITATION: NORMAL ACTIVITY
TOTAL SCORE: 0
HEADACHE, FULLNESS IN HEAD: NOT PRESENT
ANXIETY: MILDLY ANXIOUS
AUDITORY DISTURBANCES: NOT PRESENT
HEADACHE, FULLNESS IN HEAD: NOT PRESENT
ANXIETY: NO ANXIETY (AT EASE)
ANXIETY: MILDLY ANXIOUS
TREMOR: NO TREMOR
TOTAL SCORE: 1
ORIENTATION AND CLOUDING OF SENSORIUM: ORIENTED AND CAN DO SERIAL ADDITIONS
AUDITORY DISTURBANCES: NOT PRESENT
TREMOR: NO TREMOR
VISUAL DISTURBANCES: NOT PRESENT
TOTAL SCORE: 1
AGITATION: NORMAL ACTIVITY
TREMOR: NO TREMOR
AGITATION: NORMAL ACTIVITY
TREMOR: NO TREMOR
HEADACHE, FULLNESS IN HEAD: NOT PRESENT
ORIENTATION AND CLOUDING OF SENSORIUM: ORIENTED AND CAN DO SERIAL ADDITIONS
TOTAL SCORE: 0
VISUAL DISTURBANCES: NOT PRESENT
NAUSEA AND VOMITING: NO NAUSEA AND NO VOMITING
ANXIETY: NO ANXIETY (AT EASE)
HEADACHE, FULLNESS IN HEAD: NOT PRESENT
PAROXYSMAL SWEATS: NO SWEAT VISIBLE
NAUSEA AND VOMITING: NO NAUSEA AND NO VOMITING
SUBSTANCE_ABUSE: 1
PAROXYSMAL SWEATS: NO SWEAT VISIBLE
AUDITORY DISTURBANCES: NOT PRESENT
AUDITORY DISTURBANCES: NOT PRESENT
NAUSEA AND VOMITING: NO NAUSEA AND NO VOMITING
AGITATION: NORMAL ACTIVITY

## 2019-03-11 ASSESSMENT — ENCOUNTER SYMPTOMS
MYALGIAS: 0
TREMORS: 0
WEAKNESS: 0
PHOTOPHOBIA: 0
BLOOD IN STOOL: 0
HEMOPTYSIS: 0
VOMITING: 1
EYE PAIN: 0
STRIDOR: 0
TINGLING: 0
HALLUCINATIONS: 0
BACK PAIN: 1
SPUTUM PRODUCTION: 0
ABDOMINAL PAIN: 0
FEVER: 0
DIAPHORESIS: 0
BLURRED VISION: 0
FALLS: 1
NECK PAIN: 1
CONSTIPATION: 0
HEADACHES: 0
SINUS PAIN: 0
PALPITATIONS: 0
HEARTBURN: 0
WHEEZING: 0
NAUSEA: 1
CLAUDICATION: 0
COUGH: 0
DIARRHEA: 0
FLANK PAIN: 0
DOUBLE VISION: 0
SORE THROAT: 0
SHORTNESS OF BREATH: 0
ORTHOPNEA: 0
POLYDIPSIA: 0
DIZZINESS: 0
BRUISES/BLEEDS EASILY: 0
DEPRESSION: 1
CHILLS: 0
PND: 0

## 2019-03-11 NOTE — THERAPY
PT order received; pt viewed by PT up in bathroom shaving. RN reports pt has been up walking ad edenilson without device. Suspect gait instability on admit was more than likely due to his BRYAN of .18. Pt does not have skilled need and PT will DC order.

## 2019-03-11 NOTE — PROGRESS NOTES
Assumed care from Levi TSANG. Received bedside report.  Updated patient on daily plan of care on white board. Patient denies any additional needs at this time.  Patient call light with in reach.  Vitals stable. 1:1 at bedside, will continue to monitor

## 2019-03-11 NOTE — DISCHARGE PLANNING
Anticipated Discharge Disposition: In-Patient Psych    Action: Doctor signed medical clearance on legal hold. LSW provided bedside RN with Medical Clearance Checklist to fill out and give to LSW.     Barriers to Discharge: None    Plan: Fax over Medical Clearance Checklist and medically cleared legal hold to [facility].

## 2019-03-11 NOTE — PROGRESS NOTES
"Hospital Medicine Daily Progress Note    Date of Service  3/10/2019    Chief Complaint  68 y.o. male admitted 3/7/2019 with chronic alcoholism for which she was recently at a rehabilitation facility, was discharged, and had plans to go to [place], when he stopped by the [employer], became drunk.  He reports that he \"does not want to live anymore\"    Hospital Course    Patient patient arrived to the hospital with a blood pressure of 96/56, pulse of 78.  He was treated with detox IV fluids and multivitamins.  Patient was placed on CIWA protocol.  He was placed on legal hold secondary to suicidal ideations      Interval Problem Update  3/8: Patient seen this morning seem to have loss of interest, fatigue.  He states that he feels lonely and has no family.  Psychiatry to extend legal hold.  Heart rate was 86 with normal sinus rhythm on telemetry monitor. Patient still requires CIWA protocol requiring IV Ativan.  Patient also states that his neck pain radiating down his arms.  He also states that his back pain radiating down the leg.  3/9: Patient seen and examined by me today.  CIWA score is below.  Psych is continuing legal hold for now since he has suicidal ideations.  3/10: Seen and examined by me this morning.  Patient still feeling low and depressed.  We will continue legal hold and have psychiatry evaluate.       Consultants/Specialty  Psychiatry    Code Status  Full    Disposition  TBD    Review of Systems  Review of Systems   Constitutional: Negative for chills, diaphoresis, fever and malaise/fatigue.   HENT: Negative for congestion, ear discharge, ear pain, hearing loss, nosebleeds, sinus pain, sore throat and tinnitus.    Eyes: Negative for blurred vision, double vision, photophobia and pain.   Respiratory: Negative for cough, hemoptysis, sputum production, shortness of breath, wheezing and stridor.    Cardiovascular: Negative for chest pain, palpitations, orthopnea, claudication, leg swelling and PND. "   Gastrointestinal: Positive for nausea and vomiting. Negative for abdominal pain, blood in stool, constipation, diarrhea, heartburn and melena.   Genitourinary: Negative for dysuria, flank pain, frequency, hematuria and urgency.   Musculoskeletal: Positive for back pain, falls, joint pain and neck pain. Negative for myalgias.   Skin: Negative for itching and rash.   Neurological: Negative for dizziness, tingling, tremors, weakness and headaches.   Endo/Heme/Allergies: Negative for environmental allergies and polydipsia. Does not bruise/bleed easily.   Psychiatric/Behavioral: Positive for depression, substance abuse and suicidal ideas. Negative for hallucinations.        Physical Exam  Temp:  [36.4 °C (97.6 °F)-37 °C (98.6 °F)] 36.7 °C (98 °F)  Pulse:  [60-89] 63  Resp:  [17-18] 18  BP: (107-148)/(66-89) 107/73  SpO2:  [93 %-96 %] 95 %    Physical Exam   Constitutional: He is oriented to person, place, and time. He appears well-developed and well-nourished.   HENT:   Head: Normocephalic and atraumatic.   Right Ear: External ear normal.   Left Ear: External ear normal.   Mouth/Throat: No oropharyngeal exudate.   Eyes: Pupils are equal, round, and reactive to light. Conjunctivae and EOM are normal. Right eye exhibits no discharge. Left eye exhibits no discharge. No scleral icterus.   Neck: Normal range of motion. Neck supple. No JVD present. No tracheal deviation present. No thyromegaly present.   History of cervical fracture  Spurling sign is positive   Cardiovascular: Normal rate, regular rhythm and intact distal pulses.  Exam reveals no gallop and no friction rub.    No murmur heard.  Pulmonary/Chest: Effort normal and breath sounds normal. No stridor. No respiratory distress. He has no wheezes. He has no rales. He exhibits no tenderness.   Abdominal: Soft. Bowel sounds are normal. He exhibits no distension and no mass. There is no tenderness. There is no rebound and no guarding.   Musculoskeletal: Normal range of  motion. He exhibits edema. He exhibits no tenderness or deformity.   Bilateral lower extremity swelling   Lymphadenopathy:     He has no cervical adenopathy.   Neurological: He is alert and oriented to person, place, and time. He has normal reflexes. No cranial nerve deficit. He exhibits normal muscle tone. Coordination normal.   Right arm weakness   Skin: No rash noted. No erythema. No pallor.   Nursing note and vitals reviewed.      Fluids  No intake or output data in the 24 hours ending 03/10/19 1714    Laboratory  Recent Labs      03/08/19   0049   WBC  4.1*   RBC  3.47*   HEMOGLOBIN  11.1*   HEMATOCRIT  33.1*   MCV  95.4   MCH  32.0   MCHC  33.5*   RDW  50.1*   PLATELETCT  117*   MPV  10.3     Recent Labs      03/08/19 0049 03/09/19   1249   SODIUM  133*  132*   POTASSIUM  4.3  4.4   CHLORIDE  102  100   CO2  25  24   GLUCOSE  90  125*   BUN  16  10   CREATININE  0.60  0.72   CALCIUM  8.5  8.9                   Imaging  US-EXTREMITY VENOUS LOWER BILAT   Final Result      DX-CHEST-PORTABLE (1 VIEW)   Final Result         1.  Hazy reticular bilateral lung base density suggests atelectasis or interstitial infiltrate.           Assessment/Plan  * Alcoholism (HCC)   Assessment & Plan    Ongoing.  Recently discharged from rehabilitation for the same.  High risk for withdrawal, for which patient will be monitored with [facility] protocol.    High risk  Requiring IV Ativan     Severe episode of recurrent major depressive disorder, without psychotic features (HCC)   Assessment & Plan    On Cymbalta  Suicide ideation  Psychiatry consulted  Continue legal hold     Hyponatremia- (present on admission)   Assessment & Plan    Likely secondary to beer proteinemia will monitor with IVF, regular diet.- resolved  Continue to monitor to avoid the risk of osmotic demyelination     Normocytic anemia- (present on admission)   Assessment & Plan    Suspect due to chronic disease without no evidence of bleed. Transfuse if needed for  hemoglobin less than 7 gm/dL       Lumbar back pain with radiculopathy affecting lower extremity   Assessment & Plan    Complains of back pain radiating down the legs  No red flags  PT/OT eval       H/O cervical fracture   Assessment & Plan    Corpectomy of C5-C6  Pain control- started tramadol     Suicide ideation   Assessment & Plan    Has recurrent history of suicide ideation and currently states that he he wants to jump off a bridge     Localized swelling of lower extremity   Assessment & Plan    History of trauma and fracture lower extremity  US was negative for DVT          VTE prophylaxis: Lovenox

## 2019-03-11 NOTE — DISCHARGE PLANNING
Anticipated Discharge Disposition: In-Patient Psych     Action: Assessment completed. Pt is homeless and is on a legal hold. Pt will discharge to an In-Patient Psych facility upon medical clearance.     Barriers to Discharge: None    Plan: Awaiting medical clearance. Send referrals to in-patient psych upon medical clearance.     Care Transition Team Assessment    Information Source  Orientation : Oriented x 4  Information Given By: Other (Comments) (Patient's chart)  Who is responsible for making decisions for patient? : Patient (However, Pt is on legal hold. )    Readmission Evaluation  Is this a readmission?: Yes - unplanned readmission    Elopement Risk  Legal Hold: Elopement Risk  Ambulatory or Self Mobile in Wheelchair: Yes  Disoriented: No  Psychiatric Symptoms: None  History of Wandering: No  Elopement this Admit: No  Vocalizing Wanting to Leave: No  Displays Behaviors, Body Language Wanting to Leave: No-Not at Risk for Elopement  Time of Legal Hold: 0805  Date of Legal Hold: 03/07/19  Elopement Risk: At Risk for Elopement  Wanderguard On: No (See Comments) (1-1 sitter)  Personal Belongings: Hospital Clothing Only, Possessions Checked for Security / Elopement Risk, Anything Considered Risk for Security or Elopement, Removed and Stored in Secure Area (Specify Area)  Environmental Precautions: Sharp or Dangerous Items Removed, Dietary Notified for Plastic Utensils / Paperware Only    Interdisciplinary Discharge Planning  Patient or legal guardian wants to designate a caregiver (see row info): No    Discharge Preparedness  What is your plan after discharge?: Other (comment) (In-Patient Psych)  Prior Functional Level: Independent with Activities of Daily Living, Ambulatory  Difficulity with ADLs: None  Difficulity with IADLs: None    Functional Assesment  Prior Functional Level: Independent with Activities of Daily Living, Ambulatory    Finances  Financial Barriers to Discharge: No    Vision / Hearing  Impairment  Vision Impairment : No  Hearing Impairment : No    Domestic Abuse  Have you ever been the victim of abuse or violence?: No  Physical Abuse or Sexual Abuse: No  Verbal Abuse or Emotional Abuse: No  Possible Abuse Reported to:: Not Applicable    Psychological Assessment  History of Substance Abuse: Alcohol, Other (comment) (Inhaled drugs, unknown of what type)    Discharge Risks or Barriers  Discharge risks or barriers?: No PCP, Substance abuse, Homeless / couch surfing  Patient risk factors: Vulnerable adult, Substance abuse, Lack of outside supports, Homeless    Anticipated Discharge Information  Anticipated discharge disposition: Psychiatric admission - involuntary

## 2019-03-12 ENCOUNTER — PATIENT OUTREACH (OUTPATIENT)
Dept: HEALTH INFORMATION MANAGEMENT | Facility: OTHER | Age: 69
End: 2019-03-12

## 2019-03-12 VITALS
BODY MASS INDEX: 28.24 KG/M2 | HEIGHT: 71 IN | HEART RATE: 57 BPM | OXYGEN SATURATION: 96 % | SYSTOLIC BLOOD PRESSURE: 123 MMHG | WEIGHT: 201.72 LBS | DIASTOLIC BLOOD PRESSURE: 83 MMHG | RESPIRATION RATE: 19 BRPM | TEMPERATURE: 98.1 F

## 2019-03-12 PROBLEM — F33.2 SEVERE EPISODE OF RECURRENT MAJOR DEPRESSIVE DISORDER, WITHOUT PSYCHOTIC FEATURES (HCC): Status: RESOLVED | Noted: 2019-03-08 | Resolved: 2019-03-12

## 2019-03-12 PROBLEM — R45.851 SUICIDE IDEATION: Status: RESOLVED | Noted: 2019-03-08 | Resolved: 2019-03-12

## 2019-03-12 PROBLEM — E87.1 HYPONATREMIA: Status: RESOLVED | Noted: 2018-09-02 | Resolved: 2019-03-12

## 2019-03-12 PROCEDURE — 99239 HOSP IP/OBS DSCHRG MGMT >30: CPT | Performed by: HOSPITALIST

## 2019-03-12 PROCEDURE — A9270 NON-COVERED ITEM OR SERVICE: HCPCS | Performed by: HOSPITALIST

## 2019-03-12 PROCEDURE — 700102 HCHG RX REV CODE 250 W/ 637 OVERRIDE(OP): Performed by: NURSE PRACTITIONER

## 2019-03-12 PROCEDURE — 700102 HCHG RX REV CODE 250 W/ 637 OVERRIDE(OP): Performed by: HOSPITALIST

## 2019-03-12 PROCEDURE — A9270 NON-COVERED ITEM OR SERVICE: HCPCS | Performed by: NURSE PRACTITIONER

## 2019-03-12 PROCEDURE — 99232 SBSQ HOSP IP/OBS MODERATE 35: CPT | Performed by: PSYCHIATRY & NEUROLOGY

## 2019-03-12 PROCEDURE — 700111 HCHG RX REV CODE 636 W/ 250 OVERRIDE (IP): Performed by: HOSPITALIST

## 2019-03-12 RX ORDER — GABAPENTIN 600 MG/1
600 TABLET ORAL 3 TIMES DAILY
Qty: 45 TAB | Refills: 0 | Status: SHIPPED | OUTPATIENT
Start: 2019-03-12 | End: 2019-03-27

## 2019-03-12 RX ORDER — PRAZOSIN HYDROCHLORIDE 1 MG/1
1 CAPSULE ORAL EVERY EVENING
Qty: 30 CAP | Refills: 3 | Status: SHIPPED | OUTPATIENT
Start: 2019-03-12 | End: 2019-03-12

## 2019-03-12 RX ORDER — DULOXETIN HYDROCHLORIDE 60 MG/1
60 CAPSULE, DELAYED RELEASE ORAL DAILY
Qty: 15 CAP | Refills: 0 | Status: SHIPPED | OUTPATIENT
Start: 2019-03-12 | End: 2019-03-27

## 2019-03-12 RX ORDER — TRAZODONE HYDROCHLORIDE 100 MG/1
100 TABLET ORAL NIGHTLY PRN
Qty: 15 TAB | Refills: 0 | Status: SHIPPED | OUTPATIENT
Start: 2019-03-12 | End: 2019-03-27

## 2019-03-12 RX ORDER — QUETIAPINE FUMARATE 100 MG/1
100 TABLET, FILM COATED ORAL 2 TIMES DAILY
Qty: 60 TAB | Refills: 0 | Status: ON HOLD | OUTPATIENT
Start: 2019-03-12 | End: 2019-04-09

## 2019-03-12 RX ORDER — PRAZOSIN HYDROCHLORIDE 1 MG/1
1 CAPSULE ORAL EVERY EVENING
Qty: 30 CAP | Refills: 0 | Status: SHIPPED | OUTPATIENT
Start: 2019-03-12 | End: 2019-04-11

## 2019-03-12 RX ORDER — LISINOPRIL 20 MG/1
20 TABLET ORAL DAILY
Qty: 30 TAB | Refills: 0 | Status: ON HOLD | OUTPATIENT
Start: 2019-03-12 | End: 2019-04-09

## 2019-03-12 RX ORDER — DULOXETIN HYDROCHLORIDE 60 MG/1
60 CAPSULE, DELAYED RELEASE ORAL DAILY
Status: DISCONTINUED | OUTPATIENT
Start: 2019-03-13 | End: 2019-03-12 | Stop reason: HOSPADM

## 2019-03-12 RX ADMIN — GABAPENTIN 600 MG: 300 CAPSULE ORAL at 05:54

## 2019-03-12 RX ADMIN — ENOXAPARIN SODIUM 40 MG: 100 INJECTION SUBCUTANEOUS at 05:54

## 2019-03-12 RX ADMIN — TRAMADOL HYDROCHLORIDE 50 MG: 50 TABLET, COATED ORAL at 05:54

## 2019-03-12 RX ADMIN — DULOXETINE HYDROCHLORIDE 30 MG: 30 CAPSULE, DELAYED RELEASE ORAL at 05:54

## 2019-03-12 RX ADMIN — METOPROLOL TARTRATE 12.5 MG: 25 TABLET, FILM COATED ORAL at 05:54

## 2019-03-12 NOTE — DISCHARGE SUMMARY
"Discharge Summary    CHIEF COMPLAINT ON ADMISSION  Chief Complaint   Patient presents with   • Suicidal Ideation     Reports that he was at a Bar tonight drinking, starting feeling SI, Frequent Hx of the same. recently DC'd from [facility]   • Alcohol Intoxication       Reason for Admission  EMS     Admission Date  3/7/2019    CODE STATUS  Full Code    HPI & HOSPITAL COURSE  68 y.o. Male with history of chronic alcoholism for which she was recently at a rehabilitation facility, was discharged, and had plans to go to [place], when he stopped by the [employer], became drunk.  He reports that he \"does not want to live anymore\" he then repeats various versus from songs about being \"dust in the wind\" etc.  He reports multiple family members passing in the last several months, he denies any headache or vision changes, no chest pain or shortness of breath, no abdominal pain, nausea vomiting, diarrhea or constipation.  He has been drinking large amounts of alcohol.    Patient was admitted for hyponatremia likely due to chronic alcohol use, he was placed on CIWA protocol for alcohol withdrawal.  Patient did have suicidal ideations therefore psychiatry was consulted and patient was placed on legal hold.  Patient was started on Cymbalta for his depressive disorder.   Patient was monitored on CIWA and withdrawal symptoms resolved. VSS.  Psychiatry continued to reassess patient throughout the course of his hospitalization.  Patient has been to [facility] before and it was thought that patient was having behavioral changes and demanding to stay.  He was threatening to hurt himself if he was discharged.  At this time I agree with psychiatry the patient has behavior issues that need long term fu. He denied any SI/HI this morning to me.  Psychiatry has lifted legal hold.  Social work has been involved for discharge planning.  UDS was positive for cannabis.  Hyponatremia improved prior to discharge.  At this time patient is " medically stable for discharge.  Social work to assist with resources for outpatient follow-up.  Patient understood and agreed with above plan         Therefore, he is discharged in good and stable condition to home with close outpatient follow-up.    The patient met 2-midnight criteria for an inpatient stay at the time of discharge.    Discharge Date  3/12/18     FOLLOW UP ITEMS POST DISCHARGE      DISCHARGE DIAGNOSES  Principal Problem:    Alcoholism (HCC) POA: Unknown  Active Problems:    Normocytic anemia POA: Yes    Localized swelling of lower extremity POA: Unknown    H/O cervical fracture POA: Unknown    Lumbar back pain with radiculopathy affecting lower extremity POA: Unknown  Resolved Problems:    Severe episode of recurrent major depressive disorder, without psychotic features (HCC) POA: Unknown    Hyponatremia POA: Yes      Overview: Trend       9/3 135      Suicide ideation POA: Unknown      FOLLOW UP  No future appointments.  [address] [address]  [phone]  Schedule an appointment as soon as possible for a visit        MEDICATIONS ON DISCHARGE     Medication List      START taking these medications      Instructions   metoprolol 25 MG Tabs  Commonly known as:  LOPRESSOR   Take 0.5 Tabs by mouth 2 Times a Day.  Dose:  12.5 mg     prazosin 1 MG Caps  Commonly known as:  MINIPRESS   Take 1 Cap by mouth every evening for 30 days.  Dose:  1 mg        CONTINUE taking these medications      Instructions   cloNIDine 0.2 MG Tabs  Commonly known as:  CATAPRESS   Take 0.2 mg by mouth every bedtime.  Dose:  0.2 mg     DULoxetine 60 MG Cpep delayed-release capsule  Commonly known as:  CYMBALTA   Take 1 Cap by mouth every day for 15 days.  Dose:  60 mg     gabapentin 600 MG tablet  Commonly known as:  NEURONTIN   Take 1 Tab by mouth 3 times a day for 15 days.  Dose:  600 mg     lisinopril 20 MG Tabs  Commonly known as:  PRINIVIL   Take 1 Tab by mouth every day for 30  days.  Dose:  20 mg     QUEtiapine 100 MG Tabs  Commonly known as:  SEROQUEL   Take 1 Tab by mouth 2 times a day for 30 days.  Dose:  100 mg     traZODone 100 MG Tabs  Commonly known as:  DESYREL   Take 1 Tab by mouth at bedtime as needed for Sleep for up to 15 days.  Dose:  100 mg            Allergies  No Known Allergies    DIET  Orders Placed This Encounter   Procedures   • Diet Order Regular     Standing Status:   Standing     Number of Occurrences:   1     Order Specific Question:   Diet:     Answer:   Regular [1]       ACTIVITY  As tolerated.  Weight bearing as tolerated    CONSULTATIONS  psychiatry    PROCEDURES  None    LABORATORY  Lab Results   Component Value Date    SODIUM 132 (L) 03/09/2019    POTASSIUM 4.4 03/09/2019    CHLORIDE 100 03/09/2019    CO2 24 03/09/2019    GLUCOSE 125 (H) 03/09/2019    BUN 10 03/09/2019    CREATININE 0.72 03/09/2019        Lab Results   Component Value Date    WBC 4.1 (L) 03/08/2019    HEMOGLOBIN 11.1 (L) 03/08/2019    HEMATOCRIT 33.1 (L) 03/08/2019    PLATELETCT 117 (L) 03/08/2019        Total time of the discharge process exceeds 54 minutes.

## 2019-03-12 NOTE — PROGRESS NOTES
Assumed care from Levi TSANG. Received bedside report.  Updated patient on daily plan of care on white board. Patient denies any additional needs at this time.   Vitals stable.  Patient call light with in reach. 1:1 sitter at bedside. Will continue to monitor.

## 2019-03-12 NOTE — PROGRESS NOTES
"Hospital Medicine Daily Progress Note    Date of Service  3/11/2019    Chief Complaint  68 y.o. male admitted 3/7/2019 with chronic alcoholism for which she was recently at a rehabilitation facility, was discharged, and had plans to go to [place], when he stopped by the [employer], became drunk.  He reports that he \"does not want to live anymore\"    Hospital Course    Patient patient arrived to the hospital with a blood pressure of 96/56, pulse of 78.  He was treated with detox IV fluids and multivitamins.  Patient was placed on CIWA protocol.  He was placed on legal hold secondary to suicidal ideations      Interval Problem Update  3/8: Patient seen this morning seem to have loss of interest, fatigue.  He states that he feels lonely and has no family.  Psychiatry to extend legal hold.  Heart rate was 86 with normal sinus rhythm on telemetry monitor. Patient still requires CIWA protocol requiring IV Ativan.  Patient also states that his neck pain radiating down his arms.  He also states that his back pain radiating down the leg.  3/9: Patient seen and examined by me today.  CIWA score is below.  Psych is continuing legal hold for now since he has suicidal ideations.  3/10: Seen and examined by me this morning.  Patient still feeling low and depressed.  We will continue legal hold and have psychiatry evaluate.   3/11: CIWA protocol was discontinued since he had low scores on the past 72 hours.  He continues as suicidal ideations.  He is medically cleared and will transfer to psychiatry facility.      Consultants/Specialty  Psychiatry    Code Status  Full    Disposition  TBD    Review of Systems  Review of Systems   Constitutional: Negative for chills, diaphoresis, fever and malaise/fatigue.   HENT: Negative for congestion, ear discharge, ear pain, hearing loss, nosebleeds, sinus pain, sore throat and tinnitus.    Eyes: Negative for blurred vision, double vision, photophobia and pain.   Respiratory: Negative for cough, " hemoptysis, sputum production, shortness of breath, wheezing and stridor.    Cardiovascular: Negative for chest pain, palpitations, orthopnea, claudication, leg swelling and PND.   Gastrointestinal: Positive for nausea and vomiting. Negative for abdominal pain, blood in stool, constipation, diarrhea, heartburn and melena.   Genitourinary: Negative for dysuria, flank pain, frequency, hematuria and urgency.   Musculoskeletal: Positive for back pain, falls, joint pain and neck pain. Negative for myalgias.   Skin: Negative for itching and rash.   Neurological: Negative for dizziness, tingling, tremors, weakness and headaches.   Endo/Heme/Allergies: Negative for environmental allergies and polydipsia. Does not bruise/bleed easily.   Psychiatric/Behavioral: Positive for depression, substance abuse and suicidal ideas. Negative for hallucinations.        Physical Exam  Temp:  [36 °C (96.8 °F)-37.1 °C (98.7 °F)] 36.6 °C (97.8 °F)  Pulse:  [58-67] 58  Resp:  [16-18] 18  BP: (123-129)/(81-87) 129/82  SpO2:  [94 %-96 %] 95 %    Physical Exam   Constitutional: He is oriented to person, place, and time. He appears well-developed and well-nourished.   HENT:   Head: Normocephalic and atraumatic.   Right Ear: External ear normal.   Left Ear: External ear normal.   Mouth/Throat: No oropharyngeal exudate.   Eyes: Pupils are equal, round, and reactive to light. Conjunctivae and EOM are normal. Right eye exhibits no discharge. Left eye exhibits no discharge. No scleral icterus.   Neck: Normal range of motion. Neck supple. No JVD present. No tracheal deviation present. No thyromegaly present.   History of cervical fracture  Spurling sign is positive   Cardiovascular: Normal rate, regular rhythm and intact distal pulses.  Exam reveals no gallop and no friction rub.    No murmur heard.  Pulmonary/Chest: Effort normal and breath sounds normal. No stridor. No respiratory distress. He has no wheezes. He has no rales. He exhibits no  tenderness.   Abdominal: Soft. Bowel sounds are normal. He exhibits no distension and no mass. There is no tenderness. There is no rebound and no guarding.   Musculoskeletal: Normal range of motion. He exhibits edema. He exhibits no tenderness or deformity.   Bilateral lower extremity swelling   Lymphadenopathy:     He has no cervical adenopathy.   Neurological: He is alert and oriented to person, place, and time. He has normal reflexes. No cranial nerve deficit. He exhibits normal muscle tone. Coordination normal.   Right arm weakness   Skin: No rash noted. No erythema. No pallor.   Nursing note and vitals reviewed.      Fluids  No intake or output data in the 24 hours ending 03/11/19 1834    Laboratory      Recent Labs      03/09/19   1249   SODIUM  132*   POTASSIUM  4.4   CHLORIDE  100   CO2  24   GLUCOSE  125*   BUN  10   CREATININE  0.72   CALCIUM  8.9                   Imaging  US-EXTREMITY VENOUS LOWER BILAT   Final Result      DX-CHEST-PORTABLE (1 VIEW)   Final Result         1.  Hazy reticular bilateral lung base density suggests atelectasis or interstitial infiltrate.           Assessment/Plan  * Alcoholism ([facility])   Assessment & Plan    Ongoing.  Recently discharged from rehabilitation for the same.       Severe episode of recurrent major depressive disorder, without psychotic features ([facility])   Assessment & Plan    On Cymbalta  Suicide ideation  Psychiatry consulted  Continue legal hold     Hyponatremia- (present on admission)   Assessment & Plan    Likely secondary to beer proteinemia will monitor with IVF, regular diet.- resolved  Continue to monitor to avoid the risk of osmotic demyelination     Normocytic anemia- (present on admission)   Assessment & Plan    Suspect due to chronic disease without no evidence of bleed. Transfuse if needed for hemoglobin less than 7 gm/dL       Lumbar back pain with radiculopathy affecting lower extremity   Assessment & Plan    Complains of back pain radiating down the  legs  No red flags  PT/OT eval       H/O cervical fracture   Assessment & Plan    Corpectomy of C5-C6  Pain control- started tramadol     Suicide ideation   Assessment & Plan    Has recurrent history of suicide ideation and currently states that he he wants to jump off a bridge     Localized swelling of lower extremity   Assessment & Plan    History of trauma and fracture lower extremity  US was negative for DVT          VTE prophylaxis: Lovenox

## 2019-03-12 NOTE — DISCHARGE INSTRUCTIONS
Discharge Instructions    Discharged to home by car with self. Discharged via walking, hospital escort: Yes.  Special equipment needed: Not Applicable    Be sure to schedule a follow-up appointment with your primary care doctor or any specialists as instructed.     Discharge Plan:   Diet Plan: Discussed  Activity Level: Discussed  Confirmed Follow up Appointment: Patient to Call and Schedule Appointment  Confirmed Symptoms Management: Discussed  Medication Reconciliation Updated: Yes  Influenza Vaccine Indication: Not indicated: Previously immunized this influenza season and > 8 years of age    I understand that a diet low in cholesterol, fat, and sodium is recommended for good health. Unless I have been given specific instructions below for another diet, I accept this instruction as my diet prescription.   Other diet:     Special Instructions: None    · Is patient discharged on Warfarin / Coumadin?   No     Depression / Suicide Risk    As you are discharged from this [facility] facility, it is important to learn how to keep safe from harming yourself.    Recognize the warning signs:  · Abrupt changes in personality, positive or negative- including increase in energy   · Giving away possessions  · Change in eating patterns- significant weight changes-  positive or negative  · Change in sleeping patterns- unable to sleep or sleeping all the time   · Unwillingness or inability to communicate  · Depression  · Unusual sadness, discouragement and loneliness  · Talk of wanting to die  · Neglect of personal appearance   · Rebelliousness- reckless behavior  · Withdrawal from people/activities they love  · Confusion- inability to concentrate     If you or a loved one observes any of these behaviors or has concerns about self-harm, here's what you can do:  · Talk about it- your feelings and reasons for harming yourself  · Remove any means that you might use to hurt yourself (examples: pills, rope, extension cords,  firearm)  · Get professional help from the community (Mental Health, Substance Abuse, psychological counseling)  · Do not be alone:Call your Safe Contact- someone whom you trust who will be there for you.  · Call your local CRISIS HOTLINE 248-3059 or 485-182-0025  · Call your local Children's Mobile Crisis Response Team [place] (264) 231-9850 or www.Webtogs  · Call the toll free National Suicide Prevention Hotlines   · National Suicide Prevention Lifeline 598-628-OGSA (4541)  · [employer] Line Network 800-SUICIDE (654-7082)      Prazosin capsules  What is this medicine?  PRAZOSIN (PRA demetrius sin) is an antihypertensive. It works by relaxing the blood vessels. It is used to treat high blood pressure.  This medicine may be used for other purposes; ask your health care provider or pharmacist if you have questions.  COMMON BRAND NAME(S): Minipress  What should I tell my health care provider before I take this medicine?  They need to know if you have any of the following conditions:  -kidney disease  -an unusual or allergic reaction to prazosin, other medicines, foods, dyes, or preservatives  -pregnant or trying to get pregnant  -breast-feeding  How should I use this medicine?  Take this medicine by mouth with a glass of water. Follow the directions on the prescription label. Take your doses at regular intervals. Do not take your medicine more often than directed. Do not stop taking except on the advice of your doctor or health care professional.  Talk to your pediatrician regarding the use of this medicine in children. Special care may be needed.  Overdosage: If you think you have taken too much of this medicine contact a poison control center or emergency room at once.  NOTE: This medicine is only for you. Do not share this medicine with others.  What if I miss a dose?  If you miss a dose, take it as soon as you can. If it is almost time for your next dose, take only that dose. Do not take double or  extra doses.  What may interact with this medicine?  -diuretics  -medicines for high blood pressure  -sildenafil  -tadalafil  -vardenafil  This list may not describe all possible interactions. Give your health care provider a list of all the medicines, herbs, non-prescription drugs, or dietary supplements you use. Also tell them if you smoke, drink alcohol, or use illegal drugs. Some items may interact with your medicine.  What should I watch for while using this medicine?  Visit your doctor or health care professional for regular checks on your progress. Check your blood pressure regularly. Ask your doctor or health care professional what your blood pressure should be and when you should contact him or her.  Drowsiness and dizziness are more likely to occur after the first dose, after an increase in dose, or during hot weather or exercise. These effects can decrease once your body adjusts to this medicine. Do not drive, use machinery, or do anything that needs mental alertness until you know how this drug affects you. Do not stand or sit up quickly, especially if you are an older patient. This reduces the risk of dizzy or fainting spells. Alcohol can make you more drowsy and dizzy. Avoid alcoholic drinks.  Do not treat yourself for coughs, colds or allergies without asking your doctor or health care professional for advice. Some ingredients can increase your blood pressure.  Your mouth may get dry. Chewing sugarless gum or sucking hard candy, and drinking plenty of water may help. Contact your doctor if the problem does not go away or is severe.  For males, contact your doctor or health care professional right away if you have an erection that lasts longer than 4 hours or if it becomes painful. This may be a sign of a serious problem and must be treated right away to prevent permanent damage.  What side effects may I notice from receiving this medicine?  Side effects that you should report to your doctor or health  care professional as soon as possible:  -blurred vision  -difficulty breathing, shortness of breath  -fainting spells, light headedness  -fast or irregular heartbeat, palpitations or chest pain  -numbness in hands or feet  -prolonged painful erection of the penis  -swelling of the legs or ankles  -unusually weak or tired  Side effects that usually do not require medical attention (report to your doctor or health care professional if they continue or are bothersome):  -constipation or diarrhea  -headache  -nausea  -sexual difficulties  -stomach pain  This list may not describe all possible side effects. Call your doctor for medical advice about side effects. You may report side effects to FDA at 6-610-FDA-5323.  Where should I keep my medicine?  Keep out of the reach of children.  Store at room temperature between 15 and 30 degrees C (59 and 86 degrees F). Protect from light. Keep container tightly closed. Throw away any unused medicine after the expiration date.  NOTE: This sheet is a summary. It may not cover all possible information. If you have questions about this medicine, talk to your doctor, pharmacist, or health care provider.  © 2018 Elsevier/Gold Standard (2015-06-15 09:13:50)    Metoprolol extended-release tablets  What is this medicine?  METOPROLOL (me TOE proe lole) is a beta-blocker. Beta-blockers reduce the workload on the heart and help it to beat more regularly. This medicine is used to treat high blood pressure and to prevent chest pain. It is also used to after a heart attack and to prevent an additional heart attack from occurring.  This medicine may be used for other purposes; ask your health care provider or pharmacist if you have questions.  COMMON BRAND NAME(S): toprol, Toprol XL  What should I tell my health care provider before I take this medicine?  They need to know if you have any of these conditions:  -diabetes  -heart or vessel disease like slow heart rate, worsening heart failure,  heart block, sick sinus syndrome or Raynaud's disease  -kidney disease  -liver disease  -lung or breathing disease, like asthma or emphysema  -pheochromocytoma  -thyroid disease  -an unusual or allergic reaction to metoprolol, other beta-blockers, medicines, foods, dyes, or preservatives  -pregnant or trying to get pregnant  -breast-feeding  How should I use this medicine?  Take this medicine by mouth with a glass of water. Follow the directions on the prescription label. Do not crush or chew. Take this medicine with or immediately after meals. Take your doses at regular intervals. Do not take more medicine than directed. Do not stop taking this medicine suddenly. This could lead to serious heart-related effects.  Talk to your pediatrician regarding the use of this medicine in children. While this drug may be prescribed for children as young as 6 years for selected conditions, precautions do apply.  Overdosage: If you think you have taken too much of this medicine contact a poison control center or emergency room at once.  NOTE: This medicine is only for you. Do not share this medicine with others.  What if I miss a dose?  If you miss a dose, take it as soon as you can. If it is almost time for your next dose, take only that dose. Do not take double or extra doses.  What may interact with this medicine?  This medicine may interact with the following medications:  -certain medicines for blood pressure, heart disease, irregular heart beat  -certain medicines for depression, like monoamine oxidase (MAO) inhibitors, fluoxetine, or paroxetine  -clonidine  -dobutamine  -epinephrine  -isoproterenol  -reserpine  This list may not describe all possible interactions. Give your health care provider a list of all the medicines, herbs, non-prescription drugs, or dietary supplements you use. Also tell them if you smoke, drink alcohol, or use illegal drugs. Some items may interact with your medicine.  What should I watch for while  using this medicine?  Visit your doctor or health care professional for regular check ups. Contact your doctor right away if your symptoms worsen. Check your blood pressure and pulse rate regularly. Ask your health care professional what your blood pressure and pulse rate should be, and when you should contact them.  You may get drowsy or dizzy. Do not drive, use machinery, or do anything that needs mental alertness until you know how this medicine affects you. Do not sit or stand up quickly, especially if you are an older patient. This reduces the risk of dizzy or fainting spells. Contact your doctor if these symptoms continue. Alcohol may interfere with the effect of this medicine. Avoid alcoholic drinks.  What side effects may I notice from receiving this medicine?  Side effects that you should report to your doctor or health care professional as soon as possible:  -allergic reactions like skin rash, itching or hives  -cold or numb hands or feet  -depression  -difficulty breathing  -faint  -fever with sore throat  -irregular heartbeat, chest pain  -rapid weight gain  -swollen legs or ankles  Side effects that usually do not require medical attention (report to your doctor or health care professional if they continue or are bothersome):  -anxiety or nervousness  -change in sex drive or performance  -dry skin  -headache  -nightmares or trouble sleeping  -short term memory loss  -stomach upset or diarrhea  -unusually tired  This list may not describe all possible side effects. Call your doctor for medical advice about side effects. You may report side effects to FDA at 3-916-FDA-0620.  Where should I keep my medicine?  Keep out of the reach of children.  Store at room temperature between 15 and 30 degrees C (59 and 86 degrees F). Throw away any unused medicine after the expiration date.  NOTE: This sheet is a summary. It may not cover all possible information. If you have questions about this medicine, talk to your  doctor, pharmacist, or health care provider.  © 2018 Elsevier/Gold Standard (2014-08-22 14:41:37)

## 2019-03-12 NOTE — DISCHARGE PLANNING
Anticipated Discharge Disposition: Legal hold d/c.    Action: Discussed pt's case with bedside RN, psych team has d/c legal hold, provided bedside RN resources on [facility] and free clinic being held on 3/20/19 to include with d/c paperwork. notified legal hold coordinator Martha [phone].     Barriers to Discharge: N/A.     Plan: As above, pt's legal hold has been d/c. Resources for [facility] provided.

## 2019-03-12 NOTE — PSYCHIATRY
"PSYCHIATRIC FOLLOW-UP:(established)  *Reason for admission:  he was at a Bar tonight drinking, starting feeling SI, Frequent Hx of the same. recently [facility]. He went and became intoxicated and wanted to drink himself \"to death\"      *Legal Hold Status on Admission:  +                   *HPI:   Pt feels better but \"I kind of wish I didn't\". Feels lonely, no family. Was going to live with nephew who requires him to be clean, but nephew going through a divorce now. Pt would like to go to another hospital, he says he can't go to the [facility] [place] because he has a hx of TB and they won't take him (he may or may not have: it is not active now in any case), pt says he is willing to go to a rehab but hasn't called and doesn't know where they are. He expects  to find a place to stay, an apt. When told that will not happen he says he can't do it because he doesn't know how to use computers. What becomes apparent is patient wants to be taken care of. Pt told that unfortunately, this country only takes care of people in particular circumstances which he is not currently in so he will have to learn how to do calls, etc for himself.       Asked why he drinks: \"I don't know I just do\"       Pt is reluctant to define how is better and I suspect this is because he doesn't have any place that he would like, to go to.     *Psychiatric Examination:  Vitals: Blood pressure 123/83, pulse (!) 57, temperature 36.7 °C (98.1 °F), temperature source Temporal, resp. rate 19, height 1.8 m (5' 10.87\"), weight 91.5 kg (201 lb 11.5 oz), SpO2 96 %.  General Appearance: clean, balding, normal habitus, cooperative to a degree  Abnormal Movements: none  Gait and Posture: not observed  Speech: slightly slowed, has a whiny quality to it.  Thought processes: normal rate  Associations: linear  Abnormal or Psychotic Thoughts: none  Judgement and Insight: fair  Orientation: grossly oriented  Recent and Remote Memory: " grossly intact  Attention Span and Concentration: intact  Language: fluid  Fund of Knowledge:not tested  Mood and Affect:depressed but won't define further/blutned  SI/HI: won't answer about SI clearly, not HI      *ASSESSMENT/PLAN:  1. Depressive Disorder Unsep: improved, not SI  - cymbalta increased          2. Alcohol disorder severe     - rehab     3. Anxiety disorder unspc  -     4. THC use disorder      *Legal hold:  Dc'd. Will need scripts when medically ready for dc.  may have been working on substance abuse programs for patient to call. If not they will have to do so and find living options for the patient. Give him referrals for [employer], [facility].   Spoke with nurse, pt has said he will throw himself on the floor if discharged. If pt begins to threaten etc, this is behavioral for a place to stay and be cared for. Call security if needed. He was a [facility], then here. inpt will not resolve his issues: he needs to follow up and use his money to help himself  Instead of drinking further.          *Signing off

## 2019-03-12 NOTE — PROGRESS NOTES
Assumed care at 1900, bedside report received from day shift RN. Pt. Is medical on the monitor. Initial assessment completed, orders reviewed, call light within reach, bed alarm in use, and hourly rounding in place. POC addressed with patient, no additional questions at this time. Legal hold verified; 1 to 1 sitter in place; only plastic utensils with meals; room free from potentially dangerous items.

## 2019-03-12 NOTE — PROGRESS NOTES
Patient has items in safe keeping # [id number]    Unable to get items delivered to floor at this time as they are out to lunch.    Will continue to try for items.

## 2019-03-12 NOTE — PROGRESS NOTES
Spoke to Charge RN to keep 1:1 at patients bedside until we can contact MD for possible discharge orders.

## 2019-03-12 NOTE — CARE PLAN
Problem: Safety  Goal: Will remain free from injury  Outcome: PROGRESSING AS EXPECTED  Fall precautions in place. Bed in lowest position. Non-skid socks in place. Personal possessions within reach. Mobility sign on door. Call light within reach. Pt educated regarding fall prevention and states understanding. Legal hold verified; 1 to 1 sitter in place; only plastic utensils with meals; room free from potentially dangerous items.     Problem: Knowledge Deficit  Goal: Knowledge of disease process/condition, treatment plan, diagnostic tests, and medications will improve  Pt educated regarding plan of care and medications. All questions answered.

## 2019-03-12 NOTE — CARE PLAN
Problem: Infection  Goal: Will remain free from infection  Outcome: PROGRESSING AS EXPECTED  Educated patient on hand washing. Standard precautions in place. Assessed patient for s/s of infection.   In      Problem: Pain Management  Goal: Pain level will decrease to patient's comfort goal  Outcome: PROGRESSING AS EXPECTED  Monitor pain per protocol, no complaints of pain at this time.

## 2019-04-05 ENCOUNTER — APPOINTMENT (OUTPATIENT)
Dept: RADIOLOGY | Facility: MEDICAL CENTER | Age: 69
End: 2019-04-05
Attending: EMERGENCY MEDICINE
Payer: MEDICARE

## 2019-04-05 ENCOUNTER — HOSPITAL ENCOUNTER (OUTPATIENT)
Facility: MEDICAL CENTER | Age: 69
End: 2019-04-09
Attending: EMERGENCY MEDICINE | Admitting: INTERNAL MEDICINE
Payer: MEDICARE

## 2019-04-05 DIAGNOSIS — E87.1 HYPONATREMIA: ICD-10-CM

## 2019-04-05 DIAGNOSIS — M54.2 NECK PAIN: ICD-10-CM

## 2019-04-05 DIAGNOSIS — R45.851 SUICIDAL IDEATION: ICD-10-CM

## 2019-04-05 DIAGNOSIS — M54.50 LUMBAR BACK PAIN: ICD-10-CM

## 2019-04-05 DIAGNOSIS — J18.9 COMMUNITY ACQUIRED PNEUMONIA OF RIGHT LOWER LOBE OF LUNG: ICD-10-CM

## 2019-04-05 PROBLEM — D69.6 THROMBOCYTOPENIA (HCC): Status: ACTIVE | Noted: 2019-04-05

## 2019-04-05 PROBLEM — M10.9 GOUT: Status: ACTIVE | Noted: 2019-04-05

## 2019-04-05 LAB
25(OH)D3 SERPL-MCNC: 30 NG/ML (ref 30–100)
ALBUMIN SERPL BCP-MCNC: 4 G/DL (ref 3.2–4.9)
ALBUMIN/GLOB SERPL: 1.5 G/DL
ALP SERPL-CCNC: 76 U/L (ref 30–99)
ALT SERPL-CCNC: 24 U/L (ref 2–50)
AMPHET UR QL SCN: NEGATIVE
ANION GAP SERPL CALC-SCNC: 4 MMOL/L (ref 0–11.9)
ANION GAP SERPL CALC-SCNC: 7 MMOL/L (ref 0–11.9)
APAP SERPL-MCNC: <10 UG/ML (ref 10–30)
AST SERPL-CCNC: 66 U/L (ref 12–45)
BARBITURATES UR QL SCN: NEGATIVE
BASOPHILS # BLD AUTO: 0.4 % (ref 0–1.8)
BASOPHILS # BLD: 0.03 K/UL (ref 0–0.12)
BENZODIAZ UR QL SCN: NEGATIVE
BILIRUB SERPL-MCNC: 1.2 MG/DL (ref 0.1–1.5)
BUN SERPL-MCNC: 11 MG/DL (ref 8–22)
BUN SERPL-MCNC: 16 MG/DL (ref 8–22)
BZE UR QL SCN: NEGATIVE
CALCIUM SERPL-MCNC: 8.9 MG/DL (ref 8.5–10.5)
CALCIUM SERPL-MCNC: 8.9 MG/DL (ref 8.5–10.5)
CANNABINOIDS UR QL SCN: POSITIVE
CHLORIDE SERPL-SCNC: 94 MMOL/L (ref 96–112)
CHLORIDE SERPL-SCNC: 98 MMOL/L (ref 96–112)
CO2 SERPL-SCNC: 26 MMOL/L (ref 20–33)
CO2 SERPL-SCNC: 26 MMOL/L (ref 20–33)
CREAT SERPL-MCNC: 0.53 MG/DL (ref 0.5–1.4)
CREAT SERPL-MCNC: 0.82 MG/DL (ref 0.5–1.4)
EOSINOPHIL # BLD AUTO: 0.25 K/UL (ref 0–0.51)
EOSINOPHIL NFR BLD: 3.7 % (ref 0–6.9)
ERYTHROCYTE [DISTWIDTH] IN BLOOD BY AUTOMATED COUNT: 46.1 FL (ref 35.9–50)
ERYTHROCYTE [SEDIMENTATION RATE] IN BLOOD BY WESTERGREN METHOD: 12 MM/HOUR (ref 0–20)
ETHANOL BLD-MCNC: 0 G/DL
GLOBULIN SER CALC-MCNC: 2.6 G/DL (ref 1.9–3.5)
GLUCOSE SERPL-MCNC: 90 MG/DL (ref 65–99)
GLUCOSE SERPL-MCNC: 97 MG/DL (ref 65–99)
HCT VFR BLD AUTO: 35 % (ref 42–52)
HGB BLD-MCNC: 11.7 G/DL (ref 14–18)
IMM GRANULOCYTES # BLD AUTO: 0.03 K/UL (ref 0–0.11)
IMM GRANULOCYTES NFR BLD AUTO: 0.4 % (ref 0–0.9)
LYMPHOCYTES # BLD AUTO: 0.64 K/UL (ref 1–4.8)
LYMPHOCYTES NFR BLD: 9.4 % (ref 22–41)
MCH RBC QN AUTO: 31.6 PG (ref 27–33)
MCHC RBC AUTO-ENTMCNC: 33.4 G/DL (ref 33.7–35.3)
MCV RBC AUTO: 94.6 FL (ref 81.4–97.8)
METHADONE UR QL SCN: NEGATIVE
MONOCYTES # BLD AUTO: 0.79 K/UL (ref 0–0.85)
MONOCYTES NFR BLD AUTO: 11.6 % (ref 0–13.4)
NEUTROPHILS # BLD AUTO: 5.05 K/UL (ref 1.82–7.42)
NEUTROPHILS NFR BLD: 74.5 % (ref 44–72)
NRBC # BLD AUTO: 0 K/UL
NRBC BLD-RTO: 0 /100 WBC
OPIATES UR QL SCN: NEGATIVE
OXYCODONE UR QL SCN: NEGATIVE
PCP UR QL SCN: NEGATIVE
PLATELET # BLD AUTO: 116 K/UL (ref 164–446)
PMV BLD AUTO: 10 FL (ref 9–12.9)
POC BREATHALIZER: 0 PERCENT (ref 0–0.01)
POTASSIUM SERPL-SCNC: 3.8 MMOL/L (ref 3.6–5.5)
POTASSIUM SERPL-SCNC: 3.8 MMOL/L (ref 3.6–5.5)
PROCALCITONIN SERPL-MCNC: <0.05 NG/ML
PROPOXYPH UR QL SCN: NEGATIVE
PROT SERPL-MCNC: 6.6 G/DL (ref 6–8.2)
RBC # BLD AUTO: 3.7 M/UL (ref 4.7–6.1)
SALICYLATES SERPL-MCNC: 0 MG/DL (ref 15–25)
SODIUM SERPL-SCNC: 124 MMOL/L (ref 135–145)
SODIUM SERPL-SCNC: 131 MMOL/L (ref 135–145)
SODIUM UR-SCNC: 20 MMOL/L
WBC # BLD AUTO: 6.8 K/UL (ref 4.8–10.8)

## 2019-04-05 PROCEDURE — 700105 HCHG RX REV CODE 258: Performed by: EMERGENCY MEDICINE

## 2019-04-05 PROCEDURE — 99214 OFFICE O/P EST MOD 30 MIN: CPT | Performed by: PSYCHIATRY & NEUROLOGY

## 2019-04-05 PROCEDURE — 700102 HCHG RX REV CODE 250 W/ 637 OVERRIDE(OP): Performed by: STUDENT IN AN ORGANIZED HEALTH CARE EDUCATION/TRAINING PROGRAM

## 2019-04-05 PROCEDURE — 82306 VITAMIN D 25 HYDROXY: CPT

## 2019-04-05 PROCEDURE — 99285 EMERGENCY DEPT VISIT HI MDM: CPT

## 2019-04-05 PROCEDURE — 72040 X-RAY EXAM NECK SPINE 2-3 VW: CPT

## 2019-04-05 PROCEDURE — 96365 THER/PROPH/DIAG IV INF INIT: CPT

## 2019-04-05 PROCEDURE — G0378 HOSPITAL OBSERVATION PER HR: HCPCS

## 2019-04-05 PROCEDURE — 71045 X-RAY EXAM CHEST 1 VIEW: CPT

## 2019-04-05 PROCEDURE — A9270 NON-COVERED ITEM OR SERVICE: HCPCS | Performed by: STUDENT IN AN ORGANIZED HEALTH CARE EDUCATION/TRAINING PROGRAM

## 2019-04-05 PROCEDURE — 87040 BLOOD CULTURE FOR BACTERIA: CPT

## 2019-04-05 PROCEDURE — 36415 COLL VENOUS BLD VENIPUNCTURE: CPT

## 2019-04-05 PROCEDURE — 96368 THER/DIAG CONCURRENT INF: CPT

## 2019-04-05 PROCEDURE — 700111 HCHG RX REV CODE 636 W/ 250 OVERRIDE (IP): Performed by: EMERGENCY MEDICINE

## 2019-04-05 PROCEDURE — 80053 COMPREHEN METABOLIC PANEL: CPT

## 2019-04-05 PROCEDURE — 302970 POC BREATHALIZER

## 2019-04-05 PROCEDURE — 302970 POC BREATHALIZER: Performed by: EMERGENCY MEDICINE

## 2019-04-05 PROCEDURE — 51798 US URINE CAPACITY MEASURE: CPT

## 2019-04-05 PROCEDURE — 92610 EVALUATE SWALLOWING FUNCTION: CPT

## 2019-04-05 PROCEDURE — 99220 PR INITIAL OBSERVATION CARE,LEVL III: CPT | Mod: GC | Performed by: INTERNAL MEDICINE

## 2019-04-05 PROCEDURE — 84443 ASSAY THYROID STIM HORMONE: CPT

## 2019-04-05 PROCEDURE — 84300 ASSAY OF URINE SODIUM: CPT

## 2019-04-05 PROCEDURE — 85652 RBC SED RATE AUTOMATED: CPT

## 2019-04-05 PROCEDURE — 84145 PROCALCITONIN (PCT): CPT

## 2019-04-05 PROCEDURE — 80048 BASIC METABOLIC PNL TOTAL CA: CPT

## 2019-04-05 PROCEDURE — 700111 HCHG RX REV CODE 636 W/ 250 OVERRIDE (IP): Performed by: STUDENT IN AN ORGANIZED HEALTH CARE EDUCATION/TRAINING PROGRAM

## 2019-04-05 PROCEDURE — 80307 DRUG TEST PRSMV CHEM ANLYZR: CPT

## 2019-04-05 PROCEDURE — 85025 COMPLETE CBC W/AUTO DIFF WBC: CPT

## 2019-04-05 PROCEDURE — 72100 X-RAY EXAM L-S SPINE 2/3 VWS: CPT

## 2019-04-05 RX ORDER — AZITHROMYCIN 500 MG/1
500 INJECTION, POWDER, LYOPHILIZED, FOR SOLUTION INTRAVENOUS ONCE
Status: COMPLETED | OUTPATIENT
Start: 2019-04-05 | End: 2019-04-05

## 2019-04-05 RX ORDER — LISINOPRIL 20 MG/1
20 TABLET ORAL DAILY
Status: DISCONTINUED | OUTPATIENT
Start: 2019-04-05 | End: 2019-04-07

## 2019-04-05 RX ORDER — QUETIAPINE FUMARATE 100 MG/1
100 TABLET, FILM COATED ORAL 2 TIMES DAILY
Status: DISCONTINUED | OUTPATIENT
Start: 2019-04-05 | End: 2019-04-05

## 2019-04-05 RX ORDER — ACETAMINOPHEN 325 MG/1
650 TABLET ORAL EVERY 6 HOURS PRN
Status: DISCONTINUED | OUTPATIENT
Start: 2019-04-05 | End: 2019-04-09 | Stop reason: HOSPADM

## 2019-04-05 RX ORDER — BISACODYL 10 MG
10 SUPPOSITORY, RECTAL RECTAL
Status: DISCONTINUED | OUTPATIENT
Start: 2019-04-05 | End: 2019-04-09 | Stop reason: HOSPADM

## 2019-04-05 RX ORDER — HYDRALAZINE HYDROCHLORIDE 20 MG/ML
10 INJECTION INTRAMUSCULAR; INTRAVENOUS EVERY 4 HOURS PRN
Status: DISCONTINUED | OUTPATIENT
Start: 2019-04-05 | End: 2019-04-07

## 2019-04-05 RX ORDER — LIDOCAINE HYDROCHLORIDE 40 MG/ML
SOLUTION TOPICAL 3 TIMES DAILY PRN
Status: DISCONTINUED | OUTPATIENT
Start: 2019-04-05 | End: 2019-04-06

## 2019-04-05 RX ORDER — GABAPENTIN 100 MG/1
100 CAPSULE ORAL 3 TIMES DAILY PRN
Status: DISCONTINUED | OUTPATIENT
Start: 2019-04-05 | End: 2019-04-05

## 2019-04-05 RX ORDER — POLYETHYLENE GLYCOL 3350 17 G/17G
1 POWDER, FOR SOLUTION ORAL
Status: DISCONTINUED | OUTPATIENT
Start: 2019-04-05 | End: 2019-04-09 | Stop reason: HOSPADM

## 2019-04-05 RX ORDER — PRAZOSIN HYDROCHLORIDE 1 MG/1
1 CAPSULE ORAL EVERY EVENING
Status: DISCONTINUED | OUTPATIENT
Start: 2019-04-05 | End: 2019-04-09 | Stop reason: HOSPADM

## 2019-04-05 RX ORDER — AMOXICILLIN 250 MG
2 CAPSULE ORAL 2 TIMES DAILY
Status: DISCONTINUED | OUTPATIENT
Start: 2019-04-05 | End: 2019-04-09 | Stop reason: HOSPADM

## 2019-04-05 RX ORDER — CLONIDINE HYDROCHLORIDE 0.1 MG/1
0.2 TABLET ORAL
Status: DISCONTINUED | OUTPATIENT
Start: 2019-04-05 | End: 2019-04-07

## 2019-04-05 RX ORDER — DULOXETIN HYDROCHLORIDE 30 MG/1
30 CAPSULE, DELAYED RELEASE ORAL DAILY
Status: DISCONTINUED | OUTPATIENT
Start: 2019-04-06 | End: 2019-04-05

## 2019-04-05 RX ORDER — GABAPENTIN 300 MG/1
300 CAPSULE ORAL 3 TIMES DAILY
Status: DISCONTINUED | OUTPATIENT
Start: 2019-04-06 | End: 2019-04-09 | Stop reason: HOSPADM

## 2019-04-05 RX ADMIN — CEFTRIAXONE SODIUM 1 G: 1 INJECTION, POWDER, FOR SOLUTION INTRAMUSCULAR; INTRAVENOUS at 07:45

## 2019-04-05 RX ADMIN — AZITHROMYCIN MONOHYDRATE 500 MG: 500 INJECTION, POWDER, LYOPHILIZED, FOR SOLUTION INTRAVENOUS at 08:00

## 2019-04-05 RX ADMIN — ENOXAPARIN SODIUM 40 MG: 100 INJECTION SUBCUTANEOUS at 15:32

## 2019-04-05 RX ADMIN — SENNOSIDES,DOCUSATE SODIUM 2 TABLET: 8.6; 5 TABLET, FILM COATED ORAL at 21:10

## 2019-04-05 RX ADMIN — ACETAMINOPHEN 650 MG: 325 TABLET, FILM COATED ORAL at 15:32

## 2019-04-05 RX ADMIN — GABAPENTIN 100 MG: 100 CAPSULE ORAL at 15:32

## 2019-04-05 ASSESSMENT — ENCOUNTER SYMPTOMS
NECK PAIN: 1
SPUTUM PRODUCTION: 0
NAUSEA: 1
ABDOMINAL PAIN: 1
TINGLING: 1
HEADACHES: 1
SORE THROAT: 1
FLANK PAIN: 1
EYE PAIN: 1
SHORTNESS OF BREATH: 1
BLURRED VISION: 1
BRUISES/BLEEDS EASILY: 1
FEVER: 0
VOMITING: 0
CHILLS: 0
COUGH: 1
WEIGHT LOSS: 1
BACK PAIN: 1
SEIZURES: 0
LOSS OF CONSCIOUSNESS: 0
SINUS PAIN: 1

## 2019-04-05 ASSESSMENT — COGNITIVE AND FUNCTIONAL STATUS - GENERAL
DRESSING REGULAR LOWER BODY CLOTHING: A LITTLE
CLIMB 3 TO 5 STEPS WITH RAILING: A LITTLE
TOILETING: A LITTLE
DRESSING REGULAR UPPER BODY CLOTHING: A LITTLE
MOVING TO AND FROM BED TO CHAIR: A LOT
TURNING FROM BACK TO SIDE WHILE IN FLAT BAD: A LITTLE
WALKING IN HOSPITAL ROOM: A LITTLE
SUGGESTED CMS G CODE MODIFIER DAILY ACTIVITY: CK
MOVING FROM LYING ON BACK TO SITTING ON SIDE OF FLAT BED: A LITTLE
PERSONAL GROOMING: A LITTLE
DAILY ACTIVITIY SCORE: 19
HELP NEEDED FOR BATHING: A LITTLE
STANDING UP FROM CHAIR USING ARMS: A LOT
SUGGESTED CMS G CODE MODIFIER MOBILITY: CK
MOBILITY SCORE: 16

## 2019-04-05 ASSESSMENT — PATIENT HEALTH QUESTIONNAIRE - PHQ9
SUM OF ALL RESPONSES TO PHQ QUESTIONS 1-9: 27
3. TROUBLE FALLING OR STAYING ASLEEP OR SLEEPING TOO MUCH: NEARLY EVERY DAY
7. TROUBLE CONCENTRATING ON THINGS, SUCH AS READING THE NEWSPAPER OR WATCHING TELEVISION: NEARLY EVERY DAY
9. THOUGHTS THAT YOU WOULD BE BETTER OFF DEAD, OR OF HURTING YOURSELF: NEARLY EVERY DAY
6. FEELING BAD ABOUT YOURSELF - OR THAT YOU ARE A FAILURE OR HAVE LET YOURSELF OR YOUR FAMILY DOWN: NEARLY EVERY DAY
1. LITTLE INTEREST OR PLEASURE IN DOING THINGS: NEARLY EVERY DAY
5. POOR APPETITE OR OVEREATING: NEARLY EVERY DAY
2. FEELING DOWN, DEPRESSED, IRRITABLE, OR HOPELESS: NEARLY EVERY DAY
SUM OF ALL RESPONSES TO PHQ9 QUESTIONS 1 AND 2: 6
8. MOVING OR SPEAKING SO SLOWLY THAT OTHER PEOPLE COULD HAVE NOTICED. OR THE OPPOSITE, BEING SO FIGETY OR RESTLESS THAT YOU HAVE BEEN MOVING AROUND A LOT MORE THAN USUAL: NEARLY EVERY DAY
4. FEELING TIRED OR HAVING LITTLE ENERGY: NEARLY EVERY DAY

## 2019-04-05 ASSESSMENT — LIFESTYLE VARIABLES
AVERAGE NUMBER OF DAYS PER WEEK YOU HAVE A DRINK CONTAINING ALCOHOL: 2
EVER FELT BAD OR GUILTY ABOUT YOUR DRINKING: YES
ALCOHOL_USE: YES
HAVE PEOPLE ANNOYED YOU BY CRITICIZING YOUR DRINKING: NO
HOW MANY TIMES IN THE PAST YEAR HAVE YOU HAD 5 OR MORE DRINKS IN A DAY: 20
ON A TYPICAL DAY WHEN YOU DRINK ALCOHOL HOW MANY DRINKS DO YOU HAVE: 6
DOES PATIENT WANT TO STOP DRINKING: CANNOT ASSESS
TOTAL SCORE: 2
CONSUMPTION TOTAL: POSITIVE
TOTAL SCORE: 2
EVER FELT BAD OR GUILTY ABOUT YOUR DRINKING: YES
HAVE YOU EVER FELT YOU SHOULD CUT DOWN ON YOUR DRINKING: YES
TOTAL SCORE: 2
CONSUMPTION TOTAL: INCOMPLETE
TOTAL SCORE: 2
HAVE YOU EVER FELT YOU SHOULD CUT DOWN ON YOUR DRINKING: YES
HAVE PEOPLE ANNOYED YOU BY CRITICIZING YOUR DRINKING: NO
EVER HAD A DRINK FIRST THING IN THE MORNING TO STEADY YOUR NERVES TO GET RID OF A HANGOVER: NO
HOW MANY TIMES IN THE PAST YEAR HAVE YOU HAD 5 OR MORE DRINKS IN A DAY: 6
EVER HAD A DRINK FIRST THING IN THE MORNING TO STEADY YOUR NERVES TO GET RID OF A HANGOVER: NO
EVER_SMOKED: YES
ALCOHOL_USE: YES
TOTAL SCORE: 2
ON A TYPICAL DAY WHEN YOU DRINK ALCOHOL HOW MANY DRINKS DO YOU HAVE: 6
TOTAL SCORE: 2

## 2019-04-05 NOTE — THERAPY
Speech Therapy Contact Note  Orders received for a clinical swallow evaluation. Per RN, swallow evaluation is not indicated and will clarify order with [facility] team.

## 2019-04-05 NOTE — CARE PLAN
Problem: Safety  Goal: Will remain free from injury    Intervention: Provide a safe environment  Room prepared for pt's legal hold and active SI, 1:1 sitter in place and all unnecessary equipment removed        Problem: Safety:  Goal: Will remain free from injury    Intervention: Provide a safe environment  Room prepared for pt's legal hold and active SI, 1:1 sitter in place and all unnecessary equipment removed        Problem: Psychosocial Needs:  Goal: Ability to identify and develop effective coping behavior will improve  Pt just wishes to rest/sleep right now
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
## 2019-04-05 NOTE — ED NOTES
Med rec complete per pt at bedside   Pt states he has not taken any medications since being discharged from [facility]   Pt states medications on file are what was prescribed to him but he never picked them up   NKDA  No oral ABX within last 30 days
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
## 2019-04-05 NOTE — THERAPY
"Speech Language Therapy Clinical Swallow Evaluation completed.    Functional Status: RN clarified order with MD - SLP to proceed with clinical swallow evaluation. Patient AAOx4 with flat affect and intermittent mumbled speech, CNA/sitter at bedside. Speech was WNL given cues to elevate voice. Patient endorsed pain all over - RN had recently administered pain medication which CNA at bedside confirmed. Oral motor examination revealed no gross asymmetry or weakness. Vocal quality was clear and cough was slightly reduced in strength. PO trials consisted of items from GI soft, thin liquid meal tray. Swallow trigger was timely and laryngeal elevation complete to palpation. Patient had weak delayed cough x1 following PO of pork. Otherwise, appeared to tolerate diet without any overt s/sx of aspiration. Vocal quality remained clear all session. Current CXR indicating \"Pulmonary vascular congestion and right lower lobe infiltrate\" but patient currently on room air and no increase in respiratory distress noted this session. MD updated regarding current status and SLP recs.     Recommendations - Diet: At this time, there is concern for possible aspiration given current CXR and hx of ETOH and ACDF. However, pt appeared to tolerate diet without overt s/sx of aspiration or increase in respiratory distress (pt currently on room air). Discussed case with MD Magdiel FOSTER for patient to continue current diet through the weekend. Patient to be made NPO with any s/sx of aspiration or increased O2 needs. Will consider diagnostic swallow study with continued concerns for aspiration. MD agreeable to SLP recs.                             Strategies: Monitor during meals and Head of Bed at 90 Degrees                            Medication Administration: Whole with Liquid wash     Plan of Care: Will benefit from Speech Therapy 3 times per week    Post-Acute Therapy: Recommend outpatient or home health transitional care services for continued speech " "therapy services    See \"Rehab Therapy-Acute\" Patient Summary Report for complete documentation. Thank you for the consult.         "

## 2019-04-05 NOTE — ED NOTES
Report received from SHARAN Estrada. Pt sleeping on gurney. Observed even unlabored rise and fall of chest. 1:1 sitter within direct observation of patient.

## 2019-04-05 NOTE — PROGRESS NOTES
"Pt transferred from ED via gurney accompanied by transporter.  Pt up with one person assisstance to bedside chair, and a 1:1 safety sitter in in place..  Pt had an unsteady gait and weakness.  Pt assessed, A&O x4, complaints of generalized pain and specific pain to his feet ad back; pain rated as 9/10 to feet.  Left great tow is red and swollen around the toenail.  Pt's affect flat and subdued with reported fatigue.  Pt's rate of speech very slow, and pt talking softly despite this RN's request to speak louder due to difficulty in hearing pt  Pt is still wishing to die at this time.  Pt also a moderately poor historian, as when questioned about things such as \"how many days out of the past year do you think you've had at least one drink one\" pt is unable to answer clearly.  Pt did state \"I've basically been in the hospital for the past year\" and when asked about prescribed medications, pt stated he was given ibuprofen for pain \"doesn't even do anything\", \"tramadol which help out just a little\", and \"cymbalta ad Seroquel and some others I can't think of right now.\"    Updated pt on unit routine including call light system, hourly rounding, white board information, need for bed alarm, and legal hold precautions.  Bed in lowest position, locked, bed alarm active, pt wearing yellow treaded socks, and pt instructed on need to collaborate with 1:1 sitter in obtaining assistance prior to getting out of bed. Personal belonging consisting of 2 patient belongings clear plastic bags sent to S6 locked storage closet.   "

## 2019-04-06 LAB
ALBUMIN SERPL BCP-MCNC: 3.7 G/DL (ref 3.2–4.9)
ALBUMIN/GLOB SERPL: 1.5 G/DL
ALP SERPL-CCNC: 68 U/L (ref 30–99)
ALT SERPL-CCNC: 22 U/L (ref 2–50)
ANION GAP SERPL CALC-SCNC: 4 MMOL/L (ref 0–11.9)
AST SERPL-CCNC: 53 U/L (ref 12–45)
BILIRUB SERPL-MCNC: 0.7 MG/DL (ref 0.1–1.5)
BUN SERPL-MCNC: 14 MG/DL (ref 8–22)
CALCIUM SERPL-MCNC: 8.7 MG/DL (ref 8.5–10.5)
CHLORIDE SERPL-SCNC: 100 MMOL/L (ref 96–112)
CO2 SERPL-SCNC: 26 MMOL/L (ref 20–33)
CREAT SERPL-MCNC: 0.66 MG/DL (ref 0.5–1.4)
GLOBULIN SER CALC-MCNC: 2.4 G/DL (ref 1.9–3.5)
GLUCOSE SERPL-MCNC: 94 MG/DL (ref 65–99)
MAGNESIUM SERPL-MCNC: 1.8 MG/DL (ref 1.5–2.5)
PHOSPHATE SERPL-MCNC: 3.8 MG/DL (ref 2.5–4.5)
POTASSIUM SERPL-SCNC: 3.9 MMOL/L (ref 3.6–5.5)
PROT SERPL-MCNC: 6.1 G/DL (ref 6–8.2)
SODIUM SERPL-SCNC: 130 MMOL/L (ref 135–145)
TSH SERPL DL<=0.005 MIU/L-ACNC: 1.18 UIU/ML (ref 0.38–5.33)

## 2019-04-06 PROCEDURE — A9270 NON-COVERED ITEM OR SERVICE: HCPCS | Performed by: PSYCHIATRY & NEUROLOGY

## 2019-04-06 PROCEDURE — A9270 NON-COVERED ITEM OR SERVICE: HCPCS | Performed by: STUDENT IN AN ORGANIZED HEALTH CARE EDUCATION/TRAINING PROGRAM

## 2019-04-06 PROCEDURE — 700102 HCHG RX REV CODE 250 W/ 637 OVERRIDE(OP): Performed by: PSYCHIATRY & NEUROLOGY

## 2019-04-06 PROCEDURE — 80053 COMPREHEN METABOLIC PANEL: CPT

## 2019-04-06 PROCEDURE — 83735 ASSAY OF MAGNESIUM: CPT

## 2019-04-06 PROCEDURE — 99225 PR SUBSEQUENT OBSERVATION CARE,LEVEL II: CPT | Mod: GC | Performed by: INTERNAL MEDICINE

## 2019-04-06 PROCEDURE — 84100 ASSAY OF PHOSPHORUS: CPT

## 2019-04-06 PROCEDURE — 51798 US URINE CAPACITY MEASURE: CPT

## 2019-04-06 PROCEDURE — G0378 HOSPITAL OBSERVATION PER HR: HCPCS

## 2019-04-06 PROCEDURE — 700111 HCHG RX REV CODE 636 W/ 250 OVERRIDE (IP): Performed by: STUDENT IN AN ORGANIZED HEALTH CARE EDUCATION/TRAINING PROGRAM

## 2019-04-06 PROCEDURE — 700102 HCHG RX REV CODE 250 W/ 637 OVERRIDE(OP): Performed by: STUDENT IN AN ORGANIZED HEALTH CARE EDUCATION/TRAINING PROGRAM

## 2019-04-06 PROCEDURE — 36415 COLL VENOUS BLD VENIPUNCTURE: CPT

## 2019-04-06 PROCEDURE — 700101 HCHG RX REV CODE 250: Performed by: STUDENT IN AN ORGANIZED HEALTH CARE EDUCATION/TRAINING PROGRAM

## 2019-04-06 RX ORDER — LIDOCAINE 50 MG/G
1 PATCH TOPICAL DAILY
Status: DISCONTINUED | OUTPATIENT
Start: 2019-04-06 | End: 2019-04-09 | Stop reason: HOSPADM

## 2019-04-06 RX ADMIN — ACETAMINOPHEN 650 MG: 325 TABLET, FILM COATED ORAL at 11:44

## 2019-04-06 RX ADMIN — ACETAMINOPHEN 650 MG: 325 TABLET, FILM COATED ORAL at 18:26

## 2019-04-06 RX ADMIN — LIDOCAINE HYDROCHLORIDE 2 UNITS: 40 SOLUTION TOPICAL at 11:44

## 2019-04-06 RX ADMIN — GABAPENTIN 300 MG: 300 CAPSULE ORAL at 11:45

## 2019-04-06 RX ADMIN — METOPROLOL TARTRATE 12.5 MG: 25 TABLET, FILM COATED ORAL at 18:26

## 2019-04-06 RX ADMIN — GABAPENTIN 300 MG: 300 CAPSULE ORAL at 05:23

## 2019-04-06 RX ADMIN — LIDOCAINE 1 PATCH: 50 PATCH TOPICAL at 14:27

## 2019-04-06 RX ADMIN — CLONIDINE HYDROCHLORIDE 0.2 MG: 0.1 TABLET ORAL at 20:01

## 2019-04-06 RX ADMIN — ACETAMINOPHEN 650 MG: 325 TABLET, FILM COATED ORAL at 03:32

## 2019-04-06 RX ADMIN — PRAZOSIN HYDROCHLORIDE 1 MG: 1 CAPSULE ORAL at 18:28

## 2019-04-06 RX ADMIN — ENOXAPARIN SODIUM 40 MG: 100 INJECTION SUBCUTANEOUS at 05:23

## 2019-04-06 RX ADMIN — GABAPENTIN 300 MG: 300 CAPSULE ORAL at 18:26

## 2019-04-06 RX ADMIN — METOPROLOL TARTRATE 12.5 MG: 25 TABLET, FILM COATED ORAL at 05:24

## 2019-04-06 RX ADMIN — LISINOPRIL 20 MG: 20 TABLET ORAL at 05:25

## 2019-04-06 ASSESSMENT — ENCOUNTER SYMPTOMS
BRUISES/BLEEDS EASILY: 1
EYE PAIN: 1
SHORTNESS OF BREATH: 1
HALLUCINATIONS: 0
LOSS OF CONSCIOUSNESS: 0
DOUBLE VISION: 0
CHILLS: 1
SORE THROAT: 1
PALPITATIONS: 0
COUGH: 1
BACK PAIN: 1
FEVER: 0
ABDOMINAL PAIN: 1
NECK PAIN: 0
FLANK PAIN: 1
VOMITING: 0
SPUTUM PRODUCTION: 0
NAUSEA: 1
SEIZURES: 0

## 2019-04-06 NOTE — ASSESSMENT & PLAN NOTE
Likely secondary to EtOH intake. Previous hx of this issue, and it improved while in hospital with supportive management.    -STABLE.  -Supportive management.

## 2019-04-06 NOTE — PROGRESS NOTES
· 2 RN skin check complete with SHARAN San.  · Devices in place - NONE.  · Skin assessed under devices N/A.  · Confirmed pressure ulcers found on NONE.      Pt's bilateral toenails yellowed and luli-nail skin red but blanching.  Left great toe notably red and swollen around the toenail, and the 4th toe of each foot has scabbing on the knuckle of the toe.  The skin of the umbilicus area is red and pink when visualized within the abdominal fold (due to pt's anatomy, the luli-umbilicus area is not readily visible).  Bilateral inner gluteals red but blanching; sacral area red to pink and blanching.  Redness to pt's nose and pinkness to pt's chest.  Partially missing digit to right hand; not a new defect.  Groin area not visualized, including penis and scrotum.

## 2019-04-06 NOTE — ASSESSMENT & PLAN NOTE
Patient reports ~4 drinks per day. Last drink 12 hours ago.    -Continues to look stable and without significant vital sign changes nor tremors on exam.  -No signs of withdrawal currently.  -Initiate CIWA w/lorazepam if signs of withdrawal.  -If severe withdrawal transfer to telemetry.

## 2019-04-06 NOTE — ASSESSMENT & PLAN NOTE
Likely secondary to chronic EtOH abuse.     -TSH investigation.  -Supportive management.  -Patient counseled on need to abstain/decrease his EtOH intake.
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
## 2019-04-06 NOTE — ASSESSMENT & PLAN NOTE
Previous hx of this issue. 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    -Not on medications for this per med rec.  -Supportive management.

## 2019-04-06 NOTE — PROGRESS NOTES
Internal Medicine Interval Note  Note Author: Pramod Alarcon M.D.     Name Chris Leggett     1950   Age/Sex 68 y.o. male   MRN [id number]   Code Status FULL     After 5PM or if no immediate response to page, please call for cross-coverage  Attending/Team: Dr. Cuadra / [place] See Patient List for primary contact information  Call [phone] to page    1st Call - Day Intern (R1):   Dr. Alarcon 2nd Call - Day Sr. Resident (R2/R3):   Dr. Phillips         Reason for interval visit  (Principal Problem)   Suicidal ideation        Interval Problem Daily Status Update  (24 hours, problem oriented, brief subjective history, new lab/imaging data pertinent to that problem)     Patient reports no improvement of his back pain. Continues to report SI, but denies HI. Per RN patient declined acetaminophen and lidocaine. Discussed with patient that I will not give him opioid pain medications to which he verbalized understanding.     -Educated patient that he needs to be up and walking throughout the day and that bed rest will make his pain worse. He verbalized understanding.  -Psychiatry consult pending.  -Continues to report pan positive ROS.  -Feet look better this AM. Maintain personal hygiene with warm soap and water. No need for topicals currently.    Review of Systems   Constitutional: Positive for chills and malaise/fatigue. Negative for fever.   HENT: Positive for congestion and sore throat.    Eyes: Positive for pain. Negative for double vision.   Respiratory: Positive for cough and shortness of breath. Negative for sputum production.    Cardiovascular: Positive for chest pain. Negative for palpitations.   Gastrointestinal: Positive for abdominal pain and nausea. Negative for vomiting.   Genitourinary: Positive for dysuria and flank pain. Negative for urgency.   Musculoskeletal: Positive for back pain and joint pain. Negative for neck pain.   Skin: Positive for itching and rash.   Neurological:  Negative for seizures and loss of consciousness.   Endo/Heme/Allergies: Bruises/bleeds easily.   Psychiatric/Behavioral: Positive for suicidal ideas. Negative for hallucinations.       Disposition/Barriers to discharge:   Inpatient for suicidal ideation. Barriers is patient's SI.    Consultants/Specialty  Psychiatry (pending).  PCP: Pcp Pt States None      Quality Measures  Quality-Core Measures   Reviewed items::  Labs reviewed and Medications reviewed  Roca catheter::  No Roca  DVT prophylaxis pharmacological::  Enoxaparin (Lovenox)  DVT prophylaxis - mechanical:  SCDs          Physical Exam       Vitals:    04/05/19 1658 04/05/19 1935 04/06/19 0330 04/06/19 0800   BP: 128/76 (!) 98/62 138/84 117/77   Pulse: 63 70 61 78   Resp: 16 16 16 16   Temp: 36.3 °C (97.4 °F) 36.2 °C (97.1 °F) 36.1 °C (96.9 °F) 36.7 °C (98 °F)   TempSrc: Temporal Temporal Temporal Temporal   SpO2: 94% 99% 93% 93%   Weight: 94.2 kg (207 lb 10.8 oz)        Body mass index is 29.07 kg/m². Weight: 94.2 kg (207 lb 10.8 oz)  Oxygen Therapy:  Pulse Oximetry: 93 %, O2 (LPM): 0, O2 Delivery: None (Room Air)    Physical Exam   Constitutional: He is oriented to person, place, and time and well-developed, well-nourished, and in no distress.   HENT:   Head: Normocephalic and atraumatic.   Poor dentition.   Eyes: EOM are normal. No scleral icterus.   Neck: Normal range of motion. No tracheal deviation present.   Cardiovascular: Normal rate and regular rhythm.  Exam reveals no gallop and no friction rub.    No murmur heard.  Pulmonary/Chest: Effort normal. No stridor. No respiratory distress. He has no wheezes. He has no rales. He exhibits no tenderness.   Abdominal: Soft. Bowel sounds are normal. He exhibits no distension and no mass. There is no tenderness. There is no rebound and no guarding.   Musculoskeletal: Normal range of motion.   Paraspinal/spinous process tender to palpation of lumbar region.   Neurological: He is alert and oriented to person,  place, and time.   Skin: Skin is warm and dry. He is not diaphoretic.   Psychiatric:   Blunted affect. SI. No HI.         Assessment/Plan     * Suicidal ideation- (present on admission)   Assessment & Plan    Patient with previous history of this issue. Reports plan and the was going to go through with it, but pain limited his ability to do so.    -Psychiatry consulted and recommendations pending.     Alcoholism (HCC)- (present on admission)   Assessment & Plan    Patient reports ~4 drinks per day. Last drink 12 hours ago.    -Continues to look stable and without significant vital sign changes nor tremors on exam.  -No signs of withdrawal currently.  -Initiate CIWA w/lorazepam if signs of withdrawal.  -If severe withdrawal transfer to telemetry.     Mood disorder (HCC)- (present on admission)   Assessment & Plan    Chronic. Patient states he is on duloxetine and per chart review was on this medication last time he was here; however, it is not on the discharge summary from last admission (when he was receiving this medication).    -Quetiapine 100 mg po BID.     Hyponatremia- (present on admission)   Assessment & Plan    Likely secondary to EtOH intake. Previous hx of this issue, and it improved while in hospital with supportive management.    -STABLE.  -Supportive management.     Gout   Assessment & Plan    Previous hx of this issue. 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    -Not on medications for this per med rec.  -Supportive management.     Lumbar radiculopathy- (present on admission)   Assessment & Plan    Per patients symptoms would suspect L4 distribution.    -Straight leg test not helpful as patient reports pain as soon as I touch his legs.  -Supportive management.  -Ambulate TID.  -Acetaminophen.  -Lidocaine patch.     Hypertension- (present on admission)   Assessment & Plan    Chronic.    -STABLE, but with one episode of hypotension overnight. Consider titration of meds if continued  hypotension.  -Prazosin  -metoprolol  -lisinopril     Normocytic anemia- (present on admission)   Assessment & Plan    Likely secondary to chronic EtOH abuse.     -TSH investigation.  -Supportive management.  -Patient counseled on need to abstain/decrease his EtOH intake.

## 2019-04-06 NOTE — CARE PLAN
Problem: Safety  Goal: Will remain free from injury  Evaluated pt's room for safety of legal hold and active SI; 1:1 sitter in place and all unnecessary equipment removed.  Completed sitter card with sitter.      Problem: Bowel/Gastric:  Goal: Normal bowel function is maintained or improved  Inform on coming shift of pt's loose BM's so stool softener medications can be held.    Problem: Safety:  Goal: Will remain free from injury  Evaluated pt's room for safety of legal hold and active SI; 1:1 sitter in place and all unnecessary equipment removed.  Completed sitter card with sitter.

## 2019-04-06 NOTE — ASSESSMENT & PLAN NOTE
Per patients symptoms would suspect L4 distribution.    -Straight leg test not helpful as patient reports pain as soon as I touch his legs.  -Supportive management.  -Ambulate TID.  -Acetaminophen.  -Lidocaine patch.

## 2019-04-06 NOTE — ASSESSMENT & PLAN NOTE
Patient with previous history of this issue. Reports plan and the was going to go through with it, but pain limited his ability to do so.    -Psychiatry consulted and recommendations pending.

## 2019-04-06 NOTE — PROGRESS NOTES
Received report and assumed pt care at 1900 change of shift.  Legal hold pt with SI is A&Ox4 and has low volume speech.  Pt on room air.  1:1 safety sitter in room.  Pt up with one person assistance as gait is unsteady.  Pt complains of constant back and leg discomfort but refused medication intervention many times.  Pt wearing non skid treaded socks, environment uncluttered, bed in lowest and locked position, bilat upper bed rails up and bed alarm on.

## 2019-04-06 NOTE — ASSESSMENT & PLAN NOTE
Chronic. Patient states he is on duloxetine and per chart review was on this medication last time he was here; however, it is not on the discharge summary from last admission (when he was receiving this medication).    -Quetiapine 100 mg po BID.

## 2019-04-06 NOTE — CARE PLAN
Problem: Safety  Goal: Will remain free from injury    Intervention: Provide assistance with mobility  Report given to care staff that pt does require a one person assistance; one person assistance provided      Problem: Safety:  Goal: Will remain free from injury    Intervention: Provide assistance with mobility  Report given to care staff that pt does require a one person assistance; one person assistance provided      Problem: Urinary Elimination:  Goal: Ability to reestablish a normal urinary elimination pattern will improve    Intervention: Record post-void residual volumes  Post-void bladder scan ordered

## 2019-04-06 NOTE — ASSESSMENT & PLAN NOTE
Chronic.    -STABLE, but with one episode of hypotension overnight. Consider titration of meds if continued hypotension.  -Prazosin  -metoprolol  -lisinopril

## 2019-04-07 LAB
ANION GAP SERPL CALC-SCNC: 7 MMOL/L (ref 0–11.9)
BUN SERPL-MCNC: 20 MG/DL (ref 8–22)
CALCIUM SERPL-MCNC: 8.6 MG/DL (ref 8.5–10.5)
CHLORIDE SERPL-SCNC: 100 MMOL/L (ref 96–112)
CO2 SERPL-SCNC: 24 MMOL/L (ref 20–33)
CREAT SERPL-MCNC: 1.09 MG/DL (ref 0.5–1.4)
GLUCOSE SERPL-MCNC: 96 MG/DL (ref 65–99)
POTASSIUM SERPL-SCNC: 4.3 MMOL/L (ref 3.6–5.5)
SODIUM SERPL-SCNC: 131 MMOL/L (ref 135–145)

## 2019-04-07 PROCEDURE — A9270 NON-COVERED ITEM OR SERVICE: HCPCS | Performed by: PSYCHIATRY & NEUROLOGY

## 2019-04-07 PROCEDURE — 700102 HCHG RX REV CODE 250 W/ 637 OVERRIDE(OP): Performed by: PSYCHIATRY & NEUROLOGY

## 2019-04-07 PROCEDURE — 80048 BASIC METABOLIC PNL TOTAL CA: CPT

## 2019-04-07 PROCEDURE — 700101 HCHG RX REV CODE 250: Performed by: STUDENT IN AN ORGANIZED HEALTH CARE EDUCATION/TRAINING PROGRAM

## 2019-04-07 PROCEDURE — A9270 NON-COVERED ITEM OR SERVICE: HCPCS | Performed by: STUDENT IN AN ORGANIZED HEALTH CARE EDUCATION/TRAINING PROGRAM

## 2019-04-07 PROCEDURE — A9270 NON-COVERED ITEM OR SERVICE: HCPCS | Performed by: PHYSICAL MEDICINE & REHABILITATION

## 2019-04-07 PROCEDURE — 51798 US URINE CAPACITY MEASURE: CPT

## 2019-04-07 PROCEDURE — 700111 HCHG RX REV CODE 636 W/ 250 OVERRIDE (IP): Performed by: PHYSICAL MEDICINE & REHABILITATION

## 2019-04-07 PROCEDURE — 700102 HCHG RX REV CODE 250 W/ 637 OVERRIDE(OP): Performed by: STUDENT IN AN ORGANIZED HEALTH CARE EDUCATION/TRAINING PROGRAM

## 2019-04-07 PROCEDURE — 36415 COLL VENOUS BLD VENIPUNCTURE: CPT

## 2019-04-07 PROCEDURE — 700102 HCHG RX REV CODE 250 W/ 637 OVERRIDE(OP): Performed by: PHYSICAL MEDICINE & REHABILITATION

## 2019-04-07 PROCEDURE — 700111 HCHG RX REV CODE 636 W/ 250 OVERRIDE (IP): Performed by: STUDENT IN AN ORGANIZED HEALTH CARE EDUCATION/TRAINING PROGRAM

## 2019-04-07 PROCEDURE — G0378 HOSPITAL OBSERVATION PER HR: HCPCS

## 2019-04-07 PROCEDURE — 99225 PR SUBSEQUENT OBSERVATION CARE,LEVEL II: CPT | Performed by: INTERNAL MEDICINE

## 2019-04-07 PROCEDURE — 97161 PT EVAL LOW COMPLEX 20 MIN: CPT

## 2019-04-07 RX ORDER — IBUPROFEN 400 MG/1
200 TABLET ORAL ONCE
Status: COMPLETED | OUTPATIENT
Start: 2019-04-07 | End: 2019-04-07

## 2019-04-07 RX ORDER — QUETIAPINE FUMARATE 25 MG/1
50 TABLET, FILM COATED ORAL EVERY MORNING
Status: DISCONTINUED | OUTPATIENT
Start: 2019-04-07 | End: 2019-04-08

## 2019-04-07 RX ORDER — CLONIDINE HYDROCHLORIDE 0.1 MG/1
0.1 TABLET ORAL
Status: DISCONTINUED | OUTPATIENT
Start: 2019-04-07 | End: 2019-04-09 | Stop reason: HOSPADM

## 2019-04-07 RX ORDER — QUETIAPINE FUMARATE 100 MG/1
100 TABLET, FILM COATED ORAL EVERY EVENING
Status: DISCONTINUED | OUTPATIENT
Start: 2019-04-07 | End: 2019-04-08

## 2019-04-07 RX ORDER — ONDANSETRON 4 MG/1
4 TABLET, ORALLY DISINTEGRATING ORAL EVERY 4 HOURS PRN
Status: DISCONTINUED | OUTPATIENT
Start: 2019-04-07 | End: 2019-04-09 | Stop reason: HOSPADM

## 2019-04-07 RX ADMIN — ACETAMINOPHEN 650 MG: 325 TABLET, FILM COATED ORAL at 01:07

## 2019-04-07 RX ADMIN — GABAPENTIN 300 MG: 300 CAPSULE ORAL at 05:33

## 2019-04-07 RX ADMIN — ENOXAPARIN SODIUM 40 MG: 100 INJECTION SUBCUTANEOUS at 05:33

## 2019-04-07 RX ADMIN — LIDOCAINE 1 PATCH: 50 PATCH TOPICAL at 05:33

## 2019-04-07 RX ADMIN — ACETAMINOPHEN 650 MG: 325 TABLET, FILM COATED ORAL at 10:23

## 2019-04-07 RX ADMIN — QUETIAPINE FUMARATE 100 MG: 100 TABLET ORAL at 18:09

## 2019-04-07 RX ADMIN — QUETIAPINE FUMARATE 50 MG: 25 TABLET ORAL at 10:23

## 2019-04-07 RX ADMIN — GABAPENTIN 300 MG: 300 CAPSULE ORAL at 10:24

## 2019-04-07 RX ADMIN — ONDANSETRON 4 MG: 4 TABLET, ORALLY DISINTEGRATING ORAL at 10:23

## 2019-04-07 RX ADMIN — GABAPENTIN 300 MG: 300 CAPSULE ORAL at 18:09

## 2019-04-07 RX ADMIN — METOPROLOL TARTRATE 12.5 MG: 25 TABLET, FILM COATED ORAL at 18:09

## 2019-04-07 RX ADMIN — PRAZOSIN HYDROCHLORIDE 1 MG: 1 CAPSULE ORAL at 18:08

## 2019-04-07 RX ADMIN — LIDOCAINE HYDROCHLORIDE 15 ML: 20 SOLUTION OROPHARYNGEAL at 02:20

## 2019-04-07 RX ADMIN — ONDANSETRON 4 MG: 4 TABLET, ORALLY DISINTEGRATING ORAL at 02:21

## 2019-04-07 RX ADMIN — IBUPROFEN 200 MG: 400 TABLET, FILM COATED ORAL at 05:31

## 2019-04-07 ASSESSMENT — COGNITIVE AND FUNCTIONAL STATUS - GENERAL
MOVING TO AND FROM BED TO CHAIR: A LITTLE
MOBILITY SCORE: 17
MOVING FROM LYING ON BACK TO SITTING ON SIDE OF FLAT BED: A LITTLE
CLIMB 3 TO 5 STEPS WITH RAILING: A LOT
TURNING FROM BACK TO SIDE WHILE IN FLAT BAD: A LITTLE
SUGGESTED CMS G CODE MODIFIER MOBILITY: CK
STANDING UP FROM CHAIR USING ARMS: A LITTLE
WALKING IN HOSPITAL ROOM: A LITTLE

## 2019-04-07 ASSESSMENT — ENCOUNTER SYMPTOMS
DEPRESSION: 1
CARDIOVASCULAR NEGATIVE: 1
BRUISES/BLEEDS EASILY: 0
CONSTITUTIONAL NEGATIVE: 1
EYES NEGATIVE: 1
ABDOMINAL PAIN: 0
MUSCULOSKELETAL NEGATIVE: 1
HEADACHES: 0
VOMITING: 0
NAUSEA: 0
HEARTBURN: 1
RESPIRATORY NEGATIVE: 1

## 2019-04-07 ASSESSMENT — GAIT ASSESSMENTS
DEVIATION: INCREASED BASE OF SUPPORT
DISTANCE (FEET): 30
GAIT LEVEL OF ASSIST: CONTACT GUARD ASSIST

## 2019-04-07 NOTE — PROGRESS NOTES
Paged Dr. Blackwood at [facility] for pt complaints of generalized pain that   Tylenol is not relieving, nausea and indigestion.

## 2019-04-07 NOTE — PROGRESS NOTES
"                         [facility] Internal Medicine Interval Note    Name Chris Leggett     1950   Age/Sex 68 y.o. male   MRN [id number]   Code Status Full Code     After 5PM or if no immediate response to page, please call for cross-coverage  Attending/Team: Venecia/Piedad Call [phone] to page   1st Call - Day Intern (R1):   Dorian 2nd Call - Day Sr. Resident (R2/R3):   Wilmer Hogan     Chief complaint/ reason for interval visit  Suicidal ideation on legal hold    Interval Problem Update  Complaining of back pain overnight. The patch works, no longer in pain. \"That cream made me break out in a sweat though and I don't like it.\" Sitter at bedside. Per nursing doing okay with pain and the patch.    Evaluated by psychiatry yesterday, hold continued     Blood pressure on the low side, dc'ed lisinopril and reduced clonidine. Continue metoprolol 12.5 for now but not great med for depressed patients long term.     Review of Systems   Constitutional: Negative.    HENT: Negative.    Eyes: Negative.    Respiratory: Negative.    Cardiovascular: Negative.    Gastrointestinal: Positive for heartburn. Negative for abdominal pain, nausea and vomiting.   Genitourinary: Negative.    Musculoskeletal: Negative.    Skin: Negative.    Neurological: Negative for headaches.   Endo/Heme/Allergies: Does not bruise/bleed easily.   Psychiatric/Behavioral: Positive for depression.     Consultants/Specialty  Psychiatry    Disposition  Inpatient on legal hold for severe depression and suicidal ideation.     Quality Measures  Quality-Core Measures   Reviewed items::  Labs reviewed and Medications reviewed          Physical Exam       Vitals:    19 1600 19 1920 19 0355 19 0547   BP: 107/63 113/67 (!) 89/56 103/62   Pulse: 99 62 77    Resp: 16 16 17    Temp: 36.2 °C (97.1 °F) 36.3 °C (97.3 °F) 36.6 °C (97.8 °F)    TempSrc: Temporal Temporal Temporal    SpO2: 98% 100% 91%    Weight: 94.1 kg (207 lb 7.3 oz)        " Body mass index is 29.04 kg/m².  Oxygen Therapy:  Pulse Oximetry: 91 %, O2 Delivery: None (Room Air)    Physical Exam   Constitutional: He is oriented to person, place, and time and well-developed, well-nourished, and in no distress. No distress.   Sitting in chair with 1:1 sitter. Does not look at me upon entering room. Stares straight ahead. Flat. Psychomotor retardation. Indifferent.    HENT:   Head: Normocephalic and atraumatic.   Eyes: No scleral icterus.   Neck: No JVD present.   Cardiovascular: Normal rate.    Pulmonary/Chest: Effort normal.   Abdominal: He exhibits no distension.   Musculoskeletal: He exhibits no edema.   Neurological: He is alert and oriented to person, place, and time.   Skin: Skin is warm and dry. He is not diaphoretic.   Psychiatric:   flat   Nursing note and vitals reviewed.        Lab Data Review:  Recent Results (from the past 24 hour(s))   Basic Metabolic Panel    Collection Time: 04/07/19  2:33 AM   Result Value Ref Range    Sodium 131 (L) 135 - 145 mmol/L    Potassium 4.3 3.6 - 5.5 mmol/L    Chloride 100 96 - 112 mmol/L    Co2 24 20 - 33 mmol/L    Glucose 96 65 - 99 mg/dL    Bun 20 8 - 22 mg/dL    Creatinine 1.09 0.50 - 1.40 mg/dL    Calcium 8.6 8.5 - 10.5 mg/dL    Anion Gap 7.0 0.0 - 11.9   ESTIMATED GFR    Collection Time: 04/07/19  2:33 AM   Result Value Ref Range    GFR If African American >60 >60 mL/min/1.73 m 2    GFR If Non African American >60 >60 mL/min/1.73 m 2       Assessment/Plan   1. Severe depression with psychomotor retardation; Appreciate psychiatric consultation. TSH normal.    - Legal hold extended   - Hyponatremia precludes cymbalta use    - One to one sitter   - no phone, no visitors  2. Lumbar radiculopathy controlled with lidocaine patch, no alarm features (crhonic)  3. Hypotension   - DC lisinopril, reduce clonidine to 0.1   - continue metoprolol 12.5mg daily to avoid rebound tachy  4. Hyponatremia  Stable, doubt due to SIADH from psych meds. Okay with  starting these and monitoring if psychiatry feels it is appropriate. Appreciate input.   5. Normocytic anemia  TSH, b12, folate, iron studies  Multifactorial, no acute bleed    Disposition: Inpatient on legal hold

## 2019-04-07 NOTE — PROGRESS NOTES
Psych paged and Dr. Casillas updated on pt's recent behavior including SI and this RN's concern for self-harm behavior.  MD aware.

## 2019-04-07 NOTE — PSYCHIATRY
PSYCHIATRIC FOLLOW UP:    Reason for Admission: Suicidal ideations  Legal hold status:  Current   Psychiatric Supervising Attending:   Emre Stapleton MD    HPI:  Psychiatry paged due to concerning pt behaviors. Per nursing, pt is picking at arms and feet, causing bleeding, and making suicidal comments. He has made comments regarding using medications to drop BP dangerously, etc.    On exam, pt is calm and guarded, denying doing any of the above. He does report continued SI with plan, however he states he will not do anything while in the hospital. When asked about after he leaves, he states that at this point he would kill himself after leaving. Pt reports being unable to sleep, and he states that before he had been on Trazodone and Seroquel, which were effective.     Psychiatric Examination: observed phenomenon:  Vitals: Blood pressure (!) 92/59, pulse 63, temperature 36.4 °C (97.5 °F), temperature source Temporal, resp. rate 16, weight 94.1 kg (207 lb 7.3 oz), SpO2 92 %.  Musculoskeletal: normal psychomotor activity, no tics or unusual mannerisms noted  Appearance: Mildly disheveled. Behavior is calm, guarded, appropriate eye contact  Thoughts:  Linear, logical, no blocking of thought noted, no delusional content noted, not obviously responding to internal stimuli.  Speech: Normal rate and jamir, no pressuring of speech noted  Mood:  depressed          Affect: Mood congruent, blunted range of affect          SI/HI: Endorses SI with intent to kill himself once he leaves the hospital   Attention/Alertness: Alert  Memory: Recent and remote memory grossly intact     Orientation: A&Ox3     Fund of Knowledge: Fair     Insight/Judgement into symptoms: poor  Neurological Testing: Not formally tested      Lab results/tests:   Recent Results (from the past 24 hour(s))   Basic Metabolic Panel    Collection Time: 04/07/19  2:33 AM   Result Value Ref Range    Sodium 131 (L) 135 - 145 mmol/L    Potassium 4.3 3.6 - 5.5  mmol/L    Chloride 100 96 - 112 mmol/L    Co2 24 20 - 33 mmol/L    Glucose 96 65 - 99 mg/dL    Bun 20 8 - 22 mg/dL    Creatinine 1.09 0.50 - 1.40 mg/dL    Calcium 8.6 8.5 - 10.5 mg/dL    Anion Gap 7.0 0.0 - 11.9   ESTIMATED GFR    Collection Time: 04/07/19  2:33 AM   Result Value Ref Range    GFR If African American >60 >60 mL/min/1.73 m 2    GFR If Non African American >60 >60 mL/min/1.73 m 2          ASSESSMENT:   Mood disorder undpecified  Alcohol Use disorder   THC disorder   Hyponatremia     Discussion: Nursing is reporting agitated and suicidal behaviors. Given current hyponatremia, will continue to hold off on SNRI. Will restart seroquel at a lower dose than he was previously on to address agitation while trying to limit somnolence.     PLAN:  Restart Seroquel at 50 mg QAM and 100 mg QHS, will adjust as appropriate  Continue sitter, will consider security sitter if there continues to be a concern of active self harm  Continue Gabapentin 300 mg TID    Legal status: extended, transfer to inpatient psychiatry when medically stable   No phone, no visitors.   May have books and things like this provided to him.   May not have his own personal belongings due to potential for there to be drugs or items he can self harm with      Will follow, thank you for the consult.

## 2019-04-07 NOTE — PROGRESS NOTES
1:1 security sitter in place; pt still reports SI.  Pt less withdrawn today, and initiates conversation with staff.  Appropriate behavior throughout shift. Education provided on PRN pain medication; pt reported great relief from 1x application of topical lidocaine liquid to lower back, though a lidocaine patch was subsequently applied and pt continues to report great pain relief though he contributes it to topical lidocaine liquid.

## 2019-04-07 NOTE — THERAPY
"67 y/o male adm for GLF and suicidal ideations. Pt reports he vee not have any reason tolive as he does not have family anymore. Pt alsoe states he is miserable due to back pain. Acute PT to address functional mobility, strength, pain, functional tolerance and fall prevention for him to achieve higher level of function to facilitate a safe DC.    Physical Therapy Evaluation completed.   Bed Mobility:  Supine to Sit: Supervised  Transfers: Sit to Stand: Stand by Assist  Gait: Level Of Assist: Contact Guard Assist with Front-Wheel Walker  X 30 ft     Plan of Care: Will benefit from Physical Therapy 3 times per week  Discharge Recommendations: Equipment: Front-Wheel Walker. Post-acute therapy Discharge to a transitional care facility for continued skilled therapy services.    See \"Rehab Therapy-Acute\" Patient Summary Report for complete documentation.     "

## 2019-04-07 NOTE — PROGRESS NOTES
".Assumed care of patient at change of shift.  During change of shift report, patient (pt) sitting in bedside chair,with PIV in place, with actively bleeding right 4th toe, and 1:1 safety sitter in place.  Pt still subdued, but again willing to begin a conversation with this RN.  Per safety sitter, pt is \"fidgety\" and has been picking at his toe causing it to bleed, and has been trying to pick at his arms.  Safety sitter was recently a CNA on this floor, and familiar with the pt population, and per sitter, pt is not responsive to frequent education and reorientation to \"stop picking at yourself\", and per sitter pt stated to himself \"Well, I know my blood pressure is low, and if I can get it to go lower with pain meds then maybe I can die\" and other statements containing suicidal statements. With assessment, pt does continue to state \"I wish to die\" and education regarding safety provided, including the cessation of self-harm behaviors.  Pt does agree that he picked at his toe, but he denies having caused it to bleed.  Upon further education, pt states \"no, I won't try to do any of that while I'm here.\"     [facility] MD paged. Dr. Phillips updated on pt's continued SI and actively wishing to die, and self-harm behaviors and verbalizations, and on hypotension.  New orders received discontinuing blood pressure medication, and MD stated to page psych and to call with any further updates.      "

## 2019-04-07 NOTE — CARE PLAN
Problem: Safety  Goal: Will remain free from injury    Intervention: Assess behavior for possible injury to self  Assessed pt for any marks that could have resulted from self-harm since hospital admission; no noticeable marks       Problem: Safety:  Goal: Will remain free from injury    Intervention: Assess behavior for possible injury to self  Assessed pt for any marks that could have resulted from self-harm since hospital admission; no noticeable marks       Problem: Psychosocial Needs:  Goal: Ability to identify and develop effective coping behavior will improve    Intervention: Provide emotional support  Discussed pt's emotions and provided emotional support; asked pt what could be done to help pt feel better

## 2019-04-07 NOTE — CARE PLAN
Problem: Bowel/Gastric:  Goal: Normal bowel function is maintained or improved  Hold stool softener medications as pt is experiencing loose stools.    Problem: Psychosocial Needs:  Goal: Ability to verbalize positive feelings about self will improve  Encourage pt to voice positive aspects of self and to concentrate on healing pt's body, mind and soul.  Pt displayed more personal interaction and conversation compared to yesterday.

## 2019-04-07 NOTE — PROGRESS NOTES
Legal hold pt with SI is A&Ox4, on room air and 1:1 safety sitter in room.  Pt more talkative today compared to yesterday and also has a slight increase in volume when speaking.  Pt wearing non skid treaded socks, environment uncluttered, bed in lowest and locked position, bilat upper bed rails up and bed alarm on.

## 2019-04-08 PROCEDURE — 99225 PR SUBSEQUENT OBSERVATION CARE,LEVEL II: CPT | Mod: GC | Performed by: INTERNAL MEDICINE

## 2019-04-08 PROCEDURE — A9270 NON-COVERED ITEM OR SERVICE: HCPCS | Performed by: PSYCHIATRY & NEUROLOGY

## 2019-04-08 PROCEDURE — 700102 HCHG RX REV CODE 250 W/ 637 OVERRIDE(OP): Performed by: STUDENT IN AN ORGANIZED HEALTH CARE EDUCATION/TRAINING PROGRAM

## 2019-04-08 PROCEDURE — 700101 HCHG RX REV CODE 250: Performed by: STUDENT IN AN ORGANIZED HEALTH CARE EDUCATION/TRAINING PROGRAM

## 2019-04-08 PROCEDURE — A9270 NON-COVERED ITEM OR SERVICE: HCPCS | Performed by: STUDENT IN AN ORGANIZED HEALTH CARE EDUCATION/TRAINING PROGRAM

## 2019-04-08 PROCEDURE — 700102 HCHG RX REV CODE 250 W/ 637 OVERRIDE(OP): Performed by: PSYCHIATRY & NEUROLOGY

## 2019-04-08 PROCEDURE — G0378 HOSPITAL OBSERVATION PER HR: HCPCS

## 2019-04-08 PROCEDURE — 99214 OFFICE O/P EST MOD 30 MIN: CPT | Performed by: PSYCHIATRY & NEUROLOGY

## 2019-04-08 PROCEDURE — 700111 HCHG RX REV CODE 636 W/ 250 OVERRIDE (IP): Performed by: STUDENT IN AN ORGANIZED HEALTH CARE EDUCATION/TRAINING PROGRAM

## 2019-04-08 PROCEDURE — 97165 OT EVAL LOW COMPLEX 30 MIN: CPT

## 2019-04-08 RX ORDER — TRAZODONE HYDROCHLORIDE 50 MG/1
50 TABLET ORAL NIGHTLY PRN
Status: DISCONTINUED | OUTPATIENT
Start: 2019-04-08 | End: 2019-04-09

## 2019-04-08 RX ORDER — HALOPERIDOL 2 MG/ML
1 SOLUTION ORAL 3 TIMES DAILY
Status: DISCONTINUED | OUTPATIENT
Start: 2019-04-08 | End: 2019-04-09 | Stop reason: HOSPADM

## 2019-04-08 RX ADMIN — ACETAMINOPHEN 650 MG: 325 TABLET, FILM COATED ORAL at 04:15

## 2019-04-08 RX ADMIN — METOPROLOL TARTRATE 12.5 MG: 25 TABLET, FILM COATED ORAL at 17:14

## 2019-04-08 RX ADMIN — METOPROLOL TARTRATE 12.5 MG: 25 TABLET, FILM COATED ORAL at 04:19

## 2019-04-08 RX ADMIN — HALOPERIDOL LACTATE 1 MG: 2 SOLUTION, CONCENTRATE ORAL at 17:14

## 2019-04-08 RX ADMIN — TRAZODONE HYDROCHLORIDE 50 MG: 50 TABLET ORAL at 21:29

## 2019-04-08 RX ADMIN — GABAPENTIN 300 MG: 300 CAPSULE ORAL at 12:07

## 2019-04-08 RX ADMIN — QUETIAPINE FUMARATE 50 MG: 25 TABLET ORAL at 04:15

## 2019-04-08 RX ADMIN — GABAPENTIN 300 MG: 300 CAPSULE ORAL at 04:13

## 2019-04-08 RX ADMIN — LIDOCAINE 1 PATCH: 50 PATCH TOPICAL at 04:16

## 2019-04-08 RX ADMIN — PRAZOSIN HYDROCHLORIDE 1 MG: 1 CAPSULE ORAL at 17:14

## 2019-04-08 RX ADMIN — GABAPENTIN 300 MG: 300 CAPSULE ORAL at 17:14

## 2019-04-08 RX ADMIN — ENOXAPARIN SODIUM 40 MG: 100 INJECTION SUBCUTANEOUS at 04:13

## 2019-04-08 ASSESSMENT — ENCOUNTER SYMPTOMS
CONSTITUTIONAL NEGATIVE: 1
RESPIRATORY NEGATIVE: 1
BRUISES/BLEEDS EASILY: 0
CARDIOVASCULAR NEGATIVE: 1
HALLUCINATIONS: 0
HEADACHES: 0
EYES NEGATIVE: 1
NAUSEA: 0
DEPRESSION: 1
ABDOMINAL PAIN: 0
MUSCULOSKELETAL NEGATIVE: 1
VOMITING: 0
HEARTBURN: 1

## 2019-04-08 ASSESSMENT — COGNITIVE AND FUNCTIONAL STATUS - GENERAL
SUGGESTED CMS G CODE MODIFIER DAILY ACTIVITY: CH
DAILY ACTIVITIY SCORE: 24

## 2019-04-08 ASSESSMENT — ACTIVITIES OF DAILY LIVING (ADL): TOILETING: INDEPENDENT

## 2019-04-08 NOTE — DISCHARGE PLANNING
Sent out referrals and medical clearance to:  [facility], [facility], [facility], [facility] and [facility].

## 2019-04-08 NOTE — CARE PLAN
Problem: Communication  Goal: The ability to communicate needs accurately and effectively will improve    Intervention: Develop alternate methods of communication with patient and significant other/support system  Encourage pt to speak in a higher volume tone to prevent frequent asking of the pt to repeat his verbal communication.  Pt able to maintain an increased volume of speech for limited amount of time before returning to low monotone volume.        Problem: Pain Management  Goal: Pain level will decrease to patient's comfort goal    Intervention: Educate and implement non-pharmacologic comfort measures. Examples: relaxation, distration, play therapy, activity therapy, massage, etc.  Discussed alternative discomfort relieving methods such as distraction with books/TV or looking out window and repositioning/elevation.  Pt stated those methods do not work for him.

## 2019-04-08 NOTE — PROGRESS NOTES
Paged Dr. Rohan Cottrell of [facility] for updating on post void resid bladder scan results.

## 2019-04-08 NOTE — THERAPY
"Occupational Therapy Evaluation completed.   Functional Status:  SPV for functional transfers and BADLs  Plan of Care: Patient with no further skilled OT needs in the acute care setting at this time  Discharge Recommendations:  Equipment: No Equipment Needed. Anticipate that the patient will have no further occupational therapy needs after discharge from the hospital.     See \"Rehab Therapy-Acute\" Patient Summary Report for complete documentation.    OT eval completed on 67 YO M admitted with back pain and S.I. Pt SPV for functional transfers and BADLs. Pt reports no family/friends in [place] however has a nephew in [facility]. Pt does not require acute OT services at this time and anticipate pt will not require post-acute OT services.   "

## 2019-04-08 NOTE — PROGRESS NOTES
Legal hold SI pt A&Ox4, on room air and complains of constant discomfort but pt denies interventions and suggestions for relief.  During RN shift change report, received information on pt's reported behavior during day shift.  So far during this shift, pt has not displayed inappropriate behavior and has been sleeping on rounding checks and per 1:1 in room sitter.  Pt is easily awoken from sleep by calling his name.

## 2019-04-08 NOTE — PROGRESS NOTES
Assumed care of patient at change of shift. Patient is alert and oriented, though irritable. Upset about the diet he was put on and not eating the foods he normally does. Spoke with MD and got diet changed to regular. Patient also stated that he is still having depressing/S.I thoughts. Sitter remains at bedside. Encouraged patient to verbalize feelings, continues to speak in low voice. OOB to chair multiple times this shift. Ambulating in room. Negative bladder scan after voiding. Safety measures in place. Will continue to monitor.

## 2019-04-08 NOTE — ASSESSMENT & PLAN NOTE
"He is still very suicidal and plans to jump off a bridge when \"I get out of here.\"   He affect is flat.   "

## 2019-04-08 NOTE — DISCHARGE PLANNING
Legal Hold    Date of Legal Hold: 04/05/19  Time of Legal Hold: 0533    Medical Clearance Complete: YES, pt is med cleared.     Will patient present to [facility] mental health on Tuesdal morning?: YES    Referral placed to Psychiatric Facility: YES-referrals to be sent now that pt is med cleared.   Faxed copy of legal hold to Martha CARSON.

## 2019-04-08 NOTE — DISCHARGE PLANNING
Pt on legal hold. Reviewed with [facility] MD, who will complete Certification Section of legal hold. He states pt not yet med cleared.

## 2019-04-08 NOTE — ASSESSMENT & PLAN NOTE
Severe depression with psychomotor retardation.    Psychiatry following: appreciate their recs: Seroquel  1:1 sitter  On legal hold.     Medically clear to d/c to mental health facility

## 2019-04-08 NOTE — DIETARY
"Nutrition note  Patient has not had weight loss   Last discharge 1/2019 his weight was 207# with a BMI of 28.93 which is in the overweight range   Patient stated his height as 5'11\"   Current diet is Regular   NR will visit for snacks as desired   His po intake is very good  % of meals   RD will continue to follow for changes     "

## 2019-04-08 NOTE — PSYCHIATRY
"PSYCHIATRIC FOLLOW UP:      Reason for admission:   Suicidal ideation   Reason for consult: suicidal ideation   Requesting Physician: Venecia    HPI:       Chris Leggett is a 68 y.o. year old male with a PMH of back pain, depression, alcohol use disorder who presented to [facility] complaining of back pain and suicidal ideation. He was just discharged from [facility] geriatric psych unit on Monday for si and anxiety. Admitted for ETOH withdrawal, si. meds held due to sedation except for gabapentin. Became agitated over weekend, seen by psych resident and seroquel was restarted. He was picking at his arms and feet causing bleeding. He stated he wanted to use meds to drop his BP. Hyponatremia is resolving. He has been upset today about food. He wants something scheduled for agitation, agreed he doesn' want to be on anything that can lower his sodium.     Psychiatric Examination: observed phenomenon:  Vitals: /83   Pulse (!) 56   Temp 36.1 °C (96.9 °F) (Temporal)   Resp 16   Ht 1.803 m (5' 11\") Comment: Patient stated height   Wt 94.1 kg (207 lb 7.3 oz)   SpO2 100%   BMI 28.93 kg/m²  Body mass index is 28.93 kg/m².  Musculoskeletal psychomotor retardation. Walks with limp.   Appearance: grooming fair   Thoughts Process: Logical and sequential, goal-directed   Thought Content:  Denies avh no evidence delusions. +ruminations about food, dietary..   Speech: slow. Mumbling   Mood:    Depressed   Affect:    blunted  SI/HI:   +si with plan   Attention/Alertness:   Awake, alert   Memory:    Grossly intact.   Orientation:    Grossly intact.   Cognition:Grossly intact.   Insight:poor   Judgement: poor     Neurological Testing:    Medical systems reviewed:   Eyes: denies blurry vision   Skin:denies itching   CV:no cp no palpitation s  Lungs:no sob   Neuro: +numbness/tingling, +back pain   GI:denies issues   :+retention   Endocrine/metabolic hyponatremia resolving   Psych:see hpi  Musculoskeletal:  Walks with limp "   All other systems reviewed and are negative.       Soc history:    [facility]   Lab results/tests:   Recent Results (from the past 48 hour(s))   Basic Metabolic Panel    Collection Time: 04/07/19  2:33 AM   Result Value Ref Range    Sodium 131 (L) 135 - 145 mmol/L    Potassium 4.3 3.6 - 5.5 mmol/L    Chloride 100 96 - 112 mmol/L    Co2 24 20 - 33 mmol/L    Glucose 96 65 - 99 mg/dL    Bun 20 8 - 22 mg/dL    Creatinine 1.09 0.50 - 1.40 mg/dL    Calcium 8.6 8.5 - 10.5 mg/dL    Anion Gap 7.0 0.0 - 11.9   ESTIMATED GFR    Collection Time: 04/07/19  2:33 AM   Result Value Ref Range    GFR If African American >60 >60 mL/min/1.73 m 2    GFR If Non African American >60 >60 mL/min/1.73 m 2     DX-CHEST-PORTABLE (1 VIEW)   Final Result         1.  Pulmonary vascular congestion and right lower lobe infiltrate.      DX-CERVICAL SPINE-2 OR 3 VIEWS   Final Result         1.  Degenerative changes of the spine diminish diagnostic sensitivity of this exam, no acute traumatic bony injury of the cervical spine is apparent.   2.  Hazy right suprahilar density compatible with upper lobe infiltrate, new since prior      DX-LUMBAR SPINE-2 OR 3 VIEWS   Final Result         1.  No acute traumatic bony injury of the lumbar spine.   2.  Grade 1 spondylolisthesis L5 on S1, there appears be associated L5 pars defects.   3.  Retrolisthesis L4 on L5.   4.  Multilevel degenerative changes of the lumbar spine.               Assessment:  Mood disorder undpecified  Alcohol Use disorder   THC disorder   Hyponatremia   Urinary Retention.           Plan:  legal hold:extended    D/c seroquel   Since he appears to need something for agitation, will start haldol which doesn't cause urinary retention or hyponatremia.     Continue gabapentinn

## 2019-04-08 NOTE — PROGRESS NOTES
CROSS COVER: Received a page from RN.  Patient had a postvoid residual of 546, however he voided again.  His second void was large volume and he is not complaining of symptoms right now.  Continue bladder scans, will refrain from straight catheterization until subsequent PVRs over 400.

## 2019-04-08 NOTE — PROGRESS NOTES
Dr. Rohan Cottrell called back and was informed of bladder scan results of 530 mL post residual void and also since the bladder scan pt urinated a large amount in toilet and denies hesitation, dysuria, burning or full bladder feeling.

## 2019-04-08 NOTE — PROGRESS NOTES
"                         [facility] [place] [facility] Interval Note    Name Chris Leggett     1950   Age/Sex 68 y.o. male   MRN [id number]   Code Status Full Code     After 5PM or if no immediate response to page, please call for cross-coverage  Attending/Team: Dr. Valentin/Piedad Call [phone] to page   1st Call - Day Intern (R1):   Dr. Bowling 2nd Call - Day Sr. Resident (R2/R3):   Dr. Phillips     Chief complaint/ reason for interval visit  Suicidal ideation on legal hold    Interval Problem Update  He says he is quite sad because all is family had  and he is alone. He is very worried about obtaining housing because he lost his house when he last went to inpatient psychiatric treatment facility.   He is still very suicidal and plans to jump off a bridge when \"I get out of here.\"   He affect is flat.     He says the lidocaine patch is helping his back pain.   Per RN, he does not like his low fiber diet.     Certification to admit to mental health facility completed. He has no active medical issues preventing discharge to such facility. CM will send referrals out today.       Review of Systems   Constitutional: Negative.    HENT: Negative.    Eyes: Negative.    Respiratory: Negative.    Cardiovascular: Negative.    Gastrointestinal: Positive for heartburn. Negative for abdominal pain, nausea and vomiting.        Heartburn improving   Genitourinary: Negative.    Musculoskeletal: Negative.    Skin: Negative.    Neurological: Negative for headaches.   Endo/Heme/Allergies: Does not bruise/bleed easily.   Psychiatric/Behavioral: Positive for depression and suicidal ideas. Negative for hallucinations.     Consultants/Specialty  Psychiatry    Disposition  Inpatient on legal hold for severe depression and suicidal ideation.     Quality Measures  Quality-Core Measures   Reviewed items::  Labs reviewed and Medications reviewed          Physical Exam       Vitals:    19 1700 19 1904 19 " 0419 04/08/19 0754   BP: 116/64 (!) 93/54 122/72 135/83   Pulse: 77 64 60 (!) 56   Resp: 16 (!) 26  16   Temp: 36.1 °C (97 °F) 37.3 °C (99.2 °F)  36.1 °C (96.9 °F)   TempSrc: Temporal Temporal  Temporal   SpO2: 92% 97%  100%   Weight:       Height:         Body mass index is 28.93 kg/m².  Oxygen Therapy:  Pulse Oximetry: 100 %, O2 Delivery: None (Room Air)    Physical Exam   Constitutional: He is oriented to person, place, and time and well-developed, well-nourished, and in no distress. No distress.   Sitting in chair with 1:1 sitter.    HENT:   Head: Normocephalic and atraumatic.   Eyes: Pupils are equal, round, and reactive to light. Conjunctivae and EOM are normal. No scleral icterus.   Neck: No JVD present. No tracheal deviation present. No thyromegaly present.   Cardiovascular: Normal rate, regular rhythm and normal heart sounds.  Exam reveals no friction rub.    No murmur heard.  Pulmonary/Chest: Effort normal. No respiratory distress. He has no wheezes.   Abdominal: Soft. Bowel sounds are normal. He exhibits no distension. There is no tenderness. There is no rebound.   Musculoskeletal: He exhibits no edema.   Neurological: He is alert and oriented to person, place, and time.   Skin: Skin is warm and dry. He is not diaphoretic.   Psychiatric:   Flat affect  Active suicidal ideation   Nursing note and vitals reviewed.        Lab Data Review:  No results found for this or any previous visit (from the past 24 hour(s)).    Assessment/Plan     Alcoholism (HCC)  Patient reports ~4 drinks per day. Last drink 12 hours ago.    -Continues to look stable and without significant vital sign changes nor tremors on exam.  -No signs of withdrawal currently.  -Initiate CIWA w/lorazepam if signs of withdrawal.  -If severe withdrawal transfer to telemetry.    Lumbar radiculopathy  Per patients symptoms would suspect L4 distribution.    -Straight leg test not helpful as patient reports pain as soon as I touch his  "legs.  -Supportive management.  -Ambulate TID.  -Acetaminophen.  -Lidocaine patch.    Suicidal ideation  Patient with previous history of this issue. Reports plan and the was going to go through with it, but pain limited his ability to do so.    -Psychiatry consulted and recommendations pending.    Gout  Previous hx of this issue. 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    -Not on medications for this per med rec.  -Supportive management.    Hypertension  Chronic.    -STABLE, but with one episode of hypotension overnight. Consider titration of meds if continued hypotension.  -Prazosin  -metoprolol  -lisinopril    Mood disorder (HCC)  Chronic. Patient states he is on duloxetine and per chart review was on this medication last time he was here; however, it is not on the discharge summary from last admission (when he was receiving this medication).    -Quetiapine 100 mg po BID.    Hyponatremia  Likely secondary to EtOH intake. Previous hx of this issue, and it improved while in hospital with supportive management.    -STABLE.  -Supportive management.    Normocytic anemia  Likely secondary to chronic EtOH abuse.     -TSH investigation.  -Supportive management.  -Patient counseled on need to abstain/decrease his EtOH intake.    Suicidal ideation  He is still very suicidal and plans to jump off a bridge when \"I get out of here.\"   He affect is flat.     Mood disorder (HCC)  Severe depression with psychomotor retardation.    Psychiatry following: appreciate their recs: Seroquel  1:1 sitter  On legal hold.     Medically clear to d/c to mental health facility      Lumbar back pain with radiculopathy affecting lower extremity  Well controlled with lidocaine patch  APAP, neurontin    Normocytic anemia  TSH, b12, folate, iron studies  Multifactorial, no acute bleed    Hypertension  Asymptomatic hypotensive episodes.     Holding lisinopril  Clonidine pRN                 "

## 2019-04-08 NOTE — PROGRESS NOTES
While in pt's room to break sitter, pt urinated a large amount into toilet; steady stream heard from outside the door.  Per pt: no hesitation, no dysuria, no burning and bladder does not feel full.

## 2019-04-09 VITALS
WEIGHT: 207.45 LBS | OXYGEN SATURATION: 94 % | HEART RATE: 88 BPM | SYSTOLIC BLOOD PRESSURE: 122 MMHG | HEIGHT: 71 IN | TEMPERATURE: 98.6 F | DIASTOLIC BLOOD PRESSURE: 68 MMHG | BODY MASS INDEX: 29.04 KG/M2 | RESPIRATION RATE: 18 BRPM

## 2019-04-09 PROCEDURE — A9270 NON-COVERED ITEM OR SERVICE: HCPCS | Performed by: PSYCHIATRY & NEUROLOGY

## 2019-04-09 PROCEDURE — 700101 HCHG RX REV CODE 250: Performed by: STUDENT IN AN ORGANIZED HEALTH CARE EDUCATION/TRAINING PROGRAM

## 2019-04-09 PROCEDURE — 92526 ORAL FUNCTION THERAPY: CPT

## 2019-04-09 PROCEDURE — 700111 HCHG RX REV CODE 636 W/ 250 OVERRIDE (IP): Performed by: STUDENT IN AN ORGANIZED HEALTH CARE EDUCATION/TRAINING PROGRAM

## 2019-04-09 PROCEDURE — 99217 PR OBSERVATION CARE DISCHARGE: CPT | Mod: GC | Performed by: INTERNAL MEDICINE

## 2019-04-09 PROCEDURE — A9270 NON-COVERED ITEM OR SERVICE: HCPCS | Performed by: STUDENT IN AN ORGANIZED HEALTH CARE EDUCATION/TRAINING PROGRAM

## 2019-04-09 PROCEDURE — 700102 HCHG RX REV CODE 250 W/ 637 OVERRIDE(OP): Performed by: PSYCHIATRY & NEUROLOGY

## 2019-04-09 PROCEDURE — 700102 HCHG RX REV CODE 250 W/ 637 OVERRIDE(OP): Performed by: STUDENT IN AN ORGANIZED HEALTH CARE EDUCATION/TRAINING PROGRAM

## 2019-04-09 PROCEDURE — G0378 HOSPITAL OBSERVATION PER HR: HCPCS

## 2019-04-09 RX ORDER — GABAPENTIN 300 MG/1
300 CAPSULE ORAL 3 TIMES DAILY
Qty: 90 CAP
Start: 2019-04-09 | End: 2019-05-22

## 2019-04-09 RX ORDER — HALOPERIDOL 2 MG/ML
1 SOLUTION ORAL 3 TIMES DAILY
Qty: 1 BOTTLE
Start: 2019-04-09 | End: 2019-05-22

## 2019-04-09 RX ORDER — CLONIDINE HYDROCHLORIDE 0.2 MG/1
0.1 TABLET ORAL
Start: 2019-04-09 | End: 2019-05-22

## 2019-04-09 RX ADMIN — METOPROLOL TARTRATE 12.5 MG: 25 TABLET, FILM COATED ORAL at 05:06

## 2019-04-09 RX ADMIN — GABAPENTIN 300 MG: 300 CAPSULE ORAL at 05:06

## 2019-04-09 RX ADMIN — SENNOSIDES,DOCUSATE SODIUM 2 TABLET: 8.6; 5 TABLET, FILM COATED ORAL at 05:06

## 2019-04-09 RX ADMIN — LIDOCAINE 1 PATCH: 50 PATCH TOPICAL at 05:06

## 2019-04-09 RX ADMIN — HALOPERIDOL LACTATE 1 MG: 2 SOLUTION, CONCENTRATE ORAL at 05:06

## 2019-04-09 RX ADMIN — ENOXAPARIN SODIUM 40 MG: 100 INJECTION SUBCUTANEOUS at 05:06

## 2019-04-09 ASSESSMENT — ENCOUNTER SYMPTOMS
RESPIRATORY NEGATIVE: 1
HEARTBURN: 1
HALLUCINATIONS: 0
HEADACHES: 0
CONSTITUTIONAL NEGATIVE: 1
NAUSEA: 0
BRUISES/BLEEDS EASILY: 0
VOMITING: 0
CARDIOVASCULAR NEGATIVE: 1
DEPRESSION: 1
ABDOMINAL PAIN: 0
MUSCULOSKELETAL NEGATIVE: 1
EYES NEGATIVE: 1

## 2019-04-09 NOTE — CARE PLAN
Problem: Communication  Goal: The ability to communicate needs accurately and effectively will improve  Outcome: PROGRESSING AS EXPECTED      Problem: Safety  Goal: Will remain free from injury  Outcome: PROGRESSING AS EXPECTED  1:1 sitter at bedside, room stripped down of potentially dangerous items    Problem: Safety:  Goal: Will remain free from injury  Outcome: PROGRESSING AS EXPECTED  1:1 sitter at bedside, room stripped down of potentially dangerous items

## 2019-04-09 NOTE — DISCHARGE PLANNING
Received Transport Form 0800  Spoke to Levi at [facility]  Transport is scheduled for today at 1030 going to [facility].

## 2019-04-09 NOTE — PROGRESS NOTES
Paged [facility] and spoke with MD regarding discharge orders and summary for patient to go to [facility]. LAURA to set up transport.

## 2019-04-09 NOTE — PROGRESS NOTES
1:1 safety sitter at bedside. Patient remains on legal hold with current S.I. All safety measures in place. Will continue to monitor.

## 2019-04-09 NOTE — DISCHARGE PLANNING
Legal Hold     Referral: Legal Hold Court     Intervention: Pt presented for legal hold meeting with  to discuss legal options of contesting hold and meeting with the court doctors tomorrow afternoon, or not contesting legal hold and continuing the hold for one week.  advised that pt's legal hold will be be continued for one week (4/18).       Plan: Pt's legal hold has been continued for one week. Pt awaiting transfer to an in patient psych facility.

## 2019-04-09 NOTE — THERAPY
"Speech Language Therapy dysphagia treatment completed.     Functional Status: Patient was seen on this date for dysphagia treatment with a regular/thin liquid meal tray. Patient currently on room air and reported no issues with current diet. Patient consumed items from meal tray with cough x1 following trials of thin liquids. Otherwise, appeared to tolerate diet without difficulty. Vocal quality remained clear. Patient was impulsive taking large bites/sips and eating at a rapid rate requiring min-mod verbal cues for swallow strategies.     Recommendations: 1) Continue current diet of regular/thin liquids, 2) Consider repeat CXR with continued concerns for aspiration.     Plan of Care: Will benefit from Speech Therapy 3 times per week    Post-Acute Therapy: Currently anticipate no further skilled therapy needs once patient is discharged from the inpatient setting.    See \"Rehab Therapy-Acute\" Patient Summary Report for complete documentation.     "

## 2019-04-09 NOTE — PROGRESS NOTES
A&Ox4, no pain reported initially - but then patient stated a few minutes later that his head is now hurting - was offered tylenol, but pt declined.      Without prompting, patient told this RN that he still wants to kill himself, and there is no point in living.      1:1 safety sitter at bedside. No sharp objects in pt room.

## 2019-04-09 NOTE — DISCHARGE INSTRUCTIONS
Discharge Instructions    Discharged to other by medical transportation with escort. Discharged via ambulance, hospital escort: Yes.  Special equipment needed: Not Applicable    Be sure to schedule a follow-up appointment with your primary care doctor or any specialists as instructed.     Discharge Plan:   Smoking Cessation Offered: Patient Refused  Influenza Vaccine Indication: Not indicated: Previously immunized this influenza season and > 8 years of age    I understand that a diet low in cholesterol, fat, and sodium is recommended for good health. Unless I have been given specific instructions below for another diet, I accept this instruction as my diet prescription.   Other diet: Regular      Special Instructions: None    · Is patient discharged on Warfarin / Coumadin?   No     Depression, Adult  Depression refers to feeling sad, low, down in the dumps, blue, gloomy, or empty. In general, there are two kinds of depression:  1. Normal sadness or normal grief. This kind of depression is one that we all feel from time to time after upsetting life experiences, such as the loss of a job or the ending of a relationship. This kind of depression is considered normal, is short lived, and resolves within a few days to 2 weeks. Depression experienced after the loss of a loved one (bereavement) often lasts longer than 2 weeks but normally gets better with time.  2. Clinical depression. This kind of depression lasts longer than normal sadness or normal grief or interferes with your ability to function at home, at work, and in school. It also interferes with your personal relationships. It affects almost every aspect of your life. Clinical depression is an illness.  Symptoms of depression can also be caused by conditions other than those mentioned above, such as:  · Physical illness. Some physical illnesses, including underactive thyroid gland (hypothyroidism), severe anemia, specific types of cancer, diabetes, uncontrolled  seizures, heart and lung problems, strokes, and chronic pain are commonly associated with symptoms of depression.  · Side effects of some prescription medicine. In some people, certain types of medicine can cause symptoms of depression.  · Substance abuse. Abuse of alcohol and illicit drugs can cause symptoms of depression.  SYMPTOMS  Symptoms of normal sadness and normal grief include the following:  · Feeling sad or crying for short periods of time.  · Not caring about anything (apathy).  · Difficulty sleeping or sleeping too much.  · No longer able to enjoy the things you used to enjoy.  · Desire to be by oneself all the time (social isolation).  · Lack of energy or motivation.  · Difficulty concentrating or remembering.  · Change in appetite or weight.  · Restlessness or agitation.  Symptoms of clinical depression include the same symptoms of normal sadness or normal grief and also the following symptoms:  · Feeling sad or crying all the time.  · Feelings of guilt or worthlessness.  · Feelings of hopelessness or helplessness.  · Thoughts of suicide or the desire to harm yourself (suicidal ideation).  · Loss of touch with reality (psychotic symptoms). Seeing or hearing things that are not real (hallucinations) or having false beliefs about your life or the people around you (delusions and paranoia).  DIAGNOSIS   The diagnosis of clinical depression is usually based on how bad the symptoms are and how long they have lasted. Your health care provider will also ask you questions about your medical history and substance use to find out if physical illness, use of prescription medicine, or substance abuse is causing your depression. Your health care provider may also order blood tests.  TREATMENT   Often, normal sadness and normal grief do not require treatment. However, sometimes antidepressant medicine is given for bereavement to ease the depressive symptoms until they resolve.  The treatment for clinical depression  depends on how bad the symptoms are but often includes antidepressant medicine, counseling with a mental health professional, or both. Your health care provider will help to determine what treatment is best for you.  Depression caused by physical illness usually goes away with appropriate medical treatment of the illness. If prescription medicine is causing depression, talk with your health care provider about stopping the medicine, decreasing the dose, or changing to another medicine.  Depression caused by the abuse of alcohol or illicit drugs goes away when you stop using these substances. Some adults need professional help in order to stop drinking or using drugs.  SEEK IMMEDIATE MEDICAL CARE IF:  · You have thoughts about hurting yourself or others.  · You lose touch with reality (have psychotic symptoms).  · You are taking medicine for depression and have a serious side effect.  FOR MORE INFORMATION  · National [place] on Mental Illness: www.leslee.org   · National [place] of Mental Health: www.nimh.nih.gov      This information is not intended to replace advice given to you by your health care provider. Make sure you discuss any questions you have with your health care provider.     Document Released: 12/15/2001 Document Revised: 01/08/2016 Document Reviewed: 03/18/2013  [employer] Interactive Patient Education ©2016 [employer]        Haloperidol tablets  What is this medicine?  HALOPERIDOL (ha gaviota PER i dole) is used to treat schizophrenia. This medicine is also used to control tics and vocal outbursts in patients with Tourette's syndrome and treat behavioral problems in children with severe conduct disorders. It should only be used in these children if other medicines have not worked.  This medicine may be used for other purposes; ask your health care provider or pharmacist if you have questions.  COMMON BRAND NAME(S): Haldol  What should I tell my health care provider before I take this medicine?  They  need to know if you have any of these conditions:  -dementia  -head injury  -lung disease  -Parkinson's disease  -an unusual or allergic reaction to haloperidol, tartrazine, other medicines, foods, dyes, or preservatives  -pregnant or trying to get pregnant  -breast-feeding  How should I use this medicine?  Take this medicine by mouth with a glass of water. Follow the directions on the prescription label. You can take this medicine with or without food. Take your doses at regular intervals. Do not take your medicine more often than directed. Do not suddenly stop taking this medicine. You may need to gradually reduce the dose.  Talk to your pediatrician regarding the use of this medicine in children. Special care may be needed. While this medicine may be prescribed for children for selected conditions, precautions do apply.  Overdosage: If you think you have taken too much of this medicine contact a poison control center or emergency room at once.  NOTE: This medicine is only for you. Do not share this medicine with others.  What if I miss a dose?  If you miss a dose, take it as soon as you can. If it is almost time for your next dose, take only that dose. Do not take double or extra doses.  What may interact with this medicine?  Do not take this medicine with any of the following medications:  -arsenic trioxide  -certain antibiotics like grepafloxacin, pentamidine, sparfloxacin  -certain medicines for fungal infections like fluconazole, itraconazole, ketoconazole, posaconazole, voriconazole  -certain medicines for malaria like chloroquine, halofantrine  -certain medicines for irregular heart beat like dofetilide, dronedarone  -cisapride  -droperidol  -levomethadyl  -methadone  -pimozide  -ranolazine  -risperidone  -thioridazine  -ziprasidone  This medicine may also interact with the following medications:  -alcohol  -atropine  -benztropine  -cabergoline  -carbamazepine  -certain medicines for depression, anxiety, or  psychotic disturbances  -certain medicines for Parkinson's disease like levodopa  -certain medicines that treat or prevent blood clots like warfarin  -dicyclomine  -lithium  -narcotic medicines for pain  -other medicines that prolong the QT interval (cause an abnormal heart rhythm)  -promethazine  -rifampin  This list may not describe all possible interactions. Give your health care provider a list of all the medicines, herbs, non-prescription drugs, or dietary supplements you use. Also tell them if you smoke, drink alcohol, or use illegal drugs. Some items may interact with your medicine.  What should I watch for while using this medicine?  Visit your doctor or health care professional for regular checks on your progress. It may be a few weeks before you see the full effects of this medicine.  You may get dizzy or drowsy or have blurred vision. Do not drive, use machinery, or do anything that needs mental alertness until you know how this medicine affects you. Do not stand or sit up quickly, especially if you are an older patient. This reduces the risk of dizzy or fainting spells. Alcohol can increase dizziness and drowsiness. Avoid alcoholic drinks.  Do not treat yourself for colds, diarrhea or allergies. Ask your doctor or health care professional for advice, some nonprescription medicines may increase possible side effects.  Your mouth may get dry. Chewing sugarless gum or sucking hard candy, and drinking plenty of water may help. Contact your doctor if the problem does not go away or is severe.  This medicine can reduce the response of your body to heat or cold. Dress warm in cold weather and stay hydrated in hot weather. If possible, avoid extreme temperatures like saunas, hot tubs, very hot or cold showers, or activities that can cause dehydration such as vigorous exercise.  This medicine can make you more sensitive to the sun. Keep out of the sun. If you cannot avoid being in the sun, wear protective  clothing and use sunscreen. Do not use sun lamps or tanning beds/booths.  What side effects may I notice from receiving this medicine?  Side effects that you should report to your doctor or health care professional as soon as possible:  -breast pain or swelling or unusual production of breast milk  -confusion  -difficulty breathing  -difficulty in speaking or swallowing  -difficulty passing urine, or sudden loss of bladder control  -dizziness or light headedness  -fast or irregular heartbeat  -fever, chills, or sore throat  -hot, dry skin or lack of sweating  -loss of balance or difficulty walking  -seizures  -skin rash  -stiffness, spasms, trembling  -uncontrollable tongue or chewing movements, smacking lips or puffing cheeks  -uncontrollable muscle spasms, in the face hands, arms, or legs, twisting body movements  -unusually weak or tired  Side effects that usually do not require medical attention (report to your doctor or health care professional if they continue or are bothersome):  -anxiety or agitation  -constipation or diarrhea  -decreased sexual ability  -menstrual changes  -nausea or vomiting  -weight gain  This list may not describe all possible side effects. Call your doctor for medical advice about side effects. You may report side effects to FDA at 4-313-FDA-7952.  Where should I keep my medicine?  Keep out of the reach of children.  Store at room temperature between 15 and 30 degrees C (59 and 86 degrees F). Protect from light. Keep container tightly closed. Throw away any unused medicine after the expiration date.  NOTE: This sheet is a summary. It may not cover all possible information. If you have questions about this medicine, talk to your doctor, pharmacist, or health care provider.  © 2018 [employer]/Gold Standard (2014-07-13 15:40:55)        Depression / Suicide Risk    As you are discharged from this [facility] facility, it is important to learn how to keep safe from harming  yourself.    Recognize the warning signs:  · Abrupt changes in personality, positive or negative- including increase in energy   · Giving away possessions  · Change in eating patterns- significant weight changes-  positive or negative  · Change in sleeping patterns- unable to sleep or sleeping all the time   · Unwillingness or inability to communicate  · Depression  · Unusual sadness, discouragement and loneliness  · Talk of wanting to die  · Neglect of personal appearance   · Rebelliousness- reckless behavior  · Withdrawal from people/activities they love  · Confusion- inability to concentrate     If you or a loved one observes any of these behaviors or has concerns about self-harm, here's what you can do:  · Talk about it- your feelings and reasons for harming yourself  · Remove any means that you might use to hurt yourself (examples: pills, rope, extension cords, firearm)  · Get professional help from the community (Mental Health, Substance Abuse, psychological counseling)  · Do not be alone:Call your Safe Contact- someone whom you trust who will be there for you.  · Call your local CRISIS HOTLINE 073-5833 or 504-799-4984  · Call your local Children's Mobile Crisis Response Team [place] (187) 004-6225 or www.Omniture  · Call the toll free National Suicide Prevention Hotlines   · National Suicide Prevention Lifeline 477-994-QULT (1834)  · National Hope Line Network 800-SUICIDE (601-6582)

## 2019-04-09 NOTE — PROGRESS NOTES
CROSS COVER: Patient requesting medication for sleep, states he used to take trazodone.  Trazodone added.

## 2019-04-09 NOTE — DISCHARGE PLANNING
Received verified petition from the court. Sent copy to unit [facility] and [facility].

## 2019-04-09 NOTE — CARE PLAN
Problem: Safety  Goal: Will remain free from injury  Outcome: PROGRESSING AS EXPECTED      Problem: Safety:  Goal: Will remain free from injury  Outcome: PROGRESSING AS EXPECTED      Problem: Psychosocial Needs:  Goal: Ability to identify and develop effective coping behavior will improve  Outcome: PROGRESSING AS EXPECTED

## 2019-04-09 NOTE — DISCHARGE PLANNING
Medical Social Work    MSW received a call from Rohan with [facility] who states that pt was accepted to their facility last night however, they were told that there was no SW to arrange transport.  Rohan states that accepting MD is Dr. Chavez and they can accept pt around 10:00 AM.  There are is no D/C order or D/C summary.  MSW contacted Charge RN regarding the above.  She will notify bedside RN to obtain orders for transfer.  MSW spoke with unit [facility] [place] who will follow up.

## 2019-04-09 NOTE — PROGRESS NOTES
[facility] Interval Note    Name Chris Leggett     1950   Age/Sex 68 y.o. male   MRN [id number]   Code Status Full Code     After 5PM or if no immediate response to page, please call for cross-coverage  Attending/Team: Dr. Valentin/Piedad Call [phone] to page   1st Call - Day Intern (R1):   Dr. Bowling 2nd Call - Day Sr. Resident (R2/R3):   Dr. Phillips     Chief complaint/ reason for interval visit  Suicidal ideation on legal hold    Interval Problem Update  NO acute events overnight.   His affect is reactive this morning.     CM: referrals sent out after completion of medical clearance.   Seen by PSYHC: d/c Seroquel, continue gabapentin. Appreciate their recs.      Review of Systems   Constitutional: Negative.    HENT: Negative.    Eyes: Negative.    Respiratory: Negative.    Cardiovascular: Negative.    Gastrointestinal: Positive for heartburn. Negative for abdominal pain, nausea and vomiting.        Heartburn improving   Genitourinary: Negative.    Musculoskeletal: Negative.    Skin: Negative.    Neurological: Negative for headaches.   Endo/Heme/Allergies: Does not bruise/bleed easily.   Psychiatric/Behavioral: Positive for depression and suicidal ideas. Negative for hallucinations.     Consultants/Specialty  Psychiatry    Disposition  Inpatient on legal hold for severe depression and suicidal ideation.     Quality Measures  Quality-Core Measures   Reviewed items::  Labs reviewed and Medications reviewed          Physical Exam       Vitals:    19 0754 19 1540 19 2052 19 0359   BP: 135/83 109/68 101/58 114/66   Pulse: (!) 56 67 63 72   Resp: 16 16 18 20   Temp: 36.1 °C (96.9 °F) 36.3 °C (97.4 °F) 36.7 °C (98 °F) 36.9 °C (98.5 °F)   TempSrc: Temporal Temporal Temporal Temporal   SpO2: 100% 98% 94% 95%   Weight:       Height:         Body mass index is 28.93 kg/m².  Oxygen Therapy:  Pulse Oximetry: 95 %, O2 Delivery: None (Room  Air)    Physical Exam   Constitutional: He is oriented to person, place, and time and well-developed, well-nourished, and in no distress. No distress.   Sitting in chair with 1:1 sitter.    HENT:   Head: Normocephalic and atraumatic.   Eyes: Pupils are equal, round, and reactive to light. Conjunctivae and EOM are normal. No scleral icterus.   Neck: No JVD present. No tracheal deviation present. No thyromegaly present.   Cardiovascular: Normal rate, regular rhythm and normal heart sounds.  Exam reveals no friction rub.    No murmur heard.  Pulmonary/Chest: Effort normal. No respiratory distress. He has no wheezes.   Abdominal: Soft. Bowel sounds are normal. He exhibits no distension. There is no tenderness. There is no rebound.   Musculoskeletal: He exhibits no edema.   Neurological: He is alert and oriented to person, place, and time.   Skin: Skin is warm and dry. He is not diaphoretic.   Psychiatric:   Flat affect  Active suicidal ideation   Nursing note and vitals reviewed.        Lab Data Review:  No results found for this or any previous visit (from the past 24 hour(s)).    Assessment/Plan     Alcoholism (HCC)  Patient reports ~4 drinks per day. Last drink 12 hours ago.    -Continues to look stable and without significant vital sign changes nor tremors on exam.  -No signs of withdrawal currently.  -Initiate CIWA w/lorazepam if signs of withdrawal.  -If severe withdrawal transfer to telemetry.    Lumbar radiculopathy  Per patients symptoms would suspect L4 distribution.    -Straight leg test not helpful as patient reports pain as soon as I touch his legs.  -Supportive management.  -Ambulate TID.  -Acetaminophen.  -Lidocaine patch.    Suicidal ideation  Patient with previous history of this issue. Reports plan and the was going to go through with it, but pain limited his ability to do so.    -Psychiatry consulted and recommendations pending.    Gout  Previous hx of this issue. Metatarsophalangeal joint with some  "likely tophus changes.    -Not on medications for this per med rec.  -Supportive management.    Hypertension  Chronic.    -STABLE, but with one episode of hypotension overnight. Consider titration of meds if continued hypotension.  -Prazosin  -metoprolol  -lisinopril    Mood disorder (HCC)  Chronic. Patient states he is on duloxetine and per chart review was on this medication last time he was here; however, it is not on the discharge summary from last admission (when he was receiving this medication).    -Quetiapine 100 mg po BID.    Hyponatremia  Likely secondary to EtOH intake. Previous hx of this issue, and it improved while in hospital with supportive management.    -STABLE.  -Supportive management.    Normocytic anemia  Likely secondary to chronic EtOH abuse.     -TSH investigation.  -Supportive management.  -Patient counseled on need to abstain/decrease his EtOH intake.    Suicidal ideation  He is still very suicidal and plans to jump off a bridge when \"I get out of here.\"   He affect is flat.     Mood disorder (HCC)  Severe depression with psychomotor retardation.    Psychiatry following: appreciate their recs: Seroquel  1:1 sitter  On legal hold.     Medically clear to d/c to mental health facility      Lumbar back pain with radiculopathy affecting lower extremity  Well controlled with lidocaine patch  APAP, neurontin    Normocytic anemia  TSH, b12, folate, iron studies  Multifactorial, no acute bleed    Hypertension  Asymptomatic hypotensive episodes.     Holding lisinopril  Clonidine pRN                 "

## 2019-04-09 NOTE — PROGRESS NOTES
SHARAN Eaton called from [facility] stating facility has accepted patient and MD Chavez willing to accept. Told Uma SW no longer here and would have to speak with charge RN. After speaking with charge RN, stated patient would have to be DC tomorrow r/t logistics with transport and paperwork and no SW.       Spoke with Denis @ [phone] [facility]   [phone] relay message to Uma that patient will be 10x10 tomorrow discharge.

## 2019-04-09 NOTE — PROGRESS NOTES
Patient discharged to [facility] via [facility]. Discussed AVS with patient and put copy with paperwork. Patient verbalized understanding. Belongings  x 2 bags were given to [facility] with facility packet. Patient escorted off floor.

## 2019-04-09 NOTE — DISCHARGE PLANNING
Medical Social Work  Paged [facility] to request d/c summary order  Faxed transport communication/[facility] to [facility] for a tentative transport time of 10:00 to [facility]

## 2019-04-09 NOTE — PROGRESS NOTES
Assumed care of patient at change of shift. Patient is alert and oriented x 4, still expressing depressive/SI.I thoughts.States that he thinks the numbness in his hands/feet might be better.Flat affect. Plan is for patient to discharge to [facility] around 10:30am. Patient states that he has been before and they are okay,group sessions are okay. Safety sitter at bedside, all safety/S.I.precautions in place. Will continue to monitor.

## 2019-04-09 NOTE — DISCHARGE SUMMARY
[facility] Discharge Summary      Admit Date:  4/5/2019       Discharge Date:   04/09/19      Service:   [facility] Internal Medicine [facility] Team  Attending Physician(s):   Marlen       Senior Resident(s):   Wilmer Hogan  Valentino Resident(s):   Dash    Primary Diagnosis:   Suicidal Ideation    Secondary Diagnoses:                Active Hospital Problems    Diagnosis   • *Suicidal ideation [R45.851]   • Alcoholism (HCC) [F10.20]   • Mood disorder (HCC) [F39]   • Hyponatremia [E87.1]   • Cervical spine fracture (HCC) [S12.9XXA]   • Gout [M10.9]   • Lumbar radiculopathy [M54.16]   • Hypertension [I10]   • Normocytic anemia [D64.9]   • Thrombocytopenia (HCC) [D69.6]   • Lumbar back pain with radiculopathy affecting lower extremity [M54.16]       Hospital Summary (Brief Narrative):       Mr Chris Leggett is a 68-year-old male who presented to [facility] with chronic back pain and suicidal ideation. The back pain was not acute with no alarm features and was managed with gabapentin. He did not require admission for the back pain but did exhibit suicidal intent and was placed on legal hold and admitted to the hospital with close one to one observation. Psychiatry was consulted and extended his legal hold and recommended inpatient transfer to inpatient psychiatry. Due to some mild hyponatremia in the low 130 the treatment team was understandably concerned about making this worse with medications such as SSRI or Seroquel. These medications were discontinued and he was started on haldol for agitation which is not associated with medication induced SIADH.     The patient had mild low blood pressure during his hospital stay and his lisinopril 20mg was reduced to 5mg, and clonidine was reduced to 0.1mg qhs from 0.2mg. Blood pressure returned to normal.    Recommendation on follow up  - Anemia workup per PCP (stable, POA)  - Repeat chemistry panel, follow sodium  - If hypotensive, consider further reduction in blood  pressure medications  - Psychiatry to manage antipsychotics     Patient /Hospital Summary (Details -- Problem Oriented) :          Active Hospital Problems    Diagnosis   • Alcoholism (HCC) [F10.20]     Priority: High   • Suicidal ideation [R45.851]     Priority: High   • Mood disorder (HCC) [F39]     Priority: Medium   • Hyponatremia [E87.1]     Priority: Medium     Chronically low.   Stable, doubt due to SIADH from psych meds. Okay with starting these and monitoring if psychiatry feels it is appropriate. Appreciate input.      • Cervical spine fracture (HCC) [S12.9XXA]     Priority: Medium     Fractures of the C4 and C5 spinous processes   Fracture of the anterior/superior vertebral body and C6 with a small avulsed fragment. There is also fracture of the right uncinate process.  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 Neurosurgery     • Gout [M10.9]     Priority: Low   • Lumbar radiculopathy [M54.16]     Priority: Low   • Hypertension [I10]     Priority: Low   • Normocytic anemia [D64.9]     Priority: Low   • Thrombocytopenia (HCC) [D69.6]   • Lumbar back pain with radiculopathy affecting lower extremity [M54.16]       Consultants:     Psychiatry    Procedures:        None    Imaging/ Testing:      DX-CHEST-PORTABLE (1 VIEW)   Final Result         1.  Pulmonary vascular congestion and right lower lobe infiltrate.      DX-CERVICAL SPINE-2 OR 3 VIEWS   Final Result         1.  Degenerative changes of the spine diminish diagnostic sensitivity of this exam, no acute traumatic bony injury of the cervical spine is apparent.   2.  Hazy right suprahilar density compatible with upper lobe infiltrate, new since prior      DX-LUMBAR SPINE-2 OR 3 VIEWS   Final Result         1.  No acute traumatic bony injury of the lumbar spine.   2.  Grade 1 spondylolisthesis L5 on S1, there appears be associated L5 pars  defects.   3.  Retrolisthesis L4 on L5.   4.  Multilevel degenerative changes of the lumbar spine.            Discharge Medications:         Medication Reconciliation: Completed     Medication List      START taking these medications      Instructions   gabapentin 300 MG Caps  Commonly known as:  NEURONTIN   Take 1 Cap by mouth 3 times a day.  Dose:  300 mg     haloperidol 2 MG/ML Conc  Commonly known as:  HALDOL   Take 0.5 mL by mouth 3 times a day.  Dose:  1 mg        CHANGE how you take these medications      Instructions   cloNIDine 0.2 MG Tabs  What changed:  how much to take  Commonly known as:  CATAPRESS   Take 0.5 Tabs by mouth every bedtime.  Dose:  0.1 mg        CONTINUE taking these medications      Instructions   metoprolol 25 MG Tabs  Commonly known as:  LOPRESSOR   Take 0.5 Tabs by mouth 2 Times a Day.  Dose:  12.5 mg     prazosin 1 MG Caps  Commonly known as:  MINIPRESS   Take 1 Cap by mouth every evening for 30 days.  Dose:  1 mg        STOP taking these medications    lisinopril 20 MG Tabs  Commonly known as:  PRINIVIL     QUEtiapine 100 MG Tabs  Commonly known as:  SEROQUEL            Disposition:   Inpatient psychiatry transfer    Diet:   Healthy    Activity:   As tolerated    Instructions:         The patient was instructed to return to the ER in the event of worsening symptoms. I have counseled the patient on the importance of compliance and the patient has agreed to proceed with all medical recommendations and follow up plan indicated above.   The patient understands that all medications come with benefits and risks. Risks may include permanent injury or death and these risks can be minimized with close reassessment and monitoring.        Primary Care Provider:    Pending  Discharge summary faxed to primary care provider:  Deferred  Copy of discharge summary given to the patient: Deferred    Follow up appointment details :      Pending follow up with pcp and psychiatry    Pending Studies:         None    Time spent on discharge day patient visit, preparing discharge paperwork and arranging for patient follow up.    Discharge Time (Minutes) :    30 minutes      Condition on Discharge    ______________________________________________________________________    Interval history/exam for day of discharge:    Politely declines interview this morning.    Vitals:    04/08/19 0754 04/08/19 1540 04/08/19 2052 04/09/19 0359   BP: 135/83 109/68 101/58 114/66   Pulse: (!) 56 67 63 72   Resp: 16 16 18 20   Temp: 36.1 °C (96.9 °F) 36.3 °C (97.4 °F) 36.7 °C (98 °F) 36.9 °C (98.5 °F)   TempSrc: Temporal Temporal Temporal Temporal   SpO2: 100% 98% 94% 95%   Weight:       Height:         Weight/BMI: Body mass index is 28.93 kg/m².  Pulse Oximetry: 95 %, O2 Delivery: None (Room Air)    General: No acute distress. Breathing unlabored. Up to chair.   CVS: Declines  PULM: Declines      Most Recent Labs:    Lab Results   Component Value Date/Time    WBC 6.8 04/05/2019 05:47 AM    RBC 3.70 (L) 04/05/2019 05:47 AM    HEMOGLOBIN 11.7 (L) 04/05/2019 05:47 AM    HEMATOCRIT 35.0 (L) 04/05/2019 05:47 AM    MCV 94.6 04/05/2019 05:47 AM    MCH 31.6 04/05/2019 05:47 AM    MCHC 33.4 (L) 04/05/2019 05:47 AM    MPV 10.0 04/05/2019 05:47 AM    NEUTSPOLYS 74.50 (H) 04/05/2019 05:47 AM    LYMPHOCYTES 9.40 (L) 04/05/2019 05:47 AM    MONOCYTES 11.60 04/05/2019 05:47 AM    EOSINOPHILS 3.70 04/05/2019 05:47 AM    BASOPHILS 0.40 04/05/2019 05:47 AM      Lab Results   Component Value Date/Time    SODIUM 131 (L) 04/07/2019 02:33 AM    POTASSIUM 4.3 04/07/2019 02:33 AM    CHLORIDE 100 04/07/2019 02:33 AM    CO2 24 04/07/2019 02:33 AM    GLUCOSE 96 04/07/2019 02:33 AM    BUN 20 04/07/2019 02:33 AM    CREATININE 1.09 04/07/2019 02:33 AM      Lab Results   Component Value Date/Time    ALTSGPT 22 04/06/2019 02:13 AM    ASTSGOT 53 (H) 04/06/2019 02:13 AM    ALKPHOSPHAT 68 04/06/2019 02:13 AM    TBILIRUBIN 0.7 04/06/2019 02:13 AM    LIPASE 32 11/05/2018  12:20 PM    ALBUMIN 3.7 04/06/2019 02:13 AM    GLOBULIN 2.4 04/06/2019 02:13 AM    INR 1.24 (H) 11/05/2018 12:20 PM     Lab Results   Component Value Date/Time    PROTHROMBTM 15.7 (H) 11/05/2018 12:20 PM    INR 1.24 (H) 11/05/2018 12:20 PM

## 2019-04-09 NOTE — CARE PLAN
Problem: Safety  Goal: Will remain free from injury  Outcome: PROGRESSING AS EXPECTED      Problem: Safety:  Goal: Will remain free from injury  Outcome: PROGRESSING AS EXPECTED      Problem: Psychosocial Needs:  Goal: Level of anxiety will decrease  Outcome: PROGRESSING AS EXPECTED

## 2019-04-22 ENCOUNTER — HOSPITAL ENCOUNTER (EMERGENCY)
Facility: MEDICAL CENTER | Age: 69
End: 2019-04-24
Attending: EMERGENCY MEDICINE
Payer: MEDICARE

## 2019-04-22 DIAGNOSIS — R45.851 SUICIDAL IDEATION: ICD-10-CM

## 2019-04-22 LAB — POC BREATHALIZER: 0 PERCENT (ref 0–0.01)

## 2019-04-22 PROCEDURE — 302970 POC BREATHALIZER: Performed by: EMERGENCY MEDICINE

## 2019-04-22 PROCEDURE — 99285 EMERGENCY DEPT VISIT HI MDM: CPT

## 2019-04-22 PROCEDURE — 80307 DRUG TEST PRSMV CHEM ANLYZR: CPT

## 2019-04-22 PROCEDURE — 302970 POC BREATHALIZER

## 2019-04-23 LAB
AMPHET UR QL SCN: POSITIVE
BARBITURATES UR QL SCN: NEGATIVE
BENZODIAZ UR QL SCN: NEGATIVE
BZE UR QL SCN: NEGATIVE
CANNABINOIDS UR QL SCN: NEGATIVE
METHADONE UR QL SCN: NEGATIVE
OPIATES UR QL SCN: NEGATIVE
OXYCODONE UR QL SCN: NEGATIVE
PCP UR QL SCN: NEGATIVE
PROPOXYPH UR QL SCN: NEGATIVE

## 2019-04-23 PROCEDURE — 700102 HCHG RX REV CODE 250 W/ 637 OVERRIDE(OP): Performed by: STUDENT IN AN ORGANIZED HEALTH CARE EDUCATION/TRAINING PROGRAM

## 2019-04-23 PROCEDURE — 700102 HCHG RX REV CODE 250 W/ 637 OVERRIDE(OP): Performed by: EMERGENCY MEDICINE

## 2019-04-23 PROCEDURE — A9270 NON-COVERED ITEM OR SERVICE: HCPCS | Performed by: EMERGENCY MEDICINE

## 2019-04-23 PROCEDURE — 99284 EMERGENCY DEPT VISIT MOD MDM: CPT | Mod: GC | Performed by: PSYCHIATRY & NEUROLOGY

## 2019-04-23 PROCEDURE — A9270 NON-COVERED ITEM OR SERVICE: HCPCS | Performed by: STUDENT IN AN ORGANIZED HEALTH CARE EDUCATION/TRAINING PROGRAM

## 2019-04-23 RX ORDER — TRAZODONE HYDROCHLORIDE 50 MG/1
50 TABLET ORAL
Status: DISCONTINUED | OUTPATIENT
Start: 2019-04-23 | End: 2019-04-24 | Stop reason: HOSPADM

## 2019-04-23 RX ORDER — HALOPERIDOL 5 MG/1
5 TABLET ORAL DAILY
Status: DISCONTINUED | OUTPATIENT
Start: 2019-04-23 | End: 2019-04-24 | Stop reason: HOSPADM

## 2019-04-23 RX ORDER — DULOXETIN HYDROCHLORIDE 20 MG/1
40 CAPSULE, DELAYED RELEASE ORAL DAILY
Status: DISCONTINUED | OUTPATIENT
Start: 2019-04-23 | End: 2019-04-24 | Stop reason: HOSPADM

## 2019-04-23 RX ORDER — LORAZEPAM 1 MG/1
1 TABLET ORAL ONCE
Status: COMPLETED | OUTPATIENT
Start: 2019-04-23 | End: 2019-04-23

## 2019-04-23 RX ADMIN — DULOXETINE HYDROCHLORIDE 40 MG: 20 CAPSULE, DELAYED RELEASE ORAL at 11:11

## 2019-04-23 RX ADMIN — LORAZEPAM 1 MG: 1 TABLET ORAL at 00:04

## 2019-04-23 RX ADMIN — HALOPERIDOL 5 MG: 5 TABLET ORAL at 10:34

## 2019-04-23 RX ADMIN — TRAZODONE HYDROCHLORIDE 50 MG: 50 TABLET ORAL at 21:00

## 2019-04-23 ASSESSMENT — ENCOUNTER SYMPTOMS
DEPRESSION: 1
CONSTITUTIONAL NEGATIVE: 1

## 2019-04-23 NOTE — DISCHARGE PLANNING
Medical Social Work    MSW contacted Molly at [facility] to confirm that pt needs a full psych eval and referrals versus just medical clearance.  Molly states that they are completely full and were unable to complete pt's assessment.  MSW updated ERP and bedside RN.  Pt will need Alert Team assessment.

## 2019-04-23 NOTE — ED NOTES
Pt ambulatory to and from restroom with one person assist. Pt provided new underpants. Denies further needs. Calm and cooperative. Will continue to monitor.

## 2019-04-23 NOTE — DISCHARGE PLANNING
Medical Social Work    Referral: Legal Hold    Intervention: Legal Hold Paperwork given to SW by [employer] RN: Emeka    Legal Hold Initiated: Date: 04/22/2019  Time: 2230    Legal Hold faxed: Date: 04/23/2019  Time: 0148    Patient’s Insurance Listed on Face Sheet: Medicare    Referrals sent to: [facility], [facility], [facility], [facility], [facility], [facility] and [facility]    Plan: Patient will transfer to mental health facility once acceptance is obtained.

## 2019-04-23 NOTE — ED PROVIDER NOTES
"ED Provider Note    CHIEF COMPLAINT  Chief Complaint   Patient presents with   • Medical Clearance   • Suicidal Ideation       HPI  Chris Leggett is a 68 y.o. male who presents with a chief complaint of suicidal ideation.  Patient states that he is \"going to jump out in traffic\" or \"jump off a bridge.\"  Duration has been for several days.  Patient is very upset he is been sent here from [facility].  He states that he has been there before for depression and suicidal ideation he is very upset that he is been sent to [facility].  Patient does ramble at times but is coherent.  He denies any pill ingestion or any other ingestion.  He states he is upset because \"they told me I was a drunk\".    Very little information is sent with the patient.  From what I understand it appears that [facility] was extremely busy in the waiting room and did a partial legal 2000 and the patient to send him over here.  They are full at this time.    REVIEW OF SYSTEMS  Review of systems are difficult to obtain as the patient is very tearful and upset    PAST MEDICAL HISTORY  Past Medical History:   Diagnosis Date   • Gout    • Hypertension        FAMILY HISTORY  Family History   Problem Relation Age of Onset   • Cancer Mother    • Alzheimer's Disease Father    • Cancer Father    • Heart Disease Sister    • Heart Disease Brother        SOCIAL HISTORY  Social History     Social History   • Marital status: Single     Spouse name: N/A   • Number of children: N/A   • Years of education: N/A     Social History Main Topics   • Smoking status: Former Smoker     Packs/day: 0.50     Years: 5.00     Types: Cigarettes     Quit date: 1/18/2019   • Smokeless tobacco: Never Used   • Alcohol use Yes   • Drug use: Yes     Types: Inhaled      Comment: marijuana   • Sexual activity: Not on file     Other Topics Concern   • Not on file     Social History Narrative   • No narrative on file       SURGICAL HISTORY  Past Surgical History:   Procedure Laterality Date "   • CERVICAL DISK AND FUSION ANTERIOR  9/2/2018    Procedure: CERVICAL DISK AND FUSION ANTERIOR C5-C6;  Surgeon: Varghese Wilkins M.D.;  Location: SURGERY [facility];  Service: Neurosurgery   • OTHER ORTHOPEDIC SURGERY      fision with disc removal       CURRENT MEDICATIONS  Home Medications    **Home medications have not yet been reviewed for this encounter**         ALLERGIES  No Known Allergies    PHYSICAL EXAM  VITAL SIGNS: BP (!) 163/82   Pulse 85   Temp 36.8 °C (98.2 °F)   Resp 16   Wt 93.9 kg (207 lb)   SpO2 96%   BMI 28.87 kg/m²       Constitutional: Well developed, Well nourished, No acute distress, Non-toxic appearance.   HENT: Normocephalic, Atraumatic, Bilateral external ears normal, Oropharynx moist, No oral exudates, Nose normal.   Eyes: PERRLA, EOMI, Conjunctiva normal, No discharge.   Neck: Normal range of motion, No tenderness, Supple, No stridor.   Cardiovascular: Normal heart rate, Normal rhythm, No murmurs, No rubs, No gallops.   Thorax & Lungs: Normal breath sounds, No respiratory distress, No wheezing, No chest tenderness.  Abdomen: Bowel sounds normal, Soft, No tenderness, No masses, No pulsatile masses.    Skin: Erythematous diffuse rash on the cheeks side of the face, hands.  Extremities: Intact distal pulses, No edema, No tenderness, No cyanosis, No clubbing.   Neurologic: Alert oriented moves all extremities equally  Psychiatric: Slight rambling speech, tearful, occasional flight of ideas        RADIOLOGY/PROCEDURES  Results for orders placed or performed during the hospital encounter of 04/22/19   POC BREATHALIZER   Result Value Ref Range    POC Breathalizer 0.000 0.00 - 0.01 Percent        COURSE & MEDICAL DECISION MAKING  Pertinent Labs & Imaging studies reviewed. (See chart for details)  Patient had been here before with a history of suicidal ideation has been drinking.  Patient at this point has a breathalyzer of 0.  Urine drug screen is pending and the patient appears  stable with normal vital signs and only the patient has an overdose at this time he has been upset and will go ahead and have the alert team evaluate him for placement.  Patient given 1 of Ativan for his agitation.    FINAL IMPRESSION  1.  Suicidal ideation  2.  Agitation  3.      Electronically signed by: Tyrone Crane, 4/23/2019 12:14 AM

## 2019-04-23 NOTE — ED NOTES
Patient assisted to restroom, output includes bowel movement and urination, gait unsteady and requires assistance, unlabored breathing noted during ambulation.

## 2019-04-23 NOTE — ED NOTES
Patient resting in bed, awake alert and oriented x 4, [place] 15, interacts with staff, interactions noted as appropriate, psychiatry speaking with patient at bedside, unlabored breathing noted, on room air, no cough noted, bed in low position.
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
## 2019-04-23 NOTE — ED TRIAGE NOTES
Pt BIBA from [facility].  Pt was sent here for medical clearance due to their lobby being full.  Pt states he was going to jump of a bridge but is feet were hurting after walking for so long.

## 2019-04-23 NOTE — ED NOTES
Patient resting in bed, sleeping, no signs of pain or distress, unlabored breathing noted, repositions self occasionally, designee monitoring , one on one observation in place,  bed in low position, safety room features in place, frequent rounding performed.

## 2019-04-23 NOTE — ED NOTES
Patient resting in bed, awake alert and oriented x 4, Glascow 15, bed in low position, on room air, unlabored breathing noted, interactions noted as appropriate.

## 2019-04-23 NOTE — ED NOTES
Placed call to [facility] @ [phone].  Left message to have recent MARS faxed. Will updated med rec once  They are received.

## 2019-04-23 NOTE — ED NOTES
Assumed care at this time. Patient stable w no ss of distress or agitation at this time. Sitter remains outside of room for 1:1 obs.

## 2019-04-23 NOTE — ED NOTES
Patient resting in bed, attempting to sleep, no signs of pain or distress, unlabored breathing noted, repositions self occasionally, bed in low position, designee in hallway observing patient.

## 2019-04-23 NOTE — ED NOTES
Working on med rec, patient stated he hasn't had medications since leaving [facility], called medical records and waiting for the call back

## 2019-04-23 NOTE — DISCHARGE INSTRUCTIONS
Keep record/journal of your blood pressures (grocery store, fire station, home cuff).  Take this with you to your doctor in 1-2 weeks to discuss the results and any needed intervention.    Follow mental health recommendations.  Return to ER at any time for any concern.

## 2019-04-23 NOTE — CONSULTS
"[facility]   TRIAGE ASSESSMENT    Name: Chris Leggett  MRN: [id number]  : 1950  Age: 68 y.o.  Date of assessment: 2019  PCP: Pcp Pt States None  Persons in attendance: Patient    CHIEF COMPLAINT/PRESENTING ISSUE ((as stated by patient): Patient lying in room.  Calm and cooperative upon approach.  Reports being evicted from his place of residence earlier in the day (3am on 19).   Spent the day at the [employer] [facility] and walking around [place]. Reports he began feeling suicidal and started walking towards the building he planned jump from. When asked which parking structure he was referring to, patient quickly responded, \"I forget the name of the place. It doesn't matter because I walked to [facility] instead.\"  Patient reports he was too tired after walking all day to be able to go through with suicidal plan.  Patient arrived at [facility].  [facility] and was placed on legal hold.  [facility] did not have bed availability and sent patient to ER for medical clearance.  Patient has been seen in this ER for similar complaints on the following dates: 18, 18, 1/3/19,19, 3/1/19, as well as, today.   Chief Complaint   Patient presents with   • Medical Clearance   • Suicidal Ideation        CURRENT LIVING SITUATION/SOCIAL SUPPORT: Homeless. States he paid one month's rent and no longer has any money for the rest of this month.  Will not be receiving funds until 5/3/19.       BEHAVIORAL HEALTH TREATMENT HISTORY  Does patient/parent report a history of prior behavioral health treatment for patient?   Yes:    Dates Level of Care Facilty/Provider Diagnosis/Problem Medications   3/19 inpatient [facility] SI/MDD Cymbalta, topomax    inpatient [facility] SI Cymbalta, Topomax   11/15/19 inpatient [facility] SI MDD Cymbalta, Topomax                                                          SAFETY ASSESSMENT - SELF  Does patient acknowledge current or past symptoms of dangerousness to self? yes  Does " parent/significant other report patient has current or past symptoms of dangerousness to self? N\A  Does presenting problem suggest symptoms of dangerousness to self? Yes:     Past Current    Suicidal Thoughts: [x]  [x]    Suicidal Plans: [x]  [x]    Suicidal Intent: []  []    Suicide Attempts: []  []    Self-Injury []  []      For any boxes checked above, provide detail: Threatening to jump from overpass. Currently threatening to jump from unknown building    History of suicide by family member: no  History of suicide by friend/significant other: no  Recent change in frequency/specificity/intensity of suicidal thoughts or self-harm behavior? yes - increase since being evicted  Current access to firearms, medications, or other identified means of suicide/self-harm? yes - jumping from building  If yes, willing to restrict access to means of suicide/self-harm? no  Protective factors present:  Willing to address in treatment    SAFETY ASSESSMENT - OTHERS  Does patient acknowledge current or past symptoms of aggressive behavior or risk to others? no  Does parent/significant other report patient has current or past symptoms of aggressive behavior or risk to others?  N\A  Does presenting problem suggest symptoms of dangerousness to others? No    Crisis Safety Plan completed and copy given to patient? no    ABUSE/NEGLECT SCREENING  Does patient report feeling “unsafe” in his/her home, or afraid of anyone?  no  Does patient report any history of physical, sexual, or emotional abuse?  no  Does parent or significant other report any of the above? N\A  Is there evidence of neglect by self?  no  Is there evidence of neglect by a caregiver? no  Does the patient/parent report any history of CPS/APS/police involvement related to suspected abuse/neglect or domestic violence? no  Based on the information provided during the current assessment, is a mandated report of suspected abuse/neglect being made?  No    SUBSTANCE USE  "SCREENING  Yes:  Darnell all substances used in the past 30 days:      Last Use Amount   [x]   Alcohol 4/22/19 Couple beers   []   Marijuana     []   Heroin     []   Prescription Opioids  (used without prescription, for    recreation, or in excess of prescribed amount)     []   Other Prescription  (used without prescription, for    recreation, or in excess of prescribed amount)     []   Cocaine      []   Methamphetamine     []   \"\" drugs (ectasy, MDMA)     []   Other substances        UDS results: Pending  Breathalyzer results: 0.0    What consequences does the patient associate with any of the above substance use and or addictive behaviors? None    Risk factors for detox (check all that apply):  []  Seizures   []  Diaphoretic (sweating)   []  Tremors   []  Hallucinations   []  Increased blood pressure   []  Decreased blood pressure   []  Other   []  None      [] Patient education on risk factors for detoxification and instructed to return to ER as needed.      MENTAL STATUS   Participation: Active verbal participation  Grooming: Disheveled  Orientation: Alert and Fully Oriented  Behavior: Calm  Eye contact: Limited  Mood: Euthymic  Affect: broad  Thought process: Goal-directed- requesting inpatient hospitalization  Thought content: Preoccupation-with inpatient pacement  Speech: Rate within normal limits and Volume within normal limits  Perception: Within normal limits  Memory:  No gross evidence of memory deficits  Insight: Adequate  Judgment:  Limited  Other:    Collateral information:   Source:  [] Significant other present in person:   [] Significant other by telephone  [] [facility]   [] [facility]  [x] [facility]  [x] Other: ERP    [] Unable to complete full assessment due to:  [] Acute intoxication  [] Patient declined to participate/engage  [] Patient verbally unresponsive  [] Significant cognitive deficits  [] Significant perceptual distortions or behavioral " disorganization  [x] Other:      CLINICAL IMPRESSIONS:  Primary:  Reports MDD  Secondary:        IDENTIFIED NEEDS/PLAN:  [Trigger DISPOSITION list for any items marked]    [x]  Imminent safety risk - self [] Imminent safety risk - others   [x]  Acute substance withdrawal []  Psychosis/Impaired reality testing   []  Mood/anxiety [x]  Substance use/Addictive behavior   [x]  Maladaptive behaviro []  Parent/child conflict   []  Family/Couples conflict []  Biomedical   [x]  Housing []  Financial   []   Legal  Occupational/Educational   []  Domestic violence []  Other:     Disposition: Refer to Legal Hold    Does patient express agreement with the above plan? yes    Referral appointment(s) scheduled? no    Alert team only: Patient to remain on legal hold.    I have discussed findings and recommendations with Dr. Yanes who is in agreement with these recommendations.     Referral information sent to the following community providers :    If applicable : Referred  to : [facility] for legal hold follow up at (time): Pending ERP certification      Emeka Mathis R.N.  4/23/2019

## 2019-04-23 NOTE — ED NOTES
Patient resting in bed, sleeping, no signs of pain or distress, unlabored breathing noted, repositions self occasionally, sitter at bedside, bed in low position, safety room features in place, frequent rounding performed.

## 2019-04-23 NOTE — ED NOTES
Patient resting in bed, awake alert and oriented x 4, Glascow 15, bed in low position, designee monitoring from hallway, unlabored breathing noted, on room air, eating lunch, interactions noted as appropriate.

## 2019-04-23 NOTE — DISCHARGE PLANNING
[facility] has called and declined Pt. Due to Insurance Coverage. Pt does not have benefit days .

## 2019-04-23 NOTE — DISCHARGE PLANNING
Medical Social Work    MSW received a call from Paulina with [facility] declining pt as pt has reached therapeutic benefits with them.

## 2019-04-24 VITALS
DIASTOLIC BLOOD PRESSURE: 80 MMHG | BODY MASS INDEX: 28.87 KG/M2 | OXYGEN SATURATION: 96 % | TEMPERATURE: 98.1 F | WEIGHT: 207 LBS | HEART RATE: 85 BPM | SYSTOLIC BLOOD PRESSURE: 136 MMHG | RESPIRATION RATE: 16 BRPM

## 2019-04-24 PROCEDURE — 700102 HCHG RX REV CODE 250 W/ 637 OVERRIDE(OP): Performed by: STUDENT IN AN ORGANIZED HEALTH CARE EDUCATION/TRAINING PROGRAM

## 2019-04-24 PROCEDURE — 99283 EMERGENCY DEPT VISIT LOW MDM: CPT | Performed by: PSYCHIATRY & NEUROLOGY

## 2019-04-24 PROCEDURE — A9270 NON-COVERED ITEM OR SERVICE: HCPCS | Performed by: STUDENT IN AN ORGANIZED HEALTH CARE EDUCATION/TRAINING PROGRAM

## 2019-04-24 RX ADMIN — DULOXETINE HYDROCHLORIDE 40 MG: 20 CAPSULE, DELAYED RELEASE ORAL at 06:00

## 2019-04-24 RX ADMIN — HALOPERIDOL 5 MG: 5 TABLET ORAL at 06:00

## 2019-04-24 NOTE — DISCHARGE PLANNING
Liat from [facility] called and stated they will accept Pt. Dr. Stewart will be admitting physician.     PCS completed and faxed to [facility].  Transportation arranged for 1215.    Transfer Packet completed with Original Legal Legal Hold placed inside. RN updated on transfer and transfer time.    Cory at [facility] updated on Pt 1215 [facility] transportation time.

## 2019-04-24 NOTE — ED NOTES
Patient's home medications have been reviewed by the pharmacy team.     Past Medical History:   Diagnosis Date   • Gout    • Hypertension        Patient's Medications   New Prescriptions    No medications on file   Previous Medications    CLONIDINE (CATAPRESS) 0.2 MG TAB    Take 0.5 Tabs by mouth every bedtime.    GABAPENTIN (NEURONTIN) 300 MG CAP    Take 1 Cap by mouth 3 times a day.    HALOPERIDOL (HALDOL) 2 MG/ML CONC    Take 0.5 mL by mouth 3 times a day.    METOPROLOL (LOPRESSOR) 25 MG TAB    Take 0.5 Tabs by mouth 2 Times a Day.   Modified Medications    No medications on file   Discontinued Medications    No medications on file       A/P:  Did not take medications after discharge here on 4/9. Psychiatry has seen patient and started new regimen. Formerly on metoprolol, but BP looks ok, as well as HR, D/W ERP will not restart at this time. No further recommendations.    Surinder Cisneros, PharmD, BCPS

## 2019-04-24 NOTE — ED NOTES
Patient resting in bed, sleeping, no signs of pain or distress, unlabored breathing noted, repositions self occasionally, designee monitoring patient from hallway, one on one observation in place, safety room features in place, bed in low position.

## 2019-04-24 NOTE — PSYCHIATRY
"PSYCHIATRIC FOLLOW-UP:(established)  *Reason for admission: 68 year old male admitted for suicidal ideation       *Legal Hold Status on Admission:   On hold    Supervising Psychiatrist:  Dr. Johnson             *HPI:   68 year old male admitted with suicidal ideation. On interview, patient presents with flat affect stating \"things are just not good\". He states bilateral leg pain and repeatedly states \"I don't know what's wrong, I feel wrong\". Patient also expresses bilateral arm neuropathy. He states he was hospitalized in September following a fall and has had this pain since this time. Patient states in September he was walking towards a bridge with a plan to jump when he fell and fractured his skull. Review of records indicates patient sustained a cervical fracture at the time and did require surgical intervention for this. Patient state suicidal ideation at this time with plan to jump off of a bridge.     Medical ROS (as pertinent):            +headaches  +back pain  +bilateral upper extremity neuropathy  +bilateral lower extremity pain               *Psychiatric Examination:  Vitals:   Vitals:    04/24/19 1200   BP: 136/80   Pulse: 85   Resp: 16   Temp:    SpO2: 96%       General Appearance: 68 year old male appropriate to stated age in hospital attire  Abnormal Movements: no psychomotor agitation or retardation noted  Gait and Posture: not formally assessed  Speech: slow, quiet, no slurring of speech, no stuttering  Thought processes: linear  Associations: no loose associations  Abnormal or Psychotic Thoughts: denies auditory hallucinations, denies visual hallucinations, no delusions noted on interview  Judgement and Insight: limited/limted  Orientation: oriented to self, place and situation   Recent and Remote Memory: grossly in tact  Attention Span and Concentration: appropriate  Language: English  Fund of Knowledge: not formally tested  Mood and Affect: \"I just feel wrong\" Flat  SI/HI:endorses with plan to " jump off bridge/denies      *ASSESSMENT/PLAN:  1. Alcohol Use Disorder, Severe  -        -         2. Stimulant (Methamphetamine) Use Disorder    3. Unspecified Schizophrenia Spectrum and Other Psychotic Disorder  -Continue Haldol 5mg PO daily    4. Unspecified Depressive Disorder  -R/O substance induced Depressive Disorder   - Patient transferred to [place] - will defer treatment due to transfer    Patient has history of cervical fracture in 09/2018 with subsequent neurosurgical intervention. At this time, patient was transferred to SNF with recommendation of continued PT and OT for functionality and pain. It is unclear if patient has had continued participation in therapies. Patient relates pain to many of his depressive symptoms, it is unclear the role each may be playing with the other. Exacerbation of pain symptoms may be contributing to worsening mood symptoms at times.        *Legal hold: on hold, extended       *Signing off  *Thank you for the consult

## 2019-04-24 NOTE — PSYCHIATRY
"PSYCHIATRIC FOLLOW-UP:(established)  *Reason for admission:  suicidal ideation.  Patient states that he is \"going to jump out in traffic\" or \"jump off a bridge.\"  Duration has been for several days.  Patient is very upset he is been sent here from [facility].  He states that he has been there before for depression and suicidal ideation he is very upset that he is been sent to [facility].  Patient does ramble at times but is coherent.  He denies any pill ingestion or any other ingestion.  He states he is upset because \"they told me I was a drunk\". *Legal Hold Status on Admission:+                       *HPI:   Lying in bed covered up. He looks better than yesterday, no twitching noted and physically appears relaxed. However he reports he is still SI and paranoid that he has been poisoned. He talked about neuropathy related to a head injury (see below under medical).    Medical ROS (as pertinent):   9/2018:complaining of severe bilateral upper extremity burning paresthesias, neck pain and some weakness in his hands.  CT of his cervical spine which showed C4 and C5 spinous process fractures as well as an anterior C6 avulsion fracture and right uncinate process fracture at C6 which could be consistent with a ligamentous injury. 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  C5-6 anterior cervical discectomy and fusion He'll be admitted by trauma postoperatively Given his other traumatic injuries and ligamentous injuries posteriorly from C3-5, he will need to wear his cervical collar for a total of 3 months .  He had the surgery.    Whether he has had a TBI is not immediately apparent in the records.                   *Psychiatric Examination:  Vitals: Blood pressure 136/80, pulse 85, temperature 36.7 °C (98.1 °F), temperature source Temporal, resp. rate 16, weight 93.9 kg (207 lb), SpO2 96 %.  General Appearance: as noted " above.  Abnormal Movements: none  Gait and Posture: not observed  Speech: soft, minimal  Thought processes: normal rate  Associations: :linear  Abnormal or Psychotic Thoughts: paranoid which may be chronic  Judgement and Insight: poor  Orientation: grossly intact  Recent and Remote Memory: grossly intact  Attention Span and Concentration: intact  Language: fluid  Fund of Knowledge:not tested  Mood and Affect: depressed/depressed  SI/HI:+SI, negative HI      *ASSESSMENT/PLAN:  1. Alcohol use disorder severe          2. Methamphetamine use disorder     -per pt he does not use this but was + for same.      3. Psychotic disorder unspec  - R/O meth induced  -haldol used in the past     4. Depressive disorder unsepc   - R/O related to environmental stressors, to substances  -will NOT start cymbalta: he has been on it before. Will reassess in am.  -see note from psych from 3/12/2019     5. Hx of THC use    *Legal hold: extended. Being transferred.             *Signing off

## 2019-04-24 NOTE — ED NOTES
Met with pt at bedside. Pt stated that he did not fill his prescriptions when he was discharged from here. Pt reports going straight to [facility]. Contacted [facility]  ( 2nd time). Left message for medical records to call  Or fax [facility]. Pt has not taken any medications since leaving [facility].  Pt stated that he is homeless and has no way to get his prescriptions . Pt is unable  To recall the medications he was receiving at  [facility].

## 2019-05-09 ENCOUNTER — HOSPITAL ENCOUNTER (EMERGENCY)
Facility: MEDICAL CENTER | Age: 69
End: 2019-05-09
Attending: EMERGENCY MEDICINE
Payer: MEDICARE

## 2019-05-09 VITALS
TEMPERATURE: 97.9 F | SYSTOLIC BLOOD PRESSURE: 152 MMHG | BODY MASS INDEX: 26.09 KG/M2 | WEIGHT: 186.35 LBS | HEART RATE: 68 BPM | HEIGHT: 71 IN | RESPIRATION RATE: 18 BRPM | DIASTOLIC BLOOD PRESSURE: 96 MMHG

## 2019-05-09 DIAGNOSIS — F10.920 ALCOHOLIC INTOXICATION WITHOUT COMPLICATION (HCC): ICD-10-CM

## 2019-05-09 DIAGNOSIS — F10.10 ALCOHOL ABUSE: ICD-10-CM

## 2019-05-09 DIAGNOSIS — R45.851 SUICIDAL IDEATION: ICD-10-CM

## 2019-05-09 DIAGNOSIS — F33.1 MODERATE EPISODE OF RECURRENT MAJOR DEPRESSIVE DISORDER (HCC): ICD-10-CM

## 2019-05-09 LAB
AMPHET UR QL SCN: NEGATIVE
BARBITURATES UR QL SCN: NEGATIVE
BENZODIAZ UR QL SCN: NEGATIVE
BZE UR QL SCN: NEGATIVE
CANNABINOIDS UR QL SCN: POSITIVE
ETHANOL BLD-MCNC: 0.14 G/DL
METHADONE UR QL SCN: NEGATIVE
OPIATES UR QL SCN: NEGATIVE
OXYCODONE UR QL SCN: NEGATIVE
PCP UR QL SCN: NEGATIVE
POC BREATHALIZER: 0.08 PERCENT (ref 0–0.01)
PROPOXYPH UR QL SCN: NEGATIVE

## 2019-05-09 PROCEDURE — 80307 DRUG TEST PRSMV CHEM ANLYZR: CPT

## 2019-05-09 PROCEDURE — 302970 POC BREATHALIZER: Performed by: EMERGENCY MEDICINE

## 2019-05-09 PROCEDURE — 90791 PSYCH DIAGNOSTIC EVALUATION: CPT

## 2019-05-09 PROCEDURE — 99285 EMERGENCY DEPT VISIT HI MDM: CPT

## 2019-05-09 PROCEDURE — 99283 EMERGENCY DEPT VISIT LOW MDM: CPT | Performed by: PSYCHIATRY & NEUROLOGY

## 2019-05-09 NOTE — CONSULTS
"[facility]   TRIAGE ASSESSMENT    Name: Chris Leggett  MRN: [id number]  : 1950  Age: 68 y.o.  Date of assessment: 2019  PCP: Pcp Pt States None  Persons in attendance: Patient    CHIEF COMPLAINT/PRESENTING ISSUE (as stated by patient): 68 male BIB self, legal hold, SI, with ETOH intoxication (0.14); pt evaluated after pt ETOH0.078; pt alert, oriented x 4; calm behaviors; quiet; cooperative; guarded; states he drank approx 12 beers last night/this [place] a casino, lost his wallet, now with situational SI, no plan, no intent currently, is future-oriented, and willing to accept MH/CD help; states he ws DC'd from [facility] yesterday and does not know his DC f/u plan; no delusions, paranoia, hallucinations noted; adequate insight, judgement; 1 SA noted in previous C psych eval 3/7/19 but pt denies currently; with h/o psych diagnoses including methamphetamine use disorder, alcohol use disorder, depressive disorder unspecified, psychotic disorder unspecified, mood disorder unspecified, THC disorder, anxiety disorder unspecified, r/o personality disorder traits, adjustment disorder with depressed mood per previous [facility] visits; pt denies current outpt MH providers and has not followed up with inpt MH/CD DC instructions or providers; inpt MH tx includes [facility] 19, 19, 19, 19, 2018 and [facility] 19, 3/1/19, 18; states current psych meds include Trazodone, Seroquel, Cumbalta, last taken at [facility]; current substance use includes ETOH, 12 beers several times/week, last use today, THC daily, last use 19, methamphetamines weekly, last use 2019; denies h/o aggression or arrests; pt is homeless and refused to go the the homeless shelter; unemployed; receives $824/month SSI    Chief Complaint   Patient presents with   • Suicidal Ideation     Pt voluntary calls EMS for SI. Pt was at [employer], reports drinking \"6 packs\" of " negrita, wanting to jump off of a bridge. Pt is cooperative.   • Legal 2000   • Urinary Frequency        CURRENT LIVING SITUATION/SOCIAL SUPPORT: pt is homeless and refused to go the the homeless shelter; does not f/u with outpt MH/CD providers or treatment as instructed at [facility] from [facility] medical and MH/CD facilities    BEHAVIORAL HEALTH TREATMENT HISTORY  Does patient/parent report a history of prior behavioral health treatment for patient?   Yes:    Dates Level of Care Facilty/Provider Diagnosis/Problem Medications    5/8/19, 4/24/19, 1/25/19, 1/2/19, 12/2018 inpt [facility] SI, depression, polysubstance use Cymbalta, Seroquel, Trazadone, Topomax   4/9/19, 3/1/19, 11/6/18 inpt [facility] SI, depression, polysubstance use Gabapentin  Seroquel  Lisinopril  Duloxetine  Prazosin         SAFETY ASSESSMENT - SELF  Does patient acknowledge current or past symptoms of dangerousness to self? yes - today  situational SI, no plan, no intent currently; h/o chronic SI with plan to jump off of a bridge; 1 SA noted during previous [facility] psych eval 3/7/19  Does parent/significant other report patient has current or past symptoms of dangerousness to self? N\A  Does presenting problem suggest symptoms of dangerousness to self? Yes:     Past Current    Suicidal Thoughts: [x]  [x]    Suicidal Plans: [x]  [x]    Suicidal Intent: []  []    Suicide Attempts: [x]  []    Self-Injury []  []      For any boxes checked above, provide detail: today  situational SI, no plan, no intent currently; h/o chronic SI with plan to jump off of a bridge; 1 SA noted during previous C psych eval 3/7/19      History of suicide by family member: no  History of suicide by friend/significant other: no  Recent change in frequency/specificity/intensity of suicidal thoughts or self-harm behavior? no  Current access to firearms, medications, or other identified means of suicide/self-harm? no  If yes, willing to restrict access to  "means of suicide/self-harm? yes - no guns or weapons  Protective factors present:  Willing to address in treatment    SAFETY ASSESSMENT - OTHERS  Does patient acknowledge current or past symptoms of aggressive behavior or risk to others? no  Does parent/significant other report patient has current or past symptoms of aggressive behavior or risk to others?  N\A  Does presenting problem suggest symptoms of dangerousness to others? No    Crisis Safety Plan completed and copy given to patient? No-p t refused    ABUSE/NEGLECT SCREENING  Does patient report feeling “unsafe” in his/her home, or afraid of anyone?  no  Does patient report any history of physical, sexual, or emotional abuse?  no  Does parent or significant other report any of the above? N\A  Is there evidence of neglect by self?  no  Is there evidence of neglect by a caregiver? no  Does the patient/parent report any history of CPS/APS/police involvement related to suspected abuse/neglect or domestic violence? no  Based on the information provided during the current assessment, is a mandated report of suspected abuse/neglect being made?  No    SUBSTANCE USE SCREENING  Yes:  Darnell all substances used in the past 30 days:      Last Use Amount   [x]   Alcohol 5/9/19 12 beers   [x]   Marijuana 5/8/19 daily   []   Heroin     []   Prescription Opioids  (used without prescription, for    recreation, or in excess of prescribed amount)     []   Other Prescription  (used without prescription, for    recreation, or in excess of prescribed amount)     []   Cocaine      [x]   Methamphetamine 4/2019 occassionally   []   \"\" drugs (ectasy, MDMA)     []   Other substances        UDS results: + THC  Breathalyzer results: 0.14    What consequences does the patient associate with any of the above substance use and or addictive behaviors? None    Risk factors for detox (check all that apply):  [x]  Seizures   [x]  Diaphoretic (sweating)   [x]  Tremors   [x]  Hallucinations "   [x]  Increased blood pressure   [x]  Decreased blood pressure   []  Other   []  None      [x] Patient education on risk factors for detoxification and instructed to return to ER as needed.      MENTAL STATUS   Participation: Limited verbal participation, Guarded, Defensive and Resistant  Grooming: Casual and Neat  Orientation: Alert and Fully Oriented  Behavior: Calm  Eye contact: Limited  Mood: Depressed and Irritable  Affect: Constricted, Blunted and Congruent with content  Thought process: Logical and Goal-directed  Thought content: Within normal limits  Speech: Rate within normal limits and Volume within normal limits  Perception: Within normal limits  Memory:  No gross evidence of memory deficits  Insight: Adequate  Judgment:  Adequate  Other:    Collateral information:   Source:  [] Significant other present in person:   [] Significant other by telephone  [] [facility]   [x] [facility]  [x] [facility]  [] Other:     [] Unable to complete full assessment due to:  [] Acute intoxication  [] Patient declined to participate/engage  [] Patient verbally unresponsive  [] Significant cognitive deficits  [] Significant perceptual distortions or behavioral disorganization  [] Other:      CLINICAL IMPRESSIONS:  Primary:  Alcohol use disorder  Secondary:  Cannabis use disorder       IDENTIFIED NEEDS/PLAN:  [Trigger DISPOSITION list for any items marked]    []  Imminent safety risk - self [] Imminent safety risk - others   []  Acute substance withdrawal []  Psychosis/Impaired reality testing   []  Mood/anxiety [x]  Substance use/Addictive behavior   [x]  Maladaptive behaviro []  Parent/child conflict   []  Family/Couples conflict []  Biomedical   [x]  Housing [x]  Financial   []   Legal  Occupational/Educational   []  Domestic violence []  Other:     Disposition: Refer to [facility], [facility], 12 Step program: 12 step/AA mtgs, [facility] and [facility]  homeless shelter, social security office; Medicare insurance plan; writer RN reviewed community MH, CD, housing resources with pt and written information given; pt verbalized understanding; pt to DC to self with bus pass given to pt    Does patient express agreement with the above plan? yes    Referral appointment(s) scheduled? no    Alert team only:   I have discussed findings and recommendations with Dr. Henriquez who is in agreement with these recommendations. And will DC the legal hold    Referral information sent to the following community providers :[facility] as pt called and requested an intake evaluation for CD services    If applicable : Referred  to : for legal hold follow up at (time): PHIL Smith R.N.  5/9/2019

## 2019-05-09 NOTE — PSYCHIATRY
"PSYCHIATRIC FOLLOW-UP:(established)  *Reason for admission: Intoxicated, SI.  Last seen by me 2019      *Legal Hold Status on Admission:  NA      *HPI:  Pt is homeless and has been drinking daily since last seen. He did not call any rehabs. He has been to the ED on  and  , has tried to go to [facility] and [facility] in the meanwhile and been directed here. He is on haldol because sometimes he hears his mother's voice. She is . He is also on Prazocin for HTN per one note, on clonidine and metoprolol for  HTN. Pt is on gabapentin for pain from neck fusions with paraesthesias.     Pt says he does not have any other psychotic symptoms other than above, he \"isn't really SI\" but is   Tired of life\". Pt has a nephew in [place] but he cannot go there until he stops drinking. When asked why he hasn't followed up with alcohol rehabs, he doesn't know why.    Discussed the issues around treatment: that if he continues to drink daily, if an antidepressant doesn't work it may be because of the alcohol. We can try an antidepressant but he will have to go to a doctor to get refills. Therapy might be helpful for him to decrease his drinking. To summarize, and he was also told this, if he doesn't do his part, I am not sure that giving an antidepressant will do anything for him but, I would still consider giving him one. He also has issues with compliance.    Pt is still under the influence currently. However we have had this same discussion in the past when he was sober. He was not very willing, not motivated to anything then.    *Psychiatric Examination:  Vitals:Blood pressure 147/90, pulse 76, temperature 36.6 °C (97.9 °F), temperature source Temporal, resp. rate 20, height 1.803 m (5' 11\"), weight 84.5 kg (186 lb 5.6 oz).   General Appearance: clean, intermittent eye contact, cooperative, overwt.   Abnormal Movements: none noted other than he is sluggish  Gait and Posture: not observed  Speech:slowed, " soft  Thought processes: slowed  Associations: linear  Abnormal or Psychotic Thoughts: none  Judgement and Insight: poor  Orientation: hospital, self, year and thought it was March.   Recent and Remote Memory: grossly intact  Attention Span and Concentration: slightly diminished perhaps  Language: fluid  Fund of Knowledge:not tested  Mood and Affect:depressed/depressed   SI/HI:as noted. Not HI.     Results for BRANDY HUTTON (MRN [id number]) as of 5/9/2019 13:25   Ref. Range 5/9/2019 10:04   Diagnostic Alcohol Latest Ref Range: 0.00 g/dL 0.14 (H)   Results for BRANDY HUTTON (MRN [id number]) as of 5/9/2019 13:25   Ref. Range 5/9/2019 13:16   POC Breathalizer Latest Ref Range: 0.00 - 0.01 Percent 0.078 (A)      *ASSESSMENT/PLAN:  1. Alcohol intoxication  -        2. Methamphetamine use disorder  -    3.Alcohol use disorder: moderate to severe  -        4. Depressive disorder unspec        *Legal hold: does not meet criteria for a legal hold though he does need help. Alert team will give him alcohol rehab information and he was encourage to call. Could consider trying zoloft, not sure he will take it or that in the context of drinking it will help. Will reassess after he  Is sober and if he is still here.     Other:   Sitter: no              *Will Follow  *Thank you for the consult

## 2019-05-09 NOTE — ED PROVIDER NOTES
"ED Provider Note    Scribed for Darnell Henriquez M.D. by Ariel Lou. 2019  10:07 AM    Primary care provider:None noted  Means of arrival: Ambulance  History obtained from: Patient  History limited by: None    CHIEF COMPLAINT  Chief Complaint   Patient presents with   • Suicidal Ideation     Pt voluntary calls EMS for SI. Pt was at [facility], reports drinking \"6 packs\" of beer, wanting to jump off of a bridge. Pt is cooperative.   • Legal    • Urinary Frequency       HPI  Chris Leggett is a 68 y.o. homeless male with a history of alcoholism who presents to the Emergency Department for suicidal ideation.He has been to [facility] in the past year for SI and currently reports that his plan is to jump off of a bridge and states he has done this in the past.  Pt states that his last living relative, his brother,  last year. Pt reports he feels guilty about being the only person alive in his family.Pt denies HI. Has been sleeping 3-4 hours/night. Denies grandiose thoughts, spending sprees, changes in eating habits.  Per patient, no use of meth or opioids and last drink of alcohol was yesterday evening. The patient states he consumes a six pack of beer about every other day. He is not staying in the shelter. He denies cough, diarrhea, or stomach pain. He has been admitted to [facility] in the past. He has a history of hypertension, gout, suicidal ideation, and depression.  Patient has been prescribed antidepressants in the past and was taken off of SSRI's/SNRI's d/t hyponatremia. Pt most recently discharged from [facility] in April on Haldol.  Pt has not tried to OD on his medications.       REVIEW OF SYSTEMS  Pertinent positives include: Suicidal ideation, depression.  Pertinent negatives include: cough, diarrhea, stomach pain .  10+ systems reviewed and negative.      PAST MEDICAL HISTORY  Past Medical History:   Diagnosis Date   • Gout    • Hypertension        FAMILY HISTORY  Family History   Problem " "Relation Age of Onset   • Cancer Mother    • Alzheimer's Disease Father    • Cancer Father    • Heart Disease Sister    • Heart Disease Brother        SOCIAL HISTORY  Social History   Substance Use Topics   • Smoking status: Former Smoker     Packs/day: 0.50     Years: 5.00     Types: Cigarettes     Quit date: 1/18/2019   • Smokeless tobacco: Never Used   • Alcohol use Yes     History   Drug Use   • Types: Inhaled     Comment: marijuana       SURGICAL HISTORY  Past Surgical History:   Procedure Laterality Date   • CERVICAL DISK AND FUSION ANTERIOR  9/2/2018    Procedure: CERVICAL DISK AND FUSION ANTERIOR C5-C6;  Surgeon: Varghese Wilkins M.D.;  Location: SURGERY [facility];  Service: Neurosurgery   • OTHER ORTHOPEDIC SURGERY      fision with disc removal       CURRENT MEDICATIONS  No current facility-administered medications for this encounter.     Current Outpatient Prescriptions:   •  gabapentin (NEURONTIN) 300 MG Cap, Take 1 Cap by mouth 3 times a day., Disp: 90 Cap, Rfl:   •  cloNIDine (CATAPRESS) 0.2 MG Tab, Take 0.5 Tabs by mouth every bedtime., Disp: , Rfl:   •  haloperidol (HALDOL) 2 MG/ML Conc, Take 0.5 mL by mouth 3 times a day., Disp: 1 Bottle, Rfl:   •  metoprolol (LOPRESSOR) 25 MG Tab, Take 0.5 Tabs by mouth 2 Times a Day., Disp: 60 Tab, Rfl: 0      ALLERGIES  No Known Allergies    PHYSICAL EXAM  VITAL SIGNS: /90   Pulse 76   Temp 36.6 °C (97.9 °F) (Temporal)   Resp 20   Ht 1.803 m (5' 11\")   Wt 84.5 kg (186 lb 5.6 oz)   BMI 25.99 kg/m²   Reviewed and elevated blood pressure, afebrile  Constitutional: Well developed, Well nourished, No acute distress.  HENT: Normocephalic, atraumatic, bilateral external ears normal, oropharynx moist, No exudates or erythema.   Eyes: PERRLA, conjunctiva pink, no scleral icterus.   Cardiovascular: Regular rate and rhythm. No murmurs, rubs or gallops.  Respiratory: Lungs clear to auscultation bilaterally. No wheezes, rales, or rhonchi.   Abdominal:  Abdomen " soft, non-tender, non distended. No rebound, or guarding.    Skin: No erythema, no rash.   Genitourinary: No costovertebral angle tenderness.   Musculoskeletal: No edema.   Neurologic: Alert & oriented x 3, cranial nerves 2-12 intact by passive exam.  No focal deficit noted.  Psychiatric: Depressed, flat affect, Judgment normal, .  No delusions or hallucination    DIFFERENTIAL DIAGNOSIS:  Suicidal ideation, depression, alcohol intoxication and abuse, bipolar depression, schizophrenia, doubt overdose.    EKG Interpretation:  Interpreted by me    Rhythm:  Normal sinus rhythm   Rate: 72  Axis: normal  Ectopy: none  Conduction: normal  ST Segments: no acute change  T Waves: no acute change  Q Waves: none  Clinical Impression: Normal EKG without acute changes       LABORATORY:  Results for orders placed or performed during the hospital encounter of 05/09/19   Urine Drug Screen   Result Value Ref Range    Amphetamines Urine Negative Negative    Barbiturates Negative Negative    Benzodiazepines Negative Negative    Cocaine Metabolite Negative Negative    Methadone Negative Negative    Opiates Negative Negative    Oxycodone Negative Negative    Phencyclidine -Pcp Negative Negative    Propoxyphene Negative Negative    Cannabinoid Metab Positive (A) Negative   DIAGNOSTIC ALCOHOL (BA)   Result Value Ref Range    Diagnostic Alcohol 0.14 (H) 0.00 g/dL   POC BREATHALIZER   Result Value Ref Range    POC Breathalizer 0.078 (A) 0.00 - 0.01 Percent       Lab results reviewed by me.     INTERVENTIONS:  Life skills consultation obtained.    ED COURSE:  Nursing notes, VS, PMSFHx reviewed in chart.   Review of past medical records shows the patient was admitted on 4/24 and 4/5. Admission for withdrawal in March.    10:07 AM - Patient seen and examined at bedside. Ordered POC breathalizer, UDS, and diagnostic alcohol to evaluate. Life Skills will evaluate.    MEDICAL DECISION MAKING:  Pt presenting for SI. POC breathalyzer positive at  0.078. Diagnostic alcohol elevated 0.14. UDS positive for MJ only. Pt seen by psych, Dr. Johnson. Per psych, pt does not meet the criteria for a legal hold although he does need help. Pt to be given alcohol rehab information and encouraged to call. Psych will reassess pt when he is sober if he is still here.  This patient presents with frequent visits for suicidal ideation and now yesterday.  He is afraid to go to the shelter because he believes is not safe and he is unwilling to sleep on the street.  I suspect he is presenting so frequently in an attempt to find a safer place to stay.  I will medically clear him for voluntary admission to a medical detox for alcohol.  Patient declines social work consult.  Patient refused shelter.    PLAN:  Continue current medications  Abstain from alcohol  Alcohol problems handout given  Return for severe tremors, seizures, hallucinations  Legal hold discontinued    [facility]  [address] [address]  [phone]  Schedule an appointment as soon as possible for a visit       [address] [address]  [phone]  Schedule an appointment as soon as possible for a visit         CONDITION: Stable.     FINAL IMPRESSION  1. Moderate episode of recurrent major depressive disorder (HCC)    2. Suicidal ideation    3. Alcoholic intoxication without complication (HCC)    4. Alcohol abuse          Ariel PERSAUD (Scribe), am scribing for, and in the presence of, Darnell Henriquez M.D..    Electronically signed by: Ariel Lou (Scribe), 5/9/2019    Darnell PERSAUD M.D. personally performed the services described in this documentation, as scribed by Ariel Lou in my presence, and it is both accurate and complete. C    I independently evaluated the patient and repeated the important components of the history and physical. I discussed the management with the resident. I have  reviewed and agree with the pertinent clinical information above including history, exam, study findings, and recommendations.  I saw this patient with resident Dr. Blackwood    Electronically signed by: Darnell Henriquez, 5/9/2019 2:49 PM       The note accurately reflects work and decisions made by me.  Darnell Henriquez  5/9/2019  2:48 PM

## 2019-05-09 NOTE — ED NOTES
Patient resting with no visible signs or symptoms of distress, appears comfortable.  Equal chest rise, breathing non labored. RN to continue to monitor. Sitter posted.

## 2019-05-09 NOTE — DISCHARGE INSTRUCTIONS
You are medically cleared to be admitted to a detox program inpatient.  Take your medicines as prescribed.  Do not drink alcohol to excess.  Follow-up with AA.  Follow-up with [facility] or your primary psychiatrist or as referred by life skills.    You had a borderline or high normal blood pressure reading today.  This does not necessarily mean you have hypertension.  Please followup with your/a primary physician for comprehensive blood pressure evaluation and yearly fasting cholesterol assessment.  BP Readings from Last 3 Encounters:   05/09/19 147/90   04/24/19 136/80   04/09/19 122/68

## 2019-05-09 NOTE — ED NOTES
Belongings removed, security called for search of belongings. Urinal at bedside (unable to urinate yet) and lab called for straight stick.

## 2019-05-09 NOTE — ED NOTES
Patient sleeping with no visible signs or symptoms of distress, appears comfortable.  Equal chest rise, breathing non labored. RN to continue to monitor. Sitter posted.

## 2019-05-15 NOTE — ED NOTES
"Pt sleeping in NAD, pt arouses easily to voice, states, \"I feel better,\" after the medication. Sitter in place for pt.   " Detail Level: Simple Size Of Lesion: 0.8cm Body Location Override (Optional - Billing Will Still Be Based On Selected Body Map Location If Applicable): right mid  Anterior thigh Size Of Lesion: 1.0cm Body Location Override (Optional - Billing Will Still Be Based On Selected Body Map Location If Applicable): left lateral foot

## 2019-05-21 ENCOUNTER — HOSPITAL ENCOUNTER (EMERGENCY)
Facility: MEDICAL CENTER | Age: 69
End: 2019-05-23
Attending: EMERGENCY MEDICINE
Payer: MEDICARE

## 2019-05-21 DIAGNOSIS — R45.851 SUICIDAL IDEATION: ICD-10-CM

## 2019-05-21 LAB — POC BREATHALIZER: 0 PERCENT (ref 0–0.01)

## 2019-05-21 PROCEDURE — 700102 HCHG RX REV CODE 250 W/ 637 OVERRIDE(OP): Performed by: EMERGENCY MEDICINE

## 2019-05-21 PROCEDURE — 302970 POC BREATHALIZER: Performed by: EMERGENCY MEDICINE

## 2019-05-21 PROCEDURE — A9270 NON-COVERED ITEM OR SERVICE: HCPCS | Performed by: EMERGENCY MEDICINE

## 2019-05-21 PROCEDURE — 99285 EMERGENCY DEPT VISIT HI MDM: CPT

## 2019-05-21 PROCEDURE — 80307 DRUG TEST PRSMV CHEM ANLYZR: CPT

## 2019-05-21 RX ORDER — IBUPROFEN 600 MG/1
600 TABLET ORAL ONCE
Status: COMPLETED | OUTPATIENT
Start: 2019-05-21 | End: 2019-05-21

## 2019-05-21 RX ADMIN — IBUPROFEN 600 MG: 600 TABLET ORAL at 23:33

## 2019-05-21 ASSESSMENT — LIFESTYLE VARIABLES
TOTAL SCORE: 0
ON A TYPICAL DAY WHEN YOU DRINK ALCOHOL HOW MANY DRINKS DO YOU HAVE: 4
HAVE PEOPLE ANNOYED YOU BY CRITICIZING YOUR DRINKING: NO
EVER HAD A DRINK FIRST THING IN THE MORNING TO STEADY YOUR NERVES TO GET RID OF A HANGOVER: NO
TOTAL SCORE: 0
EVER FELT BAD OR GUILTY ABOUT YOUR DRINKING: NO
HAVE YOU EVER FELT YOU SHOULD CUT DOWN ON YOUR DRINKING: NO
DO YOU DRINK ALCOHOL: YES
AVERAGE NUMBER OF DAYS PER WEEK YOU HAVE A DRINK CONTAINING ALCOHOL: 3
CONSUMPTION TOTAL: INCOMPLETE
TOTAL SCORE: 0

## 2019-05-22 LAB
AMPHET UR QL SCN: NEGATIVE
BARBITURATES UR QL SCN: NEGATIVE
BENZODIAZ UR QL SCN: NEGATIVE
BZE UR QL SCN: NEGATIVE
CANNABINOIDS UR QL SCN: NEGATIVE
METHADONE UR QL SCN: NEGATIVE
OPIATES UR QL SCN: NEGATIVE
OXYCODONE UR QL SCN: NEGATIVE
PCP UR QL SCN: NEGATIVE
PROPOXYPH UR QL SCN: NEGATIVE

## 2019-05-22 PROCEDURE — A9270 NON-COVERED ITEM OR SERVICE: HCPCS | Performed by: EMERGENCY MEDICINE

## 2019-05-22 PROCEDURE — 99283 EMERGENCY DEPT VISIT LOW MDM: CPT | Mod: GC | Performed by: PSYCHIATRY & NEUROLOGY

## 2019-05-22 PROCEDURE — 700102 HCHG RX REV CODE 250 W/ 637 OVERRIDE(OP): Performed by: STUDENT IN AN ORGANIZED HEALTH CARE EDUCATION/TRAINING PROGRAM

## 2019-05-22 PROCEDURE — 700102 HCHG RX REV CODE 250 W/ 637 OVERRIDE(OP): Performed by: EMERGENCY MEDICINE

## 2019-05-22 PROCEDURE — 90791 PSYCH DIAGNOSTIC EVALUATION: CPT

## 2019-05-22 PROCEDURE — A9270 NON-COVERED ITEM OR SERVICE: HCPCS | Performed by: STUDENT IN AN ORGANIZED HEALTH CARE EDUCATION/TRAINING PROGRAM

## 2019-05-22 RX ORDER — CLONIDINE HYDROCHLORIDE 0.1 MG/1
0.1 TABLET ORAL
Status: DISCONTINUED | OUTPATIENT
Start: 2019-05-22 | End: 2019-05-23 | Stop reason: HOSPADM

## 2019-05-22 RX ORDER — ACETAMINOPHEN 325 MG/1
650 TABLET ORAL ONCE
Status: COMPLETED | OUTPATIENT
Start: 2019-05-22 | End: 2019-05-22

## 2019-05-22 RX ORDER — GABAPENTIN 300 MG/1
300 CAPSULE ORAL 3 TIMES DAILY
Status: DISCONTINUED | OUTPATIENT
Start: 2019-05-22 | End: 2019-05-23 | Stop reason: HOSPADM

## 2019-05-22 RX ORDER — DULOXETIN HYDROCHLORIDE 30 MG/1
30 CAPSULE, DELAYED RELEASE ORAL DAILY
Status: DISCONTINUED | OUTPATIENT
Start: 2019-05-22 | End: 2019-05-23 | Stop reason: HOSPADM

## 2019-05-22 RX ORDER — QUETIAPINE FUMARATE 25 MG/1
50 TABLET, FILM COATED ORAL EVERY EVENING
Status: DISCONTINUED | OUTPATIENT
Start: 2019-05-22 | End: 2019-05-23 | Stop reason: HOSPADM

## 2019-05-22 RX ADMIN — CLONIDINE HYDROCHLORIDE 0.1 MG: 0.1 TABLET ORAL at 21:47

## 2019-05-22 RX ADMIN — DULOXETINE HYDROCHLORIDE 30 MG: 30 CAPSULE, DELAYED RELEASE ORAL at 14:15

## 2019-05-22 RX ADMIN — GABAPENTIN 300 MG: 300 CAPSULE ORAL at 14:15

## 2019-05-22 RX ADMIN — METOPROLOL TARTRATE 12.5 MG: 25 TABLET ORAL at 07:58

## 2019-05-22 RX ADMIN — ACETAMINOPHEN 650 MG: 325 TABLET, FILM COATED ORAL at 00:44

## 2019-05-22 RX ADMIN — QUETIAPINE FUMARATE 50 MG: 25 TABLET ORAL at 18:37

## 2019-05-22 RX ADMIN — GABAPENTIN 300 MG: 300 CAPSULE ORAL at 18:38

## 2019-05-22 RX ADMIN — GABAPENTIN 300 MG: 300 CAPSULE ORAL at 07:58

## 2019-05-22 RX ADMIN — METOPROLOL TARTRATE 12.5 MG: 25 TABLET ORAL at 18:37

## 2019-05-22 NOTE — ED NOTES
Med Rec completed per Pt at bedside  Allergies reviewed  No ABX in last 30 days    Pt denies taking any RX's or OTC's    Pt states that he has been unable to fill his medications and has not been taking them for > 2 weeks

## 2019-05-22 NOTE — ED NOTES
Pt belongings bagged and taken out of room. 157$ given to safe keeping with patient access. All cords and hazards removed from room

## 2019-05-22 NOTE — ED NOTES
Patient's home medications have been reviewed by the pharmacy team.     Past Medical History:   Diagnosis Date   • Gout    • Hypertension        Patient's Medications   New Prescriptions    No medications on file   Previous Medications    No medications on file   Modified Medications    No medications on file   Discontinued Medications    CLONIDINE (CATAPRESS) 0.2 MG TAB    Take 0.5 Tabs by mouth every bedtime.    GABAPENTIN (NEURONTIN) 300 MG CAP    Take 1 Cap by mouth 3 times a day.    HALOPERIDOL (HALDOL) 2 MG/ML CONC    Take 0.5 mL by mouth 3 times a day.    METOPROLOL (LOPRESSOR) 25 MG TAB    Take 0.5 Tabs by mouth 2 Times a Day.          A:  Medications do not appear to be contributing to current complaints.     P:    Patient has been out of medications for > 2 weeks.  ERP has reordered as appropriate.  Will defer further psychiatric care to psychiatry team.  No additional recommendations at this time.     Renee Durant, ZachD, BCPS

## 2019-05-22 NOTE — ED NOTES
Patient brought in via EMS from homeless shelter for fall and suicidal ideations. Pt said his plan was to jump in front of a truck, but before he got to the street he tripped and fell. States he hit his head but no LOC. Pt states he currently has thoughts of SI

## 2019-05-22 NOTE — ED NOTES
Pt resting in bed at this time. Sitter in direct line of sight. Pt sleeping. Will continue to monitor.

## 2019-05-22 NOTE — ED PROVIDER NOTES
ED Provider Note    Patient reevaluated.  Patient is on a legal hold, waiting transfer to psychiatric facility when a bed is available.  Please refer to initial note for complete details.  No concerns overnight.  Patient ambulates to the bathroom independently and is awaiting breakfast.  Medication reconciliation has been reviewed, routine meds have been reordered, will allow psychiatry to dose and the other appropriate medications.

## 2019-05-22 NOTE — DISCHARGE PLANNING
Medical Social Work    SW received call from Lisa kang/ [facility] stating that the pt's referral has been declined due to ptt having reached maximum therapeutic benefit with [facility].

## 2019-05-22 NOTE — CONSULTS
[facility]   TRIAGE ASSESSMENT    Name: Chris Leggett  MRN: [id number]  : 1950  Age: 68 y.o.  Date of assessment: 2019  PCP: Pcp Pt States None  Persons in attendance: Patient    CHIEF COMPLAINT/PRESENTING ISSUE ((as stated by patient): Patient lying in room.  Calm and cooperative upon approach. Constricted affect. Patient reports he is suicidal with plan to jump in front of oncoming traffic.  Discontinued psychiatric medications for approximately 3 weeks.  Unable to contract for safety.  Patient to remain on legal hold and will be transferred to psychiatric facility for further evaluation and treatment.   Chief Complaint   Patient presents with   • Suicidal Ideation   • Fall        CURRENT LIVING SITUATION/SOCIAL SUPPORT: Homeless. Reports poor social support.      BEHAVIORAL HEALTH TREATMENT HISTORY  Does patient/parent report a history of prior behavioral health treatment for patient?   Yes:    Dates Level of Care Facilty/Provider Diagnosis/Problem Medications   3/19 inpatient [facility] SI/MDD Cymbalta, topomax    inpatient [facility] SI Cymbalta, Topomax   11/15/19 inpatient [facility] SI MDD Cymbalta, Topomax                                                          SAFETY ASSESSMENT - SELF  Does patient acknowledge current or past symptoms of dangerousness to self? yes  Does parent/significant other report patient has current or past symptoms of dangerousness to self? N\A  Does presenting problem suggest symptoms of dangerousness to self? Yes:     Past Current    Suicidal Thoughts: [x]  [x]    Suicidal Plans: [x]  [x]    Suicidal Intent: []  []    Suicide Attempts: []  []    Self-Injury []  []      For any boxes checked above, provide detail: Threatening to jump in front of traffic.  Previous admissions indicate patient had plan to jump from parking garage.     History of suicide by family member: no  History of suicide by friend/significant other: no  Recent change in  "frequency/specificity/intensity of suicidal thoughts or self-harm behavior? yes - increase since being evicted  Current access to firearms, medications, or other identified means of suicide/self-harm? yes - jumping from building  If yes, willing to restrict access to means of suicide/self-harm? no  Protective factors present:  Willing to address in treatment    SAFETY ASSESSMENT - OTHERS  Does patient acknowledge current or past symptoms of aggressive behavior or risk to others? no  Does parent/significant other report patient has current or past symptoms of aggressive behavior or risk to others?  N\A  Does presenting problem suggest symptoms of dangerousness to others? No    Crisis Safety Plan completed and copy given to patient? no    ABUSE/NEGLECT SCREENING  Does patient report feeling “unsafe” in his/her home, or afraid of anyone?  no  Does patient report any history of physical, sexual, or emotional abuse?  no  Does parent or significant other report any of the above? N\A  Is there evidence of neglect by self?  no  Is there evidence of neglect by a caregiver? no  Does the patient/parent report any history of CPS/APS/police involvement related to suspected abuse/neglect or domestic violence? no  Based on the information provided during the current assessment, is a mandated report of suspected abuse/neglect being made?  No    SUBSTANCE USE SCREENING  Yes:  Darnell all substances used in the past 30 days:      Last Use Amount   [x]   Alcohol 5/22/19 Couple beers   []   Marijuana     []   Heroin     []   Prescription Opioids  (used without prescription, for    recreation, or in excess of prescribed amount)     []   Other Prescription  (used without prescription, for    recreation, or in excess of prescribed amount)     []   Cocaine      []   Methamphetamine     []   \"\" drugs (ectasy, MDMA)     []   Other substances        UDS results: Pending  Breathalyzer results: 0.0    What consequences does the patient " associate with any of the above substance use and or addictive behaviors?     Risk factors for detox (check all that apply):  []  Seizures   []  Diaphoretic (sweating)   [x]  Tremors   []  Hallucinations   [x]  Increased blood pressure   []  Decreased blood pressure   []  Other   []  None      [x] Patient education on risk factors for detoxification and instructed to return to ER as needed.      MENTAL STATUS   Participation: Active verbal participation  Grooming: Disheveled  Orientation: Alert and Fully Oriented  Behavior: Calm  Eye contact: Limited  Mood: Depressed  Affect: Constricted  Thought process: Goal-directed- requesting inpatient hospitalization  Thought content: Within normal limits  Speech: Rate within normal limits and Volume within normal limits  Perception: Within normal limits  Memory:  No gross evidence of memory deficits  Insight: Limited  Judgment:  Limited  Other:    Collateral information:   Source:  [] Significant other present in person:   [] Significant other by telephone  [] [facility]   [] [facility]  [x] [facility]  [x] Other: ERP    [] Unable to complete full assessment due to:  [] Acute intoxication  [] Patient declined to participate/engage  [] Patient verbally unresponsive  [] Significant cognitive deficits  [] Significant perceptual distortions or behavioral disorganization  [x] Other:      CLINICAL IMPRESSIONS:  Primary:  Reports MDD  Secondary:        IDENTIFIED NEEDS/PLAN:  [Trigger DISPOSITION list for any items marked]    [x]  Imminent safety risk - self [] Imminent safety risk - others   [x]  Acute substance withdrawal []  Psychosis/Impaired reality testing   []  Mood/anxiety [x]  Substance use/Addictive behavior   [x]  Maladaptive behaviro []  Parent/child conflict   []  Family/Couples conflict []  Biomedical   [x]  Housing []  Financial   []   Legal  Occupational/Educational   []  Domestic violence []  Other:     Disposition: Refer to Legal  Hold    Does patient express agreement with the above plan? yes    Referral appointment(s) scheduled? no    Alert team only: Patient to remain on legal hold.    I have discussed findings and recommendations with Dr. Pina who is in agreement with these recommendations.     Referral information sent to the following community providers :    If applicable : Referred  to : [facility] for legal hold follow up at (time): Pending ERP certification      Emeka Mathis R.N.  5/22/2019

## 2019-05-22 NOTE — ED NOTES
Pt asleep in bed at this time. Equal chest rise and fall. No complaints at this time. Sitter in direct line of site

## 2019-05-22 NOTE — ED NOTES
Pt ambulated to bathroom with this RN. This RN in line of sight at all times. Door kept cracked open. Pt ambulated back to room. cooperative

## 2019-05-22 NOTE — DISCHARGE PLANNING
Medical Social Work    Referral: Legal Hold    Intervention: Legal Hold Paperwork given to SW by Life [employer] RN Emeka Mathis    Legal Hold Initiated: Date: 5/22/19 Time: 0015    Patient’s Insurance Listed on Face Sheet: Medicare / Medi-[employer]ICE[employer]    [facility] to: [facility], [facility], [facility], [facility], [facility], [facility]    This referral contains the following information:  1) Face sheet __x__  2) Page 1 and Page 2 of Legal Hold __x__  3) Alert Team Assessment/Psych Assessment __x__  4) Head to toe physical exam _x___  5) Urine Drug Screen _x___  6) Blood Alcohol _x_  7) Vital signs _x__  8) Pregnancy test when applicable _n/a__  9) Medications list __x__    Plan: Patient will transfer to mental health facility once acceptance is obtained.

## 2019-05-22 NOTE — ED NOTES
Pt ambulating to and from bathroom with steady gait. Pt alert, no distress. Remains in constant 1:1 supervision by sitter. Breakfast tray ordered for patient. Continue to monitor.

## 2019-05-23 VITALS
BODY MASS INDEX: 28 KG/M2 | WEIGHT: 200 LBS | HEIGHT: 71 IN | SYSTOLIC BLOOD PRESSURE: 124 MMHG | OXYGEN SATURATION: 95 % | DIASTOLIC BLOOD PRESSURE: 75 MMHG | RESPIRATION RATE: 16 BRPM | HEART RATE: 66 BPM | TEMPERATURE: 96.6 F

## 2019-05-23 PROCEDURE — 700102 HCHG RX REV CODE 250 W/ 637 OVERRIDE(OP): Performed by: EMERGENCY MEDICINE

## 2019-05-23 PROCEDURE — 700102 HCHG RX REV CODE 250 W/ 637 OVERRIDE(OP): Performed by: STUDENT IN AN ORGANIZED HEALTH CARE EDUCATION/TRAINING PROGRAM

## 2019-05-23 PROCEDURE — 99281 EMR DPT VST MAYX REQ PHY/QHP: CPT | Performed by: PSYCHIATRY & NEUROLOGY

## 2019-05-23 PROCEDURE — A9270 NON-COVERED ITEM OR SERVICE: HCPCS | Performed by: STUDENT IN AN ORGANIZED HEALTH CARE EDUCATION/TRAINING PROGRAM

## 2019-05-23 PROCEDURE — A9270 NON-COVERED ITEM OR SERVICE: HCPCS | Performed by: EMERGENCY MEDICINE

## 2019-05-23 RX ADMIN — GABAPENTIN 300 MG: 300 CAPSULE ORAL at 05:40

## 2019-05-23 RX ADMIN — DULOXETINE HYDROCHLORIDE 30 MG: 30 CAPSULE, DELAYED RELEASE ORAL at 06:23

## 2019-05-23 NOTE — ED NOTES
Pt resting watching TV. Pt has no requests at this time. Pt remains in constant supervision of 1:1 sitter. Continue to monitor.

## 2019-05-23 NOTE — ED NOTES
Received report from SHARAN Oleary, taking over pt care. Pt resting comfortably, no needs at this time, pt maintaining patent airway, bed in lowered position ,side rails up, sitter in front of room

## 2019-05-23 NOTE — ED PROVIDER NOTES
0700 - Patient reevaluated.  Patient is on a legal hold, waiting transfer to psychiatric facility when a bed is available.  Please refer to initial note for complete details.  No concerns overnight.  Patient ambulates to the bathroom independently and tolerated a meal.  Medication reconciliation has been reviewed.    11:54 AM patient has been seen evaluated by psychiatry.  Legal hold is been discontinued.  Please refer their note for complete details.  Patient will be discharge as planned.

## 2019-05-23 NOTE — ED NOTES
Pt received discharge instructions and understands all including follow up, pt ambulated to lobby with no difficulty

## 2019-05-23 NOTE — DISCHARGE PLANNING
ALERT team  note:  68 year old male admitted 5/21/19, legal hold, SI; Medicare insurance plan; pt evaluated by [facility] [place] ED psychiatry team, Dr. Johnson, legal hold DC'd; pt currently denies SI, HI, or self-harm ideation; writer RN reviewed community  and homeless resources with pt, written information given, including [facility], [facility], [facility], [facility], police help desk; pt verbalized understanding; pt to DC to self

## 2019-05-23 NOTE — ED NOTES
Pt resting no distress. Respirations equal and unlabored. 1:1 sitter provides constant supervision. Continue to monitor.

## 2019-05-23 NOTE — ED NOTES
This RN introduced self to patient. Pt sleeping comfortably, still endorses SI. Pt has 1:1 PSA at doorway in view of patient.

## 2019-05-23 NOTE — ED NOTES
Pt sleeping in no apparent distress. Respirations even and unlabored.  1:1 observer in sight of patient.
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
## 2019-05-23 NOTE — ED NOTES
Vitals obtained by this RN. VSS. Pt alert, no distress. 1:1 sitter remains in constant supervision. Pt medicated as ordered.

## 2019-05-23 NOTE — DISCHARGE INSTRUCTIONS
Follow-up with primary care behavioral health next week for reevaluation, continued care, medication management close blood pressure monitoring.    Continue any home medications as previously indicated.    Return to the emergency department for suicidal homicidal ideation,

## 2019-06-04 ENCOUNTER — HOSPITAL ENCOUNTER (OUTPATIENT)
Dept: RADIOLOGY | Facility: MEDICAL CENTER | Age: 69
End: 2019-06-04
Attending: PHYSICIAN ASSISTANT
Payer: OTHER MISCELLANEOUS

## 2019-06-04 DIAGNOSIS — Z22.7 INACTIVE TUBERCULOSIS: ICD-10-CM

## 2019-06-04 DIAGNOSIS — Z11.1 SCREENING EXAMINATION FOR PULMONARY TUBERCULOSIS: ICD-10-CM

## 2019-06-04 PROCEDURE — 71046 X-RAY EXAM CHEST 2 VIEWS: CPT

## 2019-06-14 ENCOUNTER — HOSPITAL ENCOUNTER (EMERGENCY)
Facility: MEDICAL CENTER | Age: 69
End: 2019-06-14
Attending: EMERGENCY MEDICINE | Admitting: EMERGENCY MEDICINE
Payer: MEDICARE

## 2019-06-14 VITALS
BODY MASS INDEX: 27.99 KG/M2 | HEIGHT: 71 IN | HEART RATE: 84 BPM | WEIGHT: 199.96 LBS | RESPIRATION RATE: 16 BRPM | DIASTOLIC BLOOD PRESSURE: 79 MMHG | TEMPERATURE: 99.3 F | OXYGEN SATURATION: 97 % | SYSTOLIC BLOOD PRESSURE: 110 MMHG

## 2019-06-14 DIAGNOSIS — S01.81XA FACIAL LACERATION, INITIAL ENCOUNTER: ICD-10-CM

## 2019-06-14 DIAGNOSIS — F10.920 ALCOHOLIC INTOXICATION WITHOUT COMPLICATION (HCC): ICD-10-CM

## 2019-06-14 PROCEDURE — 99284 EMERGENCY DEPT VISIT MOD MDM: CPT

## 2019-06-14 RX ORDER — LIDOCAINE HYDROCHLORIDE 10 MG/ML
2 INJECTION, SOLUTION INFILTRATION; PERINEURAL ONCE
Status: DISCONTINUED | OUTPATIENT
Start: 2019-06-14 | End: 2019-06-14

## 2019-06-14 NOTE — ED NOTES
"Clear speech and no distress at this time. Continues to demonstrate nonfocal neurologic exam.  States he, \"still thinks needs a little time before he can walk\".  Report to oncoming RN.   "

## 2019-06-14 NOTE — ED NOTES
"Ambulates with steady gait independently. States he has no headache at this time and, \"feels much better\".     The patient has been provided with discharge education and information.  The patient was also provided with instructions on follow up care and return precautions.  The patient verbalizes understanding of discharge instructions, follow up care, and return precautions.  All questions have been answered.  No RX written by ERP.  JANEL, CRISTA/Ox4, good color and appropriate at time of discharge.  Patient ambulated out of department.      "

## 2019-06-14 NOTE — ED TRIAGE NOTES
"Chief Complaint   Patient presents with   • Alcohol Intoxication   • T-5000 Head Injury     Here via EMS after he stumbled into wall. +EtOH.      /79   Pulse 84   Temp 37.4 °C (99.3 °F) (Temporal)   Resp 16   Ht 1.803 m (5' 11\")   Wt 90.7 kg (199 lb 15.3 oz)   SpO2 95%   BMI 27.89 kg/m²     70 y/o male presents to ED via EMS after [facility] where he stumbled into a wall earlier tonight.  Patient admits to drinking\" two six packs\" today.  Currently complains of headache. He is not on any blood thinners. GCS 15. A/OX4. No other complaints at this time.   "

## 2019-06-14 NOTE — ED PROVIDER NOTES
"ED Provider Note    CHIEF COMPLAINT  Chief Complaint   Patient presents with   • Alcohol Intoxication   • T-5000 Head Injury     Here via EMS after he stumbled into wall. +EtOH.        HPI  Chris Leggett is a 69 y.o. male who presents with alcohol intoxication.  He states that he stumbled into a wall earlier tonight and struck his face.  Denies anticoagulation.  No vomiting.  No neck pain.  No chest pain or trouble breathing.  No extremity pain.  Has an abrasion to the left forehead, bridge of the nose.  No nasal deformity.    REVIEW OF SYSTEMS  See HPI for further details. All other systems are negative.     PAST MEDICAL HISTORY   has a past medical history of Gout and Hypertension.    SOCIAL HISTORY  Social History     Social History Main Topics   • Smoking status: Former Smoker     Packs/day: 0.50     Years: 5.00     Types: Cigarettes     Quit date: 1/18/2019   • Smokeless tobacco: Never Used   • Alcohol use Yes   • Drug use: Yes     Types: Inhaled      Comment: marijuana   • Sexual activity: Not on file       SURGICAL HISTORY   has a past surgical history that includes cervical disk and fusion anterior (9/2/2018) and other orthopedic surgery.    CURRENT MEDICATIONS  Home Medications    **Home medications have not yet been reviewed for this encounter**         ALLERGIES  No Known Allergies    PHYSICAL EXAM  VITAL SIGNS: /79   Pulse 84   Temp 37.4 °C (99.3 °F) (Temporal)   Resp 16   Ht 1.803 m (5' 11\")   Wt 90.7 kg (199 lb 15.3 oz)   SpO2 95%   BMI 27.89 kg/m²   Pulse ox interpretation: I interpret this pulse ox as normal.  Constitutional: Alert in no apparent distress.  HENT: Faint abrasion/laceration to the left forehead, Bilateral external ears normal, Nose with abrasion to the bridge of the nose.   Eyes: Pupils are equal and reactive, Conjunctiva normal, Non-icteric.   Neck: Normal range of motion, No tenderness, Supple, No stridor.   Cardiovascular: Regular rate and rhythm.   Thorax & Lungs: " Normal breath sounds, No respiratory distress, No wheezing, No chest tenderness.   Abdomen: Bowel sounds normal, Soft, No tenderness, No masses, No pulsatile masses. No peritoneal signs.  Skin: Warm, Dry, No erythema, No rash.   Back: No bony tenderness, No CVA tenderness.   Extremities: Intact distal pulses, No edema, No tenderness, No cyanosis  Neurologic: Alert, slurred speech and odor of alcohol, normal motor function and sensory function.        DIAGNOSTIC STUDIES / PROCEDURES    Laceration Repair Procedure Note    Indication: Laceration to the left forehead and the bridge of the nose    Procedure: The patient was placed in the appropriate position. The area was then irrigated with normal saline. The laceration was closed with Dermabond. A second laceration was closed with Dermabond.     Total repaired wound length: 3 cm & 1 cm respectively.     Other Items: None    The patient tolerated the procedure well.    Complications: None        COURSE & MEDICAL DECISION MAKING    Medications - No data to display    Pertinent Labs & Imaging studies reviewed. (See chart for details)  69 y.o. male presenting while intoxicated on alcohol after a fall where he struck his face on the ground or a wall.  No neck pain.  No focal neurologic deficits.  Has an abrasion to the left forehead and bridge of the nose.  Wounds were irrigated and explored.  They appear to be superficial abrasions with subtle lacerations..  The wounds were repaired using Dermabond after irrigation.    Patient was allowed to metabolize alcohol in his system.  Will be discharged once he is clinically sober and with steady gait and sound mind.      The patient was instructed to follow-up with primary care physician for further management.  To return immediately for any worsening symptoms or development of any other concerning signs or symptoms. The patient verbalizes understanding in their own words.    /79   Pulse 84   Temp 37.4 °C (99.3 °F)  "(Temporal)   Resp 16   Ht 1.803 m (5' 11\")   Wt 90.7 kg (199 lb 15.3 oz)   SpO2 95%   BMI 27.89 kg/m²     The patient was referred to primary care where they will receive further BP management.      [facility], Emergency Dept  [address] [address] [phone]    As needed, If symptoms worsen    Primary care doctor    Schedule an appointment as soon as possible for a visit         FINAL IMPRESSION  1. Alcoholic intoxication without complication (HCC)    2. Facial laceration, initial encounter            Electronically signed by: Deonte Staples, 6/14/2019 2:00 AM    "

## 2019-06-22 ENCOUNTER — HOSPITAL ENCOUNTER (EMERGENCY)
Facility: MEDICAL CENTER | Age: 69
End: 2019-06-22
Attending: EMERGENCY MEDICINE
Payer: MEDICARE

## 2019-06-22 VITALS
DIASTOLIC BLOOD PRESSURE: 85 MMHG | HEART RATE: 62 BPM | WEIGHT: 210 LBS | BODY MASS INDEX: 29.4 KG/M2 | TEMPERATURE: 97 F | SYSTOLIC BLOOD PRESSURE: 140 MMHG | HEIGHT: 71 IN | RESPIRATION RATE: 18 BRPM | OXYGEN SATURATION: 96 %

## 2019-06-22 DIAGNOSIS — F32.A CHRONIC DEPRESSION: ICD-10-CM

## 2019-06-22 PROCEDURE — 80307 DRUG TEST PRSMV CHEM ANLYZR: CPT

## 2019-06-22 PROCEDURE — 302970 POC BREATHALIZER: Performed by: EMERGENCY MEDICINE

## 2019-06-22 PROCEDURE — 99285 EMERGENCY DEPT VISIT HI MDM: CPT

## 2019-06-22 PROCEDURE — 700102 HCHG RX REV CODE 250 W/ 637 OVERRIDE(OP): Performed by: EMERGENCY MEDICINE

## 2019-06-22 PROCEDURE — A9270 NON-COVERED ITEM OR SERVICE: HCPCS | Performed by: EMERGENCY MEDICINE

## 2019-06-22 RX ORDER — QUETIAPINE FUMARATE 25 MG/1
50 TABLET, FILM COATED ORAL ONCE
Status: COMPLETED | OUTPATIENT
Start: 2019-06-22 | End: 2019-06-22

## 2019-06-22 RX ADMIN — QUETIAPINE 50 MG: 25 TABLET, FILM COATED ORAL at 03:45

## 2019-06-22 NOTE — ED NOTES
All lines, monitors DC'd. Discharge instructions given with bus pass. Pt given time to ask any questions. Pt ambulatory out of department with steady gait. All pt belongings in pt's possession. Pt verbalized understanding.

## 2019-06-22 NOTE — ED PROVIDER NOTES
ED Provider Note    CHIEF COMPLAINT  Chief Complaint   Patient presents with   • Suicidal Ideation   • Alcohol Intoxication       HPI  Chris Leggett is a 69 y.o. male who presents with chief complaint of depression.  Patient with long-standing history of chronic depression and suicidal thoughts.  Patient has been seen multiple times in the emergency department for similar issues.  Earlier today he was heavily intoxicated and tripped and fell striking his face.  He reports that he was walking and thinking about jumping in front of bus, this was when he was heavily intoxicated.  He was seen at  where his lacerations were repaired and he was discharged after being clinically sober.  Patient reports ongoing depression, he reports fleeting suicidality but no active plan and no active suicidal ideation.  He reports he is depressed because he does not have family nearby.  He suddenly takes his medications.    REVIEW OF SYSTEMS  ROS  See HPI for further details. All other systems are negative.     PAST MEDICAL HISTORY   has a past medical history of Gout and Hypertension.    SOCIAL HISTORY  Social History     Social History Main Topics   • Smoking status: Former Smoker     Packs/day: 0.50     Years: 5.00     Types: Cigarettes     Quit date: 1/18/2019   • Smokeless tobacco: Never Used   • Alcohol use Yes   • Drug use: Yes     Types: Inhaled      Comment: marijuana   • Sexual activity: Not on file       SURGICAL HISTORY   has a past surgical history that includes cervical disk and fusion anterior (9/2/2018) and other orthopedic surgery.    CURRENT MEDICATIONS  Home Medications    **Home medications have not yet been reviewed for this encounter**         ALLERGIES  No Known Allergies    PHYSICAL EXAM  Physical Exam   Constitutional: He is oriented to person, place, and time. He appears well-developed and well-nourished.   HENT:   Laceration of the nose which has recently placed sutures.  Mild contusions  of the face.   Eyes: Conjunctivae are normal.   Neck: Normal range of motion. Neck supple.   Pulmonary/Chest: Effort normal.   Neurological: He is alert and oriented to person, place, and time.   Skin: Skin is warm.   Psychiatric:   Depressed affect, reports history of suicidal ideation but denies any active suicidality to me at this point.  He does not have any plan.         COURSE & MEDICAL DECISION MAKING  Pertinent Labs & Imaging studies reviewed. (See chart for details)  Patient here with depression, believe he is very low risk from a suicide standpoint, I do believe there is a malingering component of patient's presentation.  He has been seen here multiple times and discharged from our psychiatric providers.  Will provide patient with outpatient resources.  I given a dose of his Seroquel here.  We have counseled patient to take his medications.  Return precautions discussed.  Patient reports he does not need any refills of the medications.  Counseled patient on chronic alcohol use.      FINAL IMPRESSION  1.  Chronic depression  Chronic alcohol use      Electronically signed by: Rohan Prakash, 6/22/2019 3:18 AM

## 2019-06-22 NOTE — ED TRIAGE NOTES
"Pt bib ems, found down on street after trying to jump in front of bus. Pt instead fell down at that time, hitting his forehead and R hand. No abrasions noted, pt a, o x4 at this time, no neuro deficits noted at this time, ambulating with assistance, states he drank \"A couple six packs late last night\"     Chart up for erp  "

## 2019-06-23 NOTE — DISCHARGE PLANNING
This pt was discharged from the [facility] and [facility] is [facility] with very vague and fleeting si. He had some dubious plan to jump in traffic earlier while he was intoxicated. Presently he has been feed hot food and drink and denies si, hi or psychosis. He has been given a pass to the [facility] shelter. He admits to receiving a monthly social security check but is not sure where the money goes. He has been directed to talk to a  at the [facility] where he is being discharged to for housing options. He has been also given op treatment options.

## 2019-09-26 ENCOUNTER — HOSPITAL ENCOUNTER (INPATIENT)
Facility: MEDICAL CENTER | Age: 69
LOS: 7 days | DRG: 391 | End: 2019-10-05
Attending: EMERGENCY MEDICINE | Admitting: HOSPITALIST
Payer: MEDICARE

## 2019-09-26 DIAGNOSIS — R19.7 DIARRHEA, UNSPECIFIED TYPE: ICD-10-CM

## 2019-09-26 DIAGNOSIS — D64.9 NORMOCYTIC ANEMIA: ICD-10-CM

## 2019-09-26 DIAGNOSIS — E86.0 DEHYDRATION: ICD-10-CM

## 2019-09-26 DIAGNOSIS — F33.2 SEVERE EPISODE OF RECURRENT MAJOR DEPRESSIVE DISORDER, WITHOUT PSYCHOTIC FEATURES (HCC): ICD-10-CM

## 2019-09-26 DIAGNOSIS — R45.851 SUICIDAL IDEATION: ICD-10-CM

## 2019-09-26 DIAGNOSIS — D69.6 THROMBOCYTOPENIA (HCC): ICD-10-CM

## 2019-09-26 DIAGNOSIS — D68.9 COAGULOPATHY (HCC): ICD-10-CM

## 2019-09-26 LAB
ALBUMIN SERPL BCP-MCNC: 4.1 G/DL (ref 3.2–4.9)
ALBUMIN/GLOB SERPL: 1.8 G/DL
ALP SERPL-CCNC: 48 U/L (ref 30–99)
ALT SERPL-CCNC: 12 U/L (ref 2–50)
AMPHET UR QL SCN: NEGATIVE
ANION GAP SERPL CALC-SCNC: 6 MMOL/L (ref 0–11.9)
APPEARANCE UR: CLEAR
APTT PPP: 30.4 SEC (ref 24.7–36)
AST SERPL-CCNC: 39 U/L (ref 12–45)
BARBITURATES UR QL SCN: NEGATIVE
BASOPHILS # BLD AUTO: 0.9 % (ref 0–1.8)
BASOPHILS # BLD: 0.03 K/UL (ref 0–0.12)
BENZODIAZ UR QL SCN: NEGATIVE
BILIRUB SERPL-MCNC: 0.9 MG/DL (ref 0.1–1.5)
BILIRUB UR QL STRIP.AUTO: NEGATIVE
BUN SERPL-MCNC: 12 MG/DL (ref 8–22)
BZE UR QL SCN: NEGATIVE
CALCIUM SERPL-MCNC: 9.1 MG/DL (ref 8.5–10.5)
CANNABINOIDS UR QL SCN: POSITIVE
CHLORIDE SERPL-SCNC: 106 MMOL/L (ref 96–112)
CO2 SERPL-SCNC: 25 MMOL/L (ref 20–33)
COLOR UR: ABNORMAL
CREAT SERPL-MCNC: 0.62 MG/DL (ref 0.5–1.4)
EOSINOPHIL # BLD AUTO: 0.07 K/UL (ref 0–0.51)
EOSINOPHIL NFR BLD: 2.2 % (ref 0–6.9)
ERYTHROCYTE [DISTWIDTH] IN BLOOD BY AUTOMATED COUNT: 53.8 FL (ref 35.9–50)
GLOBULIN SER CALC-MCNC: 2.3 G/DL (ref 1.9–3.5)
GLUCOSE SERPL-MCNC: 78 MG/DL (ref 65–99)
GLUCOSE UR STRIP.AUTO-MCNC: NEGATIVE MG/DL
HCT VFR BLD AUTO: 37.9 % (ref 42–52)
HGB BLD-MCNC: 12.7 G/DL (ref 14–18)
IMM GRANULOCYTES # BLD AUTO: 0.01 K/UL (ref 0–0.11)
IMM GRANULOCYTES NFR BLD AUTO: 0.3 % (ref 0–0.9)
INR PPP: 1.25 (ref 0.87–1.13)
KETONES UR STRIP.AUTO-MCNC: ABNORMAL MG/DL
LEUKOCYTE ESTERASE UR QL STRIP.AUTO: NEGATIVE
LIPASE SERPL-CCNC: 16 U/L (ref 11–82)
LYMPHOCYTES # BLD AUTO: 0.73 K/UL (ref 1–4.8)
LYMPHOCYTES NFR BLD: 22.5 % (ref 22–41)
MCH RBC QN AUTO: 33.7 PG (ref 27–33)
MCHC RBC AUTO-ENTMCNC: 33.5 G/DL (ref 33.7–35.3)
MCV RBC AUTO: 100.5 FL (ref 81.4–97.8)
METHADONE UR QL SCN: NEGATIVE
MICRO URNS: ABNORMAL
MONOCYTES # BLD AUTO: 0.28 K/UL (ref 0–0.85)
MONOCYTES NFR BLD AUTO: 8.6 % (ref 0–13.4)
NEUTROPHILS # BLD AUTO: 2.12 K/UL (ref 1.82–7.42)
NEUTROPHILS NFR BLD: 65.5 % (ref 44–72)
NITRITE UR QL STRIP.AUTO: NEGATIVE
NRBC # BLD AUTO: 0 K/UL
NRBC BLD-RTO: 0 /100 WBC
OPIATES UR QL SCN: NEGATIVE
OXYCODONE UR QL SCN: NEGATIVE
PCP UR QL SCN: NEGATIVE
PH UR STRIP.AUTO: 6 [PH] (ref 5–8)
PLATELET # BLD AUTO: 85 K/UL (ref 164–446)
PMV BLD AUTO: 10.9 FL (ref 9–12.9)
POC BREATHALIZER: 0 PERCENT (ref 0–0.01)
POTASSIUM SERPL-SCNC: 3.9 MMOL/L (ref 3.6–5.5)
PROPOXYPH UR QL SCN: NEGATIVE
PROT SERPL-MCNC: 6.4 G/DL (ref 6–8.2)
PROT UR QL STRIP: NEGATIVE MG/DL
PROTHROMBIN TIME: 16 SEC (ref 12–14.6)
RBC # BLD AUTO: 3.77 M/UL (ref 4.7–6.1)
RBC UR QL AUTO: NEGATIVE
SODIUM SERPL-SCNC: 137 MMOL/L (ref 135–145)
SP GR UR STRIP.AUTO: 1.02
UROBILINOGEN UR STRIP.AUTO-MCNC: 1 MG/DL
WBC # BLD AUTO: 3.2 K/UL (ref 4.8–10.8)

## 2019-09-26 PROCEDURE — 700102 HCHG RX REV CODE 250 W/ 637 OVERRIDE(OP): Performed by: HOSPITALIST

## 2019-09-26 PROCEDURE — G0378 HOSPITAL OBSERVATION PER HR: HCPCS

## 2019-09-26 PROCEDURE — 85610 PROTHROMBIN TIME: CPT

## 2019-09-26 PROCEDURE — 99285 EMERGENCY DEPT VISIT HI MDM: CPT

## 2019-09-26 PROCEDURE — 85730 THROMBOPLASTIN TIME PARTIAL: CPT

## 2019-09-26 PROCEDURE — 700105 HCHG RX REV CODE 258: Performed by: HOSPITALIST

## 2019-09-26 PROCEDURE — 302970 POC BREATHALIZER

## 2019-09-26 PROCEDURE — 80053 COMPREHEN METABOLIC PANEL: CPT

## 2019-09-26 PROCEDURE — 99219 PR INITIAL OBSERVATION CARE,LEVL II: CPT | Performed by: HOSPITALIST

## 2019-09-26 PROCEDURE — 700105 HCHG RX REV CODE 258: Performed by: EMERGENCY MEDICINE

## 2019-09-26 PROCEDURE — A9270 NON-COVERED ITEM OR SERVICE: HCPCS | Performed by: HOSPITALIST

## 2019-09-26 PROCEDURE — 81003 URINALYSIS AUTO W/O SCOPE: CPT

## 2019-09-26 PROCEDURE — 96372 THER/PROPH/DIAG INJ SC/IM: CPT

## 2019-09-26 PROCEDURE — 80307 DRUG TEST PRSMV CHEM ANLYZR: CPT

## 2019-09-26 PROCEDURE — 700111 HCHG RX REV CODE 636 W/ 250 OVERRIDE (IP): Performed by: HOSPITALIST

## 2019-09-26 PROCEDURE — 302970 POC BREATHALIZER: Performed by: EMERGENCY MEDICINE

## 2019-09-26 PROCEDURE — 83690 ASSAY OF LIPASE: CPT

## 2019-09-26 PROCEDURE — 85025 COMPLETE CBC W/AUTO DIFF WBC: CPT

## 2019-09-26 PROCEDURE — 90791 PSYCH DIAGNOSTIC EVALUATION: CPT

## 2019-09-26 RX ORDER — GABAPENTIN 300 MG/1
300 CAPSULE ORAL 3 TIMES DAILY
COMMUNITY
End: 2019-10-10

## 2019-09-26 RX ORDER — LISINOPRIL 20 MG/1
20 TABLET ORAL EVERY MORNING
Status: ON HOLD | COMMUNITY
End: 2019-10-05

## 2019-09-26 RX ORDER — HEPARIN SODIUM 5000 [USP'U]/ML
5000 INJECTION, SOLUTION INTRAVENOUS; SUBCUTANEOUS EVERY 8 HOURS
Status: DISCONTINUED | OUTPATIENT
Start: 2019-09-26 | End: 2019-10-05 | Stop reason: HOSPADM

## 2019-09-26 RX ORDER — ONDANSETRON 2 MG/ML
4 INJECTION INTRAMUSCULAR; INTRAVENOUS EVERY 4 HOURS PRN
Status: DISCONTINUED | OUTPATIENT
Start: 2019-09-26 | End: 2019-10-05 | Stop reason: HOSPADM

## 2019-09-26 RX ORDER — POLYETHYLENE GLYCOL 3350 17 G/17G
1 POWDER, FOR SOLUTION ORAL
Status: DISCONTINUED | OUTPATIENT
Start: 2019-09-26 | End: 2019-09-27

## 2019-09-26 RX ORDER — LOPERAMIDE HYDROCHLORIDE 2 MG/1
2 CAPSULE ORAL 3 TIMES DAILY PRN
Status: DISCONTINUED | OUTPATIENT
Start: 2019-09-26 | End: 2019-10-05 | Stop reason: HOSPADM

## 2019-09-26 RX ORDER — GABAPENTIN 300 MG/1
300 CAPSULE ORAL 3 TIMES DAILY
Status: DISCONTINUED | OUTPATIENT
Start: 2019-09-26 | End: 2019-10-05 | Stop reason: HOSPADM

## 2019-09-26 RX ORDER — QUETIAPINE FUMARATE 100 MG/1
200 TABLET, FILM COATED ORAL EVERY EVENING
Status: DISCONTINUED | OUTPATIENT
Start: 2019-09-26 | End: 2019-09-27

## 2019-09-26 RX ORDER — SODIUM CHLORIDE, SODIUM LACTATE, POTASSIUM CHLORIDE, CALCIUM CHLORIDE 600; 310; 30; 20 MG/100ML; MG/100ML; MG/100ML; MG/100ML
INJECTION, SOLUTION INTRAVENOUS CONTINUOUS
Status: DISCONTINUED | OUTPATIENT
Start: 2019-09-26 | End: 2019-09-29

## 2019-09-26 RX ORDER — DULOXETIN HYDROCHLORIDE 60 MG/1
60 CAPSULE, DELAYED RELEASE ORAL EVERY MORNING
COMMUNITY
End: 2019-10-10

## 2019-09-26 RX ORDER — ACETAMINOPHEN 325 MG/1
650 TABLET ORAL EVERY 6 HOURS PRN
Status: DISCONTINUED | OUTPATIENT
Start: 2019-09-26 | End: 2019-10-05 | Stop reason: HOSPADM

## 2019-09-26 RX ORDER — SODIUM CHLORIDE 9 MG/ML
1000 INJECTION, SOLUTION INTRAVENOUS ONCE
Status: COMPLETED | OUTPATIENT
Start: 2019-09-26 | End: 2019-09-26

## 2019-09-26 RX ORDER — AMOXICILLIN 250 MG
2 CAPSULE ORAL 2 TIMES DAILY
Status: DISCONTINUED | OUTPATIENT
Start: 2019-09-26 | End: 2019-09-27

## 2019-09-26 RX ORDER — LISINOPRIL 20 MG/1
20 TABLET ORAL EVERY MORNING
Status: DISCONTINUED | OUTPATIENT
Start: 2019-09-26 | End: 2019-10-03

## 2019-09-26 RX ORDER — DULOXETIN HYDROCHLORIDE 30 MG/1
60 CAPSULE, DELAYED RELEASE ORAL EVERY MORNING
Status: DISCONTINUED | OUTPATIENT
Start: 2019-09-26 | End: 2019-09-27

## 2019-09-26 RX ORDER — QUETIAPINE FUMARATE 200 MG/1
200 TABLET, FILM COATED ORAL EVERY EVENING
Status: ON HOLD | COMMUNITY
End: 2019-10-05 | Stop reason: SDUPTHER

## 2019-09-26 RX ORDER — BISACODYL 10 MG
10 SUPPOSITORY, RECTAL RECTAL
Status: DISCONTINUED | OUTPATIENT
Start: 2019-09-26 | End: 2019-09-27

## 2019-09-26 RX ORDER — ONDANSETRON 4 MG/1
4 TABLET, ORALLY DISINTEGRATING ORAL EVERY 4 HOURS PRN
Status: DISCONTINUED | OUTPATIENT
Start: 2019-09-26 | End: 2019-10-05 | Stop reason: HOSPADM

## 2019-09-26 RX ORDER — TRAZODONE HYDROCHLORIDE 100 MG/1
100 TABLET ORAL
COMMUNITY
End: 2019-10-10

## 2019-09-26 RX ORDER — TRAZODONE HYDROCHLORIDE 50 MG/1
100 TABLET ORAL
Status: DISCONTINUED | OUTPATIENT
Start: 2019-09-26 | End: 2019-10-05 | Stop reason: HOSPADM

## 2019-09-26 RX ADMIN — GABAPENTIN 300 MG: 300 CAPSULE ORAL at 16:01

## 2019-09-26 RX ADMIN — DULOXETINE HYDROCHLORIDE 60 MG: 60 CAPSULE, DELAYED RELEASE ORAL at 16:01

## 2019-09-26 RX ADMIN — SODIUM CHLORIDE, POTASSIUM CHLORIDE, SODIUM LACTATE AND CALCIUM CHLORIDE: 600; 310; 30; 20 INJECTION, SOLUTION INTRAVENOUS at 17:02

## 2019-09-26 RX ADMIN — TRAZODONE HYDROCHLORIDE 100 MG: 50 TABLET ORAL at 20:57

## 2019-09-26 RX ADMIN — LOPERAMIDE HYDROCHLORIDE 2 MG: 2 CAPSULE ORAL at 18:46

## 2019-09-26 RX ADMIN — HEPARIN SODIUM 5000 UNITS: 5000 INJECTION INTRAVENOUS; SUBCUTANEOUS at 20:57

## 2019-09-26 RX ADMIN — HEPARIN SODIUM 5000 UNITS: 5000 INJECTION INTRAVENOUS; SUBCUTANEOUS at 16:01

## 2019-09-26 RX ADMIN — GABAPENTIN 300 MG: 300 CAPSULE ORAL at 20:57

## 2019-09-26 RX ADMIN — SODIUM CHLORIDE 1000 ML: 9 INJECTION, SOLUTION INTRAVENOUS at 10:50

## 2019-09-26 RX ADMIN — QUETIAPINE FUMARATE 200 MG: 100 TABLET ORAL at 18:46

## 2019-09-26 RX ADMIN — LISINOPRIL 20 MG: 20 TABLET ORAL at 16:01

## 2019-09-26 ASSESSMENT — PATIENT HEALTH QUESTIONNAIRE - PHQ9
SUM OF ALL RESPONSES TO PHQ9 QUESTIONS 1 AND 2: 6
4. FEELING TIRED OR HAVING LITTLE ENERGY: NEARLY EVERY DAY
3. TROUBLE FALLING OR STAYING ASLEEP OR SLEEPING TOO MUCH: NEARLY EVERY DAY
SUM OF ALL RESPONSES TO PHQ QUESTIONS 1-9: 27
5. POOR APPETITE OR OVEREATING: NEARLY EVERY DAY
9. THOUGHTS THAT YOU WOULD BE BETTER OFF DEAD, OR OF HURTING YOURSELF: NEARLY EVERY DAY
2. FEELING DOWN, DEPRESSED, IRRITABLE, OR HOPELESS: NEARLY EVERY DAY
6. FEELING BAD ABOUT YOURSELF - OR THAT YOU ARE A FAILURE OR HAVE LET YOURSELF OR YOUR FAMILY DOWN: NEARLY EVERY DAY
8. MOVING OR SPEAKING SO SLOWLY THAT OTHER PEOPLE COULD HAVE NOTICED. OR THE OPPOSITE, BEING SO FIGETY OR RESTLESS THAT YOU HAVE BEEN MOVING AROUND A LOT MORE THAN USUAL: NEARLY EVERY DAY
1. LITTLE INTEREST OR PLEASURE IN DOING THINGS: NEARLY EVERY DAY
7. TROUBLE CONCENTRATING ON THINGS, SUCH AS READING THE NEWSPAPER OR WATCHING TELEVISION: NEARLY EVERY DAY

## 2019-09-26 ASSESSMENT — LIFESTYLE VARIABLES
DOES PATIENT WANT TO TALK TO SOMEONE ABOUT QUITTING: NO
TOTAL SCORE: 0
EVER HAD A DRINK FIRST THING IN THE MORNING TO STEADY YOUR NERVES TO GET RID OF A HANGOVER: NO
AVERAGE NUMBER OF DAYS PER WEEK YOU HAVE A DRINK CONTAINING ALCOHOL: 1
ON A TYPICAL DAY WHEN YOU DRINK ALCOHOL HOW MANY DRINKS DO YOU HAVE: 2
HAVE PEOPLE ANNOYED YOU BY CRITICIZING YOUR DRINKING: NO
HAVE YOU EVER FELT YOU SHOULD CUT DOWN ON YOUR DRINKING: NO
HOW MANY TIMES IN THE PAST YEAR HAVE YOU HAD 5 OR MORE DRINKS IN A DAY: 0
TOTAL SCORE: 0
EVER_SMOKED: YES
DOES PATIENT WANT TO STOP DRINKING: YES
EVER FELT BAD OR GUILTY ABOUT YOUR DRINKING: NO
ALCOHOL_USE: YES
CONSUMPTION TOTAL: NEGATIVE
TOTAL SCORE: 0

## 2019-09-26 ASSESSMENT — ENCOUNTER SYMPTOMS
SORE THROAT: 0
COUGH: 0
CONSTIPATION: 0
DIZZINESS: 0
DEPRESSION: 1
ABDOMINAL PAIN: 1
DOUBLE VISION: 0
FEVER: 0
BACK PAIN: 0
LOSS OF CONSCIOUSNESS: 0
NAUSEA: 0
HEADACHES: 0
VOMITING: 0
DIARRHEA: 1
MYALGIAS: 1
BLURRED VISION: 0
PALPITATIONS: 0
BLOOD IN STOOL: 0
SHORTNESS OF BREATH: 0
CHILLS: 1

## 2019-09-26 ASSESSMENT — COGNITIVE AND FUNCTIONAL STATUS - GENERAL
STANDING UP FROM CHAIR USING ARMS: A LITTLE
SUGGESTED CMS G CODE MODIFIER DAILY ACTIVITY: CJ
MOVING TO AND FROM BED TO CHAIR: A LITTLE
DAILY ACTIVITIY SCORE: 22
TOILETING: A LITTLE
MOBILITY SCORE: 18
HELP NEEDED FOR BATHING: A LITTLE
SUGGESTED CMS G CODE MODIFIER MOBILITY: CK
MOVING FROM LYING ON BACK TO SITTING ON SIDE OF FLAT BED: A LITTLE
CLIMB 3 TO 5 STEPS WITH RAILING: A LOT
WALKING IN HOSPITAL ROOM: A LITTLE

## 2019-09-26 NOTE — ED NOTES
Pt sitting in stretcher, provided warm blankets for comfort. Updated on poc, acknowledged. No needs at this time. Will cont to monitor.

## 2019-09-26 NOTE — ED NOTES
Medication Reconciliation updated and complete per pt at bedside & pt home pharmacy  Allergies have been verified   No oral ABX within the last 14 days  Pt Home Pharmacy:[facility]

## 2019-09-26 NOTE — ASSESSMENT & PLAN NOTE
Patient complains of diarrhea x1 week.  Is diffuse constitutional symptoms would imply possibly viral origin.  He complains of chills myalgias arthralgias and generally just feeling poorly.  He has not had any fever, there is been no blood,   He has had no recent antibiotic exposures in the last 6 months, he cannot recall in fact the last time he did have any antibiotics.  Check stool O&P and cultures  Repeat labs in the a.m. w leukopenia  Abdominal pain persistent  C-diff neg  CT A/P - ok  Cipro/Flagyl stopped as no evidence of colitis

## 2019-09-26 NOTE — CONSULTS
ALERT team  note:  [facility], pt is not medically cleared d/t GI and ambulatory problems and pt will be admitted to [facility]

## 2019-09-26 NOTE — ED NOTES
Charge RN (Zaynab) notified that pt is high risk on CSSR scale.     All of pt belongings removed, all equipment removed from room.

## 2019-09-26 NOTE — ED NOTES
"Pt brought in by STEVE from [facility], ambulatory to room with steady gait.     Pt states he went to see psychiatrist at [facility] and told her he was feeling SI with plan to jump in front of bus. States he would have done it but states \"I just feel so sick and couldn't get up this morning to actually do it.\" Per EMS pt also c/o nausea and diarrhea x 1 week. Was seen at [facility] and dx with dehydration. Pt denies blood in stool. +body aches/chills.     Pt states he has not taken his depression medications for past couple days, states \"I ran out.\"     Pt cooperative, alert.   "

## 2019-09-26 NOTE — H&P
Hospital Medicine History & Physical Note    Date of Service  9/26/2019    Primary Care Physician  Pcp Pt States None    Consultants  Psychiatry service    Code Status  Full    Chief Complaint  Body aches, chills, diarrhea    History of Presenting Illness  69 y.o. male who presented 9/26/2019.  Patient reports constitutional symptoms began about a week ago.  He states there is been having diarrhea many times a day.  He reports at least 3-4.  Symptoms are worse when he eats, he usually has to go to bathroom shortly thereafter.  He has not had any blood or melanotic stool.  He denies any nausea or vomiting but he reports he is afraid to eat because he has diarrhea immediately afterwards.  He does report some diffuse abdominal pain which began several days after the diarrhea.  In addition he reports some other constitutional symptoms including body chills and body aches and worsening back pain that is normal for him.  He does not think he had a fever but is uncertain.    Here in the emergency room the patient is also admitted to suicidal ideation.  He has been placed on a legal hold, and psychiatry team has been consulted.    Review of Systems  Review of Systems   Constitutional: Positive for chills and malaise/fatigue. Negative for fever.   HENT: Negative for nosebleeds and sore throat.    Eyes: Negative for blurred vision and double vision.   Respiratory: Negative for cough and shortness of breath.    Cardiovascular: Negative for chest pain, palpitations and leg swelling.   Gastrointestinal: Positive for abdominal pain and diarrhea. Negative for blood in stool, constipation, melena, nausea and vomiting.   Genitourinary: Negative for dysuria and urgency.   Musculoskeletal: Positive for joint pain and myalgias. Negative for back pain.   Skin: Negative for rash.   Neurological: Negative for dizziness, loss of consciousness and headaches.   Psychiatric/Behavioral: Positive for depression and suicidal ideas.       Past  Medical History   has a past medical history of Gout, Hypertension, and Psychiatric disorder.    Surgical History   has a past surgical history that includes cervical disk and fusion anterior (9/2/2018) and other orthopedic surgery.     Family History  family history includes Alzheimer's Disease in his father; Cancer in his father and mother; Heart Disease in his brother and sister.     Social History   reports that he quit smoking about 8 months ago. His smoking use included cigarettes. He has a 2.50 pack-year smoking history. He has never used smokeless tobacco. He reports that he drinks alcohol. He reports that he has current or past drug history. Drug: Inhaled.    Allergies  No Known Allergies    Medications  Prior to Admission Medications   Prescriptions Last Dose Informant Patient Reported? Taking?   DULoxetine (CYMBALTA) 60 MG Cap DR Particles delayed-release capsule <1week at [facility] Patient Yes Yes   Sig: Take 60 mg by mouth every morning.   QUEtiapine (SEROQUEL) 200 MG Tab <1week at [facility] Patient Yes Yes   Sig: Take 200 mg by mouth every evening.   gabapentin (NEURONTIN) 300 MG Cap <1week at [facility] Patient Yes Yes   Sig: Take 300 mg by mouth 3 times a day.   lisinopril (PRINIVIL) 20 MG Tab <1week at [facility] Patient Yes Yes   Sig: Take 20 mg by mouth every morning.   traZODone (DESYREL) 100 MG Tab <1week at [facility] Patient Yes Yes   Sig: Take 100 mg by mouth every bedtime.      Facility-Administered Medications: None       Physical Exam  Temp:  [36.4 °C (97.5 °F)-36.6 °C (97.9 °F)] 36.4 °C (97.5 °F)  Pulse:  [54-68] 54  Resp:  [16] 16  BP: (146-148)/(89-90) 148/89    Physical Exam   Constitutional: He is oriented to person, place, and time. He appears well-developed and well-nourished. No distress.   HENT:   Head: Normocephalic and atraumatic.   Nose: Nose normal.   Mouth/Throat: Oropharynx is clear and moist.   Eyes: Conjunctivae are normal.   Neck: No JVD present.   Cardiovascular: Normal rate. Exam reveals no gallop.    No murmur heard.  Pulmonary/Chest: Effort normal. No stridor. No respiratory distress. He has no wheezes. He has no rales.   Abdominal: Soft. He exhibits no distension and no mass. There is tenderness. There is no rebound and no guarding.   Abdomen is soft throughout, there is minimal tenderness palpation diffusely throughout all abdomen.  Again no peritoneal findings, no guarding or rebound, negative Chiu and McBurney's signs   Musculoskeletal: He exhibits no edema.   Neurological: He is alert and oriented to person, place, and time.   Skin: Skin is warm and dry. No rash noted. He is not diaphoretic.   Psychiatric: Thought content normal. His affect is blunt.   Nursing note and vitals reviewed.      Laboratory:  Recent Labs     09/26/19  1042   WBC 3.2*   RBC 3.77*   HEMOGLOBIN 12.7*   HEMATOCRIT 37.9*   .5*   MCH 33.7*   MCHC 33.5*   RDW 53.8*   PLATELETCT 85*   MPV 10.9     Recent Labs     09/26/19  1042   SODIUM 137   POTASSIUM 3.9   CHLORIDE 106   CO2 25   GLUCOSE 78   BUN 12   CREATININE 0.62   CALCIUM 9.1     Recent Labs     09/26/19  1042   ALTSGPT 12   ASTSGOT 39   ALKPHOSPHAT 48   TBILIRUBIN 0.9   LIPASE 16   GLUCOSE 78     Recent Labs     09/26/19  1042   APTT 30.4   INR 1.25*     No results for input(s): NTPROBNP in the last 72 hours.      No results for input(s): TROPONINT in the last 72 hours.    Urinalysis:    Recent Labs     09/26/19  0949   SPECGRAVITY 1.022   GLUCOSEUR Negative   KETONES Trace*   NITRITE Negative   LEUKESTERAS Negative        Imaging:  No orders to display         Assessment/Plan:  I anticipate this patient is appropriate for observation status at this time.    Alcoholism (HCC)- (present on admission)  Assessment & Plan  Patient does carry history of alcoholism, he denies any active drinking to me tonight.  Psychiatry team is following  Monitor closely for evidence of withdrawal.    Suicidal ideation- (present on admission)  Assessment & Plan  Psychiatry team has been  consulted and we look forward to their evaluation and recommendations.  Probable discharge to an inpatient psych bed.  Admit to legal hold    Diarrhea of presumed infectious origin  Assessment & Plan  Patient complains of diarrhea x1 week.  Is diffuse constitutional symptoms would imply possibly viral origin.  He complains of chills myalgias arthralgias and generally just feeling poorly.  He has not had any fever, there is been no blood, he has minimal abdominal pain, his abdominal exam is benign.  His labs are benign as well.  He has had no recent antibiotic exposures in the last 6 months, he cannot recall in fact the last time he did have any antibiotics.  Check stool O&P and cultures  Trial Imodium  IV fluids  Repeat labs in the a.m.  Follow abdominal exam  Consider further work-up if it fails to resolve in the next 24 to 48 hours or worsens in any way.    Lumbar back pain with radiculopathy affecting lower extremity- (present on admission)  Assessment & Plan  Patient has chronic back pain.  We will treat him supportively.    Gout- (present on admission)  Assessment & Plan  Stable at this time      VTE prophylaxis: Heparin

## 2019-09-26 NOTE — ASSESSMENT & PLAN NOTE
Patient does carry history of alcoholism, he denies any active drinking   Psychiatry team is following  Monitor closely for evidence of withdrawal.  Daily magnesium, thiamine supplementation  B12, TSH, folate  Replace vitamins oral, Mag IV daily until >2.0

## 2019-09-26 NOTE — ED PROVIDER NOTES
"ED Provider Note    Scribed for Tony Andrade M.D. by Taisha Vera. 2019, 10:05 AM.    Primary care provider: Pcp Pt States None  Means of arrival: Walk-in  History obtained from: Patient  History limited by: None    CHIEF COMPLAINT  Chief Complaint   Patient presents with   • Suicidal Ideation   • Diarrhea   • Body Aches     HPI  Chris Leggett is a 69 y.o. male who presents to the Emergency Department for body aches that began a week ago. He states he cannot walk because he is so sore. This morning, the patient stated he was feeling suicidal ideation and planned to jump in front of a bus, but he felt too sick to do so. The patient has associated chills and diarrhea when he drinks. He states that he also has painful urination. The patient also states he has depression. He had surgery in the past year and claims he had no will to live following. He denies blood with urination or stool, vomiting, or fever. The patient has not gotten much sleep recently. He sees a psychiatrist and has medication for depression, but \"ran out and has not taken them recently.    REVIEW OF SYSTEMS  As above otherwise all other systems are negative    PAST MEDICAL HISTORY   has a past medical history of Gout, Hypertension, and Psychiatric disorder.    SURGICAL HISTORY   has a past surgical history that includes cervical disk and fusion anterior (2018) and other orthopedic surgery.    SOCIAL HISTORY  Social History     Tobacco Use   • Smoking status: Former Smoker     Packs/day: 0.50     Years: 5.00     Pack years: 2.50     Types: Cigarettes     Last attempt to quit: 2019     Years since quittin.6   • Smokeless tobacco: Never Used   Substance Use Topics   • Alcohol use: Yes   • Drug use: Yes     Types: Inhaled     Comment: marijuana      Social History     Substance and Sexual Activity   Drug Use Yes   • Types: Inhaled    Comment: marijuana     FAMILY HISTORY  Family History   Problem Relation Age of Onset   • " "Cancer Mother    • Alzheimer's Disease Father    • Cancer Father    • Heart Disease Sister    • Heart Disease Brother      CURRENT MEDICATIONS  Home Medications     Reviewed by Yusra Armstrong (Pharmacy Tech) on 09/26/19 at 1207  Med List Status: Complete   Medication Last Dose Status   DULoxetine (CYMBALTA) 60 MG Cap DR Particles delayed-release capsule <1week Active   gabapentin (NEURONTIN) 300 MG Cap <1week Active   lisinopril (PRINIVIL) 20 MG Tab <1week Active   QUEtiapine (SEROQUEL) 200 MG Tab <1week Active   traZODone (DESYREL) 100 MG Tab <1week Active              ALLERGIES  No Known Allergies    PHYSICAL EXAM  VITAL SIGNS: /90   Pulse 68   Temp 36.6 °C (97.9 °F) (Temporal)   Resp 16   Ht 1.803 m (5' 11\")   Wt 95.3 kg (210 lb)   BMI 29.29 kg/m²     Constitutional: Well developed, Well nourished, Mild distress, Non-toxic appearance.   HENT: Normocephalic, Atraumatic, Bilateral external ears normal, dry mucous membranes, No oral exudates, Nose normal.   Eyes:conjunctiva is normal, there are no signs of exudate.   Neck: Supple, no meningeal signs.  Lymphatic: No lymphadenopathy noted.   Cardiovascular: Regular rate and rhythm without murmurs gallops or rubs.   Thorax & Lungs: No respiratory distress. Breathing comfortably. Lungs are diminished, but clear to auscultation bilaterally, there are no wheezes no rales. Chest wall is nontender.  Abdomen: Soft, nontender, nondistended. Bowel sounds are present.   Skin: Warm, Dry, No erythema,   Back: No tenderness, No CVA tenderness.  Musculoskeletal: Good range of motion in all major joints. Extremities normal, Tender in all muscular aspects of back and legs, No major deformities noted. Intact distal pulses, no clubbing, no cyanosis, no edema,   Neurologic: Alert & oriented x 4, Moving all extremities. No gross abnormalities.    Psychiatric: As above, stated in HPI.    LABS  Results for orders placed or performed during the hospital encounter of " 09/26/19   Urine Drug Screen   Result Value Ref Range    Amphetamines Urine Negative Negative    Barbiturates Negative Negative    Benzodiazepines Negative Negative    Cocaine Metabolite Negative Negative    Methadone Negative Negative    Opiates Negative Negative    Oxycodone Negative Negative    Phencyclidine -Pcp Negative Negative    Propoxyphene Negative Negative    Cannabinoid Metab Positive (A) Negative   CBC WITH DIFFERENTIAL   Result Value Ref Range    WBC 3.2 (L) 4.8 - 10.8 K/uL    RBC 3.77 (L) 4.70 - 6.10 M/uL    Hemoglobin 12.7 (L) 14.0 - 18.0 g/dL    Hematocrit 37.9 (L) 42.0 - 52.0 %    .5 (H) 81.4 - 97.8 fL    MCH 33.7 (H) 27.0 - 33.0 pg    MCHC 33.5 (L) 33.7 - 35.3 g/dL    RDW 53.8 (H) 35.9 - 50.0 fL    Platelet Count 85 (L) 164 - 446 K/uL    MPV 10.9 9.0 - 12.9 fL    Neutrophils-Polys 65.50 44.00 - 72.00 %    Lymphocytes 22.50 22.00 - 41.00 %    Monocytes 8.60 0.00 - 13.40 %    Eosinophils 2.20 0.00 - 6.90 %    Basophils 0.90 0.00 - 1.80 %    Immature Granulocytes 0.30 0.00 - 0.90 %    Nucleated RBC 0.00 /100 WBC    Neutrophils (Absolute) 2.12 1.82 - 7.42 K/uL    Lymphs (Absolute) 0.73 (L) 1.00 - 4.80 K/uL    Monos (Absolute) 0.28 0.00 - 0.85 K/uL    Eos (Absolute) 0.07 0.00 - 0.51 K/uL    Baso (Absolute) 0.03 0.00 - 0.12 K/uL    Immature Granulocytes (abs) 0.01 0.00 - 0.11 K/uL    NRBC (Absolute) 0.00 K/uL   COMP METABOLIC PANEL   Result Value Ref Range    Sodium 137 135 - 145 mmol/L    Potassium 3.9 3.6 - 5.5 mmol/L    Chloride 106 96 - 112 mmol/L    Co2 25 20 - 33 mmol/L    Anion Gap 6.0 0.0 - 11.9    Glucose 78 65 - 99 mg/dL    Bun 12 8 - 22 mg/dL    Creatinine 0.62 0.50 - 1.40 mg/dL    Calcium 9.1 8.5 - 10.5 mg/dL    AST(SGOT) 39 12 - 45 U/L    ALT(SGPT) 12 2 - 50 U/L    Alkaline Phosphatase 48 30 - 99 U/L    Total Bilirubin 0.9 0.1 - 1.5 mg/dL    Albumin 4.1 3.2 - 4.9 g/dL    Total Protein 6.4 6.0 - 8.2 g/dL    Globulin 2.3 1.9 - 3.5 g/dL    A-G Ratio 1.8 g/dL   LIPASE   Result Value Ref  Range    Lipase 16 11 - 82 U/L   URINALYSIS CULTURE, IF INDICATED   Result Value Ref Range    Color DK Yellow     Character Clear     Specific Gravity 1.022 <1.035    Ph 6.0 5.0 - 8.0    Glucose Negative Negative mg/dL    Ketones Trace (A) Negative mg/dL    Protein Negative Negative mg/dL    Bilirubin Negative Negative    Urobilinogen, Urine 1.0 Negative    Nitrite Negative Negative    Leukocyte Esterase Negative Negative    Occult Blood Negative Negative    Micro Urine Req see below    APTT   Result Value Ref Range    APTT 30.4 24.7 - 36.0 sec   PROTHROMBIN TIME   Result Value Ref Range    PT 16.0 (H) 12.0 - 14.6 sec    INR 1.25 (H) 0.87 - 1.13   ESTIMATED GFR   Result Value Ref Range    GFR If African American >60 >60 mL/min/1.73 m 2    GFR If Non African American >60 >60 mL/min/1.73 m 2   POC BREATHALIZER   Result Value Ref Range    POC Breathalizer 0.00 0.00 - 0.01 Percent     All labs reviewed by me.    COURSE & MEDICAL DECISION MAKING  Pertinent Labs & Imaging studies reviewed. (See chart for details)    10:05 AM - Patient seen and examined at bedside. I told the patient of the plan for care. He understands and agrees. Patient will be treated with NS Infusion 1000 mL. Ordered Urine Drug Screen and POC Breathalizer to evaluate his symptoms. The differential diagnoses include but are not limited to: diarrhea versus dehydration versus suicidal versus depression     10:42 AM - Ordered Estimated GFR, UA Culture, if Indicated, APTT, Prothrombin Time, CBC with Differential, CMP, and Lipase.    11:40 AM - Reviewed lab results.     11:49 AM - I discussed the patient's case and the above findings with Dr. Johnson (Hospitalist) who agrees to accept the patient for admission.     11:51 AM - I reevaluated the patient at bedside and informed him of the lab findings. I told him of the plan for admission. He understands and agrees.    HYDRATION: Based on the patient's presentation of Acute Diarrhea and Other Dry mucous  membranes the patient was given IV fluids. IV Hydration was used because oral hydration was not adequate alone. Upon recheck following hydration, the patient was improved.    Decision Making:  Patient presents emerged department for evaluation.  Clinically the patient is slightly dehydrated he does have dark urine on initial evaluation.  He was given a liter bolus of normal saline pressures have improved but he still has the above symptoms.  At this point will admit the patient for further IV hydration and inpatient psychiatric evaluation.    DISPOSITION:  Patient will be admitted to Dr. Johnson, Hospitalist, in guarded condition.    FINAL IMPRESSION  1. Diarrhea, unspecified type    2. Dehydration    3. Suicidal ideation    4. Severe episode of recurrent major depressive disorder, without psychotic features (HCC)          Taisha PERSAUD (Scribe), am scribing for, and in the presence of, Tony Andrade M.D..    Electronically signed by: Taisha Vera (Scribe), 9/26/2019    Tony PERSAUD M.D. personally performed the services described in this documentation, as scribed by Taisha Vera in my presence, and it is both accurate and complete. C    The note accurately reflects work and decisions made by me.  Tony Andrade  9/26/2019  4:55 PM

## 2019-09-27 LAB
APPEARANCE UR: CLEAR
BILIRUB UR QL STRIP.AUTO: NEGATIVE
COLOR UR: YELLOW
GLUCOSE UR STRIP.AUTO-MCNC: NEGATIVE MG/DL
KETONES UR STRIP.AUTO-MCNC: NEGATIVE MG/DL
LEUKOCYTE ESTERASE UR QL STRIP.AUTO: NEGATIVE
LIPASE SERPL-CCNC: 29 U/L (ref 11–82)
MAGNESIUM SERPL-MCNC: 1.7 MG/DL (ref 1.5–2.5)
MICRO URNS: NORMAL
NITRITE UR QL STRIP.AUTO: NEGATIVE
PH UR STRIP.AUTO: 5.5 [PH] (ref 5–8)
PROT UR QL STRIP: NEGATIVE MG/DL
RBC UR QL AUTO: NEGATIVE
SP GR UR STRIP.AUTO: 1.02
UROBILINOGEN UR STRIP.AUTO-MCNC: 0.2 MG/DL

## 2019-09-27 PROCEDURE — 83735 ASSAY OF MAGNESIUM: CPT

## 2019-09-27 PROCEDURE — G0378 HOSPITAL OBSERVATION PER HR: HCPCS

## 2019-09-27 PROCEDURE — A9270 NON-COVERED ITEM OR SERVICE: HCPCS | Performed by: HOSPITALIST

## 2019-09-27 PROCEDURE — 700105 HCHG RX REV CODE 258: Performed by: HOSPITALIST

## 2019-09-27 PROCEDURE — 83690 ASSAY OF LIPASE: CPT

## 2019-09-27 PROCEDURE — 87209 SMEAR COMPLEX STAIN: CPT

## 2019-09-27 PROCEDURE — 90792 PSYCH DIAG EVAL W/MED SRVCS: CPT | Mod: GC | Performed by: PSYCHIATRY & NEUROLOGY

## 2019-09-27 PROCEDURE — 87899 AGENT NOS ASSAY W/OPTIC: CPT

## 2019-09-27 PROCEDURE — 87046 STOOL CULTR AEROBIC BACT EA: CPT

## 2019-09-27 PROCEDURE — 87493 C DIFF AMPLIFIED PROBE: CPT

## 2019-09-27 PROCEDURE — 81003 URINALYSIS AUTO W/O SCOPE: CPT

## 2019-09-27 PROCEDURE — 700102 HCHG RX REV CODE 250 W/ 637 OVERRIDE(OP): Performed by: HOSPITALIST

## 2019-09-27 PROCEDURE — 87177 OVA AND PARASITES SMEARS: CPT

## 2019-09-27 PROCEDURE — 99225 PR SUBSEQUENT OBSERVATION CARE,LEVEL II: CPT | Performed by: HOSPITALIST

## 2019-09-27 PROCEDURE — 36415 COLL VENOUS BLD VENIPUNCTURE: CPT

## 2019-09-27 PROCEDURE — 700102 HCHG RX REV CODE 250 W/ 637 OVERRIDE(OP): Performed by: STUDENT IN AN ORGANIZED HEALTH CARE EDUCATION/TRAINING PROGRAM

## 2019-09-27 PROCEDURE — 87045 FECES CULTURE AEROBIC BACT: CPT

## 2019-09-27 PROCEDURE — A9270 NON-COVERED ITEM OR SERVICE: HCPCS | Performed by: STUDENT IN AN ORGANIZED HEALTH CARE EDUCATION/TRAINING PROGRAM

## 2019-09-27 RX ORDER — QUETIAPINE FUMARATE 100 MG/1
200 TABLET, FILM COATED ORAL EVERY EVENING
Status: DISCONTINUED | OUTPATIENT
Start: 2019-09-27 | End: 2019-10-05 | Stop reason: HOSPADM

## 2019-09-27 RX ORDER — DULOXETIN HYDROCHLORIDE 20 MG/1
40 CAPSULE, DELAYED RELEASE ORAL EVERY MORNING
Status: DISCONTINUED | OUTPATIENT
Start: 2019-09-28 | End: 2019-10-05 | Stop reason: HOSPADM

## 2019-09-27 RX ADMIN — GABAPENTIN 300 MG: 300 CAPSULE ORAL at 08:40

## 2019-09-27 RX ADMIN — LOPERAMIDE HYDROCHLORIDE 2 MG: 2 CAPSULE ORAL at 16:01

## 2019-09-27 RX ADMIN — GABAPENTIN 300 MG: 300 CAPSULE ORAL at 15:56

## 2019-09-27 RX ADMIN — TRAZODONE HYDROCHLORIDE 100 MG: 50 TABLET ORAL at 23:45

## 2019-09-27 RX ADMIN — SODIUM CHLORIDE, POTASSIUM CHLORIDE, SODIUM LACTATE AND CALCIUM CHLORIDE: 600; 310; 30; 20 INJECTION, SOLUTION INTRAVENOUS at 04:50

## 2019-09-27 RX ADMIN — GABAPENTIN 300 MG: 300 CAPSULE ORAL at 23:45

## 2019-09-27 RX ADMIN — QUETIAPINE FUMARATE 200 MG: 100 TABLET ORAL at 18:33

## 2019-09-27 RX ADMIN — ACETAMINOPHEN 650 MG: 325 TABLET, FILM COATED ORAL at 08:39

## 2019-09-27 RX ADMIN — DULOXETINE HYDROCHLORIDE 60 MG: 60 CAPSULE, DELAYED RELEASE ORAL at 04:55

## 2019-09-27 ASSESSMENT — ENCOUNTER SYMPTOMS
DIARRHEA: 1
BLURRED VISION: 0
FOCAL WEAKNESS: 0
CHILLS: 0
ABDOMINAL PAIN: 1
TINGLING: 0
FEVER: 0
DIZZINESS: 0
MYALGIAS: 1
BLOOD IN STOOL: 0
WEAKNESS: 1
NECK PAIN: 0
BACK PAIN: 1
PALPITATIONS: 0
VOMITING: 0
FALLS: 0
NAUSEA: 0
CONSTIPATION: 0
DIAPHORESIS: 0
SENSORY CHANGE: 0
HEADACHES: 0

## 2019-09-27 NOTE — PROGRESS NOTES
Patient is AOx4. Patient denies pain. Patient admits to SI but denies a plan. 1-1 PSA sitter at bedside. Safety room checklist in use. Unnecessary equipment removed. Admit profile completed. IV fluids running.

## 2019-09-27 NOTE — PROGRESS NOTES
Assumed care of patient at change of shift. During change of shift report, patient sleeping in bed with 1:1 sitter. During assessment patient A&O x 4 continuing to acknowledge suicidal thoughts and feelings at this time. Patient agrees to not harm himself. Education reinforced for no self-harm behavior. Throughout the shift, patient has napped frequently and remained calm in bed.

## 2019-09-27 NOTE — PROGRESS NOTES
Pt admitted to room S 634 via wheelchair from ED. Pt here for suicide ideation and diarrhea that has been ongoing for 1 week.  Pt oriented to room, 1:1 observation sitter and suicide precautions.  Pt states understanding, has been here before with similar precautions.  Pt out of bed to bathroom independently, continues to have loose stool, medicated with imodium per order.  Pt taking po well, denies pain, states he does not have any family or friends in the area and has been depressed since he broke his neck last year.

## 2019-09-27 NOTE — DIETARY
Nutrition Services: Pt with reports of recent unplanned weight loss and poor appetite per H&P.     Pt is a 70 yo M admitted for diarrhea and suicidal ideation and on day 1 of admit on a Regular diet.    Ht: 180.3 cm  Wt: 95.3 kg (210#)   BMI: 29.29 (overweight)    Per chart review, pt has gained weight in the past few months. Up from 90.7 kg/200# in May - ~10# weight gain. Per H&P pt has been having daily diarrhea x ~1 week PTA - which was exacerbated by eating, therefore pt had began to avoid eating d/t fear of diarrhea. 1 meal recorded in ADLs at this time - % consumed. Daily medical progress note today indicates that diarrhea suspected to be r/t viral gastroenteritis which will be treated conservatively. Expect PO intake to be adequate with resolution of acute illness - full nutrition assessment is not indicated at this time per RD judgment.    RD available prn - and will monitor per dept policy

## 2019-09-27 NOTE — CONSULTS
"PSYCHIATRIC CONSULTATION:  Reason for admission: Body Aches, Chills, Diarrhea   Reason for consult:suicidal   Requesting Physician: Zaynab Dietz M.D.  Supervising Physician: Shanon Boland M.D.    Legal status:  extended    Chief Complaint: \"I am just really depressed.\"    HPI:   Patient is a 69 y.o. male with history of Alcohol Use Disorder, and Unspecified Dpression who presented to the hospital for diarrhea and he had also mentioned to the staff that he was suicidal. He was placed on a legal hold and psychiatry was asked to evaluate him.     On interview, patient complains of low mood due to being homeless and losing all of his immediate family. The low mood has been present for the last 2-3 months. Patient states he has been in [place] for a couple years now. His brother  about a year ago and he has been living at the shelter consistently for the last 3 months. He would like to go to [place] to live with his nephew however, he has not been able to get in touch with him. He has also had a low mood because he has been sick for the last week. He has been laying in his bed at the shelter all day except to use the restroom because of his diarrhea. While laying in bed he has become more suicidal. Yesterday he wanted to jump in front of the bus however he felt so weak that he did not have the energy. He noticed this time his suicidal ideation was different. Normally he has to drink to be suicidal but yesterday he was ready to do it. This scared him. He decided because he was feeling so poor he should talk to the shelter's mental health provider who sent him to the hospital.     Patient reports he was trying to get better. He stopped drinking about 2 weeks ago and has been abstinent from cannabis for 1 week. He admits that he ran out of medications about 2-3 days before admission of his duloxetine and gabapentin. He had ran out of trazodone and seroquel earlier because he was taking 400mg of each " "because he felt like they were not working at their prescribed dose. He feels like the duloxetine is not working and would like to change them.     Psychiatric Review of Systems:current symptoms as reported by pt.  Depression: depressed, anhedonia, wieght/appetite changes, sleep disturbance, psychomotor retardation, fatigue, feelings of guilt, feelings of worthlessness, feelings of hopelessness, difficulty with concentration and suicidal ideation with plan  Lela: No signs or symptoms indicative of lela  Anxiety/Panic Attacks: No signs or symptoms indicative of anxiety  PTSD symptom: Patient reports no signs or symptoms indicative of PTSD  Psychosis: Patient reports no signs or symptoms indicative of psychosis    Medical Review of Systems:  Constitutional: Positive for weight loss. Negative for fever and chills.  HENT: Negative for hearing loss, sore throat, neck pain  Eyes: Negative for blurred vision, pain, redness.   Respiratory: Negative for cough, shortness of breath, wheezing   Cardiovascular: Negative for chest pain, palpitations  Gastrointestinal: Positive for abdominal pain and diarrhea. Negative for melena, constiaption, nausea or vomiting  Genitourinary: Negative for dysuria, urgency, frequency, hematuria  Musculoskeletal: Positive for back pain, joint pain and myalgias  Skin: Negative for itching and rash.  Neurological: positive for weakness. negative for dizziness, tingling, sensory change,   Psychiatric/Behavioral: See above for psych review of systems    TBIs: reports previous TBIs with no lasting effects  SZs: reports seizure disorder that is currently being followed by physician  Strokes: denies  Thyroid: denies  Diabetes: denies  Cardiovascular disease: HTN     Psychiatric Examination:  Vitals: /75   Pulse (!) 47 Comment: RN NOTIFIED  Temp 36.3 °C (97.3 °F) (Temporal)   Resp 18   Ht 1.803 m (5' 11\")   Wt 95.3 kg (210 lb)   SpO2 96%   BMI 29.29 kg/m²   Musculoskeletal: No abnormal " "movements noted  Appearance: well-developed, appears stated age, fair hygiene and no apparent distress, cooperative, engaged, pleasant and poor eye contact  Thoughts: No overt delusions noted and suicidal ideation, linear, coherent, goal-oriented and organized  Speech:  slowed rate. Regular rhythm. Low volume, monotone   Mood: \"depressed\"  Affect: dysthymic, flat and congruent with mood  SI/HI: Patient reports SI with plan  Alert/Oriented: alert and oriented  Memory: no gross impairment in immediate, recent, or remote memory  Fund of Knowledge: adequate  Insight/Judgement into symptoms: fair  Neurological Testing: MMSE not performed during this encounter      Past Psychiatric Hx:  Diagnoses: Patient reports the following previous diagnoses:, MDD  Inpatient: Multiple hospitalizations, most recent was 4-5 months ago at [facility]    Outpatient: Denies   Medications: Currently on duloxetine, gabapentin, quetiapine, and trazodone   cymbalta , seroquel, haldol, and traozdone in the past.   Suicide attempts: About 2 years ago, he tried to jump off a bridge but slipped and fell. Required surgery.   Access to firearms: No     Taken from Dr. Moctezuma note on 5/22/19 and updated        9/2018 fell and broke his neck while drunk. Had surgery here/ was in a SNF and dc'd after 6 weeks, was homeless after that.      11/2018 at [facility] for about one month. Went offf meds as soon as he was discharged.   12/6/2018 seen by psych in our ED.   12/2018 was inpatient at [facility]   1/25/2019 seen by psych in ED for si   3/2019 was seen at [facility] and relapsed as soon as he was discharged.   3/7/2019 seen by psych in ED for wanting to jump off a bridge.   4/7/19: agitated and suicidal behavior- found to be hyponatremic secondary to alcohol abuse/dependence     4/23/19: Presented with suicidal and paranoid ideation, expressed concerns about his roommates poisoning him, had plan to jump off a " bridge    Family Psychiatric Hx:  Patient reports no family history of mental illness    Social Hx:   Patient is a .  Currently homeless.  SSI income of $847 a month.     Drug/Alcohol/Tobacco Hx:  Drugs: Patient denies the use of the following drugs: and marijuana, UDS positive for marijuana   Alcohol: History of alcohol use disorder, reports drinking 2-3 times per week  Tobacco: Pack a day    Medical Hx: labs, MARS, medications, etc were reviewed. Only those findings of potential interest to psychiatry are noted below:    Medical Conditions:   Past Medical History:   Diagnosis Date   • Gout    • Hypertension    • Psychiatric disorder      Allergies:   No Known Allergies  Medications (currently prescribed at [facility]):    Current Facility-Administered Medications:   •  traZODone (DESYREL) tablet 100 mg, 100 mg, Oral, QHS, Chad Romo D.O., 100 mg at 09/26/19 2057  •  QUEtiapine (SEROQUEL) tablet 200 mg, 200 mg, Oral, Q EVENING, Chad Romo D.O., 200 mg at 09/26/19 1846  •  lisinopril (PRINIVIL) tablet 20 mg, 20 mg, Oral, QAM, Chad Romo D.O., Stopped at 09/27/19 0600  •  gabapentin (NEURONTIN) capsule 300 mg, 300 mg, Oral, TID, Chad Romo D.O., 300 mg at 09/26/19 2057  •  DULoxetine (CYMBALTA) capsule 60 mg, 60 mg, Oral, QAM, Chad Romo, D.O., 60 mg at 09/27/19 0455  •  senna-docusate (PERICOLACE or SENOKOT S) 8.6-50 MG per tablet 2 Tab, 2 Tab, Oral, BID, Stopped at 09/26/19 1800 **AND** polyethylene glycol/lytes (MIRALAX) PACKET 1 Packet, 1 Packet, Oral, QDAY PRN **AND** magnesium hydroxide (MILK OF MAGNESIA) suspension 30 mL, 30 mL, Oral, QDAY PRN **AND** bisacodyl (DULCOLAX) suppository 10 mg, 10 mg, Rectal, QDAY PRN, Chad Romo D.O.  •  lactated ringers infusion, , Intravenous, Continuous, Chad Romo D.O., Last Rate: 100 mL/hr at 09/27/19 9774  •  acetaminophen (TYLENOL) tablet 650 mg, 650 mg, Oral, Q6HRS PRN, Chad  BRAXTON Romo  •  heparin injection 5,000 Units, 5,000 Units, Subcutaneous, Q8HRS, Chad Romo D.O., Stopped at 19 0600  •  ondansetron (ZOFRAN) syringe/vial injection 4 mg, 4 mg, Intravenous, Q4HRS PRN, Chad Romo D.O.  •  ondansetron (ZOFRAN ODT) dispertab 4 mg, 4 mg, Oral, Q4HRS PRN, Chad Romo D.O.  •  loperamide (IMODIUM) capsule 2 mg, 2 mg, Oral, TID PRN, Chad Romo D.O., 2 mg at 19 1846  •  Influenza Vaccine High-Dose pf injection 0.5 mL, 0.5 mL, Intramuscular, Once PRN, Raimundo Us D.O.  Labs:  Recent Labs     19  1042   WBC 3.2*   RBC 3.77*   HEMOGLOBIN 12.7*   HEMATOCRIT 37.9*   .5*   MCH 33.7*   MCHC 33.5*   RDW 53.8*   PLATELETCT 85*   MPV 10.9     Recent Labs     19  1042   SODIUM 137   POTASSIUM 3.9   CHLORIDE 106   CO2 25   GLUCOSE 78   BUN 12   CREATININE 0.62   CALCIUM 9.1     Recent Labs     19  1042   APTT 30.4   INR 1.25*             Recent Labs     19  0949   METHADONE Negative   OPIATES Negative   CANNABINOID Positive*   AMPHUR Negative        ECG:     Results for orders placed or performed during the hospital encounter of 19   EKG   Result Value Ref Range    Report       [facility] Emergency Dept.    Test Date:  2019  Pt Name:    BRANDY HUTTON                 Department: ER  MRN:        [id number]                      Room:       T809  Gender:     Male                         Technician: [id number]  :        1950                   Requested By:KORIN MAN  Order #:    [id number]                    Reading MD: Korin Man MD    Measurements  Intervals                                Axis  Rate:       72                           P:          56  WA:         180                          QRS:        51  QRSD:       90                           T:          55  QT:         416  QTc:        456    Interpretive Statements  SINUS RHYTHM  ATRIAL PREMATURE  COMPLEX  Compared to ECG 12/07/2018 03:12:12  Atrial premature complex(es) now present  No STEMI or dysrhythmia  Electronically Signed On 3-7-2019 17:14:45 PST by Nba Man MD         Cranial Imaging: reviewed  No orders to display       ASSESSMENT:   -Alcohol use disorder, severe  -Depressive disorder unspecified  -History of methamphetamine use disorder  -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  He currently is describing symptoms consistent with depression reports noncompliance with his medication regimen for the past week.  Patient does report that the meds have some efficacy and would like to restart them.  Patient is on a legal hold.  Patient believes the duloxetine is not working. He would like to switch to a different SSRI. However, he cannot switch until a taper of the duloxetine occurs and the trazadone is stopped to avoid serotonin syndrome. Patient would like to continue gabapentin and quetiapine.      Discussed the intents, benefits and possible adverse reactions for all medications. Discussed alternatives to treatment including observation and no treatment. Pt verbally consented to medications.       PLAN:  - Decrease duloxetine to 40mg PO daily for mood, will taper   - Continue trazadone 100mg PO nightly for insomnia   - Continue gabapentin 300mg PO TID for neuropathy   - Start seroquel 200mg PO nightly  for insomnia   Thank you for the consult.      Legal status: extended, transfer to inpatient psychiatry when medically stable   Requires a sitter at this time   Patient may have phone at nursing discretion    No visitors.   May have books and things like this provided to him.   May not have his own personal  belongings due to potential for there to be drugs or items he can self harm with

## 2019-09-27 NOTE — DISCHARGE PLANNING
This RN CM faxed pt's Legal Hold Form to Martha Cheng at [phone]. This pt's Legal Hold has been extended.

## 2019-09-27 NOTE — PROGRESS NOTES
Hospital Medicine Daily Progress Note    Date of Service  9/27/2019    Chief Complaint  69 y.o. male admitted 9/26/2019 with diarrhea and SI    Hospital Course    Patient is a 69-year-old male with history of methamphetamine abuse, severe depression, alcohol abuse, chronic low back pain, gout here with suicidal ideation.  Says he was given a jump in front of a train or a bus but decided to talk with a psychiatrist first.  He also relates history of 7 days of diarrhea and generalized malaise body aches.  Suspect to be suffering from a likely viral gastroenteritis and treated conservatively.      Interval Problem Update  Vital signs stable and within normal limits overnight  Evaluated by psychiatry  Laboratory evaluation shows mild leukopenia with normal ANC, microcytic anemia that is mild at 12.7/37.9, .5, mild thrombus cytopenia at 85  ER results reviewed and also shows cannabinoids in the urine    Consultants/Specialty  Psychiatry    Code Status  Full    Disposition  To be determined.  Patient is currently on a legal hold with suicidal ideation precautions    Review of Systems  Review of Systems   Constitutional: Positive for malaise/fatigue. Negative for chills, diaphoresis and fever.   Eyes: Negative for blurred vision.   Cardiovascular: Negative for chest pain and palpitations.   Gastrointestinal: Positive for abdominal pain and diarrhea. Negative for blood in stool, constipation, melena, nausea and vomiting.   Musculoskeletal: Positive for back pain, joint pain and myalgias. Negative for falls and neck pain.   Neurological: Positive for weakness. Negative for dizziness, tingling, sensory change, focal weakness and headaches.   All other systems reviewed and are negative.       Physical Exam  Temp:  [36.1 °C (97 °F)-36.6 °C (97.9 °F)] 36.3 °C (97.3 °F)  Pulse:  [47-68] 47  Resp:  [16-18] 18  BP: (108-148)/(69-90) 108/75  SpO2:  [94 %-97 %] 96 %    Physical Exam   Constitutional: He is oriented to person,  place, and time. He appears well-developed and well-nourished. No distress.   HENT:   Head: Normocephalic and atraumatic.   Eyes: Pupils are equal, round, and reactive to light. EOM are normal.   Neck: Neck supple.   Cardiovascular: Normal rate, regular rhythm and normal heart sounds.   Pulmonary/Chest: Effort normal and breath sounds normal. No respiratory distress. He has no wheezes. He has no rales.   Abdominal: Soft. He exhibits no distension. There is no tenderness.   Neurological: He is alert and oriented to person, place, and time. No cranial nerve deficit.   Skin: Skin is warm and dry.   Psychiatric: His speech is delayed. He is slowed. He exhibits a depressed mood.       Fluids    Intake/Output Summary (Last 24 hours) at 9/27/2019 0815  Last data filed at 9/27/2019 0400  Gross per 24 hour   Intake 1360 ml   Output 700 ml   Net 660 ml       Laboratory  Recent Labs     09/26/19  1042   WBC 3.2*   RBC 3.77*   HEMOGLOBIN 12.7*   HEMATOCRIT 37.9*   .5*   MCH 33.7*   MCHC 33.5*   RDW 53.8*   PLATELETCT 85*   MPV 10.9     Recent Labs     09/26/19  1042   SODIUM 137   POTASSIUM 3.9   CHLORIDE 106   CO2 25   GLUCOSE 78   BUN 12   CREATININE 0.62   CALCIUM 9.1     Recent Labs     09/26/19  1042   APTT 30.4   INR 1.25*               Imaging  No orders to display        Assessment/Plan  Alcoholism (HCC)- (present on admission)  Assessment & Plan  Patient does carry history of alcoholism, he denies any active drinking to me tonight.  Psychiatry team is following  Monitor closely for evidence of withdrawal.  Daily magnesium, thiamine supplementation  B12, TSH, folate    Suicidal ideation- (present on admission)  Assessment & Plan  Psychiatry team has been consulted and we look forward to their evaluation and recommendations.  Probable discharge to an inpatient psych bed.  Admit to legal hold    Diarrhea of presumed infectious origin  Assessment & Plan  Patient complains of diarrhea x1 week.  Is diffuse  constitutional symptoms would imply possibly viral origin.  He complains of chills myalgias arthralgias and generally just feeling poorly.  He has not had any fever, there is been no blood, he has minimal abdominal pain, his abdominal exam is benign.  His labs are benign as well.  He has had no recent antibiotic exposures in the last 6 months, he cannot recall in fact the last time he did have any antibiotics.  Check stool O&P and cultures  Trial Imodium  IV fluids  Repeat labs in the a.m.  Follow abdominal exam  Consider further work-up if it fails to resolve in the next 24 to 48 hours or worsens in any way.    Lumbar back pain with radiculopathy affecting lower extremity- (present on admission)  Assessment & Plan  Patient has chronic back pain.  We will treat him supportively.    Gout- (present on admission)  Assessment & Plan  Stable at this time       VTE prophylaxis: Heparin

## 2019-09-28 ENCOUNTER — APPOINTMENT (OUTPATIENT)
Dept: RADIOLOGY | Facility: MEDICAL CENTER | Age: 69
DRG: 391 | End: 2019-09-28
Attending: NURSE PRACTITIONER
Payer: MEDICARE

## 2019-09-28 PROBLEM — R10.9 ABDOMINAL PAIN: Status: ACTIVE | Noted: 2019-09-28

## 2019-09-28 PROBLEM — D72.819 LEUKOPENIA: Status: ACTIVE | Noted: 2019-09-28

## 2019-09-28 LAB
ANION GAP SERPL CALC-SCNC: 5 MMOL/L (ref 0–11.9)
BASOPHILS # BLD AUTO: 1.1 % (ref 0–1.8)
BASOPHILS # BLD: 0.02 K/UL (ref 0–0.12)
BUN SERPL-MCNC: 12 MG/DL (ref 8–22)
C DIFF DNA SPEC QL NAA+PROBE: NEGATIVE
C DIFF TOX GENS STL QL NAA+PROBE: NEGATIVE
CALCIUM SERPL-MCNC: 8.8 MG/DL (ref 8.5–10.5)
CHLORIDE SERPL-SCNC: 107 MMOL/L (ref 96–112)
CO2 SERPL-SCNC: 27 MMOL/L (ref 20–33)
CREAT SERPL-MCNC: 0.76 MG/DL (ref 0.5–1.4)
E COLI SXT1+2 STL IA: NORMAL
EOSINOPHIL # BLD AUTO: 0.12 K/UL (ref 0–0.51)
EOSINOPHIL NFR BLD: 6.6 % (ref 0–6.9)
ERYTHROCYTE [DISTWIDTH] IN BLOOD BY AUTOMATED COUNT: 54.8 FL (ref 35.9–50)
ERYTHROCYTE [DISTWIDTH] IN BLOOD BY AUTOMATED COUNT: 59.4 FL (ref 35.9–50)
FOLATE SERPL-MCNC: 13.3 NG/ML
GLUCOSE SERPL-MCNC: 80 MG/DL (ref 65–99)
HCT VFR BLD AUTO: 38.3 % (ref 42–52)
HCT VFR BLD AUTO: 40 % (ref 42–52)
HGB BLD-MCNC: 12.5 G/DL (ref 14–18)
HGB BLD-MCNC: 12.6 G/DL (ref 14–18)
HIV 1+2 AB+HIV1 P24 AG SERPL QL IA: NON REACTIVE
IMM GRANULOCYTES # BLD AUTO: 0.01 K/UL (ref 0–0.11)
IMM GRANULOCYTES NFR BLD AUTO: 0.5 % (ref 0–0.9)
LDH SERPL L TO P-CCNC: 115 U/L (ref 107–266)
LYMPHOCYTES # BLD AUTO: 0.65 K/UL (ref 1–4.8)
LYMPHOCYTES NFR BLD: 35.7 % (ref 22–41)
MAGNESIUM SERPL-MCNC: 1.6 MG/DL (ref 1.5–2.5)
MCH RBC QN AUTO: 33.5 PG (ref 27–33)
MCH RBC QN AUTO: 33.7 PG (ref 27–33)
MCHC RBC AUTO-ENTMCNC: 31.3 G/DL (ref 33.7–35.3)
MCHC RBC AUTO-ENTMCNC: 32.9 G/DL (ref 33.7–35.3)
MCV RBC AUTO: 102.4 FL (ref 81.4–97.8)
MCV RBC AUTO: 107.2 FL (ref 81.4–97.8)
MONOCYTES # BLD AUTO: 0.18 K/UL (ref 0–0.85)
MONOCYTES NFR BLD AUTO: 9.9 % (ref 0–13.4)
NEUTROPHILS # BLD AUTO: 0.84 K/UL (ref 1.82–7.42)
NEUTROPHILS NFR BLD: 46.2 % (ref 44–72)
NRBC # BLD AUTO: 0 K/UL
NRBC BLD-RTO: 0 /100 WBC
PHOSPHATE SERPL-MCNC: 3.7 MG/DL (ref 2.5–4.5)
PLATELET # BLD AUTO: 59 K/UL (ref 164–446)
PLATELET # BLD AUTO: 62 K/UL (ref 164–446)
PMV BLD AUTO: 11 FL (ref 9–12.9)
PMV BLD AUTO: 11.3 FL (ref 9–12.9)
POTASSIUM SERPL-SCNC: 4.5 MMOL/L (ref 3.6–5.5)
RBC # BLD AUTO: 3.73 M/UL (ref 4.7–6.1)
RBC # BLD AUTO: 3.74 M/UL (ref 4.7–6.1)
SIGNIFICANT IND 70042: NORMAL
SITE SITE: NORMAL
SODIUM SERPL-SCNC: 139 MMOL/L (ref 135–145)
SOURCE SOURCE: NORMAL
TSH SERPL DL<=0.005 MIU/L-ACNC: 1.37 UIU/ML (ref 0.38–5.33)
VIT B12 SERPL-MCNC: 408 PG/ML (ref 211–911)
WBC # BLD AUTO: 1.6 K/UL (ref 4.8–10.8)
WBC # BLD AUTO: 1.8 K/UL (ref 4.8–10.8)

## 2019-09-28 PROCEDURE — 83735 ASSAY OF MAGNESIUM: CPT

## 2019-09-28 PROCEDURE — 700102 HCHG RX REV CODE 250 W/ 637 OVERRIDE(OP): Performed by: STUDENT IN AN ORGANIZED HEALTH CARE EDUCATION/TRAINING PROGRAM

## 2019-09-28 PROCEDURE — 700111 HCHG RX REV CODE 636 W/ 250 OVERRIDE (IP): Performed by: HOSPITALIST

## 2019-09-28 PROCEDURE — G0475 HIV COMBINATION ASSAY: HCPCS

## 2019-09-28 PROCEDURE — 700102 HCHG RX REV CODE 250 W/ 637 OVERRIDE(OP): Performed by: NURSE PRACTITIONER

## 2019-09-28 PROCEDURE — 84443 ASSAY THYROID STIM HORMONE: CPT

## 2019-09-28 PROCEDURE — A9270 NON-COVERED ITEM OR SERVICE: HCPCS | Performed by: STUDENT IN AN ORGANIZED HEALTH CARE EDUCATION/TRAINING PROGRAM

## 2019-09-28 PROCEDURE — 36415 COLL VENOUS BLD VENIPUNCTURE: CPT

## 2019-09-28 PROCEDURE — 82746 ASSAY OF FOLIC ACID SERUM: CPT

## 2019-09-28 PROCEDURE — 700102 HCHG RX REV CODE 250 W/ 637 OVERRIDE(OP): Performed by: HOSPITALIST

## 2019-09-28 PROCEDURE — A9270 NON-COVERED ITEM OR SERVICE: HCPCS | Performed by: HOSPITALIST

## 2019-09-28 PROCEDURE — 96367 TX/PROPH/DG ADDL SEQ IV INF: CPT

## 2019-09-28 PROCEDURE — 96365 THER/PROPH/DIAG IV INF INIT: CPT

## 2019-09-28 PROCEDURE — 82607 VITAMIN B-12: CPT

## 2019-09-28 PROCEDURE — 80048 BASIC METABOLIC PNL TOTAL CA: CPT

## 2019-09-28 PROCEDURE — 700111 HCHG RX REV CODE 636 W/ 250 OVERRIDE (IP): Performed by: NURSE PRACTITIONER

## 2019-09-28 PROCEDURE — 84100 ASSAY OF PHOSPHORUS: CPT

## 2019-09-28 PROCEDURE — 99232 SBSQ HOSP IP/OBS MODERATE 35: CPT | Performed by: HOSPITALIST

## 2019-09-28 PROCEDURE — 51798 US URINE CAPACITY MEASURE: CPT

## 2019-09-28 PROCEDURE — 770006 HCHG ROOM/CARE - MED/SURG/GYN SEMI*

## 2019-09-28 PROCEDURE — 96366 THER/PROPH/DIAG IV INF ADDON: CPT

## 2019-09-28 PROCEDURE — 700101 HCHG RX REV CODE 250: Performed by: NURSE PRACTITIONER

## 2019-09-28 PROCEDURE — 74176 CT ABD & PELVIS W/O CONTRAST: CPT

## 2019-09-28 PROCEDURE — 96372 THER/PROPH/DIAG INJ SC/IM: CPT

## 2019-09-28 PROCEDURE — A9270 NON-COVERED ITEM OR SERVICE: HCPCS | Performed by: NURSE PRACTITIONER

## 2019-09-28 PROCEDURE — 83615 LACTATE (LD) (LDH) ENZYME: CPT

## 2019-09-28 PROCEDURE — G0378 HOSPITAL OBSERVATION PER HR: HCPCS

## 2019-09-28 PROCEDURE — 85027 COMPLETE CBC AUTOMATED: CPT

## 2019-09-28 PROCEDURE — 85025 COMPLETE CBC W/AUTO DIFF WBC: CPT

## 2019-09-28 RX ORDER — THIAMINE MONONITRATE (VIT B1) 100 MG
100 TABLET ORAL DAILY
Status: DISCONTINUED | OUTPATIENT
Start: 2019-09-28 | End: 2019-10-05 | Stop reason: HOSPADM

## 2019-09-28 RX ORDER — CIPROFLOXACIN 2 MG/ML
400 INJECTION, SOLUTION INTRAVENOUS EVERY 12 HOURS
Status: DISCONTINUED | OUTPATIENT
Start: 2019-09-28 | End: 2019-09-29

## 2019-09-28 RX ORDER — MAGNESIUM SULFATE HEPTAHYDRATE 40 MG/ML
2 INJECTION, SOLUTION INTRAVENOUS ONCE
Status: COMPLETED | OUTPATIENT
Start: 2019-09-28 | End: 2019-09-28

## 2019-09-28 RX ORDER — FOLIC ACID 1 MG/1
1 TABLET ORAL DAILY
Status: DISCONTINUED | OUTPATIENT
Start: 2019-09-28 | End: 2019-10-05 | Stop reason: HOSPADM

## 2019-09-28 RX ADMIN — FOLIC ACID 1 MG: 1 TABLET ORAL at 12:41

## 2019-09-28 RX ADMIN — DULOXETINE HYDROCHLORIDE 40 MG: 20 CAPSULE, DELAYED RELEASE ORAL at 06:02

## 2019-09-28 RX ADMIN — CIPROFLOXACIN 400 MG: 2 INJECTION, SOLUTION INTRAVENOUS at 17:56

## 2019-09-28 RX ADMIN — GABAPENTIN 300 MG: 300 CAPSULE ORAL at 15:11

## 2019-09-28 RX ADMIN — HEPARIN SODIUM 5000 UNITS: 5000 INJECTION INTRAVENOUS; SUBCUTANEOUS at 15:11

## 2019-09-28 RX ADMIN — GABAPENTIN 300 MG: 300 CAPSULE ORAL at 08:18

## 2019-09-28 RX ADMIN — QUETIAPINE FUMARATE 200 MG: 100 TABLET ORAL at 17:56

## 2019-09-28 RX ADMIN — HEPARIN SODIUM 5000 UNITS: 5000 INJECTION INTRAVENOUS; SUBCUTANEOUS at 21:52

## 2019-09-28 RX ADMIN — TRAZODONE HYDROCHLORIDE 100 MG: 50 TABLET ORAL at 21:51

## 2019-09-28 RX ADMIN — METRONIDAZOLE 500 MG: 500 INJECTION, SOLUTION INTRAVENOUS at 21:52

## 2019-09-28 RX ADMIN — METRONIDAZOLE 500 MG: 500 INJECTION, SOLUTION INTRAVENOUS at 15:11

## 2019-09-28 RX ADMIN — GABAPENTIN 300 MG: 300 CAPSULE ORAL at 23:42

## 2019-09-28 RX ADMIN — MAGNESIUM SULFATE 2 G: 2 INJECTION INTRAVENOUS at 11:13

## 2019-09-28 RX ADMIN — THERA TABS 1 TABLET: TAB at 12:41

## 2019-09-28 RX ADMIN — Medication 100 MG: at 11:32

## 2019-09-28 RX ADMIN — ACETAMINOPHEN 650 MG: 325 TABLET, FILM COATED ORAL at 08:22

## 2019-09-28 ASSESSMENT — ENCOUNTER SYMPTOMS
DIAPHORESIS: 0
PALPITATIONS: 0
NAUSEA: 0
WEAKNESS: 1
FALLS: 0
BLURRED VISION: 0
FOCAL WEAKNESS: 0
DIARRHEA: 1
ABDOMINAL PAIN: 1
DIZZINESS: 0
CONSTIPATION: 0
TINGLING: 0
CHILLS: 0
BLOOD IN STOOL: 0
SENSORY CHANGE: 0
NECK PAIN: 0
VOMITING: 0
BACK PAIN: 0
HEADACHES: 0
MYALGIAS: 1
FEVER: 0

## 2019-09-28 NOTE — CARE PLAN
Problem: Safety  Goal: Will remain free from injury  Outcome: PROGRESSING AS EXPECTED  Goal: Will remain free from falls  Outcome: PROGRESSING AS EXPECTED     Problem: Communication  Goal: The ability to communicate needs accurately and effectively will improve  Outcome: PROGRESSING SLOWER THAN EXPECTED     Problem: Bowel/Gastric:  Goal: Normal bowel function is maintained or improved  Outcome: PROGRESSING SLOWER THAN EXPECTED     Problem: Knowledge Deficit  Goal: Knowledge of disease process/condition, treatment plan, diagnostic tests, and medications will improve  Outcome: PROGRESSING SLOWER THAN EXPECTED     Problem: Discharge Barriers/Planning  Goal: Patient's continuum of care needs will be met  Outcome: PROGRESSING SLOWER THAN EXPECTED

## 2019-09-28 NOTE — PROGRESS NOTES
Eros from Lab called with critical result of WBC 1.6 at 0859. Critical lab result read back to Eros.   ROWAN Shelton notified of critical lab result at 0902.  Critical lab result read back by ROWAN Shelton.    New orders placed for a redraw for CBC with differential STAT and thiamine tablet ordered.

## 2019-09-28 NOTE — PROGRESS NOTES
Hospital Medicine Daily Progress Note    Date of Service  9/28/2019    Chief Complaint  69 y.o. male admitted 9/26/2019 with diarrhea and SI    Hospital Course    Patient is a 69-year-old male with history of methamphetamine abuse, severe depression, alcohol abuse, chronic low back pain, gout here with suicidal ideation.  Says he was given a jump in front of a train or a bus but decided to talk with a psychiatrist first.  He also relates history of 7 days of diarrhea and generalized malaise body aches.  Suspect to be suffering from a likely viral gastroenteritis and treated conservatively.      Interval Problem Update  C/O LLQ/RLE abd pain, diarrhea w/o hematochezia or melena and chills.  Leukopenia 1.8 w ANC <1  Thrombocytopenia  HIV normal  VSS and WNL; mild bradycardia at night w normal BP  Bladder scan ok  CT A/P --- OK  O/P, stool cx in progress  Added C-diff to r/o community acquired given leukopenia; running on prior sample  Remains on legal hold     Consultants/Specialty  Psychiatry    Code Status  Full    Disposition  To be determined.  Patient is currently on a legal hold with suicidal ideation precautions    Review of Systems  Review of Systems   Constitutional: Positive for malaise/fatigue. Negative for chills, diaphoresis and fever.   Eyes: Negative for blurred vision.   Cardiovascular: Negative for chest pain and palpitations.   Gastrointestinal: Positive for abdominal pain and diarrhea. Negative for blood in stool, constipation, melena, nausea and vomiting.   Musculoskeletal: Positive for myalgias. Negative for back pain, falls, joint pain and neck pain.   Neurological: Positive for weakness. Negative for dizziness, tingling, sensory change, focal weakness and headaches.   All other systems reviewed and are negative.       Physical Exam  Temp:  [36.5 °C (97.7 °F)-36.8 °C (98.2 °F)] 36.7 °C (98.1 °F)  Pulse:  [48-72] 54  Resp:  [16-17] 16  BP: (104-141)/(67-84) 108/72  SpO2:  [94 %-97 %] 94  %    Physical Exam   Constitutional: He is oriented to person, place, and time. He appears well-developed and well-nourished. No distress.   HENT:   Head: Normocephalic and atraumatic.   Eyes: Pupils are equal, round, and reactive to light. EOM are normal.   Neck: Neck supple.   Cardiovascular: Normal rate, regular rhythm and normal heart sounds.   Pulmonary/Chest: Effort normal and breath sounds normal. No respiratory distress. He has no wheezes. He has no rales.   Abdominal: Soft. He exhibits no distension. There is no tenderness.   Neurological: He is alert and oriented to person, place, and time. No cranial nerve deficit.   Skin: Skin is warm and dry.   Psychiatric: His speech is delayed. He is slowed. He exhibits a depressed mood.       Fluids    Intake/Output Summary (Last 24 hours) at 9/28/2019 0745  Last data filed at 9/28/2019 0600  Gross per 24 hour   Intake 1220 ml   Output 1390 ml   Net -170 ml       Laboratory  Recent Labs     09/26/19  1042   WBC 3.2*   RBC 3.77*   HEMOGLOBIN 12.7*   HEMATOCRIT 37.9*   .5*   MCH 33.7*   MCHC 33.5*   RDW 53.8*   PLATELETCT 85*   MPV 10.9     Recent Labs     09/26/19  1042   SODIUM 137   POTASSIUM 3.9   CHLORIDE 106   CO2 25   GLUCOSE 78   BUN 12   CREATININE 0.62   CALCIUM 9.1     Recent Labs     09/26/19  1042   APTT 30.4   INR 1.25*               Imaging  No orders to display        Assessment/Plan  Alcoholism (HCC)- (present on admission)  Assessment & Plan  Patient does carry history of alcoholism, he denies any active drinking to me tonight.  Psychiatry team is following  Monitor closely for evidence of withdrawal.  Daily magnesium, thiamine supplementation  B12, TSH, folate    Suicidal ideation- (present on admission)  Assessment & Plan  Psychiatry team has been consulted and we look forward to their evaluation and recommendations.  Probable discharge to an inpatient psych bed.  Admit to legal hold    Leukopenia  Assessment & Plan  Stat recheck 9/28 w WBCs  at 1.8  ANC <1  No sepsis criteria/acidosis  Likely immunocompromised  9/28 Start 5d course of Cipro/Flagyl --- transition to PO if stable x 24-48 hours    Abdominal pain  Assessment & Plan  CT A/P      Diarrhea of presumed infectious origin  Assessment & Plan  Patient complains of diarrhea x1 week.  Is diffuse constitutional symptoms would imply possibly viral origin.  He complains of chills myalgias arthralgias and generally just feeling poorly.  He has not had any fever, there is been no blood,   He has had no recent antibiotic exposures in the last 6 months, he cannot recall in fact the last time he did have any antibiotics.  Check stool O&P and cultures  IV fluids  Repeat labs in the a.m. w leukopenia  Abdominal pain persistent  C-diff w iso  CT A/P - ok  Cipro/Flagyl x 5 d - transition to PO if stable    Lumbar back pain with radiculopathy affecting lower extremity- (present on admission)  Assessment & Plan  Patient has chronic back pain.  We will treat him supportively.    Gout- (present on admission)  Assessment & Plan  Stable at this time       VTE prophylaxis: Heparin

## 2019-09-28 NOTE — PROGRESS NOTES
Alert and oriented x4. On legal hold with PSA sitter at bedside. Offered fluids and snacks. Safety precautions in placed. Bed in lowest position. Upper side rails up. Treaded socks on. Reinforced the use of call light when needing assistance.

## 2019-09-28 NOTE — ASSESSMENT & PLAN NOTE
Stat recheck 9/28 w WBCs at 1.8  ANC <1  No sepsis criteria/acidosis  Possible hemodilution; stop fluids and follow  Bone marrow bx  Hematology consult;   Hx of ETOH abuse in the past

## 2019-09-28 NOTE — PROGRESS NOTES
Pt back from CT, new orders placed for magnesium sulfate IV. Bladder scan ordered for pt, pt states cannot urinate, post void bladder scan ordered. Traction called.

## 2019-09-29 LAB
ANION GAP SERPL CALC-SCNC: 3 MMOL/L (ref 0–11.9)
BASOPHILS # BLD AUTO: 1.1 % (ref 0–1.8)
BASOPHILS # BLD: 0.02 K/UL (ref 0–0.12)
BUN SERPL-MCNC: 15 MG/DL (ref 8–22)
CALCIUM SERPL-MCNC: 8.6 MG/DL (ref 8.5–10.5)
CHLORIDE SERPL-SCNC: 106 MMOL/L (ref 96–112)
CO2 SERPL-SCNC: 29 MMOL/L (ref 20–33)
CREAT SERPL-MCNC: 0.89 MG/DL (ref 0.5–1.4)
EOSINOPHIL # BLD AUTO: 0.13 K/UL (ref 0–0.51)
EOSINOPHIL NFR BLD: 6.9 % (ref 0–6.9)
ERYTHROCYTE [DISTWIDTH] IN BLOOD BY AUTOMATED COUNT: 54.4 FL (ref 35.9–50)
GLUCOSE SERPL-MCNC: 78 MG/DL (ref 65–99)
HCT VFR BLD AUTO: 33.7 % (ref 42–52)
HGB BLD-MCNC: 11.1 G/DL (ref 14–18)
IMM GRANULOCYTES # BLD AUTO: 0 K/UL (ref 0–0.11)
IMM GRANULOCYTES NFR BLD AUTO: 0 % (ref 0–0.9)
LYMPHOCYTES # BLD AUTO: 0.66 K/UL (ref 1–4.8)
LYMPHOCYTES NFR BLD: 34.9 % (ref 22–41)
MAGNESIUM SERPL-MCNC: 1.8 MG/DL (ref 1.5–2.5)
MCH RBC QN AUTO: 33.6 PG (ref 27–33)
MCHC RBC AUTO-ENTMCNC: 32.9 G/DL (ref 33.7–35.3)
MCV RBC AUTO: 102.1 FL (ref 81.4–97.8)
MONOCYTES # BLD AUTO: 0.21 K/UL (ref 0–0.85)
MONOCYTES NFR BLD AUTO: 11.1 % (ref 0–13.4)
NEUTROPHILS # BLD AUTO: 0.87 K/UL (ref 1.82–7.42)
NEUTROPHILS NFR BLD: 46 % (ref 44–72)
NRBC # BLD AUTO: 0 K/UL
NRBC BLD-RTO: 0 /100 WBC
PHOSPHATE SERPL-MCNC: 4 MG/DL (ref 2.5–4.5)
PLATELET # BLD AUTO: 61 K/UL (ref 164–446)
PMV BLD AUTO: 11.5 FL (ref 9–12.9)
POTASSIUM SERPL-SCNC: 4.7 MMOL/L (ref 3.6–5.5)
RBC # BLD AUTO: 3.3 M/UL (ref 4.7–6.1)
SODIUM SERPL-SCNC: 138 MMOL/L (ref 135–145)
WBC # BLD AUTO: 1.9 K/UL (ref 4.8–10.8)

## 2019-09-29 PROCEDURE — 770006 HCHG ROOM/CARE - MED/SURG/GYN SEMI*

## 2019-09-29 PROCEDURE — 83735 ASSAY OF MAGNESIUM: CPT

## 2019-09-29 PROCEDURE — 700111 HCHG RX REV CODE 636 W/ 250 OVERRIDE (IP): Performed by: HOSPITALIST

## 2019-09-29 PROCEDURE — A9270 NON-COVERED ITEM OR SERVICE: HCPCS | Performed by: STUDENT IN AN ORGANIZED HEALTH CARE EDUCATION/TRAINING PROGRAM

## 2019-09-29 PROCEDURE — A9270 NON-COVERED ITEM OR SERVICE: HCPCS | Performed by: HOSPITALIST

## 2019-09-29 PROCEDURE — 700102 HCHG RX REV CODE 250 W/ 637 OVERRIDE(OP): Performed by: HOSPITALIST

## 2019-09-29 PROCEDURE — 36415 COLL VENOUS BLD VENIPUNCTURE: CPT

## 2019-09-29 PROCEDURE — 700102 HCHG RX REV CODE 250 W/ 637 OVERRIDE(OP): Performed by: NURSE PRACTITIONER

## 2019-09-29 PROCEDURE — 700102 HCHG RX REV CODE 250 W/ 637 OVERRIDE(OP): Performed by: STUDENT IN AN ORGANIZED HEALTH CARE EDUCATION/TRAINING PROGRAM

## 2019-09-29 PROCEDURE — 85025 COMPLETE CBC W/AUTO DIFF WBC: CPT

## 2019-09-29 PROCEDURE — 84100 ASSAY OF PHOSPHORUS: CPT

## 2019-09-29 PROCEDURE — 700101 HCHG RX REV CODE 250: Performed by: NURSE PRACTITIONER

## 2019-09-29 PROCEDURE — 99232 SBSQ HOSP IP/OBS MODERATE 35: CPT | Performed by: HOSPITALIST

## 2019-09-29 PROCEDURE — 700111 HCHG RX REV CODE 636 W/ 250 OVERRIDE (IP): Performed by: NURSE PRACTITIONER

## 2019-09-29 PROCEDURE — 80048 BASIC METABOLIC PNL TOTAL CA: CPT

## 2019-09-29 PROCEDURE — A9270 NON-COVERED ITEM OR SERVICE: HCPCS | Performed by: NURSE PRACTITIONER

## 2019-09-29 RX ORDER — MAGNESIUM SULFATE HEPTAHYDRATE 40 MG/ML
2 INJECTION, SOLUTION INTRAVENOUS ONCE
Status: COMPLETED | OUTPATIENT
Start: 2019-09-29 | End: 2019-09-29

## 2019-09-29 RX ORDER — CIPROFLOXACIN 500 MG/1
500 TABLET, FILM COATED ORAL EVERY 12 HOURS
Status: DISCONTINUED | OUTPATIENT
Start: 2019-09-29 | End: 2019-09-29

## 2019-09-29 RX ORDER — METRONIDAZOLE 500 MG/1
500 TABLET ORAL EVERY 8 HOURS
Status: DISCONTINUED | OUTPATIENT
Start: 2019-09-29 | End: 2019-09-29

## 2019-09-29 RX ADMIN — HEPARIN SODIUM 5000 UNITS: 5000 INJECTION INTRAVENOUS; SUBCUTANEOUS at 04:33

## 2019-09-29 RX ADMIN — ACETAMINOPHEN 650 MG: 325 TABLET, FILM COATED ORAL at 12:51

## 2019-09-29 RX ADMIN — GABAPENTIN 300 MG: 300 CAPSULE ORAL at 18:24

## 2019-09-29 RX ADMIN — GABAPENTIN 300 MG: 300 CAPSULE ORAL at 09:09

## 2019-09-29 RX ADMIN — DULOXETINE HYDROCHLORIDE 40 MG: 20 CAPSULE, DELAYED RELEASE ORAL at 04:29

## 2019-09-29 RX ADMIN — THERA TABS 1 TABLET: TAB at 04:29

## 2019-09-29 RX ADMIN — QUETIAPINE FUMARATE 200 MG: 100 TABLET ORAL at 18:24

## 2019-09-29 RX ADMIN — Medication 100 MG: at 04:29

## 2019-09-29 RX ADMIN — LOPERAMIDE HYDROCHLORIDE 2 MG: 2 CAPSULE ORAL at 09:09

## 2019-09-29 RX ADMIN — CIPROFLOXACIN 400 MG: 2 INJECTION, SOLUTION INTRAVENOUS at 04:36

## 2019-09-29 RX ADMIN — FOLIC ACID 1 MG: 1 TABLET ORAL at 04:29

## 2019-09-29 RX ADMIN — TRAZODONE HYDROCHLORIDE 100 MG: 50 TABLET ORAL at 21:22

## 2019-09-29 RX ADMIN — LISINOPRIL 20 MG: 20 TABLET ORAL at 04:29

## 2019-09-29 RX ADMIN — METRONIDAZOLE 500 MG: 500 INJECTION, SOLUTION INTRAVENOUS at 05:53

## 2019-09-29 RX ADMIN — MAGNESIUM SULFATE 2 G: 2 INJECTION INTRAVENOUS at 08:04

## 2019-09-29 RX ADMIN — GABAPENTIN 300 MG: 300 CAPSULE ORAL at 23:30

## 2019-09-29 RX ADMIN — ACETAMINOPHEN 650 MG: 325 TABLET, FILM COATED ORAL at 04:28

## 2019-09-29 ASSESSMENT — ENCOUNTER SYMPTOMS
DEPRESSION: 1
DIZZINESS: 0
ABDOMINAL PAIN: 1
SENSORY CHANGE: 0
CONSTIPATION: 0
DIAPHORESIS: 0
FOCAL WEAKNESS: 0
HEADACHES: 0
NECK PAIN: 0
BLURRED VISION: 0
PALPITATIONS: 0
BLOOD IN STOOL: 0
WEAKNESS: 1
CHILLS: 0
BACK PAIN: 0
MYALGIAS: 1
TINGLING: 0
FALLS: 0
FEVER: 0
VOMITING: 0
NAUSEA: 0

## 2019-09-29 NOTE — PROGRESS NOTES
Mena  from Lab called with critical result of WBC=1.9 at 0741. Critical lab result read back to Mena.   Dr. Melendrez notified of critical lab result at 0751.  Critical lab result read back by Dr. Longo.

## 2019-09-29 NOTE — CARE PLAN
Problem: Communication  Goal: The ability to communicate needs accurately and effectively will improve  Outcome: PROGRESSING AS EXPECTED     Problem: Safety  Goal: Will remain free from injury  Outcome: PROGRESSING AS EXPECTED    Pt on legal hold. Safety sitter at bedside.  No sharp or dangerous items in pt room.

## 2019-09-29 NOTE — PROGRESS NOTES
Assumed care of patient at change of shift.  During change of shift report, patient (pt) laying in bed, on room air and continuous IV fluids in place, with 1:1 safety sitter bedside.  During assessment, pt A&Ox4, flat and subdued, but more easily engaged in conversation than previous days.

## 2019-09-29 NOTE — PROGRESS NOTES
Hospital Medicine Daily Progress Note    Date of Service  9/29/2019    Chief Complaint  69 y.o. male admitted 9/26/2019 with diarrhea and SI    Hospital Course    Patient is a 69-year-old male with history of methamphetamine abuse, severe depression, alcohol abuse, chronic low back pain, gout here with suicidal ideation.  Says he was given a jump in front of a train or a bus but decided to talk with a psychiatrist first.  He also relates history of 7 days of diarrhea and generalized malaise body aches.  Suspect to be suffering from a likely viral gastroenteritis and treated conservatively.      Interval Problem Update  Patient complains of generalized malaise, fatigue, abdominal pain and feeling depressed.  CT scan nonacute.  Laboratory work-up was overall stable and with mild microcytic anemia likely due to alcohol and nutritional deficiency  Leukopenia slowly improving; WBCs 1.9; ANC 0.87  This was low normal on admission; likely due to viral enteritis  Continue antibiotics given immunocompromised status  Stool cultures incomplete; no organisms TD  Magnesium still low normal    Consultants/Specialty  Psychiatry    Code Status  Full    Disposition  To be determined.  Patient is currently on a legal hold with suicidal ideation precautions    Review of Systems  Review of Systems   Constitutional: Positive for malaise/fatigue. Negative for chills, diaphoresis and fever.   Eyes: Negative for blurred vision.   Cardiovascular: Negative for chest pain and palpitations.   Gastrointestinal: Positive for abdominal pain. Negative for blood in stool, constipation, melena, nausea and vomiting.   Musculoskeletal: Positive for myalgias. Negative for back pain, falls, joint pain and neck pain.   Neurological: Positive for weakness. Negative for dizziness, tingling, sensory change, focal weakness and headaches.   Psychiatric/Behavioral: Positive for depression.   All other systems reviewed and are negative.       Physical  Exam  Temp:  [36.4 °C (97.5 °F)-37.2 °C (98.9 °F)] 36.4 °C (97.5 °F)  Pulse:  [49-66] 49  Resp:  [17-18] 17  BP: (108-121)/(73-80) 116/77  SpO2:  [92 %-96 %] 94 %    Physical Exam   Constitutional: He is oriented to person, place, and time. He appears well-developed and well-nourished. No distress.   HENT:   Head: Normocephalic and atraumatic.   Eyes: Pupils are equal, round, and reactive to light. EOM are normal.   Neck: Neck supple.   Cardiovascular: Normal rate, regular rhythm and normal heart sounds.   Pulmonary/Chest: Effort normal and breath sounds normal. No respiratory distress. He has no wheezes. He has no rales.   Abdominal: Soft. He exhibits no distension. There is no tenderness.   Neurological: He is alert and oriented to person, place, and time. No cranial nerve deficit.   Skin: Skin is warm and dry.   Psychiatric: His speech is delayed. He is slowed. He exhibits a depressed mood.       Fluids    Intake/Output Summary (Last 24 hours) at 9/29/2019 0823  Last data filed at 9/29/2019 0735  Gross per 24 hour   Intake 1770 ml   Output 2400 ml   Net -630 ml       Laboratory  Recent Labs     09/28/19  0830 09/28/19  0931 09/29/19  0710   WBC 1.6* 1.8* 1.9*   RBC 3.74* 3.73* 3.30*   HEMOGLOBIN 12.6* 12.5* 11.1*   HEMATOCRIT 38.3* 40.0* 33.7*   .4* 107.2* 102.1*   MCH 33.7* 33.5* 33.6*   MCHC 32.9* 31.3* 32.9*   RDW 54.8* 59.4* 54.4*   PLATELETCT 62* 59* 61*   MPV 11.3 11.0 11.5     Recent Labs     09/26/19  1042 09/28/19  0830 09/29/19  0710   SODIUM 137 139 138   POTASSIUM 3.9 4.5 4.7   CHLORIDE 106 107 106   CO2 25 27 29   GLUCOSE 78 80 78   BUN 12 12 15   CREATININE 0.62 0.76 0.89   CALCIUM 9.1 8.8 8.6     Recent Labs     09/26/19  1042   APTT 30.4   INR 1.25*               Imaging  CT-ABDOMEN-PELVIS W/O   Final Result         1.  No evidence of bowel obstruction or bowel inflammation.      2.  Mild splenomegaly.      3.  No abdominal or pelvic adenopathy.      4.  Cholelithiasis. No biliary  dilatation.           Assessment/Plan  Alcoholism (HCC)- (present on admission)  Assessment & Plan  Patient does carry history of alcoholism, he denies any active drinking   Psychiatry team is following  Monitor closely for evidence of withdrawal.  Daily magnesium, thiamine supplementation  B12, TSH, folate  Replace vitamins oral, Mag IV daily until >2.0    Suicidal ideation- (present on admission)  Assessment & Plan  Psychiatry c/s   Probable discharge to an inpatient psych bed.    Remains on legal hold    Leukopenia  Assessment & Plan  Stat recheck 9/28 w WBCs at 1.8  ANC <1  No sepsis criteria/acidosis  Possible hemodilution; stop fluids and follow  Consider bone marrow bx vs hematology consult; hx of ETOH abuse in the past    Abdominal pain  Assessment & Plan  CT A/P ok  Likely viral etiology      Diarrhea of presumed infectious origin  Assessment & Plan  Patient complains of diarrhea x1 week.  Is diffuse constitutional symptoms would imply possibly viral origin.  He complains of chills myalgias arthralgias and generally just feeling poorly.  He has not had any fever, there is been no blood,   He has had no recent antibiotic exposures in the last 6 months, he cannot recall in fact the last time he did have any antibiotics.  Check stool O&P and cultures  Repeat labs in the a.m. w leukopenia  Abdominal pain persistent  C-diff neg  CT A/P - ok  Cipro/Flagyl stopped as no evidence of colitis      Lumbar back pain with radiculopathy affecting lower extremity- (present on admission)  Assessment & Plan  Patient has chronic back pain.  We will treat him supportively.    Gout- (present on admission)  Assessment & Plan  Stable at this time       VTE prophylaxis: Heparin

## 2019-09-29 NOTE — CARE PLAN
Problem: Safety  Goal: Will remain free from injury  Outcome: PROGRESSING AS EXPECTED  Goal: Will remain free from falls  Outcome: PROGRESSING AS EXPECTED     Problem: Infection  Goal: Will remain free from infection  Outcome: PROGRESSING AS EXPECTED     Problem: Communication  Goal: The ability to communicate needs accurately and effectively will improve  Outcome: PROGRESSING SLOWER THAN EXPECTED     Problem: Bowel/Gastric:  Goal: Normal bowel function is maintained or improved  Outcome: PROGRESSING SLOWER THAN EXPECTED     Problem: Psychosocial Needs:  Goal: Level of anxiety will decrease  Outcome: PROGRESSING SLOWER THAN EXPECTED

## 2019-09-29 NOTE — PROGRESS NOTES
A&Ox4  1:1 sitter at bedside for legal hold. Pt still has SI thoughts. No visitors or phone calls allowed at this time. Currently on special contact ISO for c.dif rule out and protective precautions for low WBC.  Pt has not had any bm thus far tonight. LR running @ 100. Pt requesting different dinner tray. Provided pt with food from cafeteria.  All needs met at this time.     Bed is locked and in lowest position, call light and bedside table are within reach, treaded slipper socks are on, and hourly rounding is in place.

## 2019-09-29 NOTE — PROGRESS NOTES
Pt A/O x 4, on a legal hold for SI. Pt states to continue to have SI thoughts. No plan at this time. C/O body aches, medicated with PRN tylenol per MAR. Pt's critical WBC, started on IV antibiotics today of Flagyl and Cipro, pt is aware. POC discussed with pt at bedside with ROWAN Garcia, pt verbalized understanding. Pt went to a CT of abdomen/pelvis today. C. Diff rule out, however results came back negative. Pt is not having loose stool anymore, have been formed today. Pt has been having more Flatus today. Lactated ringers running at 100 ml/hr. Pt received IV magnesium today and multivitamins PO today as well. Pt is urinating better as well after bladder scan, not retaining at this time. 1:1 sitter at bedside. All needs met at this time. Bed in lowest position and locked. Instructed pt to call for assistance if needed. Pt verbalized understanding.

## 2019-09-30 LAB
ANION GAP SERPL CALC-SCNC: 5 MMOL/L (ref 0–11.9)
BACTERIA STL CULT: NORMAL
BASOPHILS # BLD AUTO: 1.2 % (ref 0–1.8)
BASOPHILS # BLD: 0.02 K/UL (ref 0–0.12)
BUN SERPL-MCNC: 15 MG/DL (ref 8–22)
CALCIUM SERPL-MCNC: 8.8 MG/DL (ref 8.5–10.5)
CHLORIDE SERPL-SCNC: 107 MMOL/L (ref 96–112)
CO2 SERPL-SCNC: 27 MMOL/L (ref 20–33)
CREAT SERPL-MCNC: 0.81 MG/DL (ref 0.5–1.4)
E COLI SXT1+2 STL IA: NORMAL
EKG IMPRESSION: NORMAL
EOSINOPHIL # BLD AUTO: 0.12 K/UL (ref 0–0.51)
EOSINOPHIL NFR BLD: 7.4 % (ref 0–6.9)
ERYTHROCYTE [DISTWIDTH] IN BLOOD BY AUTOMATED COUNT: 53.1 FL (ref 35.9–50)
GLUCOSE SERPL-MCNC: 79 MG/DL (ref 65–99)
HCT VFR BLD AUTO: 33.8 % (ref 42–52)
HGB BLD-MCNC: 11.1 G/DL (ref 14–18)
IMM GRANULOCYTES # BLD AUTO: 0.01 K/UL (ref 0–0.11)
IMM GRANULOCYTES NFR BLD AUTO: 0.6 % (ref 0–0.9)
LYMPHOCYTES # BLD AUTO: 0.64 K/UL (ref 1–4.8)
LYMPHOCYTES NFR BLD: 39.5 % (ref 22–41)
MAGNESIUM SERPL-MCNC: 1.9 MG/DL (ref 1.5–2.5)
MCH RBC QN AUTO: 33.3 PG (ref 27–33)
MCHC RBC AUTO-ENTMCNC: 32.8 G/DL (ref 33.7–35.3)
MCV RBC AUTO: 101.5 FL (ref 81.4–97.8)
MONOCYTES # BLD AUTO: 0.19 K/UL (ref 0–0.85)
MONOCYTES NFR BLD AUTO: 11.7 % (ref 0–13.4)
NEUTROPHILS # BLD AUTO: 0.64 K/UL (ref 1.82–7.42)
NEUTROPHILS NFR BLD: 39.6 % (ref 44–72)
NRBC # BLD AUTO: 0 K/UL
NRBC BLD-RTO: 0 /100 WBC
PLATELET # BLD AUTO: 63 K/UL (ref 164–446)
PMV BLD AUTO: 12.1 FL (ref 9–12.9)
POTASSIUM SERPL-SCNC: 4.5 MMOL/L (ref 3.6–5.5)
RBC # BLD AUTO: 3.33 M/UL (ref 4.7–6.1)
SIGNIFICANT IND 70042: NORMAL
SITE SITE: NORMAL
SODIUM SERPL-SCNC: 139 MMOL/L (ref 135–145)
SOURCE SOURCE: NORMAL
WBC # BLD AUTO: 1.6 K/UL (ref 4.8–10.8)

## 2019-09-30 PROCEDURE — 700102 HCHG RX REV CODE 250 W/ 637 OVERRIDE(OP): Performed by: HOSPITALIST

## 2019-09-30 PROCEDURE — 700102 HCHG RX REV CODE 250 W/ 637 OVERRIDE(OP): Performed by: NURSE PRACTITIONER

## 2019-09-30 PROCEDURE — 99233 SBSQ HOSP IP/OBS HIGH 50: CPT | Performed by: HOSPITALIST

## 2019-09-30 PROCEDURE — A9270 NON-COVERED ITEM OR SERVICE: HCPCS | Performed by: STUDENT IN AN ORGANIZED HEALTH CARE EDUCATION/TRAINING PROGRAM

## 2019-09-30 PROCEDURE — 700105 HCHG RX REV CODE 258: Performed by: HOSPITALIST

## 2019-09-30 PROCEDURE — A9270 NON-COVERED ITEM OR SERVICE: HCPCS | Performed by: HOSPITALIST

## 2019-09-30 PROCEDURE — 93010 ELECTROCARDIOGRAM REPORT: CPT | Performed by: INTERNAL MEDICINE

## 2019-09-30 PROCEDURE — 80048 BASIC METABOLIC PNL TOTAL CA: CPT

## 2019-09-30 PROCEDURE — 770021 HCHG ROOM/CARE - ISO PRIVATE

## 2019-09-30 PROCEDURE — A9270 NON-COVERED ITEM OR SERVICE: HCPCS | Performed by: NURSE PRACTITIONER

## 2019-09-30 PROCEDURE — 700102 HCHG RX REV CODE 250 W/ 637 OVERRIDE(OP): Performed by: STUDENT IN AN ORGANIZED HEALTH CARE EDUCATION/TRAINING PROGRAM

## 2019-09-30 PROCEDURE — 83735 ASSAY OF MAGNESIUM: CPT

## 2019-09-30 PROCEDURE — 85025 COMPLETE CBC W/AUTO DIFF WBC: CPT

## 2019-09-30 PROCEDURE — 93005 ELECTROCARDIOGRAM TRACING: CPT | Performed by: NURSE PRACTITIONER

## 2019-09-30 PROCEDURE — 36415 COLL VENOUS BLD VENIPUNCTURE: CPT

## 2019-09-30 RX ORDER — SODIUM CHLORIDE 9 MG/ML
500 INJECTION, SOLUTION INTRAVENOUS ONCE
Status: COMPLETED | OUTPATIENT
Start: 2019-09-30 | End: 2019-09-30

## 2019-09-30 RX ADMIN — THERA TABS 1 TABLET: TAB at 05:57

## 2019-09-30 RX ADMIN — GABAPENTIN 300 MG: 300 CAPSULE ORAL at 08:36

## 2019-09-30 RX ADMIN — GABAPENTIN 300 MG: 300 CAPSULE ORAL at 18:17

## 2019-09-30 RX ADMIN — LISINOPRIL 20 MG: 20 TABLET ORAL at 05:57

## 2019-09-30 RX ADMIN — Medication 100 MG: at 06:01

## 2019-09-30 RX ADMIN — QUETIAPINE FUMARATE 200 MG: 100 TABLET ORAL at 18:17

## 2019-09-30 RX ADMIN — SODIUM CHLORIDE 500 ML: 9 INJECTION, SOLUTION INTRAVENOUS at 20:54

## 2019-09-30 RX ADMIN — ACETAMINOPHEN 650 MG: 325 TABLET, FILM COATED ORAL at 00:06

## 2019-09-30 RX ADMIN — DULOXETINE HYDROCHLORIDE 40 MG: 20 CAPSULE, DELAYED RELEASE ORAL at 05:57

## 2019-09-30 RX ADMIN — FOLIC ACID 1 MG: 1 TABLET ORAL at 05:57

## 2019-09-30 ASSESSMENT — ENCOUNTER SYMPTOMS
VOMITING: 0
FALLS: 0
NAUSEA: 0
CHILLS: 0
BLURRED VISION: 0
BLOOD IN STOOL: 0
TINGLING: 0
NECK PAIN: 0
WEAKNESS: 0
HEADACHES: 0
FEVER: 0
FOCAL WEAKNESS: 0
ABDOMINAL PAIN: 0
PALPITATIONS: 0
BACK PAIN: 0
SENSORY CHANGE: 0
CONSTIPATION: 0
DEPRESSION: 1
MYALGIAS: 0
DIAPHORESIS: 0
DIZZINESS: 0

## 2019-09-30 NOTE — PROGRESS NOTES
Alert and oriented x4 with KAREN Smart at bedside. On special contact and protective isolation. Offered snacks and fluids. Safety precautions in placed. Bed in lowest position. Upper side rails up. Treaded socks on. Reinforced to ask for help when needing assistance.

## 2019-09-30 NOTE — DISCHARGE PLANNING
Anticipated Discharge Disposition: [facility]    Action: Discussed patients needs during IDT rounds.  Patient is not medically clear for discharge.  Pending oncology consult and recommendations    Barriers to Discharge: medical clearance/mental health acceptance    Plan: follow up for medical clearance

## 2019-09-30 NOTE — PROGRESS NOTES
Pt refused to have yellow fall risk band. KAREN Smart is at bedside. Education provided. Still pt refused. Charge nurse aware

## 2019-09-30 NOTE — PROGRESS NOTES
Isidoro from Lab called with critical result of WBC at 1.6. Critical lab result read back to Isidoro.   Dr. Granger notified of critical lab result at 0515.  Critical lab result read back by Dr. Granger.

## 2019-09-30 NOTE — PROGRESS NOTES
Hospital Medicine Daily Progress Note    Date of Service  9/30/2019    Chief Complaint  69 y.o. male admitted 9/26/2019 with diarrhea and SI    Hospital Course    Patient is a 69-year-old male with history of methamphetamine abuse, severe depression, alcohol abuse, chronic low back pain, gout here with suicidal ideation.  Says he was given a jump in front of a train or a bus but decided to talk with a psychiatrist first.  He also relates history of 7 days of diarrhea and generalized malaise body aches.  Suspect to be suffering from a likely viral gastroenteritis and treated conservatively.      Interval Problem Update  VSS and WNL overnight  Leukopenia worsening - 1.6 w ANC 0.64  Anemia stable 11/33  Thrombocytopenia 63  Magnesium 1.9  C/S oncology  Bone marrow bx  Patient without abdominal complaints today.  He says he sometimes gets burning with urination but intermittently.  UA checked x2 and is normal    Consultants/Specialty  Psychiatry    Code Status  Full    Disposition  To be determined.  Patient is currently on a legal hold with suicidal ideation precautions    Review of Systems  Review of Systems   Constitutional: Positive for malaise/fatigue. Negative for chills, diaphoresis and fever.   Eyes: Negative for blurred vision.   Cardiovascular: Negative for chest pain and palpitations.   Gastrointestinal: Negative for abdominal pain, blood in stool, constipation, melena, nausea and vomiting.   Musculoskeletal: Negative for back pain, falls, joint pain, myalgias and neck pain.   Neurological: Negative for dizziness, tingling, sensory change, focal weakness, weakness and headaches.   Psychiatric/Behavioral: Positive for depression.   All other systems reviewed and are negative.       Physical Exam  Temp:  [36.4 °C (97.5 °F)-37.3 °C (99.1 °F)] 36.4 °C (97.6 °F)  Pulse:  [49-75] 60  Resp:  [15-17] 16  BP: (106-139)/(65-92) 139/92  SpO2:  [94 %-99 %] 95 %    Physical Exam   Constitutional: He is oriented to person,  place, and time. He appears well-developed and well-nourished. No distress.   HENT:   Head: Normocephalic and atraumatic.   Eyes: Pupils are equal, round, and reactive to light. EOM are normal.   Neck: Neck supple.   Cardiovascular: Normal rate, regular rhythm and normal heart sounds.   Pulmonary/Chest: Effort normal and breath sounds normal. No respiratory distress. He has no wheezes. He has no rales.   Abdominal: Soft. He exhibits no distension. There is no tenderness.   Neurological: He is alert and oriented to person, place, and time. No cranial nerve deficit.   Skin: Skin is warm and dry.   Psychiatric: He exhibits a depressed mood.       Fluids    Intake/Output Summary (Last 24 hours) at 9/30/2019 0635  Last data filed at 9/30/2019 0600  Gross per 24 hour   Intake 2080 ml   Output 2450 ml   Net -370 ml       Laboratory  Recent Labs     09/28/19  0931 09/29/19  0710 09/30/19  0149   WBC 1.8* 1.9* 1.6*   RBC 3.73* 3.30* 3.33*   HEMOGLOBIN 12.5* 11.1* 11.1*   HEMATOCRIT 40.0* 33.7* 33.8*   .2* 102.1* 101.5*   MCH 33.5* 33.6* 33.3*   MCHC 31.3* 32.9* 32.8*   RDW 59.4* 54.4* 53.1*   PLATELETCT 59* 61* 63*   MPV 11.0 11.5 12.1     Recent Labs     09/28/19  0830 09/29/19  0710 09/30/19  0149   SODIUM 139 138 139   POTASSIUM 4.5 4.7 4.5   CHLORIDE 107 106 107   CO2 27 29 27   GLUCOSE 80 78 79   BUN 12 15 15   CREATININE 0.76 0.89 0.81   CALCIUM 8.8 8.6 8.8                   Imaging  CT-ABDOMEN-PELVIS W/O   Final Result         1.  No evidence of bowel obstruction or bowel inflammation.      2.  Mild splenomegaly.      3.  No abdominal or pelvic adenopathy.      4.  Cholelithiasis. No biliary dilatation.           Assessment/Plan  Alcoholism (HCC)- (present on admission)  Assessment & Plan  Patient does carry history of alcoholism, he denies any active drinking   Psychiatry team is following  Monitor closely for evidence of withdrawal.  Daily magnesium, thiamine supplementation  B12, TSH, folate  Replace  vitamins oral, Mag IV daily until >2.0    Suicidal ideation- (present on admission)  Assessment & Plan  Psychiatry c/s   Probable discharge to an inpatient psych bed.    Remains on legal hold    Leukopenia  Assessment & Plan  Stat recheck 9/28 w WBCs at 1.8  ANC <1  No sepsis criteria/acidosis  Possible hemodilution; stop fluids and follow  Bone marrow bx  Hematology consult;   Hx of ETOH abuse in the past    Abdominal pain  Assessment & Plan  CT A/P ok  Likely viral etiology      Diarrhea of presumed infectious origin  Assessment & Plan  Patient complains of diarrhea x1 week.  Is diffuse constitutional symptoms would imply possibly viral origin.  He complains of chills myalgias arthralgias and generally just feeling poorly.  He has not had any fever, there is been no blood,   He has had no recent antibiotic exposures in the last 6 months, he cannot recall in fact the last time he did have any antibiotics.  Check stool O&P and cultures  Repeat labs in the a.m. w leukopenia  Abdominal pain persistent  C-diff neg  CT A/P - ok  Cipro/Flagyl stopped as no evidence of colitis      Thrombocytopenia (HCC)- (present on admission)  Assessment & Plan  Platelets chronically low  Onc c/s   Follow  Likely d/t ETOH abuse  Monitor for active bleeding  Transfuse if <25    Lumbar back pain with radiculopathy affecting lower extremity- (present on admission)  Assessment & Plan  Patient has chronic back pain.  We will treat him supportively.    Gout- (present on admission)  Assessment & Plan  Stable at this time       VTE prophylaxis: Heparin

## 2019-09-30 NOTE — DISCHARGE PLANNING
"Care Transition Team Assessment      Patient states he has been drinking since he was 18.  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  Patient has lived in [place] since 9/2018, has been homeless most of the time he has been living in [place].  Patient stated that he wants to go live with his nephew in [facility].  The he was planning on going prior to being admitted into the hospital.  Patient has not reached out to his nephew to see if he can live with him.    Information Source  Orientation : Oriented x 4  Information Given By: Patient  Informant's Name: (clara)  Who is responsible for making decisions for patient? : Patient         Elopement Risk  Legal Hold: Elopement Risk  Time of Legal Hold: 0944  Date of Legal Hold: 09/26/19  Wanderguard On: No (See Comments)(1:1 sitter in place)  Personal Belongings: Hospital Clothing Only  Environmental Precautions: Sharp or Dangerous Items Removed, Dietary Notified for Plastic Utensils / Paperware Only    Interdisciplinary Discharge Planning  Patient or legal guardian wants to designate a caregiver (see row info): No    Discharge Preparedness  What is your plan after discharge?: Other (comment)(mental health facility)  What are your discharge supports?: Other (comment)(nephew)  Prior Functional Level: Ambulatory, Independent with Activities of Daily Living, Independent with Medication Management  Difficulity with ADLs: None  Difficulity with IADLs: None    Functional Assesment  Prior Functional Level: Ambulatory, Independent with Activities of Daily Living, Independent with Medication Management    Finances  Financial Barriers to Discharge: No(States he makes about $842)  Prescription Coverage: Yes    Vision / Hearing Impairment  Vision Impairment : Yes  Right Eye Vision: Impaired, Wears Glasses  Left Eye Vision: Impaired, Wears Glasses  Hearing Impairment : No     "     Advance Directive  Advance Directive?: None    Domestic Abuse  Have you ever been the victim of abuse or violence?: No  Physical Abuse or Sexual Abuse: No  Verbal Abuse or Emotional Abuse: No  Possible Abuse Reported to:: Not Applicable    Psychological Assessment  History of Substance Abuse: Alcohol  Date Last Used - Alcohol: (two weeks ago)  Substance Abuse Comments: (been drinking since he was 18)  History of Psychiatric Problems: Yes  Non-compliant with Treatment: Yes  Newly Diagnosed Illness: Yes    Discharge Risks or Barriers  Discharge risks or barriers?: No PCP, Substance abuse, Mental health, Non-adherence to medication or treatment, Homeless / couch surfing  Patient risk factors: Homeless, Lack of outside supports, Mental health, Noncompliance, No PCP, Substance abuse    Anticipated Discharge Information  Anticipated discharge disposition: Detox / sobering, Outpatient mental health follow-up  Discharge Address: (unknown)

## 2019-09-30 NOTE — ASSESSMENT & PLAN NOTE
Platelets chronically low  Onc c/s   Follow  Likely d/t ETOH abuse  Monitor for active bleeding  Transfuse if <25

## 2019-09-30 NOTE — CARE PLAN
Problem: Infection  Goal: Will remain free from infection  Outcome: PROGRESSING AS EXPECTED     Problem: Safety  Goal: Will remain free from injury  Outcome: PROGRESSING AS EXPECTED     Problem: Safety  Goal: Will remain free from injury  Outcome: PROGRESSING AS EXPECTED

## 2019-10-01 LAB
ANION GAP SERPL CALC-SCNC: 4 MMOL/L (ref 0–11.9)
BASOPHILS # BLD AUTO: 1.4 % (ref 0–1.8)
BASOPHILS # BLD: 0.03 K/UL (ref 0–0.12)
BUN SERPL-MCNC: 16 MG/DL (ref 8–22)
CALCIUM SERPL-MCNC: 8.7 MG/DL (ref 8.5–10.5)
CHLORIDE SERPL-SCNC: 106 MMOL/L (ref 96–112)
CO2 SERPL-SCNC: 28 MMOL/L (ref 20–33)
CREAT SERPL-MCNC: 0.8 MG/DL (ref 0.5–1.4)
EOSINOPHIL # BLD AUTO: 0.16 K/UL (ref 0–0.51)
EOSINOPHIL NFR BLD: 7.4 % (ref 0–6.9)
ERYTHROCYTE [DISTWIDTH] IN BLOOD BY AUTOMATED COUNT: 53.1 FL (ref 35.9–50)
GLUCOSE SERPL-MCNC: 79 MG/DL (ref 65–99)
HCT VFR BLD AUTO: 37.8 % (ref 42–52)
HGB BLD-MCNC: 12.3 G/DL (ref 14–18)
IMM GRANULOCYTES # BLD AUTO: 0 K/UL (ref 0–0.11)
IMM GRANULOCYTES NFR BLD AUTO: 0 % (ref 0–0.9)
LYMPHOCYTES # BLD AUTO: 0.69 K/UL (ref 1–4.8)
LYMPHOCYTES NFR BLD: 31.9 % (ref 22–41)
MAGNESIUM SERPL-MCNC: 1.7 MG/DL (ref 1.5–2.5)
MCH RBC QN AUTO: 32.4 PG (ref 27–33)
MCHC RBC AUTO-ENTMCNC: 32.5 G/DL (ref 33.7–35.3)
MCV RBC AUTO: 99.5 FL (ref 81.4–97.8)
MONOCYTES # BLD AUTO: 0.2 K/UL (ref 0–0.85)
MONOCYTES NFR BLD AUTO: 9.3 % (ref 0–13.4)
NEUTROPHILS # BLD AUTO: 1.08 K/UL (ref 1.82–7.42)
NEUTROPHILS NFR BLD: 50 % (ref 44–72)
NRBC # BLD AUTO: 0 K/UL
NRBC BLD-RTO: 0 /100 WBC
PLATELET # BLD AUTO: 64 K/UL (ref 164–446)
PMV BLD AUTO: 11.7 FL (ref 9–12.9)
POTASSIUM SERPL-SCNC: 5.3 MMOL/L (ref 3.6–5.5)
RBC # BLD AUTO: 3.8 M/UL (ref 4.7–6.1)
SODIUM SERPL-SCNC: 138 MMOL/L (ref 135–145)
WBC # BLD AUTO: 2.2 K/UL (ref 4.8–10.8)

## 2019-10-01 PROCEDURE — 99233 SBSQ HOSP IP/OBS HIGH 50: CPT | Performed by: PSYCHIATRY & NEUROLOGY

## 2019-10-01 PROCEDURE — 85025 COMPLETE CBC W/AUTO DIFF WBC: CPT

## 2019-10-01 PROCEDURE — 36415 COLL VENOUS BLD VENIPUNCTURE: CPT

## 2019-10-01 PROCEDURE — A9270 NON-COVERED ITEM OR SERVICE: HCPCS | Performed by: STUDENT IN AN ORGANIZED HEALTH CARE EDUCATION/TRAINING PROGRAM

## 2019-10-01 PROCEDURE — A9270 NON-COVERED ITEM OR SERVICE: HCPCS | Performed by: HOSPITALIST

## 2019-10-01 PROCEDURE — 770021 HCHG ROOM/CARE - ISO PRIVATE

## 2019-10-01 PROCEDURE — 700102 HCHG RX REV CODE 250 W/ 637 OVERRIDE(OP): Performed by: HOSPITALIST

## 2019-10-01 PROCEDURE — 700102 HCHG RX REV CODE 250 W/ 637 OVERRIDE(OP): Performed by: NURSE PRACTITIONER

## 2019-10-01 PROCEDURE — 83735 ASSAY OF MAGNESIUM: CPT

## 2019-10-01 PROCEDURE — 80048 BASIC METABOLIC PNL TOTAL CA: CPT

## 2019-10-01 PROCEDURE — 99232 SBSQ HOSP IP/OBS MODERATE 35: CPT | Performed by: INTERNAL MEDICINE

## 2019-10-01 PROCEDURE — 700102 HCHG RX REV CODE 250 W/ 637 OVERRIDE(OP): Performed by: STUDENT IN AN ORGANIZED HEALTH CARE EDUCATION/TRAINING PROGRAM

## 2019-10-01 PROCEDURE — A9270 NON-COVERED ITEM OR SERVICE: HCPCS | Performed by: NURSE PRACTITIONER

## 2019-10-01 RX ADMIN — THERA TABS 1 TABLET: TAB at 04:32

## 2019-10-01 RX ADMIN — TRAZODONE HYDROCHLORIDE 100 MG: 50 TABLET ORAL at 20:19

## 2019-10-01 RX ADMIN — GABAPENTIN 300 MG: 300 CAPSULE ORAL at 16:03

## 2019-10-01 RX ADMIN — GABAPENTIN 300 MG: 300 CAPSULE ORAL at 07:30

## 2019-10-01 RX ADMIN — Medication 100 MG: at 04:32

## 2019-10-01 RX ADMIN — ACETAMINOPHEN 650 MG: 325 TABLET, FILM COATED ORAL at 16:03

## 2019-10-01 RX ADMIN — ACETAMINOPHEN 650 MG: 325 TABLET, FILM COATED ORAL at 04:38

## 2019-10-01 RX ADMIN — GABAPENTIN 300 MG: 300 CAPSULE ORAL at 20:19

## 2019-10-01 RX ADMIN — FOLIC ACID 1 MG: 1 TABLET ORAL at 04:32

## 2019-10-01 RX ADMIN — DULOXETINE HYDROCHLORIDE 40 MG: 20 CAPSULE, DELAYED RELEASE ORAL at 04:32

## 2019-10-01 RX ADMIN — QUETIAPINE FUMARATE 200 MG: 100 TABLET ORAL at 18:25

## 2019-10-01 ASSESSMENT — ENCOUNTER SYMPTOMS
DIARRHEA: 0
PALPITATIONS: 0
BLURRED VISION: 0
FEVER: 0
WEIGHT LOSS: 0
COUGH: 0
WHEEZING: 0
DIZZINESS: 0
FALLS: 0
CHILLS: 0
ABDOMINAL PAIN: 0
BACK PAIN: 0
SHORTNESS OF BREATH: 0
NECK PAIN: 0
MYALGIAS: 0
BLOOD IN STOOL: 0
SEIZURES: 0
PND: 0
NAUSEA: 0
SENSORY CHANGE: 0
DIAPHORESIS: 0
FOCAL WEAKNESS: 0
WEAKNESS: 1
HEARTBURN: 0
TREMORS: 0
CONSTIPATION: 0
SPUTUM PRODUCTION: 0
SPEECH CHANGE: 0
TINGLING: 0
VOMITING: 0
HEADACHES: 0
DEPRESSION: 1
EYE PAIN: 0

## 2019-10-01 ASSESSMENT — LIFESTYLE VARIABLES: SUBSTANCE_ABUSE: 0

## 2019-10-01 NOTE — CONSULTS
Consult Note: Hematology/Oncology    Date of consultation: 9/30/2019 6:56 PM    Referring provider: Dr LAUREN Breaux    Reason for consultation: Neutropenia      History of presenting illness:     69-year-old gentleman who is a poor historian.  He does admit to being an alcoholic.  He tells me his last drink was probably about 2 weeks ago.  He tells me he drinks about 3-4 sixpacks a day or more.  He tells me is been drinking most of his life.  He says when he was younger he had a scholarship to play baseball at [employer].  He said it all changed when he cut his finger off on his right hand.  He said he became depressed and that is when all the drinking started.  He said most of his family is all passed away now.  He admits that he most likely will go back to drinking because that is all he knows.  He tells me he is not sure what else there is really to live for.    He came to hospital this time because he just was not feeling well and having some abdominal discomfort.  He had some loose stools.  He denies any bloody stools or black stools.  He did have some mild abdominal discomfort.  Patient was brought in also for suicidal ideation.      Past Medical History:    Past Medical History:   Diagnosis Date   • Gout    • Hypertension    • Psychiatric disorder        Past surgical history:    Past Surgical History:   Procedure Laterality Date   • CERVICAL DISK AND FUSION ANTERIOR  9/2/2018    Procedure: CERVICAL DISK AND FUSION ANTERIOR C5-C6;  Surgeon: Varghese Wilkins M.D.;  Location: SURGERY [facility];  Service: Neurosurgery   • OTHER ORTHOPEDIC SURGERY      fision with disc removal       Allergies:  Patient has no known allergies.    Medications:    Current Facility-Administered Medications   Medication Dose Route Frequency Provider Last Rate Last Dose   • thiamine tablet 100 mg  100 mg Oral DAILY Jose Shelton, A.P.N.   100 mg at 09/30/19 0601   • folic acid (FOLVITE) tablet 1 mg  1 mg Oral DAILY Zahida Longo M.D.    1 mg at 09/30/19 0557   • multivitamin (THERAGRAN) tablet 1 Tab  1 Tab Oral DAILY Zaihda Longo M.D.   1 Tab at 09/30/19 0557   • DULoxetine (CYMBALTA) capsule 40 mg  40 mg Oral QAM Bina Zayas M.D.   40 mg at 09/30/19 0557   • QUEtiapine (SEROQUEL) tablet 200 mg  200 mg Oral Q EVENING Bina Zayas M.D.   200 mg at 09/30/19 1817   • traZODone (DESYREL) tablet 100 mg  100 mg Oral QHS Chad Romo, D.O.   100 mg at 09/29/19 2122   • lisinopril (PRINIVIL) tablet 20 mg  20 mg Oral QAM Chad Romo, D.O.   20 mg at 09/30/19 0557   • gabapentin (NEURONTIN) capsule 300 mg  300 mg Oral TID Chad Romo, D.O.   300 mg at 09/30/19 1817   • acetaminophen (TYLENOL) tablet 650 mg  650 mg Oral Q6HRS PRN Chad Romo, D.O.   650 mg at 09/30/19 0006   • heparin injection 5,000 Units  5,000 Units Subcutaneous Q8HRS Chad Romo, D.O.   Stopped at 09/30/19 1400   • ondansetron (ZOFRAN) syringe/vial injection 4 mg  4 mg Intravenous Q4HRS PRN Chad Romo D.O.       • ondansetron (ZOFRAN ODT) dispertab 4 mg  4 mg Oral Q4HRS PRN Chad Romo, D.O.       • loperamide (IMODIUM) capsule 2 mg  2 mg Oral TID PRN Chad Romo, D.O.   2 mg at 09/29/19 0909   • Influenza Vaccine High-Dose pf injection 0.5 mL  0.5 mL Intramuscular Once PRN Raimundo Us D.OChika           Social History:     Social History     Socioeconomic History   • Marital status: Single     Spouse name: Not on file   • Number of children: Not on file   • Years of education: Not on file   • Highest education level: Not on file   Occupational History   • Not on file   Social Needs   • Financial resource strain: Not on file   • Food insecurity:     Worry: Not on file     Inability: Not on file   • Transportation needs:     Medical: Not on file     Non-medical: Not on file   Tobacco Use   • Smoking status: Former Smoker     Packs/day: 0.50     Years: 5.00     Pack  years: 2.50     Types: Cigarettes     Last attempt to quit: 2019     Years since quittin.6   • Smokeless tobacco: Never Used   Substance and Sexual Activity   • Alcohol use: Yes   • Drug use: Yes     Types: Inhaled     Comment: marijuana   • Sexual activity: Not on file   Lifestyle   • Physical activity:     Days per week: Not on file     Minutes per session: Not on file   • Stress: Not on file   Relationships   • Social connections:     Talks on phone: Not on file     Gets together: Not on file     Attends L[employer]-day service: Not on file     Active member of club or organization: Not on file     Attends meetings of clubs or organizations: Not on file     Relationship status: Not on file   • Intimate partner violence:     Fear of current or ex partner: Not on file     Emotionally abused: Not on file     Physically abused: Not on file     Forced sexual activity: Not on file   Other Topics Concern   • Not on file   Social History Narrative   • Not on file       Family History:     Family History   Problem Relation Age of Onset   • Cancer Mother    • Alzheimer's Disease Father    • Cancer Father    • Heart Disease Sister    • Heart Disease Brother        Review of Systems:   All other review of systems are negative except what was mentioned above in the HPI.    Constitutional: No fever, chills, weight loss ,malaise/fatigue.    HEENT: No new auditory or visual complaints. No sore throat and neck pain.     Respiratory:No new cough, sputum production, shortness of breath and wheezing.    Cardiovascular: No new chest pain, palpitations, orthopnea and leg swelling.    Gastrointestinal: No heartburn, nausea, vomiting but abdominal pain, but no hematochezia or melena     Genitourinary: Negative for dysuria, hematuria    Musculoskeletal: No new arthralgias or myalgias   Skin: Negative for rash and itching.    Neurological: Negative for focal weakness or headaches.    Endo/Heme/Allergies: No abnormal bleed/bruise.   "  Psychiatric/Behavioral:  depression/anxiety.    Physical Exam:  Vitals:   /94   Pulse (!) 51 Comment: rn notified  Temp 36.8 °C (98.3 °F) (Temporal)   Resp 19   Ht 1.803 m (5' 11\")   Wt 96.9 kg (213 lb 10 oz)   SpO2 95%   BMI 29.79 kg/m²     General: Not in acute distress, alert and oriented x 3  HEENT: No pallor, icterus. Oropharynx clear.   Neck: Supple, no palpable masses.  Lymph nodes: No palpable cervical, supraclavicular, axillary or inguinal lymphadenopathy.    CVS: regular rate and rhythm, no rubs or gallops  RESP: Clear to auscultate bilaterally, no wheezing or crackles.   ABD: Soft, non tender, non distended, positive bowel sounds, no palpable organomegaly  EXT: No edema or cyanosis  CNS: Alert and oriented x3, No focal deficits.  Skin- No rash      Labs:   Recent Labs     09/28/19  0931 09/29/19  0710 09/30/19  0149   RBC 3.73* 3.30* 3.33*   HEMOGLOBIN 12.5* 11.1* 11.1*   HEMATOCRIT 40.0* 33.7* 33.8*   PLATELETCT 59* 61* 63*     Lab Results   Component Value Date/Time    SODIUM 139 09/30/2019 01:49 AM    POTASSIUM 4.5 09/30/2019 01:49 AM    CHLORIDE 107 09/30/2019 01:49 AM    CO2 27 09/30/2019 01:49 AM    GLUCOSE 79 09/30/2019 01:49 AM    BUN 15 09/30/2019 01:49 AM    CREATININE 0.81 09/30/2019 01:49 AM        Assessment and Plan:    69-year-old white male with alcoholism with chronic thrombocytopenia with some new neutropenia.  He does have splenomegaly on imaging.    I had a long discussion with his about his current situation.  He does not seem that concerned.  I do think that his depression and dependency on alcohol is his main concern.  I told him I think it could be his alcoholism that suppressing his bone marrow as well.  There is always a chance that some of his medications (especially Seroquel) that could be causing some acute toxicity to his white count.  However, I really do not want to change any of his psychiatry medications since he is got significant issues from that " perspective that brought him in.  He did agree to a bone marrow biopsy that had already been ordered prior to me seeing him.  I told him if his bone marrow is normal then I advised the primary team to look at his medications and go over it with psychiatry.  Obviously, his alcoholism is front and center for some of the potential causes of bone marrow suppression as well.  He understands that we are looking to make sure he does not have an MDS or leukemia.    He does not mention any suicidal ideation to me but is extremely depressed.    I will come back once bone marrow results are available    Please note that this dictation was created using voice recognition software. I have made every reasonable attempt to correct obvious errors, but I expect that there are errors of grammar and possibly context that I did not discover before finalizing the note    He agreed and verbalized his agreement and understanding with the current plan.  I answered all questions and concerns he has at this time.    Thank you for allowing me to participate in his care.    Please note that this dictation was created using voice recognition software. I have made every reasonable attempt to correct obvious errors, but I expect that there are errors of grammar and possibly content that I did not discover before finalizing the note.      SIGNATURES:  Chandler Shrestha    CC:  Pcp Pt States None

## 2019-10-01 NOTE — PROGRESS NOTES
Received pt. In bed awake, alert and orientedx4. Still with SI but no plan of action, pt. Feels depress and flat affect. Pt. Has intact PIV, educated about fall risks, POC discussed with the pt. Answered pt. Questions. Safety precautions checked. Due meds given.    1:1 safety sitter at bedside.

## 2019-10-01 NOTE — PROGRESS NOTES
Assumed care of patient at change of shift.  During change of shift report, patient (pt) resting in bed, on room air, with 1:1 security sitter in room.  During assessment, pt A&Ox4.      ALEX Garcia paged now for update on procedure, as pt remains NPO and is complaining of hunger.  Jose called back, and stated OK to resume diet now, and to begin NPO at midnight.  Pt updated.

## 2019-10-01 NOTE — CARE PLAN
Problem: Safety  Goal: Will remain free from injury  Outcome: PROGRESSING AS EXPECTED  Goal: Will remain free from falls  Outcome: PROGRESSING AS EXPECTED     Problem: Bowel/Gastric:  Goal: Normal bowel function is maintained or improved  Outcome: PROGRESSING AS EXPECTED     Problem: Knowledge Deficit  Goal: Knowledge of disease process/condition, treatment plan, diagnostic tests, and medications will improve  Outcome: PROGRESSING AS EXPECTED     Problem: Communication  Goal: The ability to communicate needs accurately and effectively will improve  Outcome: PROGRESSING SLOWER THAN EXPECTED     Problem: Discharge Barriers/Planning  Goal: Patient's continuum of care needs will be met  Outcome: PROGRESSING SLOWER THAN EXPECTED

## 2019-10-01 NOTE — CARE PLAN
Problem: Safety  Goal: Will remain free from injury  Outcome: PROGRESSING AS EXPECTED  Goal: Will remain free from falls  Outcome: PROGRESSING AS EXPECTED  Safety precautions checked done, ensured no harmful objects within reach. 1:1 safety sitter at bedside.    Problem: Pain Management  Goal: Pain level will decrease to patient's comfort goal  Outcome: PROGRESSING AS EXPECTED   Pt. Complaints of generalized body pain, PRN tylenol given.

## 2019-10-01 NOTE — PROGRESS NOTES
Ramos from Lab called with critical result of WBC=2.2 at 1017. Critical lab result read back to Frantz.   Dr. Ornelas notified of critical lab result at 1019.  Critical lab result read back by Dr. Ornelas.

## 2019-10-01 NOTE — PROGRESS NOTES
Updated hospitalist about pt. BP and HR, pt. Denies any complaints of chest pain or SOB. Received order 500cc NS bolus. Rounding rapid RN from CCU also updated and looked into pt. EKG that was done today.

## 2019-10-01 NOTE — PROGRESS NOTES
Updated Dr. Granger of repeat BP after the bolus, MAP is 68. Pt. Is asymptomatic otherwise, sleeping but wakes up to verbal command. Will update MD if MAP <60. No new order received. Will cont to monitor.

## 2019-10-01 NOTE — PSYCHIATRY
"PSYCHIATRIC FOLLOW UP:    Reason for Admission: Body Aches, Chills, and Diarrhea   Requesting Physician: La Ornelas M.D.  Supervising Physician:Marietta Nicholson M.D.    Subjective:  Patient is a 69 y.o. male with history of Alcohol Use Disorder, and Unspecified Dpression who presented to the hospital for diarrhea and he had also mentioned to the staff that he was suicidal. He was placed on a legal hold and psychiatry was asked to evaluate him.    Patient reports his mood continues to be down because he has not eaten in anticipation of a bone marrow biopsy. The medical team is concerned about leukemia given his low white blood cell count. This makes him mood lower. Although he wants to die he does not want to die a slow death from cancer. He has been sleeping fine. He reports no problems with the seroquel and trazadone.  He continues to feel suicidal. He does not feel like the duloxetine is helping.      Medical Review of Systems:  Constitutional: Postive for weight loss   HENT: Negative for hearing loss, sore throat, neck pain  Eyes: Negative for blurred vision, pain, redness.   Respiratory: Negative for cough, shortness of breath, wheezing   Cardiovascular: Negative for chest pain, palpitations  Gastrointestinal: Positive for abdominal pain. Negative for nausea, vomiting, diarrhea or constipation.   Genitourinary: Negative for dysuria, urgency, frequency, hematuria  Musculoskeletal: Negative for myalgias,  joint pain  Skin: Negative for itching and rash.  Neurological: Positive for headaches   Psychiatric/Behavioral: See above for psych review of systems      Psychiatric Examination:   Vitals: /92   Pulse (!) 51 Comment: RN notified  Temp 36.5 °C (97.7 °F) (Temporal)   Resp 18   Ht 1.803 m (5' 11\")   Wt 96.9 kg (213 lb 10 oz)   SpO2 95%   BMI 29.79 kg/m²   Musculoskeletal: No abnormal movements noted  Appearance: appears stated age, fair hygiene, no apparent distress, thin and appropriately dressed, " "cooperative, engaged, friendly and good eye contact  Thoughts: No overt delusions noted and suicidal ideation, linear, coherent, goal-oriented and organized  Speech: slowed rate. Regular rhythm, low volume, monotone,   Mood: \"depressed\"  Affect: dysthymic, flat and congruent with mood  SI/HI: Patient reports SI  Alert/Oriented: alert and oriented  Memory: no gross impairment in immediate, recent, or remote memory  Fund of Knowledge: adequate  Insight/Judgement into symptoms: fair  Neurological Testing: MMSE not performed during this encounter    Medications (currently prescribed at [facility]):    Current Facility-Administered Medications:   •  thiamine tablet 100 mg, 100 mg, Oral, DAILY, Jose Shelton, A.P.N., 100 mg at 10/01/19 0432  •  folic acid (FOLVITE) tablet 1 mg, 1 mg, Oral, DAILY, Zahida Longo M.D., 1 mg at 10/01/19 0432  •  multivitamin (THERAGRAN) tablet 1 Tab, 1 Tab, Oral, DAILY, Zahida Longo M.D., 1 Tab at 10/01/19 0432  •  DULoxetine (CYMBALTA) capsule 40 mg, 40 mg, Oral, QAM, Bina Zayas M.D., 40 mg at 10/01/19 0432  •  QUEtiapine (SEROQUEL) tablet 200 mg, 200 mg, Oral, Q EVENING, Bina Zayas M.D., 200 mg at 09/30/19 1817  •  traZODone (DESYREL) tablet 100 mg, 100 mg, Oral, QHS, Chad Romo D.O., Stopped at 09/30/19 2200  •  lisinopril (PRINIVIL) tablet 20 mg, 20 mg, Oral, QAM, Chad Romo D.O., Stopped at 10/01/19 0600  •  gabapentin (NEURONTIN) capsule 300 mg, 300 mg, Oral, TID, Chad Romo D.O., 300 mg at 10/01/19 1603  •  acetaminophen (TYLENOL) tablet 650 mg, 650 mg, Oral, Q6HRS PRN, Chad Romo D.O., 650 mg at 10/01/19 1603  •  heparin injection 5,000 Units, 5,000 Units, Subcutaneous, Q8HRS, Chad Romo D.O., Stopped at 09/30/19 1400  •  ondansetron (ZOFRAN) syringe/vial injection 4 mg, 4 mg, Intravenous, Q4HRS PRN, Chad Romo D.O.  •  ondansetron (ZOFRAN ODT) dispertab 4 mg, 4 mg, Oral, Q4HRS " PRN, Chad Romo D.O.  •  loperamide (IMODIUM) capsule 2 mg, 2 mg, Oral, TID PRN, Chad Romo D.O., 2 mg at 09/29/19 0909  •  Influenza Vaccine High-Dose pf injection 0.5 mL, 0.5 mL, Intramuscular, Once PRN, Raimundo Us D.O.  Labs:  Recent Labs     09/29/19  0710 09/30/19  0149 10/01/19  0930   WBC 1.9* 1.6* 2.2*   RBC 3.30* 3.33* 3.80*   HEMOGLOBIN 11.1* 11.1* 12.3*   HEMATOCRIT 33.7* 33.8* 37.8*   .1* 101.5* 99.5*   MCH 33.6* 33.3* 32.4   MCHC 32.9* 32.8* 32.5*   RDW 54.4* 53.1* 53.1*   PLATELETCT 61* 63* 64*   MPV 11.5 12.1 11.7     Recent Labs     09/29/19  0710 09/30/19 0149 10/01/19  0930   SODIUM 138 139 138   POTASSIUM 4.7 4.5 5.3   CHLORIDE 106 107 106   CO2 29 27 28   GLUCOSE 78 79 79   BUN 15 15 16   CREATININE 0.89 0.81 0.80   CALCIUM 8.6 8.8 8.7                           Assessment:  -Alcohol use disorder, severe  -Depressive disorder unspecified  -History of methamphetamine use disorder  -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  He currently is describing symptoms consistent with depression reports noncompliance with his medication regimen for the past week.      Given the patients low blood count and the possibility that seroquel is the cause we will discontinue the seroquel and evaluate the patients sleep. There is not concern that the discontinuation will cause psychosis. Patient reports the seroquel was given for sleep. If he does poorly without the seroquel we will trial ambien. We will hold off transitioning the duloxetine to an SSRI as of now.      PLAN:  - Continue  duloxetine to 40mg PO daily for mood, will taper (decreased 9/26)  - Continue trazadone 100mg PO nightly   -  Continue gabapentin 300mg PO TID for neuropathy   - Discontinue seroquel  - We will continue to follow   Thank you for the consult.        Legal status: extended, transfer to inpatient psychiatry when medically stable   Requires a sitter at this time   Patient may have phone at nursing discretion    No visitors.   May have books and things like this provided to him.   May not have his own personal belongings due to potential for there to be drugs or items he can self harm with

## 2019-10-02 LAB
ANION GAP SERPL CALC-SCNC: 7 MMOL/L (ref 0–11.9)
BASOPHILS # BLD AUTO: 0.8 % (ref 0–1.8)
BASOPHILS # BLD: 0.02 K/UL (ref 0–0.12)
BUN SERPL-MCNC: 15 MG/DL (ref 8–22)
CALCIUM SERPL-MCNC: 8.4 MG/DL (ref 8.5–10.5)
CHLORIDE SERPL-SCNC: 107 MMOL/L (ref 96–112)
CO2 SERPL-SCNC: 26 MMOL/L (ref 20–33)
CREAT SERPL-MCNC: 0.78 MG/DL (ref 0.5–1.4)
EOSINOPHIL # BLD AUTO: 0.17 K/UL (ref 0–0.51)
EOSINOPHIL NFR BLD: 6.8 % (ref 0–6.9)
ERYTHROCYTE [DISTWIDTH] IN BLOOD BY AUTOMATED COUNT: 53.4 FL (ref 35.9–50)
GLUCOSE SERPL-MCNC: 76 MG/DL (ref 65–99)
HCT VFR BLD AUTO: 38.4 % (ref 42–52)
HGB BLD-MCNC: 12.7 G/DL (ref 14–18)
IMM GRANULOCYTES # BLD AUTO: 0.01 K/UL (ref 0–0.11)
IMM GRANULOCYTES NFR BLD AUTO: 0.4 % (ref 0–0.9)
LYMPHOCYTES # BLD AUTO: 0.94 K/UL (ref 1–4.8)
LYMPHOCYTES NFR BLD: 37.5 % (ref 22–41)
MAGNESIUM SERPL-MCNC: 1.7 MG/DL (ref 1.5–2.5)
MCH RBC QN AUTO: 33.3 PG (ref 27–33)
MCHC RBC AUTO-ENTMCNC: 33.1 G/DL (ref 33.7–35.3)
MCV RBC AUTO: 100.8 FL (ref 81.4–97.8)
MONOCYTES # BLD AUTO: 0.23 K/UL (ref 0–0.85)
MONOCYTES NFR BLD AUTO: 9.2 % (ref 0–13.4)
NEUTROPHILS # BLD AUTO: 1.14 K/UL (ref 1.82–7.42)
NEUTROPHILS NFR BLD: 45.3 % (ref 44–72)
NRBC # BLD AUTO: 0 K/UL
NRBC BLD-RTO: 0 /100 WBC
O+P STL MICRO: NEGATIVE
O+P STL TRI STN: NEGATIVE
PATHOLOGY CONSULT NOTE: NORMAL
PLATELET # BLD AUTO: 68 K/UL (ref 164–446)
PMV BLD AUTO: 11.6 FL (ref 9–12.9)
POTASSIUM SERPL-SCNC: 4.1 MMOL/L (ref 3.6–5.5)
RBC # BLD AUTO: 3.81 M/UL (ref 4.7–6.1)
SODIUM SERPL-SCNC: 140 MMOL/L (ref 135–145)
WBC # BLD AUTO: 2.5 K/UL (ref 4.8–10.8)

## 2019-10-02 PROCEDURE — 80048 BASIC METABOLIC PNL TOTAL CA: CPT

## 2019-10-02 PROCEDURE — 700102 HCHG RX REV CODE 250 W/ 637 OVERRIDE(OP): Performed by: NURSE PRACTITIONER

## 2019-10-02 PROCEDURE — 38222 DX BONE MARROW BX & ASPIR: CPT | Performed by: INTERNAL MEDICINE

## 2019-10-02 PROCEDURE — 160002 HCHG RECOVERY MINUTES (STAT): Performed by: INTERNAL MEDICINE

## 2019-10-02 PROCEDURE — 88313 SPECIAL STAINS GROUP 2: CPT

## 2019-10-02 PROCEDURE — 770021 HCHG ROOM/CARE - ISO PRIVATE

## 2019-10-02 PROCEDURE — 36415 COLL VENOUS BLD VENIPUNCTURE: CPT

## 2019-10-02 PROCEDURE — 99232 SBSQ HOSP IP/OBS MODERATE 35: CPT | Performed by: INTERNAL MEDICINE

## 2019-10-02 PROCEDURE — 07DR3ZX EXTRACTION OF ILIAC BONE MARROW, PERCUTANEOUS APPROACH, DIAGNOSTIC: ICD-10-PCS | Performed by: INTERNAL MEDICINE

## 2019-10-02 PROCEDURE — 88184 FLOWCYTOMETRY/ TC 1 MARKER: CPT

## 2019-10-02 PROCEDURE — A9270 NON-COVERED ITEM OR SERVICE: HCPCS | Performed by: STUDENT IN AN ORGANIZED HEALTH CARE EDUCATION/TRAINING PROGRAM

## 2019-10-02 PROCEDURE — A9270 NON-COVERED ITEM OR SERVICE: HCPCS | Performed by: HOSPITALIST

## 2019-10-02 PROCEDURE — 88185 FLOWCYTOMETRY/TC ADD-ON: CPT | Mod: 91

## 2019-10-02 PROCEDURE — 85025 COMPLETE CBC W/AUTO DIFF WBC: CPT

## 2019-10-02 PROCEDURE — 700102 HCHG RX REV CODE 250 W/ 637 OVERRIDE(OP): Performed by: HOSPITALIST

## 2019-10-02 PROCEDURE — 160048 HCHG OR STATISTICAL LEVEL 1-5: Performed by: INTERNAL MEDICINE

## 2019-10-02 PROCEDURE — 88237 TISSUE CULTURE BONE MARROW: CPT

## 2019-10-02 PROCEDURE — 83735 ASSAY OF MAGNESIUM: CPT

## 2019-10-02 PROCEDURE — 079T3ZX DRAINAGE OF BONE MARROW, PERCUTANEOUS APPROACH, DIAGNOSTIC: ICD-10-PCS | Performed by: INTERNAL MEDICINE

## 2019-10-02 PROCEDURE — 99152 MOD SED SAME PHYS/QHP 5/>YRS: CPT | Performed by: INTERNAL MEDICINE

## 2019-10-02 PROCEDURE — A9270 NON-COVERED ITEM OR SERVICE: HCPCS | Performed by: NURSE PRACTITIONER

## 2019-10-02 PROCEDURE — 700102 HCHG RX REV CODE 250 W/ 637 OVERRIDE(OP): Performed by: STUDENT IN AN ORGANIZED HEALTH CARE EDUCATION/TRAINING PROGRAM

## 2019-10-02 PROCEDURE — 700111 HCHG RX REV CODE 636 W/ 250 OVERRIDE (IP): Performed by: INTERNAL MEDICINE

## 2019-10-02 PROCEDURE — 160027 HCHG SURGERY MINUTES - 1ST 30 MINS LEVEL 2: Performed by: INTERNAL MEDICINE

## 2019-10-02 PROCEDURE — 160035 HCHG PACU - 1ST 60 MINS PHASE I: Performed by: INTERNAL MEDICINE

## 2019-10-02 PROCEDURE — 88305 TISSUE EXAM BY PATHOLOGIST: CPT

## 2019-10-02 PROCEDURE — 700111 HCHG RX REV CODE 636 W/ 250 OVERRIDE (IP): Performed by: HOSPITALIST

## 2019-10-02 PROCEDURE — 88311 DECALCIFY TISSUE: CPT

## 2019-10-02 PROCEDURE — 88264 CHROMOSOME ANALYSIS 20-25: CPT

## 2019-10-02 RX ORDER — MIDAZOLAM HYDROCHLORIDE 1 MG/ML
INJECTION INTRAMUSCULAR; INTRAVENOUS
Status: DISCONTINUED | OUTPATIENT
Start: 2019-10-02 | End: 2019-10-02 | Stop reason: HOSPADM

## 2019-10-02 RX ORDER — SODIUM CHLORIDE 9 MG/ML
500 INJECTION, SOLUTION INTRAVENOUS
Status: DISCONTINUED | OUTPATIENT
Start: 2019-10-02 | End: 2019-10-02 | Stop reason: HOSPADM

## 2019-10-02 RX ORDER — MIDAZOLAM HYDROCHLORIDE 1 MG/ML
.5-2 INJECTION INTRAMUSCULAR; INTRAVENOUS PRN
Status: DISCONTINUED | OUTPATIENT
Start: 2019-10-02 | End: 2019-10-02 | Stop reason: HOSPADM

## 2019-10-02 RX ADMIN — FOLIC ACID 1 MG: 1 TABLET ORAL at 04:40

## 2019-10-02 RX ADMIN — GABAPENTIN 300 MG: 300 CAPSULE ORAL at 17:23

## 2019-10-02 RX ADMIN — Medication 100 MG: at 04:40

## 2019-10-02 RX ADMIN — GABAPENTIN 300 MG: 300 CAPSULE ORAL at 21:21

## 2019-10-02 RX ADMIN — TRAZODONE HYDROCHLORIDE 100 MG: 50 TABLET ORAL at 21:21

## 2019-10-02 RX ADMIN — QUETIAPINE FUMARATE 200 MG: 100 TABLET ORAL at 17:23

## 2019-10-02 RX ADMIN — HEPARIN SODIUM 5000 UNITS: 5000 INJECTION INTRAVENOUS; SUBCUTANEOUS at 21:21

## 2019-10-02 RX ADMIN — DULOXETINE HYDROCHLORIDE 40 MG: 20 CAPSULE, DELAYED RELEASE ORAL at 04:44

## 2019-10-02 RX ADMIN — THERA TABS 1 TABLET: TAB at 04:40

## 2019-10-02 RX ADMIN — GABAPENTIN 300 MG: 300 CAPSULE ORAL at 08:59

## 2019-10-02 ASSESSMENT — ENCOUNTER SYMPTOMS
HEADACHES: 0
NAUSEA: 0
FALLS: 0
SEIZURES: 0
HEARTBURN: 0
WEAKNESS: 1
MYALGIAS: 0
SPUTUM PRODUCTION: 0
BLURRED VISION: 0
ABDOMINAL PAIN: 0
PND: 0
NECK PAIN: 0
EYE PAIN: 0
FOCAL WEAKNESS: 0
WHEEZING: 0
DIZZINESS: 0
CONSTIPATION: 0
WEIGHT LOSS: 0
TINGLING: 0
COUGH: 0
BACK PAIN: 1
DIAPHORESIS: 0
PALPITATIONS: 0
DEPRESSION: 1
FEVER: 0

## 2019-10-02 ASSESSMENT — LIFESTYLE VARIABLES: SUBSTANCE_ABUSE: 0

## 2019-10-02 NOTE — CARE PLAN
Problem: Safety  Goal: Will remain free from injury  Outcome: PROGRESSING AS EXPECTED   Pt has 1:1 sitter and asks for assistance when needed.

## 2019-10-02 NOTE — PROGRESS NOTES
Hospital Medicine Daily Progress Note    Date of Service  10/1/2019    Chief Complaint  69 y.o. male admitted 9/26/2019 with diarrhea and SI    Hospital Course    Patient is a 69-year-old male with history of methamphetamine abuse, severe depression, alcohol abuse, chronic low back pain, gout here with suicidal ideation.  Says he was given a jump in front of a train or a bus but decided to talk with a psychiatrist first.  He also relates history of 7 days of diarrhea and generalized malaise body aches.  Suspect to be suffering from a likely viral gastroenteritis and treated conservatively.      Interval Problem Update  10/1.  Still pending bone marrow biopsy however not scheduled for today.  Reordered and scheduled for tomorrow.  Patient's WBC improved to 2.2.  Patient complains of general body achiness and depression. Patient's pain is general 2-3/10, intermittent and does not radiate to other location, sharp and with some tingling. Can be controlled by pain meds.      Consultants/Specialty  Psychiatry    Code Status  Full    Disposition  To be determined.  Patient is currently on a legal hold with suicidal ideation precautions    Review of Systems  Review of Systems   Constitutional: Positive for malaise/fatigue. Negative for chills, diaphoresis, fever and weight loss.   HENT: Negative for congestion, ear discharge, ear pain, hearing loss and nosebleeds.    Eyes: Negative for blurred vision and pain.   Respiratory: Negative for cough, sputum production, shortness of breath and wheezing.    Cardiovascular: Negative for chest pain, palpitations, leg swelling and PND.   Gastrointestinal: Negative for abdominal pain, blood in stool, constipation, diarrhea, heartburn, melena, nausea and vomiting.   Genitourinary: Negative for dysuria, frequency and hematuria.   Musculoskeletal: Negative for back pain, falls, joint pain, myalgias and neck pain.   Skin: Negative for rash.   Neurological: Positive for weakness. Negative  for dizziness, tingling, tremors, sensory change, speech change, focal weakness, seizures and headaches.   Psychiatric/Behavioral: Positive for depression. Negative for substance abuse and suicidal ideas.   All other systems reviewed and are negative.       Physical Exam  Temp:  [36.4 °C (97.5 °F)-37 °C (98.6 °F)] 36.5 °C (97.7 °F)  Pulse:  [45-88] 51  Resp:  [16-20] 18  BP: ()/(40-98) 158/92  SpO2:  [94 %-97 %] 95 %    Physical Exam   Constitutional: He is oriented to person, place, and time. He appears well-developed and well-nourished. No distress.   HENT:   Head: Normocephalic and atraumatic.   Eyes: Pupils are equal, round, and reactive to light. EOM are normal.   Neck: Normal range of motion. Neck supple. No JVD present. No thyromegaly present.   Cardiovascular: Normal rate, regular rhythm and normal heart sounds. Exam reveals no gallop and no friction rub.   No murmur heard.  Pulmonary/Chest: Effort normal and breath sounds normal. No respiratory distress. He has no wheezes. He exhibits no tenderness.   Abdominal: Soft. Bowel sounds are normal. He exhibits no distension and no mass. There is no rebound and no guarding.   Musculoskeletal: Normal range of motion. He exhibits no edema.   Lymphadenopathy:     He has no cervical adenopathy.   Neurological: He is alert and oriented to person, place, and time. He displays normal reflexes. No cranial nerve deficit.   Skin: Skin is warm and dry. No rash noted.   Psychiatric: He exhibits a depressed mood.   Patient feels depressed   Nursing note and vitals reviewed.      Fluids    Intake/Output Summary (Last 24 hours) at 10/1/2019 1759  Last data filed at 10/1/2019 1729  Gross per 24 hour   Intake 680 ml   Output 2230 ml   Net -1550 ml       Laboratory  Recent Labs     09/29/19  0710 09/30/19  0149 10/01/19  0930   WBC 1.9* 1.6* 2.2*   RBC 3.30* 3.33* 3.80*   HEMOGLOBIN 11.1* 11.1* 12.3*   HEMATOCRIT 33.7* 33.8* 37.8*   .1* 101.5* 99.5*   MCH 33.6* 33.3*  32.4   MCHC 32.9* 32.8* 32.5*   RDW 54.4* 53.1* 53.1*   PLATELETCT 61* 63* 64*   MPV 11.5 12.1 11.7     Recent Labs     09/29/19  0710 09/30/19  0149 10/01/19  0930   SODIUM 138 139 138   POTASSIUM 4.7 4.5 5.3   CHLORIDE 106 107 106   CO2 29 27 28   GLUCOSE 78 79 79   BUN 15 15 16   CREATININE 0.89 0.81 0.80   CALCIUM 8.6 8.8 8.7                   Imaging  CT-ABDOMEN-PELVIS W/O   Final Result         1.  No evidence of bowel obstruction or bowel inflammation.      2.  Mild splenomegaly.      3.  No abdominal or pelvic adenopathy.      4.  Cholelithiasis. No biliary dilatation.           Assessment/Plan  Alcoholism (HCC)- (present on admission)  Assessment & Plan  Patient does carry history of alcoholism, he denies any active drinking   Psychiatry team is following    Daily magnesium, thiamine supplementation  B12, TSH, folate  Replace vitamins oral, Mag IV daily until >2.0  Currently have no signs of withdrawal continue to monitor    Suicidal ideation- (present on admission)  Assessment & Plan  Psychiatry c/s   Probable discharge to an inpatient psych bed.    Remains on legal hold  Continue medication    Leukopenia  Assessment & Plan  Stat recheck 9/28 w WBCs at 1.8  ANC <1  No sepsis criteria/acidosis  Possible hemodilution; stop fluids and follow  Bone marrow bx reordered  Hematology consult;   Hx of ETOH abuse in the past  Pending bone marrow biopsy    Abdominal pain  Assessment & Plan  CT A/P ok  Likely viral etiology      Diarrhea of presumed infectious origin  Assessment & Plan  Patient complains of diarrhea x1 week.  Is diffuse constitutional symptoms would imply possibly viral origin.  He complains of chills myalgias arthralgias and generally just feeling poorly.  He has not had any fever, there is been no blood,   He has had no recent antibiotic exposures in the last 6 months, he cannot recall in fact the last time he did have any antibiotics.  Check stool O&P and cultures  Repeat labs in the a.m. w  leukopenia  Abdominal pain persistent  C-diff neg  CT A/P - ok  Cipro/Flagyl stopped as no evidence of colitis      Thrombocytopenia (HCC)- (present on admission)  Assessment & Plan  Platelets chronically low  Onc c/s   Follow  Likely d/t ETOH abuse  Monitor for active bleeding  Transfuse if <25    Lumbar back pain with radiculopathy affecting lower extremity- (present on admission)  Assessment & Plan  Patient has chronic back pain.  We will treat him supportively.    Gout- (present on admission)  Assessment & Plan  Stable at this time       VTE prophylaxis: Heparin      Current Facility-Administered Medications:   •  thiamine tablet 100 mg, 100 mg, Oral, DAILY, Jose Shelton, A.P.N., 100 mg at 10/01/19 0432  •  folic acid (FOLVITE) tablet 1 mg, 1 mg, Oral, DAILY, Zahida Longo M.D., 1 mg at 10/01/19 0432  •  multivitamin (THERAGRAN) tablet 1 Tab, 1 Tab, Oral, DAILY, Zahida Longo M.D., 1 Tab at 10/01/19 0432  •  DULoxetine (CYMBALTA) capsule 40 mg, 40 mg, Oral, QAM, Bina Zayas M.D., 40 mg at 10/01/19 0432  •  QUEtiapine (SEROQUEL) tablet 200 mg, 200 mg, Oral, Q EVENING, Bina Zayas M.D., 200 mg at 09/30/19 1817  •  traZODone (DESYREL) tablet 100 mg, 100 mg, Oral, QHS, Chad Romo D.O., Stopped at 09/30/19 2200  •  lisinopril (PRINIVIL) tablet 20 mg, 20 mg, Oral, QAM, Chad Romo D.O., Stopped at 10/01/19 0600  •  gabapentin (NEURONTIN) capsule 300 mg, 300 mg, Oral, TID, Chad Romo D.O., 300 mg at 10/01/19 1603  •  acetaminophen (TYLENOL) tablet 650 mg, 650 mg, Oral, Q6HRS PRN, Chad Romo D.O., 650 mg at 10/01/19 1603  •  heparin injection 5,000 Units, 5,000 Units, Subcutaneous, Q8HRS, Chad Romo D.O., Stopped at 09/30/19 1400  •  ondansetron (ZOFRAN) syringe/vial injection 4 mg, 4 mg, Intravenous, Q4HRS PRN, Chad Romo D.O.  •  ondansetron (ZOFRAN ODT) dispertab 4 mg, 4 mg, Oral, Q4HRS PRN, Chad  BRAXTON Romo  •  loperamide (IMODIUM) capsule 2 mg, 2 mg, Oral, TID PRN, Chad Romo D.O., 2 mg at 09/29/19 0909  •  Influenza Vaccine High-Dose pf injection 0.5 mL, 0.5 mL, Intramuscular, Once PRN, Raimundo Us D.O.

## 2019-10-02 NOTE — PROGRESS NOTES
Hospital Medicine Daily Progress Note    Date of Service  10/2/2019    Chief Complaint  69 y.o. male admitted 9/26/2019 with diarrhea and SI    Hospital Course    Patient is a 69-year-old male with history of methamphetamine abuse, severe depression, alcohol abuse, chronic low back pain, gout here with suicidal ideation.  Says he was given a jump in front of a train or a bus but decided to talk with a psychiatrist first.  He also relates history of 7 days of diarrhea and generalized malaise body aches.  Suspect to be suffering from a likely viral gastroenteritis and treated conservatively.      Interval Problem Update  10/1.  Still pending bone marrow biopsy however not scheduled for today.  Reordered and scheduled for tomorrow.  Patient's WBC improved to 2.2.  Patient complains of general body achiness and depression. Patient's pain is general 2-3/10, intermittent and does not radiate to other location, sharp and with some tingling. Can be controlled by pain meds.  10/2.  Patient overall stable.  No significant overnight event.  Tolerated bone marrow biopsy procedure.  Complains of biopsy site pain. Patient's pain is local, 4-5/10, intermittent and does not radiate to other location, sharp and with some tingling. Can be controlled by pain meds. Dressing in place.    Consultants/Specialty  Psychiatry    Code Status  Full    Disposition  To be determined.  Patient is currently on a legal hold with suicidal ideation precautions    Review of Systems  Review of Systems   Constitutional: Positive for malaise/fatigue. Negative for diaphoresis, fever and weight loss.   HENT: Negative for congestion, ear discharge, ear pain, hearing loss and nosebleeds.    Eyes: Negative for blurred vision and pain.   Respiratory: Negative for cough, sputum production and wheezing.    Cardiovascular: Negative for chest pain, palpitations and PND.   Gastrointestinal: Negative for abdominal pain, constipation, heartburn, melena and nausea.    Genitourinary: Negative for dysuria, frequency and hematuria.   Musculoskeletal: Positive for back pain. Negative for falls, myalgias and neck pain.   Skin: Negative for rash.   Neurological: Positive for weakness. Negative for dizziness, tingling, focal weakness, seizures and headaches.   Psychiatric/Behavioral: Positive for depression. Negative for substance abuse and suicidal ideas.   All other systems reviewed and are negative.       Physical Exam  Temp:  [36.3 °C (97.4 °F)-37 °C (98.6 °F)] 36.3 °C (97.4 °F)  Pulse:  [46-65] 46  Resp:  [9-19] 16  BP: ()/(55-92) 115/78  SpO2:  [93 %-100 %] 94 %    Physical Exam   Constitutional: He is oriented to person, place, and time. He appears well-developed and well-nourished. No distress.   HENT:   Head: Normocephalic and atraumatic.   Eyes: Pupils are equal, round, and reactive to light. EOM are normal. Right eye exhibits no discharge. Left eye exhibits no discharge.   Neck: Normal range of motion. Neck supple. No JVD present. No thyromegaly present.   Cardiovascular: Normal rate, regular rhythm and normal heart sounds. Exam reveals no gallop and no friction rub.   Pulmonary/Chest: Effort normal and breath sounds normal. No respiratory distress. He has no rales. He exhibits no tenderness.   Abdominal: Soft. Bowel sounds are normal. He exhibits no distension and no mass. There is no rebound and no guarding.   Musculoskeletal: He exhibits no edema.   Lymphadenopathy:     He has no cervical adenopathy.   Neurological: He is alert and oriented to person, place, and time. He displays normal reflexes. Coordination normal.   Skin: Skin is warm and dry. No rash noted. He is not diaphoretic.   Psychiatric: He exhibits a depressed mood.   Patient feels depressed   Nursing note and vitals reviewed.      Fluids    Intake/Output Summary (Last 24 hours) at 10/2/2019 1557  Last data filed at 10/2/2019 1330  Gross per 24 hour   Intake 560 ml   Output 2350 ml   Net -1790 ml        Laboratory  Recent Labs     09/30/19  0149 10/01/19  0930 10/02/19  0310   WBC 1.6* 2.2* 2.5*   RBC 3.33* 3.80* 3.81*   HEMOGLOBIN 11.1* 12.3* 12.7*   HEMATOCRIT 33.8* 37.8* 38.4*   .5* 99.5* 100.8*   MCH 33.3* 32.4 33.3*   MCHC 32.8* 32.5* 33.1*   RDW 53.1* 53.1* 53.4*   PLATELETCT 63* 64* 68*   MPV 12.1 11.7 11.6     Recent Labs     09/30/19  0149 10/01/19  0930 10/02/19  0310   SODIUM 139 138 140   POTASSIUM 4.5 5.3 4.1   CHLORIDE 107 106 107   CO2 27 28 26   GLUCOSE 79 79 76   BUN 15 16 15   CREATININE 0.81 0.80 0.78   CALCIUM 8.8 8.7 8.4*                   Imaging  CT-ABDOMEN-PELVIS W/O   Final Result         1.  No evidence of bowel obstruction or bowel inflammation.      2.  Mild splenomegaly.      3.  No abdominal or pelvic adenopathy.      4.  Cholelithiasis. No biliary dilatation.           Assessment/Plan  Alcoholism (HCC)- (present on admission)  Assessment & Plan  Patient does carry history of alcoholism, he denies any active drinking   Psychiatry team is following    Daily magnesium, thiamine supplementation  B12, TSH, folate  Replace vitamins oral, Mag IV daily until >2.0  Currently have no signs of withdrawal continue to monitor    Suicidal ideation- (present on admission)  Assessment & Plan  Psychiatry c/s   Probable discharge to an inpatient psych bed.    Remains on legal hold  Continue medication    Leukopenia  Assessment & Plan  Stat recheck 9/28 w WBCs at 1.8->2.1->2.5  ANC <1->1.1  No sepsis criteria/acidosis  Possible hemodilution; stop fluids and follow  Bone marrow bx reordered  Hematology consult;   Hx of ETOH abuse in the past  Pending bone marrow biopsy result    Abdominal pain  Assessment & Plan  CT A/P ok  Likely viral etiology      Diarrhea of presumed infectious origin  Assessment & Plan  Patient complains of diarrhea x1 week.  Is diffuse constitutional symptoms would imply possibly viral origin.  He complains of chills myalgias arthralgias and generally just feeling  poorly.  He has not had any fever, there is been no blood,   He has had no recent antibiotic exposures in the last 6 months, he cannot recall in fact the last time he did have any antibiotics.  Check stool O&P and cultures  Repeat labs in the a.m. w leukopenia  Abdominal pain persistent  C-diff neg  CT A/P - ok  Cipro/Flagyl stopped as no evidence of colitis      Thrombocytopenia (HCC)- (present on admission)  Assessment & Plan  Platelets chronically low  Onc c/s   Follow  Likely d/t ETOH abuse  Monitor for active bleeding  Transfuse if <25    Lumbar back pain with radiculopathy affecting lower extremity- (present on admission)  Assessment & Plan  Patient has chronic back pain.  We will treat him supportively.    Gout- (present on admission)  Assessment & Plan  Stable at this time       VTE prophylaxis: Heparin      Current Facility-Administered Medications:   •  thiamine tablet 100 mg, 100 mg, Oral, DAILY, Jose Shelton A.P.N., 100 mg at 10/02/19 0440  •  folic acid (FOLVITE) tablet 1 mg, 1 mg, Oral, DAILY, Zahida Longo M.D., 1 mg at 10/02/19 0440  •  multivitamin (THERAGRAN) tablet 1 Tab, 1 Tab, Oral, DAILY, Zahida Longo M.D., 1 Tab at 10/02/19 0440  •  DULoxetine (CYMBALTA) capsule 40 mg, 40 mg, Oral, QAM, Bina Zayas M.D., 40 mg at 10/02/19 0444  •  QUEtiapine (SEROQUEL) tablet 200 mg, 200 mg, Oral, Q EVENING, Bina Zayas M.D., 200 mg at 10/01/19 1825  •  traZODone (DESYREL) tablet 100 mg, 100 mg, Oral, QHS, Chad Romo, D.O., 100 mg at 10/01/19 2019  •  lisinopril (PRINIVIL) tablet 20 mg, 20 mg, Oral, QAM, Chad Romo D.O., Stopped at 10/01/19 0600  •  gabapentin (NEURONTIN) capsule 300 mg, 300 mg, Oral, TID, Chad Romo D.O., 300 mg at 10/02/19 0859  •  acetaminophen (TYLENOL) tablet 650 mg, 650 mg, Oral, Q6HRS PRN, Chad Romo DLE, 650 mg at 10/01/19 1603  •  heparin injection 5,000 Units, 5,000 Units, Subcutaneous, Q8HRS,  Chad Romo D.O., Stopped at 09/30/19 1400  •  ondansetron (ZOFRAN) syringe/vial injection 4 mg, 4 mg, Intravenous, Q4HRS PRN, Chad Romo D.O.  •  ondansetron (ZOFRAN ODT) dispertab 4 mg, 4 mg, Oral, Q4HRS PRN, Chad Romo D.O.  •  loperamide (IMODIUM) capsule 2 mg, 2 mg, Oral, TID PRN, Chad Romo D.O., 2 mg at 09/29/19 0909  •  Influenza Vaccine High-Dose pf injection 0.5 mL, 0.5 mL, Intramuscular, Once PRN, Raimundo Us D.O.

## 2019-10-02 NOTE — PROCEDURES
Bone Marrow Biopsy/Aspiration  Date/Time: 10/2/2019 11:58 AM  Performed by: Tamera Leos M.D.  Authorized by: Tamera Leos M.D.     Consent:     Consent obtained:  Verbal    Consent given by:  Patient    Risks discussed:  Bleeding, infection and pain    Alternatives discussed:  No treatment  Universal protocol:     Procedure explained and questions answered to patient or proxy's satisfaction: yes      Relevant documents present and verified: yes      Test results available and properly labeled: yes      Imaging studies available: yes      Required blood products, implants, devices, and special equipment available: yes      Immediately prior to procedure a time out was called: yes      Site/side marked: yes      Patient identity confirmed:  Verbally with patient  Pre-procedure details:     Procedure type:  Aspiration and biopsy    Requesting physician:  Dr Shrestha / Dr Ornelas     Indications:  Pancytopenia, neutropenia    Position:  Prone    Buttock laterality:  Left    Local anesthetic:  1% Lidocaine    Subcutaneous volume:  1 mL    Periosteum anesthetic volume:  4 mL    Preparation: Patient was prepped and draped in usual sterile fashion    Sedation:     Patient Sedated: Yes      Sedation type: moderate (conscious) sedation      Sedation:  Midazolam    Analgesia:  Fentanyl    Sedation length:  15 minutes  Procedure details:     Aspirate obtained:  5 mL followed by 5 mL    Biopsy performed:  1 core    Number of attempts:  1  Post-procedure:     Puncture site:  Adhesive bandage applied and direct pressure applied    Patient tolerance of procedure:  Tolerated well, no immediate complications

## 2019-10-02 NOTE — PROGRESS NOTES
Assumed care of patient at change of shift.  During change of shift report, patient (pt) sleeping in bed, on room air, with 1:1 safety sitter in place.  During assessment, pt A&Ox4, on NPO diet pending bone marrow aspiration.  Procedure now completed, and pt tolerated procedure well.    Pt asking about his belongings and worried that they have been lost.  This RN located and retrieved this pt's belongings from ED, locker 8, and confirmed with the pt that all of his belongings are present, including his debit card issued by [employer].  Belongings consist of 2 patient belonging bag with clothing.  Belongings now placed into S6 locked closet.

## 2019-10-03 LAB
ANION GAP SERPL CALC-SCNC: 8 MMOL/L (ref 0–11.9)
BASOPHILS # BLD AUTO: 0.8 % (ref 0–1.8)
BASOPHILS # BLD: 0.02 K/UL (ref 0–0.12)
BUN SERPL-MCNC: 20 MG/DL (ref 8–22)
CALCIUM SERPL-MCNC: 8.8 MG/DL (ref 8.5–10.5)
CHLORIDE SERPL-SCNC: 103 MMOL/L (ref 96–112)
CO2 SERPL-SCNC: 26 MMOL/L (ref 20–33)
CREAT SERPL-MCNC: 0.9 MG/DL (ref 0.5–1.4)
EOSINOPHIL # BLD AUTO: 0.16 K/UL (ref 0–0.51)
EOSINOPHIL NFR BLD: 6.1 % (ref 0–6.9)
ERYTHROCYTE [DISTWIDTH] IN BLOOD BY AUTOMATED COUNT: 54.2 FL (ref 35.9–50)
GLUCOSE SERPL-MCNC: 97 MG/DL (ref 65–99)
HCT VFR BLD AUTO: 38.9 % (ref 42–52)
HGB BLD-MCNC: 12.5 G/DL (ref 14–18)
IMM GRANULOCYTES # BLD AUTO: 0.01 K/UL (ref 0–0.11)
IMM GRANULOCYTES NFR BLD AUTO: 0.4 % (ref 0–0.9)
LYMPHOCYTES # BLD AUTO: 0.88 K/UL (ref 1–4.8)
LYMPHOCYTES NFR BLD: 33.3 % (ref 22–41)
MAGNESIUM SERPL-MCNC: 1.7 MG/DL (ref 1.5–2.5)
MCH RBC QN AUTO: 32.8 PG (ref 27–33)
MCHC RBC AUTO-ENTMCNC: 32.1 G/DL (ref 33.7–35.3)
MCV RBC AUTO: 102.1 FL (ref 81.4–97.8)
MONOCYTES # BLD AUTO: 0.28 K/UL (ref 0–0.85)
MONOCYTES NFR BLD AUTO: 10.6 % (ref 0–13.4)
NEUTROPHILS # BLD AUTO: 1.29 K/UL (ref 1.82–7.42)
NEUTROPHILS NFR BLD: 48.8 % (ref 44–72)
NRBC # BLD AUTO: 0 K/UL
NRBC BLD-RTO: 0 /100 WBC
PLATELET # BLD AUTO: 69 K/UL (ref 164–446)
PMV BLD AUTO: 11.3 FL (ref 9–12.9)
POTASSIUM SERPL-SCNC: 4.4 MMOL/L (ref 3.6–5.5)
RBC # BLD AUTO: 3.81 M/UL (ref 4.7–6.1)
SODIUM SERPL-SCNC: 137 MMOL/L (ref 135–145)
WBC # BLD AUTO: 2.6 K/UL (ref 4.8–10.8)

## 2019-10-03 PROCEDURE — 36415 COLL VENOUS BLD VENIPUNCTURE: CPT

## 2019-10-03 PROCEDURE — 85025 COMPLETE CBC W/AUTO DIFF WBC: CPT

## 2019-10-03 PROCEDURE — 99232 SBSQ HOSP IP/OBS MODERATE 35: CPT | Performed by: INTERNAL MEDICINE

## 2019-10-03 PROCEDURE — A9270 NON-COVERED ITEM OR SERVICE: HCPCS | Performed by: HOSPITALIST

## 2019-10-03 PROCEDURE — 700111 HCHG RX REV CODE 636 W/ 250 OVERRIDE (IP): Performed by: HOSPITALIST

## 2019-10-03 PROCEDURE — 700102 HCHG RX REV CODE 250 W/ 637 OVERRIDE(OP): Performed by: HOSPITALIST

## 2019-10-03 PROCEDURE — 700102 HCHG RX REV CODE 250 W/ 637 OVERRIDE(OP): Performed by: STUDENT IN AN ORGANIZED HEALTH CARE EDUCATION/TRAINING PROGRAM

## 2019-10-03 PROCEDURE — 83735 ASSAY OF MAGNESIUM: CPT

## 2019-10-03 PROCEDURE — A9270 NON-COVERED ITEM OR SERVICE: HCPCS | Performed by: STUDENT IN AN ORGANIZED HEALTH CARE EDUCATION/TRAINING PROGRAM

## 2019-10-03 PROCEDURE — 80048 BASIC METABOLIC PNL TOTAL CA: CPT

## 2019-10-03 PROCEDURE — 700102 HCHG RX REV CODE 250 W/ 637 OVERRIDE(OP): Performed by: NURSE PRACTITIONER

## 2019-10-03 PROCEDURE — 700105 HCHG RX REV CODE 258: Performed by: INTERNAL MEDICINE

## 2019-10-03 PROCEDURE — A9270 NON-COVERED ITEM OR SERVICE: HCPCS | Performed by: NURSE PRACTITIONER

## 2019-10-03 PROCEDURE — 770021 HCHG ROOM/CARE - ISO PRIVATE

## 2019-10-03 RX ORDER — SODIUM CHLORIDE 9 MG/ML
500 INJECTION, SOLUTION INTRAVENOUS ONCE
Status: COMPLETED | OUTPATIENT
Start: 2019-10-03 | End: 2019-10-03

## 2019-10-03 RX ADMIN — QUETIAPINE FUMARATE 200 MG: 100 TABLET ORAL at 17:54

## 2019-10-03 RX ADMIN — FOLIC ACID 1 MG: 1 TABLET ORAL at 04:11

## 2019-10-03 RX ADMIN — TRAZODONE HYDROCHLORIDE 100 MG: 50 TABLET ORAL at 21:32

## 2019-10-03 RX ADMIN — GABAPENTIN 300 MG: 300 CAPSULE ORAL at 14:53

## 2019-10-03 RX ADMIN — GABAPENTIN 300 MG: 300 CAPSULE ORAL at 07:39

## 2019-10-03 RX ADMIN — ACETAMINOPHEN 650 MG: 325 TABLET, FILM COATED ORAL at 04:11

## 2019-10-03 RX ADMIN — GABAPENTIN 300 MG: 300 CAPSULE ORAL at 21:32

## 2019-10-03 RX ADMIN — HEPARIN SODIUM 5000 UNITS: 5000 INJECTION INTRAVENOUS; SUBCUTANEOUS at 04:11

## 2019-10-03 RX ADMIN — Medication 100 MG: at 04:12

## 2019-10-03 RX ADMIN — DULOXETINE HYDROCHLORIDE 40 MG: 20 CAPSULE, DELAYED RELEASE ORAL at 04:11

## 2019-10-03 RX ADMIN — SODIUM CHLORIDE 500 ML: 9 INJECTION, SOLUTION INTRAVENOUS at 09:13

## 2019-10-03 RX ADMIN — HEPARIN SODIUM 5000 UNITS: 5000 INJECTION INTRAVENOUS; SUBCUTANEOUS at 21:33

## 2019-10-03 RX ADMIN — HEPARIN SODIUM 5000 UNITS: 5000 INJECTION INTRAVENOUS; SUBCUTANEOUS at 14:53

## 2019-10-03 RX ADMIN — THERA TABS 1 TABLET: TAB at 04:11

## 2019-10-03 RX ADMIN — LISINOPRIL 20 MG: 20 TABLET ORAL at 04:11

## 2019-10-03 ASSESSMENT — ENCOUNTER SYMPTOMS
CHILLS: 0
SPUTUM PRODUCTION: 0
BACK PAIN: 1
PND: 0
MYALGIAS: 0
PALPITATIONS: 0
DIAPHORESIS: 0
WEIGHT LOSS: 0
DEPRESSION: 1
DIZZINESS: 0
EYE PAIN: 0
COUGH: 0
BLURRED VISION: 0
WHEEZING: 0
SEIZURES: 0
DIARRHEA: 0
FALLS: 0
FOCAL WEAKNESS: 0
WEAKNESS: 1
NECK PAIN: 0
TINGLING: 0
CONSTIPATION: 0
NAUSEA: 0
HEARTBURN: 0

## 2019-10-03 ASSESSMENT — LIFESTYLE VARIABLES: SUBSTANCE_ABUSE: 0

## 2019-10-03 NOTE — PROGRESS NOTES
Hospital Medicine Daily Progress Note    Date of Service  10/3/2019    Chief Complaint  69 y.o. male admitted 9/26/2019 with diarrhea and SI    Hospital Course    Patient is a 69-year-old male with history of methamphetamine abuse, severe depression, alcohol abuse, chronic low back pain, gout here with suicidal ideation.  Says he was given a jump in front of a train or a bus but decided to talk with a psychiatrist first.  He also relates history of 7 days of diarrhea and generalized malaise body aches.  Suspect to be suffering from a likely viral gastroenteritis and treated conservatively.      Interval Problem Update  10/1.  Still pending bone marrow biopsy however not scheduled for today.  Reordered and scheduled for tomorrow.  Patient's WBC improved to 2.2.  Patient complains of general body achiness and depression. Patient's pain is general 2-3/10, intermittent and does not radiate to other location, sharp and with some tingling. Can be controlled by pain meds.  10/2.  Patient overall stable.  No significant overnight event.  Tolerated bone marrow biopsy procedure.  Complains of biopsy site pain. Patient's pain is local, 4-5/10, intermittent and does not radiate to other location, sharp and with some tingling. Can be controlled by pain meds. Dressing in place.  10/3.  Patient feels stable overnight.  Tolerated meal.  Patient's bone marrow biopsy report showing no significant signs of primary hematological disorder for pancytopenia.  Patient's pancytopenia likely due to alcohol use chronically.  Patient complains of general body achiness but otherwise remained stable. Patient otherwise denies fever, chills, nausea, vomiting, adb pain, SOB, CP, headache, constipation, diarrhea, cough, or sputum.      Consultants/Specialty  Psychiatry    Code Status  Full    Disposition  Patient is medically stable to be transferred to inpatient psych facility.  patient is currently on a legal hold with suicidal ideation  precautions    Review of Systems  Review of Systems   Constitutional: Positive for malaise/fatigue. Negative for chills, diaphoresis and weight loss.   HENT: Negative for congestion, ear discharge, ear pain, hearing loss and nosebleeds.    Eyes: Negative for blurred vision and pain.   Respiratory: Negative for cough, sputum production and wheezing.    Cardiovascular: Negative for chest pain, palpitations and PND.   Gastrointestinal: Negative for constipation, diarrhea, heartburn, melena and nausea.   Genitourinary: Negative for dysuria, frequency and hematuria.   Musculoskeletal: Positive for back pain. Negative for falls, myalgias and neck pain.   Skin: Negative for rash.   Neurological: Positive for weakness. Negative for dizziness, tingling, focal weakness and seizures.   Psychiatric/Behavioral: Positive for depression. Negative for substance abuse and suicidal ideas.   All other systems reviewed and are negative.       Physical Exam  Temp:  [36.3 °C (97.4 °F)-37.3 °C (99.2 °F)] 36.6 °C (97.9 °F)  Pulse:  [46-84] 51  Resp:  [16-17] 17  BP: ()/(49-78) 116/66  SpO2:  [91 %-95 %] 94 %    Physical Exam   Constitutional: He is oriented to person, place, and time. He appears well-developed.   HENT:   Head: Normocephalic and atraumatic.   Eyes: Pupils are equal, round, and reactive to light. EOM are normal. Right eye exhibits no discharge. Left eye exhibits no discharge.   Neck: Normal range of motion. Neck supple. No JVD present. No thyromegaly present.   Cardiovascular: Normal rate, regular rhythm and normal heart sounds. Exam reveals no gallop.   No murmur heard.  Pulmonary/Chest: Effort normal and breath sounds normal. No respiratory distress. He has no wheezes. He exhibits no tenderness.   Abdominal: Soft. Bowel sounds are normal. He exhibits no distension and no mass. There is no rebound and no guarding.   Musculoskeletal: He exhibits no edema.   Lymphadenopathy:     He has no cervical adenopathy.    Neurological: He is alert and oriented to person, place, and time. He displays normal reflexes. Coordination normal.   Skin: Skin is warm and dry. No rash noted. He is not diaphoretic.   Psychiatric: He exhibits a depressed mood.   Patient feels depressed   Nursing note and vitals reviewed.      Fluids    Intake/Output Summary (Last 24 hours) at 10/3/2019 1327  Last data filed at 10/3/2019 1300  Gross per 24 hour   Intake 1530 ml   Output 1600 ml   Net -70 ml       Laboratory  Recent Labs     10/01/19  0930 10/02/19  0310 10/03/19  0058   WBC 2.2* 2.5* 2.6*   RBC 3.80* 3.81* 3.81*   HEMOGLOBIN 12.3* 12.7* 12.5*   HEMATOCRIT 37.8* 38.4* 38.9*   MCV 99.5* 100.8* 102.1*   MCH 32.4 33.3* 32.8   MCHC 32.5* 33.1* 32.1*   RDW 53.1* 53.4* 54.2*   PLATELETCT 64* 68* 69*   MPV 11.7 11.6 11.3     Recent Labs     10/01/19  0930 10/02/19  0310 10/03/19  0058   SODIUM 138 140 137   POTASSIUM 5.3 4.1 4.4   CHLORIDE 106 107 103   CO2 28 26 26   GLUCOSE 79 76 97   BUN 16 15 20   CREATININE 0.80 0.78 0.90   CALCIUM 8.7 8.4* 8.8                   Imaging  CT-ABDOMEN-PELVIS W/O   Final Result         1.  No evidence of bowel obstruction or bowel inflammation.      2.  Mild splenomegaly.      3.  No abdominal or pelvic adenopathy.      4.  Cholelithiasis. No biliary dilatation.           Assessment/Plan  Alcoholism (HCC)- (present on admission)  Assessment & Plan  Patient does carry history of alcoholism, he denies any active drinking   Psychiatry team is following    Daily magnesium, thiamine supplementation  B12, TSH, folate  Replace vitamins oral, Mag IV daily until >2.0  Currently have no signs of withdrawal continue to monitor    Patient's bone marrow biopsy report showing no significant signs of primary hematological disorder for pancytopenia.  Patient's pancytopenia likely due to alcohol use chronically.      Suicidal ideation- (present on admission)  Assessment & Plan  Psychiatry c/s   Probable discharge to an inpatient psych  bed.    Remains on legal hold  Continue medication    Leukopenia  Assessment & Plan  Stat recheck 9/28 w WBCs at 1.8->2.1->2.5  ANC <1->1.1  No sepsis criteria/acidosis  Possible hemodilution; stop fluids and follow  Bone marrow bx reordered  Hematology consult;   Hx of ETOH abuse in the past  Patient's bone marrow biopsy report showing no significant signs of primary hematological disorder for pancytopenia.  Patient's pancytopenia likely due to alcohol use chronically.      Abdominal pain  Assessment & Plan  CT A/P ok  Likely viral etiology      Diarrhea of presumed infectious origin  Assessment & Plan  Patient complains of diarrhea x1 week.  Is diffuse constitutional symptoms would imply possibly viral origin.  He complains of chills myalgias arthralgias and generally just feeling poorly.  He has not had any fever, there is been no blood,   He has had no recent antibiotic exposures in the last 6 months, he cannot recall in fact the last time he did have any antibiotics.  Check stool O&P and cultures  Repeat labs in the a.m. w leukopenia  Abdominal pain persistent  C-diff neg  CT A/P - ok  Cipro/Flagyl stopped as no evidence of colitis      Thrombocytopenia (HCC)- (present on admission)  Assessment & Plan  Platelets chronically low  Onc c/s   Follow  Likely d/t ETOH abuse  Monitor for active bleeding  Transfuse if <25    Patient's bone marrow biopsy report showing no significant signs of primary hematological disorder for pancytopenia.  Patient's pancytopenia likely due to alcohol use chronically.      Lumbar back pain with radiculopathy affecting lower extremity- (present on admission)  Assessment & Plan  Patient has chronic back pain.  We will treat him supportively.    Gout- (present on admission)  Assessment & Plan  Stable at this time       VTE prophylaxis: Heparin      Current Facility-Administered Medications:   •  thiamine tablet 100 mg, 100 mg, Oral, DAILY, Jose Shelton, A.P.N., 100 mg at 10/03/19 0412  •   folic acid (FOLVITE) tablet 1 mg, 1 mg, Oral, DAILY, Zahida Longo M.D., 1 mg at 10/03/19 0411  •  multivitamin (THERAGRAN) tablet 1 Tab, 1 Tab, Oral, DAILY, Zahida Longo M.D., 1 Tab at 10/03/19 0411  •  DULoxetine (CYMBALTA) capsule 40 mg, 40 mg, Oral, QAM, Bina Zayas M.D., 40 mg at 10/03/19 0411  •  QUEtiapine (SEROQUEL) tablet 200 mg, 200 mg, Oral, Q EVENING, Bina Zayas M.D., 200 mg at 10/02/19 1723  •  traZODone (DESYREL) tablet 100 mg, 100 mg, Oral, QHS, Chad Romo D.O., 100 mg at 10/02/19 2121  •  gabapentin (NEURONTIN) capsule 300 mg, 300 mg, Oral, TID, Chad Romo D.O., 300 mg at 10/03/19 0739  •  acetaminophen (TYLENOL) tablet 650 mg, 650 mg, Oral, Q6HRS PRN, Chad Romo D.O., 650 mg at 10/03/19 0411  •  heparin injection 5,000 Units, 5,000 Units, Subcutaneous, Q8HRS, Chad Romo D.O., 5,000 Units at 10/03/19 0411  •  ondansetron (ZOFRAN) syringe/vial injection 4 mg, 4 mg, Intravenous, Q4HRS PRN, Chad Romo D.O.  •  ondansetron (ZOFRAN ODT) dispertab 4 mg, 4 mg, Oral, Q4HRS PRN, Chad Romo D.O.  •  loperamide (IMODIUM) capsule 2 mg, 2 mg, Oral, TID PRN, Chad Romo D.O., 2 mg at 09/29/19 0909  •  Influenza Vaccine High-Dose pf injection 0.5 mL, 0.5 mL, Intramuscular, Once PRN, Raimundo Us D.O.

## 2019-10-03 NOTE — CARE PLAN
Problem: Safety  Goal: Will remain free from falls  Outcome: PROGRESSING AS EXPECTED  Note:   Pt remains free from injury/falls this shift. Continue to assess for risks for injury/fall and implement prevention measures as needed       Problem: Infection  Goal: Will remain free from infection  Outcome: PROGRESSING AS EXPECTED  Note:   Pt remains free from infection this shift. Continue to assess for signs/symptoms of infection and intervene as needed.

## 2019-10-04 PROCEDURE — 700102 HCHG RX REV CODE 250 W/ 637 OVERRIDE(OP): Performed by: NURSE PRACTITIONER

## 2019-10-04 PROCEDURE — A9270 NON-COVERED ITEM OR SERVICE: HCPCS | Performed by: HOSPITALIST

## 2019-10-04 PROCEDURE — 99232 SBSQ HOSP IP/OBS MODERATE 35: CPT | Performed by: PSYCHIATRY & NEUROLOGY

## 2019-10-04 PROCEDURE — 700102 HCHG RX REV CODE 250 W/ 637 OVERRIDE(OP): Performed by: HOSPITALIST

## 2019-10-04 PROCEDURE — A9270 NON-COVERED ITEM OR SERVICE: HCPCS | Performed by: STUDENT IN AN ORGANIZED HEALTH CARE EDUCATION/TRAINING PROGRAM

## 2019-10-04 PROCEDURE — 99232 SBSQ HOSP IP/OBS MODERATE 35: CPT | Mod: 25 | Performed by: INTERNAL MEDICINE

## 2019-10-04 PROCEDURE — 700111 HCHG RX REV CODE 636 W/ 250 OVERRIDE (IP): Performed by: HOSPITALIST

## 2019-10-04 PROCEDURE — A9270 NON-COVERED ITEM OR SERVICE: HCPCS | Performed by: NURSE PRACTITIONER

## 2019-10-04 PROCEDURE — 770021 HCHG ROOM/CARE - ISO PRIVATE

## 2019-10-04 PROCEDURE — 700102 HCHG RX REV CODE 250 W/ 637 OVERRIDE(OP): Performed by: STUDENT IN AN ORGANIZED HEALTH CARE EDUCATION/TRAINING PROGRAM

## 2019-10-04 RX ADMIN — GABAPENTIN 300 MG: 300 CAPSULE ORAL at 16:40

## 2019-10-04 RX ADMIN — GABAPENTIN 300 MG: 300 CAPSULE ORAL at 23:13

## 2019-10-04 RX ADMIN — THERA TABS 1 TABLET: TAB at 05:42

## 2019-10-04 RX ADMIN — GABAPENTIN 300 MG: 300 CAPSULE ORAL at 05:43

## 2019-10-04 RX ADMIN — DULOXETINE HYDROCHLORIDE 40 MG: 20 CAPSULE, DELAYED RELEASE ORAL at 05:42

## 2019-10-04 RX ADMIN — QUETIAPINE FUMARATE 200 MG: 100 TABLET ORAL at 16:40

## 2019-10-04 RX ADMIN — TRAZODONE HYDROCHLORIDE 100 MG: 50 TABLET ORAL at 21:02

## 2019-10-04 RX ADMIN — FOLIC ACID 1 MG: 1 TABLET ORAL at 05:42

## 2019-10-04 RX ADMIN — HEPARIN SODIUM 5000 UNITS: 5000 INJECTION INTRAVENOUS; SUBCUTANEOUS at 05:41

## 2019-10-04 RX ADMIN — HEPARIN SODIUM 5000 UNITS: 5000 INJECTION INTRAVENOUS; SUBCUTANEOUS at 16:41

## 2019-10-04 RX ADMIN — Medication 100 MG: at 05:42

## 2019-10-04 ASSESSMENT — ENCOUNTER SYMPTOMS
WEIGHT LOSS: 0
PND: 0
DEPRESSION: 1
HEARTBURN: 0
SEIZURES: 0
WHEEZING: 0
NECK PAIN: 0
VOMITING: 0
EYE PAIN: 0
PALPITATIONS: 0
BLURRED VISION: 0
DIAPHORESIS: 0
TINGLING: 0
FEVER: 0
FOCAL WEAKNESS: 0
WEAKNESS: 1
FALLS: 0
DIARRHEA: 0
CLAUDICATION: 0
BACK PAIN: 1
SPUTUM PRODUCTION: 0
DIZZINESS: 0
COUGH: 0
CONSTIPATION: 0

## 2019-10-04 ASSESSMENT — LIFESTYLE VARIABLES: SUBSTANCE_ABUSE: 0

## 2019-10-04 NOTE — PROGRESS NOTES
Hematology/oncology    I discussed that the bone marrow was normal.  I discussed everything with the primary team as well.  He can continue to follow with his medical team.  He was encouraged to stop drinking.  I think this is the most likely cause of his bone marrow suppression and some splenomegaly.  His white count did improve some in the hospital since he has not had any alcohol.    We will sign off for now.  Please reconsult if needed

## 2019-10-04 NOTE — DISCHARGE PLANNING
Agency/Facility Name: [facility], [facility].  Referral sent @[phone]

## 2019-10-04 NOTE — DISCHARGE PLANNING
Anticipated Discharge Disposition: Mental Health Faciltiy    Action: Patient is medically clear to d/c.    Faxed Legal Hold/Medical Clearance Checklist to [facility]   Faxed Legal Hold to [facility]    Barriers to Discharge: acceptance    Plan: follow up with [facility] for acceptance

## 2019-10-04 NOTE — PSYCHIATRY
"PSYCHIATRIC FOLLOW-UP:(established)  *Reason for admission: body aches that began a week ago. He states he cannot walk because he is so sore. This morning, the patient stated he was feeling suicidal ideation and planned to jump in front of a bus, but he felt too sick to do so. The patient has associated chills and diarrhea when he drinks. He states that he also has painful urination. The patient also states he has depression.  He sees a psychiatrist and has medication for depression, but \"ran out and has not taken them recently.     *Legal Hold Status on Admission: +                      *HPI:   Pt reports he has been on seroquel for years. He states he has been told his WBC and platelets are improving and might be due to his alcohol intake (true) which he plans on stopping. He was also told that a bone marrow test was \"normal\" (true). He feels much better and explains his SI as being secondary to \"being really sick\" when he was admitted.     Pt would like to go to [place] to stay with nephew when he is able to leave.  However he ha had this plan for many months and it never materializes. When seen before by me, he has said that his nephew won't take him while he drinks. He looks much better than when I have seen him before.      Medical ROS (as pertinent):                    Results for BRANDY HUTTON (MRN [id number]) as of 10/4/2019 14:58   Ref. Range 9/29/2019 07:10 9/30/2019 01:49 9/30/2019 10:07 10/1/2019 09:30 10/2/2019 03:10 10/2/2019 12:00 10/2/2019 12:22 10/3/2019 00:58   WBC Latest Ref Range: 4.8 - 10.8 K/uL 1.9 (LL) 1.6 (LL)  2.2 (LL) 2.5 (LL)   2.6 (L)   Results for BRANDY HUTTON (MRN [id number]) as of 10/4/2019 14:58   Ref. Range 9/29/2019 07:10 9/30/2019 01:49 9/30/2019 10:07 10/1/2019 09:30 10/2/2019 03:10 10/2/2019 12:00 10/2/2019 12:22 10/3/2019 00:58   Platelet Count Latest Ref Range: 164 - 446 K/uL 61 (L) 63 (L)  64 (L) 68 (L)   69 (L)       *Psychiatric Examination:  Vitals:Blood pressure 116/74, " "pulse (!) 50, temperature 36.2 °C (97.2 °F), temperature source Temporal, resp. rate 17, height 1.803 m (5' 11\"), weight 96.9 kg (213 lb 10 oz), SpO2 95 %.  General Appearance: good eye contact, pleasant  Abnormal Movements: none  Gait and Posture: sitting up in bed   Speech: wnl  Thought processes:normal rate  Associations: linear  Abnormal or Psychotic Thoughts: none  Judgement and Insight: fair  Orientation: grossly oriented  Recent and Remote Memory: grossly intact  Attention Span and Concentration: intact  Language: fluid  Fund of Knowledge:not tested  Mood and Affect: \"good\"/euthymic  SI/HI:denies      *ASSESSMENT/PLAN:  1. Alcohol use disorder severe  - leukopenia and thromocytopenia: bone marrow testing unremarkable.  -pt reports sobriety x 3 weeks.  -historically has used his funds for drinking and has been homeless.  -if he doesn't go to [place], please give him referrals for alcoholism.  -self report in past of \"TB\" (no testing here) and that he is not allowed at the [facility] as a result-    2. Adjustment disorder with depressed mood  - due to feeling \"really sick\"  -continue trazodone and seroquel  -continue cymbalta: he can talk with his psychiatrist as an outpt to continue the taper and make further adjustments as appropriate.   -self report of bipolar disorder  -Hx of depressive disorder unspec.  -hx of non compliance with follow up    3. Methamphetamine use disorder    4. Medical  -gout  -lumbar back pain with radiculopathy of LE: neurontin        -diarrhea: continued workup.  -self report in past of \"TB\" (no testing here)     *Legal hold: dc'd. Pt's affect and words are congruous to each other. He has a clear plan for discharge. Psychiatry had planned on dc of seroquel but this did not occur. He has a hx of bipolar disorder for which it might help though not first line.           *Will Follow while here.       "

## 2019-10-04 NOTE — DISCHARGE PLANNING
MSW received call from Chika at [facility]. Pt is out of his lifetime Medicare days. MSW updated LAURA [place].

## 2019-10-04 NOTE — DISCHARGE PLANNING
Agency/Facility Name: [facility]  Spoke To: Voice message from [facility]  Outcome: Patient referral declined.  Patient has reached maximum therapeutic benefits.    Agency/Facility Name: [facility]  Outcome: No days left.    Agency/Facility Name: [facility]  Spoke To: Tiffanie  Outcome: Will check to see if referral was received.    Agency/Facility Name: [facility]  Spoke To: Keaton  Outcome: Referral received, under review.    Agency/[facility]ility Name: [facility]  Outcome: Left a voice message regarding patients referral.  Requested a call back.

## 2019-10-04 NOTE — PROGRESS NOTES
Assumed care of pt at 1900, sitter at bedside, no c/o pain or other discomforts at that time. Meds given per MAR. Pt resting in bed now, call light in reach, no further needs at this time

## 2019-10-05 VITALS
SYSTOLIC BLOOD PRESSURE: 133 MMHG | OXYGEN SATURATION: 95 % | RESPIRATION RATE: 16 BRPM | WEIGHT: 189.82 LBS | TEMPERATURE: 98.1 F | DIASTOLIC BLOOD PRESSURE: 95 MMHG | HEART RATE: 57 BPM | BODY MASS INDEX: 26.57 KG/M2 | HEIGHT: 71 IN

## 2019-10-05 PROCEDURE — 700102 HCHG RX REV CODE 250 W/ 637 OVERRIDE(OP): Performed by: NURSE PRACTITIONER

## 2019-10-05 PROCEDURE — A9270 NON-COVERED ITEM OR SERVICE: HCPCS | Performed by: NURSE PRACTITIONER

## 2019-10-05 PROCEDURE — 99239 HOSP IP/OBS DSCHRG MGMT >30: CPT | Performed by: INTERNAL MEDICINE

## 2019-10-05 PROCEDURE — A9270 NON-COVERED ITEM OR SERVICE: HCPCS | Performed by: HOSPITALIST

## 2019-10-05 PROCEDURE — 700102 HCHG RX REV CODE 250 W/ 637 OVERRIDE(OP): Performed by: HOSPITALIST

## 2019-10-05 PROCEDURE — A9270 NON-COVERED ITEM OR SERVICE: HCPCS | Performed by: STUDENT IN AN ORGANIZED HEALTH CARE EDUCATION/TRAINING PROGRAM

## 2019-10-05 PROCEDURE — 700102 HCHG RX REV CODE 250 W/ 637 OVERRIDE(OP): Performed by: STUDENT IN AN ORGANIZED HEALTH CARE EDUCATION/TRAINING PROGRAM

## 2019-10-05 RX ORDER — FOLIC ACID 1 MG/1
1 TABLET ORAL DAILY
Qty: 30 TAB | Refills: 0 | Status: SHIPPED | OUTPATIENT
Start: 2019-10-06 | End: 2019-10-05

## 2019-10-05 RX ORDER — FOLIC ACID 1 MG/1
1 TABLET ORAL DAILY
Qty: 30 TAB | Refills: 0 | Status: SHIPPED | OUTPATIENT
Start: 2019-10-06 | End: 2019-10-07

## 2019-10-05 RX ORDER — LANOLIN ALCOHOL/MO/W.PET/CERES
100 CREAM (GRAM) TOPICAL DAILY
Qty: 30 TAB | Refills: 0 | Status: SHIPPED | OUTPATIENT
Start: 2019-10-06 | End: 2019-10-05

## 2019-10-05 RX ORDER — QUETIAPINE FUMARATE 200 MG/1
200 TABLET, FILM COATED ORAL EVERY EVENING
Qty: 60 TAB | Refills: 1 | Status: SHIPPED | OUTPATIENT
Start: 2019-10-05 | End: 2019-10-05 | Stop reason: SDUPTHER

## 2019-10-05 RX ORDER — QUETIAPINE FUMARATE 200 MG/1
200 TABLET, FILM COATED ORAL EVERY EVENING
Qty: 60 TAB | Refills: 1 | Status: SHIPPED | OUTPATIENT
Start: 2019-10-05 | End: 2019-10-07

## 2019-10-05 RX ORDER — LANOLIN ALCOHOL/MO/W.PET/CERES
100 CREAM (GRAM) TOPICAL DAILY
Qty: 30 TAB | Refills: 0 | Status: SHIPPED | OUTPATIENT
Start: 2019-10-06 | End: 2019-10-07

## 2019-10-05 RX ADMIN — ACETAMINOPHEN 650 MG: 325 TABLET, FILM COATED ORAL at 04:50

## 2019-10-05 RX ADMIN — DULOXETINE HYDROCHLORIDE 40 MG: 20 CAPSULE, DELAYED RELEASE ORAL at 04:51

## 2019-10-05 RX ADMIN — FOLIC ACID 1 MG: 1 TABLET ORAL at 04:51

## 2019-10-05 RX ADMIN — Medication 100 MG: at 04:51

## 2019-10-05 RX ADMIN — GABAPENTIN 300 MG: 300 CAPSULE ORAL at 08:56

## 2019-10-05 RX ADMIN — THERA TABS 1 TABLET: TAB at 04:51

## 2019-10-05 NOTE — CARE PLAN
Problem: Communication  Goal: The ability to communicate needs accurately and effectively will improve  Outcome: PROGRESSING AS EXPECTED  Intervention: Educate patient and significant other/support system about the plan of care, procedures, treatments, medications and allow for questions  Flowsheets (Taken 10/5/2019 0959)  Pt & Family Have Been Educated on Methods Available to Report Concerns Related to Care, Treatment, Services, and Patient Safety Issues: Yes  Note:   Educate patient on treatment and steps needed for proper discharge.     Problem: Safety  Goal: Will remain free from injury  Outcome: PROGRESSING AS EXPECTED  Intervention: Provide assistance with mobility  Flowsheets (Taken 10/5/2019 0959)  Assistance: Standby Assist  Ambulation Tolerance: Tolerates Well  Note:   Patients room will be free from clutter and patient will be educated on fall precautions.

## 2019-10-05 NOTE — PROGRESS NOTES
Patient cleared for discharge. Discharge information packet discussed in full with patient. Patient belongs returned. Patient states his friend is going to drive him to the shelter. Told patient to follow up with PCP after discharge. IV removed.

## 2019-10-05 NOTE — DISCHARGE INSTRUCTIONS
Discharge Instructions per La Ornelas M.D.    Please follow-up with PCP as outpatient.    DIET: regular    ACTIVITY: As tolerated    DIAGNOSIS: ETOH, SI    Return to ER if symptoms recur     Discharge Instructions    Discharged to other (Shelter) by car with friend. Discharged via walking, hospital escort: Refused.  Special equipment needed: Not Applicable    Be sure to schedule a follow-up appointment with your primary care doctor or any specialists as instructed.     Discharge Plan:   Diet Plan: Discussed  Activity Level: Discussed  Smoking Cessation Offered: Patient Counseled  Confirmed Symptoms Management: Discussed  Medication Reconciliation Updated: Yes  Influenza Vaccine Indication: Indicated: 65 years and older    I understand that a diet low in cholesterol, fat, and sodium is recommended for good health. Unless I have been given specific instructions below for another diet, I accept this instruction as my diet prescription.   Other diet: regular    Special Instructions: None    · Is patient discharged on Warfarin / Coumadin?   No     Depression / Suicide Risk    As you are discharged from this [facility] facility, it is important to learn how to keep safe from harming yourself.    Recognize the warning signs:  · Abrupt changes in personality, positive or negative- including increase in energy   · Giving away possessions  · Change in eating patterns- significant weight changes-  positive or negative  · Change in sleeping patterns- unable to sleep or sleeping all the time   · Unwillingness or inability to communicate  · Depression  · Unusual sadness, discouragement and loneliness  · Talk of wanting to die  · Neglect of personal appearance   · Rebelliousness- reckless behavior  · Withdrawal from people/activities they love  · Confusion- inability to concentrate     If you or a loved one observes any of these behaviors or has concerns about self-harm, here's what you can do:  · Talk about it- your feelings  and reasons for harming yourself  · Remove any means that you might use to hurt yourself (examples: pills, rope, extension cords, firearm)  · Get professional help from the community (Mental Health, Substance Abuse, psychological counseling)  · Do not be alone:Call your Safe Contact- someone whom you trust who will be there for you.  · Call your local CRISIS HOTLINE 032-1136 or 389-421-4109  · Call your local Children's Mobile Crisis Response Team [place] (678) 217-4774 or wwwSighter  · Call the toll free National Suicide Prevention Hotlines   · National Suicide Prevention Lifeline 464-235-LCXO (5757)  · National Hope Line Network 800-SUICIDE (281-6945)      Suicidal Feelings: How to Help Yourself  Suicide is the taking of one's own life. If you feel as though life is getting too tough to handle and are thinking about suicide, get help right away. To get help:  · Call your local emergency services (449 in the [place]  · Call a suicide hotline to speak with a trained counselor who understands how you are feeling. The following is a list of suicide hotlines in the [place]. For a list of hotlines in [place], visit www.suicide.org/hotlines/international/udqeig-pqahfzv-hqeqkwkk.html.  ¨ 9-222-877-TALK (1-507.601.9934).  ¨ 6-786-BSQNYBM (1-477.984.7976).  ¨ 1-435.812.5136. This is a hotline for [place] speakers.  ¨ 1-031-724-4TTY (1-935.749.2702). This is a hotline for TTY users.  ¨ 0-607-1-U-MINISTERIO (1-316.458.3667). This is a hotline for lesbian, arreguin, bisexual, transgender, or questioning youth.  · Contact a crisis center or a local suicide prevention center. To find a crisis center or suicide prevention center:  ¨ Call your local hospital, clinic, community service organization, mental health center, social service provider, or health department. Ask for assistance in connecting to a crisis center.  ¨ Visit www.suicidepreventionlifeline.org/getinvolved/ for a list of crisis centers in the  [place], or visit www.suicideprevention.ca/dmlbtidg-dglwg-wrdtnpz/find-a-crisis-centre for a list of centers in [place].  · Visit the following websites:  ¨ National Suicide Prevention Lifeline: www.suicidepreventionlifeline.org  ¨ Hopeline: www.hopeline.com  ¨ American Foundation for Suicide Prevention: www.afsp.org  ¨ The [place] Project (for lesbian, arreguin, bisexual, transgender, or questioning youth): www.thetrevorproject.org  How can I help myself feel better?  · Promise yourself that you will not do anything drastic when you have suicidal feelings. Remember, there is hope. Many people have gotten through suicidal thoughts and feelings, and you will, too. You may have gotten through them before, and this proves that you can get through them again.  · Let family, friends, teachers, or counselors know how you are feeling. Try not to isolate yourself from those who care about you. Remember, they will want to help you. Talk with someone every day, even if you do not feel sociable. Face-to-face conversation is best.  · Call a mental health professional and see one regularly.  · Visit your primary health care provider every year.  · Eat a well-balanced diet, and space your meals so you eat regularly.  · Get plenty of rest.  · Avoid alcohol and drugs, and remove them from your home. They will only make you feel worse.  · If you are thinking of taking a lot of medicine, give your medicine to someone who can give it to you one day at a time. If you are on antidepressants and are concerned you will overdose, let your health care provider know so he or she can give you safer medicines. Ask your mental health professional about the possible side effects of any medicines you are taking.  · Remove weapons, poisons, knives, and anything else that could harm you from your home.  · Try to stick to routines. Follow a schedule every day. Put self-care on your schedule.  · Make a list of realistic goals, and cross them off when  you achieve them. Accomplishments give a sense of worth.  · Wait until you are feeling better before doing the things you find difficult or unpleasant.  · Exercise if you are able. You will feel better if you exercise for even a half hour each day.  · Go out in the sun or into nature. This will help you recover from depression faster. If you have a favorite place to walk, go there.  · Do the things that have always given you pleasure. Play your favorite music, read a good book, paint a picture, play your favorite instrument, or do anything else that takes your mind off your depression if it is safe to do.  · Keep your living space well lit.  · When you are feeling well, write yourself a letter about tips and support that you can read when you are not feeling well.  · Remember that life’s difficulties can be sorted out with help. Conditions can be treated. You can work on thoughts and strategies that serve you well.  This information is not intended to replace advice given to you by your health care provider. Make sure you discuss any questions you have with your health care provider.  Document Released: 06/23/2004 Document Revised: 08/16/2017 Document Reviewed: 04/14/2015  [employer] Interactive Patient Education © 2017 [employer]

## 2019-10-05 NOTE — CARE PLAN
Problem: Safety  Goal: Will remain free from injury  Outcome: PROGRESSING AS EXPECTED  Pt. Denies any thoughts of harming himself at the time of assessment     Problem: Psychosocial Needs:  Goal: Level of anxiety will decrease  Outcome: PROGRESSING AS EXPECTED  Dc legal hold, awaiting paperwork to be signed.      Problem: Knowledge Deficit  Goal: Knowledge of disease process/condition, treatment plan, diagnostic tests, and medications will improve  Outcome: PROGRESSING AS EXPECTED   POC discussed with the pt. Plan to dc to shelter.

## 2019-10-05 NOTE — DISCHARGE SUMMARY
Discharge Summary    CHIEF COMPLAINT ON ADMISSION  Chief Complaint   Patient presents with   • Suicidal Ideation   • Diarrhea   • Body Aches       Reason for Admission  SI     Admission Date  9/26/2019    CODE STATUS  Full Code    HPI & HOSPITAL COURSE  Patient is a 69-year-old male with history of methamphetamine abuse, severe depression, alcohol abuse, chronic low back pain, gout here with suicidal ideation.  Says he was given a jump in front of a train or a bus but decided to talk with a psychiatrist first.  He also relates history of 7 days of diarrhea and generalized malaise body aches.  Suspect to be suffering from a likely viral gastroenteritis and treated conservatively.  Patient was admitted for suicidal ideation.  Patient was evaluated by psychiatrist and put on legal hold.  Patient was also close monitored and noticed to have pancytopenia.  Likely due to alcohol use.  Hematologist was consulted and recommend bone marrow biopsy.  Patient underwent bone marrow biopsy and tolerated procedure.  Bone marrow biopsy results showing no signs of malignancy.  Likely due to chronic alcohol use.  Hematologist recommend alcohol abstinence.  Patient agree with above plan.  Psychiatrist further evaluated patient and discontinued patient's legal hold.  Patient currently stable and ready to be discharged home.  Patient does have a good discharge plan and patient has been not drinking alcohol for 3 weeks.  Patient is committed to stop drinking alcohol.    Therefore, he is discharged in fair and stable condition to home with close outpatient follow-up.    The patient met 2-midnight criteria for an inpatient stay at the time of discharge.    Discharge Date  10/5/2019    FOLLOW UP ITEMS POST DISCHARGE  none    DISCHARGE DIAGNOSES      Suicidal ideation POA: Yes    Alcoholism (HCC) POA: Yes    Lumbar back pain with radiculopathy affecting lower extremity POA: Yes    Thrombocytopenia (HCC) POA: Yes    Diarrhea of presumed  infectious origin POA: Yes    Abdominal pain POA: Yes    Leukopenia POA: Yes    Gout POA: Yes      FOLLOW UP  Follow-up with PCP and psychiatry.    MEDICATIONS ON DISCHARGE     Medication List      START taking these medications      Instructions   folic acid 1 MG Tabs  Start taking on:  10/6/2019  Commonly known as:  FOLVITE   Take 1 Tab by mouth every day.  Dose:  1 mg     thiamine 100 MG tablet  Start taking on:  10/6/2019  Commonly known as:  THIAMINE   Take 1 Tab by mouth every day.  Dose:  100 mg        CONTINUE taking these medications      Instructions   DULoxetine 60 MG Cpep delayed-release capsule  Commonly known as:  CYMBALTA   Take 60 mg by mouth every morning.  Dose:  60 mg     gabapentin 300 MG Caps  Commonly known as:  NEURONTIN   Take 300 mg by mouth 3 times a day.  Dose:  300 mg     QUEtiapine 200 MG Tabs  Commonly known as:  SEROQUEL   Take 1 Tab by mouth every evening.  Dose:  200 mg     traZODone 100 MG Tabs  Commonly known as:  DESYREL   Take 100 mg by mouth every bedtime.  Dose:  100 mg        STOP taking these medications    lisinopril 20 MG Tabs  Commonly known as:  PRINIVIL            Allergies  No Known Allergies    DIET  Orders Placed This Encounter   Procedures   • Diet Order Regular (no sharps)     Standing Status:   Standing     Number of Occurrences:   1     Order Specific Question:   Diet:     Answer:   Regular [1]     Comments:   no sharps       ACTIVITY  As tolerated.  Weight bearing as tolerated    CONSULTATIONS  Psychiatrist  Hematologist    PROCEDURES  Marrow biopsy    CT-ABDOMEN-PELVIS W/O   Final Result         1.  No evidence of bowel obstruction or bowel inflammation.      2.  Mild splenomegaly.      3.  No abdominal or pelvic adenopathy.      4.  Cholelithiasis. No biliary dilatation.        PE:  Gen: AAOx3, NAD  Eyes: PELLA  Neck: no JVD, no lymphadenopathy  Cardia: RRR, no mrg  Lungs: CTAB, no rales, rhonci or wheezing  Abd: NABS, soft, non extended, no mass  EXT: No  C/C/E, peripheral pulse 2+ b/l  Neuro: CN II-XII intact, non focal, reflex 2+ symmetrical  Skin: Intact, no lesion, warm  Psych: Appropriate.      LABORATORY  Lab Results   Component Value Date    SODIUM 137 10/03/2019    POTASSIUM 4.4 10/03/2019    CHLORIDE 103 10/03/2019    CO2 26 10/03/2019    GLUCOSE 97 10/03/2019    BUN 20 10/03/2019    CREATININE 0.90 10/03/2019        Lab Results   Component Value Date    WBC 2.6 (L) 10/03/2019    HEMOGLOBIN 12.5 (L) 10/03/2019    HEMATOCRIT 38.9 (L) 10/03/2019    PLATELETCT 69 (L) 10/03/2019        Total time of the discharge process exceeds 38 minutes.

## 2019-10-06 ENCOUNTER — HOSPITAL ENCOUNTER (EMERGENCY)
Facility: MEDICAL CENTER | Age: 69
End: 2019-10-06
Attending: EMERGENCY MEDICINE
Payer: MEDICARE

## 2019-10-06 VITALS
SYSTOLIC BLOOD PRESSURE: 163 MMHG | BODY MASS INDEX: 26.49 KG/M2 | TEMPERATURE: 97.9 F | HEIGHT: 71 IN | RESPIRATION RATE: 18 BRPM | HEART RATE: 71 BPM | OXYGEN SATURATION: 96 % | DIASTOLIC BLOOD PRESSURE: 92 MMHG | WEIGHT: 189.2 LBS

## 2019-10-06 DIAGNOSIS — F32.A DEPRESSION, UNSPECIFIED DEPRESSION TYPE: ICD-10-CM

## 2019-10-06 DIAGNOSIS — F10.10 ALCOHOL ABUSE: ICD-10-CM

## 2019-10-06 LAB
AMPHET UR QL SCN: NEGATIVE
BARBITURATES UR QL SCN: NEGATIVE
BENZODIAZ UR QL SCN: NEGATIVE
BZE UR QL SCN: NEGATIVE
CANNABINOIDS UR QL SCN: NEGATIVE
METHADONE UR QL SCN: NEGATIVE
OPIATES UR QL SCN: NEGATIVE
OXYCODONE UR QL SCN: NEGATIVE
PCP UR QL SCN: NEGATIVE
POC BREATHALIZER: 0 PERCENT (ref 0–0.01)
POC BREATHALIZER: 0.03 PERCENT (ref 0–0.01)
PROPOXYPH UR QL SCN: NEGATIVE

## 2019-10-06 PROCEDURE — 80307 DRUG TEST PRSMV CHEM ANLYZR: CPT

## 2019-10-06 PROCEDURE — 302970 POC BREATHALIZER: Performed by: EMERGENCY MEDICINE

## 2019-10-06 PROCEDURE — 99285 EMERGENCY DEPT VISIT HI MDM: CPT

## 2019-10-06 PROCEDURE — 90791 PSYCH DIAGNOSTIC EVALUATION: CPT

## 2019-10-06 PROCEDURE — 302970 POC BREATHALIZER

## 2019-10-06 NOTE — ED NOTES
Pt able to ambulate to rest room under his own power.  He was slow ambulating but was able to get to restroom and back to room without assistance.

## 2019-10-06 NOTE — ED NOTES
Received report from night shift RN. Pt high SI, 1:1 sitter in doorway. Safety checklist complete. Chest rise and fall noted. Pt resting comfortably.

## 2019-10-06 NOTE — ED NOTES
Report recd and care assumed.  Pt is resting in bed in room 30 with sitting in line of sight.  Safety maintained.  Will continue to monitor.

## 2019-10-06 NOTE — CONSULTS
[facility]   TRIAGE ASSESSMENT    Name: Chris Leggett  MRN: [id number]  : 1950  Age: 69 y.o.  Date of assessment: 10/6/2019  PCP: Pcp Pt States None  Persons in attendance: Patient    CHIEF COMPLAINT/PRESENTING ISSUE (as stated by Patient ): Patient has long history of alcohol dependency and states that he has been drinking since he has been 18 years old and drinks alcohol daily for most of his life.  Patient has gone into [facility] programs here locally for the past ear.  States has been at [facility], [facility] and [facility].  Normal stay is about 2-3 weeks for detox and Suicidal Ideation.  Patient was discharged from [facility] [place] yesterday and returned to drinking last night and has returned here today.  Patient has a long standing history of chronic depression and suicidal thoughts. Denies that he has ever followed through or able to follow through with his plans. Patient states that he stayed in the shelter last night but didn't like it. There appears to be a malingering component of the patient and his history.  He is non-complaint with medical and mental health recommendations.   Chief Complaint   Patient presents with   • Suicidal Ideation     says he has been feeling this way for a long time now, alcoholic, was just admitted on the second for GI issues, was told that if he keeps drinking he will die, said he decided to keep drinking to kill himself. last drink was two hours ago    • N/V     profuse vomiting in triage        CURRENT LIVING SITUATION/SOCIAL SUPPORT: Homeless, but does have $161.00 if motel was needed    BEHAVIORAL HEALTH TREATMENT HISTORY  Does patient/parent report a history of prior behavioral health treatment for patient?   Yes:    Dates Level of Care Facilty/Provider Diagnosis/Problem Medications   2019 Inpt  [facility]  ETOH/ SI     3/2019 Inpt [facility]  ETOH/SI     2019 Inpt [facility] ETOH/SI                                                     "        SAFETY ASSESSMENT - SELF  Does patient acknowledge current or past symptoms of dangerousness to self? yes  Does parent/significant other report patient has current or past symptoms of dangerousness to self? N\A  Does presenting problem suggest symptoms of dangerousness to self? Yes:     Past Current    Suicidal Thoughts: [x]  [x]    Suicidal Plans: [x]  [x]    Suicidal Intent: [x]  [x]    Suicide Attempts: []  []    Self-Injury []  []      For any boxes checked above, provide detail: Patient states that he was \"going to jump in front of a bus but fall before he could.\"    History of suicide by family member: no  History of suicide by friend/significant other: no  Recent change in frequency/specificity/intensity of suicidal thoughts or self-harm behavior? yes - with increased alcohol use  Current access to firearms, medications, or other identified means of suicide/self-harm? no  If yes, willing to restrict access to means of suicide/self-harm? no  Protective factors present:  unknown    SAFETY ASSESSMENT - OTHERS  Does patient acknowledge current or past symptoms of aggressive behavior or risk to others? no  Does parent/significant other report patient has current or past symptoms of aggressive behavior or risk to others?  N\A  Does presenting problem suggest symptoms of dangerousness to others? No    Crisis Safety Plan completed and copy given to patient? N\A    ABUSE/NEGLECT SCREENING  Does patient report feeling “unsafe” in his/her home, or afraid of anyone?  no  Does patient report any history of physical, sexual, or emotional abuse?  no  Does parent or significant other report any of the above? N\A  Is there evidence of neglect by self?  no  Is there evidence of neglect by a caregiver? no  Does the patient/parent report any history of CPS/APS/police involvement related to suspected abuse/neglect or domestic violence? no  Based on the information provided during the current assessment, is a mandated " "report of suspected abuse/neglect being made?  No    SUBSTANCE USE SCREENING  Yes:  [place] all substances used in the past 30 days:      Last Use Amount   [x]   Alcohol 10/5/2019 >12 beers   [x]   Marijuana 30 days ago     []   Heroin     []   Prescription Opioids  (used without prescription, for    recreation, or in excess of prescribed amount)     []   Other Prescription  (used without prescription, for    recreation, or in excess of prescribed amount) \"Have tried all drugs\"     []   Cocaine      []   Methamphetamine     []   \"\" drugs (ectasy, MDMA)     []   Other substances        UDS results: neg  Breathalyzer results: 0.03    What consequences does the patient associate with any of the above substance use and or addictive behaviors? Work problems or losses: , Family problems: , Health problems: , Monetary problems:     Risk factors for detox (check all that apply):  [x]  Seizures   [x]  Diaphoretic (sweating)   [x]  Tremors   [x]  Hallucinations   [x]  Increased blood pressure   []  Decreased blood pressure   []  Other   []  None      [x] Patient education on risk factors for detoxification and instructed to return to ER as needed.      MENTAL STATUS   Participation: Active verbal participation  Grooming: Disheveled  Orientation: Alert  Behavior: Tense  Eye contact: Poor  Mood: Anxious and Irritable  Affect: Labile  Thought process: Circumstantial  Thought content: Within normal limits  Speech: Soft  Perception: Within normal limits  Memory:  Poor memory for chronology of events  Insight: Adequate  Judgment:  Poor  Other:    Collateral information:   Source:  [] Significant other present in person:   [] Significant other by telephone  [] [facility]   [x] [facility]  [x] [facility]  [] Other:     CLINICAL IMPRESSIONS:  Primary:  Alcohol Dependency   Secondary:  Chronic depressive DO        IDENTIFIED NEEDS/PLAN:  [Trigger DISPOSITION list for any items marked]    []  Imminent " safety risk - self [] Imminent safety risk - others   []  Acute substance withdrawal []  Psychosis/Impaired reality testing   [x]  Mood/anxiety [x]  Substance use/Addictive behavior   [x]  Maladaptive behaviro []  Parent/child conflict   []  Family/Couples conflict []  Biomedical   [x]  Housing []  Financial   []   Legal  Occupational/Educational   []  Domestic violence []  Other:     Disposition: Defer    Does patient express agreement with the above plan? yes    Referral appointment(s) scheduled? N\A    Alert team only:   I have discussed findings and recommendations with Dr. Johnson. She will be doing further eval.     Marina Reed R.N.  10/6/2019

## 2019-10-06 NOTE — ED NOTES
Pt given belongings.  Pt able to put clothing on an walked with steady gait to rest room and back under his own power.  Vital sign stable.   Pt given discharge instructions and able to a sign to affirm understanding.  Pt had money secured by security.  That was given back by security.

## 2019-10-06 NOTE — ED TRIAGE NOTES
"Chris Pruitt Oleksandr   69 y.o. male   Chief Complaint   Patient presents with   • Suicidal Ideation     says he has been feeling this way for a long time now, alcoholic, was just admitted on the second for GI issues, was told that if he keeps drinking he will die, said he decided to keep drinking to kill himself. last drink was two hours ago    • N/V     profuse vomiting in triage      Pt to triage in  for above complaint. Was released from the hospital Saturday at 1330 and says he has been drinking since he was released. Charge RN aware of patient.       To room 38 in , line of sight throughout.     /90   Pulse 78   Temp 36.8 °C (98.2 °F) (Temporal)   Resp 18   Ht 1.803 m (5' 11\")   Wt 85.8 kg (189 lb 3.2 oz)   SpO2 96%   BMI 26.39 kg/m²     "

## 2019-10-06 NOTE — ED NOTES
No acute change in condition since last entry.  Pt remains on one on one with sitter in line of sight.  Will continue to monitor.

## 2019-10-06 NOTE — ED PROVIDER NOTES
ED Provider Note    Chief Complaint:   Alcohol abuse    HPI:  Chris Leggett is a 69 y.o. male who presents with chief complaint of alcohol abuse.  He was admitted to the hospital for suicidal thoughts, as well as findings concerning for viral gastroenteritis and pancytopenia.  Symptoms of gastroenteritis improved, pancytopenia was investigated and determined to be due to his chronic alcohol use.  He was discharged yesterday just shortly after noon.  He was evaluated by Dr. Johnson, psychiatrist, and had a plan to return to live with a family member.  He was not endorsing any suicidal thoughts at that time.     Instead of leaving to find alternate living arrangements or contacting his family member, he began drinking again.  He return to the emergency department at 4:00 in the morning today, reporting suicidal thoughts.  He reports that he has nothing to live for, and decided to drink himself to death.  On my assessment he is clinically sober, still making vague statements that he no longer has anything to live for.  He does not have a definite plan to harm himself.  He again repeated having thoughts of walking in front of a bus which is identical to his statements when he last presented to this facility.    Otherwise, he has complaints of feeling generally unwell.  He reports that he has an upset stomach after drinking heavily, and feels tired and run down.  He denies any associated fevers.  He has had some nausea and vomiting after drinking heavily earlier today, though symptoms have since resolved.    Review of Systems:  See HPI for pertinent positives and negatives. All other systems negative.    Past Medical History:   has a past medical history of Gout, Hypertension, and Psychiatric disorder.    Social History:  Social History     Tobacco Use   • Smoking status: Former Smoker     Packs/day: 0.50     Years: 5.00     Pack years: 2.50     Types: Cigarettes     Last attempt to quit: 1/18/2019     Years since  "quittin.7   • Smokeless tobacco: Never Used   Substance and Sexual Activity   • Alcohol use: Yes   • Drug use: Yes     Types: Inhaled     Comment: marijuana   • Sexual activity: Not on file       Surgical History:   has a past surgical history that includes cervical disk and fusion anterior (2018); other orthopedic surgery; dx bone marrow aspirations (Left, 10/2/2019); and dx bone marrow biopsies (10/2/2019).    Current Medications:  Home Medications    **Home medications have not yet been reviewed for this encounter**         Allergies:  No Known Allergies    Physical Exam:  Vital Signs: /94   Pulse 65   Temp 36.5 °C (97.7 °F) (Temporal)   Resp 18   Ht 1.803 m (5' 11\")   Wt 85.8 kg (189 lb 3.2 oz)   SpO2 96%   BMI 26.39 kg/m²   Constitutional: Alert, calm  HENT: Atraumatic, normocephalic  Eyes: Normal conjunctiva, no scleral icterus   Neck: Supple, normal range of motion  Cardiovascular: Extremities are warm and well perfused  Pulmonary: No respiratory distress, normal work of breathing  Abdomen: Non-distended  Skin: Warm, dry, no rashes or lesions  Musculoskeletal: No extremity deformity noted  Neurologic: Alert, awake, no slurred speech, no facial droop, normal gait, normal movements  Psychiatric: Overall appearance well-developed, well-nourished, normal orientation, affect is flat, thought process linear, hallucinations absent, speech normal, no slurred speech, poor insight, poor judgement.    Medical records reviewed for continuity of care.  Discharge summary reviewed from yesterday, 10/5/2019.  Patient has a history of methamphetamine abuse, severe depression, alcohol abuse, chronic low back pain, and gout here with suicidal ideation.  He reported that he wanted to jump in front of a bus but wanted to talk to a psychiatrist first.  He was treated conservatively for presumed viral gastroenteritis.  He does have pancytopenia, hematologist recommended bone marrow biopsy which showed no signs " of malignancy.  Pancytopenia likely due to chronic alcohol use.  Psychiatry evaluated the patient and discontinued his legal hold.  He is currently stable and ready to be discharged home.  Psychiatry note reviewed from 10/4/2019 that also relates suicidal thoughts and plan to jump in front of a bus.  When seen by the psychiatrist he reported that his suicidal ideation was due to feeling sick when he arrived to the emergency department, and he reported feeling much better at time of psychiatric evaluation.  He was continued on trazodone, Seroquel, and Cymbalta.  He has a plan for discharge to live with a family member.    Labs:  Labs Reviewed   POC BREATHALIZER - Abnormal; Notable for the following components:       Result Value    POC Breathalizer 0.03 (*)     All other components within normal limits   POC BREATHALIZER - Normal   URINE DRUG SCREEN     Differential diagnosis:  Alcohol abuse, depression, substance-induced mood disorder    MDM:  History and physical exam as documented above.  Patient presents to the emergency department after drinking heavily with reported suicidal thoughts.  He initially stated that he tried to drink himself to death.  I do lengthy discussion with this patient, and reviewed his medical records.  Additionally I discussed the case with Dr. Johnson, psychiatrist, who evaluated him very recently during his stay.  While he likely has some underlying depression, it seems as though his thoughts of self-harm or directly tied to his alcohol abuse and his feeling generally unwell after binge drinking.  During his most recent presentation to the emergency department he stated that he only felt that he had nothing to live for because he was feeling sick. Dr. Johnson's recommendations remain unchanged as he does not have any new or changed symptoms today.    During my discussion with him today, his presentation seems identical.  He was feeling well, left the hospital, began drinking heavily, and  then became depressed.  On my assessment, he is clinically sober, and now symptoms of a hangover, with a mildly upset stomach, and feeling generally fatigued.  He does have a known history of alcohol dependence, it is unlikely that he drank heavily in an attempt to kill himself, as his established pattern of behavior is to binge drink due to his alcoholism.  We discussed his alcohol problems at length, and discussed the fact that heavy drinking may ultimately lead him to self-harm.  However, we are not able to hospitalize him solely to maintain his sobriety.  He understands, and his outpatient plan for discharge was reviewed.  At this time, he is future oriented, and understands the need for outpatient follow up and support with alcohol cessation.     Plan at this time is for discharge home.  He is counseled to follow-up with all outpatient resources previously provided. Return precautions were discussed with the patient, and provided in written form with the patient's discharge instructions.     Blood pressure today is greater than 120/80, patient is instructed to follow up with primary care provider for blood pressure recheck.    Disposition:  Discharge home in stable condition    Final Impression:  1. Depression, unspecified depression type    2. Alcohol abuse        Electronically signed by: Gricel Ordonez, 10/6/2019 11:33 AM

## 2019-10-06 NOTE — DISCHARGE INSTRUCTIONS
Please follow-up with the behavioral health resources provided at your discharge yesterday.  Please contact family members as previously planned for support.  Return to the emergency department if you develop any new or worsening symptoms including thoughts of harming yourself, worsening depression, or if you have any further concerns.

## 2019-10-06 NOTE — ED NOTES
Patient resting comfortably. Chest rise and fall noted.Patient in direct observation of sitter. Will Continue to monitor.

## 2019-10-07 ENCOUNTER — HOSPITAL ENCOUNTER (EMERGENCY)
Facility: MEDICAL CENTER | Age: 69
End: 2019-10-10
Attending: EMERGENCY MEDICINE
Payer: MEDICARE

## 2019-10-07 DIAGNOSIS — F43.21 SITUATIONAL DEPRESSION: ICD-10-CM

## 2019-10-07 DIAGNOSIS — R45.851 SUICIDAL IDEATION: ICD-10-CM

## 2019-10-07 LAB
AMPHET UR QL SCN: NEGATIVE
BARBITURATES UR QL SCN: NEGATIVE
BENZODIAZ UR QL SCN: NEGATIVE
BZE UR QL SCN: NEGATIVE
CANNABINOIDS UR QL SCN: POSITIVE
METHADONE UR QL SCN: NEGATIVE
OPIATES UR QL SCN: NEGATIVE
OXYCODONE UR QL SCN: NEGATIVE
PCP UR QL SCN: NEGATIVE
POC BREATHALIZER: 0.02 PERCENT (ref 0–0.01)
PROPOXYPH UR QL SCN: NEGATIVE

## 2019-10-07 PROCEDURE — 700102 HCHG RX REV CODE 250 W/ 637 OVERRIDE(OP): Performed by: EMERGENCY MEDICINE

## 2019-10-07 PROCEDURE — 302970 POC BREATHALIZER

## 2019-10-07 PROCEDURE — 302970 POC BREATHALIZER: Performed by: EMERGENCY MEDICINE

## 2019-10-07 PROCEDURE — 80307 DRUG TEST PRSMV CHEM ANLYZR: CPT

## 2019-10-07 PROCEDURE — 99285 EMERGENCY DEPT VISIT HI MDM: CPT

## 2019-10-07 PROCEDURE — A9270 NON-COVERED ITEM OR SERVICE: HCPCS | Performed by: EMERGENCY MEDICINE

## 2019-10-07 PROCEDURE — 90791 PSYCH DIAGNOSTIC EVALUATION: CPT

## 2019-10-07 RX ORDER — TRAZODONE HYDROCHLORIDE 50 MG/1
100 TABLET ORAL ONCE
Status: COMPLETED | OUTPATIENT
Start: 2019-10-07 | End: 2019-10-07

## 2019-10-07 RX ORDER — THIAMINE MONONITRATE (VIT B1) 100 MG
100 TABLET ORAL DAILY
Status: ON HOLD | COMMUNITY
End: 2019-10-14

## 2019-10-07 RX ORDER — FOLIC ACID 1 MG/1
1 TABLET ORAL DAILY
Status: ON HOLD | COMMUNITY
End: 2019-10-14

## 2019-10-07 RX ORDER — QUETIAPINE FUMARATE 200 MG/1
200 TABLET, FILM COATED ORAL
COMMUNITY
End: 2019-10-10

## 2019-10-07 RX ORDER — QUETIAPINE FUMARATE 100 MG/1
200 TABLET, FILM COATED ORAL 3 TIMES DAILY
Status: COMPLETED | OUTPATIENT
Start: 2019-10-07 | End: 2019-10-07

## 2019-10-07 RX ADMIN — TRAZODONE HYDROCHLORIDE 100 MG: 50 TABLET ORAL at 20:35

## 2019-10-07 RX ADMIN — QUETIAPINE FUMARATE 200 MG: 100 TABLET ORAL at 20:35

## 2019-10-07 NOTE — ED PROVIDER NOTES
ED Provider Note    Scribed for Zack Tian D.O. by Rosa Maria Gibson. 10/7/2019  12:55 PM    Primary care provider: Pcp Pt States None  Means of arrival: Walk-In escorted by EMS  History obtained from: Patient  History limited by: None    CHIEF COMPLAINT  Chief Complaint   Patient presents with   • Suicidal Ideation       HPI  Chris Leggett is a 69 y.o. Male, with a history of alcohol abuse, who presents to the Emergency Department for suicidal ideation. Patient denies any alcohol or drug use today. Patient notes he woke up this morning wanting to jump off of a freeway bridge onto oncoming traffic. No additional pain or symptoms noted at this time. Reports being hospitalized at [place].     Patient was seen here yesterday for alcohol abuse and suicidal ideations. He was admitted at that time. At that time, he did not have a definite plan to harm himself.       REVIEW OF SYSTEMS  Pertinent positives include suicidal ideations. Pertinent negatives include: No additional pain or symptoms noted at this time.  All other systems reviewed and negative.    PAST MEDICAL HISTORY  Past Medical History:   Diagnosis Date   • Gout    • Hypertension    • Psychiatric disorder        SURGICAL HISTORY  Past Surgical History:   Procedure Laterality Date   • NM DX BONE MARROW ASPIRATIONS Left 10/2/2019    Procedure: ASPIRATION, BONE MARROW - APARICIO;  Surgeon: Tamera Leos M.D.;  Location: ENDOSCOPY [facility];  Service: Orthopedics   • NM DX BONE MARROW BIOPSIES  10/2/2019    Procedure: BIOPSY, BONE MARROW, USING NEEDLE OR TROCAR;  Surgeon: Tamera Leos M.D.;  Location: ENDOSCOPY [facility];  Service: Orthopedics   • CERVICAL DISK AND FUSION ANTERIOR  9/2/2018    Procedure: CERVICAL DISK AND FUSION ANTERIOR C5-C6;  Surgeon: Varghese Wilkins M.D.;  Location: SURGERY [facility];  Service: Neurosurgery   • OTHER ORTHOPEDIC SURGERY      fision with disc removal        SOCIAL HISTORY  Social History  "    Tobacco Use   • Smoking status: Former Smoker     Packs/day: 0.50     Years: 5.00     Pack years: 2.50     Types: Cigarettes     Last attempt to quit: 2019     Years since quittin.7   • Smokeless tobacco: Never Used   Substance Use Topics   • Alcohol use: Yes   • Drug use: Yes     Types: Inhaled     Comment: marijuana      Social History     Substance and Sexual Activity   Drug Use Yes   • Types: Inhaled    Comment: marijuana       FAMILY HISTORY  Family History   Problem Relation Age of Onset   • Cancer Mother    • Alzheimer's Disease Father    • Cancer Father    • Heart Disease Sister    • Heart Disease Brother        CURRENT MEDICATIONS  Home Medications     Reviewed by Audra Zheng R.N. (Registered Nurse) on 10/07/19 at 1242  Med List Status: <None>   Medication Last Dose Status   DULoxetine (CYMBALTA) 60 MG Cap DR Particles delayed-release capsule  Active   folic acid (FOLVITE) 1 MG Tab  Active   gabapentin (NEURONTIN) 300 MG Cap  Active   QUEtiapine (SEROQUEL) 200 MG Tab  Active   thiamine (THIAMINE) 100 MG tablet  Active   traZODone (DESYREL) 100 MG Tab  Active                ALLERGIES  No Known Allergies    PHYSICAL EXAM  VITAL SIGNS: /90   Pulse 96   Temp 36.4 °C (97.6 °F)   Resp 18   Ht 1.803 m (5' 11\")   Wt 86.2 kg (190 lb)   BMI 26.50 kg/m²     Nursing notes and vitals reviewed.  Constitutional: Well developed, Well nourished, No acute distress, Non-toxic appearance.   Eyes: PERRLA, EOMI, Conjunctiva normal, No discharge.   Cardiovascular: Normal heart rate, Normal rhythm, No murmurs, No rubs, No gallops.   Thorax & Lungs: No respiratory distress, No rales, No rhonchi, No wheezing, No chest tenderness.   Abdomen: Bowel sounds normal, Soft, No tenderness, No guarding, No rebound, No masses, No pulsatile masses.   Skin: Cellulitis and petechia bilaterally on lowe extremities. Warm, Dry, No erythema, No rash.   Musculoskeletal: Intact distal pulses, No edema, No cyanosis, No " clubbing. Good range of motion in all major joints. No tenderness to palpation or major deformities noted, no CVA tenderness, no midline back tenderness.   Neurologic: Alert & oriented x 3, Normal motor function, Normal sensory function, No focal deficits noted.  Psychiatric: Flat affect, suicidal ideations, Affect normal for clinical presentation.    DIAGNOSTIC STUDIES/PROCEDURES    LABS  Results for orders placed or performed during the hospital encounter of 10/07/19   Urine Drug Screen   Result Value Ref Range    Amphetamines Urine Negative Negative    Barbiturates Negative Negative    Benzodiazepines Negative Negative    Cocaine Metabolite Negative Negative    Methadone Negative Negative    Opiates Negative Negative    Oxycodone Negative Negative    Phencyclidine -Pcp Negative Negative    Propoxyphene Negative Negative    Cannabinoid Metab Positive (A) Negative   POC BREATHALIZER   Result Value Ref Range    POC Breathalizer 0.02 (A) 0.00 - 0.01 Percent       All labs reviewed by me.    COURSE & MEDICAL DECISION MAKING  Pertinent Labs & Imaging studies reviewed. (See chart for details)    12:55 PM - Patient seen and examined at bedside. Ordered urine drug screen and POC breathalizer to evaluate his symptoms.   This is a 61-year-old male presents with suicidal ideation.  Here in the emergency department, he has no evidence of endorgan damage.  He was just hospitalized for a thorough work-up for his pancytopenia and they believe is secondary to his chronic alcohol ingestion.  The patient is medically stable.  He has been evaluated by life skills and we do believe the patient is at risk as he stated he is going to jump off a bridge into traffic and kill himself if he leaves the hospital.  We have had long conversations with the patient he adamantly states that he is going to kill himself and wants help.  For this reason, he remains in a legal 2000 and will be transferred to a local psychiatric facility for  evaluation.  The patient does have a slight petechial rash in bilateral lower extremities by secondary to his thrombocytopenia.    FINAL IMPRESSION  Suicidal ideation  Depression with adjustment disorder  Petechiae   Rosa Maria PERSAUD (Delilah), am scribing for, and in the presence of, Zack Tian D.O    Electronically signed by: Rosa Maria Gibson (Delilah), 10/7/2019    IZack D.O. personally performed the services described in this documentation, as scribed by Rosa Maria Gibson in my presence, and it is both accurate and complete. E.    The note accurately reflects work and decisions made by me.  Zack Tian  10/7/2019  6:06 PM

## 2019-10-07 NOTE — ED NOTES
Assumed care of pt at this time. Called security for belongings search now that pt is on legal hold. Sitter is in direct view. Pt is awake calm and cooperative at this time.

## 2019-10-07 NOTE — ED TRIAGE NOTES
JOHN Wilhelm c/o SI.  Pt reports that he wants to jump off an overpass & has had similar thoughts in the past.  Pt was seen here & discharged yesterday.  Reports having hospitalizations at [place].

## 2019-10-07 NOTE — DISCHARGE PLANNING
Notice of withdrawal filed with the court via [employer], scanned copy of verified notice onto .

## 2019-10-07 NOTE — ED NOTES
Med rec updated and complete. Allergies reviewed. Met with pt at bedside and dicussed current medications. No antibiotic use in  Last 14 days.  Home pharmacy [facility].

## 2019-10-07 NOTE — CONSULTS
"[facility]   TRIAGE ASSESSMENT    Name: Chris Leggett  MRN: [id number]  : 1950  Age: 69 y.o.  Date of assessment: 10/7/2019  PCP: Pcp Pt States None  Persons in attendance: Patient    CHIEF COMPLAINT/PRESENTING ISSUE (as stated by pt): Pt BIBA with c/o SI; wants to jump from an overpass.    Pt recently spent 9 days inpatient here at [facility] for GI issues, chronic pain and SI.  He was DC'd to self 10/5/19 at 12:34pm.  He returned to this ER the next day, 10/6/19, at 4 am with etoh intoxcation and c/o SI.  Dc'd to self once sober.  Reported he stayed in the shelter the night before and \"didn't like it.\"  He denied past SAs to the Alert Team .  Chief Complaint   Patient presents with   • Suicidal Ideation        CURRENT LIVING SITUATION/SOCIAL SUPPORT: Pt continues to be homeless.  Address on facesheet is for [facility].  Reports monthly SSI $824.  Per charting from his ER visit yesterday, he still had $161 left at that point.     Pt first presented to this ER last September.  He had come from [place] with his dying brother.  Pt was intoxicated during that trip and fell, fracturing his neck; inpt tx here for 3 days then sent to [facility].  He did not report any psych hx in his PMH.    Pt presented to ER again 10/18/18 with low back pain and neck pain.  Pt reported another intoxicated fall and subsequent pain; no injury though.  Pt was given resources, including cab voucher and some cash for phone calls from the [facility], and discharged.    18 ER for SI and several medical complaints; brought from [facility] after voicing SI to jump in front of train.  Sent to [facility].    18 ER to inpt medical unit for 9 days d/t back pain and vague SI, no legal hold.  Noted to complain about staff treatment of him and left AMA.  Refused to wait for prescriptions.    18 presented to ER after eloping from inpt unit a few hours earlier.  Reported to staff that his wife and child \" in my arms.\"  Vague, on and " "off SI, closely related to not having housing.  DC'd to self.    1/3/19 ER for etoh, SI.  Had just been released from [facility], went to [facility] and drank heavily, then came to this ER.  He had been advised to go to homeless shelter upon DC from [facility].  Noted to state \"I ain't going to the shelter.\"   had also arranged with [facility] to interview pt that day, and he was supposed to start their IOP program that day.  DC'd to self.    1/4/19 ER for SI, chronic pain.  ERP noted him to be manipulative for secondary gain of shelter.  He reported he had already spent his monthly SSI money.  Noted by ERP to also be a frequent utilizer of [facility] ER as well.  DC'd to self.    1/25/19 ER for SI and various medical complaints.  Sent to .    3/1/19 ER for etoh/SI.  Sent to [facility].    3/7/19 ER for etoh, SI.  Noted to threaten to kill self if DC'd.  DC'd to self per psychiatry 3/12/19.    4/5/19 ER for SI/etoh.  4 days on medical inpt unit for hyponatremia and back pain, thrombocytopenia.    4/22/19 ER for SI; sent to .  Noted to have used amphetamines \"occasionally.\"    5/9/19 ER for SI/etoh.  DC'd to self.    5/21/19 ER for SI and a fall.  DC'd to self 2 days later.    6/22/19 ER for SI/etoh/fall.  Went to [facility] first, dc'd, then came here.  DC'd to self.  Then seen 9/26; see above notes.      BEHAVIORAL HEALTH TREATMENT HISTORY  Does patient/parent report a history of prior behavioral health treatment for patient?   Yes:    Dates Level of Care Facilty/Provider Diagnosis/Problem Medications                        11/2018 x1 month, 3/2019, 4/2019 inpt [facility] SI/etoh Went off meds after DC'd and didn't f/u           12/2018, 1/2019 x2, 4/2019, 5/2019 inpt  SI/etoh           4/2019 inpt [facility] SI/etoh                    Pt has been generally noncompliant with f/u recommendations and plans made for him to f/u.    SAFETY ASSESSMENT - SELF  Does patient acknowledge current or past symptoms of " dangerousness to self? yes  Does parent/significant other report patient has current or past symptoms of dangerousness to self? N\A  Does presenting problem suggest symptoms of dangerousness to self? Yes:     Past Current    Suicidal Thoughts: [x]  [x]    Suicidal Plans: []  []    Suicidal Intent: []  []    Suicide Attempts: [x] see notes below []    Self-Injury []  []      For any boxes checked above, provide detail: Reporting on past attempts has been varied.  Pt has reported overdosing once in the past.  To a different practitioner, he reported one past attempt to jump off a bridge but fell and then required surgery.  Pt has had SI multiple times and is currently expressing SI.  He denied any past attempts when assessed yesterday.    History of suicide by family member: no  History of suicide by friend/significant other: no  Recent change in frequency/specificity/intensity of suicidal thoughts or self-harm behavior? no  Current access to firearms, medications, or other identified means of suicide/self-harm? no  If yes, willing to restrict access to means of suicide/self-harm? no  Protective factors present:  Fear of suicide and Willing to address in treatment    SAFETY ASSESSMENT - OTHERS  Does patient acknowledge current or past symptoms of aggressive behavior or risk to others? no  Does parent/significant other report patient has current or past symptoms of aggressive behavior or risk to others?  N\A  Does presenting problem suggest symptoms of dangerousness to others? No    Crisis Safety Plan completed and copy given to patient? N\A    ABUSE/NEGLECT SCREENING  Does patient report feeling “unsafe” in his/her home, or afraid of anyone?  no  Does patient report any history of physical, sexual, or emotional abuse?  no  Does parent or significant other report any of the above? no  Is there evidence of neglect by self?  no  Is there evidence of neglect by a caregiver? no  Does the patient/parent report any history  "of CPS/APS/police involvement related to suspected abuse/neglect or domestic violence? no  Based on the information provided during the current assessment, is a mandated report of suspected abuse/neglect being made?  No    SUBSTANCE USE SCREENING  Yes:  Darnell all substances used in the past 30 days:      Last Use Amount   [x]   Alcohol Yesterday at 10am per pt    []   Marijuana     []   Heroin     []   Prescription Opioids  (used without prescription, for    recreation, or in excess of prescribed amount)     []   Other Prescription  (used without prescription, for    recreation, or in excess of prescribed amount)     []   Cocaine      []   Methamphetamine     []   \"\" drugs (ectasy, MDMA)     []   Other substances        UDS results: neg  Breathalyzer results: 0.0    What consequences does the patient associate with any of the above substance use and or addictive behaviors? None    Risk factors for detox (check all that apply):  []  Seizures   []  Diaphoretic (sweating)   []  Tremors   []  Hallucinations   []  Increased blood pressure   []  Decreased blood pressure   []  Other   []  None      [x] Patient education on risk factors for detoxification and instructed to return to ER as needed.      MENTAL STATUS   Participation: Active verbal participation  Grooming: Casual  Orientation: Alert and Fully Oriented  Behavior: Calm  Eye contact: Limited  Mood: Depressed and Irritable  Affect: Constricted  Thought process: Logical and Goal-directed  Thought content: Within normal limits  Speech: Rate within normal limits and Volume within normal limits  Perception: Within normal limits  Memory:  Poor memory for chronology of events  Insight: Limited  Judgment:  Adequate  Other:    Collateral information: past visits  Source:  [] Significant other present in person:   [] Significant other by telephone  [] [facility]   [] [facility] Nursing Staff  [x] [facility]  [] Other:     [] Unable to complete full " "assessment due to:  [] Acute intoxication  [] Patient declined to participate/engage  [] Patient verbally unresponsive  [] Significant cognitive deficits  [] Significant perceptual distortions or behavioral disorganization  [] Other:      CLINICAL IMPRESSIONS:  Primary:  Alcohol use d/o  Secondary:  SI       IDENTIFIED NEEDS/PLAN:  [Trigger DISPOSITION list for any items marked]    []  Imminent safety risk - self [] Imminent safety risk - others   []  Acute substance withdrawal []  Psychosis/Impaired reality testing   [x]  Mood/anxiety [x]  Substance use/Addictive behavior   [x]  Maladaptive behaviro []  Parent/child conflict   []  Family/Couples conflict []  Biomedical   [x]  Housing [x]  Financial   []   Legal  Occupational/Educational   []  Domestic violence []  Other:     Disposition: [facility] being [facility]    Does patient express agreement with the above plan? yes    Referral appointment(s) scheduled? N\A    Alert team only: Pt placed on  for SI.  He cannot contract for safety and says he will jump off a bridge into the highway if DC'd.  He says this time is different because he is sober and will \"really do it.\"  He is willing to accept treatment and comply with f/u recommendations.  Spoke frankly with pt about burning bridges, \"crying sharp,\" using up his Medicare days, malingering for secondary gain and not complying with f/u.  Pt tearful, \"I really want some help.  I used to be able to drink to deal with these feelings, but now it makes me sick.\"  I have discussed findings and recommendations with Dr. Tian, who is in agreement with these recommendations.     Referral information sent to the following community providers : per     If applicable : Referred  to : [facility] for legal hold follow up at (time): pending ERP certification.      Sindy Chacon R.N.  10/7/2019                "

## 2019-10-08 PROCEDURE — 700102 HCHG RX REV CODE 250 W/ 637 OVERRIDE(OP): Performed by: EMERGENCY MEDICINE

## 2019-10-08 PROCEDURE — A9270 NON-COVERED ITEM OR SERVICE: HCPCS | Performed by: EMERGENCY MEDICINE

## 2019-10-08 PROCEDURE — 99284 EMERGENCY DEPT VISIT MOD MDM: CPT | Mod: GC | Performed by: PSYCHIATRY & NEUROLOGY

## 2019-10-08 RX ORDER — DULOXETIN HYDROCHLORIDE 60 MG/1
60 CAPSULE, DELAYED RELEASE ORAL EVERY MORNING
Status: DISCONTINUED | OUTPATIENT
Start: 2019-10-08 | End: 2019-10-08

## 2019-10-08 RX ORDER — QUETIAPINE FUMARATE 100 MG/1
200 TABLET, FILM COATED ORAL
Status: DISCONTINUED | OUTPATIENT
Start: 2019-10-08 | End: 2019-10-10 | Stop reason: HOSPADM

## 2019-10-08 RX ORDER — TRAZODONE HYDROCHLORIDE 50 MG/1
100 TABLET ORAL
Status: DISCONTINUED | OUTPATIENT
Start: 2019-10-08 | End: 2019-10-10 | Stop reason: HOSPADM

## 2019-10-08 RX ORDER — GABAPENTIN 300 MG/1
300 CAPSULE ORAL 3 TIMES DAILY
Status: DISCONTINUED | OUTPATIENT
Start: 2019-10-08 | End: 2019-10-10 | Stop reason: HOSPADM

## 2019-10-08 RX ORDER — FOLIC ACID 1 MG/1
1 TABLET ORAL DAILY
Status: DISCONTINUED | OUTPATIENT
Start: 2019-10-08 | End: 2019-10-10 | Stop reason: HOSPADM

## 2019-10-08 RX ORDER — DULOXETIN HYDROCHLORIDE 20 MG/1
40 CAPSULE, DELAYED RELEASE ORAL EVERY MORNING
Status: DISCONTINUED | OUTPATIENT
Start: 2019-10-09 | End: 2019-10-10 | Stop reason: HOSPADM

## 2019-10-08 RX ORDER — THIAMINE MONONITRATE (VIT B1) 100 MG
100 TABLET ORAL DAILY
Status: DISCONTINUED | OUTPATIENT
Start: 2019-10-08 | End: 2019-10-10 | Stop reason: HOSPADM

## 2019-10-08 RX ADMIN — FOLIC ACID 1 MG: 1 TABLET ORAL at 10:55

## 2019-10-08 RX ADMIN — DULOXETINE HYDROCHLORIDE 60 MG: 60 CAPSULE, DELAYED RELEASE ORAL at 10:54

## 2019-10-08 RX ADMIN — QUETIAPINE FUMARATE 200 MG: 100 TABLET ORAL at 21:56

## 2019-10-08 RX ADMIN — GABAPENTIN 300 MG: 300 CAPSULE ORAL at 10:55

## 2019-10-08 RX ADMIN — GABAPENTIN 300 MG: 300 CAPSULE ORAL at 18:41

## 2019-10-08 RX ADMIN — Medication 100 MG: at 10:55

## 2019-10-08 RX ADMIN — TRAZODONE HYDROCHLORIDE 100 MG: 50 TABLET ORAL at 21:56

## 2019-10-08 ASSESSMENT — ENCOUNTER SYMPTOMS
MUSCULOSKELETAL NEGATIVE: 1
EYES NEGATIVE: 1
CONSTITUTIONAL NEGATIVE: 1
CARDIOVASCULAR NEGATIVE: 1
NERVOUS/ANXIOUS: 1
RESPIRATORY NEGATIVE: 1
GASTROINTESTINAL NEGATIVE: 1
NEUROLOGICAL NEGATIVE: 1
DEPRESSION: 1

## 2019-10-08 ASSESSMENT — LIFESTYLE VARIABLES: SUBSTANCE_ABUSE: 1

## 2019-10-08 NOTE — ED NOTES
Received report from SHARAN Smith, taking over pt care, pt resting comfortably, maintaining patent airway, no needs at this time, bed in lowered positions side rails up, sitter infront of room.

## 2019-10-08 NOTE — PSYCHIATRY
"PSYCHIATRIC INTAKE EVALUATION    Reason for admission: SI                   Reason for consult: SI       Requesting Physician/APN: Dr. Tian        Supervising Psychiatrist: Dr. Nicholson        Legal Hold status: +            *Chief Complaint:\" I am always depressed and suicidal\" As stated by patient.       *HPI (includes Psychiatric ROS):   Mr. Leggett is a 69-year-old male well-known to this service with a history of depressive disorder unspecified, alcohol use disorder and meth use disorder who is currently on a legal hold for SI with a plan to jump off a bridge into traffic.  This is approximately his 14th visit to the ER since September 2018 for SI/ethanol intoxication or other substances.  He was recently discharged on 10/5/2019 after a  work-up for pancytopenia for 9 days was negative, patient then returned to the ER the next day for SI and was discharged the same day. Per DC summary by Dr. Ornelas on 10/5/19 \"Patient underwent bone marrow biopsy and tolerated procedure.  Bone marrow biopsy results showing no signs of malignancy.  Likely due to chronic alcohol use.  Hematologist recommend alcohol abstinence.\"  He states he stayed in a motel that night and was drinking alcohol alone when he became nauseous and started vomiting.  He started to become suicidal and called a friend whom he had just met here at the hospital and he advised him to call the ambulance and return to the ER.  Patient reports chronic depression that is only mildly relieved by Cymbalta.  He reports the main stressors in his life and has been homeless in the loss of all his family members.  He reports his only protective factor as \"my new friend Anirudh who is going to help me get into rehab\".  His plan before discharge on his last admission was to contact his nephew and live with him in [place], but he did not contact his nephew and has not spoken with him in over a year.  He states \"I really am suicidal at this time, I have not been using " "alcohol and this time feels different\".  He is agreeable with the help of his friend Anirudh, to contact local rehab centers for his chronic alcohol use.    Depression: Positive.  Report depressed mood, sleep disturbances, anhedonia, feelings of guilt, low energy, poor concentration, poor appetite, psychomotor disturbances and suicidality.  Anxiety: Positive.  Reports excessive anxiety more days than not and associated symptoms such as difficulty concentrating, sleep disturbance and irritability.  Psychosis: Negative. Denies AVH, disorganized thinking or speech.  Lela/Bipolar: Negative. Denies periods of decreased need for sleep, distractability, impulsivity, dangerous activities, flight of ideas.  PTSD: Positive for symptoms.  Reports past trauma and associated symptoms such as nightmares, flashbacks..        *Medical Review Of Symptoms (not dx conditions):   Review of Systems   Constitutional: Negative.    HENT: Negative.    Eyes: Negative.    Respiratory: Negative.    Cardiovascular: Negative.    Gastrointestinal: Negative.    Genitourinary: Negative.    Musculoskeletal: Negative.    Skin: Positive for rash.        Petechial rash to b/l lower extremities.   Neurological: Negative.    Endo/Heme/Allergies: Negative.    Psychiatric/Behavioral: Positive for depression, substance abuse and suicidal ideas. The patient is nervous/anxious.        *Psychiatric Examination:   Vitals: Blood pressure 131/70, pulse 82, temperature 37.2 °C (99 °F), temperature source Temporal, resp. rate 16, height 1.803 m (5' 11\"), weight 86.2 kg (190 lb), SpO2 97 %.   General Appearance:69 y.o. male laying in bed in NAD.  Poorly kempt.  Disheveled.  Behavior: Cooperative and engaged with interview.  Appropriate eye contact.  No aggressive behaviors.  Abnormal Movements: No abnormal movements, muscle spasms or choreiform movements.  Gait and Posture: Normal gait and posture.  Speech: Low volume.  Normal tone.  Slowed rate and rhythm.  Mumbles " "at times.  Thought Process: Linear, logical and goal-directed  Associations: None  Abnormal or Psychotic Thoughts: Denies AVH, disorganized thinking or delusional thinking.  Judgement and Insight: Poor/Fair  Orientation: Alert and oriented x4  Recent and Remote Memory: Intact  Attention Span and Concentration: Intact  Language: English  Fund of Knowledge: Appropriate  Mood:\" Depressed\" as stated by patient  Affect: Dysthymic, full range-able to laugh at jokes. Congruent with stated mood.  SI/HI: Reports SI and denies HI.  MMSE score and/or clock drawin/30       *PAST MEDICAL/PSYCH/FAMILY/SOCIAL(as reported by patient):       Medical hx:        TBI: Denies  SZ: Denies  Stroke: Denies  Past Medical History:   Diagnosis Date   • Gout    • Hypertension    • Psychiatric disorder      Past Surgical History:   Procedure Laterality Date   • MI DX BONE MARROW ASPIRATIONS Left 10/2/2019    Procedure: ASPIRATION, BONE MARROW - APARICIO;  Surgeon: Tamera Leos M.D.;  Location: ENDOSCOPY [facility];  Service: Orthopedics   • MI DX BONE MARROW BIOPSIES  10/2/2019    Procedure: BIOPSY, BONE MARROW, USING NEEDLE OR TROCAR;  Surgeon: Tamera Leos M.D.;  Location: ENDOSCOPY [facility];  Service: Orthopedics   • CERVICAL DISK AND FUSION ANTERIOR  2018    Procedure: CERVICAL DISK AND FUSION ANTERIOR C5-C6;  Surgeon: Varghese Wilkins M.D.;  Location: SURGERY [facility];  Service: Neurosurgery   • OTHER ORTHOPEDIC SURGERY      fision with disc removal        Psychiatric hx:   SAs: Reports 4 or 5 attempts, all since moving from [place] to [place] 1 year ago.  Most recent 6 months ago by overdose and required hospitalization.  Guns: Denies  Hx of Violence: Denies  Hospitalizations: Multiple due to SI and ethanol intoxication.  Med Hx: Reports being on Cymbalta 40 mg, Seroquel 200 mg, gabapentin 300 mg 3 times daily and trazodone 100 mg nightly.  Thinks he may have possibly been on Zoloft in the past, but did not take " it for long enough.  Dx Hx: Major depressive disorder, alcohol use disorder and meth use disorder.  Other:     Family Psych Hx: Denies      Social hx: Patient reports he is homeless and alternates between living in the streets or the homeless shelter.  He reports Magdalene at the shelter helps him to organize his medications.  Alcohol: Most recent use 2 days ago,  Drugs: THC: Most recent use 1 week ago, reports years of use.  Meth: Reports most recent use months ago, has used 2 or 3 times in his life.  Cocaine/heroin: One-time use of both approximately 4 years ago.  Tobacco: 4 to 5 cigarettes/day.     *MEDICAL HX: labs, MARS, medications, etc were reviewed. Only those findings of potential interest to psychiatry are noted below:    *Current Medical issues: See Below.     *Allergies:  No Known Allergies  *Current Medications:    Current Facility-Administered Medications:   •  DULoxetine (CYMBALTA) capsule 60 mg, 60 mg, Oral, QAM, Rasheed Davies M.D., 60 mg at 10/08/19 1054  •  folic acid (FOLVITE) tablet 1 mg, 1 mg, Oral, DAILY, Rasheed Davies M.D., 1 mg at 10/08/19 1055  •  gabapentin (NEURONTIN) capsule 300 mg, 300 mg, Oral, TID, Rasheed Davies M.D., 300 mg at 10/08/19 1055  •  QUEtiapine (SEROQUEL) tablet 200 mg, 200 mg, Oral, QHS, Rasheed Davies M.D.  •  thiamine tablet 100 mg, 100 mg, Oral, DAILY, Rasheed Davies M.D., 100 mg at 10/08/19 1055  •  traZODone (DESYREL) tablet 100 mg, 100 mg, Oral, QHS, Rasheed Davies M.D.    Current Outpatient Medications:   •  folic acid (FOLVITE) 1 MG Tab, Take 1 mg by mouth every day., Disp: , Rfl:   •  QUEtiapine (SEROQUEL) 200 MG Tab, Take 200 mg by mouth every bedtime., Disp: , Rfl:   •  thiamine (THIAMINE) 100 MG Tab, Take 100 mg by mouth every day., Disp: , Rfl:   •  DULoxetine (CYMBALTA) 60 MG Cap DR Particles delayed-release capsule, Take 60 mg by mouth every morning., Disp: , Rfl:   •  traZODone (DESYREL) 100 MG Tab, Take 100 mg by mouth every bedtime.,  Disp: , Rfl:   •  gabapentin (NEURONTIN) 300 MG Cap, Take 300 mg by mouth 3 times a day., Disp: , Rfl:   EKG:  from 9/30/2019.  Imaging: CT head without contrast from 9/2/2018:1.  No evidence of acute intracranial process.    2.  Cerebral atrophy as well as periventricular chronic small vessel ischemic change.    3.  Nasal fracture.  EEG: None     *Labs:  No results for input(s): WBC, RBC, HEMOGLOBIN, HEMATOCRIT, MCV, MCH, RDW, PLATELETCT, MPV, NEUTSPOLYS, LYMPHOCYTES, MONOCYTES, EOSINOPHILS, BASOPHILS, RBCMORPHOLO in the last 72 hours.  Lab Results   Component Value Date/Time    SODIUM 137 10/03/2019 12:58 AM    POTASSIUM 4.4 10/03/2019 12:58 AM    CHLORIDE 103 10/03/2019 12:58 AM    CO2 26 10/03/2019 12:58 AM    GLUCOSE 97 10/03/2019 12:58 AM    BUN 20 10/03/2019 12:58 AM    CREATININE 0.90 10/03/2019 12:58 AM         Lab Results   Component Value Date/Time    BREATHALIZER 0.02 (A) 10/07/2019 1246     No components found for: BLOODALCOHOL   Lab Results   Component Value Date/Time    AMPHUR Negative 10/07/2019 1246    BARBSURINE Negative 10/07/2019 1246    BENZODIAZU Negative 10/07/2019 1246    COCAINEMET Negative 10/07/2019 1246    METHADONE Negative 10/07/2019 1246    OPIATES Negative 10/07/2019 1246    OXYCODN Negative 10/07/2019 1246    PCPURINE Negative 10/07/2019 1246    PROPOXY Negative 10/07/2019 1246    CANNABINOID Positive (A) 10/07/2019 1246     Lab Results   Component Value Date/Time    FREET4 1.15 11/05/2018 1220        ASSESSMENT/PLAN:  Formulation:Mr. Leggett is a 69-year-old male well-known to this service with a history of depressive disorder unspecified, alcohol use disorder and meth use disorder who is currently on a legal hold for SI with a plan to jump off a bridge into traffic.  Given patient's ongoing SI, poor living situation and chronic alcoholism we will continue his legal hold.  We will restart his Cymbalta, Seroquel, trazodone and gabapentin as he states he has done well with these  medications in the past.  We will allow him to make phone calls to long-term rehab centers for chronic alcoholism.    1.  Depressive disorder unspecified  -Continue Cymbalta 40 mg daily for depression.  -Continue trazodone 100 mg nightly for sleep.  -Continue Seroquel 200 mg nightly for sleep/mood.  -Continue gabapentin 300 mg 3 times daily.          2.  Polysubstance use disorder      -Patient is allowed to use the phone to contact rehab centers for chronic alcohol use/.      -Continue thiamine and folic acid supplementation.    3.  Medical issues: None currently.  -Defer to medical team for treatment.          Legal hold: +  Other:   Sitter: Yes   Visitors: No   Phone calls: Yes, to contact rehab facilities for chronic alcohol use.   Personal items (specify): Per nursing discretion.  *Medication risk/benefit/expections discussed  *Will Follow  *Thank you for the consult

## 2019-10-08 NOTE — DISCHARGE PLANNING
Medical Social Work    Referral: Legal Hold    Intervention: Legal Hold Paperwork given to SW by Life Skills RN Sindy Chacon    Legal Hold Initiated: Date: 10- Time: 1443    Patient’s Insurance Listed on Face Sheet: Medicare/MediCal HMO    Referrals sent to: [facility], [facility], [place] ,[facility]    This referral contains the following information:  1) Face sheet _x___  2) Page 1 and Page 2 of Legal Hold __x__  3) Alert Team Assessment/Psych Assessment __x__  4) Head to toe physical exam ___x_  5) Urine Drug Screen ___x_  6) Blood Alcohol _x___  7) Vital signs _x___  8) Pregnancy test when applicable __NA_  9) Medications list __x__    Plan: Patient will transfer to mental health facility once acceptance is obtained

## 2019-10-08 NOTE — DISCHARGE PLANNING
:    Legal Hold received from Alert Team Sindy Chacon.  SW will send Mental Health referral upon completion of Psyc/Alert Team Assessment, H&P.

## 2019-10-08 NOTE — ED PROVIDER NOTES
ER Provider Addendum Note     Scribed for KE DAVIES by Bette Diaz on 10/8/2019 at 11:47 AM.     This is an addendum to the note on Chris Leggett.  For further details and full chart entry, see the previously signed ED Provider Note written by Dr. Zack Tian ([facility]).      11:48 AM On evaluation, patient is resting comfortably. He offers no medical complaints. Awaiting transfer at this time.          IBette (Scribe), am scribing for, and in the presence of, Ke Davies M.D.    Electronically signed by: Bette Diaz (Scribe), 10/8/2019    Ke PERSAUD M.D. personally performed the services described in this documentation, as scribed by Bette Diaz in my presence, and it is both accurate and complete.      The note accurately reflects work and decisions made by me.  Ke Davies  10/8/2019  12:07 PM

## 2019-10-08 NOTE — ED NOTES
Pt sitting up comfortably, no needs at this time, bed in lowered position side rails up, sitter infront of room

## 2019-10-08 NOTE — ED NOTES
Patient's home medications have been reviewed by the pharmacy team.     Past Medical History:   Diagnosis Date   • Gout    • Hypertension    • Psychiatric disorder        Patient's Medications   New Prescriptions    No medications on file   Previous Medications    DULOXETINE (CYMBALTA) 60 MG CAP DR PARTICLES DELAYED-RELEASE CAPSULE    Take 60 mg by mouth every morning.    FOLIC ACID (FOLVITE) 1 MG TAB    Take 1 mg by mouth every day.    GABAPENTIN (NEURONTIN) 300 MG CAP    Take 300 mg by mouth 3 times a day.    QUETIAPINE (SEROQUEL) 200 MG TAB    Take 200 mg by mouth every bedtime.    THIAMINE (THIAMINE) 100 MG TAB    Take 100 mg by mouth every day.    TRAZODONE (DESYREL) 100 MG TAB    Take 100 mg by mouth every bedtime.   Modified Medications    No medications on file   Discontinued Medications    FOLIC ACID (FOLVITE) 1 MG TAB    Take 1 Tab by mouth every day.    QUETIAPINE (SEROQUEL) 200 MG TAB    Take 1 Tab by mouth every evening.    THIAMINE (THIAMINE) 100 MG TABLET    Take 1 Tab by mouth every day.          A: Medications do not appear to be contributing to current complaints.       P:  No recommendations at this time. Home medications have been reordered as appropriate.        Keisha Goins, Pharm.D., BCPS

## 2019-10-08 NOTE — ED NOTES
Pt sitting up eating, no other needs at this time,bed inlowered position, side rails up , sitter in front of room

## 2019-10-08 NOTE — DISCHARGE PLANNING
"Medical Social Work    MSW received a call from Mark at [facility] declining pt as he's reached his \"max therapeutic benefits\".  MSW received a call from Tanja at [facility] with the same information and declining pt for transfer.  "

## 2019-10-08 NOTE — DISCHARGE PLANNING
Alert Team  Per psychiatry, pt is to be making phone calls this afternoon to investigate long term rehab programming.  He has a friend at the bedside who is helping/encouraging him to make the calls.  I provided the chemical dependency resource list and highlighted the placed he should place calls to.  Phone provided.

## 2019-10-09 PROCEDURE — A9270 NON-COVERED ITEM OR SERVICE: HCPCS | Performed by: EMERGENCY MEDICINE

## 2019-10-09 PROCEDURE — A9270 NON-COVERED ITEM OR SERVICE: HCPCS | Performed by: STUDENT IN AN ORGANIZED HEALTH CARE EDUCATION/TRAINING PROGRAM

## 2019-10-09 PROCEDURE — 700102 HCHG RX REV CODE 250 W/ 637 OVERRIDE(OP): Performed by: EMERGENCY MEDICINE

## 2019-10-09 PROCEDURE — 99283 EMERGENCY DEPT VISIT LOW MDM: CPT | Performed by: PSYCHIATRY & NEUROLOGY

## 2019-10-09 PROCEDURE — 700102 HCHG RX REV CODE 250 W/ 637 OVERRIDE(OP): Performed by: STUDENT IN AN ORGANIZED HEALTH CARE EDUCATION/TRAINING PROGRAM

## 2019-10-09 RX ADMIN — QUETIAPINE FUMARATE 200 MG: 100 TABLET ORAL at 21:34

## 2019-10-09 RX ADMIN — DULOXETINE HYDROCHLORIDE 40 MG: 20 CAPSULE, DELAYED RELEASE ORAL at 05:45

## 2019-10-09 RX ADMIN — GABAPENTIN 300 MG: 300 CAPSULE ORAL at 19:00

## 2019-10-09 RX ADMIN — FOLIC ACID 1 MG: 1 TABLET ORAL at 05:45

## 2019-10-09 RX ADMIN — GABAPENTIN 300 MG: 300 CAPSULE ORAL at 12:01

## 2019-10-09 RX ADMIN — Medication 100 MG: at 05:45

## 2019-10-09 RX ADMIN — TRAZODONE HYDROCHLORIDE 100 MG: 50 TABLET ORAL at 21:34

## 2019-10-09 RX ADMIN — GABAPENTIN 300 MG: 300 CAPSULE ORAL at 05:45

## 2019-10-09 ASSESSMENT — LIFESTYLE VARIABLES
CONSUMPTION TOTAL: INCOMPLETE
TOTAL SCORE: 0
HAVE PEOPLE ANNOYED YOU BY CRITICIZING YOUR DRINKING: NO
TOTAL SCORE: 0
HAVE YOU EVER FELT YOU SHOULD CUT DOWN ON YOUR DRINKING: NO
EVER HAD A DRINK FIRST THING IN THE MORNING TO STEADY YOUR NERVES TO GET RID OF A HANGOVER: NO
TOTAL SCORE: 0
DO YOU DRINK ALCOHOL: YES
EVER FELT BAD OR GUILTY ABOUT YOUR DRINKING: NO

## 2019-10-09 NOTE — ED PROVIDER NOTES
ED Provider Note    Patient remains on psychiatric hold, no new issues over night per nursing staff.  This morning patient is resting quietly.

## 2019-10-09 NOTE — ED NOTES
Pt sleeping. Even, easy respirations noted. Does not appear to be distressed, will continue to monitor.

## 2019-10-09 NOTE — ED NOTES
Medicated Pt per MAR. Pt requests shower in the morning. Pt ambulate to restroom with steady gait.

## 2019-10-09 NOTE — ED NOTES
Pt appears to be resting quietly. Even, easy respirations noted. Does not appear to be distressed, will continue to monitor. Sitter outside doorway.

## 2019-10-09 NOTE — PSYCHIATRY
"PSYCHIATRIC FOLLOW-UP:(established)   *Reason for admission:     Suicidal ideation               *Legal Hold Status on Admission:    On legal hold         *HPI:   Chris Leggett is a 69 year old male with a  PMH of major depressive disorder and alcohol use disorder who is still currently on a legal hold for SI with a plan to jump off a bridge. This morning, the patient feels \"not too good\". The patient reports currently still feeling suicidal and would still jump off a bridge into traffic if discharged today. He says he feels \"hopeless\" but would be alright if he wasn't homeless which he reports is the biggest stressor for him currently and states is why he feels suicidal. He spoke with his friend Anirudh yesterday who works at the hospital and the patient states they called multiple places in [place] and [place] to find a place for the patient to stay and receive rehabilitation for alcohol use. The patient states they filled out applications for these places. The patient reports he slept off and off last night. He reports a good appetite and states he has been peeing adequately. Reports he has a headache.       Medical ROS (as pertinent):                     Review of Systems   Constitutional: Negative.     HENT: Negative.     Eyes: Negative.     Respiratory: Negative.     Cardiovascular: Negative.     Gastrointestinal: Negative.     Genitourinary: Negative.     Musculoskeletal: Negative.     Skin: Positive for rash.         Petechial rash to b/l lower extremities.    Neurological: Positive for headahce.     Endo/Heme/Allergies: Negative.     Psychiatric/Behavioral: Positive for depression, substance abuse and suicidal ideas. The patient is nervous/anxious.         *Psychiatric Examination:   Vitals: /86   Pulse 88   Temp 37.2 °C (99 °F) (Temporal)   Resp 15   Ht 1.803 m (5' 11\")   Wt 86.2 kg (190 lb)   SpO2 95%   BMI 26.50 kg/m²     General Appearance: Laying in bed, poorly kept, disheveled   Abnormal " "Movements: No abnormal movements   Gait and Posture: Normal gait and posture   Speech: Low volume. Normal tone, slowed rate and rhythm. Mumbles.   Thought processes:Linear, logical and goal directed   Associations: None   Abnormal or Psychotic Thoughts: None   Judgement and Insight: Poor/limited-fair   Orientation: A&Ox4   Recent and Remote Memory: Intact   Attention Span and Concentration: Intact   Language: English   Fund of Knowledge: not tested  Mood and Affect: \"Depressed\"   SI/HI: Reports SI, Denies HI     Current Facility-Administered Medications   Medication Dose Route Frequency Provider Last Rate Last Dose   • folic acid (FOLVITE) tablet 1 mg  1 mg Oral DAILY Rasheed Davies M.D.   1 mg at 10/09/19 0545   • gabapentin (NEURONTIN) capsule 300 mg  300 mg Oral TID Rasheed Davies M.D.   300 mg at 10/09/19 1201   • QUEtiapine (SEROQUEL) tablet 200 mg  200 mg Oral QHS Rasheed Davies M.D.   200 mg at 10/08/19 2156   • thiamine tablet 100 mg  100 mg Oral DAILY Rasheed Davies M.D.   100 mg at 10/09/19 0545   • traZODone (DESYREL) tablet 100 mg  100 mg Oral QHS Rasheed Davies M.D.   100 mg at 10/08/19 2156   • DULoxetine (CYMBALTA) capsule 40 mg  40 mg Oral CAYETANO Hammond M.D.   40 mg at 10/09/19 0545     Current Outpatient Medications   Medication Sig Dispense Refill   • folic acid (FOLVITE) 1 MG Tab Take 1 mg by mouth every day.     • QUEtiapine (SEROQUEL) 200 MG Tab Take 200 mg by mouth every bedtime.     • thiamine (THIAMINE) 100 MG Tab Take 100 mg by mouth every day.     • DULoxetine (CYMBALTA) 60 MG Cap DR Particles delayed-release capsule Take 60 mg by mouth every morning.     • traZODone (DESYREL) 100 MG Tab Take 100 mg by mouth every bedtime.     • gabapentin (NEURONTIN) 300 MG Cap Take 300 mg by mouth 3 times a day.       No results found for this or any previous visit (from the past 24 hour(s)).          *ASSESSMENT/PLAN:   Patient is known to our services. He has a hx of MDD on " psychotropic medications, as well as severe alcohol use dso. Pt was recently discharged from [facility] less than 1 week ago, and soon after discharge pt resuming alcohol drinking and stopped taking his psychiatric medications. Pt continues to endorse conditional suicidal ideations if discharged to a shelter. This provider suspects that he is amplifying or fabricating symptoms in order to prolong his hospital stay for shelter in the hospital. Pt did make calls to rehab programs and applied to a few. Pt was instructed to call his nephew to follow up with his plan of moving with him. Pt affect is very depressed, with low speech volume; however he has been wathicng TV and attending to his basic needs. Will continue to monitor pt's mood and behavior for a safer discharge.       1. Major Depressive Disorder   -Continue Cymbalta 40 mg daily for depression.   -Continue trazodone 100 mg nightly for sleep.   -Continue Seroquel 200 mg nightly for sleep/mood.   -Continue gabapentin 300 mg 3 times daily.   2. Alcohol use disorder   -      Patient is currently seeking a place for rehabilitation and will continue to contact places today with his friend elvin   -      Continue thiamine and folic acid supplementation.                      *Legal hold: On hold, extended   Other:               Sitter: no              Visitors: yes              Phone calls: yes              Personal items (specify):  no             *Will Follow   *Thank you for the consult

## 2019-10-09 NOTE — DISCHARGE PLANNING
Awake and alert. Continues to endorse suicidal thoughts with a plan to jump [place] [address] during rush hour traffic.  States he wants to go to rehab and has put in an application at [facility]. Continue to wait for placement in a psychiatric facility.

## 2019-10-09 NOTE — ED NOTES
Report received from night RN. Pt resting in [facility] with 1:1 sitter in doorway, high SI. Chest rise and fall noted. Safety checklist and sitter card completed.

## 2019-10-10 VITALS
TEMPERATURE: 98.4 F | HEIGHT: 71 IN | BODY MASS INDEX: 26.6 KG/M2 | WEIGHT: 190 LBS | HEART RATE: 73 BPM | SYSTOLIC BLOOD PRESSURE: 145 MMHG | DIASTOLIC BLOOD PRESSURE: 90 MMHG | RESPIRATION RATE: 16 BRPM | OXYGEN SATURATION: 95 %

## 2019-10-10 PROCEDURE — A9270 NON-COVERED ITEM OR SERVICE: HCPCS | Performed by: STUDENT IN AN ORGANIZED HEALTH CARE EDUCATION/TRAINING PROGRAM

## 2019-10-10 PROCEDURE — 700102 HCHG RX REV CODE 250 W/ 637 OVERRIDE(OP): Performed by: STUDENT IN AN ORGANIZED HEALTH CARE EDUCATION/TRAINING PROGRAM

## 2019-10-10 PROCEDURE — 700102 HCHG RX REV CODE 250 W/ 637 OVERRIDE(OP): Performed by: EMERGENCY MEDICINE

## 2019-10-10 PROCEDURE — A9270 NON-COVERED ITEM OR SERVICE: HCPCS | Performed by: EMERGENCY MEDICINE

## 2019-10-10 PROCEDURE — 99283 EMERGENCY DEPT VISIT LOW MDM: CPT | Mod: GC | Performed by: PSYCHIATRY & NEUROLOGY

## 2019-10-10 RX ORDER — GABAPENTIN 300 MG/1
300 CAPSULE ORAL 3 TIMES DAILY
Qty: 90 CAP | Refills: 0 | Status: SHIPPED | OUTPATIENT
Start: 2019-10-10 | End: 2019-12-18

## 2019-10-10 RX ORDER — TRAZODONE HYDROCHLORIDE 100 MG/1
100 TABLET ORAL
Qty: 30 TAB | Refills: 0 | Status: SHIPPED | OUTPATIENT
Start: 2019-10-10 | End: 2019-12-18

## 2019-10-10 RX ORDER — QUETIAPINE FUMARATE 200 MG/1
200 TABLET, FILM COATED ORAL
Qty: 30 TAB | Refills: 0 | Status: SHIPPED | OUTPATIENT
Start: 2019-10-10 | End: 2019-12-18

## 2019-10-10 RX ORDER — DULOXETINE 40 MG/1
40 CAPSULE, DELAYED RELEASE ORAL EVERY MORNING
Qty: 30 CAP | Refills: 0 | Status: ON HOLD | OUTPATIENT
Start: 2019-10-11 | End: 2019-10-14

## 2019-10-10 RX ADMIN — GABAPENTIN 300 MG: 300 CAPSULE ORAL at 05:54

## 2019-10-10 RX ADMIN — FOLIC ACID 1 MG: 1 TABLET ORAL at 05:54

## 2019-10-10 RX ADMIN — GABAPENTIN 300 MG: 300 CAPSULE ORAL at 12:27

## 2019-10-10 RX ADMIN — DULOXETINE HYDROCHLORIDE 40 MG: 20 CAPSULE, DELAYED RELEASE ORAL at 05:54

## 2019-10-10 RX ADMIN — Medication 100 MG: at 05:54

## 2019-10-10 ASSESSMENT — ENCOUNTER SYMPTOMS
EYES NEGATIVE: 1
DEPRESSION: 1
CARDIOVASCULAR NEGATIVE: 1
NEUROLOGICAL NEGATIVE: 1
GASTROINTESTINAL NEGATIVE: 1
MUSCULOSKELETAL NEGATIVE: 1
NERVOUS/ANXIOUS: 1
CONSTITUTIONAL NEGATIVE: 1
RESPIRATORY NEGATIVE: 1

## 2019-10-10 ASSESSMENT — LIFESTYLE VARIABLES: SUBSTANCE_ABUSE: 1

## 2019-10-10 NOTE — DISCHARGE INSTRUCTIONS
Proceed directly to [facility] as arranged by our social workers.    Return to ER for any problems or concerns.

## 2019-10-10 NOTE — ED NOTES
Pt belongings returned to pt at this time.  Md at bedside.  Pending discharge.  Will continue to monitor.

## 2019-10-10 NOTE — ED NOTES
Pt tolerated medication without difficulty.  No vomiting after medications.  No S&S of reactions.  Will continue to monitor.  Pt updated on plan of care.

## 2019-10-10 NOTE — ED NOTES
Patient walked to bathroom, RN to bathroom with 1:1 observation. Patient back to bed. PSA at bedside

## 2019-10-10 NOTE — DISCHARGE PLANNING
"    [facility]  Crisis/Safety Plan    Name:  Chris Leggett  MRN:  [id number]  Date:  10/10/2019    Warning signs that a crisis may be developing for me or I may be at risk:  1) having money  2) being around the wrong people  3)casinos    Coping strategies I can use on my own (relaxation, physical activity, etc):  1) meditate  2) breathe  3) accepting God    Ways I can make my environment safe:  1) stay away from alcohol  2) reach out to others in recovery  3) attend AA    Things I want to tell myself when I feel a crisis developin) \"I don't have to drink\"  2) there is a better way of life  3) Ask God for help    People I can contact for support or distraction (and their phone numbers):  1) Anirudh Drake  [phone]  2)  [phone]  3)    If I’m not able to reach my support people, or the above strategies don’t help, I can contact the following professionals, agencies, or hotlines:  1) Crisis Call Center ():  [phone] -OR- [phone]  2) Crisis Text Line ():  Text CARE TO 941042  3)   4)     Zandra Vila R.N.    "

## 2019-10-10 NOTE — ED PROVIDER NOTES
"ED PROVIDER NOTE    Scribed for Marisa Marie M.D. by Shannan Cárdenas. 10/10/2019, 6:31 AM.    This is an addendum to the note on Chris Leggett. For further details and full chart entry, entry, see the previously signed ED Provider Note written by Dr. Castro (ERP).      5:58 AM I discussed the patient's case with Dr. Brasher (ERP) who will transfer care of the patient to me at this time.        6:30 AM Patient was seen at bedside. Nurse reports patient has been calm and cooperative overnight. Currently, he does not report any issues or medical complaints. He states he is getting enough to eat and drink. He is awaiting transfer to psychiatric facility. Morning vitals were reviewed and are stable: /79   Pulse (!) 57   Temp 36.9 °C (98.4 °F) (Temporal)   Resp 16   Ht 1.803 m (5' 11\")   Wt 86.2 kg (190 lb)   SpO2 97%   BMI 26.50 kg/m²      1550: Patient has been evaluated by , psychiatry, and the alert team.  Psychiatry is lifted the legal 2000 on this patient.  Plan is to get him transferred over to [facility] will he will spend the next week.  After that he likely will go [facility].  Patient is amenable with this plan.  The patient currently has a ride to [facility] and will be transferred there right now.  Vital signs are normal and stable upon discharge.  He has no complaints or concerns upon discharge.  He has been given strict return precautions and discharge instructions and understands treatment plan and follow-up.      FINAL IMPRESSION   1. Suicidal ideation Acute    2. Situational depression Acute        This dictation has been created using voice recognition software. The accuracy of the dictation is limited by the abilities of the software. I expect there may be some errors of grammar and possibly content. I made every attempt to manually correct the errors within my dictation. However, errors related to voice recognition software may still exist and should be interpreted within the " appropriate context.      Shannan PERSAUD (Scribedna), am scribing for, and in the presence of, Marisa Marie M.D..  Electronically signed by: Shannan Cárdenas (Delilah), 10/10/2019  Marisa PERSAUD M.D. personally performed the services described in this documentation, as scribed by Shannan Cárdenas in my presence, and it is both accurate and complete.    The note accurately reflects work and decisions made by me.  Marisa Marie  10/10/2019  3:56 PM

## 2019-10-10 NOTE — ED NOTES
Pt discharged to home.  Pt given discharge paperwork and all applicable prescriptions written by provider.  Pt to follow with PCP, when indicated.  Pt verbalizes understanding of discharge teaching and will return to ED if condition worsens.

## 2019-10-10 NOTE — ED NOTES
Assumed pt care.  Pt sitting up in bed, watching tv at this time.  No needs verbalized.  Will continue to monitor.  Sitter present.  Breathing even and unlabored.

## 2019-10-10 NOTE — ED NOTES
Pt remains resting in bed at this time.  No needs verbalized.  Will continue to monitor.  Sitter present.  Breathing even and unlabored.

## 2019-10-10 NOTE — PSYCHIATRY
"BRIEF PSYCHIATRIC CONSULT NOTE: patient seen, full note to follow.  Patient reports his depression has improved from 10 to 5 and that his chronic suicidal thoughts remains the same. He however feels more hopefull since he has made a \"real friend\" Anirudh, who has been visiting him daily and helping him filling up applications to rehab. Pt says that there is a possible bed available at [facility] in week and that he feels motivated to go there. \"I want to try harder. I want to try to think positive. Call more places and [facility]. I got a friend all of the sudden\". Pt was asked to filled out a form to apply to [facility], and he completed himself. Pt is endorsing conditional suicidal thoughts if he is discharged to the shelter, but I discussed with the patient together with the Alert Team that pt is being given all the resources to improve his mental health such as chemical dependence programs, and outpatient psychiatric services. 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 Anirudh also came later to inform pt that he is working with a [facility] to provide him a temporary place until he can get a bed in a rehab program. Pt has a severe alcohol use disorder and is not compliant with psychiatric treatment in the community. He was been seen by our services during his medical hospitalization less than one week ago, and soon after discharge, he resumed his alcohol drinking and did not go  his psychiatric prescriptions. Pt's main concern is housing at this time, as he is refusing to return to the shelter. Pt has an elevated chronic elevated risk for danger to self and deterioration on discharge due to his severe ongoing alcohol use, past suicide attempt two years ago, chronic mental illness, homelessness, low social economic status, no family support and non compliance with psychiatric treatment. Patient however at this time is, future oriented, has been able to advocate for himself, has been " watching TV and attending to his basic ADLS. He feels that his depression has improved and is more hopefull since he has made a friend this past week that has been motivating and assisting patient on achieving sobriety. Pt appears to be amplifying or fabricating symptoms to obtain and prolong his stay in the hospital for shelter; however is it notable to say that pt is depressed and has a depressed affect. We educated patient on the importance of compliance with psychiatric treatment in the outpatient setting and communication with an outpatient provider regarding the early alin signs of worsening symptoms. Pt was made aware of available resources should his symptoms worsen (return to the ED, call 911). Continue ER stay would not meaningfully mitigate the risks or relapse on discharge. Therefore further inpatient hospital treatment is not warranted or beneficial at this time. Discharge is appropriate.     Legal hold discontinued.

## 2019-10-10 NOTE — ED NOTES
Patient resting in gurney, chest rising and falling. PSA at bedside for 1:1 observation of patient. No needs at this time, will continue to monitor.

## 2019-10-10 NOTE — PSYCHIATRY
"PSYCHIATRIC FOLLOW UP:    Reason for admission: SI                   Reason for consult: SI       Requesting Physician/APN: Dr. Tian     Supervising Psychiatrist: Dr. Nicholson     HPI: Mr. Leggett is a 69-year-old male well-known to this service with a history of depressive disorder unspecified, alcohol use disorder and meth use disorder who is currently on a legal hold for SI with a plan to jump off a bridge into traffic.  He states his mood as \"I am feeling better\" and reports his depression has decreased from a 10 out of 10 to 5 out of 10, with 10 being the worst depression, since admission.He reports SI, but states \"I am really only depressed\" and feels that he is safe to discharge to [facility] for alcohol rehab.  He states he feels more hopeful because \"I have made a real friend\" named Anirudh Drake who works in hospitality here at [employer].  He states \"I want to try harder.  I want to try to think positive\" and states he feels motivated to quit his alcohol abuse.  He reports his friend Anirudh will help him once discharged from our care to get involved with his Gnosticism and to find housing.  Patient appears to be over estimating his symptoms for possible gain to stay in the hospital and not at the men's shelter.  He was made available of resources should his condition worsen.  He affirms that should he feel suicidal he will immediately call 911 or return to the hospital.  Further inpatient hospital treatment is not warranted due to his chronic issues and the need to curb his alcohol use.  He reports he will be going to [facility] for alcohol rehabilitation.       Psychiatric Examination: observed phenomenon:  Vitals: /79   Pulse (!) 57   Temp 36.9 °C (98.4 °F) (Temporal)   Resp 16   Ht 1.803 m (5' 11\")   Wt 86.2 kg (190 lb)   SpO2 97%   BMI 26.50 kg/m²  Body mass index is 26.5 kg/m².  Review of Systems:  Psychiatric:  Depression: Positive.  Report depressed mood, sleep disturbances, anhedonia, feelings " "of guilt, low energy, poor concentration, poor appetite, psychomotor disturbances and suicidality.  Anxiety: Positive.  Reports excessive anxiety more days than not and associated symptoms such as difficulty concentrating, sleep disturbance and irritability.  Psychosis: Negative. Denies AVH, disorganized thinking or speech.  Lela/Bipolar: Negative. Denies periods of decreased need for sleep, distractability, impulsivity, dangerous activities, flight of ideas.  PTSD: Positive for symptoms.  Reports past trauma and associated symptoms such as nightmares, flashbacks..    Review of Systems   Constitutional: Negative.    HENT: Negative.    Eyes: Negative.    Respiratory: Negative.    Cardiovascular: Negative.    Gastrointestinal: Negative.    Genitourinary: Negative.    Musculoskeletal: Negative.    Skin: Negative.    Neurological: Negative.    Endo/Heme/Allergies: Negative.    Psychiatric/Behavioral: Positive for depression, substance abuse and suicidal ideas. The patient is nervous/anxious.         Psychiatric Examination: observed phenomenon:  Vitals: /79   Pulse (!) 57   Temp 36.9 °C (98.4 °F) (Temporal)   Resp 16   Ht 1.803 m (5' 11\")   Wt 86.2 kg (190 lb)   SpO2 97%   BMI 26.50 kg/m²  Body mass index is 26.5 kg/m².  General Appearance:69 y.o. male laying in bed in NAD.   Moderately kempt.   Behavior: Cooperative and engaged with interview.  Appropriate eye contact.  No aggressive behaviors.  Abnormal Movements: No abnormal movements, muscle spasms or choreiform movements.  Gait and Posture: Normal gait and posture.  Speech: Low volume.  Normal tone.  Slowed rate and rhythm.  Mumbles at times.  Thought Process: Linear, logical and goal-directed  Associations: None  Abnormal or Psychotic Thoughts: Denies AVH, disorganized thinking or delusional thinking.  Judgement and Insight: Poor, but improved from admission/Fair  Orientation: Alert and oriented x4  Recent and Remote Memory: Intact  Attention Span " "and Concentration: Intact  Language: English  Fund of Knowledge: Appropriate  Mood:\" I am feeling better\" as stated by patient  Affect: Dysthymic, full range-able to laugh at jokes. Congruent with stated mood.  SI/HI: Reports chronic SI, but states \"I am just depressed and do not need to be in the hospital\" and denies HI.  MMSE score and/or clock drawin/30    Soc history:  Pt reports his friend Anirudh is going to help him go to rehab at [facility].    Lab results/tests:   No results found for this or any previous visit (from the past 48 hour(s)).  No orders to display            Assessment:  1.  Depressive disorder unspecified  -Continue Cymbalta 40 mg daily for depression.  -Continue trazodone 100 mg nightly for sleep.  -Continue Seroquel 200 mg nightly for sleep/mood.  -Continue gabapentin 300 mg 3 times daily.  -Discharge to self and patient to go to rehab at [facility].          2.  Polysubstance use disorder      -Patient to go to rehab at [facility].  -Continue thiamine and folic acid supplementation.             Plan: Legal hold DC'd, patient to go to [facility] for alcohol rehab.  legal hold:  "

## 2019-10-13 ENCOUNTER — APPOINTMENT (OUTPATIENT)
Dept: RADIOLOGY | Facility: MEDICAL CENTER | Age: 69
End: 2019-10-13
Attending: EMERGENCY MEDICINE
Payer: MEDICARE

## 2019-10-13 ENCOUNTER — HOSPITAL ENCOUNTER (OUTPATIENT)
Facility: MEDICAL CENTER | Age: 69
End: 2019-10-15
Attending: EMERGENCY MEDICINE | Admitting: INTERNAL MEDICINE
Payer: MEDICARE

## 2019-10-13 DIAGNOSIS — F10.20 ALCOHOLISM (HCC): ICD-10-CM

## 2019-10-13 DIAGNOSIS — R53.1 GENERALIZED WEAKNESS: ICD-10-CM

## 2019-10-13 PROBLEM — E87.1 HYPONATREMIA: Status: RESOLVED | Noted: 2018-09-02 | Resolved: 2019-10-13

## 2019-10-13 LAB
ALBUMIN SERPL BCP-MCNC: 4.3 G/DL (ref 3.2–4.9)
ALBUMIN/GLOB SERPL: 1.5 G/DL
ALP SERPL-CCNC: 64 U/L (ref 30–99)
ALT SERPL-CCNC: 21 U/L (ref 2–50)
ANION GAP SERPL CALC-SCNC: 13 MMOL/L (ref 0–11.9)
APPEARANCE UR: CLEAR
AST SERPL-CCNC: 46 U/L (ref 12–45)
BASOPHILS # BLD AUTO: 0.6 % (ref 0–1.8)
BASOPHILS # BLD: 0.03 K/UL (ref 0–0.12)
BILIRUB SERPL-MCNC: 0.4 MG/DL (ref 0.1–1.5)
BILIRUB UR QL STRIP.AUTO: NEGATIVE
BUN SERPL-MCNC: 10 MG/DL (ref 8–22)
CALCIUM SERPL-MCNC: 9.2 MG/DL (ref 8.4–10.2)
CHLORIDE SERPL-SCNC: 100 MMOL/L (ref 96–112)
CO2 SERPL-SCNC: 24 MMOL/L (ref 20–33)
COLOR UR: YELLOW
CREAT SERPL-MCNC: 0.72 MG/DL (ref 0.5–1.4)
EKG IMPRESSION: NORMAL
EOSINOPHIL # BLD AUTO: 0.14 K/UL (ref 0–0.51)
EOSINOPHIL NFR BLD: 2.9 % (ref 0–6.9)
ERYTHROCYTE [DISTWIDTH] IN BLOOD BY AUTOMATED COUNT: 46.7 FL (ref 35.9–50)
GLOBULIN SER CALC-MCNC: 2.9 G/DL (ref 1.9–3.5)
GLUCOSE SERPL-MCNC: 99 MG/DL (ref 65–99)
GLUCOSE UR STRIP.AUTO-MCNC: NEGATIVE MG/DL
HCT VFR BLD AUTO: 35.6 % (ref 42–52)
HGB BLD-MCNC: 12.5 G/DL (ref 14–18)
IMM GRANULOCYTES # BLD AUTO: 0.01 K/UL (ref 0–0.11)
IMM GRANULOCYTES NFR BLD AUTO: 0.2 % (ref 0–0.9)
KETONES UR STRIP.AUTO-MCNC: NEGATIVE MG/DL
LEUKOCYTE ESTERASE UR QL STRIP.AUTO: NEGATIVE
LYMPHOCYTES # BLD AUTO: 1.13 K/UL (ref 1–4.8)
LYMPHOCYTES NFR BLD: 23.8 % (ref 22–41)
MCH RBC QN AUTO: 33.2 PG (ref 27–33)
MCHC RBC AUTO-ENTMCNC: 35.1 G/DL (ref 33.7–35.3)
MCV RBC AUTO: 94.7 FL (ref 81.4–97.8)
MICRO URNS: NORMAL
MONOCYTES # BLD AUTO: 0.31 K/UL (ref 0–0.85)
MONOCYTES NFR BLD AUTO: 6.5 % (ref 0–13.4)
NEUTROPHILS # BLD AUTO: 3.13 K/UL (ref 1.82–7.42)
NEUTROPHILS NFR BLD: 66 % (ref 44–72)
NITRITE UR QL STRIP.AUTO: NEGATIVE
NRBC # BLD AUTO: 0 K/UL
NRBC BLD-RTO: 0 /100 WBC
PH UR STRIP.AUTO: 7 [PH] (ref 5–8)
PLATELET # BLD AUTO: 80 K/UL (ref 164–446)
PMV BLD AUTO: 10.6 FL (ref 9–12.9)
POC BREATHALIZER: 0 PERCENT (ref 0–0.01)
POTASSIUM SERPL-SCNC: 4 MMOL/L (ref 3.6–5.5)
PROT SERPL-MCNC: 7.2 G/DL (ref 6–8.2)
PROT UR QL STRIP: NEGATIVE MG/DL
RBC # BLD AUTO: 3.76 M/UL (ref 4.7–6.1)
RBC UR QL AUTO: NEGATIVE
SODIUM SERPL-SCNC: 137 MMOL/L (ref 135–145)
SP GR UR STRIP.AUTO: 1.01
TROPONIN T SERPL-MCNC: 10 NG/L (ref 6–19)
TSH SERPL DL<=0.005 MIU/L-ACNC: 1.78 UIU/ML (ref 0.38–5.33)
WBC # BLD AUTO: 4.8 K/UL (ref 4.8–10.8)

## 2019-10-13 PROCEDURE — A9270 NON-COVERED ITEM OR SERVICE: HCPCS | Performed by: EMERGENCY MEDICINE

## 2019-10-13 PROCEDURE — 99285 EMERGENCY DEPT VISIT HI MDM: CPT

## 2019-10-13 PROCEDURE — 70450 CT HEAD/BRAIN W/O DYE: CPT

## 2019-10-13 PROCEDURE — 700102 HCHG RX REV CODE 250 W/ 637 OVERRIDE(OP)

## 2019-10-13 PROCEDURE — A9270 NON-COVERED ITEM OR SERVICE: HCPCS

## 2019-10-13 PROCEDURE — G0378 HOSPITAL OBSERVATION PER HR: HCPCS

## 2019-10-13 PROCEDURE — 84443 ASSAY THYROID STIM HORMONE: CPT

## 2019-10-13 PROCEDURE — 71045 X-RAY EXAM CHEST 1 VIEW: CPT

## 2019-10-13 PROCEDURE — 81003 URINALYSIS AUTO W/O SCOPE: CPT

## 2019-10-13 PROCEDURE — 82306 VITAMIN D 25 HYDROXY: CPT

## 2019-10-13 PROCEDURE — 93005 ELECTROCARDIOGRAM TRACING: CPT | Performed by: EMERGENCY MEDICINE

## 2019-10-13 PROCEDURE — 302970 POC BREATHALIZER: Performed by: EMERGENCY MEDICINE

## 2019-10-13 PROCEDURE — 84484 ASSAY OF TROPONIN QUANT: CPT

## 2019-10-13 PROCEDURE — 99220 PR INITIAL OBSERVATION CARE,LEVL III: CPT | Performed by: INTERNAL MEDICINE

## 2019-10-13 PROCEDURE — 700102 HCHG RX REV CODE 250 W/ 637 OVERRIDE(OP): Performed by: EMERGENCY MEDICINE

## 2019-10-13 PROCEDURE — 700105 HCHG RX REV CODE 258: Performed by: INTERNAL MEDICINE

## 2019-10-13 PROCEDURE — 85025 COMPLETE CBC W/AUTO DIFF WBC: CPT

## 2019-10-13 PROCEDURE — 80053 COMPREHEN METABOLIC PANEL: CPT

## 2019-10-13 RX ORDER — POLYETHYLENE GLYCOL 3350 17 G/17G
1 POWDER, FOR SOLUTION ORAL
Status: DISCONTINUED | OUTPATIENT
Start: 2019-10-13 | End: 2019-10-15 | Stop reason: HOSPADM

## 2019-10-13 RX ORDER — QUETIAPINE FUMARATE 100 MG/1
200 TABLET, FILM COATED ORAL
Status: DISCONTINUED | OUTPATIENT
Start: 2019-10-13 | End: 2019-10-15 | Stop reason: HOSPADM

## 2019-10-13 RX ORDER — DULOXETIN HYDROCHLORIDE 20 MG/1
40 CAPSULE, DELAYED RELEASE ORAL ONCE
Status: COMPLETED | OUTPATIENT
Start: 2019-10-13 | End: 2019-10-13

## 2019-10-13 RX ORDER — TRAZODONE HYDROCHLORIDE 50 MG/1
TABLET ORAL
Status: COMPLETED
Start: 2019-10-13 | End: 2019-10-13

## 2019-10-13 RX ORDER — BISACODYL 10 MG
10 SUPPOSITORY, RECTAL RECTAL
Status: DISCONTINUED | OUTPATIENT
Start: 2019-10-13 | End: 2019-10-15 | Stop reason: HOSPADM

## 2019-10-13 RX ORDER — ONDANSETRON 4 MG/1
4 TABLET, ORALLY DISINTEGRATING ORAL EVERY 4 HOURS PRN
Status: DISCONTINUED | OUTPATIENT
Start: 2019-10-13 | End: 2019-10-15 | Stop reason: HOSPADM

## 2019-10-13 RX ORDER — FOLIC ACID 1 MG/1
1 TABLET ORAL DAILY
Status: DISCONTINUED | OUTPATIENT
Start: 2019-10-14 | End: 2019-10-15 | Stop reason: HOSPADM

## 2019-10-13 RX ORDER — HEPARIN SODIUM 5000 [USP'U]/ML
5000 INJECTION, SOLUTION INTRAVENOUS; SUBCUTANEOUS EVERY 8 HOURS
Status: DISCONTINUED | OUTPATIENT
Start: 2019-10-13 | End: 2019-10-15 | Stop reason: HOSPADM

## 2019-10-13 RX ORDER — QUETIAPINE FUMARATE 100 MG/1
200 TABLET, FILM COATED ORAL ONCE
Status: COMPLETED | OUTPATIENT
Start: 2019-10-13 | End: 2019-10-13

## 2019-10-13 RX ORDER — GABAPENTIN 300 MG/1
300 CAPSULE ORAL ONCE
Status: COMPLETED | OUTPATIENT
Start: 2019-10-13 | End: 2019-10-13

## 2019-10-13 RX ORDER — AMOXICILLIN 250 MG
2 CAPSULE ORAL 2 TIMES DAILY
Status: DISCONTINUED | OUTPATIENT
Start: 2019-10-13 | End: 2019-10-15 | Stop reason: HOSPADM

## 2019-10-13 RX ORDER — TRAZODONE HYDROCHLORIDE 50 MG/1
100 TABLET ORAL
Status: DISCONTINUED | OUTPATIENT
Start: 2019-10-13 | End: 2019-10-15 | Stop reason: HOSPADM

## 2019-10-13 RX ORDER — QUETIAPINE FUMARATE 100 MG/1
TABLET, FILM COATED ORAL
Status: COMPLETED
Start: 2019-10-13 | End: 2019-10-13

## 2019-10-13 RX ORDER — SODIUM CHLORIDE 9 MG/ML
INJECTION, SOLUTION INTRAVENOUS CONTINUOUS
Status: DISCONTINUED | OUTPATIENT
Start: 2019-10-13 | End: 2019-10-15

## 2019-10-13 RX ORDER — GABAPENTIN 300 MG/1
300 CAPSULE ORAL 3 TIMES DAILY
Status: DISCONTINUED | OUTPATIENT
Start: 2019-10-14 | End: 2019-10-15 | Stop reason: HOSPADM

## 2019-10-13 RX ORDER — TRAZODONE HYDROCHLORIDE 50 MG/1
100 TABLET ORAL ONCE
Status: COMPLETED | OUTPATIENT
Start: 2019-10-13 | End: 2019-10-13

## 2019-10-13 RX ORDER — DULOXETIN HYDROCHLORIDE 20 MG/1
40 CAPSULE, DELAYED RELEASE ORAL EVERY MORNING
Status: DISCONTINUED | OUTPATIENT
Start: 2019-10-14 | End: 2019-10-15 | Stop reason: HOSPADM

## 2019-10-13 RX ORDER — THIAMINE MONONITRATE (VIT B1) 100 MG
100 TABLET ORAL DAILY
Status: DISCONTINUED | OUTPATIENT
Start: 2019-10-14 | End: 2019-10-15 | Stop reason: HOSPADM

## 2019-10-13 RX ORDER — ONDANSETRON 2 MG/ML
4 INJECTION INTRAMUSCULAR; INTRAVENOUS EVERY 4 HOURS PRN
Status: DISCONTINUED | OUTPATIENT
Start: 2019-10-13 | End: 2019-10-15 | Stop reason: HOSPADM

## 2019-10-13 RX ORDER — THIAMINE MONONITRATE (VIT B1) 100 MG
100 TABLET ORAL ONCE
Status: COMPLETED | OUTPATIENT
Start: 2019-10-13 | End: 2019-10-13

## 2019-10-13 RX ADMIN — DULOXETINE 40 MG: 20 CAPSULE, DELAYED RELEASE ORAL at 18:26

## 2019-10-13 RX ADMIN — SODIUM CHLORIDE: 900 INJECTION INTRAVENOUS at 22:15

## 2019-10-13 RX ADMIN — GABAPENTIN 300 MG: 300 CAPSULE ORAL at 18:26

## 2019-10-13 RX ADMIN — Medication 100 MG: at 20:06

## 2019-10-13 RX ADMIN — QUETIAPINE FUMARATE 200 MG: 100 TABLET, FILM COATED ORAL at 19:58

## 2019-10-13 RX ADMIN — QUETIAPINE FUMARATE 200 MG: 100 TABLET ORAL at 19:58

## 2019-10-13 RX ADMIN — TRAZODONE HYDROCHLORIDE 100 MG: 50 TABLET ORAL at 19:58

## 2019-10-13 SDOH — HEALTH STABILITY: MENTAL HEALTH: HOW MANY STANDARD DRINKS CONTAINING ALCOHOL DO YOU HAVE ON A TYPICAL DAY?: 10 OR MORE

## 2019-10-13 ASSESSMENT — COGNITIVE AND FUNCTIONAL STATUS - GENERAL
PERSONAL GROOMING: A LITTLE
DRESSING REGULAR LOWER BODY CLOTHING: A LITTLE
CLIMB 3 TO 5 STEPS WITH RAILING: A LITTLE
MOBILITY SCORE: 21
SUGGESTED CMS G CODE MODIFIER DAILY ACTIVITY: CK
STANDING UP FROM CHAIR USING ARMS: A LITTLE
DAILY ACTIVITIY SCORE: 19
DRESSING REGULAR UPPER BODY CLOTHING: A LITTLE
SUGGESTED CMS G CODE MODIFIER MOBILITY: CJ
TOILETING: A LITTLE
HELP NEEDED FOR BATHING: A LITTLE
WALKING IN HOSPITAL ROOM: A LITTLE

## 2019-10-13 ASSESSMENT — ENCOUNTER SYMPTOMS
NAUSEA: 0
DIZZINESS: 0
COUGH: 0
SHORTNESS OF BREATH: 0
VOMITING: 0
SORE THROAT: 0
CHILLS: 0
BACK PAIN: 1
DEPRESSION: 0
FEVER: 0
HALLUCINATIONS: 0
HEADACHES: 1
DIARRHEA: 0
WEAKNESS: 1
FOCAL WEAKNESS: 0
HEARTBURN: 0
MYALGIAS: 1
ABDOMINAL PAIN: 0
PALPITATIONS: 1
BLOOD IN STOOL: 0

## 2019-10-13 ASSESSMENT — PAIN SCALES - WONG BAKER: WONGBAKER_NUMERICALRESPONSE: HURTS A LITTLE MORE

## 2019-10-14 PROBLEM — D61.818 PANCYTOPENIA (HCC): Status: ACTIVE | Noted: 2019-09-28

## 2019-10-14 LAB
25(OH)D3 SERPL-MCNC: 24 NG/ML (ref 30–100)
ALBUMIN SERPL BCP-MCNC: 3.5 G/DL (ref 3.2–4.9)
ALBUMIN/GLOB SERPL: 1.4 G/DL
ALP SERPL-CCNC: 51 U/L (ref 30–99)
ALT SERPL-CCNC: 15 U/L (ref 2–50)
ANION GAP SERPL CALC-SCNC: 12 MMOL/L (ref 0–11.9)
AST SERPL-CCNC: 36 U/L (ref 12–45)
BASOPHILS # BLD AUTO: 1 % (ref 0–1.8)
BASOPHILS # BLD: 0.02 K/UL (ref 0–0.12)
BILIRUB SERPL-MCNC: 0.3 MG/DL (ref 0.1–1.5)
BUN SERPL-MCNC: 9 MG/DL (ref 8–22)
CALCIUM SERPL-MCNC: 8.7 MG/DL (ref 8.4–10.2)
CHLORIDE SERPL-SCNC: 108 MMOL/L (ref 96–112)
CO2 SERPL-SCNC: 21 MMOL/L (ref 20–33)
CREAT SERPL-MCNC: 0.69 MG/DL (ref 0.5–1.4)
EOSINOPHIL # BLD AUTO: 0.14 K/UL (ref 0–0.51)
EOSINOPHIL NFR BLD: 7 % (ref 0–6.9)
ERYTHROCYTE [DISTWIDTH] IN BLOOD BY AUTOMATED COUNT: 48.7 FL (ref 35.9–50)
GLOBULIN SER CALC-MCNC: 2.5 G/DL (ref 1.9–3.5)
GLUCOSE SERPL-MCNC: 88 MG/DL (ref 65–99)
HCT VFR BLD AUTO: 33.5 % (ref 42–52)
HGB BLD-MCNC: 11.6 G/DL (ref 14–18)
IMM GRANULOCYTES # BLD AUTO: 0 K/UL (ref 0–0.11)
IMM GRANULOCYTES NFR BLD AUTO: 0 % (ref 0–0.9)
LYMPHOCYTES # BLD AUTO: 0.83 K/UL (ref 1–4.8)
LYMPHOCYTES NFR BLD: 41.7 % (ref 22–41)
MCH RBC QN AUTO: 33.8 PG (ref 27–33)
MCHC RBC AUTO-ENTMCNC: 34.6 G/DL (ref 33.7–35.3)
MCV RBC AUTO: 97.7 FL (ref 81.4–97.8)
MONOCYTES # BLD AUTO: 0.2 K/UL (ref 0–0.85)
MONOCYTES NFR BLD AUTO: 10.1 % (ref 0–13.4)
NEUTROPHILS # BLD AUTO: 0.8 K/UL (ref 1.82–7.42)
NEUTROPHILS NFR BLD: 40.2 % (ref 44–72)
NRBC # BLD AUTO: 0 K/UL
NRBC BLD-RTO: 0 /100 WBC
PLATELET # BLD AUTO: 69 K/UL (ref 164–446)
PMV BLD AUTO: 11.1 FL (ref 9–12.9)
POTASSIUM SERPL-SCNC: 3.8 MMOL/L (ref 3.6–5.5)
PROT SERPL-MCNC: 6 G/DL (ref 6–8.2)
RBC # BLD AUTO: 3.43 M/UL (ref 4.7–6.1)
SODIUM SERPL-SCNC: 141 MMOL/L (ref 135–145)
WBC # BLD AUTO: 2.1 K/UL (ref 4.8–10.8)

## 2019-10-14 PROCEDURE — 700111 HCHG RX REV CODE 636 W/ 250 OVERRIDE (IP): Performed by: INTERNAL MEDICINE

## 2019-10-14 PROCEDURE — G0378 HOSPITAL OBSERVATION PER HR: HCPCS

## 2019-10-14 PROCEDURE — 96372 THER/PROPH/DIAG INJ SC/IM: CPT

## 2019-10-14 PROCEDURE — 80053 COMPREHEN METABOLIC PANEL: CPT

## 2019-10-14 PROCEDURE — A9270 NON-COVERED ITEM OR SERVICE: HCPCS | Performed by: INTERNAL MEDICINE

## 2019-10-14 PROCEDURE — 700102 HCHG RX REV CODE 250 W/ 637 OVERRIDE(OP): Performed by: INTERNAL MEDICINE

## 2019-10-14 PROCEDURE — 85025 COMPLETE CBC W/AUTO DIFF WBC: CPT

## 2019-10-14 PROCEDURE — 99225 PR SUBSEQUENT OBSERVATION CARE,LEVEL II: CPT | Performed by: INTERNAL MEDICINE

## 2019-10-14 PROCEDURE — 97162 PT EVAL MOD COMPLEX 30 MIN: CPT

## 2019-10-14 PROCEDURE — 700105 HCHG RX REV CODE 258: Performed by: INTERNAL MEDICINE

## 2019-10-14 PROCEDURE — 97165 OT EVAL LOW COMPLEX 30 MIN: CPT

## 2019-10-14 PROCEDURE — 36415 COLL VENOUS BLD VENIPUNCTURE: CPT

## 2019-10-14 RX ORDER — DULOXETIN HYDROCHLORIDE 60 MG/1
60 CAPSULE, DELAYED RELEASE ORAL DAILY
Status: ON HOLD | COMMUNITY
End: 2020-02-07

## 2019-10-14 RX ORDER — LISINOPRIL 30 MG/1
30 TABLET ORAL EVERY MORNING
Status: ON HOLD | COMMUNITY
End: 2020-02-07

## 2019-10-14 RX ADMIN — HEPARIN SODIUM 5000 UNITS: 5000 INJECTION, SOLUTION INTRAVENOUS; SUBCUTANEOUS at 20:39

## 2019-10-14 RX ADMIN — DULOXETINE 40 MG: 20 CAPSULE, DELAYED RELEASE ORAL at 05:37

## 2019-10-14 RX ADMIN — GABAPENTIN 300 MG: 300 CAPSULE ORAL at 11:39

## 2019-10-14 RX ADMIN — SODIUM CHLORIDE: 900 INJECTION INTRAVENOUS at 07:56

## 2019-10-14 RX ADMIN — GABAPENTIN 300 MG: 300 CAPSULE ORAL at 05:37

## 2019-10-14 RX ADMIN — SENNOSIDES, DOCUSATE SODIUM 2 TABLET: 50; 8.6 TABLET, FILM COATED ORAL at 05:37

## 2019-10-14 RX ADMIN — FOLIC ACID 1 MG: 1 TABLET ORAL at 05:37

## 2019-10-14 RX ADMIN — TRAZODONE HYDROCHLORIDE 100 MG: 50 TABLET ORAL at 20:39

## 2019-10-14 RX ADMIN — Medication 100 MG: at 05:37

## 2019-10-14 RX ADMIN — QUETIAPINE FUMARATE 200 MG: 100 TABLET ORAL at 20:39

## 2019-10-14 RX ADMIN — GABAPENTIN 300 MG: 300 CAPSULE ORAL at 17:12

## 2019-10-14 RX ADMIN — HEPARIN SODIUM 5000 UNITS: 5000 INJECTION, SOLUTION INTRAVENOUS; SUBCUTANEOUS at 05:37

## 2019-10-14 ASSESSMENT — ENCOUNTER SYMPTOMS
CHILLS: 0
DIAPHORESIS: 0
DEPRESSION: 1
VOMITING: 0
ABDOMINAL PAIN: 0
DIARRHEA: 0
SINUS PAIN: 0
FEVER: 0
COUGH: 0
CONSTIPATION: 0
PALPITATIONS: 0
TINGLING: 0
HEADACHES: 0
SPEECH CHANGE: 0
SHORTNESS OF BREATH: 0
NAUSEA: 0
MYALGIAS: 1
DIZZINESS: 0
WHEEZING: 0

## 2019-10-14 ASSESSMENT — LIFESTYLE VARIABLES
ON A TYPICAL DAY WHEN YOU DRINK ALCOHOL HOW MANY DRINKS DO YOU HAVE: 24
DOES PATIENT WANT TO TALK TO SOMEONE ABOUT QUITTING: NO
SUBSTANCE_ABUSE: 1
DOES PATIENT WANT TO STOP DRINKING: YES
HAVE PEOPLE ANNOYED YOU BY CRITICIZING YOUR DRINKING: YES
HAVE YOU EVER FELT YOU SHOULD CUT DOWN ON YOUR DRINKING: YES
EVER HAD A DRINK FIRST THING IN THE MORNING TO STEADY YOUR NERVES TO GET RID OF A HANGOVER: YES
CONSUMPTION TOTAL: POSITIVE
TOTAL SCORE: 3
ALCOHOL_USE: NO
EVER FELT BAD OR GUILTY ABOUT YOUR DRINKING: NO
HOW MANY TIMES IN THE PAST YEAR HAVE YOU HAD 5 OR MORE DRINKS IN A DAY: 5
TOTAL SCORE: 3
AVERAGE NUMBER OF DAYS PER WEEK YOU HAVE A DRINK CONTAINING ALCOHOL: 1
TOTAL SCORE: 3

## 2019-10-14 ASSESSMENT — PATIENT HEALTH QUESTIONNAIRE - PHQ9
7. TROUBLE CONCENTRATING ON THINGS, SUCH AS READING THE NEWSPAPER OR WATCHING TELEVISION: NEARLY EVERY DAY
6. FEELING BAD ABOUT YOURSELF - OR THAT YOU ARE A FAILURE OR HAVE LET YOURSELF OR YOUR FAMILY DOWN: NEARLY EVERY DAY
9. THOUGHTS THAT YOU WOULD BE BETTER OFF DEAD, OR OF HURTING YOURSELF: MORE THAN HALF THE DAYS
3. TROUBLE FALLING OR STAYING ASLEEP OR SLEEPING TOO MUCH: NEARLY EVERY DAY
5. POOR APPETITE OR OVEREATING: SEVERAL DAYS
1. LITTLE INTEREST OR PLEASURE IN DOING THINGS: NEARLY EVERY DAY
8. MOVING OR SPEAKING SO SLOWLY THAT OTHER PEOPLE COULD HAVE NOTICED. OR THE OPPOSITE, BEING SO FIGETY OR RESTLESS THAT YOU HAVE BEEN MOVING AROUND A LOT MORE THAN USUAL: NEARLY EVERY DAY
SUM OF ALL RESPONSES TO PHQ9 QUESTIONS 1 AND 2: 6
2. FEELING DOWN, DEPRESSED, IRRITABLE, OR HOPELESS: NEARLY EVERY DAY
SUM OF ALL RESPONSES TO PHQ QUESTIONS 1-9: 24
4. FEELING TIRED OR HAVING LITTLE ENERGY: NEARLY EVERY DAY

## 2019-10-14 ASSESSMENT — GAIT ASSESSMENTS
DEVIATION: ATAXIC
ASSISTIVE DEVICE: FRONT WHEEL WALKER
GAIT LEVEL OF ASSIST: MINIMAL ASSIST
DISTANCE (FEET): 50

## 2019-10-14 ASSESSMENT — PAIN SCALES - WONG BAKER: WONGBAKER_NUMERICALRESPONSE: DOESN'T HURT AT ALL

## 2019-10-14 ASSESSMENT — ACTIVITIES OF DAILY LIVING (ADL): TOILETING: INDEPENDENT

## 2019-10-14 NOTE — ASSESSMENT & PLAN NOTE
He has not had any alcohol for greater than week   He has peripheral neuropathy symptoms and gait ataxia likely from chronic alcohol use  Pancytopenia is also from alcohol effects

## 2019-10-14 NOTE — ASSESSMENT & PLAN NOTE
He has a history of depression and suicidal ideation in the recent past  Continue Seroquel and duloxetine  Patient is depressed but denies suicidal ideation at this time

## 2019-10-14 NOTE — PROGRESS NOTES
Pt arrived on floor and was helped to the bathroom. Skin swarm completed by myself and Lynne TSANG.

## 2019-10-14 NOTE — ED PROVIDER NOTES
ED Provider Note    CHIEF COMPLAINT  Chief Complaint   Patient presents with   • Weakness     pt BIB friend who picked him up from [facility] for a visit. pt c/o weakness all over 2 hours ago. pt was discharged from [facility] for SI. pt stays at [facility]. pt was prescribed duloxetine and neurontin which he did not fill.    • Medication Refill       HPI  Chris Leggett is a 69 y.o. male who presents with a chief complaint of weakness.  Patient is a poor historian but notes that he was recently admitted to the hospital for diarrhea and was discharged without a diagnosis.  Since that time, he has been seen in the emergency department for suicidal ideation but he was cleared and subsequently discharged.  A new friend helped him to get established with [facility] which is where he has been staying for the past several days.  He was given prescriptions from the ER but [facility] reportedly never filled these and has not been giving them to him.  Today, he reports overall malaise.  He feels fatigued and has had difficulty sleeping.  He has associated shortness of breath, cough productive of yellow sputum, body aches, and is cold.  He has not taken any medications for his symptoms.  His friend brought him to the ER for evaluation and assistance.    REVIEW OF SYSTEMS  See HPI for further details.  Weakness.  Fatigue.  Insomnia.  Body aches.  Cold.  All other systems are negative.     PAST MEDICAL HISTORY   has a past medical history of Gout, Hypertension, and Psychiatric disorder.    SOCIAL HISTORY  Social History     Tobacco Use   • Smoking status: Former Smoker     Packs/day: 0.50     Years: 5.00     Pack years: 2.50     Types: Cigarettes     Last attempt to quit: 2019     Years since quittin.7   • Smokeless tobacco: Never Used   Substance and Sexual Activity   • Alcohol use: Yes     Alcohol/week: 10.8 - 12.0 oz     Types: 18 - 20 Cans of beer per week     Drinks per session: 10 or more     Comment: last drink     •  "Drug use: Yes     Types: Inhaled     Comment: marijuana last dose last month   • Sexual activity: Not on file       SURGICAL HISTORY   has a past surgical history that includes cervical disk and fusion anterior (9/2/2018); other orthopedic surgery; dx bone marrow aspirations (Left, 10/2/2019); and dx bone marrow biopsies (10/2/2019).    CURRENT MEDICATIONS  Home Medications    **Home medications have not yet been reviewed for this encounter**         ALLERGIES  No Known Allergies    PHYSICAL EXAM  VITAL SIGNS: /106   Pulse 69   Temp 36.9 °C (98.5 °F) (Temporal)   Resp 20   Ht 1.803 m (5' 11\")   Wt 98 kg (216 lb 0.8 oz)   SpO2 97%   BMI 30.13 kg/m²    Pulse ox interpretation: I interpret this pulse ox as normal.  Constitutional: Alert in no apparent distress.  HENT: No signs of trauma, Bilateral external ears normal, Nose normal.  Tacky mucous membranes.  Eyes: Pupils are equal and reactive, Conjunctiva normal, Non-icteric.   Neck: Normal range of motion, No tenderness, Supple, No stridor.   Lymphatic: No lymphadenopathy noted.   Cardiovascular: Regular rate and rhythm, no murmurs.   Thorax & Lungs: Normal breath sounds, No respiratory distress, No wheezing, No chest tenderness.   Abdomen: Bowel sounds normal, Soft, No tenderness, No masses, No pulsatile masses. No peritoneal signs.  Skin: Warm, Dry, No erythema, No rash.   Back: Normal alignment.  Extremities: Intact distal pulses, No edema, No tenderness, No cyanosis.  Musculoskeletal: Good range of motion in all major joints. No tenderness to palpation or major deformities noted.   Neurologic: Alert, Normal motor function, Normal sensory function, No focal deficits noted.   Psychiatric: Withdrawn.       DIAGNOSTIC STUDIES / PROCEDURES    EKG  Results for orders placed or performed during the hospital encounter of 10/13/19   EKG   Result Value Ref Range    Report       [facility] Emergency Dept.    Test Date:  2019-10-13  Pt " Name:    BRANDY HUTTON                 Department: [facility]  MRN:        [id number]                      [facility]  Gender:     Male                         Technician: [id number]  :        1950                   Requested By:NARGIS ARRIAGA  Order #:    [id number]                    Reading MD: Nargis Arriaga MD    Measurements  Intervals                                Axis  Rate:       61                           P:          -33  AZ:         144                          QRS:        20  QRSD:       94                           T:          52  QT:         444  QTc:        448    Interpretive Statements  SINUS RHYTHM      Electronically Signed On 10- 19:11:05 PDT by Nargis Arriaga MD         LABS  CBC  CMP  Troponin  Urinalysis    RADIOLOGY  Chest x-ray    COURSE & MEDICAL DECISION MAKING  Pertinent Labs & Imaging studies reviewed. (See chart for details)  This is a 69-year-old male with a history of alcohol and drug abuse as well as underlying psychiatric disorders and suicidal ideation who is here with general malaise, weakness, body aches.  Differential diagnosis includes, but is not limited to, viral syndrome, dehydration, electrolyte abnormality, hypoglycemia, global deconditioning.  Arrives slightly hypertensive but afebrile with otherwise normal vital signs.  Appears well-hydrated and nontoxic.  He is a poor historian but has a friend at bedside who is able to give additional history.  He has been out of his medications as well care is reportedly not giving them to him.  I have reviewed his medications and note that he has been out of his Neurontin and Cymbalta.  He was given both of these medications in the emergency department.  His labs are consistent with prior.  No leukocytosis or leukopenia but he does have thrombocytopenia to 80 which is actually an improvement from prior 10 days ago at which time they were 69.  No active bleeding.  He has an anion gap to 13.  His AST is elevated to 46 but the  remainder of his metabolic panel is normal.  Troponin is normal.  Point-of-care breathalyzer is 0.    Chart reviewed.  Patient was most recently seen in the emergency department 10/10/2019.  At that time he was seen for suicidal ideation but was evaluated by , psychiatry, and the alert team.  Psychiatry lifted the legal 2000 and he was transferred to [facility] where the plan was for him to spend this week.  After that he is supposed to go to [facility].  He had been admitted to the hospital on 9/26 for suicidal ideation and was discharged 10/5.  He was noted to have pancytopenia.  Hematology was consulted and he underwent a bone marrow biopsy.  There were no signs of malignancy.  His lab abnormalities were thought to be due to chronic alcohol use.    Upon reevaluation, the patient was resting comfortably in bed.  I felt that he would be safe for discharge but when we tried to ambulate him to ensure safety, he was unable to ambulate without a two-person assist.  A CT of his head was subsequently ordered to rule out intracranial hemorrhage, mass, or obvious stroke and did not reveal acute abnormality.  I did have some concern for potential Wernicke's given his history of alcohol abuse so I gave him a dose of thiamine.  Because of his complaints of insomnia, I also gave him his Seroquel and trazodone.  He will require admission to the hospital as he is not safe for discharge at this point.  I discussed the patient with the on-call hospitalist, Dr. Iqbal, who will admit the patient to her service.  The patient was admitted in guarded condition.    FINAL IMPRESSION  1.  Ataxia  2.  Weakness    Electronically signed by: Patrick Pina, 10/13/2019 6:04 PM

## 2019-10-14 NOTE — THERAPY
"Occupational Therapy Evaluation completed.   Functional Status:  Admitted with weakness. A&Ox3. A bit groggy, just awoke. Agreeable for activity. Performs sup><sit with Sup. B shoulders- limited AROM, 0-90. STS with close Sup. Walks to br, sink with Issa, FWW. Unsteady. ADL transfers with Issa. Toileting with Sup. Grooming at sink with close Sup. Fatigued; BTB.   Plan of Care: Will benefit from Occupational Therapy 3 times per week  Discharge Recommendations:  Equipment: ?Front-Wheel Walker.  Discharge to home with home health for additional skilled therapy services. Has been at [facility] for 3 days PTA; states he may return here.   See \"Rehab Therapy-Acute\" Patient Summary Report for complete documentation.    "

## 2019-10-14 NOTE — ED NOTES
Patient ambulatory to the bathroom from room; Trial of ambulation = fail; Patient unsteady/lack of coordination; ERP made aware

## 2019-10-14 NOTE — FACE TO FACE
Face to Face Note  -  Durable Medical Equipment    Landy Thurston M.D. - NPI: [id number]  I certify that this patient is under my care and that they had a durable medical equipment(DME)face to face encounter by myself that meets the physician DME face-to-face encounter requirements with this patient on:    Date of encounter:   Patient:                    MRN:                       YOB: 2018  Chris Pruitt [facility]  [id number]  1950     The encounter with the patient was in whole, or in part, for the following medical condition, which is the primary reason for durable medical equipment:  Other - alcoholism with ataxia    I certify that, based on my findings, the following durable medical equipment is medically necessary:  Walkers.    HOME O2 Saturation Measurements:(Values must be present for Home Oxygen orders)         ,     ,         My Clinical findings support the need for the above equipment due to:  Abnormal Gait    Supporting Symptoms: ataxic gait

## 2019-10-14 NOTE — DISCHARGE SUMMARY
Discharge Summary    CHIEF COMPLAINT ON ADMISSION  Chief Complaint   Patient presents with   • Weakness     pt BIB friend who picked him up from [facility] for a visit. pt c/o weakness all over 2 hours ago. pt was discharged from [facility] for SI. pt stays at [facility]. pt was prescribed duloxetine and neurontin which he did not fill.    • Medication Refill       Reason for Admission  medical refill     Admission Date  10/13/2019    CODE STATUS  Full Code    HPI & HOSPITAL COURSE  This is a 69 y.o. male here with ataxic gait. He presented requesting a medication refill as he had recently been prescribed cymbalta and neurontin but did not get them filled. He has a history of alcoholism but had not drank alcohol in a week and was noted to have some unsteadiness when trying to ambulate. He had no focal neurologic deficits on examination. He had a normal head CT scan and physical therapy did evaluate the patient and recomended home health services which were ordered for discharge and will need to be established once the patient follows up with a primary care provider.    Due to the patient having an unsteady gait with physical therapy a walker was ordered and MRI of the lumbar spine was done. I reviewed the images myself and with Neurosurgery Dr. Wilkins's PA Dayron, as well as the patient's symptoms of numbness and ataxia but good bowel control and intact sensation of the perineum. Neurosurgery agreed to see the patient after discharge but found no need for emergent surgery at this time.       Therefore, he is discharged in fair and stable condition to home with close outpatient follow-up.        Discharge Date  10/15/2019    FOLLOW UP ITEMS POST DISCHARGE  Primary care provider follow up as soon as possible  Neurosurgery follow up for worsening symptoms, Dr. Wilkins    DISCHARGE DIAGNOSES  Principal Problem:    Generalized weakness POA: Yes  Active Problems:    Alcoholism (HCC) POA: Yes    Mood disorder (HCC) POA: Yes     Lumbar back pain with radiculopathy affecting lower extremity POA: Yes    Normocytic anemia POA: Yes  Resolved Problems:    * No resolved hospital problems. *      FOLLOW UP  No future appointments.  [address] [id number] [phone]  Schedule an appointment as soon as possible for a visit in 1 day  to establish with a PCP, For recheck    [address] [address]  [phone]  Schedule an appointment as soon as possible for a visit in 1 day  to establish with a PCP, For recheck      MEDICATIONS ON DISCHARGE     Medication List      CONTINUE taking these medications      Instructions   DULoxetine 60 MG Cpep delayed-release capsule  Commonly known as:  CYMBALTA   Take 60 mg by mouth every day.  Dose:  60 mg     gabapentin 300 MG Caps  Commonly known as:  NEURONTIN   Take 1 Cap by mouth 3 times a day.  Dose:  300 mg     lisinopril 20 MG Tabs  Commonly known as:  PRINIVIL   Take 20 mg by mouth every day.  Dose:  20 mg     QUEtiapine 200 MG Tabs  Commonly known as:  SEROQUEL   Take 1 Tab by mouth every bedtime.  Dose:  200 mg     traZODone 100 MG Tabs  Commonly known as:  DESYREL   Take 1 Tab by mouth every bedtime.  Dose:  100 mg            Allergies  No Known Allergies    DIET  Orders Placed This Encounter   Procedures   • Diet Order Regular     Standing Status:   Standing     Number of Occurrences:   1     Order Specific Question:   Diet:     Answer:   Regular [1]       ACTIVITY  As tolerated.  Weight bearing as tolerated    CONSULTATIONS  none    PROCEDURES  none    LABORATORY  Lab Results   Component Value Date    SODIUM 141 10/14/2019    POTASSIUM 3.8 10/14/2019    CHLORIDE 108 10/14/2019    CO2 21 10/14/2019    GLUCOSE 88 10/14/2019    BUN 9 10/14/2019    CREATININE 0.69 10/14/2019        Lab Results   Component Value Date    WBC 2.1 (LL) 10/14/2019    HEMOGLOBIN 11.6 (L) 10/14/2019    HEMATOCRIT 33.5 (L) 10/14/2019     PLATELETCT 69 (L) 10/14/2019        Total time of the discharge process exceeds 33 minutes.

## 2019-10-14 NOTE — H&P
Hospital Medicine History & Physical Note    Date of Service  10/13/2019    Primary Care Physician  None    Consultants  None    Code Status  Full    Chief Complaint  Weakness    History of Presenting Illness  69 y.o. male with a history of alcoholism, depression, hypertension, low back pain with radiculopathy who presented 10/13/2019 with generalized weakness.    The patient has had a recent comp located past medical history including a hospitalization for alcoholism and pancytopenia.  He was discharged and then was hospitalized shortly after, only a few weeks ago for suicidal ideation.  He was discharged to [facility] 3 days ago and was not able to get any of his prescriptions filled therefore has not taken any of them for 3 days.  He is usually on duloxetine, trazodone, gabapentin, lisinopril and is unclear on any others but he believes there may be more.  He said around 3:30 PM today he began feeling generally weak.  He states he has low back pain and bilateral leg weakness.  He says he has been nauseated but has been eating and drinking okay.  Denies any diarrhea.  He has not had any slurred speech or headache.    He is accompanied by a friend who is at the bedside.  He confirms that the patient has not had any alcohol for 1 week.    ER course: Head CT was negative, troponin negative.  He does not have leukocytosis and metabolic panel is normal aside from AST of 46.  He has no focal weakness on exam but does demonstrate some gait instability.  He will be admitted for observation and to evaluate for further etiologies of his generalized weakness    Review of Systems  Review of Systems   Constitutional: Positive for malaise/fatigue. Negative for chills and fever.   HENT: Negative for sore throat.    Respiratory: Negative for cough and shortness of breath.    Cardiovascular: Positive for palpitations. Negative for chest pain.   Gastrointestinal: Negative for abdominal pain, blood in stool, diarrhea, heartburn,  nausea and vomiting.   Genitourinary: Negative for dysuria and frequency.   Musculoskeletal: Positive for back pain and myalgias.   Neurological: Positive for weakness and headaches. Negative for dizziness and focal weakness.        Difficulty walking   Psychiatric/Behavioral: Negative for depression and hallucinations.   All other systems reviewed and are negative.      Past Medical History   has a past medical history of Gout, Hypertension, and Psychiatric disorder.    Surgical History   has a past surgical history that includes cervical disk and fusion anterior (9/2/2018); other orthopedic surgery; pr dx bone marrow aspirations (Left, 10/2/2019); and pr dx bone marrow biopsies (10/2/2019).     Family History  family history includes Alzheimer's Disease in his father; Cancer in his father and mother; Heart Disease in his brother and sister.     Social History   reports that he quit smoking about 8 months ago. His smoking use included cigarettes. He has a 2.50 pack-year smoking history. He has never used smokeless tobacco. He reports that he drinks about 10.8 - 12.0 oz of alcohol per week. He reports that he has current or past drug history. Drug: Inhaled.    Allergies  No Known Allergies    Medications  Prior to Admission Medications   Prescriptions Last Dose Informant Patient Reported? Taking?   DULoxetine 40 MG Cap DR Particles   No No   Sig: Take 40 mg by mouth every morning for 30 days.   QUEtiapine (SEROQUEL) 200 MG Tab   No No   Sig: Take 1 Tab by mouth every bedtime.   folic acid (FOLVITE) 1 MG Tab  Patient Yes No   Sig: Take 1 mg by mouth every day.   gabapentin (NEURONTIN) 300 MG Cap   No No   Sig: Take 1 Cap by mouth 3 times a day.   thiamine (THIAMINE) 100 MG Tab  Patient Yes No   Sig: Take 100 mg by mouth every day.   traZODone (DESYREL) 100 MG Tab   No No   Sig: Take 1 Tab by mouth every bedtime.      Facility-Administered Medications: None       Physical Exam  Temp:  [36.4 °C (97.6 °F)-36.9 °C (98.5  °F)] 36.4 °C (97.6 °F)  Pulse:  [60-83] 83  Resp:  [16-20] 16  BP: (124-161)/() 124/81  SpO2:  [95 %-98 %] 95 %    Physical Exam   Constitutional: He is oriented to person, place, and time. He appears well-developed and well-nourished. No distress.   Lying flat in the bed, flat affect   HENT:   Head: Normocephalic.   Mouth/Throat: No oropharyngeal exudate.   Eyes: Pupils are equal, round, and reactive to light. Conjunctivae are normal.   Neck: Normal range of motion. No JVD present. No tracheal deviation present.   Cardiovascular: Normal rate and regular rhythm.   No murmur heard.  Pulmonary/Chest: Effort normal. No respiratory distress. He has no wheezes. He exhibits no tenderness.   Abdominal: Soft. He exhibits no distension. There is no tenderness. There is no guarding.   Musculoskeletal: Normal range of motion. He exhibits edema (trace). He exhibits no tenderness.   Lymphadenopathy:     He has no cervical adenopathy.   Neurological: He is alert and oriented to person, place, and time. No cranial nerve deficit.   Skin: Skin is warm and dry. No rash noted. There is pallor.   Psychiatric: His affect is blunt. His speech is tangential. He is agitated.   Nursing note and vitals reviewed.      Laboratory:  Recent Labs     10/13/19  1830   WBC 4.8   RBC 3.76*   HEMOGLOBIN 12.5*   HEMATOCRIT 35.6*   MCV 94.7   MCH 33.2*   MCHC 35.1   RDW 46.7   PLATELETCT 80*   MPV 10.6     Recent Labs     10/13/19  1830   SODIUM 137   POTASSIUM 4.0   CHLORIDE 100   CO2 24   GLUCOSE 99   BUN 10   CREATININE 0.72   CALCIUM 9.2     Recent Labs     10/13/19  1830   ALTSGPT 21   ASTSGOT 46*   ALKPHOSPHAT 64   TBILIRUBIN 0.4   GLUCOSE 99         No results for input(s): NTPROBNP in the last 72 hours.      Recent Labs     10/13/19  1830   TROPONINT 10       Urinalysis:    Recent Labs     10/13/19  1625   SPECGRAVITY 1.010   GLUCOSEUR Negative   KETONES Negative   NITRITE Negative   LEUKESTERAS Negative        Imaging:  CT-HEAD W/O    Final Result         1.  No acute intracranial abnormality is identified, there are nonspecific white matter changes, commonly associated with small vessel ischemic disease.  Associated mild cerebral atrophy is noted.   2.  Atherosclerosis.      DX-CHEST-LIMITED (1 VIEW)   Final Result      No acute cardiopulmonary process is identified.            Assessment/Plan:  I anticipate this patient is appropriate for observation status at this time.    * Generalized weakness  Assessment & Plan  States he began feeling generalized weakness today around 3:30 PM.  He has been living at [facility] for the last 3 days and has not had any access to alcohol therefore confirms he has been sober.  He is having difficulty walking due to weakness.  He was given thiamine in the emergency department I will continue thiamine and folate due to his history of alcohol and likely due to nutritional deficiencies.  Head CT was negative, troponin negative and no immediate evidence of infection  Repeat WBC and metabolic panel in the morning  Check TSH and vitamin D  Resume gabapentin (he had been out for three days)  Hydrate    Alcoholism (HCC)- (present on admission)  Assessment & Plan  He has not had any alcohol for greater than week therefore I will not place him on alcohol withdrawal protocol  I suspect he is very nutritionally depleted however, related to long-standing alcohol abuse    Mood disorder (HCC)- (present on admission)  Assessment & Plan  He has a history of depression and suicidal ideation in the recent past  Continue Seroquel and duloxetine  Consider psych eval    Lumbar back pain with radiculopathy affecting lower extremity- (present on admission)  Assessment & Plan  He has known low back pain and radiculopathy.  He has had a hard time walking but has no focal deficits on exam.  And no saddle anesthesia  Resume gabapentin  PT OT    Normocytic anemia- (present on admission)  Assessment & Plan  He denies any blood loss  His  hemoglobin is at baseline  Repeat in the morning        VTE prophylaxis: Lovenox

## 2019-10-14 NOTE — ASSESSMENT & PLAN NOTE
He has known low back pain and radiculopathy.  He has had a hard time walking but has no focal deficits on exam.  And no saddle anesthesia  Resume gabapentin  PT OT evaluations done and walker is ordered  Will order MRI of the lumbar spine as the patient had disease in 2018 that he is not a surgical candidate to undergo at that time, but want to repeat imaging to see if any more emergent need for surgery that would outweigh the surgical risk

## 2019-10-14 NOTE — CARE PLAN
Patient is A&Ox4, VSS with chronic pain issues being well managed with scheduled medications. Patient has flat affect. Friend Anirudh at bedside this evening and supportive. PT/OT worked with patient today, see their notes for further details. Patient is an assist of one and a walker when ambulating. Currently awaiting MRI      Problem: Discharge Barriers/Planning  Goal: Patient's continuum of care needs will be met  Outcome: PROGRESSING AS EXPECTED       12hr Chart Check

## 2019-10-14 NOTE — FACE TO FACE
Face to Face Supporting Documentation - Home Health    The encounter with this patient was in whole or in part the primary reason for home health admission.    Date of encounter:   Patient:                    MRN:                       YOB: 2018  Chris Leggett  [id number]  1950     Home health to see patient for:  Skilled Nursing care for assessment, interventions & education, Physical Therapy evaluation and treatment and Occupational therapy evaluation and treatment    Skilled need for:  New Onset Medical Diagnosis ataxia    Skilled nursing interventions to include:  Comment: home safety and mobility adaptation    Homebound status evidenced by:  Need the aid of supportive devices such as crutches, canes, wheelchairs or walkers. Leaving home requires a considerable and taxing effort. There is a normal inability to leave the home.    Community Physician to provide follow up care: Pcp Pt States None     Optional Interventions? No      I certify the face to face encounter for this home health care referral meets the CMS requirements and the encounter/clinical assessment with the patient was, in whole, or in part, for the medical condition(s) listed above, which is the primary reason for home health care. Based on my clinical findings: the service(s) are medically necessary, support the need for home health care, and the homebound criteria are met.  I certify that this patient has had a face to face encounter by myself.  Landy Thurston M.D. - NPI: [id number]

## 2019-10-14 NOTE — THERAPY
"Physical Therapy Evaluation completed.   Bed Mobility:  Supine to Sit: Supervised   Transfers: Sit to Stand: Minimal Assist(close guarding for safety)  Gait: Level Of Assist: Minimal Assist with Front-Wheel Walker x 50 ft      Plan of Care: Will benefit from Physical Therapy 5 times per week  Discharge Recommendations: Equipment: Front-Wheel Walker. Post-acute therapy Discharge to a transitional care facility for continued skilled therapy services.    Pt is a 68 yo male with diagnosis of generalized weakness, history of ETOH. Pt reports history of \"pinched nerve in my back\" however has noticed a significant increase in B LE weakness and decreased motor control as well as paresthesias R LE> LE over the past week. Pt is unsteady on feet when ambulating and requires use of FWW for balance (normally uses SPC), R LE noted to be buckling with gait with decreased motor control, buckling noted on L but not as significant as R LE. B LE deficits appear to be neurological in nature, d/w MD. Pt would benefit from continued PT at SNF prior to DC home, is not at baseline for mobility. Acute PT to continue to address deficits and progress mobility.    See \"Rehab Therapy-Acute\" Patient Summary Report for complete documentation.     "

## 2019-10-14 NOTE — PROGRESS NOTES
[facility] Medicine Daily Progress Note    Date of Service  10/14/2019    Chief Complaint  69 y.o. male admitted 10/13/2019 with leg weakness.    Hospital Course    69 y.o. male with a history of alcoholism, depression, hypertension, low back pain with radiculopathy who presented 10/13/2019 with generalized weakness. He was evaluated by physical therapy and has known lumbar spine foraminal stenosis from 2018 that he was not a candidate for surgical intervention at that time.      Interval Problem Update  The patient is recommended to have a walker and legs give out when he is ambulating    Consultants/Specialty  none    Code Status  full    Disposition  tbd    Review of Systems  Review of Systems   Constitutional: Negative for chills, diaphoresis, fever and malaise/fatigue.   HENT: Negative for congestion and sinus pain.    Respiratory: Negative for cough, shortness of breath and wheezing.    Cardiovascular: Negative for chest pain, palpitations and leg swelling.   Gastrointestinal: Negative for abdominal pain, constipation, diarrhea, nausea and vomiting.   Genitourinary: Negative for frequency and urgency.   Musculoskeletal: Positive for myalgias.        Pain all over   Skin: Negative for itching and rash.   Neurological: Negative for dizziness, tingling, speech change and headaches.        Bilateral leg weakness   Psychiatric/Behavioral: Positive for depression and substance abuse. Negative for suicidal ideas.        Physical Exam  Temp:  [36.3 °C (97.4 °F)-36.9 °C (98.5 °F)] 36.3 °C (97.4 °F)  Pulse:  [59-84] 61  Resp:  [14-20] 14  BP: ()/() 113/67  SpO2:  [95 %-100 %] 96 %    Physical Exam   Constitutional: He is oriented to person, place, and time. No distress.   HENT:   Head: Atraumatic.   Mouth/Throat: Oropharynx is clear and moist. No oropharyngeal exudate.   Eyes: Conjunctivae are normal. No scleral icterus.   Neck: Neck supple. No tracheal deviation present.   Cardiovascular: Normal rate and  regular rhythm.   No murmur heard.  Pulmonary/Chest: Effort normal and breath sounds normal. No respiratory distress.   Abdominal: Soft. Bowel sounds are normal. He exhibits no distension.   Musculoskeletal: Normal range of motion. He exhibits no edema.   Neurological: He is alert and oriented to person, place, and time. Coordination abnormal.   Skin: Skin is warm and dry. No rash noted. He is not diaphoretic.   Nursing note and vitals reviewed.      Fluids    Intake/Output Summary (Last 24 hours) at 10/14/2019 1535  Last data filed at 10/14/2019 0600  Gross per 24 hour   Intake 240 ml   Output 400 ml   Net -160 ml       Laboratory  Recent Labs     10/13/19  1830 10/14/19  0356   WBC 4.8 2.1*   RBC 3.76* 3.43*   HEMOGLOBIN 12.5* 11.6*   HEMATOCRIT 35.6* 33.5*   MCV 94.7 97.7   MCH 33.2* 33.8*   MCHC 35.1 34.6   RDW 46.7 48.7   PLATELETCT 80* 69*   MPV 10.6 11.1     Recent Labs     10/13/19  1830 10/14/19  0356   SODIUM 137 141   POTASSIUM 4.0 3.8   CHLORIDE 100 108   CO2 24 21   GLUCOSE 99 88   BUN 10 9   CREATININE 0.72 0.69   CALCIUM 9.2 8.7                   Imaging  CT-HEAD W/O   Final Result         1.  No acute intracranial abnormality is identified, there are nonspecific white matter changes, commonly associated with small vessel ischemic disease.  Associated mild cerebral atrophy is noted.   2.  Atherosclerosis.      DX-CHEST-LIMITED (1 VIEW)   Final Result      No acute cardiopulmonary process is identified.      MR-LUMBAR SPINE-W/O    (Results Pending)        Assessment/Plan  * Generalized weakness- (present on admission)  Assessment & Plan  States he began feeling generalized weakness today around 3:30 PM.  He has been living at [facility] for the last 3 days and has not had any access to alcohol therefore confirms he has been sober.  He is having difficulty walking due to weakness.  He was given thiamine in the emergency department I will continue thiamine and folate due to his history of alcohol and  likely due to nutritional deficiencies.  Head CT was negative, troponin negative and no immediate evidence of infection  Repeat WBC and metabolic panel in the morning  Check TSH and vitamin D  Resume gabapentin (he had been out for three days)  Hydrate    Alcoholism (HCC)- (present on admission)  Assessment & Plan  He has not had any alcohol for greater than week   He has peripheral neuropathy symptoms and gait ataxia likely from chronic alcohol use  Pancytopenia is also from alcohol effects    Mood disorder (HCC)- (present on admission)  Assessment & Plan  He has a history of depression and suicidal ideation in the recent past  Continue Seroquel and duloxetine  Patient is depressed but denies suicidal ideation at this time    Pancytopenia (HCC)  Assessment & Plan  Due to alcohol abuse and effects of alcohol, will follow labs    Lumbar back pain with radiculopathy affecting lower extremity- (present on admission)  Assessment & Plan  He has known low back pain and radiculopathy.  He has had a hard time walking but has no focal deficits on exam.  And no saddle anesthesia  Resume gabapentin  PT OT evaluations done and walker is ordered  Will order MRI of the lumbar spine as the patient had disease in 2018 that he is not a surgical candidate to undergo at that time, but want to repeat imaging to see if any more emergent need for surgery that would outweigh the surgical risk    Normocytic anemia- (present on admission)  Assessment & Plan  At patient's baseline, no evidence of bleed         VTE prophylaxis: heparin

## 2019-10-14 NOTE — ED NOTES
Patient resting in bed with family at bedside; Patient/familiy updated on plan of care; Call light within reach; Monitoring continued

## 2019-10-14 NOTE — ED NOTES
"Chief Complaint   Patient presents with   • Weakness     pt BIB friend who picked him up from [facility] for a visit. pt c/o weakness all over 2 hours ago. pt was discharged from [facility] for SI. pt stays at [facility]. pt was prescribed duloxetine and neurontin which he did not fill.      Pt is also out of his lisinopril. Last dose 11/5  /106   Pulse 69   Temp 36.9 °C (98.5 °F) (Temporal)   Resp 20   Ht 1.803 m (5' 11\")   Wt 98 kg (216 lb 0.8 oz)   SpO2 97%   BMI 30.13 kg/m²     "

## 2019-10-14 NOTE — ED NOTES
Med Rec complete per Pt at bedside and Pt's pharmacy  Allergies Reviewed  No ABX in the last 14 days    Pt unsure of medications and strengths.   Pt unsure of last doses. pt states he last took some of his medications about 3 days ago but states he's been out of some for about 2 weeks.

## 2019-10-14 NOTE — ASSESSMENT & PLAN NOTE
States he began feeling generalized weakness today around 3:30 PM.  He has been living at [facility] for the last 3 days and has not had any access to alcohol therefore confirms he has been sober.  He is having difficulty walking due to weakness.  He was given thiamine in the emergency department I will continue thiamine and folate due to his history of alcohol and likely due to nutritional deficiencies.  Head CT was negative, troponin negative and no immediate evidence of infection  Repeat WBC and metabolic panel in the morning  Check TSH and vitamin D  Resume gabapentin (he had been out for three days)  Hydrate

## 2019-10-15 ENCOUNTER — APPOINTMENT (OUTPATIENT)
Dept: RADIOLOGY | Facility: MEDICAL CENTER | Age: 69
End: 2019-10-15
Attending: INTERNAL MEDICINE
Payer: MEDICARE

## 2019-10-15 VITALS
HEIGHT: 71 IN | SYSTOLIC BLOOD PRESSURE: 146 MMHG | OXYGEN SATURATION: 97 % | RESPIRATION RATE: 18 BRPM | TEMPERATURE: 97.6 F | HEART RATE: 57 BPM | WEIGHT: 216.05 LBS | BODY MASS INDEX: 30.25 KG/M2 | DIASTOLIC BLOOD PRESSURE: 92 MMHG

## 2019-10-15 PROCEDURE — 72148 MRI LUMBAR SPINE W/O DYE: CPT

## 2019-10-15 PROCEDURE — A9270 NON-COVERED ITEM OR SERVICE: HCPCS | Performed by: INTERNAL MEDICINE

## 2019-10-15 PROCEDURE — 96372 THER/PROPH/DIAG INJ SC/IM: CPT

## 2019-10-15 PROCEDURE — 99217 PR OBSERVATION CARE DISCHARGE: CPT | Performed by: INTERNAL MEDICINE

## 2019-10-15 PROCEDURE — G0378 HOSPITAL OBSERVATION PER HR: HCPCS

## 2019-10-15 PROCEDURE — 700102 HCHG RX REV CODE 250 W/ 637 OVERRIDE(OP): Performed by: INTERNAL MEDICINE

## 2019-10-15 PROCEDURE — 700111 HCHG RX REV CODE 636 W/ 250 OVERRIDE (IP): Performed by: INTERNAL MEDICINE

## 2019-10-15 RX ADMIN — GABAPENTIN 300 MG: 300 CAPSULE ORAL at 05:51

## 2019-10-15 RX ADMIN — Medication 100 MG: at 05:51

## 2019-10-15 RX ADMIN — FOLIC ACID 1 MG: 1 TABLET ORAL at 05:51

## 2019-10-15 RX ADMIN — DULOXETINE 40 MG: 20 CAPSULE, DELAYED RELEASE ORAL at 05:50

## 2019-10-15 RX ADMIN — GABAPENTIN 300 MG: 300 CAPSULE ORAL at 12:01

## 2019-10-15 RX ADMIN — SENNOSIDES, DOCUSATE SODIUM 2 TABLET: 50; 8.6 TABLET, FILM COATED ORAL at 05:50

## 2019-10-15 RX ADMIN — HEPARIN SODIUM 5000 UNITS: 5000 INJECTION, SOLUTION INTRAVENOUS; SUBCUTANEOUS at 05:51

## 2019-10-15 NOTE — DISCHARGE INSTRUCTIONS
Discharge Instructions    Discharged to [facility] by car with friend. Discharged via wheelchair, hospital escort: Yes.  Special equipment needed: Not Applicable    Be sure to schedule a follow-up appointment with your primary care doctor or any specialists as instructed.     Discharge Plan:   Diet Plan: Discussed  Activity Level: Discussed  Confirmed Follow up Appointment: Patient to Call and Schedule Appointment  Confirmed Symptoms Management: Discussed  Medication Reconciliation Updated: Yes  Influenza Vaccine Indication: Not indicated: Previously immunized this influenza season and > 8 years of age    I understand that a diet low in cholesterol, fat, and sodium is recommended for good health. Unless I have been given specific instructions below for another diet, I accept this instruction as my diet prescription.   Other diet: Regular    Special Instructions: None    · Is patient discharged on Warfarin / Coumadin?   No     Depression / Suicide Risk    As you are discharged from this [facility], it is important to learn how to keep safe from harming yourself.    Recognize the warning signs:  · Abrupt changes in personality, positive or negative- including increase in energy   · Giving away possessions  · Change in eating patterns- significant weight changes-  positive or negative  · Change in sleeping patterns- unable to sleep or sleeping all the time   · Unwillingness or inability to communicate  · Depression  · Unusual sadness, discouragement and loneliness  · Talk of wanting to die  · Neglect of personal appearance   · Rebelliousness- reckless behavior  · Withdrawal from people/activities they love  · Confusion- inability to concentrate     If you or a loved one observes any of these behaviors or has concerns about self-harm, here's what you can do:  · Talk about it- your feelings and reasons for harming yourself  · Remove any means that you might use to hurt yourself (examples: pills, rope, extension  cords, firearm)  · Get professional help from the community (Mental Health, Substance Abuse, psychological counseling)  · Do not be alone:Call your Safe Contact- someone whom you trust who will be there for you.  · Call your local CRISIS HOTLINE 992-1191 or 181-273-1865  · Call your local Children's Mobile Crisis Response Team [place] (918) 989-3586 or www.[employer]  · Call the toll free National Suicide Prevention Hotlines   · National Suicide Prevention Lifeline 637-888-SVMH (8654)  · [employer] Line Network 800-SUICIDE (891-4647)          You were seen in the ER for generalized weakness.  Your labs and imaging did not reveal an acute abnormality and you are safe to go home.  I recommend taking Tylenol and/or ibuprofen as directed on the bottle for discomfort.  I did give you a dose of your medications in the emergency department.  Please discuss your prescriptions with [facility] staff to get them to fill them and give them to you.  Return to the ER with any new or worsening symptoms.      Discharge Instructions per Landy Thurston M.D.    Follow up with primary care provider as son as possible  Neurosurgery follow up with Dr. Wilkins if symptoms worsen    DIET: regular    ACTIVITY: as tolerated    DIAGNOSIS: ataxia    Return to ER if symptoms of unsteadiness worsen

## 2019-10-15 NOTE — CARE PLAN
Problem: Safety  Goal: Will remain free from injury  Outcome: PROGRESSING AS EXPECTED  Goal: Will remain free from falls  Outcome: PROGRESSING AS EXPECTED     Problem: Pain Management  Goal: Pain level will decrease to patient's comfort goal  Outcome: PROGRESSING AS EXPECTED     Problem: Psychosocial Needs:  Goal: Level of anxiety will decrease  Outcome: PROGRESSING AS EXPECTED

## 2019-10-15 NOTE — DISCHARGE PLANNING
LSW notified pt's friend, Anirudh Drake, about pt discharging.  Anirudh confirmed that he will be here to pick pt up at 3:30 today.    Anirudh had also requested LSW to assist pt to complete an JENNIFER for [facility] a [facility] substance use rehab facility.  LSW faxed completed JENNIFER to Joelle [phone] at [facility] and informed Anirudh.

## 2019-10-15 NOTE — PROGRESS NOTES
Patient discharged back to [facility], friend at bedside to take home, discharge instructions given and verbalizes understanding, no personal belongings at bedside, refused wheelchair escort out

## 2019-10-15 NOTE — DISCHARGE PLANNING
Care Transition Team Assessment    Information Source  Orientation : Oriented x 4  Information Given By: Patient  Informant's Name: (Chris)  Who is responsible for making decisions for patient? : Patient         Elopement Risk  Legal Hold: No  Ambulatory or Self Mobile in Wheelchair: Yes  Disoriented: No  Psychiatric Symptoms: None  History of Wandering: No  Elopement this Admit: No  Vocalizing Wanting to Leave: No  Displays Behaviors, Body Language Wanting to Leave: No-Not at Risk for Elopement  Elopement Risk: Not at Risk for Elopement    Interdisciplinary Discharge Planning  Lives with - Patient's Self Care Capacity: Unrelated Adult  Patient or legal guardian wants to designate a caregiver (see row info): No  Housing / Facility: [address] [facility]  Prior Services: Intermittent Physical Support for ADL Per Service    Discharge Preparedness  What is your plan after discharge?: Uncertain - pending medical team collaboration  What are your discharge supports?: Other (comment)(Friend: Anirudh Drake)  Prior Functional Level: Independent with Activities of Daily Living  Difficulity with ADLs: None  Difficulity with IADLs: None    Functional Assesment  Prior Functional Level: Independent with Activities of Daily Living    Finances  Financial Barriers to Discharge: No  Prescription Coverage: Yes    Vision / Hearing Impairment  Vision Impairment : No  Hearing Impairment : No              Domestic Abuse  Have you ever been the victim of abuse or violence?: No  Physical Abuse or Sexual Abuse: No  Verbal Abuse or Emotional Abuse: No  Possible Abuse Reported to:: Not Applicable    Psychological Assessment  Non-compliant with Treatment: No  Newly Diagnosed Illness: No    Discharge Risks or Barriers  Discharge risks or barriers?: Lives alone, no community support, Homeless / couch surfing  Patient risk factors: Homeless, Lack of outside supports, Substance abuse    Anticipated Discharge Information  Anticipated discharge disposition:  Discharge needs currently unknown

## 2019-12-18 ENCOUNTER — HOSPITAL ENCOUNTER (EMERGENCY)
Facility: MEDICAL CENTER | Age: 69
End: 2019-12-18
Attending: EMERGENCY MEDICINE
Payer: MEDICARE

## 2019-12-18 ENCOUNTER — APPOINTMENT (OUTPATIENT)
Dept: RADIOLOGY | Facility: MEDICAL CENTER | Age: 69
End: 2019-12-18
Attending: EMERGENCY MEDICINE
Payer: MEDICARE

## 2019-12-18 VITALS
BODY MASS INDEX: 32.99 KG/M2 | DIASTOLIC BLOOD PRESSURE: 96 MMHG | HEART RATE: 82 BPM | WEIGHT: 235.67 LBS | OXYGEN SATURATION: 94 % | SYSTOLIC BLOOD PRESSURE: 152 MMHG | HEIGHT: 71 IN | TEMPERATURE: 97.3 F | RESPIRATION RATE: 18 BRPM

## 2019-12-18 DIAGNOSIS — K59.01 SLOW TRANSIT CONSTIPATION: ICD-10-CM

## 2019-12-18 DIAGNOSIS — R33.9 URINARY RETENTION: ICD-10-CM

## 2019-12-18 LAB
ALBUMIN SERPL BCP-MCNC: 4.6 G/DL (ref 3.2–4.9)
ALBUMIN/GLOB SERPL: 1.5 G/DL
ALP SERPL-CCNC: 61 U/L (ref 30–99)
ALT SERPL-CCNC: 25 U/L (ref 2–50)
ANION GAP SERPL CALC-SCNC: 12 MMOL/L (ref 0–11.9)
APPEARANCE UR: CLEAR
AST SERPL-CCNC: 66 U/L (ref 12–45)
BASOPHILS # BLD AUTO: 0.2 % (ref 0–1.8)
BASOPHILS # BLD: 0.02 K/UL (ref 0–0.12)
BILIRUB SERPL-MCNC: 0.5 MG/DL (ref 0.1–1.5)
BILIRUB UR QL STRIP.AUTO: NEGATIVE
BUN SERPL-MCNC: 16 MG/DL (ref 8–22)
CALCIUM SERPL-MCNC: 9 MG/DL (ref 8.4–10.2)
CHLORIDE SERPL-SCNC: 98 MMOL/L (ref 96–112)
CO2 SERPL-SCNC: 24 MMOL/L (ref 20–33)
COLOR UR: YELLOW
CREAT SERPL-MCNC: 0.8 MG/DL (ref 0.5–1.4)
EOSINOPHIL # BLD AUTO: 0.04 K/UL (ref 0–0.51)
EOSINOPHIL NFR BLD: 0.4 % (ref 0–6.9)
ERYTHROCYTE [DISTWIDTH] IN BLOOD BY AUTOMATED COUNT: 47.7 FL (ref 35.9–50)
GLOBULIN SER CALC-MCNC: 3 G/DL (ref 1.9–3.5)
GLUCOSE SERPL-MCNC: 98 MG/DL (ref 65–99)
GLUCOSE UR STRIP.AUTO-MCNC: NEGATIVE MG/DL
HCT VFR BLD AUTO: 42 % (ref 42–52)
HGB BLD-MCNC: 14.1 G/DL (ref 14–18)
IMM GRANULOCYTES # BLD AUTO: 0.02 K/UL (ref 0–0.11)
IMM GRANULOCYTES NFR BLD AUTO: 0.2 % (ref 0–0.9)
KETONES UR STRIP.AUTO-MCNC: NEGATIVE MG/DL
LEUKOCYTE ESTERASE UR QL STRIP.AUTO: NEGATIVE
LIPASE SERPL-CCNC: 20 U/L (ref 7–58)
LYMPHOCYTES # BLD AUTO: 0.14 K/UL (ref 1–4.8)
LYMPHOCYTES NFR BLD: 1.6 % (ref 22–41)
MCH RBC QN AUTO: 32.8 PG (ref 27–33)
MCHC RBC AUTO-ENTMCNC: 33.6 G/DL (ref 33.7–35.3)
MCV RBC AUTO: 97.7 FL (ref 81.4–97.8)
MICRO URNS: NORMAL
MONOCYTES # BLD AUTO: 0.17 K/UL (ref 0–0.85)
MONOCYTES NFR BLD AUTO: 1.9 % (ref 0–13.4)
NEUTROPHILS # BLD AUTO: 8.55 K/UL (ref 1.82–7.42)
NEUTROPHILS NFR BLD: 95.7 % (ref 44–72)
NITRITE UR QL STRIP.AUTO: NEGATIVE
NRBC # BLD AUTO: 0 K/UL
NRBC BLD-RTO: 0 /100 WBC
PH UR STRIP.AUTO: 7 [PH] (ref 5–8)
PLATELET # BLD AUTO: 96 K/UL (ref 164–446)
PMV BLD AUTO: 10.7 FL (ref 9–12.9)
POTASSIUM SERPL-SCNC: 4.5 MMOL/L (ref 3.6–5.5)
PROT SERPL-MCNC: 7.6 G/DL (ref 6–8.2)
PROT UR QL STRIP: NEGATIVE MG/DL
RBC # BLD AUTO: 4.3 M/UL (ref 4.7–6.1)
RBC UR QL AUTO: NEGATIVE
SODIUM SERPL-SCNC: 134 MMOL/L (ref 135–145)
SP GR UR STRIP.AUTO: 1.01
WBC # BLD AUTO: 8.9 K/UL (ref 4.8–10.8)

## 2019-12-18 PROCEDURE — A9270 NON-COVERED ITEM OR SERVICE: HCPCS | Performed by: EMERGENCY MEDICINE

## 2019-12-18 PROCEDURE — 96374 THER/PROPH/DIAG INJ IV PUSH: CPT

## 2019-12-18 PROCEDURE — 700101 HCHG RX REV CODE 250: Performed by: EMERGENCY MEDICINE

## 2019-12-18 PROCEDURE — 700111 HCHG RX REV CODE 636 W/ 250 OVERRIDE (IP): Performed by: EMERGENCY MEDICINE

## 2019-12-18 PROCEDURE — 51702 INSERT TEMP BLADDER CATH: CPT

## 2019-12-18 PROCEDURE — 83690 ASSAY OF LIPASE: CPT

## 2019-12-18 PROCEDURE — 36415 COLL VENOUS BLD VENIPUNCTURE: CPT

## 2019-12-18 PROCEDURE — 303105 HCHG CATHETER EXTRA

## 2019-12-18 PROCEDURE — 99284 EMERGENCY DEPT VISIT MOD MDM: CPT

## 2019-12-18 PROCEDURE — 85025 COMPLETE CBC W/AUTO DIFF WBC: CPT

## 2019-12-18 PROCEDURE — 80053 COMPREHEN METABOLIC PANEL: CPT

## 2019-12-18 PROCEDURE — 700112 HCHG RX REV CODE 229: Performed by: EMERGENCY MEDICINE

## 2019-12-18 PROCEDURE — 700102 HCHG RX REV CODE 250 W/ 637 OVERRIDE(OP): Performed by: EMERGENCY MEDICINE

## 2019-12-18 PROCEDURE — 81003 URINALYSIS AUTO W/O SCOPE: CPT

## 2019-12-18 PROCEDURE — 74022 RADEX COMPL AQT ABD SERIES: CPT

## 2019-12-18 RX ORDER — KETOROLAC TROMETHAMINE 30 MG/ML
15 INJECTION, SOLUTION INTRAMUSCULAR; INTRAVENOUS ONCE
Status: COMPLETED | OUTPATIENT
Start: 2019-12-18 | End: 2019-12-18

## 2019-12-18 RX ORDER — DOCUSATE SODIUM 100 MG/1
100 CAPSULE, LIQUID FILLED ORAL ONCE
Status: COMPLETED | OUTPATIENT
Start: 2019-12-18 | End: 2019-12-18

## 2019-12-18 RX ORDER — GABAPENTIN 300 MG/1
300 CAPSULE ORAL 3 TIMES DAILY
Status: ON HOLD | COMMUNITY
End: 2020-02-17 | Stop reason: SDUPTHER

## 2019-12-18 RX ORDER — QUETIAPINE FUMARATE 200 MG/1
200 TABLET, FILM COATED ORAL
Status: ON HOLD | COMMUNITY
End: 2020-02-07 | Stop reason: SDUPTHER

## 2019-12-18 RX ORDER — BISACODYL 10 MG
10 SUPPOSITORY, RECTAL RECTAL ONCE
Status: COMPLETED | OUTPATIENT
Start: 2019-12-18 | End: 2019-12-18

## 2019-12-18 RX ORDER — POLYETHYLENE GLYCOL 3350 17 G/17G
17 POWDER, FOR SOLUTION ORAL DAILY
Qty: 30 EACH | Refills: 0 | Status: SHIPPED | OUTPATIENT
Start: 2019-12-18 | End: 2020-01-17

## 2019-12-18 RX ORDER — AMLODIPINE BESYLATE 5 MG/1
5 TABLET ORAL EVERY MORNING
Status: ON HOLD | COMMUNITY
End: 2020-02-07

## 2019-12-18 RX ORDER — ENEMA 19; 7 G/133ML; G/133ML
1 ENEMA RECTAL ONCE
Status: COMPLETED | OUTPATIENT
Start: 2019-12-18 | End: 2019-12-18

## 2019-12-18 RX ORDER — TRAZODONE HYDROCHLORIDE 150 MG/1
150 TABLET ORAL NIGHTLY
Status: ON HOLD | COMMUNITY
End: 2020-02-07 | Stop reason: SDUPTHER

## 2019-12-18 RX ADMIN — DOCUSATE SODIUM 100 MG: 100 CAPSULE, LIQUID FILLED ORAL at 16:28

## 2019-12-18 RX ADMIN — KETOROLAC TROMETHAMINE 15 MG: 30 INJECTION, SOLUTION INTRAMUSCULAR at 15:49

## 2019-12-18 RX ADMIN — BISACODYL 10 MG: 10 SUPPOSITORY RECTAL at 17:46

## 2019-12-18 RX ADMIN — SODIUM PHOSPHATE 133 ML: 7; 19 ENEMA RECTAL at 16:28

## 2019-12-18 NOTE — ED TRIAGE NOTES
"Chief Complaint   Patient presents with   • Constipation     LBM -4 days.      Pt reports no relief from suppository.  /84   Pulse 99   Temp 36.8 °C (98.2 °F) (Temporal)   Resp 18   Ht 1.803 m (5' 11\")   Wt 106.9 kg (235 lb 10.8 oz)   SpO2 95%       Pt amb to triage with steady gait for above complaint. Pt is alert and oriented, speaking in full sentences, follows commands and responds appropriately to questions. NAD. Resp are even and unlabored. Pt informed of wait times. Educated on triage process.  Asked to return to triage RN for any new or worsening of symptoms. Thanked for patience.       "

## 2019-12-18 NOTE — ED PROVIDER NOTES
ED Provider Note    CHIEF COMPLAINT  Chief Complaint   Patient presents with   • Constipation     LBM -4 days.        HPI  Chris Leggett is a 69 y.o. male who presents complaning of constipation and being unable to have a bowel movement for the last 4 days.  The patient states that normally his stools are hard anyway.  He states that he has tried some over-the-counter suppositories without any improvement in his symptoms.  He describes diffuse pain wrapping around his abdomen with intermittent cramping.  He denies any fevers nausea or vomiting.  He denies any dizziness or lightheadedness.  He denies any pain with urination or blood in his urine.    REVIEW OF SYSTEMS  HEENT:  No ear pain, congestion or sore throat   EYES: no discharge redness or vision changes  CARDIAC: no chest pain, palpitations    PULMONARY: no dyspnea, cough or congestion   GI: See history of present illness  : no dysuria, back pain or hematuria   Neuro: no weakness, numbness aphasia or headache  Musculoskeletal: no swelling deformity or pain no joint swelling  Endocrine: no fevers, sweating, weight loss   SKIN: no rash, erythema or contusions     See history of present illness all other systems are negative      PAST MEDICAL HISTORY  Past Medical History:   Diagnosis Date   • Gout    • Hypertension    • Psychiatric disorder        FAMILY HISTORY  Family History   Problem Relation Age of Onset   • Cancer Mother    • Alzheimer's Disease Father    • Cancer Father    • Heart Disease Sister    • Heart Disease Brother        SOCIAL HISTORY  Social History     Socioeconomic History   • Marital status: Single     Spouse name: Not on file   • Number of children: Not on file   • Years of education: Not on file   • Highest education level: Not on file   Occupational History   • Not on file   Social Needs   • Financial resource strain: Not on file   • Food insecurity:     Worry: Not on file     Inability: Not on file   • Transportation needs:      Medical: Not on file     Non-medical: Not on file   Tobacco Use   • Smoking status: Current Every Day Smoker     Packs/day: 0.50     Years: 5.00     Pack years: 2.50     Types: Cigarettes     Last attempt to quit: 2019     Years since quittin.9   • Smokeless tobacco: Never Used   Substance and Sexual Activity   • Alcohol use: Yes     Alcohol/week: 10.8 - 12.0 oz     Types: 18 - 20 Cans of beer per week     Drinks per session: 10 or more     Comment: last drink     • Drug use: Yes     Types: Inhaled     Comment: marijuana last dose last month   • Sexual activity: Not on file   Lifestyle   • Physical activity:     Days per week: Not on file     Minutes per session: Not on file   • Stress: Not on file   Relationships   • Social connections:     Talks on phone: Not on file     Gets together: Not on file     Attends Yazidi service: Not on file     Active member of club or organization: Not on file     Attends meetings of clubs or organizations: Not on file     Relationship status: Not on file   • Intimate partner violence:     Fear of current or ex partner: Not on file     Emotionally abused: Not on file     Physically abused: Not on file     Forced sexual activity: Not on file   Other Topics Concern   • Not on file   Social History Narrative   • Not on file       SURGICAL HISTORY  Past Surgical History:   Procedure Laterality Date   • MO DX BONE MARROW ASPIRATIONS Left 10/2/2019    Procedure: ASPIRATION, BONE MARROW - APARICIO;  Surgeon: Tamera Leos M.D.;  Location: [facility];  Service: Orthopedics   • MO DX BONE MARROW BIOPSIES  10/2/2019    Procedure: BIOPSY, BONE MARROW, USING NEEDLE OR TROCAR;  Surgeon: Tamera Leos M.D.;  Location: ENDOSCOPY [facility];  Service: Orthopedics   • CERVICAL DISK AND FUSION ANTERIOR  2018    Procedure: CERVICAL DISK AND FUSION ANTERIOR C5-C6;  Surgeon: Varghese Wilkins M.D.;  Location: SURGERY [facility];  Service: Neurosurgery   • OTHER  "ORTHOPEDIC SURGERY      fision with disc removal       CURRENT MEDICATIONS  Home Medications     Reviewed by Yusra Armstrong (Pharmacy Tech) on 12/18/19 at 1700  Med List Status: Complete   Medication Last Dose Status   amLODIPine (NORVASC) 5 MG Tab 12/17/2019 Active   Docusate Calcium (STOOL SOFTENER PO) 12/17/2019 Active   DULoxetine (CYMBALTA) 60 MG Cap DR Particles delayed-release capsule 12/17/2019 Active   gabapentin (NEURONTIN) 300 MG Cap 12/17/2019 Active   lisinopril (PRINIVIL) 30 MG tablet 12/17/2019 Active   QUEtiapine (SEROQUEL) 200 MG Tab 12/17/2019 Active   traZODone (DESYREL) 100 MG Tab 12/17/2019 Active                 ALLERGIES  No Known Allergies    PHYSICAL EXAM  VITAL SIGNS: /95   Pulse 70   Temp 36.8 °C (98.2 °F) (Temporal)   Resp 18   Ht 1.803 m (5' 11\")   Wt 106.9 kg (235 lb 10.8 oz)   SpO2 98%   BMI 32.87 kg/m²       Constitutional: Well developed, Well nourished, No acute distress, Non-toxic appearance.   HENT: Normocephalic, Atraumatic, Bilateral external ears normal, Oropharynx moist, No oral exudates, Nose normal.   Eyes: PERRLA, EOMI, Conjunctiva normal, No discharge.   Neck: Normal range of motion, No tenderness, Supple, No stridor.   Lymphatic: No lymphadenopathy noted.   Cardiovascular: Normal heart rate, Normal rhythm, No murmurs, No rubs, No gallops.   Thorax & Lungs: Normal breath sounds, No respiratory distress, No wheezing, No chest tenderness.   Abdomen: Obese, soft mild diffuse tenderness to palpation no rebound masses or peritoneal signs bowel sounds are normal  Skin: Warm, Dry, No erythema, No rash.   Back: No tenderness, No CVA tenderness.   Extremities: Intact distal pulses, No edema, No tenderness, No cyanosis, No clubbing.   Neurologic: Alert & oriented x 3, Normal motor function, Normal sensory function, No focal deficits noted.       RADIOLOGY/PROCEDURES  DX-ABDOMEN COMPLETE WITH AP OR PA CXR   Final Result      No evidence of bowel obstruction.    "         COURSE & MEDICAL DECISION MAKING  Pertinent Labs & Imaging studies reviewed. (See chart for details)  Differential diagnosis: Constipation, colitis, bowel obstruction, UTI, pancreatitis    3:21 PM  An IV was started and labs were drawn.  I gave the patient some Toradol for the pain.    Results for orders placed or performed during the hospital encounter of 12/18/19   CBC WITH DIFFERENTIAL   Result Value Ref Range    WBC 8.9 4.8 - 10.8 K/uL    RBC 4.30 (L) 4.70 - 6.10 M/uL    Hemoglobin 14.1 14.0 - 18.0 g/dL    Hematocrit 42.0 42.0 - 52.0 %    MCV 97.7 81.4 - 97.8 fL    MCH 32.8 27.0 - 33.0 pg    MCHC 33.6 (L) 33.7 - 35.3 g/dL    RDW 47.7 35.9 - 50.0 fL    Platelet Count 96 (L) 164 - 446 K/uL    MPV 10.7 9.0 - 12.9 fL    Neutrophils-Polys 95.70 (H) 44.00 - 72.00 %    Lymphocytes 1.60 (L) 22.00 - 41.00 %    Monocytes 1.90 0.00 - 13.40 %    Eosinophils 0.40 0.00 - 6.90 %    Basophils 0.20 0.00 - 1.80 %    Immature Granulocytes 0.20 0.00 - 0.90 %    Nucleated RBC 0.00 /100 WBC    Neutrophils (Absolute) 8.55 (H) 1.82 - 7.42 K/uL    Lymphs (Absolute) 0.14 (L) 1.00 - 4.80 K/uL    Monos (Absolute) 0.17 0.00 - 0.85 K/uL    Eos (Absolute) 0.04 0.00 - 0.51 K/uL    Baso (Absolute) 0.02 0.00 - 0.12 K/uL    Immature Granulocytes (abs) 0.02 0.00 - 0.11 K/uL    NRBC (Absolute) 0.00 K/uL   COMP METABOLIC PANEL   Result Value Ref Range    Sodium 134 (L) 135 - 145 mmol/L    Potassium 4.5 3.6 - 5.5 mmol/L    Chloride 98 96 - 112 mmol/L    Co2 24 20 - 33 mmol/L    Anion Gap 12.0 (H) 0.0 - 11.9    Glucose 98 65 - 99 mg/dL    Bun 16 8 - 22 mg/dL    Creatinine 0.80 0.50 - 1.40 mg/dL    Calcium 9.0 8.4 - 10.2 mg/dL    AST(SGOT) 66 (H) 12 - 45 U/L    ALT(SGPT) 25 2 - 50 U/L    Alkaline Phosphatase 61 30 - 99 U/L    Total Bilirubin 0.5 0.1 - 1.5 mg/dL    Albumin 4.6 3.2 - 4.9 g/dL    Total Protein 7.6 6.0 - 8.2 g/dL    Globulin 3.0 1.9 - 3.5 g/dL    A-G Ratio 1.5 g/dL   LIPASE   Result Value Ref Range    Lipase 20 7 - 58 U/L    URINALYSIS,CULTURE IF INDICATED   Result Value Ref Range    Color Yellow     Character Clear     Specific Gravity 1.010 <1.035    Ph 7.0 5.0 - 8.0    Glucose Negative Negative mg/dL    Ketones Negative Negative mg/dL    Protein Negative Negative mg/dL    Bilirubin Negative Negative    Nitrite Negative Negative    Leukocyte Esterase Negative Negative    Occult Blood Negative Negative    Micro Urine Req see below    ESTIMATED GFR   Result Value Ref Range    GFR If African American >60 >60 mL/min/1.73 m 2    GFR If Non African American >60 >60 mL/min/1.73 m 2       5:35 PM the patient was retaining urine because of his amount of constipation.  His labs are otherwise normal.  We placed a Roca catheter and got 600 cc of clear yellow urine out that was not infected.  Repeat outpatient of the patient's abdomen is soft without tenderness.  I offered the patient to go home with the Roca catheter but he does not want to do this.  I will make sure that he has a few more bowel movements prior to his discharge and will discharge him home with MiraLAX to help soften his stools and keep things moving.  If he has further urinary retention he should return to the ER for Orca placement.  Patient will be discharged home in stable improved condition.    The patient will return for new or worsening symptoms and is stable at the time of discharge.    The patient is referred to a primary physician for blood pressure management, diabetic screening, and for all other preventative health concerns.    6:35 PM the patient decided that he will keep his catheter and with a leg bag.  I think this is a great idea.  I will give him the number to Dr. Mayorga to have the catheter removed in a few days.  He should return for any worsening pain or worsening symptoms.    DISPOSITION:  Patient will be discharged home in stable condition.    FOLLOW UP:  [facility]  [address] [place] [id number] [phone]  Call in 2  days  As needed, If symptoms worsen    [facility], Emergency Dept  [address]  [place] [address] [phone]  Call   As needed, If symptoms worsen    Manuelito Mayorga M.D.  [address]  [place] [id number] [phone]    Call in 1 day  to establish care for recheck and campbell removal      OUTPATIENT MEDICATIONS:  New Prescriptions    POLYETHYLENE GLYCOL/LYTES (MIRALAX) PACK    Take 1 Packet by mouth every day for 30 days.       FINAL IMPRESSION  1. Slow transit constipation     2. Urinary retention             Electronically signed by: Oumou Ramires, 12/18/2019 3:15 PM

## 2019-12-19 NOTE — ED NOTES
Pt given written and oral dc instructions. Pt verbalized understanding of all instructions given. All questions answered. VSS. IV removed. Pt given fu instructions and educated on where to  prescription and educated on use. Pt educated on s/s of when to return to the ER. Pt dc'd in stable condition.

## 2019-12-19 NOTE — ED NOTES
Enema given. BSC set up and call light in reach. Pt unable to hold initial half of enema but holding second half. Pt educated to hold as long as he can and to call when he has to go.

## 2019-12-19 NOTE — ED NOTES
Pt reports feeling much improved. Leg bag applied to pt. Pt educated on use. Pt had another moderate sized soft BM. Pt asking for diaper to get him home. Pt provided diaper and helped him in.

## 2019-12-19 NOTE — ED NOTES
Roca placed to drain large amount of urine in pt bladder to be pulled. 1100 drained from bladder. ERP aware.

## 2019-12-20 ENCOUNTER — HOSPITAL ENCOUNTER (EMERGENCY)
Facility: MEDICAL CENTER | Age: 69
End: 2019-12-20
Attending: EMERGENCY MEDICINE
Payer: MEDICARE

## 2019-12-20 VITALS
TEMPERATURE: 98.7 F | SYSTOLIC BLOOD PRESSURE: 171 MMHG | HEART RATE: 87 BPM | WEIGHT: 218.48 LBS | DIASTOLIC BLOOD PRESSURE: 96 MMHG | RESPIRATION RATE: 18 BRPM | OXYGEN SATURATION: 96 % | BODY MASS INDEX: 30.59 KG/M2 | HEIGHT: 71 IN

## 2019-12-20 DIAGNOSIS — R19.7 DIARRHEA, UNSPECIFIED TYPE: ICD-10-CM

## 2019-12-20 LAB
ALBUMIN SERPL BCP-MCNC: 4.1 G/DL (ref 3.2–4.9)
ALBUMIN/GLOB SERPL: 1.3 G/DL
ALP SERPL-CCNC: 47 U/L (ref 30–99)
ALT SERPL-CCNC: 24 U/L (ref 2–50)
ANION GAP SERPL CALC-SCNC: 16 MMOL/L (ref 0–11.9)
AST SERPL-CCNC: 77 U/L (ref 12–45)
BASOPHILS # BLD AUTO: 0.3 % (ref 0–1.8)
BASOPHILS # BLD: 0.02 K/UL (ref 0–0.12)
BILIRUB SERPL-MCNC: 0.6 MG/DL (ref 0.1–1.5)
BUN SERPL-MCNC: 10 MG/DL (ref 8–22)
CALCIUM SERPL-MCNC: 8.6 MG/DL (ref 8.4–10.2)
CHLORIDE SERPL-SCNC: 103 MMOL/L (ref 96–112)
CO2 SERPL-SCNC: 19 MMOL/L (ref 20–33)
CREAT SERPL-MCNC: 0.56 MG/DL (ref 0.5–1.4)
EOSINOPHIL # BLD AUTO: 0.18 K/UL (ref 0–0.51)
EOSINOPHIL NFR BLD: 2.8 % (ref 0–6.9)
ERYTHROCYTE [DISTWIDTH] IN BLOOD BY AUTOMATED COUNT: 46.5 FL (ref 35.9–50)
GLOBULIN SER CALC-MCNC: 3.2 G/DL (ref 1.9–3.5)
GLUCOSE SERPL-MCNC: 104 MG/DL (ref 65–99)
HCT VFR BLD AUTO: 39.3 % (ref 42–52)
HGB BLD-MCNC: 13.3 G/DL (ref 14–18)
IMM GRANULOCYTES # BLD AUTO: 0.02 K/UL (ref 0–0.11)
IMM GRANULOCYTES NFR BLD AUTO: 0.3 % (ref 0–0.9)
LYMPHOCYTES # BLD AUTO: 0.94 K/UL (ref 1–4.8)
LYMPHOCYTES NFR BLD: 14.4 % (ref 22–41)
MCH RBC QN AUTO: 32.4 PG (ref 27–33)
MCHC RBC AUTO-ENTMCNC: 33.8 G/DL (ref 33.7–35.3)
MCV RBC AUTO: 95.6 FL (ref 81.4–97.8)
MONOCYTES # BLD AUTO: 0.67 K/UL (ref 0–0.85)
MONOCYTES NFR BLD AUTO: 10.3 % (ref 0–13.4)
NEUTROPHILS # BLD AUTO: 4.7 K/UL (ref 1.82–7.42)
NEUTROPHILS NFR BLD: 71.9 % (ref 44–72)
NRBC # BLD AUTO: 0 K/UL
NRBC BLD-RTO: 0 /100 WBC
PLATELET # BLD AUTO: 96 K/UL (ref 164–446)
PMV BLD AUTO: 10.2 FL (ref 9–12.9)
POTASSIUM SERPL-SCNC: 3.5 MMOL/L (ref 3.6–5.5)
PROT SERPL-MCNC: 7.3 G/DL (ref 6–8.2)
RBC # BLD AUTO: 4.11 M/UL (ref 4.7–6.1)
SODIUM SERPL-SCNC: 138 MMOL/L (ref 135–145)
WBC # BLD AUTO: 6.5 K/UL (ref 4.8–10.8)

## 2019-12-20 PROCEDURE — 80053 COMPREHEN METABOLIC PANEL: CPT

## 2019-12-20 PROCEDURE — 99284 EMERGENCY DEPT VISIT MOD MDM: CPT

## 2019-12-20 PROCEDURE — A9270 NON-COVERED ITEM OR SERVICE: HCPCS | Performed by: EMERGENCY MEDICINE

## 2019-12-20 PROCEDURE — 85025 COMPLETE CBC W/AUTO DIFF WBC: CPT

## 2019-12-20 PROCEDURE — 700102 HCHG RX REV CODE 250 W/ 637 OVERRIDE(OP): Performed by: EMERGENCY MEDICINE

## 2019-12-20 RX ORDER — TRAZODONE HYDROCHLORIDE 50 MG/1
50 TABLET ORAL ONCE
Status: COMPLETED | OUTPATIENT
Start: 2019-12-20 | End: 2019-12-20

## 2019-12-20 RX ADMIN — TRAZODONE HYDROCHLORIDE 50 MG: 50 TABLET ORAL at 21:15

## 2019-12-20 ASSESSMENT — LIFESTYLE VARIABLES: DO YOU DRINK ALCOHOL: NO

## 2019-12-21 NOTE — DISCHARGE INSTRUCTIONS
You may take over-the-counter antidiarrhea medications for your symptoms.  Return to the emergency department if you develop any new or worsening symptoms, this includes inability to urinate, fevers, abdominal pain, worsening diarrhea, bloody stools, or any further concerns.  Please call the primary care clinic listed above for complete recheck, or call your primary care physician for recheck at the opening of business hours.  Return to the emergency department if your symptoms are not improving over the next 24 to 48 hours.

## 2019-12-21 NOTE — ED TRIAGE NOTES
"Chief Complaint   Patient presents with   • Diarrhea   • Urinary Catheter Problem     /97   Pulse 87   Temp 37.1 °C (98.7 °F) (Temporal)   Resp 18   Ht 1.803 m (5' 11\")   Wt 99.1 kg (218 lb 7.6 oz)   SpO2 96%   BMI 30.47 kg/m²     Pt reports he was here 2 days ago for constipation and has had \"diarrhea nonstop\" since leaving. Pt relates he has stopped eating because he is scared of continued diarrhea. Pt also has campbell catheter and reports the bottom of his bag is leaking and that he is ready for us to take the catheter out.   "

## 2019-12-21 NOTE — ED PROVIDER NOTES
ED Provider Note    Chief Complaint:   Diarrhea, Roca catheter problem    HPI:  Chris Leggett is a 69 y.o. male who presents with chief complaint of diarrhea.  He was seen in this emergency department 3 days ago for constipation.  He was given stool softeners to alleviate his symptoms.  Shortly after that visit, and receiving the medications, he reports a very large firm bowel movement.  Since that time, he has been having loose stools and watery stools.  He denies any bloody stools, denies any dark tarry like stools.  He is not had any associated vomiting, he has no associated abdominal pain.  He was also diagnosed with urinary retention at that visit, and had a Roca catheter placed with leg bag.  He reports that the leg bag is now leaking, and he would like to have the Roca catheter removed.    He has had some decreased food and fluid intake, as he is concerned that eating or drinking will exacerbate his diarrhea.  He is not had any associated fevers, no chest pain, no shortness of breath.  He denies any true incontinence, states that he feels the urge to defecate and is able to make it to the restroom in time, he denies any symptoms concerning for overflow urinary incontinence.  He denies any symptoms of saddle anesthesia.  He reports that he is not been on antibiotics recently.  He is unable to identify any alleviating factors.    Review of Systems:  See HPI for pertinent positives and negatives. All other systems negative.    Past Medical History:   has a past medical history of Gout, Hypertension, and Psychiatric disorder.    Social History:  Social History     Tobacco Use   • Smoking status: Current Every Day Smoker     Packs/day: 0.50     Years: 5.00     Pack years: 2.50     Types: Cigarettes     Last attempt to quit: 2019     Years since quittin.9   • Smokeless tobacco: Never Used   Substance and Sexual Activity   • Alcohol use: Yes     Alcohol/week: 10.8 - 12.0 oz     Types: 18 - 20 Cans of  "beer per week     Drinks per session: 10 or more     Comment: last drink 11/5    • Drug use: Yes     Types: Inhaled     Comment: marijuana last dose last month   • Sexual activity: Not on file       Surgical History:   has a past surgical history that includes cervical disk and fusion anterior (9/2/2018); other orthopedic surgery; dx bone marrow aspirations (Left, 10/2/2019); and dx bone marrow biopsies (10/2/2019).    Current Medications:  Home Medications     Reviewed by Olga Benoit R.N. (Registered Nurse) on 12/20/19 at [employer]  Med List Status: <None>   Medication Last Dose Status   amLODIPine (NORVASC) 5 MG Tab  Active   Docusate Calcium (STOOL SOFTENER PO)  Active   DULoxetine (CYMBALTA) 60 MG Cap DR Particles delayed-release capsule  Active   gabapentin (NEURONTIN) 300 MG Cap  Active   lisinopril (PRINIVIL) 30 MG tablet  Active   polyethylene glycol/lytes (MIRALAX) Pack  Active   QUEtiapine (SEROQUEL) 200 MG Tab  Active   traZODone (DESYREL) 100 MG Tab  Active                Allergies:  No Known Allergies    Physical Exam:  Vital Signs: BP (!) 171/96   Pulse 87   Temp 37.1 °C (98.7 °F) (Temporal)   Resp 18   Ht 1.803 m (5' 11\")   Wt 99.1 kg (218 lb 7.6 oz)   SpO2 96%   BMI 30.47 kg/m²   Constitutional: Alert, no acute distress  HENT: Moist mucus membranes, normal posterior pharynx, no intraoral lesions  Eyes: Pupils equal and reactive, normal conjunctiva  Neck: Supple, normal range of motion, no stridor  Cardiovascular: Extremities are warm and well perfused, no murmur appreciated, normal cardiac auscultation  Pulmonary: No respiratory distress, normal work of breathing, no accessory muscule usage, breath sounds clear and equal bilaterally  Abdomen: Soft, non-distended, non-tender to palpation, no peritoneal signs  : Leg bag in place with clear urine output  Skin: Warm, dry, no rashes or lesions  Musculoskeletal: Normal range of motion in all extremities, no swelling or deformity noted  Neurologic: " Alert, oriented, normal speech, normal motor function  Psychiatric: Normal and appropriate mood and affect    Medical records reviewed for continuity of care.  Patient was seen in this emergency department 12/18/2019 for constipation.  Plain film of the abdomen was negative for evidence of bowel obstruction.  Lab work was reassuring.  He was diagnosed with urinary retention, Roca catheter placed, discharged with a leg bag.  Discharge summary reviewed from 10/13/2019.  He presented with ataxic gait.  He had no focal neurologic deficits on examination.  MRI of the lumbar spine was done.  Images reviewed with neurosurgical PA, neurosurgery agreed to follow the patient after discharge but found no need for emergent surgery at this time.    MRI of the lumbar spine performed 10/15/2019 reviewed.  Moderate multilevel degenerative change of the lumbar spine noted similar to prior.  Mild narrowing of the spinal canal at L4-5.  MRI of the thoracic spine result reviewed from 12/7/2018 which demonstrated mild degenerative disease.  Previously seen prevertebral hematoma/edema is resolved.  MRI of the cervical spine reviewed from 12/7/2018.  Moderate central canal stenosis noted at C2-3, C5-6, and C6-7.  Increased intramedullary T2 signal intensity at C5-6, likely myelomalacia, may be secondary to previous cord injury.    Labs:  Labs Reviewed   CBC WITH DIFFERENTIAL - Abnormal; Notable for the following components:       Result Value    RBC 4.11 (*)     Hemoglobin 13.3 (*)     Hematocrit 39.3 (*)     Platelet Count 96 (*)     Lymphocytes 14.40 (*)     Lymphs (Absolute) 0.94 (*)     All other components within normal limits   COMP METABOLIC PANEL - Abnormal; Notable for the following components:    Potassium 3.5 (*)     Co2 19 (*)     Anion Gap 16.0 (*)     Glucose 104 (*)     AST(SGOT) 77 (*)     All other components within normal limits   ESTIMATED GFR   URINALYSIS,CULTURE IF INDICATED       Radiology:  No orders to display         ED Medications Administered:  Medications   traZODone (DESYREL) tablet 50 mg (50 mg Oral Given 12/20/19 2115)       Differential diagnosis:  Electrolyte abnormality, stool softener and laxative side effect, dehydration, gastroenteritis    MDM:  Patient presents with chief complaint of painless diarrhea.  Symptoms began after he was given medications for constipation.  He has no associated abdominal pain, no fevers, his abdominal exam is completely benign without tenderness to palpation or palpable masses.  Doubt intra-abdominal infection, doubt diverticulitis.  He reports stool is normal in coloration without concern for melena nor hematochezia.  No evidence of GI bleed.    On laboratory evaluation CMP is reassuring, potassium is slightly low at 3.5, no critically low potassium.  White blood count is normal at 6.5.  Hemoglobin is 13.3, consistent with his baseline.  He has a normal platelet count.    His Roca catheter was removed in the emergency department.  He was unable to urinate immediately, that this is likely because his bladder is empty.  He is feeling well, and is requesting discharge home at this time.  He is counseled to take over-the-counter antidiarrheal medication as needed.  I suspect that his diarrhea is due to his recent MiraLAX use.  He is no longer using stool softeners.  Plan at this time is for discharge home, he will follow-up with his primary care physician at the opening of business hours as long as his symptoms continue to improve.  He will return to the emergency department with new or worsening symptoms. Return precautions were discussed with the patient, and provided in written form with the patient's discharge instructions.     Blood pressure today is greater than 120/80, patient is instructed to follow up with primary care provider for blood pressure recheck.    Disposition:  Discharge home in stable condition    Final Impression:  1. Diarrhea, unspecified type         Electronically signed by: Gricel Ordonez MD, 12/21/2019 12:42 AM

## 2019-12-21 NOTE — ED NOTES
"Pt seen for constipation 2 days ago. Given enema and suppositories. Now having diarrhea. Pt also had campbell placed 2 days ago for \"obstructions\". Wants campbell out  "

## 2020-02-04 ENCOUNTER — APPOINTMENT (OUTPATIENT)
Dept: RADIOLOGY | Facility: MEDICAL CENTER | Age: 70
DRG: 641 | End: 2020-02-04
Attending: EMERGENCY MEDICINE
Payer: MEDICARE

## 2020-02-04 ENCOUNTER — HOSPITAL ENCOUNTER (INPATIENT)
Facility: MEDICAL CENTER | Age: 70
LOS: 3 days | DRG: 641 | End: 2020-02-07
Attending: EMERGENCY MEDICINE | Admitting: INTERNAL MEDICINE
Payer: MEDICARE

## 2020-02-04 DIAGNOSIS — S12.401A CLOSED NONDISPLACED FRACTURE OF FIFTH CERVICAL VERTEBRA, UNSPECIFIED FRACTURE MORPHOLOGY, INITIAL ENCOUNTER (HCC): ICD-10-CM

## 2020-02-04 DIAGNOSIS — R00.1 BRADYCARDIA: ICD-10-CM

## 2020-02-04 DIAGNOSIS — S02.2XXB OPEN FRACTURE OF NASAL BONE, INITIAL ENCOUNTER: ICD-10-CM

## 2020-02-04 DIAGNOSIS — M25.561 ACUTE PAIN OF RIGHT KNEE: ICD-10-CM

## 2020-02-04 DIAGNOSIS — S01.81XA LACERATION OF FOREHEAD, INITIAL ENCOUNTER: ICD-10-CM

## 2020-02-04 DIAGNOSIS — S12.601A CLOSED NONDISPLACED FRACTURE OF SEVENTH CERVICAL VERTEBRA, UNSPECIFIED FRACTURE MORPHOLOGY, INITIAL ENCOUNTER (HCC): ICD-10-CM

## 2020-02-04 DIAGNOSIS — R55 SYNCOPE, UNSPECIFIED SYNCOPE TYPE: ICD-10-CM

## 2020-02-04 LAB
ALBUMIN SERPL BCP-MCNC: 3.6 G/DL (ref 3.2–4.9)
ALBUMIN/GLOB SERPL: 1.3 G/DL
ALP SERPL-CCNC: 38 U/L (ref 30–99)
ALT SERPL-CCNC: 18 U/L (ref 2–50)
ANION GAP SERPL CALC-SCNC: 6 MMOL/L (ref 0–11.9)
APTT PPP: 26.6 SEC (ref 24.7–36)
AST SERPL-CCNC: 41 U/L (ref 12–45)
BASOPHILS # BLD AUTO: 0.8 % (ref 0–1.8)
BASOPHILS # BLD: 0.04 K/UL (ref 0–0.12)
BILIRUB SERPL-MCNC: 0.7 MG/DL (ref 0.1–1.5)
BUN SERPL-MCNC: 13 MG/DL (ref 8–22)
CALCIUM SERPL-MCNC: 9.1 MG/DL (ref 8.5–10.5)
CHLORIDE SERPL-SCNC: 99 MMOL/L (ref 96–112)
CO2 SERPL-SCNC: 27 MMOL/L (ref 20–33)
CREAT SERPL-MCNC: 0.77 MG/DL (ref 0.5–1.4)
EKG IMPRESSION: NORMAL
EOSINOPHIL # BLD AUTO: 0.24 K/UL (ref 0–0.51)
EOSINOPHIL NFR BLD: 4.7 % (ref 0–6.9)
ERYTHROCYTE [DISTWIDTH] IN BLOOD BY AUTOMATED COUNT: 48.2 FL (ref 35.9–50)
GLOBULIN SER CALC-MCNC: 2.8 G/DL (ref 1.9–3.5)
GLUCOSE SERPL-MCNC: 121 MG/DL (ref 65–99)
HCT VFR BLD AUTO: 35.8 % (ref 42–52)
HGB BLD-MCNC: 12.1 G/DL (ref 14–18)
IMM GRANULOCYTES # BLD AUTO: 0.01 K/UL (ref 0–0.11)
IMM GRANULOCYTES NFR BLD AUTO: 0.2 % (ref 0–0.9)
INR PPP: 1.28 (ref 0.87–1.13)
LYMPHOCYTES # BLD AUTO: 1.21 K/UL (ref 1–4.8)
LYMPHOCYTES NFR BLD: 23.4 % (ref 22–41)
MCH RBC QN AUTO: 33.1 PG (ref 27–33)
MCHC RBC AUTO-ENTMCNC: 33.8 G/DL (ref 33.7–35.3)
MCV RBC AUTO: 97.8 FL (ref 81.4–97.8)
MONOCYTES # BLD AUTO: 0.36 K/UL (ref 0–0.85)
MONOCYTES NFR BLD AUTO: 7 % (ref 0–13.4)
NEUTROPHILS # BLD AUTO: 3.3 K/UL (ref 1.82–7.42)
NEUTROPHILS NFR BLD: 63.9 % (ref 44–72)
NRBC # BLD AUTO: 0 K/UL
NRBC BLD-RTO: 0 /100 WBC
PLATELET # BLD AUTO: 104 K/UL (ref 164–446)
PMV BLD AUTO: 11.4 FL (ref 9–12.9)
POTASSIUM SERPL-SCNC: 4 MMOL/L (ref 3.6–5.5)
PROT SERPL-MCNC: 6.4 G/DL (ref 6–8.2)
PROTHROMBIN TIME: 16.3 SEC (ref 12–14.6)
RBC # BLD AUTO: 3.66 M/UL (ref 4.7–6.1)
SODIUM SERPL-SCNC: 132 MMOL/L (ref 135–145)
TROPONIN T SERPL-MCNC: 8 NG/L (ref 6–19)
WBC # BLD AUTO: 5.2 K/UL (ref 4.8–10.8)

## 2020-02-04 PROCEDURE — 85025 COMPLETE CBC W/AUTO DIFF WBC: CPT

## 2020-02-04 PROCEDURE — 303747 HCHG EXTRA SUTURE

## 2020-02-04 PROCEDURE — 96374 THER/PROPH/DIAG INJ IV PUSH: CPT

## 2020-02-04 PROCEDURE — 96376 TX/PRO/DX INJ SAME DRUG ADON: CPT

## 2020-02-04 PROCEDURE — 36415 COLL VENOUS BLD VENIPUNCTURE: CPT

## 2020-02-04 PROCEDURE — 700105 HCHG RX REV CODE 258: Performed by: EMERGENCY MEDICINE

## 2020-02-04 PROCEDURE — A9270 NON-COVERED ITEM OR SERVICE: HCPCS | Performed by: INTERNAL MEDICINE

## 2020-02-04 PROCEDURE — 72125 CT NECK SPINE W/O DYE: CPT

## 2020-02-04 PROCEDURE — 84484 ASSAY OF TROPONIN QUANT: CPT

## 2020-02-04 PROCEDURE — 700111 HCHG RX REV CODE 636 W/ 250 OVERRIDE (IP): Performed by: EMERGENCY MEDICINE

## 2020-02-04 PROCEDURE — 80053 COMPREHEN METABOLIC PANEL: CPT

## 2020-02-04 PROCEDURE — 700102 HCHG RX REV CODE 250 W/ 637 OVERRIDE(OP): Performed by: INTERNAL MEDICINE

## 2020-02-04 PROCEDURE — 70486 CT MAXILLOFACIAL W/O DYE: CPT

## 2020-02-04 PROCEDURE — 700101 HCHG RX REV CODE 250: Performed by: EMERGENCY MEDICINE

## 2020-02-04 PROCEDURE — 73552 X-RAY EXAM OF FEMUR 2/>: CPT | Mod: RT

## 2020-02-04 PROCEDURE — 85610 PROTHROMBIN TIME: CPT

## 2020-02-04 PROCEDURE — 99285 EMERGENCY DEPT VISIT HI MDM: CPT

## 2020-02-04 PROCEDURE — 304217 HCHG IRRIGATION SYSTEM

## 2020-02-04 PROCEDURE — 70450 CT HEAD/BRAIN W/O DYE: CPT

## 2020-02-04 PROCEDURE — 99407 BEHAV CHNG SMOKING > 10 MIN: CPT | Performed by: INTERNAL MEDICINE

## 2020-02-04 PROCEDURE — 96375 TX/PRO/DX INJ NEW DRUG ADDON: CPT

## 2020-02-04 PROCEDURE — 303485 HCHG DRESSING MEDIUM

## 2020-02-04 PROCEDURE — 93005 ELECTROCARDIOGRAM TRACING: CPT | Performed by: EMERGENCY MEDICINE

## 2020-02-04 PROCEDURE — 0HQ1XZZ REPAIR FACE SKIN, EXTERNAL APPROACH: ICD-10-PCS | Performed by: EMERGENCY MEDICINE

## 2020-02-04 PROCEDURE — 700105 HCHG RX REV CODE 258: Performed by: INTERNAL MEDICINE

## 2020-02-04 PROCEDURE — 73562 X-RAY EXAM OF KNEE 3: CPT | Mod: RT

## 2020-02-04 PROCEDURE — 94760 N-INVAS EAR/PLS OXIMETRY 1: CPT

## 2020-02-04 PROCEDURE — 770020 HCHG ROOM/CARE - TELE (206)

## 2020-02-04 PROCEDURE — 85730 THROMBOPLASTIN TIME PARTIAL: CPT

## 2020-02-04 PROCEDURE — 304999 HCHG REPAIR-SIMPLE/INTERMED LEVEL 1

## 2020-02-04 PROCEDURE — 99223 1ST HOSP IP/OBS HIGH 75: CPT | Mod: AI,25 | Performed by: INTERNAL MEDICINE

## 2020-02-04 PROCEDURE — 700111 HCHG RX REV CODE 636 W/ 250 OVERRIDE (IP): Performed by: INTERNAL MEDICINE

## 2020-02-04 PROCEDURE — 71045 X-RAY EXAM CHEST 1 VIEW: CPT

## 2020-02-04 RX ORDER — NAPROXEN 500 MG/1
1 TABLET ORAL 2 TIMES DAILY PRN
Status: ON HOLD | COMMUNITY
End: 2020-02-07

## 2020-02-04 RX ORDER — MORPHINE SULFATE 4 MG/ML
4 INJECTION, SOLUTION INTRAMUSCULAR; INTRAVENOUS ONCE
Status: COMPLETED | OUTPATIENT
Start: 2020-02-04 | End: 2020-02-04

## 2020-02-04 RX ORDER — SODIUM CHLORIDE 9 MG/ML
INJECTION, SOLUTION INTRAVENOUS CONTINUOUS
Status: DISCONTINUED | OUTPATIENT
Start: 2020-02-04 | End: 2020-02-06

## 2020-02-04 RX ORDER — QUETIAPINE FUMARATE 200 MG/1
200 TABLET, FILM COATED ORAL
Status: DISCONTINUED | OUTPATIENT
Start: 2020-02-04 | End: 2020-02-07 | Stop reason: HOSPADM

## 2020-02-04 RX ORDER — LISINOPRIL 20 MG/1
30 TABLET ORAL EVERY MORNING
Status: DISCONTINUED | OUTPATIENT
Start: 2020-02-05 | End: 2020-02-05

## 2020-02-04 RX ORDER — ENALAPRILAT 1.25 MG/ML
1.25 INJECTION INTRAVENOUS EVERY 6 HOURS PRN
Status: DISCONTINUED | OUTPATIENT
Start: 2020-02-04 | End: 2020-02-07 | Stop reason: HOSPADM

## 2020-02-04 RX ORDER — OXYCODONE HYDROCHLORIDE 5 MG/1
5 TABLET ORAL
Status: DISCONTINUED | OUTPATIENT
Start: 2020-02-04 | End: 2020-02-07 | Stop reason: HOSPADM

## 2020-02-04 RX ORDER — ONDANSETRON 2 MG/ML
4 INJECTION INTRAMUSCULAR; INTRAVENOUS EVERY 4 HOURS PRN
Status: DISCONTINUED | OUTPATIENT
Start: 2020-02-04 | End: 2020-02-07 | Stop reason: HOSPADM

## 2020-02-04 RX ORDER — ACETAMINOPHEN 325 MG/1
650 TABLET ORAL EVERY 6 HOURS PRN
Status: DISCONTINUED | OUTPATIENT
Start: 2020-02-04 | End: 2020-02-07 | Stop reason: HOSPADM

## 2020-02-04 RX ORDER — AMLODIPINE BESYLATE 5 MG/1
5 TABLET ORAL EVERY MORNING
Status: DISCONTINUED | OUTPATIENT
Start: 2020-02-05 | End: 2020-02-05

## 2020-02-04 RX ORDER — OXYCODONE HYDROCHLORIDE 10 MG/1
10 TABLET ORAL
Status: DISCONTINUED | OUTPATIENT
Start: 2020-02-04 | End: 2020-02-07 | Stop reason: HOSPADM

## 2020-02-04 RX ORDER — HYDROMORPHONE HYDROCHLORIDE 1 MG/ML
0.5 INJECTION, SOLUTION INTRAMUSCULAR; INTRAVENOUS; SUBCUTANEOUS
Status: DISCONTINUED | OUTPATIENT
Start: 2020-02-04 | End: 2020-02-07 | Stop reason: HOSPADM

## 2020-02-04 RX ORDER — LIDOCAINE HYDROCHLORIDE 10 MG/ML
20 INJECTION, SOLUTION INFILTRATION; PERINEURAL ONCE
Status: COMPLETED | OUTPATIENT
Start: 2020-02-04 | End: 2020-02-04

## 2020-02-04 RX ORDER — AMOXICILLIN 250 MG
2 CAPSULE ORAL 2 TIMES DAILY
Status: DISCONTINUED | OUTPATIENT
Start: 2020-02-04 | End: 2020-02-07 | Stop reason: HOSPADM

## 2020-02-04 RX ORDER — SODIUM CHLORIDE 9 MG/ML
1000 INJECTION, SOLUTION INTRAVENOUS ONCE
Status: COMPLETED | OUTPATIENT
Start: 2020-02-04 | End: 2020-02-04

## 2020-02-04 RX ORDER — BISACODYL 10 MG
10 SUPPOSITORY, RECTAL RECTAL
Status: DISCONTINUED | OUTPATIENT
Start: 2020-02-04 | End: 2020-02-07 | Stop reason: HOSPADM

## 2020-02-04 RX ORDER — GABAPENTIN 300 MG/1
300 CAPSULE ORAL 3 TIMES DAILY
Status: DISCONTINUED | OUTPATIENT
Start: 2020-02-04 | End: 2020-02-05

## 2020-02-04 RX ORDER — ONDANSETRON 4 MG/1
4 TABLET, ORALLY DISINTEGRATING ORAL EVERY 4 HOURS PRN
Status: DISCONTINUED | OUTPATIENT
Start: 2020-02-04 | End: 2020-02-07 | Stop reason: HOSPADM

## 2020-02-04 RX ORDER — DULOXETIN HYDROCHLORIDE 60 MG/1
60 CAPSULE, DELAYED RELEASE ORAL DAILY
Status: DISCONTINUED | OUTPATIENT
Start: 2020-02-05 | End: 2020-02-07 | Stop reason: HOSPADM

## 2020-02-04 RX ORDER — TRAZODONE HYDROCHLORIDE 50 MG/1
150 TABLET ORAL NIGHTLY
Status: DISCONTINUED | OUTPATIENT
Start: 2020-02-04 | End: 2020-02-05

## 2020-02-04 RX ORDER — POLYETHYLENE GLYCOL 3350 17 G/17G
1 POWDER, FOR SOLUTION ORAL
Status: DISCONTINUED | OUTPATIENT
Start: 2020-02-04 | End: 2020-02-07 | Stop reason: HOSPADM

## 2020-02-04 RX ORDER — ONDANSETRON 2 MG/ML
4 INJECTION INTRAMUSCULAR; INTRAVENOUS ONCE
Status: COMPLETED | OUTPATIENT
Start: 2020-02-04 | End: 2020-02-04

## 2020-02-04 RX ADMIN — LIDOCAINE HYDROCHLORIDE 20 ML: 10 INJECTION, SOLUTION INFILTRATION; PERINEURAL at 18:30

## 2020-02-04 RX ADMIN — MORPHINE SULFATE 4 MG: 4 INJECTION INTRAVENOUS at 16:39

## 2020-02-04 RX ADMIN — HYDROMORPHONE HYDROCHLORIDE 0.5 MG: 1 INJECTION, SOLUTION INTRAMUSCULAR; INTRAVENOUS; SUBCUTANEOUS at 20:43

## 2020-02-04 RX ADMIN — GABAPENTIN 300 MG: 300 CAPSULE ORAL at 20:43

## 2020-02-04 RX ADMIN — TRAZODONE HYDROCHLORIDE 150 MG: 50 TABLET ORAL at 20:44

## 2020-02-04 RX ADMIN — MORPHINE SULFATE 4 MG: 4 INJECTION INTRAVENOUS at 18:31

## 2020-02-04 RX ADMIN — ONDANSETRON 4 MG: 2 INJECTION INTRAMUSCULAR; INTRAVENOUS at 16:39

## 2020-02-04 RX ADMIN — SENNOSIDES AND DOCUSATE SODIUM 2 TABLET: 8.6; 5 TABLET ORAL at 20:44

## 2020-02-04 RX ADMIN — SODIUM CHLORIDE: 9 INJECTION, SOLUTION INTRAVENOUS at 23:16

## 2020-02-04 RX ADMIN — SODIUM CHLORIDE 1000 ML: 9 INJECTION, SOLUTION INTRAVENOUS at 16:01

## 2020-02-04 RX ADMIN — OXYCODONE HYDROCHLORIDE 10 MG: 10 TABLET ORAL at 23:13

## 2020-02-04 ASSESSMENT — ENCOUNTER SYMPTOMS
LOSS OF CONSCIOUSNESS: 1
HEADACHES: 1
CHILLS: 0
DIZZINESS: 0
FEVER: 0
PALPITATIONS: 0
VOMITING: 0
NAUSEA: 0
COUGH: 0
DEPRESSION: 0
MYALGIAS: 0
FALLS: 1
TINGLING: 0
ABDOMINAL PAIN: 0
SHORTNESS OF BREATH: 0
STRIDOR: 0
DIARRHEA: 0
WEAKNESS: 0
CONSTIPATION: 0
SPUTUM PRODUCTION: 0

## 2020-02-04 ASSESSMENT — LIFESTYLE VARIABLES
HOW MANY TIMES IN THE PAST YEAR HAVE YOU HAD 5 OR MORE DRINKS IN A DAY: 0
HAVE PEOPLE ANNOYED YOU BY CRITICIZING YOUR DRINKING: NO
CONSUMPTION TOTAL: NEGATIVE
ALCOHOL_USE: NO
HAVE YOU EVER FELT YOU SHOULD CUT DOWN ON YOUR DRINKING: NO
EVER HAD A DRINK FIRST THING IN THE MORNING TO STEADY YOUR NERVES TO GET RID OF A HANGOVER: NO
AVERAGE NUMBER OF DAYS PER WEEK YOU HAVE A DRINK CONTAINING ALCOHOL: 0
DOES PATIENT WANT TO STOP DRINKING: NO
TOTAL SCORE: 0
EVER FELT BAD OR GUILTY ABOUT YOUR DRINKING: NO
ON A TYPICAL DAY WHEN YOU DRINK ALCOHOL HOW MANY DRINKS DO YOU HAVE: 0
TOTAL SCORE: 0
TOTAL SCORE: 0
EVER_SMOKED: YES

## 2020-02-04 ASSESSMENT — COGNITIVE AND FUNCTIONAL STATUS - GENERAL
CLIMB 3 TO 5 STEPS WITH RAILING: A LOT
TURNING FROM BACK TO SIDE WHILE IN FLAT BAD: A LITTLE
SUGGESTED CMS G CODE MODIFIER MOBILITY: CK
EATING MEALS: A LITTLE
DAILY ACTIVITIY SCORE: 18
DRESSING REGULAR LOWER BODY CLOTHING: A LITTLE
STANDING UP FROM CHAIR USING ARMS: A LITTLE
MOVING FROM LYING ON BACK TO SITTING ON SIDE OF FLAT BED: A LITTLE
MOBILITY SCORE: 16
WALKING IN HOSPITAL ROOM: A LOT
PERSONAL GROOMING: A LITTLE
DRESSING REGULAR UPPER BODY CLOTHING: A LITTLE
SUGGESTED CMS G CODE MODIFIER DAILY ACTIVITY: CK
TOILETING: A LITTLE
HELP NEEDED FOR BATHING: A LITTLE
MOVING TO AND FROM BED TO CHAIR: A LITTLE

## 2020-02-04 ASSESSMENT — PATIENT HEALTH QUESTIONNAIRE - PHQ9
SUM OF ALL RESPONSES TO PHQ9 QUESTIONS 1 AND 2: 0
1. LITTLE INTEREST OR PLEASURE IN DOING THINGS: NOT AT ALL
2. FEELING DOWN, DEPRESSED, IRRITABLE, OR HOPELESS: NOT AT ALL

## 2020-02-04 NOTE — ED PROVIDER NOTES
ED Provider Note    Scribed for Drea Vargas M.D. by Darío Mclain. 2/4/2020, 3:19 PM.    Primary care provider: Pcp Pt States None  Means of arrival: EMS  History obtained from: Patient  History limited by: None    CHIEF COMPLAINT  Chief Complaint   Patient presents with   • Syncope   • Nausea       HPI  Chris Leggett is a 69 y.o. male who presents to the Emergency Department via EMS for evaluation after a syncopal episode that occurred just prior to arrival. Patient states he was at home sitting on the couch when he started to feel nauseous. After getting up, he passed out. He does not remember much of the event at that point. He reported hitting his head/face on tile. He sustained an associated laceration on his head. He denies having chest pain prior to the event. He has had right leg pain, right knee pain, some shortness of breath, head pain, and chest wall discomfort after the event. He has had bilateral arm tingling since having neck surgery last year. Denies any new abdominal pain. His roommate called the ambulance for him. He reports history of hypertension. States he also has prior history of low heart rate. Denies being on any blood thinners currently. States he is only on sleeping pills.    REVIEW OF SYSTEMS  Pertinent positives include syncope, nausea, head laceration, right leg pain, right knee pain, some shortness of breath, head pain, chest wall discomfort, bilateral arm tingling (chronic, since neck surgery last year). Pertinent negatives include no new abdominal pain, no lumbar pain, no weakness in his arms, no anticoagulation. All other systems reviewed and negative.     PAST MEDICAL HISTORY   has a past medical history of Gout, Hypertension, and Psychiatric disorder.    SURGICAL HISTORY   has a past surgical history that includes cervical disk and fusion anterior (9/2/2018); other orthopedic surgery; dx bone marrow aspirations (Left, 10/2/2019); and dx bone marrow biopsies  "(10/2/2019).    SOCIAL HISTORY  Social History     Tobacco Use   • Smoking status: Current Every Day Smoker     Packs/day: 0.50     Years: 5.00     Pack years: 2.50     Types: Cigarettes     Last attempt to quit: 2019     Years since quittin.0   • Smokeless tobacco: Never Used   Substance Use Topics   • Alcohol use: Yes     Alcohol/week: 10.8 - 12.0 oz     Types: 18 - 20 Cans of beer per week     Drinks per session: 10 or more     Comment: last drink     • Drug use: Yes     Types: Inhaled     Comment: marijuana last dose last month      Social History     Substance and Sexual Activity   Drug Use Yes   • Types: Inhaled    Comment: marijuana last dose last month       FAMILY HISTORY  Family History   Problem Relation Age of Onset   • Cancer Mother    • Alzheimer's Disease Father    • Cancer Father    • Heart Disease Sister    • Heart Disease Brother        CURRENT MEDICATIONS  No current facility-administered medications on file prior to encounter.      Current Outpatient Medications on File Prior to Encounter   Medication Sig Dispense Refill   • gabapentin (NEURONTIN) 300 MG Cap Take 300 mg by mouth 3 times a day.     • QUEtiapine (SEROQUEL) 200 MG Tab Take 200 mg by mouth every bedtime.     • traZODone (DESYREL) 100 MG Tab Take 100 mg by mouth every evening.     • Docusate Calcium (STOOL SOFTENER PO) Take 2 Caps by mouth every day.     • amLODIPine (NORVASC) 5 MG Tab Take 5 mg by mouth every morning.     • DULoxetine (CYMBALTA) 60 MG Cap DR Particles delayed-release capsule Take 60 mg by mouth every day.     • lisinopril (PRINIVIL) 30 MG tablet Take 30 mg by mouth every morning.          ALLERGIES  No Known Allergies    PHYSICAL EXAM  VITAL SIGNS: /70   Pulse (!) 51   Temp 36.2 °C (97.1 °F) (Temporal)   Resp 16   Ht 1.803 m (5' 11\")   Wt 99.8 kg (220 lb)   SpO2 100%   BMI 30.68 kg/m²   Constitutional: Patient laying on gurney with C-collar in place. Well developed, mild acute distress, " Non-toxic appearance.   HENT: Normocephalic, See Skin section. Right jaw pain, front two teeth missing which is old, no other oral trauma. Bilateral external ears normal, Oropharynx moist, No oral exudates, .  Nasal bridge with deformity and laceration.  No evidence of septal hematoma  Eyes: PERRL, EOMI, Conjunctiva normal,  Neck: In C-collar.   Cardiovascular: Bradycardic, Normal rhythm,   Thorax & Lungs: Normal breath sounds, No respiratory distress, Diffuse chest wall tenderness  Abdomen: Benign abdominal exam, no guarding no rebound, diffuse abdominal tenderness, no distention  Skin: Warm, Dry, No rash. On medial aspect of left eyebrow there is a 4 cm laceration into the subcutaneous tissue with minimal bleeding. 1 cm laceration to nasal bridge. Two superficial abrasions to the right temple which do not require sutures.  Back: No midline tenderness to palpation, no obvious deformities or step-offs   extremities: Intact distal pulses, Pain to right knee with some swelling and pain with range of movement, right leg is neurovascularly intact, no hip pain.  Neurologic: Alert & oriented x 3, Normal motor function, Normal sensory function but complains of a tingling in bilateral hands which is chronic, good  strength bilaterally, no focal deficits noted.  Psychiatric: Appropriate                                             DIAGNOSTIC STUDIES / PROCEDURES\    LABS  Results for orders placed or performed during the hospital encounter of 02/04/20   CBC WITH DIFFERENTIAL   Result Value Ref Range    WBC 5.2 4.8 - 10.8 K/uL    RBC 3.66 (L) 4.70 - 6.10 M/uL    Hemoglobin 12.1 (L) 14.0 - 18.0 g/dL    Hematocrit 35.8 (L) 42.0 - 52.0 %    MCV 97.8 81.4 - 97.8 fL    MCH 33.1 (H) 27.0 - 33.0 pg    MCHC 33.8 33.7 - 35.3 g/dL    RDW 48.2 35.9 - 50.0 fL    Platelet Count 104 (L) 164 - 446 K/uL    MPV 11.4 9.0 - 12.9 fL    Neutrophils-Polys 63.90 44.00 - 72.00 %    Lymphocytes 23.40 22.00 - 41.00 %    Monocytes 7.00 0.00 - 13.40  %    Eosinophils 4.70 0.00 - 6.90 %    Basophils 0.80 0.00 - 1.80 %    Immature Granulocytes 0.20 0.00 - 0.90 %    Nucleated RBC 0.00 /100 WBC    Neutrophils (Absolute) 3.30 1.82 - 7.42 K/uL    Lymphs (Absolute) 1.21 1.00 - 4.80 K/uL    Monos (Absolute) 0.36 0.00 - 0.85 K/uL    Eos (Absolute) 0.24 0.00 - 0.51 K/uL    Baso (Absolute) 0.04 0.00 - 0.12 K/uL    Immature Granulocytes (abs) 0.01 0.00 - 0.11 K/uL    NRBC (Absolute) 0.00 K/uL   COMP METABOLIC PANEL   Result Value Ref Range    Sodium 132 (L) 135 - 145 mmol/L    Potassium 4.0 3.6 - 5.5 mmol/L    Chloride 99 96 - 112 mmol/L    Co2 27 20 - 33 mmol/L    Anion Gap 6.0 0.0 - 11.9    Glucose 121 (H) 65 - 99 mg/dL    Bun 13 8 - 22 mg/dL    Creatinine 0.77 0.50 - 1.40 mg/dL    Calcium 9.1 8.5 - 10.5 mg/dL    AST(SGOT) 41 12 - 45 U/L    ALT(SGPT) 18 2 - 50 U/L    Alkaline Phosphatase 38 30 - 99 U/L    Total Bilirubin 0.7 0.1 - 1.5 mg/dL    Albumin 3.6 3.2 - 4.9 g/dL    Total Protein 6.4 6.0 - 8.2 g/dL    Globulin 2.8 1.9 - 3.5 g/dL    A-G Ratio 1.3 g/dL   TROPONIN   Result Value Ref Range    Troponin T 8 6 - 19 ng/L   PRTOTHROMBIN TIME (INR)   Result Value Ref Range    PT 16.3 (H) 12.0 - 14.6 sec    INR 1.28 (H) 0.87 - 1.13   APTT   Result Value Ref Range    APTT 26.6 24.7 - 36.0 sec   ESTIMATED GFR   Result Value Ref Range    GFR If African American >60 >60 mL/min/1.73 m 2    GFR If Non African American >60 >60 mL/min/1.73 m 2   EKG   Result Value Ref Range    Report       [facility] Emergency Dept.    Test Date:  2020  Pt Name:    BRANDY HUTTON                 Department: ER  MRN:        [id number]                      Room:       [facility]  Gender:     Male                         Technician: [id number]  :        1950                   Requested By:ER TRIAGE PROTOCOL  Order #:    [id number]                    Pilar MD: Drea Pedersen    Measurements  Intervals                                Axis  Rate:       47                           P:           48  ND:         172                          QRS:        62  QRSD:       98                           T:          64  QT:         456  QTc:        404    Interpretive Statements  SINUS BRADYCARDIA  MINIMAL ST ELEVATION, INFERIOR LEADS  Compared to ECG 10/13/2019 19:07:56  ST (T wave) deviation now present  Sinus rhythm no longer present  Electronically Signed On 2-4-2020 18:08:10 PST by Drea Pedersen        All labs reviewed by me.    EKG  See Labs section above. Interpreted by me.    RADIOLOGY  CT-HEAD W/O   Final Result      1.  No acute intracranial findings.      2.  Diffuse atrophy and periventricular white matter changes consistent with chronic small vessel disease.         CT-MAXILLOFACIAL W/O   Final Result         1.  Bilateral nasal bone fractures      CT-CSPINE WITHOUT PLUS RECONS   Final Result      1.  New transverse lucency within C7 suspicious for a fracture      2.  MRI recommended for further assessment      3.  Otherwise, no significant changes      Findings were discussed with DREA BIRD on 2/4/2020 5:52 PM.      DX-FEMUR-2+ RIGHT   Final Result      Negative femur series.      DX-KNEE 3 VIEWS RIGHT   Final Result         1.  Severe osteoarthritic changes of the lateral compartment of the right knee.         2. No evidence of acute right knee fracture.      DX-CHEST-PORTABLE (1 VIEW)   Final Result      No acute cardiopulmonary abnormality identified.        The radiologist's interpretation of all radiological studies have been reviewed by me.      Laceration Repair Procedure Note    Indication: Lacerations to forehead and to the nose    Procedure: The patient was placed in the appropriate position and anesthesia around the lacerations were obtained by infiltration using 1% Lidocaine without epinephrine. The areas were then irrigated with normal saline. The forehead laceration was closed with 5-0 Prolene using interrupted sutures. A second laceration on the nose was closed with  chromic gut sutures. The wound areas were then dressed with a bandage.      Total repaired wound length: Forehead laceration was 4 cm, nose laceration was 1 cm    Other Items: Suture count: 9 on forehead, 2 on nose    The patient tolerated the procedure well.    Complications: None      COURSE & MEDICAL DECISION MAKING  Nursing notes, VS, PMSFHx reviewed in chart.     3:19 PM Patient seen and examined at bedside. The patient presents for evaluation after a syncopal episode and the differential diagnosis includes but is not limited to syncope due to bradycardia, other cardiac event, dehydration. Evidence of trauma to face and knee after syncope, will rule out intracranial bleed secondary to syncope and extremity fracture.  Patient is c-collar with history of previous C-spine surgery.  Will obtain a CT to further evaluate possible new injury.  Complaining of chronic tingling in his hands without any significant change after the fall.  No appreciable weakness on exam.  Informed patient he will need to undergo laceration repair procedure. He understands and agrees to plan. Ordered for DX femur right, DX knee right, CT head, DX chest, CT maxillofacial, CT C spine, CBC with differential, CMP, troponin, INR, APTT, EKG to evaluate.     4:10 PM Patient was treated with zofran 4 mg, morphine 4 mg for his symptoms.    CT has returned and shows evidence of a possible C spine fracture.  Therefore trauma surgery will be consulted as well as neurosurgery.  Patient has not had any further episodes of bradycardia here.  He was initially bradycardic when found by paramedics which I suspect is the etiology of his syncope.  Labs do not show any evidence of of elevated troponin and EKG shows bradycardia with some nonspecific ST changes without evidence of acute MI.  I do feel the patient needs admission for further evaluation of C-spine fracture as well as further evaluation of his syncope.    6:17 PM Paged trauma    6:18 PM Patient  reevaluated at bedside. Performed laceration repair procedure, as outlined above.    6:23 PM - I discussed the patient's case and the above findings with Dr. Nazario (surgery) who advised consulting with neurosurgery. If they feel that patient should go to the ICU, then Dr. Nazario will admit. If not, then she advised admitting patient to medicine.     6:26 PM Paged neurosurgery    6:30 PM - I discussed the patient's case and the above findings with Dr. Akins (neurosurgery) who advised consulting with Dr. Cee who patient normally sees.     6:48 PM - I discussed the patient's case and the above findings with Dr. Wilkins (neurosurgery) who will review the films and will call back.     6:54 PM - I discussed the patient's case and the above findings with Dr. Wilkins (neurosurgery) who advised no surgery. He will see him as an outpatient. He states patient will need a [place] J collar.    7:23 PM  I discussed the patient's case and the above findings with Dr. Arnold (hospitalist) who will admit the patient.     HYDRATION: Based on the patient's presentation of Dehydration the patient was given IV fluids. IV Hydration was used because oral hydration was not adequate alone. Upon recheck following hydration, the patient was improved.     DISPOSITION:  Patient will be hospitalized by Dr. Arnold in guarded condition.      FINAL IMPRESSION  1. Syncope, unspecified syncope type     2. Bradycardia     3. Laceration of forehead, initial encounter     4. Open fracture of nasal bone, initial encounter     5. Acute pain of right knee     6. Closed nondisplaced fracture of seventh cervical vertebra, unspecified fracture morphology, initial encounter ([facility])       Laceration repair procedure, see above.     Darío PERSAUD (Delilah), am scribing for, and in the presence of, Drea Vargas M.D..    Electronically signed by: Darío Mclain (Delilah), 2/4/2020    Drea PERSAUD M.D. personally performed the services described  in this documentation, as scribed by Darío Mclain in my presence, and it is both accurate and complete. C    The note accurately reflects work and decisions made by me.  Drea Vargas M.D.  2/4/2020  9:55 PM

## 2020-02-04 NOTE — ED TRIAGE NOTES
Name and  verified for triage    Pt here via medic from home with syncopal episode. States he hasnt felt well all day. States he got up to go to bathroom to vomit and passed out. Hit head/face on tile. Laceration noted to left brow. UTD on tetanus. Denies use of blood thinners.

## 2020-02-04 NOTE — ED NOTES
Med rec complete per pt at bedside  I contacted [employer] for Naprosyn strength.  Allergies reviewed and updated.

## 2020-02-05 ENCOUNTER — APPOINTMENT (OUTPATIENT)
Dept: CARDIOLOGY | Facility: MEDICAL CENTER | Age: 70
DRG: 641 | End: 2020-02-05
Attending: INTERNAL MEDICINE
Payer: MEDICARE

## 2020-02-05 PROBLEM — Z72.0 TOBACCO ABUSE: Status: ACTIVE | Noted: 2020-02-05

## 2020-02-05 PROBLEM — R55 SYNCOPE: Status: ACTIVE | Noted: 2020-02-05

## 2020-02-05 LAB
CK SERPL-CCNC: 212 U/L (ref 0–154)
CORTIS SERPL-MCNC: 5.6 UG/DL (ref 0–23)
LV EJECT FRACT  99904: 55
LV EJECT FRACT MOD 4C 99902: 49.98
TSH SERPL DL<=0.005 MIU/L-ACNC: 1.11 UIU/ML (ref 0.38–5.33)
VIT B12 SERPL-MCNC: 439 PG/ML (ref 211–911)

## 2020-02-05 PROCEDURE — 84443 ASSAY THYROID STIM HORMONE: CPT

## 2020-02-05 PROCEDURE — 99233 SBSQ HOSP IP/OBS HIGH 50: CPT | Performed by: INTERNAL MEDICINE

## 2020-02-05 PROCEDURE — 97162 PT EVAL MOD COMPLEX 30 MIN: CPT

## 2020-02-05 PROCEDURE — 700111 HCHG RX REV CODE 636 W/ 250 OVERRIDE (IP): Performed by: INTERNAL MEDICINE

## 2020-02-05 PROCEDURE — 700105 HCHG RX REV CODE 258: Performed by: INTERNAL MEDICINE

## 2020-02-05 PROCEDURE — A9270 NON-COVERED ITEM OR SERVICE: HCPCS | Performed by: INTERNAL MEDICINE

## 2020-02-05 PROCEDURE — 36415 COLL VENOUS BLD VENIPUNCTURE: CPT

## 2020-02-05 PROCEDURE — 93306 TTE W/DOPPLER COMPLETE: CPT

## 2020-02-05 PROCEDURE — 700102 HCHG RX REV CODE 250 W/ 637 OVERRIDE(OP): Performed by: INTERNAL MEDICINE

## 2020-02-05 PROCEDURE — 82607 VITAMIN B-12: CPT

## 2020-02-05 PROCEDURE — 82533 TOTAL CORTISOL: CPT

## 2020-02-05 PROCEDURE — 82550 ASSAY OF CK (CPK): CPT

## 2020-02-05 PROCEDURE — 770020 HCHG ROOM/CARE - TELE (206)

## 2020-02-05 PROCEDURE — 93306 TTE W/DOPPLER COMPLETE: CPT | Mod: 26 | Performed by: INTERNAL MEDICINE

## 2020-02-05 RX ORDER — GABAPENTIN 100 MG/1
200 CAPSULE ORAL 3 TIMES DAILY
Status: DISCONTINUED | OUTPATIENT
Start: 2020-02-05 | End: 2020-02-06

## 2020-02-05 RX ORDER — TRAZODONE HYDROCHLORIDE 50 MG/1
50 TABLET ORAL NIGHTLY
Status: DISCONTINUED | OUTPATIENT
Start: 2020-02-05 | End: 2020-02-07 | Stop reason: HOSPADM

## 2020-02-05 RX ORDER — LISINOPRIL 20 MG/1
20 TABLET ORAL EVERY MORNING
Status: DISCONTINUED | OUTPATIENT
Start: 2020-02-06 | End: 2020-02-07 | Stop reason: HOSPADM

## 2020-02-05 RX ADMIN — SODIUM CHLORIDE: 9 INJECTION, SOLUTION INTRAVENOUS at 11:57

## 2020-02-05 RX ADMIN — QUETIAPINE FUMARATE 200 MG: 200 TABLET ORAL at 00:51

## 2020-02-05 RX ADMIN — DULOXETINE HYDROCHLORIDE 60 MG: 60 CAPSULE, DELAYED RELEASE ORAL at 05:12

## 2020-02-05 RX ADMIN — OXYCODONE HYDROCHLORIDE 10 MG: 10 TABLET ORAL at 13:32

## 2020-02-05 RX ADMIN — ENOXAPARIN SODIUM 40 MG: 100 INJECTION SUBCUTANEOUS at 13:30

## 2020-02-05 RX ADMIN — GABAPENTIN 300 MG: 300 CAPSULE ORAL at 05:12

## 2020-02-05 RX ADMIN — OXYCODONE HYDROCHLORIDE 10 MG: 10 TABLET ORAL at 08:24

## 2020-02-05 RX ADMIN — OXYCODONE HYDROCHLORIDE 10 MG: 10 TABLET ORAL at 17:02

## 2020-02-05 RX ADMIN — GABAPENTIN 200 MG: 100 CAPSULE ORAL at 17:01

## 2020-02-05 RX ADMIN — SODIUM CHLORIDE: 9 INJECTION, SOLUTION INTRAVENOUS at 23:31

## 2020-02-05 RX ADMIN — SENNOSIDES AND DOCUSATE SODIUM 2 TABLET: 8.6; 5 TABLET ORAL at 05:11

## 2020-02-05 RX ADMIN — OXYCODONE HYDROCHLORIDE 10 MG: 10 TABLET ORAL at 20:46

## 2020-02-05 RX ADMIN — SENNOSIDES AND DOCUSATE SODIUM 2 TABLET: 8.6; 5 TABLET ORAL at 17:01

## 2020-02-05 RX ADMIN — HYDROMORPHONE HYDROCHLORIDE 0.5 MG: 1 INJECTION, SOLUTION INTRAMUSCULAR; INTRAVENOUS; SUBCUTANEOUS at 00:51

## 2020-02-05 RX ADMIN — OXYCODONE HYDROCHLORIDE 10 MG: 10 TABLET ORAL at 05:12

## 2020-02-05 RX ADMIN — TRAZODONE HYDROCHLORIDE 50 MG: 50 TABLET ORAL at 21:50

## 2020-02-05 ASSESSMENT — COGNITIVE AND FUNCTIONAL STATUS - GENERAL
DRESSING REGULAR UPPER BODY CLOTHING: A LITTLE
CLIMB 3 TO 5 STEPS WITH RAILING: A LITTLE
DRESSING REGULAR LOWER BODY CLOTHING: A LITTLE
MOBILITY SCORE: 17
SUGGESTED CMS G CODE MODIFIER DAILY ACTIVITY: CK
EATING MEALS: A LITTLE
PERSONAL GROOMING: A LITTLE
SUGGESTED CMS G CODE MODIFIER MOBILITY: CK
STANDING UP FROM CHAIR USING ARMS: A LITTLE
TOILETING: A LITTLE
SUGGESTED CMS G CODE MODIFIER MOBILITY: CK
MOVING FROM LYING ON BACK TO SITTING ON SIDE OF FLAT BED: A LOT
WALKING IN HOSPITAL ROOM: A LITTLE
TURNING FROM BACK TO SIDE WHILE IN FLAT BAD: A LITTLE
STANDING UP FROM CHAIR USING ARMS: A LITTLE
HELP NEEDED FOR BATHING: A LITTLE
MOVING TO AND FROM BED TO CHAIR: A LITTLE
MOVING TO AND FROM BED TO CHAIR: A LITTLE
MOVING FROM LYING ON BACK TO SITTING ON SIDE OF FLAT BED: A LOT
WALKING IN HOSPITAL ROOM: A LOT
TURNING FROM BACK TO SIDE WHILE IN FLAT BAD: A LITTLE
MOBILITY SCORE: 15
DAILY ACTIVITIY SCORE: 18
CLIMB 3 TO 5 STEPS WITH RAILING: A LOT

## 2020-02-05 ASSESSMENT — ENCOUNTER SYMPTOMS
HEARTBURN: 0
BRUISES/BLEEDS EASILY: 0
WEIGHT LOSS: 0
VOMITING: 0
PHOTOPHOBIA: 0
NECK PAIN: 1
NAUSEA: 0
FEVER: 0
ORTHOPNEA: 0
PALPITATIONS: 0
HEMOPTYSIS: 0
COUGH: 0
SPUTUM PRODUCTION: 0
SPEECH CHANGE: 0
FLANK PAIN: 0
BLURRED VISION: 0
POLYDIPSIA: 0
CHILLS: 0
NERVOUS/ANXIOUS: 0
HALLUCINATIONS: 0
DOUBLE VISION: 0
HEADACHES: 0
BACK PAIN: 0
TREMORS: 0
FOCAL WEAKNESS: 0

## 2020-02-05 ASSESSMENT — LIFESTYLE VARIABLES: SUBSTANCE_ABUSE: 0

## 2020-02-05 ASSESSMENT — GAIT ASSESSMENTS
DISTANCE (FEET): 6
GAIT LEVEL OF ASSIST: MINIMAL ASSIST
ASSISTIVE DEVICE: FRONT WHEEL WALKER

## 2020-02-05 NOTE — PROGRESS NOTES
PT arrived to unit via gurney escorted by transport. VSS Pt is in sinus bradycardia. PT A&O4. Monitor leads in place. Monitor room notified. PT c/o 8/10 generalized pain. Medicated per MAR. PT is orientated to the unit, call light, phone system, fall precautions in place, appropriate signs in place, bed in low and locked positions, bed alarm on. Pt educated regarded fall precautions and importance of calling staff for assistance. Pt denies further needs at the time. Will continue to monitor.

## 2020-02-05 NOTE — PROGRESS NOTES
Hospital Medicine Daily Progress Note    Date of Service  2/5/2020    Chief Complaint/Hospital Course  69 y.o. male admitted 2/4/2020 with C7 fracture after syncopal episode and fall        Interval Problem Update  Patient stated he has some pain in the right jaw when he is trying to chew and having difficulty chewing food.  I changed diet to dysphagia 3.  Maxillofacial CT was done on admission, consistent with nasal bones fracture  He is complaining of moderate right knee pain and swelling.  X-ray of the right knee on admission showed severe osteoarthritis, no acute fracture  Transthoracic echo is normal.  PT OT evaluation pending.  On telemetry he noted to be mildly bradycardic with sinus rhythm, heart rate down to 40 at night, frequent PAC and rare PVC  Patient has occasional nausea  His blood pressure remains soft.  I DC'd amlodipine and decrease dose of lisinopril from 30 to 20 mg.  I reduced dose of trazodone at night from 150 to 50 mg.  I ordered cortisol and CPK    Consultants/Specialty  None    Code Status  Full code  Disposition  PT OT evaluation pending.    Review of Systems  Review of Systems   Constitutional: Negative for chills, fever and weight loss.   HENT: Negative for ear pain, hearing loss and tinnitus.    Eyes: Negative for blurred vision, double vision and photophobia.   Respiratory: Negative for cough, hemoptysis and sputum production.    Cardiovascular: Negative for chest pain, palpitations and orthopnea.   Gastrointestinal: Negative for heartburn, nausea and vomiting.   Genitourinary: Negative for dysuria, flank pain, frequency and hematuria.   Musculoskeletal: Positive for joint pain and neck pain. Negative for back pain.   Skin: Negative for itching and rash.   Neurological: Negative for tremors, speech change, focal weakness and headaches.   Endo/Heme/Allergies: Negative for environmental allergies and polydipsia. Does not bruise/bleed easily.   Psychiatric/Behavioral: Negative for  hallucinations and substance abuse. The patient is not nervous/anxious.         Physical Exam  Temp:  [36.2 °C (97.1 °F)-36.6 °C (97.8 °F)] 36.6 °C (97.8 °F)  Pulse:  [48-65] 65  Resp:  [14-20] 17  BP: ()/(57-78) 114/75  SpO2:  [91 %-100 %] 97 %    Physical Exam  Vitals signs and nursing note reviewed.   Constitutional:       General: He is not in acute distress.     Appearance: Normal appearance.   HENT:      Head: Normocephalic.      Comments: Bruising and laceration over the forehead on the left side     Nose: Nose normal.      Mouth/Throat:      Mouth: Mucous membranes are moist.   Eyes:      Extraocular Movements: Extraocular movements intact.      Pupils: Pupils are equal, round, and reactive to light.   Neck:      Musculoskeletal: Normal range of motion and neck supple.      Comments: Rigid neck collar  Cardiovascular:      Rate and Rhythm: Normal rate and regular rhythm.   Pulmonary:      Effort: Pulmonary effort is normal.      Breath sounds: Normal breath sounds.   Abdominal:      General: Abdomen is flat. There is no distension.      Tenderness: There is no tenderness.   Musculoskeletal: Normal range of motion.         General: No swelling or deformity.      Comments: Right knee swelling, tenderness   Skin:     General: Skin is warm and dry.   Neurological:      General: No focal deficit present.      Mental Status: He is alert and oriented to person, place, and time.   Psychiatric:         Mood and Affect: Mood normal.         Behavior: Behavior normal.         Fluids    Intake/Output Summary (Last 24 hours) at 2/5/2020 1214  Last data filed at 2/5/2020 0800  Gross per 24 hour   Intake 1480 ml   Output --   Net 1480 ml       Laboratory  Recent Labs     02/04/20  1520   WBC 5.2   RBC 3.66*   HEMOGLOBIN 12.1*   HEMATOCRIT 35.8*   MCV 97.8   MCH 33.1*   MCHC 33.8   RDW 48.2   PLATELETCT 104*   MPV 11.4     Recent Labs     02/04/20  1520   SODIUM 132*   POTASSIUM 4.0   CHLORIDE 99   CO2 27   GLUCOSE  121*   BUN 13   CREATININE 0.77   CALCIUM 9.1     Recent Labs     02/04/20  1520   APTT 26.6   INR 1.28*               Imaging  EC-ECHOCARDIOGRAM COMPLETE W/O CONT   Final Result      CT-HEAD W/O   Final Result      1.  No acute intracranial findings.      2.  Diffuse atrophy and periventricular white matter changes consistent with chronic small vessel disease.         CT-MAXILLOFACIAL W/O   Final Result         1.  Bilateral nasal bone fractures      CT-CSPINE WITHOUT PLUS RECONS   Final Result      1.  New transverse lucency within C7 suspicious for a fracture      2.  MRI recommended for further assessment      3.  Otherwise, no significant changes      Findings were discussed with RAFA BIRD on 2/4/2020 5:52 PM.      DX-FEMUR-2+ RIGHT   Final Result      Negative femur series.      DX-KNEE 3 VIEWS RIGHT   Final Result         1.  Severe osteoarthritic changes of the lateral compartment of the right knee.         2. No evidence of acute right knee fracture.      DX-CHEST-PORTABLE (1 VIEW)   Final Result      No acute cardiopulmonary abnormality identified.           Assessment/Plan  * Syncope- (present on admission)  Assessment & Plan  Appears to be orthostatic per history, consistent with hypovolemia  -Resulting in significant trauma  -Patient currently complaining of headache, neck pain and right knee pain  -  CT head:  no acute intracranial abnormality but did note atrophy and chronic changes of ischemia  -Telemetry showed mild sinus bradycardia  -Transthoracic echo is normal  -Continue monitoring on telemetry  Dose of lisinopril reduced, amlodipine held  IV hydration  PT OT evaluation  We will check level of cortisol, TSH, B12, CPK    Alcoholism (HCC)- (present on admission)  Assessment & Plan  History of.  Denies recent alcohol intake.  We will start B1 supplementation    Cervical spine fracture (HCC)- (present on admission)  Assessment & Plan  Patient has prior history of fracture in C4, C5 spinous  processes, status post anterior fusion C5-C6 by Dr. Wilkins  -Post syncopal episode  -Neurosurgery was consulted, patient is currently in [place] J collar  -Recommended conservative treatment, follow-up with neurosurgery after discharge with repeat x-ray  Pain control  PT OT evaluation    Tobacco abuse  Assessment & Plan  Patient received smoking cessation counseling on admission    Thrombocytopenia (HCC)- (present on admission)  Assessment & Plan  -No sign of gross bleeding  -Repeat CBC in the morning    Hypertension- (present on admission)  Assessment & Plan  Blood pressure is soft.  I held amlodipine and reduce dose of lisinopril from 30 to 20 mg once a day  -PRN enalapril  -Adjust as needed    Normocytic anemia- (present on admission)  Assessment & Plan  -No sign of gross bleeding  -Repeat CBC in the morning       VTE prophylaxis: Heparin

## 2020-02-05 NOTE — PROGRESS NOTES
2 RN skin check completed w/ Shannan TSANG.   Devices in place J collar.  Skin assessed under devices intact.  Confirmed pressure ulcers found on n/a.  New potential pressure ulcers noted on n/a. Wound consult placed n/a.  The following interventions in place: pillows for support and positioning, linen changes as needed.     Abrasions on face and nose  9 forehead sutures, 2 nose sutures  J collar in place.   Swelling to R knee.     All other skin clean, dry and intact.

## 2020-02-05 NOTE — PROGRESS NOTES
Spoke with Dr Vargas re C7 fx, s/p syncopal fall.  He is at his neurological baseline with some BUE numbness and tingling and neck pain    I reviewed CT.    Patient needs [place] J and he should follow up in Dr Cee clinic 2 weeks after DC from hospital with Cervical 4 view xrays    Please call [facility] if any questions or concerns

## 2020-02-05 NOTE — H&P
Hospital Medicine History & Physical Note    Date of Service  2/4/2020    Primary Care Physician  Pcp Pt States None    Consultants  Neurosurgery    Code Status  Full    Chief Complaint  Loss of consciousness    History of Presenting Illness  69 y.o. male who presented 2/4/2020 with syncope with resultant pain.  Patient states he had not felt well today, had fatigue.  He states that he was sitting on his couch and developed nausea.  He states he got up to go to the bathroom because he was afraid he may throw up, next thing he knew he was on the ground with blood surrounding him.  He did have multiple lacerations as well as pain.  He states he did not instantly have pain but later developed headache, neck pain as well as right knee pain.  He states he had no dizziness prior to syncope, no warning.  He denied any palpitations.  Upon arrival, he was noted to be bradycardic in the 40s, he also was noted to have a C7 fracture.  I did discuss the case including labs and imaging with the ER physician.    Review of Systems  Review of Systems   Constitutional: Negative for chills, fever and malaise/fatigue.   HENT: Negative for congestion.    Respiratory: Negative for cough, sputum production, shortness of breath and stridor.    Cardiovascular: Negative for chest pain, palpitations and leg swelling.   Gastrointestinal: Negative for abdominal pain, constipation, diarrhea, nausea and vomiting.   Genitourinary: Negative for dysuria and urgency.   Musculoskeletal: Positive for falls and joint pain (right knee, neck). Negative for myalgias.   Neurological: Positive for loss of consciousness and headaches. Negative for dizziness, tingling and weakness.   Psychiatric/Behavioral: Negative for depression and suicidal ideas.   All other systems reviewed and are negative.      Past Medical History   has a past medical history of Gout, Hypertension, and Psychiatric disorder.    Surgical History   has a past surgical history that  includes cervical disk and fusion anterior (9/2/2018); other orthopedic surgery; pr dx bone marrow aspirations (Left, 10/2/2019); and pr dx bone marrow biopsies (10/2/2019).     Family History  family history includes Alzheimer's Disease in his father; Cancer in his father and mother; Heart Disease in his brother and sister.     Social History   reports that he has been smoking cigarettes. He has a 2.50 pack-year smoking history. He has never used smokeless tobacco. He reports current alcohol use of about 10.8 - 12.0 oz of alcohol per week. He reports current drug use. Drug: Inhaled.    Allergies  No Known Allergies    Medications  Prior to Admission Medications   Prescriptions Last Dose Informant Patient Reported? Taking?   DULoxetine (CYMBALTA) 60 MG Cap DR Particles delayed-release capsule 2/2/2020 at ran out Patient Yes No   Sig: Take 60 mg by mouth every day.   QUEtiapine (SEROQUEL) 200 MG Tab 1/28/2020 at ran out Patient Yes No   Sig: Take 200 mg by mouth every bedtime.   amLODIPine (NORVASC) 5 MG Tab 2/4/2020 at 0700 Patient Yes No   Sig: Take 5 mg by mouth every morning.   gabapentin (NEURONTIN) 300 MG Cap 2/2/2020 at ran out Patient Yes No   Sig: Take 300 mg by mouth 3 times a day.   lisinopril (PRINIVIL) 30 MG tablet 2/4/2020 at 0700 Patient Yes No   Sig: Take 30 mg by mouth every morning.   naproxen (NAPROSYN) 500 MG Tab 1/28/2020 at Unknown time Patient's Home Pharmacy Yes Yes   Sig: Take 1 Tab by mouth 2 times a day as needed.   traZODone (DESYREL) 150 MG Tab 1/28/2020 at ran out Patient Yes No   Sig: Take 150 mg by mouth every evening.      Facility-Administered Medications: None       Physical Exam  Temp:  [36.2 °C (97.1 °F)] 36.2 °C (97.1 °F)  Pulse:  [48-62] 54  Resp:  [16-20] 16  BP: ()/(58-78) 126/76  SpO2:  [91 %-100 %] 98 %    Physical Exam  Vitals signs and nursing note reviewed.   Constitutional:       General: He is not in acute distress.     Appearance: He is well-developed. He is not  toxic-appearing or diaphoretic.   HENT:      Head: Normocephalic.      Comments: Bruising and laceration above left eye     Right Ear: External ear normal.      Left Ear: External ear normal.      Mouth/Throat:      Mouth: Mucous membranes are dry.      Pharynx: No oropharyngeal exudate.   Eyes:      General: No scleral icterus.        Right eye: No discharge.         Left eye: No discharge.      Extraocular Movements: Extraocular movements intact.   Neck:      Musculoskeletal: Neck supple. No edema or erythema.      Trachea: No tracheal deviation.      Comments: In [place] J collar  Cardiovascular:      Rate and Rhythm: Normal rate and regular rhythm.      Heart sounds: No murmur. No friction rub. No gallop.    Pulmonary:      Effort: Pulmonary effort is normal. No respiratory distress.      Breath sounds: Normal breath sounds. No stridor. No wheezing or rales.   Chest:      Chest wall: No tenderness.   Abdominal:      General: Bowel sounds are normal. There is no distension.      Palpations: Abdomen is soft.      Tenderness: There is no tenderness.   Musculoskeletal: Normal range of motion.         General: Swelling (right knee ) present. No tenderness.      Right lower leg: No edema.      Left lower leg: No edema.   Lymphadenopathy:      Cervical: No cervical adenopathy.   Skin:     General: Skin is warm and dry.      Findings: No erythema or rash.   Neurological:      General: No focal deficit present.      Mental Status: He is alert and oriented to person, place, and time.      Cranial Nerves: No cranial nerve deficit.   Psychiatric:         Mood and Affect: Mood normal.         Behavior: Behavior normal.         Thought Content: Thought content normal.         Judgment: Judgment normal.         Laboratory:  Recent Labs     02/04/20  1520   WBC 5.2   RBC 3.66*   HEMOGLOBIN 12.1*   HEMATOCRIT 35.8*   MCV 97.8   MCH 33.1*   MCHC 33.8   RDW 48.2   PLATELETCT 104*   MPV 11.4     Recent Labs     02/04/20  1520    SODIUM 132*   POTASSIUM 4.0   CHLORIDE 99   CO2 27   GLUCOSE 121*   BUN 13   CREATININE 0.77   CALCIUM 9.1     Recent Labs     02/04/20  1520   ALTSGPT 18   ASTSGOT 41   ALKPHOSPHAT 38   TBILIRUBIN 0.7   GLUCOSE 121*     Recent Labs     02/04/20  1520   APTT 26.6   INR 1.28*     No results for input(s): NTPROBNP in the last 72 hours.      Recent Labs     02/04/20  1520   TROPONINT 8       Urinalysis:    No results found     Imaging:  CT-HEAD W/O   Final Result      1.  No acute intracranial findings.      2.  Diffuse atrophy and periventricular white matter changes consistent with chronic small vessel disease.         CT-MAXILLOFACIAL W/O   Final Result         1.  Bilateral nasal bone fractures      CT-CSPINE WITHOUT PLUS RECONS   Final Result      1.  New transverse lucency within C7 suspicious for a fracture      2.  MRI recommended for further assessment      3.  Otherwise, no significant changes      Findings were discussed with RAFA BIRD on 2/4/2020 5:52 PM.      DX-FEMUR-2+ RIGHT   Final Result      Negative femur series.      DX-KNEE 3 VIEWS RIGHT   Final Result         1.  Severe osteoarthritic changes of the lateral compartment of the right knee.         2. No evidence of acute right knee fracture.      DX-CHEST-PORTABLE (1 VIEW)   Final Result      No acute cardiopulmonary abnormality identified.            Assessment/Plan:  I anticipate this patient will require at least two midnights for appropriate medical management, necessitating inpatient admission.    * Syncope- (present on admission)  Assessment & Plan  -Resulting in significant trauma  -Patient currently complaining of headache, neck pain and right knee pain  -I did personally review her CT head, noted no acute intracranial abnormality but did note atrophy and chronic changes of ischemia  -He continues to have significant right knee pain, swelling noted on exam, if no improvement, consider CT scan  -Concern for bradycardia or arrhythmia  considering the suddenness without warning  -Monitor patient on telemetry  -Obtain echocardiogram    Cervical spine fracture (HCC)- (present on admission)  Assessment & Plan  -Post syncopal episode  -Neurosurgery was consulted, patient is currently in [facility]  -Await additional recommendations    Tobacco abuse  Assessment & Plan  -Tobacco cessation counseling and education provided for more than 11 minutes. Nicotine replacement options provided including patch, and further medical treatments including Wellbutrin and chantix.  As well as over the counter options of lozenges and gum    Thrombocytopenia (HCC)- (present on admission)  Assessment & Plan  -No sign of gross bleeding  -Repeat CBC in the morning    Hypertension- (present on admission)  Assessment & Plan  -Continue home lisinopril and amlodipine  -Start PRN enalapril  -Adjust as needed    Normocytic anemia- (present on admission)  Assessment & Plan  -No sign of gross bleeding  -Repeat CBC in the morning      VTE prophylaxis: SCDs

## 2020-02-05 NOTE — PROGRESS NOTES
[place] J collar applied to pt. Cervical precautions maintained during application. Any further questions or assistance please call ortho tech at [phone].

## 2020-02-06 PROBLEM — T79.6XXA TRAUMATIC RHABDOMYOLYSIS (HCC): Status: ACTIVE | Noted: 2020-02-06

## 2020-02-06 LAB
ANION GAP SERPL CALC-SCNC: 4 MMOL/L (ref 0–11.9)
APPEARANCE FLD: NORMAL
BODY FLD TYPE: NORMAL
BUN SERPL-MCNC: 8 MG/DL (ref 8–22)
CALCIUM SERPL-MCNC: 8 MG/DL (ref 8.5–10.5)
CHLORIDE SERPL-SCNC: 104 MMOL/L (ref 96–112)
CO2 SERPL-SCNC: 25 MMOL/L (ref 20–33)
COLOR FLD: NORMAL
CREAT SERPL-MCNC: 0.62 MG/DL (ref 0.5–1.4)
EOSINOPHIL NFR FLD: 1 %
ERYTHROCYTE [DISTWIDTH] IN BLOOD BY AUTOMATED COUNT: 48.6 FL (ref 35.9–50)
GLUCOSE SERPL-MCNC: 87 MG/DL (ref 65–99)
GRAM STN SPEC: NORMAL
HCT VFR BLD AUTO: 32.1 % (ref 42–52)
HGB BLD-MCNC: 11 G/DL (ref 14–18)
LYMPHOCYTES NFR FLD: 5 %
MCH RBC QN AUTO: 33.8 PG (ref 27–33)
MCHC RBC AUTO-ENTMCNC: 34.3 G/DL (ref 33.7–35.3)
MCV RBC AUTO: 98.8 FL (ref 81.4–97.8)
MONONUC CELLS NFR FLD: 1 %
NEUTROPHILS NFR FLD: 93 %
PLATELET # BLD AUTO: 66 K/UL (ref 164–446)
PMV BLD AUTO: 11.4 FL (ref 9–12.9)
POTASSIUM SERPL-SCNC: 4.2 MMOL/L (ref 3.6–5.5)
RBC # BLD AUTO: 3.25 M/UL (ref 4.7–6.1)
RBC # FLD: NORMAL CELLS/UL
SIGNIFICANT IND 70042: NORMAL
SITE SITE: NORMAL
SODIUM SERPL-SCNC: 133 MMOL/L (ref 135–145)
SOURCE SOURCE: NORMAL
WBC # BLD AUTO: 3.5 K/UL (ref 4.8–10.8)
WBC # FLD: 5460 CELLS/UL

## 2020-02-06 PROCEDURE — 700102 HCHG RX REV CODE 250 W/ 637 OVERRIDE(OP): Performed by: INTERNAL MEDICINE

## 2020-02-06 PROCEDURE — 99222 1ST HOSP IP/OBS MODERATE 55: CPT | Mod: 25 | Performed by: PHYSICAL MEDICINE & REHABILITATION

## 2020-02-06 PROCEDURE — 36415 COLL VENOUS BLD VENIPUNCTURE: CPT

## 2020-02-06 PROCEDURE — 700111 HCHG RX REV CODE 636 W/ 250 OVERRIDE (IP): Performed by: INTERNAL MEDICINE

## 2020-02-06 PROCEDURE — 87205 SMEAR GRAM STAIN: CPT

## 2020-02-06 PROCEDURE — 97165 OT EVAL LOW COMPLEX 30 MIN: CPT

## 2020-02-06 PROCEDURE — 80048 BASIC METABOLIC PNL TOTAL CA: CPT

## 2020-02-06 PROCEDURE — 89051 BODY FLUID CELL COUNT: CPT

## 2020-02-06 PROCEDURE — 700105 HCHG RX REV CODE 258: Performed by: INTERNAL MEDICINE

## 2020-02-06 PROCEDURE — 770020 HCHG ROOM/CARE - TELE (206)

## 2020-02-06 PROCEDURE — 85027 COMPLETE CBC AUTOMATED: CPT

## 2020-02-06 PROCEDURE — 99232 SBSQ HOSP IP/OBS MODERATE 35: CPT | Performed by: INTERNAL MEDICINE

## 2020-02-06 PROCEDURE — 0S9C3ZZ DRAINAGE OF RIGHT KNEE JOINT, PERCUTANEOUS APPROACH: ICD-10-PCS | Performed by: PHYSICAL MEDICINE & REHABILITATION

## 2020-02-06 PROCEDURE — A9270 NON-COVERED ITEM OR SERVICE: HCPCS | Performed by: INTERNAL MEDICINE

## 2020-02-06 PROCEDURE — 700102 HCHG RX REV CODE 250 W/ 637 OVERRIDE(OP): Performed by: PHYSICAL MEDICINE & REHABILITATION

## 2020-02-06 PROCEDURE — 20610 DRAIN/INJ JOINT/BURSA W/O US: CPT | Performed by: PHYSICAL MEDICINE & REHABILITATION

## 2020-02-06 PROCEDURE — 87070 CULTURE OTHR SPECIMN AEROBIC: CPT

## 2020-02-06 PROCEDURE — A9270 NON-COVERED ITEM OR SERVICE: HCPCS | Performed by: PHYSICAL MEDICINE & REHABILITATION

## 2020-02-06 RX ORDER — GABAPENTIN 300 MG/1
300 CAPSULE ORAL 3 TIMES DAILY
Status: DISCONTINUED | OUTPATIENT
Start: 2020-02-06 | End: 2020-02-07 | Stop reason: HOSPADM

## 2020-02-06 RX ADMIN — ENOXAPARIN SODIUM 40 MG: 100 INJECTION SUBCUTANEOUS at 05:21

## 2020-02-06 RX ADMIN — TRAZODONE HYDROCHLORIDE 50 MG: 50 TABLET ORAL at 20:42

## 2020-02-06 RX ADMIN — SENNOSIDES AND DOCUSATE SODIUM 2 TABLET: 8.6; 5 TABLET ORAL at 16:13

## 2020-02-06 RX ADMIN — GABAPENTIN 200 MG: 100 CAPSULE ORAL at 10:28

## 2020-02-06 RX ADMIN — OXYCODONE HYDROCHLORIDE 10 MG: 10 TABLET ORAL at 16:14

## 2020-02-06 RX ADMIN — OXYCODONE HYDROCHLORIDE 10 MG: 10 TABLET ORAL at 10:28

## 2020-02-06 RX ADMIN — OXYCODONE HYDROCHLORIDE 10 MG: 10 TABLET ORAL at 01:52

## 2020-02-06 RX ADMIN — OXYCODONE HYDROCHLORIDE 10 MG: 10 TABLET ORAL at 05:21

## 2020-02-06 RX ADMIN — QUETIAPINE FUMARATE 200 MG: 200 TABLET ORAL at 20:42

## 2020-02-06 RX ADMIN — SODIUM CHLORIDE: 9 INJECTION, SOLUTION INTRAVENOUS at 10:30

## 2020-02-06 RX ADMIN — SENNOSIDES AND DOCUSATE SODIUM 2 TABLET: 8.6; 5 TABLET ORAL at 05:21

## 2020-02-06 RX ADMIN — GABAPENTIN 200 MG: 100 CAPSULE ORAL at 05:21

## 2020-02-06 RX ADMIN — DULOXETINE HYDROCHLORIDE 60 MG: 60 CAPSULE, DELAYED RELEASE ORAL at 05:21

## 2020-02-06 RX ADMIN — GABAPENTIN 300 MG: 300 CAPSULE ORAL at 16:13

## 2020-02-06 RX ADMIN — OXYCODONE HYDROCHLORIDE 10 MG: 10 TABLET ORAL at 20:42

## 2020-02-06 RX ADMIN — LISINOPRIL 20 MG: 20 TABLET ORAL at 05:21

## 2020-02-06 ASSESSMENT — COGNITIVE AND FUNCTIONAL STATUS - GENERAL
HELP NEEDED FOR BATHING: A LOT
DRESSING REGULAR UPPER BODY CLOTHING: A LITTLE
TOILETING: A LITTLE
DRESSING REGULAR LOWER BODY CLOTHING: A LOT
SUGGESTED CMS G CODE MODIFIER DAILY ACTIVITY: CK
PERSONAL GROOMING: A LITTLE
DAILY ACTIVITIY SCORE: 17

## 2020-02-06 ASSESSMENT — ACTIVITIES OF DAILY LIVING (ADL): TOILETING: INDEPENDENT

## 2020-02-06 ASSESSMENT — ENCOUNTER SYMPTOMS
HEMOPTYSIS: 0
HALLUCINATIONS: 0
DOUBLE VISION: 0
SPEECH CHANGE: 0
PALPITATIONS: 0
PHOTOPHOBIA: 0
HEADACHES: 0
SPUTUM PRODUCTION: 0
FOCAL WEAKNESS: 0
HEARTBURN: 0
NERVOUS/ANXIOUS: 0
NECK PAIN: 1
BACK PAIN: 0
CHILLS: 0
POLYDIPSIA: 0
VOMITING: 0
NAUSEA: 0
FLANK PAIN: 0
COUGH: 0
TREMORS: 0
ORTHOPNEA: 0
WEIGHT LOSS: 0
FEVER: 0
BLURRED VISION: 0
BRUISES/BLEEDS EASILY: 0

## 2020-02-06 ASSESSMENT — LIFESTYLE VARIABLES: SUBSTANCE_ABUSE: 0

## 2020-02-06 NOTE — THERAPY
"Occupational Therapy Evaluation completed.   Functional Status:  Min A for log roll to EOB, mod A w/LB dressing, min A sit>Stand, min A w/txf to chair, on going c/o of RLE knee pain notable edema, max A for collar management mild c/o dizziness but reports it resolved. Roommate was present at start of session and reported he is at work all day unable to assist pt. Pt remained up in chair w/alarm on and call light w/in reach   Plan of Care: Will benefit from Occupational Therapy 4 times per week  Discharge Recommendations:  Equipment: Will Continue to Assess for Equipment Needs. Post-acute therapy Recommend post-acute placement for additional occupational therapy services prior to discharge home.      See \"Rehab Therapy-Acute\" Patient Summary Report for complete documentation.        69 yr old male admitted for fall after syncopal event, PMHX: Gout, HTN, psychiatric d/o, and cervical fusion. This admission pt is dx w/syncope appears to be consistent w/hypovolemia, thrombocytopenia, cervical spine fx being managed non-op in a collar, and alcoholism. Pt is demonstrating impaired mobility d/t R-knee pain, decreased balance and activity tolerance and remains a high fall risk. Pt will benefit from acute OT and currently recommend post acute placement prior to d/c home   "

## 2020-02-06 NOTE — CONSULTS
Physical Medicine and Rehabilitation Consultation         Initial Consult      Date of Consultation: 2/6/2020  Consulting provider: Dewayne Miner MD  Reason for consultation: assess for acute inpatient rehab appropriateness  LOS: 2 Day(s)    Chief complaint: C7 fx s/p GLF    HPI: The patient is a 69 y.o. right hand dominant male with a past medical history of gout, hypertension, psychiatric disorder;  who presented on 2/4/2020  3:10 PM with neck pain status post ground-level fall.  Patient was sitting on the couch in felt nauseous and got up to go to the bathroom.  Next thing he knew he was on the ground with blood surrounding him.  Work-up in the emergency department discovered a C7 fracture, seen by neurosurgery and determined not to be surgical.  Patient is placed in a [facility] brace.    The patient currently reports overall doing well.  There is a visibly swollen right knee without signs of ecchymosis or erythema.  He is missing his right index finger at the PIP.  Patient endorses numbness and tingling in his bilateral hands and feet.  Patient endorses constipation      ROS:  Pertinent positives are listed in HPI, all other systems reviewed and are negative      Social Hx:  Pre-morbidly, this patient lived in a single level home with One steps to enter, with friend.   Employment: Retired  Tobacco: Smokes 1 pack a week  Alcohol: Denies, quit 6 months ago  Drugs: Denies    Current level of function:  The patient was evaluated by acute care Physical Therapy and Occupational Therapy; currently requiring moderate assistance for mobility and minimal assistance for ADLs,    PMH:  Past Medical History:   Diagnosis Date   • Gout    • Hypertension    • Psychiatric disorder        PSH:  Past Surgical History:   Procedure Laterality Date   • [place] DX BONE MARROW ASPIRATIONS Left 10/2/2019    Procedure: ASPIRATION, BONE MARROW - APARICIO;  Surgeon: Tamera Leos M.D.;  Location: [facility];  Service:  "Orthopedics   • AL DX BONE MARROW BIOPSIES  10/2/2019    Procedure: BIOPSY, BONE MARROW, USING NEEDLE OR TROCAR;  Surgeon: Tamera Leos M.D.;  Location: ENDOSCOPY [facility];  Service: Orthopedics   • CERVICAL DISK AND FUSION ANTERIOR  9/2/2018    Procedure: CERVICAL DISK AND FUSION ANTERIOR C5-C6;  Surgeon: Varghese Wilkins M.D.;  Location: SURGERY [facility];  Service: Neurosurgery   • OTHER ORTHOPEDIC SURGERY      fision with disc removal       FHX:    Family History   Problem Relation Age of Onset   • Cancer Mother    • Alzheimer's Disease Father    • Cancer Father    • Heart Disease Sister    • Heart Disease Brother        Medications:  Current Facility-Administered Medications   Medication Dose   • lisinopril (PRINIVIL) tablet 20 mg  20 mg   • traZODone (DESYREL) tablet 50 mg  50 mg   • enoxaparin (LOVENOX) inj 40 mg  40 mg   • gabapentin (NEURONTIN) capsule 200 mg  200 mg   • DULoxetine (CYMBALTA) capsule 60 mg  60 mg   • QUEtiapine (SEROQUEL) tablet 200 mg  200 mg   • senna-docusate (PERICOLACE or SENOKOT S) 8.6-50 MG per tablet 2 Tab  2 Tab    And   • polyethylene glycol/lytes (MIRALAX) PACKET 1 Packet  1 Packet    And   • magnesium hydroxide (MILK OF MAGNESIA) suspension 30 mL  30 mL    And   • bisacodyl (DULCOLAX) suppository 10 mg  10 mg   • acetaminophen (TYLENOL) tablet 650 mg  650 mg   • enalaprilat (VASOTEC) injection 1.25 mg  1.25 mg   • ondansetron (ZOFRAN) syringe/vial injection 4 mg  4 mg   • ondansetron (ZOFRAN ODT) dispertab 4 mg  4 mg   • Pharmacy Consult Request ...Pain Management Review 1 Each  1 Each    And   • oxyCODONE immediate-release (ROXICODONE) tablet 5 mg  5 mg    And   • oxyCODONE immediate-release (ROXICODONE) tablet 10 mg  10 mg    And   • HYDROmorphone pf (DILAUDID) injection 0.5 mg  0.5 mg       Allergies:  No Known Allergies    Physical Exam:  Vitals: /67   Pulse 62   Temp 36.5 °C (97.7 °F) (Temporal)   Resp 16   Ht 1.803 m (5' 11\")   Wt 100.8 kg (222 lb " 3.6 oz)   SpO2 96%   Gen: NAD  Head: Multiple lacerations on the head and face, closed with surgical glue and sutures  Eyes/ Nose/ Mouth: PERRLA, moist mucous membranes  Cardio: RRR, no mumurs  Pulm: CTAB, with normal respiratory effort  Abd: Soft NTND, active bowel sounds,   Ext: No peripheral edema. No calf tenderness. No clubbing/cyanosis.  Right knee swollen and tense without signs of erythema or ecchymosis    Mental status:  A&Ox4 (person, place, date, situation) answers questions appropriately follows commands  Speech: fluent, no aphasia or dysarthria    CRANIAL NERVES:  2,3: visual acuity grossly intact, PERRL  3,4,6: EOMI bilaterally, no nystagmus or diplopia  5: sensation intact to light touch bilaterally and symmetric  7: no facial asymmetry  8: hearing grossly intact  9,10: symmetric palate elevation  11: SCM/Trapezius strength 5/5 bilaterally  12: tongue protrudes midline    Motor:      Shoulder flexors:  Bilateral -  5/5  Elbow flexors:  Bilateral -  5/5  Wrist extensors:  Bilateral -  5/5  Elbow extensors:  Bilateral -  5/5  Finger flexors:  Bilateral -  5/5  Finger abductors:  Bilateral -  4/5  Hip flexors:  Bilateral -  4/5  Knee ext:  Bilateral -  4/5  Dorsiflexors:  Bilateral -  5/5  EHL:  Bilateral -  5/5  Plantar flexors:  Bilateral -  5/5     Sensory:   Altered to light touch through out bilateral lower extremities    DTRs: 2+ in bilateral biceps, triceps, brachioradialis, 2+ in bilateral patellar and achilles tendons    Tone: no spasticity noted, no cogwheeling noted    Labs: Reviewed and significant for   Recent Labs     02/04/20  1520 02/06/20  0420   RBC 3.66* 3.25*   HEMOGLOBIN 12.1* 11.0*   HEMATOCRIT 35.8* 32.1*   PLATELETCT 104* 66*   PROTHROMBTM 16.3*  --    APTT 26.6  --    INR 1.28*  --      Recent Labs     02/04/20  1520 02/06/20  0420   SODIUM 132* 133*   POTASSIUM 4.0 4.2   CHLORIDE 99 104   CO2 27 25   GLUCOSE 121* 87   BUN 13 8   CREATININE 0.77 0.62   CALCIUM 9.1 8.0*      Recent Results (from the past 24 hour(s))   Basic Metabolic Panel (BMP)    Collection Time: 02/06/20  4:20 AM   Result Value Ref Range    Sodium 133 (L) 135 - 145 mmol/L    Potassium 4.2 3.6 - 5.5 mmol/L    Chloride 104 96 - 112 mmol/L    Co2 25 20 - 33 mmol/L    Glucose 87 65 - 99 mg/dL    Bun 8 8 - 22 mg/dL    Creatinine 0.62 0.50 - 1.40 mg/dL    Calcium 8.0 (L) 8.5 - 10.5 mg/dL    Anion Gap 4.0 0.0 - 11.9   CBC without Differential    Collection Time: 02/06/20  4:20 AM   Result Value Ref Range    WBC 3.5 (L) 4.8 - 10.8 K/uL    RBC 3.25 (L) 4.70 - 6.10 M/uL    Hemoglobin 11.0 (L) 14.0 - 18.0 g/dL    Hematocrit 32.1 (L) 42.0 - 52.0 %    MCV 98.8 (H) 81.4 - 97.8 fL    MCH 33.8 (H) 27.0 - 33.0 pg    MCHC 34.3 33.7 - 35.3 g/dL    RDW 48.6 35.9 - 50.0 fL    Platelet Count 66 (L) 164 - 446 K/uL    MPV 11.4 9.0 - 12.9 fL   ESTIMATED GFR    Collection Time: 02/06/20  4:20 AM   Result Value Ref Range    GFR If African American >60 >60 mL/min/1.73 m 2    GFR If Non African American >60 >60 mL/min/1.73 m 2       Imaging:   CT head 2/4/2020  1.  No acute intracranial findings.  2.  Diffuse atrophy and periventricular white matter changes consistent with chronic small vessel disease.    ASSESSMENT:  Patient is a 69 y.o. male admitted with C7 fracture and swollen right knee and multiple head and face lacerations after a ground-level fall.  Arthrocentesis for hemarthrosis done today, see separate procedure note.    Rehabilitation: Impaired ADLs and mobility  Patient is a good candidate for inpatient rehab based on needs for PT, OT.  Patient has a good discharge situation which will be home with roommate.   Barriers to transfer include: Insurance authorization, TCCs to verify disposition, medical clearance and bed availability     [facility] Code / Diagnosis to Support: 16 Debility (Non Cardiac, Non Pulmonary)    Syncope resulting in ground-level fall and C7 fracture  -Etiology thought to be orthostatic hypovolemic  -CT head  negative, TTE normal, telemetry negative for arrhythmia  -Surgical history of anterior fusion C5-C6 by Dr. lucas  -Neurosurgery recommended conservative treatment with [place] J collar  -Continue PT OT while in-house  -Anticipate patient will benefit from IPR services, assuming he has stable discharge situation with supervision.  This patient will require 24/7 supervision due to high risk of falls.    Hemarthrosis  - 100 mL of bloody knee aspirate removed today  -Patient experienced immediate relief of pain, as well as increase strength and range of motion after the procedure  - Blood in knee joint likely traumatic secondary to ground-level fall   - Fluid sent to lab for cell count and culture if needed  - Moderate risk for reaccumulation if vessel in knee joint area is still bleeding    Hypertension  -Vasotec injection as needed  -Lisinopril 20 mg tab every morning    Sleep  -Seroquel 200 mg nightly  -Trazodone 50 mg nightly    Pain  -Tylenol 650 every 6 hours as needed  -Roxicodone 5 to 10 mg every 3 hours as needed    Neuropathic pain  - Cymbalta 60 mg daily  - Gabapentin 200 mg 3 times daily -increasing to 300 mg 3 times daily    Bowel program-no documented stool since admission.  Recommend aggressive use of PRN stool softeners.  Titrate to 1 soft stool per day.  - Patient is constipated, likely related to immobility and opioid use, recommend the following:  - Senokot 1 tab nightly  - MiraLAX 1 packet twice daily PRN  - Milk of magnesia 30mL daily if no bowel movement in greater than 24 hours  - Dulcolax suppository 10 mg if patient goes more than 48 hours without a bowel movement  - Fleet enema if patient goes more than 72 hours without a bowel movement    DVT Prophylaxis:   -Lovenox 40 mg injection daily    Discussed with pt, summarized hospitalization and care, options for next step of care  Labs reviewed   Imaging personally reviewed and head CT shows no evidence of subarachnoid or subdural  hematoma  Discussed with team about recommendations     Thank you for allowing us to participate in the care of this patient.       Discussed with patient about recommendations for and plan for rehabilitation as follows. Patient with multiple co-morbidities(including but not limited to hypertension, anemia, neuropathic pain); with functional deficits in mobility/self-care, and Moderate de-conditioning.     Pre-morbidly, this patient lived in a single level home with One steps to enter, with roommate. The patient was evaluated by acute care Physical Therapy and Occupational Therapy; currently requiring moderate assistance for mobility and minimal assistance for ADLs    The patient is a(n) Very Good candidate for an acute inpatient rehabilitation program with a coordinated program of care at an intensity and frequency not available at a lower level of care.     Note: if patient continues to progress while waiting for medical clearance, and no longer requires 2 of of 3 therapy services (PT/OT/SLP) at a CGA/Minimal assistance level, patient will no longer need acute inpatient rehabilitation.    This recommendation is substantiated by the patient's current medical condition with intervention and assessment of medical issues requiring an acute level of care for patient's safety and maximum outcome. A coordinated program of care will be provided by an interdisciplinary team including physical therapy, occupational therapy, physiatry and rehab nursing. Rehab goals include improved mobility, self-care management, strength and conditioning/endurance, pain management, bowel and bladder management, mood and affect, and safety with independent home management including caregiver training.     Estimated length of stay is approximately 12 days. Rehab potential: Very Good. Disposition: to pre-morbid independent living setting with supportive care of patient's community resources. We will continue to follow with you in anticipation  of discharge to acute inpatient rehabilitation when medically stable to do so at the discretion of his  attending physician. Thank you for allowing us to participate in his care. Please call with any questions regarding this recommendation.    Chris Brown, DO   Physical Medicine and Rehabilitation

## 2020-02-06 NOTE — PROGRESS NOTES
Attempted to do orthostatics while PT was working with patient. Patient could not tolerate and denied. Will attempt again tomorrow.

## 2020-02-06 NOTE — PREADMISSION SCREENING NOTE
Pre-Admission Screening Form    Patient Information:   Name: Chris Leggett     MRN: [id number]       : 1950      Age: 69 y.o.   Gender: male      Race: White [7]       Marital Status: Single [1]  Family Contact: Anirudh Drake        Relationship: Friend [5]  Home Phone:            Cell Phone: [phone]  Advanced Directives: None  Code Status:  FULL  Current Attending Provider: Dewayne Miner M.D.  Referring Physician: Dr. Miner      Physiatrist Consult: London Shields       Referral Date: 2020  Primary Payor Source:  MEDICARE  Secondary Payor Source:  [employer]    Medical Information:   Date of Admission to Acute Care [place]  Room Number: T707/01  Rehabilitation Diagnosis: 16 Debility (Non Cardiac, Non Pulmonary)    Immunization History   Administered Date(s) Administered   • Hep A/HEP B Combined Vaccine (TwinRix) 2019   • Influenza Vaccine Adult HD 2018   • Pneumococcal Conjugate Vaccine (Prevnar/PCV-13) 2018   • Tdap Vaccine 2018     No Known Allergies  Past Medical History:   Diagnosis Date   • Gout    • Hypertension    • Psychiatric disorder      Past Surgical History:   Procedure Laterality Date   • TX DX BONE MARROW ASPIRATIONS Left 10/2/2019    Procedure: ASPIRATION, BONE MARROW - APARICIO;  Surgeon: Tamera Leos M.D.;  Location: [facility];  Service: Orthopedics   • TX DX BONE MARROW BIOPSIES  10/2/2019    Procedure: BIOPSY, BONE MARROW, USING NEEDLE OR TROCAR;  Surgeon: Tamera Leos M.D.;  Location: ENDOSCOPY [facility];  Service: Orthopedics   • CERVICAL DISK AND FUSION ANTERIOR  2018    Procedure: CERVICAL DISK AND FUSION ANTERIOR C5-C6;  Surgeon: Varghese Wilkins M.D.;  Location: SURGERY [facility];  Service: Neurosurgery   • OTHER ORTHOPEDIC SURGERY      fision with disc removal       History Leading to Admission, Conditions that Caused the Need for Rehab (CMS):     Tyrone Arnold D.O.   [facility] Medicine History & Physical Note     Date of Service  2/4/2020     Primary Care Physician  Pcp Pt States None     Consultants  Neurosurgery     Code Status  Full     Chief Complaint  Loss of consciousness     History of Presenting Illness  69 y.o. male who presented 2/4/2020 with syncope with resultant pain.  Patient states he had not felt well today, had fatigue.  He states that he was sitting on his couch and developed nausea.  He states he got up to go to the bathroom because he was afraid he may throw up, next thing he knew he was on the ground with blood surrounding him.  He did have multiple lacerations as well as pain.  He states he did not instantly have pain but later developed headache, neck pain as well as right knee pain.  He states he had no dizziness prior to syncope, no warning.  He denied any palpitations.  Upon arrival, he was noted to be bradycardic in the 40s, he also was noted to have a C7 fracture.  I did discuss the case including labs and imaging with the ER physician.           Assessment/Plan:  I anticipate this patient will require at least two midnights for appropriate medical management, necessitating inpatient admission.     * Syncope- (present on admission)  Assessment & Plan  -Resulting in significant trauma  -Patient currently complaining of headache, neck pain and right knee pain  -I did personally review her CT head, noted no acute intracranial abnormality but did note atrophy and chronic changes of ischemia  -He continues to have significant right knee pain, swelling noted on exam, if no improvement, consider CT scan  -Concern for bradycardia or arrhythmia considering the suddenness without warning  -Monitor patient on telemetry  -Obtain echocardiogram     Cervical spine fracture (HCC)- (present on admission)  Assessment & Plan  -Post syncopal episode  -Neurosurgery was consulted, patient is currently in [facility]  -Await additional  recommendations     Tobacco abuse  Assessment & Plan  -Tobacco cessation counseling and education provided for more than 11 minutes. Nicotine replacement options provided including patch, and further medical treatments including Wellbutrin and chantix.  As well as over the counter options of lozenges and gum     Thrombocytopenia (HCC)- (present on admission)  Assessment & Plan  -No sign of gross bleeding  -Repeat CBC in the morning     Hypertension- (present on admission)  Assessment & Plan  -Continue home lisinopril and amlodipine  -Start PRN enalapril  -Adjust as needed     Normocytic anemia- (present on admission)  Assessment & Plan  -No sign of gross bleeding  -Repeat CBC in the morning        VTE prophylaxis: SCDs          Chris Brown D.O.   Physician   Physical Medicine & Rehab     Date of Consultation: 2/6/2020  Consulting provider: Dewayne Miner MD  Reason for consultation: assess for acute inpatient rehab appropriateness  LOS: 2 Day(s)     Chief complaint: C7 fx s/p GLF     HPI: The patient is a 69 y.o. right hand dominant male with a past medical history of gout, hypertension, psychiatric disorder;  who presented on 2/4/2020  3:10 PM with neck pain status post ground-level fall.  Patient was sitting on the couch in felt nauseous and got up to go to the bathroom.  Next thing he knew he was on the ground with blood surrounding him.  Work-up in the emergency department discovered a C7 fracture, seen by neurosurgery and determined not to be surgical.  Patient is placed in a [facility] brace.     The patient currently reports overall doing well.  There is a visibly swollen right knee without signs of ecchymosis or erythema.  He is missing his right index finger at the PIP.  Patient endorses numbness and tingling in his bilateral hands and feet.  Patient endorses constipation        ROS:  Pertinent positives are listed in HPI, all other systems reviewed and are negative        Social Hx:  Pre-morbidly, this  patient lived in a single level home with One steps to enter, with friend.   Employment: Retired  Tobacco: Smokes 1 pack a week  Alcohol: Denies, quit 6 months ago  Drugs: Denies     Current level of function:  The patient was evaluated by acute care Physical Therapy and Occupational Therapy; currently requiring moderate assistance for mobility and minimal assistance for ADLs,     PMH:  Past Medical History        Past Medical History:   Diagnosis Date   • Gout     • Hypertension     • Psychiatric disorder              PSH:  Past Surgical History   Past Surgical History:   Procedure Laterality Date   • CA DX BONE MARROW ASPIRATIONS Left 10/2/2019     Procedure: ASPIRATION, BONE MARROW - APARICIO;  Surgeon: Tamera Leos M.D.;  Location: ENDOSCOPY [facility];  Service: Orthopedics   • CA DX BONE MARROW BIOPSIES   10/2/2019     Procedure: BIOPSY, BONE MARROW, USING NEEDLE OR TROCAR;  Surgeon: Tamera Leos M.D.;  Location: ENDOSCOPY [facility];  Service: Orthopedics   • CERVICAL DISK AND FUSION ANTERIOR   9/2/2018     Procedure: CERVICAL DISK AND FUSION ANTERIOR C5-C6;  Surgeon: Varghese Wilkins M.D.;  Location: SURGERY [facility];  Service: Neurosurgery   • OTHER ORTHOPEDIC SURGERY         fision with disc removal            FHX:     Family History         Family History   Problem Relation Age of Onset   • Cancer Mother     • Alzheimer's Disease Father     • Cancer Father     • Heart Disease Sister     • Heart Disease Brother              Medications:       Current Facility-Administered Medications   Medication Dose   • lisinopril (PRINIVIL) tablet 20 mg  20 mg   • traZODone (DESYREL) tablet 50 mg  50 mg   • enoxaparin (LOVENOX) inj 40 mg  40 mg   • gabapentin (NEURONTIN) capsule 200 mg  200 mg   • DULoxetine (CYMBALTA) capsule 60 mg  60 mg   • QUEtiapine (SEROQUEL) tablet 200 mg  200 mg   • senna-docusate (PERICOLACE or SENOKOT S) 8.6-50 MG per tablet 2 Tab  2 Tab     And   • polyethylene glycol/lytes  "(MIRALAX) PACKET 1 Packet  1 Packet     And   • magnesium hydroxide (MILK OF MAGNESIA) suspension 30 mL  30 mL     And   • bisacodyl (DULCOLAX) suppository 10 mg  10 mg   • acetaminophen (TYLENOL) tablet 650 mg  650 mg   • enalaprilat (VASOTEC) injection 1.25 mg  1.25 mg   • ondansetron (ZOFRAN) syringe/vial injection 4 mg  4 mg   • ondansetron (ZOFRAN ODT) dispertab 4 mg  4 mg   • Pharmacy Consult Request ...Pain Management Review 1 Each  1 Each     And   • oxyCODONE immediate-release (ROXICODONE) tablet 5 mg  5 mg     And   • oxyCODONE immediate-release (ROXICODONE) tablet 10 mg  10 mg     And   • HYDROmorphone pf (DILAUDID) injection 0.5 mg  0.5 mg         Allergies:  No Known Allergies     Physical Exam:  Vitals: /67   Pulse 62   Temp 36.5 °C (97.7 °F) (Temporal)   Resp 16   Ht 1.803 m (5' 11\")   Wt 100.8 kg (222 lb 3.6 oz)   SpO2 96%   Gen: NAD  Head: Multiple lacerations on the head and face, closed with surgical glue and sutures  Eyes/ Nose/ Mouth: PERRLA, moist mucous membranes  Cardio: RRR, no mumurs  Pulm: CTAB, with normal respiratory effort  Abd: Soft NTND, active bowel sounds,   Ext: No peripheral edema. No calf tenderness. No clubbing/cyanosis.  Right knee swollen and tense without signs of erythema or ecchymosis     Mental status:  A&Ox4 (person, place, date, situation) answers questions appropriately follows commands  Speech: fluent, no aphasia or dysarthria     CRANIAL NERVES:  2,3: visual acuity grossly intact, PERRL  3,4,6: EOMI bilaterally, no nystagmus or diplopia  5: sensation intact to light touch bilaterally and symmetric  7: no facial asymmetry  8: hearing grossly intact  9,10: symmetric palate elevation  11: SCM/Trapezius strength 5/5 bilaterally  12: tongue protrudes midline     Motor:                            Shoulder flexors:  Bilateral -  5/5  Elbow flexors:  Bilateral -  5/5  Wrist extensors:  Bilateral -  5/5  Elbow extensors:  Bilateral -  5/5  Finger flexors:  " Bilateral -  5/5  Finger abductors:  Bilateral -  4/5  Hip flexors:  Bilateral -  4/5  Knee ext:  Bilateral -  4/5  Dorsiflexors:  Bilateral -  5/5  EHL:  Bilateral -  5/5  Plantar flexors:  Bilateral -  5/5      Sensory:   Altered to light touch through out bilateral lower extremities     DTRs: 2+ in bilateral biceps, triceps, brachioradialis, 2+ in bilateral patellar and achilles tendons     Tone: no spasticity noted, no cogwheeling noted     Labs: Reviewed and significant for        Recent Labs     02/04/20 1520 02/06/20  0420   RBC 3.66* 3.25*   HEMOGLOBIN 12.1* 11.0*   HEMATOCRIT 35.8* 32.1*   PLATELETCT 104* 66*   PROTHROMBTM 16.3*  --    APTT 26.6  --    INR 1.28*  --            Recent Labs     02/04/20 1520 02/06/20  0420   SODIUM 132* 133*   POTASSIUM 4.0 4.2   CHLORIDE 99 104   CO2 27 25   GLUCOSE 121* 87   BUN 13 8   CREATININE 0.77 0.62   CALCIUM 9.1 8.0*      Recent Results         Recent Results (from the past 24 hour(s))   Basic Metabolic Panel (BMP)     Collection Time: 02/06/20  4:20 AM   Result Value Ref Range     Sodium 133 (L) 135 - 145 mmol/L     Potassium 4.2 3.6 - 5.5 mmol/L     Chloride 104 96 - 112 mmol/L     Co2 25 20 - 33 mmol/L     Glucose 87 65 - 99 mg/dL     Bun 8 8 - 22 mg/dL     Creatinine 0.62 0.50 - 1.40 mg/dL     Calcium 8.0 (L) 8.5 - 10.5 mg/dL     Anion Gap 4.0 0.0 - 11.9   CBC without Differential     Collection Time: 02/06/20  4:20 AM   Result Value Ref Range     WBC 3.5 (L) 4.8 - 10.8 K/uL     RBC 3.25 (L) 4.70 - 6.10 M/uL     Hemoglobin 11.0 (L) 14.0 - 18.0 g/dL     Hematocrit 32.1 (L) 42.0 - 52.0 %     MCV 98.8 (H) 81.4 - 97.8 fL     MCH 33.8 (H) 27.0 - 33.0 pg     MCHC 34.3 33.7 - 35.3 g/dL     RDW 48.6 35.9 - 50.0 fL     Platelet Count 66 (L) 164 - 446 K/uL     MPV 11.4 9.0 - 12.9 fL   ESTIMATED GFR     Collection Time: 02/06/20  4:20 AM   Result Value Ref Range     GFR If African American >60 >60 mL/min/1.73 m 2     GFR If Non African American >60 >60 mL/min/1.73 m 2             Imaging:   CT head 2/4/2020  1.  No acute intracranial findings.  2.  Diffuse atrophy and periventricular white matter changes consistent with chronic small vessel disease.     ASSESSMENT:  Patient is a 69 y.o. male admitted with C7 fracture and swollen right knee and multiple head and face lacerations after a ground-level fall.  Arthrocentesis for hemarthrosis done today, see separate procedure note.     Rehabilitation: Impaired ADLs and mobility  Patient is a good candidate for inpatient rehab based on needs for PT, OT.  Patient has a good discharge situation which will be home with roommate.   Barriers to transfer include: Insurance authorization, TCCs to verify disposition, medical clearance and bed availability      [facility] Code / Diagnosis to Support: 16 Debility (Non Cardiac, Non Pulmonary)     Syncope resulting in ground-level fall and C7 fracture  -Etiology thought to be orthostatic hypovolemic  -CT head negative, TTE normal, telemetry negative for arrhythmia  -Surgical history of anterior fusion C5-C6 by Dr. lucas  -Neurosurgery recommended conservative treatment with [place] J collar  -Continue PT OT while in-house  -Anticipate patient will benefit from IPR services, assuming he has stable discharge situation with supervision.  This patient will require 24/7 supervision due to high risk of falls.     Hemarthrosis  - 100 mL of bloody knee aspirate removed today  -Patient experienced immediate relief of pain, as well as increase strength and range of motion after the procedure  - Blood in knee joint likely traumatic secondary to ground-level fall   - Fluid sent to lab for cell count and culture if needed  - Moderate risk for reaccumulation if vessel in knee joint area is still bleeding     Hypertension  -Vasotec injection as needed  -Lisinopril 20 mg tab every morning     Sleep  -Seroquel 200 mg nightly  -Trazodone 50 mg nightly     Pain  -Tylenol 650 every 6 hours as needed  -Roxicodone 5 to 10 mg  every 3 hours as needed     Neuropathic pain  - Cymbalta 60 mg daily  - Gabapentin 200 mg 3 times daily -increasing to 300 mg 3 times daily     Bowel program-no documented stool since admission.  Recommend aggressive use of PRN stool softeners.  Titrate to 1 soft stool per day.  - Patient is constipated, likely related to immobility and opioid use, recommend the following:  - Senokot 1 tab nightly  - MiraLAX 1 packet twice daily PRN  - Milk of magnesia 30mL daily if no bowel movement in greater than 24 hours  - Dulcolax suppository 10 mg if patient goes more than 48 hours without a bowel movement  - Fleet enema if patient goes more than 72 hours without a bowel movement     DVT Prophylaxis:   -Lovenox 40 mg injection daily     Discussed with pt, summarized hospitalization and care, options for next step of care  Labs reviewed   Imaging personally reviewed and head CT shows no evidence of subarachnoid or subdural hematoma  Discussed with team about recommendations      Thank you for allowing us to participate in the care of this patient.         Discussed with patient about recommendations for and plan for rehabilitation as follows. Patient with multiple co-morbidities(including but not limited to hypertension, anemia, neuropathic pain); with functional deficits in mobility/self-care, and Moderate de-conditioning.      Pre-morbidly, this patient lived in a single level home with One steps to enter, with roommate. The patient was evaluated by acute care Physical Therapy and Occupational Therapy; currently requiring moderate assistance for mobility and minimal assistance for ADLs     The patient is a(n) Very Good candidate for an acute inpatient rehabilitation program with a coordinated program of care at an intensity and frequency not available at a lower level of care.      Note: if patient continues to progress while waiting for medical clearance, and no longer requires 2 of of 3 therapy services (PT/OT/SLP) at a  CGA/Minimal assistance level, patient will no longer need acute inpatient rehabilitation.     This recommendation is substantiated by the patient's current medical condition with intervention and assessment of medical issues requiring an acute level of care for patient's safety and maximum outcome. A coordinated program of care will be provided by an interdisciplinary team including physical therapy, occupational therapy, physiatry and rehab nursing. Rehab goals include improved mobility, self-care management, strength and conditioning/endurance, pain management, bowel and bladder management, mood and affect, and safety with independent home management including caregiver training.      Estimated length of stay is approximately 12 days. Rehab potential: Very Good. Disposition: to pre-morbid independent living setting with supportive care of patient's community resources. We will continue to follow with you in anticipation of discharge to acute inpatient rehabilitation when medically stable to do so at the discretion of his  attending physician. Thank you for allowing us to participate in his care. Please call with any questions regarding this recommendation.     Chris Brown, DO   Physical Medicine and Rehabilitation                          Imaging:  CT-HEAD W/O   Final Result       1.  No acute intracranial findings.       2.  Diffuse atrophy and periventricular white matter changes consistent with chronic small vessel disease.           CT-MAXILLOFACIAL W/O   Final Result           1.  Bilateral nasal bone fractures       CT-CSPINE WITHOUT PLUS RECONS   Final Result       1.  New transverse lucency within C7 suspicious for a fracture       2.  MRI recommended for further assessment       3.  Otherwise, no significant changes       Findings were discussed with RAFA BIRD on 2/4/2020 5:52 PM.       DX-FEMUR-2+ RIGHT   Final Result       Negative femur series.       DX-KNEE 3 VIEWS RIGHT   Final Result          "  1.  Severe osteoarthritic changes of the lateral compartment of the right knee.           2. No evidence of acute right knee fracture.       DX-CHEST-PORTABLE (1 VIEW)   Final Result       No acute cardiopulmonary abnormality identified.          Co-morbidities: Please see below  Potential Risk - Complications: Pain, Pneumonia and Urinary Tract Infection  Level of Risk: High    Ongoing Medical Management Needed (Medical/Nursing Needs):   Patient Active Problem List    Diagnosis Date Noted   • Syncope 02/05/2020     Priority: High   • Alcoholism (HCC) 03/07/2019     Priority: High   • Suicidal ideation 12/06/2018     Priority: High   • Pain and swelling of right lower extremity 12/06/2018     Priority: High   • Mood disorder (HCC) 12/06/2018     Priority: Medium   • Cervical spine fracture ([facility]) 09/02/2018     Priority: Medium   • Coagulopathy ([facility]) 09/02/2018     Priority: Medium   • Gout 04/05/2019     Priority: Low   • Hypertension 12/06/2018     Priority: Low   • Lumbar radiculopathy 12/06/2018     Priority: Low   • Forehead laceration 09/05/2018     Priority: Low   • Trauma 09/03/2018     Priority: Low   • Normocytic anemia 09/02/2018     Priority: Low   • Tobacco abuse 02/05/2020   • Generalized weakness 10/13/2019   • Abdominal pain 09/28/2019   • Pancytopenia ([facility]) 09/28/2019   • Diarrhea of presumed infectious origin 09/26/2019   • Thrombocytopenia ([facility]) 04/05/2019   • Localized swelling of lower extremity 03/08/2019   • H/O cervical fracture 03/08/2019   • Lumbar back pain with radiculopathy affecting lower extremity 03/08/2019     Current Vital Signs:   /68   Pulse (!) 102   Temp 37 °C (98.6 °F) (Temporal)   Resp 16   Ht 1.803 m (5' 11\")   Wt 100.8 kg (222 lb 3.6 oz)   SpO2 91%       Completed Laboratory Reports:  Recent Labs     02/04/20  1520 02/06/20  0420   WBC 5.2 3.5*   HEMOGLOBIN 12.1* 11.0*   HEMATOCRIT 35.8* 32.1*   PLATELETCT 104* 66*   SODIUM 132* 133*   POTASSIUM 4.0 4.2   BUN 13 " 8   CREATININE 0.77 0.62   ALBUMIN 3.6  --    GLUCOSE 121* 87   INR 1.28*  --      Additional Labs: Not Applicable    Prior Living Situation:   Housing / Facility: 1 Story Apartment / Condo  Steps Into Home: 0  Steps In Home: 0  Lives with - Patient's Self Care Capacity: Friends, Other (Comments)(lives with roomate)  Equipment Owned: None    Prior Level of Function / Living Situation:   Physical Therapy: Prior Services: None  Housing / Facility: 1 Story Apartment / Condo  Steps Into Home: 0  Steps In Home: 0  Equipment Owned: None  Lives with - Patient's Self Care Capacity: Friends, Other (Comments)(lives with roomate)  Bed Mobility: Independent  Transfer Status: Independent  Ambulation: Independent  Distance Ambulation (Feet): (community)  Assistive Devices Used: None  Stairs: Independent  Current Level of Function:   Level Of Assist: Minimal Assist(CGA to min A dependnet on cueing and pain)  Assistive Device: Front Wheel Walker  Distance (Feet): 6  Deviation: Other (Comment), Step To, Decreased Base Of Support, Decreased Heel Strike, Decreased Toe Off(dec jamir/clearance; guarded; see balance)  # of Stairs Climbed: 0  Weight Bearing Status: no restrictions  Supine to Sit: Modified Independent  Sit to Supine: Modified Independent  Sit to Stand: Contact Guard Assist(CGA 2' to concerns with RLE knee pain/possible buckling)  Bed, Chair, Wheelchair Transfer: Minimal Assist  Transfer Method: Other (Comments)(walks to EOB)  Sitting in Chair: NT  Sitting Edge of Bed: at least 5-6 mins  Standing: ~3 mins total  Occupational Therapy:      Prior Services: None  Housing / Facility: 1 Story Apartment / Condo  Current Level of Function:      Speech Language Pathology:      Rehabilitation Prognosis/Potential: Good  Estimated Length of Stay: 7 - 10  days    Nursing:   Orientation : Oriented x 4  Continent    Scope/Intensity of Services Recommended:  Physical Therapy: 1.5 hr / day  5 days / week. Therapeutic Interventions  Required: Maximize Endurance, Mobility, Strength, Safety and education/training   Occupational Therapy: 1.5 hr / day 5 days / week. Therapeutic Interventions Required: Maximize Self Care, ADLs, IADLs and Energy Conservation  Rehabilitation Nursin/7. Therapeutic Interventions Required: Monitor Pain, Skin, Vital Signs, Intake and Output, Labs, Safety and Family Training  Rehabilitation Physician: 3 - 5 days / week. Therapeutic Interventions Required: Medical Management  Respiratory Care: Eval and tx. Therapeutic Interventions Required: Per protocol  Dietician: Eval and Treat . Therapeutic Interventions Required: Per protocol.    Rehabilitation Goals and Plan (Expected frequency & duration of treatment in the IRF):   Return to the Community  Anticipated Date of Rehabilitation Admission: 2020  Patient/Family oriented IRF level of care/facility/plan: Yes  Patient/Family willing to participate in IRF care/facility/plan: Yes  Patient able to tolerate IRF level of care proposed: Yes  Patient has potential to benefit IRF level of care proposed: Yes  Comments: Not Applicable    Special Needs or Precautions - Medical Necessity:  [place] J Collar  Diet:   DIET ORDERS (From admission to next 24h)     Start     Ordered    20 0951  Diet Order Regular (after passing bedside swallow eval )  ALL MEALS     Question Answer Comment   Diet: Regular after passing bedside swallow eval    Texture/Fiber modifications: Dysphagia 3(Mechanical Soft)specify fluid consistency(question 6)        20 0950                Anticipated Discharge Destination / Patient/Family Goal:  Destination: Home with Supervision/ Pt will be alone during day as roommate works full time. Single story apartment. No stairs/  +walk in shower.   Support System:  No family.  Roommate Anirudh Drake /   Anticipated home health services: OT, PT and Nursing  Previously used HH service/ provider: Not Applicable  Anticipated DME Needs: Walker. Pt did  "have a SPC. Indicates it 'broke\".   Outpatient Services: Possible Out PT therapy.  Also, ED MD consulted with Neurosurgery, and recommendations made for out pt tollow up.   Alternative resources to address additional identified needs:   Follow up with PCP at [facility]-pt reports he is already established with a provider.   Maintain sobriety. HX of alcoholism. Sober for 6 months.    PT to follow up with social security / applying for [place] medicaid.   Pt has been at [facility] [place] in the past.     Pre-Screen Completed: 2/6/2020 11:17 AM LAURA Sal< CCM   "

## 2020-02-06 NOTE — PROCEDURES
Knee Aspiration Procedure Note     Knee aspiration consent reviewed with patient prior to beginning the procedure.  Time out was performed and witnessed by the nurse.     The right knee was marked. This area was then cleaned with alcohol. Using the superior medial approach, a 21 G 1.5 inch needle was inserted into the joint space without difficulty. There was immediate bloody aspirate upon entering the joint space. About 100ml of bloody knee aspirate was removed from the knee. Hemostasis was ensured after removal of the needle. The patient tolerated the procedure well. Fluid was collected in a sterile cup and sent for cytology and culture. There were no immediate complications.     The patient reported immediate relief of discomfort with removal of the fluid and had increased range of motion as well as increased strength upon post procedure testing. No complaints.      Chris Brown, DO   Physical Medicine and Rehabilitation   2/6/2020

## 2020-02-06 NOTE — DISCHARGE PLANNING
Care Transition Team Assessment    Met with pt at bedside to complete assessment. confirmed information listed on facesheet. Pt reports he lives with a roommate. Prior to admission pt was completely independent and didn't require any assistive devices.   Pt agreeable to acute rehab placement.     Information Source  Orientation : Oriented x 4  Information Given By: Patient         Elopement Risk  Legal Hold: No  Ambulatory or Self Mobile in Wheelchair: No-Not an Elopement Risk  Elopement Risk: Not at Risk for Elopement    Interdisciplinary Discharge Planning  Lives with - Patient's Self Care Capacity: Friends, Other (Comments)  Patient or legal guardian wants to designate a caregiver (see row info): No  Housing / Facility: 1 Story Apartment / Condo  Prior Services: None    Discharge Preparedness  What is your plan after discharge?: (acute rehab)  What are your discharge supports?: (roommate)  Prior Functional Level: Ambulatory, Independent with Activities of Daily Living, Independent with Medication Management  Difficulity with ADLs: None  Difficulity with IADLs: None    Functional Assesment  Prior Functional Level: Ambulatory, Independent with Activities of Daily Living, Independent with Medication Management    Finances  Financial Barriers to Discharge: No  Prescription Coverage: Yes    Vision / Hearing Impairment  Vision Impairment : Yes  Right Eye Vision: Impaired, Wears Glasses  Left Eye Vision: Impaired, Wears Glasses  Hearing Impairment : No              Domestic Abuse  Have you ever been the victim of abuse or violence?: No  Physical Abuse or Sexual Abuse: No  Verbal Abuse or Emotional Abuse: No  Possible Abuse Reported to:: Not Applicable    Psychological Assessment  History of Substance Abuse: None  History of Psychiatric Problems: No  Non-compliant with Treatment: No  Newly Diagnosed Illness: Yes    Discharge Risks or Barriers  Discharge risks or barriers?: No PCP    Anticipated Discharge  Information  Anticipated discharge disposition: (rehab)

## 2020-02-06 NOTE — PROGRESS NOTES
Hospital Medicine Daily Progress Note    Date of Service  2/6/2020    Chief Complaint/Hospital Course  69 y.o. male admitted 2/4/2020 with C7 fracture after syncopal episode and fall        Interval Problem Update  Patient is up to chair.  States he is feeling better today.  Still complaining of occasional orthostatic dizziness.  Complaining of swollen and painful right knee.  Telemetry showed sinus rhythm 60-75, no significant ectopy, except for rare PAC and PVC  PT OT evaluated, recommended inpatient rehabilitation.  I placed referral for rehab consult.    Consultants/Specialty  Rehab medicine    Code Status  Full code  Disposition  Rehab inpatient    Review of Systems  Review of Systems   Constitutional: Negative for chills, fever and weight loss.   HENT: Negative for ear pain, hearing loss and tinnitus.    Eyes: Negative for blurred vision, double vision and photophobia.   Respiratory: Negative for cough, hemoptysis and sputum production.    Cardiovascular: Negative for chest pain, palpitations and orthopnea.   Gastrointestinal: Negative for heartburn, nausea and vomiting.   Genitourinary: Negative for dysuria, flank pain, frequency and hematuria.   Musculoskeletal: Positive for joint pain and neck pain. Negative for back pain.   Skin: Negative for itching and rash.   Neurological: Negative for tremors, speech change, focal weakness and headaches.   Endo/Heme/Allergies: Negative for environmental allergies and polydipsia. Does not bruise/bleed easily.   Psychiatric/Behavioral: Negative for hallucinations and substance abuse. The patient is not nervous/anxious.         Physical Exam  Temp:  [36.4 °C (97.6 °F)-37.3 °C (99.2 °F)] 36.5 °C (97.7 °F)  Pulse:  [62-70] 62  Resp:  [16-18] 16  BP: (100-145)/(67-93) 108/67  SpO2:  [91 %-98 %] 96 %    Physical Exam  Vitals signs and nursing note reviewed.   Constitutional:       General: He is not in acute distress.     Appearance: Normal appearance.   HENT:      Head:  Normocephalic.      Comments: Bruising and laceration over the forehead on the left side     Nose: Nose normal.      Mouth/Throat:      Mouth: Mucous membranes are moist.   Eyes:      Extraocular Movements: Extraocular movements intact.      Pupils: Pupils are equal, round, and reactive to light.   Neck:      Musculoskeletal: Normal range of motion and neck supple.      Comments: Rigid neck collar  Cardiovascular:      Rate and Rhythm: Normal rate and regular rhythm.   Pulmonary:      Effort: Pulmonary effort is normal.      Breath sounds: Normal breath sounds.   Abdominal:      General: Abdomen is flat. There is no distension.      Tenderness: There is no tenderness.   Musculoskeletal: Normal range of motion.         General: No swelling or deformity.      Comments: Right knee swelling, tenderness   Skin:     General: Skin is warm and dry.   Neurological:      General: No focal deficit present.      Mental Status: He is alert and oriented to person, place, and time.   Psychiatric:         Mood and Affect: Mood normal.         Behavior: Behavior normal.         Fluids    Intake/Output Summary (Last 24 hours) at 2/6/2020 1221  Last data filed at 2/6/2020 1030  Gross per 24 hour   Intake 1680 ml   Output 1250 ml   Net 430 ml       Laboratory  Recent Labs     02/04/20  1520 02/06/20  0420   WBC 5.2 3.5*   RBC 3.66* 3.25*   HEMOGLOBIN 12.1* 11.0*   HEMATOCRIT 35.8* 32.1*   MCV 97.8 98.8*   MCH 33.1* 33.8*   MCHC 33.8 34.3   RDW 48.2 48.6   PLATELETCT 104* 66*   MPV 11.4 11.4     Recent Labs     02/04/20  1520 02/06/20  0420   SODIUM 132* 133*   POTASSIUM 4.0 4.2   CHLORIDE 99 104   CO2 27 25   GLUCOSE 121* 87   BUN 13 8   CREATININE 0.77 0.62   CALCIUM 9.1 8.0*     Recent Labs     02/04/20  1520   APTT 26.6   INR 1.28*               Imaging  EC-ECHOCARDIOGRAM COMPLETE W/O CONT   Final Result      CT-HEAD W/O   Final Result      1.  No acute intracranial findings.      2.  Diffuse atrophy and periventricular white matter  changes consistent with chronic small vessel disease.         CT-MAXILLOFACIAL W/O   Final Result         1.  Bilateral nasal bone fractures      CT-CSPINE WITHOUT PLUS RECONS   Final Result      1.  New transverse lucency within C7 suspicious for a fracture      2.  MRI recommended for further assessment      3.  Otherwise, no significant changes      Findings were discussed with RAFA BIRD on 2/4/2020 5:52 PM.      DX-FEMUR-2+ RIGHT   Final Result      Negative femur series.      DX-KNEE 3 VIEWS RIGHT   Final Result         1.  Severe osteoarthritic changes of the lateral compartment of the right knee.         2. No evidence of acute right knee fracture.      DX-CHEST-PORTABLE (1 VIEW)   Final Result      No acute cardiopulmonary abnormality identified.           Assessment/Plan  * Syncope- (present on admission)  Assessment & Plan  Appears to be orthostatic per history, consistent with hypovolemia  -Resulting in significant trauma  -Patient currently complaining of headache, neck pain and right knee pain  -  CT head:  no acute intracranial abnormality but did note atrophy and chronic changes of ischemia  -Transthoracic echo is normal  -Telemetry negative for arrhythmia or significant bradycardia  Suspect polypharmacy plays a role.  Dose of lisinopril reduced, amlodipine held, dose of trazodone reduced  He received sufficient IV hydration  Repeat orthostatic    Alcoholism (HCC)- (present on admission)  Assessment & Plan  History of.  Denies recent alcohol intake.  We will start B1 supplementation    Cervical spine fracture (HCC)- (present on admission)  Assessment & Plan  Patient has prior history of fracture in C4, C5 spinous processes, status post anterior fusion C5-C6 by Dr. Wilkins  -Post syncopal episode  -Neurosurgery was consulted, patient is currently in [place] J collar  -Recommended conservative treatment, follow-up with neurosurgery after discharge with repeat x-ray  Pain control  Inpatient rehab is  planned    Traumatic rhabdomyolysis (HCC)  Assessment & Plan  Mild  Secondary to fall and injury  IV normal saline hydration  Monitor and replace electrolytes    Tobacco abuse  Assessment & Plan  Patient received smoking cessation counseling on admission    Thrombocytopenia (HCC)- (present on admission)  Assessment & Plan  Chronic, likely secondary to alcoholic liver cirrhosis  -No sign of gross bleeding      Hypertension- (present on admission)  Assessment & Plan  Blood pressure is soft.  I held amlodipine and reduce dose of lisinopril from 30 to 20 mg once a day  -PRN enalapril  -Adjust as needed    Normocytic anemia- (present on admission)  Assessment & Plan  -No sign of gross bleeding         VTE prophylaxis: Heparin

## 2020-02-06 NOTE — PROGRESS NOTES
Assumed care of PT A&O 4. Pt resting in bed with no signs of labored breathing. On RA. Tele monitor in place, cardiac rhythm being monitored. [place] J collar in place. Call light within reach, bed in lowest position, upper bed rails up. Pt was updated on plan of care for the day. Will continue to monitor.

## 2020-02-06 NOTE — DISCHARGE PLANNING
[facility] Transitional Care Coordination     Referral from:  Dr. Miner    Facesheet indicates: Medicare    Potential Rehab Diagnosis: Debility.    Admitted due to syncopal event; injury to RLE knee, and cervical fracture (no surgical intervention-follow up w/ Neurosurgery as an out pt recommended)     Chart review indicates patient does have on going medical management and has therapy needs to possibly meet inpatient rehab facility criteria with the goal of returning to community.     D/C support: PT is retired; lives in [place]  w/ friend who works full time.     Physiatry consultation FORWARDED per protocol.    Will continue to follow.     Thank you for the referral.

## 2020-02-07 ENCOUNTER — PATIENT OUTREACH (OUTPATIENT)
Dept: HEALTH INFORMATION MANAGEMENT | Facility: OTHER | Age: 70
End: 2020-02-07

## 2020-02-07 ENCOUNTER — HOSPITAL ENCOUNTER (INPATIENT)
Facility: REHABILITATION | Age: 70
LOS: 10 days | DRG: 560 | End: 2020-02-17
Attending: PHYSICAL MEDICINE & REHABILITATION | Admitting: PHYSICAL MEDICINE & REHABILITATION
Payer: MEDICARE

## 2020-02-07 VITALS
BODY MASS INDEX: 31.11 KG/M2 | SYSTOLIC BLOOD PRESSURE: 116 MMHG | HEART RATE: 102 BPM | DIASTOLIC BLOOD PRESSURE: 68 MMHG | TEMPERATURE: 98.6 F | WEIGHT: 222.22 LBS | OXYGEN SATURATION: 91 % | RESPIRATION RATE: 16 BRPM | HEIGHT: 71 IN

## 2020-02-07 DIAGNOSIS — M54.16 LUMBAR RADICULOPATHY: ICD-10-CM

## 2020-02-07 PROCEDURE — A9270 NON-COVERED ITEM OR SERVICE: HCPCS | Performed by: PHYSICAL MEDICINE & REHABILITATION

## 2020-02-07 PROCEDURE — 700102 HCHG RX REV CODE 250 W/ 637 OVERRIDE(OP): Performed by: PHYSICAL MEDICINE & REHABILITATION

## 2020-02-07 PROCEDURE — 700111 HCHG RX REV CODE 636 W/ 250 OVERRIDE (IP): Performed by: INTERNAL MEDICINE

## 2020-02-07 PROCEDURE — A9270 NON-COVERED ITEM OR SERVICE: HCPCS | Performed by: INTERNAL MEDICINE

## 2020-02-07 PROCEDURE — 99223 1ST HOSP IP/OBS HIGH 75: CPT | Mod: AI | Performed by: PHYSICAL MEDICINE & REHABILITATION

## 2020-02-07 PROCEDURE — 770010 HCHG ROOM/CARE - REHAB SEMI PRIVAT*

## 2020-02-07 PROCEDURE — 99239 HOSP IP/OBS DSCHRG MGMT >30: CPT | Performed by: INTERNAL MEDICINE

## 2020-02-07 PROCEDURE — 700102 HCHG RX REV CODE 250 W/ 637 OVERRIDE(OP): Performed by: INTERNAL MEDICINE

## 2020-02-07 RX ORDER — DULOXETIN HYDROCHLORIDE 30 MG/1
60 CAPSULE, DELAYED RELEASE ORAL DAILY
Status: DISCONTINUED | OUTPATIENT
Start: 2020-02-08 | End: 2020-02-17 | Stop reason: HOSPADM

## 2020-02-07 RX ORDER — ONDANSETRON 4 MG/1
4 TABLET, ORALLY DISINTEGRATING ORAL 4 TIMES DAILY PRN
Status: DISCONTINUED | OUTPATIENT
Start: 2020-02-07 | End: 2020-02-17 | Stop reason: HOSPADM

## 2020-02-07 RX ORDER — QUETIAPINE FUMARATE 200 MG/1
100 TABLET, FILM COATED ORAL
Qty: 60 TAB | Refills: 3 | Status: ON HOLD | OUTPATIENT
Start: 2020-02-07 | End: 2020-02-17 | Stop reason: SDUPTHER

## 2020-02-07 RX ORDER — PRAZOSIN HYDROCHLORIDE 1 MG/1
1 CAPSULE ORAL EVERY EVENING
Status: DISCONTINUED | OUTPATIENT
Start: 2020-02-07 | End: 2020-02-08

## 2020-02-07 RX ORDER — POLYVINYL ALCOHOL 14 MG/ML
1 SOLUTION/ DROPS OPHTHALMIC PRN
Status: DISCONTINUED | OUTPATIENT
Start: 2020-02-07 | End: 2020-02-07

## 2020-02-07 RX ORDER — ONDANSETRON 2 MG/ML
4 INJECTION INTRAMUSCULAR; INTRAVENOUS 4 TIMES DAILY PRN
Status: DISCONTINUED | OUTPATIENT
Start: 2020-02-07 | End: 2020-02-17 | Stop reason: HOSPADM

## 2020-02-07 RX ORDER — OXYCODONE HYDROCHLORIDE 5 MG/1
5 TABLET ORAL
Status: DISCONTINUED | OUTPATIENT
Start: 2020-02-07 | End: 2020-02-17 | Stop reason: HOSPADM

## 2020-02-07 RX ORDER — QUETIAPINE FUMARATE 200 MG/1
200 TABLET, FILM COATED ORAL
Status: CANCELLED | OUTPATIENT
Start: 2020-02-07

## 2020-02-07 RX ORDER — AMOXICILLIN 250 MG
2 CAPSULE ORAL 2 TIMES DAILY
Status: DISCONTINUED | OUTPATIENT
Start: 2020-02-07 | End: 2020-02-17 | Stop reason: HOSPADM

## 2020-02-07 RX ORDER — OXYCODONE HYDROCHLORIDE 5 MG/1
5 TABLET ORAL
Qty: 24 TAB | Refills: 0 | Status: ON HOLD
Start: 2020-02-07 | End: 2020-02-17

## 2020-02-07 RX ORDER — LISINOPRIL 20 MG/1
20 TABLET ORAL EVERY MORNING
Status: DISCONTINUED | OUTPATIENT
Start: 2020-02-08 | End: 2020-02-09

## 2020-02-07 RX ORDER — TRAZODONE HYDROCHLORIDE 50 MG/1
50 TABLET ORAL NIGHTLY
Status: DISCONTINUED | OUTPATIENT
Start: 2020-02-07 | End: 2020-02-17 | Stop reason: HOSPADM

## 2020-02-07 RX ORDER — GABAPENTIN 300 MG/1
300 CAPSULE ORAL 3 TIMES DAILY
Status: DISCONTINUED | OUTPATIENT
Start: 2020-02-07 | End: 2020-02-12

## 2020-02-07 RX ORDER — ALUMINA, MAGNESIA, AND SIMETHICONE 2400; 2400; 240 MG/30ML; MG/30ML; MG/30ML
20 SUSPENSION ORAL
Status: DISCONTINUED | OUTPATIENT
Start: 2020-02-07 | End: 2020-02-17 | Stop reason: HOSPADM

## 2020-02-07 RX ORDER — GABAPENTIN 300 MG/1
300 CAPSULE ORAL 3 TIMES DAILY
Status: CANCELLED | OUTPATIENT
Start: 2020-02-07

## 2020-02-07 RX ORDER — TRAZODONE HYDROCHLORIDE 50 MG/1
50 TABLET ORAL NIGHTLY
Status: CANCELLED | OUTPATIENT
Start: 2020-02-07

## 2020-02-07 RX ORDER — TRAZODONE HYDROCHLORIDE 50 MG/1
50 TABLET ORAL
Status: DISCONTINUED | OUTPATIENT
Start: 2020-02-07 | End: 2020-02-17 | Stop reason: HOSPADM

## 2020-02-07 RX ORDER — QUETIAPINE FUMARATE 100 MG/1
200 TABLET, FILM COATED ORAL
Status: DISCONTINUED | OUTPATIENT
Start: 2020-02-07 | End: 2020-02-17 | Stop reason: HOSPADM

## 2020-02-07 RX ORDER — DULOXETIN HYDROCHLORIDE 60 MG/1
60 CAPSULE, DELAYED RELEASE ORAL DAILY
Qty: 30 CAP | Status: ON HOLD
Start: 2020-02-08 | End: 2020-02-17 | Stop reason: SDUPTHER

## 2020-02-07 RX ORDER — POLYETHYLENE GLYCOL 3350 17 G/17G
1 POWDER, FOR SOLUTION ORAL
Status: DISCONTINUED | OUTPATIENT
Start: 2020-02-07 | End: 2020-02-17 | Stop reason: HOSPADM

## 2020-02-07 RX ORDER — HYDRALAZINE HYDROCHLORIDE 25 MG/1
25 TABLET, FILM COATED ORAL EVERY 8 HOURS PRN
Status: DISCONTINUED | OUTPATIENT
Start: 2020-02-07 | End: 2020-02-17 | Stop reason: HOSPADM

## 2020-02-07 RX ORDER — ACETAMINOPHEN 325 MG/1
650 TABLET ORAL EVERY 4 HOURS PRN
Status: DISCONTINUED | OUTPATIENT
Start: 2020-02-07 | End: 2020-02-17 | Stop reason: HOSPADM

## 2020-02-07 RX ORDER — LISINOPRIL 20 MG/1
20 TABLET ORAL EVERY MORNING
Status: CANCELLED | OUTPATIENT
Start: 2020-02-08

## 2020-02-07 RX ORDER — LISINOPRIL 20 MG/1
20 TABLET ORAL EVERY MORNING
Qty: 30 TAB | Refills: 0 | Status: ON HOLD | OUTPATIENT
Start: 2020-02-08 | End: 2020-02-17

## 2020-02-07 RX ORDER — OXYCODONE HYDROCHLORIDE 10 MG/1
10 TABLET ORAL
Status: DISCONTINUED | OUTPATIENT
Start: 2020-02-07 | End: 2020-02-17 | Stop reason: HOSPADM

## 2020-02-07 RX ORDER — DULOXETIN HYDROCHLORIDE 60 MG/1
60 CAPSULE, DELAYED RELEASE ORAL DAILY
Status: CANCELLED | OUTPATIENT
Start: 2020-02-08

## 2020-02-07 RX ORDER — TRAZODONE HYDROCHLORIDE 50 MG/1
50 TABLET ORAL NIGHTLY PRN
Qty: 30 TAB | Refills: 0 | Status: ON HOLD | OUTPATIENT
Start: 2020-02-07 | End: 2020-02-17 | Stop reason: SDUPTHER

## 2020-02-07 RX ORDER — ECHINACEA PURPUREA EXTRACT 125 MG
2 TABLET ORAL PRN
Status: DISCONTINUED | OUTPATIENT
Start: 2020-02-07 | End: 2020-02-17 | Stop reason: HOSPADM

## 2020-02-07 RX ORDER — BISACODYL 10 MG
10 SUPPOSITORY, RECTAL RECTAL
Status: DISCONTINUED | OUTPATIENT
Start: 2020-02-07 | End: 2020-02-17 | Stop reason: HOSPADM

## 2020-02-07 RX ADMIN — QUETIAPINE FUMARATE 200 MG: 100 TABLET ORAL at 20:02

## 2020-02-07 RX ADMIN — OXYCODONE HYDROCHLORIDE 10 MG: 10 TABLET ORAL at 05:25

## 2020-02-07 RX ADMIN — OXYCODONE HYDROCHLORIDE 10 MG: 10 TABLET ORAL at 17:43

## 2020-02-07 RX ADMIN — SENNOSIDES AND DOCUSATE SODIUM 2 TABLET: 8.6; 5 TABLET ORAL at 20:00

## 2020-02-07 RX ADMIN — SENNOSIDES AND DOCUSATE SODIUM 2 TABLET: 8.6; 5 TABLET ORAL at 05:25

## 2020-02-07 RX ADMIN — LISINOPRIL 20 MG: 20 TABLET ORAL at 05:25

## 2020-02-07 RX ADMIN — OXYCODONE HYDROCHLORIDE 10 MG: 10 TABLET ORAL at 21:11

## 2020-02-07 RX ADMIN — MAGNESIUM HYDROXIDE 30 ML: 400 SUSPENSION ORAL at 13:13

## 2020-02-07 RX ADMIN — GABAPENTIN 300 MG: 300 CAPSULE ORAL at 05:25

## 2020-02-07 RX ADMIN — DULOXETINE HYDROCHLORIDE 60 MG: 60 CAPSULE, DELAYED RELEASE ORAL at 05:25

## 2020-02-07 RX ADMIN — GABAPENTIN 300 MG: 300 CAPSULE ORAL at 15:24

## 2020-02-07 RX ADMIN — TRAZODONE HYDROCHLORIDE 50 MG: 50 TABLET ORAL at 20:03

## 2020-02-07 RX ADMIN — GABAPENTIN 300 MG: 300 CAPSULE ORAL at 20:02

## 2020-02-07 RX ADMIN — OXYCODONE HYDROCHLORIDE 10 MG: 10 TABLET ORAL at 13:04

## 2020-02-07 RX ADMIN — ENOXAPARIN SODIUM 40 MG: 100 INJECTION SUBCUTANEOUS at 05:24

## 2020-02-07 ASSESSMENT — LIFESTYLE VARIABLES
EVER HAD A DRINK FIRST THING IN THE MORNING TO STEADY YOUR NERVES TO GET RID OF A HANGOVER: YES
TOTAL SCORE: 4
CONSUMPTION TOTAL: INCOMPLETE
HAVE PEOPLE ANNOYED YOU BY CRITICIZING YOUR DRINKING: YES
ALCOHOL_USE: NO
DOES PATIENT WANT TO STOP DRINKING: NO
TOTAL SCORE: 4
HAVE YOU EVER FELT YOU SHOULD CUT DOWN ON YOUR DRINKING: YES
TOTAL SCORE: 4
EVER FELT BAD OR GUILTY ABOUT YOUR DRINKING: YES

## 2020-02-07 ASSESSMENT — PATIENT HEALTH QUESTIONNAIRE - PHQ9
2. FEELING DOWN, DEPRESSED, IRRITABLE, OR HOPELESS: NOT AT ALL
1. LITTLE INTEREST OR PLEASURE IN DOING THINGS: NOT AT ALL
SUM OF ALL RESPONSES TO PHQ9 QUESTIONS 1 AND 2: 0

## 2020-02-07 NOTE — PROGRESS NOTES
Patient admitted to facility at 1200 via WC; accompanied by hospital transport.  Patient assisted to room and positioned in bed for comfort and safety; call light within reach.  Patient assisted with stowing belongings and oriented to room and facility.  Admission assessment performed and documented in computer.  Admission paperwork completed; signed copies placed in chart.

## 2020-02-07 NOTE — DISCHARGE SUMMARY
Discharge Summary    CHIEF COMPLAINT ON ADMISSION  Chief Complaint   Patient presents with   • Syncope   • Nausea       Reason for Admission  EMS 20     CODE STATUS  Full Code    HPI & HOSPITAL COURSE  This is a 69 y.o. male with history of gout, hypertension, depression, was admitted on 2/4 with complaints of neck and the right knee pain after orthostatic syncopal event ground-level fall.  Evaluation revealed cervical spine fracture at C7 and traumatic right knee hemarthrosis.  Neurosurgery Dr Wilkins/[facility] consulted and recommended conservative management.  Work-up for syncopal was negative, including unremarkable transthoracic echo and telemetry monitoring.  It was felt that syncopal episode was related to poor oral intake and polypharmacy.  Doses of antihypertensive and psychiatric medications reduced.  Patient was evaluated by PT and recommended inpatient rehab    Therefore, he is discharged in fair and stable condition to an inpatient rehabilitation hospital.    The patient met 2-midnight criteria for an inpatient stay at the time of discharge.      FOLLOW UP ITEMS POST DISCHARGE  Patient needs [place] and he should follow up in Dr Cee clinic 2 weeks after DC from hospital with Cervical 4 view xrays    DISCHARGE DIAGNOSES  Principal Problem:    Syncope POA: Yes  Active Problems:    Alcoholism (HCC) POA: Yes    Cervical spine fracture ([facility]) POA: Yes      Overview: Fractures of the C4 and C5 spinous processes       Fracture of the anterior/superior vertebral body and C6 with a small       avulsed fragment. There is also fracture of the right uncinate process.      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 Neurosurgery    Thrombocytopenia ([facility]) POA: Yes    Tobacco abuse POA: Unknown    Traumatic rhabdomyolysis (HCC) POA: Unknown    Normocytic anemia POA: Yes    Hypertension  POA: Yes  Resolved Problems:    * No resolved hospital problems. *      FOLLOW UP  No future appointments.  Jimi Cee M.D.  [address]  [facility] [address]  [phone]      [facility]  called office of [facility] and left a voicemail requesting office to call to schedule your appointment . If you do not hear from them please call to schedule your appointment. Thank you      MEDICATIONS ON DISCHARGE     Medication List      START taking these medications      Instructions   oxyCODONE immediate-release 5 MG Tabs  Commonly known as:  ROXICODONE   Take 1 Tab by mouth every 3 hours as needed for up to 3 days.  Dose:  5 mg        CHANGE how you take these medications      Instructions   lisinopril 20 MG Tabs  Start taking on:  February 8, 2020  What changed:    · medication strength  · how much to take  Commonly known as:  PRINIVIL   Take 1 Tab by mouth every morning.  Dose:  20 mg     QUEtiapine 200 MG Tabs  What changed:  how much to take  Commonly known as:  SEROQUEL   Take 0.5 Tabs by mouth every bedtime.  Dose:  100 mg     traZODone 50 MG Tabs  What changed:    · medication strength  · how much to take  · when to take this  · reasons to take this  Commonly known as:  DESYREL   Take 1 Tab by mouth at bedtime as needed for Sleep.  Dose:  50 mg        CONTINUE taking these medications      Instructions   DULoxetine 60 MG Cpep delayed-release capsule  Start taking on:  February 8, 2020  Commonly known as:  CYMBALTA   Take 1 Cap by mouth every day.  Dose:  60 mg     gabapentin 300 MG Caps  Commonly known as:  NEURONTIN   Take 300 mg by mouth 3 times a day.  Dose:  300 mg        STOP taking these medications    amLODIPine 5 MG Tabs  Commonly known as:  NORVASC     naproxen 500 MG Tabs  Commonly known as:  NAPROSYN            Allergies  No Known Allergies    DIET  No orders of the defined types were placed in this encounter.      ACTIVITY  As tolerated.  Weight bearing as tolerated    LINES, DRAINS, AND  WOUNDS  This is an automated list. Peripheral IVs will be removed prior to discharge.                     MENTAL STATUS ON TRANSFER  Level of Consciousness: Alert  Orientation : Oriented x 4  Speech: Speech Clear    CONSULTATIONS  Neurosurgery  Rehab medicine    PROCEDURES  Right knee arthrocentesis    LABORATORY  Lab Results   Component Value Date    SODIUM 133 (L) 02/06/2020    POTASSIUM 4.2 02/06/2020    CHLORIDE 104 02/06/2020    CO2 25 02/06/2020    GLUCOSE 87 02/06/2020    BUN 8 02/06/2020    CREATININE 0.62 02/06/2020        Lab Results   Component Value Date    WBC 3.5 (L) 02/06/2020    HEMOGLOBIN 11.0 (L) 02/06/2020    HEMATOCRIT 32.1 (L) 02/06/2020    PLATELETCT 66 (L) 02/06/2020      EC-ECHOCARDIOGRAM COMPLETE W/O CONT   Final Result      CT-HEAD W/O   Final Result      1.  No acute intracranial findings.      2.  Diffuse atrophy and periventricular white matter changes consistent with chronic small vessel disease.         CT-MAXILLOFACIAL W/O   Final Result         1.  Bilateral nasal bone fractures      CT-CSPINE WITHOUT PLUS RECONS   Final Result      1.  New transverse lucency within C7 suspicious for a fracture      2.  MRI recommended for further assessment      3.  Otherwise, no significant changes      Findings were discussed with RAFA BIRD on 2/4/2020 5:52 PM.      DX-FEMUR-2+ RIGHT   Final Result      Negative femur series.      DX-KNEE 3 VIEWS RIGHT   Final Result         1.  Severe osteoarthritic changes of the lateral compartment of the right knee.         2. No evidence of acute right knee fracture.      DX-CHEST-PORTABLE (1 VIEW)   Final Result      No acute cardiopulmonary abnormality identified.            Total time of the discharge process exceeds 37 minutes.

## 2020-02-07 NOTE — PROGRESS NOTES
Pt. D/c'd [facility] with escort. Discharge teaching completed, no further questions or concerns. All personal belongings with pt. Pt is A&OX4. No signs of distress. All lines removed, tele box removed and placed in monitor room, and monitors notified.

## 2020-02-07 NOTE — H&P
REHABILITATION HISTORY AND PHYSICAL/POST ADMISSION PHYSICAL EVALUATION    Date of Admission: 2/7/2020  Chris Leggett  RH13/02    [facility] Code / Diagnosis to Support: 08.9 Other Orthopedic (listed incorrectly on PAS as other debillity)  Etiologic Diagnosis: Cervical spine fracture (HCC)    CC: Neck pain, hand numbness, right knee pain    HPI:  The patient is a 69 y.o. right hand dominant male with a past medical history of gout, hypertension, psychiatric disorder;  who presented on 2/4/2020  3:10 PM with neck pain status post ground-level fall.  Patient was sitting on the couch in felt nauseous and got up to go to the bathroom.  Next thing he knew he was on the ground with blood surrounding him.  Work-up in the emergency department discovered a C7 fracture, seen by neurosurgery and determined not to be surgical.  Patient is placed in a [place] J brace.  Patient also with right swollen knee post-fall. Patient was evaluated by PM&R and Dr. Brown aspirated blood aspirate on 2/6/20.    Patient tolerated the transfer. He reports the knee aspiration worked very well. He reports that he has less weakness, more ROM, and less pain when walking. He reports otherwise he has baseline numbness in his hands from his fall in 2018 where he had C5-C6 fractures with anterior cervical fusion with Dr. Wilkins. He reports he is doing better after having first BM in a few days after multiple medications. He denies NVD. Denies SOB.     REVIEW OF SYSTEMS:     Comprehensive 14 point ROS was reviewed and all were negative except as noted elsewhere in this document.     PMH:  Past Medical History:   Diagnosis Date   • Gout    • Hypertension    • Psychiatric disorder        PSH:  Past Surgical History:   Procedure Laterality Date   • MT DX BONE MARROW ASPIRATIONS Left 10/2/2019    Procedure: ASPIRATION, BONE MARROW - APARICIO;  Surgeon: Tamera Leos M.D.;  Location: [facility];  Service: Orthopedics   • MT DX BONE MARROW BIOPSIES   10/2/2019    Procedure: BIOPSY, BONE MARROW, USING NEEDLE OR TROCAR;  Surgeon: Tamera Leos M.D.;  Location: ENDOSCOPY [facility];  Service: Orthopedics   • CERVICAL DISK AND FUSION ANTERIOR  9/2/2018    Procedure: CERVICAL DISK AND FUSION ANTERIOR C5-C6;  Surgeon: Varghese Wilkins M.D.;  Location: SURGERY [facility];  Service: Neurosurgery   • OTHER ORTHOPEDIC SURGERY      fision with disc removal       FAMILY HISTORY:  Family History   Problem Relation Age of Onset   • Cancer Mother    • Alzheimer's Disease Father    • Cancer Father    • Heart Disease Sister    • Heart Disease Brother          MEDICATIONS:  Current Facility-Administered Medications   Medication Dose   • Respiratory Care per Protocol     • oxyCODONE immediate-release (ROXICODONE) tablet 5 mg  5 mg   • oxyCODONE immediate release (ROXICODONE) tablet 10 mg  10 mg   • hydrALAZINE (APRESOLINE) tablet 25 mg  25 mg   • acetaminophen (TYLENOL) tablet 650 mg  650 mg   • senna-docusate (PERICOLACE or SENOKOT S) 8.6-50 MG per tablet 2 Tab  2 Tab    And   • polyethylene glycol/lytes (MIRALAX) PACKET 1 Packet  1 Packet    And   • magnesium hydroxide (MILK OF MAGNESIA) suspension 30 mL  30 mL    And   • bisacodyl (DULCOLAX) suppository 10 mg  10 mg   • benzocaine-menthol (CEPACOL) lozenge 1 Lozenge  1 Lozenge   • mag hydrox-al hydrox-simeth (MAALOX PLUS ES or MYLANTA DS) suspension 20 mL  20 mL   • ondansetron (ZOFRAN ODT) dispertab 4 mg  4 mg    Or   • ondansetron (ZOFRAN) syringe/vial injection 4 mg  4 mg   • traZODone (DESYREL) tablet 50 mg  50 mg   • sodium chloride (OCEAN) 0.65 % nasal spray 2 Spray  2 Spray   • [START ON 2/8/2020] DULoxetine (CYMBALTA) capsule 60 mg  60 mg   • [START ON 2/8/2020] enoxaparin (LOVENOX) inj 40 mg  40 mg   • gabapentin (NEURONTIN) capsule 300 mg  300 mg   • [START ON 2/8/2020] lisinopril (PRINIVIL) tablet 20 mg  20 mg   • QUEtiapine (SEROQUEL) tablet 200 mg  200 mg   • traZODone (DESYREL) tablet 50 mg  50 mg   •  polyethyl glycol-propyl glycol (SYSTANE) 0.4-0.3 % ophth drops 1 Drop  1 Drop       ALLERGIES:  Patient has no known allergies.    PSYCHOSOCIAL HISTORY:  Housing / Facility: 1 Story Apartment / Condo  Steps Into Home: 0  Steps In Home: 0  Lives with - Patient's Self Care Capacity: Friends, Other (Comments)(lives with roomate)  Equipment Owned: None     Prior Level of Function / Living Situation:   Physical Therapy: Prior Services: None  Housing / Facility: 1 Story Apartment / Condo  Steps Into Home: 0  Steps In Home: 0  Equipment Owned: None  Lives with - Patient's Self Care Capacity: Friends, Other (Comments)(lives with roomate)  Bed Mobility: Independent  Transfer Status: Independent  Ambulation: Independent  Distance Ambulation (Feet): (community)  Assistive Devices Used: None  Stairs: Independent  Current Level of Function:   Level Of Assist: Minimal Assist(CGA to min A dependnet on cueing and pain)  Assistive Device: Front Wheel Walker  Distance (Feet): 6  Deviation: Other (Comment), Step To, Decreased Base Of Support, Decreased Heel Strike, Decreased Toe Off(dec jamir/clearance; guarded; see balance)  # of Stairs Climbed: 0  Weight Bearing Status: no restrictions  Supine to Sit: Modified Independent  Sit to Supine: Modified Independent  Sit to Stand: Contact Guard Assist(CGA 2' to concerns with RLE knee pain/possible buckling)  Bed, Chair, Wheelchair Transfer: Minimal Assist  Transfer Method: Other (Comments)(walks to EOB)  Sitting in Chair: NT  Sitting Edge of Bed: at least 5-6 mins  Standing: ~3 mins total  Occupational Therapy:      Prior Services: None  Housing / Facility: 1 Story Apartment / Condo  Current Level of Function:      Speech Language Pathology:      Rehabilitation Prognosis/Potential: Good  Estimated Length of Stay: 7 - 10  days    CURRENT LEVEL OF FUNCTION:   Same as level of function prior to admission to [facility]    PHYSICAL EXAM:     VITAL SIGNS:   height is 1.803 m  "(5' 11\") and weight is 100.6 kg (221 lb 12.5 oz). His oral temperature is 36.7 °C (98.1 °F). His blood pressure is 106/74 and his pulse is 64. His respiration is 16.     GENERAL: No apparent distress  HEENT: Normocephalic/atraumatic, EOMI and PERRL; in [place] collar  CARDIAC: Regular rate and rhythm, normal S1, S2   LUNGS: Clear to auscultation   ABDOMINAL: bowel sounds present, soft, nontender and nondistended    EXTREMITIES: no contractures, spasticity. Right knee swollen, limited ROM, injection site on medial surface without erythema, bandaid removed.  No calf tenderness bilaterally  NEURO:  Mental status:  A&Ox4 (person, place, date, situation) answers questions appropriately  Speech: fluent, no aphasia or dysarthria  CRANIAL NERVES: CN 2-12 intact  Motor: Missing Right 2nd DIP  5/5 BUE except bilateral 3/5 finger abduction  5/5 LLE  4/5 R HF, 3/5 R KE due pain otherwise 5/5 distal  Sensory: decreased BUE  DTRs: 1+ in bilateral biceps    RADIOLOGY:  TTE   2/5/20  CONCLUSIONS  Normal left ventricular systolic function.  Left ventricular ejection fraction is visually estimated to be 55%.  Normal transthoracic echocardiogram.   BEcat     CT Head 2/4/20  IMPRESSION:     1.  No acute intracranial findings.     2.  Diffuse atrophy and periventricular white matter changes consistent with chronic small vessel disease.    CT C spine 2/4/20  IMPRESSION:     1.  New transverse lucency within C7 suspicious for a fracture     2.  MRI recommended for further assessment     3.  Otherwise, no significant changes    LABS:  Recent Labs     02/06/20  0420   SODIUM 133*   POTASSIUM 4.2   CHLORIDE 104   CO2 25   GLUCOSE 87   BUN 8   CREATININE 0.62   CALCIUM 8.0*     Recent Labs     02/06/20  0420   WBC 3.5*   RBC 3.25*   HEMOGLOBIN 11.0*   HEMATOCRIT 32.1*   MCV 98.8*   MCH 33.8*   MCHC 34.3   RDW 48.6   PLATELETCT 66*   MPV 11.4             PRIMARY REHAB DIAGNOSIS:    This patient is a 69 y.o. male admitted for acute inpatient " rehabilitation with Cervical spine fracture (HCC).    IMPAIRMENTS:   ADLs/IADLs  Mobility    SECONDARY DIAGNOSIS/MEDICAL CO-MORBIDITIES AFFECTING FUNCTION:  Anemia  Hyponatremia  Right knee hemarthrosis   Depression    RELEVANT CHANGES SINCE PREADMISSION EVALUATION:    Status unchanged    The patient's rehabilitation potential is Very Good  The patient's medical prognosis is good    PLAN:   Discussion and Recommendations:   1. The patient requires an acute inpatient rehabilitation program with a coordinated program of care at an intensity and frequency not available at a lower level of care. This recommendation is substantiated by the patient's medical physicians who recommend that the patient's intervention and assessment of medical issues needs to be done at an acute level of care for patient's safety and maximum outcome.   2. A coordinated program of care will be supplied by an interdisciplinary team of physical therapy, occupational therapy, rehab physician, rehab nursing, and, if needed, speech therapy and rehab psychology. Rehab team presents a patient-specific rehabilitation and education program concentrating on prevention of future problems related to accessibility, mobility, skin, bowel, bladder, sexuality, and psychosocial and medical/surgical problems.   3. Need for Rehabilitation Physician: The rehab physician will be evaluating the patient on a multi-weekly basis to help coordinate the program of care. The rehab physician communicates between medical physicians, therapists, and nurses to maximize the patient's potential outcome. Specific areas in which the rehab physician will be providing daily assessment include the following:   A. Assessing the patient's heart rate and blood pressure response (vitals monitoring) to activity and making adjustments in medications or conservative measures as needed.   B. The rehab physician will be assessing the frequency at which the program can be increased to allow  the patient to reach optimal functional outcome.   C. The rehab physician will also provide assessments in daily skin care, especially in light of patient's impairments in mobility.   D. The rehab physician will provide special expertise in understanding how to work with functional impairment and recommend appropriate interventions, compensatory techniques, and education that will facilitate the patient's outcome.   4. Rehab R.N.   The rehab RN will be working with patient to carry over in room mobility and activities of daily living when the patient is not in 3 hours of skilled therapy. Rehab nursing will be working in conjunction with rehab physician to address all the medical issues above and continue to assess laboratory work and discuss abnormalities with the treating physicians, assess vitals, and response to activity, and discuss and report abnormalities with the rehab physician. Rehab RN will also continue daily skin care, supervise bladder/bowel program, instruct in medication administration, and ensure patient safety.   5. Rehab Therapy: Therapies to treat at intensity and frequency of (may change after completion of evaluation by all therapeutic disciplines):       PT:  Physical therapy to address mobility, transfer, gait training and evaluation for adaptive equipment needs 1 and 1/2 hour/day at least 5 days/week for the duration of the ELOS (see below)       OT:  Occupational therapy to address ADLs, self-care, home management training, functional mobility/transfers and assistive device evaluation, and community re-integration 1 and 1/2 hour/day at least 5 days/week for the duration of the ELOS (see below).     Medical management / Rehabilitation Issues/ Adverse Potential as part of rehabilitation plan     REHABILITATION ISSUES/ADVERSE POTENTIAL:  1.  C7 Fracture - Patient with syncopal event with C7 fracture managed non-operatively. In [place] collar. Patient demonstrates functional deficits in strength,  balance, coordination, and ADL's. Patient is admitted to [facility] for comprehensive rehabilitation therapy as described below.   Rehabilitation nursing monitors bowel and bladder control, educates on medication administration, co-morbidities and monitors patient safety.    2.  Neurostimulants: None at this time but continue to assess daily for need to initiate should status change.    3.  DVT prophylaxis:  Patient is on Lovenox for anticoagulation upon transfer. Encourage OOB. Monitor daily for signs and symptoms of DVT including but not limited to swelling and pain to prevent the development of DVT that may interfere with therapies.    4.  GI prophylaxis:  On prilosec to prevent gastritis/dyspepsia which may interfere with therapies.    5.  Pain: No issues with pain currently / Controlled with APAP/Oxycodone    6.  Nutrition/Dysphagia: Dietician monitors nutrient intake, recommend supplements prn and provide nutrition education to pt/family to promote optimal nutrition for wound healing/recovery.     7.  Bladder/bowel:  Start bowel and bladder program as described below, to prevent constipation, urinary retention (which may lead to UTI), and urinary incontinence (which will impact upon pt's functional independence).   - Post void bladder scans, I&O cath for PVRs >400  - up to commode after meal     8.  Skin/dermal ulcer prophylaxis: Monitor for new skin conditions with q.2 h. turns as required to prevent the development of skin breakdown.     9.  Cognition/Behavior:  Psychologist Dr. Norwood provides adjustment counseling to illness and psychosocial barriers that may be potential barriers to rehabilitation.     10. Respiratory therapy: RT performs O2 management prn, breathing retraining, pulmonary hygiene and bronchospasm management prn to optimize participation in therapies.     MEDICAL CO-MORBIDITIES/ADVERSE POTENTIAL AFFECTING FUNCTION:   C7 Fracture - Patient with syncopal event with C7  fracture managed non-operatively. In [place] collar.   -PT and OT for mobility and ADLs    HTN - Patient on Lisinopril 20 mg. 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 Continue Gabapentin 300 mg TID    R Knee pain - s/p aspiration of blood products. With improved ROM and pain control  -PRN Oxycodone.     Depression - Patient on Duloxetine 60 mg daily, Seroquel 200 mg QHS, and Trazodone 50 mg QHS    Constipation - Patient without BM in 3 days. Start Senna-docusate.     DVT Ppx - Patient on Lovenox on transfer.    I personally performed a complete drug regimen review and no potential clinically significant medication issues were identified.     Pt was seen today for 77 min, and entire time spent in face-to-face contact was >50% in counseling and coordination of care as detailed in A/P above.        GOALS/EXPECTED LEVEL OF FUNCTION BASED ON CURRENT MEDICAL AND FUNCTIONAL STATUS (may change based on patient's medical status and rate of impairment recovery):  Transfers:   Modified Independent  Mobility/Gait:   Modified Independent  ADL's:   Modified Independent    DISPOSITION: Discharge to pre-morbid independent living setting with the supportive care of patient's family.    ELOS: 7-10 days

## 2020-02-07 NOTE — DISCHARGE PLANNING
Anticipated Discharge Disposition: [facility]    Action: CM received call from [facility] with Rehab stating that they have a bed today and can accept the pt and will transport pt today at 1130 am. CM updated Dr. Miner and bedside SHARAN Diez.   CM met with pt at bedside and completed COBRA and pt is in agreement with transfer. He states that there is not anyone he needs this CM to call.     Barriers to Discharge:     Plan: [facility] to [facility] today at 1130 am.

## 2020-02-07 NOTE — DISCHARGE INSTRUCTIONS
Discharge Instructions    Discharged to other by medical transportation with escort. Discharged via wheelchair, hospital escort: Yes.  Special equipment needed: C-Collar    Be sure to schedule a follow-up appointment with your primary care doctor or any specialists as instructed.     Discharge Plan:   Diet Plan: Discussed  Activity Level: Discussed  Smoking Cessation Offered: Patient Refused  Confirmed Follow up Appointment: Appointment Scheduled  Confirmed Symptoms Management: Discussed  Medication Reconciliation Updated: Yes  Influenza Vaccine Indication: Not indicated: Previously immunized this influenza season and > 8 years of age    I understand that a diet low in cholesterol, fat, and sodium is recommended for good health. Unless I have been given specific instructions below for another diet, I accept this instruction as my diet prescription.   Other diet:     Special Instructions: None    · Is patient discharged on Warfarin / Coumadin?   No         Syncope  Introduction  Syncope is when you lose temporarily pass out (faint). Signs that you may be about to pass out include:  · Feeling dizzy or light-headed.  · Feeling sick to your stomach (nauseous).  · Seeing all white or all black.  · Having cold, clammy skin.  If you passed out, get help right away. Call your local emergency services (911 in the [place] Do not drive yourself to the hospital.  Follow these instructions at home:  Pay attention to any changes in your symptoms. Take these actions to help with your condition:  · Have someone stay with you until you feel stable.  · Do not drive, use machinery, or play sports until your doctor says it is okay.  · Keep all follow-up visits as told by your doctor. This is important.  · If you start to feel like you might pass out, lie down right away and raise (elevate) your feet above the level of your heart. Breathe deeply and steadily. Wait until all of the symptoms are gone.  · Drink enough fluid to keep your pee  (urine) clear or pale yellow.  · If you are taking blood pressure or heart medicine, get up slowly and spend many minutes getting ready to sit and then stand. This can help with dizziness.  · Take over-the-counter and prescription medicines only as told by your doctor.  Get help right away if:  · You have a very bad headache.  · You have unusual pain in your chest, tummy, or back.  · You are bleeding from your mouth or rectum.  · You have black or tarry poop (stool).  · You have a very fast or uneven heartbeat (palpitations).  · It hurts to breathe.  · You pass out once or more than once.  · You have jerky movements that you cannot control (seizure).  · You are confused.  · You have trouble walking.  · You are very weak.  · You have vision problems.  These symptoms may be an emergency. Do not wait to see if the symptoms will go away. Get medical help right away. Call your local emergency services (911 in the [place] Do not drive yourself to the hospital.   This information is not intended to replace advice given to you by your health care provider. Make sure you discuss any questions you have with your health care provider.  Document Released: 06/05/2009 Document Revised: 05/25/2017 Document Reviewed: 08/31/2016  © 2017 [employer]      Cervical Spine Fracture, Stable  A cervical spine fracture is a break or crack in one of the bones of the neck. If there is a very low risk of problems happening during healing, the fracture is considered stable.  What are the causes?  This condition may be caused by:  · Motor vehicle accidents.  · Injuries from sports such as diving, football, biking, wrestling, or skiing.  · Severe osteoporosis or other bone diseases, such as cancers that spread to bone or metabolic abnormalities that cause bone weakness.  What are the signs or symptoms?  Symptoms of this condition include:  · Severe neck pain after an accident or fall. Pain may spread down the shoulders or arms.  · Bruising or  swelling on the back of the neck.  · Numbness, tingling, sudden muscle tightening (spasms), or weakness in the arms, legs, or both.  How is this diagnosed?  This condition may be diagnosed based on:  · Your medical history.  · A physical exam of your neck, arms, and legs.  · Imaging studies of the neck, such as:  ¨ X-rays.  ¨ CT scan.  ¨ MRI.  How is this treated?  This condition is treated with a neck brace or cervical collar to keep your neck from moving during the healing process. A cervical collar is a device that supports your chin and the back of your head. You may also be given medicine to help relieve pain.  Follow these instructions at home:  If you have a neck brace:  · Wear the brace as told by your health care provider. Remove it only as told by your health care provider.  · If you experience numbness or tingling, loosen the brace.  · Keep the brace clean.  · If the brace is not waterproof:  ¨ Do not let it get wet.  ¨ Cover it with a watertight covering when you take a bath or a shower.  If you have a cervical collar:  · Do not remove the collar unless your health care provider tells you to do this. If you are allowed to remove the collar for cleaning and bathing:  ¨ Follow your health care provider’s instructions about how to safely take off the collar.  ¨ Wash and thoroughly dry the skin on your neck. Check your skin for irritation or sores. If you see any, tell your health care provider.  · Ask your health care provider before making any adjustments to your collar. Small adjustments may be needed over time to improve comfort and reduce pressure on your chin or on the back of your head.  · Keep long hair outside of the collar.  · Keep your collar clean by wiping it with mild soap and water and letting it air-dry completely. The pads can be hand-washed with soap and water and air-dried completely.  Managing pain, stiffness, and swelling  · If directed, put ice on the injured area:  ¨ If you have a  removable brace or cervical collar, remove it as told by your health care provider.  ¨ Put ice in a plastic bag.  ¨ Place a towel between your skin and the bag.  ¨ Leave the ice on for 20 minutes, 2-3 times a day.  Activity  · Do not drive a car until your health care provider approves.  · Do not drive or use heavy machinery while taking prescription pain medicine.  · Avoid physical activity for as long as directed. Ask your health care provider what activities are safe for you.  General instructions  · Take over-the-counter and prescription medicines only as told by your health care provider.  · Do not take baths, swim, or use a hot tub until your health care provider approves. Ask your health care provider if you can take showers. You may only be allowed to take sponge baths for bathing.  · Do not use any products that contain nicotine or tobacco, such as cigarettes and e-cigarettes. These can delay bone healing. If you need help quitting, ask your health care provider.  · Keep all follow-up visits as told by your health care provider. This is important to help prevent long-term (chronic) or permanent injury, pain, and disability. You may need to have follow-up X-rays or MRI 1-3 weeks after your injury.  Contact a health care provider if:  · You have irritation or sores on your skin from your brace or cervical collar.  Get help right away if:  · You have neck pain that gets worse.  · You develop difficulties swallowing or breathing.  · You develop swelling in your neck.  · You have any of the following problems in your arms, legs, or both:  ¨ Numbness.  ¨ Weakness.  ¨ Burning pain.  ¨ Movement problems.  · You are unable to control when you urinate or have a bowel movement (incontinence).  · You have problems with coordination or difficulty walking.  This information is not intended to replace advice given to you by your health care provider. Make sure you discuss any questions you have with your health care  provider.  Document Released: 11/04/2005 Document Revised: 09/21/2017 Document Reviewed: 09/21/2017  [employer] Patient Education © 2017 [employer]      Nasal Fracture  Introduction  A fracture is a break in a bone. A nasal fracture is a broken nose. Minor breaks do not need treatment. Serious breaks may need surgery.  Follow these instructions at home:  · If directed, put ice on the injured area:  ¨ Put ice in a plastic bag.  ¨ Place a towel between your skin and the bag.  ¨ Leave the ice on for 20 minutes, 2-3 times per day.  · Take over-the-counter and prescription medicines only as told by your doctor.  · If your nose bleeds, sit up while you gently squeeze your nose shut for 10 minutes.  · Try to not blow your nose.  · Return to your normal activities as told by your doctor. Ask your doctor what activities are safe for you.  · Do not play contact sports for 3-4 weeks or as told by your doctor.  · Keep all follow-up visits as told by your doctor. This is important.  Contact a doctor if:  · You have more pain or very bad pain.  · You keep having nosebleeds.  · The shape of your nose does not return to normal after 5 days.  · You have pus coming out of your nose.  Get help right away if:  · Your nose bleeds for more than 20 minutes.  · You have clear fluid draining out of your nose.  · You have a grape-like swelling on the inside of your nose.  · You have trouble moving your eyes.  · You keep throwing up (vomiting).  This information is not intended to replace advice given to you by your health care provider. Make sure you discuss any questions you have with your health care provider.  Document Released: 09/26/2009 Document Revised: 05/25/2017 Document Reviewed: 01/25/2016  © 2017 [employer]    Depression / Suicide Risk    As you are discharged from this [facility] facility, it is important to learn how to keep safe from harming yourself.    Recognize the warning signs:  · Abrupt changes in  personality, positive or negative- including increase in energy   · Giving away possessions  · Change in eating patterns- significant weight changes-  positive or negative  · Change in sleeping patterns- unable to sleep or sleeping all the time   · Unwillingness or inability to communicate  · Depression  · Unusual sadness, discouragement and loneliness  · Talk of wanting to die  · Neglect of personal appearance   · Rebelliousness- reckless behavior  · Withdrawal from people/activities they love  · Confusion- inability to concentrate     If you or a loved one observes any of these behaviors or has concerns about self-harm, here's what you can do:  · Talk about it- your feelings and reasons for harming yourself  · Remove any means that you might use to hurt yourself (examples: pills, rope, extension cords, firearm)  · Get professional help from the community (Mental Health, Substance Abuse, psychological counseling)  · Do not be alone:Call your Safe Contact- someone whom you trust who will be there for you.  · Call your local CRISIS HOTLINE 942-1342 or 652-986-3744  · Call your local Children's Mobile Crisis Response Team [place] (923) 411-7862 or www.[employer]  · Call the toll free National Suicide Prevention Hotlines   · National Suicide Prevention Lifeline 856-044-DAAY (4815)  · National Hope Line Network 800-SUICIDE (718-3984)

## 2020-02-07 NOTE — DISCHARGE PLANNING
[facility]/ Transitional Care Coordination  Rehab can accept today  Medical clearance from Dr. Miner obtained.    Accepting MD @ [facility]  is [place] [facility].   Transport set up for 1130.   Tc to [facility]/ informing her of above.    I did speak w/ pt early today, and he is in agreement w/ [facility].    Plan: transfer to [facility] today.

## 2020-02-07 NOTE — PROGRESS NOTES
Assumed care of patient A&O x4. Patient resting in bed with no signs of labored breathing, on room air. Tele monitor in place, cardiac rhythm being monitored. Call light within reach, bed in lowest position. Patient was updated with plan for the night. Will continue to monitor.

## 2020-02-07 NOTE — DISCHARGE PLANNING
Acute Rehab Hospital/ Transitional Care Coordination  I met with pt this am  Explained rehab process, and informed rehab has accepted him.  He is in agreement w/ plan    He provided me with following information:  Dc plan- return to his friend's home in [place].   Singe story apartment w/ no stairs. +walk in shower.  Friend works full time-pt will be home alone during the day.   No other family/freinds.    PT shares hx of alcoholism/, sober for 6 months after completing  treatment program. .  He has also been at [facility] in the past.  Hx of homelessness.   Pt confirms he is a resident of [place]- encouraged him to apply for [place] Medicaid.   He reports he has tried in the past, and needs to supply documentation of his residence to Social Security.   Pt did have a single point cane, but he report it is broken.   PCP @  [facility] - unable to remember name of provider.  Pharmacy:  [facility].   Had previous spine surgery with Dr. Cee in the past.     Plan:  Assist w/ transition to [facility].

## 2020-02-08 LAB
25(OH)D3 SERPL-MCNC: 30 NG/ML (ref 30–100)
ALBUMIN SERPL BCP-MCNC: 3.3 G/DL (ref 3.2–4.9)
ALBUMIN/GLOB SERPL: 1.5 G/DL
ALP SERPL-CCNC: 38 U/L (ref 30–99)
ALT SERPL-CCNC: 16 U/L (ref 2–50)
ANION GAP SERPL CALC-SCNC: 7 MMOL/L (ref 0–11.9)
AST SERPL-CCNC: 29 U/L (ref 12–45)
BASOPHILS # BLD AUTO: 0.8 % (ref 0–1.8)
BASOPHILS # BLD: 0.02 K/UL (ref 0–0.12)
BILIRUB SERPL-MCNC: 0.8 MG/DL (ref 0.1–1.5)
BUN SERPL-MCNC: 6 MG/DL (ref 8–22)
CALCIUM SERPL-MCNC: 8.3 MG/DL (ref 8.5–10.5)
CHLORIDE SERPL-SCNC: 100 MMOL/L (ref 96–112)
CO2 SERPL-SCNC: 29 MMOL/L (ref 20–33)
CREAT SERPL-MCNC: 0.65 MG/DL (ref 0.5–1.4)
EOSINOPHIL # BLD AUTO: 0.29 K/UL (ref 0–0.51)
EOSINOPHIL NFR BLD: 11.3 % (ref 0–6.9)
ERYTHROCYTE [DISTWIDTH] IN BLOOD BY AUTOMATED COUNT: 47.5 FL (ref 35.9–50)
EST. AVERAGE GLUCOSE BLD GHB EST-MCNC: 103 MG/DL
GLOBULIN SER CALC-MCNC: 2.2 G/DL (ref 1.9–3.5)
GLUCOSE SERPL-MCNC: 81 MG/DL (ref 65–99)
HBA1C MFR BLD: 5.2 % (ref 0–5.6)
HCT VFR BLD AUTO: 31.9 % (ref 42–52)
HGB BLD-MCNC: 10.8 G/DL (ref 14–18)
IMM GRANULOCYTES # BLD AUTO: 0.01 K/UL (ref 0–0.11)
IMM GRANULOCYTES NFR BLD AUTO: 0.4 % (ref 0–0.9)
LYMPHOCYTES # BLD AUTO: 0.72 K/UL (ref 1–4.8)
LYMPHOCYTES NFR BLD: 28 % (ref 22–41)
MCH RBC QN AUTO: 33.1 PG (ref 27–33)
MCHC RBC AUTO-ENTMCNC: 33.9 G/DL (ref 33.7–35.3)
MCV RBC AUTO: 97.9 FL (ref 81.4–97.8)
MONOCYTES # BLD AUTO: 0.27 K/UL (ref 0–0.85)
MONOCYTES NFR BLD AUTO: 10.5 % (ref 0–13.4)
NEUTROPHILS # BLD AUTO: 1.26 K/UL (ref 1.82–7.42)
NEUTROPHILS NFR BLD: 49 % (ref 44–72)
NRBC # BLD AUTO: 0 K/UL
NRBC BLD-RTO: 0 /100 WBC
PLATELET # BLD AUTO: 54 K/UL (ref 164–446)
PMV BLD AUTO: 10.7 FL (ref 9–12.9)
POTASSIUM SERPL-SCNC: 4.2 MMOL/L (ref 3.6–5.5)
PROT SERPL-MCNC: 5.5 G/DL (ref 6–8.2)
RBC # BLD AUTO: 3.26 M/UL (ref 4.7–6.1)
SODIUM SERPL-SCNC: 136 MMOL/L (ref 135–145)
TSH SERPL DL<=0.005 MIU/L-ACNC: 1.2 UIU/ML (ref 0.38–5.33)
WBC # BLD AUTO: 2.6 K/UL (ref 4.8–10.8)

## 2020-02-08 PROCEDURE — 97112 NEUROMUSCULAR REEDUCATION: CPT

## 2020-02-08 PROCEDURE — 94760 N-INVAS EAR/PLS OXIMETRY 1: CPT

## 2020-02-08 PROCEDURE — 97162 PT EVAL MOD COMPLEX 30 MIN: CPT

## 2020-02-08 PROCEDURE — 84443 ASSAY THYROID STIM HORMONE: CPT

## 2020-02-08 PROCEDURE — 80053 COMPREHEN METABOLIC PANEL: CPT

## 2020-02-08 PROCEDURE — 97166 OT EVAL MOD COMPLEX 45 MIN: CPT

## 2020-02-08 PROCEDURE — 83036 HEMOGLOBIN GLYCOSYLATED A1C: CPT

## 2020-02-08 PROCEDURE — 36415 COLL VENOUS BLD VENIPUNCTURE: CPT

## 2020-02-08 PROCEDURE — A9270 NON-COVERED ITEM OR SERVICE: HCPCS | Performed by: PHYSICAL MEDICINE & REHABILITATION

## 2020-02-08 PROCEDURE — 99222 1ST HOSP IP/OBS MODERATE 55: CPT | Performed by: HOSPITALIST

## 2020-02-08 PROCEDURE — 700111 HCHG RX REV CODE 636 W/ 250 OVERRIDE (IP): Performed by: PHYSICAL MEDICINE & REHABILITATION

## 2020-02-08 PROCEDURE — 85025 COMPLETE CBC W/AUTO DIFF WBC: CPT

## 2020-02-08 PROCEDURE — 97110 THERAPEUTIC EXERCISES: CPT

## 2020-02-08 PROCEDURE — 700102 HCHG RX REV CODE 250 W/ 637 OVERRIDE(OP): Performed by: PHYSICAL MEDICINE & REHABILITATION

## 2020-02-08 PROCEDURE — 770010 HCHG ROOM/CARE - REHAB SEMI PRIVAT*

## 2020-02-08 PROCEDURE — 82306 VITAMIN D 25 HYDROXY: CPT

## 2020-02-08 RX ADMIN — OXYCODONE HYDROCHLORIDE 5 MG: 5 TABLET ORAL at 07:46

## 2020-02-08 RX ADMIN — SENNOSIDES AND DOCUSATE SODIUM 2 TABLET: 8.6; 5 TABLET ORAL at 21:08

## 2020-02-08 RX ADMIN — GABAPENTIN 300 MG: 300 CAPSULE ORAL at 08:29

## 2020-02-08 RX ADMIN — DULOXETINE HYDROCHLORIDE 60 MG: 30 CAPSULE, DELAYED RELEASE ORAL at 08:29

## 2020-02-08 RX ADMIN — OXYCODONE HYDROCHLORIDE 5 MG: 5 TABLET ORAL at 21:06

## 2020-02-08 RX ADMIN — LISINOPRIL 20 MG: 20 TABLET ORAL at 08:28

## 2020-02-08 RX ADMIN — GABAPENTIN 300 MG: 300 CAPSULE ORAL at 21:07

## 2020-02-08 RX ADMIN — OXYCODONE HYDROCHLORIDE 5 MG: 5 TABLET ORAL at 17:53

## 2020-02-08 RX ADMIN — OXYCODONE HYDROCHLORIDE 5 MG: 5 TABLET ORAL at 11:53

## 2020-02-08 RX ADMIN — GABAPENTIN 300 MG: 300 CAPSULE ORAL at 14:45

## 2020-02-08 RX ADMIN — QUETIAPINE FUMARATE 200 MG: 100 TABLET ORAL at 21:06

## 2020-02-08 RX ADMIN — ENOXAPARIN SODIUM 40 MG: 40 INJECTION, SOLUTION INTRAVENOUS; SUBCUTANEOUS at 08:30

## 2020-02-08 RX ADMIN — TRAZODONE HYDROCHLORIDE 50 MG: 50 TABLET ORAL at 21:07

## 2020-02-08 RX ADMIN — SENNOSIDES AND DOCUSATE SODIUM 2 TABLET: 8.6; 5 TABLET ORAL at 08:28

## 2020-02-08 RX ADMIN — TRAZODONE HYDROCHLORIDE 50 MG: 50 TABLET ORAL at 22:33

## 2020-02-08 ASSESSMENT — ENCOUNTER SYMPTOMS
NAUSEA: 0
SHORTNESS OF BREATH: 0
BRUISES/BLEEDS EASILY: 0
NECK PAIN: 1
ABDOMINAL PAIN: 0
CHILLS: 0
COUGH: 0
VOMITING: 0
EYES NEGATIVE: 1
POLYDIPSIA: 0
FEVER: 0
PALPITATIONS: 0
DEPRESSION: 1

## 2020-02-08 ASSESSMENT — LIFESTYLE VARIABLES
HAVE YOU EVER FELT YOU SHOULD CUT DOWN ON YOUR DRINKING: YES
HAVE PEOPLE ANNOYED YOU BY CRITICIZING YOUR DRINKING: YES
TOTAL SCORE: 4
TOTAL SCORE: 4
DOES PATIENT WANT TO STOP DRINKING: NO
ALCOHOL_USE: NO
EVER_SMOKED: YES
ON A TYPICAL DAY WHEN YOU DRINK ALCOHOL HOW MANY DRINKS DO YOU HAVE: 0
HOW MANY TIMES IN THE PAST YEAR HAVE YOU HAD 5 OR MORE DRINKS IN A DAY: 5
TOTAL SCORE: 4
CONSUMPTION TOTAL: POSITIVE
AVERAGE NUMBER OF DAYS PER WEEK YOU HAVE A DRINK CONTAINING ALCOHOL: 0
EVER FELT BAD OR GUILTY ABOUT YOUR DRINKING: YES
EVER HAD A DRINK FIRST THING IN THE MORNING TO STEADY YOUR NERVES TO GET RID OF A HANGOVER: YES

## 2020-02-08 ASSESSMENT — COPD QUESTIONNAIRES
COPD SCREENING SCORE: 4
DURING THE PAST 4 WEEKS HOW MUCH DID YOU FEEL SHORT OF BREATH: NONE/LITTLE OF THE TIME
HAVE YOU SMOKED AT LEAST 100 CIGARETTES IN YOUR ENTIRE LIFE: YES
DO YOU EVER COUGH UP ANY MUCUS OR PHLEGM?: NO/ONLY WITH OCCASIONAL COLDS OR INFECTIONS

## 2020-02-08 ASSESSMENT — BRIEF INTERVIEW FOR MENTAL STATUS (BIMS)
BIMS SUMMARY SCORE: 15
WHAT MONTH IS IT: ACCURATE WITHIN 5 DAYS
ASKED TO RECALL BED: YES, NO CUE REQUIRED
INITIAL REPETITION OF BED BLUE SOCK - FIRST ATTEMPT: 3
WHAT YEAR IS IT: CORRECT
ASKED TO RECALL SOCK: YES, NO CUE REQUIRED
ASKED TO RECALL BLUE: YES, NO CUE REQUIRED
WHAT DAY OF THE WEEK IS IT: CORRECT

## 2020-02-08 ASSESSMENT — ACTIVITIES OF DAILY LIVING (ADL): TOILETING: INDEPENDENT

## 2020-02-08 NOTE — ASSESSMENT & PLAN NOTE
2nd to [facility] from syncopal episode  Sustained acute C7 fracture -- non-surgical mangement  Cervical collar  Note: has prior hx of cervical fusion

## 2020-02-08 NOTE — THERAPY
"Occupational Therapy   Initial Evaluation     Patient Name: Chris Leggett  Age:  69 y.o., Sex:  male  Medical Record #: [id number]  Today's Date: 2/8/2020     Subjective    \"Well, I was homeless and an alcoholic. I've been sober the past 6 months and I have no intention of drinking ever again. My roommate is very spiritual. He found me at the hospital when I had bronchitis and he knew I had nowhere to go so he took me in. I hadn't met him before. I think he is trying to convert me. He is really nice though. He's why I'm sober. I had no real reason to be sober before and now he's my reason I guess. He has a wife, but its also kind of weird. He hugs me and tells me he loves me.\"    \"I have horrendous nightmares every night so I don't like to sleep much. I ran out of my medication and they think that is why this all happened.\"     Objective       02/08/20 0831   Prior Living Situation   Prior Services Home-Independent  (roommate )   Housing / Facility 1 Story Apartment / Condo   Steps Into Home 0   Steps In Home 0   Rail None   Bathroom Set up Walk In Shower   Lives with - Patient's Self Care Capacity Friends  (able to care for self)   Prior Level of ADL Function   Self Feeding Independent   Grooming / Hygiene Independent   Bathing Independent   Dressing Independent   Toileting Independent   Prior Level of IADL Function   Medication Management Independent   Laundry Independent   Kitchen Mobility Independent   Finances Independent   Home Management Independent   Shopping Independent   Prior Level Of Mobility Independent Without Device in Community   Driving / Transportation Walks;Utilizes Public Transportation   Occupation (Pre-Hospital Vocational) Not Employed   Leisure Interests Exercise  (walks 5 miles a day; enjoys sit ups and other core exercises)   IRF-KAYLEIGH:  Prior Functioning: Everyday Activities   Self Care Independent   Indoor Mobility (Ambulation) Independent   Stairs Independent   Functional Cognition " "Independent   Prior Device Use None of the given options   IRF-KAYLEIGH:  Cognitive Pattern Assessment   Cognitive Pattern Assessment Used BIMS   IRF-KAYLEIGH:  Brief Interview for Mental Status (BIMS)   Repetition of Three Words (First Attempt) 3   Temporal Orientation: Able to Report the Correct Year Correct   Temporal Orientation: Able to Report the Correct Month Accurate within 5 days   Temporal Orientation: Able to Report the Correct Day of the Week Correct   Able to Recall \"Sock\" Yes, no cue required   Able to Recall \"Blue\" Yes, no cue required   Able to Recall \"Bed\" Yes, no cue required   BIMS Summary Score 15   Vision Screen   Vision Not tested   Active ROM Upper Body   Active ROM Upper Body  X   Comments Pt with slight limitation to abduction and adduction. Pt states this was present since his last cervical fracture.   Strength Upper Body   Upper Body Strength  WDL   Comments 5/5 bilaterally   Sensation Upper Body   Comments altered sensation in hands at baseline from previous spinal fracture.   Balance Assessment   Sitting Balance (Static) Fair +   Sitting Balance (Dynamic) Fair +   Standing Balance (Static) Fair -   Standing Balance (Dynamic) Fair -   Bed Mobility    Supine to Sit Stand by Assist   Sit to Supine Stand by Assist   Sit to Stand Contact Guard Assist   Coordination Upper Body   Coordination WDL   IRF-KAYLEIGH:  Eating   Assistance Needed Set-up / clean-up   [place] Physical Assistance Level No physical assistance or only touching/steadying assist   CARE Score 5   Discharge Goal:  Assistance Needed Independent   Discharge Goal:  Physical Assistance Level No physical assistance or only touching/steadying assist   Discharge Goal:  Score 6   IRF-KAYLEIGH:  Oral Hygiene   Assistance Needed Supervision   Physical Assistance Level No physical assistance   CARE Score 4   Discharge Goal:  Assistance Needed Independent   Discharge Goal:  Physical Assistance Level No physical assistance or only touching/steadying assist "   Discharge Goal:  Score 6   IRF-KAYLEIGH:  Shower/Bathe Self   Assistance Needed Physical assistance   Physical Assistance Level Less than 25%   CARE Score 3   Discharge Goal:  Assistance Needed Independent;Adaptive equipment   Discharge Goal:  Physical Assistance Level No physical assistance or only touching/steadying assist   Discharge Goal Score 6   IRF-KAYLEIGH:  Upper Body Dressing   Assistance Needed Supervision   Physical Assistance Level No physical assistance or only touching/steadying assist   CARE Score 4   Discharge Goal:  Assistance Needed Independent   Discharge Goal:  Physical Assistance Level No physical assistance or only touching/steadying assist   Dischage Goal:  Score 6   IRF-KAYLEIGH:  Lower Body Dressing   Assistance Needed Incidental touching   Physical Assistance Level No physical assistance or only touching/steadying assist   CARE Score 4   Discharge Goal:  Assistance Needed Independent   Discharge Goal:  Physical Assistance Level No physical assistance or only touching/steadying assist   Discharge Goal:  Score 6   IRF KAYLEIGH:  Putting On/Taking Off Footwear   Assistance Needed Supervision;Verbal cues   Physical Assistance Level No physical assistance or only touching/steadying assist   CARE Score 4   Discharge Goal:  Assistance Needed Independent   Discharge Goal:  Physical Assistance Level No physical assistance or only touching/steadying assist   Discharge Goal:  Score 6   IRF-KAYLEIGH:  Toileting Hygiene   Assistance Needed Physical assistance   Physical Assistance Level Less than 25%   CARE Score 3   Discharge Goal:  Assistance Needed Independent   Discharge Goal:  Physical Assistance Level No physical assistance or only touching/steadying assist   Discharge Goal:  Score 6   IRF-KAYLEIGH:  Toilet Transfer   Assistance Needed Incidental touching   Physical Assistance Level No physical assistance or only touching/steadying assist   CARE Score 4   Discharge Goal:  Assistance Needed Independent   Discharge Goal:   Physical Assistance Level No physical assistance or only touching/steadying assist   Discahrge Goal:  Score 6   IRF-KAYLEIGH:  Hearing, Speech, and Vision   Expression of Ideas and Wants 4   Understanding Verbal and Non-Verbal Content 4   Problem List   Problem List Decreased Active Daily Living Skills;Decreased Homemaking Skills;Decreased Functional Mobility;Impaired Postural Control / Balance   Precautions   Precautions Cervical Collar  ;Fall Risk;Spinal / Back Precautions    Current Discharge Plan   Current Discharge Plan Return to Prior Living Situation   Benefit    Therapy Benefit Patient Would Benefit from Inpatient Rehab Occupational Therapy to Maximize [place] with ADLs, IADLs and Functional Mobility.   Interdisciplinary Plan of Care Collaboration   IDT Collaboration with  Physical Therapist   Patient Position at End of Therapy Seated;Call Light within Reach;Tray Table within Reach;Phone within Reach;Self Releasing Lap Belt Applied   Collaboration Comments re: CLOF   Equipment Needs   Assistive Device / DME Front-Wheel Walker   Adaptive Equipment Reacher;Long Handled Shoe Horn;Long Handled Sponge   OT Total Time Spent   OT Individual Total Time Spent (Mins) 60   OT Charge Group   OT Evaluation OT Evaluation Mod       FIM Eating Score:  6 - Modified Independent  Eating Description:  Increased time    FIM Grooming Score:  6 - Modified Independent  Grooming Description:  Increased time    FIM Bathing Score:  4 - Minimal Assistance  Bathing Description:  Grab bar, Tub bench, Hand held shower(Assist for back due to spinal precautions. Pt can reach his feet by bringing them up to his knee.)    FIM Upper Body Dressin - Standby Prompting/Supervision or Set-up  Upper Body Dressing Description:  Supervision for safety(assist for Casey J collar)    FIM Lower Body Dressing Score:  4 - Minimal Assistance  Lower Body Dressing Description:  Verbal cueing(CGA; VC to follow precautions)    FIM Toileting Body Dressing:   4 - Minimal Assistance  Toileting Description:  Grab bar, Increased time, Supervision for safety(CGA)    FIM Bed/Chair/Wheelchair Transfers Score: 4 - Minimal Assistance  Bed/Chair/Wheelchair Transfers Description:  Increased time, Supervision for safety, Verbal cueing, Set-up of equipment(CGA; VC for precautions)    FIM Toilet Transfer Score:  4 - Minimal Assistance  Toilet Transfer Description:  Grab bar, Increased time, Supervision for safety, Verbal cueing(CGA; Grab bar)    FIM Tub/Shower Transfers Score:  4 - Minimal Assistance  Tub/Shower Transfers Description:  Grab bar, Increased time, Supervision for safety, Set-up of equipment(CGA)    FIM Comprehension Score:  6 - Modified Independent  Comprehension Description:       FIM Expression Score:  6 - Modified Independent  Expression Description:       FIM Social Interaction Score:  6 - Modified Independent  Social Interaction Description:       FIM Problem Solving Score:  5 - Standby Prompting/Supervision or Set-up  Problem Solving Description:       Assessment  Patient is 69 y.o. male with a diagnosis of C7 fracture. Pt was not feeling well at home and next thing he knew he woke up in a puddle of his own blood. Additional factors influencing patient status / progress (ie: cognitive factors, co-morbidities, social support, etc): past medical history of gout, hypertension, psychiatric disorder and alcoholism. Pt also had a C5-C6 fusion in the past. Pt with impairments to endurance, balance, and limited knowledge of spinal precautions impacting safety and independence with I/ADLs.     Plan  Recommend Occupational Therapy  minutes per day 5-7 days per week for 7-10 days for the following treatments:  OT Orthotics Training, OT E Stim Attended, OT Group Therapy, OT Self Care/ADL, OT Community Reintegration, OT Manual Ther Technique, OT Neuro Re-Ed/Balance, OT Sensory Int Techniques, OT Therapeutic Activity, OT Ultrasound, OT Evaluation and OT Therapeutic  Exercise.    Goals:  Long term and short term goals have been discussed with patient and they are in agreement.    Occupational Therapy Goals     Problem: Bathing     Dates: Start: 02/08/20       Description:     Goal: STG-Within one week, patient will bathe     Dates: Start: 02/08/20   Expected End: 02/15/20       Description: 1) Individualized Goal:  With supervision and AE  2) Interventions:  OT Orthotics Training, OT E Stim Attended, OT Group Therapy, OT Self Care/ADL, OT Cognitive Skill Dev, OT Community Reintegration, OT Manual Ther Technique, OT Neuro Re-Ed/Balance, OT Sensory Int Techniques, OT Therapeutic Activity, OT Ultrasound, OT Evaluation and OT Therapeutic Exercise                   Problem: Dressing     Dates: Start: 02/08/20       Description:     Goal: STG-Within one week, patient will dress LB     Dates: Start: 02/08/20   Expected End: 02/15/20       Description: 1) Individualized Goal:  With supervision and AE  2) Interventions:  OT Orthotics Training, OT E Stim Attended, OT Group Therapy, OT Self Care/ADL, OT Cognitive Skill Dev, OT Community Reintegration, OT Manual Ther Technique, OT Neuro Re-Ed/Balance, OT Sensory Int Techniques, OT Therapeutic Activity, OT Ultrasound, OT Evaluation and OT Therapeutic Exercise                   Problem: Functional Transfers     Dates: Start: 02/08/20       Description:     Goal: STG-Within one week, patient will transfer to toilet     Dates: Start: 02/08/20   Expected End: 02/15/20       Description: 1) Individualized Goal:  With supervision and AE  2) Interventions:  OT Orthotics Training, OT E Stim Attended, OT Group Therapy, OT Self Care/ADL, OT Cognitive Skill Dev, OT Community Reintegration, OT Manual Ther Technique, OT Neuro Re-Ed/Balance, OT Sensory Int Techniques, OT Therapeutic Activity, OT Ultrasound, OT Evaluation and OT Therapeutic Exercise                   Problem: OT Long Term Goals     Dates: Start: 02/08/20       Description:     Goal: LTG-By  discharge, patient will complete basic self care tasks     Dates: Start: 02/08/20   Expected End: 02/22/20       Description: 1) Individualized Goal:  With mod I  2) Interventions:  OT Orthotics Training, OT E Stim Attended, OT Group Therapy, OT Self Care/ADL, OT Cognitive Skill Dev, OT Community Reintegration, OT Manual Ther Technique, OT Neuro Re-Ed/Balance, OT Sensory Int Techniques, OT Therapeutic Activity, OT Ultrasound, OT Evaluation and OT Therapeutic Exercise             Goal: LTG-By discharge, patient will complete basic home management     Dates: Start: 02/08/20   Expected End: 02/22/20       Description: 1) Individualized Goal:  With mod I  2) Interventions:  OT Orthotics Training, OT E Stim Attended, OT Group Therapy, OT Self Care/ADL, OT Cognitive Skill Dev, OT Community Reintegration, OT Manual Ther Technique, OT Neuro Re-Ed/Balance, OT Sensory Int Techniques, OT Therapeutic Activity, OT Ultrasound, OT Evaluation and OT Therapeutic Exercise

## 2020-02-08 NOTE — ASSESSMENT & PLAN NOTE
WBC's: 1.9 (2.9) --> 7.5 (2/10) --> 2.2 (2/14) --> 3.5 (2/17)  H&H: mild stable  Platelets stable   Appears chronic and stable since at least September 2018  Vit B12, Folate, and TSH levels all normal  S/P Granix (2/9)  S/P Granix (2/14)  Will give Granix (2/17)  D/W CM -- set up Hematology consult for out patient  Monitor levels

## 2020-02-08 NOTE — THERAPY
"Occupational Therapy  Daily Treatment     Patient Name: Chris Leggett  Age:  69 y.o., Sex:  male  Medical Record #: [id number]  Today's Date: 2020     Precautions  Precautions: Cervical Collar  , Fall Risk, Spinal / Back Precautions          Subjective    \"I could do this all day.\" re: NuStep     Objective       20 1331   Pain 0 - 10 Group   Pain Rating Scale (NPRS) 1   Comfort Goal Comfort at Rest;Perform Activity;Comfort with Movement   Cognition    Level of Consciousness Alert   Ability To Follow Commands 3 Step   Sitting Lower Body Exercises   Nustep Resistance Level 5;Time (See Comments)  (10 minutes)   Balance   Sitting Balance (Static) Fair +   Sitting Balance (Dynamic) Fair +   Standing Balance (Static) Fair -   Standing Balance (Dynamic) Fair -   Comments walked from one gym to another and around east gym with FWW. No LOB.    OT Total Time Spent   OT Individual Total Time Spent (Mins) 30   OT Charge Group   OT Neuromuscular Re-education / Balance 1   OT Therapeutic Exercise  1       FIM Toileting Body Dressin - Minimal Assistance  Toileting Description:  (CGA)    FIM Bed/Chair/Wheelchair Transfers Score: 4 - Minimal Assistance  Bed/Chair/Wheelchair Transfers Description:  Increased time, Supervision for safety, Verbal cueing, Set-up of equipment(CGA; VC for precautions)    FIM Toilet Transfer Score:  4 - Minimal Assistance  Toilet Transfer Description:  (CGA)        Assessment    Pt with bright affect. Pt motivated to return to baseline. Pt tolerated endurance training on NuStep and Functional Mobility without LOB.     Plan    I/ADLs with spinal precautions, Endurance, Balance    "

## 2020-02-08 NOTE — CARE PLAN
Problem: SKIN INTEGRITY  Goal: LTG - PATIENT WILL MAINTAIN/IMPROVE SKIN INTEGRITY THROUGH PROPER SKIN CARE TECHNIQUES.  Outcome: PROGRESSING SLOWER THAN EXPECTED  Note:   2 RN Skin check completed with Maria Del Rosario TSANG. Moisture fissure noted on pt's sacrum. Barrier past applied. Photos uploaded to Epic.

## 2020-02-08 NOTE — CARE PLAN
Problem: Safety  Goal: Will remain free from injury  Outcome: PROGRESSING AS EXPECTED  Note:   Call light with in reach. Redirection to use call light for assistance. Non skid socks. Upper siderails up x2 while in bed.      Problem: Pain Management  Goal: Pain level will decrease to patient's comfort goal  Outcome: PROGRESSING AS EXPECTED  Note:   Educate patient of non-pharmacological comfort measures: repositioning, relaxation/breathing technique, cold compress and activities. Complains of right knee pain, repositioned self in bed and as needed medication given with relief. No respiratory distress. Able to make needs known. Assisted as needed. Uses urinal. Will continue to monitor.

## 2020-02-08 NOTE — PROGRESS NOTES
Labs reviewed.  Not of progression of thrombocytopenia.  Platelets 54 today.  Consult to hospitalist placed.    Joseph Smith MD

## 2020-02-08 NOTE — CARE PLAN
Problem: Communication  Goal: The ability to communicate needs accurately and effectively will improve  Outcome: PROGRESSING AS EXPECTED  Note:   Patient having regular bowel movements; last BM 2/8/20  Denies s/s constipation; bowel meds available if needed.       Problem: Safety  Goal: Will remain free from injury  Outcome: PROGRESSING AS EXPECTED  Note:   Patient demonstrates good safety technique this shift.  Asks for assistance when needed and does not attempt self transfer.  Able to verbalize needs.

## 2020-02-08 NOTE — PROGRESS NOTES
Received shift report and assumed care of patient.  Patient awake, calm and stable, currently positioned in bed for comfort and safety; call light within reach.  5/10 HA pain at this time, see mar for pain medication.  Will continue to monitor.

## 2020-02-08 NOTE — CONSULTS
HOSPITAL MEDICINE CONSULTATION    Requesting Physician:  Dr. Smith    Reason for Consult:  Hypertension, Pancytopenia    History of Present Illness:  The patient is a 69-year-old  male with past medical history significant for hypertension, gout, and mood disorder.  He was admitted to [facility] on 2/4/20 after sustaining a ground level fall secondary to a syncopal episode.  He reportedly had a negative syncope work-up.  His injuries include a C7 fracture, which was conservatively managed, and right knee traumatic hemarthrosis, for which he underwent joint aspiration.  Due to his ongoing functional debility, the patient was transferred to [facility] on 27/20.  Hospital Medicine consultation is requested to assist in the management of this patient's HTN, leukopenia, anemia, and thrombocytopenia.    Review of Systems:  Review of Systems   Constitutional: Negative for chills and fever.   HENT: Negative.    Eyes: Negative.    Respiratory: Negative for cough and shortness of breath.    Cardiovascular: Negative for chest pain and palpitations.   Gastrointestinal: Negative for abdominal pain, nausea and vomiting.   Musculoskeletal: Positive for neck pain.   Skin: Negative for itching and rash.   Endo/Heme/Allergies: Negative for polydipsia. Does not bruise/bleed easily.   Psychiatric/Behavioral: Positive for depression.   All other systems reviewed and are negative.      Allergies:  No Known Allergies    Medications:    Current Facility-Administered Medications:   •  Respiratory Care per Protocol, , Nebulization, Continuous RT, Talia Shields M.D.  •  oxyCODONE immediate-release (ROXICODONE) tablet 5 mg, 5 mg, Oral, Q3HRS PRN, Talia Shields M.D., 5 mg at 02/08/20 1153  •  oxyCODONE immediate release (ROXICODONE) tablet 10 mg, 10 mg, Oral, Q3HRS PRN, Talia Shields M.D., 10 mg at 02/07/20 2111  •  hydrALAZINE (APRESOLINE) tablet 25 mg, 25 mg,  Oral, Q8HRS PRN, Talia Shields M.D.  •  acetaminophen (TYLENOL) tablet 650 mg, 650 mg, Oral, Q4HRS PRN, Talia Shields M.D.  •  senna-docusate (PERICOLACE or SENOKOT S) 8.6-50 MG per tablet 2 Tab, 2 Tab, Oral, BID, 2 Tab at 02/08/20 0828 **AND** polyethylene glycol/lytes (MIRALAX) PACKET 1 Packet, 1 Packet, Oral, QDAY PRN **AND** magnesium hydroxide (MILK OF MAGNESIA) suspension 30 mL, 30 mL, Oral, QDAY PRN, 30 mL at 02/07/20 1313 **AND** bisacodyl (DULCOLAX) suppository 10 mg, 10 mg, Rectal, QDAY PRN, Talia Shields M.D.  •  benzocaine-menthol (CEPACOL) lozenge 1 Lozenge, 1 Lozenge, Mouth/Throat, Q2HRS PRN, Talia Shields M.D.  •  mag hydrox-al hydrox-simeth (MAALOX PLUS ES or MYLANTA DS) suspension 20 mL, 20 mL, Oral, Q2HRS PRN, Talia Shields M.D.  •  ondansetron (ZOFRAN ODT) dispertab 4 mg, 4 mg, Oral, 4X/DAY PRN **OR** ondansetron (ZOFRAN) syringe/vial injection 4 mg, 4 mg, Intramuscular, 4X/DAY PRN, Talia Shields M.D.  •  traZODone (DESYREL) tablet 50 mg, 50 mg, Oral, QHS PRN, Talia Shields M.D.  •  sodium chloride (OCEAN) 0.65 % nasal spray 2 Spray, 2 Spray, Nasal, PRN, Talia Shields M.D.  •  DULoxetine (CYMBALTA) capsule 60 mg, 60 mg, Oral, DAILY, Talia Shields M.D., 60 mg at 02/08/20 0829  •  enoxaparin (LOVENOX) inj 40 mg, 40 mg, Subcutaneous, DAILY, Talia Shields M.D., 40 mg at 02/08/20 0830  •  gabapentin (NEURONTIN) capsule 300 mg, 300 mg, Oral, TID, Talia Shields M.D., 300 mg at 02/08/20 0829  •  lisinopril (PRINIVIL) tablet 20 mg, 20 mg, Oral, QAM, Talia Shields M.D., 20 mg at 02/08/20 0828  •  QUEtiapine (SEROQUEL) tablet 200 mg, 200 mg, Oral, QHS, Talia Shields M.D., 200 mg at 02/07/20 2002  •  traZODone (DESYREL) tablet 50 mg, 50 mg, Oral, Nightly, Talia Shields M.D., 50 mg at 02/07/20 2003  •  polyethyl glycol-propyl glycol (SYSTANE) 0.4-0.3 % ophth  drops 1 Drop, 1 Drop, Both Eyes, PRN, Talia Shields M.D.    Past Medical/Surgical History:  Past Medical History:   Diagnosis Date   • Arthritis    • Gout    • Hypertension    • Psychiatric disorder      Past Surgical History:   Procedure Laterality Date   • OR DX BONE MARROW ASPIRATIONS Left 10/2/2019    Procedure: ASPIRATION, BONE MARROW - APARICIO;  Surgeon: Tamera Leos M.D.;  Location: [facility];  Service: Orthopedics   • OR DX BONE MARROW BIOPSIES  10/2/2019    Procedure: BIOPSY, BONE MARROW, USING NEEDLE OR TROCAR;  Surgeon: Tamera Leos M.D.;  Location: ENDOSCOPY [facility];  Service: Orthopedics   • CERVICAL DISK AND FUSION ANTERIOR  2018    Procedure: CERVICAL DISK AND FUSION ANTERIOR C5-C6;  Surgeon: Varghese Wilkins M.D.;  Location: SURGERY [facility];  Service: Neurosurgery   • OTHER ORTHOPEDIC SURGERY      fision with disc removal       Social History:  Social History     Socioeconomic History   • Marital status: Single     Spouse name: Not on file   • Number of children: Not on file   • Years of education: Not on file   • Highest education level: Not on file   Occupational History   • Not on file   Social Needs   • Financial resource strain: Not on file   • Food insecurity:     Worry: Not on file     Inability: Not on file   • Transportation needs:     Medical: Not on file     Non-medical: Not on file   Tobacco Use   • Smoking status: Current Every Day Smoker     Packs/day: 0.50     Years: 5.00     Pack years: 2.50     Types: Cigarettes     Last attempt to quit: 2019     Years since quittin.0   • Smokeless tobacco: Never Used   Substance and Sexual Activity   • Alcohol use: Yes     Alcohol/week: 10.8 - 12.0 oz     Types: 18 - 20 Cans of beer per week     Drinks per session: 10 or more     Comment: last drink     • Drug use: Yes     Types: Inhaled     Comment: marijuana last dose last month   • Sexual activity: Not on file   Lifestyle   • Physical  activity:     Days per week: Not on file     Minutes per session: Not on file   • Stress: Not on file   Relationships   • Social connections:     Talks on phone: Not on file     Gets together: Not on file     Attends Alevism service: Not on file     Active member of club or organization: Not on file     Attends meetings of clubs or organizations: Not on file     Relationship status: Not on file   • Intimate partner violence:     Fear of current or ex partner: Not on file     Emotionally abused: Not on file     Physically abused: Not on file     Forced sexual activity: Not on file   Other Topics Concern   • Not on file   Social History Narrative   • Not on file       Family History  Family History   Problem Relation Age of Onset   • Cancer Mother    • Alzheimer's Disease Father    • Cancer Father    • Heart Disease Sister    • Heart Disease Brother        Physical Examination:   Vitals:    02/07/20 1835 02/07/20 2001 02/07/20 2111 02/08/20 0700   BP: (!) 94/64 (!) 94/64 100/64 104/70   Pulse: 68 68  60   Resp: 18   18   Temp: 36.7 °C (98 °F)   36.6 °C (97.8 °F)   TempSrc: Oral   Oral   Weight:       Height:           Physical Exam   Constitutional: He is oriented to person, place, and time. No distress.   HENT:   Head: Normocephalic and atraumatic.   Right Ear: External ear normal.   Left Ear: External ear normal.   Eyes: Conjunctivae and EOM are normal. Right eye exhibits no discharge. Left eye exhibits no discharge.   Neck:   C-collar   Cardiovascular: Normal rate and regular rhythm.   Pulmonary/Chest: No respiratory distress. He has no wheezes.   Decreased BS   Abdominal: Soft. Bowel sounds are normal. He exhibits no distension. There is no tenderness.   Musculoskeletal:         General: Tenderness, deformity and edema present.      Comments: Right knee swelling   Neurological: He is alert and oriented to person, place, and time.   Skin: Skin is warm and dry. He is not diaphoretic.   Vitals  reviewed.      Laboratory Data:  Recent Labs     02/06/20  0420 02/08/20  0525   WBC 3.5* 2.6*   RBC 3.25* 3.26*   HEMOGLOBIN 11.0* 10.8*   HEMATOCRIT 32.1* 31.9*   MCV 98.8* 97.9*   MCH 33.8* 33.1*   MCHC 34.3 33.9   RDW 48.6 47.5   PLATELETCT 66* 54*   MPV 11.4 10.7     Recent Labs     02/06/20  0420 02/08/20  0525   SODIUM 133* 136   POTASSIUM 4.2 4.2   CHLORIDE 104 100   CO2 25 29   GLUCOSE 87 81   BUN 8 6*   CREATININE 0.62 0.65   CALCIUM 8.0* 8.3*       Imaging:  No orders to display       Impressions/Recommendations:  Syncope  Work-up negative per [facility] Discharge Summary by Dr. Miner    Hypertension  On Lisinopril and Prazosin  Will discontinue Prazosin for hypotension    Cervical spine fracture (HCC)  2/2 GLF from syncopal episode  S/P acute C7 fx, non-surgical mangement  Cervical collar  Note prior h/o cervical fusion    Gout  On no meds  Monitor for acute flare-up    Mood disorder (HCC)  On Seroquel    Pain and swelling of right lower extremity  Right knee traumatic hemarthrosis  S/P joint aspiration w/ removal of blood    Pancytopenia (HCC)  CBC appears chronic and stable since at least September 2018  Check Fe Panel, Vit B12, Folate, and TFT's  Avoid meds w/ bone marrow toxicity  Follow WBC/ANC, H/H, and Platelet counts  Monitor need for G-CSF, PRBC, and Platelet transfusion    Full Code    Thank you for the opportunity to assist in this patient's care.  We will continue to follow along with you.

## 2020-02-09 LAB
ANION GAP SERPL CALC-SCNC: 6 MMOL/L (ref 0–11.9)
BACTERIA FLD AEROBE CULT: NORMAL
BASOPHILS # BLD AUTO: 1 % (ref 0–1.8)
BASOPHILS # BLD: 0.02 K/UL (ref 0–0.12)
BUN SERPL-MCNC: 7 MG/DL (ref 8–22)
CALCIUM SERPL-MCNC: 8.6 MG/DL (ref 8.5–10.5)
CHLORIDE SERPL-SCNC: 102 MMOL/L (ref 96–112)
CO2 SERPL-SCNC: 29 MMOL/L (ref 20–33)
CREAT SERPL-MCNC: 0.73 MG/DL (ref 0.5–1.4)
EOSINOPHIL # BLD AUTO: 0.26 K/UL (ref 0–0.51)
EOSINOPHIL NFR BLD: 13.5 % (ref 0–6.9)
ERYTHROCYTE [DISTWIDTH] IN BLOOD BY AUTOMATED COUNT: 46.7 FL (ref 35.9–50)
FOLATE SERPL-MCNC: 14.9 NG/ML
GLUCOSE SERPL-MCNC: 80 MG/DL (ref 65–99)
GRAM STN SPEC: NORMAL
HCT VFR BLD AUTO: 31.5 % (ref 42–52)
HGB BLD-MCNC: 10.9 G/DL (ref 14–18)
IMM GRANULOCYTES # BLD AUTO: 0 K/UL (ref 0–0.11)
IMM GRANULOCYTES NFR BLD AUTO: 0 % (ref 0–0.9)
IRON SATN MFR SERPL: 6 % (ref 15–55)
IRON SERPL-MCNC: 15 UG/DL (ref 50–180)
LYMPHOCYTES # BLD AUTO: 0.58 K/UL (ref 1–4.8)
LYMPHOCYTES NFR BLD: 30.1 % (ref 22–41)
MCH RBC QN AUTO: 33.9 PG (ref 27–33)
MCHC RBC AUTO-ENTMCNC: 34.6 G/DL (ref 33.7–35.3)
MCV RBC AUTO: 97.8 FL (ref 81.4–97.8)
MONOCYTES # BLD AUTO: 0.26 K/UL (ref 0–0.85)
MONOCYTES NFR BLD AUTO: 13.5 % (ref 0–13.4)
NEUTROPHILS # BLD AUTO: 0.81 K/UL (ref 1.82–7.42)
NEUTROPHILS NFR BLD: 41.9 % (ref 44–72)
NRBC # BLD AUTO: 0.03 K/UL
NRBC BLD-RTO: 1.6 /100 WBC
PLATELET # BLD AUTO: 63 K/UL (ref 164–446)
PMV BLD AUTO: 11.3 FL (ref 9–12.9)
POTASSIUM SERPL-SCNC: 3.9 MMOL/L (ref 3.6–5.5)
RBC # BLD AUTO: 3.22 M/UL (ref 4.7–6.1)
SIGNIFICANT IND 70042: NORMAL
SITE SITE: NORMAL
SODIUM SERPL-SCNC: 137 MMOL/L (ref 135–145)
SOURCE SOURCE: NORMAL
TIBC SERPL-MCNC: 235 UG/DL (ref 250–450)
TSH SERPL DL<=0.005 MIU/L-ACNC: 1.49 UIU/ML (ref 0.38–5.33)
VIT B12 SERPL-MCNC: 394 PG/ML (ref 211–911)
WBC # BLD AUTO: 1.9 K/UL (ref 4.8–10.8)

## 2020-02-09 PROCEDURE — 700102 HCHG RX REV CODE 250 W/ 637 OVERRIDE(OP): Performed by: PHYSICAL MEDICINE & REHABILITATION

## 2020-02-09 PROCEDURE — 36415 COLL VENOUS BLD VENIPUNCTURE: CPT

## 2020-02-09 PROCEDURE — 82607 VITAMIN B-12: CPT

## 2020-02-09 PROCEDURE — 83550 IRON BINDING TEST: CPT

## 2020-02-09 PROCEDURE — 700111 HCHG RX REV CODE 636 W/ 250 OVERRIDE (IP): Performed by: PHYSICAL MEDICINE & REHABILITATION

## 2020-02-09 PROCEDURE — 85025 COMPLETE CBC W/AUTO DIFF WBC: CPT

## 2020-02-09 PROCEDURE — 80048 BASIC METABOLIC PNL TOTAL CA: CPT

## 2020-02-09 PROCEDURE — 770010 HCHG ROOM/CARE - REHAB SEMI PRIVAT*

## 2020-02-09 PROCEDURE — 84443 ASSAY THYROID STIM HORMONE: CPT

## 2020-02-09 PROCEDURE — 99232 SBSQ HOSP IP/OBS MODERATE 35: CPT | Performed by: PHYSICAL MEDICINE & REHABILITATION

## 2020-02-09 PROCEDURE — 700111 HCHG RX REV CODE 636 W/ 250 OVERRIDE (IP): Performed by: HOSPITALIST

## 2020-02-09 PROCEDURE — 99232 SBSQ HOSP IP/OBS MODERATE 35: CPT | Performed by: HOSPITALIST

## 2020-02-09 PROCEDURE — 82746 ASSAY OF FOLIC ACID SERUM: CPT

## 2020-02-09 PROCEDURE — A9270 NON-COVERED ITEM OR SERVICE: HCPCS | Performed by: PHYSICAL MEDICINE & REHABILITATION

## 2020-02-09 PROCEDURE — 83540 ASSAY OF IRON: CPT

## 2020-02-09 RX ORDER — LISINOPRIL 5 MG/1
10 TABLET ORAL EVERY MORNING
Status: DISCONTINUED | OUTPATIENT
Start: 2020-02-10 | End: 2020-02-10

## 2020-02-09 RX ORDER — ASCORBIC ACID 500 MG
500 TABLET ORAL
Status: DISCONTINUED | OUTPATIENT
Start: 2020-02-10 | End: 2020-02-17 | Stop reason: HOSPADM

## 2020-02-09 RX ORDER — ACETAMINOPHEN 325 MG/1
650 TABLET ORAL EVERY 6 HOURS
Status: DISCONTINUED | OUTPATIENT
Start: 2020-02-09 | End: 2020-02-13

## 2020-02-09 RX ORDER — FERROUS SULFATE 325(65) MG
325 TABLET ORAL
Status: DISCONTINUED | OUTPATIENT
Start: 2020-02-10 | End: 2020-02-17 | Stop reason: HOSPADM

## 2020-02-09 RX ADMIN — TRAZODONE HYDROCHLORIDE 50 MG: 50 TABLET ORAL at 20:30

## 2020-02-09 RX ADMIN — TRAZODONE HYDROCHLORIDE 50 MG: 50 TABLET ORAL at 22:23

## 2020-02-09 RX ADMIN — SENNOSIDES AND DOCUSATE SODIUM 2 TABLET: 8.6; 5 TABLET ORAL at 08:06

## 2020-02-09 RX ADMIN — DULOXETINE HYDROCHLORIDE 60 MG: 30 CAPSULE, DELAYED RELEASE ORAL at 08:06

## 2020-02-09 RX ADMIN — GABAPENTIN 300 MG: 300 CAPSULE ORAL at 15:10

## 2020-02-09 RX ADMIN — GABAPENTIN 300 MG: 300 CAPSULE ORAL at 20:29

## 2020-02-09 RX ADMIN — OXYCODONE HYDROCHLORIDE 5 MG: 5 TABLET ORAL at 10:37

## 2020-02-09 RX ADMIN — OXYCODONE HYDROCHLORIDE 5 MG: 5 TABLET ORAL at 13:33

## 2020-02-09 RX ADMIN — TBO-FILGRASTIM 480 MCG: 300 INJECTION, SOLUTION SUBCUTANEOUS at 10:34

## 2020-02-09 RX ADMIN — OXYCODONE HYDROCHLORIDE 5 MG: 5 TABLET ORAL at 20:29

## 2020-02-09 RX ADMIN — QUETIAPINE FUMARATE 200 MG: 100 TABLET ORAL at 20:30

## 2020-02-09 RX ADMIN — LISINOPRIL 20 MG: 20 TABLET ORAL at 08:06

## 2020-02-09 RX ADMIN — ACETAMINOPHEN 650 MG: 325 TABLET, FILM COATED ORAL at 17:29

## 2020-02-09 RX ADMIN — OXYCODONE HYDROCHLORIDE 5 MG: 5 TABLET ORAL at 17:29

## 2020-02-09 RX ADMIN — GABAPENTIN 300 MG: 300 CAPSULE ORAL at 08:06

## 2020-02-09 RX ADMIN — SENNOSIDES AND DOCUSATE SODIUM 2 TABLET: 8.6; 5 TABLET ORAL at 20:31

## 2020-02-09 RX ADMIN — ACETAMINOPHEN 650 MG: 325 TABLET, FILM COATED ORAL at 13:18

## 2020-02-09 RX ADMIN — OXYCODONE HYDROCHLORIDE 5 MG: 5 TABLET ORAL at 06:57

## 2020-02-09 RX ADMIN — ENOXAPARIN SODIUM 40 MG: 40 INJECTION, SOLUTION INTRAVENOUS; SUBCUTANEOUS at 08:07

## 2020-02-09 ASSESSMENT — ENCOUNTER SYMPTOMS
VOMITING: 0
CHILLS: 0
FEVER: 0
SHORTNESS OF BREATH: 0
EYES NEGATIVE: 1
DEPRESSION: 1
NAUSEA: 0
COUGH: 0
ABDOMINAL PAIN: 0
POLYDIPSIA: 0
NECK PAIN: 1
PALPITATIONS: 0
BRUISES/BLEEDS EASILY: 0

## 2020-02-09 NOTE — PROGRESS NOTES
"Rehab Progress Note     Interval Events (Subjective)  Neck pain, right jaw pain and right knee pain, hand numbness    Mr. Leggett reports that he has slept well.  No particular complaints regarding the right knee.  It is still painful, but moving better since arthrocentesis.  Hand numbness is unchanged.    He has been having more pain in his right jaw that bothers him more when he is talking, chewing and trying to open his mouth wide.    No chest pain, no shortness of breath.    He reports that he thinks that he has had a bone biopsy in the past for work-up of pancytopenia.  Bone biopsy was done 10/02/2019    Objective:  VITAL SIGNS: /75   Pulse 61   Temp 36.4 °C (97.6 °F) (Oral)   Resp 17   Ht 1.803 m (5' 11\")   Wt 99.9 kg (220 lb 3.8 oz)   SpO2 96%   BMI 30.72 kg/m²   Gen: Patient is alert and cooperative and in no acute distress  HEENT: multiple lacerations over right anterior scalp, small amount of irritation over right ear, left-sided facial lacerations.  None with erythema  Tenderness to palpation over the right TMJ and mandible.  Palpation over the left mandible causes referred pain on the right.  CV: RRR, nl S1, S2, no edema  Lungs: CTA bilaterally, normal respiratory effort  Abd: positive  Bowel sounds, soft, non-tender and non-distended  MSK: right knee with mild effusion.  No warmth, no erythema  Ext: No calf tenderness bilaterlally    Recent Results (from the past 72 hour(s))   Fluid Cell Count    Collection Time: 02/06/20  2:20 PM   Result Value Ref Range    Fluid Type Synovial     Color-Body Fluid Red     Character-Body Fluid Bloody     Total RBC Count [id number] cells/uL    Total WBC 5460 cells/uL    Polys 93 %    Lymphs 5 %    Mononuclear Cells - Fluid 1 %    Eosinophils - CSF 1 %   FLUID CULTURE W/GRAM STAIN    Collection Time: 02/06/20  2:36 PM   Result Value Ref Range    Significant Indicator NEG     Source SYNO     Site Knee     Culture Result No growth at 72 hours.     Gram Stain Result " Few WBCs.  No organisms seen.      GRAM STAIN    Collection Time: 02/06/20  2:36 PM   Result Value Ref Range    Significant Indicator .     Source SYNO     Site Knee     Gram Stain Result Few WBCs.  No organisms seen.      CBC with Differential    Collection Time: 02/08/20  5:25 AM   Result Value Ref Range    WBC 2.6 (L) 4.8 - 10.8 K/uL    RBC 3.26 (L) 4.70 - 6.10 M/uL    Hemoglobin 10.8 (L) 14.0 - 18.0 g/dL    Hematocrit 31.9 (L) 42.0 - 52.0 %    MCV 97.9 (H) 81.4 - 97.8 fL    MCH 33.1 (H) 27.0 - 33.0 pg    MCHC 33.9 33.7 - 35.3 g/dL    RDW 47.5 35.9 - 50.0 fL    Platelet Count 54 (L) 164 - 446 K/uL    MPV 10.7 9.0 - 12.9 fL    Neutrophils-Polys 49.00 44.00 - 72.00 %    Lymphocytes 28.00 22.00 - 41.00 %    Monocytes 10.50 0.00 - 13.40 %    Eosinophils 11.30 (H) 0.00 - 6.90 %    Basophils 0.80 0.00 - 1.80 %    Immature Granulocytes 0.40 0.00 - 0.90 %    Nucleated RBC 0.00 /100 WBC    Neutrophils (Absolute) 1.26 (L) 1.82 - 7.42 K/uL    Lymphs (Absolute) 0.72 (L) 1.00 - 4.80 K/uL    Monos (Absolute) 0.27 0.00 - 0.85 K/uL    Eos (Absolute) 0.29 0.00 - 0.51 K/uL    Baso (Absolute) 0.02 0.00 - 0.12 K/uL    Immature Granulocytes (abs) 0.01 0.00 - 0.11 K/uL    NRBC (Absolute) 0.00 K/uL   Comp Metabolic Panel (CMP)    Collection Time: 02/08/20  5:25 AM   Result Value Ref Range    Sodium 136 135 - 145 mmol/L    Potassium 4.2 3.6 - 5.5 mmol/L    Chloride 100 96 - 112 mmol/L    Co2 29 20 - 33 mmol/L    Anion Gap 7.0 0.0 - 11.9    Glucose 81 65 - 99 mg/dL    Bun 6 (L) 8 - 22 mg/dL    Creatinine 0.65 0.50 - 1.40 mg/dL    Calcium 8.3 (L) 8.5 - 10.5 mg/dL    AST(SGOT) 29 12 - 45 U/L    ALT(SGPT) 16 2 - 50 U/L    Alkaline Phosphatase 38 30 - 99 U/L    Total Bilirubin 0.8 0.1 - 1.5 mg/dL    Albumin 3.3 3.2 - 4.9 g/dL    Total Protein 5.5 (L) 6.0 - 8.2 g/dL    Globulin 2.2 1.9 - 3.5 g/dL    A-G Ratio 1.5 g/dL   HEMOGLOBIN A1C    Collection Time: 02/08/20  5:25 AM   Result Value Ref Range    Glycohemoglobin 5.2 0.0 - 5.6 %    Est Avg  Glucose 103 mg/dL   TSH with Reflex to FT4    Collection Time: 02/08/20  5:25 AM   Result Value Ref Range    TSH 1.200 0.380 - 5.330 uIU/mL   Vitamin D, 25-hydroxy (blood)    Collection Time: 02/08/20  5:25 AM   Result Value Ref Range    25-Hydroxy   Vitamin D 25 30 30 - 100 ng/mL   ESTIMATED GFR    Collection Time: 02/08/20  5:25 AM   Result Value Ref Range    GFR If African American >60 >60 mL/min/1.73 m 2    GFR If Non African American >60 >60 mL/min/1.73 m 2   CBC WITH DIFFERENTIAL    Collection Time: 02/09/20  5:07 AM   Result Value Ref Range    WBC 1.9 (LL) 4.8 - 10.8 K/uL    RBC 3.22 (L) 4.70 - 6.10 M/uL    Hemoglobin 10.9 (L) 14.0 - 18.0 g/dL    Hematocrit 31.5 (L) 42.0 - 52.0 %    MCV 97.8 81.4 - 97.8 fL    MCH 33.9 (H) 27.0 - 33.0 pg    MCHC 34.6 33.7 - 35.3 g/dL    RDW 46.7 35.9 - 50.0 fL    Platelet Count 63 (L) 164 - 446 K/uL    MPV 11.3 9.0 - 12.9 fL    Neutrophils-Polys 41.90 (L) 44.00 - 72.00 %    Lymphocytes 30.10 22.00 - 41.00 %    Monocytes 13.50 (H) 0.00 - 13.40 %    Eosinophils 13.50 (H) 0.00 - 6.90 %    Basophils 1.00 0.00 - 1.80 %    Immature Granulocytes 0.00 0.00 - 0.90 %    Nucleated RBC 1.60 /100 WBC    Neutrophils (Absolute) 0.81 (L) 1.82 - 7.42 K/uL    Lymphs (Absolute) 0.58 (L) 1.00 - 4.80 K/uL    Monos (Absolute) 0.26 0.00 - 0.85 K/uL    Eos (Absolute) 0.26 0.00 - 0.51 K/uL    Baso (Absolute) 0.02 0.00 - 0.12 K/uL    Immature Granulocytes (abs) 0.00 0.00 - 0.11 K/uL    NRBC (Absolute) 0.03 K/uL   Basic Metabolic Panel    Collection Time: 02/09/20  5:07 AM   Result Value Ref Range    Sodium 137 135 - 145 mmol/L    Potassium 3.9 3.6 - 5.5 mmol/L    Chloride 102 96 - 112 mmol/L    Co2 29 20 - 33 mmol/L    Glucose 80 65 - 99 mg/dL    Bun 7 (L) 8 - 22 mg/dL    Creatinine 0.73 0.50 - 1.40 mg/dL    Calcium 8.6 8.5 - 10.5 mg/dL    Anion Gap 6.0 0.0 - 11.9   IRON/TOTAL IRON BIND    Collection Time: 02/09/20  5:07 AM   Result Value Ref Range    Iron 15 (L) 50 - 180 ug/dL    Total Iron Binding  235 (L) 250 - 450 ug/dL    % Saturation 6 (L) 15 - 55 %   FOLATE    Collection Time: 02/09/20  5:07 AM   Result Value Ref Range    Folate -Folic Acid 14.9 >4.0 ng/mL   VITAMIN B12    Collection Time: 02/09/20  5:07 AM   Result Value Ref Range    Vitamin B12 -True Cobalamin 394 211 - 911 pg/mL   TSH WITH REFLEX TO FT4    Collection Time: 02/09/20  5:07 AM   Result Value Ref Range    TSH 1.490 0.380 - 5.330 uIU/mL   ESTIMATED GFR    Collection Time: 02/09/20  5:07 AM   Result Value Ref Range    GFR If African American >60 >60 mL/min/1.73 m 2    GFR If Non African American >60 >60 mL/min/1.73 m 2       Current Facility-Administered Medications   Medication Frequency   • Respiratory Care per Protocol Continuous RT   • oxyCODONE immediate-release (ROXICODONE) tablet 5 mg Q3HRS PRN   • oxyCODONE immediate release (ROXICODONE) tablet 10 mg Q3HRS PRN   • hydrALAZINE (APRESOLINE) tablet 25 mg Q8HRS PRN   • acetaminophen (TYLENOL) tablet 650 mg Q4HRS PRN   • senna-docusate (PERICOLACE or SENOKOT S) 8.6-50 MG per tablet 2 Tab BID    And   • polyethylene glycol/lytes (MIRALAX) PACKET 1 Packet QDAY PRN    And   • magnesium hydroxide (MILK OF MAGNESIA) suspension 30 mL QDAY PRN    And   • bisacodyl (DULCOLAX) suppository 10 mg QDAY PRN   • benzocaine-menthol (CEPACOL) lozenge 1 Lozenge Q2HRS PRN   • mag hydrox-al hydrox-simeth (MAALOX PLUS ES or MYLANTA DS) suspension 20 mL Q2HRS PRN   • ondansetron (ZOFRAN ODT) dispertab 4 mg 4X/DAY PRN    Or   • ondansetron (ZOFRAN) syringe/vial injection 4 mg 4X/DAY PRN   • traZODone (DESYREL) tablet 50 mg QHS PRN   • sodium chloride (OCEAN) 0.65 % nasal spray 2 Spray PRN   • DULoxetine (CYMBALTA) capsule 60 mg DAILY   • enoxaparin (LOVENOX) inj 40 mg DAILY   • gabapentin (NEURONTIN) capsule 300 mg TID   • lisinopril (PRINIVIL) tablet 20 mg QAM   • QUEtiapine (SEROQUEL) tablet 200 mg QHS   • traZODone (DESYREL) tablet 50 mg Nightly   • polyethyl glycol-propyl glycol (SYSTANE) 0.4-0.3 % ophth  drops 1 Drop PRN       Orders Placed This Encounter   Procedures   • Diet Order Regular (after passing bedside swallow eval )     Standing Status:   Standing     Number of Occurrences:   1     Order Specific Question:   Diet:     Answer:   Regular [1]     Comments:   after passing bedside swallow eval      Order Specific Question:   Texture/Fiber modifications:     Answer:   Dysphagia 3(Mechanical Soft)specify fluid consistency(question 6) [3]       Assessment:  Active Hospital Problems    Diagnosis   • *Cervical spine fracture (HCC)   • Syncope   • Pain and swelling of right lower extremity   • Mood disorder (HCC)   • Gout   • Hypertension   • Forehead laceration   • Normocytic anemia   • Tobacco abuse   • Pancytopenia (HCC)   • H/O cervical fracture       Medical Decision Making and Plan:  Cervical spine fracture  Continue full rehab program with PT/OT, 5 days a week  Continue [place] J collar    Trauma  Reviewed CT maxillofacial study.  It does not appear that he has an acute fracture.  Could consider repeat imaging if symptoms persist.    Resume scheduled tylenol    Right knee pain  Synovial fluid analysis consistent with hemarthrosis.  Culture negative at 72hours  Continue to monitor.    Forehead laceration  Continue to monitor, healing well    Gout  Currently asymptomatic    Hypertension  Continue lisinopril    Pancytopenia  Discussed with patient and Hospitalist.  Granix x1 ordered.  Appreciate Hospitalist input.    Anemia  Iron deficiency noted.  Fe and vitamin C started. Appreciate Hospitalist input.    Total time: 26 minutes.  I spent greater than 50% of the time for patient care and coordination on this date, including unit/floor time, and face-to-face time with the patient as per assessment and plan above.  Discussed case with Hospitalist.     Joseph Smith M.D.

## 2020-02-09 NOTE — THERAPY
Physical Therapy   Daily Treatment     Patient Name: Chris Leggett  Age:  69 y.o., Sex:  male  Medical Record #: [id number]  Today's Date: 2/8/2020     Precautions  Precautions: (P) Other (See Comments), Fall Risk, Cervical Collar    Comments: (P) Monitor BP    Subjective    Pt agreeable to HEP practice. No complaints of dizziness or pain.     Objective       02/08/20 1501   Precautions   Precautions Other (See Comments);Fall Risk;Cervical Collar     Comments Monitor BP   Vitals   Vitals Comments PT monitored for signs and symptoms of dizziness, dyspnea, and nausea throughout therapy sessoin. None noted.   Pain 0 - 10 Group   Therapist Pain Assessment 0;Post Activity Pain Same as Prior to Activity   Cognition    Level of Consciousness Alert   Ability To Follow Commands 3 Step   Supine Lower Body Exercise   Bridges 1 set of 10   Hip Flexion 1 set of 10;Bilateral   Hip Abduction 1 set of 10;Side Lying;Bilateral   Straight Leg Raises 1 set of 10;Bilateral   Heel Slide 1 set of 10;Bilateral   Ankle Pumps 1 set of 15   Interdisciplinary Plan of Care Collaboration   Patient Position at End of Therapy In Bed;Phone within Reach;Tray Table within Reach;Call Light within Reach   PT Total Time Spent   PT Individual Total Time Spent (Mins) 30   PT Charge Group   PT Therapeutic Exercise 2       HEP distributed handout of above    Assessment    Pt was pleasant and agreeable to HEP. Easily fatigued, but enthusiastic about completing when not in therapy.    Plan    Gait training endurance, Dynamic balance, LE strengthening, Stair training

## 2020-02-09 NOTE — FLOWSHEET NOTE
02/08/20 4180   Type of Assessment   Assessment Yes   Patient History   Pulmonary Diagnosis None   Surgical Procedures None   Home O2 No   Home Treatments/Frequency No   COPD Risk Screening   Do you have a history of COPD? No   COPD Population Screener   During the past 4 weeks, how much did you feel short of breath? 0   Do you ever cough up any mucus or phlegm? 0   In the past 12 months, you do less than you used to because of your breathing problems 0   Have you smoked at least 100 cigarettes in your entire life? 2   How old are you? 2   COPD Screening Score 4   Smoking History   Have you ever smoked Yes   Have you smoked in the last 12 months Yes   Have you quit smoking No   Smoking Cessation Offered Patient Counseled   Level Of Consciousness   Level of Consciousness Alert   Chest Exam   Respiration 18   Pulse 70   Oximetry   #Pulse Oximetry (Single Determination) Yes   Oxygen   Home O2 Use Prior To Admission? No   Pulse Oximetry 96 %   O2 Daily Delivery Respiratory  Room Air with O2 Available

## 2020-02-09 NOTE — CARE PLAN
Problem: Safety  Goal: Will remain free from injury  Note:   Patient uses call light appropriately. Bed locked and in the lowest position. Non skid socks in place. Hourly rounding in place.       Problem: Discharge Barriers/Planning  Goal: Patient's continuum of care needs will be met  Note:   WBC 1.9 this morning. Hospitalist ordered Granix 480 mcg was applied as ordered by hospitalist. Pt was placed on reverse isolation to decrease the risk of infection.

## 2020-02-09 NOTE — PROGRESS NOTES
Received call from Lab at 06.30 with critical wbc of 1.9, result read back to Reina Murillo charge  Nurse will follow up with doctor.

## 2020-02-09 NOTE — PROGRESS NOTES
Hospital Medicine Daily Progress Note      Chief Complaint  HTN, Pancytopenia    Interval Problem Update  No new complaints.  Labs reviewed.    Review of Systems  Review of Systems   Constitutional: Negative for chills and fever.   HENT: Negative.    Eyes: Negative.    Respiratory: Negative for cough and shortness of breath.    Cardiovascular: Negative for chest pain and palpitations.   Gastrointestinal: Negative for abdominal pain, nausea and vomiting.   Musculoskeletal: Positive for neck pain.   Skin: Negative for itching and rash.   Endo/Heme/Allergies: Negative for polydipsia. Does not bruise/bleed easily.   Psychiatric/Behavioral: Positive for depression.        Physical Exam  Temp:  [36.4 °C (97.6 °F)-36.7 °C (98.1 °F)] 36.4 °C (97.6 °F)  Pulse:  [61-75] 75  Resp:  [16-18] 16  BP: ()/(62-76) 109/76  SpO2:  [96 %] 96 %    Physical Exam  Vitals signs reviewed.   Constitutional:       General: He is not in acute distress.     Appearance: Normal appearance. He is not ill-appearing.   HENT:      Head: Normocephalic and atraumatic.      Right Ear: External ear normal.      Left Ear: External ear normal.      Nose: Nose normal.   Eyes:      General:         Right eye: No discharge.         Left eye: No discharge.      Extraocular Movements: Extraocular movements intact.      Conjunctiva/sclera: Conjunctivae normal.   Neck:      Comments: C-collar  Cardiovascular:      Rate and Rhythm: Normal rate and regular rhythm.   Pulmonary:      Effort: No respiratory distress.      Breath sounds: No wheezing.      Comments: Decreased BS  Abdominal:      General: Bowel sounds are normal. There is no distension.      Palpations: Abdomen is soft.      Tenderness: There is no tenderness.   Musculoskeletal:      Right lower leg: No edema.      Left lower leg: No edema.   Skin:     General: Skin is warm and dry.   Neurological:      Mental Status: He is alert and oriented to person, place, and time.          Fluids    Intake/Output Summary (Last 24 hours) at 2/9/2020 1520  Last data filed at 2/9/2020 1300  Gross per 24 hour   Intake 2300 ml   Output 1650 ml   Net 650 ml       Laboratory  Recent Labs     02/08/20  0525 02/09/20  0507   WBC 2.6* 1.9*   RBC 3.26* 3.22*   HEMOGLOBIN 10.8* 10.9*   HEMATOCRIT 31.9* 31.5*   MCV 97.9* 97.8   MCH 33.1* 33.9*   MCHC 33.9 34.6   RDW 47.5 46.7   PLATELETCT 54* 63*   MPV 10.7 11.3     Recent Labs     02/08/20  0525 02/09/20  0507   SODIUM 136 137   POTASSIUM 4.2 3.9   CHLORIDE 100 102   CO2 29 29   GLUCOSE 81 80   BUN 6* 7*   CREATININE 0.65 0.73   CALCIUM 8.3* 8.6                 Assessment/Plan  * Cervical spine fracture (HCC)- (present on admission)  Assessment & Plan  2/2 GLF from syncopal episode  S/P acute C7 fx, non-surgical mangement  Cervical collar  Note prior h/o cervical fusion    Syncope- (present on admission)  Assessment & Plan  Work-up negative per [facility] Discharge Summary by Dr. Miner    Pain and swelling of right lower extremity- (present on admission)  Assessment & Plan  Right knee traumatic hemarthrosis, 2/2 fall and thrombocytopenia  S/P joint aspiration w/ removal of blood    Mood disorder (HCC)- (present on admission)  Assessment & Plan  On Seroquel    Pancytopenia (HCC)- (present on admission)  Assessment & Plan  CBC appears chronic and stable since at least September 2018  Avoid meds w/ bone marrow toxicity  Monitor need for G-CSF, PRBC, and Platelet transfusion  Follow WBC/ANC, H/H, and Platelet counts  Vit B12, Folate, and TSH levels all normal  Fe 15, start Fe and Vit C  WBC 1.9 and ANC 0.81, will give Granix x 1    Gout- (present on admission)  Assessment & Plan  On no meds  Monitor for acute flare-up    Hypertension- (present on admission)  Assessment & Plan  On Lisinopril  Discontinued Prazosin for hypotension  Will decrease Lisinopril for continued low blood pressures     Full Code    Reviewed w/ pt and RN

## 2020-02-09 NOTE — CARE PLAN
Problem: Safety  Goal: Will remain free from injury  Outcome: PROGRESSING AS EXPECTED  Goal: Will remain free from falls  Outcome: PROGRESSING AS EXPECTED  Pt uses call light consistently and appropriately. Waits for assistance does not attempt self transfer this shift. Able to verbalize needs.      Problem: Pain Management  Goal: Pain level will decrease to patient's comfort goal  Outcome: PROGRESSING AS EXPECTED  Patient complains of neck pain.  Medicated with Roxicodone 5 mg per prn order.

## 2020-02-09 NOTE — THERAPY
Physical Therapy   Initial Evaluation     Patient Name: Chris Leggett  Age:  69 y.o., Sex:  male  Medical Record #: [id number]  Today's Date: 2/8/2020     Subjective    Pt found seated in back gym after completing OT. Roommate present and leaving upon arrival.       Objective       02/08/20 1401   Prior Living Situation   Prior Services None   Housing / Facility 1 Story Apartment / Condo   Steps Into Home 0   Steps In Home 0   Bathroom Set up Bathtub / Shower Combination   Equipment Owned None   Lives with - Patient's Self Care Capacity Unrelated Adult   Comments Pt homeless up until 6 months ago. Roommate took into home to help with soberity. Patient sober from ETOH abuse for 6 months.   Prior Level of Functional Mobility   Bed Mobility Independent   Transfer Status Independent   Ambulation Independent   Assistive Devices Used None   Wheelchair Other (Comments)   Stairs Independent   IRF-KAYLEIGH:  Prior Functioning: Everyday Activities   Self Care Independent   Indoor Mobility (Ambulation) Independent   Stairs Independent   Functional Cognition Independent   Prior Device Use None of the given options   Vitals   Pulse (!) 54   Patient BP Position Sitting   Blood Pressure  148/72   Pulse Oximetry (!) 87 %   Vitals Comments PT monitored for signs and symptoms of dizziness, dyspnea, and nausea throughout therapy session.   Pain 0 - 10 Group   Therapist Pain Assessment 0;Post Activity Pain Same as Prior to Activity   Cognition    Level of Consciousness Alert   Passive ROM Lower Body   Passive ROM Lower Body X   Comments Right knee injured with edema, drained once. WNL, except painful AROM/PROM. xray show no fx   Active ROM Lower Body    Comments Right knee injured with edema, drained once. WNL, except painful AROM/PROM. xray show no fx   Strength Lower Body   Lower Body Strength  X   Comments Gross LE 4-/5, accept 3+/5 for right knee flex/ext d/t pain   Sensation Lower Body   Lower Extremity Sensation   X   Comments LE  WNL, except premobid bilateral hand parasthesias   Balance Assessment   Sitting Balance (Static) Normal   Sitting Balance (Dynamic) Good   Standing Balance (Static) Fair +   Standing Balance (Dynamic) Fair   Weight Shift Sitting Good   Weight Shift Standing Good   Bed Mobility    Supine to Sit Stand by Assist   Sit to Supine Stand by Assist   Sit to Stand Stand by Assist   Scooting Stand by Assist   Rolling Supervised   Neurological Concerns   Neurological Concerns No   Coordination Lower Body    Coordination Lower Body  WDL   IRF-KAYLEIGH:  Roll Left and Right   Assistance Needed Adaptive equipment   Physical Assistance Level No physical assistance or only touching/steadying assist   CARE Score 6   Discharge Goal:  Assistance Needed Independent   Discharge Goal:  Physical Assistance Level No physical assistance or only touching/steadying assist   Discharge Goal:  Score 6   IRF-KAYLEIGH:  Sit to Lying   Assistance Needed Supervision   Physical Assistance Level No physical assistance or only touching/steadying assist   CARE Score 4   Discharge Goal:  Assistance Needed Independent   Discharge Goal:  Physical Assistance Level No physical assistance or only touching/steadying assist   Discharge Goal:  Score 6   IRF-KAYLEIGH:  Lying to Sitting on Side of Bed   Assistance Needed Supervision   Physical Assistance Level No physical assistance or only touching/steadying assist   CARE Score 4   Discharge Goal:  Assistance Needed Independent   Discharge Goal:  Physical Assistance Level No physical assistance or only touching/steadying assist   Discharge Goal:  Score 6   IRF-KAYLEIGH:  Sit to Stand   Assistance Needed Adaptive equipment;Supervision   Physical Assistance Level No physical assistance or only touching/steadying assist   CARE Score 4   Discharge Goal:  Assistance Needed Independent   Discharge Goal:  Physical Assistance Level No physical assistance or only touching/steadying assist   Discahrge Goal:  Score 6   IRF-KAYLEIGH:   Chair/Bed-to-Chair Transfer   Assistance Needed Adaptive equipment;Supervision   Physical Assistance Level No physical assistance or only touching/steadying assist   CARE Score 4   Discharge Goal:  Assistance Needed Independent   Discharge Goal:  Physical Assistance Level No physical assistance or only touching/steadying assist   Discharge Goal:  Score 6   IRF-KAYLEIGH:  Toilet Transfer   Assistance Needed Adaptive equipment;Set-up / clean-up;Supervision   Physical Assistance Level No physical assistance or only touching/steadying assist   CARE Score 4   Discharge Goal:  Assistance Needed Independent   Discharge Goal:  Physical Assistance Level No physical assistance or only touching/steadying assist   Discahrge Goal:  Score 6   IRF-KAYLEIGH:  Car Transfer   Assistance Needed Adaptive equipment;Set-up / clean-up;Supervision;Verbal cues;Incidental touching   Physical Assistance Level No physical assistance or only touching/steadying assist   CARE Score 4   Discharge Goal:  Assistance Needed Independent;Adaptive equipment   Discharge Goal:  Physical Assistance Level No physical assistance or only touching/steadying assist   Discharge Goal:  Score 6   IRF KAYLEIGH:  Walking   Does the Patient Walk? Yes   IRF KAYLEIGH:  Walk 10 Feet   Assistance Needed Adaptive equipment;Supervision   Physical Assistance Level No physical assistance or only touching/steadying assist   CARE Score 4   Discharge Goal:  Assistance Needed Independent   Discharge Goal:  Physical Assistance Level No physical assistance or only touching/steadying assist   Discharge Goal:  Score 6   IRF-KAYLEIGH:  Walk 50 Feet with Two Turns   Assistance Needed Adaptive equipment;Supervision   Physical Assistance Level No physical assistance or only touching/steadying assist   CARE Score 4   Discharge Goal:  Assistance Needed Independent   Discharge Goal:  Physical Assistance Level No physical assistance or only touching/steadying assist   Discharge Goal:  Score 6   IRF-KAYLEIGH:  Walk 150  Feet   Assistance Needed Adaptive equipment;Supervision   Physical Assistance Level No physical assistance or only touching/steadying assist   CARE Score 4   Discharge Goal:  Assistance Needed Independent   Discharge Goal:  Physical Assistance Level No physical assistance or only touching/steadying assist   Discharge Goal:  Score 6   IRF KAYLEIGH:  Walking 10 Feet on Uneven Surfaces   Assistance Needed Adaptive equipment;Supervision;Verbal cues;Incidental touching   Physical Assistance Level No physical assistance or only touching/steadying assist   CARE Score 4   Discharge Goal:  Assistance Needed Independent   Discharge Goal:  Physical Assistance Level No physical assistance or only touching/steadying assist   Discharge Goal:  Score 6   IRF KAYLEIGH:  1 Step (Curb)   Assistance Needed Incidental touching;Supervision;Adaptive equipment   Physical Assistance Level No physical assistance or only touching/steadying assist   CARE Score 4   Discharge Goal:  Assistance Needed Independent   Discharge Goal:  Physical Assistance Level No physical assistance or only touching/steadying assist   Discharge Goal:  Score 6   IRF-KAYLEIGH:  4 Steps   Assistance Needed Adaptive equipment;Supervision;Physical assistance   Physical Assistance Level Less than 25%   CARE Score 3   Discharge Goal:  Assistance Needed Independent;Adaptive equipment   Discharge Goal:  Physical Assistance Level No physical assistance or only touching/steadying assist   Discharge Goal:  Score 6   IRF KAYLEIHG:  12 Steps   Assistance Needed Physical assistance;Adaptive equipment   Physical Assistance Level Less than 25%   CARE Score 3   Discharge Goal:  Assistance Needed Independent;Adaptive equipment   Discharge Goal:  Physical Assistance Level No physical assistance or only touching/steadying assist   Discharge Goal:  Score 6   IRF KAYLEIGH:  Picking Up Object   Assistance Needed Supervision   Physical Assistance Level No physical assistance or only touching/steadying assist   CARE  Score 4   Discharge Goal:  Assistance Needed Independent   Discharge Goal:  Physical Assistance Level No physical assistance or only touching/steadying assist   Discharge Goal:  Score 6   IRF-KAYLEIGH:  Wheel 50 Feet with Two Turns   Indicate the Type of Wheelchair or Scooter Used Manual   Assistance Needed Supervision;Verbal cues   Physical Assistance Level No physical assistance or only touching/steadying assist   CARE Score 4   Discharge Goal:  Assistance Needed Independent   Discharge Goal:  Physical Assistance Level No physical assistance or only touching/steadying assist   Discharge Goal:  Score 6   IRF-KAYLEIGH:  Wheel 150 Feet   Indicate the Type of Wheelchair or Scooter Used Manual   Assistance Needed Supervision   Physical Assistance Level No physical assistance or only touching/steadying assist   CARE Score 4   Discharge Goal:  Assistance Needed Independent   Discharge Goal:  Physical Assistance Level No physical assistance or only touching/steadying assist   Discharge Goal:  Score 6   Problem List    Problems Impaired Balance;Impaired Coordination;Functional Strength Deficit;Functional ROM Deficit;Pain;Decreased Activity Tolerance;Motor Planning / Sequencing   Precautions   Precautions Cervical Collar  ;Spinal / Back Precautions    Comments Monitor BP   Current Discharge Plan   Current Discharge Plan Return to Prior Living Situation   Interdisciplinary Plan of Care Collaboration   IDT Collaboration with  Nursing   Patient Position at End of Therapy In Bed;Phone within Reach;Tray Table within Reach;Call Light within Reach   Collaboration Comments Re: medication   Benefit   Therapy Benefit Patient Would Benefit from Inpatient Rehabilitation Physical Therapy to Maximize Functional [place] with ADLs, IADLs and Mobility.   PT Total Time Spent   PT Individual Total Time Spent (Mins) 60   PT Charge Group   PT Evaluation PT Evaluation Mod       FIM Bed/Chair/Wheelchair Transfers Score: 5 - Standby  Prompting/Supervision or Set-up  Bed/Chair/Wheelchair Transfers Description:  (Pt completes SPT within FWW SBA from bed<>WC.)    FIM Walking Score:  5 - Standby Prompting/Supervision or Set-up  Walking Description:  (Pt ambulates x220' in FWW SBA with extra time needed throughout.)    FIM Wheelchair Score:  6 - Modified Independent  Wheelchair Description:  (Pt propells WC x150' with BUEs.)    FIM Stairs Score:  4 - Minimal Assistance  Stairs Description:  (Pt ascends/descends 16 standard steps with BHR, step through gait pattern and CGA needed for descent.)    FIM Toilet Transfer Score:  5 - Standby Prompting/Supervision or Set-up  Toilet Transfer Description:  (Pt completed SPT on/off toilet SBA with use of handrail)          Assessment  Patient is 69 y.o. male with a diagnosis of non surgical C7 fracture due to ground level fall with facial lacerations and right swollen knee.  Additional factors influencing patient status / progress (ie: cognitive factors, co-morbidities, social support, etc): past medical history of gout, hypertension, psychiatric disorder, and history of C5-6 spinal fusion.      Plan  Recommend Physical Therapy  minutes per day 5-6 days per week for 2 weeks for the following treatments:  PT Gait Training, PT Therapeutic Exercises, PT Neuro Re-Ed/Balance, PT Therapeutic Activity, PT Manual Therapy and PT Evaluation.    Goals:  Long term and short term goals have been discussed with patient and they are in agreement.    Physical Therapy Problems     Problem: Balance     Dates: Start: 02/08/20       Description:     Goal: STG-Within one week, patient will maintain dynamic standing     Dates: Start: 02/08/20   Expected End: 02/15/20       Description: 1) Individualized goal:  Pt will maintain >10 minutes of balance activities with minimal UE support to improve independence with ADLs & IADLs within one week.    2) Interventions:  PT Gait Training, PT Therapeutic Exercises, PT Neuro  Re-Ed/Balance, PT Therapeutic Activity, PT Manual Therapy and PT Evaluation                   Problem: Mobility     Dates: Start: 02/08/20       Description:     Goal: STG-Within one week, patient will ambulate community distances     Dates: Start: 02/08/20   Expected End: 02/15/20       Description: 1) Individualized goal:  Pt will ambulate >200' Ez in FWW to become Ez in facility within one week.    2) Interventions:  PT Gait Training, PT Therapeutic Exercises, PT Neuro Re-Ed/Balance, PT Therapeutic Activity, PT Manual Therapy and PT Evaluation           Goal: STG-Within one week, patient will ambulate up/down flight of stairs     Dates: Start: 02/08/20   Expected End: 02/15/20       Description: 1) Individualized goal:  Pt will ascend/descend 12 standard steps SBA with use of single HR to improve independence and safety with flight of stairs within one week.    2) Interventions:  PT Gait Training, PT Therapeutic Exercises, PT Neuro Re-Ed/Balance, PT Therapeutic Activity, PT Manual Therapy and PT Evaluation                 Problem: Mobility Transfers     Dates: Start: 02/08/20       Description:     Goal: STG-Within one week, patient will sit to stand     Dates: Start: 02/08/20   Expected End: 02/15/20       Description: 1) Individualized goal:  Pt will completed sit to stand Ez with FWW to become Ez within room within one week.    2) Interventions:  PT Gait Training, PT Therapeutic Exercises, PT Neuro Re-Ed/Balance, PT Therapeutic Activity, PT Manual Therapy and PT Evaluation                 Problem: PT-Long Term Goals     Dates: Start: 02/08/20       Description:     Goal: LTG-By discharge, patient will ambulate     Dates: Start: 02/08/20   Expected End: 02/22/20       Description: 1) Individualized goal:  Pt will ambulate >500' Ez with least restrictive device to safely manage community distances to return to prior living environment.     2) Interventions:  PT Gait Training, PT Therapeutic Exercises,  PT Neuro Re-Ed/Balance, PT Therapeutic Activity, PT Manual Therapy and PT Evaluation           Goal: LTG-By discharge, patient will perform home exercise program     Dates: Start: 02/08/20   Expected End: 02/22/20       Description: 1) Individualized goal:  Pt will demonstrate independence with home exercise program to return to prior living environment.     2) Interventions:  PT Gait Training, PT Therapeutic Exercises, PT Neuro Re-Ed/Balance, PT Therapeutic Activity, PT Manual Therapy and PT Evaluation           Goal: LTG-By discharge, patient will ambulate up/down flight of stairs     Dates: Start: 02/08/20   Expected End: 02/22/20       Description: 1) Individualized goal:  Pt will ascend/descend 12 standard steps with one HR Ez to prepare for discharge.    2) Interventions:  PT Gait Training, PT Therapeutic Exercises, PT Neuro Re-Ed/Balance, PT Therapeutic Activity, PT Manual Therapy and PT Evaluation

## 2020-02-10 LAB
BASOPHILS # BLD AUTO: 0.9 % (ref 0–1.8)
BASOPHILS # BLD: 0.07 K/UL (ref 0–0.12)
EOSINOPHIL # BLD AUTO: 0.42 K/UL (ref 0–0.51)
EOSINOPHIL NFR BLD: 5.6 % (ref 0–6.9)
ERYTHROCYTE [DISTWIDTH] IN BLOOD BY AUTOMATED COUNT: 48.2 FL (ref 35.9–50)
HCT VFR BLD AUTO: 34.7 % (ref 42–52)
HGB BLD-MCNC: 11.7 G/DL (ref 14–18)
IMM GRANULOCYTES # BLD AUTO: 0.04 K/UL (ref 0–0.11)
IMM GRANULOCYTES NFR BLD AUTO: 0.5 % (ref 0–0.9)
LYMPHOCYTES # BLD AUTO: 0.78 K/UL (ref 1–4.8)
LYMPHOCYTES NFR BLD: 10.5 % (ref 22–41)
MCH RBC QN AUTO: 33.7 PG (ref 27–33)
MCHC RBC AUTO-ENTMCNC: 33.7 G/DL (ref 33.7–35.3)
MCV RBC AUTO: 100 FL (ref 81.4–97.8)
MONOCYTES # BLD AUTO: 0.41 K/UL (ref 0–0.85)
MONOCYTES NFR BLD AUTO: 5.5 % (ref 0–13.4)
NEUTROPHILS # BLD AUTO: 5.73 K/UL (ref 1.82–7.42)
NEUTROPHILS NFR BLD: 77 % (ref 44–72)
NRBC # BLD AUTO: 0 K/UL
NRBC BLD-RTO: 0 /100 WBC
PLATELET # BLD AUTO: 66 K/UL (ref 164–446)
PMV BLD AUTO: 11.3 FL (ref 9–12.9)
RBC # BLD AUTO: 3.47 M/UL (ref 4.7–6.1)
WBC # BLD AUTO: 7.5 K/UL (ref 4.8–10.8)

## 2020-02-10 PROCEDURE — 97110 THERAPEUTIC EXERCISES: CPT

## 2020-02-10 PROCEDURE — 770010 HCHG ROOM/CARE - REHAB SEMI PRIVAT*

## 2020-02-10 PROCEDURE — 36415 COLL VENOUS BLD VENIPUNCTURE: CPT

## 2020-02-10 PROCEDURE — A9270 NON-COVERED ITEM OR SERVICE: HCPCS | Performed by: HOSPITALIST

## 2020-02-10 PROCEDURE — 97530 THERAPEUTIC ACTIVITIES: CPT

## 2020-02-10 PROCEDURE — 700102 HCHG RX REV CODE 250 W/ 637 OVERRIDE(OP): Performed by: PHYSICAL MEDICINE & REHABILITATION

## 2020-02-10 PROCEDURE — 97116 GAIT TRAINING THERAPY: CPT

## 2020-02-10 PROCEDURE — 97112 NEUROMUSCULAR REEDUCATION: CPT

## 2020-02-10 PROCEDURE — 99232 SBSQ HOSP IP/OBS MODERATE 35: CPT | Performed by: HOSPITALIST

## 2020-02-10 PROCEDURE — A9270 NON-COVERED ITEM OR SERVICE: HCPCS | Performed by: PHYSICAL MEDICINE & REHABILITATION

## 2020-02-10 PROCEDURE — 97535 SELF CARE MNGMENT TRAINING: CPT

## 2020-02-10 PROCEDURE — 700102 HCHG RX REV CODE 250 W/ 637 OVERRIDE(OP): Performed by: HOSPITALIST

## 2020-02-10 PROCEDURE — 99232 SBSQ HOSP IP/OBS MODERATE 35: CPT | Performed by: PHYSICAL MEDICINE & REHABILITATION

## 2020-02-10 PROCEDURE — 700111 HCHG RX REV CODE 636 W/ 250 OVERRIDE (IP): Performed by: PHYSICAL MEDICINE & REHABILITATION

## 2020-02-10 PROCEDURE — 85025 COMPLETE CBC W/AUTO DIFF WBC: CPT

## 2020-02-10 RX ORDER — LISINOPRIL 5 MG/1
5 TABLET ORAL EVERY MORNING
Status: DISCONTINUED | OUTPATIENT
Start: 2020-02-11 | End: 2020-02-12

## 2020-02-10 RX ADMIN — GABAPENTIN 300 MG: 300 CAPSULE ORAL at 15:19

## 2020-02-10 RX ADMIN — ACETAMINOPHEN 650 MG: 325 TABLET, FILM COATED ORAL at 17:46

## 2020-02-10 RX ADMIN — ACETAMINOPHEN 650 MG: 325 TABLET, FILM COATED ORAL at 11:58

## 2020-02-10 RX ADMIN — QUETIAPINE FUMARATE 200 MG: 100 TABLET ORAL at 19:59

## 2020-02-10 RX ADMIN — GABAPENTIN 300 MG: 300 CAPSULE ORAL at 19:59

## 2020-02-10 RX ADMIN — OXYCODONE HYDROCHLORIDE AND ACETAMINOPHEN 500 MG: 500 TABLET ORAL at 08:40

## 2020-02-10 RX ADMIN — TRAZODONE HYDROCHLORIDE 50 MG: 50 TABLET ORAL at 19:59

## 2020-02-10 RX ADMIN — OXYCODONE HYDROCHLORIDE 5 MG: 5 TABLET ORAL at 05:50

## 2020-02-10 RX ADMIN — OXYCODONE HYDROCHLORIDE 5 MG: 5 TABLET ORAL at 20:01

## 2020-02-10 RX ADMIN — DULOXETINE HYDROCHLORIDE 60 MG: 30 CAPSULE, DELAYED RELEASE ORAL at 08:40

## 2020-02-10 RX ADMIN — FERROUS SULFATE TAB 325 MG (65 MG ELEMENTAL FE) 325 MG: 325 (65 FE) TAB at 08:40

## 2020-02-10 RX ADMIN — GABAPENTIN 300 MG: 300 CAPSULE ORAL at 08:40

## 2020-02-10 RX ADMIN — OXYCODONE HYDROCHLORIDE 5 MG: 5 TABLET ORAL at 15:21

## 2020-02-10 RX ADMIN — SENNOSIDES AND DOCUSATE SODIUM 2 TABLET: 8.6; 5 TABLET ORAL at 08:40

## 2020-02-10 RX ADMIN — ENOXAPARIN SODIUM 40 MG: 40 INJECTION, SOLUTION INTRAVENOUS; SUBCUTANEOUS at 08:39

## 2020-02-10 RX ADMIN — SENNOSIDES AND DOCUSATE SODIUM 2 TABLET: 8.6; 5 TABLET ORAL at 19:59

## 2020-02-10 RX ADMIN — ACETAMINOPHEN 650 MG: 325 TABLET, FILM COATED ORAL at 05:51

## 2020-02-10 RX ADMIN — LISINOPRIL 10 MG: 5 TABLET ORAL at 08:40

## 2020-02-10 ASSESSMENT — ENCOUNTER SYMPTOMS
NECK PAIN: 1
VOMITING: 0
PALPITATIONS: 0
BRUISES/BLEEDS EASILY: 0
POLYDIPSIA: 0
NAUSEA: 0
CHILLS: 0
EYES NEGATIVE: 1
COUGH: 0
SHORTNESS OF BREATH: 0
ABDOMINAL PAIN: 0
DEPRESSION: 1
FEVER: 0

## 2020-02-10 ASSESSMENT — 10 METER WALK TEST (10METWT)
TRIAL 1: TIME TO WALK 10 METERS: 9.66
AVERAGE VELOCITY - METERS PER SECOND: .64
TRIAL 3: TIME TO WALK 10 METERS: 9.12
AVERAGE TIME - SECONDS: 9.4
TRIAL 2: TIME TO WALK 10 METERS: 9.43

## 2020-02-10 ASSESSMENT — GAIT ASSESSMENTS: ASSISTIVE DEVICE: FRONT WHEEL WALKER

## 2020-02-10 NOTE — PROGRESS NOTES
Received shift report and assumed care of patient.  Patient awake, calm and stable, currently positioned in bed for comfort and safety; call light within reach.  2/10 HA pain at this time medication given prior to shift change, see mar.  Will continue to monitor.

## 2020-02-10 NOTE — CARE PLAN
Problem: Pain Management  Goal: Pain level will decrease to patient's comfort goal  Outcome: PROGRESSING AS EXPECTED   2030 Patient complained of neck and jaw pain.  Medicated with Roxicodone 5 mg per prn order.

## 2020-02-10 NOTE — PROGRESS NOTES
"Rehab Progress Note     Encounter Date: 2/10/2020    CC: Headache, head lacerations    Interval Events (Subjective)  Patient sitting up in therapy gym. He reports therapy is going very well. Denies NVD. Denies SOB. Hospitalist consulted over the weekend for leukopenia and HTN. His leukopenia has resolved on this morning labs. Discussed with patient as well as his improving anemia. Anemia was found to be low iron.      Objective:  VITAL SIGNS: /61   Pulse 74   Temp 36.6 °C (97.8 °F) (Oral)   Resp 20   Ht 1.803 m (5' 11\")   Wt 99.9 kg (220 lb 3.8 oz)   SpO2 94%   BMI 30.72 kg/m²   Gen: NAD  Psych: Mood and affect appropriate  CV: RRR, no edema  Resp: CTAB, no upper airway sounds  Abd: NTND  Neuro: AOx4, 5/5 BUE    Recent Results (from the past 72 hour(s))   CBC with Differential    Collection Time: 02/08/20  5:25 AM   Result Value Ref Range    WBC 2.6 (L) 4.8 - 10.8 K/uL    RBC 3.26 (L) 4.70 - 6.10 M/uL    Hemoglobin 10.8 (L) 14.0 - 18.0 g/dL    Hematocrit 31.9 (L) 42.0 - 52.0 %    MCV 97.9 (H) 81.4 - 97.8 fL    MCH 33.1 (H) 27.0 - 33.0 pg    MCHC 33.9 33.7 - 35.3 g/dL    RDW 47.5 35.9 - 50.0 fL    Platelet Count 54 (L) 164 - 446 K/uL    MPV 10.7 9.0 - 12.9 fL    Neutrophils-Polys 49.00 44.00 - 72.00 %    Lymphocytes 28.00 22.00 - 41.00 %    Monocytes 10.50 0.00 - 13.40 %    Eosinophils 11.30 (H) 0.00 - 6.90 %    Basophils 0.80 0.00 - 1.80 %    Immature Granulocytes 0.40 0.00 - 0.90 %    Nucleated RBC 0.00 /100 WBC    Neutrophils (Absolute) 1.26 (L) 1.82 - 7.42 K/uL    Lymphs (Absolute) 0.72 (L) 1.00 - 4.80 K/uL    Monos (Absolute) 0.27 0.00 - 0.85 K/uL    Eos (Absolute) 0.29 0.00 - 0.51 K/uL    Baso (Absolute) 0.02 0.00 - 0.12 K/uL    Immature Granulocytes (abs) 0.01 0.00 - 0.11 K/uL    NRBC (Absolute) 0.00 K/uL   Comp Metabolic Panel (CMP)    Collection Time: 02/08/20  5:25 AM   Result Value Ref Range    Sodium 136 135 - 145 mmol/L    Potassium 4.2 3.6 - 5.5 mmol/L    Chloride 100 96 - 112 mmol/L    Co2 " 29 20 - 33 mmol/L    Anion Gap 7.0 0.0 - 11.9    Glucose 81 65 - 99 mg/dL    Bun 6 (L) 8 - 22 mg/dL    Creatinine 0.65 0.50 - 1.40 mg/dL    Calcium 8.3 (L) 8.5 - 10.5 mg/dL    AST(SGOT) 29 12 - 45 U/L    ALT(SGPT) 16 2 - 50 U/L    Alkaline Phosphatase 38 30 - 99 U/L    Total Bilirubin 0.8 0.1 - 1.5 mg/dL    Albumin 3.3 3.2 - 4.9 g/dL    Total Protein 5.5 (L) 6.0 - 8.2 g/dL    Globulin 2.2 1.9 - 3.5 g/dL    A-G Ratio 1.5 g/dL   HEMOGLOBIN A1C    Collection Time: 02/08/20  5:25 AM   Result Value Ref Range    Glycohemoglobin 5.2 0.0 - 5.6 %    Est Avg Glucose 103 mg/dL   TSH with Reflex to FT4    Collection Time: 02/08/20  5:25 AM   Result Value Ref Range    TSH 1.200 0.380 - 5.330 uIU/mL   Vitamin D, 25-hydroxy (blood)    Collection Time: 02/08/20  5:25 AM   Result Value Ref Range    25-Hydroxy   Vitamin D 25 30 30 - 100 ng/mL   ESTIMATED GFR    Collection Time: 02/08/20  5:25 AM   Result Value Ref Range    GFR If African American >60 >60 mL/min/1.73 m 2    GFR If Non African American >60 >60 mL/min/1.73 m 2   CBC WITH DIFFERENTIAL    Collection Time: 02/09/20  5:07 AM   Result Value Ref Range    WBC 1.9 (LL) 4.8 - 10.8 K/uL    RBC 3.22 (L) 4.70 - 6.10 M/uL    Hemoglobin 10.9 (L) 14.0 - 18.0 g/dL    Hematocrit 31.5 (L) 42.0 - 52.0 %    MCV 97.8 81.4 - 97.8 fL    MCH 33.9 (H) 27.0 - 33.0 pg    MCHC 34.6 33.7 - 35.3 g/dL    RDW 46.7 35.9 - 50.0 fL    Platelet Count 63 (L) 164 - 446 K/uL    MPV 11.3 9.0 - 12.9 fL    Neutrophils-Polys 41.90 (L) 44.00 - 72.00 %    Lymphocytes 30.10 22.00 - 41.00 %    Monocytes 13.50 (H) 0.00 - 13.40 %    Eosinophils 13.50 (H) 0.00 - 6.90 %    Basophils 1.00 0.00 - 1.80 %    Immature Granulocytes 0.00 0.00 - 0.90 %    Nucleated RBC 1.60 /100 WBC    Neutrophils (Absolute) 0.81 (L) 1.82 - 7.42 K/uL    Lymphs (Absolute) 0.58 (L) 1.00 - 4.80 K/uL    Monos (Absolute) 0.26 0.00 - 0.85 K/uL    Eos (Absolute) 0.26 0.00 - 0.51 K/uL    Baso (Absolute) 0.02 0.00 - 0.12 K/uL    Immature Granulocytes  (abs) 0.00 0.00 - 0.11 K/uL    NRBC (Absolute) 0.03 K/uL   Basic Metabolic Panel    Collection Time: 02/09/20  5:07 AM   Result Value Ref Range    Sodium 137 135 - 145 mmol/L    Potassium 3.9 3.6 - 5.5 mmol/L    Chloride 102 96 - 112 mmol/L    Co2 29 20 - 33 mmol/L    Glucose 80 65 - 99 mg/dL    Bun 7 (L) 8 - 22 mg/dL    Creatinine 0.73 0.50 - 1.40 mg/dL    Calcium 8.6 8.5 - 10.5 mg/dL    Anion Gap 6.0 0.0 - 11.9   IRON/TOTAL IRON BIND    Collection Time: 02/09/20  5:07 AM   Result Value Ref Range    Iron 15 (L) 50 - 180 ug/dL    Total Iron Binding 235 (L) 250 - 450 ug/dL    % Saturation 6 (L) 15 - 55 %   FOLATE    Collection Time: 02/09/20  5:07 AM   Result Value Ref Range    Folate -Folic Acid 14.9 >4.0 ng/mL   VITAMIN B12    Collection Time: 02/09/20  5:07 AM   Result Value Ref Range    Vitamin B12 -True Cobalamin 394 211 - 911 pg/mL   TSH WITH REFLEX TO FT4    Collection Time: 02/09/20  5:07 AM   Result Value Ref Range    TSH 1.490 0.380 - 5.330 uIU/mL   ESTIMATED GFR    Collection Time: 02/09/20  5:07 AM   Result Value Ref Range    GFR If African American >60 >60 mL/min/1.73 m 2    GFR If Non African American >60 >60 mL/min/1.73 m 2   CBC WITH DIFFERENTIAL    Collection Time: 02/10/20  5:59 AM   Result Value Ref Range    WBC 7.5 4.8 - 10.8 K/uL    RBC 3.47 (L) 4.70 - 6.10 M/uL    Hemoglobin 11.7 (L) 14.0 - 18.0 g/dL    Hematocrit 34.7 (L) 42.0 - 52.0 %    .0 (H) 81.4 - 97.8 fL    MCH 33.7 (H) 27.0 - 33.0 pg    MCHC 33.7 33.7 - 35.3 g/dL    RDW 48.2 35.9 - 50.0 fL    Platelet Count 66 (L) 164 - 446 K/uL    MPV 11.3 9.0 - 12.9 fL    Neutrophils-Polys 77.00 (H) 44.00 - 72.00 %    Lymphocytes 10.50 (L) 22.00 - 41.00 %    Monocytes 5.50 0.00 - 13.40 %    Eosinophils 5.60 0.00 - 6.90 %    Basophils 0.90 0.00 - 1.80 %    Immature Granulocytes 0.50 0.00 - 0.90 %    Nucleated RBC 0.00 /100 WBC    Neutrophils (Absolute) 5.73 1.82 - 7.42 K/uL    Lymphs (Absolute) 0.78 (L) 1.00 - 4.80 K/uL    Monos (Absolute) 0.41  0.00 - 0.85 K/uL    Eos (Absolute) 0.42 0.00 - 0.51 K/uL    Baso (Absolute) 0.07 0.00 - 0.12 K/uL    Immature Granulocytes (abs) 0.04 0.00 - 0.11 K/uL    NRBC (Absolute) 0.00 K/uL       Current Facility-Administered Medications   Medication Frequency   • [START ON 2/11/2020] lisinopril (PRINIVIL) tablet 5 mg QAM   • acetaminophen (TYLENOL) tablet 650 mg Q6HRS   • ascorbic acid tablet 500 mg QDAY with Breakfast   • ferrous sulfate tablet 325 mg QDAY with Breakfast   • Respiratory Care per Protocol Continuous RT   • oxyCODONE immediate-release (ROXICODONE) tablet 5 mg Q3HRS PRN   • oxyCODONE immediate release (ROXICODONE) tablet 10 mg Q3HRS PRN   • hydrALAZINE (APRESOLINE) tablet 25 mg Q8HRS PRN   • acetaminophen (TYLENOL) tablet 650 mg Q4HRS PRN   • senna-docusate (PERICOLACE or SENOKOT S) 8.6-50 MG per tablet 2 Tab BID    And   • polyethylene glycol/lytes (MIRALAX) PACKET 1 Packet QDAY PRN    And   • magnesium hydroxide (MILK OF MAGNESIA) suspension 30 mL QDAY PRN    And   • bisacodyl (DULCOLAX) suppository 10 mg QDAY PRN   • benzocaine-menthol (CEPACOL) lozenge 1 Lozenge Q2HRS PRN   • mag hydrox-al hydrox-simeth (MAALOX PLUS ES or MYLANTA DS) suspension 20 mL Q2HRS PRN   • ondansetron (ZOFRAN ODT) dispertab 4 mg 4X/DAY PRN    Or   • ondansetron (ZOFRAN) syringe/vial injection 4 mg 4X/DAY PRN   • traZODone (DESYREL) tablet 50 mg QHS PRN   • sodium chloride (OCEAN) 0.65 % nasal spray 2 Spray PRN   • DULoxetine (CYMBALTA) capsule 60 mg DAILY   • enoxaparin (LOVENOX) inj 40 mg DAILY   • gabapentin (NEURONTIN) capsule 300 mg TID   • QUEtiapine (SEROQUEL) tablet 200 mg QHS   • traZODone (DESYREL) tablet 50 mg Nightly   • polyethyl glycol-propyl glycol (SYSTANE) 0.4-0.3 % ophth drops 1 Drop PRN       Orders Placed This Encounter   Procedures   • Diet Order Regular (after passing bedside swallow eval )     Standing Status:   Standing     Number of Occurrences:   1     Order Specific Question:   Diet:     Answer:   Regular  [1]     Comments:   after passing bedside swallow eval      Order Specific Question:   Texture/Fiber modifications:     Answer:   Dysphagia 3(Mechanical Soft)specify fluid consistency(question 6) [3]       Assessment:  Active Hospital Problems    Diagnosis   • *Cervical spine fracture (HCC)   • Syncope   • Pain and swelling of right lower extremity   • Mood disorder (HCC)   • Gout   • Hypertension   • Forehead laceration   • Normocytic anemia   • Tobacco abuse   • Pancytopenia (HCC)   • H/O cervical fracture       Medical Decision Making and Plan:  C7 Fracture - Patient with syncopal event with C7 fracture managed non-operatively. In [place] collar.   -PT and OT for mobility and ADLs     HTN - Patient on Lisinopril 20 mg. Previously on Amlodipine 5 mg  -Consult hospitalist. Low SBP on admission. 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 Continue Gabapentin 300 mg TID     R Knee pain - s/p aspiration of blood products. With improved ROM and pain control  -PRN Oxycodone.      Leukopenia - Down to 1.9, improved on 2/10/20    Depression - Patient on Duloxetine 60 mg daily, Seroquel 200 mg QHS, and Trazodone 50 mg QHS     Constipation - Patient without BM in 3 days. Start Senna-docusate.      DVT Ppx - Patient on Lovenox on transfer. Ambulating > 500 feet    Total time:  27 minutes.  I spent greater than 50% of the time for patient care and coordination on this date, including unit/floor time, and face-to-face time with the patient as per assessment and plan above.    Talia Shields M.D.

## 2020-02-10 NOTE — THERAPY
Occupational Therapy  Daily Treatment     Patient Name: Chris Leggett  Age:  69 y.o., Sex:  male  Medical Record #: [id number]  Today's Date: 2/10/2020     Precautions  Precautions: Fall Risk, Spinal / Back Precautions , Cervical Collar    Comments: Monitor BP    Safety   ADL Safety : (P) Requires Supervision for Safety  Bathroom Safety: (P) Requires Supervision for Safety  Comments: (P) See FIMs for additional ADL routine information    Subjective    Patient agreeable to participate in OT.      Objective     02/10/20 1001   Precautions   Precautions Fall Risk;Spinal / Back Precautions ;Cervical Collar     Comments Monitor BP   Safety    ADL Safety  Requires Supervision for Safety   Bathroom Safety Requires Supervision for Safety   Comments See FIMs for additional ADL routine information   Standing Upper Body Exercises   Other Exercises Fluido bike in standing x 15 minutes (Resistance level 7)   Interdisciplinary Plan of Care Collaboration   IDT Collaboration with  Physical Therapist   Patient Position at End of Therapy Seated;Call Light within Reach   Collaboration Comments Received patient from PT   OT Total Time Spent   OT Individual Total Time Spent (Mins) 60   OT Charge Group   OT Self Care / ADL 2   OT Neuromuscular Re-education / Balance 1   OT Therapeutic Exercise  1     FIM Bathing Score:  5 - Standby Prompting/Supervision or Set-up  Bathing Description:       FIM Upper Body Dressin - Standby Prompting/Supervision or Set-up  Upper Body Dressing Description:  Set-up of equipment, Supervision for safety, Increased time(Patient donned t-shirt with set-up while seated )    FIM Lower Body Dressing Score:  5 - Standby Prompting/Supervsion or Set-up  Lower Body Dressing Description:  Increased time, Supervision for safety, Set-up of equipment(Patient demonstrated ability to don underwear, elastic waist pants and non skid socks with set-up and SBA. Donned socks using crossed-leg method. )    FIM Tub/Shower  Transfers Score:  5 - Standby Prompting/Supervision or Set-up  Tub/Shower Transfers Description:  Increased time, Supervision for safety, Grab bar(Patient performed shower txfr with FWW, SBA and use of grab bar (for safety and balance).     Assessment    Patient tolerated OT session well with focus on progression with ADLs, standing tolerance and endurance. Patient demo'd ability to perform all ADLs at SBA level with no LOB noted during this session.     Plan    ADLs, IADLs, standing balance, UB strength/endurance building

## 2020-02-10 NOTE — DISCHARGE PLANNING
CASE MANAGEMENT INITIAL ASSESSMENT    Admit Date:  2/7/2020     I met with patient to discuss role of case management / discharge planning / team conference. Patient was alert and able to provide information.  Patient is a  69 y.o. male transferred from [facility] (02/04-02/07).     Diagnosis: 16 debility  non cardiac  non pulmonary  C7 cervical fracture (HCC)    Co-morbidities:   Patient Active Problem List    Diagnosis Date Noted   • Syncope 02/05/2020     Priority: High   • Alcoholism (HCC) 03/07/2019     Priority: High   • Suicidal ideation 12/06/2018     Priority: High   • Pain and swelling of right lower extremity 12/06/2018     Priority: High   • Mood disorder (HCC) 12/06/2018     Priority: Medium   • Cervical spine fracture ([facility]) 09/02/2018     Priority: Medium   • Coagulopathy ([facility]) 09/02/2018     Priority: Medium   • Gout 04/05/2019     Priority: Low   • Hypertension 12/06/2018     Priority: Low   • Lumbar radiculopathy 12/06/2018     Priority: Low   • Forehead laceration 09/05/2018     Priority: Low   • Trauma 09/03/2018     Priority: Low   • Normocytic anemia 09/02/2018     Priority: Low   • Traumatic rhabdomyolysis ([facility]) 02/06/2020   • Tobacco abuse 02/05/2020   • Generalized weakness 10/13/2019   • Abdominal pain 09/28/2019   • Pancytopenia ([facility]) 09/28/2019   • Diarrhea of presumed infectious origin 09/26/2019   • Thrombocytopenia ([facility]) 04/05/2019   • Localized swelling of lower extremity 03/08/2019   • H/O cervical fracture 03/08/2019   • Lumbar back pain with radiculopathy affecting lower extremity 03/08/2019     Prior Living Situation:  Housing / Facility: 1 Story Apartment / Condo  Lives with - Patient's Self Care Capacity: Unrelated Adult    Prior Level of Function:  Medication Management: Independent  Finances: Independent  Home Management: Independent  Shopping: Independent  Prior Level Of Mobility: Independent Without Device in Community  Driving / Transportation: Walks, Utilizes Public  "Transportation    Support Systems:  Primary : Anirudh Drake (rommate) [phone]     Previous Services Utilized:   Equipment Owned: None  Prior Services: None    Other Information:  Occupation (Pre-Hospital Vocational): Not Employed  Primary Payor Source: Medicare A, Medicare B  Primary Care Practitioner : Minnie Pérez MDs: Dr. Cee    Patient / Family Goal:  Patient / Family Goal: patient lives in a one story apartment with an adult roommate. There are no JUSTINA or steps in home. Patient has no prior DME. Patient stated \"I was homeless until 6 months ago when I got sober and my room mate took me in to help.\" Patients goals is to get home and find either a volunteer position or part time job. Patient is not a .     Additional Case Management Questions:  Have you ever received case management services for yourself or a family member? No    Do you feel you have and an understanding of what services  provide? Yes, letter provided    Do you have any additional questions regarding case management? No          CASE MANAGEMENT PLAN OF CARE   Individualized Goals:   1. To get home   2. Patient would like a cane  3. Set up appointment with Primary Care    Barriers:   1. Mobility deficits  2. Limited income    Plan:  1. Continue to follow patient through hospitalization and provide discharge planning in collaboration with patient, family, physicians and ancillary services.     2. Utilize community resources to ensure a safe discharge.       "

## 2020-02-10 NOTE — PROGRESS NOTES
Hospital Medicine Daily Progress Note      Chief Complaint  HTN, Pancytopenia    Interval Problem Update  No overnight problems.  Labs reviewed.    Review of Systems  Review of Systems   Constitutional: Negative for chills and fever.   HENT: Negative.    Eyes: Negative.    Respiratory: Negative for cough and shortness of breath.    Cardiovascular: Negative for chest pain and palpitations.   Gastrointestinal: Negative for abdominal pain, nausea and vomiting.   Musculoskeletal: Positive for neck pain.   Skin: Negative for itching and rash.   Endo/Heme/Allergies: Negative for polydipsia. Does not bruise/bleed easily.   Psychiatric/Behavioral: Positive for depression.        Physical Exam  Temp:  [36.6 °C (97.8 °F)-36.8 °C (98.2 °F)] 36.6 °C (97.8 °F)  Pulse:  [63-74] 74  Resp:  [18-20] 20  BP: ()/(60-84) 101/61  SpO2:  [93 %-94 %] 94 %    Physical Exam  Vitals signs reviewed.   Constitutional:       General: He is not in acute distress.     Appearance: Normal appearance. He is not ill-appearing.   HENT:      Head: Normocephalic and atraumatic.      Right Ear: External ear normal.      Left Ear: External ear normal.      Nose: Nose normal.   Eyes:      General:         Right eye: No discharge.         Left eye: No discharge.      Extraocular Movements: Extraocular movements intact.      Conjunctiva/sclera: Conjunctivae normal.   Neck:      Comments: C-collar  Cardiovascular:      Rate and Rhythm: Normal rate and regular rhythm.   Pulmonary:      Effort: No respiratory distress.      Breath sounds: No wheezing.      Comments: Decreased BS  Abdominal:      General: Bowel sounds are normal. There is no distension.      Palpations: Abdomen is soft.      Tenderness: There is no tenderness.   Musculoskeletal:      Right lower leg: No edema.      Left lower leg: No edema.   Skin:     General: Skin is warm and dry.   Neurological:      Mental Status: He is alert and oriented to person, place, and time.          Fluids    Intake/Output Summary (Last 24 hours) at 2/10/2020 1420  Last data filed at 2/10/2020 1300  Gross per 24 hour   Intake 480 ml   Output 2600 ml   Net -2120 ml       Laboratory  Recent Labs     02/08/20  0525 02/09/20  0507 02/10/20  0559   WBC 2.6* 1.9* 7.5   RBC 3.26* 3.22* 3.47*   HEMOGLOBIN 10.8* 10.9* 11.7*   HEMATOCRIT 31.9* 31.5* 34.7*   MCV 97.9* 97.8 100.0*   MCH 33.1* 33.9* 33.7*   MCHC 33.9 34.6 33.7   RDW 47.5 46.7 48.2   PLATELETCT 54* 63* 66*   MPV 10.7 11.3 11.3     Recent Labs     02/08/20  0525 02/09/20  0507   SODIUM 136 137   POTASSIUM 4.2 3.9   CHLORIDE 100 102   CO2 29 29   GLUCOSE 81 80   BUN 6* 7*   CREATININE 0.65 0.73   CALCIUM 8.3* 8.6                 Assessment/Plan  * Cervical spine fracture (HCC)- (present on admission)  Assessment & Plan  2/2 GLF from syncopal episode  S/P acute C7 fx, non-surgical mangement  Cervical collar  Note prior h/o cervical fusion    Syncope- (present on admission)  Assessment & Plan  Work-up negative per [facility] Discharge Summary by Dr. Miner    Pain and swelling of right lower extremity- (present on admission)  Assessment & Plan  Right knee traumatic hemarthrosis, 2/2 fall and thrombocytopenia  S/P joint aspiration w/ removal of blood    Mood disorder (HCC)- (present on admission)  Assessment & Plan  On Seroquel    Pancytopenia (HCC)- (present on admission)  Assessment & Plan  CBC appears chronic and stable since at least September 2018  Avoid meds w/ bone marrow toxicity  Monitor need for G-CSF, PRBC, and Platelet transfusion  Follow WBC/ANC, H/H, and Platelet counts  Vit B12, Folate, and TSH levels all normal  Fe and Vit C started for low iron stores  WBC 1.9 and ANC 0.81 on 2/9/20, Granix x 1 given w/ appropriate response  Recommend outpt Hematology eval    Gout- (present on admission)  Assessment & Plan  On no meds  Monitor for acute flare-up    Hypertension- (present on admission)  Assessment & Plan  Discontinued Prazosin for  hypotension  Decrease Lisinopril further for continued low blood pressures     Full Code

## 2020-02-10 NOTE — THERAPY
Occupational Therapy  Daily Treatment     Patient Name: Chris Lgegett  Age:  69 y.o., Sex:  male  Medical Record #: [id number]  Today's Date: 2/10/2020     Precautions  Precautions: (P) Fall Risk, Spinal / Back Precautions , Cervical Collar    Comments: Monitor BP         Subjective    Patient sitting in chair watching tv.  Ready for OT.  Would like to brush teeth.     Objective       02/10/20 0831   Precautions   Precautions Fall Risk;Spinal / Back Precautions ;Cervical Collar     Sitting Upper Body Exercises   Sitting Upper Body Exercises Yes   Upper Extremity Bike Minutes / Rest Breaks (See Comments)  (water bike level 4 x 11 minutes in standing)   Interdisciplinary Plan of Care Collaboration   Patient Position at End of Therapy Seated;Call Light within Reach;Tray Table within Reach   OT Total Time Spent   OT Individual Total Time Spent (Mins) 30   OT Charge Group   OT Therapy Activity 1   OT Therapeutic Exercise  1       FIM Grooming Score:  5 - Standby Prompting/Supervision or Set-up  Grooming Description:  Supervision for safety(supervised standing at sink to brush teeth)    FIM Walking Score:  5 - Standby Prompting/Supervision or Set-up  Walking Description:  Walker, Supervision for safety, Safety concerns, Verbal cueing(SBA for 350' x 2, cues for keeping both hands on walker when moving)      Assessment    Patient doing well with mobility, though demonstrates impaired standing balance and safety awareness with use of FWW as he frequently takes one hand off walker while walking.    Plan    ADLs, IADLs, standing balance, UB strength/endurance building

## 2020-02-10 NOTE — THERAPY
Physical Therapy   Daily Treatment     Patient Name: Chris Leggett  Age:  69 y.o., Sex:  male  Medical Record #: [id number]  Today's Date: 2/10/2020     Precautions  Precautions: Fall Risk, Spinal / Back Precautions , Cervical Collar    Comments: Monitor BP    Subjective    Pt reports he is agreeable to walking     Objective       02/10/20 0931   Vitals   Patient BP Position Standing 3 minutes   Blood Pressure  121/84   Lower Extremity Outcome Measures   Lower Extremity Outcome Measures Utilized 10 Meter Walk Test   Assistive Device Front Wheel Walker   10 Meter Walk Test   Normal - Trial 1 9.66 seconds   Normal - Trial 2 9.43 seconds   Normal - Trial 3 9.12 seconds   Normal Average Time 9.4 seconds   Normal Average Velocity (m/s) 0.64   Interdisciplinary Plan of Care Collaboration   Patient Position at End of Therapy Seated  (handoff OT)   PT Total Time Spent   PT Individual Total Time Spent (Mins) 30   PT Charge Group   PT Gait Training 2       FIM Walking Score:  5 - Standby Prompting/Supervision or Set-up  Walking Description:  Extra time, Safety concerns, Verbal cueing, Supervision for safety, Walker(1000+ ft FWW SPV, no LOB, no AD SPV as well no  ft)      Assessment    Pt demos high fall risk via gait speed but would benefit from more specific balance test to determine need for AD. BP normal and stable throughout session     Plan    Gait training AD vs no AD, balance assessment to determine need for AD, Vector for variable gait with obstacles uneven surfaces etc for Dynamic balance, LE strengthening, HEP,

## 2020-02-11 PROCEDURE — 700102 HCHG RX REV CODE 250 W/ 637 OVERRIDE(OP): Performed by: PHYSICAL MEDICINE & REHABILITATION

## 2020-02-11 PROCEDURE — 99233 SBSQ HOSP IP/OBS HIGH 50: CPT | Performed by: PHYSICAL MEDICINE & REHABILITATION

## 2020-02-11 PROCEDURE — A9270 NON-COVERED ITEM OR SERVICE: HCPCS | Performed by: HOSPITALIST

## 2020-02-11 PROCEDURE — 700102 HCHG RX REV CODE 250 W/ 637 OVERRIDE(OP): Performed by: HOSPITALIST

## 2020-02-11 PROCEDURE — A9270 NON-COVERED ITEM OR SERVICE: HCPCS | Performed by: PHYSICAL MEDICINE & REHABILITATION

## 2020-02-11 PROCEDURE — 99232 SBSQ HOSP IP/OBS MODERATE 35: CPT | Performed by: HOSPITALIST

## 2020-02-11 PROCEDURE — 770010 HCHG ROOM/CARE - REHAB SEMI PRIVAT*

## 2020-02-11 PROCEDURE — 97112 NEUROMUSCULAR REEDUCATION: CPT

## 2020-02-11 PROCEDURE — 97530 THERAPEUTIC ACTIVITIES: CPT

## 2020-02-11 PROCEDURE — 97116 GAIT TRAINING THERAPY: CPT

## 2020-02-11 PROCEDURE — 97110 THERAPEUTIC EXERCISES: CPT

## 2020-02-11 RX ADMIN — OXYCODONE HYDROCHLORIDE 5 MG: 5 TABLET ORAL at 05:37

## 2020-02-11 RX ADMIN — TRAZODONE HYDROCHLORIDE 50 MG: 50 TABLET ORAL at 20:59

## 2020-02-11 RX ADMIN — ACETAMINOPHEN 650 MG: 325 TABLET, FILM COATED ORAL at 05:36

## 2020-02-11 RX ADMIN — DULOXETINE HYDROCHLORIDE 60 MG: 30 CAPSULE, DELAYED RELEASE ORAL at 08:20

## 2020-02-11 RX ADMIN — OXYCODONE HYDROCHLORIDE AND ACETAMINOPHEN 500 MG: 500 TABLET ORAL at 08:20

## 2020-02-11 RX ADMIN — GABAPENTIN 300 MG: 300 CAPSULE ORAL at 08:20

## 2020-02-11 RX ADMIN — SENNOSIDES AND DOCUSATE SODIUM 2 TABLET: 8.6; 5 TABLET ORAL at 08:21

## 2020-02-11 RX ADMIN — OXYCODONE HYDROCHLORIDE 10 MG: 10 TABLET ORAL at 20:59

## 2020-02-11 RX ADMIN — FERROUS SULFATE TAB 325 MG (65 MG ELEMENTAL FE) 325 MG: 325 (65 FE) TAB at 08:20

## 2020-02-11 RX ADMIN — OXYCODONE HYDROCHLORIDE 10 MG: 10 TABLET ORAL at 16:19

## 2020-02-11 RX ADMIN — QUETIAPINE FUMARATE 200 MG: 100 TABLET ORAL at 20:59

## 2020-02-11 RX ADMIN — ACETAMINOPHEN 650 MG: 325 TABLET, FILM COATED ORAL at 11:33

## 2020-02-11 RX ADMIN — ACETAMINOPHEN 650 MG: 325 TABLET, FILM COATED ORAL at 00:17

## 2020-02-11 RX ADMIN — GABAPENTIN 300 MG: 300 CAPSULE ORAL at 14:50

## 2020-02-11 RX ADMIN — GABAPENTIN 300 MG: 300 CAPSULE ORAL at 20:59

## 2020-02-11 RX ADMIN — LISINOPRIL 5 MG: 5 TABLET ORAL at 08:20

## 2020-02-11 RX ADMIN — SENNOSIDES AND DOCUSATE SODIUM 2 TABLET: 8.6; 5 TABLET ORAL at 20:59

## 2020-02-11 RX ADMIN — ACETAMINOPHEN 650 MG: 325 TABLET, FILM COATED ORAL at 17:37

## 2020-02-11 RX ADMIN — OXYCODONE HYDROCHLORIDE 5 MG: 5 TABLET ORAL at 11:37

## 2020-02-11 RX ADMIN — TRAZODONE HYDROCHLORIDE 50 MG: 50 TABLET ORAL at 00:19

## 2020-02-11 ASSESSMENT — ENCOUNTER SYMPTOMS
FEVER: 0
ABDOMINAL PAIN: 0
CHILLS: 0
VOMITING: 0
SHORTNESS OF BREATH: 0
NERVOUS/ANXIOUS: 0
NAUSEA: 0
DIARRHEA: 0

## 2020-02-11 ASSESSMENT — PATIENT HEALTH QUESTIONNAIRE - PHQ9
1. LITTLE INTEREST OR PLEASURE IN DOING THINGS: NOT AT ALL
SUM OF ALL RESPONSES TO PHQ9 QUESTIONS 1 AND 2: 0
2. FEELING DOWN, DEPRESSED, IRRITABLE, OR HOPELESS: NOT AT ALL

## 2020-02-11 NOTE — REHAB-PHARMACY IDT TEAM NOTE
Pharmacy   Pharmacy  Antibiotics/Antifungals/Antivirals:  Medication:      Active Orders (From admission, onward)    None        Route:        NA  Stop Date:  NA  Reason:      NA  Antihypertensives/Cardiac:  Medication:    Orders (72h ago, onward)     Start     Ordered    02/11/20 0900  lisinopril (PRINIVIL) tablet 5 mg  EVERY MORNING      02/10/20 1415    02/10/20 0900  lisinopril (PRINIVIL) tablet 10 mg  EVERY MORNING,   Status:  Discontinued      02/09/20 1507    02/08/20 0900  lisinopril (PRINIVIL) tablet 20 mg  EVERY MORNING,   Status:  Discontinued      02/07/20 1216    02/07/20 2100  prazosin (MINIPRESS) capsule 1 mg  EVERY EVENING,   Status:  Discontinued      02/07/20 1616    02/07/20 1216  hydrALAZINE (APRESOLINE) tablet 25 mg  EVERY 8 HOURS PRN      02/07/20 1216              Patient Vitals for the past 24 hrs:   BP Pulse   02/10/20 1431 143/93 95   02/10/20 1300 101/61 74   02/10/20 0931 121/84 --   02/10/20 0700 102/72 63   02/09/20 1925 (!) 96/60 70     Anticoagulation:  Medication: None                                     Other key medications: A review of the medication list reveals no issues at this time. Patient is currently on antihypertensive(s). Recommend home blood pressure monitoring/recording if antihypertensive(s) regimen(s) continue.    Section completed by: Mushtaq Walsh [facility]

## 2020-02-11 NOTE — PROGRESS NOTES
Hospital Medicine Daily Progress Note      Chief Complaint:  Hypertension  Pancytopenia    Interval History:  No significant events or changes since last visit    Review of Systems  Review of Systems   Constitutional: Negative for chills and fever.   Respiratory: Negative for shortness of breath.    Cardiovascular: Negative for chest pain.   Gastrointestinal: Negative for abdominal pain, diarrhea, nausea and vomiting.   Psychiatric/Behavioral: The patient is not nervous/anxious.         Physical Exam  Temp:  [36.6 °C (97.8 °F)-36.6 °C (97.9 °F)] 36.6 °C (97.8 °F)  Pulse:  [58-95] 58  Resp:  [18-20] 20  BP: (101-143)/(61-93) 105/73  SpO2:  [84 %-96 %] 94 %    Physical Exam  Vitals signs and nursing note reviewed.   Constitutional:       Appearance: Normal appearance.   HENT:      Head: Atraumatic.   Eyes:      Conjunctiva/sclera: Conjunctivae normal.      Pupils: Pupils are equal, round, and reactive to light.   Neck:      Vascular: No carotid bruit.      Comments: Has C-collar  Cardiovascular:      Rate and Rhythm: Normal rate and regular rhythm.      Heart sounds: No murmur.   Pulmonary:      Effort: Pulmonary effort is normal.      Breath sounds: No stridor. No wheezing or rales.   Abdominal:      General: There is no distension.      Palpations: Abdomen is soft.      Tenderness: There is no tenderness.   Musculoskeletal:      Right lower leg: No edema.      Left lower leg: No edema.   Skin:     General: Skin is warm and dry.      Findings: No rash.   Neurological:      Mental Status: He is alert and oriented to person, place, and time.   Psychiatric:         Mood and Affect: Mood normal.         Behavior: Behavior normal.         Fluids    Intake/Output Summary (Last 24 hours) at 2/11/2020 0947  Last data filed at 2/11/2020 0900  Gross per 24 hour   Intake 1640 ml   Output 2000 ml   Net -360 ml       Laboratory  Recent Labs     02/09/20  0507 02/10/20  0559   WBC 1.9* 7.5   RBC 3.22* 3.47*   HEMOGLOBIN 10.9*  11.7*   HEMATOCRIT 31.5* 34.7*   MCV 97.8 100.0*   MCH 33.9* 33.7*   MCHC 34.6 33.7   RDW 46.7 48.2   PLATELETCT 63* 66*   MPV 11.3 11.3     Recent Labs     02/09/20  0507   SODIUM 137   POTASSIUM 3.9   CHLORIDE 102   CO2 29   GLUCOSE 80   BUN 7*   CREATININE 0.73   CALCIUM 8.6                 Assessment/Plan  * Cervical spine fracture (HCC)- (present on admission)  Assessment & Plan  2nd to GLF from syncopal episode  Sustained acute C7 fracture -- non-surgical mangement  Cervical collar  Note: has prior hx of cervical fusion    Syncope- (present on admission)  Assessment & Plan  Work-up negative per [facility] [place] [facility] discharge Summary by Dr. Miner    Pain and swelling of right lower extremity- (present on admission)  Assessment & Plan  Right knee traumatic hemarthrosis  2nd to GLF  S/P joint aspiration with removal of blood    Mood disorder (HCC)- (present on admission)  Assessment & Plan  On Seroquel    Pancytopenia (HCC)- (present on admission)  Assessment & Plan  WBC's: 1.9 (2.9) --> 7.5 (2/10)  H&H: mild stable  Platelets stable at 66 (2/10)  Appears chronic and stable since at least September 2018  Vit B12, Folate, and TSH levels all normal  Fe and Vit C started for low iron stores  S/P Granix (2/9)  Follow WBC/ANC, H/H, and Platelet counts  Monitor need for G-CSF, PRBC, and Platelet transfusion  Recommend outpt Hematology eval    Gout- (present on admission)  Assessment & Plan  On no meds  Monitor for acute flare-up    Hypertension- (present on admission)  Assessment & Plan  BP ok  On Lisinopril: 20 mg daily --> 10 mg daily (2/10) --> 5 mg daily (2/11)  Note: off Prazosin for hypotension  Cont to monitor

## 2020-02-11 NOTE — REHAB-CM IDT TEAM NOTE
Case Management    DC Planning  DC destination/dispostion:  Patient lives in a single level apartment with a roommate.    DC Needs:  He has no dme.  He needs a PCP and probably dme.  I will await recommendations for follow up therapy.    Barriers to discharge:  Was homeless up until recently.      Strengths: supportive roommate.    Section completed by:  Luli Bah R.N.

## 2020-02-11 NOTE — THERAPY
"Occupational Therapy  Daily Treatment     Patient Name: Chris Leggett  Age:  69 y.o., Sex:  male  Medical Record #: [id number]  Today's Date: 2/11/2020     Precautions  Precautions: Fall Risk, Spinal / Back Precautions   Comments: Monitor BP, protective precautions for low WBC count     Safety   ADL Safety : Requires Supervision for Safety  Bathroom Safety: Requires Supervision for Safety  Comments: See FIMs for additional ADL routine information    Subjective    Patient agreeable to participate in OT.      Objective     02/11/20 0831   Precautions   Precautions Fall Risk;Spinal / Back Precautions    Comments Monitor BP, protective precautions for low WBC count    Balance   Sitting Balance (Static) Normal   Sitting Balance (Dynamic) Good   Standing Balance (Static) Fair +   Standing Balance (Dynamic) Fair   Comments Patient performed step-ups with 3.5\" (1 set of 10), 5.5\" (1 set of 10) and 8\" step (2 sets of 10) and FWW. SBA for safety. No LOB noted.    Interdisciplinary Plan of Care Collaboration   Patient Position at End of Therapy Seated;Call Light within Reach   OT Total Time Spent   OT Individual Total Time Spent (Mins) 30   OT Charge Group   OT Neuromuscular Re-education / Balance 2     FIM Walking Score:  5 - Standby Prompting/Supervision or Set-up  Walking Description:  Walker, Supervision for safety(Patient performed indoor functional mobility 200' x 2 with FWW and SBA (for safety))    Assessment    Patient tolerated OT session well with focus on dynamic standing balance and indoor functional mobility. No LOB noted during this session.     Plan    ADLs, IADLs, standing balance, UB strength/endurance building    "

## 2020-02-11 NOTE — REHAB-PT IDT TEAM NOTE
Physical Therapy   Mobility  Bed mobility:   Mod I  Bed /Chair/Wheelchair Transfer Initial:  4 - Minimal Assistance  Bed /Chair/Wheelchair Transfer Current:  5 - Standby Prompting/Supervision or Set-up   Bed/Chair/Wheelchair Transfer Description:  Supervision for safety  Walk Initial:  5 - Standby Prompting/Supervision or Set-up  Walk Current:  4 - Minimal Assistance   Walk Description:  Assist device/equipment, Walker, Verbal cueing, Supervision for safety, Requires incidental assist (250 ft with FWW, 1000 ft with SPC, 125 ft with SPC, CGA progressing to SBA with verbal cues for pathfinding)  Wheelchair Initial:  6 - Modified Independent  Wheelchair Current:  6 - Modified Independent   Wheelchair Description:  (Pt propells WC x150' with BUEs.)  Stairs Initial:  4 - Minimal Assistance  Stairs Current: 4 - Minimal Assistance   Stairs Description: Hand rails, Extra time, Safety concerns, Verbal cueing, Supervision for safety, Requires incidental assist, Ascends/descends 12 to 14 steps (ascends/descends 20 steps with 1 HR, holding his SPC, CGA; pt started with step over pattern and experienced 1 LOB, after verbal cueing for step to pattern, pt performed the last 16 steps at SBA level and step to gait pattern.)  Patient/Family Training/Education:  Ongoing patient education  DME/DC Recommendations:  SPC  Strengths:  Adequate strength, Alert and oriented, Good endurance, Independent PLOF, Making steady progress towards goals and Willingly participates in therapeutic activities  Barriers:   Poor balance  # of short term goals set= 4  # of short term goals met=2    Physical Therapy Problems     Problem: Balance     Dates: Start: 02/08/20       Description:     Goal: STG-Within one week, patient will maintain dynamic standing     Dates: Start: 02/08/20   Expected End: 02/15/20       Description: 1) Individualized goal:  Pt will maintain >10 minutes of balance activities with minimal UE support to improve independence with  ADLs & IADLs within one week.    2) Interventions:  PT Gait Training, PT Therapeutic Exercises, PT Neuro Re-Ed/Balance, PT Therapeutic Activity, PT Manual Therapy and PT Evaluation       Note:     Goal Note filed on 02/11/20 0808 by Audra Schmitt, Student    To be assessed, pt evaluated on 2/8                        Problem: Mobility     Dates: Start: 02/08/20       Description:     Goal: STG-Within one week, patient will ambulate up/down flight of stairs     Dates: Start: 02/08/20   Expected End: 02/15/20       Description: 1) Individualized goal:  Pt will ascend/descend 12 standard steps SBA with use of single HR to improve independence and safety with flight of stairs within one week.    2) Interventions:  PT Gait Training, PT Therapeutic Exercises, PT Neuro Re-Ed/Balance, PT Therapeutic Activity, PT Manual Therapy and PT Evaluation     Note:     Goal Note filed on 02/11/20 0808 by Audra Schmitt, Student    Pt required CGA for 1 minor LOB during stair management. To be assessed.                        Problem: PT-Long Term Goals     Dates: Start: 02/08/20       Description:     Goal: LTG-By discharge, patient will ambulate     Dates: Start: 02/08/20   Expected End: 02/22/20       Description: 1) Individualized goal:  Pt will ambulate >500' Ez with least restrictive device to safely manage community distances to return to prior living environment.     2) Interventions:  PT Gait Training, PT Therapeutic Exercises, PT Neuro Re-Ed/Balance, PT Therapeutic Activity, PT Manual Therapy and PT Evaluation           Goal: LTG-By discharge, patient will perform home exercise program     Dates: Start: 02/08/20   Expected End: 02/22/20       Description: 1) Individualized goal:  Pt will demonstrate independence with home exercise program to return to prior living environment.     2) Interventions:  PT Gait Training, PT Therapeutic Exercises, PT Neuro Re-Ed/Balance, PT Therapeutic Activity, PT Manual Therapy and PT  Evaluation           Goal: LTG-By discharge, patient will ambulate up/down flight of stairs     Dates: Start: 02/08/20   Expected End: 02/22/20       Description: 1) Individualized goal:  Pt will ascend/descend 12 standard steps with one HR Ez to prepare for discharge.    2) Interventions:  PT Gait Training, PT Therapeutic Exercises, PT Neuro Re-Ed/Balance, PT Therapeutic Activity, PT Manual Therapy and PT Evaluation                         Section completed by: Rakel Nowak PT, DPT; Audra Schmitt, Student

## 2020-02-11 NOTE — CARE PLAN
Problem: Venous Thromboembolism (VTW)/Deep Vein Thrombosis (DVT) Prevention:  Goal: Patient will participate in Venous Thrombosis (VTE)/Deep Vein Thrombosis (DVT)Prevention Measures  Outcome: PROGRESSING AS EXPECTED  Flowsheets (Taken 2/10/2020 1927)  Pharmacologic Prophylaxis Used: LMWH: Enoxaparin(Lovenox)  Note:   Pt is up and walking, participates in therapies, and up to dinning room for all meals.      Problem: Skin Integrity  Goal: Risk for impaired skin integrity will decrease  Outcome: PROGRESSING AS EXPECTED  Note:   Patient's skin remains intact and free from new or accidental injury this shift. Patient remains free from s/s infection; afebrile.

## 2020-02-11 NOTE — REHAB-COLLABORATIVE ONGOING IDT TEAM NOTE
Weekly Interdisciplinary Team Conference Note    Weekly Interdisciplinary Team Conference # 1  Date:  2/11/2020    Clinicians present and reporting at team conference include the following:   MD: SATNAM Shields MD    RN:  Emelina Servin RN   PT:   Rakel Nowak PT, DPT  OT:  [facility] OTD, OTR/L   ST:  Not Applicable.   CM:  Luli Bah RN [facility]  REC:  None  RT:  None  RPh:  Trish Johns [facility]  Other:   None  All reporting clinicians have a working knowledge of this patient's plan of care.    Targeted DC Date:  2//17/2020     Medical    Patient Active Problem List    Diagnosis Date Noted   • Syncope 02/05/2020     Priority: High   • Alcoholism ([facility]) 03/07/2019     Priority: High   • Suicidal ideation 12/06/2018     Priority: High   • Pain and swelling of right lower extremity 12/06/2018     Priority: High   • Mood disorder ([facility]) 12/06/2018     Priority: Medium   • Cervical spine fracture ([facility]) 09/02/2018     Priority: Medium   • Coagulopathy ([facility]) 09/02/2018     Priority: Medium   • Gout 04/05/2019     Priority: Low   • Hypertension 12/06/2018     Priority: Low   • Lumbar radiculopathy 12/06/2018     Priority: Low   • Forehead laceration 09/05/2018     Priority: Low   • Trauma 09/03/2018     Priority: Low   • Normocytic anemia 09/02/2018     Priority: Low   • Traumatic rhabdomyolysis ([facility]) 02/06/2020   • Tobacco abuse 02/05/2020   • Generalized weakness 10/13/2019   • Abdominal pain 09/28/2019   • Pancytopenia ([facility]) 09/28/2019   • Diarrhea of presumed infectious origin 09/26/2019   • Thrombocytopenia ([facility]) 04/05/2019   • Localized swelling of lower extremity 03/08/2019   • H/O cervical fracture 03/08/2019   • Lumbar back pain with radiculopathy affecting lower extremity 03/08/2019     Results     ** No results found for the last 24 hours. **        Nursing  Diet/Nutrition:  Regular, Dysphagia III-Mechanical Soft and Thin Liquids  Medication Administration:  Whole with Liquid Wash  % consumed at meals in last  24 hours:  Consumed 50%-100% of meals per documentation.  Meal/Snack Consumption for the past 24 hrs:   Oral Nutrition   02/10/20 1955 Dinner;Between 50-75% Consumed   02/10/20 1300 Between % Consumed   02/10/20 0900 Between % Consumed     Snack schedule:  None  Appetite:  Good  Fluid Intake/Output in past 24 hours: In: 480 [P.O.:480]  Out: 2900   Admit Weight:  Weight: 100.6 kg (221 lb 12.5 oz)  Weight Last 7 Days   [99.9 kg (220 lb 3.8 oz)-100.6 kg (221 lb 12.5 oz)] 99.9 kg (220 lb 3.8 oz) (02/09 0900)    Pain Issues:    Location:  Jaw;Head (02/10 1955)  Right (02/10 1955)         Severity:  Moderate   Scheduled pain medications:  gabapentin (NEURONTIN) , Tylenol   PRN pain medications used in last 24 hours:  None   Non Pharmacologic Interventions:  warm blanket, repositioned and rest  Effectiveness of pain management plan:  good=patient states acceptable comfort after interventions    Bowel:    Bowel Assist Initial Score:  4 - Minimal Assistance  Bowel Assist Current Score:  4 - Minimal Assistance  Bowl Accidents in last 7 days:     Last bowel movement: 02/10/20  Stool Description: Large, Brown, Soft     Usual bowel pattern:  every other day  Scheduled bowel medications:  senna-docusate (PERICOLACE or SENOKOT S)   PRN bowel medications used in last 24 hours:  None  Nursing Interventions:  Increased time, Scheduled medication, Supervision, Verbal cueing, Positioning on commode/toilet  Effectiveness of bowel program:   fair=sometimes needs prn bowel meds for constipation  Bladder:    Bladder Assist Initial Score:  4 - Minimal Assistance  Bladder Assist Current Score:  4 - Minimal Assistance  Bladder Accidents in last 7 days:     PVR range for past 24-48 hours:  [42]  ()  Intermittent Catheterization:    Medications affecting bladder:  None    Interventions:  Increased time, Supervision, Verbal cueing, Emptying of device, Urinal  Effectiveness of bladder training:  Good=regular, predictable, emptying of  bladder, patient initiates time voiding    Wound:   Patient Lines/Drains/Airways Status    Active Current Wounds     Name: Placement date: Placement time: Site: Days:    Wound 02/07/20 Other (comment) Coccyx Moisture fissure  02/07/20 1530   --  3    Wound 02/07/20 Laceration Face Face lacerations  02/07/20 1530   --  3                   Interventions:  Monitor and assess  Effectiveness of intervention:  wound is unchanged     Sleep/wake cycle:   Average hours slept:  Sleeps 4-6 hours without waking  Sleep medication usage:  Other Trazodone 50 mg    Patient/Family Training/Education:  Bladder Management/Training, Bowel Management/Training, Diet/Nutrition, Fall Prevention, General Self Care, Medication Management, Pain Management, Respiratory Hygiene, Safety and Wound Care    Strengths: Alert and oriented, Able to follow instructions and Supportive family   Barriers:   Fatigue, Generalized weakness and Limited mobility       Nursing Problems     Problem: Bowel/Gastric:     Description:     Goal: Normal bowel function is maintained or improved     Description:           Goal: Will not experience complications related to bowel motility     Description:                 Problem: Communication     Description:     Goal: The ability to communicate needs accurately and effectively will improve     Description:                 Problem: Discharge Barriers/Planning     Description:     Goal: Patient's continuum of care needs will be met     Description:                 Problem: IP BLADDER/VOIDING     Description:     Goal: LTG - patient will complete bladder elimination     Description:                 Problem: Infection     Description:     Goal: Will remain free from infection     Description:                 Problem: Knowledge Deficit     Description:     Goal: Knowledge of disease process/condition, treatment plan, diagnostic tests, and medications will improve     Description:           Goal: Knowledge of the  prescribed therapeutic regimen will improve     Description:                 Problem: Pain Management     Description:     Goal: Pain level will decrease to patient's comfort goal     Description:                 Problem: Psychosocial needs     Description:     Goal: Anxiety reduction     Description:                 Problem: Respiratory:     Description:     Goal: Respiratory status will improve     Description:                 Problem: SKIN INTEGRITY     Description:     Goal: LTG - PATIENT WILL MAINTAIN/IMPROVE SKIN INTEGRITY THROUGH PROPER SKIN CARE TECHNIQUES.     Description:                 Problem: Safety     Description:     Goal: Will remain free from injury     Description:           Goal: Will remain free from falls     Description:                 Problem: Skin Integrity     Description:     Goal: Risk for impaired skin integrity will decrease     Description:                 Problem: Venous Thromboembolism (VTW)/Deep Vein Thrombosis (DVT) Prevention:     Description:     Goal: Patient will participate in Venous Thrombosis (VTE)/Deep Vein Thrombosis (DVT)Prevention Measures     Description:     Flowsheet:     Taken at 02/10/20 1927    Pharmacologic Prophylaxis Used LMWH: Enoxaparin(Lovenox) by  Mena Duckworth R.N.                             Long Term Goals:   At discharge patient will be able to function safely at home and in the community with support.    Section completed by:  Kaitlynn Saenz R.N.  Read and reviewed by Emelina Servin R.N.         Mobility  Bed mobility:   Mod I  Bed /Chair/Wheelchair Transfer Initial:  4 - Minimal Assistance  Bed /Chair/Wheelchair Transfer Current:  5 - Standby Prompting/Supervision or Set-up   Bed/Chair/Wheelchair Transfer Description:  Supervision for safety  Walk Initial:  5 - Standby Prompting/Supervision or Set-up  Walk Current:  4 - Minimal Assistance   Walk Description:  Assist device/equipment, Walker, Verbal cueing, Supervision for safety,  Requires incidental assist (250 ft with FWW, 1000 ft with SPC, 125 ft with SPC, CGA progressing to SBA with verbal cues for pathfinding)  Wheelchair Initial:  6 - Modified Independent  Wheelchair Current:  6 - Modified Independent   Wheelchair Description:  (Pt propells WC x150' with BUEs.)  Stairs Initial:  4 - Minimal Assistance  Stairs Current: 4 - Minimal Assistance   Stairs Description: Hand rails, Extra time, Safety concerns, Verbal cueing, Supervision for safety, Requires incidental assist, Ascends/descends 12 to 14 steps (ascends/descends 20 steps with 1 HR, holding his SPC, CGA; pt started with step over pattern and experienced 1 LOB, after verbal cueing for step to pattern, pt performed the last 16 steps at SBA level and step to gait pattern.)  Patient/Family Training/Education:  Ongoing patient education  DME/DC Recommendations:  SPC  Strengths:  Adequate strength, Alert and oriented, Good endurance, Independent PLOF, Making steady progress towards goals and Willingly participates in therapeutic activities  Barriers:   Poor balance  # of short term goals set= 4  # of short term goals met=2    Physical Therapy Problems     Problem: Balance     Dates: Start: 02/08/20       Description:     Goal: STG-Within one week, patient will maintain dynamic standing     Dates: Start: 02/08/20   Expected End: 02/15/20       Description: 1) Individualized goal:  Pt will maintain >10 minutes of balance activities with minimal UE support to improve independence with ADLs & IADLs within one week.    2) Interventions:  PT Gait Training, PT Therapeutic Exercises, PT Neuro Re-Ed/Balance, PT Therapeutic Activity, PT Manual Therapy and PT Evaluation       Note:     Goal Note filed on 02/11/20 0808 by Audra Schmitt, Student    To be assessed, pt evaluated on 2/8                        Problem: Mobility     Dates: Start: 02/08/20       Description:     Goal: STG-Within one week, patient will ambulate up/down flight of stairs      Dates: Start: 02/08/20   Expected End: 02/15/20       Description: 1) Individualized goal:  Pt will ascend/descend 12 standard steps SBA with use of single HR to improve independence and safety with flight of stairs within one week.    2) Interventions:  PT Gait Training, PT Therapeutic Exercises, PT Neuro Re-Ed/Balance, PT Therapeutic Activity, PT Manual Therapy and PT Evaluation     Note:     Goal Note filed on 02/11/20 0808 by Audra Schmitt, Student    Pt required CGA for 1 minor LOB during stair management. To be assessed.                        Problem: PT-Long Term Goals     Dates: Start: 02/08/20       Description:     Goal: LTG-By discharge, patient will ambulate     Dates: Start: 02/08/20   Expected End: 02/22/20       Description: 1) Individualized goal:  Pt will ambulate >500' Ez with least restrictive device to safely manage community distances to return to prior living environment.     2) Interventions:  PT Gait Training, PT Therapeutic Exercises, PT Neuro Re-Ed/Balance, PT Therapeutic Activity, PT Manual Therapy and PT Evaluation           Goal: LTG-By discharge, patient will perform home exercise program     Dates: Start: 02/08/20   Expected End: 02/22/20       Description: 1) Individualized goal:  Pt will demonstrate independence with home exercise program to return to prior living environment.     2) Interventions:  PT Gait Training, PT Therapeutic Exercises, PT Neuro Re-Ed/Balance, PT Therapeutic Activity, PT Manual Therapy and PT Evaluation           Goal: LTG-By discharge, patient will ambulate up/down flight of stairs     Dates: Start: 02/08/20   Expected End: 02/22/20       Description: 1) Individualized goal:  Pt will ascend/descend 12 standard steps with one HR Ez to prepare for discharge.    2) Interventions:  PT Gait Training, PT Therapeutic Exercises, PT Neuro Re-Ed/Balance, PT Therapeutic Activity, PT Manual Therapy and PT Evaluation                         Section completed by:  Rakel Nowak PT, DPT; Audra Schmitt, Student    Activities of Daily Living  Eating Initial:  6 - Modified Independent  Eating Current:  5 - Standby Prompting/Supervision or Set-up   Eating Description:  Set-up of equipment or meal/tube feeding  Grooming Initial:  6 - Modified Independent  Grooming Current:  5 - Standby Prompting/Supervision or Set-up   Grooming Description:  Supervision for safety(supervised standing at sink to brush teeth)  Bathing Initial:  4 - Minimal Assistance  Bathing Current:  5 - Standby Prompting/Supervision or Set-up   Bathing Description:  Supervision for safety, Verbal cueing, Set-up of equipment, Increased time(Patient demo'd ability to wash, rinse and dry all body parts with set-up and SBA while incorporating sitting on fold-down shower bench and standing (with use of grab  bar))  Upper Body Dressing Initial:  5 - Standby Prompting/Supervision or Set-up  Upper Body Dressing Current:  5 - Standby Prompting/Supervision or Set-up   Upper Body Dressing Description:  Set-up of equipment, Supervision for safety, Increased time(Patient donned t-shirt with set-up while seated )  Lower Body Dressing Initial:  4 - Minimal Assistance  Lower Body Dressing Current:  5 - Standby Prompting/Supervsion or Set-up   Lower Body Dressing Description:  5 - Standby Prompting/Supervsion or Set-up  Toileting Initial:  7 - Independent  Toileting Current:  4 - Minimal Assistance   Toileting Description:  (CGA)  Toilet Transfer Initial:  4 - Minimal Assistance  Toilet Transfer Current:  5 - Standby Prompting/Supervision or Set-up   Toilet Transfer Description:  5 - Standby Prompting/Supervision or Set-up  Tub / Shower Transfer Initial:  4 - Minimal Assistance  Tub / Shower Transfer Current:  5 - Standby Prompting/Supervision or Set-up   Tub / Shower Transfer Description:  Increased time, Supervision for safety, Grab bar(Patient performed shower txfr with FWW, SBA and use of grab bar (for safety and balance).  )  IADL:  Not yet assessed; new patient   Family Training/Education:  Not completed; patient lives with a roommate    DME/DC Recommendations:  Outpatient OT? Grab bars for safety/balance (in bathroom)     Strengths:  Able to follow instructions, Adequate strength, Alert and oriented, Independent PLOF, Making steady progress towards goals, Manages pain appropriately, Motivated for self care and independence, Pleasant and cooperative and Willingly participates in therapeutic activities  Barriers:  Other: No family support, decreased balance     # of short term goals set= 3    # of short term goals met= 3     Occupational Therapy Goals     Problem: OT Long Term Goals     Dates: Start: 02/08/20       Description:     Goal: LTG-By discharge, patient will complete basic self care tasks     Dates: Start: 02/08/20   Expected End: 02/22/20       Description: 1) Individualized Goal:  With mod I  2) Interventions:  OT Orthotics Training, OT E Stim Attended, OT Group Therapy, OT Self Care/ADL, OT Cognitive Skill Dev, OT Community Reintegration, OT Manual Ther Technique, OT Neuro Re-Ed/Balance, OT Sensory Int Techniques, OT Therapeutic Activity, OT Ultrasound, OT Evaluation and OT Therapeutic Exercise             Goal: LTG-By discharge, patient will complete basic home management     Dates: Start: 02/08/20   Expected End: 02/22/20       Description: 1) Individualized Goal:  With mod I  2) Interventions:  OT Orthotics Training, OT E Stim Attended, OT Group Therapy, OT Self Care/ADL, OT Cognitive Skill Dev, OT Community Reintegration, OT Manual Ther Technique, OT Neuro Re-Ed/Balance, OT Sensory Int Techniques, OT Therapeutic Activity, OT Ultrasound, OT Evaluation and OT Therapeutic Exercise                       Section completed by:  Vanesa Lopez MS,OTR/L             REHAB-Pharmacy IDT Team Note by Mushtaq Walsh RPH at 2/10/2020  5:49 PM  Version 1 of 1    Author:  Mushtaq Walsh RPH Service:  -- Author Type:  Pharmacist     Filed:  2/10/2020  5:49 PM Date of Service:  2/10/2020  5:49 PM Status:  Signed    :  Mushtaq Walsh RPH (Pharmacist)         Pharmacy   Pharmacy  Antibiotics/Antifungals/Antivirals:  Medication:      Active Orders (From admission, onward)    None        Route:        NA  Stop Date:  NA  Reason:      NA  Antihypertensives/Cardiac:  Medication:    Orders (72h ago, onward)     Start     Ordered    02/11/20 0900  lisinopril (PRINIVIL) tablet 5 mg  EVERY MORNING      02/10/20 1415    02/10/20 0900  lisinopril (PRINIVIL) tablet 10 mg  EVERY MORNING,   Status:  Discontinued      02/09/20 1507    02/08/20 0900  lisinopril (PRINIVIL) tablet 20 mg  EVERY MORNING,   Status:  Discontinued      02/07/20 1216    02/07/20 2100  prazosin (MINIPRESS) capsule 1 mg  EVERY EVENING,   Status:  Discontinued      02/07/20 1616    02/07/20 1216  hydrALAZINE (APRESOLINE) tablet 25 mg  EVERY 8 HOURS PRN      02/07/20 1216              Patient Vitals for the past 24 hrs:   BP Pulse   02/10/20 1431 143/93 95   02/10/20 1300 101/61 74   02/10/20 0931 121/84 --   02/10/20 0700 102/72 63   02/09/20 1925 (!) 96/60 70     Anticoagulation:  Medication: None                                     Other key medications: A review of the medication list reveals no issues at this time. Patient is currently on antihypertensive(s). Recommend home blood pressure monitoring/recording if antihypertensive(s) regimen(s) continue.    Section completed by: Mushtaq Walsh [facility][AW.[email or web address]]     Attribution Key     AW.1 - Mushtaq Walsh [facility] on 2/10/2020  5:49 PM                  DC Planning  DC destination/dispostion:  Patient lives in a single level apartment with a roommate.    DC Needs:  He has no dme.  He needs a PCP and probably dme.  I will await recommendations for follow up therapy.    Barriers to discharge:  Was homeless up until recently.      Strengths: supportive roommate.    Section completed by:  Luli Bah R.N.      Physician Summary  T.  [place] Shields MD  participated and led team conference discussion.

## 2020-02-11 NOTE — REHAB-OT IDT TEAM NOTE
Occupational Therapy   Activities of Daily Living  Eating Initial:  6 - Modified Independent  Eating Current:  5 - Standby Prompting/Supervision or Set-up   Eating Description:  Set-up of equipment or meal/tube feeding  Grooming Initial:  6 - Modified Independent  Grooming Current:  5 - Standby Prompting/Supervision or Set-up   Grooming Description:  Supervision for safety(supervised standing at sink to brush teeth)  Bathing Initial:  4 - Minimal Assistance  Bathing Current:  5 - Standby Prompting/Supervision or Set-up   Bathing Description:  Supervision for safety, Verbal cueing, Set-up of equipment, Increased time(Patient demo'd ability to wash, rinse and dry all body parts with set-up and SBA while incorporating sitting on fold-down shower bench and standing (with use of grab  bar))  Upper Body Dressing Initial:  5 - Standby Prompting/Supervision or Set-up  Upper Body Dressing Current:  5 - Standby Prompting/Supervision or Set-up   Upper Body Dressing Description:  Set-up of equipment, Supervision for safety, Increased time(Patient donned t-shirt with set-up while seated )  Lower Body Dressing Initial:  4 - Minimal Assistance  Lower Body Dressing Current:  5 - Standby Prompting/Supervsion or Set-up   Lower Body Dressing Description:  5 - Standby Prompting/Supervsion or Set-up  Toileting Initial:  7 - Independent  Toileting Current:  4 - Minimal Assistance   Toileting Description:  (CGA)  Toilet Transfer Initial:  4 - Minimal Assistance  Toilet Transfer Current:  5 - Standby Prompting/Supervision or Set-up   Toilet Transfer Description:  5 - Standby Prompting/Supervision or Set-up  Tub / Shower Transfer Initial:  4 - Minimal Assistance  Tub / Shower Transfer Current:  5 - Standby Prompting/Supervision or Set-up   Tub / Shower Transfer Description:  Increased time, Supervision for safety, Grab bar(Patient performed shower txfr with FWW, SBA and use of grab bar (for safety and balance). )  IADL:  Not yet assessed;  new patient   Family Training/Education:  Not completed; patient lives with a roommate    DME/DC Recommendations:  Outpatient OT? Grab bars for safety/balance (in bathroom)     Strengths:  Able to follow instructions, Adequate strength, Alert and oriented, Independent PLOF, Making steady progress towards goals, Manages pain appropriately, Motivated for self care and independence, Pleasant and cooperative and Willingly participates in therapeutic activities  Barriers:  Other: No family support, decreased balance     # of short term goals set= 3    # of short term goals met= 3     Occupational Therapy Goals     Problem: OT Long Term Goals     Dates: Start: 02/08/20       Description:     Goal: LTG-By discharge, patient will complete basic self care tasks     Dates: Start: 02/08/20   Expected End: 02/22/20       Description: 1) Individualized Goal:  With mod I  2) Interventions:  OT Orthotics Training, OT E Stim Attended, OT Group Therapy, OT Self Care/ADL, OT Cognitive Skill Dev, OT Community Reintegration, OT Manual Ther Technique, OT Neuro Re-Ed/Balance, OT Sensory Int Techniques, OT Therapeutic Activity, OT Ultrasound, OT Evaluation and OT Therapeutic Exercise             Goal: LTG-By discharge, patient will complete basic home management     Dates: Start: 02/08/20   Expected End: 02/22/20       Description: 1) Individualized Goal:  With mod I  2) Interventions:  OT Orthotics Training, OT E Stim Attended, OT Group Therapy, OT Self Care/ADL, OT Cognitive Skill Dev, OT Community Reintegration, OT Manual Ther Technique, OT Neuro Re-Ed/Balance, OT Sensory Int Techniques, OT Therapeutic Activity, OT Ultrasound, OT Evaluation and OT Therapeutic Exercise                       Section completed by:  Vanesa Lopez MS,OTR/L

## 2020-02-11 NOTE — REHAB-NURSING IDT TEAM NOTE
Nursing   Nursing  Diet/Nutrition:  Regular, Dysphagia III-Mechanical Soft and Thin Liquids  Medication Administration:  Whole with Liquid Wash  % consumed at meals in last 24 hours:  Consumed 50%-100% of meals per documentation.  Meal/Snack Consumption for the past 24 hrs:   Oral Nutrition   02/10/20 1955 Dinner;Between 50-75% Consumed   02/10/20 1300 Between % Consumed   02/10/20 0900 Between % Consumed     Snack schedule:  None  Appetite:  Good  Fluid Intake/Output in past 24 hours: In: 480 [P.O.:480]  Out: 2900   Admit Weight:  Weight: 100.6 kg (221 lb 12.5 oz)  Weight Last 7 Days   [99.9 kg (220 lb 3.8 oz)-100.6 kg (221 lb 12.5 oz)] 99.9 kg (220 lb 3.8 oz) (02/09 0900)    Pain Issues:    Location:  Jaw;Head (02/10 1955)  Right (02/10 1955)         Severity:  Moderate   Scheduled pain medications:  gabapentin (NEURONTIN) , Tylenol   PRN pain medications used in last 24 hours:  None   Non Pharmacologic Interventions:  warm blanket, repositioned and rest  Effectiveness of pain management plan:  good=patient states acceptable comfort after interventions    Bowel:    Bowel Assist Initial Score:  4 - Minimal Assistance  Bowel Assist Current Score:  4 - Minimal Assistance  Bowl Accidents in last 7 days:     Last bowel movement: 02/10/20  Stool Description: Large, Brown, Soft     Usual bowel pattern:  every other day  Scheduled bowel medications:  senna-docusate (PERICOLACE or SENOKOT S)   PRN bowel medications used in last 24 hours:  None  Nursing Interventions:  Increased time, Scheduled medication, Supervision, Verbal cueing, Positioning on commode/toilet  Effectiveness of bowel program:   fair=sometimes needs prn bowel meds for constipation  Bladder:    Bladder Assist Initial Score:  4 - Minimal Assistance  Bladder Assist Current Score:  4 - Minimal Assistance  Bladder Accidents in last 7 days:     PVR range for past 24-48 hours:  [42]  ()  Intermittent Catheterization:    Medications affecting bladder:   None    Interventions:  Increased time, Supervision, Verbal cueing, Emptying of device, Urinal  Effectiveness of bladder training:  Good=regular, predictable, emptying of bladder, patient initiates time voiding    Wound:   Patient Lines/Drains/Airways Status    Active Current Wounds     Name: Placement date: Placement time: Site: Days:    Wound 02/07/20 Other (comment) Coccyx Moisture fissure  02/07/20   1530   --  3    Wound 02/07/20 Laceration Face Face lacerations  02/07/20   1530   --  3                   Interventions:  Monitor and assess  Effectiveness of intervention:  wound is unchanged     Sleep/wake cycle:   Average hours slept:  Sleeps 4-6 hours without waking  Sleep medication usage:  Other Trazodone 50 mg    Patient/Family Training/Education:  Bladder Management/Training, Bowel Management/Training, Diet/Nutrition, Fall Prevention, General Self Care, Medication Management, Pain Management, Respiratory Hygiene, Safety and Wound Care    Strengths: Alert and oriented, Able to follow instructions and Supportive family   Barriers:   Fatigue, Generalized weakness and Limited mobility       Nursing Problems     Problem: Bowel/Gastric:     Description:     Goal: Normal bowel function is maintained or improved     Description:           Goal: Will not experience complications related to bowel motility     Description:                 Problem: Communication     Description:     Goal: The ability to communicate needs accurately and effectively will improve     Description:                 Problem: Discharge Barriers/Planning     Description:     Goal: Patient's continuum of care needs will be met     Description:                 Problem: IP BLADDER/VOIDING     Description:     Goal: LTG - patient will complete bladder elimination     Description:                 Problem: Infection     Description:     Goal: Will remain free from infection     Description:                 Problem: Knowledge Deficit     Description:      Goal: Knowledge of disease process/condition, treatment plan, diagnostic tests, and medications will improve     Description:           Goal: Knowledge of the prescribed therapeutic regimen will improve     Description:                 Problem: Pain Management     Description:     Goal: Pain level will decrease to patient's comfort goal     Description:                 Problem: Psychosocial needs     Description:     Goal: Anxiety reduction     Description:                 Problem: Respiratory:     Description:     Goal: Respiratory status will improve     Description:                 Problem: SKIN INTEGRITY     Description:     Goal: LTG - PATIENT WILL MAINTAIN/IMPROVE SKIN INTEGRITY THROUGH PROPER SKIN CARE TECHNIQUES.     Description:                 Problem: Safety     Description:     Goal: Will remain free from injury     Description:           Goal: Will remain free from falls     Description:                 Problem: Skin Integrity     Description:     Goal: Risk for impaired skin integrity will decrease     Description:                 Problem: Venous Thromboembolism (VTW)/Deep Vein Thrombosis (DVT) Prevention:     Description:     Goal: Patient will participate in Venous Thrombosis (VTE)/Deep Vein Thrombosis (DVT)Prevention Measures     Description:     Flowsheet:     Taken at 02/10/20 5390    Pharmacologic Prophylaxis Used LMWH: Enoxaparin(Lovenox) by  Mena Duckworth RChikaNChika                             Long Term Goals:   At discharge patient will be able to function safely at home and in the community with support.    Section completed by:  Kaitlynn Saenz R.N.  Read and reviewed by Emelina Servin R.N.

## 2020-02-11 NOTE — THERAPY
"Physical Therapy   Daily Treatment     Patient Name: Chris Leggett  Age:  69 y.o., Sex:  male  Medical Record #: [id number]  Today's Date: 2/11/2020     Precautions  Precautions: Fall Risk, Spinal / Back Precautions   Comments: monitor BP, protective px for low WBC count     Subjective    Patient asking why he \"passed out in the first place?\"; excited about [employer] outing this afternoon     Objective       02/11/20 1031   Precautions   Precautions Fall Risk;Spinal / Back Precautions    Vitals   Blood Pressure  119/76  (after 10min Nu-Step)   Sitting Lower Body Exercises   Nustep Resistance Level 7  (10min for endurance, 807 steps)   Interdisciplinary Plan of Care Collaboration   IDT Collaboration with  Occupational Therapist;Physical Therapist;Physician   Collaboration Comments mod I in room with SPC, mod I with transfers; planned [employer] outing this afternoon   PT Total Time Spent   PT Individual Total Time Spent (Mins) 60   PT Charge Group   PT Gait Training 2   PT Therapeutic Exercise 1   PT Therapeutic Activities 1       FIM Bed/Chair/Wheelchair Transfers Score: 6 - Modified Independent  Bed/Chair/Wheelchair Transfers Description:  (mod I SPT )    FIM Walking Score:  5 - Standby Prompting/Supervision or Set-up  Walking Description:  (amb 400ft FWW SBA (picks up on corners); amb 600ft SPC SBA; amb 25ft x 2, 100ft no AD CGA/SBA, no LOB)      Assessment    Patient demonstrated safe ambulation for approx 1000ft with SPC SBA required for wayfinding, approx 100ft with no AD, no LOB; no evidence of LE weakness; demonstrated 3 safe SPT in room with BR, phone and closet scenarios    Plan    Assess community outing; continue ambulation (SPC for long distances, no AD for short distances), balance assessment; stairs review; instruction in HEP for LE strengthening    "

## 2020-02-11 NOTE — CARE PLAN
Problem: Bathing  Goal: STG-Within one week, patient will bathe  Description  1) Individualized Goal:  With supervision and AE  2) Interventions:  OT Orthotics Training, OT E Stim Attended, OT Group Therapy, OT Self Care/ADL, OT Cognitive Skill Dev, OT Community Reintegration, OT Manual Ther Technique, OT Neuro Re-Ed/Balance, OT Sensory Int Techniques, OT Therapeutic Activity, OT Ultrasound, OT Evaluation and OT Therapeutic Exercise     Outcome: MET     Problem: Dressing  Goal: STG-Within one week, patient will dress LB  Description  1) Individualized Goal:  With supervision and AE  2) Interventions:  OT Orthotics Training, OT E Stim Attended, OT Group Therapy, OT Self Care/ADL, OT Cognitive Skill Dev, OT Community Reintegration, OT Manual Ther Technique, OT Neuro Re-Ed/Balance, OT Sensory Int Techniques, OT Therapeutic Activity, OT Ultrasound, OT Evaluation and OT Therapeutic Exercise     Outcome: MET     Problem: Functional Transfers  Goal: STG-Within one week, patient will transfer to toilet  Description  1) Individualized Goal:  With supervision and AE  2) Interventions:  OT Orthotics Training, OT E Stim Attended, OT Group Therapy, OT Self Care/ADL, OT Cognitive Skill Dev, OT Community Reintegration, OT Manual Ther Technique, OT Neuro Re-Ed/Balance, OT Sensory Int Techniques, OT Therapeutic Activity, OT Ultrasound, OT Evaluation and OT Therapeutic Exercise     Outcome: MET

## 2020-02-11 NOTE — CARE PLAN
Problem: Communication  Goal: The ability to communicate needs accurately and effectively will improve  Intervention: Educate patient and significant other/support system about the plan of care, procedures, treatments, medications and allow for questions  Note:   Patient taken off reverse isolation, WBC within normal limits.      Problem: Safety  Goal: Will remain free from falls  Note:   Patient has good safety awareness and was made Mod I in his room with a cane. Patient is CGA when outside of room.

## 2020-02-11 NOTE — PROGRESS NOTES
"Rehab Progress Note     Encounter Date: 2/11/2020    CC: Headache, head lacerations    Interval Events (Subjective)  Patient sitting up in room. He reports he is doing well. He has right sided jaw pain that is worse when he opens his jaw. Discussed his CT maxillofacial had nose fractures but no injury to right mandible. He reports his pain is improving so he understands it is most likely muscle/joint related. Otherwise he reports therapy is going well. Discussed will have IDT later today. He has questions as to why he is still on precautions, discussed that his leukopenia has resolved so no longer needed. Denies NVD. Denies SOB.     IDT Team Meeting 2/11/2020    I, Talia Shields M.D., was present and led the interdisciplinary team conference on 2/11/2020.  I led the IDT conference and agree with the IDT conference documentation and plan of care as noted below.     RN:  Diet Regular   % Meal     Pain    Sleep    Bowel Continent   Bladder Continent   In's & Out's      PT:  Bed Mobility    Transfers CGA   Mobility CGA - minor balance issues   Stairs Dmitriy   Aggressive on steps and tried to go step through    OT:  Eating    Grooming SBA   Bathing SBA   UB Dressing    LB Dressing SBA   Toileting SBA   Shower & Transfer SBA   Right jaw pain    CM:  Continues to work on disposition and DME needs.      DC/Disposition:  2/17/20    Objective:  VITAL SIGNS: /76 Comment: after 10min Nu-Step  Pulse (!) 58   Temp 36.6 °C (97.8 °F) (Oral)   Resp 20   Ht 1.803 m (5' 11\")   Wt 99.9 kg (220 lb 3.8 oz)   SpO2 94%   BMI 30.72 kg/m²   Gen: NAD  Psych: Mood and affect appropriate  CV: RRR, no edema  Resp: CTAB, no upper airway sounds  Abd: NTND  Neuro: AOx4, 5/5 BUE  Unchanged from 2/10/20    Recent Results (from the past 72 hour(s))   CBC WITH DIFFERENTIAL    Collection Time: 02/09/20  5:07 AM   Result Value Ref Range    WBC 1.9 (LL) 4.8 - 10.8 K/uL    RBC 3.22 (L) 4.70 - 6.10 M/uL    Hemoglobin 10.9 (L) 14.0 - " 18.0 g/dL    Hematocrit 31.5 (L) 42.0 - 52.0 %    MCV 97.8 81.4 - 97.8 fL    MCH 33.9 (H) 27.0 - 33.0 pg    MCHC 34.6 33.7 - 35.3 g/dL    RDW 46.7 35.9 - 50.0 fL    Platelet Count 63 (L) 164 - 446 K/uL    MPV 11.3 9.0 - 12.9 fL    Neutrophils-Polys 41.90 (L) 44.00 - 72.00 %    Lymphocytes 30.10 22.00 - 41.00 %    Monocytes 13.50 (H) 0.00 - 13.40 %    Eosinophils 13.50 (H) 0.00 - 6.90 %    Basophils 1.00 0.00 - 1.80 %    Immature Granulocytes 0.00 0.00 - 0.90 %    Nucleated RBC 1.60 /100 WBC    Neutrophils (Absolute) 0.81 (L) 1.82 - 7.42 K/uL    Lymphs (Absolute) 0.58 (L) 1.00 - 4.80 K/uL    Monos (Absolute) 0.26 0.00 - 0.85 K/uL    Eos (Absolute) 0.26 0.00 - 0.51 K/uL    Baso (Absolute) 0.02 0.00 - 0.12 K/uL    Immature Granulocytes (abs) 0.00 0.00 - 0.11 K/uL    NRBC (Absolute) 0.03 K/uL   Basic Metabolic Panel    Collection Time: 02/09/20  5:07 AM   Result Value Ref Range    Sodium 137 135 - 145 mmol/L    Potassium 3.9 3.6 - 5.5 mmol/L    Chloride 102 96 - 112 mmol/L    Co2 29 20 - 33 mmol/L    Glucose 80 65 - 99 mg/dL    Bun 7 (L) 8 - 22 mg/dL    Creatinine 0.73 0.50 - 1.40 mg/dL    Calcium 8.6 8.5 - 10.5 mg/dL    Anion Gap 6.0 0.0 - 11.9   IRON/TOTAL IRON BIND    Collection Time: 02/09/20  5:07 AM   Result Value Ref Range    Iron 15 (L) 50 - 180 ug/dL    Total Iron Binding 235 (L) 250 - 450 ug/dL    % Saturation 6 (L) 15 - 55 %   FOLATE    Collection Time: 02/09/20  5:07 AM   Result Value Ref Range    Folate -Folic Acid 14.9 >4.0 ng/mL   VITAMIN B12    Collection Time: 02/09/20  5:07 AM   Result Value Ref Range    Vitamin B12 -True Cobalamin 394 211 - 911 pg/mL   TSH WITH REFLEX TO FT4    Collection Time: 02/09/20  5:07 AM   Result Value Ref Range    TSH 1.490 0.380 - 5.330 uIU/mL   ESTIMATED GFR    Collection Time: 02/09/20  5:07 AM   Result Value Ref Range    GFR If African American >60 >60 mL/min/1.73 m 2    GFR If Non African American >60 >60 mL/min/1.73 m 2   CBC WITH DIFFERENTIAL    Collection Time:  02/10/20  5:59 AM   Result Value Ref Range    WBC 7.5 4.8 - 10.8 K/uL    RBC 3.47 (L) 4.70 - 6.10 M/uL    Hemoglobin 11.7 (L) 14.0 - 18.0 g/dL    Hematocrit 34.7 (L) 42.0 - 52.0 %    .0 (H) 81.4 - 97.8 fL    MCH 33.7 (H) 27.0 - 33.0 pg    MCHC 33.7 33.7 - 35.3 g/dL    RDW 48.2 35.9 - 50.0 fL    Platelet Count 66 (L) 164 - 446 K/uL    MPV 11.3 9.0 - 12.9 fL    Neutrophils-Polys 77.00 (H) 44.00 - 72.00 %    Lymphocytes 10.50 (L) 22.00 - 41.00 %    Monocytes 5.50 0.00 - 13.40 %    Eosinophils 5.60 0.00 - 6.90 %    Basophils 0.90 0.00 - 1.80 %    Immature Granulocytes 0.50 0.00 - 0.90 %    Nucleated RBC 0.00 /100 WBC    Neutrophils (Absolute) 5.73 1.82 - 7.42 K/uL    Lymphs (Absolute) 0.78 (L) 1.00 - 4.80 K/uL    Monos (Absolute) 0.41 0.00 - 0.85 K/uL    Eos (Absolute) 0.42 0.00 - 0.51 K/uL    Baso (Absolute) 0.07 0.00 - 0.12 K/uL    Immature Granulocytes (abs) 0.04 0.00 - 0.11 K/uL    NRBC (Absolute) 0.00 K/uL       Current Facility-Administered Medications   Medication Frequency   • lisinopril (PRINIVIL) tablet 5 mg QAM   • acetaminophen (TYLENOL) tablet 650 mg Q6HRS   • ascorbic acid tablet 500 mg QDAY with Breakfast   • ferrous sulfate tablet 325 mg QDAY with Breakfast   • Respiratory Care per Protocol Continuous RT   • oxyCODONE immediate-release (ROXICODONE) tablet 5 mg Q3HRS PRN   • oxyCODONE immediate release (ROXICODONE) tablet 10 mg Q3HRS PRN   • hydrALAZINE (APRESOLINE) tablet 25 mg Q8HRS PRN   • acetaminophen (TYLENOL) tablet 650 mg Q4HRS PRN   • senna-docusate (PERICOLACE or SENOKOT S) 8.6-50 MG per tablet 2 Tab BID    And   • polyethylene glycol/lytes (MIRALAX) PACKET 1 Packet QDAY PRN    And   • magnesium hydroxide (MILK OF MAGNESIA) suspension 30 mL QDAY PRN    And   • bisacodyl (DULCOLAX) suppository 10 mg QDAY PRN   • benzocaine-menthol (CEPACOL) lozenge 1 Lozenge Q2HRS PRN   • mag hydrox-al hydrox-simeth (MAALOX PLUS ES or MYLANTA DS) suspension 20 mL Q2HRS PRN   • ondansetron (ZOFRAN ODT)  dispertab 4 mg 4X/DAY PRN    Or   • ondansetron (ZOFRAN) syringe/vial injection 4 mg 4X/DAY PRN   • traZODone (DESYREL) tablet 50 mg QHS PRN   • sodium chloride (OCEAN) 0.65 % nasal spray 2 Spray PRN   • DULoxetine (CYMBALTA) capsule 60 mg DAILY   • gabapentin (NEURONTIN) capsule 300 mg TID   • QUEtiapine (SEROQUEL) tablet 200 mg QHS   • traZODone (DESYREL) tablet 50 mg Nightly   • polyethyl glycol-propyl glycol (SYSTANE) 0.4-0.3 % ophth drops 1 Drop PRN       Orders Placed This Encounter   Procedures   • Diet Order Regular (after passing bedside swallow eval )     Standing Status:   Standing     Number of Occurrences:   1     Order Specific Question:   Diet:     Answer:   Regular [1]     Comments:   after passing bedside swallow eval      Order Specific Question:   Texture/Fiber modifications:     Answer:   Dysphagia 3(Mechanical Soft)specify fluid consistency(question 6) [3]       Assessment:  Active Hospital Problems    Diagnosis   • *Cervical spine fracture (HCC)   • Syncope   • Pain and swelling of right lower extremity   • Mood disorder (HCC)   • Gout   • Hypertension   • Forehead laceration   • Normocytic anemia   • Tobacco abuse   • Pancytopenia (HCC)   • H/O cervical fracture       Medical Decision Making and Plan:  C7 Fracture - Patient with syncopal event with C7 fracture managed non-operatively. In [place] collar.   -PT and OT for mobility and ADLs     Right jaw pain - Most likely from fall. No fracture on imaging, continues to improve.     HTN - Patient on Lisinopril 20 mg. Previously on Amlodipine 5 mg  -Consult hospitalist. Low SBP on admission. 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 Continue Gabapentin 300 mg TID     R Knee pain - s/p aspiration of blood products. With improved ROM and pain control  -PRN Oxycodone.      Leukopenia - Down to 1.9, improved on 2/10/20 after granix. Discontinue precautions    Depression -  Patient on Duloxetine 60 mg daily, Seroquel 200 mg QHS, and Trazodone 50 mg QHS     Constipation - Patient without BM in 3 days. Start Senna-docusate.      DVT Ppx - Patient on Lovenox on transfer. Ambulating > 500 feet    Total time:  35 minutes.  I spent greater than 50% of the time for patient care, counseling, and coordination on this date, including unit/floor time, and face-to-face time with the patient as per interval events and assessment and plan above. Topics discussed included discharge planning, right jaw pain, reviewed CT with patient, and elevated SBP yesterday. Per hospitalist continue to monitor. Patient was discussed separately in IDT today; please see details above.    Talia Shields M.D.

## 2020-02-11 NOTE — THERAPY
Occupational Therapy  Daily Treatment     Patient Name: Chris Leggett  Age:  69 y.o., Sex:  male  Medical Record #: [id number]  Today's Date: 2/11/2020     Precautions  Precautions: Fall Risk, Spinal / Back Precautions   Comments: (P) Monitor BP, protective precautions for low WBC count     Safety   ADL Safety : Requires Supervision for Safety  Bathroom Safety: Requires Supervision for Safety  Comments: See FIMs for additional ADL routine information    Subjective    Patient agreeable to participate in OT. Patient expressed some residual pain in R temple/jaw area. To be addressed to MD during team conference this afternoon.      Objective     02/11/20 0931   Precautions   Precautions Fall Risk;Spinal / Back Precautions    Comments Monitor BP, protective precautions for low WBC count    Sitting Lower Body Exercises   Nustep Resistance Level 6;Resistance Level 7  (Level 6 x 8 minutes, Level 7 x 7 minutes, 0.54 miles)   Interdisciplinary Plan of Care Collaboration   Patient Position at End of Therapy Seated;Call Light within Reach   OT Total Time Spent   OT Individual Total Time Spent (Mins) 30   OT Charge Group   OT Therapy Activity 1   OT Therapeutic Exercise  1     Patient performed txfr to/from Nustep with SBA and FWW  (balance and safety).     Assessment    Patient tolerated OT session well with focus on endurance. No c/o pain or discomfort during Nustep bike activity.     Plan    Therapeutic community outing this afternoon to [place]. ADLs, IADLs, standing balance, UB strength/endurance building

## 2020-02-11 NOTE — CARE PLAN
Problem: Balance  Goal: STG-Within one week, patient will maintain dynamic standing  Description  1) Individualized goal:  Pt will maintain >10 minutes of balance activities with minimal UE support to improve independence with ADLs & IADLs within one week.    2) Interventions:  PT Gait Training, PT Therapeutic Exercises, PT Neuro Re-Ed/Balance, PT Therapeutic Activity, PT Manual Therapy and PT Evaluation     Outcome: NOT MET  Note:   To be assessed, pt evaluated on 2/8     Problem: Mobility  Goal: STG-Within one week, patient will ambulate up/down flight of stairs  Description  1) Individualized goal:  Pt will ascend/descend 12 standard steps SBA with use of single HR to improve independence and safety with flight of stairs within one week.    2) Interventions:  PT Gait Training, PT Therapeutic Exercises, PT Neuro Re-Ed/Balance, PT Therapeutic Activity, PT Manual Therapy and PT Evaluation   Outcome: NOT MET  Note:   Pt required CGA for 1 minor LOB during stair management. To be assessed.

## 2020-02-11 NOTE — THERAPY
"Physical Therapy   Daily Treatment     Patient Name: Chris Leggett  Age:  69 y.o., Sex:  male  Medical Record #: [id number]  Today's Date: 2/10/2020     Precautions  Precautions: (P) Fall Risk, Spinal / Back Precautions , Cervical Collar, protective precautions for low WBC  Comments: Monitor BP    Subjective    Pt in bed at start of therapy, agreeable to PT. In response to using a SPC: \"I used to have one of these.\"  \"I usually walk 3 miles per day.\"     Objective       02/10/20 1431   Precautions   Precautions Fall Risk;Spinal / Back Precautions ;Cervical Collar     Vitals   Pulse 95   Patient BP Position Sitting   Blood Pressure  143/93   Pulse Oximetry (!) 84 %  (increased to 96% after 1 min seated rest break )   Vitals Comments Vitals taken after 1000 ft of amb and conversation; no pt report of dizziness   Cognition    Level of Consciousness Alert   Sitting Lower Body Exercises   Nustep Resistance Level 8  (10 min, 556 steps)   Bed Mobility    Sit to Supine Supervised   Sit to Stand Supervised   Neuro-Muscular Treatments   Neuro-Muscular Treatments Facilitation;Postural Changes;Sequencing;Tactile Cuing;Verbal Cuing;Weight Shift Right;Weight Shift Left   Comments Obstacle course for balance and gait training with SPC: amb over mat/uneven surface, step over bolster, step onto AirEx pad, then tap cones 10x bilaterally,  cones and reverse  (CGA )   Interdisciplinary Plan of Care Collaboration   IDT Collaboration with  Nursing   Patient Position at End of Therapy In Bed;Call Light within Reach;Tray Table within Reach   Collaboration Comments Discussed pt precautions with nursing       FIM Walking Score:  4 - Minimal Assistance  Walking Description:  Assist device/equipment, Walker, Verbal cueing, Supervision for safety, Requires incidental assist(250 ft with FWW, 1000 ft with SPC, 125 ft with SPC, CGA progressing to SBA with verbal cues for pathfinding)    FIM Stairs Score:  4 - Minimal Assistance  Stairs " Description:  Hand rails, Extra time, Safety concerns, Verbal cueing, Supervision for safety, Requires incidental assist, Ascends/descends 12 to 14 steps(ascends/descends 20 steps with 1 HR, holding his SPC, CGA; pt started with step over pattern and experienced 1 LOB, after verbal cueing for step to pattern, pt performed the last 16 steps at SBA level and step to gait pattern.)      Assessment    Pt agreeable to trialing SPC with ambulation today. He has very good endurance, however is still limited in his dynamic balance. Pt educated on importance of using step-to pattern on stairs vs step-through pattern and responded well to verbal cues for sequencing.     Plan    Gait training AD vs no AD, balance assessment to determine need for AD, Vector for variable gait with obstacles uneven surfaces etc for Dynamic balance, LE strengthening, HEP.

## 2020-02-11 NOTE — PROGRESS NOTES
Patient care assumed. Report received from [facility] ORA Calderón. Patient is alert and calm, resting in bed. Call light and bedside table within reach. Will continue to monitor.

## 2020-02-11 NOTE — CARE PLAN
Problem: Balance  Goal: STG-Within one week, patient will maintain dynamic standing  Description  1) Individualized goal:  Pt will maintain >10 minutes of balance activities with minimal UE support to improve independence with ADLs & IADLs within one week.    2) Interventions:  PT Gait Training, PT Therapeutic Exercises, PT Neuro Re-Ed/Balance, PT Therapeutic Activity, PT Manual Therapy and PT Evaluation     Outcome: NOT MET  Note:   To be assessed, pt evaluated on 2/8     Problem: Mobility  Goal: STG-Within one week, patient will ambulate community distances  Description  1) Individualized goal:  Pt will ambulate >200' Ez in FWW to become Ez in facility within one week.    2) Interventions:  PT Gait Training, PT Therapeutic Exercises, PT Neuro Re-Ed/Balance, PT Therapeutic Activity, PT Manual Therapy and PT Evaluation   Outcome: MET  Note:   Pt has progressed to amb with SPC but is not yet at a mod I level  Goal: STG-Within one week, patient will ambulate up/down flight of stairs  Description  1) Individualized goal:  Pt will ascend/descend 12 standard steps SBA with use of single HR to improve independence and safety with flight of stairs within one week.    2) Interventions:  PT Gait Training, PT Therapeutic Exercises, PT Neuro Re-Ed/Balance, PT Therapeutic Activity, PT Manual Therapy and PT Evaluation   2/11/2020 0815 by Audra Schmitt, Student  Outcome: MET  2/11/2020 0808 by Audra Schmitt, Student  Outcome: NOT MET  Note:   Pt required CGA for 1 minor LOB during stair management. To be assessed.     Problem: Mobility Transfers  Goal: STG-Within one week, patient will sit to stand  Description  1) Individualized goal:  Pt will completed sit to stand Ez with FWW to become Ez within room within one week.    2) Interventions:  PT Gait Training, PT Therapeutic Exercises, PT Neuro Re-Ed/Balance, PT Therapeutic Activity, PT Manual Therapy and PT Evaluation   Outcome: MET

## 2020-02-11 NOTE — THERAPY
Physical Therapy   Daily Treatment     Patient Name: Chris Leggett  Age:  69 y.o., Sex:  male  Medical Record #: [id number]  Today's Date: 2/11/2020     Precautions  Precautions: Fall Risk, Spinal / Back Precautions   Comments: Monitor BP    Subjective    Pt seated in room, agreeable to PT.     Objective       02/11/20 1431   Precautions   Precautions Fall Risk;Spinal / Back Precautions    Comments Monitor BP, protective precautions for low WBC count   Cognition    Level of Consciousness Alert   Bed Mobility    Sit to Supine Supervised   Sit to Stand Supervised   Neuro-Muscular Treatments   Neuro-Muscular Treatments Compensatory Strategies;Facilitation;Postural Changes;Postural Facilitation;Sequencing;Verbal Cuing;Weight Shift Right;Weight Shift Left   Comments Balance on AirEx pad x30 sec; balance on AirEx while passing and bouncing ball x3 min   (On AirEx pad with feet together, CGA)   Lower Extremity Outcome Measures   Lower Extremity Outcome Measures Utilized FGA   Assistive Device None   FGA (Functional Gait Assessment)   Gait Level Surface 2   Change in Gait Speed 3   Gait with Horizontal Head Turns   (N/A spinal precautions)   Gait with Vertical Head Turns   (N/A Spinal precautions)   Gait and Pivot Turns 2   Step Over Obstacle 3   Gait with Narrow Base of Support 2   Gait with Eyes Closed 1   Ambulating Backwards 2   Steps 2   Interdisciplinary Plan of Care Collaboration   IDT Collaboration with  Nursing   Patient Position at End of Therapy Seated;Call Light within Reach;Tray Table within Reach   Collaboration Comments RN administered meds during session       FIM Walking Score:  5 - Standby Prompting/Supervision or Set-up  Walking Description:  Assist device/equipment, Supervision for safety(pt amb ~250 ft x2 with SPC, SBA; incidental amb for FGA.)    FIM Stairs Score:  5 - Standby Prompting/Supervision or Set-up  Stairs Description:  Hand rails, Safety concerns, Verbal cueing, Supervision for  "safety(ascends/descends 12 6\" steps with 1 HR, SBA in a step through pattern. Verbal reminders of step-to gait pattern)      Assessment    Pt participatory in therapy. 17/24 modified score on FGA with greatest dynamic balance deficits in situations with decreased CHELSIE and with eyes closed. Pt tolerated dynamic standing for the entire session with one rest break ~20 minutes in. He demonstrates appropriate use of hip and ankle strategy during balance on uneven surface and did not require any assistance to recover from minor LOB.     Plan    Challenge dynamic balance, gait with SPC and without AD, gait on uneven surfaces, LE strengthening, HEP, fall recovery/prevention    "

## 2020-02-12 PROCEDURE — 700102 HCHG RX REV CODE 250 W/ 637 OVERRIDE(OP): Performed by: PHYSICAL MEDICINE & REHABILITATION

## 2020-02-12 PROCEDURE — 97110 THERAPEUTIC EXERCISES: CPT

## 2020-02-12 PROCEDURE — 97530 THERAPEUTIC ACTIVITIES: CPT

## 2020-02-12 PROCEDURE — A9270 NON-COVERED ITEM OR SERVICE: HCPCS | Performed by: PHYSICAL MEDICINE & REHABILITATION

## 2020-02-12 PROCEDURE — 99232 SBSQ HOSP IP/OBS MODERATE 35: CPT | Performed by: PHYSICAL MEDICINE & REHABILITATION

## 2020-02-12 PROCEDURE — A9270 NON-COVERED ITEM OR SERVICE: HCPCS | Performed by: HOSPITALIST

## 2020-02-12 PROCEDURE — 770010 HCHG ROOM/CARE - REHAB SEMI PRIVAT*

## 2020-02-12 PROCEDURE — 97535 SELF CARE MNGMENT TRAINING: CPT

## 2020-02-12 PROCEDURE — 97112 NEUROMUSCULAR REEDUCATION: CPT

## 2020-02-12 PROCEDURE — 99232 SBSQ HOSP IP/OBS MODERATE 35: CPT | Performed by: HOSPITALIST

## 2020-02-12 PROCEDURE — 700102 HCHG RX REV CODE 250 W/ 637 OVERRIDE(OP): Performed by: HOSPITALIST

## 2020-02-12 RX ORDER — GABAPENTIN 400 MG/1
400 CAPSULE ORAL 3 TIMES DAILY
Status: DISCONTINUED | OUTPATIENT
Start: 2020-02-12 | End: 2020-02-14

## 2020-02-12 RX ADMIN — GABAPENTIN 400 MG: 400 CAPSULE ORAL at 19:59

## 2020-02-12 RX ADMIN — OXYCODONE HYDROCHLORIDE 10 MG: 10 TABLET ORAL at 20:00

## 2020-02-12 RX ADMIN — GABAPENTIN 300 MG: 300 CAPSULE ORAL at 08:23

## 2020-02-12 RX ADMIN — OXYCODONE HYDROCHLORIDE 10 MG: 10 TABLET ORAL at 08:26

## 2020-02-12 RX ADMIN — FERROUS SULFATE TAB 325 MG (65 MG ELEMENTAL FE) 325 MG: 325 (65 FE) TAB at 08:23

## 2020-02-12 RX ADMIN — TRAZODONE HYDROCHLORIDE 50 MG: 50 TABLET ORAL at 00:07

## 2020-02-12 RX ADMIN — OXYCODONE HYDROCHLORIDE 10 MG: 10 TABLET ORAL at 11:32

## 2020-02-12 RX ADMIN — GABAPENTIN 300 MG: 300 CAPSULE ORAL at 15:01

## 2020-02-12 RX ADMIN — QUETIAPINE FUMARATE 200 MG: 100 TABLET ORAL at 19:59

## 2020-02-12 RX ADMIN — OXYCODONE HYDROCHLORIDE 10 MG: 10 TABLET ORAL at 05:26

## 2020-02-12 RX ADMIN — ACETAMINOPHEN 650 MG: 325 TABLET, FILM COATED ORAL at 17:29

## 2020-02-12 RX ADMIN — ACETAMINOPHEN 650 MG: 325 TABLET, FILM COATED ORAL at 11:33

## 2020-02-12 RX ADMIN — OXYCODONE HYDROCHLORIDE 10 MG: 10 TABLET ORAL at 15:06

## 2020-02-12 RX ADMIN — ACETAMINOPHEN 650 MG: 325 TABLET, FILM COATED ORAL at 00:07

## 2020-02-12 RX ADMIN — SENNOSIDES AND DOCUSATE SODIUM 2 TABLET: 8.6; 5 TABLET ORAL at 19:59

## 2020-02-12 RX ADMIN — LISINOPRIL 5 MG: 5 TABLET ORAL at 08:23

## 2020-02-12 RX ADMIN — DULOXETINE HYDROCHLORIDE 60 MG: 30 CAPSULE, DELAYED RELEASE ORAL at 08:23

## 2020-02-12 RX ADMIN — OXYCODONE HYDROCHLORIDE AND ACETAMINOPHEN 500 MG: 500 TABLET ORAL at 08:23

## 2020-02-12 RX ADMIN — TRAZODONE HYDROCHLORIDE 50 MG: 50 TABLET ORAL at 19:59

## 2020-02-12 RX ADMIN — SENNOSIDES AND DOCUSATE SODIUM 2 TABLET: 8.6; 5 TABLET ORAL at 08:23

## 2020-02-12 ASSESSMENT — ENCOUNTER SYMPTOMS
FEVER: 0
BLURRED VISION: 0
DIZZINESS: 0
NAUSEA: 0
VOMITING: 0
HALLUCINATIONS: 0
HEADACHES: 0
SHORTNESS OF BREATH: 0
PALPITATIONS: 0

## 2020-02-12 NOTE — CARE PLAN
Problem: Safety  Goal: Will remain free from falls  Intervention: Implement fall precautions  Note:   Pt is MOD I in room with SPC and SBA on the unit.Appropriately uses call light to make needs known.Fall precautions and frequent rounding in place for safety.Call light within reach.Will continue to monitor and assess needs and safety.     Problem: Pain Management  Goal: Pain level will decrease to patient's comfort goal  Intervention: Follow pain managment plan developed in collaboration with patient and Interdisciplinary Team  Note:   Medicated per request for pain.Repositions self frequently while in bed for comfort.Will continue to monitor and assess pain level and medicate as needed.

## 2020-02-12 NOTE — THERAPY
"Physical Therapy   Daily Treatment     Patient Name: Chris Leggett  Age:  69 y.o., Sex:  male  Medical Record #: [id number]  Today's Date: 2/12/2020     Precautions  Precautions: Fall Risk, Spinal / Back Precautions   Comments: Monitor BP    Subjective    Pt seated in room, agreeable to PT. In reference to his balance: \"The hardest ones are when I have to put one foot in front of the other\" (tandem stance)     Objective       02/12/20 1001   Precautions   Precautions Fall Risk;Spinal / Back Precautions    Comments Monitor BP   Cognition    Level of Consciousness Alert   Neuro-Muscular Treatments   Neuro-Muscular Treatments Compensatory Strategies;Facilitation;Postural Changes;Postural Facilitation;Sequencing;Verbal Cuing;Weight Shift Right;Weight Shift Left   Comments Obstacle course for balance and gait training: amb across 4\" mat, over yoga mat & bolster, cone taps x10 bilaterally, then reverse and stand on mat at end and perform ball toss 11 balls into target with L & R hands. 1x with SPC, 2x w/o AD;  Standing and reaching exercise x5 min: standing on 4\" mat, reaching to L (11 balls) and R (11 balls) and performing ball toss  (Above activities provided with CGA with intermittent SBA)   Interdisciplinary Plan of Care Collaboration   IDT Collaboration with  Physician   Patient Position at End of Therapy Seated;Call Light within Reach;Tray Table within Reach   Collaboration Comments MD discussed CLOF       Assessment    Pt participatory in therapy. His balance is improving significantly as he had no LOB during this session that required assistance. He continues to have appropriate reactive balance responses during dynamic activities.     Plan    Challenge dynamic balance (especially involving single leg stance or tandem stance), gait with SPC and without AD, gait on uneven surfaces, LE strengthening, HEP, fall recovery/prevention    "

## 2020-02-12 NOTE — DISCHARGE PLANNING
Met with patient and reviewed team conference discussion.  Reviewed home health and he would like to discuss with his roommate.  I will order spc per our discussion and follow.

## 2020-02-12 NOTE — THERAPY
"Occupational Therapy  Daily Treatment     Patient Name: Chris Leggett  Age:  69 y.o., Sex:  male  Medical Record #: [id number]  Today's Date: 2/12/2020     Precautions  Precautions: Fall Risk, Spinal / Back Precautions   Comments: Monitor BP    Safety   ADL Safety : Requires Supervision for Safety  Bathroom Safety: Requires Supervision for Safety  Comments: See FIMs for additional ADL routine information    Subjective    \"I can definitely feel these,\" patient stated re: UB therex with weighted dowel.     Objective       02/12/20 1231   Precautions   Precautions Fall Risk;Spinal / Back Precautions    Comments Monitor BP   Sitting Upper Body Exercises   Chest Press 2 sets of 15;Weight (See Comments for lbs);Bilateral  (Equalizer; 20#)   Front Arm Raise 2 sets of 15;Bilateral;Weight (See Comments for lbs)  (with 3# weighted dowel, EOM)   Bilateral Row 2 sets of 15;Bilateral;Weight (See Comments for lbs)  (Equalizer; 25#, 30#)   Bicep Curls 2 sets of 15;Bilateral;Weight (See Comments for lbs)  (with 4# weighted dowel, EOM)   Other Exercise Shoulder circles; 2 sets of 10 with 4# weighted dowel, EOM   Interdisciplinary Plan of Care Collaboration   Patient Position at End of Therapy Seated;Call Light within Reach   OT Total Time Spent   OT Individual Total Time Spent (Mins) 60   OT Charge Group   OT Neuromuscular Re-education / Balance 2   OT Therapeutic Exercise  2       FIM Walking Score:  6 - Modified Independent  Walking Description:  Assist device/equipment, Extra time(Patient performed outdoor functional mobility x 3000' with mod I / SPC. )    Assessment    Patient tolerated OT session well with focus on progression with functional community mobility (with SPC) and UB strengthening. Patient performed UB therex to the best of his ability with no c/o pain or discomfort. No LOB noted during this session.     Plan    IADLs, high level balance activity, UB strength/endurance building, Therapeutic community outing        "

## 2020-02-12 NOTE — THERAPY
"Physical Therapy   Daily Treatment     Patient Name: Chris Leggett  Age:  69 y.o., Sex:  male  Medical Record #: [id number]  Today's Date: 2/12/2020     Precautions  Precautions: (P) Fall Risk, Spinal / Back Precautions   Comments: (P) Monitor BP    Subjective    Pt seated in room, agreeable to PT. \"I did about 50 squats this morning. I got bored.\"     Objective       02/12/20 0831   Precautions   Precautions Fall Risk;Spinal / Back Precautions    Comments Monitor BP   Cognition    Level of Consciousness Alert   Ability To Follow Commands 3 Step   Sitting Lower Body Exercises   Nustep Resistance Level 6  (10 min, 0.41 mi)   Standing Lower Body Exercises   Hamstring Curl 2 sets of 15;Bilateral    Hip Flexion 2 sets of 15;Bilateral   Hip Extension 2 sets of 15;Bilateral    Hip Abduction 2 sets of 15;Bilateral   Marching 2 sets of 15   Heel Rise 2 sets of 15;Bilateral   Toe Rise 2 sets of 15;Bilateral   Mini Squat 2 sets of 15   Other Exercises Above exercises performed in // bars with 3# ankle weights on and use of 1 or no HR  (1 seated rest break during each full set of standing ther-ex)   Bed Mobility    Sit to Stand Supervised   Interdisciplinary Plan of Care Collaboration   Patient Position at End of Therapy Seated;Call Light within Reach;Tray Table within Reach       FIM Walking Score:  5 - Standby Prompting/Supervision or Set-up  Walking Description:  Assist device/equipment, Supervision for safety(pt amb room <> gym ~250 ft x2 with SPC, SPV)    FIM Stairs Score:  5 - Standby Prompting/Supervision or Set-up  Stairs Description:  Hand rails, Supervision for safety, Ascends/descends 12 to 14 steps(ascends/descends 48 6 inch steps with 1 HR, SBA in a step through pattern; performed for LE strength and endurance.)      Assessment    Pt participatory and motivated during therapy session. He has improved LE strength and endurance, however, he is still limited in hip strength as demonstrated by significant instability " and muscle quivering during standing hip abduction and flexion.     Plan    Challenge dynamic balance, gait with SPC and without AD, gait on uneven surfaces, LE strengthening focusing on hips, HEP, fall recovery/prevention

## 2020-02-12 NOTE — PROGRESS NOTES
"Rehab Progress Note     Encounter Date: 2/12/2020    CC: Headache, head lacerations    Interval Events (Subjective)  Patient sitting up in room. He reports therapy is going well. He has questions to when his sutures on his eye brow can be removed. Laceration appears to be well healing, can remove.  Otherwise ongoing neck and back pain with oxycodone use. Discussed increase in Gabapentin.  Discussed discharge on the 17th and he is encouraged by the date. Denies NVD.     IDT Team Meeting 2/11/2020  DC/Disposition:  2/17/20    Objective:  VITAL SIGNS: /70   Pulse 65   Temp 36.8 °C (98.3 °F) (Temporal)   Resp 18   Ht 1.803 m (5' 11\")   Wt 99.9 kg (220 lb 3.8 oz)   SpO2 93%   BMI 30.72 kg/m²   Gen: NAD  Psych: Mood and affect appropriate  CV: RRR, no edema  Resp: CTAB, no upper airway sounds  Abd: NTND  Neuro: AOx4, walking with C collar  Skin: Sutures along laceration of left eyebrow with scabbing.     Recent Results (from the past 72 hour(s))   CBC WITH DIFFERENTIAL    Collection Time: 02/10/20  5:59 AM   Result Value Ref Range    WBC 7.5 4.8 - 10.8 K/uL    RBC 3.47 (L) 4.70 - 6.10 M/uL    Hemoglobin 11.7 (L) 14.0 - 18.0 g/dL    Hematocrit 34.7 (L) 42.0 - 52.0 %    .0 (H) 81.4 - 97.8 fL    MCH 33.7 (H) 27.0 - 33.0 pg    MCHC 33.7 33.7 - 35.3 g/dL    RDW 48.2 35.9 - 50.0 fL    Platelet Count 66 (L) 164 - 446 K/uL    MPV 11.3 9.0 - 12.9 fL    Neutrophils-Polys 77.00 (H) 44.00 - 72.00 %    Lymphocytes 10.50 (L) 22.00 - 41.00 %    Monocytes 5.50 0.00 - 13.40 %    Eosinophils 5.60 0.00 - 6.90 %    Basophils 0.90 0.00 - 1.80 %    Immature Granulocytes 0.50 0.00 - 0.90 %    Nucleated RBC 0.00 /100 WBC    Neutrophils (Absolute) 5.73 1.82 - 7.42 K/uL    Lymphs (Absolute) 0.78 (L) 1.00 - 4.80 K/uL    Monos (Absolute) 0.41 0.00 - 0.85 K/uL    Eos (Absolute) 0.42 0.00 - 0.51 K/uL    Baso (Absolute) 0.07 0.00 - 0.12 K/uL    Immature Granulocytes (abs) 0.04 0.00 - 0.11 K/uL    NRBC (Absolute) 0.00 K/uL "       Current Facility-Administered Medications   Medication Frequency   • acetaminophen (TYLENOL) tablet 650 mg Q6HRS   • ascorbic acid tablet 500 mg QDAY with Breakfast   • ferrous sulfate tablet 325 mg QDAY with Breakfast   • Respiratory Care per Protocol Continuous RT   • oxyCODONE immediate-release (ROXICODONE) tablet 5 mg Q3HRS PRN   • oxyCODONE immediate release (ROXICODONE) tablet 10 mg Q3HRS PRN   • hydrALAZINE (APRESOLINE) tablet 25 mg Q8HRS PRN   • acetaminophen (TYLENOL) tablet 650 mg Q4HRS PRN   • senna-docusate (PERICOLACE or SENOKOT S) 8.6-50 MG per tablet 2 Tab BID    And   • polyethylene glycol/lytes (MIRALAX) PACKET 1 Packet QDAY PRN    And   • magnesium hydroxide (MILK OF MAGNESIA) suspension 30 mL QDAY PRN    And   • bisacodyl (DULCOLAX) suppository 10 mg QDAY PRN   • benzocaine-menthol (CEPACOL) lozenge 1 Lozenge Q2HRS PRN   • mag hydrox-al hydrox-simeth (MAALOX PLUS ES or MYLANTA DS) suspension 20 mL Q2HRS PRN   • ondansetron (ZOFRAN ODT) dispertab 4 mg 4X/DAY PRN    Or   • ondansetron (ZOFRAN) syringe/vial injection 4 mg 4X/DAY PRN   • traZODone (DESYREL) tablet 50 mg QHS PRN   • sodium chloride (OCEAN) 0.65 % nasal spray 2 Spray PRN   • DULoxetine (CYMBALTA) capsule 60 mg DAILY   • gabapentin (NEURONTIN) capsule 300 mg TID   • QUEtiapine (SEROQUEL) tablet 200 mg QHS   • traZODone (DESYREL) tablet 50 mg Nightly   • polyethyl glycol-propyl glycol (SYSTANE) 0.4-0.3 % ophth drops 1 Drop PRN       Orders Placed This Encounter   Procedures   • Diet Order Regular (after passing bedside swallow eval )     Standing Status:   Standing     Number of Occurrences:   1     Order Specific Question:   Diet:     Answer:   Regular [1]     Comments:   after passing bedside swallow eval      Order Specific Question:   Texture/Fiber modifications:     Answer:   Dysphagia 3(Mechanical Soft)specify fluid consistency(question 6) [3]       Assessment:  Active Hospital Problems    Diagnosis   • *Cervical spine  fracture ([facility])   • Syncope   • Pain and swelling of right lower extremity   • Mood disorder (HCC)   • Gout   • Hypertension   • Forehead laceration   • Normocytic anemia   • Tobacco abuse   • Pancytopenia (HCC)   • H/O cervical fracture       Medical Decision Making and Plan:  C7 Fracture - Patient with syncopal event with C7 fracture managed non-operatively. In [place] collar.   -PT and OT for mobility and ADLs     Right jaw pain - Most likely from fall. No fracture on imaging, continues to improve.     HTN - Patient on Lisinopril 20 mg. Previously on Amlodipine 5 mg  -Consult hospitalist. Low SBP on admission. 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 Continue Gabapentin 300 mg TID  -Increase Gabapentin 400 mg TID.      R Knee pain - s/p aspiration of blood products. With improved ROM and pain control  -PRN Oxycodone.      Leukopenia - Down to 1.9, improved on 2/10/20 after granix. Discontinue precautions    Depression - Patient on Duloxetine 60 mg daily, Seroquel 200 mg QHS, and Trazodone 50 mg QHS     Constipation - Patient without BM in 3 days. Start Senna-docusate.      DVT Ppx - Patient on Lovenox on transfer. Ambulating > 500 feet    Total time:  26 minutes.  I spent greater than 50% of the time for patient care, counseling, and coordination on this date, including unit/floor time, and face-to-face time with the patient as per interval events and assessment and plan above. Topics discussed included discharge planning, ongoing back and knee pain, and increase gabapentin. Consider steroid injection to knee.     Talia Shields M.D.

## 2020-02-12 NOTE — PROGRESS NOTES
Patient care assumed. Report received from [facility] ORA Calder[facility]. Patient is alert and calm, resting in bed. Call light and bedside table within reach. Will continue to monitor.

## 2020-02-12 NOTE — PROGRESS NOTES
Hospital Medicine Daily Progress Note      Chief Complaint:  Hypertension  Pancytopenia    Interval History:  No significant events or changes since last visit    Review of Systems  Review of Systems   Constitutional: Negative for fever.   Eyes: Negative for blurred vision.   Respiratory: Negative for shortness of breath.    Cardiovascular: Negative for palpitations.   Gastrointestinal: Negative for nausea and vomiting.   Neurological: Negative for dizziness and headaches.   Psychiatric/Behavioral: Negative for hallucinations.        Physical Exam  Temp:  [36.5 °C (97.7 °F)-37.2 °C (98.9 °F)] 36.7 °C (98.1 °F)  Pulse:  [56-71] 60  Resp:  [18-20] 18  BP: (100-123)/(60-87) 123/87  SpO2:  [93 %] 93 %    Physical Exam  Vitals signs and nursing note reviewed.   Constitutional:       General: He is not in acute distress.  HENT:      Mouth/Throat:      Mouth: Mucous membranes are moist.      Pharynx: Oropharynx is clear.   Eyes:      General: No scleral icterus.  Neck:      Vascular: No carotid bruit.      Comments: Has C-collar  Cardiovascular:      Rate and Rhythm: Normal rate and regular rhythm.      Heart sounds: No murmur.   Pulmonary:      Effort: Pulmonary effort is normal.      Breath sounds: Normal breath sounds. No stridor.   Abdominal:      General: There is no distension.      Palpations: Abdomen is soft.      Tenderness: There is no abdominal tenderness.   Musculoskeletal:      Right lower leg: No edema.      Left lower leg: No edema.   Skin:     General: Skin is warm and dry.      Findings: No rash.   Neurological:      Mental Status: He is alert and oriented to person, place, and time.   Psychiatric:         Mood and Affect: Mood normal.         Behavior: Behavior normal.         Fluids    Intake/Output Summary (Last 24 hours) at 2/12/2020 0845  Last data filed at 2/12/2020 0600  Gross per 24 hour   Intake 1520 ml   Output 1050 ml   Net 470 ml       Laboratory  Recent Labs     02/10/20  0559   WBC 7.5   RBC  3.47*   HEMOGLOBIN 11.7*   HEMATOCRIT 34.7*   .0*   MCH 33.7*   MCHC 33.7   RDW 48.2   PLATELETCT 66*   MPV 11.3                     Assessment/Plan  * Cervical spine fracture (HCC)- (present on admission)  Assessment & Plan  2nd to GLF from syncopal episode  Sustained acute C7 fracture -- non-surgical mangement  Cervical collar  Note: has prior hx of cervical fusion    Syncope- (present on admission)  Assessment & Plan  Work-up negative per [facility] discharge Summary by Dr. Miner    Pain and swelling of right lower extremity- (present on admission)  Assessment & Plan  Right knee traumatic hemarthrosis  2nd to GLF  S/P joint aspiration with removal of blood    Mood disorder (HCC)- (present on admission)  Assessment & Plan  On Seroquel    Pancytopenia (HCC)- (present on admission)  Assessment & Plan  WBC's: 1.9 (2.9) --> 7.5 (2/10)  H&H: mild stable  Platelets stable at 66 (2/10)  Appears chronic and stable since at least September 2018  Vit B12, Folate, and TSH levels all normal  Fe and Vit C started for low iron stores  S/P Granix (2/9)  Follow WBC/ANC, H/H, and Platelet counts  Monitor need for G-CSF, PRBC, and Platelet transfusion  Recommend outpt Hematology eval    Gout- (present on admission)  Assessment & Plan  On no meds  Monitor for acute flare-up    Hypertension- (present on admission)  Assessment & Plan  BP low normal  On Lisinopril: 20 mg daily --> 10 mg daily (2/10) --> 5 mg daily (2/11) --> will d/c (last dose 2/12)  Note: off Prazosin for hypotension  Cont to monitor

## 2020-02-12 NOTE — CARE PLAN
Problem: Safety  Goal: Will remain free from injury  Intervention: Provide assistance with mobility  Note: Patient has good safety awareness and is Mod I with a cane in his room, needs CGA when outside room.     Problem: Pain Management  Goal: Pain level will decrease to patient's comfort goal  Intervention: Follow pain managment plan developed in collaboration with patient and Interdisciplinary Team  Note: Patient able to verbalize pain level and verbalize an acceptable level of pain.

## 2020-02-12 NOTE — THERAPY
Occupational Therapy  Daily Treatment     Patient Name: Chris Leggett  Age:  69 y.o., Sex:  male  Medical Record #: [id number]  Today's Date: 2020     Precautions  Precautions: (P) Fall Risk, Spinal / Back Precautions   Comments: Monitor BP    Safety   ADL Safety : (P) Modified Independent  Bathroom Safety: (P) Modified Independent  Comments: (P) See FIMs for additional ADL performance details     Subjective    Patient agreeable to participate in OT.      Objective       20 1431   Precautions   Precautions Fall Risk;Spinal / Back Precautions    Safety    ADL Safety  Modified Independent   Bathroom Safety Modified Independent   Comments See FIMs for additional ADL performance details    Interdisciplinary Plan of Care Collaboration   Patient Position at End of Therapy Seated;Call Light within Reach   OT Total Time Spent   OT Individual Total Time Spent (Mins) 30   OT Charge Group   OT Self Care / ADL 2       FIM Bathing Score:  6 - Modified Independent  Bathing Description:  Increased time, Grab bar(Patient demonstrated ability to wash, rinse and dry all body parts at mod I level (while standing with use of grab bar))    FIM Upper Body Dressin - Modified Independent  Upper Body Dressing Description:  Increased time(Patient demo'd ability to don t-shirt at mod I level while sitting )    FIM Lower Body Dressing Score:  6 - Modified Independent  Lower Body Dressing Description:  Increased time(Patient demo'd ability to don underwear, sweatpants and non skid socks at mod I level)    FIM Tub/Shower Transfers Score:  6 - Modified Independent  Tub/Shower Transfers Description:  Grab bar(Patient ambulated room<>shower with mod I, no AD)      Assessment    Patient tolerated OT session well with focus on progression with ADLs. Improved functional performance noted; patient now able to perform all ADLs at mod I level. No safety issues/LOB noted during this session.     Plan    IADLs, high level balance activity,  UB strength/endurance building, Therapeutic community outing

## 2020-02-12 NOTE — THERAPY
Occupational Therapy  Daily Treatment     Patient Name: Chris Leggett  Age:  69 y.o., Sex:  male  Medical Record #: [id number]  Today's Date: 2/11/2020     Precautions  Precautions: Fall Risk, Spinal / Back Precautions   Comments: Monitor BP    Safety   ADL Safety : Requires Supervision for Safety  Bathroom Safety: Requires Supervision for Safety  Comments: See FIMs for additional ADL routine information    Subjective    Patient agreeable to participate in OT.      Objective       02/11/20 1231   Precautions   Precautions Fall Risk;Spinal / Back Precautions    Comments Monitor BP   Interdisciplinary Plan of Care Collaboration   Patient Position at End of Therapy Seated   OT Total Time Spent   OT Individual Total Time Spent (Mins) 60   OT Charge Group   OT Neuromuscular Re-education / Balance 4     FIM Walking Score:  5 - Standby Prompting/Supervision or Set-up  Walking Description:  Assist device/equipment, Extra time, Supervision for safety(Patient performed outdoor functional mobility (on uneven surfaces) ; rehab hospital [facility] (2362' x 2) with SPC and SBA. )    Assessment    Patient tolerated OT session well with focus on community mobility and endurance. No LOB noted.     Plan    Therapeutic community outing this afternoon to [facility]. ADLs, IADLs, standing balance, UB strength/endurance building

## 2020-02-13 PROCEDURE — 700102 HCHG RX REV CODE 250 W/ 637 OVERRIDE(OP): Performed by: PHYSICAL MEDICINE & REHABILITATION

## 2020-02-13 PROCEDURE — 97110 THERAPEUTIC EXERCISES: CPT

## 2020-02-13 PROCEDURE — A9270 NON-COVERED ITEM OR SERVICE: HCPCS | Performed by: PHYSICAL MEDICINE & REHABILITATION

## 2020-02-13 PROCEDURE — 97530 THERAPEUTIC ACTIVITIES: CPT

## 2020-02-13 PROCEDURE — 770010 HCHG ROOM/CARE - REHAB SEMI PRIVAT*

## 2020-02-13 PROCEDURE — 97112 NEUROMUSCULAR REEDUCATION: CPT

## 2020-02-13 PROCEDURE — 99232 SBSQ HOSP IP/OBS MODERATE 35: CPT | Performed by: HOSPITALIST

## 2020-02-13 PROCEDURE — 700102 HCHG RX REV CODE 250 W/ 637 OVERRIDE(OP): Performed by: HOSPITALIST

## 2020-02-13 PROCEDURE — A9270 NON-COVERED ITEM OR SERVICE: HCPCS | Performed by: HOSPITALIST

## 2020-02-13 PROCEDURE — 99232 SBSQ HOSP IP/OBS MODERATE 35: CPT | Performed by: PHYSICAL MEDICINE & REHABILITATION

## 2020-02-13 RX ORDER — ACETAMINOPHEN 500 MG
1000 TABLET ORAL 3 TIMES DAILY
Status: DISCONTINUED | OUTPATIENT
Start: 2020-02-13 | End: 2020-02-17 | Stop reason: HOSPADM

## 2020-02-13 RX ADMIN — OXYCODONE HYDROCHLORIDE 10 MG: 10 TABLET ORAL at 05:29

## 2020-02-13 RX ADMIN — OXYCODONE HYDROCHLORIDE 5 MG: 5 TABLET ORAL at 09:46

## 2020-02-13 RX ADMIN — TRAZODONE HYDROCHLORIDE 50 MG: 50 TABLET ORAL at 23:19

## 2020-02-13 RX ADMIN — SENNOSIDES AND DOCUSATE SODIUM 2 TABLET: 8.6; 5 TABLET ORAL at 08:47

## 2020-02-13 RX ADMIN — FERROUS SULFATE TAB 325 MG (65 MG ELEMENTAL FE) 325 MG: 325 (65 FE) TAB at 08:47

## 2020-02-13 RX ADMIN — GABAPENTIN 400 MG: 400 CAPSULE ORAL at 14:51

## 2020-02-13 RX ADMIN — OXYCODONE HYDROCHLORIDE 10 MG: 10 TABLET ORAL at 19:29

## 2020-02-13 RX ADMIN — OXYCODONE HYDROCHLORIDE AND ACETAMINOPHEN 500 MG: 500 TABLET ORAL at 08:47

## 2020-02-13 RX ADMIN — DULOXETINE HYDROCHLORIDE 60 MG: 30 CAPSULE, DELAYED RELEASE ORAL at 08:47

## 2020-02-13 RX ADMIN — OXYCODONE HYDROCHLORIDE 10 MG: 10 TABLET ORAL at 00:13

## 2020-02-13 RX ADMIN — GABAPENTIN 400 MG: 400 CAPSULE ORAL at 21:05

## 2020-02-13 RX ADMIN — QUETIAPINE FUMARATE 200 MG: 100 TABLET ORAL at 21:05

## 2020-02-13 RX ADMIN — OXYCODONE HYDROCHLORIDE 10 MG: 10 TABLET ORAL at 14:51

## 2020-02-13 RX ADMIN — SENNOSIDES AND DOCUSATE SODIUM 2 TABLET: 8.6; 5 TABLET ORAL at 21:05

## 2020-02-13 RX ADMIN — TRAZODONE HYDROCHLORIDE 50 MG: 50 TABLET ORAL at 21:05

## 2020-02-13 RX ADMIN — ACETAMINOPHEN 1000 MG: 500 TABLET, FILM COATED ORAL at 21:05

## 2020-02-13 RX ADMIN — ACETAMINOPHEN 650 MG: 325 TABLET, FILM COATED ORAL at 12:08

## 2020-02-13 RX ADMIN — GABAPENTIN 400 MG: 400 CAPSULE ORAL at 08:47

## 2020-02-13 RX ADMIN — TRAZODONE HYDROCHLORIDE 50 MG: 50 TABLET ORAL at 00:13

## 2020-02-13 ASSESSMENT — BALANCE ASSESSMENTS
LOOK OVER LEFT AND RIGHT SHOULDERS WHILE STANDING: 4
TURN 360 DEGREES: 3
LONG VERSION TOTAL SCORE (MAX 56): 51
STANDING UNSUPPORTED: 4
STANDING TO SITTING: 4
STANDING UNSUPPORTED WITH EYES CLOSED: 4
STANDING UNSUPPORTED WITH FEET TOGETHER: 4
LONG VERSION TOTAL SCORE (MAX 56): 51
STANDING UNSUPPORTED ONE FOOT IN FRONT: 3
REACHING FORWARD WITH OUTSTRETCHED ARM WHILE STANDING: 3
TRANSFERS: 4
PICK UP OBJECT FROM THE FLOOR FROM A STANDING POSITION: 4
STANDING ON ONE LEG: 2
SITTING TO STANDING: 4
SITTING UNSUPPORTED: 4
PLACE ALTERNATE FOOT ON STEP OR STOOL WHILE STANDING UNSUPPORTED: 4

## 2020-02-13 ASSESSMENT — ENCOUNTER SYMPTOMS
COUGH: 0
FEVER: 0
DIZZINESS: 0
DIARRHEA: 0
BLURRED VISION: 0
NERVOUS/ANXIOUS: 0

## 2020-02-13 ASSESSMENT — PATIENT HEALTH QUESTIONNAIRE - PHQ9
2. FEELING DOWN, DEPRESSED, IRRITABLE, OR HOPELESS: NOT AT ALL
SUM OF ALL RESPONSES TO PHQ9 QUESTIONS 1 AND 2: 0
1. LITTLE INTEREST OR PLEASURE IN DOING THINGS: NOT AT ALL

## 2020-02-13 NOTE — CARE PLAN
Problem: Communication  Goal: The ability to communicate needs accurately and effectively will improve  Outcome: PROGRESSING AS EXPECTED     Problem: Safety  Goal: Will remain free from injury  Outcome: PROGRESSING AS EXPECTED     Problem: Bowel/Gastric:  Goal: Normal bowel function is maintained or improved  Outcome: PROGRESSING AS EXPECTED     Problem: IP BLADDER/VOIDING  Goal: LTG - patient will complete bladder elimination  Outcome: PROGRESSING AS EXPECTED     Problem: Respiratory:  Goal: Respiratory status will improve  Outcome: PROGRESSING AS EXPECTED

## 2020-02-13 NOTE — THERAPY
Physical Therapy   Daily Treatment     Patient Name: Chris Leggett  Age:  69 y.o., Sex:  male  Medical Record #: [id number]  Today's Date: 2/13/2020     Precautions  Precautions: Spinal / Back Precautions   Comments: Mod I on unit    Subjective    Pt in therapy gym with OT, ready for PT session.      Objective       02/13/20 1401   Precautions   Precautions Spinal / Back Precautions    Comments Mod I on unit   Interdisciplinary Plan of Care Collaboration   IDT Collaboration with  Occupational Therapist   Patient Position at End of Therapy Other (Comments)  (ambulating in hallway toward room)   Collaboration Comments pt care passed from OT    PT Total Time Spent   PT Individual Total Time Spent (Mins) 30   PT Charge Group   PT Therapeutic Activities 2       FIM Walking Score:  6 - Modified Independent  Walking Description:  Assist device/equipment, Extra time(4470 ft outdoors and ~300 ft indoors with SPC, mod I )      Assessment    Pt motivated for increased ambulation distance outdoors. Able to safely multi task with conversation while walking.    Plan    High level dynamic balance activities, LE strength and endurance, ambulation with SPC and no AD

## 2020-02-13 NOTE — PROGRESS NOTES
Hospital Medicine Daily Progress Note      Chief Complaint:  Hypertension  Pancytopenia    Interval History:  No significant events or changes since last visit    Review of Systems  Review of Systems   Constitutional: Negative for fever.   Eyes: Negative for blurred vision.   Respiratory: Negative for cough.    Cardiovascular: Negative for chest pain.   Gastrointestinal: Negative for diarrhea.   Musculoskeletal: Negative for joint pain.   Neurological: Negative for dizziness.   Psychiatric/Behavioral: The patient is not nervous/anxious.         Physical Exam  Temp:  [36.3 °C (97.3 °F)-36.8 °C (98.3 °F)] 36.3 °C (97.3 °F)  Pulse:  [54-71] 54  Resp:  [18] 18  BP: (100-112)/(66-77) 100/66    Physical Exam  Vitals signs and nursing note reviewed.   Constitutional:       Appearance: He is not diaphoretic.   HENT:      Mouth/Throat:      Pharynx: No oropharyngeal exudate or posterior oropharyngeal erythema.   Eyes:      Extraocular Movements: Extraocular movements intact.   Neck:      Vascular: No carotid bruit.      Comments: Has C-collar  Cardiovascular:      Rate and Rhythm: Normal rate and regular rhythm.      Heart sounds: No murmur.   Pulmonary:      Effort: Pulmonary effort is normal.      Breath sounds: Normal breath sounds. No stridor.   Abdominal:      General: Bowel sounds are normal.      Palpations: Abdomen is soft.   Musculoskeletal:      Right lower leg: No edema.      Left lower leg: No edema.   Skin:     General: Skin is warm and dry.      Findings: No rash.   Neurological:      Mental Status: He is alert and oriented to person, place, and time.   Psychiatric:         Mood and Affect: Mood normal.         Behavior: Behavior normal.         Fluids    Intake/Output Summary (Last 24 hours) at 2/13/2020 0880  Last data filed at 2/13/2020 0329  Gross per 24 hour   Intake 900 ml   Output 1100 ml   Net -200 ml       Laboratory                      Assessment/Plan  * Cervical spine fracture (HCC)- (present on  admission)  Assessment & Plan  2nd to GLF from syncopal episode  Sustained acute C7 fracture -- non-surgical mangement  Cervical collar  Note: has prior hx of cervical fusion    Syncope- (present on admission)  Assessment & Plan  Work-up negative per [facility] [place] by Dr. Miner    Pain and swelling of right lower extremity- (present on admission)  Assessment & Plan  Right knee traumatic hemarthrosis  2nd to GLF  S/P joint aspiration with removal of blood    Mood disorder (HCC)- (present on admission)  Assessment & Plan  On Seroquel    Pancytopenia (HCC)- (present on admission)  Assessment & Plan  WBC's: 1.9 (2.9) --> Granix --> 7.5 (2/10)  H&H: mild stable  Platelets stable at 66 (2/10)  Appears chronic and stable since at least September 2018  Vit B12, Folate, and TSH levels all normal  S/P Granix (2/9)  D/W CM -- ordered a Hematology consult for out patient  Monitor levels    Gout- (present on admission)  Assessment & Plan  On no meds  Monitor for acute flare-up    Hypertension- (present on admission)  Assessment & Plan  BP ok  Off Lisinopril: 20 mg daily --> 10 mg daily (2/10) --> 5 mg daily (2/11) --> d/c's (last dose 2/12)  Note: off Prazosin for hypotension  Cont to monitor

## 2020-02-13 NOTE — DISCHARGE PLANNING
I have received a message from patient's roommate and am attempting to meet with him.  Will follow up with patient and roommate regarding home health choice.  Will order spc for home.  Met with hospitalist who indicates patient will need referral for hematology.  Will follow.

## 2020-02-13 NOTE — THERAPY
Occupational Therapy  Daily Treatment     Patient Name: Chris Leggett  Age:  69 y.o., Sex:  male  Medical Record #: [id number]  Today's Date: 2/13/2020     Precautions  Precautions: (P) Fall Risk, Spinal / Back Precautions   Comments: (P) Mod I on unit    Safety   ADL Safety : Modified Independent  Bathroom Safety: Modified Independent  Comments: See FIMs for additional ADL performance details     Subjective    Patient agreeable to participate in OT.      Objective       02/13/20 1331   Precautions   Precautions Fall Risk;Spinal / Back Precautions    Comments Mod I on unit   Interdisciplinary Plan of Care Collaboration   Patient Position at End of Therapy Seated   OT Total Time Spent   OT Individual Total Time Spent (Mins) 30   OT Charge Group   OT Neuromuscular Re-education / Balance 1   OT Therapeutic Exercise  1     Fluidobike (resistance level 16) in standing x 10 minutes.     Reviewed HEP with focus on UB strengthening/ TB exercises. Completed bicep curls, triceps (elbow extension), horizontal abd/adduction, and shoulder flexion/extension (2 sets of 15 for each exercise, R/L).     Assessment    Patient tolerated OT session well with focus on endurance and HEP. Demo'd ability to perform all UB exercises in standing position; no LOB observed.     Plan    Therapeutic community outing tomorrow ([place]). Continue with high level balance activities, endurance training.

## 2020-02-13 NOTE — THERAPY
"Occupational Therapy  Daily Treatment     Patient Name: Chris Leggett  Age:  69 y.o., Sex:  male  Medical Record #: [id number]  Today's Date: 2/13/2020     Precautions  Precautions: Fall Risk, Spinal / Back Precautions   Comments: Mod I on unit    Safety   ADL Safety : Modified Independent  Bathroom Safety: Modified Independent  Comments: See FIMs for additional ADL performance details     Subjective    \"That was more of a workout than I thought it would be\"      Objective     02/13/20 1031   Precautions   Precautions Fall Risk;Spinal / Back Precautions    Comments Mod I on unit   Safety    ADL Safety  Modified Independent   Bathroom Safety Modified Independent   Standing Upper Body Exercises   Other Exercises Bilateral Rows (Equalizer) 3 sets of 15; 25# and 30#   Comments See note below for additional information re: rebounder exercise activity   Interdisciplinary Plan of Care Collaboration   Patient Position at End of Therapy Seated  (in dining room for lunch)   OT Total Time Spent   OT Individual Total Time Spent (Mins) 60   OT Charge Group   OT Neuromuscular Re-education / Balance 3   OT Therapeutic Exercise  1     FIM Walking Score:  6 - Modified Independent  Walking Description:  Extra time, Assist device/equipment(Patient performed outdoor functional mobility x 4500' at mod I level (with SPC and increased time).    Patient completed Rebounder activity in standing with supervision for safety. Completed 1 set of 20 with 4# and 6# ball, 2 sets of 20 with 9# ball. No LOB noted.     Assessment    Patient tolerated OT session well with focus on outdoor functional mobility, endurance, dynamic standing balance and UB strengthening. Patient performed all therapeutic activities/exercises to the best of his ability with no LOB or c/o pain/discomfort.     Plan    Therapeutic community outing tomorrow ([place]). Continue with high level balance activities, endurance training.     "

## 2020-02-13 NOTE — THERAPY
Physical Therapy   Daily Treatment     Patient Name: Chris Leggett  Age:  69 y.o., Sex:  male  Medical Record #: [id number]  Today's Date: 2/13/2020     Precautions  Precautions: Fall Risk, Spinal / Back Precautions   Comments: Mod I on unit    Subjective    Pt seated in room, agreeable to PT.      Objective       02/13/20 0831   Precautions   Precautions Spinal / Back Precautions    Comments Monitor BP   Sitting Lower Body Exercises   Nustep Resistance Level 7  (15 min, 1056 steps)   Neuro-Muscular Treatments   Neuro-Muscular Treatments Weight Shift Right;Weight Shift Left;Sequencing;Verbal Cuing   Comments Pt completed bean bag toss activity for balance and coordination: standing on air-ex in // bars with only intermittent UE support when resting, 24 bean bags x1 with RUE x1 with LUE with spv throughout   Franklin Balance Scale   Sitting Unsupported (Score 0-4) 4   Change Of Positon: Sitting To Standing (Score 0-4) 4   Change Of Positon: Standing To Sitting (Score 0-4) 4   Transfers (Score 0-4) 4   Standing Unsupported (Score 0-4) 4   Standing With Eyes Closed (Score 0-4) 4   Standing With Feet Together (Score 0-4) 4   Tandem Standing (Score 0-4) 3   Standing On One Leg (Score 0-4) 2   Turning Trunk (Feet Fixed) (Score 0-4) 4   Retrieving Objects From Floor (Score 0-4) 4   Turning 360 Degrees (Score 0-4) 3   Stool Stepping (Score 0-4) 4   Reaching Forward While Standing (Score 0-4) 3   Franklin Balance Total Score (0-56) 51   Interdisciplinary Plan of Care Collaboration   IDT Collaboration with  Nursing   Patient Position at End of Therapy Seated;Call Light within Reach;Tray Table within Reach   Collaboration Comments RN adminstered medications, discussed pt's diet, c-collar orders, and mod I on unit status during PT session    PT Total Time Spent   PT Individual Total Time Spent (Mins) 60   PT Charge Group   PT Therapeutic Exercise 1   PT Neuromuscular Re-Education / Balance 2   PT Therapeutic Activities 1       FIM  Walking Score:  6 - Modified Independent  Walking Description:  Extra time(~600 ft with SPC, mod I )      Assessment    Pt demonstrates good balance and safety with ambulation. Score of 51/56 on Franklin assessment indicates low fall risk. Cleared for Mod I on unit with SPC.     Plan    High level dynamic balance activities, LE strength and endurance, ambulation with SPC and no AD

## 2020-02-14 ENCOUNTER — HOME HEALTH ADMISSION (OUTPATIENT)
Dept: HOME HEALTH SERVICES | Facility: HOME HEALTHCARE | Age: 70
End: 2020-02-14
Payer: MEDICARE

## 2020-02-14 LAB
BASOPHILS # BLD AUTO: 0.9 % (ref 0–1.8)
BASOPHILS # BLD: 0.02 K/UL (ref 0–0.12)
EOSINOPHIL # BLD AUTO: 0.3 K/UL (ref 0–0.51)
EOSINOPHIL NFR BLD: 13.4 % (ref 0–6.9)
ERYTHROCYTE [DISTWIDTH] IN BLOOD BY AUTOMATED COUNT: 47.9 FL (ref 35.9–50)
HCT VFR BLD AUTO: 35.8 % (ref 42–52)
HGB BLD-MCNC: 11.8 G/DL (ref 14–18)
IMM GRANULOCYTES # BLD AUTO: 0.03 K/UL (ref 0–0.11)
IMM GRANULOCYTES NFR BLD AUTO: 1.3 % (ref 0–0.9)
LYMPHOCYTES # BLD AUTO: 0.7 K/UL (ref 1–4.8)
LYMPHOCYTES NFR BLD: 31.3 % (ref 22–41)
MCH RBC QN AUTO: 32.6 PG (ref 27–33)
MCHC RBC AUTO-ENTMCNC: 33 G/DL (ref 33.7–35.3)
MCV RBC AUTO: 98.9 FL (ref 81.4–97.8)
MONOCYTES # BLD AUTO: 0.23 K/UL (ref 0–0.85)
MONOCYTES NFR BLD AUTO: 10.3 % (ref 0–13.4)
NEUTROPHILS # BLD AUTO: 0.96 K/UL (ref 1.82–7.42)
NEUTROPHILS NFR BLD: 42.8 % (ref 44–72)
NRBC # BLD AUTO: 0 K/UL
NRBC BLD-RTO: 0 /100 WBC
PLATELET # BLD AUTO: 73 K/UL (ref 164–446)
PMV BLD AUTO: 10.7 FL (ref 9–12.9)
RBC # BLD AUTO: 3.62 M/UL (ref 4.7–6.1)
WBC # BLD AUTO: 2.2 K/UL (ref 4.8–10.8)

## 2020-02-14 PROCEDURE — 97530 THERAPEUTIC ACTIVITIES: CPT

## 2020-02-14 PROCEDURE — A9270 NON-COVERED ITEM OR SERVICE: HCPCS | Performed by: PHYSICAL MEDICINE & REHABILITATION

## 2020-02-14 PROCEDURE — 770010 HCHG ROOM/CARE - REHAB SEMI PRIVAT*

## 2020-02-14 PROCEDURE — A9270 NON-COVERED ITEM OR SERVICE: HCPCS | Performed by: HOSPITALIST

## 2020-02-14 PROCEDURE — 700102 HCHG RX REV CODE 250 W/ 637 OVERRIDE(OP): Performed by: PHYSICAL MEDICINE & REHABILITATION

## 2020-02-14 PROCEDURE — 97535 SELF CARE MNGMENT TRAINING: CPT

## 2020-02-14 PROCEDURE — 36415 COLL VENOUS BLD VENIPUNCTURE: CPT

## 2020-02-14 PROCEDURE — 97110 THERAPEUTIC EXERCISES: CPT

## 2020-02-14 PROCEDURE — 700111 HCHG RX REV CODE 636 W/ 250 OVERRIDE (IP): Performed by: HOSPITALIST

## 2020-02-14 PROCEDURE — 700102 HCHG RX REV CODE 250 W/ 637 OVERRIDE(OP): Performed by: HOSPITALIST

## 2020-02-14 PROCEDURE — 99232 SBSQ HOSP IP/OBS MODERATE 35: CPT | Performed by: HOSPITALIST

## 2020-02-14 PROCEDURE — 97112 NEUROMUSCULAR REEDUCATION: CPT

## 2020-02-14 PROCEDURE — 85025 COMPLETE CBC W/AUTO DIFF WBC: CPT

## 2020-02-14 PROCEDURE — 99232 SBSQ HOSP IP/OBS MODERATE 35: CPT | Performed by: PHYSICAL MEDICINE & REHABILITATION

## 2020-02-14 RX ORDER — GABAPENTIN 300 MG/1
600 CAPSULE ORAL 3 TIMES DAILY
Status: DISCONTINUED | OUTPATIENT
Start: 2020-02-14 | End: 2020-02-17 | Stop reason: HOSPADM

## 2020-02-14 RX ADMIN — TBO-FILGRASTIM 480 MCG: 480 INJECTION, SOLUTION SUBCUTANEOUS at 15:17

## 2020-02-14 RX ADMIN — OXYCODONE HYDROCHLORIDE 10 MG: 10 TABLET ORAL at 06:22

## 2020-02-14 RX ADMIN — GABAPENTIN 400 MG: 400 CAPSULE ORAL at 09:57

## 2020-02-14 RX ADMIN — FERROUS SULFATE TAB 325 MG (65 MG ELEMENTAL FE) 325 MG: 325 (65 FE) TAB at 09:57

## 2020-02-14 RX ADMIN — OXYCODONE HYDROCHLORIDE 10 MG: 10 TABLET ORAL at 09:57

## 2020-02-14 RX ADMIN — ACETAMINOPHEN 1000 MG: 500 TABLET, FILM COATED ORAL at 09:57

## 2020-02-14 RX ADMIN — SENNOSIDES AND DOCUSATE SODIUM 2 TABLET: 8.6; 5 TABLET ORAL at 21:45

## 2020-02-14 RX ADMIN — TRAZODONE HYDROCHLORIDE 50 MG: 50 TABLET ORAL at 21:46

## 2020-02-14 RX ADMIN — ACETAMINOPHEN 1000 MG: 500 TABLET, FILM COATED ORAL at 21:45

## 2020-02-14 RX ADMIN — OXYCODONE HYDROCHLORIDE AND ACETAMINOPHEN 500 MG: 500 TABLET ORAL at 09:58

## 2020-02-14 RX ADMIN — QUETIAPINE FUMARATE 200 MG: 100 TABLET ORAL at 21:45

## 2020-02-14 RX ADMIN — GABAPENTIN 400 MG: 400 CAPSULE ORAL at 15:17

## 2020-02-14 RX ADMIN — GABAPENTIN 600 MG: 300 CAPSULE ORAL at 21:46

## 2020-02-14 RX ADMIN — OXYCODONE HYDROCHLORIDE 10 MG: 10 TABLET ORAL at 21:46

## 2020-02-14 RX ADMIN — DULOXETINE HYDROCHLORIDE 60 MG: 30 CAPSULE, DELAYED RELEASE ORAL at 09:57

## 2020-02-14 RX ADMIN — OXYCODONE HYDROCHLORIDE 10 MG: 10 TABLET ORAL at 16:44

## 2020-02-14 RX ADMIN — ACETAMINOPHEN 1000 MG: 500 TABLET, FILM COATED ORAL at 15:17

## 2020-02-14 RX ADMIN — SENNOSIDES AND DOCUSATE SODIUM 2 TABLET: 8.6; 5 TABLET ORAL at 09:58

## 2020-02-14 ASSESSMENT — PATIENT HEALTH QUESTIONNAIRE - PHQ9
1. LITTLE INTEREST OR PLEASURE IN DOING THINGS: NOT AT ALL
2. FEELING DOWN, DEPRESSED, IRRITABLE, OR HOPELESS: NOT AT ALL
SUM OF ALL RESPONSES TO PHQ9 QUESTIONS 1 AND 2: 0

## 2020-02-14 ASSESSMENT — ENCOUNTER SYMPTOMS
ABDOMINAL PAIN: 0
NERVOUS/ANXIOUS: 0
SHORTNESS OF BREATH: 0
NAUSEA: 0
FEVER: 0
DIARRHEA: 0
CHILLS: 0
VOMITING: 0

## 2020-02-14 NOTE — CONSULTS
DATE OF SERVICE:  02/11/2020    BEHAVIORAL MEDICINE EVALUATION    BRIEF HISTORY OF PRESENTING COMPLAINTS:  The patient is a 69-year-old white   single male who is referred for a behavioral medicine evaluation by Dr. Shields.  The patient was transferred to rehab from acute where he presented   on 02/04/2020 with neck pain, status post ground-level fall.  Patient   reportedly was sitting on the couch.  He felt nauseous and got to go to the   bathroom.  The next thing he knew was that he awoke on the ground with blood   surrounding him.  ED showed a C7 fracture that was determined not to be   surgical.  He was placed in a [facility] brace.  The patient was stabilized   acutely and then sent to rehab to address his general debility.    PAST MEDICAL HISTORY:  Significant for gout, hypertension.    PSYCHOLOGICAL STATUS:  MENTAL STATUS EXAMINATION:  Patient is a well-nourished, obese male of medium   to tall stature who appeared older than his stated age of 69.  At   presentation, the patient was alert.  He was sitting in a wheelchair next to   his bed when approached.  The patient oriented well to my presence.  The   patient was kempt in appearance.  He was dressed in a T-shirt and sweatpants.    The patient's appearance was remarkable for poor dentition.  Patient's manner   of presentation was cooperative and friendly.    The patient was grossly intact cognitively.  He was well oriented to time,   place and person.  His language was logical and goal oriented.  His speech was   somewhat sparse and slightly dysarthric.  Patient's concentration and memory   functioning seemed generally intact.    The patient's affect was constricted, stable and mildly intense.  He related   marginally well.  His mood appeared depressed, but appropriate to the context.    There was no evidence of delusional or perceptual disturbance.  Also, the   patient showed no unusual pain or motor behavior during the interview.    SPECIFIC BEHAVIORAL  COMPLAINTS:  Patient admitted to symptoms of generalized   mood disturbance.  He reported in the past several days he has felt guilty,   worried, disappointed in himself and depressed.  He said he is bothered by   feelings of worthlessness and nervousness.  He also reported some feelings of   inferiority and discouragement of the future.  Patient denied any thoughts of   wanting to die.    Other problems reported by the patient included moderate neck and jaw pain,   low levels of energy and sleep disturbance despite his use of Seroquel and   trazodone.  The patient denied any problems with his appetite.  Patient also   admitted to minimal socializing.  He said he was taking care of his day-to-day   stressors for the most part prior to his hospitalization.    The patient reported no ETOH or other drug abuse.  However, he has a history   of heavy drinking most of his adult life.  He said he quit drinking 6 months   ago.  He also has a remote history of methamphetamine abuse.  Further, the   patient admitted to tobacco dependence.    PSYCHIATRIC HISTORY:  Patient has a long history of psychiatric disturbance.    Most of his contacts with mental health were related to his alcohol   abuse/dependence.  As mentioned above, the patient has been abstinent for 6   months.  He has been hospitalized a minimum of 4 times related to recovery.    He also has participated in outpatient care for depressed mood.    PSYCHOMETRIC TESTING:  The patient was administered 2 psychometric tests and 2   screening instruments.  The PS/PC-R revealed symptoms of generalized mood   disturbance.  His CDR survey showed no problems with level of consciousness or   attention.  However, his thinking seemed impaired for reasoning and limited   in scope.  No perceptual problems are noted.  The patient's speech was   somewhat slow.  Patient also admitted to a slight decrease in memory   functioning.  Moreover, the patient reported deficits in behavioral    activation, basic self-care, generalized mood disturbance, loss of vigor,   sleep disturbance and moderate levels of neck and jaw pain.    The patient was screened for any elder abuse or risk of suicide.  Although   there was no evidence for elder abuse, there is some evidence that he is a   minimal to moderate risk for suicidal behavior.  He will be further evaluated.    SOCIAL HISTORY:  The patient is a retired .  He lives with a   friend in [place], [place].    IMPRESSION:  Persistent depressive disorder; recent history of chronic alcohol   abuse/dependence; tobacco dependence; remote history of methamphetamine   abuse; adjustment disorder with depression and anxiety.    RECOMMENDATIONS:  Patient will be followed for status and supportive care.       ____________________________________     JOHANNY GARCIA, PHD    LAVON / FILI    DD:  02/14/2020 12:30:54  DT:  02/14/2020 15:02:42    D#:  [id number]  Job#:  [id number]

## 2020-02-14 NOTE — THERAPY
"Occupational Therapy  Daily Treatment     Patient Name: Chris Leggett  Age:  69 y.o., Sex:  male  Medical Record #: [id number]  Today's Date: 2020     Precautions  Precautions: Cervical Collar  , Spinal / Back Precautions   Comments: Mod I on unit    Safety   ADL Safety : Modified Independent  Bathroom Safety: Modified Independent  Comments: See FIMs for additional ADL performance details     Subjective    \"That felt good\" patient stated referring to shower.      Objective     20 0701   Precautions   Precautions Cervical Collar  ;Spinal / Back Precautions    Comments Mod I on unit   Sitting Lower Body Exercises   Nustep Resistance Level 8  (0.5 miles, 11:30)   Interdisciplinary Plan of Care Collaboration   Patient Position at End of Therapy Seated  (in dining room for breakfast)   OT Total Time Spent   OT Individual Total Time Spent (Mins) 60   OT Charge Group   OT Self Care / ADL 3   OT Therapeutic Exercise  1     FIM Grooming Score:  6 - Modified Independent  Grooming Description:  (Patient brushed teeth at mod I level in standing.)    FIM Bathing Score:  6 - Modified Independent  Bathing Description:  Grab bar(Patient washed, rinsed and dried all body parts at mod I level  (while incorporating sitting and standing with use  of grab bar for safety). )    FIM Upper Body Dressin - Modified Independent  Upper Body Dressing Description:  Increased time(Patient donned t-shirt at mod I level in standing)    FIM Lower Body Dressing Score:  6 - Modified Independent  Lower Body Dressing Description:  Increased time(Patient donned underwear, elastic waist pants, socks and shoes at mod I level (while incorporating sitting and standing with use of grab bar))    FIM Tub/Shower Transfers Score:  6 - Modified Independent  Tub/Shower Transfers Description:  Increased time, Grab bar    Patient performed indoor functional mobility 200' x 2 at mod I level with SPC.     Assessment    Patient tolerated OT session well " with focus on endurance and progression with ADLs. Patient performs all ADLs at mod I level. No LOB or safety concerns observed during this session.     Plan    Therapeutic community outing tomorrow ([place]). Continue with high level balance activities, endurance training.

## 2020-02-14 NOTE — THERAPY
Occupational Therapy  Daily Treatment     Patient Name: Chris Leggett  Age:  69 y.o., Sex:  male  Medical Record #: [id number]  Today's Date: 2/14/2020     Precautions  Precautions: (P) Cervical Collar  , Spinal / Back Precautions   Comments: (P) Mod I on unit    Safety   ADL Safety : Modified Independent  Bathroom Safety: Modified Independent  Comments: See FIMs for additional ADL performance details     Subjective    Patient agreeable to participate in OT.      Objective       02/14/20 0931   Precautions   Precautions Cervical Collar  ;Spinal / Back Precautions    Comments Mod I on unit   IADL Treatments   Home Management Patient removed towels from dryer/folded them and placed dirty laundry/detergent into washer, started wash cycle at mod I level   Interdisciplinary Plan of Care Collaboration   Patient Position at End of Therapy Seated   OT Total Time Spent   OT Individual Total Time Spent (Mins) 30   OT Charge Group   OT Self Care / ADL 1   OT Neuromuscular Re-education / Balance 1     FIM Walking Score:  6 - Modified Independent  Walking Description:  Assist device/equipment, Extra time(Patient performed outdoor functional mobility with mod I and SPC ( x1500') )    Assessment    Patient tolerated OT session well with focus on dynamic standing balance, endurance and IADLs. Patient demo'd ability to complete laundry tasks at mod I level. No LOB observed during this session.     Plan    DC FIMs/IRF [place], Therapeutic community outing this afternoon ([place]). D/C Monday.

## 2020-02-14 NOTE — DISCHARGE PLANNING
Per Jocelyne at [facility], DME referral accepted.  They will deliver on Monday for discharge.

## 2020-02-15 PROCEDURE — 99232 SBSQ HOSP IP/OBS MODERATE 35: CPT | Performed by: HOSPITALIST

## 2020-02-15 PROCEDURE — A9270 NON-COVERED ITEM OR SERVICE: HCPCS | Performed by: PHYSICAL MEDICINE & REHABILITATION

## 2020-02-15 PROCEDURE — 700102 HCHG RX REV CODE 250 W/ 637 OVERRIDE(OP): Performed by: HOSPITALIST

## 2020-02-15 PROCEDURE — 770010 HCHG ROOM/CARE - REHAB SEMI PRIVAT*

## 2020-02-15 PROCEDURE — A9270 NON-COVERED ITEM OR SERVICE: HCPCS | Performed by: HOSPITALIST

## 2020-02-15 PROCEDURE — 99231 SBSQ HOSP IP/OBS SF/LOW 25: CPT | Performed by: PHYSICAL MEDICINE & REHABILITATION

## 2020-02-15 PROCEDURE — 700102 HCHG RX REV CODE 250 W/ 637 OVERRIDE(OP): Performed by: PHYSICAL MEDICINE & REHABILITATION

## 2020-02-15 RX ADMIN — ACETAMINOPHEN 1000 MG: 500 TABLET, FILM COATED ORAL at 20:30

## 2020-02-15 RX ADMIN — ACETAMINOPHEN 1000 MG: 500 TABLET, FILM COATED ORAL at 15:46

## 2020-02-15 RX ADMIN — GABAPENTIN 600 MG: 300 CAPSULE ORAL at 09:16

## 2020-02-15 RX ADMIN — OXYCODONE HYDROCHLORIDE AND ACETAMINOPHEN 500 MG: 500 TABLET ORAL at 09:16

## 2020-02-15 RX ADMIN — DULOXETINE HYDROCHLORIDE 60 MG: 30 CAPSULE, DELAYED RELEASE ORAL at 09:17

## 2020-02-15 RX ADMIN — QUETIAPINE FUMARATE 200 MG: 100 TABLET ORAL at 20:30

## 2020-02-15 RX ADMIN — FERROUS SULFATE TAB 325 MG (65 MG ELEMENTAL FE) 325 MG: 325 (65 FE) TAB at 09:16

## 2020-02-15 RX ADMIN — OXYCODONE HYDROCHLORIDE 10 MG: 10 TABLET ORAL at 05:55

## 2020-02-15 RX ADMIN — GABAPENTIN 600 MG: 300 CAPSULE ORAL at 15:47

## 2020-02-15 RX ADMIN — TRAZODONE HYDROCHLORIDE 50 MG: 50 TABLET ORAL at 23:35

## 2020-02-15 RX ADMIN — TRAZODONE HYDROCHLORIDE 50 MG: 50 TABLET ORAL at 20:31

## 2020-02-15 RX ADMIN — OXYCODONE HYDROCHLORIDE 10 MG: 10 TABLET ORAL at 20:30

## 2020-02-15 RX ADMIN — ACETAMINOPHEN 1000 MG: 500 TABLET, FILM COATED ORAL at 09:16

## 2020-02-15 RX ADMIN — GABAPENTIN 600 MG: 300 CAPSULE ORAL at 20:30

## 2020-02-15 RX ADMIN — SENNOSIDES AND DOCUSATE SODIUM 2 TABLET: 8.6; 5 TABLET ORAL at 20:30

## 2020-02-15 RX ADMIN — OXYCODONE HYDROCHLORIDE 10 MG: 10 TABLET ORAL at 17:17

## 2020-02-15 RX ADMIN — SENNOSIDES AND DOCUSATE SODIUM 2 TABLET: 8.6; 5 TABLET ORAL at 09:17

## 2020-02-15 RX ADMIN — OXYCODONE HYDROCHLORIDE 10 MG: 10 TABLET ORAL at 23:35

## 2020-02-15 RX ADMIN — OXYCODONE HYDROCHLORIDE 10 MG: 10 TABLET ORAL at 09:17

## 2020-02-15 ASSESSMENT — ENCOUNTER SYMPTOMS
HEADACHES: 0
BLURRED VISION: 0
DIZZINESS: 0
FEVER: 0
HALLUCINATIONS: 0
NAUSEA: 0
PALPITATIONS: 0
VOMITING: 0
SHORTNESS OF BREATH: 0

## 2020-02-15 NOTE — CARE PLAN
Problem: Communication  Goal: The ability to communicate needs accurately and effectively will improve  Outcome: PROGRESSING AS EXPECTED     Problem: Safety  Goal: Will remain free from injury  Outcome: PROGRESSING AS EXPECTED  Goal: Will remain free from falls  Outcome: PROGRESSING AS EXPECTED     Problem: Infection  Goal: Will remain free from infection  Outcome: PROGRESSING AS EXPECTED     Problem: Venous Thromboembolism (VTW)/Deep Vein Thrombosis (DVT) Prevention:  Goal: Patient will participate in Venous Thrombosis (VTE)/Deep Vein Thrombosis (DVT)Prevention Measures  Outcome: PROGRESSING AS EXPECTED     Problem: Bowel/Gastric:  Goal: Normal bowel function is maintained or improved  Outcome: PROGRESSING AS EXPECTED  Goal: Will not experience complications related to bowel motility  Outcome: PROGRESSING AS EXPECTED     Problem: Knowledge Deficit  Goal: Knowledge of disease process/condition, treatment plan, diagnostic tests, and medications will improve  Outcome: PROGRESSING AS EXPECTED  Goal: Knowledge of the prescribed therapeutic regimen will improve  Outcome: PROGRESSING AS EXPECTED     Problem: Discharge Barriers/Planning  Goal: Patient's continuum of care needs will be met  Outcome: PROGRESSING AS EXPECTED     Problem: Pain Management  Goal: Pain level will decrease to patient's comfort goal  Outcome: PROGRESSING AS EXPECTED     Problem: Skin Integrity  Goal: Risk for impaired skin integrity will decrease  Outcome: PROGRESSING AS EXPECTED     Problem: Respiratory:  Goal: Respiratory status will improve  Outcome: PROGRESSING AS EXPECTED

## 2020-02-15 NOTE — THERAPY
Recreational Therapy   Initial Evaluation     Patient Name: Chris Leggett  Age:  69 y.o., Sex:  male  Medical Record #: [id number]  Today's Date: 2/14/2020     Subjective    Pt was ready and willing for session.     Objective       02/14/20 1001   Leisure History   Leisure Interests Exercise;Other (Comments)   Leisure Comments Pt reported that he enjoys hiking.    Pre-Morbid Leisure Lifestyle Individual;Group;Active   Prior Living Arrangements   Lives with - Patient's Self Care Capacity Unrelated Adult;Other (Comments)  (roomate)   Steps Into Home 0   Steps In Home 0   Ambulation Independent   Assistive Devices Used None   Functional Ability Status - Physical   Endurance Good    Right  Strong   Left  Strong   Right Arm Strong   Left Arm Strong   Right Leg Strong   Left Leg Strong   Upper Extremity Gross Motor Uses Both Arms / Hands   Lower Extremetiy Gross Motor Uses Both Legs  (Poor endurance)   Fine Motor Manipulates Small Objects   Functional Ability Status - Cognitive   Attention Span Remains on Task   Comprehension Follows Three Step Commands   Judgment Able to Make Independent Decisions   Functional Ability Status - Emotional    Affect Appropriate   Mood Appropriate   Behavior Appropriate   Leisure Competence Measure   Leisure Awareness Independent   Leisure Attitude Independent   Leisure Skills Minimal Assist   Cultural / Social Behaviors Independent   Interpersonal Skills Independent   Community Integration Skills Cueing Assist   Social Contact Independent   Community Participation Minimal Assist   Clinical Impression   Clinical Impression Impaired Endurance;Impaired Community Skills;Impaired Relaxation and Coping Skills;Impaired Leisure Skills   Current Discharge Plan   Current Discharge Plan Return to Prior Living Situation   Benefit    Benefit Patient would Benefit from Inpatient Recreational Therapy to Maximize Independent Leisure Functioning    Interdisciplinary Plan of Care Collaboration   IDT  Collaboration with  Physician   Patient Position at End of Therapy Seated   Collaboration Comments Transfer of care to Physician. Mod I on unit.    Treatment Time   Total Time Spent (mins) 30   Procedural Tracking   Procedural Tracking Community Re-Integration;Leisure Skills Awareness;Gross Motor Functional Leisure Skills       Assessment  Patient is 69 y.o. male with a diagnosis of Cervical spine fracture .  Additional factors influencing patient status / progress (ie: cognitive factors, co-morbidities, social support, etc): gout, hypertension, psychiatric disorder.        Plan  Recommend Recreational Therapy 30-60 minutes per day 3-4 days per week for 2 weeks for the following treatments:  Community Re-Integration, Community Skills Development, Leisure Skills Awareness, Social Skills Training, Gross Motor Functional Leisure Skills and Group Treatment    Goals:  Long term and short term goals have been discussed with patient and they are in agreement.    Recreation Therapy Problems     Problem: Recreation Therapy     Dates: Start: 02/14/20       Description:     Goal: STG-Within one week, patient will demonstrate a standing tolerance     Dates: Start: 02/14/20       Description: Pt will remain standing while duel tasking CGA for 10 minutes x2 without LOB.            Goal: STG-Within one week, patient will actively engage in planning of community re-integration outing     Dates: Start: 02/14/20       Description:           Goal: LTG-By discharge, patient will demonstrate a standing tolerance     Dates: Start: 02/14/20       Description: Pt will remain standing while duel tasking CGA for 8 minutes x3 without LOB.            Goal: LTG-By discharge, patient will participate in a community re-integration outing     Dates: Start: 02/14/20       Description:

## 2020-02-15 NOTE — CARE PLAN
Problem: Communication  Goal: The ability to communicate needs accurately and effectively will improve  Outcome: PROGRESSING AS EXPECTED  Education reinforced on pain scale and appropriate time to ask for [pain medication, pt shows good understanding, will continue to reinforce.     Problem: Safety  Goal: Will remain free from injury  Outcome: PROGRESSING AS EXPECTED  Goal: Will remain free from falls  Outcome: PROGRESSING AS EXPECTED   Pt educated to call for help if feeling tired or weak, shows good understanding, will continue to monitor and reinforce education.

## 2020-02-15 NOTE — DISCHARGE PLANNING
Met with patient at bedside today, discussed home health services once patient is discharged from rehab. Patient is agreeable to home health. Card given to patient if he has further questions or concerns.   Thank you,  Violet Torres RN Liaison

## 2020-02-15 NOTE — PROGRESS NOTES
"Rehab Progress Note     Date of Service: 2/15/2020  Chief Complaint: follow up cervical spine fracture    Interval Events (Subjective)    Patient seen and examined today in his room.  He understands plan for discharge home on Monday.  He does report some mild right-sided jaw pain which is not new.  He is mostly worried about having another fainting episode at home which was what resulted in his cervical fracture here.  Reports he does not sleep well but this is not a new problem and he is sleeping about the same here as he does at home, using Seroquel and trazodone.  He has no other complaints.    Objective:  VITAL SIGNS: /84   Pulse 65   Temp 36.5 °C (97.7 °F) (Oral)   Resp 16   Ht 1.803 m (5' 11\")   Wt 99.9 kg (220 lb 3.8 oz)   SpO2 99%   BMI 30.72 kg/m²   Gen: alert, no apparent distress  HEENT: cervical collar in place  CV: regular rate and rhythm, no murmurs, no peripheral edema  Resp: clear to ascultation bilaterally, normal respiratory effort  GI: soft, non-tender abdomen, bowel sounds present      Recent Results (from the past 72 hour(s))   CBC WITH DIFFERENTIAL    Collection Time: 02/14/20  6:10 AM   Result Value Ref Range    WBC 2.2 (LL) 4.8 - 10.8 K/uL    RBC 3.62 (L) 4.70 - 6.10 M/uL    Hemoglobin 11.8 (L) 14.0 - 18.0 g/dL    Hematocrit 35.8 (L) 42.0 - 52.0 %    MCV 98.9 (H) 81.4 - 97.8 fL    MCH 32.6 27.0 - 33.0 pg    MCHC 33.0 (L) 33.7 - 35.3 g/dL    RDW 47.9 35.9 - 50.0 fL    Platelet Count 73 (L) 164 - 446 K/uL    MPV 10.7 9.0 - 12.9 fL    Neutrophils-Polys 42.80 (L) 44.00 - 72.00 %    Lymphocytes 31.30 22.00 - 41.00 %    Monocytes 10.30 0.00 - 13.40 %    Eosinophils 13.40 (H) 0.00 - 6.90 %    Basophils 0.90 0.00 - 1.80 %    Immature Granulocytes 1.30 (H) 0.00 - 0.90 %    Nucleated RBC 0.00 /100 WBC    Neutrophils (Absolute) 0.96 (L) 1.82 - 7.42 K/uL    Lymphs (Absolute) 0.70 (L) 1.00 - 4.80 K/uL    Monos (Absolute) 0.23 0.00 - 0.85 K/uL    Eos (Absolute) 0.30 0.00 - 0.51 K/uL    Baso " (Absolute) 0.02 0.00 - 0.12 K/uL    Immature Granulocytes (abs) 0.03 0.00 - 0.11 K/uL    NRBC (Absolute) 0.00 K/uL       Current Facility-Administered Medications   Medication Frequency   • gabapentin (NEURONTIN) capsule 600 mg TID   • acetaminophen (TYLENOL) tablet 1,000 mg TID   • ascorbic acid tablet 500 mg QDAY with Breakfast   • ferrous sulfate tablet 325 mg QDAY with Breakfast   • Respiratory Care per Protocol Continuous RT   • oxyCODONE immediate-release (ROXICODONE) tablet 5 mg Q3HRS PRN   • oxyCODONE immediate release (ROXICODONE) tablet 10 mg Q3HRS PRN   • hydrALAZINE (APRESOLINE) tablet 25 mg Q8HRS PRN   • acetaminophen (TYLENOL) tablet 650 mg Q4HRS PRN   • senna-docusate (PERICOLACE or SENOKOT S) 8.6-50 MG per tablet 2 Tab BID    And   • polyethylene glycol/lytes (MIRALAX) PACKET 1 Packet QDAY PRN    And   • magnesium hydroxide (MILK OF MAGNESIA) suspension 30 mL QDAY PRN    And   • bisacodyl (DULCOLAX) suppository 10 mg QDAY PRN   • benzocaine-menthol (CEPACOL) lozenge 1 Lozenge Q2HRS PRN   • mag hydrox-al hydrox-simeth (MAALOX PLUS ES or MYLANTA DS) suspension 20 mL Q2HRS PRN   • ondansetron (ZOFRAN ODT) dispertab 4 mg 4X/DAY PRN    Or   • ondansetron (ZOFRAN) syringe/vial injection 4 mg 4X/DAY PRN   • traZODone (DESYREL) tablet 50 mg QHS PRN   • sodium chloride (OCEAN) 0.65 % nasal spray 2 Spray PRN   • DULoxetine (CYMBALTA) capsule 60 mg DAILY   • QUEtiapine (SEROQUEL) tablet 200 mg QHS   • traZODone (DESYREL) tablet 50 mg Nightly   • polyethyl glycol-propyl glycol (SYSTANE) 0.4-0.3 % ophth drops 1 Drop PRN       Orders Placed This Encounter   Procedures   • Diet Order Regular (after passing bedside swallow eval )     Standing Status:   Standing     Number of Occurrences:   1     Order Specific Question:   Diet:     Answer:   Regular [1]     Comments:   after passing bedside swallow eval      Order Specific Question:   Texture/Fiber modifications:     Answer:   Dysphagia 3(Mechanical Soft)specify  fluid consistency(question 6) [3]       Assessment:  Active Hospital Problems    Diagnosis   • *Cervical spine fracture (HCC)   • Syncope   • Pain and swelling of right lower extremity   • Mood disorder (HCC)   • Gout   • Hypertension   • Forehead laceration   • Normocytic anemia   • Tobacco abuse   • Pancytopenia (HCC)   • H/O cervical fracture       Medical Decision Making and Plan:    C7 fracture  Continue collar  Continue PT/OT - patient mod indep on unit    Pain management  Continue gabapentin, tylenol, PRN oxycodone    Patient getting ready for discharge on Monday.      Total time:  15 minutes.  I spent greater than 50% of the time for patient care, counseling, and coordination on this date, including patient face-to face time, unit/floor time with review of records/pertinent lab data and studies, as well as discussing diagnostic evaluation/work up, planned therapeutic interventions, and future disposition of care, as per the interval events/subjective and the assessment and plan as noted above.      Kandace Padron M.D.   Physical Medicine and Rehabilitation

## 2020-02-15 NOTE — DISCHARGE PLANNING
Confirmed with patient and his roommate that okay for [facility] referral.  Left message for Neurosurgery for follow up appointment.  Will follow on Monday.

## 2020-02-15 NOTE — PROGRESS NOTES
"Rehab Progress Note     Encounter Date: 2/14/2020    CC: Headache, head lacerations    Interval Events (Subjective)  Patient sitting up in room. He cleaned off the scabs on his laceration but his sutures have not been removed. Discussed with nursing staff and will try to remove them today.  He continues to have jaw pain when yawning and eating. He reports it is hurting less when he talks.  Discussed may benefit from increase in Gabapentin although no typically used for TMJ pain. Denies NVD. Denies leg swelling.     IDT Team Meeting 2/11/2020  DC/Disposition:  2/17/20    Objective:  VITAL SIGNS: /64   Pulse 70   Temp 36.5 °C (97.7 °F) (Oral)   Resp 16   Ht 1.803 m (5' 11\")   Wt 99.9 kg (220 lb 3.8 oz)   SpO2 97%   BMI 30.72 kg/m²   Gen: NAD  Psych: Mood and affect appropriate  CV: RRR, no edema  Resp: CTAB, no upper airway sounds  Abd: NTND  Neuro: AOx4, following simple commands  Unchanged from 2/13/20 in addition no swelling or erythema at right jawline    Recent Results (from the past 72 hour(s))   CBC WITH DIFFERENTIAL    Collection Time: 02/14/20  6:10 AM   Result Value Ref Range    WBC 2.2 (LL) 4.8 - 10.8 K/uL    RBC 3.62 (L) 4.70 - 6.10 M/uL    Hemoglobin 11.8 (L) 14.0 - 18.0 g/dL    Hematocrit 35.8 (L) 42.0 - 52.0 %    MCV 98.9 (H) 81.4 - 97.8 fL    MCH 32.6 27.0 - 33.0 pg    MCHC 33.0 (L) 33.7 - 35.3 g/dL    RDW 47.9 35.9 - 50.0 fL    Platelet Count 73 (L) 164 - 446 K/uL    MPV 10.7 9.0 - 12.9 fL    Neutrophils-Polys 42.80 (L) 44.00 - 72.00 %    Lymphocytes 31.30 22.00 - 41.00 %    Monocytes 10.30 0.00 - 13.40 %    Eosinophils 13.40 (H) 0.00 - 6.90 %    Basophils 0.90 0.00 - 1.80 %    Immature Granulocytes 1.30 (H) 0.00 - 0.90 %    Nucleated RBC 0.00 /100 WBC    Neutrophils (Absolute) 0.96 (L) 1.82 - 7.42 K/uL    Lymphs (Absolute) 0.70 (L) 1.00 - 4.80 K/uL    Monos (Absolute) 0.23 0.00 - 0.85 K/uL    Eos (Absolute) 0.30 0.00 - 0.51 K/uL    Baso (Absolute) 0.02 0.00 - 0.12 K/uL    Immature " Granulocytes (abs) 0.03 0.00 - 0.11 K/uL    NRBC (Absolute) 0.00 K/uL       Current Facility-Administered Medications   Medication Frequency   • acetaminophen (TYLENOL) tablet 1,000 mg TID   • gabapentin (NEURONTIN) capsule 400 mg TID   • ascorbic acid tablet 500 mg QDAY with Breakfast   • ferrous sulfate tablet 325 mg QDAY with Breakfast   • Respiratory Care per Protocol Continuous RT   • oxyCODONE immediate-release (ROXICODONE) tablet 5 mg Q3HRS PRN   • oxyCODONE immediate release (ROXICODONE) tablet 10 mg Q3HRS PRN   • hydrALAZINE (APRESOLINE) tablet 25 mg Q8HRS PRN   • acetaminophen (TYLENOL) tablet 650 mg Q4HRS PRN   • senna-docusate (PERICOLACE or SENOKOT S) 8.6-50 MG per tablet 2 Tab BID    And   • polyethylene glycol/lytes (MIRALAX) PACKET 1 Packet QDAY PRN    And   • magnesium hydroxide (MILK OF MAGNESIA) suspension 30 mL QDAY PRN    And   • bisacodyl (DULCOLAX) suppository 10 mg QDAY PRN   • benzocaine-menthol (CEPACOL) lozenge 1 Lozenge Q2HRS PRN   • mag hydrox-al hydrox-simeth (MAALOX PLUS ES or MYLANTA DS) suspension 20 mL Q2HRS PRN   • ondansetron (ZOFRAN ODT) dispertab 4 mg 4X/DAY PRN    Or   • ondansetron (ZOFRAN) syringe/vial injection 4 mg 4X/DAY PRN   • traZODone (DESYREL) tablet 50 mg QHS PRN   • sodium chloride (OCEAN) 0.65 % nasal spray 2 Spray PRN   • DULoxetine (CYMBALTA) capsule 60 mg DAILY   • QUEtiapine (SEROQUEL) tablet 200 mg QHS   • traZODone (DESYREL) tablet 50 mg Nightly   • polyethyl glycol-propyl glycol (SYSTANE) 0.4-0.3 % ophth drops 1 Drop PRN       Orders Placed This Encounter   Procedures   • Diet Order Regular (after passing bedside swallow eval )     Standing Status:   Standing     Number of Occurrences:   1     Order Specific Question:   Diet:     Answer:   Regular [1]     Comments:   after passing bedside swallow eval      Order Specific Question:   Texture/Fiber modifications:     Answer:   Dysphagia 3(Mechanical Soft)specify fluid consistency(question 6) [3]        Assessment:  Active Hospital Problems    Diagnosis   • *Cervical spine fracture (HCC)   • Syncope   • Pain and swelling of right lower extremity   • Mood disorder (HCC)   • Gout   • Hypertension   • Forehead laceration   • Normocytic anemia   • Tobacco abuse   • Pancytopenia (HCC)   • H/O cervical fracture       Medical Decision Making and Plan:  C7 Fracture - Patient with syncopal event with C7 fracture managed non-operatively. In [place] collar.   -PT and OT for mobility and ADLs     Right jaw pain - Most likely mechanical from fall. No fracture on imaging, continues to improve. May benefit from increase in Gabapentin    HTN - Patient on Lisinopril 20 mg. Previously on Amlodipine 5 mg  -Consult hospitalist. Low SBP on admission. 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 Continue Gabapentin 300 mg TID  -Increase Gabapentin 400 mg TID.  Change Tylenol to 1000 mg TID    R Knee pain - s/p aspiration of blood products. With improved ROM and pain control  -PRN Oxycodone.      Leukopenia - Down to 1.9, improved on 2/10/20 after granix. Discontinue precautions    Depression - Patient on Duloxetine 60 mg daily, Seroquel 200 mg QHS, and Trazodone 50 mg QHS     Constipation -  Start Senna-docusate.      DVT Ppx - Patient on Lovenox on transfer. Ambulating > 500 feet    Total time:  25 minutes.  I spent greater than 50% of the time for patient care, counseling, and coordination on this date, including unit/floor time, and face-to-face time with the patient as per interval events and assessment and plan above. Topics discussed included ongoing jaw pain and increase Gabapentin.     Talia Shields M.D.

## 2020-02-15 NOTE — THERAPY
Physical Therapy   Daily Treatment     Patient Name: Chris Leggett  Age:  69 y.o., Sex:  male  Medical Record #: [id number]  Today's Date: 2/14/2020     Precautions  Precautions: Cervical Collar  , Spinal / Back Precautions   Comments: Mod I on unit     Subjective    Pt seated in room, ready for PT session.      Objective       02/14/20 1301   Precautions   Precautions Cervical Collar  ;Spinal / Back Precautions    Comments Mod I on unit    Interdisciplinary Plan of Care Collaboration   IDT Collaboration with  Recreation Therapist   Patient Position at End of Therapy Other (Comments)  (pt ambulated to room after PT session)   Collaboration Comments Rec therapist drove van and assisted with guarding pt during outing    PT Total Time Spent   PT Individual Total Time Spent (Mins) 90   PT Charge Group   PT Therapeutic Exercise 3   PT Therapeutic Activities 3     Pt participated in community outing to [facility]. Pt transferred into and out of hospital van, SBA with use of grab bar. Pt participated in bowling with 9 lb ball for full 10 frames with SBA and no LOB. Pt ambulated in hospital, through parking lot, and in [facility] mod I - spv with SPC.    Assessment    Pt participatory in bowling activity for community outing. No LOB during activity.     Plan    DC home Monday 12/17, complete DC info, issue standing HEP handout, issue fall information packet, high level balance activities.

## 2020-02-15 NOTE — PROGRESS NOTES
Hospital Medicine Daily Progress Note      Chief Complaint:  Hypertension  Pancytopenia    Interval History:  No significant events or changes since last visit    Review of Systems  Review of Systems   Constitutional: Negative for fever.   Eyes: Negative for blurred vision.   Respiratory: Negative for shortness of breath.    Cardiovascular: Negative for palpitations.   Gastrointestinal: Negative for nausea and vomiting.   Neurological: Negative for dizziness and headaches.   Psychiatric/Behavioral: Negative for hallucinations.        Physical Exam  Temp:  [36.4 °C (97.5 °F)-37 °C (98.6 °F)] 36.4 °C (97.5 °F)  Pulse:  [69-82] 69  Resp:  [16-18] 18  BP: (107-122)/(64-84) 107/67  SpO2:  [97 %] 97 %    Physical Exam  Vitals signs and nursing note reviewed.   Constitutional:       General: He is not in acute distress.  HENT:      Mouth/Throat:      Mouth: Mucous membranes are moist.      Pharynx: Oropharynx is clear.   Eyes:      General: No scleral icterus.  Neck:      Musculoskeletal: No neck rigidity.      Comments: Has C-collar  Cardiovascular:      Rate and Rhythm: Normal rate and regular rhythm.   Pulmonary:      Effort: Pulmonary effort is normal.      Breath sounds: No wheezing or rales.   Abdominal:      General: Bowel sounds are normal.      Palpations: Abdomen is soft.   Musculoskeletal:      Right lower leg: No edema.      Left lower leg: No edema.   Skin:     General: Skin is warm and dry.   Neurological:      Mental Status: He is alert and oriented to person, place, and time.   Psychiatric:         Mood and Affect: Mood normal.         Behavior: Behavior normal.         Fluids    Intake/Output Summary (Last 24 hours) at 2/15/2020 0848  Last data filed at 2/15/2020 0600  Gross per 24 hour   Intake 1320 ml   Output 1700 ml   Net -380 ml       Laboratory  Recent Labs     02/14/20  0610   WBC 2.2*   RBC 3.62*   HEMOGLOBIN 11.8*   HEMATOCRIT 35.8*   MCV 98.9*   MCH 32.6   MCHC 33.0*   RDW 47.9   PLATELETCT 73*    MPV 10.7                     Assessment/Plan  * Cervical spine fracture (HCC)- (present on admission)  Assessment & Plan  2nd to GLF from syncopal episode  Sustained acute C7 fracture -- non-surgical mangement  Cervical collar  Note: has prior hx of cervical fusion    Syncope- (present on admission)  Assessment & Plan  Work-up negative per [facility] discharge Summary by Dr. Miner    Pain and swelling of right lower extremity- (present on admission)  Assessment & Plan  Right knee traumatic hemarthrosis  2nd to GLF  S/P joint aspiration with removal of blood    Mood disorder (HCC)- (present on admission)  Assessment & Plan  On Seroquel    Pancytopenia (HCC)- (present on admission)  Assessment & Plan  WBC's: 1.9 (2.9) --> Granix --> 7.5 (2/10) --> 2.2 (2/14) --> Granix (2/14)  H&H: mild stable  Platelets stable at 66 (2/10)  Appears chronic and stable since at least September 2018  Vit B12, Folate, and TSH levels all normal  S/P Granix (2/9)  S/P Granix (2/14)  D/W CM -- ordered a Hematology consult for out patient  Monitor levels    Gout- (present on admission)  Assessment & Plan  On no meds  Monitor for acute flare-up    Hypertension- (present on admission)  Assessment & Plan  BP ok  Off Lisinopril  Currently not on any hypertensive meds  Monitor

## 2020-02-15 NOTE — DISCHARGE PLANNING
ATTN: Case Management  RE: Referral for Home Health    As of 2/14/20, we have accepted the Home Health referral for the patient listed above.    A [facility] clinician will be out to see the patient within 48 hours. If you have any questions or concerns regarding the patient's transition to Home Health, please do not hesitate to contact us at [phone].      We look forward to collaborating with you,  [facility] Home Health Team

## 2020-02-16 PROCEDURE — A9270 NON-COVERED ITEM OR SERVICE: HCPCS | Performed by: PHYSICAL MEDICINE & REHABILITATION

## 2020-02-16 PROCEDURE — 97116 GAIT TRAINING THERAPY: CPT | Mod: CQ

## 2020-02-16 PROCEDURE — 770010 HCHG ROOM/CARE - REHAB SEMI PRIVAT*

## 2020-02-16 PROCEDURE — 700102 HCHG RX REV CODE 250 W/ 637 OVERRIDE(OP): Performed by: PHYSICAL MEDICINE & REHABILITATION

## 2020-02-16 PROCEDURE — 97535 SELF CARE MNGMENT TRAINING: CPT

## 2020-02-16 PROCEDURE — A9270 NON-COVERED ITEM OR SERVICE: HCPCS | Performed by: HOSPITALIST

## 2020-02-16 PROCEDURE — 99232 SBSQ HOSP IP/OBS MODERATE 35: CPT | Performed by: HOSPITALIST

## 2020-02-16 PROCEDURE — 97112 NEUROMUSCULAR REEDUCATION: CPT

## 2020-02-16 PROCEDURE — 97530 THERAPEUTIC ACTIVITIES: CPT

## 2020-02-16 PROCEDURE — 700102 HCHG RX REV CODE 250 W/ 637 OVERRIDE(OP): Performed by: HOSPITALIST

## 2020-02-16 PROCEDURE — 99231 SBSQ HOSP IP/OBS SF/LOW 25: CPT | Performed by: PHYSICAL MEDICINE & REHABILITATION

## 2020-02-16 PROCEDURE — 97110 THERAPEUTIC EXERCISES: CPT | Mod: CQ

## 2020-02-16 PROCEDURE — 97110 THERAPEUTIC EXERCISES: CPT

## 2020-02-16 RX ADMIN — OXYCODONE HYDROCHLORIDE 10 MG: 10 TABLET ORAL at 13:47

## 2020-02-16 RX ADMIN — FERROUS SULFATE TAB 325 MG (65 MG ELEMENTAL FE) 325 MG: 325 (65 FE) TAB at 09:14

## 2020-02-16 RX ADMIN — DULOXETINE HYDROCHLORIDE 60 MG: 30 CAPSULE, DELAYED RELEASE ORAL at 09:14

## 2020-02-16 RX ADMIN — OXYCODONE HYDROCHLORIDE 10 MG: 10 TABLET ORAL at 06:09

## 2020-02-16 RX ADMIN — ACETAMINOPHEN 1000 MG: 500 TABLET, FILM COATED ORAL at 20:59

## 2020-02-16 RX ADMIN — GABAPENTIN 600 MG: 300 CAPSULE ORAL at 20:58

## 2020-02-16 RX ADMIN — OXYCODONE HYDROCHLORIDE AND ACETAMINOPHEN 500 MG: 500 TABLET ORAL at 09:14

## 2020-02-16 RX ADMIN — GABAPENTIN 600 MG: 300 CAPSULE ORAL at 09:14

## 2020-02-16 RX ADMIN — ACETAMINOPHEN 1000 MG: 500 TABLET, FILM COATED ORAL at 16:05

## 2020-02-16 RX ADMIN — ACETAMINOPHEN 1000 MG: 500 TABLET, FILM COATED ORAL at 09:14

## 2020-02-16 RX ADMIN — GABAPENTIN 600 MG: 300 CAPSULE ORAL at 16:00

## 2020-02-16 RX ADMIN — TRAZODONE HYDROCHLORIDE 50 MG: 50 TABLET ORAL at 20:59

## 2020-02-16 RX ADMIN — SENNOSIDES AND DOCUSATE SODIUM 2 TABLET: 8.6; 5 TABLET ORAL at 20:58

## 2020-02-16 RX ADMIN — OXYCODONE HYDROCHLORIDE 10 MG: 10 TABLET ORAL at 19:34

## 2020-02-16 RX ADMIN — QUETIAPINE FUMARATE 200 MG: 100 TABLET ORAL at 20:59

## 2020-02-16 RX ADMIN — SENNOSIDES AND DOCUSATE SODIUM 2 TABLET: 8.6; 5 TABLET ORAL at 09:14

## 2020-02-16 ASSESSMENT — ACTIVITIES OF DAILY LIVING (ADL)
SHOWER_TRANSFER_LEVEL_OF_ASSIST: ABLE TO COMPLETE SHOWER TRANSFER WITHOUT ASSIST
TOILETING_LEVEL_OF_ASSIST: ABLE TO COMPLETE TOILETING WITHOUT ASSIST
TOILET_TRANSFER_LEVEL_OF_ASSIST: ABLE TO COMPLETE TOILET TRANSFER WITHOUT ASSIST

## 2020-02-16 ASSESSMENT — ENCOUNTER SYMPTOMS
DIZZINESS: 0
NERVOUS/ANXIOUS: 0
FEVER: 0
DIARRHEA: 0
COUGH: 0
BLURRED VISION: 0

## 2020-02-16 NOTE — CARE PLAN
Problem: PT-Long Term Goals  Goal: LTG-By discharge, patient will ambulate up/down flight of stairs  Description: 1) Individualized goal:  Pt will ascend/descend 12 standard steps with one HR Ez to prepare for discharge.    2) Interventions:  PT Gait Training, PT Therapeutic Exercises, PT Neuro Re-Ed/Balance, PT Therapeutic Activity, PT Manual Therapy and PT Evaluation   Outcome: NOT MET  Note: Pt requiring SPV for safety navigating stairs      Problem: PT-Long Term Goals  Goal: LTG-By discharge, patient will ambulate  Description: 1) Individualized goal:  Pt will ambulate >500' Ez with least restrictive device to safely manage community distances to return to prior living environment.     2) Interventions:  PT Gait Training, PT Therapeutic Exercises, PT Neuro Re-Ed/Balance, PT Therapeutic Activity, PT Manual Therapy and PT Evaluation   Outcome: MET  Goal: LTG-By discharge, patient will perform home exercise program  Description: 1) Individualized goal:  Pt will demonstrate independence with home exercise program to return to prior living environment.     2) Interventions:  PT Gait Training, PT Therapeutic Exercises, PT Neuro Re-Ed/Balance, PT Therapeutic Activity, PT Manual Therapy and PT Evaluation   Outcome: MET

## 2020-02-16 NOTE — THERAPY
"Occupational Therapy  Daily Treatment     Patient Name: Chris Leggett  Age:  69 y.o., Sex:  male  Medical Record #: [id number]  Today's Date: 2020     Precautions  Precautions: Cervical Collar  , Spinal / Back Precautions   Comments: Ez on unit    Safety   ADL Safety : Modified Independent  Bathroom Safety: Modified Independent  Comments: See FIMs for additional ADL performance details     Subjective    \"There is a nice gym at the apartment I should start going to\"     Objective    Functional ambulation on outdoor uneven surfaces (1490ft) with SPC - Mod I, no  LOB demo     20 1431   Interdisciplinary Plan of Care Collaboration   Patient Position at End of Therapy Seated;Call Light within Reach;Tray Table within Reach   OT Total Time Spent   OT Individual Total Time Spent (Mins) 30   OT Charge Group   OT Self Care / ADL 1   OT Therapeutic Exercise  1       FIM Eating Score:  6 - Modified Independent  Eating Description:  Increased time    FIM Grooming Score:  6 - Modified Independent  Grooming Description:  Increased time    FIM Bathing Score:     Bathing Description:       FIM Upper Body Dressin - Modified Independent  Upper Body Dressing Description:  Increased time    FIM Lower Body Dressing Score:  6 - Modified Independent  Lower Body Dressing Description:  Increased time    FIM Toileting Body Dressin - Modified Independent  Toileting Description:  Grab bar, Increased time    FIM Bed/Chair/Wheelchair Transfers Score: 6 - Modified Independent  Bed/Chair/Wheelchair Transfers Description:  Increased time    FIM Toilet Transfer Score:  6 - Modified Independent  Toilet Transfer Description:  Increased time, Grab bar    FIM Tub/Shower Transfers Score:  6 - Modified Independent  Tub/Shower Transfers Description:  Grab bar, Increased time      Assessment    Pt demo Mod I level for A/IADL's and functional mobility tasks with no reports of pain. Pt with no further questions/concerns re: home safety " and discharge    Plan    D/c tomorrow

## 2020-02-16 NOTE — THERAPY
"Physical Therapy   Daily Treatment     Patient Name: Chris Leggett  Age:  69 y.o., Sex:  male  Medical Record #: [id number]  Today's Date: 2/16/2020     Precautions  Precautions: Cervical Collar  , Spinal / Back Precautions   Comments: Mod I on unit     Subjective    Pt was sitting in w/c upon arrival and agreeable to tx     Objective     02/16/20 0931   Precautions   Precautions Cervical Collar  ;Spinal / Back Precautions    Comments Mod I on unit    Sitting Lower Body Exercises   Nustep Resistance Level 5  (B LE 15minutes)   Standing Lower Body Exercises   Hamstring Curl 2 sets of 15;Bilateral    Hip Flexion 2 sets of 15;Bilateral   Hip Extension 2 sets of 15;Bilateral    Hip Abduction 2 sets of 15;Bilateral   Marching 2 sets of 15   Heel Rise 2 sets of 15;Bilateral   Toe Rise 2 sets of 15;Bilateral   Mini Squat 2 sets of 15;Full    Interdisciplinary Plan of Care Collaboration   Patient Position at End of Therapy Seated;Call Light within Reach;Tray Table within Reach;Phone within Reach   PT Total Time Spent   PT Individual Total Time Spent (Mins) 60   PT Charge Group   PT Gait Training 1   PT Therapeutic Exercise 2   PT Therapeutic Activities 1     FIM Bed/Chair/Wheelchair Transfers Score: 6 - Modified Independent  Bed/Chair/Wheelchair Transfers Description:       FIM Walking Score:  6 - Modified Independent  Walking Description:  (400ft  x2 mod I no AD)    FIM Wheelchair Score:  0 - Not tested, patient refused  Wheelchair Description:       FIM Stairs Score:  5 - Standby Prompting/Supervision or Set-up  Stairs Description:  (12  x 1 6\" steps unilateral HR SPV)    See Flowsheet to see all collected IRF KAYLEIGH d/c info    Education on standing HEP for strength, education on floor recovery with demo/ practice with SPV,   Education on improving safety in home with handout     Assessment    Pt feels prepared to dc home tomorrow with SPC and home health therapy    Plan    Dc home tomorrow with home health therapy and " SPC

## 2020-02-16 NOTE — THERAPY
"Occupational Therapy  Daily Treatment     Patient Name: Chris Leggett  Age:  69 y.o., Sex:  male  Medical Record #: [id number]  Today's Date: 2/16/2020     Precautions  Precautions: Cervical Collar  , Spinal / Back Precautions   Comments: Mod I on unit     Safety   ADL Safety : Modified Independent  Bathroom Safety: Modified Independent  Comments: See FIMs for additional ADL performance details     Subjective    \"I am cleared to take showers on my own\"     Objective       02/16/20 0831   Sitting Lower Body Exercises   Nustep Resistance Level 6  (15 min LE propulsion only, 0 RB, .69 mile)   Standing Lower Body Exercises   Marching 3 sets of 10  (on foam pad with 2 sec single leg stance hold)   Mini Squat Full ;3 sets of 10  (on foam pad, intermittent UE support in //bars)   Balance   Standing Balance (Static) Good   Standing Balance (Dynamic) Fair +   Weight Shift Standing Good   Comments standing in // bars - single leg balance toe taps to cone targets on foam pad 3 sets x 10, no LOB, occasional UE support   Interdisciplinary Plan of Care Collaboration   Patient Position at End of Therapy Seated   OT Total Time Spent   OT Individual Total Time Spent (Mins) 60   OT Charge Group   OT Neuromuscular Re-education / Balance 2   OT Therapeutic Exercise  2       Assessment    Pt completed various high level standing balance exercises with only 1 mild LOB that pt was able to recover on own     Plan    Cont overall strength/endurance and standing balance to improve ADL's and functional mobility    "

## 2020-02-16 NOTE — PROGRESS NOTES
Hospital Medicine Daily Progress Note      Chief Complaint:  Hypertension  Pancytopenia    Interval History:  No significant events or changes since last visit    Review of Systems  Review of Systems   Constitutional: Negative for fever.   Eyes: Negative for blurred vision.   Respiratory: Negative for cough.    Cardiovascular: Negative for chest pain.   Gastrointestinal: Negative for diarrhea.   Musculoskeletal: Negative for joint pain.   Neurological: Negative for dizziness.   Psychiatric/Behavioral: The patient is not nervous/anxious.         Physical Exam  Temp:  [36.3 °C (97.4 °F)-36.6 °C (97.9 °F)] 36.6 °C (97.9 °F)  Pulse:  [60-65] 60  Resp:  [16-18] 18  BP: (112-131)/(76-84) 112/78  SpO2:  [99 %] 99 %    Physical Exam  Vitals signs and nursing note reviewed.   Constitutional:       Appearance: He is not diaphoretic.   HENT:      Mouth/Throat:      Pharynx: No oropharyngeal exudate or posterior oropharyngeal erythema.   Eyes:      Extraocular Movements: Extraocular movements intact.   Neck:      Vascular: No carotid bruit.      Comments: Has C-collar  Cardiovascular:      Rate and Rhythm: Normal rate and regular rhythm.   Pulmonary:      Effort: Pulmonary effort is normal.      Breath sounds: No wheezing or rales.   Abdominal:      General: There is no distension.      Palpations: Abdomen is soft.      Tenderness: There is no abdominal tenderness.   Musculoskeletal:      Right lower leg: No edema.      Left lower leg: No edema.   Skin:     General: Skin is warm and dry.   Neurological:      Mental Status: He is alert and oriented to person, place, and time.   Psychiatric:         Mood and Affect: Mood normal.         Behavior: Behavior normal.         Fluids    Intake/Output Summary (Last 24 hours) at 2/16/2020 0854  Last data filed at 2/16/2020 0300  Gross per 24 hour   Intake 1220 ml   Output 1500 ml   Net -280 ml       Laboratory  Recent Labs     02/14/20  0610   WBC 2.2*   RBC 3.62*   HEMOGLOBIN 11.8*    HEMATOCRIT 35.8*   MCV 98.9*   MCH 32.6   MCHC 33.0*   RDW 47.9   PLATELETCT 73*   MPV 10.7                     Assessment/Plan  * Cervical spine fracture (HCC)- (present on admission)  Assessment & Plan  2nd to GLF from syncopal episode  Sustained acute C7 fracture -- non-surgical mangement  Cervical collar  Note: has prior hx of cervical fusion    Syncope- (present on admission)  Assessment & Plan  Work-up negative per [facility] discharge Summary by Dr. Miner    Pain and swelling of right lower extremity- (present on admission)  Assessment & Plan  Right knee traumatic hemarthrosis  2nd to GLF  S/P joint aspiration with removal of blood    Mood disorder (HCC)- (present on admission)  Assessment & Plan  On Seroquel    Pancytopenia (HCC)- (present on admission)  Assessment & Plan  WBC's: 1.9 (2.9) --> Granix --> 7.5 (2/10) --> 2.2 (2/14) --> Granix (2/14)  H&H: mild stable  Platelets stable at 66 (2/10)  Appears chronic and stable since at least September 2018  Vit B12, Folate, and TSH levels all normal  S/P Granix (2/9)  S/P Granix (2/14)  D/W CM -- ordered a Hematology consult for out patient  Monitor levels    Gout- (present on admission)  Assessment & Plan  On no meds  Monitor for acute flare-up    Hypertension- (present on admission)  Assessment & Plan  BP ok  Off Lisinopril  Currently not on any hypertensive meds  Monitor

## 2020-02-16 NOTE — CARE PLAN
Problem: Safety  Goal: Will remain free from injury  Outcome: PROGRESSING AS EXPECTED  Goal: Will remain free from falls  Outcome: PROGRESSING AS EXPECTED   Pt education reinforced, pt shows good understanding , and reports he will ask for help and use call light if he is feeling tired or weak. Will continue to provide education and support as needed.

## 2020-02-16 NOTE — PROGRESS NOTES
"Rehab Progress Note     Date of Service: 2/16/2020  Chief Complaint: follow up cervical spine fracture    Interval Events (Subjective)    Patient seen and examined in the therapy gym today.  He is on the bike and working with PT.  He reports he is mildly sweaty.  He is currently denying any pain.  He continues to wonder what made him pass out.  He has no other complaints and feels like he is ready for discharge tomorrow.    Objective:  VITAL SIGNS: /78   Pulse 60   Temp 36.6 °C (97.9 °F) (Temporal)   Resp 18   Ht 1.803 m (5' 11\")   Wt 99.7 kg (219 lb 12.8 oz)   SpO2 99%   BMI 30.66 kg/m²   Gen: alert, no apparent distress  CV: regular rate and rhythm, no murmurs, no peripheral edema  Resp: clear to ascultation bilaterally, normal respiratory effort  GI: soft, non-tender abdomen, bowel sounds present      Recent Results (from the past 72 hour(s))   CBC WITH DIFFERENTIAL    Collection Time: 02/14/20  6:10 AM   Result Value Ref Range    WBC 2.2 (LL) 4.8 - 10.8 K/uL    RBC 3.62 (L) 4.70 - 6.10 M/uL    Hemoglobin 11.8 (L) 14.0 - 18.0 g/dL    Hematocrit 35.8 (L) 42.0 - 52.0 %    MCV 98.9 (H) 81.4 - 97.8 fL    MCH 32.6 27.0 - 33.0 pg    MCHC 33.0 (L) 33.7 - 35.3 g/dL    RDW 47.9 35.9 - 50.0 fL    Platelet Count 73 (L) 164 - 446 K/uL    MPV 10.7 9.0 - 12.9 fL    Neutrophils-Polys 42.80 (L) 44.00 - 72.00 %    Lymphocytes 31.30 22.00 - 41.00 %    Monocytes 10.30 0.00 - 13.40 %    Eosinophils 13.40 (H) 0.00 - 6.90 %    Basophils 0.90 0.00 - 1.80 %    Immature Granulocytes 1.30 (H) 0.00 - 0.90 %    Nucleated RBC 0.00 /100 WBC    Neutrophils (Absolute) 0.96 (L) 1.82 - 7.42 K/uL    Lymphs (Absolute) 0.70 (L) 1.00 - 4.80 K/uL    Monos (Absolute) 0.23 0.00 - 0.85 K/uL    Eos (Absolute) 0.30 0.00 - 0.51 K/uL    Baso (Absolute) 0.02 0.00 - 0.12 K/uL    Immature Granulocytes (abs) 0.03 0.00 - 0.11 K/uL    NRBC (Absolute) 0.00 K/uL       Current Facility-Administered Medications   Medication Frequency   • gabapentin " (NEURONTIN) capsule 600 mg TID   • acetaminophen (TYLENOL) tablet 1,000 mg TID   • ascorbic acid tablet 500 mg QDAY with Breakfast   • ferrous sulfate tablet 325 mg QDAY with Breakfast   • Respiratory Care per Protocol Continuous RT   • oxyCODONE immediate-release (ROXICODONE) tablet 5 mg Q3HRS PRN   • oxyCODONE immediate release (ROXICODONE) tablet 10 mg Q3HRS PRN   • hydrALAZINE (APRESOLINE) tablet 25 mg Q8HRS PRN   • acetaminophen (TYLENOL) tablet 650 mg Q4HRS PRN   • senna-docusate (PERICOLACE or SENOKOT S) 8.6-50 MG per tablet 2 Tab BID    And   • polyethylene glycol/lytes (MIRALAX) PACKET 1 Packet QDAY PRN    And   • magnesium hydroxide (MILK OF MAGNESIA) suspension 30 mL QDAY PRN    And   • bisacodyl (DULCOLAX) suppository 10 mg QDAY PRN   • benzocaine-menthol (CEPACOL) lozenge 1 Lozenge Q2HRS PRN   • mag hydrox-al hydrox-simeth (MAALOX PLUS ES or MYLANTA DS) suspension 20 mL Q2HRS PRN   • ondansetron (ZOFRAN ODT) dispertab 4 mg 4X/DAY PRN    Or   • ondansetron (ZOFRAN) syringe/vial injection 4 mg 4X/DAY PRN   • traZODone (DESYREL) tablet 50 mg QHS PRN   • sodium chloride (OCEAN) 0.65 % nasal spray 2 Spray PRN   • DULoxetine (CYMBALTA) capsule 60 mg DAILY   • QUEtiapine (SEROQUEL) tablet 200 mg QHS   • traZODone (DESYREL) tablet 50 mg Nightly   • polyethyl glycol-propyl glycol (SYSTANE) 0.4-0.3 % ophth drops 1 Drop PRN       Orders Placed This Encounter   Procedures   • Diet Order Regular (after passing bedside swallow eval )     Standing Status:   Standing     Number of Occurrences:   1     Order Specific Question:   Diet:     Answer:   Regular [1]     Comments:   after passing bedside swallow eval      Order Specific Question:   Texture/Fiber modifications:     Answer:   Dysphagia 3(Mechanical Soft)specify fluid consistency(question 6) [3]       Assessment:  Active Hospital Problems    Diagnosis   • *Cervical spine fracture (HCC)   • Syncope   • Pain and swelling of right lower extremity   • Mood disorder  (HCC)   • Gout   • Hypertension   • Forehead laceration   • Normocytic anemia   • Tobacco abuse   • Pancytopenia (HCC)   • H/O cervical fracture       Medical Decision Making and Plan:    C7 fracture  Continue collar  Continue PT/OT - patient mod indep on unit    Pain management  Continue gabapentin, tylenol, PRN oxycodone  Pain controlled    For discharge tomorrow    Total time:  16 minutes.  I spent greater than 50% of the time for patient care, counseling, and coordination on this date, including patient face-to face time, unit/floor time with review of records/pertinent lab data and studies, as well as discussing diagnostic evaluation/work up, planned therapeutic interventions, and future disposition of care, as per the interval events/subjective and the assessment and plan as noted above.    I have performed a physical exam, reviewed and updated ROS, as well as the assessment and plan today 2/16/2020. In review of note from 2/15/2020 there are no new changes except as documented above.            Kandace Padron M.D.   Physical Medicine and Rehabilitation

## 2020-02-16 NOTE — THERAPY
"Physical Therapy   Daily Treatment     Patient Name: Chris Leggett  Age:  69 y.o., Sex:  male  Medical Record #: [id number]  Today's Date: 2/16/2020     Precautions  Precautions: (P) Cervical Collar  , Spinal / Back Precautions   Comments: (P) Ez on unit    Subjective    Pt agreeable to therapy \"I love the bike\"     Objective       02/16/20 1331   Precautions   Precautions Cervical Collar  ;Spinal / Back Precautions    Comments Ez on unit   Sitting Lower Body Exercises   Nustep Resistance Level 7;Resistance Level 8;Time (See Comments)  (20' B LE for strength/endurance)   Interdisciplinary Plan of Care Collaboration   Patient Position at End of Therapy Other (Comments)  (Ez on unity SPC)   PT Total Time Spent   PT Individual Total Time Spent (Mins) 30   PT Charge Group   PT Gait Training 1   PT Therapeutic Exercise 1   Supervising Physical Therapist Zaynab Wang       Assessment    Pt tolerated CV endurance training on nustep for 20 minutes on level 7/8-pt was medicated for B LE knee soreness/discomfort during therapy    Plan    Dc has been completed, pt is prepared for dc    "

## 2020-02-16 NOTE — THERAPY
Physical Therapy Discharge Instructions for Chris Pruitt Oleksandr    2/16/2020    Level of Assist Required for Ambulation: Supervision on Stairs, No Assist on Flat Surfaces, Supervision on Curbs  Distance Patient May Ambulate: (community distances as tolerated)  Device Recommended for Ambulation: Single Point Cane  Level of Assist Required for Transfers: Requires No Assist  Device Recommended for Transfers: Single Point Cane  Home Exercise Program: Refer to Home Exercise Program Handout for Details  It was a pleasure working with you! Best of luck on your continued recovery.  Sincerely,  Zaynab Wang PT, DPT

## 2020-02-16 NOTE — PROGRESS NOTES
Received patient on bed. Conscious coherent not in cp distress. Patient verbalized been having next pain. Patient requested roxicodone PO. Patient on cervical precaution. Patient verbalized no headache. Patient on Mod I. No dizziness no weakness. Patient verbalized will inform RN if dizzy with ambulation.

## 2020-02-17 VITALS
RESPIRATION RATE: 18 BRPM | SYSTOLIC BLOOD PRESSURE: 132 MMHG | BODY MASS INDEX: 30.77 KG/M2 | OXYGEN SATURATION: 97 % | TEMPERATURE: 98.6 F | DIASTOLIC BLOOD PRESSURE: 86 MMHG | HEIGHT: 71 IN | WEIGHT: 219.8 LBS | HEART RATE: 60 BPM

## 2020-02-17 PROBLEM — M79.89 PAIN AND SWELLING OF RIGHT LOWER EXTREMITY: Status: RESOLVED | Noted: 2018-12-06 | Resolved: 2020-02-17

## 2020-02-17 PROBLEM — R55 SYNCOPE: Status: RESOLVED | Noted: 2020-02-05 | Resolved: 2020-02-17

## 2020-02-17 PROBLEM — M79.604 PAIN AND SWELLING OF RIGHT LOWER EXTREMITY: Status: RESOLVED | Noted: 2018-12-06 | Resolved: 2020-02-17

## 2020-02-17 LAB
BASOPHILS # BLD AUTO: 0.9 % (ref 0–1.8)
BASOPHILS # BLD: 0.03 K/UL (ref 0–0.12)
EOSINOPHIL # BLD AUTO: 0.31 K/UL (ref 0–0.51)
EOSINOPHIL NFR BLD: 8.9 % (ref 0–6.9)
ERYTHROCYTE [DISTWIDTH] IN BLOOD BY AUTOMATED COUNT: 48.4 FL (ref 35.9–50)
HCT VFR BLD AUTO: 33.9 % (ref 42–52)
HGB BLD-MCNC: 11.7 G/DL (ref 14–18)
IMM GRANULOCYTES # BLD AUTO: 0.05 K/UL (ref 0–0.11)
IMM GRANULOCYTES NFR BLD AUTO: 1.4 % (ref 0–0.9)
LYMPHOCYTES # BLD AUTO: 0.77 K/UL (ref 1–4.8)
LYMPHOCYTES NFR BLD: 22 % (ref 22–41)
MCH RBC QN AUTO: 33.7 PG (ref 27–33)
MCHC RBC AUTO-ENTMCNC: 34.5 G/DL (ref 33.7–35.3)
MCV RBC AUTO: 97.7 FL (ref 81.4–97.8)
MONOCYTES # BLD AUTO: 0.3 K/UL (ref 0–0.85)
MONOCYTES NFR BLD AUTO: 8.6 % (ref 0–13.4)
NEUTROPHILS # BLD AUTO: 2.04 K/UL (ref 1.82–7.42)
NEUTROPHILS NFR BLD: 58.2 % (ref 44–72)
NRBC # BLD AUTO: 0 K/UL
NRBC BLD-RTO: 0 /100 WBC
PLATELET # BLD AUTO: 77 K/UL (ref 164–446)
PMV BLD AUTO: 10.9 FL (ref 9–12.9)
RBC # BLD AUTO: 3.47 M/UL (ref 4.7–6.1)
WBC # BLD AUTO: 3.5 K/UL (ref 4.8–10.8)

## 2020-02-17 PROCEDURE — A9270 NON-COVERED ITEM OR SERVICE: HCPCS | Performed by: HOSPITALIST

## 2020-02-17 PROCEDURE — 36415 COLL VENOUS BLD VENIPUNCTURE: CPT

## 2020-02-17 PROCEDURE — 700111 HCHG RX REV CODE 636 W/ 250 OVERRIDE (IP): Performed by: HOSPITALIST

## 2020-02-17 PROCEDURE — 700102 HCHG RX REV CODE 250 W/ 637 OVERRIDE(OP): Performed by: PHYSICAL MEDICINE & REHABILITATION

## 2020-02-17 PROCEDURE — 85025 COMPLETE CBC W/AUTO DIFF WBC: CPT

## 2020-02-17 PROCEDURE — 99232 SBSQ HOSP IP/OBS MODERATE 35: CPT | Performed by: HOSPITALIST

## 2020-02-17 PROCEDURE — A9270 NON-COVERED ITEM OR SERVICE: HCPCS | Performed by: PHYSICAL MEDICINE & REHABILITATION

## 2020-02-17 PROCEDURE — 700102 HCHG RX REV CODE 250 W/ 637 OVERRIDE(OP): Performed by: HOSPITALIST

## 2020-02-17 PROCEDURE — 99239 HOSP IP/OBS DSCHRG MGMT >30: CPT | Performed by: PHYSICAL MEDICINE & REHABILITATION

## 2020-02-17 RX ORDER — QUETIAPINE FUMARATE 200 MG/1
200 TABLET, FILM COATED ORAL
Qty: 90 TAB | Refills: 2 | Status: ON HOLD | OUTPATIENT
Start: 2020-02-17 | End: 2020-05-12

## 2020-02-17 RX ORDER — FERROUS SULFATE 325(65) MG
325 TABLET ORAL
Qty: 30 TAB | Refills: 2 | Status: SHIPPED | OUTPATIENT
Start: 2020-02-18 | End: 2020-06-16 | Stop reason: SDUPTHER

## 2020-02-17 RX ORDER — TRAZODONE HYDROCHLORIDE 50 MG/1
50 TABLET ORAL NIGHTLY PRN
Qty: 90 TAB | Refills: 2 | Status: ON HOLD | OUTPATIENT
Start: 2020-02-17 | End: 2020-05-12

## 2020-02-17 RX ORDER — GABAPENTIN 300 MG/1
600 CAPSULE ORAL 3 TIMES DAILY
Qty: 180 CAP | Refills: 2 | Status: SHIPPED | OUTPATIENT
Start: 2020-02-17 | End: 2020-05-11

## 2020-02-17 RX ORDER — OXYCODONE HYDROCHLORIDE 10 MG/1
10 TABLET ORAL EVERY 6 HOURS PRN
Qty: 21 TAB | Refills: 0 | Status: SHIPPED | OUTPATIENT
Start: 2020-02-17 | End: 2020-02-24

## 2020-02-17 RX ORDER — DULOXETIN HYDROCHLORIDE 60 MG/1
60 CAPSULE, DELAYED RELEASE ORAL DAILY
Qty: 90 CAP | Refills: 2 | Status: ON HOLD | OUTPATIENT
Start: 2020-02-17 | End: 2020-05-12

## 2020-02-17 RX ADMIN — GABAPENTIN 600 MG: 300 CAPSULE ORAL at 14:18

## 2020-02-17 RX ADMIN — OXYCODONE HYDROCHLORIDE 10 MG: 10 TABLET ORAL at 09:01

## 2020-02-17 RX ADMIN — ACETAMINOPHEN 1000 MG: 500 TABLET, FILM COATED ORAL at 09:01

## 2020-02-17 RX ADMIN — GABAPENTIN 600 MG: 300 CAPSULE ORAL at 09:01

## 2020-02-17 RX ADMIN — OXYCODONE HYDROCHLORIDE 10 MG: 10 TABLET ORAL at 00:15

## 2020-02-17 RX ADMIN — OXYCODONE HYDROCHLORIDE 10 MG: 10 TABLET ORAL at 13:10

## 2020-02-17 RX ADMIN — FERROUS SULFATE TAB 325 MG (65 MG ELEMENTAL FE) 325 MG: 325 (65 FE) TAB at 09:01

## 2020-02-17 RX ADMIN — SENNOSIDES AND DOCUSATE SODIUM 2 TABLET: 8.6; 5 TABLET ORAL at 09:01

## 2020-02-17 RX ADMIN — OXYCODONE HYDROCHLORIDE 10 MG: 10 TABLET ORAL at 05:46

## 2020-02-17 RX ADMIN — ACETAMINOPHEN 1000 MG: 500 TABLET, FILM COATED ORAL at 14:18

## 2020-02-17 RX ADMIN — OXYCODONE HYDROCHLORIDE AND ACETAMINOPHEN 500 MG: 500 TABLET ORAL at 09:01

## 2020-02-17 RX ADMIN — DULOXETINE HYDROCHLORIDE 60 MG: 30 CAPSULE, DELAYED RELEASE ORAL at 09:01

## 2020-02-17 RX ADMIN — TBO-FILGRASTIM 480 MCG: 480 INJECTION, SOLUTION SUBCUTANEOUS at 09:05

## 2020-02-17 RX ADMIN — TRAZODONE HYDROCHLORIDE 50 MG: 50 TABLET ORAL at 00:16

## 2020-02-17 ASSESSMENT — ENCOUNTER SYMPTOMS
NAUSEA: 0
SHORTNESS OF BREATH: 0
VOMITING: 0
ABDOMINAL PAIN: 0
DIARRHEA: 0
NERVOUS/ANXIOUS: 0
FEVER: 0
CHILLS: 0

## 2020-02-17 NOTE — DISCHARGE SUMMARY
Rehab Discharge Summary    Admission Date: 2/7/2020    Discharge Date: 2/17/2020    Attending Provider: Talia Shields MD/PhD    Admission Diagnosis:   Active Hospital Problems    Diagnosis   • *Cervical spine fracture (HCC)   • Mood disorder (HCC)   • Gout   • Hypertension   • Forehead laceration   • Normocytic anemia   • Tobacco abuse   • Pancytopenia (HCC)   • H/O cervical fracture       Discharge Diagnosis:  Active Hospital Problems    Diagnosis   • *Cervical spine fracture (HCC)   • Mood disorder (HCC)   • Gout   • Hypertension   • Forehead laceration   • Normocytic anemia   • Tobacco abuse   • Pancytopenia (HCC)   • H/O cervical fracture       HPI per H&P:  The patient is a 69 y.o. right hand dominant male with a past medical history of gout, hypertension, psychiatric disorder;  who presented on 2/4/2020  3:10 PM with neck pain status post ground-level fall.  Patient was sitting on the couch in felt nauseous and got up to go to the bathroom.  Next thing he knew he was on the ground with blood surrounding him.  Work-up in the emergency department discovered a C7 fracture, seen by neurosurgery and determined not to be surgical.  Patient is placed in a [place] J brace.  Patient also with right swollen knee post-fall. Patient was evaluated by PM&R and Dr. Brown aspirated blood aspirate on 2/6/20.     Patient was admitted to [facility] on 2/7/2020.     Hospital Course by Problem List:  C7 Fracture - Patient with syncopal event with C7 fracture managed non-operatively. In [place] collar. Patient underwent acute inpatient rehabilitation from 2/7/20 to 2/17/20 with good improvement in mobility and ADLs.      Right jaw pain - Most likely mechanical from fall. No fracture on imaging, continues to improve. May benefit from increase in Gabapentin (see below)     HTN - Patient on Lisinopril 20 mg. Previously on Amlodipine 5 mg. Consult hospitalist. 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 Continue Gabapentin 300 mg TID. Increase Gabapentin 400 mg TID.  Change Tylenol to 1000 mg TID  -Continue Tylenol and Gabapentin 400 mg TID     R Knee pain - s/p aspiration of blood products. With improved ROM and pain control     Leukopenia - Down to 1.9, improved on 2/10/20 after granix. Discontinue precautions     Depression - Patient on Duloxetine 60 mg daily, Seroquel 200 mg QHS, and Trazodone 50 mg QHS     DVT Ppx - Patient on Lovenox on transfer. Ambulating > 500 feet     Functional Status at Discharge  Eatin - Modified Independent  Eating Description:  Increased time  Groomin - Modified Independent  Grooming Description:  Increased time  Bathin - Modified Independent  Bathing Description:  Grab bar(Patient washed, rinsed and dried all body parts at mod I level  (while incorporating sitting and standing with use  of grab bar for safety). )  Upper Body Dressin - Modified Independent  Upper Body Dressing Description:  Increased time  Lower Body Dressin - Modified Independent  Lower Body Dressing Description:  6 - Modified Independent  Discharge Location : Home  Patient Discharging with Assist of: Friend  Level of Supervision Required: No Supervision  Recommended Equipment for Discharge: None  Recommended Services Upon Discharge: No Follow-Up Occupational Therapy Recommended  Long Term Goals Met: 2  Long Term Goals Not Met: 0  Criteria for Termination of Services: Maximum Function Achieved for Inpatient Rehabilitation  Walk:  6 - Modified Independent  Distance Walked:  Walks a minimum of 150 feet  Walk Description:  (400ft  x2 mod I no AD)  Wheelchair:  0 - Not tested, patient refused  Distance Propelled:  Propels a minimum of 150 feet   Wheelchair Description:  (Pt propells WC x150' with BUEs.)  Stairs 5 - Standby Prompting/Supervision or Set-up  Stairs Description(12  x 1 6\" " steps unilateral HR SPV)  Discharge Location: Relative / Friend's Home  Patient Discharging with Assist of: Friend  Level of Supervision Required Upon Discharge: No Supervision  Recommended Equipment for Discharge: Single Point Cane  Recommeded Services Upon Discharge: Home Health Physical Therapy  Long Term Goals Met: 2  Long Term Goals Not Met: 1  Reason(s) for Goals Not Met: Patient requires SPV d/t impaired balance on stairs  Criteria for Termination of Services: Maximum Function Achieved for Inpatient Rehabilitation  Comprehension Mode:  Auditory  Comprehension:  6 - Modified Independent  Comprehension Description:     Expression Mode:  Vocal  Expression:  6 - Modified Independent  Expression Description:     Social Interaction:  6 - Modified Independent  Social Interaction Description:     Problem Solvin - Standby Prompting/Supervision or Set-up  Problem Solving Description:     Memory:  6 - Modified Independent  Memory Description:          Talia PERSAUD M.D., personally performed a complete drug regimen review and no potential clinically significant medication issues were identified.   Discharge Medication:     Medication List      START taking these medications      Instructions   ferrous sulfate 325 (65 Fe) MG tablet  Start taking on:  2020   Take 1 Tab by mouth every morning with breakfast.  Dose:  325 mg        CHANGE how you take these medications      Instructions   gabapentin 300 MG Caps  What changed:  how much to take  Commonly known as:  NEURONTIN   Take 2 Caps by mouth 3 times a day.  Dose:  600 mg     oxyCODONE immediate release 10 MG immediate release tablet  What changed:    · medication strength  · how much to take  · when to take this  Commonly known as:  ROXICODONE   Take 1 Tab by mouth every 6 hours as needed for up to 7 days.  Dose:  10 mg     QUEtiapine 200 MG Tabs  What changed:  how much to take  Commonly known as:  SEROQUEL   Take 1 Tab by mouth every  bedtime.  Dose:  200 mg        CONTINUE taking these medications      Instructions   DULoxetine 60 MG Cpep delayed-release capsule  Commonly known as:  CYMBALTA   Take 1 Cap by mouth every day.  Dose:  60 mg     traZODone 50 MG Tabs  Commonly known as:  DESYREL   Take 1 Tab by mouth at bedtime as needed for Sleep.  Dose:  50 mg        STOP taking these medications    lisinopril 20 MG Tabs  Commonly known as:  PRINIVIL            Discharge Diet:  Regular    Discharge Activity:  As tolerated in C collar     Disposition:  Patient to discharge home with family support and community resources.     Equipment:  SPC    Follow-up & Discharge Instructions:  Follow up with your primary care provider (PCP) within 7-10 days of discharge to review your medications and take over your care.     If you develop chest pain, fever, chills, change in neurologic function (weakness, sensation changes, vision changes), or other concerning sxs, seek immediate medical attention or call 911.      Condition on Discharge:  Good    More than 32 minutes was spent on discharging this patient, including face-to-face time, prescription management, and the dictation of this note.    Talia Shields M.D.    Date of Service: 2/17/2020

## 2020-02-17 NOTE — DISCHARGE PLANNING
Patient will discharge today.  His roommate with pick him up at 2 pm.  We have contacted [facility] to confirm delivery of spc today.  Will follow.

## 2020-02-17 NOTE — DISCHARGE INSTRUCTIONS
[facility] NURSING DISCHARGE INSTRUCTIONS    Blood Pressure : 111/67  Weight: 99.7 kg (219 lb 12.8 oz)  Nursing recommendations for Chris Leggett at time of discharge are as follows:  Client and Family Member verbalized understanding of all discharge instructions and prescriptions.     Review all your home medications and newly ordered medications with your doctor and/or pharmacist. Follow medication instructions as directed by your doctor and/or pharmacist.    Pain Management:   Discharge Pain Medication Instructions:  Comfort Goal: Comfort at Rest  Notify your primary care provider if pain is unrelieved with these measures, if the pain is new, or increased in intensity.    Discharge Skin Characteristics: Warm, Dry  Discharge Skin Exam: Other (Comments)(redness in coccyx; forehead laceration-healing well)    Skin / Wound Care Instructions: Please contact your primary care physician for any change in skin integrity. 0    If You Have Surgical Incisions / Wounds:  Monitor surgical site(s) for signs of increased swelling, redness or symptoms of drainage from the site or fever as this could indicate signs and symptoms of infection. If these symptoms are noted, notifiy your primary care provider.      Discharge Safety Instructions: Should Not Be Left Alone In The House     Discharge Safety Concerns: History Of Falls, Unsteady Gait, Weakness  The interdisciplinary team has made recommendation that you should not be left alone  in the house due to history of falls, weakness and unsteady gait  Anti-embolic stockings are required during the day and off at night to increase circulation to the lower extremities.    Discharge Diet: Regular Dysphagia 3     Discharge Liquids: Thin liquids  Discharge Bowel Function:    Please contact your primary care physician for any changes in bowel habits.  Discharge Bowel Program:    Discharge Bladder Function:    Discharge Urinary Devices:        Nursing Discharge  Plan:   Smoking Cessation Offered: Patient Counseled  Influenza Vaccine Indication: Not indicated: Previously immunized this influenza season and > 8 years of age    Case Management Discharge Instructions:   Discharge Location:    Agency Name/Address/Phone:    Home Health:    Outpatient Services:    DME Provider/Phone:    Medical Equipment Ordered:    Prescription Faxed to:        Discharge Medication Instructions:  Below are the medications your physician expects you to take upon discharge:      Fall Prevention in Hospitals, Adult  WHAT ARE SOME SAFETY TIPS FOR PREVENTING FALLS?  If you or a loved one has to stay in the hospital, talk with the health care providers about the risk of falling. Find out which medicines or treatments can cause dizziness or affect balance. Make a plan with the health care providers to prevent falls. The plan may include these points:  · Ask for help moving around, especially after surgery or when feeling unwell.  · Have support available when getting up and moving around.  · Wear nonskid footwear.  · Use the safety straps on wheelchairs.  · Ask for help to get objects that are out of reach.  · Wear eyeglasses.  · Remove all clutter from the floor and the sides of the bed.  · Keep equipment and wires securely out of the way.  · Keep the bed locked in the low position.  · Keep the side rails up on the bed.  · Keep the nurse call button within reach.  · Keep the door open when no one else is in the room.  · Have someone stay in the hospital with you or your loved one.  · Ask for a bed alarm if you are not able to stay with your loved one who is at risk for getting up without help.  · Ask if sleeping pills or other medicines that alter mental status are necessary.  WHAT INCREASES THE RISK FOR FALLS?  Certain conditions and treatments may increase a patient's risk for falls in a hospital. These include:  · Being in an unfamiliar environment.  · Being on bed rest.  · Having surgery.  · Taking  certain medicines, such as sleeping pills.  · Having tubes in place, such as IV lines or catheters.  Additional risk factors for falls in a hospital include:  · Having vision problems.  · Having a change in thinking, feeling, or behavior (altered mental status).  · Having trouble with balance.  · Needing to use the toilet frequently.  · Having fallen in the past three months.  · Having low blood pressure when standing up quickly (orthostatic hypotension).  WHAT DOES THE HOSPITAL STAFF DO TO HELP PREVENT ME OR MY LOVED ONE FROM FALLING?  Hospitals have systems in place to prevent falls and accidents. Talk with the hospital staff about:  · Doing an assessment to discuss fall risks and create a personalized plan to prevent falls.  · Checking in regularly to see if help is needed for moving around and to assess any changes in fall risk.  · Knowing where the nurse call button is and how to use it. Use this to call a nursing care provider any time.  · Keeping personal items within reach. This includes eyeglasses, phones, and other electronic devices.  · Following general safety guidelines when moving around.  · Keeping the area around the bed free from clutter.  · Removing unnecessary equipment or tubes to reduce the risk of tripping.  · Using safety equipment, such as:  ¨ Walkers, crutches, and other walking devices for support.  ¨ Safety rails on the bed.  ¨ Safety straps in the bed.  ¨ A bed that can be lowered and locked to prevent movement.  ¨ Handrails in the bathroom.  ¨ Nonskid socks and shoes.  ¨ Locking mechanisms to secure equipment in place.  ¨ Lifting and transfer equipment.  WHAT CAN I DO TO HELP PREVENT A FALL?  · Talk with health care providers about fall prevention.  · Have a personalized fall prevention plan in place.  · Do not try to move around if you feel off balance or ill.  · Change position slowly.  · Sit on the side of the bed before standing up.  · Sit down and call for help if you feel dizzy or  unsteady when standing.  · Keep the hospital room clear of clutter.  WHEN SHOULD I ASK FOR HELP?  Ask for help whenever you:  · Are not sure if you are able to move around safely.  · Feel dizzy or unsteady.  · Are not comfortable helping your loved one move around or use the bathroom.  If you or a loved one falls, tell the hospital staff. This is important.  This information is not intended to replace advice given to you by your health care provider. Make sure you discuss any questions you have with your health care provider.  Document Released: 12/15/2001 Document Revised: 05/16/2017 Document Reviewed: 09/29/2016  [employer] Patient Education © 2017 [employer]    Depression / Suicide Risk    As you are discharged from this Critical access hospital facility, it is important to learn how to keep safe from harming yourself.    Recognize the warning signs:  · Abrupt changes in personality, positive or negative- including increase in energy   · Giving away possessions  · Change in eating patterns- significant weight changes-  positive or negative  · Change in sleeping patterns- unable to sleep or sleeping all the time   · Unwillingness or inability to communicate  · Depression  · Unusual sadness, discouragement and loneliness  · Talk of wanting to die  · Neglect of personal appearance   · Rebelliousness- reckless behavior  · Withdrawal from people/activities they love  · Confusion- inability to concentrate     If you or a loved one observes any of these behaviors or has concerns about self-harm, here's what you can do:  · Talk about it- your feelings and reasons for harming yourself  · Remove any means that you might use to hurt yourself (examples: pills, rope, extension cords, firearm)  · Get professional help from the community (Mental Health, Substance Abuse, psychological counseling)  · Do not be alone:Call your Safe Contact- someone whom you trust who will be there for you.  · Call your local CRISIS HOTLINE  095-6030 or 907-992-1591  · Call your local Children's Mobile Crisis Response Team [place] (319) 669-6061 or www.[employer]  · Call the toll free National Suicide Prevention Hotlines   · National Suicide Prevention Lifeline 878-453-NHDL (8567)  · [employer] Hope Line Network 800-SUICIDE (302-7384)        Occupational Therapy Discharge Instructions for Chris Leggett    2/16/2020    Level of Assist Required for Eating: Able to Complete Eating without Assist  Level of Assist Required for Grooming: Able to Complete Grooming without Assist  Level of Assist Required for Dressing: Able to Complete Dressing without Assist  Level of Assist Required for Toileting: Able to Complete Toileting without Assist  Level of Assist Required for Toilet Transfer: Able to Complete Toilet Transfer without Assist  Level of Assist Required for Bathing: Able to Complete Bathing without Assist  Equipment for Bathing: Shower Chair, Grab Bars in Tub / Shower  Level of Assist Required for Shower Transfer: Able to Complete Shower Transfer without Assist  Equipment for Shower Transfer: Grab Bars in Tub / Shower, Shower Chair  Level of Assist Required for Home Mgmt: Able to Complete Home Management without Assist  Level of Assist Required for Meal Prep: Able to Complete Meal Preparation without Assist  Driving: May not Drive, Please Contact Physician for Further Information

## 2020-02-17 NOTE — PROGRESS NOTES
Patient discharged to home per order.  Discharge instructions reviewed with patient and  they verbalize understanding and signed copies placed in chart.  Patient has all belongings; signed copy of form in chart.  Patient left facility at 1430

## 2020-02-17 NOTE — CARE PLAN
Problem: Safety  Goal: Will remain free from injury  Outcome: PROGRESSING AS EXPECTED  Note: Transfers mod I in the room using a cane. He calls appropriately when in need of assistance.     Problem: Pain Management  Goal: Pain level will decrease to patient's comfort goal  Outcome: PROGRESSING AS EXPECTED  Note: Roxicodone 10mg given PRN per MAR for c/o right jaw pain and headache with moderate relief. Pt sleeps good with scheduled trazodone. Will continue to monitor.

## 2020-02-17 NOTE — DISCHARGE PLANNING
Flowsheets      02/17/20  0838   Discharge Instructions - Completed by Case Mgmt   Discharge Location Home with Home Health   Agency Name / Address / Phone [facility] at [phone] (They will call you to schedule visits)   Home Health Registered Nurse; Occupational Therapist; Physical Therapist   Medical equipment Provider / Phone [facility] at [phone]   Medical Equipment Ordered Cane            Follow-up With  Details  Why  Contact Info   [facility] (Primary Care)  On 2/24/2020 Monday-check in at 2:30 for 2:40 appointment. (records will be faxed)  [address]  [place] [id number] [phone]     Jimi Cee M.D.(Neurosurgery)    I have requested follow up appointment. You will recieve a phone call with this information.      [address]  [place] [id number] [phone]     [facility] (Hematology Consultations)    We have requested outpatient hematology consult. You will recieve a phone call with appointment information once records reviewed.  [address]  [place] [id number] [phone]

## 2020-02-17 NOTE — PROGRESS NOTES
Hospital Medicine Daily Progress Note      Chief Complaint:  Hypertension  Pancytopenia    Interval History:  No significant events or changes since last visit    Review of Systems  Review of Systems   Constitutional: Negative for chills and fever.   Respiratory: Negative for shortness of breath.    Cardiovascular: Negative for chest pain.   Gastrointestinal: Negative for abdominal pain, diarrhea, nausea and vomiting.   Psychiatric/Behavioral: The patient is not nervous/anxious.         Physical Exam  Temp:  [36.2 °C (97.1 °F)-36.8 °C (98.2 °F)] 36.2 °C (97.1 °F)  Pulse:  [66-96] 96  Resp:  [18-19] 18  BP: (111-135)/(67-88) 135/88  SpO2:  [90 %-98 %] 90 %    Physical Exam  Vitals signs and nursing note reviewed.   Constitutional:       Appearance: Normal appearance.   HENT:      Head: Atraumatic.   Eyes:      Conjunctiva/sclera: Conjunctivae normal.      Pupils: Pupils are equal, round, and reactive to light.   Neck:      Musculoskeletal: Normal range of motion and neck supple.      Comments: Has C-collar  Cardiovascular:      Rate and Rhythm: Normal rate and regular rhythm.      Heart sounds: No murmur.   Pulmonary:      Effort: Pulmonary effort is normal.      Breath sounds: No wheezing or rales.   Abdominal:      General: There is no distension.      Palpations: Abdomen is soft.      Tenderness: There is no abdominal tenderness.   Musculoskeletal:      Right lower leg: No edema.      Left lower leg: No edema.   Skin:     General: Skin is warm and dry.      Findings: No rash.   Neurological:      Mental Status: He is alert and oriented to person, place, and time.   Psychiatric:         Mood and Affect: Mood normal.         Behavior: Behavior normal.         Fluids    Intake/Output Summary (Last 24 hours) at 2/17/2020 0756  Last data filed at 2/17/2020 0547  Gross per 24 hour   Intake 1320 ml   Output 2500 ml   Net -1180 ml       Laboratory  Recent Labs     02/17/20  0539   WBC 3.5*   RBC 3.47*   HEMOGLOBIN 11.7*    HEMATOCRIT 33.9*   MCV 97.7   MCH 33.7*   MCHC 34.5   RDW 48.4   PLATELETCT 77*   MPV 10.9                     Assessment/Plan  * Cervical spine fracture (HCC)- (present on admission)  Assessment & Plan  2nd to GLF from syncopal episode  Sustained acute C7 fracture -- non-surgical mangement  Cervical collar  Note: has prior hx of cervical fusion    Syncope- (present on admission)  Assessment & Plan  Work-up negative per [facility] discharge Summary by Dr. Miner    Pain and swelling of right lower extremity- (present on admission)  Assessment & Plan  Right knee traumatic hemarthrosis  2nd to GLF  S/P joint aspiration with removal of blood    Mood disorder (HCC)- (present on admission)  Assessment & Plan  On Seroquel    Pancytopenia (HCC)- (present on admission)  Assessment & Plan  WBC's: 1.9 (2.9) --> 7.5 (2/10) --> 2.2 (2/14) --> 3.5 (2/17)  H&H: mild stable  Platelets stable   Appears chronic and stable since at least September 2018  Vit B12, Folate, and TSH levels all normal  S/P Granix (2/9)  S/P Granix (2/14)  Will give Granix (2/17)  D/W CM -- set up Hematology consult for out patient  Monitor levels    Gout- (present on admission)  Assessment & Plan  On no meds  Monitor for acute flare-up    Hypertension- (present on admission)  Assessment & Plan  BP ok  Off Lisinopril  Currently not on any hypertensive meds  Monitor

## 2020-02-18 ENCOUNTER — HOME CARE VISIT (OUTPATIENT)
Dept: HOME HEALTH SERVICES | Facility: HOME HEALTHCARE | Age: 70
End: 2020-02-18
Payer: MEDICARE

## 2020-02-18 VITALS
RESPIRATION RATE: 16 BRPM | DIASTOLIC BLOOD PRESSURE: 96 MMHG | SYSTOLIC BLOOD PRESSURE: 138 MMHG | BODY MASS INDEX: 30.78 KG/M2 | TEMPERATURE: 98.1 F | HEART RATE: 64 BPM | OXYGEN SATURATION: 98 % | HEIGHT: 70 IN | WEIGHT: 215 LBS

## 2020-02-18 PROCEDURE — 665999 HH PPS REVENUE DEBIT

## 2020-02-18 PROCEDURE — 665998 HH PPS REVENUE CREDIT

## 2020-02-18 PROCEDURE — 665001 SOC-HOME HEALTH

## 2020-02-18 PROCEDURE — G0493 RN CARE EA 15 MIN HH/HOSPICE: HCPCS

## 2020-02-18 ASSESSMENT — ENCOUNTER SYMPTOMS
VOMITING: DENIES
NAUSEA: DENIES

## 2020-02-18 ASSESSMENT — PATIENT HEALTH QUESTIONNAIRE - PHQ9
1. LITTLE INTEREST OR PLEASURE IN DOING THINGS: 01
2. FEELING DOWN, DEPRESSED, IRRITABLE, OR HOPELESS: 01
SUM OF ALL RESPONSES TO PHQ QUESTIONS 1-9: 4
5. POOR APPETITE OR OVEREATING: 0 - NOT AT ALL
CLINICAL INTERPRETATION OF PHQ2 SCORE: 2

## 2020-02-18 NOTE — DISCHARGE PLANNING
Case management Summary:   Met with patient and his roommate prior to discharge. Reviewed all follow up appointments. [facility] and they have accepted referral and are ready to follow.  [facility] has delivered a spc to patient.  I have discussed with patient's roommate, change to Medicaid from [employer].  I provided pill dispensing device information, Medicaid information and housing information.  During hospitalization, I have provided support and education and have been available for questions and information during hours of operation, communicated with therapy team and MD along with providing links/resources  to outside services.    Patient verbalizes agreement with all plans and has an understanding of the next steps within the post acute services.        CASE MANAGEMENT PLAN OF CARE   Individualized Goals:   1. To get home   2. Patient would like a cane  3. Set up appointment with Primary Care      Outcome:   1. Met: he is discharging with his roommate.  2. Met; Spc has been ordered and delivered.  3. Met; this has also been scheduled.

## 2020-02-19 PROCEDURE — 665999 HH PPS REVENUE DEBIT

## 2020-02-19 PROCEDURE — 665998 HH PPS REVENUE CREDIT

## 2020-02-20 ENCOUNTER — HOME CARE VISIT (OUTPATIENT)
Dept: HOME HEALTH SERVICES | Facility: HOME HEALTHCARE | Age: 70
End: 2020-02-20
Payer: MEDICARE

## 2020-02-20 ENCOUNTER — ANTICOAGULATION MONITORING (OUTPATIENT)
Dept: MEDICAL GROUP | Facility: PHYSICIAN GROUP | Age: 70
End: 2020-02-20

## 2020-02-20 VITALS
TEMPERATURE: 98.2 F | OXYGEN SATURATION: 92 % | HEART RATE: 66 BPM | DIASTOLIC BLOOD PRESSURE: 100 MMHG | RESPIRATION RATE: 18 BRPM | SYSTOLIC BLOOD PRESSURE: 150 MMHG

## 2020-02-20 PROCEDURE — 665998 HH PPS REVENUE CREDIT

## 2020-02-20 PROCEDURE — G0493 RN CARE EA 15 MIN HH/HOSPICE: HCPCS

## 2020-02-20 PROCEDURE — 665999 HH PPS REVENUE DEBIT

## 2020-02-20 ASSESSMENT — ENCOUNTER SYMPTOMS
ARTHRALGIAS: 1
DEBILITATING PAIN: 1
POOR JUDGMENT: 1

## 2020-02-20 NOTE — PROGRESS NOTES
Received referral from [facility]. Medications reviewed. No clinically significant interactions noted.     Continue to work with psychiatry as some of his medications have the potential to interact although they are commonly used together in many patients.    Current QTc interval 404-based on EKG result from 2/4/2020.      Sterling Romero, PharmD, MS, BCACP, [facility]    This note was created using voice recognition software (Dragon). The accuracy of the dictation is limited by the abilities of the software. I have reviewed the note prior to signing, however some errors in grammar and context are still possible. If you have any questions related to this note please do not hesitate to contact our office.

## 2020-02-21 PROCEDURE — 665998 HH PPS REVENUE CREDIT

## 2020-02-21 PROCEDURE — 665999 HH PPS REVENUE DEBIT

## 2020-02-22 PROCEDURE — 665999 HH PPS REVENUE DEBIT

## 2020-02-22 PROCEDURE — 665998 HH PPS REVENUE CREDIT

## 2020-02-23 PROCEDURE — 665998 HH PPS REVENUE CREDIT

## 2020-02-23 PROCEDURE — 665999 HH PPS REVENUE DEBIT

## 2020-02-24 ENCOUNTER — HOME CARE VISIT (OUTPATIENT)
Dept: HOME HEALTH SERVICES | Facility: HOME HEALTHCARE | Age: 70
End: 2020-02-24
Payer: MEDICARE

## 2020-02-24 VITALS
RESPIRATION RATE: 16 BRPM | TEMPERATURE: 98.1 F | OXYGEN SATURATION: 95 % | SYSTOLIC BLOOD PRESSURE: 148 MMHG | DIASTOLIC BLOOD PRESSURE: 95 MMHG | HEART RATE: 83 BPM

## 2020-02-24 PROCEDURE — 665999 HH PPS REVENUE DEBIT

## 2020-02-24 PROCEDURE — G0151 HHCP-SERV OF PT,EA 15 MIN: HCPCS

## 2020-02-24 PROCEDURE — 665998 HH PPS REVENUE CREDIT

## 2020-02-24 ASSESSMENT — ACTIVITIES OF DAILY LIVING (ADL): AMBULATION ASSISTANCE ON FLAT SURFACES: 1

## 2020-02-24 ASSESSMENT — ENCOUNTER SYMPTOMS
MUSCLE WEAKNESS: 1
LIMITED RANGE OF MOTION: 1
ARTHRALGIAS: 1
DEBILITATING PAIN: 1

## 2020-02-25 ENCOUNTER — HOME CARE VISIT (OUTPATIENT)
Dept: HOME HEALTH SERVICES | Facility: HOME HEALTHCARE | Age: 70
End: 2020-02-25
Payer: MEDICARE

## 2020-02-25 VITALS
RESPIRATION RATE: 18 BRPM | TEMPERATURE: 97.4 F | OXYGEN SATURATION: 95 % | HEART RATE: 63 BPM | SYSTOLIC BLOOD PRESSURE: 140 MMHG | DIASTOLIC BLOOD PRESSURE: 90 MMHG

## 2020-02-25 PROCEDURE — 665999 HH PPS REVENUE DEBIT

## 2020-02-25 PROCEDURE — G0493 RN CARE EA 15 MIN HH/HOSPICE: HCPCS

## 2020-02-25 PROCEDURE — 665998 HH PPS REVENUE CREDIT

## 2020-02-25 ASSESSMENT — ENCOUNTER SYMPTOMS
POOR JUDGMENT: 1
MUSCLE WEAKNESS: 1

## 2020-02-26 ENCOUNTER — HOME CARE VISIT (OUTPATIENT)
Dept: HOME HEALTH SERVICES | Facility: HOME HEALTHCARE | Age: 70
End: 2020-02-26
Payer: MEDICARE

## 2020-02-26 VITALS
OXYGEN SATURATION: 96 % | TEMPERATURE: 97.6 F | HEART RATE: 68 BPM | SYSTOLIC BLOOD PRESSURE: 132 MMHG | RESPIRATION RATE: 18 BRPM | DIASTOLIC BLOOD PRESSURE: 65 MMHG

## 2020-02-26 PROCEDURE — 665999 HH PPS REVENUE DEBIT

## 2020-02-26 PROCEDURE — 665998 HH PPS REVENUE CREDIT

## 2020-02-26 PROCEDURE — G0151 HHCP-SERV OF PT,EA 15 MIN: HCPCS

## 2020-02-26 ASSESSMENT — ACTIVITIES OF DAILY LIVING (ADL): AMBULATION ASSISTANCE ON FLAT SURFACES: 1

## 2020-02-27 PROCEDURE — 665998 HH PPS REVENUE CREDIT

## 2020-02-27 PROCEDURE — 665999 HH PPS REVENUE DEBIT

## 2020-02-27 ASSESSMENT — ACTIVITIES OF DAILY LIVING (ADL): OASIS_M1830: 03

## 2020-02-28 ENCOUNTER — HOME CARE VISIT (OUTPATIENT)
Dept: HOME HEALTH SERVICES | Facility: HOME HEALTHCARE | Age: 70
End: 2020-02-28
Payer: MEDICARE

## 2020-02-28 PROCEDURE — 665999 HH PPS REVENUE DEBIT

## 2020-02-28 PROCEDURE — 665998 HH PPS REVENUE CREDIT

## 2020-02-29 PROCEDURE — 665998 HH PPS REVENUE CREDIT

## 2020-02-29 PROCEDURE — 665999 HH PPS REVENUE DEBIT

## 2020-03-01 PROCEDURE — 665999 HH PPS REVENUE DEBIT

## 2020-03-01 PROCEDURE — 665998 HH PPS REVENUE CREDIT

## 2020-03-02 ENCOUNTER — HOME CARE VISIT (OUTPATIENT)
Dept: HOME HEALTH SERVICES | Facility: HOME HEALTHCARE | Age: 70
End: 2020-03-02
Payer: MEDICARE

## 2020-03-02 PROCEDURE — 665998 HH PPS REVENUE CREDIT

## 2020-03-02 PROCEDURE — G0495 RN CARE TRAIN/EDU IN HH: HCPCS

## 2020-03-02 PROCEDURE — 665999 HH PPS REVENUE DEBIT

## 2020-03-03 VITALS
RESPIRATION RATE: 18 BRPM | OXYGEN SATURATION: 94 % | SYSTOLIC BLOOD PRESSURE: 128 MMHG | DIASTOLIC BLOOD PRESSURE: 80 MMHG | HEART RATE: 82 BPM | TEMPERATURE: 97.6 F

## 2020-03-03 PROCEDURE — 665999 HH PPS REVENUE DEBIT

## 2020-03-03 PROCEDURE — 665998 HH PPS REVENUE CREDIT

## 2020-03-03 ASSESSMENT — ENCOUNTER SYMPTOMS: POOR JUDGMENT: 1

## 2020-03-04 ENCOUNTER — HOME CARE VISIT (OUTPATIENT)
Dept: HOME HEALTH SERVICES | Facility: HOME HEALTHCARE | Age: 70
End: 2020-03-04
Payer: MEDICARE

## 2020-03-04 PROCEDURE — 665998 HH PPS REVENUE CREDIT

## 2020-03-04 PROCEDURE — 665999 HH PPS REVENUE DEBIT

## 2020-03-05 PROCEDURE — 665998 HH PPS REVENUE CREDIT

## 2020-03-05 PROCEDURE — 665999 HH PPS REVENUE DEBIT

## 2020-03-05 NOTE — PROGRESS NOTES
Anirudh called back to state he made an appt to see his PCP at [facility] 3/9 at 4pm

## 2020-03-06 ENCOUNTER — HOME CARE VISIT (OUTPATIENT)
Dept: HOME HEALTH SERVICES | Facility: HOME HEALTHCARE | Age: 70
End: 2020-03-06
Payer: MEDICARE

## 2020-03-06 VITALS
SYSTOLIC BLOOD PRESSURE: 128 MMHG | HEART RATE: 69 BPM | DIASTOLIC BLOOD PRESSURE: 60 MMHG | TEMPERATURE: 97.8 F | RESPIRATION RATE: 16 BRPM | OXYGEN SATURATION: 96 %

## 2020-03-06 PROCEDURE — 665998 HH PPS REVENUE CREDIT

## 2020-03-06 PROCEDURE — G0493 RN CARE EA 15 MIN HH/HOSPICE: HCPCS

## 2020-03-06 PROCEDURE — 665999 HH PPS REVENUE DEBIT

## 2020-03-06 ASSESSMENT — ENCOUNTER SYMPTOMS: MUSCLE WEAKNESS: 1

## 2020-03-07 PROCEDURE — 665998 HH PPS REVENUE CREDIT

## 2020-03-07 PROCEDURE — 665999 HH PPS REVENUE DEBIT

## 2020-03-08 PROCEDURE — 665999 HH PPS REVENUE DEBIT

## 2020-03-08 PROCEDURE — 665998 HH PPS REVENUE CREDIT

## 2020-03-09 PROCEDURE — 665998 HH PPS REVENUE CREDIT

## 2020-03-09 PROCEDURE — 665999 HH PPS REVENUE DEBIT

## 2020-03-10 ENCOUNTER — HOME CARE VISIT (OUTPATIENT)
Dept: HOME HEALTH SERVICES | Facility: HOME HEALTHCARE | Age: 70
End: 2020-03-10
Payer: MEDICARE

## 2020-03-10 PROCEDURE — G0495 RN CARE TRAIN/EDU IN HH: HCPCS

## 2020-03-10 PROCEDURE — 665999 HH PPS REVENUE DEBIT

## 2020-03-10 PROCEDURE — 665998 HH PPS REVENUE CREDIT

## 2020-03-10 ASSESSMENT — ENCOUNTER SYMPTOMS: POOR JUDGMENT: 1

## 2020-03-11 PROCEDURE — 665998 HH PPS REVENUE CREDIT

## 2020-03-11 PROCEDURE — 665999 HH PPS REVENUE DEBIT

## 2020-03-12 PROCEDURE — 665999 HH PPS REVENUE DEBIT

## 2020-03-12 PROCEDURE — 665998 HH PPS REVENUE CREDIT

## 2020-03-13 ENCOUNTER — HOME CARE VISIT (OUTPATIENT)
Dept: HOME HEALTH SERVICES | Facility: HOME HEALTHCARE | Age: 70
End: 2020-03-13
Payer: MEDICARE

## 2020-03-13 VITALS
TEMPERATURE: 97.5 F | SYSTOLIC BLOOD PRESSURE: 140 MMHG | OXYGEN SATURATION: 95 % | DIASTOLIC BLOOD PRESSURE: 100 MMHG | RESPIRATION RATE: 18 BRPM | HEART RATE: 73 BPM

## 2020-03-13 PROCEDURE — G0493 RN CARE EA 15 MIN HH/HOSPICE: HCPCS

## 2020-03-13 PROCEDURE — 665998 HH PPS REVENUE CREDIT

## 2020-03-13 PROCEDURE — 665999 HH PPS REVENUE DEBIT

## 2020-03-13 ASSESSMENT — PATIENT HEALTH QUESTIONNAIRE - PHQ9
CLINICAL INTERPRETATION OF PHQ2 SCORE: 3
5. POOR APPETITE OR OVEREATING: 0 - NOT AT ALL
SUM OF ALL RESPONSES TO PHQ QUESTIONS 1-9: 9

## 2020-03-13 ASSESSMENT — ACTIVITIES OF DAILY LIVING (ADL)
OASIS_M1830: 00
HOME_HEALTH_OASIS: 00

## 2020-03-13 ASSESSMENT — ENCOUNTER SYMPTOMS: DEPRESSED MOOD: 1

## 2020-03-14 PROCEDURE — 665999 HH PPS REVENUE DEBIT

## 2020-03-14 PROCEDURE — 665998 HH PPS REVENUE CREDIT

## 2020-03-15 PROCEDURE — 665998 HH PPS REVENUE CREDIT

## 2020-03-15 PROCEDURE — 665999 HH PPS REVENUE DEBIT

## 2020-03-16 PROCEDURE — 665999 HH PPS REVENUE DEBIT

## 2020-03-16 PROCEDURE — 665998 HH PPS REVENUE CREDIT

## 2020-03-17 PROCEDURE — 665998 HH PPS REVENUE CREDIT

## 2020-03-17 PROCEDURE — 665999 HH PPS REVENUE DEBIT

## 2020-03-18 PROCEDURE — 665998 HH PPS REVENUE CREDIT

## 2020-03-18 PROCEDURE — 665999 HH PPS REVENUE DEBIT

## 2020-05-11 ENCOUNTER — APPOINTMENT (OUTPATIENT)
Dept: RADIOLOGY | Facility: MEDICAL CENTER | Age: 70
DRG: 392 | End: 2020-05-11
Attending: EMERGENCY MEDICINE
Payer: MEDICARE

## 2020-05-11 ENCOUNTER — HOSPITAL ENCOUNTER (INPATIENT)
Facility: MEDICAL CENTER | Age: 70
LOS: 3 days | DRG: 392 | End: 2020-05-14
Attending: EMERGENCY MEDICINE | Admitting: HOSPITALIST
Payer: MEDICARE

## 2020-05-11 DIAGNOSIS — R11.2 INTRACTABLE VOMITING WITH NAUSEA, UNSPECIFIED VOMITING TYPE: ICD-10-CM

## 2020-05-11 DIAGNOSIS — K29.80 DUODENITIS: ICD-10-CM

## 2020-05-11 DIAGNOSIS — E86.0 DEHYDRATION: ICD-10-CM

## 2020-05-11 DIAGNOSIS — R06.02 SHORTNESS OF BREATH: ICD-10-CM

## 2020-05-11 LAB
ALBUMIN SERPL BCP-MCNC: 4.2 G/DL (ref 3.2–4.9)
ALBUMIN/GLOB SERPL: 1.2 G/DL
ALP SERPL-CCNC: 57 U/L (ref 30–99)
ALT SERPL-CCNC: 25 U/L (ref 2–50)
ANION GAP SERPL CALC-SCNC: 14 MMOL/L (ref 7–16)
APPEARANCE UR: CLEAR
APTT PPP: 28 SEC (ref 24.7–36)
AST SERPL-CCNC: 58 U/L (ref 12–45)
BASOPHILS # BLD AUTO: 0.6 % (ref 0–1.8)
BASOPHILS # BLD: 0.04 K/UL (ref 0–0.12)
BILIRUB SERPL-MCNC: 0.4 MG/DL (ref 0.1–1.5)
BILIRUB UR QL STRIP.AUTO: NEGATIVE
BUN SERPL-MCNC: 9 MG/DL (ref 8–22)
CALCIUM SERPL-MCNC: 10.5 MG/DL (ref 8.4–10.2)
CHLORIDE SERPL-SCNC: 96 MMOL/L (ref 96–112)
CK SERPL-CCNC: 309 U/L (ref 0–154)
CO2 SERPL-SCNC: 23 MMOL/L (ref 20–33)
COLOR UR: YELLOW
COVID ORDER STATUS COVID19: NORMAL
CREAT SERPL-MCNC: 0.78 MG/DL (ref 0.5–1.4)
CRP SERPL HS-MCNC: 1.01 MG/DL (ref 0–0.75)
D DIMER PPP IA.FEU-MCNC: 2.83 UG/ML (FEU) (ref 0–0.5)
EKG IMPRESSION: NORMAL
EOSINOPHIL # BLD AUTO: 0.1 K/UL (ref 0–0.51)
EOSINOPHIL NFR BLD: 1.4 % (ref 0–6.9)
ERYTHROCYTE [DISTWIDTH] IN BLOOD BY AUTOMATED COUNT: 46.5 FL (ref 35.9–50)
ERYTHROCYTE [SEDIMENTATION RATE] IN BLOOD BY WESTERGREN METHOD: 25 MM/HOUR (ref 0–20)
FERRITIN SERPL-MCNC: 127 NG/ML (ref 22–322)
GLOBULIN SER CALC-MCNC: 3.4 G/DL (ref 1.9–3.5)
GLUCOSE SERPL-MCNC: 90 MG/DL (ref 65–99)
GLUCOSE UR STRIP.AUTO-MCNC: NEGATIVE MG/DL
HCT VFR BLD AUTO: 41.6 % (ref 42–52)
HGB BLD-MCNC: 14.4 G/DL (ref 14–18)
IMM GRANULOCYTES # BLD AUTO: 0.02 K/UL (ref 0–0.11)
IMM GRANULOCYTES NFR BLD AUTO: 0.3 % (ref 0–0.9)
INR PPP: 1.03 (ref 0.87–1.13)
KETONES UR STRIP.AUTO-MCNC: ABNORMAL MG/DL
LACTATE BLD-SCNC: 2.1 MMOL/L (ref 0.5–2)
LACTATE BLD-SCNC: 2.2 MMOL/L (ref 0.5–2)
LDH SERPL L TO P-CCNC: 155 U/L (ref 107–266)
LEUKOCYTE ESTERASE UR QL STRIP.AUTO: NEGATIVE
LIPASE SERPL-CCNC: 60 U/L (ref 7–58)
LYMPHOCYTES # BLD AUTO: 1.42 K/UL (ref 1–4.8)
LYMPHOCYTES NFR BLD: 19.9 % (ref 22–41)
MAGNESIUM SERPL-MCNC: 1.8 MG/DL (ref 1.5–2.5)
MCH RBC QN AUTO: 32.8 PG (ref 27–33)
MCHC RBC AUTO-ENTMCNC: 34.6 G/DL (ref 33.7–35.3)
MCV RBC AUTO: 94.8 FL (ref 81.4–97.8)
MICRO URNS: ABNORMAL
MONOCYTES # BLD AUTO: 0.43 K/UL (ref 0–0.85)
MONOCYTES NFR BLD AUTO: 6 % (ref 0–13.4)
NEUTROPHILS # BLD AUTO: 5.13 K/UL (ref 1.82–7.42)
NEUTROPHILS NFR BLD: 71.8 % (ref 44–72)
NITRITE UR QL STRIP.AUTO: NEGATIVE
NRBC # BLD AUTO: 0 K/UL
NRBC BLD-RTO: 0 /100 WBC
NT-PROBNP SERPL IA-MCNC: 710 PG/ML (ref 0–125)
PH UR STRIP.AUTO: 7 [PH] (ref 5–8)
PHOSPHATE SERPL-MCNC: 3.1 MG/DL (ref 2.5–4.5)
PLATELET # BLD AUTO: 102 K/UL (ref 164–446)
PMV BLD AUTO: 11.2 FL (ref 9–12.9)
POTASSIUM SERPL-SCNC: 3.9 MMOL/L (ref 3.6–5.5)
PROCALCITONIN SERPL-MCNC: 0.03 NG/ML
PROT SERPL-MCNC: 7.6 G/DL (ref 6–8.2)
PROT UR QL STRIP: NEGATIVE MG/DL
PROTHROMBIN TIME: 13.6 SEC (ref 12–14.6)
RBC # BLD AUTO: 4.39 M/UL (ref 4.7–6.1)
RBC UR QL AUTO: NEGATIVE
SARS-COV-2 RNA RESP QL NAA+PROBE: NOTDETECTED
SODIUM SERPL-SCNC: 133 MMOL/L (ref 135–145)
SP GR UR STRIP.AUTO: 1.01
SPECIMEN SOURCE: NORMAL
TROPONIN T SERPL-MCNC: 17 NG/L (ref 6–19)
WBC # BLD AUTO: 7.1 K/UL (ref 4.8–10.8)

## 2020-05-11 PROCEDURE — 82728 ASSAY OF FERRITIN: CPT

## 2020-05-11 PROCEDURE — 700111 HCHG RX REV CODE 636 W/ 250 OVERRIDE (IP): Performed by: EMERGENCY MEDICINE

## 2020-05-11 PROCEDURE — 83690 ASSAY OF LIPASE: CPT

## 2020-05-11 PROCEDURE — 87040 BLOOD CULTURE FOR BACTERIA: CPT | Mod: 91

## 2020-05-11 PROCEDURE — 96376 TX/PRO/DX INJ SAME DRUG ADON: CPT

## 2020-05-11 PROCEDURE — 770006 HCHG ROOM/CARE - MED/SURG/GYN SEMI*

## 2020-05-11 PROCEDURE — 81003 URINALYSIS AUTO W/O SCOPE: CPT

## 2020-05-11 PROCEDURE — 83605 ASSAY OF LACTIC ACID: CPT | Mod: 91

## 2020-05-11 PROCEDURE — 99223 1ST HOSP IP/OBS HIGH 75: CPT | Mod: AI | Performed by: HOSPITALIST

## 2020-05-11 PROCEDURE — U0004 COV-19 TEST NON-CDC HGH THRU: HCPCS

## 2020-05-11 PROCEDURE — 96365 THER/PROPH/DIAG IV INF INIT: CPT

## 2020-05-11 PROCEDURE — 84100 ASSAY OF PHOSPHORUS: CPT

## 2020-05-11 PROCEDURE — 93005 ELECTROCARDIOGRAM TRACING: CPT | Performed by: EMERGENCY MEDICINE

## 2020-05-11 PROCEDURE — 700105 HCHG RX REV CODE 258: Performed by: EMERGENCY MEDICINE

## 2020-05-11 PROCEDURE — 71045 X-RAY EXAM CHEST 1 VIEW: CPT

## 2020-05-11 PROCEDURE — 83880 ASSAY OF NATRIURETIC PEPTIDE: CPT

## 2020-05-11 PROCEDURE — 83615 LACTATE (LD) (LDH) ENZYME: CPT

## 2020-05-11 PROCEDURE — 84145 PROCALCITONIN (PCT): CPT

## 2020-05-11 PROCEDURE — 99285 EMERGENCY DEPT VISIT HI MDM: CPT

## 2020-05-11 PROCEDURE — 74176 CT ABD & PELVIS W/O CONTRAST: CPT

## 2020-05-11 PROCEDURE — 96367 TX/PROPH/DG ADDL SEQ IV INF: CPT

## 2020-05-11 PROCEDURE — 85610 PROTHROMBIN TIME: CPT

## 2020-05-11 PROCEDURE — 700111 HCHG RX REV CODE 636 W/ 250 OVERRIDE (IP): Performed by: HOSPITALIST

## 2020-05-11 PROCEDURE — 84484 ASSAY OF TROPONIN QUANT: CPT

## 2020-05-11 PROCEDURE — 36415 COLL VENOUS BLD VENIPUNCTURE: CPT

## 2020-05-11 PROCEDURE — 83520 IMMUNOASSAY QUANT NOS NONAB: CPT

## 2020-05-11 PROCEDURE — 86140 C-REACTIVE PROTEIN: CPT

## 2020-05-11 PROCEDURE — 96375 TX/PRO/DX INJ NEW DRUG ADDON: CPT

## 2020-05-11 PROCEDURE — A9270 NON-COVERED ITEM OR SERVICE: HCPCS | Performed by: HOSPITALIST

## 2020-05-11 PROCEDURE — 700101 HCHG RX REV CODE 250: Performed by: EMERGENCY MEDICINE

## 2020-05-11 PROCEDURE — A9270 NON-COVERED ITEM OR SERVICE: HCPCS | Performed by: EMERGENCY MEDICINE

## 2020-05-11 PROCEDURE — 700102 HCHG RX REV CODE 250 W/ 637 OVERRIDE(OP): Performed by: HOSPITALIST

## 2020-05-11 PROCEDURE — 700102 HCHG RX REV CODE 250 W/ 637 OVERRIDE(OP): Performed by: EMERGENCY MEDICINE

## 2020-05-11 PROCEDURE — 85652 RBC SED RATE AUTOMATED: CPT

## 2020-05-11 PROCEDURE — 93005 ELECTROCARDIOGRAM TRACING: CPT

## 2020-05-11 PROCEDURE — 85379 FIBRIN DEGRADATION QUANT: CPT

## 2020-05-11 PROCEDURE — 85025 COMPLETE CBC W/AUTO DIFF WBC: CPT

## 2020-05-11 PROCEDURE — 85730 THROMBOPLASTIN TIME PARTIAL: CPT

## 2020-05-11 PROCEDURE — 83735 ASSAY OF MAGNESIUM: CPT

## 2020-05-11 PROCEDURE — 700105 HCHG RX REV CODE 258: Performed by: HOSPITALIST

## 2020-05-11 PROCEDURE — 80053 COMPREHEN METABOLIC PANEL: CPT

## 2020-05-11 PROCEDURE — 82550 ASSAY OF CK (CPK): CPT

## 2020-05-11 PROCEDURE — G2023 SPECIMEN COLLECT COVID-19: HCPCS | Performed by: EMERGENCY MEDICINE

## 2020-05-11 RX ORDER — ACETAMINOPHEN 325 MG/1
650 TABLET ORAL EVERY 6 HOURS PRN
Status: DISCONTINUED | OUTPATIENT
Start: 2020-05-11 | End: 2020-05-14 | Stop reason: HOSPADM

## 2020-05-11 RX ORDER — SODIUM CHLORIDE, SODIUM LACTATE, POTASSIUM CHLORIDE, CALCIUM CHLORIDE 600; 310; 30; 20 MG/100ML; MG/100ML; MG/100ML; MG/100ML
INJECTION, SOLUTION INTRAVENOUS CONTINUOUS
Status: DISCONTINUED | OUTPATIENT
Start: 2020-05-11 | End: 2020-05-14

## 2020-05-11 RX ORDER — METRONIDAZOLE 500 MG/1
500 TABLET ORAL EVERY 8 HOURS
Status: DISCONTINUED | OUTPATIENT
Start: 2020-05-12 | End: 2020-05-14 | Stop reason: HOSPADM

## 2020-05-11 RX ORDER — GABAPENTIN 400 MG/1
400 CAPSULE ORAL 3 TIMES DAILY
COMMUNITY
End: 2020-06-16 | Stop reason: SDUPTHER

## 2020-05-11 RX ORDER — ONDANSETRON 4 MG/1
4 TABLET, ORALLY DISINTEGRATING ORAL EVERY 4 HOURS PRN
Status: DISCONTINUED | OUTPATIENT
Start: 2020-05-11 | End: 2020-05-14 | Stop reason: HOSPADM

## 2020-05-11 RX ORDER — HEPARIN SODIUM 5000 [USP'U]/ML
5000 INJECTION, SOLUTION INTRAVENOUS; SUBCUTANEOUS EVERY 8 HOURS
Status: DISCONTINUED | OUTPATIENT
Start: 2020-05-11 | End: 2020-05-14 | Stop reason: HOSPADM

## 2020-05-11 RX ORDER — ONDANSETRON 2 MG/ML
4 INJECTION INTRAMUSCULAR; INTRAVENOUS EVERY 4 HOURS PRN
Status: DISCONTINUED | OUTPATIENT
Start: 2020-05-11 | End: 2020-05-14 | Stop reason: HOSPADM

## 2020-05-11 RX ORDER — OXYCODONE HYDROCHLORIDE 5 MG/1
2.5 TABLET ORAL
Status: DISCONTINUED | OUTPATIENT
Start: 2020-05-11 | End: 2020-05-14 | Stop reason: HOSPADM

## 2020-05-11 RX ORDER — QUETIAPINE FUMARATE 100 MG/1
200 TABLET, FILM COATED ORAL
Status: DISCONTINUED | OUTPATIENT
Start: 2020-05-11 | End: 2020-05-14

## 2020-05-11 RX ORDER — ENALAPRILAT 1.25 MG/ML
1.25 INJECTION INTRAVENOUS ONCE
Status: COMPLETED | OUTPATIENT
Start: 2020-05-11 | End: 2020-05-11

## 2020-05-11 RX ORDER — HYDRALAZINE HYDROCHLORIDE 20 MG/ML
20 INJECTION INTRAMUSCULAR; INTRAVENOUS EVERY 6 HOURS PRN
Status: DISCONTINUED | OUTPATIENT
Start: 2020-05-11 | End: 2020-05-12

## 2020-05-11 RX ORDER — DULOXETIN HYDROCHLORIDE 30 MG/1
60 CAPSULE, DELAYED RELEASE ORAL DAILY
Status: DISCONTINUED | OUTPATIENT
Start: 2020-05-12 | End: 2020-05-14

## 2020-05-11 RX ORDER — HYDROMORPHONE HYDROCHLORIDE 1 MG/ML
0.25 INJECTION, SOLUTION INTRAMUSCULAR; INTRAVENOUS; SUBCUTANEOUS
Status: DISCONTINUED | OUTPATIENT
Start: 2020-05-11 | End: 2020-05-14 | Stop reason: HOSPADM

## 2020-05-11 RX ORDER — AMLODIPINE BESYLATE 5 MG/1
10 TABLET ORAL ONCE
Status: COMPLETED | OUTPATIENT
Start: 2020-05-11 | End: 2020-05-11

## 2020-05-11 RX ORDER — SODIUM CHLORIDE, SODIUM LACTATE, POTASSIUM CHLORIDE, AND CALCIUM CHLORIDE .6; .31; .03; .02 G/100ML; G/100ML; G/100ML; G/100ML
20 INJECTION, SOLUTION INTRAVENOUS
Status: COMPLETED | OUTPATIENT
Start: 2020-05-11 | End: 2020-05-11

## 2020-05-11 RX ORDER — TRAZODONE HYDROCHLORIDE 50 MG/1
50 TABLET ORAL NIGHTLY PRN
Status: DISCONTINUED | OUTPATIENT
Start: 2020-05-11 | End: 2020-05-14 | Stop reason: HOSPADM

## 2020-05-11 RX ORDER — ONDANSETRON 2 MG/ML
4 INJECTION INTRAMUSCULAR; INTRAVENOUS ONCE
Status: COMPLETED | OUTPATIENT
Start: 2020-05-11 | End: 2020-05-11

## 2020-05-11 RX ORDER — OXYCODONE HYDROCHLORIDE 5 MG/1
5 TABLET ORAL
Status: DISCONTINUED | OUTPATIENT
Start: 2020-05-11 | End: 2020-05-14 | Stop reason: HOSPADM

## 2020-05-11 RX ORDER — GABAPENTIN 400 MG/1
400 CAPSULE ORAL 3 TIMES DAILY
Status: DISCONTINUED | OUTPATIENT
Start: 2020-05-11 | End: 2020-05-14 | Stop reason: HOSPADM

## 2020-05-11 RX ADMIN — ENALAPRILAT 1.25 MG: 2.5 INJECTION INTRAVENOUS at 18:04

## 2020-05-11 RX ADMIN — GABAPENTIN 400 MG: 400 CAPSULE ORAL at 22:05

## 2020-05-11 RX ADMIN — FENTANYL CITRATE 50 MCG: 50 INJECTION INTRAMUSCULAR; INTRAVENOUS at 18:06

## 2020-05-11 RX ADMIN — TRAZODONE HYDROCHLORIDE 50 MG: 50 TABLET ORAL at 22:06

## 2020-05-11 RX ADMIN — ONDANSETRON HYDROCHLORIDE 4 MG: 2 SOLUTION INTRAMUSCULAR; INTRAVENOUS at 18:03

## 2020-05-11 RX ADMIN — ONDANSETRON HYDROCHLORIDE 4 MG: 2 SOLUTION INTRAMUSCULAR; INTRAVENOUS at 20:01

## 2020-05-11 RX ADMIN — SODIUM CHLORIDE, POTASSIUM CHLORIDE, SODIUM LACTATE AND CALCIUM CHLORIDE 2014 ML: 600; 310; 30; 20 INJECTION, SOLUTION INTRAVENOUS at 18:00

## 2020-05-11 RX ADMIN — QUETIAPINE FUMARATE 200 MG: 100 TABLET ORAL at 22:05

## 2020-05-11 RX ADMIN — HEPARIN SODIUM 5000 UNITS: 5000 INJECTION, SOLUTION INTRAVENOUS; SUBCUTANEOUS at 22:06

## 2020-05-11 RX ADMIN — AMLODIPINE BESYLATE 10 MG: 5 TABLET ORAL at 19:57

## 2020-05-11 RX ADMIN — CEFTRIAXONE SODIUM 2 G: 2 INJECTION, POWDER, FOR SOLUTION INTRAMUSCULAR; INTRAVENOUS at 19:13

## 2020-05-11 RX ADMIN — METRONIDAZOLE 500 MG: 500 INJECTION, SOLUTION INTRAVENOUS at 19:58

## 2020-05-11 RX ADMIN — OXYCODONE HYDROCHLORIDE 5 MG: 5 TABLET ORAL at 22:06

## 2020-05-11 RX ADMIN — FENTANYL CITRATE 50 MCG: 50 INJECTION INTRAMUSCULAR; INTRAVENOUS at 20:01

## 2020-05-11 RX ADMIN — SODIUM CHLORIDE, POTASSIUM CHLORIDE, SODIUM LACTATE AND CALCIUM CHLORIDE: 600; 310; 30; 20 INJECTION, SOLUTION INTRAVENOUS at 22:06

## 2020-05-11 ASSESSMENT — ENCOUNTER SYMPTOMS
INSOMNIA: 1
DOUBLE VISION: 0
CHILLS: 0
EYE PAIN: 0
PHOTOPHOBIA: 0
SHORTNESS OF BREATH: 0
TREMORS: 0
STRIDOR: 0
SPEECH CHANGE: 0
DEPRESSION: 0
BLOOD IN STOOL: 0
MEMORY LOSS: 0
NERVOUS/ANXIOUS: 0
MYALGIAS: 0
SORE THROAT: 0
DIARRHEA: 1
HEADACHES: 0
PND: 0
ORTHOPNEA: 0
SENSORY CHANGE: 0
HEARTBURN: 0
TINGLING: 0
SPUTUM PRODUCTION: 0
CONSTIPATION: 0
COUGH: 0
NECK PAIN: 0
WEAKNESS: 0
VOMITING: 0
HEMOPTYSIS: 0
FEVER: 0
DIZZINESS: 0
BLURRED VISION: 0
PALPITATIONS: 0
ABDOMINAL PAIN: 1
NAUSEA: 0
BACK PAIN: 0
CLAUDICATION: 0

## 2020-05-11 ASSESSMENT — LIFESTYLE VARIABLES
TOTAL SCORE: 0
TOTAL SCORE: 0
EVER_SMOKED: YES
EVER FELT BAD OR GUILTY ABOUT YOUR DRINKING: NO
HAVE PEOPLE ANNOYED YOU BY CRITICIZING YOUR DRINKING: NO
HAVE YOU EVER FELT YOU SHOULD CUT DOWN ON YOUR DRINKING: NO
CONSUMPTION TOTAL: NEGATIVE
HOW MANY TIMES IN THE PAST YEAR HAVE YOU HAD 5 OR MORE DRINKS IN A DAY: 0
ON A TYPICAL DAY WHEN YOU DRINK ALCOHOL HOW MANY DRINKS DO YOU HAVE: 0
AVERAGE NUMBER OF DAYS PER WEEK YOU HAVE A DRINK CONTAINING ALCOHOL: 0
ALCOHOL_USE: NO
TOTAL SCORE: 0
EVER HAD A DRINK FIRST THING IN THE MORNING TO STEADY YOUR NERVES TO GET RID OF A HANGOVER: NO

## 2020-05-11 ASSESSMENT — FIBROSIS 4 INDEX: FIB4 SCORE: 6.4

## 2020-05-11 ASSESSMENT — PATIENT HEALTH QUESTIONNAIRE - PHQ9
SUM OF ALL RESPONSES TO PHQ9 QUESTIONS 1 AND 2: 0
2. FEELING DOWN, DEPRESSED, IRRITABLE, OR HOPELESS: NOT AT ALL
1. LITTLE INTEREST OR PLEASURE IN DOING THINGS: NOT AT ALL

## 2020-05-11 ASSESSMENT — COGNITIVE AND FUNCTIONAL STATUS - GENERAL
DAILY ACTIVITIY SCORE: 24
SUGGESTED CMS G CODE MODIFIER MOBILITY: CH
SUGGESTED CMS G CODE MODIFIER DAILY ACTIVITY: CH
MOBILITY SCORE: 24

## 2020-05-11 NOTE — ED TRIAGE NOTES
Pt amb to triage c/o sob, abd pain and insomnia x3d. Pt is a current every day smoker. Pt c/o decr appetite and generalized malaise

## 2020-05-12 LAB
ALBUMIN SERPL BCP-MCNC: 3.2 G/DL (ref 3.2–4.9)
ALBUMIN/GLOB SERPL: 1.3 G/DL
ALP SERPL-CCNC: 38 U/L (ref 30–99)
ALT SERPL-CCNC: 19 U/L (ref 2–50)
ANION GAP SERPL CALC-SCNC: 11 MMOL/L (ref 7–16)
AST SERPL-CCNC: 41 U/L (ref 12–45)
BASOPHILS # BLD AUTO: 0.7 % (ref 0–1.8)
BASOPHILS # BLD: 0.03 K/UL (ref 0–0.12)
BILIRUB SERPL-MCNC: 0.4 MG/DL (ref 0.1–1.5)
BUN SERPL-MCNC: 7 MG/DL (ref 8–22)
CALCIUM SERPL-MCNC: 8.4 MG/DL (ref 8.4–10.2)
CHLORIDE SERPL-SCNC: 98 MMOL/L (ref 96–112)
CO2 SERPL-SCNC: 24 MMOL/L (ref 20–33)
CREAT SERPL-MCNC: 0.65 MG/DL (ref 0.5–1.4)
EOSINOPHIL # BLD AUTO: 0.09 K/UL (ref 0–0.51)
EOSINOPHIL NFR BLD: 2 % (ref 0–6.9)
ERYTHROCYTE [DISTWIDTH] IN BLOOD BY AUTOMATED COUNT: 46.4 FL (ref 35.9–50)
GLOBULIN SER CALC-MCNC: 2.4 G/DL (ref 1.9–3.5)
GLUCOSE SERPL-MCNC: 99 MG/DL (ref 65–99)
HCT VFR BLD AUTO: 33.6 % (ref 42–52)
HGB BLD-MCNC: 11.6 G/DL (ref 14–18)
IMM GRANULOCYTES # BLD AUTO: 0.02 K/UL (ref 0–0.11)
IMM GRANULOCYTES NFR BLD AUTO: 0.5 % (ref 0–0.9)
LACTATE BLD-SCNC: 1.2 MMOL/L (ref 0.5–2)
LYMPHOCYTES # BLD AUTO: 1.2 K/UL (ref 1–4.8)
LYMPHOCYTES NFR BLD: 27.2 % (ref 22–41)
MCH RBC QN AUTO: 32.8 PG (ref 27–33)
MCHC RBC AUTO-ENTMCNC: 34.5 G/DL (ref 33.7–35.3)
MCV RBC AUTO: 94.9 FL (ref 81.4–97.8)
MONOCYTES # BLD AUTO: 0.35 K/UL (ref 0–0.85)
MONOCYTES NFR BLD AUTO: 7.9 % (ref 0–13.4)
NEUTROPHILS # BLD AUTO: 2.72 K/UL (ref 1.82–7.42)
NEUTROPHILS NFR BLD: 61.7 % (ref 44–72)
NRBC # BLD AUTO: 0 K/UL
NRBC BLD-RTO: 0 /100 WBC
PLATELET # BLD AUTO: 64 K/UL (ref 164–446)
PMV BLD AUTO: 11.1 FL (ref 9–12.9)
POTASSIUM SERPL-SCNC: 3.8 MMOL/L (ref 3.6–5.5)
PROT SERPL-MCNC: 5.6 G/DL (ref 6–8.2)
RBC # BLD AUTO: 3.54 M/UL (ref 4.7–6.1)
SODIUM SERPL-SCNC: 133 MMOL/L (ref 135–145)
WBC # BLD AUTO: 4.4 K/UL (ref 4.8–10.8)

## 2020-05-12 PROCEDURE — 700111 HCHG RX REV CODE 636 W/ 250 OVERRIDE (IP): Performed by: HOSPITALIST

## 2020-05-12 PROCEDURE — 770006 HCHG ROOM/CARE - MED/SURG/GYN SEMI*

## 2020-05-12 PROCEDURE — 83605 ASSAY OF LACTIC ACID: CPT

## 2020-05-12 PROCEDURE — A9270 NON-COVERED ITEM OR SERVICE: HCPCS | Performed by: HOSPITALIST

## 2020-05-12 PROCEDURE — 700102 HCHG RX REV CODE 250 W/ 637 OVERRIDE(OP): Performed by: HOSPITALIST

## 2020-05-12 PROCEDURE — 36415 COLL VENOUS BLD VENIPUNCTURE: CPT

## 2020-05-12 PROCEDURE — 85025 COMPLETE CBC W/AUTO DIFF WBC: CPT

## 2020-05-12 PROCEDURE — 99233 SBSQ HOSP IP/OBS HIGH 50: CPT | Performed by: INTERNAL MEDICINE

## 2020-05-12 PROCEDURE — 80053 COMPREHEN METABOLIC PANEL: CPT

## 2020-05-12 PROCEDURE — 700102 HCHG RX REV CODE 250 W/ 637 OVERRIDE(OP): Performed by: INTERNAL MEDICINE

## 2020-05-12 PROCEDURE — 99407 BEHAV CHNG SMOKING > 10 MIN: CPT

## 2020-05-12 PROCEDURE — A9270 NON-COVERED ITEM OR SERVICE: HCPCS | Performed by: INTERNAL MEDICINE

## 2020-05-12 PROCEDURE — 700105 HCHG RX REV CODE 258: Performed by: INTERNAL MEDICINE

## 2020-05-12 PROCEDURE — 700105 HCHG RX REV CODE 258: Performed by: HOSPITALIST

## 2020-05-12 RX ORDER — SODIUM CHLORIDE 9 MG/ML
INJECTION, SOLUTION INTRAVENOUS CONTINUOUS
Status: DISCONTINUED | OUTPATIENT
Start: 2020-05-12 | End: 2020-05-12

## 2020-05-12 RX ORDER — HYDRALAZINE HYDROCHLORIDE 20 MG/ML
5 INJECTION INTRAMUSCULAR; INTRAVENOUS EVERY 6 HOURS PRN
Status: DISCONTINUED | OUTPATIENT
Start: 2020-05-12 | End: 2020-05-14 | Stop reason: HOSPADM

## 2020-05-12 RX ORDER — OMEPRAZOLE 20 MG/1
20 CAPSULE, DELAYED RELEASE ORAL DAILY
Status: DISCONTINUED | OUTPATIENT
Start: 2020-05-12 | End: 2020-05-14 | Stop reason: HOSPADM

## 2020-05-12 RX ADMIN — TRAZODONE HYDROCHLORIDE 50 MG: 50 TABLET ORAL at 22:49

## 2020-05-12 RX ADMIN — GABAPENTIN 400 MG: 400 CAPSULE ORAL at 11:37

## 2020-05-12 RX ADMIN — OXYCODONE HYDROCHLORIDE 5 MG: 5 TABLET ORAL at 17:33

## 2020-05-12 RX ADMIN — OXYCODONE HYDROCHLORIDE 5 MG: 5 TABLET ORAL at 11:40

## 2020-05-12 RX ADMIN — GABAPENTIN 400 MG: 400 CAPSULE ORAL at 05:03

## 2020-05-12 RX ADMIN — METRONIDAZOLE 500 MG: 500 TABLET, FILM COATED ORAL at 05:04

## 2020-05-12 RX ADMIN — METRONIDAZOLE 500 MG: 500 TABLET, FILM COATED ORAL at 21:33

## 2020-05-12 RX ADMIN — CEFTRIAXONE SODIUM 2 G: 2 INJECTION, POWDER, FOR SOLUTION INTRAMUSCULAR; INTRAVENOUS at 17:28

## 2020-05-12 RX ADMIN — GABAPENTIN 400 MG: 400 CAPSULE ORAL at 17:28

## 2020-05-12 RX ADMIN — OXYCODONE HYDROCHLORIDE 5 MG: 5 TABLET ORAL at 05:04

## 2020-05-12 RX ADMIN — QUETIAPINE FUMARATE 200 MG: 100 TABLET ORAL at 21:34

## 2020-05-12 RX ADMIN — METRONIDAZOLE 500 MG: 500 TABLET, FILM COATED ORAL at 14:29

## 2020-05-12 RX ADMIN — DULOXETINE HYDROCHLORIDE 60 MG: 30 CAPSULE, DELAYED RELEASE ORAL at 05:03

## 2020-05-12 RX ADMIN — SODIUM CHLORIDE, POTASSIUM CHLORIDE, SODIUM LACTATE AND CALCIUM CHLORIDE: 600; 310; 30; 20 INJECTION, SOLUTION INTRAVENOUS at 18:11

## 2020-05-12 RX ADMIN — HEPARIN SODIUM 5000 UNITS: 5000 INJECTION, SOLUTION INTRAVENOUS; SUBCUTANEOUS at 21:34

## 2020-05-12 RX ADMIN — HEPARIN SODIUM 5000 UNITS: 5000 INJECTION, SOLUTION INTRAVENOUS; SUBCUTANEOUS at 05:04

## 2020-05-12 RX ADMIN — HEPARIN SODIUM 5000 UNITS: 5000 INJECTION, SOLUTION INTRAVENOUS; SUBCUTANEOUS at 14:29

## 2020-05-12 RX ADMIN — SODIUM CHLORIDE, POTASSIUM CHLORIDE, SODIUM LACTATE AND CALCIUM CHLORIDE: 600; 310; 30; 20 INJECTION, SOLUTION INTRAVENOUS at 06:18

## 2020-05-12 RX ADMIN — OMEPRAZOLE 20 MG: 20 CAPSULE, DELAYED RELEASE ORAL at 11:37

## 2020-05-12 ASSESSMENT — ENCOUNTER SYMPTOMS
TREMORS: 0
DIARRHEA: 0
PALPITATIONS: 0
NAUSEA: 1
NECK PAIN: 0
WHEEZING: 0
FALLS: 0
SEIZURES: 0
COUGH: 0
SPEECH CHANGE: 0
HEARTBURN: 0
DIZZINESS: 0
WEAKNESS: 1
HEADACHES: 0
CONSTIPATION: 0
BLURRED VISION: 0
EYE PAIN: 0
CHILLS: 0
DEPRESSION: 0
ABDOMINAL PAIN: 1
PND: 0
VOMITING: 0
WEIGHT LOSS: 0
SHORTNESS OF BREATH: 0
FEVER: 0
SPUTUM PRODUCTION: 0
BACK PAIN: 0

## 2020-05-12 ASSESSMENT — LIFESTYLE VARIABLES: SUBSTANCE_ABUSE: 0

## 2020-05-12 NOTE — PROGRESS NOTES
[facility] Medicine Daily Progress Note    Date of Service  5/12/2020    Chief Complaint  69 y.o. male admitted 5/11/2020 with nausea and abdominal pain    Hospital Course    Presented with complaint of nausea and abdominal pain.  CT showing duodenitis.  Started on PPI and antibiotics.        Interval Problem Update  5/12.  Patient still complains of abdominal pain but better than yesterday. Patient's pain is local 4-5/10, intermittent and does not radiate to other location, sharp and with some tingling. Can be controlled by pain meds. Still complains of nausea but no vomiting.    Consultants/Specialty  none    Code Status  full    Disposition  tbd    Review of Systems  Review of Systems   Constitutional: Positive for malaise/fatigue. Negative for chills, fever and weight loss.   HENT: Negative for congestion, ear discharge, ear pain, hearing loss and nosebleeds.    Eyes: Negative for blurred vision and pain.   Respiratory: Negative for cough, sputum production, shortness of breath and wheezing.    Cardiovascular: Negative for chest pain, palpitations, leg swelling and PND.   Gastrointestinal: Positive for abdominal pain and nausea. Negative for constipation, diarrhea, heartburn and vomiting.   Genitourinary: Negative for dysuria, frequency and hematuria.   Musculoskeletal: Negative for back pain, falls, joint pain and neck pain.   Skin: Negative for rash.   Neurological: Positive for weakness. Negative for dizziness, tremors, speech change, seizures and headaches.   Psychiatric/Behavioral: Negative for depression, substance abuse and suicidal ideas.        Physical Exam  Temp:  [36.4 °C (97.5 °F)-36.7 °C (98.1 °F)] 36.6 °C (97.8 °F)  Pulse:  [] 53  Resp:  [15-30] 19  BP: ()/() 124/75  SpO2:  [92 %-100 %] 93 %    Physical Exam  Constitutional:       General: He is not in acute distress.     Appearance: Normal appearance. He is not ill-appearing, toxic-appearing or diaphoretic.   HENT:      Head:  Normocephalic and atraumatic.      Right Ear: Tympanic membrane and external ear normal.      Left Ear: Tympanic membrane and external ear normal.      Nose: No congestion or rhinorrhea.      Mouth/Throat:      Mouth: Mucous membranes are moist.      Pharynx: Oropharynx is clear. No oropharyngeal exudate.   Eyes:      Extraocular Movements: Extraocular movements intact.      Conjunctiva/sclera: Conjunctivae normal.      Pupils: Pupils are equal, round, and reactive to light.   Neck:      Musculoskeletal: Normal range of motion and neck supple. No neck rigidity or muscular tenderness.   Cardiovascular:      Rate and Rhythm: Normal rate and regular rhythm.      Pulses: Normal pulses.      Heart sounds: Normal heart sounds. No murmur. No friction rub. No gallop.    Pulmonary:      Effort: Pulmonary effort is normal. No respiratory distress.      Breath sounds: Normal breath sounds. No stridor. No wheezing, rhonchi or rales.   Chest:      Chest wall: No tenderness.   Abdominal:      General: Abdomen is flat. Bowel sounds are normal. There is no distension.      Palpations: Abdomen is soft. There is no mass.      Tenderness: There is abdominal tenderness. There is no guarding or rebound.      Hernia: No hernia is present.   Musculoskeletal: Normal range of motion.         General: No swelling or deformity.   Skin:     General: Skin is warm and dry.      Capillary Refill: Capillary refill takes more than 3 seconds.   Neurological:      General: No focal deficit present.      Mental Status: He is alert and oriented to person, place, and time. Mental status is at baseline.      Cranial Nerves: No cranial nerve deficit.      Motor: No weakness.   Psychiatric:         Mood and Affect: Mood normal.         Behavior: Behavior normal.         Fluids    Intake/Output Summary (Last 24 hours) at 5/12/2020 1107  Last data filed at 5/12/2020 0618  Gross per 24 hour   Intake 1370 ml   Output 975 ml   Net 395 ml        Laboratory  Recent Labs     05/11/20 1756 05/12/20  0307   WBC 7.1 4.4*   RBC 4.39* 3.54*   HEMOGLOBIN 14.4 11.6*   HEMATOCRIT 41.6* 33.6*   MCV 94.8 94.9   MCH 32.8 32.8   MCHC 34.6 34.5   RDW 46.5 46.4   PLATELETCT 102* 64*   MPV 11.2 11.1     Recent Labs     05/11/20  1756 05/12/20  0307   SODIUM 133* 133*   POTASSIUM 3.9 3.8   CHLORIDE 96 98   CO2 23 24   GLUCOSE 90 99   BUN 9 7*   CREATININE 0.78 0.65   CALCIUM 10.5* 8.4     Recent Labs     05/11/20 1756   APTT 28.0   INR 1.03               Imaging  CT-ABDOMEN-PELVIS W/O   Final Result      1.  Mild thickening of the wall of the duodenum with a small amount of surrounding inflammatory stranding which may represent presence of duodenitis. There is also some dense material layering dependently within the duodenum.      2.  No evidence of bowel obstruction or focal inflammatory change seen in the area of the colon.      3.  Splenomegaly.      4.  Possible tiny gallstones.      DX-CHEST-PORTABLE (1 VIEW)   Final Result      No evidence of acute cardiopulmonary process.           Assessment/Plan  * Duodenitis  Assessment & Plan  Unclear etilogy, possibly infectious?   IV Ceftriaxone and flagyl started, continue  monitor progress, may need EGD at some point.   Currently has no worsening signs of GI bleed or abdominal pain continue to monitor possible outpatient GI follow-up  Start patient on PPI omeprazole    Mood disorder (HCC)- (present on admission)  Assessment & Plan  Resume home dose of Cymbalta, Seroquel, trazodone    Hyponatremia  Assessment & Plan  2/2 to hyponatremia  IV fluids   Na 133->133    Lumbar back pain with radiculopathy affecting lower extremity- (present on admission)  Assessment & Plan  Controlled on gabapentin.  monitor         VTE prophylaxis: SCD heparin      Current Facility-Administered Medications:   •  hydrALAZINE (APRESOLINE) injection 5 mg, 5 mg, Intravenous, Q6HRS PRN, La Ornelas M.D.  •  DULoxetine (CYMBALTA) capsule 60 mg, 60  mg, Oral, DAILY, Nadya Woodward M.D., 60 mg at 05/12/20 0503  •  gabapentin (NEURONTIN) capsule 400 mg, 400 mg, Oral, TID, Nadya Woodward M.D., 400 mg at 05/12/20 0503  •  QUEtiapine (SEROQUEL) tablet 200 mg, 200 mg, Oral, QHS, Nadya Woodward M.D., 200 mg at 05/11/20 2205  •  traZODone (DESYREL) tablet 50 mg, 50 mg, Oral, HS PRN, Nadya Woodward M.D., 50 mg at 05/11/20 2206  •  cefTRIAXone (ROCEPHIN) 2 g in  mL IVPB, 2 g, Intravenous, Q24HRS, Nadya Woodward M.D.  •  metroNIDAZOLE (FLAGYL) tablet 500 mg, 500 mg, Oral, Q8HRS, Nadya Woodward M.D., 500 mg at 05/12/20 0504  •  lactated ringers infusion, , Intravenous, Continuous, La Ornelas M.D., Last Rate: 75 mL/hr at 05/12/20 0829  •  heparin injection 5,000 Units, 5,000 Units, Subcutaneous, Q8HRS, Nadya Woodward M.D., 5,000 Units at 05/12/20 0504  •  acetaminophen (TYLENOL) tablet 650 mg, 650 mg, Oral, Q6HRS PRN, Nadya Woodward M.D.  •  Notify provider if pain remains uncontrolled, , , CONTINUOUS **AND** Use the numeric rating scale (NRS-11) on regular floors and Critical-Care Pain Observation Tool (CPOT) on ICUs/Trauma to assess pain, , , CONTINUOUS **AND** Pulse Ox (Oximetry), , , CONTINUOUS **AND** Pharmacy Consult Request ...Pain Management Review 1 Each, 1 Each, Other, PHARMACY TO DOSE **AND** If patient difficult to arouse and/or has respiratory depression, stop any opiates that are currently infusing and call a Rapid Response., , , CONTINUOUS **AND** oxyCODONE immediate-release (ROXICODONE) tablet 2.5 mg, 2.5 mg, Oral, Q3HRS PRN **AND** oxyCODONE immediate-release (ROXICODONE) tablet 5 mg, 5 mg, Oral, Q3HRS PRN, 5 mg at 05/12/20 0504 **AND** HYDROmorphone pf (DILAUDID) injection 0.25 mg, 0.25 mg, Intravenous, Q3HRS PRN, Nadya Woodward M.D.  •  ondansetron (ZOFRAN) syringe/vial injection 4 mg, 4 mg, Intravenous, Q4HRS PRN, Nadya Woodward M.D.  •  ondansetron (ZOFRAN ODT) dispertab 4 mg, 4 mg, Oral, Q4HRS PRN, Nadya  MONIKA Woodward.

## 2020-05-12 NOTE — CARE PLAN
Assumed pt care   Pt is A&Ox4, resting comfortably in bed.   Pt on r.a.. No signs of SOB/respiratory distress.   Labs noted, vital signs noted.   Pt c/o 8/10 pain at this moment, medicated per MAR.   Assessment completed, upper mid abdominal tenderness upon palpation, hypoactive bowel sounds in all four quadrants.    Fall precautions in place.   Bed at lowest position.   Call light and personal belongings within reach. Continue to monitor         Problem: Bowel/Gastric:  Goal: Normal bowel function is maintained or improved  Outcome: PROGRESSING AS EXPECTED     Problem: Pain Management  Goal: Pain level will decrease to patient's comfort goal  Outcome: PROGRESSING AS EXPECTED

## 2020-05-12 NOTE — ED NOTES
Admit orders noted-update given over the phone per pt request to friend Anirudh dinh at [phone]. also made aware of room assignment

## 2020-05-12 NOTE — CARE PLAN
Problem: Safety  Goal: Will remain free from injury  Outcome: PROGRESSING AS EXPECTED     Problem: Bowel/Gastric:  Goal: Normal bowel function is maintained or improved  Outcome: PROGRESSING AS EXPECTED     Problem: Pain Management  Goal: Pain level will decrease to patient's comfort goal  Outcome: PROGRESSING AS EXPECTED   Up with 1 person assist. Pain and abd complaints better.

## 2020-05-12 NOTE — PROGRESS NOTES
Pt resting in bed. VSS. No signs of distress noted. VSS. Pain and nausea better. No needs at this time.

## 2020-05-12 NOTE — ED NOTES
Seen and examined by erp-orders recvd. Per same, pt is covid r/a so n95 mask, goggles gown and gloves worn by rn with pt care. saline lock with blood draw x 2 . Vs as charted, call light in reach

## 2020-05-12 NOTE — PROGRESS NOTES
Med rec updated and complete  Allergies reviewed  Called pt in room, pt did not answer.  Called pt caregiver-friend Anirudh Drake @ [phone] to verify all medications.   Pts caregiver-friend reports no antibiotics in the last 2 weeks

## 2020-05-12 NOTE — PROGRESS NOTES
2 RN skin assessment completed, skin not WDL. Old blood blister on left plantar foot, calluses present on right plantar foot, and redness present on right second toe.

## 2020-05-12 NOTE — ASSESSMENT & PLAN NOTE
Unclear etilogy, possibly infectious?   IV Ceftriaxone and flagyl started  monitor progress, may need EGD at some point.

## 2020-05-12 NOTE — H&P
Hospital Medicine History & Physical Note    Date of Service  5/11/2020    Primary Care Physician  Pcp Pt States None    Consultants  None    Code Status  Full Code    Chief Complaint  Chief Complaint   Patient presents with   • Shortness of Breath   • Abdominal Pain   • Insomnia       History of Presenting Illness   is a very pleasant 69 y.o. male with a past medical history of mood disorder hypertension, patient was recently admitted in February of this year after sustaining a ground-level fall and having cervical spinal fracture, as a result of syncope at the time was taken off of his hypertensive thinking it was due to hypotension, nevertheless patient  today patient presented to the emergency room on 5/11/2020 for evaluation of nausea vomiting dry heaving.  Patient also reported having diarrhea which he said was watery over the past 2 to 3 days.  He is having significant pain about 8 out of 10 in pain epigastric area, cramping sensation nonradiating associated with nausea.  Patient also reports having a headache as well over the several days which is intermittent currently denies having any headaches.  He also said he has had insomnia over the past several days, he is not been sleeping well.  Otherwise denies having any fevers chills dysuria denies any ill contacts, denies any recent antibiotic use is.    I discussed the presenting symptoms, physical examination, lab and radiological study results with the emergency department physician.      Review of Systems  Review of Systems   Constitutional: Positive for malaise/fatigue. Negative for chills and fever.   HENT: Negative for congestion, hearing loss, sore throat and tinnitus.    Eyes: Negative for blurred vision, double vision, photophobia and pain.   Respiratory: Negative for cough, hemoptysis, sputum production, shortness of breath and stridor.    Cardiovascular: Negative for chest pain, palpitations, orthopnea, claudication and PND.    Gastrointestinal: Positive for abdominal pain and diarrhea. Negative for blood in stool, constipation, heartburn, melena, nausea and vomiting.   Genitourinary: Negative for dysuria, frequency and urgency.   Musculoskeletal: Negative for back pain, myalgias and neck pain.   Neurological: Negative for dizziness, tingling, tremors, sensory change, speech change, weakness and headaches.   Psychiatric/Behavioral: Negative for depression, memory loss and suicidal ideas. The patient has insomnia. The patient is not nervous/anxious.    All other systems reviewed and are negative.      Past Medical History  Past Medical History:   Diagnosis Date   • Arthritis    • Gout    • Hypertension    • Psychiatric disorder        Surgical History   has a past surgical history that includes cervical disk and fusion anterior (9/2/2018); other orthopedic surgery; pr dx bone marrow aspirations (Left, 10/2/2019); and pr dx bone marrow biopsies (10/2/2019).    Family History  family history includes Alzheimer's Disease in his father; Cancer in his father and mother; Heart Disease in his brother and sister.    Social History   reports that he has been smoking cigarettes. He has a 2.50 pack-year smoking history. He has never used smokeless tobacco. He reports current alcohol use of about 10.8 - 12.0 oz of alcohol per week. He reports current drug use. Drug: Inhaled.    Allergies  No Known Allergies    Medications  Prior to Admission medications    Medication Sig Start Date End Date Taking? Authorizing Provider   gabapentin (NEURONTIN) 400 MG Cap Take 400 mg by mouth 3 times a day.   Yes Nn Emergency Md Per Protocol, M.D.   naproxen (NAPROSYN) 500 MG Tab Take 500 mg by mouth 2 times daily with meals as needed (pain). Indications: Joint Damage causing Pain and Loss of Function 3/10/20   Physician Outpatient   sennosides-docusate sodium (SENOKOT-S) 8.6-50 MG tablet Take 2 Tabs by mouth every day. Indications: Constipation 2/20/20   Physician  Outpatient   acetaminophen (TYLENOL) 650 MG CR tablet Take 500 mg by mouth every four hours as needed for Moderate Pain. Indications: Pain    Physician Outpatient   DULoxetine (CYMBALTA) 60 MG Cap DR Particles delayed-release capsule Take 1 Cap by mouth every day. 2/17/20   Talia Shields M.D.   ferrous sulfate 325 (65 Fe) MG tablet Take 1 Tab by mouth every morning with breakfast. 2/18/20   Talia Shields M.D.   QUEtiapine (SEROQUEL) 200 MG Tab Take 1 Tab by mouth every bedtime.  Patient not taking: Reported on 3/10/2020 2/17/20   Talia Shields M.D.   traZODone (DESYREL) 50 MG Tab Take 1 Tab by mouth at bedtime as needed for Sleep. 2/17/20   Talia Shields M.D.       Physical Exam  Temp:  [36.7 °C (98.1 °F)] 36.7 °C (98.1 °F)  Pulse:  [56-70] 69  Resp:  [15-30] 17  BP: (190-225)/(101-119) 190/101  SpO2:  [92 %-100 %] 96 %  Physical Exam   Constitutional: He is oriented to person, place, and time. He appears well-developed and well-nourished. No distress.   HENT:   Head: Normocephalic and atraumatic.   Mouth/Throat: No oropharyngeal exudate.   Eyes: Pupils are equal, round, and reactive to light. Conjunctivae are normal. Right eye exhibits no discharge. No scleral icterus.   Neck: Neck supple. No JVD present. No thyromegaly present.   Cardiovascular: Normal rate and intact distal pulses.   No murmur heard.  Pulses:       Dorsalis pedis pulses are 2+ on the right side and 2+ on the left side.   Cap refill < 3 s   Pulmonary/Chest: Effort normal and breath sounds normal. No stridor. No respiratory distress. He has no wheezes. He has no rales.   Abdominal: Soft. Bowel sounds are normal. He exhibits no distension. There is abdominal tenderness (epigastric pain on palpation). There is no rebound.   Musculoskeletal: Normal range of motion.         General: No edema.   Neurological: He is alert and oriented to person, place, and time. No cranial nerve deficit.   Skin: Skin is warm  and dry. He is not diaphoretic. No erythema.   Psychiatric: He has a normal mood and affect. His behavior is normal. Thought content normal.   Nursing note and vitals reviewed.      Laboratory:  Recent Labs     05/11/20 1756   WBC 7.1   RBC 4.39*   HEMOGLOBIN 14.4   HEMATOCRIT 41.6*   MCV 94.8   MCH 32.8   MCHC 34.6   RDW 46.5   PLATELETCT 102*   MPV 11.2     Recent Labs     05/11/20 1756   SODIUM 133*   POTASSIUM 3.9   CHLORIDE 96   CO2 23   GLUCOSE 90   BUN 9   CREATININE 0.78   CALCIUM 10.5*     Recent Labs     05/11/20 1756   ALTSGPT 25   ASTSGOT 58*   ALKPHOSPHAT 57   TBILIRUBIN 0.4   LIPASE 60*   GLUCOSE 90     Recent Labs     05/11/20 1756   APTT 28.0   INR 1.03     Recent Labs     05/11/20 1756   CPKTOTAL 309*       Urinalysis:          Imaging:  CT-ABDOMEN-PELVIS W/O   Final Result      1.  Mild thickening of the wall of the duodenum with a small amount of surrounding inflammatory stranding which may represent presence of duodenitis. There is also some dense material layering dependently within the duodenum.      2.  No evidence of bowel obstruction or focal inflammatory change seen in the area of the colon.      3.  Splenomegaly.      4.  Possible tiny gallstones.      DX-CHEST-PORTABLE (1 VIEW)   Final Result      No evidence of acute cardiopulmonary process.          Assessment/Plan:  I anticipate this patient will require at least two midnights for appropriate medical management, necessitating inpatient admission.    * Duodenitis  Assessment & Plan  Unclear etilogy, possibly infectious?   IV Ceftriaxone and flagyl started  monitor progress, may need EGD at some point.     Mood disorder (HCC)- (present on admission)  Assessment & Plan  Resume home dose of Cymbalta, Seroquel, trazodone    Hyponatremia  Assessment & Plan  2/2 to hyponatremia  IV fluids   Recheck bmp in the am.    Lumbar back pain with radiculopathy affecting lower extremity- (present on admission)  Assessment & Plan  Controlled on  gabapentin.        VTE prophylaxis: Prophylaxis: sc heparin

## 2020-05-12 NOTE — ED PROVIDER NOTES
ED Provider Note    CHIEF COMPLAINT  Chief Complaint   Patient presents with   • Shortness of Breath   • Abdominal Pain   • Insomnia       HPI  Chris Leggett is a 69 y.o. male who presents with 3 or 4 days of shortness of breath.  He has a chronic smoker's cough which has not gotten any worse.  He denies any recent fevers.  He has had nausea, dry heaves, and watery diarrhea over the last 2 to 3 days.  He is having a lot of constant pain in his epigastrium which does not appear to be associated with the breathing.  He states he is not urinating very much, and it is very dark, but he denies dysuria.  He is never had any surgeries on his abdomen.  He has been in and out of a nursing facility and getting home health because he recently had a syncopal event with neck fracture and subsequent rehab stay.  It was hypothesized at that time that he had a syncopal event due to hypotension, so he was taken off his chronic antihypertensives several weeks ago.  He has had a headache for the last 3 or 4 days which seems to come and go.  He complains that he has not been sleeping well, but he also states he has not been tolerating the usual dose of Seroquel and gabapentin at night.    REVIEW OF SYSTEMS  See HPI for further details.  No fever, cough.  All other systems are negative.    PAST MEDICAL HISTORY  Past Medical History:   Diagnosis Date   • Arthritis    • Gout    • Hypertension    • Psychiatric disorder        FAMILY HISTORY  Family History   Problem Relation Age of Onset   • Cancer Mother    • Alzheimer's Disease Father    • Cancer Father    • Heart Disease Sister    • Heart Disease Brother        SOCIAL HISTORY  Social History     Socioeconomic History   • Marital status: Single     Spouse name: Not on file   • Number of children: Not on file   • Years of education: Not on file   • Highest education level: Not on file   Occupational History   • Not on file   Social Needs   • Financial resource strain: Not on file   •  Food insecurity     Worry: Not on file     Inability: Not on file   • Transportation needs     Medical: Not on file     Non-medical: Not on file   Tobacco Use   • Smoking status: Current Every Day Smoker     Packs/day: 0.50     Years: 5.00     Pack years: 2.50     Types: Cigarettes     Last attempt to quit: 2019     Years since quittin.3   • Smokeless tobacco: Never Used   Substance and Sexual Activity   • Alcohol use: Yes     Alcohol/week: 10.8 - 12.0 oz     Types: 18 - 20 Cans of beer per week     Drinks per session: 10 or more     Comment: last drink     • Drug use: Yes     Types: Inhaled     Comment: marijuana last dose last month   • Sexual activity: Not on file   Lifestyle   • Physical activity     Days per week: Not on file     Minutes per session: Not on file   • Stress: Not on file   Relationships   • Social connections     Talks on phone: Not on file     Gets together: Not on file     Attends Muslim service: Not on file     Active member of club or organization: Not on file     Attends meetings of clubs or organizations: Not on file     Relationship status: Not on file   • Intimate partner violence     Fear of current or ex partner: Not on file     Emotionally abused: Not on file     Physically abused: Not on file     Forced sexual activity: Not on file   Other Topics Concern   • Not on file   Social History Narrative   • Not on file       SURGICAL HISTORY  Past Surgical History:   Procedure Laterality Date   • LA DX BONE MARROW ASPIRATIONS Left 10/2/2019    Procedure: ASPIRATION, BONE MARROW - APARICIO;  Surgeon: Tamera Leos M.D.;  Location: [facility];  Service: Orthopedics   • LA DX BONE MARROW BIOPSIES  10/2/2019    Procedure: BIOPSY, BONE MARROW, USING NEEDLE OR TROCAR;  Surgeon: Tamera Leos M.D.;  Location: [facility];  Service: Orthopedics   • CERVICAL DISK AND FUSION ANTERIOR  2018    Procedure: CERVICAL DISK AND FUSION ANTERIOR C5-C6;  Surgeon:  "Varghese Wilkins M.D.;  Location[facility];  Service: Neurosurgery   • OTHER ORTHOPEDIC SURGERY      fision with disc removal       CURRENT MEDICATIONS    Current Outpatient Medications:   •  naproxen (NAPROSYN) 500 MG Tab, Take 500 mg by mouth 2 times daily with meals as needed (pain). Indications: Joint Damage causing Pain and Loss of Function, Disp: , Rfl:   •  sennosides-docusate sodium (SENOKOT-S) 8.6-50 MG tablet, Take 2 Tabs by mouth every day. Indications: Constipation, Disp: , Rfl:   •  acetaminophen (TYLENOL) 650 MG CR tablet, Take 500 mg by mouth every four hours as needed for Moderate Pain. Indications: Pain, Disp: , Rfl:   •  DULoxetine (CYMBALTA) 60 MG Cap DR Particles delayed-release capsule, Take 1 Cap by mouth every day., Disp: 90 Cap, Rfl: 2  •  ferrous sulfate 325 (65 Fe) MG tablet, Take 1 Tab by mouth every morning with breakfast., Disp: 30 Tab, Rfl: 2  •  gabapentin (NEURONTIN) 300 MG Cap, Take 2 Caps by mouth 3 times a day. (Patient not taking: Reported on 3/10/2020), Disp: 180 Cap, Rfl: 2  •  QUEtiapine (SEROQUEL) 200 MG Tab, Take 1 Tab by mouth every bedtime. (Patient not taking: Reported on 3/10/2020), Disp: 90 Tab, Rfl: 2  •  traZODone (DESYREL) 50 MG Tab, Take 1 Tab by mouth at bedtime as needed for Sleep., Disp: 90 Tab, Rfl: 2      ALLERGIES  No Known Allergies    PHYSICAL EXAM  VITAL SIGNS: BP (!) 212/108   Pulse 64   Temp 36.7 °C (98.1 °F) (Temporal)   Resp (!) 30   Ht 1.803 m (5' 11\")   Wt 100.7 kg (222 lb 0.1 oz)   SpO2 98%   BMI 30.96 kg/m²  @KIRBY[[id number]::@   Constitutional: Well developed, Well nourished, No acute respiratory distress, Non-toxic appearance.   HENT: Normocephalic, Atraumatic, Bilateral external ears normal, Oropharynx clear, mucous membranes are dry  Eyes: PERRLA, EOMI, Conjunctiva normal, No discharge. No icterus.  Neck: Normal range of motion. Supple, No stridor.   Lymphatic: No cervical lymphadenopathy noted.   Cardiovascular: Normal heart " rate, multiple extrasystoles, No murmurs, No rubs, No gallops.   Thorax & Lungs: Clear to auscultation bilaterally, No respiratory distress, No wheezing.  Abdomen: Bowel sounds normal, Soft, moderately distended, tender to the right upper quadrant and epigastrium without rebound or guarding, No masses, no rebound or guarding   Skin: Warm, Dry, No erythema, No rash.   Extremities: Intact distal pulses, No edema, No tenderness  Musculoskeletal: Good range of motion in all major joints. No tenderness to palpation or major deformities noted. Normal gait.  Neurologic: Alert & oriented x 3, cranial nerve and cerebellar exams grossly normal  Psychiatric: Anxious, Judgment normal, Mood normal.     EKG  Twelve-lead EKG by my interpretation is as below.  No ST or T wave changes to indicate ischemia or infarct    RADIOLOGY/PROCEDURES  CT-ABDOMEN-PELVIS W/O   Final Result      1.  Mild thickening of the wall of the duodenum with a small amount of surrounding inflammatory stranding which may represent presence of duodenitis. There is also some dense material layering dependently within the duodenum.      2.  No evidence of bowel obstruction or focal inflammatory change seen in the area of the colon.      3.  Splenomegaly.      4.  Possible tiny gallstones.      DX-CHEST-PORTABLE (1 VIEW)   Final Result      No evidence of acute cardiopulmonary process.            COURSE & MEDICAL DECISION MAKING  Pertinent Labs & Imaging studies reviewed. (See chart for details)  Differential diagnosis includes gastritis, hepatitis, pancreatitis, colitis, diverticulitis, volvulus, obstruction, MI, COVID.    Patient got IV fluids for septic protocol.  Rocephin was given as well for septic protocol.  He got fentanyl for pain and Reglan for nausea.    Results for orders placed or performed during the hospital encounter of 05/11/20   CBC WITH DIFFERENTIAL   Result Value Ref Range    WBC 7.1 4.8 - 10.8 K/uL    RBC 4.39 (L) 4.70 - 6.10 M/uL     Hemoglobin 14.4 14.0 - 18.0 g/dL    Hematocrit 41.6 (L) 42.0 - 52.0 %    MCV 94.8 81.4 - 97.8 fL    MCH 32.8 27.0 - 33.0 pg    MCHC 34.6 33.7 - 35.3 g/dL    RDW 46.5 35.9 - 50.0 fL    Platelet Count 102 (L) 164 - 446 K/uL    MPV 11.2 9.0 - 12.9 fL    Neutrophils-Polys 71.80 44.00 - 72.00 %    Lymphocytes 19.90 (L) 22.00 - 41.00 %    Monocytes 6.00 0.00 - 13.40 %    Eosinophils 1.40 0.00 - 6.90 %    Basophils 0.60 0.00 - 1.80 %    Immature Granulocytes 0.30 0.00 - 0.90 %    Nucleated RBC 0.00 /100 WBC    Neutrophils (Absolute) 5.13 1.82 - 7.42 K/uL    Lymphs (Absolute) 1.42 1.00 - 4.80 K/uL    Monos (Absolute) 0.43 0.00 - 0.85 K/uL    Eos (Absolute) 0.10 0.00 - 0.51 K/uL    Baso (Absolute) 0.04 0.00 - 0.12 K/uL    Immature Granulocytes (abs) 0.02 0.00 - 0.11 K/uL    NRBC (Absolute) 0.00 K/uL   COMP METABOLIC PANEL   Result Value Ref Range    Sodium 133 (L) 135 - 145 mmol/L    Potassium 3.9 3.6 - 5.5 mmol/L    Chloride 96 96 - 112 mmol/L    Co2 23 20 - 33 mmol/L    Anion Gap 14.0 7.0 - 16.0    Glucose 90 65 - 99 mg/dL    Bun 9 8 - 22 mg/dL    Creatinine 0.78 0.50 - 1.40 mg/dL    Calcium 10.5 (H) 8.4 - 10.2 mg/dL    AST(SGOT) 58 (H) 12 - 45 U/L    ALT(SGPT) 25 2 - 50 U/L    Alkaline Phosphatase 57 30 - 99 U/L    Total Bilirubin 0.4 0.1 - 1.5 mg/dL    Albumin 4.2 3.2 - 4.9 g/dL    Total Protein 7.6 6.0 - 8.2 g/dL    Globulin 3.4 1.9 - 3.5 g/dL    A-G Ratio 1.2 g/dL   LIPASE   Result Value Ref Range    Lipase 60 (H) 7 - 58 U/L   TROPONIN   Result Value Ref Range    Troponin T 17 6 - 19 ng/L   PROTHROMBIN TIME (INR)   Result Value Ref Range    PT 13.6 12.0 - 14.6 sec    INR 1.03 0.87 - 1.13   APTT   Result Value Ref Range    APTT 28.0 24.7 - 36.0 sec   Magnesium   Result Value Ref Range    Magnesium 1.8 1.5 - 2.5 mg/dL   Phosphorus   Result Value Ref Range    Phosphorus 3.1 2.5 - 4.5 mg/dL   Ferritin   Result Value Ref Range    Ferritin 127.0 22.0 - 322.0 ng/mL   Creatinine Kinase   Result Value Ref Range    CPK Total 309  (H) 0 - 154 U/L   D-Dimer   Result Value Ref Range    D-Dimer Screen 2.83 (H) 0.00 - 0.50 ug/mL (FEU)   CRP Quantitative (Non-Cardiac)   Result Value Ref Range    Stat C-Reactive Protein 1.01 (H) 0.00 - 0.75 mg/dL   proBrain Natriuretic Peptide, NT   Result Value Ref Range    NT-proBNP 710 (H) 0 - 125 pg/mL   Sed Rate   Result Value Ref Range    Sed Rate Westergren 25 (H) 0 - 20 mm/hour   Procalcitonin   Result Value Ref Range    Procalcitonin 0.03 <0.25 ng/mL   LDH   Result Value Ref Range    LDH Total 155 107 - 266 U/L   COVID/SARS CoV-2   Result Value Ref Range    COVID Order Status Received    URINALYSIS   Result Value Ref Range    Color Yellow     Character Clear     Specific Gravity 1.015 <1.035    Ph 7.0 5.0 - 8.0    Glucose Negative Negative mg/dL    Ketones Trace (A) Negative mg/dL    Protein Negative Negative mg/dL    Bilirubin Negative Negative    Nitrite Negative Negative    Leukocyte Esterase Negative Negative    Occult Blood Negative Negative    Micro Urine Req see below    LACTIC ACID   Result Value Ref Range    Lactic Acid 2.1 (H) 0.5 - 2.0 mmol/L   ESTIMATED GFR   Result Value Ref Range    GFR If African American >60 >60 mL/min/1.73 m 2    GFR If Non African American >60 >60 mL/min/1.73 m 2   SARS-CoV-2, PCR (In-House)   Result Value Ref Range    SARS-CoV-2 Source NP Swab     SARS-CoV-2 by PCR NotDetected NotDetected   EKG   Result Value Ref Range    Report       [facility]    Test Date:  2020  Pt Name:    BRANDY HUTTON                 Department: [facility]  MRN:        [id number]                      [facility]  Gender:     Male                         Technician: GT  :        1950                   Requested By:ER TRIAGE PROTOCOL  Order #:    [id number]                    Reading MD: GALE GARCIA MD    Measurements  Intervals                                Axis  Rate:       62                           P:          31  OR:         139                           QRS:        10  QRSD:       82                           T:          21  QT:         392  QTc:        398    Interpretive Statements  SINUS RHYTHM  MULTIPLE PREMATURE COMPLEXES, VENT & SUPRAVEN  Compared to ECG 02/04/2020 15:02:45  Sinus bradycardia no longer present  ST (T wave) deviation no longer present  Electronically Signed On 5- 19:46:53 PDT by GALE NASH MD          7:48 PM reevaluation of patient at bedside.  He appears to be better hydrated but he continues to have nausea and vomiting.  He was given Zofran and another dose of fentanyl.  I suspect that he is not going to get better and not be able to hydrate himself at home, so I spoke with Dr. Parham, hospitalist, about admitting the patient.  I also ordered a dose of Flagyl since it appears the patient has duodenitis.  I suspect the d-dimer is elevated due to generalized inflammation/SIRS and the shortness of breath is due to inflammation of the duodenum just underneath the diaphragm.    Disposition: Admit to Dr. Parham, hospitalist, in guarded condition.  Transfer of care is as of 8 PM.    FINAL IMPRESSION  1. Duodenitis    2. Shortness of breath    3. Intractable vomiting with nausea, unspecified vomiting type    4. Dehydration               Electronically signed by: Gale Nash M.D., 5/11/2020 5:27 PM

## 2020-05-12 NOTE — CARE PLAN
Problem: Nutritional:  Goal: Achieve adequate nutritional intake  Description: Advance diet past clear liquids. Patient will consume >50% of meals.   Outcome: NOT MET   See RD note.

## 2020-05-12 NOTE — DIETARY
"Nutrition services: Day 1 of admit.  Chris Leggett is a 69 y.o. male with admitting DX of Duodenitis.  Consult received for MST score of 3 per nutrition screen for unplanned weight loss of 14-23 lb in <1 week with poor PO intake.    Assessment:  Height: 180.3 cm (5' 11\")  Weight: 100.7 kg (222 lb 0.1 oz)  Body mass index is 30.96 kg/m²., BMI classification: Class 1 Obesity  Diet/Intake: Clear Liquid - No red foods.  PO intake 5/12 breakfast = 50-75%.    Evaluation:   1. Pt admitted with nausea, vomiting, and diarrhea x 2-3 days.  2. Pt states he may have lost weight in the past week. States usual weight is 220 lb. Current weight is 222 lb.  Per chart review, weight has been stable.  Weight on 2/7/2020 = 100.6 kg/222 lb.  3. Pt tolerated clear liquid breakfast this morning.  Observed pt ate most of meal tray.  Discussed with pt that diet will be advanced per MD order.    Malnutrition Risk: Criteria not met.    Recommendations/Plan:  1. Diet advancement per MD.  2. Encourage intake of meals.  3. Document intake of all meals as % taken in ADL's to provide interdisciplinary communication across all shifts.   4. Monitor weight.  5. Nutrition rep will continue to see patient for ongoing meal and snack preferences.     RD following.          "

## 2020-05-12 NOTE — RESPIRATORY CARE
" COPD EDUCATION by COPD CLINICAL EDUCATOR  5/12/2020 at 4:35 PM by Jocelyne Hu, RRT     Patient reviewed by COPD education team. Patient does not have a history or diagnosis of COPD. Patient is a smoker, smoking cessation education done. A comprehensive packet with tips to quit and information on outpatient classes given to patient.  Smoking Cessation Intervention and education completed, 15 minutes spent on smoking cessation education with patient    Provided smoking cessation packet with \"Tips to Quit\" and flyer for \"Free Smoking Cessation Classes\".     "

## 2020-05-13 LAB
ANION GAP SERPL CALC-SCNC: 10 MMOL/L (ref 7–16)
BASOPHILS # BLD AUTO: 0.8 % (ref 0–1.8)
BASOPHILS # BLD: 0.02 K/UL (ref 0–0.12)
BUN SERPL-MCNC: 6 MG/DL (ref 8–22)
CALCIUM SERPL-MCNC: 8.4 MG/DL (ref 8.4–10.2)
CHLORIDE SERPL-SCNC: 104 MMOL/L (ref 96–112)
CO2 SERPL-SCNC: 26 MMOL/L (ref 20–33)
CREAT SERPL-MCNC: 0.67 MG/DL (ref 0.5–1.4)
EOSINOPHIL # BLD AUTO: 0.21 K/UL (ref 0–0.51)
EOSINOPHIL NFR BLD: 8.3 % (ref 0–6.9)
ERYTHROCYTE [DISTWIDTH] IN BLOOD BY AUTOMATED COUNT: 47.8 FL (ref 35.9–50)
GLUCOSE SERPL-MCNC: 87 MG/DL (ref 65–99)
HCT VFR BLD AUTO: 33.8 % (ref 42–52)
HGB BLD-MCNC: 11.3 G/DL (ref 14–18)
IMM GRANULOCYTES # BLD AUTO: 0 K/UL (ref 0–0.11)
IMM GRANULOCYTES NFR BLD AUTO: 0 % (ref 0–0.9)
LIPASE SERPL-CCNC: 25 U/L (ref 7–58)
LYMPHOCYTES # BLD AUTO: 0.86 K/UL (ref 1–4.8)
LYMPHOCYTES NFR BLD: 33.9 % (ref 22–41)
MCH RBC QN AUTO: 32.7 PG (ref 27–33)
MCHC RBC AUTO-ENTMCNC: 33.4 G/DL (ref 33.7–35.3)
MCV RBC AUTO: 97.7 FL (ref 81.4–97.8)
MONOCYTES # BLD AUTO: 0.19 K/UL (ref 0–0.85)
MONOCYTES NFR BLD AUTO: 7.5 % (ref 0–13.4)
NEUTROPHILS # BLD AUTO: 1.26 K/UL (ref 1.82–7.42)
NEUTROPHILS NFR BLD: 49.5 % (ref 44–72)
NRBC # BLD AUTO: 0 K/UL
NRBC BLD-RTO: 0 /100 WBC
PLATELET # BLD AUTO: 57 K/UL (ref 164–446)
PMV BLD AUTO: 11.6 FL (ref 9–12.9)
POTASSIUM SERPL-SCNC: 4.2 MMOL/L (ref 3.6–5.5)
RBC # BLD AUTO: 3.46 M/UL (ref 4.7–6.1)
SODIUM SERPL-SCNC: 140 MMOL/L (ref 135–145)
WBC # BLD AUTO: 2.5 K/UL (ref 4.8–10.8)

## 2020-05-13 PROCEDURE — 700102 HCHG RX REV CODE 250 W/ 637 OVERRIDE(OP): Performed by: INTERNAL MEDICINE

## 2020-05-13 PROCEDURE — 770006 HCHG ROOM/CARE - MED/SURG/GYN SEMI*

## 2020-05-13 PROCEDURE — 700102 HCHG RX REV CODE 250 W/ 637 OVERRIDE(OP): Performed by: HOSPITALIST

## 2020-05-13 PROCEDURE — 700111 HCHG RX REV CODE 636 W/ 250 OVERRIDE (IP): Performed by: INTERNAL MEDICINE

## 2020-05-13 PROCEDURE — 700111 HCHG RX REV CODE 636 W/ 250 OVERRIDE (IP): Performed by: HOSPITALIST

## 2020-05-13 PROCEDURE — A9270 NON-COVERED ITEM OR SERVICE: HCPCS | Performed by: INTERNAL MEDICINE

## 2020-05-13 PROCEDURE — 85025 COMPLETE CBC W/AUTO DIFF WBC: CPT

## 2020-05-13 PROCEDURE — 83690 ASSAY OF LIPASE: CPT

## 2020-05-13 PROCEDURE — 700105 HCHG RX REV CODE 258: Performed by: INTERNAL MEDICINE

## 2020-05-13 PROCEDURE — 36415 COLL VENOUS BLD VENIPUNCTURE: CPT

## 2020-05-13 PROCEDURE — 99232 SBSQ HOSP IP/OBS MODERATE 35: CPT | Performed by: INTERNAL MEDICINE

## 2020-05-13 PROCEDURE — A9270 NON-COVERED ITEM OR SERVICE: HCPCS | Performed by: HOSPITALIST

## 2020-05-13 PROCEDURE — 80048 BASIC METABOLIC PNL TOTAL CA: CPT

## 2020-05-13 RX ADMIN — GABAPENTIN 400 MG: 400 CAPSULE ORAL at 06:09

## 2020-05-13 RX ADMIN — METRONIDAZOLE 500 MG: 500 TABLET, FILM COATED ORAL at 21:51

## 2020-05-13 RX ADMIN — HEPARIN SODIUM 5000 UNITS: 5000 INJECTION, SOLUTION INTRAVENOUS; SUBCUTANEOUS at 14:03

## 2020-05-13 RX ADMIN — QUETIAPINE FUMARATE 200 MG: 100 TABLET ORAL at 21:51

## 2020-05-13 RX ADMIN — OXYCODONE HYDROCHLORIDE 5 MG: 5 TABLET ORAL at 00:02

## 2020-05-13 RX ADMIN — OXYCODONE HYDROCHLORIDE 5 MG: 5 TABLET ORAL at 06:09

## 2020-05-13 RX ADMIN — SODIUM CHLORIDE, POTASSIUM CHLORIDE, SODIUM LACTATE AND CALCIUM CHLORIDE: 600; 310; 30; 20 INJECTION, SOLUTION INTRAVENOUS at 06:14

## 2020-05-13 RX ADMIN — METRONIDAZOLE 500 MG: 500 TABLET, FILM COATED ORAL at 06:08

## 2020-05-13 RX ADMIN — DULOXETINE HYDROCHLORIDE 60 MG: 30 CAPSULE, DELAYED RELEASE ORAL at 06:09

## 2020-05-13 RX ADMIN — HEPARIN SODIUM 5000 UNITS: 5000 INJECTION, SOLUTION INTRAVENOUS; SUBCUTANEOUS at 06:09

## 2020-05-13 RX ADMIN — GABAPENTIN 400 MG: 400 CAPSULE ORAL at 18:20

## 2020-05-13 RX ADMIN — OXYCODONE HYDROCHLORIDE 5 MG: 5 TABLET ORAL at 14:09

## 2020-05-13 RX ADMIN — METRONIDAZOLE 500 MG: 500 TABLET, FILM COATED ORAL at 14:03

## 2020-05-13 RX ADMIN — GABAPENTIN 400 MG: 400 CAPSULE ORAL at 14:03

## 2020-05-13 RX ADMIN — OMEPRAZOLE 20 MG: 20 CAPSULE, DELAYED RELEASE ORAL at 06:09

## 2020-05-13 RX ADMIN — OXYCODONE HYDROCHLORIDE 5 MG: 5 TABLET ORAL at 19:55

## 2020-05-13 RX ADMIN — HEPARIN SODIUM 5000 UNITS: 5000 INJECTION, SOLUTION INTRAVENOUS; SUBCUTANEOUS at 21:51

## 2020-05-13 RX ADMIN — CEFTRIAXONE SODIUM 2 G: 2 INJECTION, POWDER, FOR SOLUTION INTRAMUSCULAR; INTRAVENOUS at 18:20

## 2020-05-13 ASSESSMENT — ENCOUNTER SYMPTOMS
NECK PAIN: 0
HEADACHES: 0
WEIGHT LOSS: 0
HEARTBURN: 0
CONSTIPATION: 0
SPEECH CHANGE: 0
CHILLS: 0
COUGH: 0
DIARRHEA: 0
BACK PAIN: 0
WEAKNESS: 1
BLURRED VISION: 0
SHORTNESS OF BREATH: 0
PND: 0
EYE PAIN: 0
FALLS: 0
WHEEZING: 0
DEPRESSION: 0
SPUTUM PRODUCTION: 0
SEIZURES: 0
ABDOMINAL PAIN: 1
ORTHOPNEA: 0
NAUSEA: 1
TINGLING: 0
DIZZINESS: 0

## 2020-05-13 ASSESSMENT — LIFESTYLE VARIABLES: SUBSTANCE_ABUSE: 0

## 2020-05-13 NOTE — PROGRESS NOTES
0700: Bedside report from Megan PISANO RN. Pt wakes to voice. No needs at this time. Pending possible DC, GI consult outpatient.

## 2020-05-13 NOTE — PROGRESS NOTES
Received report from day shift. Assumed care of patient. Patient resting in bed. Will continue to monitor.

## 2020-05-13 NOTE — CARE PLAN
Problem: Communication  Goal: The ability to communicate needs accurately and effectively will improve  Outcome: PROGRESSING AS EXPECTED     Problem: Safety  Goal: Will remain free from injury  Outcome: PROGRESSING AS EXPECTED  Note: Encouraged patient to ask for help when ambulating     Problem: Pain Management  Goal: Pain level will decrease to patient's comfort goal  Outcome: PROGRESSING AS EXPECTED  Intervention: Educate and implement non-pharmacologic comfort measures. Examples: relaxation, distration, play therapy, activity therapy, massage, etc.  Note: Provided education about pain medications. Offered a heat pack but patient declined

## 2020-05-13 NOTE — PROGRESS NOTES
[facility] Medicine Daily Progress Note    Date of Service  5/13/2020    Chief Complaint  69 y.o. male admitted 5/11/2020 with nausea and abdominal pain    Hospital Course    Presented with complaint of nausea and abdominal pain.  CT showing duodenitis.  Started on PPI and antibiotics.        Interval Problem Update  5/12.  Patient still complains of abdominal pain but better than yesterday. Patient's pain is local 4-5/10, intermittent and does not radiate to other location, sharp and with some tingling. Can be controlled by pain meds. Still complains of nausea but no vomiting.  5/13.  Patient says his abdominal pain slightly improved compared to yesterday but still very significant. Patient's pain is local 5-6/10, intermittent and does not radiate to other location, sharp and with some tingling. Can be controlled by pain meds.  Still requires narcotics for pain control  Patient tolerated clear liquid diet and would like to advance to full liquid diet.    Consultants/Specialty  none    Code Status  full    Disposition  tbd    Review of Systems  Review of Systems   Constitutional: Positive for malaise/fatigue. Negative for chills and weight loss.   HENT: Negative for congestion, ear discharge, ear pain, hearing loss and nosebleeds.    Eyes: Negative for blurred vision and pain.   Respiratory: Negative for cough, sputum production, shortness of breath and wheezing.    Cardiovascular: Negative for chest pain, orthopnea, leg swelling and PND.   Gastrointestinal: Positive for abdominal pain and nausea. Negative for constipation, diarrhea and heartburn.   Genitourinary: Negative for dysuria, frequency and hematuria.   Musculoskeletal: Negative for back pain, falls, joint pain and neck pain.   Skin: Negative for rash.   Neurological: Positive for weakness. Negative for dizziness, tingling, speech change, seizures and headaches.   Psychiatric/Behavioral: Negative for depression, substance abuse and suicidal ideas.         Physical Exam  Temp:  [36.4 °C (97.6 °F)-36.8 °C (98.3 °F)] 36.5 °C (97.7 °F)  Pulse:  [54-87] 54  Resp:  [16-18] 16  BP: (107-157)/(66-93) 157/93  SpO2:  [92 %-98 %] 98 %    Physical Exam  Constitutional:       General: He is not in acute distress.     Appearance: Normal appearance. He is not ill-appearing, toxic-appearing or diaphoretic.   HENT:      Head: Normocephalic and atraumatic.      Right Ear: Tympanic membrane and external ear normal.      Left Ear: Tympanic membrane and external ear normal.      Nose: No congestion or rhinorrhea.      Mouth/Throat:      Mouth: Mucous membranes are moist.      Pharynx: Oropharynx is clear. No oropharyngeal exudate.   Eyes:      Extraocular Movements: Extraocular movements intact.      Conjunctiva/sclera: Conjunctivae normal.      Pupils: Pupils are equal, round, and reactive to light.   Neck:      Musculoskeletal: Normal range of motion and neck supple. No neck rigidity or muscular tenderness.   Cardiovascular:      Rate and Rhythm: Normal rate and regular rhythm.      Pulses: Normal pulses.      Heart sounds: Normal heart sounds. No murmur. No friction rub.   Pulmonary:      Effort: Pulmonary effort is normal. No respiratory distress.      Breath sounds: Normal breath sounds. No stridor. No rhonchi or rales.   Chest:      Chest wall: No tenderness.   Abdominal:      General: Abdomen is flat. Bowel sounds are normal. There is no distension.      Palpations: Abdomen is soft. There is no mass.      Tenderness: There is abdominal tenderness. There is no guarding or rebound.   Musculoskeletal: Normal range of motion.         General: No swelling or deformity.   Skin:     General: Skin is warm and dry.      Capillary Refill: Capillary refill takes more than 3 seconds.   Neurological:      General: No focal deficit present.      Mental Status: He is alert and oriented to person, place, and time. Mental status is at baseline.      Cranial Nerves: No cranial nerve deficit.       Motor: No weakness.   Psychiatric:         Mood and Affect: Mood normal.         Behavior: Behavior normal.         Fluids    Intake/Output Summary (Last 24 hours) at 5/13/2020 1235  Last data filed at 5/13/2020 0800  Gross per 24 hour   Intake 1840 ml   Output 2415 ml   Net -575 ml       Laboratory  Recent Labs     05/11/20 1756 05/12/20  0307 05/13/20  0447   WBC 7.1 4.4* 2.5*   RBC 4.39* 3.54* 3.46*   HEMOGLOBIN 14.4 11.6* 11.3*   HEMATOCRIT 41.6* 33.6* 33.8*   MCV 94.8 94.9 97.7   MCH 32.8 32.8 32.7   MCHC 34.6 34.5 33.4*   RDW 46.5 46.4 47.8   PLATELETCT 102* 64* 57*   MPV 11.2 11.1 11.6     Recent Labs     05/11/20 1756 05/12/20  0307 05/13/20  0447   SODIUM 133* 133* 140   POTASSIUM 3.9 3.8 4.2   CHLORIDE 96 98 104   CO2 23 24 26   GLUCOSE 90 99 87   BUN 9 7* 6*   CREATININE 0.78 0.65 0.67   CALCIUM 10.5* 8.4 8.4     Recent Labs     05/11/20  1756   APTT 28.0   INR 1.03               Imaging  CT-ABDOMEN-PELVIS W/O   Final Result      1.  Mild thickening of the wall of the duodenum with a small amount of surrounding inflammatory stranding which may represent presence of duodenitis. There is also some dense material layering dependently within the duodenum.      2.  No evidence of bowel obstruction or focal inflammatory change seen in the area of the colon.      3.  Splenomegaly.      4.  Possible tiny gallstones.      DX-CHEST-PORTABLE (1 VIEW)   Final Result      No evidence of acute cardiopulmonary process.           Assessment/Plan  * Duodenitis  Assessment & Plan  Unclear etilogy, possibly infectious?   IV Ceftriaxone and flagyl started, continue, total course of 3 days as planned  monitor progress, may need EGD at some point.   Currently has no worsening signs of GI bleed or abdominal pain continue to monitor possible outpatient GI follow-up  Start patient on PPI omeprazole  Gradually advance diet to full liquid for today.  If can tolerate oral intake may be advanced to GI soft tomorrow and discharge  home    Mood disorder (HCC)- (present on admission)  Assessment & Plan  Resume home dose of Cymbalta, Seroquel, trazodone    Hyponatremia  Assessment & Plan  2/2 to hyponatremia  IV fluids   Na 133->133    Lumbar back pain with radiculopathy affecting lower extremity- (present on admission)  Assessment & Plan  Controlled on gabapentin.  monitor         VTE prophylaxis: SCD heparin      Current Facility-Administered Medications:   •  hydrALAZINE (APRESOLINE) injection 5 mg, 5 mg, Intravenous, Q6HRS PRN, La Ornelas M.D.  •  omeprazole (PRILOSEC) capsule 20 mg, 20 mg, Oral, DAILY, La Ornelas M.D., 20 mg at 05/13/20 0609  •  DULoxetine (CYMBALTA) capsule 60 mg, 60 mg, Oral, DAILY, Nadya Woodward M.D., 60 mg at 05/13/20 0609  •  gabapentin (NEURONTIN) capsule 400 mg, 400 mg, Oral, TID, Nadya Woodward M.D., 400 mg at 05/13/20 0609  •  QUEtiapine (SEROQUEL) tablet 200 mg, 200 mg, Oral, QHS, Nadya Woodward M.D., 200 mg at 05/12/20 2134  •  traZODone (DESYREL) tablet 50 mg, 50 mg, Oral, HS PRN, Nadya Woodward M.D., 50 mg at 05/12/20 2249  •  cefTRIAXone (ROCEPHIN) 2 g in  mL IVPB, 2 g, Intravenous, Q24HRS, Nadya Woodward M.D., Stopped at 05/12/20 1758  •  metroNIDAZOLE (FLAGYL) tablet 500 mg, 500 mg, Oral, Q8HRS, Nadya Woodward M.D., 500 mg at 05/13/20 0608  •  lactated ringers infusion, , Intravenous, Continuous, La Ornelas M.D., Last Rate: 75 mL/hr at 05/13/20 0614  •  heparin injection 5,000 Units, 5,000 Units, Subcutaneous, Q8HRS, Nadya Woowdard M.D., 5,000 Units at 05/13/20 0609  •  acetaminophen (TYLENOL) tablet 650 mg, 650 mg, Oral, Q6HRS PRN, Nadya Woodward M.D.  •  Notify provider if pain remains uncontrolled, , , CONTINUOUS **AND** Use the numeric rating scale (NRS-11) on regular floors and Critical-Care Pain Observation Tool (CPOT) on ICUs/Trauma to assess pain, , , CONTINUOUS **AND** Pulse Ox (Oximetry), , , CONTINUOUS **AND** Pharmacy Consult Request ...Pain Management Review 1  Each, 1 Each, Other, PHARMACY TO DOSE **AND** If patient difficult to arouse and/or has respiratory depression, stop any opiates that are currently infusing and call a Rapid Response., , , CONTINUOUS **AND** oxyCODONE immediate-release (ROXICODONE) tablet 2.5 mg, 2.5 mg, Oral, Q3HRS PRN **AND** oxyCODONE immediate-release (ROXICODONE) tablet 5 mg, 5 mg, Oral, Q3HRS PRN, 5 mg at 05/13/20 0609 **AND** HYDROmorphone pf (DILAUDID) injection 0.25 mg, 0.25 mg, Intravenous, Q3HRS PRN, Nadya Woodward M.D.  •  ondansetron (ZOFRAN) syringe/vial injection 4 mg, 4 mg, Intravenous, Q4HRS PRN, Nadya Woodward M.D.  •  ondansetron (ZOFRAN ODT) dispertab 4 mg, 4 mg, Oral, Q4HRS PRN, Nadya Woodward M.D.

## 2020-05-13 NOTE — PROGRESS NOTES
Kim from Lab called with critical result of WBC at 2.5. Critical lab result read back to Kim.   Dr. Arnold notified of critical lab result at 2.5.  Critical lab result read back by Dr. Arnold. No new orders.

## 2020-05-13 NOTE — CARE PLAN
Problem: Communication  Goal: The ability to communicate needs accurately and effectively will improve  Outcome: PROGRESSING AS EXPECTED     Problem: Safety  Goal: Will remain free from injury  Outcome: PROGRESSING AS EXPECTED  Goal: Will remain free from falls  Outcome: PROGRESSING AS EXPECTED     Problem: Knowledge Deficit  Goal: Knowledge of the prescribed therapeutic regimen will improve  Outcome: PROGRESSING AS EXPECTED     Problem: Discharge Barriers/Planning  Goal: Patient's continuum of care needs will be met  Outcome: PROGRESSING AS EXPECTED

## 2020-05-14 VITALS
OXYGEN SATURATION: 98 % | HEART RATE: 56 BPM | SYSTOLIC BLOOD PRESSURE: 156 MMHG | TEMPERATURE: 97.5 F | WEIGHT: 222 LBS | DIASTOLIC BLOOD PRESSURE: 85 MMHG | HEIGHT: 71 IN | RESPIRATION RATE: 18 BRPM | BODY MASS INDEX: 31.08 KG/M2

## 2020-05-14 PROBLEM — K29.80 DUODENITIS: Status: RESOLVED | Noted: 2020-05-11 | Resolved: 2020-05-14

## 2020-05-14 PROBLEM — E87.1 HYPONATREMIA: Status: RESOLVED | Noted: 2018-09-02 | Resolved: 2020-05-14

## 2020-05-14 LAB
ANION GAP SERPL CALC-SCNC: 9 MMOL/L (ref 7–16)
BASOPHILS # BLD AUTO: 1.2 % (ref 0–1.8)
BASOPHILS # BLD: 0.02 K/UL (ref 0–0.12)
BUN SERPL-MCNC: 5 MG/DL (ref 8–22)
CALCIUM SERPL-MCNC: 8.6 MG/DL (ref 8.4–10.2)
CHLORIDE SERPL-SCNC: 104 MMOL/L (ref 96–112)
CO2 SERPL-SCNC: 26 MMOL/L (ref 20–33)
CREAT SERPL-MCNC: 0.7 MG/DL (ref 0.5–1.4)
EOSINOPHIL # BLD AUTO: 0.17 K/UL (ref 0–0.51)
EOSINOPHIL NFR BLD: 9.8 % (ref 0–6.9)
ERYTHROCYTE [DISTWIDTH] IN BLOOD BY AUTOMATED COUNT: 46.3 FL (ref 35.9–50)
GLUCOSE SERPL-MCNC: 79 MG/DL (ref 65–99)
HCT VFR BLD AUTO: 32.7 % (ref 42–52)
HGB BLD-MCNC: 10.9 G/DL (ref 14–18)
IL6 SERPL-MCNC: <5 PG/ML
IMM GRANULOCYTES # BLD AUTO: 0.01 K/UL (ref 0–0.11)
IMM GRANULOCYTES NFR BLD AUTO: 0.6 % (ref 0–0.9)
LYMPHOCYTES # BLD AUTO: 0.71 K/UL (ref 1–4.8)
LYMPHOCYTES NFR BLD: 41 % (ref 22–41)
MCH RBC QN AUTO: 32.3 PG (ref 27–33)
MCHC RBC AUTO-ENTMCNC: 33.3 G/DL (ref 33.7–35.3)
MCV RBC AUTO: 97 FL (ref 81.4–97.8)
MONOCYTES # BLD AUTO: 0.16 K/UL (ref 0–0.85)
MONOCYTES NFR BLD AUTO: 9.2 % (ref 0–13.4)
NEUTROPHILS # BLD AUTO: 0.66 K/UL (ref 1.82–7.42)
NEUTROPHILS NFR BLD: 38.2 % (ref 44–72)
NRBC # BLD AUTO: 0 K/UL
NRBC BLD-RTO: 0 /100 WBC
PLATELET # BLD AUTO: 58 K/UL (ref 164–446)
PMV BLD AUTO: 11.2 FL (ref 9–12.9)
POTASSIUM SERPL-SCNC: 3.9 MMOL/L (ref 3.6–5.5)
RBC # BLD AUTO: 3.37 M/UL (ref 4.7–6.1)
SODIUM SERPL-SCNC: 139 MMOL/L (ref 135–145)
WBC # BLD AUTO: 1.7 K/UL (ref 4.8–10.8)

## 2020-05-14 PROCEDURE — A9270 NON-COVERED ITEM OR SERVICE: HCPCS | Performed by: INTERNAL MEDICINE

## 2020-05-14 PROCEDURE — 80048 BASIC METABOLIC PNL TOTAL CA: CPT

## 2020-05-14 PROCEDURE — 700111 HCHG RX REV CODE 636 W/ 250 OVERRIDE (IP): Performed by: HOSPITALIST

## 2020-05-14 PROCEDURE — 85025 COMPLETE CBC W/AUTO DIFF WBC: CPT

## 2020-05-14 PROCEDURE — 36415 COLL VENOUS BLD VENIPUNCTURE: CPT

## 2020-05-14 PROCEDURE — 700102 HCHG RX REV CODE 250 W/ 637 OVERRIDE(OP): Performed by: INTERNAL MEDICINE

## 2020-05-14 PROCEDURE — 700102 HCHG RX REV CODE 250 W/ 637 OVERRIDE(OP): Performed by: HOSPITALIST

## 2020-05-14 PROCEDURE — A9270 NON-COVERED ITEM OR SERVICE: HCPCS | Performed by: HOSPITALIST

## 2020-05-14 PROCEDURE — 99239 HOSP IP/OBS DSCHRG MGMT >30: CPT | Performed by: INTERNAL MEDICINE

## 2020-05-14 RX ORDER — QUETIAPINE FUMARATE 50 MG/1
100 TABLET, FILM COATED ORAL
Qty: 60 TAB | Refills: 0 | Status: SHIPPED | OUTPATIENT
Start: 2020-05-14 | End: 2020-06-13

## 2020-05-14 RX ADMIN — METRONIDAZOLE 500 MG: 500 TABLET, FILM COATED ORAL at 05:22

## 2020-05-14 RX ADMIN — GABAPENTIN 400 MG: 400 CAPSULE ORAL at 05:23

## 2020-05-14 RX ADMIN — HEPARIN SODIUM 5000 UNITS: 5000 INJECTION, SOLUTION INTRAVENOUS; SUBCUTANEOUS at 05:23

## 2020-05-14 RX ADMIN — TRAZODONE HYDROCHLORIDE 50 MG: 50 TABLET ORAL at 01:18

## 2020-05-14 RX ADMIN — OXYCODONE HYDROCHLORIDE 5 MG: 5 TABLET ORAL at 05:23

## 2020-05-14 RX ADMIN — DULOXETINE HYDROCHLORIDE 60 MG: 30 CAPSULE, DELAYED RELEASE ORAL at 05:22

## 2020-05-14 RX ADMIN — OMEPRAZOLE 20 MG: 20 CAPSULE, DELAYED RELEASE ORAL at 05:23

## 2020-05-14 NOTE — PROGRESS NOTES
Bedside report received from Zaynab TSANG. Resume care. Pt is AAO x 4.Pt reports 7/10 pain level.See MAR.POC discussed. Evening assessment done. All needs met at this time.Bed in low position.Call light within reach.Rounding in place.

## 2020-05-14 NOTE — CARE PLAN
Problem: Communication  Goal: The ability to communicate needs accurately and effectively will improve  Outcome: PROGRESSING AS EXPECTED     Problem: Safety  Goal: Will remain free from injury  Outcome: PROGRESSING AS EXPECTED     Problem: Pain Management  Goal: Pain level will decrease to patient's comfort goal  Outcome: PROGRESSING AS EXPECTED     Problem: Infection  Goal: Will remain free from infection  Outcome: PROGRESSING AS EXPECTED

## 2020-05-14 NOTE — PROGRESS NOTES
Pt removed IV. Will try and get a new one in. Wiil give up to date to the day nurse. All needs met now

## 2020-05-14 NOTE — DISCHARGE INSTRUCTIONS
Discharge Instructions    Discharged to home by car with relative. Discharged via wheelchair, hospital escort: Yes.  Special equipment needed: Not Applicable    Be sure to schedule a follow-up appointment with your primary care doctor or any specialists as instructed.     Discharge Plan:   Smoking Cessation Offered: Patient Refused    I understand that a diet low in cholesterol, fat, and sodium is recommended for good health. Unless I have been given specific instructions below for another diet, I accept this instruction as my diet prescription.   Other diet: GI soft low fiber    Special Instructions: NoneLow-Fiber Diet  Fiber is found in fruits, vegetables, and whole grains. A low-fiber diet restricts fibrous foods that are not digested in the small intestine. A diet containing about 10-15 grams of fiber per day is considered low fiber. Low-fiber diets may be used to:  · Promote healing and rest the bowel during intestinal flare-ups.  · Prevent blockage of a partially obstructed or narrowed gastrointestinal tract.  · Reduce fecal weight and volume.  · Slow the movement of feces.  You may be on a low-fiber diet as a transitional diet following surgery, after an injury (trauma), or because of a short (acute) or lifelong (chronic) illness. Your health care provider will determine the length of time you need to stay on this diet.  What do I need to know about a low-fiber diet?  Always check the fiber content on the packaging's Nutrition Facts label, especially on foods from the grains list. Ask your dietitian if you have questions about specific foods that are related to your condition, especially if the food is not listed below. In general, a low-fiber food will have less than 2 g of fiber.  What foods can I eat?  Grains   All breads and crackers made with white flour. Sweet rolls, doughnuts, waffles, pancakes, [place] toast, bagels. Pretzels, [place] toast, zwieback. Well-cooked cereals, such as cornmeal, farina, or  cream cereals. Dry cereals that do not contain whole grains, fruit, or nuts, such as refined corn, wheat, rice, and oat cereals. Potatoes prepared any way without skins, plain pastas and noodles, refined white rice. Use white flour for baking and making sauces. Use allowed list of grains for casseroles, dumplings, and puddings.  Vegetables   Strained tomato and vegetable juices. Fresh lettuce, cucumber, spinach. Well-cooked (no skin or pulp) or canned vegetables, such as asparagus, bean sprouts, beets, carrots, green beans, mushrooms, potatoes, pumpkin, spinach, yellow squash, tomato sauce/puree, turnips, yams, and zucchini. Keep servings limited to ½ cup.  Fruits   All fruit juices except prune juice. Cooked or canned fruits without skin and seeds, such as applesauce, apricots, cherries, fruit cocktail, grapefruit, grapes, mandarin oranges, melons, peaches, pears, pineapple, and plums. Fresh fruits without skin, such as apricots, avocados, bananas, melons, pineapple, nectarines, and peaches. Keep servings limited to ½ cup or 1 piece.  Meat and Other Protein Sources   Ground or well-cooked tender beef, ham, veal, lamb, pork, or poultry. Eggs, plain cheese. Fish, oysters, shrimp, lobster, and other seafood. Liver, organ meats. Smooth nut butters.  Dairy   All milk products and alternative dairy substitutes, such as soy, rice, almond, and coconut, not containing added whole nuts, seeds, or added fruit.  Beverages   Decaf coffee, fruit, and vegetable juices or smoothies (small amounts, with no pulp or skins, and with fruits from allowed list), sports drinks, herbal tea.  Condiments   Ketchup, mustard, vinegar, cream sauce, cheese sauce, cocoa powder. Spices in moderation, such as allspice, basil, bay leaves, celery powder or leaves, cinnamon, cumin powder, ramos powder, wilbur, mace, marjoram, onion or garlic powder, oregano, paprika, parsley flakes, ground pepper, rosemary, [place]vy, savory, tarragon, thyme, and  turmeric.  Sweets and Desserts   Plain cakes and cookies, pie made with allowed fruit, pudding, custard, cream pie. Gelatin, fruit, ice, sherbet, frozen ice pops. Ice cream, ice milk without nuts. Plain hard candy, honey, jelly, molasses, syrup, sugar, chocolate syrup, gumdrops, marshmallows. Limit overall sugar intake.  Fats and Oil   Margarine, butter, cream, mayonnaise, salad oils, plain salad dressings made from allowed foods. Choose healthy fats such as olive oil, canola oil, and omega-3 fatty acids (such as found in salmon or tuna) when possible.  Other   Bouillon, broth, or cream soups made from allowed foods. Any strained soup. Casseroles or mixed dishes made with allowed foods.  The items listed above may not be a complete list of recommended foods or beverages. Contact your dietitian for more options.   What foods are not recommended?  Grains   All whole wheat and whole grain breads and crackers. Multigrains, rye, bran seeds, nuts, or coconut. Cereals containing whole grains, multigrains, bran, coconut, nuts, raisins. Cooked or dry oatmeal, steel-cut oats. Coarse wheat cereals, granola. Cereals advertised as high fiber. Potato skins. Whole grain pasta, wild or brown rice. Popcorn. Coconut flour. Bran, buckwheat, corn bread, multigrains, rye, wheat germ.  Vegetables   Fresh, cooked or canned vegetables, such as artichokes, asparagus, beet greens, broccoli, [place] sprouts, cabbage, celery, cauliflower, corn, eggplant, kale, legumes or beans, okra, peas, and tomatoes. Avoid large servings of any vegetables, especially raw vegetables.  Fruits   Fresh fruits, such as apples with or without skin, berries, cherries, figs, grapes, grapefruit, guavas, kiwis, mangoes, oranges, papayas, pears, persimmons, pineapple, and pomegranate. Prune juice and juices with pulp, stewed or dried prunes. Dried fruits, dates, raisins. Fruit seeds or skins. Avoid large servings of all fresh fruits.  Meats and Other Protein Sources    Tough, fibrous meats with gristle. [place] nut butter. Cheese made with seeds, nuts, or other foods not recommended. Nuts, seeds, legumes (beans, including baked beans), dried peas, beans, lentils.  Dairy   Yogurt or cheese that contains nuts, seeds, or added fruit.  Beverages   Fruit juices with high pulp, prune juice. Caffeinated coffee and teas.  Condiments   Coconut, maple syrup, pickles, olives.  Sweets and Desserts   Desserts, cookies, or candies that contain nuts or coconut, chunky peanut butter, dried fruits. Jams, preserves with seeds, marmalade. Large amounts of sugar and sweets. Any other dessert made with fruits from the not recommended list.  Other   Soups made from vegetables that are not recommended or that contain other foods not recommended.  The items listed above may not be a complete list of foods and beverages to avoid. Contact your dietitian for more information.   This information is not intended to replace advice given to you by your health care provider. Make sure you discuss any questions you have with your health care provider.  Document Released: 06/09/2003 Document Revised: 05/25/2017 Document Reviewed: 11/10/2014  [employer] Interactive Patient Education © 2017 [employer]      · Is patient discharged on Warfarin / Coumadin?   No     Depression / Suicide Risk    As you are discharged from this [facility] facility, it is important to learn how to keep safe from harming yourself.    Recognize the warning signs:  · Abrupt changes in personality, positive or negative- including increase in energy   · Giving away possessions  · Change in eating patterns- significant weight changes-  positive or negative  · Change in sleeping patterns- unable to sleep or sleeping all the time   · Unwillingness or inability to communicate  · Depression  · Unusual sadness, discouragement and loneliness  · Talk of wanting to die  · Neglect of personal appearance   · Rebelliousness- reckless  behavior  · Withdrawal from people/activities they love  · Confusion- inability to concentrate     If you or a loved one observes any of these behaviors or has concerns about self-harm, here's what you can do:  · Talk about it- your feelings and reasons for harming yourself  · Remove any means that you might use to hurt yourself (examples: pills, rope, extension cords, firearm)  · Get professional help from the community (Mental Health, Substance Abuse, psychological counseling)  · Do not be alone:Call your Safe Contact- someone whom you trust who will be there for you.  · Call your local CRISIS HOTLINE 536-6311 or 159-576-9514  · Call your local Children's Mobile Crisis Response Team [place] (968) 765-1278 or www.NXTM  · Call the toll free National Suicide Prevention Hotlines   · National Suicide Prevention Lifeline 646-366-WADO (2008)  · [employer] Line Network 800-SUICIDE (832-5262)    Duodenitis  Introduction  Duodenitis is inflammation of the lining of the first part of the small intestine (duodenum). It is commonly caused by a bacterial infection, which may also lead to open sores (ulcers) in the intestine.  Duodenitis may develop suddenly and last for a short time (acute) or it may develop gradually and last for months or years (chronic).  What are the causes?  The most common cause of duodenitis is an infection from H. pylori bacteria. Other causes of this condition include:  · Long-term use of NSAIDs.  · Excessive use of alcohol.  · An infection of the small intestine (giardiasis).  · Crohn’s disease.  · Certain diseases of the immune system.  · Certain treatments for cancer.  What increases the risk?     The following factors may make you more likely to develop duodenitis:  · Smoking cigarettes.  · Drinking alcohol.  · Having a family history of duodenitis.  · Taking NSAIDs.  · Eating a high-fat diet.  What are the signs or symptoms?  Symptoms of this condition may include:  · Gnawing  or burning pain in the upper center of the abdomen (epigastric pain). This may get worse when the stomach is empty and may get better after eating.  · Abdominal cramps.  · Nausea and vomiting.  · Bloody vomit.  · Stools that are bloody, dark, or look like tar.  · Diarrhea.  · Weight loss.  · Fatigue.  How is this diagnosed?  This condition may be diagnosed based on your medical history and a physical exam. You may also have tests, such as:  · Blood tests.  · Stool tests.  · A test that checks the gases in your breath.  · An X-ray that is done after you swallow a liquid (barium) that makes your digestive tract easier to see.  · Endoscopy. This is an exam of the duodenum that is done by putting a thin tube with a tiny camera on the end down your throat (endoscopy). A sample of tissue from your duodenum (biopsy) may be removed with the endoscope and examined under a microscope for signs of inflammation and infection.  How is this treated?  Treatment depends on the cause of your condition. Treatment may include:  · Antibiotic medicine to treat H. pylori infection.  · Stopping your intake of NSAIDs.  · Medicine to reduce stomach acids.  · Medicine to treat Crohn’s disease.  · Surgery to treat severe inflammation that causes scarring or severe bleeding.  Follow these instructions at home:  Medicines  · Take over-the-counter and prescription medicines only as told by your health care provider.  · If you were prescribed antibiotic medicine, take it as told by your health care provider. Do not stop taking the antibiotic even if you start to feel better.  Eating and drinking  · Eat small, frequent meals.  · Do not drink alcohol.  · Drink enough water to keep your urine clear or pale yellow.  · Follow instructions from your health care provider about eating or drinking restrictions. You may be asked to avoid:  ¨ Caffeinated drinks.  ¨ Chocolate.  ¨ Peppermint or mint-flavored food or drinks.  ¨ Garlic.  ¨ Onions.  ¨ Spicy  foods.  ¨ Citrus fruits.  ¨ Tomato-based foods.  ¨ Fatty foods.  ¨ Fried foods.  Contact a health care provider if:  · You have a fever.  · Your symptoms come back, get worse, or do not get better with treatment.  Get help right away if:  · You vomit blood.  · You have severe abdominal pain.  · Your abdomen swells and is painful.  · You have a lot of blood in your stool.  · You feel dizzy or light-headed.  This information is not intended to replace advice given to you by your health care provider. Make sure you discuss any questions you have with your health care provider.  Document Released: 04/14/2014 Document Revised: 05/25/2017 Document Reviewed: 10/20/2016  © 2017 [employer]

## 2020-06-16 ENCOUNTER — OFFICE VISIT (OUTPATIENT)
Dept: MEDICAL GROUP | Facility: PHYSICIAN GROUP | Age: 70
End: 2020-06-16
Payer: MEDICARE

## 2020-06-16 VITALS
WEIGHT: 224 LBS | HEART RATE: 88 BPM | SYSTOLIC BLOOD PRESSURE: 128 MMHG | RESPIRATION RATE: 20 BRPM | HEIGHT: 70 IN | TEMPERATURE: 97.1 F | DIASTOLIC BLOOD PRESSURE: 84 MMHG | BODY MASS INDEX: 32.07 KG/M2 | OXYGEN SATURATION: 98 %

## 2020-06-16 DIAGNOSIS — Z87.81 H/O CERVICAL FRACTURE: ICD-10-CM

## 2020-06-16 DIAGNOSIS — D61.818 PANCYTOPENIA (HCC): ICD-10-CM

## 2020-06-16 DIAGNOSIS — R35.1 BENIGN PROSTATIC HYPERPLASIA WITH NOCTURIA: ICD-10-CM

## 2020-06-16 DIAGNOSIS — D64.9 NORMOCYTIC ANEMIA: ICD-10-CM

## 2020-06-16 DIAGNOSIS — G62.9 POLYNEUROPATHY: ICD-10-CM

## 2020-06-16 DIAGNOSIS — N40.1 BENIGN PROSTATIC HYPERPLASIA WITH NOCTURIA: ICD-10-CM

## 2020-06-16 DIAGNOSIS — M54.16 LUMBAR BACK PAIN WITH RADICULOPATHY AFFECTING LOWER EXTREMITY: ICD-10-CM

## 2020-06-16 DIAGNOSIS — Z23 NEED FOR VACCINATION: ICD-10-CM

## 2020-06-16 DIAGNOSIS — I10 ESSENTIAL HYPERTENSION: ICD-10-CM

## 2020-06-16 DIAGNOSIS — F51.01 PRIMARY INSOMNIA: ICD-10-CM

## 2020-06-16 DIAGNOSIS — F39 MOOD DISORDER (HCC): ICD-10-CM

## 2020-06-16 PROCEDURE — 90732 PPSV23 VACC 2 YRS+ SUBQ/IM: CPT | Performed by: PHYSICIAN ASSISTANT

## 2020-06-16 PROCEDURE — 90750 HZV VACC RECOMBINANT IM: CPT | Performed by: PHYSICIAN ASSISTANT

## 2020-06-16 PROCEDURE — G0009 ADMIN PNEUMOCOCCAL VACCINE: HCPCS | Performed by: PHYSICIAN ASSISTANT

## 2020-06-16 PROCEDURE — 90472 IMMUNIZATION ADMIN EACH ADD: CPT | Performed by: PHYSICIAN ASSISTANT

## 2020-06-16 PROCEDURE — 99204 OFFICE O/P NEW MOD 45 MIN: CPT | Mod: 25 | Performed by: PHYSICIAN ASSISTANT

## 2020-06-16 RX ORDER — DULOXETIN HYDROCHLORIDE 60 MG/1
60 CAPSULE, DELAYED RELEASE ORAL
COMMUNITY
Start: 2020-05-19 | End: 2020-06-16 | Stop reason: SDUPTHER

## 2020-06-16 RX ORDER — TRAZODONE HYDROCHLORIDE 100 MG/1
TABLET ORAL
COMMUNITY
Start: 2020-05-19 | End: 2020-06-16 | Stop reason: SDUPTHER

## 2020-06-16 RX ORDER — DULOXETIN HYDROCHLORIDE 60 MG/1
60 CAPSULE, DELAYED RELEASE ORAL
Qty: 90 CAP | Refills: 0 | Status: SHIPPED | OUTPATIENT
Start: 2020-06-16 | End: 2020-09-14

## 2020-06-16 RX ORDER — QUETIAPINE FUMARATE 300 MG/1
300 TABLET, FILM COATED ORAL
COMMUNITY
Start: 2020-05-19 | End: 2021-01-03

## 2020-06-16 RX ORDER — TAMSULOSIN HYDROCHLORIDE 0.4 MG/1
0.4 CAPSULE ORAL
Qty: 30 CAP | Refills: 0 | Status: SHIPPED | OUTPATIENT
Start: 2020-06-16 | End: 2020-07-07

## 2020-06-16 RX ORDER — FERROUS SULFATE 325(65) MG
325 TABLET ORAL
Qty: 30 TAB | Refills: 2 | Status: ON HOLD | OUTPATIENT
Start: 2020-06-16 | End: 2020-09-19

## 2020-06-16 RX ORDER — GABAPENTIN 400 MG/1
400 CAPSULE ORAL 3 TIMES DAILY
Qty: 270 CAP | Refills: 1 | Status: SHIPPED | OUTPATIENT
Start: 2020-06-16 | End: 2020-12-13

## 2020-06-16 RX ORDER — AMLODIPINE BESYLATE 5 MG/1
5 TABLET ORAL
Status: ON HOLD | COMMUNITY
Start: 2020-05-27 | End: 2020-09-19

## 2020-06-16 RX ORDER — TRAZODONE HYDROCHLORIDE 100 MG/1
100 TABLET ORAL
Qty: 90 TAB | Refills: 0 | Status: SHIPPED | OUTPATIENT
Start: 2020-06-16 | End: 2020-09-14

## 2020-06-16 RX ORDER — SENNOSIDES 8.6 MG
650 CAPSULE ORAL EVERY 4 HOURS PRN
Qty: 180 TAB | Refills: 1 | Status: ON HOLD | OUTPATIENT
Start: 2020-06-16 | End: 2020-09-19

## 2020-06-16 ASSESSMENT — FIBROSIS 4 INDEX: FIB4 SCORE: 11.19

## 2020-07-07 DIAGNOSIS — R35.1 BENIGN PROSTATIC HYPERPLASIA WITH NOCTURIA: ICD-10-CM

## 2020-07-07 DIAGNOSIS — N40.1 BENIGN PROSTATIC HYPERPLASIA WITH NOCTURIA: ICD-10-CM

## 2020-07-07 RX ORDER — TAMSULOSIN HYDROCHLORIDE 0.4 MG/1
0.4 CAPSULE ORAL
Qty: 30 CAP | Refills: 0 | Status: SHIPPED | OUTPATIENT
Start: 2020-07-07 | End: 2020-08-06

## 2020-09-05 DIAGNOSIS — F39 MOOD DISORDER (HCC): ICD-10-CM

## 2020-09-08 RX ORDER — DULOXETIN HYDROCHLORIDE 60 MG/1
60 CAPSULE, DELAYED RELEASE ORAL
Qty: 90 CAP | Refills: 0 | OUTPATIENT
Start: 2020-09-08 | End: 2020-12-07

## 2020-09-12 DIAGNOSIS — F51.01 PRIMARY INSOMNIA: ICD-10-CM

## 2020-09-14 ENCOUNTER — HOSPITAL ENCOUNTER (EMERGENCY)
Facility: MEDICAL CENTER | Age: 70
End: 2020-09-15
Attending: EMERGENCY MEDICINE
Payer: MEDICARE

## 2020-09-14 DIAGNOSIS — R45.851 SUICIDAL IDEATION: ICD-10-CM

## 2020-09-14 LAB — POC BREATHALIZER: 0.07 PERCENT (ref 0–0.01)

## 2020-09-14 PROCEDURE — 99285 EMERGENCY DEPT VISIT HI MDM: CPT

## 2020-09-14 PROCEDURE — 85025 COMPLETE CBC W/AUTO DIFF WBC: CPT

## 2020-09-14 PROCEDURE — 80307 DRUG TEST PRSMV CHEM ANLYZR: CPT

## 2020-09-14 PROCEDURE — 80053 COMPREHEN METABOLIC PANEL: CPT

## 2020-09-14 PROCEDURE — 302970 POC BREATHALIZER: Performed by: EMERGENCY MEDICINE

## 2020-09-14 RX ORDER — TRAZODONE HYDROCHLORIDE 100 MG/1
100 TABLET ORAL
Qty: 90 TAB | Refills: 0 | Status: ON HOLD | OUTPATIENT
Start: 2020-09-14 | End: 2020-09-19

## 2020-09-14 SDOH — HEALTH STABILITY: MENTAL HEALTH: HOW OFTEN DO YOU HAVE A DRINK CONTAINING ALCOHOL?: 4 OR MORE TIMES A WEEK

## 2020-09-14 SDOH — HEALTH STABILITY: MENTAL HEALTH: HOW OFTEN DO YOU HAVE 6 OR MORE DRINKS ON ONE OCCASION?: DAILY OR ALMOST DAILY

## 2020-09-14 ASSESSMENT — FIBROSIS 4 INDEX: FIB4 SCORE: 11.19

## 2020-09-15 VITALS
HEIGHT: 71 IN | WEIGHT: 220 LBS | BODY MASS INDEX: 30.8 KG/M2 | SYSTOLIC BLOOD PRESSURE: 129 MMHG | RESPIRATION RATE: 14 BRPM | OXYGEN SATURATION: 93 % | DIASTOLIC BLOOD PRESSURE: 79 MMHG | TEMPERATURE: 98 F | HEART RATE: 58 BPM

## 2020-09-15 LAB
ALBUMIN SERPL BCP-MCNC: 4.9 G/DL (ref 3.2–4.9)
ALBUMIN/GLOB SERPL: 1.5 G/DL
ALP SERPL-CCNC: 80 U/L (ref 30–99)
ALT SERPL-CCNC: 36 U/L (ref 2–50)
AMPHET UR QL SCN: NEGATIVE
ANION GAP SERPL CALC-SCNC: 16 MMOL/L (ref 7–16)
AST SERPL-CCNC: 69 U/L (ref 12–45)
BARBITURATES UR QL SCN: NEGATIVE
BASOPHILS # BLD AUTO: 0.1 % (ref 0–1.8)
BASOPHILS # BLD: 0.01 K/UL (ref 0–0.12)
BENZODIAZ UR QL SCN: NEGATIVE
BILIRUB SERPL-MCNC: 0.7 MG/DL (ref 0.1–1.5)
BUN SERPL-MCNC: 15 MG/DL (ref 8–22)
BZE UR QL SCN: NEGATIVE
CALCIUM SERPL-MCNC: 9.7 MG/DL (ref 8.5–10.5)
CANNABINOIDS UR QL SCN: POSITIVE
CHLORIDE SERPL-SCNC: 89 MMOL/L (ref 96–112)
CO2 SERPL-SCNC: 23 MMOL/L (ref 20–33)
CREAT SERPL-MCNC: 0.79 MG/DL (ref 0.5–1.4)
EOSINOPHIL # BLD AUTO: 0.06 K/UL (ref 0–0.51)
EOSINOPHIL NFR BLD: 0.7 % (ref 0–6.9)
ERYTHROCYTE [DISTWIDTH] IN BLOOD BY AUTOMATED COUNT: 43.8 FL (ref 35.9–50)
ETHANOL BLD-MCNC: 94.1 MG/DL (ref 0–10.1)
GLOBULIN SER CALC-MCNC: 3.3 G/DL (ref 1.9–3.5)
GLUCOSE SERPL-MCNC: 93 MG/DL (ref 65–99)
HCT VFR BLD AUTO: 42 % (ref 42–52)
HGB BLD-MCNC: 14.5 G/DL (ref 14–18)
IMM GRANULOCYTES # BLD AUTO: 0.04 K/UL (ref 0–0.11)
IMM GRANULOCYTES NFR BLD AUTO: 0.5 % (ref 0–0.9)
LYMPHOCYTES # BLD AUTO: 0.59 K/UL (ref 1–4.8)
LYMPHOCYTES NFR BLD: 7.1 % (ref 22–41)
MCH RBC QN AUTO: 33.7 PG (ref 27–33)
MCHC RBC AUTO-ENTMCNC: 34.5 G/DL (ref 33.7–35.3)
MCV RBC AUTO: 97.7 FL (ref 81.4–97.8)
METHADONE UR QL SCN: NEGATIVE
MONOCYTES # BLD AUTO: 0.22 K/UL (ref 0–0.85)
MONOCYTES NFR BLD AUTO: 2.7 % (ref 0–13.4)
NEUTROPHILS # BLD AUTO: 7.38 K/UL (ref 1.82–7.42)
NEUTROPHILS NFR BLD: 88.9 % (ref 44–72)
NRBC # BLD AUTO: 0 K/UL
NRBC BLD-RTO: 0 /100 WBC
OPIATES UR QL SCN: NEGATIVE
OXYCODONE UR QL SCN: NEGATIVE
PCP UR QL SCN: NEGATIVE
PLATELET # BLD AUTO: 75 K/UL (ref 164–446)
PMV BLD AUTO: 11.5 FL (ref 9–12.9)
POTASSIUM SERPL-SCNC: 4.4 MMOL/L (ref 3.6–5.5)
PROPOXYPH UR QL SCN: NEGATIVE
PROT SERPL-MCNC: 8.2 G/DL (ref 6–8.2)
RBC # BLD AUTO: 4.3 M/UL (ref 4.7–6.1)
SODIUM SERPL-SCNC: 128 MMOL/L (ref 135–145)
WBC # BLD AUTO: 8.3 K/UL (ref 4.8–10.8)

## 2020-09-15 PROCEDURE — 90791 PSYCH DIAGNOSTIC EVALUATION: CPT

## 2020-09-15 NOTE — DISCHARGE PLANNING
Arianna from [facility] called and they will accept Pt.   Dr. Chavez will be admitting physcian.   They request a 0700 admit time.    SW will arrange transport and update RN.

## 2020-09-15 NOTE — DISCHARGE PLANNING
PCS completed and faxed to [facility]. Transportation time arranged for 0630 with Hakeem.     Transfer Packet completed with Original Legal Hold placed inside. RN updated on transfer and transfer time. RN aware of [facility] items, SafeKeeping and Pharmacy Items.     Arianna at [facility] updated on 0630 [facility] transfer time.

## 2020-09-15 NOTE — ED PROVIDER NOTES
"ED Provider Note    CHIEF COMPLAINT  Chief Complaint   Patient presents with   • Suicidal Ideation     Patient brought in by [facility]. Per EMS, police called because patient was reporting suicidal ideation. Patient states \"I don't want to live anymore\". Patient explains \"It's my wife's birthday today who is dead. I have no friends no family\". Patient was at [place], patient reported he drank 20 corona beers and intentionally took 1 seroquel 300 mg tablet.        HPI  Chris Leggett is a 70 y.o. male who presents with suicidal ideation.  The patient states he is lost all of his family including his wife and he no longer wants to live.  He states he is been drinking pretty heavily this evening and states that he wants to die and EMS was contacted.  The patient states he abused meth about 3 years ago but does not currently abuse methamphetamines.  He does admit to the alcohol abuse.  Does not have any current medical complaints.  He states he does have a plan to overdose.  He states he attempted overdose 2 nights ago on Seroquel which was not effective.    REVIEW OF SYSTEMS  See HPI for further details. All other systems are negative.     PAST MEDICAL HISTORY  Past Medical History:   Diagnosis Date   • Arthritis    • Gout    • Hypertension    • Psychiatric disorder        FAMILY HISTORY  [unfilled]    SOCIAL HISTORY  Social History     Socioeconomic History   • Marital status: Single     Spouse name: Not on file   • Number of children: Not on file   • Years of education: Not on file   • Highest education level: Not on file   Occupational History   • Not on file   Social Needs   • Financial resource strain: Not on file   • Food insecurity     Worry: Not on file     Inability: Not on file   • Transportation needs     Medical: Not on file     Non-medical: Not on file   Tobacco Use   • Smoking status: Current Every Day Smoker     Packs/day: 0.50     Years: 5.00     Pack years: 2.50     Types: Cigarettes     Last attempt to " quit: 2019     Years since quittin.6   • Smokeless tobacco: Never Used   Substance and Sexual Activity   • Alcohol use: Yes     Alcohol/week: 10.8 - 12.0 oz     Types: 18 - 20 Cans of beer per week     Frequency: 4 or more times a week     Drinks per session: 10 or more     Binge frequency: Daily or almost daily   • Drug use: Yes     Types: Inhaled     Comment: marijuana last dose last month   • Sexual activity: Not on file   Lifestyle   • Physical activity     Days per week: Not on file     Minutes per session: Not on file   • Stress: Not on file   Relationships   • Social connections     Talks on phone: Not on file     Gets together: Not on file     Attends [employer] service: Not on file     Active member of club or organization: Not on file     Attends meetings of clubs or organizations: Not on file     Relationship status: Not on file   • Intimate partner violence     Fear of current or ex partner: Not on file     Emotionally abused: Not on file     Physically abused: Not on file     Forced sexual activity: Not on file   Other Topics Concern   • Not on file   Social History Narrative   • Not on file       SURGICAL HISTORY  Past Surgical History:   Procedure Laterality Date   • MA DX BONE MARROW ASPIRATIONS Left 10/2/2019    Procedure: ASPIRATION, BONE MARROW - APARICIO;  Surgeon: Tamera Leos M.D.;  Location: [facility];  Service: Orthopedics   • MA DX BONE MARROW BIOPSIES  10/2/2019    Procedure: BIOPSY, BONE MARROW, USING NEEDLE OR TROCAR;  Surgeon: Tamera Leos M.D.;  Location: ENDOSCOPY [facility];  Service: Orthopedics   • CERVICAL DISK AND FUSION ANTERIOR  2018    Procedure: CERVICAL DISK AND FUSION ANTERIOR C5-C6;  Surgeon: Varghese Wilkins M.D.;  Location: SURGERY [facility];  Service: Neurosurgery   • OTHER ORTHOPEDIC SURGERY      fision with disc removal       CURRENT MEDICATIONS  Home Medications    **Home medications have not yet been reviewed for this  "encounter**         ALLERGIES  No Known Allergies    PHYSICAL EXAM  VITAL SIGNS: /80   Pulse 85   Temp 36.9 °C (98.5 °F) (Temporal)   Resp 17   Ht 1.803 m (5' 11\")   Wt 99.8 kg (220 lb)   SpO2 93%   BMI 30.68 kg/m²       Constitutional: Intoxicated and depressed  HENT: Normocephalic, Atraumatic, Bilateral external ears normal, Oropharynx moist, No oral exudates, Nose normal.   Eyes: PERRLA, EOMI, Conjunctiva normal, No discharge.   Neck: Normal range of motion, No tenderness, Supple, No stridor.   Lymphatic: No lymphadenopathy noted.   Cardiovascular: Normal heart rate, Normal rhythm, No murmurs, No rubs, No gallops.   Thorax & Lungs: Normal breath sounds, No respiratory distress, No wheezing, No chest tenderness.   Abdomen: Bowel sounds normal, Soft, No tenderness, No masses, No pulsatile masses.   Skin: Warm, Dry, No erythema, No rash.   Back: No tenderness, No CVA tenderness.   Extremities: Intact distal pulses, No edema, No tenderness, No cyanosis, No clubbing.   Neurologic: Alert & oriented x 3, Normal motor function, Normal sensory function, No focal deficits noted.   Psychiatric: Depressed affect with ongoing suicidal ideation      COURSE & MEDICAL DECISION MAKING  Pertinent Labs & Imaging studies reviewed. (See chart for details)  This a 70-year-old male who presents the emergency department with suicidal ideation.  The patient states that he tried to overdose a couple of days ago on Seroquel.  I did check laboratory analysis to make sure is no significant metabolic derangements.  He does have some mild hyponatremia.  On arrival I also ordered a fluid bolus of a liter of saline due to his alcohol use.  On repeat exam his exam is unchanged.  He is unable to contract for safety and he continues to be suicidal.  Therefore the patiently medically cleared for psychiatric care.  [facility] has evaluated the patient and agree and the patient has been placed on a legal hold.    FINAL IMPRESSION  1.  " Suicidal ideation   2.  Alcohol abuse  3.  Hyponatremia    Disposition  The patient will be transferred in stable condition         Electronically signed by: Krish Garcia M.D., 9/14/2020 10:40 PM

## 2020-09-15 NOTE — ED NOTES
Agree with triage note.     Patient belonging in bag, in hospital clothes.  environment free of hazards.     Patient resting in bed, equal rise and fall of the chest, unlabored breathing, no apparent distress.   Repositions self regularly    1:1 sitter next to bed.

## 2020-09-15 NOTE — DISCHARGE PLANNING
Medical Social Work    Referral: Legal Hold    Intervention: Legal Hold Paperwork given to SW by [employer] RN Uma Rizzo    Legal Hold Initiated: Date: 09- Time: 0035    Patient’s Insurance Listed on Face Sheet: Medicare/Medical HMO    Referrals sent to: [facility], [facility], [place], [facility], [facility]    This referral contains the following information:  1) Face sheet __x__  2) Page 1 and Page 2 of Legal Hold _x___  3) Alert Team Assessment/Psych Assessment _x___  4) Head to toe physical exam __x__  5) Urine Drug Screen __xx__  6) Blood Alcohol ____  7) Vital signs __x__  8) Pregnancy test when applicable _NA__  9) Medications list __x__    Plan: Patient will transfer to mental health facility once acceptance is obtained

## 2020-09-15 NOTE — ED TRIAGE NOTES
"Chris Leggett  70 y.o. male  Chief Complaint   Patient presents with   • Suicidal Ideation     Patient brought in by [facility]. Per EMS, police called because patient was reporting suicidal ideation. Patient states \"I don't want to live anymore\". Patient explains \"It's my wife's birthday today who is dead. I have no friends no family\". Patient was at [place], patient reported he drank 20 corona beers and intentionally took 1 seroquel 300 mg tablet.        Pt ambulated to [facility] with steady gait.  Pt is alert and oriented, speaking in full sentences, follows commands and responds appropriately to questions. Resp are even and unlabored.     Vitals obtained.    Vitals:    09/14/20 2231   BP: 121/80   Pulse: 85   Resp: 17   Temp: 36.9 °C (98.5 °F)   SpO2: 93%   .    "

## 2020-09-15 NOTE — CONSULTS
"[facility]   TRIAGE ASSESSMENT    Name: Chris Leggett  MRN: [id number]  : 1950  Age: 70 y.o.  Date of assessment: 9/15/2020  PCP: [facility]  Persons in attendance: Patient    CHIEF COMPLAINT/PRESENTING ISSUE (as stated by Patient, ER RN, ERP): Patient is a 71 y/o male BIB EMS after endorsing suicidal Ideation while at the [facility]. Patient intoxicated upon arrival reporting he consumed 20 Coronas and 300 mg's of Seroquel with intent to harm himself triggered by  wife's birthday. Patient further disclosed to ERP an overdose attempt 2 nights ago with his Seroquel medication. Patient is now clinically sober and continues to endorse suicidal thoughts, hopelessness. Patient is not currently engaged in PMH outpatient and uncertain compliance with medication regime.   EMR documents history of alcohol intoxication with various medical complaints and suicidal ideation dating back to 2018. Patient reports most recent inpatient hospitalization at [facility] for Alcohol Use D/O and Suicidal Ideation. Patient is unsheltered with minimal social support. Patient is somnolent, oriented to self, place and situation, depressed mood, poor eye contact, one-two word responses, affect is blunted, Denies HI or AVH and does not display delusional or paranoid thought content. Patient will remain on legal hold and transfer to psychiatric facility for further stabilization.   Chief Complaint   Patient presents with   • Suicidal Ideation     [facility], police called because patient was reporting suicidal ideation. Patient states \"I don't want to live anymore\". Patient explains \"It's my wife's birthday today who is dead. I have no friends no family\". Patient was at [place], patient reported he drank 20 corona beers and intentionally took 1 seroquel 300 mg tablet.         CURRENT LIVING SITUATION/SOCIAL SUPPORT: Patient is currently unsheltered and " further reports no support system. SSI.    BEHAVIORAL HEALTH TREATMENT HISTORY  Does patient/parent report a history of prior behavioral health treatment for patient?   Dates Level of Care Facilty/Provider Diagnosis/Problem Medications                8/2020 inpt  [facility]   [facility]  uncertain medication compliance               11/2018 x1 month, 3/2019, 4/2019 inpt [facility]/et[facility] Went off meds after DC'd and didn't f/u                12/2018, 1/2019 x2, 4/2019, 5/2019 inpt  [facility]                 [facility]                                 SAFETY ASSESSMENT - SELF  Does patient acknowledge current or past symptoms of dangerousness to self? yes  Does parent/significant other report patient has current or past symptoms of dangerousness to self? N\A  Does presenting problem suggest symptoms of dangerousness to self? Yes:     Past Current    Suicidal Thoughts: [x]  [x]    Suicidal Plans: [x]  [x]    Suicidal Intent: []  [x]    Suicide Attempts: [x]  [x]    Self-Injury []  []      For any boxes checked above, provide detail: Patient currently endorsing SI. Reported to EMS that he had attempted to harm himself by ingesting 20 Coronas and 300 mg of Seroquel and then further reports to ERP overdosing 2 days ago with his Seroquel medication. EMR documents past ER visits for SI, attempts have varied; OD and jumping off a bridge but fell and then required surgery.     History of suicide by family member: no  History of suicide by friend/significant other: no  Recent change in frequency/specificity/intensity of suicidal thoughts or self-harm behavior? yes - several days  Current access to firearms, medications, or other identified means of suicide/self-harm? yes - medications  If yes, willing to restrict access to means of suicide/self-harm? yes - placed on legal hold, belongings secure and awaiting transfer to psychiatric facility.   Protective factors present:  Willing to address in treatment    SAFETY ASSESSMENT -  "OTHERS  Does patient acknowledge current or past symptoms of aggressive behavior or risk to others? no  Does parent/significant other report patient has current or past symptoms of aggressive behavior or risk to others?  N\A  Does presenting problem suggest symptoms of dangerousness to others? No; denies HI or aggression history.     Crisis Safety Plan completed and copy given to patient? N\A    ABUSE/NEGLECT SCREENING  Does patient report feeling “unsafe” in his/her home, or afraid of anyone?  no  Does patient report any history of physical, sexual, or emotional abuse?  no  Does parent or significant other report any of the above? N\A  Is there evidence of neglect by self?  no  Is there evidence of neglect by a caregiver? no  Does the patient/parent report any history of CPS/APS/police involvement related to suspected abuse/neglect or domestic violence? no  Based on the information provided during the current assessment, is a mandated report of suspected abuse/neglect being made?  No    SUBSTANCE USE SCREENING  Yes:  [place] all substances used in the past 30 days:      Last Use Amount   [x]   Alcohol 9/14/2020 20 beers; reports daily use   []   Marijuana     []   Heroin     []   Prescription Opioids  (used without prescription, for    recreation, or in excess of prescribed amount)     []   Other Prescription  (used without prescription, for    recreation, or in excess of prescribed amount)     []   Cocaine      []   Methamphetamine     []   \"\" drugs (ectasy, MDMA)     []   Other substances        UDS results: THC  Breathalyzer results: 0.069, 94.1, 0.049    What consequences does the patient associate with any of the above substance use and or addictive behaviors? Health problems    Risk factors for detox (check all that apply):  []  Seizures   []  Diaphoretic (sweating)   [x]  Tremors   []  Hallucinations   []  Increased blood pressure   []  Decreased blood pressure   []  Other   []  None      [x] Patient " education on risk factors for detoxification and instructed to return to ER as needed.      MENTAL STATUS   Participation: Limited verbal participation  Grooming: Disheveled  Orientation: Drowsy/Somnolent  Behavior: Calm  Eye contact: Poor  Mood: Depressed  Affect: Flat  Thought process: Logical  Thought content: Within normal limits  Speech: Soft, Hypotalkative and Latency of response  Perception: Within normal limits  Memory:  Poor memory for chronology of events  Insight: Poor  Judgment:  Poor  Other:    Collateral information:  Source:  [] Significant other present in person:   [] Significant other by telephone  [] [facility]   [x] [facility] Staff  [x] [facility]  [x] Other: ERP    [] Unable to complete full assessment due to:  [] Acute intoxication  [] Patient declined to participate/engage  [] Patient verbally unresponsive  [] Significant cognitive deficits  [] Significant perceptual distortions or behavioral disorganization  [x] Other: N/A     CLINICAL IMPRESSIONS:  Primary:  Depression w/ Suicidal Ideation  Secondary:  Alcohol Use       IDENTIFIED NEEDS/PLAN:  [Trigger DISPOSITION list for any items marked]    [x]  Imminent safety risk - self [] Imminent safety risk - others   [x]  Acute substance withdrawal []  Psychosis/Impaired reality testing   [x]  Mood/anxiety [x]  Substance use/Addictive behavior   []  Maladaptive behaviro []  Parent/child conflict   []  Family/Couples conflict []  Biomedical   [x]  Housing [x]  Financial   []   Legal  Occupational/Educational   []  Domestic violence []  Other:     Disposition: Actively being addressed by Legal Hold, [facility], [facility] and [facility]    Does patient express agreement with the above plan? yes    Referral appointment(s) scheduled? N\A    Alert team only: Patient currently endorsing SI, reports recent SA by OD x 2 days ago. Legal hold continued and awaiting transfer to  psychiatric facility for further stabilization.   I have discussed findings and recommendations with Dr. Garcia who is in agreement with these recommendations.     Referral information sent to the following community providers : JESUS, SYDNEE    If applicable : Referred  to : [facility] for legal hold follow up at 0120      Uma Rizzo R.N.  9/15/2020

## 2020-09-15 NOTE — DISCHARGE PLANNING
Sylvie from [facility] called and stated they just discharged Pt from their unit on 09-.   She stated his length of stay was from 09- to 09-. Pt stated on discharge from [facility] he was going to go back to [place] to seek treatment.     Sylvie did not decline Pt , she stated she would reach out to their Psychiatrist to see if he would accept Pt back.

## 2020-09-18 ENCOUNTER — HOSPITAL ENCOUNTER (INPATIENT)
Facility: MEDICAL CENTER | Age: 70
LOS: 2 days | DRG: 897 | End: 2020-09-21
Attending: EMERGENCY MEDICINE | Admitting: INTERNAL MEDICINE
Payer: MEDICARE

## 2020-09-18 DIAGNOSIS — E87.1 HYPONATREMIA: ICD-10-CM

## 2020-09-18 DIAGNOSIS — R45.851 SUICIDAL IDEATION: ICD-10-CM

## 2020-09-18 DIAGNOSIS — R07.9 ACUTE CHEST PAIN: ICD-10-CM

## 2020-09-18 DIAGNOSIS — E86.0 DEHYDRATION: ICD-10-CM

## 2020-09-18 DIAGNOSIS — Z59.00 HOMELESSNESS: ICD-10-CM

## 2020-09-18 DIAGNOSIS — F10.10 ALCOHOL ABUSE: ICD-10-CM

## 2020-09-18 PROCEDURE — HZ2ZZZZ DETOXIFICATION SERVICES FOR SUBSTANCE ABUSE TREATMENT: ICD-10-PCS | Performed by: INTERNAL MEDICINE

## 2020-09-18 PROCEDURE — 99285 EMERGENCY DEPT VISIT HI MDM: CPT

## 2020-09-18 SDOH — ECONOMIC STABILITY - HOUSING INSECURITY: HOMELESSNESS UNSPECIFIED: Z59.00

## 2020-09-18 ASSESSMENT — FIBROSIS 4 INDEX: FIB4 SCORE: 10.58

## 2020-09-19 ENCOUNTER — APPOINTMENT (OUTPATIENT)
Dept: RADIOLOGY | Facility: MEDICAL CENTER | Age: 70
DRG: 897 | End: 2020-09-19
Attending: EMERGENCY MEDICINE
Payer: MEDICARE

## 2020-09-19 LAB
ALBUMIN SERPL BCP-MCNC: 4.2 G/DL (ref 3.2–4.9)
ALBUMIN/GLOB SERPL: 1.4 G/DL
ALP SERPL-CCNC: 74 U/L (ref 30–99)
ALT SERPL-CCNC: 47 U/L (ref 2–50)
AMPHET UR QL SCN: NEGATIVE
ANION GAP SERPL CALC-SCNC: 12 MMOL/L (ref 7–16)
ANION GAP SERPL CALC-SCNC: 12 MMOL/L (ref 7–16)
ANION GAP SERPL CALC-SCNC: 8 MMOL/L (ref 7–16)
APAP SERPL-MCNC: <5 UG/ML (ref 10–30)
AST SERPL-CCNC: 79 U/L (ref 12–45)
BARBITURATES UR QL SCN: NEGATIVE
BASOPHILS # BLD AUTO: 0.4 % (ref 0–1.8)
BASOPHILS # BLD: 0.03 K/UL (ref 0–0.12)
BENZODIAZ UR QL SCN: NEGATIVE
BILIRUB SERPL-MCNC: 0.6 MG/DL (ref 0.1–1.5)
BUN SERPL-MCNC: 10 MG/DL (ref 8–22)
BUN SERPL-MCNC: 8 MG/DL (ref 8–22)
BUN SERPL-MCNC: 9 MG/DL (ref 8–22)
BZE UR QL SCN: NEGATIVE
CALCIUM SERPL-MCNC: 8.2 MG/DL (ref 8.4–10.2)
CALCIUM SERPL-MCNC: 8.3 MG/DL (ref 8.4–10.2)
CALCIUM SERPL-MCNC: 8.5 MG/DL (ref 8.4–10.2)
CANNABINOIDS UR QL SCN: POSITIVE
CHLORIDE SERPL-SCNC: 88 MMOL/L (ref 96–112)
CHLORIDE SERPL-SCNC: 94 MMOL/L (ref 96–112)
CHLORIDE SERPL-SCNC: 99 MMOL/L (ref 96–112)
CO2 SERPL-SCNC: 22 MMOL/L (ref 20–33)
CO2 SERPL-SCNC: 23 MMOL/L (ref 20–33)
CO2 SERPL-SCNC: 25 MMOL/L (ref 20–33)
COVID ORDER STATUS COVID19: NORMAL
CREAT SERPL-MCNC: 0.56 MG/DL (ref 0.5–1.4)
CREAT SERPL-MCNC: 0.59 MG/DL (ref 0.5–1.4)
CREAT SERPL-MCNC: 0.63 MG/DL (ref 0.5–1.4)
EKG IMPRESSION: NORMAL
EOSINOPHIL # BLD AUTO: 0.16 K/UL (ref 0–0.51)
EOSINOPHIL NFR BLD: 2.1 % (ref 0–6.9)
ERYTHROCYTE [DISTWIDTH] IN BLOOD BY AUTOMATED COUNT: 41.5 FL (ref 35.9–50)
ERYTHROCYTE [DISTWIDTH] IN BLOOD BY AUTOMATED COUNT: 42.4 FL (ref 35.9–50)
GLOBULIN SER CALC-MCNC: 3 G/DL (ref 1.9–3.5)
GLUCOSE SERPL-MCNC: 113 MG/DL (ref 65–99)
GLUCOSE SERPL-MCNC: 84 MG/DL (ref 65–99)
GLUCOSE SERPL-MCNC: 87 MG/DL (ref 65–99)
HCT VFR BLD AUTO: 34.9 % (ref 42–52)
HCT VFR BLD AUTO: 35.1 % (ref 42–52)
HGB BLD-MCNC: 12.3 G/DL (ref 14–18)
HGB BLD-MCNC: 12.3 G/DL (ref 14–18)
IMM GRANULOCYTES # BLD AUTO: 0.09 K/UL (ref 0–0.11)
IMM GRANULOCYTES NFR BLD AUTO: 1.2 % (ref 0–0.9)
LACTATE BLD-SCNC: 1.7 MMOL/L (ref 0.5–2)
LYMPHOCYTES # BLD AUTO: 0.98 K/UL (ref 1–4.8)
LYMPHOCYTES NFR BLD: 12.9 % (ref 22–41)
MAGNESIUM SERPL-MCNC: 1.7 MG/DL (ref 1.5–2.5)
MCH RBC QN AUTO: 33.2 PG (ref 27–33)
MCH RBC QN AUTO: 33.3 PG (ref 27–33)
MCHC RBC AUTO-ENTMCNC: 35 G/DL (ref 33.7–35.3)
MCHC RBC AUTO-ENTMCNC: 35.2 G/DL (ref 33.7–35.3)
MCV RBC AUTO: 94.6 FL (ref 81.4–97.8)
MCV RBC AUTO: 94.9 FL (ref 81.4–97.8)
METHADONE UR QL SCN: NEGATIVE
MONOCYTES # BLD AUTO: 0.43 K/UL (ref 0–0.85)
MONOCYTES NFR BLD AUTO: 5.7 % (ref 0–13.4)
NEUTROPHILS # BLD AUTO: 5.92 K/UL (ref 1.82–7.42)
NEUTROPHILS NFR BLD: 77.7 % (ref 44–72)
NRBC # BLD AUTO: 0 K/UL
NRBC BLD-RTO: 0 /100 WBC
OPIATES UR QL SCN: NEGATIVE
OXYCODONE UR QL SCN: NEGATIVE
PCP UR QL SCN: NEGATIVE
PLATELET # BLD AUTO: 73 K/UL (ref 164–446)
PLATELET # BLD AUTO: 86 K/UL (ref 164–446)
PMV BLD AUTO: 10.6 FL (ref 9–12.9)
PMV BLD AUTO: 11.1 FL (ref 9–12.9)
POC BREATHALIZER: 0.14 PERCENT (ref 0–0.01)
POTASSIUM SERPL-SCNC: 4.1 MMOL/L (ref 3.6–5.5)
POTASSIUM SERPL-SCNC: 4.2 MMOL/L (ref 3.6–5.5)
POTASSIUM SERPL-SCNC: 4.4 MMOL/L (ref 3.6–5.5)
PROPOXYPH UR QL SCN: NEGATIVE
PROT SERPL-MCNC: 7.2 G/DL (ref 6–8.2)
RBC # BLD AUTO: 3.69 M/UL (ref 4.7–6.1)
RBC # BLD AUTO: 3.7 M/UL (ref 4.7–6.1)
SALICYLATES SERPL-MCNC: <1 MG/DL (ref 15–25)
SARS-COV-2 RNA RESP QL NAA+PROBE: NOTDETECTED
SODIUM SERPL-SCNC: 122 MMOL/L (ref 135–145)
SODIUM SERPL-SCNC: 129 MMOL/L (ref 135–145)
SODIUM SERPL-SCNC: 132 MMOL/L (ref 135–145)
SPECIMEN SOURCE: NORMAL
TROPONIN T SERPL-MCNC: 16 NG/L (ref 6–19)
WBC # BLD AUTO: 5.3 K/UL (ref 4.8–10.8)
WBC # BLD AUTO: 7.6 K/UL (ref 4.8–10.8)

## 2020-09-19 PROCEDURE — 700111 HCHG RX REV CODE 636 W/ 250 OVERRIDE (IP): Performed by: INTERNAL MEDICINE

## 2020-09-19 PROCEDURE — 80053 COMPREHEN METABOLIC PANEL: CPT

## 2020-09-19 PROCEDURE — 36415 COLL VENOUS BLD VENIPUNCTURE: CPT

## 2020-09-19 PROCEDURE — 84484 ASSAY OF TROPONIN QUANT: CPT

## 2020-09-19 PROCEDURE — 80307 DRUG TEST PRSMV CHEM ANLYZR: CPT

## 2020-09-19 PROCEDURE — 83605 ASSAY OF LACTIC ACID: CPT

## 2020-09-19 PROCEDURE — 93005 ELECTROCARDIOGRAM TRACING: CPT | Performed by: EMERGENCY MEDICINE

## 2020-09-19 PROCEDURE — U0003 INFECTIOUS AGENT DETECTION BY NUCLEIC ACID (DNA OR RNA); SEVERE ACUTE RESPIRATORY SYNDROME CORONAVIRUS 2 (SARS-COV-2) (CORONAVIRUS DISEASE [COVID-19]), AMPLIFIED PROBE TECHNIQUE, MAKING USE OF HIGH THROUGHPUT TECHNOLOGIES AS DESCRIBED BY CMS-2020-01-R: HCPCS

## 2020-09-19 PROCEDURE — 80048 BASIC METABOLIC PNL TOTAL CA: CPT | Mod: 91

## 2020-09-19 PROCEDURE — 93005 ELECTROCARDIOGRAM TRACING: CPT

## 2020-09-19 PROCEDURE — 83735 ASSAY OF MAGNESIUM: CPT

## 2020-09-19 PROCEDURE — 700105 HCHG RX REV CODE 258: Performed by: EMERGENCY MEDICINE

## 2020-09-19 PROCEDURE — 700102 HCHG RX REV CODE 250 W/ 637 OVERRIDE(OP): Performed by: INTERNAL MEDICINE

## 2020-09-19 PROCEDURE — 85027 COMPLETE CBC AUTOMATED: CPT

## 2020-09-19 PROCEDURE — C9803 HOPD COVID-19 SPEC COLLECT: HCPCS | Performed by: INTERNAL MEDICINE

## 2020-09-19 PROCEDURE — 770006 HCHG ROOM/CARE - MED/SURG/GYN SEMI*

## 2020-09-19 PROCEDURE — 700105 HCHG RX REV CODE 258: Performed by: INTERNAL MEDICINE

## 2020-09-19 PROCEDURE — 85025 COMPLETE CBC W/AUTO DIFF WBC: CPT

## 2020-09-19 PROCEDURE — 99223 1ST HOSP IP/OBS HIGH 75: CPT | Mod: AI | Performed by: INTERNAL MEDICINE

## 2020-09-19 PROCEDURE — A9270 NON-COVERED ITEM OR SERVICE: HCPCS | Performed by: INTERNAL MEDICINE

## 2020-09-19 PROCEDURE — 302970 POC BREATHALIZER: Performed by: EMERGENCY MEDICINE

## 2020-09-19 PROCEDURE — 71045 X-RAY EXAM CHEST 1 VIEW: CPT

## 2020-09-19 RX ORDER — LORAZEPAM 2 MG/ML
2 INJECTION INTRAMUSCULAR
Status: DISCONTINUED | OUTPATIENT
Start: 2020-09-19 | End: 2020-09-21 | Stop reason: HOSPADM

## 2020-09-19 RX ORDER — ONDANSETRON 2 MG/ML
4 INJECTION INTRAMUSCULAR; INTRAVENOUS EVERY 4 HOURS PRN
Status: DISCONTINUED | OUTPATIENT
Start: 2020-09-19 | End: 2020-09-21 | Stop reason: HOSPADM

## 2020-09-19 RX ORDER — LORAZEPAM 2 MG/ML
1 INJECTION INTRAMUSCULAR
Status: DISCONTINUED | OUTPATIENT
Start: 2020-09-19 | End: 2020-09-21 | Stop reason: HOSPADM

## 2020-09-19 RX ORDER — LORAZEPAM 1 MG/1
1 TABLET ORAL EVERY 4 HOURS PRN
Status: DISCONTINUED | OUTPATIENT
Start: 2020-09-19 | End: 2020-09-21 | Stop reason: HOSPADM

## 2020-09-19 RX ORDER — ENALAPRILAT 1.25 MG/ML
1.25 INJECTION INTRAVENOUS EVERY 6 HOURS PRN
Status: DISCONTINUED | OUTPATIENT
Start: 2020-09-19 | End: 2020-09-21 | Stop reason: HOSPADM

## 2020-09-19 RX ORDER — SODIUM CHLORIDE, SODIUM LACTATE, POTASSIUM CHLORIDE, CALCIUM CHLORIDE 600; 310; 30; 20 MG/100ML; MG/100ML; MG/100ML; MG/100ML
1000 INJECTION, SOLUTION INTRAVENOUS ONCE
Status: COMPLETED | OUTPATIENT
Start: 2020-09-19 | End: 2020-09-19

## 2020-09-19 RX ORDER — LORAZEPAM 2 MG/ML
0.5 INJECTION INTRAMUSCULAR EVERY 4 HOURS PRN
Status: DISCONTINUED | OUTPATIENT
Start: 2020-09-19 | End: 2020-09-21 | Stop reason: HOSPADM

## 2020-09-19 RX ORDER — LORAZEPAM 0.5 MG/1
0.5 TABLET ORAL EVERY 4 HOURS PRN
Status: DISCONTINUED | OUTPATIENT
Start: 2020-09-19 | End: 2020-09-21 | Stop reason: HOSPADM

## 2020-09-19 RX ORDER — LORAZEPAM 2 MG/ML
1.5 INJECTION INTRAMUSCULAR
Status: DISCONTINUED | OUTPATIENT
Start: 2020-09-19 | End: 2020-09-21 | Stop reason: HOSPADM

## 2020-09-19 RX ORDER — LORAZEPAM 2 MG/ML
0.5 INJECTION INTRAMUSCULAR EVERY 4 HOURS PRN
Status: DISCONTINUED | OUTPATIENT
Start: 2020-09-19 | End: 2020-09-19

## 2020-09-19 RX ORDER — LORAZEPAM 1 MG/1
2 TABLET ORAL
Status: DISCONTINUED | OUTPATIENT
Start: 2020-09-19 | End: 2020-09-21 | Stop reason: HOSPADM

## 2020-09-19 RX ORDER — ONDANSETRON 4 MG/1
4 TABLET, ORALLY DISINTEGRATING ORAL EVERY 4 HOURS PRN
Status: DISCONTINUED | OUTPATIENT
Start: 2020-09-19 | End: 2020-09-21 | Stop reason: HOSPADM

## 2020-09-19 RX ORDER — LORAZEPAM 1 MG/1
3 TABLET ORAL
Status: DISCONTINUED | OUTPATIENT
Start: 2020-09-19 | End: 2020-09-21 | Stop reason: HOSPADM

## 2020-09-19 RX ORDER — FERROUS SULFATE 325(65) MG
325 TABLET ORAL
Status: DISCONTINUED | OUTPATIENT
Start: 2020-09-19 | End: 2020-09-21 | Stop reason: HOSPADM

## 2020-09-19 RX ORDER — TRAZODONE HYDROCHLORIDE 50 MG/1
50 TABLET ORAL
Status: ON HOLD | COMMUNITY
End: 2020-09-21

## 2020-09-19 RX ORDER — BISACODYL 10 MG
10 SUPPOSITORY, RECTAL RECTAL
Status: DISCONTINUED | OUTPATIENT
Start: 2020-09-19 | End: 2020-09-21 | Stop reason: HOSPADM

## 2020-09-19 RX ORDER — POLYETHYLENE GLYCOL 3350 17 G/17G
1 POWDER, FOR SOLUTION ORAL
Status: DISCONTINUED | OUTPATIENT
Start: 2020-09-19 | End: 2020-09-21 | Stop reason: HOSPADM

## 2020-09-19 RX ORDER — DULOXETIN HYDROCHLORIDE 60 MG/1
60 CAPSULE, DELAYED RELEASE ORAL DAILY
COMMUNITY
End: 2021-01-03

## 2020-09-19 RX ORDER — QUETIAPINE FUMARATE 100 MG/1
300 TABLET, FILM COATED ORAL NIGHTLY
Status: DISCONTINUED | OUTPATIENT
Start: 2020-09-19 | End: 2020-09-21 | Stop reason: HOSPADM

## 2020-09-19 RX ORDER — AMLODIPINE BESYLATE 5 MG/1
5 TABLET ORAL
Status: DISCONTINUED | OUTPATIENT
Start: 2020-09-19 | End: 2020-09-21 | Stop reason: HOSPADM

## 2020-09-19 RX ORDER — GABAPENTIN 400 MG/1
400 CAPSULE ORAL 3 TIMES DAILY
Status: DISCONTINUED | OUTPATIENT
Start: 2020-09-19 | End: 2020-09-21 | Stop reason: HOSPADM

## 2020-09-19 RX ORDER — SODIUM CHLORIDE 9 MG/ML
INJECTION, SOLUTION INTRAVENOUS CONTINUOUS
Status: DISCONTINUED | OUTPATIENT
Start: 2020-09-19 | End: 2020-09-21 | Stop reason: HOSPADM

## 2020-09-19 RX ORDER — LISINOPRIL 20 MG/1
20 TABLET ORAL DAILY
COMMUNITY
End: 2021-01-03

## 2020-09-19 RX ORDER — ACETAMINOPHEN 325 MG/1
650 TABLET ORAL EVERY 6 HOURS PRN
Status: DISCONTINUED | OUTPATIENT
Start: 2020-09-19 | End: 2020-09-21 | Stop reason: HOSPADM

## 2020-09-19 RX ORDER — AMOXICILLIN 250 MG
2 CAPSULE ORAL 2 TIMES DAILY
Status: DISCONTINUED | OUTPATIENT
Start: 2020-09-19 | End: 2020-09-21 | Stop reason: HOSPADM

## 2020-09-19 RX ORDER — LORAZEPAM 1 MG/1
4 TABLET ORAL
Status: DISCONTINUED | OUTPATIENT
Start: 2020-09-19 | End: 2020-09-21 | Stop reason: HOSPADM

## 2020-09-19 RX ORDER — TRAZODONE HYDROCHLORIDE 50 MG/1
100 TABLET ORAL
Status: DISCONTINUED | OUTPATIENT
Start: 2020-09-19 | End: 2020-09-20

## 2020-09-19 RX ADMIN — GABAPENTIN 400 MG: 400 CAPSULE ORAL at 05:26

## 2020-09-19 RX ADMIN — QUETIAPINE FUMARATE 300 MG: 100 TABLET ORAL at 20:35

## 2020-09-19 RX ADMIN — GABAPENTIN 400 MG: 400 CAPSULE ORAL at 13:17

## 2020-09-19 RX ADMIN — ENOXAPARIN SODIUM 40 MG: 40 INJECTION SUBCUTANEOUS at 05:26

## 2020-09-19 RX ADMIN — SODIUM CHLORIDE: 9 INJECTION, SOLUTION INTRAVENOUS at 13:18

## 2020-09-19 RX ADMIN — FERROUS SULFATE TAB 325 MG (65 MG ELEMENTAL FE) 325 MG: 325 (65 FE) TAB at 10:33

## 2020-09-19 RX ADMIN — SENNOSIDES-DOCUSATE SODIUM TAB 8.6-50 MG 2 TABLET: 8.6-5 TAB at 16:15

## 2020-09-19 RX ADMIN — QUETIAPINE FUMARATE 300 MG: 100 TABLET ORAL at 03:08

## 2020-09-19 RX ADMIN — SODIUM CHLORIDE: 9 INJECTION, SOLUTION INTRAVENOUS at 05:26

## 2020-09-19 RX ADMIN — SODIUM CHLORIDE, POTASSIUM CHLORIDE, SODIUM LACTATE AND CALCIUM CHLORIDE 1000 ML: 600; 310; 30; 20 INJECTION, SOLUTION INTRAVENOUS at 00:31

## 2020-09-19 RX ADMIN — SENNOSIDES-DOCUSATE SODIUM TAB 8.6-50 MG 2 TABLET: 8.6-5 TAB at 05:26

## 2020-09-19 RX ADMIN — GABAPENTIN 400 MG: 400 CAPSULE ORAL at 16:15

## 2020-09-19 ASSESSMENT — COGNITIVE AND FUNCTIONAL STATUS - GENERAL
SUGGESTED CMS G CODE MODIFIER MOBILITY: CH
DAILY ACTIVITIY SCORE: 24
SUGGESTED CMS G CODE MODIFIER DAILY ACTIVITY: CH
MOBILITY SCORE: 24

## 2020-09-19 ASSESSMENT — LIFESTYLE VARIABLES
HAVE PEOPLE ANNOYED YOU BY CRITICIZING YOUR DRINKING: NO
EVER FELT BAD OR GUILTY ABOUT YOUR DRINKING: NO
ON A TYPICAL DAY WHEN YOU DRINK ALCOHOL HOW MANY DRINKS DO YOU HAVE: 0
TOTAL SCORE: 0
HAVE YOU EVER FELT YOU SHOULD CUT DOWN ON YOUR DRINKING: NO
CONSUMPTION TOTAL: NEGATIVE
TOTAL SCORE: 0
ALCOHOL_USE: YES
TOTAL SCORE: 0
PAROXYSMAL SWEATS: NO SWEAT VISIBLE
HOW MANY TIMES IN THE PAST YEAR HAVE YOU HAD 5 OR MORE DRINKS IN A DAY: 0
HEADACHE, FULLNESS IN HEAD: NOT PRESENT
AGITATION: SOMEWHAT MORE THAN NORMAL ACTIVITY
VISUAL DISTURBANCES: NOT PRESENT
TOTAL SCORE: 3
AVERAGE NUMBER OF DAYS PER WEEK YOU HAVE A DRINK CONTAINING ALCOHOL: 0
AUDITORY DISTURBANCES: NOT PRESENT
TREMOR: TREMOR NOT VISIBLE BUT CAN BE FELT, FINGERTIP TO FINGERTIP
ORIENTATION AND CLOUDING OF SENSORIUM: ORIENTED AND CAN DO SERIAL ADDITIONS
ANXIETY: NO ANXIETY (AT EASE)
NAUSEA AND VOMITING: MILD NAUSEA WITH NO VOMITING
EVER HAD A DRINK FIRST THING IN THE MORNING TO STEADY YOUR NERVES TO GET RID OF A HANGOVER: NO

## 2020-09-19 ASSESSMENT — PATIENT HEALTH QUESTIONNAIRE - PHQ9
4. FEELING TIRED OR HAVING LITTLE ENERGY: NEARLY EVERY DAY
9. THOUGHTS THAT YOU WOULD BE BETTER OFF DEAD, OR OF HURTING YOURSELF: NOT AT ALL
1. LITTLE INTEREST OR PLEASURE IN DOING THINGS: SEVERAL DAYS
SUM OF ALL RESPONSES TO PHQ9 QUESTIONS 1 AND 2: 2
5. POOR APPETITE OR OVEREATING: NOT AT ALL
3. TROUBLE FALLING OR STAYING ASLEEP OR SLEEPING TOO MUCH: NEARLY EVERY DAY
8. MOVING OR SPEAKING SO SLOWLY THAT OTHER PEOPLE COULD HAVE NOTICED. OR THE OPPOSITE, BEING SO FIGETY OR RESTLESS THAT YOU HAVE BEEN MOVING AROUND A LOT MORE THAN USUAL: NOT AT ALL
7. TROUBLE CONCENTRATING ON THINGS, SUCH AS READING THE NEWSPAPER OR WATCHING TELEVISION: NOT AT ALL
SUM OF ALL RESPONSES TO PHQ QUESTIONS 1-9: 8
6. FEELING BAD ABOUT YOURSELF - OR THAT YOU ARE A FAILURE OR HAVE LET YOURSELF OR YOUR FAMILY DOWN: NOT AL ALL
2. FEELING DOWN, DEPRESSED, IRRITABLE, OR HOPELESS: SEVERAL DAYS

## 2020-09-19 NOTE — ASSESSMENT & PLAN NOTE
-psychiatric consultation pending  -legal hold for suicidal ideation and lethal plan, also prior attempts.  -Case management following for inpatient psychiatric referral  -Possible transfer to [place]

## 2020-09-19 NOTE — ED NOTES
Per [facility]: pt was discharged at 1547 on 9/18/2020 with full resources and a plan for outpatient treatment including AA, Homelessness, Therapy etc.

## 2020-09-19 NOTE — PROGRESS NOTES
Pt very sleepy and drowsy this morning. AAOx4,  Pt is cooperative and calm. Does states he doesn't wants to live anymore, does not have plan to harm self and contracted for safety in this unit.

## 2020-09-19 NOTE — ED NOTES
Med rec complete per [facility] [phone]. Pt discharged yesterday.  Pt unable to tell me his medications.

## 2020-09-19 NOTE — CONSULTS
"PSYCHIATRY CONSULT TEAM NOTE: Consult received for \"depression.\" Patient was drunk when consult was placed and is currently denying suicidal ideations. He is not on a legal hold. Sent a tiger text to the physician to clarify consult questions. Physician stated that they will review case and the consult and inform this writer if a consult is still needed. Canceling consult. Please feel free to reconsult if needed.     Please note that psychiatric medication management services are unavailable 9/19-9/21.            "

## 2020-09-19 NOTE — PROGRESS NOTES
"Interval Problem Update    Patient is lying in bed, easily awakened, however drowsy.  He is fully oriented.  He verbalizes suicidal ideation with a plan.  He states he would jump off of an overpass.  He complains of numbness and tingling in his bilateral upper extremities which has been happening since his cervical disk and fusion anterior (2018).  He states this is worse than usual.  He has a headache and is constipated.  His last bowel movement was \"a few\" days ago.  His brother  3 months ago. The patient is also upset he lost his wallet.  -Bilateral upper extremity sensation, motion, circulation and strength intact.  -Follows commands  -Patient appears altered, slurred speech.  Likely still intoxicated.  -Defer psychology evaluation until tomorrow  -[facility] protocol  -Bowel medications  -Continue current plan of care    LOS [place].          "

## 2020-09-19 NOTE — DIETARY
Nutrition Services:    MST 2 (unsure weight loss without poor PO PTA) on Nutrition Admit Screen. Pt is currently on a Regular diet. No PO intake recorded yet. Ht: 180.3 cm, Wt: 99.8 kg, BMI 30.68 (Obesity class I).      Per chart review: weight stable/trend up past 7 months.    Consult RD as needed. RD will re-screen weekly. RD available prn

## 2020-09-19 NOTE — H&P
"Hospital Medicine History & Physical Note    Date of Service  9/19/2020    Primary Care Physician  [facility]    Consultants  Psychiatry    Code Status  Full Code    Chief Complaint  Chief Complaint   Patient presents with   • Chest Pain     2 hours of chest pain.   • Suicidal Ideation     \"i dont want to live no more\"       History of Presenting Illness  70 y.o. male who presented 9/18/2020 with alcohol intoxication.  [facility].  Decided to go to the [place] and drink a significant amount.  While he was there he fell asleep and because of this they kicked him out.  After this he complained of chest pain and suicidal ideation, both of which he denies now.  Patient at this time is homeless.  Patient at this point in time is intoxicated, somewhat confused and therefore a poor historian, information obtained from the chart.  I did discuss the case including labs and imaging with the ER physician.    Review of Systems  Review of Systems   Unable to perform ROS: Acuity of condition       Past Medical History   has a past medical history of Arthritis, Gout, Hypertension, and Psychiatric disorder.    Surgical History   has a past surgical history that includes cervical disk and fusion anterior (9/2/2018); other orthopedic surgery; pr dx bone marrow aspirations (Left, 10/2/2019); and pr dx bone marrow biopsies (10/2/2019).     Family History  family history includes Alzheimer's Disease in his father; Cancer in his father and mother; Heart Disease in his brother and sister.     Social History   reports that he has been smoking cigarettes. He has a 2.50 pack-year smoking history. He has never used smokeless tobacco. He reports current alcohol use of about 10.8 - 12.0 oz of alcohol per week. He reports current drug use. Drug: Inhaled.    Allergies  No Known Allergies    Medications  Prior to Admission Medications   Prescriptions Last Dose Informant Patient " Reported? Taking?   acetaminophen (TYLENOL) 650 MG CR tablet   No No   Sig: Take 1 Tab by mouth every four hours as needed for Moderate Pain for up to 180 days. Indications: Pain   amLODIPine (NORVASC) 5 MG Tab   Yes No   Sig: Take 5 mg by mouth every day. 1 table once a day   ferrous sulfate 325 (65 Fe) MG tablet   No No   Sig: Take 1 Tab by mouth every morning with breakfast.   gabapentin (NEURONTIN) 400 MG Cap   No No   Sig: Take 1 Cap by mouth 3 times a day for 180 days.   naproxen (NAPROSYN) 500 MG Tab  Caregiver Yes No   Sig: Take 500 mg by mouth 2 times daily with meals as needed (pain). Indications: Joint Damage causing Pain and Loss of Function   quetiapine (SEROQUEL) 300 MG tablet   Yes No   Sig: Take 300 mg by mouth. FOR SLEEP   traZODone (DESYREL) 100 MG Tab   No No   Sig: TAKE 1 TAB BY MOUTH AT BEDTIME AS NEEDED FOR SLEEP FOR UP TO 90 DAYS.      Facility-Administered Medications: None       Physical Exam  Temp:  [36.6 °C (97.8 °F)] 36.6 °C (97.8 °F)  Pulse:  [76] 76  Resp:  [16] 16  BP: (114)/(78) 114/78  SpO2:  [94 %] 94 %    Physical Exam  Vitals signs and nursing note reviewed.   Constitutional:       General: He is not in acute distress.     Appearance: He is well-developed. He is ill-appearing. He is not toxic-appearing or diaphoretic.   HENT:      Head: Normocephalic and atraumatic.      Right Ear: External ear normal.      Left Ear: External ear normal.      Nose: Nose normal. No congestion or rhinorrhea.      Mouth/Throat:      Mouth: Mucous membranes are dry.      Pharynx: No oropharyngeal exudate.   Eyes:      General:         Right eye: No discharge.         Left eye: No discharge.      Extraocular Movements: Extraocular movements intact.   Neck:      Musculoskeletal: Neck supple. No edema or erythema.      Trachea: No tracheal deviation.   Cardiovascular:      Rate and Rhythm: Normal rate and regular rhythm.      Heart sounds: No murmur. No friction rub. No gallop.    Pulmonary:      Effort:  Pulmonary effort is normal. No respiratory distress.      Breath sounds: Normal breath sounds. No stridor. No wheezing or rales.   Abdominal:      General: Bowel sounds are normal. There is no distension.      Palpations: Abdomen is soft.   Musculoskeletal:      Right lower leg: No edema.      Left lower leg: No edema.   Lymphadenopathy:      Cervical: No cervical adenopathy.   Skin:     General: Skin is warm and dry.      Findings: No erythema or rash.   Neurological:      Comments: Awake, intoxicated    Psychiatric:         Cognition and Memory: Cognition is impaired. Memory is impaired.         Laboratory:  Recent Labs     09/19/20  0015   WBC 7.6   RBC 3.70*   HEMOGLOBIN 12.3*   HEMATOCRIT 35.1*   MCV 94.9   MCH 33.2*   MCHC 35.0   RDW 42.4   PLATELETCT 86*   MPV 11.1     Recent Labs     09/19/20  0015   SODIUM 122*   POTASSIUM 4.1   CHLORIDE 88*   CO2 22   GLUCOSE 113*   BUN 10   CREATININE 0.63   CALCIUM 8.5     Recent Labs     09/19/20  0015   ALTSGPT 47   ASTSGOT 79*   ALKPHOSPHAT 74   TBILIRUBIN 0.6   GLUCOSE 113*         No results for input(s): NTPROBNP in the last 72 hours.      Recent Labs     09/19/20  0015   TROPONINT 16       Imaging:  DX-CHEST-PORTABLE (1 VIEW)   Final Result      Possible free air beneath right hemidiaphragm, along with patchy and linear opacities in the right lung base suggesting atelectasis. Recommend upright or left lateral decubitus views of the abdomen to exclude free air.            Assessment/Plan:  I anticipate this patient will require at least two midnights for appropriate medical management, necessitating inpatient admission.    * Hyponatremia- (present on admission)  Assessment & Plan  -Due to dehydration  -Start IV fluids  -Obtain BMP every 6 hours, avoid increasing sodium greater than 10 to 12/day however I do not think he is at increased risk considering his sodium is always borderline low    Suicidal ideation- (present on admission)  Assessment & Plan  -Currently  denies, I will obtain a psychiatric consultation  -Patient is currently intoxicated, is not on a legal hold    Alcohol intoxication (HCC)- (present on admission)  Assessment & Plan  -Significant, causing confusion  -He does however deny a suicidal ideation at this time however it is difficult to get additional information from him  -He was at [facility] for 4 days, should not be at risk for withdrawal, I will start PRN Ativan but will not start CIWA protocol    Thrombocytopenia (HCC)- (present on admission)  Assessment & Plan  -No sign of gross bleeding    Normocytic anemia- (present on admission)  Assessment & Plan  -No sign of bleeding  -Repeat CBC in the morning

## 2020-09-19 NOTE — PROGRESS NOTES
Pt arrived from ER via gurney, accompanied by transport. Pt alert,  appears to be drunk but able to communicate properly. Pt kept saying he does not want to live anymore for he has no any family member. Pt asked for food, snacks given and became more cooperative and pleasant during conversation. Will monitor pt for safety and needs.

## 2020-09-19 NOTE — CARE PLAN
Problem: Communication  Goal: The ability to communicate needs accurately and effectively will improve  Outcome: PROGRESSING AS EXPECTED  Note: Patient/ family member updated on Plan Of Care and Nurses communications with Doctors. Encouraged pt to verbalized  Concerns.Interventions discussed and available as needed.      Problem: Safety  Goal: Will remain free from falls  Outcome: PROGRESSING AS EXPECTED  Intervention: Implement fall precautions  Flowsheets (Taken 9/19/2020 0255)  Bed Alarm: Yes - Alarm On  Environmental Precautions:   Treaded Slipper Socks on Patient   Bed in Low Position  Note: Patient will be free from injury or fall incident- instruction for use of call light given . Will check patient regularly to attend needs. Call light placed w/. In easy reach. Bed Alarm on.

## 2020-09-19 NOTE — ED TRIAGE NOTES
"Chief Complaint   Patient presents with   • Chest Pain     2 hours of chest pain.   • Suicidal Ideation     \"i dont want to live no more\"     ED Triage Vitals [09/18/20 2357]   Enc Vitals Group      Blood Pressure  114/78      Pulse 76      Respiration 16      Temperature 36.6 °C (97.8 °F)      Temp src Temporal      Pulse Oximetry 94 %      Weight 99.8 kg (220 lb)      Height 1.803 m (5' 11\")     Etoh intox. Released from [facility] today, patient states, \"They made me leave, I was still suicidal but they threw me out.\"  Patient was asleep inside [facility] when security awoke him. They called 911 due to patient complaining of CP and SI. Patient states he wants to jump off overpass into rush hour traffic because his brothers are all dead now.   "

## 2020-09-19 NOTE — ED PROVIDER NOTES
"ED Provider Note    CHIEF COMPLAINT  Chief Complaint   Patient presents with   • Chest Pain     2 hours of chest pain.   • Suicidal Ideation     \"i dont want to live no more\"       HPI    Primary care provider: [facility]  Means of arrival: EMS  History obtained from: Medics and patient and prior records  History limited by: Patient is a very poor historian and clinically intoxicated    Chris Leggett is a 70 y.o. male who presents with suicidal ideation.  The [facility], he is currently homeless and has been for approximately a year, so after being discharged from the psychiatric facility [place] and [place].  [place], staff awoke him and told him he was not allowed to sleep there, so he then requested an ambulance be called because he said he was having chest pain and he was suicidal.  No specific plan at this time in the past he has had thoughts of jumping off of a bridge/roadway.  He did admit to several alcoholic drinks tonight.  He is not currently taking any medications.  No alleviating measures noted.  No aggravating factors.  Many prior episodes.  Denies any recent medical illness or fevers or chills or falls.  He has had chest pain many times in the past but denies a history of coronary artery disease.  He just describes an achy anterior nonradiating chest pain.  No trauma.  On arrival to the ER he is now chest pain-free without intervention.    REVIEW OF SYSTEMS  Constitutional: Negative for fever or chills.   Respiratory: Negative for cough or shortness of breath.    Cardiovascular: Positive for chest pain but negative for syncope or palpitations.   Gastrointestinal: Negative for nausea, vomiting, abdominal pain.   Genitourinary: Negative for dysuria or flank pain.   Musculoskeletal: Negative for back pain or joint pain.   Skin: Negative " for itching or rash.   Psychiatric/Behavioral: Positive for depressed mood, alcohol abuse, and suicidal ideation.  See HPI for further details. All other systems are negative.     PAST MEDICAL HISTORY   has a past medical history of Arthritis, Gout, Hypertension, and Psychiatric disorder.    PAST FAMILY HISTORY  Family History   Problem Relation Age of Onset   • Cancer Mother    • Alzheimer's Disease Father    • Cancer Father    • Heart Disease Sister    • Heart Disease Brother        SOCIAL HISTORY  Social History     Tobacco Use   • Smoking status: Current Every Day Smoker     Packs/day: 0.50     Years: 5.00     Pack years: 2.50     Types: Cigarettes     Last attempt to quit: 2019     Years since quittin.6   • Smokeless tobacco: Never Used   Substance and Sexual Activity   • Alcohol use: Yes     Alcohol/week: 10.8 - 12.0 oz     Types: 18 - 20 Cans of beer per week     Frequency: 4 or more times a week     Drinks per session: 10 or more     Binge frequency: Daily or almost daily   • Drug use: Yes     Types: Inhaled     Comment: marijuana last dose last month   • Sexual activity: Not on file       SURGICAL HISTORY   has a past surgical history that includes cervical disk and fusion anterior (2018); other orthopedic surgery; dx bone marrow aspirations (Left, 10/2/2019); and dx bone marrow biopsies (10/2/2019).    CURRENT MEDICATIONS  Home Medications     Reviewed by Emre Donis RChikaNChika (Registered Nurse) on 20 at 0238  Med List Status: Complete   Medication Last Dose Status   acetaminophen (TYLENOL) 650 MG CR tablet  Active   amLODIPine (NORVASC) 5 MG Tab 2020 Active   ferrous sulfate 325 (65 Fe) MG tablet  Active   gabapentin (NEURONTIN) 400 MG Cap 2020 Active   naproxen (NAPROSYN) 500 MG Tab  Active   quetiapine (SEROQUEL) 300 MG tablet 20 Active   traZODone (DESYREL) 100 MG Tab 20 Active                ALLERGIES  No Known Allergies    PHYSICAL EXAM  VITAL SIGNS: BP (!)  "96/66   Pulse 60   Temp 37 °C (98.6 °F) (Oral)   Resp 18   Ht 1.803 m (5' 11\")   Wt 99.8 kg (220 lb)   SpO2 93%   BMI 30.68 kg/m²    Pulse ox interpretation: On room air, I interpret this pulse ox as normal.  Constitutional: Unkempt, lying on the stretcher.  HEENT: Normocephalic, atraumatic. Posterior pharynx clear, mucous membranes dry.  Eyes:  EOMI. PERRLA 3-2, slightly injected sclerae.  Neck: Supple, nontender.  Chest/Pulmonary: Diminished to ausculation bilaterally, no wheezes or rhonchi.  Cardiovascular: Regular rate and rhythm, no murmur.   Abdomen: Soft, nontender; no rebound, guarding, or masses.  Back: No CVA or midline tenderness.   Musculoskeletal: No deformity or edema.  Neuro: Slurred speech otherwise no focal weakness or asymmetry.  Psych: Cantankerous, flat affect, suicidal.  Skin: No rashes, warm and dry.      DIAGNOSTIC STUDIES / PROCEDURES    LABS & EKG  Results for orders placed or performed during the hospital encounter of 09/18/20   Urine Drug Screen   Result Value Ref Range    Amphetamines Urine Negative Negative    Barbiturates Negative Negative    Benzodiazepines Negative Negative    Cocaine Metabolite Negative Negative    Methadone Negative Negative    Opiates Negative Negative    Oxycodone Negative Negative    Phencyclidine -Pcp Negative Negative    Propoxyphene Negative Negative    Cannabinoid Metab Positive (A) Negative   CBC with Differential   Result Value Ref Range    WBC 7.6 4.8 - 10.8 K/uL    RBC 3.70 (L) 4.70 - 6.10 M/uL    Hemoglobin 12.3 (L) 14.0 - 18.0 g/dL    Hematocrit 35.1 (L) 42.0 - 52.0 %    MCV 94.9 81.4 - 97.8 fL    MCH 33.2 (H) 27.0 - 33.0 pg    MCHC 35.0 33.7 - 35.3 g/dL    RDW 42.4 35.9 - 50.0 fL    Platelet Count 86 (L) 164 - 446 K/uL    MPV 11.1 9.0 - 12.9 fL    Neutrophils-Polys 77.70 (H) 44.00 - 72.00 %    Lymphocytes 12.90 (L) 22.00 - 41.00 %    Monocytes 5.70 0.00 - 13.40 %    Eosinophils 2.10 0.00 - 6.90 %    Basophils 0.40 0.00 - 1.80 %    Immature " Granulocytes 1.20 (H) 0.00 - 0.90 %    Nucleated RBC 0.00 /100 WBC    Neutrophils (Absolute) 5.92 1.82 - 7.42 K/uL    Lymphs (Absolute) 0.98 (L) 1.00 - 4.80 K/uL    Monos (Absolute) 0.43 0.00 - 0.85 K/uL    Eos (Absolute) 0.16 0.00 - 0.51 K/uL    Baso (Absolute) 0.03 0.00 - 0.12 K/uL    Immature Granulocytes (abs) 0.09 0.00 - 0.11 K/uL    NRBC (Absolute) 0.00 K/uL   Complete Metabolic Panel (CMP)   Result Value Ref Range    Sodium 122 (L) 135 - 145 mmol/L    Potassium 4.1 3.6 - 5.5 mmol/L    Chloride 88 (L) 96 - 112 mmol/L    Co2 22 20 - 33 mmol/L    Anion Gap 12.0 7.0 - 16.0    Glucose 113 (H) 65 - 99 mg/dL    Bun 10 8 - 22 mg/dL    Creatinine 0.63 0.50 - 1.40 mg/dL    Calcium 8.5 8.4 - 10.2 mg/dL    AST(SGOT) 79 (H) 12 - 45 U/L    ALT(SGPT) 47 2 - 50 U/L    Alkaline Phosphatase 74 30 - 99 U/L    Total Bilirubin 0.6 0.1 - 1.5 mg/dL    Albumin 4.2 3.2 - 4.9 g/dL    Total Protein 7.2 6.0 - 8.2 g/dL    Globulin 3.0 1.9 - 3.5 g/dL    A-G Ratio 1.4 g/dL   Troponin   Result Value Ref Range    Troponin T 16 6 - 19 ng/L   ACETAMINOPHEN   Result Value Ref Range    Acetaminophen -Tylenol <5 (L) 10 - 30 ug/mL   Salicylate   Result Value Ref Range    Salicylates, Quant. <1 (L) 15 - 25 mg/dL   MAGNESIUM   Result Value Ref Range    Magnesium 1.7 1.5 - 2.5 mg/dL   LACTIC ACID   Result Value Ref Range    Lactic Acid 1.7 0.5 - 2.0 mmol/L   ESTIMATED GFR   Result Value Ref Range    GFR If African American >60 >60 mL/min/1.73 m 2    GFR If Non African American >60 >60 mL/min/1.73 m 2   Routine (COVID/SARS COV-2 In-House PCR up to 24 hours)    Specimen: Nasopharyngeal; Respirate   Result Value Ref Range    COVID Order Status Received    SARS-CoV-2, PCR (In-House)   Result Value Ref Range    SARS-CoV-2 Source NP Swab    POC BREATHALIZER   Result Value Ref Range    POC Breathalizer 0.137 (A) 0.00 - 0.01 Percent   EKG   Result Value Ref Range    Report       [facility] Emergency Dept.    Test Date:   2020  Pt Name:    BRANDY HUTTON                 Department: [facility]  MRN:        [id number]                      Room:       2209  Gender:     Male                         Technician: COOKIE  :        1950                   Requested By:KORIN LEROY  Order #:    [id number]                    Reading MD: Korin Leroy MD    Measurements  Intervals                                Axis  Rate:       76                           P:          39  NM:         188                          QRS:        62  QRSD:       95                           T:          47  QT:         376  QTc:        423    Interpretive Statements  Sinus rhythm  Atrial premature complex  Low voltage, precordial leads  Compared to ECG 2020 16:55:55  Atrial premature complex(es) now present  Low QRS voltage now present  No STEMI or dysrhythmia  Electronically Signed On 2020 5:37:28 PDT by Korin Leroy MD           RADIOLOGY  DX-CHEST-PORTABLE (1 VIEW)   Final Result      Possible free air beneath right hemidiaphragm, along with patchy and linear opacities in the right lung base suggesting atelectasis. Recommend upright or left lateral decubitus views of the abdomen to exclude free air.          COURSE & MEDICAL DECISION MAKING    This is a 70 y.o. male who presents with alcohol intoxication suicidal ideation and chest pain.    Differential Diagnosis includes but is not limited to:  Homelessness, depression, intoxication, dehydration, electrolyte abnormality, occult ingestion    ED Course:  This is a unfortunate 70-year-old male [place], reported chest pain and suicidal ideation.  Currently chest pain-free.  Just discharged from psychiatric facility.  Plan screening labs and close monitoring and constant observation.  He is clinically intoxicated and looks dry, I will give him an IV fluid bolus.    On records review he has been in this hospital recently had a downtrending sodium today he is near  critical with a sodium of 122.  No other severe electrolyte abnormality present.  No fevers white count normal doubt infection.  No critical anemia.  Alcohol level is elevated but no lactic acidosis is present.  No evidence of other occult ingestion.  Patient is still intoxicated I do not plan to put him on a legal hold, my suspicion of him being acutely suicidal is low and seems to be more like he may be malingering for housing, however given his electrolyte abnormalities he needs to be reassessed in the morning when he is clinically sober so I will admit him to the hospital.  Hospitalist Dr. Arnold will kindly admit the patient for further work-up and treatment.  Psychiatry to be consulted in the morning to determine his psychosocial disposition.    Medications   amLODIPine (NORVASC) tablet 5 mg (0 mg Oral Held 9/19/20 0600)   ferrous sulfate tablet 325 mg (has no administration in time range)   gabapentin (NEURONTIN) capsule 400 mg (400 mg Oral Given 9/19/20 0526)   QUEtiapine (SEROQUEL) tablet 300 mg (300 mg Oral Given 9/19/20 0308)   traZODone (DESYREL) tablet 100 mg (has no administration in time range)   senna-docusate (PERICOLACE or SENOKOT S) 8.6-50 MG per tablet 2 Tab (2 Tabs Oral Given 9/19/20 0526)     And   polyethylene glycol/lytes (MIRALAX) PACKET 1 Packet (has no administration in time range)     And   magnesium hydroxide (MILK OF MAGNESIA) suspension 30 mL (has no administration in time range)     And   bisacodyl (DULCOLAX) suppository 10 mg (has no administration in time range)   NS infusion ( Intravenous New Bag 9/19/20 0526)   enoxaparin (LOVENOX) inj 40 mg (40 mg Subcutaneous Given 9/19/20 0526)   acetaminophen (TYLENOL) tablet 650 mg (has no administration in time range)   enalaprilat (VASOTEC) injection 1.25 mg (has no administration in time range)   ondansetron (ZOFRAN) syringe/vial injection 4 mg (has no administration in time range)   ondansetron (ZOFRAN ODT) dispertab 4 mg (has no  administration in time range)   LORazepam (ATIVAN) injection 0.5 mg (has no administration in time range)   lactated ringers infusion (BOLUS) (0 mL Intravenous Stopped 9/19/20 0144)       FINAL IMPRESSION  1. Hyponatremia    2. Dehydration    3. Alcohol abuse    4. Homelessness    5. Suicidal ideation    6. Acute chest pain        -ADMIT-      Pertinent Labs & Imaging studies reviewed and verified by myself, as well as nursing notes and the patient's past medical, family, and social histories (See chart for details).    Portions of this record were made with voice recognition software.  Despite my review, spelling/grammar/context errors may still remain.  Interpretation of this chart should be taken in this context.    Electronically signed by Nba Man M.D. on 9/19/2020 at 5:45 AM.

## 2020-09-19 NOTE — ASSESSMENT & PLAN NOTE
-Significant, causing confusion  -He does endorse suicidal ideation as above  -He was at [facility] for 4 days, should not be at risk for withdrawal, CIWA protocol ordered as precaution  -appears resolved, will continue to assess

## 2020-09-20 PROBLEM — Z75.8 PROBLEM RELATED TO DISCHARGE PLANNING: Status: ACTIVE | Noted: 2020-09-20

## 2020-09-20 PROBLEM — Z02.9 PROBLEM RELATED TO DISCHARGE PLANNING: Status: ACTIVE | Noted: 2020-09-20

## 2020-09-20 LAB
ANION GAP SERPL CALC-SCNC: 8 MMOL/L (ref 7–16)
BUN SERPL-MCNC: 13 MG/DL (ref 8–22)
CALCIUM SERPL-MCNC: 8.5 MG/DL (ref 8.4–10.2)
CHLORIDE SERPL-SCNC: 102 MMOL/L (ref 96–112)
CO2 SERPL-SCNC: 26 MMOL/L (ref 20–33)
CREAT SERPL-MCNC: 0.72 MG/DL (ref 0.5–1.4)
ERYTHROCYTE [DISTWIDTH] IN BLOOD BY AUTOMATED COUNT: 43.6 FL (ref 35.9–50)
GLUCOSE SERPL-MCNC: 105 MG/DL (ref 65–99)
HCT VFR BLD AUTO: 36.3 % (ref 42–52)
HGB BLD-MCNC: 12.2 G/DL (ref 14–18)
MAGNESIUM SERPL-MCNC: 1.9 MG/DL (ref 1.5–2.5)
MCH RBC QN AUTO: 32.7 PG (ref 27–33)
MCHC RBC AUTO-ENTMCNC: 33.6 G/DL (ref 33.7–35.3)
MCV RBC AUTO: 97.3 FL (ref 81.4–97.8)
PHOSPHATE SERPL-MCNC: 3.6 MG/DL (ref 2.5–4.5)
PLATELET # BLD AUTO: 70 K/UL (ref 164–446)
PMV BLD AUTO: 11.2 FL (ref 9–12.9)
POTASSIUM SERPL-SCNC: 4.2 MMOL/L (ref 3.6–5.5)
RBC # BLD AUTO: 3.73 M/UL (ref 4.7–6.1)
SODIUM SERPL-SCNC: 136 MMOL/L (ref 135–145)
WBC # BLD AUTO: 2.6 K/UL (ref 4.8–10.8)

## 2020-09-20 PROCEDURE — 85027 COMPLETE CBC AUTOMATED: CPT

## 2020-09-20 PROCEDURE — 770001 HCHG ROOM/CARE - MED/SURG/GYN PRIV*

## 2020-09-20 PROCEDURE — A9270 NON-COVERED ITEM OR SERVICE: HCPCS | Performed by: INTERNAL MEDICINE

## 2020-09-20 PROCEDURE — 80048 BASIC METABOLIC PNL TOTAL CA: CPT

## 2020-09-20 PROCEDURE — 83735 ASSAY OF MAGNESIUM: CPT

## 2020-09-20 PROCEDURE — 700102 HCHG RX REV CODE 250 W/ 637 OVERRIDE(OP): Performed by: NURSE PRACTITIONER

## 2020-09-20 PROCEDURE — 84100 ASSAY OF PHOSPHORUS: CPT

## 2020-09-20 PROCEDURE — 700105 HCHG RX REV CODE 258: Performed by: INTERNAL MEDICINE

## 2020-09-20 PROCEDURE — 700111 HCHG RX REV CODE 636 W/ 250 OVERRIDE (IP): Performed by: INTERNAL MEDICINE

## 2020-09-20 PROCEDURE — A9270 NON-COVERED ITEM OR SERVICE: HCPCS | Performed by: NURSE PRACTITIONER

## 2020-09-20 PROCEDURE — 700102 HCHG RX REV CODE 250 W/ 637 OVERRIDE(OP): Performed by: INTERNAL MEDICINE

## 2020-09-20 PROCEDURE — 99232 SBSQ HOSP IP/OBS MODERATE 35: CPT | Performed by: FAMILY MEDICINE

## 2020-09-20 PROCEDURE — 36415 COLL VENOUS BLD VENIPUNCTURE: CPT

## 2020-09-20 RX ORDER — DULOXETIN HYDROCHLORIDE 30 MG/1
60 CAPSULE, DELAYED RELEASE ORAL DAILY
Status: DISCONTINUED | OUTPATIENT
Start: 2020-09-20 | End: 2020-09-21 | Stop reason: HOSPADM

## 2020-09-20 RX ORDER — LISINOPRIL 20 MG/1
20 TABLET ORAL DAILY
Status: DISCONTINUED | OUTPATIENT
Start: 2020-09-20 | End: 2020-09-21 | Stop reason: HOSPADM

## 2020-09-20 RX ADMIN — QUETIAPINE FUMARATE 300 MG: 100 TABLET ORAL at 19:56

## 2020-09-20 RX ADMIN — LISINOPRIL 20 MG: 20 TABLET ORAL at 08:52

## 2020-09-20 RX ADMIN — DULOXETINE HYDROCHLORIDE 60 MG: 30 CAPSULE, DELAYED RELEASE ORAL at 08:53

## 2020-09-20 RX ADMIN — GABAPENTIN 400 MG: 400 CAPSULE ORAL at 05:18

## 2020-09-20 RX ADMIN — SENNOSIDES-DOCUSATE SODIUM TAB 8.6-50 MG 2 TABLET: 8.6-5 TAB at 05:18

## 2020-09-20 RX ADMIN — ENOXAPARIN SODIUM 40 MG: 40 INJECTION SUBCUTANEOUS at 05:17

## 2020-09-20 RX ADMIN — SODIUM CHLORIDE: 9 INJECTION, SOLUTION INTRAVENOUS at 01:30

## 2020-09-20 RX ADMIN — AMLODIPINE BESYLATE 5 MG: 5 TABLET ORAL at 05:17

## 2020-09-20 RX ADMIN — FERROUS SULFATE TAB 325 MG (65 MG ELEMENTAL FE) 325 MG: 325 (65 FE) TAB at 07:46

## 2020-09-20 RX ADMIN — GABAPENTIN 400 MG: 400 CAPSULE ORAL at 17:05

## 2020-09-20 RX ADMIN — ACETAMINOPHEN 650 MG: 325 TABLET, FILM COATED ORAL at 23:32

## 2020-09-20 RX ADMIN — SODIUM CHLORIDE: 9 INJECTION, SOLUTION INTRAVENOUS at 12:57

## 2020-09-20 RX ADMIN — GABAPENTIN 400 MG: 400 CAPSULE ORAL at 11:25

## 2020-09-20 ASSESSMENT — LIFESTYLE VARIABLES
VISUAL DISTURBANCES: NOT PRESENT
PAROXYSMAL SWEATS: NO SWEAT VISIBLE
ANXIETY: MILDLY ANXIOUS
PAROXYSMAL SWEATS: BARELY PERCEPTIBLE SWEATING. PALMS MOIST
NAUSEA AND VOMITING: NO NAUSEA AND NO VOMITING
TREMOR: TREMOR NOT VISIBLE BUT CAN BE FELT, FINGERTIP TO FINGERTIP
ANXIETY: MILDLY ANXIOUS
PAROXYSMAL SWEATS: NO SWEAT VISIBLE
NAUSEA AND VOMITING: NO NAUSEA AND NO VOMITING
ANXIETY: MILDLY ANXIOUS
VISUAL DISTURBANCES: NOT PRESENT
PAROXYSMAL SWEATS: NO SWEAT VISIBLE
ORIENTATION AND CLOUDING OF SENSORIUM: ORIENTED AND CAN DO SERIAL ADDITIONS
AGITATION: NORMAL ACTIVITY
HEADACHE, FULLNESS IN HEAD: VERY MILD
TOTAL SCORE: 4
TREMOR: *
AGITATION: NORMAL ACTIVITY
TOTAL SCORE: 3
TREMOR: TREMOR NOT VISIBLE BUT CAN BE FELT, FINGERTIP TO FINGERTIP
AUDITORY DISTURBANCES: NOT PRESENT
ANXIETY: NO ANXIETY (AT EASE)
TREMOR: *
HEADACHE, FULLNESS IN HEAD: NOT PRESENT
VISUAL DISTURBANCES: NOT PRESENT
NAUSEA AND VOMITING: MILD NAUSEA WITH NO VOMITING
TOTAL SCORE: 3
AUDITORY DISTURBANCES: NOT PRESENT
AGITATION: NORMAL ACTIVITY
VISUAL DISTURBANCES: NOT PRESENT
VISUAL DISTURBANCES: NOT PRESENT
PAROXYSMAL SWEATS: NO SWEAT VISIBLE
NAUSEA AND VOMITING: NO NAUSEA AND NO VOMITING
AGITATION: NORMAL ACTIVITY
ANXIETY: MILDLY ANXIOUS
NAUSEA AND VOMITING: MILD NAUSEA WITH NO VOMITING
HEADACHE, FULLNESS IN HEAD: VERY MILD
AUDITORY DISTURBANCES: NOT PRESENT
TREMOR: TREMOR NOT VISIBLE BUT CAN BE FELT, FINGERTIP TO FINGERTIP
AGITATION: NORMAL ACTIVITY
AGITATION: NORMAL ACTIVITY
AUDITORY DISTURBANCES: NOT PRESENT
ORIENTATION AND CLOUDING OF SENSORIUM: ORIENTED AND CAN DO SERIAL ADDITIONS
ANXIETY: MILDLY ANXIOUS
TOTAL SCORE: 2
ORIENTATION AND CLOUDING OF SENSORIUM: ORIENTED AND CAN DO SERIAL ADDITIONS
AUDITORY DISTURBANCES: NOT PRESENT
HEADACHE, FULLNESS IN HEAD: VERY MILD
ORIENTATION AND CLOUDING OF SENSORIUM: ORIENTED AND CAN DO SERIAL ADDITIONS
PAROXYSMAL SWEATS: NO SWEAT VISIBLE
TREMOR: NO TREMOR
TOTAL SCORE: 3
ANXIETY: MILDLY ANXIOUS
TOTAL SCORE: 3
ORIENTATION AND CLOUDING OF SENSORIUM: ORIENTED AND CAN DO SERIAL ADDITIONS
NAUSEA AND VOMITING: NO NAUSEA AND NO VOMITING
HEADACHE, FULLNESS IN HEAD: VERY MILD
AGITATION: NORMAL ACTIVITY
TREMOR: TREMOR NOT VISIBLE BUT CAN BE FELT, FINGERTIP TO FINGERTIP
NAUSEA AND VOMITING: NO NAUSEA AND NO VOMITING
AUDITORY DISTURBANCES: NOT PRESENT
PAROXYSMAL SWEATS: NO SWEAT VISIBLE
ORIENTATION AND CLOUDING OF SENSORIUM: ORIENTED AND CAN DO SERIAL ADDITIONS
AUDITORY DISTURBANCES: NOT PRESENT
VISUAL DISTURBANCES: NOT PRESENT
VISUAL DISTURBANCES: NOT PRESENT
ORIENTATION AND CLOUDING OF SENSORIUM: ORIENTED AND CAN DO SERIAL ADDITIONS
TOTAL SCORE: 4
HEADACHE, FULLNESS IN HEAD: VERY MILD
HEADACHE, FULLNESS IN HEAD: NOT PRESENT
SUBSTANCE_ABUSE: 1

## 2020-09-20 ASSESSMENT — ENCOUNTER SYMPTOMS
NECK PAIN: 0
FEVER: 0
EYE PAIN: 0
VOMITING: 0
SHORTNESS OF BREATH: 0
SPUTUM PRODUCTION: 0
DEPRESSION: 1
TINGLING: 1
WHEEZING: 0
CONSTIPATION: 0
HEADACHES: 1
COUGH: 0
MYALGIAS: 0
DIZZINESS: 0
ABDOMINAL PAIN: 0
NAUSEA: 0
HEARTBURN: 0
PALPITATIONS: 0
CHILLS: 0
HALLUCINATIONS: 0
WEAKNESS: 0
PHOTOPHOBIA: 0

## 2020-09-20 ASSESSMENT — FIBROSIS 4 INDEX: FIB4 SCORE: 11.36

## 2020-09-20 NOTE — ASSESSMENT & PLAN NOTE
History of, cervical disc and fusion C5-C6 in 2018 with Dr. Melo.  Fracture of C7 in 2020  Bilateral hand partial numbness, tingling.  Circulation, sensation and motion intact.  -5/5 bilateral upper extremity strength  -Continue home Neurontin

## 2020-09-20 NOTE — DISCHARGE PLANNING
RN  CM notified by bedside RN that pt is missing his wallet. RN CM called the ER, they do not have pts wallet. RN CM called security, they do not have his wallet. RN CM called lost and found at the [facility]. Pts wallet is at the [facility] in lost and found. They will hold his wallet until pt can pick it up.

## 2020-09-20 NOTE — PROGRESS NOTES
"Hospital Medicine Daily Progress Note    Date of Service  2020    Chief Complaint  Suicidal ideation    Hospital Course    70 y.o. male who presented 2020 with alcohol intoxication.  [facility].  Decided to go to the [facility] and drink a significant amount.  While he was there he fell asleep and because of this they kicked him out.  After this he complained of chest pain and suicidal ideation, both of which he denies now.  Patient at this time is homeless.  Patient at this point in time is intoxicated, somewhat confused and therefore a poor historian, information obtained from the chart.     His brother  3 months ago. The patient is also upset he lost his wallet.      Interval Problem Update  Patient is lying in bed, alert and fully oriented.  He verbalizes suicidal ideation with a lethal plan.  He states he would jump off of an overpass.  He complains of numbness and tingling in his bilateral upper extremities which has been happening since his cervical disk and fusion anterior (2018).  He states this is worse than usual.  He has a \"little\" headache. He denies dizziness, pain and any other problems. His constipation is resolved.  His bowel movement this morning was loose.   -No longer appears intoxicated, speech and attention are within normal limits.   -Bilateral upper extremity sensation, motion, circulation and strength remain intact.  -Follows commands  -Psychology evaluation tomorrow per Dr. Aguiar  -Legal hold initiated  -referred to Inpatient psychiatric facility  -[facility] protocol, tomorrow is day 3 after last drink  -Wallet is at lost and found at the [facility] per CM  -Continue current plan of care    Consultants/Specialty  Psychiatry    Code Status  Full Code    Disposition  Inpatient psychiatry     Review of Systems  Review of Systems   Constitutional: Negative for chills, fever and malaise/fatigue.   Eyes: Negative for photophobia and pain. "   Respiratory: Negative for cough, sputum production, shortness of breath and wheezing.    Cardiovascular: Negative for chest pain, palpitations and leg swelling.   Gastrointestinal: Negative for abdominal pain, constipation, heartburn, nausea and vomiting.   Genitourinary: Negative for dysuria, frequency, hematuria and urgency.   Musculoskeletal: Negative for myalgias and neck pain.   Neurological: Positive for tingling (baseline BUE) and headaches. Negative for dizziness and weakness.   Psychiatric/Behavioral: Positive for depression, substance abuse and suicidal ideas. Negative for hallucinations.        Physical Exam  Temp:  [36.4 °C (97.5 °F)-36.6 °C (97.8 °F)] 36.6 °C (97.8 °F)  Pulse:  [55-63] 63  Resp:  [18-20] 20  BP: (102-158)/() 158/106  SpO2:  [93 %-95 %] 95 %    Physical Exam  Vitals signs and nursing note reviewed.   Constitutional:       General: He is awake. He is not in acute distress.     Appearance: He is well-developed. He is ill-appearing. He is not toxic-appearing or diaphoretic.   HENT:      Head: Normocephalic and atraumatic.      Nose: Nose normal. No congestion or rhinorrhea.      Mouth/Throat:      Mouth: Mucous membranes are dry.      Pharynx: No oropharyngeal exudate.   Eyes:      General:         Right eye: No discharge.         Left eye: No discharge.      Extraocular Movements: Extraocular movements intact.   Neck:      Musculoskeletal: Neck supple. No edema or erythema.      Trachea: No tracheal deviation.   Cardiovascular:      Rate and Rhythm: Normal rate and regular rhythm.      Heart sounds: No murmur. No friction rub. No gallop.    Pulmonary:      Effort: Pulmonary effort is normal. No respiratory distress.      Breath sounds: Normal breath sounds. No stridor. No wheezing or rales.   Abdominal:      General: Bowel sounds are normal. There is no distension.      Palpations: Abdomen is soft.   Musculoskeletal:      Right lower leg: No edema.      Left lower leg: No edema.    Lymphadenopathy:      Cervical: No cervical adenopathy.   Skin:     General: Skin is warm and dry.      Findings: No erythema or rash.   Neurological:      Mental Status: He is alert and oriented to person, place, and time.   Psychiatric:         Attention and Perception: Attention normal.         Mood and Affect: Mood is depressed.         Speech: Speech is tangential.         Behavior: Behavior is cooperative.         Thought Content: Thought content includes suicidal ideation. Thought content includes suicidal plan.         Fluids    Intake/Output Summary (Last 24 hours) at 9/20/2020 1406  Last data filed at 9/20/2020 0516  Gross per 24 hour   Intake 600 ml   Output 2600 ml   Net -2000 ml       Laboratory  Recent Labs     09/19/20  0015 09/19/20  0549 09/20/20  0029   WBC 7.6 5.3 2.6*   RBC 3.70* 3.69* 3.73*   HEMOGLOBIN 12.3* 12.3* 12.2*   HEMATOCRIT 35.1* 34.9* 36.3*   MCV 94.9 94.6 97.3   MCH 33.2* 33.3* 32.7   MCHC 35.0 35.2 33.6*   RDW 42.4 41.5 43.6   PLATELETCT 86* 73* 70*   MPV 11.1 10.6 11.2     Recent Labs     09/19/20  0549 09/19/20  1213 09/20/20  0029   SODIUM 129* 132* 136   POTASSIUM 4.2 4.4 4.2   CHLORIDE 94* 99 102   CO2 23 25 26   GLUCOSE 87 84 105*   BUN 8 9 13   CREATININE 0.56 0.59 0.72   CALCIUM 8.2* 8.3* 8.5                   Imaging  DX-CHEST-PORTABLE (1 VIEW)   Final Result      Possible free air beneath right hemidiaphragm, along with patchy and linear opacities in the right lung base suggesting atelectasis. Recommend upright or left lateral decubitus views of the abdomen to exclude free air.           Assessment/Plan  * Hyponatremia- (present on admission)  Assessment & Plan  -Due to dehydration  -continue IV fluids  -daily BMP   -resolved    Suicidal ideation- (present on admission)  Assessment & Plan  -psychiatric consultation pending  -legal hold for suicidal ideation and lethal plan, also prior attempts.  -Case management following for inpatient psychiatric referral    Alcohol  intoxication (HCC)- (present on admission)  Assessment & Plan  -Significant, causing confusion  -He does endorse suicidal ideation as above  -He was at [facility] for 4 days, should not be at risk for withdrawal, CIWA protocol ordered as precaution  -appears resolved, will continue to assess    Cervical spine fracture (HCC)- (present on admission)  Assessment & Plan  History of, cervical disc and fusion C5-C6 in 2018 with Dr. Melo.  Fracture of C7 in 2020  Bilateral hand partial numbness, tingling.  Circulation, sensation and motion intact.  -5/5 bilateral upper extremity strength  -Continue home Neurontin    Problem related to discharge planning  Assessment & Plan  Wallet is at the [facility] in loss and found    Thrombocytopenia (HCC)- (present on admission)  Assessment & Plan  -No sign of gross bleeding  -stable  Cbc in am    Normocytic anemia- (present on admission)  Assessment & Plan  -No sign of bleeding  -Repeat CBC in the morning         VTE prophylaxis: Lovenox    LOS [place].

## 2020-09-20 NOTE — PROGRESS NOTES
"Received report from night shift nurse at 0655. Pt is sleeping in bed and awakes easily to verbal stimuli. No signs of distress. Pt has calm and unlabored breathing. ciwa score of 3 at 0747. Pt states he has numbness and tingling in his upper extremities which is baseline for him. During assesment pt stated that he plans to end his life when he leaves the hospital. Pt states, \"I just need to figure out how I'm going to do it\".  Pt stated, \"my plan was to jump off the freeway\". Pt stated that, \"I tried pills to do it in the past but it didn't work.\" Pt stated that he wishes to be dead. Pt stated that he is not planning on harming or killing others. Charge nurse notified of pt's response to suicide screening questions. MD notified of pt's response to suicide screening questions.  "

## 2020-09-20 NOTE — PROGRESS NOTES
Bedside report received from day RN.Pt is AAO x 4.  Pt denies pain. POC discussed.Evening assessment completed.IV infusion running. CIWA protocol in place.All needs met at this time.Bed in low position.Call light within reach.Rounding in place.

## 2020-09-20 NOTE — CARE PLAN
Problem: Safety  Goal: Will remain free from falls  Outcome: PROGRESSING AS EXPECTED  Note: Pt ambulates steady. Pt has not fallen during this hospital admission. Will continue to monitor.     Problem: Infection  Goal: Will remain free from infection  Outcome: PROGRESSING AS EXPECTED     Problem: Pain Management  Goal: Pain level will decrease to patient's comfort goal  Outcome: PROGRESSING AS EXPECTED

## 2020-09-21 VITALS
TEMPERATURE: 98.1 F | BODY MASS INDEX: 32.38 KG/M2 | WEIGHT: 231.26 LBS | HEART RATE: 53 BPM | HEIGHT: 71 IN | RESPIRATION RATE: 18 BRPM | DIASTOLIC BLOOD PRESSURE: 93 MMHG | OXYGEN SATURATION: 95 % | SYSTOLIC BLOOD PRESSURE: 151 MMHG

## 2020-09-21 PROBLEM — Z02.9 PROBLEM RELATED TO DISCHARGE PLANNING: Status: RESOLVED | Noted: 2020-09-20 | Resolved: 2020-09-21

## 2020-09-21 PROBLEM — Z75.8 PROBLEM RELATED TO DISCHARGE PLANNING: Status: RESOLVED | Noted: 2020-09-20 | Resolved: 2020-09-21

## 2020-09-21 PROBLEM — E87.1 HYPONATREMIA: Status: RESOLVED | Noted: 2018-09-02 | Resolved: 2020-09-21

## 2020-09-21 PROBLEM — S12.9XXA CERVICAL SPINE FRACTURE (HCC): Status: RESOLVED | Noted: 2018-09-02 | Resolved: 2020-09-21

## 2020-09-21 PROBLEM — F10.929 ALCOHOL INTOXICATION (HCC): Status: RESOLVED | Noted: 2018-09-02 | Resolved: 2020-09-21

## 2020-09-21 LAB
ANION GAP SERPL CALC-SCNC: 11 MMOL/L (ref 7–16)
BUN SERPL-MCNC: 11 MG/DL (ref 8–22)
CALCIUM SERPL-MCNC: 7 MG/DL (ref 8.4–10.2)
CHLORIDE SERPL-SCNC: 107 MMOL/L (ref 96–112)
CO2 SERPL-SCNC: 21 MMOL/L (ref 20–33)
CREAT SERPL-MCNC: 0.64 MG/DL (ref 0.5–1.4)
ERYTHROCYTE [DISTWIDTH] IN BLOOD BY AUTOMATED COUNT: 44.6 FL (ref 35.9–50)
GLUCOSE SERPL-MCNC: 68 MG/DL (ref 65–99)
HCT VFR BLD AUTO: 33.3 % (ref 42–52)
HGB BLD-MCNC: 11.2 G/DL (ref 14–18)
MCH RBC QN AUTO: 32.9 PG (ref 27–33)
MCHC RBC AUTO-ENTMCNC: 33.6 G/DL (ref 33.7–35.3)
MCV RBC AUTO: 97.9 FL (ref 81.4–97.8)
PLATELET # BLD AUTO: 52 K/UL (ref 164–446)
PMV BLD AUTO: 11.2 FL (ref 9–12.9)
POTASSIUM SERPL-SCNC: 3.6 MMOL/L (ref 3.6–5.5)
RBC # BLD AUTO: 3.4 M/UL (ref 4.7–6.1)
SODIUM SERPL-SCNC: 139 MMOL/L (ref 135–145)
WBC # BLD AUTO: 2.3 K/UL (ref 4.8–10.8)

## 2020-09-21 PROCEDURE — 80048 BASIC METABOLIC PNL TOTAL CA: CPT

## 2020-09-21 PROCEDURE — 85027 COMPLETE CBC AUTOMATED: CPT

## 2020-09-21 PROCEDURE — 36415 COLL VENOUS BLD VENIPUNCTURE: CPT

## 2020-09-21 PROCEDURE — A9270 NON-COVERED ITEM OR SERVICE: HCPCS | Performed by: INTERNAL MEDICINE

## 2020-09-21 PROCEDURE — 99239 HOSP IP/OBS DSCHRG MGMT >30: CPT | Performed by: FAMILY MEDICINE

## 2020-09-21 PROCEDURE — 700105 HCHG RX REV CODE 258: Performed by: INTERNAL MEDICINE

## 2020-09-21 PROCEDURE — 700102 HCHG RX REV CODE 250 W/ 637 OVERRIDE(OP): Performed by: INTERNAL MEDICINE

## 2020-09-21 PROCEDURE — A9270 NON-COVERED ITEM OR SERVICE: HCPCS | Performed by: NURSE PRACTITIONER

## 2020-09-21 PROCEDURE — 700102 HCHG RX REV CODE 250 W/ 637 OVERRIDE(OP): Performed by: NURSE PRACTITIONER

## 2020-09-21 RX ORDER — AMLODIPINE BESYLATE 5 MG/1
5 TABLET ORAL
Qty: 30 TAB | Refills: 0 | Status: ON HOLD
Start: 2020-09-21 | End: 2020-10-28 | Stop reason: SDUPTHER

## 2020-09-21 RX ORDER — ACETAMINOPHEN 325 MG/1
650 TABLET ORAL EVERY 6 HOURS PRN
Qty: 30 TAB | Refills: 0 | Status: SHIPPED | OUTPATIENT
Start: 2020-09-21 | End: 2021-11-29

## 2020-09-21 RX ADMIN — SODIUM CHLORIDE: 9 INJECTION, SOLUTION INTRAVENOUS at 05:33

## 2020-09-21 RX ADMIN — FERROUS SULFATE TAB 325 MG (65 MG ELEMENTAL FE) 325 MG: 325 (65 FE) TAB at 07:50

## 2020-09-21 RX ADMIN — DULOXETINE HYDROCHLORIDE 60 MG: 30 CAPSULE, DELAYED RELEASE ORAL at 05:20

## 2020-09-21 RX ADMIN — LISINOPRIL 20 MG: 20 TABLET ORAL at 05:21

## 2020-09-21 RX ADMIN — GABAPENTIN 400 MG: 400 CAPSULE ORAL at 12:19

## 2020-09-21 RX ADMIN — LORAZEPAM 0.5 MG: 0.5 TABLET ORAL at 08:00

## 2020-09-21 RX ADMIN — GABAPENTIN 400 MG: 400 CAPSULE ORAL at 17:52

## 2020-09-21 RX ADMIN — LORAZEPAM 0.5 MG: 0.5 TABLET ORAL at 17:52

## 2020-09-21 RX ADMIN — LORAZEPAM 0.5 MG: 0.5 TABLET ORAL at 12:19

## 2020-09-21 RX ADMIN — ACETAMINOPHEN 650 MG: 325 TABLET, FILM COATED ORAL at 12:19

## 2020-09-21 RX ADMIN — AMLODIPINE BESYLATE 5 MG: 5 TABLET ORAL at 05:20

## 2020-09-21 RX ADMIN — GABAPENTIN 400 MG: 400 CAPSULE ORAL at 05:21

## 2020-09-21 RX ADMIN — SENNOSIDES-DOCUSATE SODIUM TAB 8.6-50 MG 2 TABLET: 8.6-5 TAB at 05:20

## 2020-09-21 ASSESSMENT — LIFESTYLE VARIABLES
NAUSEA AND VOMITING: NO NAUSEA AND NO VOMITING
TOTAL SCORE: 6
NAUSEA AND VOMITING: NO NAUSEA AND NO VOMITING
ANXIETY: *
NAUSEA AND VOMITING: NO NAUSEA AND NO VOMITING
AGITATION: NORMAL ACTIVITY
TOTAL SCORE: VERY MILD ITCHING, PINS AND NEEDLES SENSATION, BURNING OR NUMBNESS
PAROXYSMAL SWEATS: NO SWEAT VISIBLE
ORIENTATION AND CLOUDING OF SENSORIUM: ORIENTED AND CAN DO SERIAL ADDITIONS
HEADACHE, FULLNESS IN HEAD: NOT PRESENT
HEADACHE, FULLNESS IN HEAD: VERY MILD
ORIENTATION AND CLOUDING OF SENSORIUM: ORIENTED AND CAN DO SERIAL ADDITIONS
TOTAL SCORE: VERY MILD ITCHING, PINS AND NEEDLES SENSATION, BURNING OR NUMBNESS
HEADACHE, FULLNESS IN HEAD: NOT PRESENT
VISUAL DISTURBANCES: NOT PRESENT
TREMOR: *
ORIENTATION AND CLOUDING OF SENSORIUM: ORIENTED AND CAN DO SERIAL ADDITIONS
TOTAL SCORE: 2
ORIENTATION AND CLOUDING OF SENSORIUM: ORIENTED AND CAN DO SERIAL ADDITIONS
VISUAL DISTURBANCES: NOT PRESENT
AUDITORY DISTURBANCES: NOT PRESENT
AUDITORY DISTURBANCES: NOT PRESENT
TREMOR: *
HEADACHE, FULLNESS IN HEAD: NOT PRESENT
VISUAL DISTURBANCES: VERY MILD SENSITIVITY
TOTAL SCORE: 6
PAROXYSMAL SWEATS: NO SWEAT VISIBLE
AGITATION: NORMAL ACTIVITY
TREMOR: *
PAROXYSMAL SWEATS: NO SWEAT VISIBLE
ANXIETY: MILDLY ANXIOUS
AUDITORY DISTURBANCES: NOT PRESENT
TREMOR: TREMOR NOT VISIBLE BUT CAN BE FELT, FINGERTIP TO FINGERTIP
AUDITORY DISTURBANCES: NOT PRESENT
PAROXYSMAL SWEATS: NO SWEAT VISIBLE
NAUSEA AND VOMITING: NO NAUSEA AND NO VOMITING
ANXIETY: MILDLY ANXIOUS
VISUAL DISTURBANCES: VERY MILD SENSITIVITY
TOTAL SCORE: 7
ANXIETY: *
AGITATION: NORMAL ACTIVITY
TOTAL SCORE: VERY MILD ITCHING, PINS AND NEEDLES SENSATION, BURNING OR NUMBNESS
AGITATION: NORMAL ACTIVITY

## 2020-09-21 NOTE — DISCHARGE INSTRUCTIONS
Discharge Instructions    Discharged to other by medical transportation with escort. Discharged via wheelchair, hospital escort: Yes.  Special equipment needed: Not Applicable    Be sure to schedule a follow-up appointment with your primary care doctor or any specialists as instructed.     Discharge Plan:        I understand that a diet low in cholesterol, fat, and sodium is recommended for good health. Unless I have been given specific instructions below for another diet, I accept this instruction as my diet prescription.   Other diet: Regular diet    Special Instructions:   Chest Pain, Nonspecific  It is often hard to give a specific diagnosis for the cause of chest pain. There is always a chance that your pain could be related to something serious, like a heart attack or a blood clot in the lungs. You need to follow up with your caregiver for further evaluation. More lab tests or other studies such as X-rays, electrocardiography, stress testing, or cardiac imaging may be needed to find the cause of your pain.  Most of the time, nonspecific chest pain improves within 2 to 3 days with rest and mild pain medicine. For the next few days, avoid physical exertion or activities that bring on pain. Do not smoke. Avoid drinking alcohol. Call your caregiver for routine follow-up as advised.   SEEK IMMEDIATE MEDICAL CARE IF:  · You develop increased chest pain or pain that radiates to the arm, neck, jaw, back, or abdomen.   · You develop shortness of breath, increased coughing, or you start coughing up blood.   · You have severe back or abdominal pain, nausea, or vomiting.   · You develop severe weakness, fainting, fever, or chills.   Document Released: 12/18/2006 Document Revised: 03/11/2013 Document Reviewed: 06/06/2008  [employer]® Patient Information ©2013 [employer].    Suicidal Feelings: How to Help Yourself  Suicide is when you end your own life. There are many things you can do to help yourself feel better when  struggling with these feelings. Many services and people are available to support you and others who struggle with similar feelings.   If you ever feel like you may hurt yourself or others, or have thoughts about taking your own life, get help right away. To get help:  · Call your local emergency services (911 in the [place]  · The [facility] and human services helpline (211 in the [place]  · Go to your nearest emergency department.  · Call a suicide hotline to speak with a trained counselor. The following suicide hotlines are available in the [place]:  ? 4-326-582-TALK (1-776.887.8205).  ? 0-114-LAHFOMN (1-372.680.5255).  ? 1-819.915.5591. This is a hotline for [place] speakers.  ? 1-979.734.8699. This is a hotline for TTY users.  ? 8-520-4-U-MINISTERIO (1-312.259.4846). This is a hotline for lesbian, arreguin, bisexual, transgender, or questioning youth.  ? For a list of hotlines in [place], visit www.suicide.org/hotlines/international/lluukd-mqmxoym-brvddgwh.html  · Contact a crisis center or a local suicide prevention center. To find a crisis center or suicide prevention center:  ? Call your local hospital, clinic, community service organization, mental health center, social service provider, or health department. Ask for help with connecting to a crisis center.  ? For a list of crisis centers in the [place], visit: suicidepreventionlifeline.org  ? For a list of crisis centers in [place], visit: suicideprevention.ca  How to help yourself feel better    · Promise yourself that you will not do anything extreme when you have suicidal feelings. Remember, there is hope. Many people have gotten through suicidal thoughts and feelings, and you can too. If you have had these feelings before, remind yourself that you can get through them again.  · Let family, friends, teachers, or counselors know how you are feeling. Try not to separate yourself from those who care about you and want to help you. Talk with someone  every day, even if you do not feel sociable. Face-to-face conversation is best to help them understand your feelings.  · Contact a mental health care provider and work with this person regularly.  · Make a safety plan that you can follow during a crisis. Include phone numbers of suicide prevention hotlines, mental health professionals, and trusted friends and family members you can call during an emergency. Save these numbers on your phone.  · If you are thinking of taking a lot of medicine, give your medicine to someone who can give it to you as prescribed. If you are on antidepressants and are concerned you will overdose, tell your health care provider so that he or she can give you safer medicines.  · Try to stick to your routines. Follow a schedule every day. Make self-care a priority.  · Make a list of realistic goals, and cross them off when you achieve them. Accomplishments can give you a sense of worth.  · Wait until you are feeling better before doing things that you find difficult or unpleasant.  · Do things that you have always enjoyed to take your mind off your feelings. Try reading a book, or listening to or playing music. Spending time outside, in nature, may help you feel better.  Follow these instructions at home:    · Visit your primary health care provider every year for a checkup.  · Work with a mental health care provider as needed.  · Eat a well-balanced diet, and eat regular meals.  · Get plenty of rest.  · Exercise if you are able. Just 30 minutes of exercise each day can help you feel better.  · Take over-the-counter and prescription medicines only as told by your health care provider. Ask your mental health care provider about the possible side effects of any medicines you are taking.  · Do not use alcohol or drugs, and remove these substances from your home.  · Remove weapons, poisons, knives, and other deadly items from your home.  General recommendations  · Keep your living space well  lit.  · When you are feeling well, write yourself a letter with tips and support that you can read when you are not feeling well.  · Remember that life's difficulties can be sorted out with help. Conditions can be treated, and you can learn behaviors and ways of thinking that will help you.  Where to find more information  · National Suicide Prevention Lifeline: www.suicidepreventionlifeline.org  · Hopeline: www.hopeline.com  · American Foundation for Suicide Prevention: www.afsp.org  · The [place] Project (for lesbian, arreguin, bisexual, transgender, or questioning youth): www.thetrevorproject.org  Contact a health care provider if:  · You feel as though you are a burden to others.  · You feel agitated, angry, vengeful, or have extreme mood swings.  · You have withdrawn from family and friends.  Get help right away if:  · You are talking about suicide or wishing to die.  · You start making plans for how to commit suicide.  · You feel that you have no reason to live.  · You start making plans for putting your affairs in order, saying goodbye, or giving your possessions away.  · You feel guilt, shame, or unbearable pain, and it seems like there is no way out.  · You are frequently using drugs or alcohol.  · You are engaging in risky behaviors that could lead to death.  If you have any of these symptoms, get help right away. Call emergency services, go to your nearest emergency department or crisis center, or call a suicide crisis helpline.  Summary  · Suicide is when you take your own life.  · Promise yourself that you will not do anything extreme when you have suicidal feelings.  · Let family, friends, teachers, or counselors know how you are feeling.  · Get help right away if you feel as though life is getting too tough to handle and you are thinking about suicide.  This information is not intended to replace advice given to you by your health care provider. Make sure you discuss any questions you have with your health  care provider.  Document Released: 06/23/2004 Document Revised: 04/09/2020 Document Reviewed: 07/31/2018  Elsevier Patient Education © 2020 Elsevier Inc.      · Is patient discharged on Warfarin / Coumadin?   No     Depression / Suicide Risk    As you are discharged from this [facility] facility, it is important to learn how to keep safe from harming yourself.    Recognize the warning signs:  · Abrupt changes in personality, positive or negative- including increase in energy   · Giving away possessions  · Change in eating patterns- significant weight changes-  positive or negative  · Change in sleeping patterns- unable to sleep or sleeping all the time   · Unwillingness or inability to communicate  · Depression  · Unusual sadness, discouragement and loneliness  · Talk of wanting to die  · Neglect of personal appearance   · Rebelliousness- reckless behavior  · Withdrawal from people/activities they love  · Confusion- inability to concentrate     If you or a loved one observes any of these behaviors or has concerns about self-harm, here's what you can do:  · Talk about it- your feelings and reasons for harming yourself  · Remove any means that you might use to hurt yourself (examples: pills, rope, extension cords, firearm)  · Get professional help from the community (Mental Health, Substance Abuse, psychological counseling)  · Do not be alone:Call your Safe Contact- someone whom you trust who will be there for you.  · Call your local CRISIS HOTLINE 505-5516 or 535-203-2909  · Call your local Children's Mobile Crisis Response Team [place] (483) 414-1450 or www.ExSafe  · Call the toll free National Suicide Prevention Hotlines   · National Suicide Prevention Lifeline 070-214-NDTG (2162)  · National Hope Line Network 800-SUICIDE (147-4766)

## 2020-09-21 NOTE — CARE PLAN
Received report from day shift nurse.   Assumed pt care   Pt is A&Ox4, resting comfortably in bed.   Pt on r.a.. No signs of SOB/respiratory distress.   Labs noted, VSS.   Pt c/o no pain at this moment.   Assessment completed, CIWA score of 3 assessed, pt continues to have thoughts of suicide, states he has a plan and would follow through with plan after leaving hospital.   Fall precautions in place.   Bed at lowest position.   Call light and personal belongings within reach. Continue to monitor         Problem: Safety  Goal: Will remain free from injury  Outcome: PROGRESSING AS EXPECTED     Problem: Psychosocial Needs:  Goal: Level of anxiety will decrease  Outcome: PROGRESSING AS EXPECTED

## 2020-09-21 NOTE — PROGRESS NOTES
Coretta from Lab called with critical result of WBC at 2.3 at 0524. Critical lab result read back to Jose A.   Dr. Arnold notified of critical lab result at 0604.  Critical lab result read back by Dr. Arnold.

## 2020-09-21 NOTE — DISCHARGE PLANNING
"[facility] sent referrals to all [facility] facilities, per request of SHARAN Resendiz.     Faxed referral manually to [facility].       @7233    Agency/Facility Name: [facility]   Spoke To: Tiffanie   Outcome: Per Tiffanie [facility] receives frequent referrals for pt and pt is a known \"hospital hopper\". Pt declined.       Agency/Facility Name: [facility]   Spoke To: Juan Carlos   Outcome: Per Juan Carlos referral has been received and Lety is currently working on it, pt has been at facility in the past and is possible he will be accepted back, Lety will f/u with [facility] once review is complete.         @4570  Agency/Facility Name: [facility]   Spoke To: Lety   Outcome: Per Lety review is complete, but asks that [facility] fax copy of legal hold, [facility] faxed at 1240. Lety states acceptance is being verified by DON and will f/u once complete.        @1350  Pt is accepted at [facility], [facility] agrees to set [facility] for today at 1800 per request.     Pt is accepted to [facility], accepting provider is Dr. Farah.   "

## 2020-09-21 NOTE — DISCHARGE SUMMARY
"Discharge Summary    CHIEF COMPLAINT ON ADMISSION  Chief Complaint   Patient presents with   • Chest Pain     2 hours of chest pain.   • Suicidal Ideation     \"i dont want to live no more\"       Reason for Admission  Suicidal Ideation     CODE STATUS  Full Code    HPI & HOSPITAL COURSE   70 y.o. male who presented 2020 with alcohol intoxication.  Patient was [facility] from [facility].  Decided to go to the [facility] and drink a significant amount.  While he was there he fell asleep and because of this they kicked him out.  After this he complained of chest pain and suicidal ideation, both of which he denies now.  Patient at this time is homeless.  Patient at this point in time is intoxicated, somewhat confused and therefore a poor historian, information obtained from the chart.     His brother  3 months ago. The patient is also upset he lost his wallet, which is at lost and found at the [facility] per CM.   Today the patient is lying in bed, alert and fully oriented.  He continues to verbalize suicidal ideation with a lethal plan.  He states he would jump off of an overpass.  He complains of numbness and tingling in his bilateral upper extremities which has been happening since his cervical disk and fusion anterior (2018). He has mild dizziness on ambulation. He denies pain and any other problems. His constipation is resolved.  His bowel movement this morning was loose.    -No longer appears intoxicated, speech and attention are within normal limits. Now more than 3 days since last alcohol drink.  -Bilateral upper extremity sensation, motion, circulation and strength remain intact.  Continue Neurontin  -Follows commands  -Legal hold   -referred to Inpatient psychiatric facility  -Hyponatremia is resolved with IV fluids, is likely secondary to dehydration.  -Thrombocytopenia stable, likely related to EtOH use.  -Chronic leukocytopenia, likely related to EtOH use    Therefore, he is " discharged in fair and stable condition to a psychiatric hospital.    The patient met 2-midnight criteria for an inpatient stay at the time of discharge.      FOLLOW UP ITEMS POST DISCHARGE  09/21/20    DISCHARGE DIAGNOSES  Principal Problem (Resolved):    Hyponatremia POA: Yes      Overview: Chronically low.       Stable, doubt due to SIADH from psych meds. Okay with starting these and       monitoring if psychiatry feels it is appropriate. Appreciate input.   Active Problems:    Suicidal ideation POA: Yes    Thrombocytopenia (HCC) POA: Yes    Normocytic anemia POA: Yes    Pancytopenia (HCC) POA: Yes  Resolved Problems:    Problem related to discharge planning POA: Yes    Cervical spine fracture (HCC) POA: Yes      Overview: 2018      Fractures of the C4 and C5 spinous processes       Fracture of the anterior/superior vertebral body and C6 with a small       avulsed fragment. There is also fracture of the right uncinate process.      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 Neurosurgery             2020      Fracture of C7, non-surgical management.    Alcohol intoxication (HCC) POA: Yes      Overview: Monitor for signs withdrawal            FOLLOW UP  With [place] primary care provider  No follow-up provider specified.    MEDICATIONS ON DISCHARGE     Medication List      START taking these medications      Instructions   acetaminophen 325 MG Tabs  Commonly known as: TYLENOL   Take 2 Tabs by mouth every 6 hours as needed (Mild Pain; (Pain scale 1-3); Temp greater than 100.5 F).  Dose: 650 mg        CONTINUE taking these medications      Instructions   amLODIPine 5 MG Tabs  Commonly known as: NORVASC   Take 1 Tab by mouth every day. 1 table once a day  Dose: 5 mg     DULoxetine 60 MG Cpep delayed-release capsule  Commonly known as: CYMBALTA   Take 60 mg by mouth every day.  Dose: 60  mg     gabapentin 400 MG Caps  Commonly known as: NEURONTIN   Take 1 Cap by mouth 3 times a day for 180 days.  Dose: 400 mg     lisinopril 20 MG Tabs  Commonly known as: PRINIVIL   Take 20 mg by mouth every day.  Dose: 20 mg     quetiapine 300 MG tablet  Commonly known as: SEROQUEL   Take 300 mg by mouth every bedtime. FOR SLEEP  Dose: 300 mg        STOP taking these medications    traZODone 50 MG Tabs  Commonly known as: DESYREL            Allergies  No Known Allergies    DIET  Orders Placed This Encounter   Procedures   • Diet Order Regular     Standing Status:   Standing     Number of Occurrences:   1     Order Specific Question:   Diet:     Answer:   Regular [1]       ACTIVITY  As tolerated.  Weight bearing as tolerated    LINES, DRAINS, AND WOUNDS  This is an automated list. Peripheral IVs will be removed prior to discharge.  Peripheral IV 09/19/20 18 G Left Forearm (Active)   Site Assessment Clean;Dry;Intact 09/20/20 2000   Dressing Type Transparent;Occlusive 09/20/20 2000   Line Status Infusing;Scrubbed the hub prior to access;Flushed 09/20/20 2000   Dressing Status Clean;Dry;Intact 09/20/20 2000   Dressing Intervention N/A 09/20/20 2000   Date Primary Tubing Changed 09/19/20 09/20/20 2000   NEXT Primary Tubing Change  09/23/20 09/20/20 2000   Infiltration Grading ([facility]) 0 09/20/20 2000   Phlebitis Scale ([facility] Only) 0 09/20/20 2000       Wound 02/07/20 Other (comment) Coccyx Moisture fissure (Active)       Wound 02/07/20 Laceration Face Face lacerations (Active)       Peripheral IV 09/19/20 18 G Left Forearm (Active)   Site Assessment Clean;Dry;Intact 09/20/20 2000   Dressing Type Transparent;Occlusive 09/20/20 2000   Line Status Infusing;Scrubbed the hub prior to access;Flushed 09/20/20 2000   Dressing Status Clean;Dry;Intact 09/20/20 2000   Dressing Intervention N/A 09/20/20 2000   Date Primary Tubing Changed 09/19/20 09/20/20 2000   NEXT Primary Tubing Change  09/23/20 09/20/20 2000    Infiltration Grading ([facility], [facility]) 0 09/20/20 2000   Phlebitis Scale ([facility] Only) 0 09/20/20 2000               MENTAL STATUS ON TRANSFER  Level of Consciousness: Alert  Orientation : Oriented x 4  Speech: Speech Clear    CONSULTATIONS  psychiatry    PROCEDURES  none    LABORATORY  Lab Results   Component Value Date    SODIUM 139 09/21/2020    POTASSIUM 3.6 09/21/2020    CHLORIDE 107 09/21/2020    CO2 21 09/21/2020    GLUCOSE 68 09/21/2020    BUN 11 09/21/2020    CREATININE 0.64 09/21/2020        Lab Results   Component Value Date    WBC 2.3 (LL) 09/21/2020    HEMOGLOBIN 11.2 (L) 09/21/2020    HEMATOCRIT 33.3 (L) 09/21/2020    PLATELETCT 52 (L) 09/21/2020      Results from last 7 days   Lab Units 09/20/20  0029 09/19/20  0015   MAGNESIUM 561 mg/dL 1.9 1.7     Blood Culture   Date Value Ref Range Status   04/05/2019 No growth after 5 days of incubation.  Final   04/05/2019 No growth after 5 days of incubation.  Final        DX-CHEST-PORTABLE (1 VIEW)  Narrative: 9/19/2020 12:27 AM    HISTORY/REASON FOR EXAM:  Chest Pain    TECHNIQUE/EXAM DESCRIPTION AND NUMBER OF VIEWS:  Single portable view of the chest.    COMPARISON: None    FINDINGS:  The heart, mediastinum, and herminio are unremarkable. There are patchy and curvilinear opacities in the right lung base, and the appearance is somewhat suggestive of air beneath right hemidiaphragm. Left lung is well expanded with no acute process.  Impression: Possible free air beneath right hemidiaphragm, along with patchy and linear opacities in the right lung base suggesting atelectasis. Recommend upright or left lateral decubitus views of the abdomen to exclude free air.    Total time of the discharge process exceeds 34 minutes.    LOS Stone.

## 2020-09-21 NOTE — DISCHARGE PLANNING
Received Transport Form at 1405  Spoke to Jud at [facility]     Transport is scheduled for today at 1800 with [facility] going to [place].

## 2020-10-22 ENCOUNTER — APPOINTMENT (OUTPATIENT)
Dept: RADIOLOGY | Facility: MEDICAL CENTER | Age: 70
DRG: 880 | End: 2020-10-22
Attending: EMERGENCY MEDICINE
Payer: MEDICARE

## 2020-10-22 ENCOUNTER — HOSPITAL ENCOUNTER (INPATIENT)
Facility: MEDICAL CENTER | Age: 70
LOS: 5 days | DRG: 880 | End: 2020-10-28
Attending: EMERGENCY MEDICINE | Admitting: STUDENT IN AN ORGANIZED HEALTH CARE EDUCATION/TRAINING PROGRAM
Payer: MEDICARE

## 2020-10-22 DIAGNOSIS — R07.9 CHEST PAIN, UNSPECIFIED TYPE: ICD-10-CM

## 2020-10-22 LAB
AMPHET UR QL SCN: NEGATIVE
BARBITURATES UR QL SCN: NEGATIVE
BASOPHILS # BLD AUTO: 0.3 % (ref 0–1.8)
BASOPHILS # BLD: 0.01 K/UL (ref 0–0.12)
BENZODIAZ UR QL SCN: NEGATIVE
BZE UR QL SCN: NEGATIVE
CANNABINOIDS UR QL SCN: NEGATIVE
EOSINOPHIL # BLD AUTO: 0.14 K/UL (ref 0–0.51)
EOSINOPHIL NFR BLD: 4.3 % (ref 0–6.9)
ERYTHROCYTE [DISTWIDTH] IN BLOOD BY AUTOMATED COUNT: 45.6 FL (ref 35.9–50)
HCT VFR BLD AUTO: 38.3 % (ref 42–52)
HGB BLD-MCNC: 12.8 G/DL (ref 14–18)
IMM GRANULOCYTES # BLD AUTO: 0.01 K/UL (ref 0–0.11)
IMM GRANULOCYTES NFR BLD AUTO: 0.3 % (ref 0–0.9)
LYMPHOCYTES # BLD AUTO: 0.79 K/UL (ref 1–4.8)
LYMPHOCYTES NFR BLD: 24.2 % (ref 22–41)
MCH RBC QN AUTO: 32.8 PG (ref 27–33)
MCHC RBC AUTO-ENTMCNC: 33.4 G/DL (ref 33.7–35.3)
MCV RBC AUTO: 98.2 FL (ref 81.4–97.8)
METHADONE UR QL SCN: NEGATIVE
MONOCYTES # BLD AUTO: 0.17 K/UL (ref 0–0.85)
MONOCYTES NFR BLD AUTO: 5.2 % (ref 0–13.4)
NEUTROPHILS # BLD AUTO: 2.14 K/UL (ref 1.82–7.42)
NEUTROPHILS NFR BLD: 65.7 % (ref 44–72)
NRBC # BLD AUTO: 0 K/UL
NRBC BLD-RTO: 0 /100 WBC
OPIATES UR QL SCN: NEGATIVE
OXYCODONE UR QL SCN: NEGATIVE
PCP UR QL SCN: NEGATIVE
PLATELET # BLD AUTO: 72 K/UL (ref 164–446)
PMV BLD AUTO: 11.4 FL (ref 9–12.9)
PROPOXYPH UR QL SCN: NEGATIVE
RBC # BLD AUTO: 3.9 M/UL (ref 4.7–6.1)
WBC # BLD AUTO: 3.3 K/UL (ref 4.8–10.8)

## 2020-10-22 PROCEDURE — 93005 ELECTROCARDIOGRAM TRACING: CPT | Performed by: EMERGENCY MEDICINE

## 2020-10-22 PROCEDURE — 85025 COMPLETE CBC W/AUTO DIFF WBC: CPT

## 2020-10-22 PROCEDURE — 36415 COLL VENOUS BLD VENIPUNCTURE: CPT

## 2020-10-22 PROCEDURE — 700102 HCHG RX REV CODE 250 W/ 637 OVERRIDE(OP): Performed by: EMERGENCY MEDICINE

## 2020-10-22 PROCEDURE — 80307 DRUG TEST PRSMV CHEM ANLYZR: CPT

## 2020-10-22 PROCEDURE — 80053 COMPREHEN METABOLIC PANEL: CPT

## 2020-10-22 PROCEDURE — 99285 EMERGENCY DEPT VISIT HI MDM: CPT

## 2020-10-22 PROCEDURE — 71045 X-RAY EXAM CHEST 1 VIEW: CPT

## 2020-10-22 PROCEDURE — A9270 NON-COVERED ITEM OR SERVICE: HCPCS | Performed by: EMERGENCY MEDICINE

## 2020-10-22 PROCEDURE — 83735 ASSAY OF MAGNESIUM: CPT

## 2020-10-22 PROCEDURE — 83690 ASSAY OF LIPASE: CPT

## 2020-10-22 PROCEDURE — 84484 ASSAY OF TROPONIN QUANT: CPT

## 2020-10-22 RX ORDER — ASPIRIN 81 MG/1
324 TABLET, CHEWABLE ORAL ONCE
Status: COMPLETED | OUTPATIENT
Start: 2020-10-22 | End: 2020-10-22

## 2020-10-22 RX ORDER — ASPIRIN 300 MG/1
300 SUPPOSITORY RECTAL ONCE
Status: COMPLETED | OUTPATIENT
Start: 2020-10-22 | End: 2020-10-22

## 2020-10-22 RX ADMIN — ASPIRIN 324 MG: 81 TABLET, CHEWABLE ORAL at 22:33

## 2020-10-22 ASSESSMENT — FIBROSIS 4 INDEX: FIB4 SCORE: 15.29

## 2020-10-23 PROBLEM — F32.9 MAJOR DEPRESSIVE DISORDER: Status: ACTIVE | Noted: 2020-10-23

## 2020-10-23 LAB
ALBUMIN SERPL BCP-MCNC: 4.2 G/DL (ref 3.2–4.9)
ALBUMIN/GLOB SERPL: 1.4 G/DL
ALP SERPL-CCNC: 65 U/L (ref 30–99)
ALT SERPL-CCNC: 28 U/L (ref 2–50)
ANION GAP SERPL CALC-SCNC: 14 MMOL/L (ref 7–16)
AST SERPL-CCNC: 59 U/L (ref 12–45)
BILIRUB SERPL-MCNC: 0.5 MG/DL (ref 0.1–1.5)
BUN SERPL-MCNC: 8 MG/DL (ref 8–22)
CALCIUM SERPL-MCNC: 9.2 MG/DL (ref 8.5–10.5)
CHLORIDE SERPL-SCNC: 97 MMOL/L (ref 96–112)
CO2 SERPL-SCNC: 23 MMOL/L (ref 20–33)
CREAT SERPL-MCNC: 0.73 MG/DL (ref 0.5–1.4)
EKG IMPRESSION: NORMAL
ETHANOL BLD-MCNC: <10.1 MG/DL (ref 0–10)
GLOBULIN SER CALC-MCNC: 3 G/DL (ref 1.9–3.5)
GLUCOSE SERPL-MCNC: 84 MG/DL (ref 65–99)
LIPASE SERPL-CCNC: 25 U/L (ref 11–82)
MAGNESIUM SERPL-MCNC: 1.9 MG/DL (ref 1.5–2.5)
POTASSIUM SERPL-SCNC: 4.2 MMOL/L (ref 3.6–5.5)
PROT SERPL-MCNC: 7.2 G/DL (ref 6–8.2)
SODIUM SERPL-SCNC: 134 MMOL/L (ref 135–145)
TROPONIN T SERPL-MCNC: 13 NG/L (ref 6–19)

## 2020-10-23 PROCEDURE — A9270 NON-COVERED ITEM OR SERVICE: HCPCS | Performed by: HOSPITALIST

## 2020-10-23 PROCEDURE — C9803 HOPD COVID-19 SPEC COLLECT: HCPCS | Performed by: STUDENT IN AN ORGANIZED HEALTH CARE EDUCATION/TRAINING PROGRAM

## 2020-10-23 PROCEDURE — 99223 1ST HOSP IP/OBS HIGH 75: CPT | Mod: AI | Performed by: STUDENT IN AN ORGANIZED HEALTH CARE EDUCATION/TRAINING PROGRAM

## 2020-10-23 PROCEDURE — 700111 HCHG RX REV CODE 636 W/ 250 OVERRIDE (IP): Performed by: STUDENT IN AN ORGANIZED HEALTH CARE EDUCATION/TRAINING PROGRAM

## 2020-10-23 PROCEDURE — 700102 HCHG RX REV CODE 250 W/ 637 OVERRIDE(OP): Performed by: HOSPITALIST

## 2020-10-23 PROCEDURE — 770001 HCHG ROOM/CARE - MED/SURG/GYN PRIV*

## 2020-10-23 PROCEDURE — A9270 NON-COVERED ITEM OR SERVICE: HCPCS | Performed by: STUDENT IN AN ORGANIZED HEALTH CARE EDUCATION/TRAINING PROGRAM

## 2020-10-23 PROCEDURE — 700102 HCHG RX REV CODE 250 W/ 637 OVERRIDE(OP): Performed by: STUDENT IN AN ORGANIZED HEALTH CARE EDUCATION/TRAINING PROGRAM

## 2020-10-23 RX ORDER — GABAPENTIN 400 MG/1
400 CAPSULE ORAL 3 TIMES DAILY
Status: DISCONTINUED | OUTPATIENT
Start: 2020-10-23 | End: 2020-10-28 | Stop reason: HOSPADM

## 2020-10-23 RX ORDER — AMOXICILLIN 250 MG
2 CAPSULE ORAL 2 TIMES DAILY
Status: DISCONTINUED | OUTPATIENT
Start: 2020-10-23 | End: 2020-10-28 | Stop reason: HOSPADM

## 2020-10-23 RX ORDER — DULOXETIN HYDROCHLORIDE 30 MG/1
60 CAPSULE, DELAYED RELEASE ORAL DAILY
Status: DISCONTINUED | OUTPATIENT
Start: 2020-10-23 | End: 2020-10-28 | Stop reason: HOSPADM

## 2020-10-23 RX ORDER — QUETIAPINE FUMARATE 100 MG/1
300 TABLET, FILM COATED ORAL
Status: DISCONTINUED | OUTPATIENT
Start: 2020-10-23 | End: 2020-10-28 | Stop reason: HOSPADM

## 2020-10-23 RX ORDER — POLYETHYLENE GLYCOL 3350 17 G/17G
1 POWDER, FOR SOLUTION ORAL
Status: DISCONTINUED | OUTPATIENT
Start: 2020-10-23 | End: 2020-10-28 | Stop reason: HOSPADM

## 2020-10-23 RX ORDER — LISINOPRIL 20 MG/1
20 TABLET ORAL DAILY
Status: DISCONTINUED | OUTPATIENT
Start: 2020-10-23 | End: 2020-10-28

## 2020-10-23 RX ORDER — BISACODYL 10 MG
10 SUPPOSITORY, RECTAL RECTAL
Status: DISCONTINUED | OUTPATIENT
Start: 2020-10-23 | End: 2020-10-28 | Stop reason: HOSPADM

## 2020-10-23 RX ORDER — ONDANSETRON 4 MG/1
4 TABLET, ORALLY DISINTEGRATING ORAL EVERY 4 HOURS PRN
Status: DISCONTINUED | OUTPATIENT
Start: 2020-10-23 | End: 2020-10-28 | Stop reason: HOSPADM

## 2020-10-23 RX ORDER — ACETAMINOPHEN 325 MG/1
650 TABLET ORAL EVERY 6 HOURS PRN
Status: DISCONTINUED | OUTPATIENT
Start: 2020-10-23 | End: 2020-10-28 | Stop reason: HOSPADM

## 2020-10-23 RX ORDER — AMLODIPINE BESYLATE 5 MG/1
5 TABLET ORAL
Status: DISCONTINUED | OUTPATIENT
Start: 2020-10-23 | End: 2020-10-28

## 2020-10-23 RX ORDER — ONDANSETRON 2 MG/ML
4 INJECTION INTRAMUSCULAR; INTRAVENOUS EVERY 4 HOURS PRN
Status: DISCONTINUED | OUTPATIENT
Start: 2020-10-23 | End: 2020-10-28 | Stop reason: HOSPADM

## 2020-10-23 RX ORDER — FAMOTIDINE 20 MG/1
20 TABLET, FILM COATED ORAL 2 TIMES DAILY
Status: DISCONTINUED | OUTPATIENT
Start: 2020-10-23 | End: 2020-10-28 | Stop reason: HOSPADM

## 2020-10-23 RX ADMIN — GABAPENTIN 400 MG: 400 CAPSULE ORAL at 13:07

## 2020-10-23 RX ADMIN — ENOXAPARIN SODIUM 40 MG: 40 INJECTION SUBCUTANEOUS at 07:43

## 2020-10-23 RX ADMIN — FAMOTIDINE 20 MG: 20 TABLET, FILM COATED ORAL at 13:07

## 2020-10-23 RX ADMIN — LISINOPRIL 20 MG: 20 TABLET ORAL at 07:43

## 2020-10-23 RX ADMIN — DULOXETINE HYDROCHLORIDE 60 MG: 30 CAPSULE, DELAYED RELEASE ORAL at 07:43

## 2020-10-23 RX ADMIN — GABAPENTIN 400 MG: 400 CAPSULE ORAL at 20:44

## 2020-10-23 RX ADMIN — ACETAMINOPHEN 650 MG: 325 TABLET, FILM COATED ORAL at 17:11

## 2020-10-23 RX ADMIN — FAMOTIDINE 20 MG: 20 TABLET, FILM COATED ORAL at 17:11

## 2020-10-23 RX ADMIN — AMLODIPINE BESYLATE 5 MG: 5 TABLET ORAL at 07:42

## 2020-10-23 RX ADMIN — GABAPENTIN 400 MG: 400 CAPSULE ORAL at 07:43

## 2020-10-23 RX ADMIN — QUETIAPINE FUMARATE 300 MG: 100 TABLET ORAL at 20:44

## 2020-10-23 RX ADMIN — ACETAMINOPHEN 650 MG: 325 TABLET, FILM COATED ORAL at 07:43

## 2020-10-23 ASSESSMENT — ENCOUNTER SYMPTOMS
DIARRHEA: 0
NAUSEA: 0
CHILLS: 0
PALPITATIONS: 0
HALLUCINATIONS: 0
DOUBLE VISION: 0
CONSTIPATION: 0
BLURRED VISION: 0
HEADACHES: 0
COUGH: 0
HEARTBURN: 0
SHORTNESS OF BREATH: 0
NECK PAIN: 0
BRUISES/BLEEDS EASILY: 0
BACK PAIN: 0
VOMITING: 0
DIZZINESS: 0
INSOMNIA: 0
FOCAL WEAKNESS: 0
WHEEZING: 0
BLOOD IN STOOL: 0
DEPRESSION: 1
LOSS OF CONSCIOUSNESS: 0
ABDOMINAL PAIN: 0
WEAKNESS: 0
FEVER: 0

## 2020-10-23 ASSESSMENT — FIBROSIS 4 INDEX: FIB4 SCORE: 10.69

## 2020-10-23 ASSESSMENT — PATIENT HEALTH QUESTIONNAIRE - PHQ9
9. THOUGHTS THAT YOU WOULD BE BETTER OFF DEAD, OR OF HURTING YOURSELF: NEARLY EVERY DAY
5. POOR APPETITE OR OVEREATING: SEVERAL DAYS
8. MOVING OR SPEAKING SO SLOWLY THAT OTHER PEOPLE COULD HAVE NOTICED. OR THE OPPOSITE, BEING SO FIGETY OR RESTLESS THAT YOU HAVE BEEN MOVING AROUND A LOT MORE THAN USUAL: SEVERAL DAYS
1. LITTLE INTEREST OR PLEASURE IN DOING THINGS: NEARLY EVERY DAY
3. TROUBLE FALLING OR STAYING ASLEEP OR SLEEPING TOO MUCH: NEARLY EVERY DAY
2. FEELING DOWN, DEPRESSED, IRRITABLE, OR HOPELESS: NEARLY EVERY DAY
SUM OF ALL RESPONSES TO PHQ QUESTIONS 1-9: 23
4. FEELING TIRED OR HAVING LITTLE ENERGY: NEARLY EVERY DAY
6. FEELING BAD ABOUT YOURSELF - OR THAT YOU ARE A FAILURE OR HAVE LET YOURSELF OR YOUR FAMILY DOWN: NEARLY EVERY DAY
SUM OF ALL RESPONSES TO PHQ9 QUESTIONS 1 AND 2: 6
7. TROUBLE CONCENTRATING ON THINGS, SUCH AS READING THE NEWSPAPER OR WATCHING TELEVISION: NEARLY EVERY DAY

## 2020-10-23 NOTE — PROGRESS NOTES
Aox4, RA, VSS, room check completed with sitter, skin intact, pt states he wants to get help because he wants to be dead.

## 2020-10-23 NOTE — PROGRESS NOTES
2RNs skin assessment checked with Rosanna RN,skin intact with dry abrasion to right shin and left ankle and left knee bruise,otherwise skin intact

## 2020-10-23 NOTE — ED NOTES
Pt remains on cardiac monitor, bradycardic, non symptomatic. No needs at this time. 1:1 sitter remains at door.

## 2020-10-23 NOTE — PROGRESS NOTES
Received patient from ER,alert/oriented x4,ambulatory ,patient is on legal hold for SI with a sitter at bedside,patient is sleeping right now,unable to do admit profile .Patient can't have anything in the room such as call light and anything sharp,removed shower curtain.

## 2020-10-23 NOTE — PROGRESS NOTES
[facility]ICER ER REPORT    Consult/Admission requested by: Dr Garcia    Chief complaint: Chest pain  Pertinent history/ER Course: This is a 70-year-old male with hypertension, psychiatric disorder, who earlier had suicidal ideation and thoughts of killing himself by [place], for which he decided to go to [place] voluntarily.  There, he started getting scared then developed substernal chest pain.  [place] then sent him to the ED for medical clearance.  Legal hold was placed.  Vital signs were stable.  Initial blood work-up were unimpressive except for AST of 59, sodium of 134.  Lipase was normal.  Blood alcohol level was low.  Urine drug screen was negative.  Troponin was negative.  EKG showed no dynamic ischemic changes.  Chest x-ray showed nothing acute.  No concern for aortic dissection per the ER physician, and he felt that chest pain was more due to anxiety but wanted to get him into the hospital for further work-up, and psychiatric evaluation.  He was placed on legal hold.    Patient meets admission criterion: Yes..  Recommendations given or work up & consultations requested per triage officer: None  Consultants involved and pertinent input from consultants: None, but will need psychiatry evaluation in the morning.    Admission status: To be reviewed by case management and admitting physician.   Admission order placed: To be placed by admitting physician.   Floor requested: To be determined by admitting physician  Assigned hospitalist: Dr. Diaz

## 2020-10-23 NOTE — ASSESSMENT & PLAN NOTE
Patient reporting active suicide ideation with plan to kill himself by jumping from a bridge or taking a lot of pills  Extended legal hold  Discussed case with psychiatry  Patient is medically clear  Pending discharge to psychiatric facility

## 2020-10-23 NOTE — ED TRIAGE NOTES
"Chief Complaint   Patient presents with   • Chest Pain     substernal tightness, + SOB, tingling in bilat hands. feels similar to previous panic attack   • Headache     Pt BIB [facility] for above complaint on legal hold from [facility]. Pt presented to [facility] for SI, wanted to jump off bridge. Reports severe depression since last family member recently passed away. At [facility], pt was sent here for above medical complaints.  Here for medical clearance.       Labs drawn, EKG complete.     All potentially harmful equipment removed from room ( pt on monitor due to CP), 1:1 sitter at door, pt in hospital gown only, belongings outside of room.       /76   Pulse (!) 50   Temp 36.7 °C (98 °F) (Temporal)   Resp 18   Ht 1.803 m (5' 11\")   Wt 99.8 kg (220 lb)   SpO2 98%   BMI 30.68 kg/m²       "

## 2020-10-23 NOTE — H&P
Hospital Medicine History & Physical Note    Date of Service  10/23/2020    Primary Care Physician  [facility]    Consultants  Psychiatry    Code Status  Full Code    Chief Complaint  Chief Complaint   Patient presents with   • Chest Pain     substernal tightness, + SOB, tingling in bilat hands. feels similar to previous panic attack   • Headache       History of Presenting Illness  70 y.o. male who presented 10/22/2020 from transfer from [place] due to suicidal ideation.  Patient states his wife passed away about 5 years ago and is more depressed as her birthday would have been today.  States that he has been having suicidal ideations the past few days and realizes he needs help for this.  He states he was going to jump off a bridge and realized he needed help from psychiatry and went to ED at [place]. There he complained of substernal chest pain which has now since resolved. Had a work up for chest pain and negative for troponin, EKG changes, negative CXR. Patient states he believes this was all just related to anxiety over SI. He denies any HI/ AH/VH/TH. States he takes cymbalta for depression. Also states he smokes 1/4 ppd and does not want any intervention at this time for quitting. Admits to EtOH use however denies recent use and EtOH levels <10.1 on admission.     On arrival patient had the following vital signs: 98.0, 50, 18, 145/76, 98% on room air.    EKG was performed and revealed sinus bradycardia without ST segment abnormalities or T wave inversions.    Chest x-ray performed and revealed no acute cardiopulmonary disease.    CBC reveals pancytopenia with WBC 3.3, H/H 12.8/38.3 and MCV of 98.2 with platelet count of 72.  Urine drug screen was obtained and was negative.  Troponin T was performed and resulted at 13, lipase of 25, magnesium 1.9 and CMP revealed mild hyponatremia at 134 and slight elevation in AST of 59 otherwise unremarkable.    Patient in agreement for evaluation by  psychiatry in a.m. and will be transferred to medical rich floor for further optimization of medical management.    Review of Systems  Review of Systems   Constitutional: Negative for chills and fever.   HENT: Negative for congestion.    Eyes: Negative for blurred vision and double vision.   Respiratory: Negative for cough, shortness of breath and wheezing.    Cardiovascular: Positive for chest pain. Negative for palpitations.   Gastrointestinal: Negative for abdominal pain, blood in stool, constipation, diarrhea, heartburn, melena, nausea and vomiting.   Genitourinary: Negative for dysuria and frequency.   Musculoskeletal: Negative for back pain and neck pain.   Skin: Negative for itching and rash.   Neurological: Negative for dizziness, focal weakness, loss of consciousness, weakness and headaches.   Endo/Heme/Allergies: Negative for environmental allergies. Does not bruise/bleed easily.   Psychiatric/Behavioral: Positive for depression and suicidal ideas. Negative for hallucinations. The patient does not have insomnia.        Past Medical History   has a past medical history of Arthritis, Gout, Hypertension, and Psychiatric disorder.    Surgical History   has a past surgical history that includes cervical disk and fusion anterior (9/2/2018); other orthopedic surgery; pr dx bone marrow aspirations (Left, 10/2/2019); and pr dx bone marrow biopsies (10/2/2019).     Family History  family history includes Alzheimer's Disease in his father; Cancer in his father and mother; Heart Disease in his brother and sister.     Social History   reports that he has been smoking cigarettes. He has a 2.50 pack-year smoking history. He has never used smokeless tobacco. He reports current alcohol use of about 10.8 - 12.0 oz of alcohol per week. He reports current drug use. Drug: Inhaled.    Allergies  No Known Allergies    Medications  Prior to Admission Medications   Prescriptions Last Dose Informant Patient Reported? Taking?    DULoxetine (CYMBALTA) 60 MG Cap DR Particles delayed-release capsule   Yes No   Sig: Take 60 mg by mouth every day.   acetaminophen (TYLENOL) 325 MG Tab   No No   Sig: Take 2 Tabs by mouth every 6 hours as needed (Mild Pain; (Pain scale 1-3); Temp greater than 100.5 F).   amLODIPine (NORVASC) 5 MG Tab   No No   Sig: Take 1 Tab by mouth every day. 1 table once a day   gabapentin (NEURONTIN) 400 MG Cap   No No   Sig: Take 1 Cap by mouth 3 times a day for 180 days.   lisinopril (PRINIVIL) 20 MG Tab   Yes No   Sig: Take 20 mg by mouth every day.   quetiapine (SEROQUEL) 300 MG tablet   Yes No   Sig: Take 300 mg by mouth every bedtime. FOR SLEEP      Facility-Administered Medications: None       Physical Exam  Temp:  [36.7 °C (98 °F)] 36.7 °C (98 °F)  Pulse:  [48-50] 49  Resp:  [15-18] 16  BP: (138-146)/(75-86) 138/75  SpO2:  [98 %-99 %] 98 %    Physical Exam  Vitals signs reviewed.   Constitutional:       Appearance: Normal appearance. He is normal weight. He is not toxic-appearing or diaphoretic.   HENT:      Head: Normocephalic and atraumatic.      Mouth/Throat:      Mouth: Mucous membranes are moist.      Pharynx: Oropharynx is clear. No oropharyngeal exudate or posterior oropharyngeal erythema.   Eyes:      General: No scleral icterus.     Extraocular Movements: Extraocular movements intact.      Pupils: Pupils are equal, round, and reactive to light.      Comments: Pale conjunctiva   Neck:      Musculoskeletal: Normal range of motion and neck supple. No muscular tenderness.      Vascular: No carotid bruit.   Cardiovascular:      Rate and Rhythm: Regular rhythm. Bradycardia present.      Pulses: Normal pulses.      Heart sounds: Normal heart sounds. No murmur. No friction rub. No gallop.    Pulmonary:      Effort: Pulmonary effort is normal.      Breath sounds: Normal breath sounds. No wheezing, rhonchi or rales.   Abdominal:      General: Bowel sounds are normal. There is no distension.      Palpations: Abdomen is  soft. There is no mass.      Tenderness: There is no abdominal tenderness. There is no guarding or rebound.      Hernia: No hernia is present.   Musculoskeletal: Normal range of motion.      Right lower leg: No edema.      Left lower leg: No edema.   Lymphadenopathy:      Cervical: No cervical adenopathy.   Skin:     General: Skin is warm and dry.      Capillary Refill: Capillary refill takes less than 2 seconds.      Coloration: Skin is not pale.      Findings: No erythema or rash.   Neurological:      General: No focal deficit present.      Mental Status: He is alert and oriented to person, place, and time. Mental status is at baseline.      Cranial Nerves: No cranial nerve deficit.      Sensory: No sensory deficit.      Motor: No weakness.   Psychiatric:      Comments: Anhedonic with abnormal thought processes and good insight of underlying depression.         Laboratory:  Recent Labs     10/22/20  2228   WBC 3.3*   RBC 3.90*   HEMOGLOBIN 12.8*   HEMATOCRIT 38.3*   MCV 98.2*   MCH 32.8   MCHC 33.4*   RDW 45.6   PLATELETCT 72*   MPV 11.4     Recent Labs     10/22/20  2228   SODIUM 134*   POTASSIUM 4.2   CHLORIDE 97   CO2 23   GLUCOSE 84   BUN 8   CREATININE 0.73   CALCIUM 9.2     Recent Labs     10/22/20  2228   ALTSGPT 28   ASTSGOT 59*   ALKPHOSPHAT 65   TBILIRUBIN 0.5   LIPASE 25   GLUCOSE 84         No results for input(s): NTPROBNP in the last 72 hours.      Recent Labs     10/22/20  2228   TROPONINT 13       Imaging:  DX-CHEST-PORTABLE (1 VIEW)   Final Result         1.  No acute cardiopulmonary disease.        I reviewed chest x-ray and agree there is no acute cardiopulmonary disease    I reviewed EKG and agree sinus bradycardia without any ST segment abnormalities or T wave inversions.    Assessment/Plan:  I anticipate this patient is appropriate for observation status at this time.    * Suicidal ideation- (present on admission)  Assessment & Plan  EKG does not reveal any changes associated with intentional  toxicity  One-on-one sitter  Psychiatry to see patient in a.m. for possibility for transfer.    Alcoholism (HCC)- (present on admission)  Assessment & Plan  Denies any recent use within the past 48 hours of EtOH and had a diagnostic alcohol level less than 10.1.    Major depressive disorder- (present on admission)  Assessment & Plan  Continue Cymbalta 60 mg p.o. daily  Continue Seroquel 300 mg p.o. nightly  Psychiatry consulted    Pancytopenia (HCC)- (present on admission)  Assessment & Plan  CBC in a.m.  Chronically has pancytopenia no significant changes    Essential hypertension- (present on admission)  Assessment & Plan  Continue lisinopril 20 mg p.o. daily  Continue Norvasc 5 mg p.o. daily

## 2020-10-23 NOTE — ED PROVIDER NOTES
ED Provider Note    CHIEF COMPLAINT  Chief Complaint   Patient presents with   • Chest Pain     substernal tightness, + SOB, tingling in bilat hands. feels similar to previous panic attack   • Headache       HPI  Chris Leggett is a 70 y.o. male who presents with chest pain.  The patient states that he was going [place] kill himself [place].  He states that he started getting scared then developed substernal chest pain.  He went to a psychiatric facility for help and they sent him here for the chest pain.  He states he continues have some mild chest pressure.  He states he does not have any known history of cardiac disease.  He does have hypertension.  He does not take any anticoagulants.  The patient states the pain is mild at this time.  He states that substernal in location with no radicular pain.  He does not have any associated diaphoresis but has had some slight nausea and dyspnea.  He does not have any pain or swelling to his lower extremities.  The patient continues have suicidal ideations he states he has no family around.    REVIEW OF SYSTEMS  See HPI for further details. All other systems are negative.     PAST MEDICAL HISTORY  Past Medical History:   Diagnosis Date   • Arthritis    • Gout    • Hypertension    • Psychiatric disorder        FAMILY HISTORY  [unfilled]    SOCIAL HISTORY  Social History     Socioeconomic History   • Marital status: Single     Spouse name: Not on file   • Number of children: Not on file   • Years of education: Not on file   • Highest education level: Not on file   Occupational History   • Not on file   Social Needs   • Financial resource strain: Not on file   • Food insecurity     Worry: Not on file     Inability: Not on file   • Transportation needs     Medical: Not on file     Non-medical: Not on file   Tobacco Use   • Smoking status: Current Every Day Smoker     Packs/day: 0.50     Years: 5.00     Pack years: 2.50     Types: Cigarettes     Last attempt  to quit: 2019     Years since quittin.7   • Smokeless tobacco: Never Used   Substance and Sexual Activity   • Alcohol use: Yes     Alcohol/week: 10.8 - 12.0 oz     Types: 18 - 20 Cans of beer per week     Frequency: 4 or more times a week     Drinks per session: 10 or more     Binge frequency: Daily or almost daily   • Drug use: Yes     Types: Inhaled     Comment: marijuana last dose last month   • Sexual activity: Not on file   Lifestyle   • Physical activity     Days per week: Not on file     Minutes per session: Not on file   • Stress: Not on file   Relationships   • Social connections     Talks on phone: Not on file     Gets together: Not on file     Attends L[employer]-day service: Not on file     Active member of club or organization: Not on file     Attends meetings of clubs or organizations: Not on file     Relationship status: Not on file   • Intimate partner violence     Fear of current or ex partner: Not on file     Emotionally abused: Not on file     Physically abused: Not on file     Forced sexual activity: Not on file   Other Topics Concern   • Not on file   Social History Narrative   • Not on file       SURGICAL HISTORY  Past Surgical History:   Procedure Laterality Date   • IL DX BONE MARROW ASPIRATIONS Left 10/2/2019    Procedure: ASPIRATION, BONE MARROW - APARICIO;  Surgeon: Tamera Leos M.D.;  Location: [facility];  Service: Orthopedics   • IL DX BONE MARROW BIOPSIES  10/2/2019    Procedure: BIOPSY, BONE MARROW, USING NEEDLE OR TROCAR;  Surgeon: Tamera Leos M.D.;  Location: ENDOSCOPY [facility];  Service: Orthopedics   • CERVICAL DISK AND FUSION ANTERIOR  2018    Procedure: CERVICAL DISK AND FUSION ANTERIOR C5-C6;  Surgeon: Varghese Wilkins M.D.;  Location: SURGERY [facility];  Service: Neurosurgery   • OTHER ORTHOPEDIC SURGERY      fision with disc removal       CURRENT MEDICATIONS  Home Medications    **Home medications have not yet been reviewed for this  "encounter**         ALLERGIES  No Known Allergies    PHYSICAL EXAM  VITAL SIGNS: Ht 1.803 m (5' 11\")   Wt 99.8 kg (220 lb)   BMI 30.68 kg/m²       Constitutional: Well developed, Well nourished, No acute distress, Non-toxic appearance.   HENT: Normocephalic, Atraumatic, Bilateral external ears normal, Oropharynx moist, No oral exudates, Nose normal.   Eyes: PERRLA, EOMI, Conjunctiva normal, No discharge.   Neck: Normal range of motion, No tenderness, Supple, No stridor.   Lymphatic: No lymphadenopathy noted.   Cardiovascular: Normal heart rate, Normal rhythm, No murmurs, No rubs, No gallops.   Thorax & Lungs: Normal breath sounds, No respiratory distress, No wheezing, No chest tenderness.   Abdomen: Bowel sounds normal, Soft, No tenderness, No masses, No pulsatile masses.   Skin: Warm, Dry, No erythema, No rash.   Back: No tenderness, No CVA tenderness.    Extremities: Intact distal pulses, No edema, No tenderness, No cyanosis, No clubbing.   Neurologic: Alert & oriented x 3, Normal motor function, Normal sensory function, No focal deficits noted.   Psychiatric: Depressed affect with ongoing suicidal ideation    Results for orders placed or performed during the hospital encounter of 10/22/20   CBC w/ Differential   Result Value Ref Range    WBC 3.3 (L) 4.8 - 10.8 K/uL    RBC 3.90 (L) 4.70 - 6.10 M/uL    Hemoglobin 12.8 (L) 14.0 - 18.0 g/dL    Hematocrit 38.3 (L) 42.0 - 52.0 %    MCV 98.2 (H) 81.4 - 97.8 fL    MCH 32.8 27.0 - 33.0 pg    MCHC 33.4 (L) 33.7 - 35.3 g/dL    RDW 45.6 35.9 - 50.0 fL    Platelet Count 72 (L) 164 - 446 K/uL    MPV 11.4 9.0 - 12.9 fL    Neutrophils-Polys 65.70 44.00 - 72.00 %    Lymphocytes 24.20 22.00 - 41.00 %    Monocytes 5.20 0.00 - 13.40 %    Eosinophils 4.30 0.00 - 6.90 %    Basophils 0.30 0.00 - 1.80 %    Immature Granulocytes 0.30 0.00 - 0.90 %    Nucleated RBC 0.00 /100 WBC    Neutrophils (Absolute) 2.14 1.82 - 7.42 K/uL    Lymphs (Absolute) 0.79 (L) 1.00 - 4.80 K/uL    Monos " (Absolute) 0.17 0.00 - 0.85 K/uL    Eos (Absolute) 0.14 0.00 - 0.51 K/uL    Baso (Absolute) 0.01 0.00 - 0.12 K/uL    Immature Granulocytes (abs) 0.01 0.00 - 0.11 K/uL    NRBC (Absolute) 0.00 K/uL   Complete Metabolic Panel (CMP)   Result Value Ref Range    Sodium 134 (L) 135 - 145 mmol/L    Potassium 4.2 3.6 - 5.5 mmol/L    Chloride 97 96 - 112 mmol/L    Co2 23 20 - 33 mmol/L    Anion Gap 14.0 7.0 - 16.0    Glucose 84 65 - 99 mg/dL    Bun 8 8 - 22 mg/dL    Creatinine 0.73 0.50 - 1.40 mg/dL    Calcium 9.2 8.5 - 10.5 mg/dL    AST(SGOT) 59 (H) 12 - 45 U/L    ALT(SGPT) 28 2 - 50 U/L    Alkaline Phosphatase 65 30 - 99 U/L    Total Bilirubin 0.5 0.1 - 1.5 mg/dL    Albumin 4.2 3.2 - 4.9 g/dL    Total Protein 7.2 6.0 - 8.2 g/dL    Globulin 3.0 1.9 - 3.5 g/dL    A-G Ratio 1.4 g/dL   Troponin STAT   Result Value Ref Range    Troponin T 13 6 - 19 ng/L   Lipase   Result Value Ref Range    Lipase 25 11 - 82 U/L   Urine Drug Screen   Result Value Ref Range    Amphetamines Urine Negative Negative    Barbiturates Negative Negative    Benzodiazepines Negative Negative    Cocaine Metabolite Negative Negative    Methadone Negative Negative    Opiates Negative Negative    Oxycodone Negative Negative    Phencyclidine -Pcp Negative Negative    Propoxyphene Negative Negative    Cannabinoid Metab Negative Negative   DIAGNOSTIC ALCOHOL   Result Value Ref Range    Diagnostic Alcohol <10.1 0.0 - 10.0 mg/dL   ESTIMATED GFR   Result Value Ref Range    GFR If African American >60 >60 mL/min/1.73 m 2    GFR If Non African American >60 >60 mL/min/1.73 m 2   EKG   Result Value Ref Range    Report       [facility].    Test Date:  2020-10-22  Pt Name:    BRANDY HUTTON                 Department: ER  MRN:        [id number]                      Room:       [facility]  Gender:     Male                         Technician: [id number]  :        1950                   Requested By:PINO AKHTAR  Order #:    [id number]                     Reading MD: KRISH GARCIA MD    Measurements  Intervals                                Axis  Rate:       49                           P:          49  ME:         180                          QRS:        52  QRSD:       82                           T:          53  QT:         472  QTc:        427    Interpretive Statements  Twelve-lead EKG shows a sinus bradycardia with a ventricular to 49, normal  QRS,  normal intervals, no ST segment elevation or depression, normal T waves.  Electronically Signed On 10- 0:35:35 PDT by KRISH GARCIA MD         RADIOLOGY/PROCEDURES  DX-CHEST-PORTABLE (1 VIEW)   Final Result         1.  No acute cardiopulmonary disease.            COURSE & MEDICAL DECISION MAKING  Pertinent Labs & Imaging studies reviewed. (See chart for details)  This a 70-year-old gentleman who presents the emerge department with chest pain.  This did start when the patient was considering suicide by jumping [place]  Therefore I suspect this is going to be from anxiety and stress.  However based on his age as well as his cardiac risk factors he does need to be ruled out from a cardiac standpoint before he can be safely treated at a psychiatric facility.  Therefore admit the patient to the hospital for further cardiac rule out.  The patient will be placed on a legal hold for suicidal thoughts and plan.  As for other sources of his chest pain.  This could be esophagitis.  Chest x-ray does not show any evidence of a pulmonary source.  The patient's abdomen is benign therefore referred pain from cholecystitis and pancreatitis would be unlikely.    FINAL IMPRESSION  1.  Chest pain  2.  Suicidal ideation    Disposition  The patient will be admitted in stable condition         Electronically signed by: Krish Garcia M.D., 10/22/2020 10:21 PM

## 2020-10-23 NOTE — ASSESSMENT & PLAN NOTE
Chronic, likely secondary to alcohol abuse  Check Vit b12, folate, TSH within normal limits  Monitor CBC

## 2020-10-23 NOTE — PROGRESS NOTES
Please see H&P from this morning by Dr Diaz  Patient with chest pain and suicidal thought  Psych eval pending  Trop neg x 2, echo pending  Added famotidine bid.  Continue monitoring.

## 2020-10-24 ENCOUNTER — APPOINTMENT (OUTPATIENT)
Dept: CARDIOLOGY | Facility: MEDICAL CENTER | Age: 70
DRG: 880 | End: 2020-10-24
Attending: HOSPITALIST
Payer: MEDICARE

## 2020-10-24 PROBLEM — F10.939 ALCOHOL WITHDRAWAL (HCC): Status: ACTIVE | Noted: 2020-10-24

## 2020-10-24 LAB — LV EJECT FRACT  99904: 70

## 2020-10-24 PROCEDURE — 700102 HCHG RX REV CODE 250 W/ 637 OVERRIDE(OP): Performed by: STUDENT IN AN ORGANIZED HEALTH CARE EDUCATION/TRAINING PROGRAM

## 2020-10-24 PROCEDURE — 99233 SBSQ HOSP IP/OBS HIGH 50: CPT | Performed by: INTERNAL MEDICINE

## 2020-10-24 PROCEDURE — 770001 HCHG ROOM/CARE - MED/SURG/GYN PRIV*

## 2020-10-24 PROCEDURE — 700102 HCHG RX REV CODE 250 W/ 637 OVERRIDE(OP): Performed by: INTERNAL MEDICINE

## 2020-10-24 PROCEDURE — A9270 NON-COVERED ITEM OR SERVICE: HCPCS | Performed by: INTERNAL MEDICINE

## 2020-10-24 PROCEDURE — A9270 NON-COVERED ITEM OR SERVICE: HCPCS | Performed by: STUDENT IN AN ORGANIZED HEALTH CARE EDUCATION/TRAINING PROGRAM

## 2020-10-24 PROCEDURE — 700117 HCHG RX CONTRAST REV CODE 255: Performed by: HOSPITALIST

## 2020-10-24 PROCEDURE — 99223 1ST HOSP IP/OBS HIGH 75: CPT | Performed by: PSYCHIATRY & NEUROLOGY

## 2020-10-24 PROCEDURE — HZ2ZZZZ DETOXIFICATION SERVICES FOR SUBSTANCE ABUSE TREATMENT: ICD-10-PCS | Performed by: INTERNAL MEDICINE

## 2020-10-24 PROCEDURE — 700111 HCHG RX REV CODE 636 W/ 250 OVERRIDE (IP): Performed by: STUDENT IN AN ORGANIZED HEALTH CARE EDUCATION/TRAINING PROGRAM

## 2020-10-24 PROCEDURE — 93306 TTE W/DOPPLER COMPLETE: CPT | Mod: 26 | Performed by: INTERNAL MEDICINE

## 2020-10-24 PROCEDURE — 93306 TTE W/DOPPLER COMPLETE: CPT

## 2020-10-24 PROCEDURE — 700101 HCHG RX REV CODE 250: Performed by: INTERNAL MEDICINE

## 2020-10-24 PROCEDURE — 700102 HCHG RX REV CODE 250 W/ 637 OVERRIDE(OP): Performed by: HOSPITALIST

## 2020-10-24 PROCEDURE — A9270 NON-COVERED ITEM OR SERVICE: HCPCS | Performed by: HOSPITALIST

## 2020-10-24 RX ORDER — LORAZEPAM 0.5 MG/1
0.5 TABLET ORAL EVERY 4 HOURS PRN
Status: DISCONTINUED | OUTPATIENT
Start: 2020-10-24 | End: 2020-10-27

## 2020-10-24 RX ORDER — LORAZEPAM 1 MG/1
1 TABLET ORAL EVERY 4 HOURS PRN
Status: DISCONTINUED | OUTPATIENT
Start: 2020-10-24 | End: 2020-10-27

## 2020-10-24 RX ORDER — LORAZEPAM 1 MG/1
2 TABLET ORAL
Status: DISCONTINUED | OUTPATIENT
Start: 2020-10-24 | End: 2020-10-27

## 2020-10-24 RX ORDER — LORAZEPAM 1 MG/1
3 TABLET ORAL
Status: DISCONTINUED | OUTPATIENT
Start: 2020-10-24 | End: 2020-10-27

## 2020-10-24 RX ORDER — THIAMINE MONONITRATE (VIT B1) 100 MG
100 TABLET ORAL DAILY
Status: COMPLETED | OUTPATIENT
Start: 2020-10-25 | End: 2020-10-28

## 2020-10-24 RX ORDER — LORAZEPAM 2 MG/ML
1.5 INJECTION INTRAMUSCULAR
Status: DISCONTINUED | OUTPATIENT
Start: 2020-10-24 | End: 2020-10-27

## 2020-10-24 RX ORDER — LORAZEPAM 2 MG/ML
0.5 INJECTION INTRAMUSCULAR EVERY 4 HOURS PRN
Status: DISCONTINUED | OUTPATIENT
Start: 2020-10-24 | End: 2020-10-27

## 2020-10-24 RX ORDER — LORAZEPAM 1 MG/1
4 TABLET ORAL
Status: DISCONTINUED | OUTPATIENT
Start: 2020-10-24 | End: 2020-10-27

## 2020-10-24 RX ORDER — LORAZEPAM 2 MG/ML
1 INJECTION INTRAMUSCULAR
Status: DISCONTINUED | OUTPATIENT
Start: 2020-10-24 | End: 2020-10-27

## 2020-10-24 RX ORDER — LORAZEPAM 2 MG/ML
2 INJECTION INTRAMUSCULAR
Status: DISCONTINUED | OUTPATIENT
Start: 2020-10-24 | End: 2020-10-27

## 2020-10-24 RX ORDER — FOLIC ACID 1 MG/1
1 TABLET ORAL DAILY
Status: COMPLETED | OUTPATIENT
Start: 2020-10-25 | End: 2020-10-28

## 2020-10-24 RX ADMIN — GABAPENTIN 400 MG: 400 CAPSULE ORAL at 14:09

## 2020-10-24 RX ADMIN — GABAPENTIN 400 MG: 400 CAPSULE ORAL at 05:44

## 2020-10-24 RX ADMIN — LORAZEPAM 3 MG: 1 TABLET ORAL at 08:29

## 2020-10-24 RX ADMIN — DULOXETINE HYDROCHLORIDE 60 MG: 30 CAPSULE, DELAYED RELEASE ORAL at 05:43

## 2020-10-24 RX ADMIN — ENOXAPARIN SODIUM 40 MG: 40 INJECTION SUBCUTANEOUS at 05:46

## 2020-10-24 RX ADMIN — FAMOTIDINE 20 MG: 20 TABLET, FILM COATED ORAL at 05:44

## 2020-10-24 RX ADMIN — AMLODIPINE BESYLATE 5 MG: 5 TABLET ORAL at 05:43

## 2020-10-24 RX ADMIN — LORAZEPAM 1 MG: 1 TABLET ORAL at 18:30

## 2020-10-24 RX ADMIN — LORAZEPAM 2 MG: 1 TABLET ORAL at 15:37

## 2020-10-24 RX ADMIN — FAMOTIDINE 20 MG: 20 TABLET, FILM COATED ORAL at 17:16

## 2020-10-24 RX ADMIN — DOCUSATE SODIUM 50 MG AND SENNOSIDES 8.6 MG 2 TABLET: 8.6; 5 TABLET, FILM COATED ORAL at 05:44

## 2020-10-24 RX ADMIN — LORAZEPAM 0.5 MG: 0.5 TABLET ORAL at 22:43

## 2020-10-24 RX ADMIN — ACETAMINOPHEN 650 MG: 325 TABLET, FILM COATED ORAL at 20:05

## 2020-10-24 RX ADMIN — LORAZEPAM 2 MG: 1 TABLET ORAL at 12:22

## 2020-10-24 RX ADMIN — GABAPENTIN 400 MG: 400 CAPSULE ORAL at 20:05

## 2020-10-24 RX ADMIN — LORAZEPAM 1 MG: 1 TABLET ORAL at 10:01

## 2020-10-24 RX ADMIN — HUMAN ALBUMIN MICROSPHERES AND PERFLUTREN 3 ML: 10; .22 INJECTION, SOLUTION INTRAVENOUS at 10:54

## 2020-10-24 RX ADMIN — QUETIAPINE FUMARATE 300 MG: 100 TABLET ORAL at 20:05

## 2020-10-24 RX ADMIN — THIAMINE HYDROCHLORIDE: 100 INJECTION, SOLUTION INTRAMUSCULAR; INTRAVENOUS at 14:05

## 2020-10-24 RX ADMIN — LISINOPRIL 20 MG: 20 TABLET ORAL at 05:44

## 2020-10-24 ASSESSMENT — ENCOUNTER SYMPTOMS
DIAPHORESIS: 0
FEVER: 0
BACK PAIN: 0
RESPIRATORY NEGATIVE: 1
DIARRHEA: 0
CARDIOVASCULAR NEGATIVE: 1
SPUTUM PRODUCTION: 0
CHILLS: 0
COUGH: 0
PALPITATIONS: 0
BLOOD IN STOOL: 0
NEUROLOGICAL NEGATIVE: 1
SEIZURES: 0
MUSCULOSKELETAL NEGATIVE: 1
SORE THROAT: 0
TREMORS: 1
MYALGIAS: 0
GASTROINTESTINAL NEGATIVE: 1
NAUSEA: 0
VOMITING: 0
BLURRED VISION: 0
SHORTNESS OF BREATH: 0
ABDOMINAL PAIN: 0
FOCAL WEAKNESS: 0
DEPRESSION: 1
NECK PAIN: 0
HEADACHES: 0
CONSTITUTIONAL NEGATIVE: 1
NERVOUS/ANXIOUS: 1
DIZZINESS: 0
EYES NEGATIVE: 1
WHEEZING: 0
FLANK PAIN: 0
BRUISES/BLEEDS EASILY: 0

## 2020-10-24 ASSESSMENT — LIFESTYLE VARIABLES
ANXIETY: *
VISUAL DISTURBANCES: MILD SENSITIVITY
NAUSEA AND VOMITING: NO NAUSEA AND NO VOMITING
AGITATION: NORMAL ACTIVITY
ALCOHOL_USE: YES
PAROXYSMAL SWEATS: NO SWEAT VISIBLE
HAVE PEOPLE ANNOYED YOU BY CRITICIZING YOUR DRINKING: YES
AUDITORY DISTURBANCES: NOT PRESENT
TREMOR: TREMOR NOT VISIBLE BUT CAN BE FELT, FINGERTIP TO FINGERTIP
PAROXYSMAL SWEATS: NO SWEAT VISIBLE
PAROXYSMAL SWEATS: NO SWEAT VISIBLE
AGITATION: NORMAL ACTIVITY
TREMOR: TREMOR NOT VISIBLE BUT CAN BE FELT, FINGERTIP TO FINGERTIP
DOES PATIENT WANT TO TALK TO SOMEONE ABOUT QUITTING: YES
TOTAL SCORE: 4
TOTAL SCORE: 4
ORIENTATION AND CLOUDING OF SENSORIUM: ORIENTED AND CAN DO SERIAL ADDITIONS
ANXIETY: *
HEADACHE, FULLNESS IN HEAD: NOT PRESENT
AUDITORY DISTURBANCES: MILD HARSHNESS OR ABILITY TO FRIGHTEN
NAUSEA AND VOMITING: NO NAUSEA AND NO VOMITING
NAUSEA AND VOMITING: NO NAUSEA AND NO VOMITING
VISUAL DISTURBANCES: MODERATE SENSITIVITY
ORIENTATION AND CLOUDING OF SENSORIUM: ORIENTED AND CAN DO SERIAL ADDITIONS
NAUSEA AND VOMITING: NO NAUSEA AND NO VOMITING
TOTAL SCORE: 10
TREMOR: *
ORIENTATION AND CLOUDING OF SENSORIUM: ORIENTED AND CAN DO SERIAL ADDITIONS
AUDITORY DISTURBANCES: MODERATE HARSHNESS OR ABILITY TO FRIGHTEN
TOTAL SCORE: 6
HEADACHE, FULLNESS IN HEAD: VERY MILD
CONSUMPTION TOTAL: POSITIVE
ORIENTATION AND CLOUDING OF SENSORIUM: ORIENTED AND CAN DO SERIAL ADDITIONS
VISUAL DISTURBANCES: VERY MILD SENSITIVITY
HAVE YOU EVER FELT YOU SHOULD CUT DOWN ON YOUR DRINKING: YES
HEADACHE, FULLNESS IN HEAD: MODERATE
PAROXYSMAL SWEATS: BARELY PERCEPTIBLE SWEATING. PALMS MOIST
TOTAL SCORE: 9
AUDITORY DISTURBANCES: VERY MILD HARSHNESS OR ABILITY TO FRIGHTEN
EVER FELT BAD OR GUILTY ABOUT YOUR DRINKING: YES
PAROXYSMAL SWEATS: BARELY PERCEPTIBLE SWEATING. PALMS MOIST
AUDITORY DISTURBANCES: NOT PRESENT
ANXIETY: *
TREMOR: NO TREMOR
ANXIETY: *
AUDITORY DISTURBANCES: VERY MILD HARSHNESS OR ABILITY TO FRIGHTEN
AGITATION: SOMEWHAT MORE THAN NORMAL ACTIVITY
AGITATION: NORMAL ACTIVITY
AVERAGE NUMBER OF DAYS PER WEEK YOU HAVE A DRINK CONTAINING ALCOHOL: 7
AGITATION: SOMEWHAT MORE THAN NORMAL ACTIVITY
NAUSEA AND VOMITING: NO NAUSEA AND NO VOMITING
NAUSEA AND VOMITING: NO NAUSEA AND NO VOMITING
ON A TYPICAL DAY WHEN YOU DRINK ALCOHOL HOW MANY DRINKS DO YOU HAVE: 12
HEADACHE, FULLNESS IN HEAD: NOT PRESENT
VISUAL DISTURBANCES: NOT PRESENT
PAROXYSMAL SWEATS: NO SWEAT VISIBLE
AGITATION: MODERATELY FIDGETY AND RESTLESS
ANXIETY: *
TOTAL SCORE: 17
TREMOR: *
VISUAL DISTURBANCES: MODERATE SENSITIVITY
HOW MANY TIMES IN THE PAST YEAR HAVE YOU HAD 5 OR MORE DRINKS IN A DAY: 20
TREMOR: MODERATE TREMOR WITH ARMS EXTENDED
HEADACHE, FULLNESS IN HEAD: NOT PRESENT
ORIENTATION AND CLOUDING OF SENSORIUM: ORIENTED AND CAN DO SERIAL ADDITIONS
ORIENTATION AND CLOUDING OF SENSORIUM: ORIENTED AND CAN DO SERIAL ADDITIONS
DOES PATIENT WANT TO STOP DRINKING: YES
EVER HAD A DRINK FIRST THING IN THE MORNING TO STEADY YOUR NERVES TO GET RID OF A HANGOVER: YES
ANXIETY: *
TOTAL SCORE: 12
SUBSTANCE_ABUSE: 1
VISUAL DISTURBANCES: NOT PRESENT
TOTAL SCORE: 13
HEADACHE, FULLNESS IN HEAD: NOT PRESENT
TOTAL SCORE: 4

## 2020-10-24 ASSESSMENT — COGNITIVE AND FUNCTIONAL STATUS - GENERAL
MOBILITY SCORE: 24
SUGGESTED CMS G CODE MODIFIER DAILY ACTIVITY: CH
SUGGESTED CMS G CODE MODIFIER MOBILITY: CH
DAILY ACTIVITIY SCORE: 24

## 2020-10-24 ASSESSMENT — PAIN DESCRIPTION - PAIN TYPE: TYPE: ACUTE PAIN

## 2020-10-24 NOTE — PROGRESS NOTES
Hospital Medicine Daily Progress Note    Date of Service  10/24/2020    Chief Complaint  70 y.o. male admitted 10/22/2020 with chest pain and suicidal ideation    Hospital Course          Interval Problem Update  Patient developed symptoms of alcohol withdrawal and has been started on CIWA protocol.  He denies any active chest pain.  He continues to note suicidal ideation, awaiting psych input    Consultants/Specialty  psychiatry    Code Status  Full Code    Disposition  TBD    Review of Systems  Review of Systems   Constitutional: Negative for chills, diaphoresis and fever.   HENT: Negative for hearing loss and sore throat.    Eyes: Negative for blurred vision.   Respiratory: Negative for cough, sputum production, shortness of breath and wheezing.    Cardiovascular: Negative for chest pain, palpitations and leg swelling.   Gastrointestinal: Negative for abdominal pain, blood in stool, diarrhea, nausea and vomiting.   Genitourinary: Negative for dysuria, flank pain and urgency.   Musculoskeletal: Negative for back pain, joint pain, myalgias and neck pain.   Skin: Negative for rash.   Neurological: Positive for tremors. Negative for dizziness, focal weakness, seizures and headaches.   Endo/Heme/Allergies: Does not bruise/bleed easily.   Psychiatric/Behavioral: Positive for depression and suicidal ideas. The patient is nervous/anxious.    All other systems reviewed and are negative.       Physical Exam  Temp:  [36.3 °C (97.3 °F)-36.3 °C (97.4 °F)] 36.3 °C (97.3 °F)  Pulse:  [53-57] 53  Resp:  [16-18] 16  BP: (113-134)/(80-84) 123/81  SpO2:  [93 %-98 %] 96 %    Physical Exam  Vitals signs and nursing note reviewed.   Constitutional:       General: He is not in acute distress.     Appearance: Normal appearance.   HENT:      Head: Normocephalic and atraumatic.      Nose: Nose normal.      Mouth/Throat:      Mouth: Mucous membranes are moist.   Eyes:      Extraocular Movements: Extraocular movements intact.       Conjunctiva/sclera: Conjunctivae normal.      Pupils: Pupils are equal, round, and reactive to light.   Neck:      Musculoskeletal: Normal range of motion and neck supple.   Cardiovascular:      Rate and Rhythm: Normal rate and regular rhythm.      Pulses: Normal pulses.      Heart sounds: Normal heart sounds.   Pulmonary:      Effort: Pulmonary effort is normal. No respiratory distress.      Breath sounds: Normal breath sounds. No wheezing, rhonchi or rales.   Abdominal:      General: Bowel sounds are normal. There is no distension.      Palpations: Abdomen is soft.      Tenderness: There is no abdominal tenderness.   Musculoskeletal: Normal range of motion.         General: No swelling or tenderness.   Lymphadenopathy:      Cervical: No cervical adenopathy.   Skin:     General: Skin is warm.      Coloration: Skin is not jaundiced.      Findings: No rash.   Neurological:      General: No focal deficit present.      Mental Status: He is alert and oriented to person, place, and time.      Cranial Nerves: No cranial nerve deficit.      Motor: No weakness.      Comments: Tremulous   Psychiatric:         Mood and Affect: Mood normal.         Behavior: Behavior normal.         Thought Content: Thought content includes suicidal ideation.         Fluids    Intake/Output Summary (Last 24 hours) at 10/24/2020 1212  Last data filed at 10/23/2020 2000  Gross per 24 hour   Intake 880 ml   Output --   Net 880 ml       Laboratory  Recent Labs     10/22/20  2228   WBC 3.3*   RBC 3.90*   HEMOGLOBIN 12.8*   HEMATOCRIT 38.3*   MCV 98.2*   MCH 32.8   MCHC 33.4*   RDW 45.6   PLATELETCT 72*   MPV 11.4     Recent Labs     10/22/20  2228   SODIUM 134*   POTASSIUM 4.2   CHLORIDE 97   CO2 23   GLUCOSE 84   BUN 8   CREATININE 0.73   CALCIUM 9.2                   Imaging  EC-ECHOCARDIOGRAM COMPLETE W/ CONT         DX-CHEST-PORTABLE (1 VIEW)   Final Result         1.  No acute cardiopulmonary disease.           Assessment/Plan  * Suicidal  ideation- (present on admission)  Assessment & Plan  EKG does not reveal any changes associated with intentional toxicity  One-on-one sitter  Psychiatry to see patient in a.m. for possibility for transfer.    Alcohol withdrawal (HCC)- (present on admission)  Assessment & Plan  Patient will be admitted to the telemetry unit with close cardiac monitoring on CIWA protocol  Started on rally bag, multivitamin, thiamine and folate  Replete electrolytes  Patient has been counseled on alcohol cessation and will be provided with information for outpatient detox facilities      Alcoholism (HCC)- (present on admission)  Assessment & Plan  Counseled on cessation    Major depressive disorder- (present on admission)  Assessment & Plan  Continue Cymbalta 60 mg p.o. daily  Continue Seroquel 300 mg p.o. nightly  Psychiatry consulted    Pancytopenia (HCC)- (present on admission)  Assessment & Plan  CBC in a.m.  Chronically has pancytopenia no significant changes    Essential hypertension- (present on admission)  Assessment & Plan  Continue lisinopril 20 mg p.o. daily  Continue Norvasc 5 mg p.o. daily       VTE prophylaxis: Lovenox

## 2020-10-24 NOTE — CARE PLAN
Problem: Safety  Goal: Will remain free from injury  Outcome: PROGRESSING AS EXPECTED   PT has not sustained injury on shift, safety/self harm precautions in place.     Problem: Pain Management  Goal: Pain level will decrease to patient's comfort goal  Outcome: PROGRESSING AS EXPECTED   Pt has minimal pain, denied intervention.

## 2020-10-24 NOTE — ASSESSMENT & PLAN NOTE
Completed detox while inpatient and CIWA protocol  Need for Ativan at current moment  Patient has been counseled on alcohol cessation and will be provided with information for outpatient detox facilities

## 2020-10-24 NOTE — CONSULTS
"PSYCHIATRIC INTAKE EVALUATION    *Reason for admission:        substernal tightness, + SOB, tingling in bilat hands. feels similar to previous panic attack.  The patient states that he was going to kill himself by jumping [place] free[place].             *Reason for consult:  suicidal      *Requesting Physician/APN:   REYES Kay MD         Legal Hold status:  on legal hold         *Chief Complaint:: suicidal       *HPI (includes Psychiatric ROS):   71 yo male, alcoholic and chronic SI who was staying at [facility] and finished his 7 days and is now homeless. He say he was staying at [facility] (unverified) and sent here for medical clearance and they told him they would take him back. Pt will get paid on November 3 and plans on getting a ticket to [place] to see if any family there would be willing to take him in.     Depression: chronic, and chronic SI as a result of the consequences of his drinking: homelessness, isolation, etc. He sleeps well with seroquel 300 mg hs and  cymbalta 60 mg hs has been helping. Nevertheless he is still SI. Says he almost jumped [place] free[place] but got to thinking \"what if I end up paralyzed instead\" and didn't.     Anxiety: related to his environmental circumstances.    Lela: none  Psychosis: none  Other: this pt is very well known to psych. The reader is referred to past evaluations for more details.    *Medical Review Of Symptoms (not dx conditions):   Review of Systems   Constitutional: Negative.    HENT: Negative.    Eyes: Negative.    Respiratory: Negative.    Cardiovascular: Negative.    Gastrointestinal: Negative.    Genitourinary: Negative.    Musculoskeletal: Negative.    Skin: Negative.    Neurological: Negative.    Psychiatric/Behavioral: Positive for depression, substance abuse and suicidal ideas.       *Psychiatric Examination:   Vitals:   Vitals:    10/24/20 0541 10/24/20 0829 10/24/20 0838 10/24/20 1000   BP: 134/80  123/81    Pulse: (!) 54  (!) 53    Resp:  18 16  "   Temp:   36.3 °C (97.3 °F)    TempSrc:   Temporal    SpO2: 93% 98% 94% 96%   Weight:       Height:         General Appearance:  normal habitus, intermittent eye contact, cooperative and scars  Abnormal Movements: none noted  Gait and Posture: not observed  Speech: soft  Thought Process: slow rate  Associations:   linear  Abnormal or Psychotic Thoughts: none  Judgement and Insight: limited  Orientation: grossly intact  Recent and Remote Memory: grossly intact  Attention Span and Concentration: intact  Language:fluent  Fund of Knowledge: not tested  Mood and Affect: depressed  SI/HI:  suicidal - yes but at the same time did not jump because he might get hurt. He also says his mood is better with cymbalta but is SI. Pt has chronic SI and it is usually related to whether or not he has a place to stay. Not HI.  MMSE score and/or clock drawing:not applicable    *PAST MEDICAL/PSYCH/FAMILY/SOCIAL(as reported by patient):       *medical hx:           Past Medical History:   Diagnosis Date   • Arthritis    • Gout    • Hypertension    • Psychiatric disorder      Past Surgical History:   Procedure Laterality Date   • VT DX BONE MARROW ASPIRATIONS Left 10/2/2019    Procedure: ASPIRATION, BONE MARROW - APARICIO;  Surgeon: Tamera Leos M.D.;  Location: [facility];  Service: Orthopedics   • VT DX BONE MARROW BIOPSIES  10/2/2019    Procedure: BIOPSY, BONE MARROW, USING NEEDLE OR TROCAR;  Surgeon: Tamera Leos M.D.;  Location: ENDOSCOPY [facility];  Service: Orthopedics   • CERVICAL DISK AND FUSION ANTERIOR  9/2/2018    Procedure: CERVICAL DISK AND FUSION ANTERIOR C5-C6;  Surgeon: Varghese Wilkins M.D.;  Location: SURGERY [facility];  Service: Neurosurgery   • OTHER ORTHOPEDIC SURGERY      fision with disc removal        *psychiatric hx:   SAs:overdosed but many threats without acts  Guns:does not have any  Hospitalizations:multiple  Med Hx:as noted     *family Psych hx: reviewed     *social hx: homeless pt  always has plans to go to [place] and might this time. Had a nephew who told him he could come stay with him as long as he was sober x a few years. The nephew  in a MVA 2 months ago. Pt believes that other family members in [place] might allow him to stay and plans to return on Nov 3.   Alcohol: vague, saying drinks for a while then stops. reports being sober for 1 year a few years ago.  Drugs:THC     *MEDICAL HX: labs, MARS, medications, etc were reviewed. Only those findings of potential interest to psychiatry are noted below:    *Current Medical issues:   see below     *Allergies:  No Known Allergies   *Current Medications:    Current Facility-Administered Medications:   •  influenza Vac High-Dose Quad (Fluzone) injection 0.7 mL, 0.7 mL, Intramuscular, Once PRN, Yariel Troy M.D.  •  LORazepam (ATIVAN) tablet 0.5 mg, 0.5 mg, Oral, Q4HRS PRN, Apolinar Lyles M.D.  •  LORazepam (ATIVAN) tablet 1 mg, 1 mg, Oral, Q4HRS PRN, 1 mg at 10/24/20 1001 **OR** LORazepam (ATIVAN) injection 0.5 mg, 0.5 mg, Intravenous, Q4HRS PRN, Apolinar Lyles M.D.  •  LORazepam (ATIVAN) tablet 2 mg, 2 mg, Oral, Q2HRS PRN, 2 mg at 10/24/20 1222 **OR** LORazepam (ATIVAN) injection 1 mg, 1 mg, Intravenous, Q2HRS PRN, Apolinar Lyles M.D.  •  LORazepam (ATIVAN) tablet 3 mg, 3 mg, Oral, Q HOUR PRN, 3 mg at 10/24/20 0829 **OR** LORazepam (ATIVAN) injection 1.5 mg, 1.5 mg, Intravenous, Q HOUR PRN, Apolinar Lyles M.D.  •  LORazepam (ATIVAN) tablet 4 mg, 4 mg, Oral, Q15 MIN PRN **OR** LORazepam (ATIVAN) injection 2 mg, 2 mg, Intravenous, Q15 MIN PRN, Apolinar Lyles M.D.  •  detox IV 1000 mL (D5LR + magnesium 1 g + thiamine 100 mg + folic acid 1 mg) infusion, , Intravenous, Once **AND** [START ON 10/25/2020] thiamine (vitamin B-1) tablet 100 mg, 100 mg, Oral, DAILY **AND** [START ON 10/25/2020] multivitamin (THERAGRAN) tablet 1 Tab, 1 Tab, Oral, DAILY **AND** [START ON 10/25/2020] folic acid (FOLVITE) tablet 1 mg, 1 mg, Oral, DAILY, Apolinar Lyles M.D.  •   acetaminophen (TYLENOL) tablet 650 mg, 650 mg, Oral, Q6HRS PRN, Jaison Diaz, D.O., 650 mg at 10/23/20 1711  •  amLODIPine (NORVASC) tablet 5 mg, 5 mg, Oral, QDAY, Jaison Diaz, D.O., 5 mg at 10/24/20 0543  •  DULoxetine (CYMBALTA) capsule 60 mg, 60 mg, Oral, DAILY, Jaison Diaz, D.O., 60 mg at 10/24/20 0543  •  gabapentin (NEURONTIN) capsule 400 mg, 400 mg, Oral, TID, Jaison Diaz, D.O., 400 mg at 10/24/20 0544  •  lisinopril (PRINIVIL) tablet 20 mg, 20 mg, Oral, DAILY, Jaison Diaz, D.O., 20 mg at 10/24/20 0544  •  QUEtiapine (Seroquel) tablet 300 mg, 300 mg, Oral, QHS, Jaison Diaz, D.O., 300 mg at 10/23/20 2044  •  senna-docusate (PERICOLACE or SENOKOT S) 8.6-50 MG per tablet 2 Tab, 2 Tab, Oral, BID, 2 Tab at 10/24/20 0544 **AND** polyethylene glycol/lytes (MIRALAX) PACKET 1 Packet, 1 Packet, Oral, QDAY PRN **AND** magnesium hydroxide (MILK OF MAGNESIA) suspension 30 mL, 30 mL, Oral, QDAY PRN **AND** bisacodyl (DULCOLAX) suppository 10 mg, 10 mg, Rectal, QDAY PRN, Jaison Diaz, D.O.  •  enoxaparin (LOVENOX) inj 40 mg, 40 mg, Subcutaneous, DAILY, Jaison Diaz D.O., 40 mg at 10/24/20 0546  •  ondansetron (ZOFRAN) syringe/vial injection 4 mg, 4 mg, Intravenous, Q4HRS PRN, Jaison Diaz, D.O.  •  ondansetron (ZOFRAN ODT) dispertab 4 mg, 4 mg, Oral, Q4HRS PRN, Jaison Diaz D.O.  •  famotidine (PEPCID) tablet 20 mg, 20 mg, Oral, BID, Yariel Troy M.D., 20 mg at 10/24/20 0544  *ECG: personally reviewed QTc 427  *Cranial Imaging: personally reviewed CT neg        *Labs:  Recent Labs     10/22/20  2228   WBC 3.3*   RBC 3.90*   HEMOGLOBIN 12.8*   HEMATOCRIT 38.3*   MCV 98.2*   MCH 32.8   RDW 45.6   PLATELETCT 72*   MPV 11.4   NEUTSPOLYS 65.70   LYMPHOCYTES 24.20   MONOCYTES 5.20   EOSINOPHILS 4.30   BASOPHILS 0.30     Lab Results   Component Value Date/Time    SODIUM 134 (L) 10/22/2020 10:28 PM    POTASSIUM 4.2 10/22/2020 10:28 PM    CHLORIDE 97 10/22/2020 10:28  PM    CO2 23 10/22/2020 10:28 PM    GLUCOSE 84 10/22/2020 10:28 PM    BUN 8 10/22/2020 10:28 PM    CREATININE 0.73 10/22/2020 10:28 PM         Lab Results   Component Value Date/Time    BREATHALIZER 0.137 (A) 09/19/2020 0010     No components found for: BLOODALCOHOL   Lab Results   Component Value Date/Time    AMPHUR Negative 10/22/2020 2228    BARBSURINE Negative 10/22/2020 2228    BENZODIAZU Negative 10/22/2020 2228    COCAINEMET Negative 10/22/2020 2228    METHADONE Negative 10/22/2020 2228    OPIATES Negative 10/22/2020 2228    OXYCODN Negative 10/22/2020 2228    PCPURINE Negative 10/22/2020 2228    PROPOXY Negative 10/22/2020 2228    CANNABINOID Negative 10/22/2020 2228     Lab Results   Component Value Date/Time    FREET4 1.15 11/05/2018 1220        *ASSESSMENT/PLAN:    1. Alcohol induced depressive disorder  - continue seroquel and cymbalta          2.   Alcohol use disorder  - severe    3. Adjustment disorder with depressed and anxious mood  -environmental circumstances that cannot be addressed by psych    4. Medical:  -HTN  -gout  -alcohol related pancytopenia    Legal hold:will not be extended. If [facility] is willing to take pt back, he can be transferred as he is willing to go. Otherwise recommend case management work with him on an alcohol rehab program (which [facility] has) and/or getting him back to [place].    *Signing off  *Thank you for the consult

## 2020-10-24 NOTE — PROGRESS NOTES
"Pt AxO x4 at time of assessment, on RA with no s/s of distress.  Pt said his day was \"long and boring and he wants to rest tonight.\"  Pt affect is flat and he expresses he is still thinking about ending his life \"sometimes.\" Pt has a 1:1 sitter and room checklist in place.  Pt denying any further needs.  Frequent rounding in place.   "

## 2020-10-24 NOTE — PROGRESS NOTES
Aox4, 2L NC 96%, 1:1 sitter, CIWAs started and ativan given, showered and ambulated in halls, pt requesting help with drinking problem

## 2020-10-25 LAB
ANION GAP SERPL CALC-SCNC: 8 MMOL/L (ref 7–16)
BASOPHILS # BLD AUTO: 0.9 % (ref 0–1.8)
BASOPHILS # BLD: 0.02 K/UL (ref 0–0.12)
BUN SERPL-MCNC: 9 MG/DL (ref 8–22)
CALCIUM SERPL-MCNC: 8.8 MG/DL (ref 8.5–10.5)
CHLORIDE SERPL-SCNC: 98 MMOL/L (ref 96–112)
CO2 SERPL-SCNC: 24 MMOL/L (ref 20–33)
CREAT SERPL-MCNC: 0.62 MG/DL (ref 0.5–1.4)
EOSINOPHIL # BLD AUTO: 0.12 K/UL (ref 0–0.51)
EOSINOPHIL NFR BLD: 5.2 % (ref 0–6.9)
ERYTHROCYTE [DISTWIDTH] IN BLOOD BY AUTOMATED COUNT: 45.4 FL (ref 35.9–50)
FOLATE SERPL-MCNC: 14.5 NG/ML
GLUCOSE SERPL-MCNC: 80 MG/DL (ref 65–99)
HCT VFR BLD AUTO: 37.5 % (ref 42–52)
HGB BLD-MCNC: 12.6 G/DL (ref 14–18)
IMM GRANULOCYTES # BLD AUTO: 0.01 K/UL (ref 0–0.11)
IMM GRANULOCYTES NFR BLD AUTO: 0.4 % (ref 0–0.9)
IRON SATN MFR SERPL: 24 % (ref 15–55)
IRON SERPL-MCNC: 62 UG/DL (ref 50–180)
LYMPHOCYTES # BLD AUTO: 0.89 K/UL (ref 1–4.8)
LYMPHOCYTES NFR BLD: 38.4 % (ref 22–41)
MAGNESIUM SERPL-MCNC: 1.8 MG/DL (ref 1.5–2.5)
MCH RBC QN AUTO: 33.1 PG (ref 27–33)
MCHC RBC AUTO-ENTMCNC: 33.6 G/DL (ref 33.7–35.3)
MCV RBC AUTO: 98.4 FL (ref 81.4–97.8)
MONOCYTES # BLD AUTO: 0.17 K/UL (ref 0–0.85)
MONOCYTES NFR BLD AUTO: 7.3 % (ref 0–13.4)
NEUTROPHILS # BLD AUTO: 1.11 K/UL (ref 1.82–7.42)
NEUTROPHILS NFR BLD: 47.8 % (ref 44–72)
NRBC # BLD AUTO: 0 K/UL
NRBC BLD-RTO: 0 /100 WBC
PHOSPHATE SERPL-MCNC: 3.2 MG/DL (ref 2.5–4.5)
PLATELET # BLD AUTO: 66 K/UL (ref 164–446)
PMV BLD AUTO: 11.7 FL (ref 9–12.9)
POTASSIUM SERPL-SCNC: 4.3 MMOL/L (ref 3.6–5.5)
RBC # BLD AUTO: 3.81 M/UL (ref 4.7–6.1)
SODIUM SERPL-SCNC: 130 MMOL/L (ref 135–145)
TIBC SERPL-MCNC: 257 UG/DL (ref 250–450)
TSH SERPL DL<=0.005 MIU/L-ACNC: 1.64 UIU/ML (ref 0.38–5.33)
UIBC SERPL-MCNC: 195 UG/DL (ref 110–370)
VIT B12 SERPL-MCNC: 836 PG/ML (ref 211–911)
WBC # BLD AUTO: 2.3 K/UL (ref 4.8–10.8)

## 2020-10-25 PROCEDURE — 82746 ASSAY OF FOLIC ACID SERUM: CPT

## 2020-10-25 PROCEDURE — 700111 HCHG RX REV CODE 636 W/ 250 OVERRIDE (IP): Performed by: STUDENT IN AN ORGANIZED HEALTH CARE EDUCATION/TRAINING PROGRAM

## 2020-10-25 PROCEDURE — A9270 NON-COVERED ITEM OR SERVICE: HCPCS | Performed by: INTERNAL MEDICINE

## 2020-10-25 PROCEDURE — 82607 VITAMIN B-12: CPT

## 2020-10-25 PROCEDURE — 83550 IRON BINDING TEST: CPT

## 2020-10-25 PROCEDURE — 83540 ASSAY OF IRON: CPT

## 2020-10-25 PROCEDURE — 770001 HCHG ROOM/CARE - MED/SURG/GYN PRIV*

## 2020-10-25 PROCEDURE — 700102 HCHG RX REV CODE 250 W/ 637 OVERRIDE(OP): Performed by: STUDENT IN AN ORGANIZED HEALTH CARE EDUCATION/TRAINING PROGRAM

## 2020-10-25 PROCEDURE — A9270 NON-COVERED ITEM OR SERVICE: HCPCS | Performed by: STUDENT IN AN ORGANIZED HEALTH CARE EDUCATION/TRAINING PROGRAM

## 2020-10-25 PROCEDURE — A9270 NON-COVERED ITEM OR SERVICE: HCPCS | Performed by: HOSPITALIST

## 2020-10-25 PROCEDURE — 80048 BASIC METABOLIC PNL TOTAL CA: CPT

## 2020-10-25 PROCEDURE — 700102 HCHG RX REV CODE 250 W/ 637 OVERRIDE(OP): Performed by: HOSPITALIST

## 2020-10-25 PROCEDURE — 84100 ASSAY OF PHOSPHORUS: CPT

## 2020-10-25 PROCEDURE — 85025 COMPLETE CBC W/AUTO DIFF WBC: CPT

## 2020-10-25 PROCEDURE — 84443 ASSAY THYROID STIM HORMONE: CPT

## 2020-10-25 PROCEDURE — 36415 COLL VENOUS BLD VENIPUNCTURE: CPT

## 2020-10-25 PROCEDURE — 700102 HCHG RX REV CODE 250 W/ 637 OVERRIDE(OP): Performed by: INTERNAL MEDICINE

## 2020-10-25 PROCEDURE — 83735 ASSAY OF MAGNESIUM: CPT

## 2020-10-25 PROCEDURE — 99232 SBSQ HOSP IP/OBS MODERATE 35: CPT | Performed by: INTERNAL MEDICINE

## 2020-10-25 RX ADMIN — QUETIAPINE FUMARATE 300 MG: 100 TABLET ORAL at 20:49

## 2020-10-25 RX ADMIN — FAMOTIDINE 20 MG: 20 TABLET, FILM COATED ORAL at 18:08

## 2020-10-25 RX ADMIN — GABAPENTIN 400 MG: 400 CAPSULE ORAL at 14:35

## 2020-10-25 RX ADMIN — LORAZEPAM 0.5 MG: 0.5 TABLET ORAL at 10:41

## 2020-10-25 RX ADMIN — AMLODIPINE BESYLATE 5 MG: 5 TABLET ORAL at 05:37

## 2020-10-25 RX ADMIN — LISINOPRIL 20 MG: 20 TABLET ORAL at 05:37

## 2020-10-25 RX ADMIN — LORAZEPAM 0.5 MG: 0.5 TABLET ORAL at 18:12

## 2020-10-25 RX ADMIN — FOLIC ACID 1 MG: 1 TABLET ORAL at 05:37

## 2020-10-25 RX ADMIN — GABAPENTIN 400 MG: 400 CAPSULE ORAL at 20:48

## 2020-10-25 RX ADMIN — THERA TABS 1 TABLET: TAB at 05:37

## 2020-10-25 RX ADMIN — GABAPENTIN 400 MG: 400 CAPSULE ORAL at 05:36

## 2020-10-25 RX ADMIN — FAMOTIDINE 20 MG: 20 TABLET, FILM COATED ORAL at 05:36

## 2020-10-25 RX ADMIN — DOCUSATE SODIUM 50 MG AND SENNOSIDES 8.6 MG 2 TABLET: 8.6; 5 TABLET, FILM COATED ORAL at 05:37

## 2020-10-25 RX ADMIN — LORAZEPAM 0.5 MG: 0.5 TABLET ORAL at 06:48

## 2020-10-25 RX ADMIN — Medication 100 MG: at 05:37

## 2020-10-25 RX ADMIN — ENOXAPARIN SODIUM 40 MG: 40 INJECTION SUBCUTANEOUS at 05:36

## 2020-10-25 RX ADMIN — LORAZEPAM 0.5 MG: 0.5 TABLET ORAL at 02:48

## 2020-10-25 RX ADMIN — ACETAMINOPHEN 650 MG: 325 TABLET, FILM COATED ORAL at 05:48

## 2020-10-25 RX ADMIN — DULOXETINE HYDROCHLORIDE 60 MG: 30 CAPSULE, DELAYED RELEASE ORAL at 05:36

## 2020-10-25 ASSESSMENT — LIFESTYLE VARIABLES
AGITATION: NORMAL ACTIVITY
PAROXYSMAL SWEATS: NO SWEAT VISIBLE
ANXIETY: *
TREMOR: *
TOTAL SCORE: 5
TREMOR: TREMOR NOT VISIBLE BUT CAN BE FELT, FINGERTIP TO FINGERTIP
ANXIETY: *
HEADACHE, FULLNESS IN HEAD: NOT PRESENT
NAUSEA AND VOMITING: NO NAUSEA AND NO VOMITING
TOTAL SCORE: 5
NAUSEA AND VOMITING: NO NAUSEA AND NO VOMITING
TOTAL SCORE: 4
AGITATION: NORMAL ACTIVITY
HEADACHE, FULLNESS IN HEAD: MILD
AUDITORY DISTURBANCES: NOT PRESENT
HEADACHE, FULLNESS IN HEAD: NOT PRESENT
TREMOR: NO TREMOR
ORIENTATION AND CLOUDING OF SENSORIUM: ORIENTED AND CAN DO SERIAL ADDITIONS
VISUAL DISTURBANCES: NOT PRESENT
ORIENTATION AND CLOUDING OF SENSORIUM: ORIENTED AND CAN DO SERIAL ADDITIONS
TREMOR: NO TREMOR
TOTAL SCORE: 5
TOTAL SCORE: 4
VISUAL DISTURBANCES: NOT PRESENT
TREMOR: *
AGITATION: NORMAL ACTIVITY
PAROXYSMAL SWEATS: NO SWEAT VISIBLE
NAUSEA AND VOMITING: NO NAUSEA AND NO VOMITING
ANXIETY: *
HEADACHE, FULLNESS IN HEAD: MILD
VISUAL DISTURBANCES: NOT PRESENT
AGITATION: NORMAL ACTIVITY
AUDITORY DISTURBANCES: NOT PRESENT
NAUSEA AND VOMITING: NO NAUSEA AND NO VOMITING
ORIENTATION AND CLOUDING OF SENSORIUM: ORIENTED AND CAN DO SERIAL ADDITIONS
ORIENTATION AND CLOUDING OF SENSORIUM: ORIENTED AND CAN DO SERIAL ADDITIONS
AUDITORY DISTURBANCES: NOT PRESENT
PAROXYSMAL SWEATS: NO SWEAT VISIBLE
VISUAL DISTURBANCES: NOT PRESENT
NAUSEA AND VOMITING: NO NAUSEA AND NO VOMITING
NAUSEA AND VOMITING: NO NAUSEA AND NO VOMITING
AUDITORY DISTURBANCES: NOT PRESENT
AUDITORY DISTURBANCES: NOT PRESENT
TREMOR: *
ANXIETY: *
AGITATION: NORMAL ACTIVITY
VISUAL DISTURBANCES: NOT PRESENT
ANXIETY: *
HEADACHE, FULLNESS IN HEAD: VERY MILD
ORIENTATION AND CLOUDING OF SENSORIUM: ORIENTED AND CAN DO SERIAL ADDITIONS
VISUAL DISTURBANCES: NOT PRESENT
AUDITORY DISTURBANCES: NOT PRESENT
PAROXYSMAL SWEATS: NO SWEAT VISIBLE
PAROXYSMAL SWEATS: NO SWEAT VISIBLE
ORIENTATION AND CLOUDING OF SENSORIUM: ORIENTED AND CAN DO SERIAL ADDITIONS
TOTAL SCORE: 5
ANXIETY: *
AGITATION: NORMAL ACTIVITY
HEADACHE, FULLNESS IN HEAD: NOT PRESENT
PAROXYSMAL SWEATS: NO SWEAT VISIBLE

## 2020-10-25 ASSESSMENT — ENCOUNTER SYMPTOMS
COUGH: 0
BLURRED VISION: 0
CHILLS: 0
MYALGIAS: 0
SEIZURES: 0
NECK PAIN: 0
HEADACHES: 0
FEVER: 0
NAUSEA: 0
DEPRESSION: 1
FOCAL WEAKNESS: 0
NERVOUS/ANXIOUS: 1
WHEEZING: 0
BACK PAIN: 0
SPUTUM PRODUCTION: 0
BRUISES/BLEEDS EASILY: 0
DIZZINESS: 0
SHORTNESS OF BREATH: 0
BLOOD IN STOOL: 0
DIARRHEA: 0
ABDOMINAL PAIN: 0
FLANK PAIN: 0
DIAPHORESIS: 0
SORE THROAT: 0
TREMORS: 1
PALPITATIONS: 0
VOMITING: 0

## 2020-10-25 ASSESSMENT — PAIN DESCRIPTION - PAIN TYPE: TYPE: ACUTE PAIN

## 2020-10-25 NOTE — PROGRESS NOTES
"Received critical lab value of WBC 2.3 from lab this AM, paged on call hospitalist, Rohan Damico, who advised \"there was nothing to do.\" Will continue to monitor.   "

## 2020-10-25 NOTE — DIETARY
"Nutrition services: Day 2 of admit.  Chris Leggett is a 70 y.o. male with admitting DX of Suicidal ideations.    Pt seen for poor PO/wt loss per admit screen, malnutrition screening tool score of 2. Pt reports good PO intake PTA, eating three meals/day as usual intake. Pt reports ~ 10 lbs wt loss over past couple of weeks which he believes to be related to depression and UBW is ~ 220#. Pt reports appetite is good and eating 100% of meals and requesting snacks. Pt states he has not noted any physical changes in muscle or fat, no changes in strength. Noted pt preferences for snacks. Reviewed current diet, pt with no further questions/concerns regarding nutrition.       Assessment:  Height: 180.3 cm (5' 11\")  Weight: 102.5 kg (225 lb 15.5 oz)- per bedscale on 10/23.   Body mass index is 31.52 kg/m²., BMI classification: Class I obesity.   Diet/Intake: Regular, Cardiac.     Evaluation:   1. Pt appears to have adequate intake, no further intervention needed.  2. No wt loss based on pt's report of UBW and current wt per bedscale.   3. Pertinent Labs: Na+ 130.   4. Pertinent Meds: Detox IV bag, Thiamine, Daily Multivitamin, Folic Acid, bowel protocol.     Malnutrition Risk: does not meet criteria.     Recommendations/Plan:  1. Will add snacks per pt request.   2. Document intake of all PO  as % taken in ADL's to provide interdisciplinary communication across all shifts.   3. Monitor weight.  4. Nutrition rep will continue to see patient for ongoing meal and snack preferences.   5. Monitor and replete NA+ as needed.     RD available PRN.           "

## 2020-10-25 NOTE — CARE PLAN
Problem: Venous Thromboembolism (VTW)/Deep Vein Thrombosis (DVT) Prevention:  Goal: Patient will participate in Venous Thrombosis (VTE)/Deep Vein Thrombosis (DVT)Prevention Measures  Outcome: PROGRESSING AS EXPECTED   Pt up ad edenilson and is receiving scheduled enoxaparin injections     Problem: Discharge Barriers/Planning  Goal: Patient's continuum of care needs will be met  Outcome: PROGRESSING AS EXPECTED   Pt needs being met and reassessed as needed.

## 2020-10-25 NOTE — PROGRESS NOTES
Hospital Medicine Daily Progress Note    Date of Service  10/25/2020    Chief Complaint  70 y.o. male admitted 10/22/2020 with chest pain and suicidal ideation    Hospital Course          Interval Problem Update  Patient developed symptoms of alcohol withdrawal and has been started on CIWA protocol.  He denies any active chest pain.  He continues to note suicidal ideation, awaiting psych input    10/25 Pt found to have low WBC this morning. On review it appears he has chronic pancytopenia and has undergone a BM biopsy which did not reveal any primary bone marrow disorder. His pancytopenia is likely secondary to bone marrow suppression from alcohol abuse. Check Vit b12, folate, TSH and iron studies. Continue CIWA protocol.     Consultants/Specialty  psychiatry    Code Status  Full Code    Disposition  DC to [place] once medically cleared    Review of Systems  Review of Systems   Constitutional: Negative for chills, diaphoresis and fever.   HENT: Negative for hearing loss and sore throat.    Eyes: Negative for blurred vision.   Respiratory: Negative for cough, sputum production, shortness of breath and wheezing.    Cardiovascular: Negative for chest pain, palpitations and leg swelling.   Gastrointestinal: Negative for abdominal pain, blood in stool, diarrhea, nausea and vomiting.   Genitourinary: Negative for dysuria, flank pain and urgency.   Musculoskeletal: Negative for back pain, joint pain, myalgias and neck pain.   Skin: Negative for rash.   Neurological: Positive for tremors. Negative for dizziness, focal weakness, seizures and headaches.   Endo/Heme/Allergies: Does not bruise/bleed easily.   Psychiatric/Behavioral: Positive for depression and suicidal ideas. The patient is nervous/anxious.    All other systems reviewed and are negative.       Physical Exam  Temp:  [36.1 °C (96.9 °F)-36.3 °C (97.3 °F)] 36.1 °C (96.9 °F)  Pulse:  [50-68] 66  Resp:  [16-20] 17  BP: (119-141)/() 119/82  SpO2:  [94 %-98 %]  94 %    Physical Exam  Vitals signs and nursing note reviewed.   Constitutional:       General: He is not in acute distress.     Appearance: Normal appearance.   HENT:      Head: Normocephalic and atraumatic.      Nose: Nose normal.      Mouth/Throat:      Mouth: Mucous membranes are moist.   Eyes:      Extraocular Movements: Extraocular movements intact.      Conjunctiva/sclera: Conjunctivae normal.      Pupils: Pupils are equal, round, and reactive to light.   Neck:      Musculoskeletal: Normal range of motion and neck supple.   Cardiovascular:      Rate and Rhythm: Normal rate and regular rhythm.      Pulses: Normal pulses.      Heart sounds: Normal heart sounds.   Pulmonary:      Effort: Pulmonary effort is normal. No respiratory distress.      Breath sounds: Normal breath sounds. No wheezing, rhonchi or rales.   Abdominal:      General: Bowel sounds are normal. There is no distension.      Palpations: Abdomen is soft.      Tenderness: There is no abdominal tenderness.   Musculoskeletal: Normal range of motion.         General: No swelling or tenderness.   Lymphadenopathy:      Cervical: No cervical adenopathy.   Skin:     General: Skin is warm.      Coloration: Skin is not jaundiced.      Findings: No rash.   Neurological:      General: No focal deficit present.      Mental Status: He is alert and oriented to person, place, and time.      Cranial Nerves: No cranial nerve deficit.      Motor: No weakness.      Comments: Tremulous   Psychiatric:         Mood and Affect: Mood normal.         Behavior: Behavior normal.         Thought Content: Thought content includes suicidal ideation.         Fluids    Intake/Output Summary (Last 24 hours) at 10/25/2020 1146  Last data filed at 10/25/2020 0900  Gross per 24 hour   Intake 600 ml   Output --   Net 600 ml       Laboratory  Recent Labs     10/22/20  2228 10/25/20  0325   WBC 3.3* 2.3*   RBC 3.90* 3.81*   HEMOGLOBIN 12.8* 12.6*   HEMATOCRIT 38.3* 37.5*   MCV 98.2*  98.4*   MCH 32.8 33.1*   MCHC 33.4* 33.6*   RDW 45.6 45.4   PLATELETCT 72* 66*   MPV 11.4 11.7     Recent Labs     10/22/20  2228 10/25/20  0325   SODIUM 134* 130*   POTASSIUM 4.2 4.3   CHLORIDE 97 98   CO2 23 24   GLUCOSE 84 80   BUN 8 9   CREATININE 0.73 0.62   CALCIUM 9.2 8.8                   Imaging  EC-ECHOCARDIOGRAM COMPLETE W/ CONT   Final Result      DX-CHEST-PORTABLE (1 VIEW)   Final Result         1.  No acute cardiopulmonary disease.           Assessment/Plan  * Suicidal ideation- (present on admission)  Assessment & Plan  EKG does not reveal any changes associated with intentional toxicity  One-on-one sitter  Psychiatry  Evaluated pt and DC'd legal hold. Ok to DC to [place] once medically cleared    Alcohol withdrawal (HCC)- (present on admission)  Assessment & Plan  Patient will be admitted to the telemetry unit with close cardiac monitoring on CIWA protocol  Started on rally bag, multivitamin, thiamine and folate  Replete electrolytes  Patient has been counseled on alcohol cessation and will be provided with information for outpatient detox facilities      Alcoholism (HCC)- (present on admission)  Assessment & Plan  Counseled on cessation    Major depressive disorder- (present on admission)  Assessment & Plan  Continue Cymbalta 60 mg p.o. daily  Continue Seroquel 300 mg p.o. nightly  Psychiatry consulted    Pancytopenia (HCC)- (present on admission)  Assessment & Plan  Chronic, likely secondary to alcohol abuse  Check Vit b12, folate, TSH and iron  Monitor CBC      Essential hypertension- (present on admission)  Assessment & Plan  Continue lisinopril 20 mg p.o. daily  Continue Norvasc 5 mg p.o. daily       VTE prophylaxis: Lovenox

## 2020-10-25 NOTE — PROGRESS NOTES
Pt AxO x4 at time of assessment, complaining of a headache, administered PRN tylenol. Pt on CIWAs and currently performed q 4hrs.  Pt on a continuous pulse ox machine as well.  Pt has 1:1 sitter and safety checklist in place.  All needs met at this time, will continue to monitor.

## 2020-10-26 LAB
ANION GAP SERPL CALC-SCNC: 7 MMOL/L (ref 7–16)
BASOPHILS # BLD AUTO: 0.6 % (ref 0–1.8)
BASOPHILS # BLD: 0.02 K/UL (ref 0–0.12)
BUN SERPL-MCNC: 9 MG/DL (ref 8–22)
CALCIUM SERPL-MCNC: 9.2 MG/DL (ref 8.5–10.5)
CHLORIDE SERPL-SCNC: 92 MMOL/L (ref 96–112)
CO2 SERPL-SCNC: 29 MMOL/L (ref 20–33)
CREAT SERPL-MCNC: 0.77 MG/DL (ref 0.5–1.4)
EOSINOPHIL # BLD AUTO: 0.17 K/UL (ref 0–0.51)
EOSINOPHIL NFR BLD: 5.2 % (ref 0–6.9)
ERYTHROCYTE [DISTWIDTH] IN BLOOD BY AUTOMATED COUNT: 46 FL (ref 35.9–50)
GLUCOSE SERPL-MCNC: 80 MG/DL (ref 65–99)
HCT VFR BLD AUTO: 39.7 % (ref 42–52)
HGB BLD-MCNC: 13.5 G/DL (ref 14–18)
IMM GRANULOCYTES # BLD AUTO: 0.01 K/UL (ref 0–0.11)
IMM GRANULOCYTES NFR BLD AUTO: 0.3 % (ref 0–0.9)
LYMPHOCYTES # BLD AUTO: 0.79 K/UL (ref 1–4.8)
LYMPHOCYTES NFR BLD: 24.2 % (ref 22–41)
MCH RBC QN AUTO: 33.4 PG (ref 27–33)
MCHC RBC AUTO-ENTMCNC: 34 G/DL (ref 33.7–35.3)
MCV RBC AUTO: 98.3 FL (ref 81.4–97.8)
MONOCYTES # BLD AUTO: 0.23 K/UL (ref 0–0.85)
MONOCYTES NFR BLD AUTO: 7 % (ref 0–13.4)
NEUTROPHILS # BLD AUTO: 2.05 K/UL (ref 1.82–7.42)
NEUTROPHILS NFR BLD: 62.7 % (ref 44–72)
NRBC # BLD AUTO: 0 K/UL
NRBC BLD-RTO: 0 /100 WBC
PLATELET # BLD AUTO: 75 K/UL (ref 164–446)
PMV BLD AUTO: 11.9 FL (ref 9–12.9)
POTASSIUM SERPL-SCNC: 4.6 MMOL/L (ref 3.6–5.5)
RBC # BLD AUTO: 4.04 M/UL (ref 4.7–6.1)
SODIUM SERPL-SCNC: 128 MMOL/L (ref 135–145)
WBC # BLD AUTO: 3.3 K/UL (ref 4.8–10.8)

## 2020-10-26 PROCEDURE — 80048 BASIC METABOLIC PNL TOTAL CA: CPT

## 2020-10-26 PROCEDURE — 700102 HCHG RX REV CODE 250 W/ 637 OVERRIDE(OP): Performed by: INTERNAL MEDICINE

## 2020-10-26 PROCEDURE — 36415 COLL VENOUS BLD VENIPUNCTURE: CPT

## 2020-10-26 PROCEDURE — 700102 HCHG RX REV CODE 250 W/ 637 OVERRIDE(OP): Performed by: STUDENT IN AN ORGANIZED HEALTH CARE EDUCATION/TRAINING PROGRAM

## 2020-10-26 PROCEDURE — 770001 HCHG ROOM/CARE - MED/SURG/GYN PRIV*

## 2020-10-26 PROCEDURE — 700111 HCHG RX REV CODE 636 W/ 250 OVERRIDE (IP): Performed by: STUDENT IN AN ORGANIZED HEALTH CARE EDUCATION/TRAINING PROGRAM

## 2020-10-26 PROCEDURE — 700102 HCHG RX REV CODE 250 W/ 637 OVERRIDE(OP): Performed by: HOSPITALIST

## 2020-10-26 PROCEDURE — 99232 SBSQ HOSP IP/OBS MODERATE 35: CPT | Performed by: INTERNAL MEDICINE

## 2020-10-26 PROCEDURE — A9270 NON-COVERED ITEM OR SERVICE: HCPCS | Performed by: HOSPITALIST

## 2020-10-26 PROCEDURE — 85025 COMPLETE CBC W/AUTO DIFF WBC: CPT

## 2020-10-26 PROCEDURE — A9270 NON-COVERED ITEM OR SERVICE: HCPCS | Performed by: STUDENT IN AN ORGANIZED HEALTH CARE EDUCATION/TRAINING PROGRAM

## 2020-10-26 PROCEDURE — A9270 NON-COVERED ITEM OR SERVICE: HCPCS | Performed by: INTERNAL MEDICINE

## 2020-10-26 RX ADMIN — LORAZEPAM 0.5 MG: 0.5 TABLET ORAL at 06:08

## 2020-10-26 RX ADMIN — LORAZEPAM 0.5 MG: 0.5 TABLET ORAL at 02:00

## 2020-10-26 RX ADMIN — GABAPENTIN 400 MG: 400 CAPSULE ORAL at 21:20

## 2020-10-26 RX ADMIN — THERA TABS 1 TABLET: TAB at 06:08

## 2020-10-26 RX ADMIN — FOLIC ACID 1 MG: 1 TABLET ORAL at 06:08

## 2020-10-26 RX ADMIN — DOCUSATE SODIUM 50 MG AND SENNOSIDES 8.6 MG 2 TABLET: 8.6; 5 TABLET, FILM COATED ORAL at 06:08

## 2020-10-26 RX ADMIN — FAMOTIDINE 20 MG: 20 TABLET, FILM COATED ORAL at 06:08

## 2020-10-26 RX ADMIN — GABAPENTIN 400 MG: 400 CAPSULE ORAL at 14:35

## 2020-10-26 RX ADMIN — LORAZEPAM 0.5 MG: 0.5 TABLET ORAL at 10:54

## 2020-10-26 RX ADMIN — LORAZEPAM 0.5 MG: 0.5 TABLET ORAL at 14:36

## 2020-10-26 RX ADMIN — QUETIAPINE FUMARATE 300 MG: 100 TABLET ORAL at 21:20

## 2020-10-26 RX ADMIN — Medication 100 MG: at 06:08

## 2020-10-26 RX ADMIN — DULOXETINE HYDROCHLORIDE 60 MG: 30 CAPSULE, DELAYED RELEASE ORAL at 06:08

## 2020-10-26 RX ADMIN — GABAPENTIN 400 MG: 400 CAPSULE ORAL at 06:08

## 2020-10-26 RX ADMIN — AMLODIPINE BESYLATE 5 MG: 5 TABLET ORAL at 07:48

## 2020-10-26 RX ADMIN — ACETAMINOPHEN 650 MG: 325 TABLET, FILM COATED ORAL at 06:08

## 2020-10-26 RX ADMIN — FAMOTIDINE 20 MG: 20 TABLET, FILM COATED ORAL at 18:46

## 2020-10-26 RX ADMIN — LISINOPRIL 20 MG: 20 TABLET ORAL at 07:49

## 2020-10-26 RX ADMIN — ENOXAPARIN SODIUM 40 MG: 40 INJECTION SUBCUTANEOUS at 06:08

## 2020-10-26 ASSESSMENT — ENCOUNTER SYMPTOMS
TREMORS: 1
VOMITING: 0
HEADACHES: 0
BLOOD IN STOOL: 0
PALPITATIONS: 0
DEPRESSION: 1
SHORTNESS OF BREATH: 0
ABDOMINAL PAIN: 0
SORE THROAT: 0
DIZZINESS: 0
DIARRHEA: 0
BACK PAIN: 0
NERVOUS/ANXIOUS: 1
SPUTUM PRODUCTION: 0
CHILLS: 0
NECK PAIN: 0
WHEEZING: 0
FEVER: 0
SEIZURES: 0
DIAPHORESIS: 0

## 2020-10-26 ASSESSMENT — LIFESTYLE VARIABLES
HEADACHE, FULLNESS IN HEAD: MILD
NAUSEA AND VOMITING: NO NAUSEA AND NO VOMITING
VISUAL DISTURBANCES: NOT PRESENT
AGITATION: *
TOTAL SCORE: 5
AGITATION: NORMAL ACTIVITY
ANXIETY: *
AGITATION: NORMAL ACTIVITY
NAUSEA AND VOMITING: NO NAUSEA AND NO VOMITING
NAUSEA AND VOMITING: NO NAUSEA AND NO VOMITING
HEADACHE, FULLNESS IN HEAD: NOT PRESENT
AUDITORY DISTURBANCES: NOT PRESENT
PAROXYSMAL SWEATS: NO SWEAT VISIBLE
ANXIETY: *
TOTAL SCORE: 6
TREMOR: *
TREMOR: *
ORIENTATION AND CLOUDING OF SENSORIUM: ORIENTED AND CAN DO SERIAL ADDITIONS
ORIENTATION AND CLOUDING OF SENSORIUM: ORIENTED AND CAN DO SERIAL ADDITIONS
TREMOR: TREMOR NOT VISIBLE BUT CAN BE FELT, FINGERTIP TO FINGERTIP
ANXIETY: *
PAROXYSMAL SWEATS: NO SWEAT VISIBLE
ANXIETY: NO ANXIETY (AT EASE)
ORIENTATION AND CLOUDING OF SENSORIUM: ORIENTED AND CAN DO SERIAL ADDITIONS
TREMOR: *
PAROXYSMAL SWEATS: NO SWEAT VISIBLE
AUDITORY DISTURBANCES: NOT PRESENT
ANXIETY: *
PAROXYSMAL SWEATS: NO SWEAT VISIBLE
TREMOR: TREMOR NOT VISIBLE BUT CAN BE FELT, FINGERTIP TO FINGERTIP
AUDITORY DISTURBANCES: NOT PRESENT
HEADACHE, FULLNESS IN HEAD: VERY MILD
AGITATION: NORMAL ACTIVITY
VISUAL DISTURBANCES: NOT PRESENT
AUDITORY DISTURBANCES: NOT PRESENT
TOTAL SCORE: 2
VISUAL DISTURBANCES: NOT PRESENT
AGITATION: NORMAL ACTIVITY
VISUAL DISTURBANCES: NOT PRESENT
AUDITORY DISTURBANCES: NOT PRESENT
ORIENTATION AND CLOUDING OF SENSORIUM: ORIENTED AND CAN DO SERIAL ADDITIONS
VISUAL DISTURBANCES: NOT PRESENT
AUDITORY DISTURBANCES: NOT PRESENT
ORIENTATION AND CLOUDING OF SENSORIUM: ORIENTED AND CAN DO SERIAL ADDITIONS
HEADACHE, FULLNESS IN HEAD: NOT PRESENT
NAUSEA AND VOMITING: NO NAUSEA AND NO VOMITING
HEADACHE, FULLNESS IN HEAD: VERY MILD
VISUAL DISTURBANCES: NOT PRESENT
HEADACHE, FULLNESS IN HEAD: MODERATE
PAROXYSMAL SWEATS: NO SWEAT VISIBLE
TOTAL SCORE: 7
AGITATION: NORMAL ACTIVITY
TREMOR: *
ORIENTATION AND CLOUDING OF SENSORIUM: ORIENTED AND CAN DO SERIAL ADDITIONS
NAUSEA AND VOMITING: NO NAUSEA AND NO VOMITING
TOTAL SCORE: 3
TOTAL SCORE: 6
ANXIETY: *
PAROXYSMAL SWEATS: NO SWEAT VISIBLE
NAUSEA AND VOMITING: NO NAUSEA AND NO VOMITING

## 2020-10-26 ASSESSMENT — PAIN DESCRIPTION - PAIN TYPE
TYPE: ACUTE PAIN
TYPE: ACUTE PAIN

## 2020-10-26 NOTE — PROGRESS NOTES
Hospital Medicine Daily Progress Note    Date of Service  10/26/2020    Chief Complaint  70 y.o. male admitted 10/22/2020 with chest pain and suicidal ideation    Hospital Course          Interval Problem Update  Patient developed symptoms of alcohol withdrawal and has been started on CIWA protocol.  He denies any active chest pain.  He continues to note suicidal ideation, awaiting psych input    10/25 Pt found to have low WBC this morning. On review it appears he has chronic pancytopenia and has undergone a BM biopsy which did not reveal any primary bone marrow disorder. His pancytopenia is likely secondary to bone marrow suppression from alcohol abuse. Check Vit b12, folate, TSH and iron studies. Continue CIWA protocol.     10/26: No acute issue overnight.  Patient is pending inpatient psychiatric transfer.  Updated him about his CBC status.    Consultants/Specialty  psychiatry    Code Status  Full Code    Disposition  DC to [place] once medically cleared    Review of Systems  Review of Systems   Constitutional: Negative for chills, diaphoresis and fever.   HENT: Negative for hearing loss and sore throat.    Respiratory: Negative for sputum production, shortness of breath and wheezing.    Cardiovascular: Negative for palpitations and leg swelling.   Gastrointestinal: Negative for abdominal pain, blood in stool, diarrhea and vomiting.   Genitourinary: Negative for dysuria and urgency.   Musculoskeletal: Negative for back pain, joint pain and neck pain.   Skin: Negative for rash.   Neurological: Positive for tremors. Negative for dizziness, seizures and headaches.   Psychiatric/Behavioral: Positive for depression and suicidal ideas. The patient is nervous/anxious.    All other systems reviewed and are negative.       Physical Exam  Temp:  [36.1 °C (97 °F)-36.6 °C (97.8 °F)] 36.2 °C (97.1 °F)  Pulse:  [55-68] 55  Resp:  [17-18] 18  BP: (108-136)/(76-91) 118/81  SpO2:  [92 %-96 %] 96 %    Physical Exam  Vitals  signs and nursing note reviewed.   Constitutional:       Appearance: Normal appearance.   HENT:      Head: Normocephalic and atraumatic.      Nose: Nose normal.      Mouth/Throat:      Mouth: Mucous membranes are moist.   Eyes:      Conjunctiva/sclera: Conjunctivae normal.      Pupils: Pupils are equal, round, and reactive to light.   Neck:      Musculoskeletal: Normal range of motion and neck supple.   Cardiovascular:      Rate and Rhythm: Normal rate and regular rhythm.      Pulses: Normal pulses.      Heart sounds: Normal heart sounds.   Pulmonary:      Effort: Pulmonary effort is normal. No respiratory distress.      Breath sounds: Normal breath sounds. No rhonchi.   Abdominal:      General: Bowel sounds are normal. There is no distension.      Palpations: Abdomen is soft.   Musculoskeletal: Normal range of motion.         General: No swelling or tenderness.   Lymphadenopathy:      Cervical: No cervical adenopathy.   Skin:     General: Skin is warm.      Coloration: Skin is not jaundiced.      Findings: No rash.   Neurological:      General: No focal deficit present.      Mental Status: He is alert.      Comments: Tremulous   Psychiatric:         Thought Content: Thought content includes suicidal ideation.         Fluids    Intake/Output Summary (Last 24 hours) at 10/26/2020 0854  Last data filed at 10/25/2020 1939  Gross per 24 hour   Intake 820 ml   Output --   Net 820 ml       Laboratory  Recent Labs     10/25/20  0325   WBC 2.3*   RBC 3.81*   HEMOGLOBIN 12.6*   HEMATOCRIT 37.5*   MCV 98.4*   MCH 33.1*   MCHC 33.6*   RDW 45.4   PLATELETCT 66*   MPV 11.7     Recent Labs     10/25/20  0325 10/26/20  0735   SODIUM 130* 128*   POTASSIUM 4.3 4.6   CHLORIDE 98 92*   CO2 24 29   GLUCOSE 80 80   BUN 9 9   CREATININE 0.62 0.77   CALCIUM 8.8 9.2                   Imaging  EC-ECHOCARDIOGRAM COMPLETE W/ CONT   Final Result      DX-CHEST-PORTABLE (1 VIEW)   Final Result         1.  No acute cardiopulmonary disease.            Assessment/Plan  * Suicidal ideation- (present on admission)  Assessment & Plan  EKG does not reveal any changes associated with intentional toxicity  One-on-one sitter  Psychiatry  Evaluated pt and DC'd legal hold. Ok to DC to [place] once medically cleared    Alcohol withdrawal (HCC)- (present on admission)  Assessment & Plan  Patient will be admitted to the telemetry unit with close cardiac monitoring on CIWA protocol  Started on rally bag, multivitamin, thiamine and folate  Replete electrolytes  Patient has been counseled on alcohol cessation and will be provided with information for outpatient detox facilities      Alcoholism (HCC)- (present on admission)  Assessment & Plan  Counseled on cessation    Major depressive disorder- (present on admission)  Assessment & Plan  Continue Cymbalta 60 mg p.o. daily  Continue Seroquel 300 mg p.o. nightly  Psychiatry consulted    Pancytopenia (HCC)- (present on admission)  Assessment & Plan  Chronic, likely secondary to alcohol abuse  Check Vit b12, folate, TSH within normal limits  Monitor CBC      Essential hypertension- (present on admission)  Assessment & Plan  Continue lisinopril 20 mg p.o. daily  Continue Norvasc 5 mg p.o. daily       VTE prophylaxis: Lovenox

## 2020-10-26 NOTE — CARE PLAN
Problem: Infection  Goal: Will remain free from infection  Outcome: PROGRESSING AS EXPECTED   No s/s of infection    Problem: Bowel/Gastric:  Goal: Will not experience complications related to bowel motility  Outcome: PROGRESSING AS EXPECTED   Pt reported having a bowel movement this AM

## 2020-10-26 NOTE — PROGRESS NOTES
Pt AxO x4 at time of assessment, denied any pain.  Pt still verbalized want to end life, allowed time for pt to verbalize feelings. Pt on RA with no s/s of labored breathing.  CIWA protocol still in place.  No further needs at this time, pt has 1:1 sitter for SI, frequent rounding in place.

## 2020-10-26 NOTE — PROGRESS NOTES
Legal hold is up as of today. Per psych the unit can d/c his sitter if he is appropriate. Per this writer pt is appropriate, CIWA's between 5-6 and medicated accordingly. Wanderguard placed to right wrist. Sitter to be d/c'd today.

## 2020-10-26 NOTE — DISCHARGE PLANNING
Anticipated Discharge Disposition: TBD    Action: LSW placed call to [facility] to find out if they will take PT back when he is ready to D/C. Spoke with Claribel in admissions who explained that after 48 hours they discharged Pt and because he is no longer on the legal hold he will have to come to [facility] after his discharge as a voluntary admission to be assessed again.     Barriers to Discharge: Medical clearance    Plan: Continue to collaborate with the pt and health care team to provide social and discharge support as needed.

## 2020-10-27 PROCEDURE — 700111 HCHG RX REV CODE 636 W/ 250 OVERRIDE (IP): Performed by: STUDENT IN AN ORGANIZED HEALTH CARE EDUCATION/TRAINING PROGRAM

## 2020-10-27 PROCEDURE — 770001 HCHG ROOM/CARE - MED/SURG/GYN PRIV*

## 2020-10-27 PROCEDURE — A9270 NON-COVERED ITEM OR SERVICE: HCPCS | Performed by: HOSPITALIST

## 2020-10-27 PROCEDURE — A9270 NON-COVERED ITEM OR SERVICE: HCPCS | Performed by: STUDENT IN AN ORGANIZED HEALTH CARE EDUCATION/TRAINING PROGRAM

## 2020-10-27 PROCEDURE — 700102 HCHG RX REV CODE 250 W/ 637 OVERRIDE(OP): Performed by: INTERNAL MEDICINE

## 2020-10-27 PROCEDURE — A9270 NON-COVERED ITEM OR SERVICE: HCPCS | Performed by: INTERNAL MEDICINE

## 2020-10-27 PROCEDURE — 700102 HCHG RX REV CODE 250 W/ 637 OVERRIDE(OP): Performed by: HOSPITALIST

## 2020-10-27 PROCEDURE — 99232 SBSQ HOSP IP/OBS MODERATE 35: CPT | Performed by: STUDENT IN AN ORGANIZED HEALTH CARE EDUCATION/TRAINING PROGRAM

## 2020-10-27 PROCEDURE — 700102 HCHG RX REV CODE 250 W/ 637 OVERRIDE(OP): Performed by: STUDENT IN AN ORGANIZED HEALTH CARE EDUCATION/TRAINING PROGRAM

## 2020-10-27 RX ADMIN — FAMOTIDINE 20 MG: 20 TABLET, FILM COATED ORAL at 06:25

## 2020-10-27 RX ADMIN — DULOXETINE HYDROCHLORIDE 60 MG: 30 CAPSULE, DELAYED RELEASE ORAL at 06:25

## 2020-10-27 RX ADMIN — THERA TABS 1 TABLET: TAB at 06:25

## 2020-10-27 RX ADMIN — FAMOTIDINE 20 MG: 20 TABLET, FILM COATED ORAL at 17:58

## 2020-10-27 RX ADMIN — LORAZEPAM 0.5 MG: 0.5 TABLET ORAL at 06:30

## 2020-10-27 RX ADMIN — ENOXAPARIN SODIUM 40 MG: 40 INJECTION SUBCUTANEOUS at 06:25

## 2020-10-27 RX ADMIN — DOCUSATE SODIUM 50 MG AND SENNOSIDES 8.6 MG 2 TABLET: 8.6; 5 TABLET, FILM COATED ORAL at 06:25

## 2020-10-27 RX ADMIN — QUETIAPINE FUMARATE 300 MG: 100 TABLET ORAL at 20:28

## 2020-10-27 RX ADMIN — FOLIC ACID 1 MG: 1 TABLET ORAL at 06:25

## 2020-10-27 RX ADMIN — ACETAMINOPHEN 650 MG: 325 TABLET, FILM COATED ORAL at 09:39

## 2020-10-27 RX ADMIN — LORAZEPAM 0.5 MG: 0.5 TABLET ORAL at 02:50

## 2020-10-27 RX ADMIN — ACETAMINOPHEN 650 MG: 325 TABLET, FILM COATED ORAL at 18:00

## 2020-10-27 RX ADMIN — LISINOPRIL 20 MG: 20 TABLET ORAL at 06:25

## 2020-10-27 RX ADMIN — GABAPENTIN 400 MG: 400 CAPSULE ORAL at 06:25

## 2020-10-27 RX ADMIN — AMLODIPINE BESYLATE 5 MG: 5 TABLET ORAL at 06:25

## 2020-10-27 RX ADMIN — GABAPENTIN 400 MG: 400 CAPSULE ORAL at 14:12

## 2020-10-27 RX ADMIN — Medication 100 MG: at 06:25

## 2020-10-27 RX ADMIN — GABAPENTIN 400 MG: 400 CAPSULE ORAL at 20:29

## 2020-10-27 ASSESSMENT — LIFESTYLE VARIABLES
TOTAL SCORE: MILD ITCHING, PINS AND NEEDLES SENSATION, BURNING OR NUMBNESS
TOTAL SCORE: 5
HEADACHE, FULLNESS IN HEAD: VERY MILD
ANXIETY: *
ORIENTATION AND CLOUDING OF SENSORIUM: ORIENTED AND CAN DO SERIAL ADDITIONS
VISUAL DISTURBANCES: NOT PRESENT
NAUSEA AND VOMITING: NO NAUSEA AND NO VOMITING
AGITATION: NORMAL ACTIVITY
AGITATION: NORMAL ACTIVITY
HEADACHE, FULLNESS IN HEAD: MODERATE
ORIENTATION AND CLOUDING OF SENSORIUM: ORIENTED AND CAN DO SERIAL ADDITIONS
VISUAL DISTURBANCES: NOT PRESENT
TREMOR: *
PAROXYSMAL SWEATS: NO SWEAT VISIBLE
TOTAL SCORE: 5
TREMOR: NO TREMOR
ANXIETY: NO ANXIETY (AT EASE)
AUDITORY DISTURBANCES: NOT PRESENT
NAUSEA AND VOMITING: NO NAUSEA AND NO VOMITING
PAROXYSMAL SWEATS: NO SWEAT VISIBLE
AUDITORY DISTURBANCES: NOT PRESENT

## 2020-10-27 ASSESSMENT — ENCOUNTER SYMPTOMS
SPUTUM PRODUCTION: 0
DIAPHORESIS: 0
DIZZINESS: 0
DEPRESSION: 1
FEVER: 0
SHORTNESS OF BREATH: 0
WHEEZING: 0
SEIZURES: 0
DIARRHEA: 0
BACK PAIN: 0
NECK PAIN: 0
HEADACHES: 0
NERVOUS/ANXIOUS: 1
PALPITATIONS: 0
SORE THROAT: 0
BLOOD IN STOOL: 0
ABDOMINAL PAIN: 0
VOMITING: 0
CHILLS: 0
TREMORS: 0

## 2020-10-27 ASSESSMENT — PAIN DESCRIPTION - PAIN TYPE
TYPE: ACUTE PAIN
TYPE: ACUTE PAIN

## 2020-10-27 NOTE — CARE PLAN
Problem: Safety:  Goal: Will remain free from injury  Intervention: Assess behavior for possible injury to self  Note: Bx assessed for self injury, plans for self injury discussed. Care team notified.     Problem: Health Behavior:  Goal: Ability to identify appropriate support needs will improve  Outcome: PROGRESSING AS EXPECTED  Intervention: Identify available resources and support systems  Note: Pt agreeable to voluntary admit to [facility] as resource. Cooperative with staff instruction and rules of unit for his safety

## 2020-10-27 NOTE — DISCHARGE PLANNING
Per Dr. Johnson legal hold will not be extended. Legal hold  as of 10/26 at 1700.    Removed pt from legal hold in flowsheet.

## 2020-10-27 NOTE — PROGRESS NOTES
[facility] Medicine Daily Progress Note    Date of Service  10/27/2020    Chief Complaint  70 y.o. male admitted 10/22/2020 with chest pain and suicidal ideation    Hospital Course    70 y.o. male admitted 10/22/2020 with chest pain and suicidal ideation      Interval Problem Update  Patient evaluated at bedside and found AAOX4, afebrile, in no acute distress.  Patient still having active suicidal thoughts and reporting that release he will jump out of a bridge and kill himself or take a lot of pills to do so   Extended legal hold  Discussed case with psychiatry  Patient medically clear, pending transfer to psychiatric facility    Consultants/Specialty  Psychiatry    Code Status  Full Code    Disposition  DC to [facility] once medically cleared    Review of Systems  Review of Systems   Constitutional: Negative for chills, diaphoresis and fever.   HENT: Negative for hearing loss and sore throat.    Respiratory: Negative for sputum production, shortness of breath and wheezing.    Cardiovascular: Negative for palpitations and leg swelling.   Gastrointestinal: Negative for abdominal pain, blood in stool, diarrhea and vomiting.   Genitourinary: Negative for dysuria and urgency.   Musculoskeletal: Negative for back pain, joint pain and neck pain.   Skin: Negative for rash.   Neurological: Negative for dizziness, tremors, seizures and headaches.   Psychiatric/Behavioral: Positive for depression and suicidal ideas. The patient is nervous/anxious.    All other systems reviewed and are negative.       Physical Exam  Temp:  [35.9 °C (96.7 °F)-36.6 °C (97.8 °F)] 36.2 °C (97.1 °F)  Pulse:  [51-79] 55  Resp:  [17-19] 18  BP: ()/(62-80) 100/67  SpO2:  [93 %-96 %] 93 %    Physical Exam  Vitals signs and nursing note reviewed.   Constitutional:       Appearance: Normal appearance.   HENT:      Head: Normocephalic and atraumatic.      Nose: Nose normal.      Mouth/Throat:      Mouth: Mucous membranes are moist.   Eyes:       Conjunctiva/sclera: Conjunctivae normal.      Pupils: Pupils are equal, round, and reactive to light.   Neck:      Musculoskeletal: Normal range of motion and neck supple.   Cardiovascular:      Rate and Rhythm: Normal rate and regular rhythm.      Pulses: Normal pulses.      Heart sounds: Normal heart sounds.   Pulmonary:      Effort: Pulmonary effort is normal. No respiratory distress.      Breath sounds: Normal breath sounds. No rhonchi.   Abdominal:      General: Bowel sounds are normal. There is no distension.      Palpations: Abdomen is soft.   Musculoskeletal: Normal range of motion.         General: No swelling or tenderness.   Lymphadenopathy:      Cervical: No cervical adenopathy.   Skin:     General: Skin is warm.      Coloration: Skin is not jaundiced.      Findings: No rash.   Neurological:      General: No focal deficit present.      Mental Status: He is alert.      Comments: Tremulous   Psychiatric:         Thought Content: Thought content includes suicidal ideation.         Fluids    Intake/Output Summary (Last 24 hours) at 10/27/2020 0857  Last data filed at 10/26/2020 1844  Gross per 24 hour   Intake 800 ml   Output --   Net 800 ml       Laboratory  Recent Labs     10/25/20  0325 10/26/20  0735   WBC 2.3* 3.3*   RBC 3.81* 4.04*   HEMOGLOBIN 12.6* 13.5*   HEMATOCRIT 37.5* 39.7*   MCV 98.4* 98.3*   MCH 33.1* 33.4*   MCHC 33.6* 34.0   RDW 45.4 46.0   PLATELETCT 66* 75*   MPV 11.7 11.9     Recent Labs     10/25/20  0325 10/26/20  0735   SODIUM 130* 128*   POTASSIUM 4.3 4.6   CHLORIDE 98 92*   CO2 24 29   GLUCOSE 80 80   BUN 9 9   CREATININE 0.62 0.77   CALCIUM 8.8 9.2                   Imaging  EC-ECHOCARDIOGRAM COMPLETE W/ CONT   Final Result      DX-CHEST-PORTABLE (1 VIEW)   Final Result         1.  No acute cardiopulmonary disease.           Assessment/Plan  * Suicidal ideation- (present on admission)  Assessment & Plan  Patient reporting active suicide ideation with plan to kill himself by jumping  from a bridge or taking a lot of pills  Extended legal hold  Discussed case with psychiatry  Patient is medically clear  Pending discharge to psychiatric facility    Alcohol withdrawal (HCC)- (present on admission)  Assessment & Plan  Completed detox while inpatient and CIWA protocol  Need for Ativan at current moment  Patient has been counseled on alcohol cessation and will be provided with information for outpatient detox facilities    Alcoholism (HCC)- (present on admission)  Assessment & Plan  Counseled on cessation    Major depressive disorder- (present on admission)  Assessment & Plan  Continue Cymbalta 60 mg p.o. daily  Continue Seroquel 300 mg p.o. nightly  Psychiatry consulted    Pancytopenia (HCC)- (present on admission)  Assessment & Plan  Chronic, likely secondary to alcohol abuse  Check Vit b12, folate, TSH within normal limits  Monitor CBC    Essential hypertension- (present on admission)  Assessment & Plan  Continue lisinopril 20 mg p.o. daily  Continue Norvasc 5 mg p.o. daily       VTE prophylaxis: Lovenox

## 2020-10-27 NOTE — CARE PLAN
Problem: Safety:  Goal: Will remain free from injury  Outcome: PROGRESSING AS EXPECTED  Pt. Legal hold status , all other precautions still in place as pt. Still verbalizes having thoughts of SI but no direct plan on it. Pt. Does have WG to ensure safety at this time.      Problem: Psychosocial Needs:  Goal: Ability to verbalize positive feelings about self will improve  Outcome: PROGRESSING AS EXPECTED   Pt. Verbalized current problem and knows the reasoning why he is feeling suicidal, he states he does not have any family members as support. Provided a listening ear to pt. Concerns at this time and empathetically listen to pt.     Problem: Psychosocial Needs:  Goal: Level of anxiety will decrease  Outcome: PROGRESSING AS EXPECTED  Pt. Is calm and relax at this time, still on CIWA protocol. PRN Ativan on board.

## 2020-10-27 NOTE — DISCHARGE PLANNING
Anticipated Discharge Disposition: Behavioral Health Facilty    Action: Received new Legal Hold from Pt's MD. Pt is medically cleared. Faxed new hold to legal hold CCA and floor CCA to start referrals to behavioral health facilities.     Barriers to Discharge: Acceptance to behavioral health    Plan: Awaiting acceptance to behavioral health.

## 2020-10-27 NOTE — PROGRESS NOTES
"Bedside report received at change of shift, assumed care of pt. No immediate needs.     Pt is axox 4, withdrawn, cooperative.   Reporting pain of 6/10 in anterior head, PRN tylenol provided. CIWA protocol discontinued by provider this morning. Pt has some mild tremors upon assessment. PERRLA intact. Pt reports continued thoughts of suicide, when asked about any plans to act on SI he states \"I was going to either drink some antifreeze or jump off the freeway, but I guess they're still trying to get a bed for me at [facility]\".  Legal hold and sitter discontinued 10/26. WG in place on R wrist for safety. Needs attended. No additional needs at this time. Plan of care reviewed, patient board updated, environmental safety precautions in place, and frequent rounding in practice.   "

## 2020-10-28 VITALS
SYSTOLIC BLOOD PRESSURE: 99 MMHG | HEIGHT: 71 IN | TEMPERATURE: 97.3 F | WEIGHT: 225.97 LBS | DIASTOLIC BLOOD PRESSURE: 69 MMHG | OXYGEN SATURATION: 96 % | RESPIRATION RATE: 16 BRPM | HEART RATE: 66 BPM | BODY MASS INDEX: 31.64 KG/M2

## 2020-10-28 PROCEDURE — 700102 HCHG RX REV CODE 250 W/ 637 OVERRIDE(OP): Performed by: INTERNAL MEDICINE

## 2020-10-28 PROCEDURE — 700111 HCHG RX REV CODE 636 W/ 250 OVERRIDE (IP): Performed by: STUDENT IN AN ORGANIZED HEALTH CARE EDUCATION/TRAINING PROGRAM

## 2020-10-28 PROCEDURE — 51798 US URINE CAPACITY MEASURE: CPT

## 2020-10-28 PROCEDURE — 700102 HCHG RX REV CODE 250 W/ 637 OVERRIDE(OP): Performed by: HOSPITALIST

## 2020-10-28 PROCEDURE — A9270 NON-COVERED ITEM OR SERVICE: HCPCS | Performed by: STUDENT IN AN ORGANIZED HEALTH CARE EDUCATION/TRAINING PROGRAM

## 2020-10-28 PROCEDURE — 99239 HOSP IP/OBS DSCHRG MGMT >30: CPT | Performed by: STUDENT IN AN ORGANIZED HEALTH CARE EDUCATION/TRAINING PROGRAM

## 2020-10-28 PROCEDURE — A9270 NON-COVERED ITEM OR SERVICE: HCPCS | Performed by: HOSPITALIST

## 2020-10-28 PROCEDURE — A9270 NON-COVERED ITEM OR SERVICE: HCPCS | Performed by: INTERNAL MEDICINE

## 2020-10-28 PROCEDURE — 700102 HCHG RX REV CODE 250 W/ 637 OVERRIDE(OP): Performed by: STUDENT IN AN ORGANIZED HEALTH CARE EDUCATION/TRAINING PROGRAM

## 2020-10-28 RX ORDER — AMLODIPINE BESYLATE 5 MG/1
5 TABLET ORAL
Qty: 30 TAB | Refills: 0 | Status: SHIPPED
Start: 2020-10-28 | End: 2021-11-29

## 2020-10-28 RX ADMIN — ENOXAPARIN SODIUM 40 MG: 40 INJECTION SUBCUTANEOUS at 05:43

## 2020-10-28 RX ADMIN — DULOXETINE HYDROCHLORIDE 60 MG: 30 CAPSULE, DELAYED RELEASE ORAL at 05:48

## 2020-10-28 RX ADMIN — Medication 100 MG: at 05:48

## 2020-10-28 RX ADMIN — AMLODIPINE BESYLATE 5 MG: 5 TABLET ORAL at 05:48

## 2020-10-28 RX ADMIN — FOLIC ACID 1 MG: 1 TABLET ORAL at 05:48

## 2020-10-28 RX ADMIN — ACETAMINOPHEN 650 MG: 325 TABLET, FILM COATED ORAL at 08:01

## 2020-10-28 RX ADMIN — DOCUSATE SODIUM 50 MG AND SENNOSIDES 8.6 MG 2 TABLET: 8.6; 5 TABLET, FILM COATED ORAL at 05:48

## 2020-10-28 RX ADMIN — THERA TABS 1 TABLET: TAB at 05:48

## 2020-10-28 RX ADMIN — FAMOTIDINE 20 MG: 20 TABLET, FILM COATED ORAL at 05:43

## 2020-10-28 RX ADMIN — LISINOPRIL 20 MG: 20 TABLET ORAL at 05:43

## 2020-10-28 RX ADMIN — GABAPENTIN 400 MG: 400 CAPSULE ORAL at 05:48

## 2020-10-28 ASSESSMENT — PAIN DESCRIPTION - PAIN TYPE: TYPE: ACUTE PAIN

## 2020-10-28 NOTE — DISCHARGE SUMMARY
Discharge Summary    CHIEF COMPLAINT ON ADMISSION  Chief Complaint   Patient presents with   • Chest Pain     substernal tightness, + SOB, tingling in bilat hands. feels similar to previous panic attack   • Headache       Reason for Admission  Suicidal ideations     CODE STATUS  Full Code    HPI & HOSPITAL COURSE  70-year-old male with a past medical history of hypertension, chronic alcoholism and major depressive disorder with chronic suicide ideation was transferred from [facility] for chest pain work-up.  Patient's chest pain resolved and his work-up was negative for troponins, EKG changes and negative chest x-ray.  In the past he has had chest pain which he relates to chronic anxiety.  He was evaluated by psychiatry with reported that he does have a history of chronic suicide ideation and opted to not renew legal hold.  During several interviews throughout hospital stay patient voicing active suicidal ideation by jumping off a bridge or taking a lot of pills.  As such we will hold was renewed.  Patient is medically clear and is to be transferred to the service for continuity of psychiatric care regarding active suicidal ideation and rehabilitation for chronic alcoholism.     Therefore, he is discharged in good and stable condition to a psychiatric hospital.    The patient met 2-midnight criteria for an inpatient stay at the time of discharge.      FOLLOW UP ITEMS POST DISCHARGE  Psychiatric hospital to follow blood pressure  Occasional hospital to follow mental health and have for alcoholism    DISCHARGE DIAGNOSES  Principal Problem:    Suicidal ideation POA: Yes  Active Problems:    Alcoholism (HCC) POA: Yes    Alcohol withdrawal (HCC) POA: Yes    Major depressive disorder POA: Yes    Essential hypertension POA: Yes    Pancytopenia (HCC) POA: Yes  Resolved Problems:    * No resolved hospital problems. *      FOLLOW UP  No future appointments.  No follow-up provider specified.    MEDICATIONS ON DISCHARGE      Medication List      CONTINUE taking these medications      Instructions   acetaminophen 325 MG Tabs  Commonly known as: TYLENOL   Take 2 Tabs by mouth every 6 hours as needed (Mild Pain; (Pain scale 1-3); Temp greater than 100.5 F).  Dose: 650 mg     amLODIPine 5 MG Tabs  Commonly known as: NORVASC   Take 1 Tab by mouth every day. 1 table once a day  Dose: 5 mg     DULoxetine 60 MG Cpep delayed-release capsule  Commonly known as: CYMBALTA   Take 60 mg by mouth every day.  Dose: 60 mg     gabapentin 400 MG Caps  Commonly known as: NEURONTIN   Take 1 Cap by mouth 3 times a day for 180 days.  Dose: 400 mg     lisinopril 20 MG Tabs  Commonly known as: PRINIVIL   Take 20 mg by mouth every day.  Dose: 20 mg     quetiapine 300 MG tablet  Commonly known as: SEROQUEL   Take 300 mg by mouth every bedtime. FOR SLEEP  Dose: 300 mg            Allergies  No Known Allergies    DIET  Orders Placed This Encounter   Procedures   • Diet Order Regular, Cardiac     Standing Status:   Standing     Number of Occurrences:   1     Order Specific Question:   Diet:     Answer:   Regular [1]     Order Specific Question:   Diet:     Answer:   Cardiac [6]       ACTIVITY  As tolerated.  Weight bearing as tolerated    LINES, DRAINS, AND WOUNDS  This is an automated list. Peripheral IVs will be removed prior to discharge.       Wound 02/07/20 Other (comment) Coccyx Moisture fissure (Active)       Wound 02/07/20 Laceration Face Face lacerations (Active)                  MENTAL STATUS ON TRANSFER  Level of Consciousness: Alert  Orientation : Oriented x 4  Speech: Speech Clear    CONSULTATIONS  Psychiatry    PROCEDURES  None    LABORATORY  Lab Results   Component Value Date    SODIUM 128 (L) 10/26/2020    POTASSIUM 4.6 10/26/2020    CHLORIDE 92 (L) 10/26/2020    CO2 29 10/26/2020    GLUCOSE 80 10/26/2020    BUN 9 10/26/2020    CREATININE 0.77 10/26/2020        Lab Results   Component Value Date    WBC 3.3 (L) 10/26/2020    HEMOGLOBIN 13.5 (L)  10/26/2020    HEMATOCRIT 39.7 (L) 10/26/2020    PLATELETCT 75 (L) 10/26/2020        Total time of the discharge process exceeds 38 minutes.

## 2020-10-28 NOTE — DISCHARGE PLANNING
MSW received call from Janae at [facility]. They are declining pt due to being too medically complex.

## 2020-10-28 NOTE — PROGRESS NOTES
Bedside report received at change of shift, assumed care of pt.  No immediate needs.     Pt is axox 4, withdrawn. WG in place on R wrist. Reporting pain of 6/10 anterior headache, Prn tylenol provided. Pt reports some burning and hesitancy with urination. MD Mcconnell updated. Pt agreeable to getting out of bed to chair for lunch today. Needs attended. No additional needs at this time. Plan of care reviewed, patient board updated, environmental safety precautions in place, and frequent rounding in practice.

## 2020-10-28 NOTE — DISCHARGE PLANNING
Behavioral Health referral faxed to the following at 0834:  [facility], [facility], [facility], [facility], [facility]    @[phone]  Faxed the legal hold medical clearance to above behavioral health facilities.

## 2020-10-28 NOTE — CARE PLAN
Problem: Communication  Goal: The ability to communicate needs accurately and effectively will improve  Outcome: PROGRESSING AS EXPECTED  Note: Encourage patient to voice feelings      Problem: Safety  Goal: Will remain free from injury  Note: Patient no longer on legal hold per MD. Patient still has SI. Closely observing pt, educated to call for assistance.

## 2020-10-28 NOTE — PROGRESS NOTES
Discussed with MD the certification of LH and extending it as patient continues to state he has SI thoughts. Clarified with MD if pt ok to not have a sitter and he stated yes. Nursing communication placed so that others would be aware.

## 2020-10-28 NOTE — DISCHARGE PLANNING
Received Transport Form @ 4606  Spoke to Tete @ [facility]    Transport is scheduled for 10/28/2020 @1215 going to [place].    [facility] Trip #[id number]    Kwame @ [facility] notified of transport time.

## 2020-10-28 NOTE — DISCHARGE PLANNING
Anticipated Discharge Disposition: Behavioral Health Facility    Action: LSW spoke with BS RN Donna who repotted that she had spoken with [facility] and they were prepared to take Pt back today at 12. Donna asked LSW to arrange transport.     Sent [facility] transport request to [facility].     Barriers to Discharge: Transportation    [facility]

## 2020-10-28 NOTE — DISCHARGE INSTRUCTIONS
Discharge Summary     CHIEF COMPLAINT ON ADMISSION       Chief Complaint   Patient presents with   • Chest Pain       substernal tightness, + SOB, tingling in bilat hands. feels similar to previous panic attack   • Headache         Reason for Admission  Suicidal ideations      CODE STATUS  Full Code     HPI & HOSPITAL COURSE  70-year-old male with a past medical history of hypertension, chronic alcoholism and major depressive disorder with chronic suicide ideation was transferred from [place] for chest pain work-up.  Patient's chest pain resolved and his work-up was negative for troponins, EKG changes and negative chest x-ray.  In the past he has had chest pain which he relates to chronic anxiety.  He was evaluated by psychiatry with reported that he does have a history of chronic suicide ideation and opted to not renew legal hold.  During several interviews throughout hospital stay patient voicing active suicidal ideation by jumping off a bridge or taking a lot of pills.  As such we will hold was renewed.  Patient is medically clear and is to be transferred to the service for continuity of psychiatric care regarding active suicidal ideation and rehabilitation for chronic alcoholism.      Therefore, he is discharged in good and stable condition to a psychiatric hospital.     The patient met 2-midnight criteria for an inpatient stay at the time of discharge.        FOLLOW UP ITEMS POST DISCHARGE  Psychiatric hospital to follow blood pressure  Occasional hospital to follow mental health and have for alcoholism     DISCHARGE DIAGNOSES  Principal Problem:    Suicidal ideation POA: Yes  Active Problems:    Alcoholism (HCC) POA: Yes    Alcohol withdrawal (HCC) POA: Yes    Major depressive disorder POA: Yes    Essential hypertension POA: Yes    Pancytopenia (HCC) POA: Yes  Resolved Problems:    * No resolved hospital problems. *        FOLLOW UP  No future appointments.  No follow-up provider specified.     MEDICATIONS  ON DISCHARGE          Medication List           CONTINUE taking these medications      Instructions   acetaminophen 325 MG Tabs  Commonly known as: TYLENOL    Take 2 Tabs by mouth every 6 hours as needed (Mild Pain; (Pain scale 1-3); Temp greater than 100.5 F).  Dose: 650 mg      amLODIPine 5 MG Tabs  Commonly known as: NORVASC    Take 1 Tab by mouth every day. 1 table once a day  Dose: 5 mg      DULoxetine 60 MG Cpep delayed-release capsule  Commonly known as: CYMBALTA    Take 60 mg by mouth every day.  Dose: 60 mg      gabapentin 400 MG Caps  Commonly known as: NEURONTIN    Take 1 Cap by mouth 3 times a day for 180 days.  Dose: 400 mg      lisinopril 20 MG Tabs  Commonly known as: PRINIVIL    Take 20 mg by mouth every day.  Dose: 20 mg      quetiapine 300 MG tablet  Commonly known as: SEROQUEL    Take 300 mg by mouth every bedtime. FOR SLEEP  Dose: 300 mg                Allergies  No Known Allergies     DIET        Orders Placed This Encounter   Procedures   • Diet Order Regular, Cardiac       Standing Status:   Standing       Number of Occurrences:   1       Order Specific Question:   Diet:       Answer:   Regular [1]       Order Specific Question:   Diet:       Answer:   Cardiac [6]         ACTIVITY  As tolerated.  Weight bearing as tolerated     LINES, DRAINS, AND WOUNDS  This is an automated list. Peripheral IVs will be removed prior to discharge.       Wound 02/07/20 Other (comment) Coccyx Moisture fissure (Active)       Wound 02/07/20 Laceration Face Face lacerations (Active)                   MENTAL STATUS ON TRANSFER  Level of Consciousness: Alert  Orientation : Oriented x 4  Speech: Speech Clear     CONSULTATIONS  Psychiatry     PROCEDURES  None      Discharge Instructions    Discharged to other by ambulance with self. Discharged via ambulance, hospital escort: Refused.  Special equipment needed: Not Applicable    Be sure to schedule a follow-up appointment with your primary care doctor or any specialists  as instructed.     Discharge Plan:   Diet Plan: Discussed  Activity Level: Discussed  Confirmed Follow up Appointment: No (Comments)  Confirmed Symptoms Management: Discussed  Medication Reconciliation Updated: Yes  Influenza Vaccine Indication: Not indicated: Previously immunized this influenza season and > 8 years of age(per pt received 10/14/2020)    I understand that a diet low in cholesterol, fat, and sodium is recommended for good health. Unless I have been given specific instructions below for another diet, I accept this instruction as my diet prescription.   Other diet: n/a    Special Instructions: None    · Is patient discharged on Warfarin / Coumadin?   No     Depression / Suicide Risk    As you are discharged from this [facility] facility, it is important to learn how to keep safe from harming yourself.    Recognize the warning signs:  · Abrupt changes in personality, positive or negative- including increase in energy   · Giving away possessions  · Change in eating patterns- significant weight changes-  positive or negative  · Change in sleeping patterns- unable to sleep or sleeping all the time   · Unwillingness or inability to communicate  · Depression  · Unusual sadness, discouragement and loneliness  · Talk of wanting to die  · Neglect of personal appearance   · Rebelliousness- reckless behavior  · Withdrawal from people/activities they love  · Confusion- inability to concentrate     If you or a loved one observes any of these behaviors or has concerns about self-harm, here's what you can do:  · Talk about it- your feelings and reasons for harming yourself  · Remove any means that you might use to hurt yourself (examples: pills, rope, extension cords, firearm)  · Get professional help from the community (Mental Health, Substance Abuse, psychological counseling)  · Do not be alone:Call your Safe Contact- someone whom you trust who will be there for you.  · Call your local CRISIS HOTLINE 405-4183 or  457-824-7875  · Call your local Children's Mobile Crisis Response Team [place] (252) 094-9101 or www.[place]  · Call the toll free National Suicide Prevention Hotlines   · National Suicide Prevention Lifeline 678-398-PDLO (3747)  · National SaySwap Line Network 800-SUICIDE (835-8367)

## 2020-10-28 NOTE — PROGRESS NOTES
Received bedside report from RN, pt care assumed. pt assessment complete. Pt AAOx4, 3/10 head pain at this time. No signs of acute distress noted at this time. POC discussed with pt. Pt states he still has thoughts of suicide. Not on legal hold at this time per MD, pt ok not to have a sitter. Will keep a close observation on him. Hourly rounding in place. wanderguard on. Educated him to call for help or if he needs to talk.  Bed in lowest position,  pt educated on fall risk and verbalized understanding, call light within reach.

## 2020-10-28 NOTE — PROGRESS NOTES
Pt dc'd 1224. WG removed Pt left unit via ambulance with [facility]. Personal belongings with [facility] when leaving unit. Pt given discharge instructions prior to leaving unit; verbalizes understanding. Copy of discharge instructions with pt and in the chart.

## 2020-11-04 ENCOUNTER — HOSPITAL ENCOUNTER (EMERGENCY)
Facility: MEDICAL CENTER | Age: 70
End: 2020-11-05
Attending: EMERGENCY MEDICINE | Admitting: EMERGENCY MEDICINE
Payer: MEDICARE

## 2020-11-04 DIAGNOSIS — T50.902A INTENTIONAL DRUG OVERDOSE, INITIAL ENCOUNTER (HCC): ICD-10-CM

## 2020-11-04 DIAGNOSIS — E87.1 HYPONATREMIA: ICD-10-CM

## 2020-11-04 DIAGNOSIS — R45.851 SUICIDAL IDEATION: ICD-10-CM

## 2020-11-04 LAB
ALBUMIN SERPL BCP-MCNC: 4.2 G/DL (ref 3.2–4.9)
ALBUMIN/GLOB SERPL: 1.5 G/DL
ALP SERPL-CCNC: 64 U/L (ref 30–99)
ALT SERPL-CCNC: 34 U/L (ref 2–50)
AMPHET UR QL SCN: NEGATIVE
ANION GAP SERPL CALC-SCNC: 13 MMOL/L (ref 7–16)
APAP SERPL-MCNC: <5 UG/ML (ref 10–30)
AST SERPL-CCNC: 81 U/L (ref 12–45)
BARBITURATES UR QL SCN: NEGATIVE
BASOPHILS # BLD AUTO: 0.2 % (ref 0–1.8)
BASOPHILS # BLD: 0.01 K/UL (ref 0–0.12)
BENZODIAZ UR QL SCN: NEGATIVE
BILIRUB SERPL-MCNC: 1 MG/DL (ref 0.1–1.5)
BUN SERPL-MCNC: 8 MG/DL (ref 8–22)
BZE UR QL SCN: NEGATIVE
CALCIUM SERPL-MCNC: 8.8 MG/DL (ref 8.5–10.5)
CANNABINOIDS UR QL SCN: POSITIVE
CHLORIDE SERPL-SCNC: 90 MMOL/L (ref 96–112)
CO2 SERPL-SCNC: 22 MMOL/L (ref 20–33)
CREAT SERPL-MCNC: 0.6 MG/DL (ref 0.5–1.4)
EOSINOPHIL # BLD AUTO: 0.2 K/UL (ref 0–0.51)
EOSINOPHIL NFR BLD: 3.3 % (ref 0–6.9)
ERYTHROCYTE [DISTWIDTH] IN BLOOD BY AUTOMATED COUNT: 44.2 FL (ref 35.9–50)
GLOBULIN SER CALC-MCNC: 2.8 G/DL (ref 1.9–3.5)
GLUCOSE BLD-MCNC: 110 MG/DL (ref 65–99)
GLUCOSE SERPL-MCNC: 82 MG/DL (ref 65–99)
HCT VFR BLD AUTO: 35.2 % (ref 42–52)
HGB BLD-MCNC: 12.5 G/DL (ref 14–18)
IMM GRANULOCYTES # BLD AUTO: 0.01 K/UL (ref 0–0.11)
IMM GRANULOCYTES NFR BLD AUTO: 0.2 % (ref 0–0.9)
LIPASE SERPL-CCNC: 20 U/L (ref 11–82)
LYMPHOCYTES # BLD AUTO: 1.1 K/UL (ref 1–4.8)
LYMPHOCYTES NFR BLD: 18.3 % (ref 22–41)
MCH RBC QN AUTO: 33.3 PG (ref 27–33)
MCHC RBC AUTO-ENTMCNC: 35.5 G/DL (ref 33.7–35.3)
MCV RBC AUTO: 93.9 FL (ref 81.4–97.8)
METHADONE UR QL SCN: NEGATIVE
MONOCYTES # BLD AUTO: 0.52 K/UL (ref 0–0.85)
MONOCYTES NFR BLD AUTO: 8.6 % (ref 0–13.4)
NEUTROPHILS # BLD AUTO: 4.18 K/UL (ref 1.82–7.42)
NEUTROPHILS NFR BLD: 69.4 % (ref 44–72)
NRBC # BLD AUTO: 0 K/UL
NRBC BLD-RTO: 0 /100 WBC
OPIATES UR QL SCN: NEGATIVE
OXYCODONE UR QL SCN: NEGATIVE
PCP UR QL SCN: NEGATIVE
PLATELET # BLD AUTO: 93 K/UL (ref 164–446)
PMV BLD AUTO: 10.9 FL (ref 9–12.9)
POC BREATHALIZER: 0.06 PERCENT (ref 0–0.01)
POTASSIUM SERPL-SCNC: 3.9 MMOL/L (ref 3.6–5.5)
PROPOXYPH UR QL SCN: NEGATIVE
PROT SERPL-MCNC: 7 G/DL (ref 6–8.2)
RBC # BLD AUTO: 3.75 M/UL (ref 4.7–6.1)
SALICYLATES SERPL-MCNC: <1 MG/DL (ref 15–25)
SODIUM SERPL-SCNC: 125 MMOL/L (ref 135–145)
WBC # BLD AUTO: 6 K/UL (ref 4.8–10.8)

## 2020-11-04 PROCEDURE — 82962 GLUCOSE BLOOD TEST: CPT

## 2020-11-04 PROCEDURE — 80053 COMPREHEN METABOLIC PANEL: CPT

## 2020-11-04 PROCEDURE — 80307 DRUG TEST PRSMV CHEM ANLYZR: CPT

## 2020-11-04 PROCEDURE — 302970 POC BREATHALIZER: Performed by: EMERGENCY MEDICINE

## 2020-11-04 PROCEDURE — 83690 ASSAY OF LIPASE: CPT

## 2020-11-04 PROCEDURE — 85025 COMPLETE CBC W/AUTO DIFF WBC: CPT

## 2020-11-04 PROCEDURE — 99285 EMERGENCY DEPT VISIT HI MDM: CPT

## 2020-11-04 PROCEDURE — 90791 PSYCH DIAGNOSTIC EVALUATION: CPT

## 2020-11-04 PROCEDURE — 700105 HCHG RX REV CODE 258: Performed by: EMERGENCY MEDICINE

## 2020-11-04 RX ORDER — ACETAMINOPHEN 500 MG
1000 TABLET ORAL EVERY 6 HOURS PRN
COMMUNITY
End: 2021-01-03

## 2020-11-04 RX ORDER — SODIUM CHLORIDE 9 MG/ML
1000 INJECTION, SOLUTION INTRAVENOUS ONCE
Status: COMPLETED | OUTPATIENT
Start: 2020-11-04 | End: 2020-11-05

## 2020-11-04 RX ADMIN — SODIUM CHLORIDE 1000 ML: 9 INJECTION, SOLUTION INTRAVENOUS at 23:00

## 2020-11-04 ASSESSMENT — FIBROSIS 4 INDEX: FIB4 SCORE: 10.26

## 2020-11-04 NOTE — ED TRIAGE NOTES
"Chief Complaint   Patient presents with   • Suicidal     Pt states he took 1200 mg seroquel with 2 6 packs beer today t \"try and kill myself.\"  \"I don't want to live, I have no reason to live anymore.\"     BIBA to room.  Agree with triage assessment.  PT voluntary called and asked to be brought to hospital due to SI.  All belongings removed from room, all sharp objects removed.  "

## 2020-11-04 NOTE — ED PROVIDER NOTES
"ED Provider Note    Scribed for Stephen Harris M.D. by Nash Morrell. 11/4/2020  3:50 PM    Primary care provider: oSuth Serna P.A.-C.  Means of arrival: Ambulance  History obtained from: Patient  History limited by: None    CHIEF COMPLAINT  Chief Complaint   Patient presents with   • Suicidal     Pt states he took 1200 mg seroquel with 2 6 packs beer today t \"try and kill myself.\"  \"I don't want to live, I have no reason to live anymore.\"       HPI  Chris Leggett is a 70 y.o. male who presents to the Emergency Department for suicidal ideations onset years ago. Patient states that he doesn't want to live at this time. Patient states that yesterday he took 4 Seroquel and drank in order to kill himself. Patient notes that he has a history of attempting to kill himself where he was going to jump off of a bridge onto the freeway, but wasn't able to do it. He adds that he fell from the bridge and injured his neck. Patient states that he is homeless and has been in and out of the hospital recently. Patient states that he is experiencing abdominal pain at this time. Patient is currently afebrile.   PPE Note: I personally donned full PPE for all patient encounters during this visit, including being clean-shaven with an N95 respirator mask and gloves.    Scribe remained outside the patient's room and did not have any contact with the patient for the duration of patient encounter.     REVIEW OF SYSTEMS  Pertinent negatives include no fevers. As above, all other systems reviewed and are negative.   See HPI for further details.     PAST MEDICAL HISTORY   has a past medical history of Arthritis, Gout, Hypertension, and Psychiatric disorder.    SURGICAL HISTORY   has a past surgical history that includes cervical disk and fusion anterior (9/2/2018); other orthopedic surgery; dx bone marrow aspirations (Left, 10/2/2019); and dx bone marrow biopsies (10/2/2019).    SOCIAL HISTORY  Social History     Tobacco Use   • " "Smoking status: Current Every Day Smoker     Packs/day: 0.50     Years: 5.00     Pack years: 2.50     Types: Cigarettes     Last attempt to quit: 2019     Years since quittin.7   • Smokeless tobacco: Never Used   Substance Use Topics   • Alcohol use: Yes     Alcohol/week: 10.8 - 12.0 oz     Types: 18 - 20 Cans of beer per week     Frequency: 4 or more times a week     Drinks per session: 10 or more     Binge frequency: Daily or almost daily   • Drug use: Yes     Types: Inhaled     Comment: marijuana last dose last month      Social History     Substance and Sexual Activity   Drug Use Yes   • Types: Inhaled    Comment: marijuana last dose last month       FAMILY HISTORY  Family History   Problem Relation Age of Onset   • Cancer Mother    • Alzheimer's Disease Father    • Cancer Father    • Heart Disease Sister    • Heart Disease Brother        CURRENT MEDICATIONS    Current Outpatient Medications:   •  amLODIPine (NORVASC) 5 MG Tab, Take 1 Tab by mouth every day. 1 table once a day, Disp: 30 Tab, Rfl: 0  •  acetaminophen (TYLENOL) 325 MG Tab, Take 2 Tabs by mouth every 6 hours as needed (Mild Pain; (Pain scale 1-3); Temp greater than 100.5 F)., Disp: 30 Tab, Rfl: 0  •  lisinopril (PRINIVIL) 20 MG Tab, Take 20 mg by mouth every day., Disp: , Rfl:   •  DULoxetine (CYMBALTA) 60 MG Cap DR Particles delayed-release capsule, Take 60 mg by mouth every day., Disp: , Rfl:   •  quetiapine (SEROQUEL) 300 MG tablet, Take 300 mg by mouth every bedtime. FOR SLEEP, Disp: , Rfl:   •  gabapentin (NEURONTIN) 400 MG Cap, Take 1 Cap by mouth 3 times a day for 180 days., Disp: 270 Cap, Rfl: 1    ALLERGIES  No Known Allergies    PHYSICAL EXAM  VITAL SIGNS: /81   Pulse 71   Temp 36.8 °C (98.2 °F) (Temporal)   Resp 20   Ht 1.803 m (5' 11\")   Wt 102.1 kg (225 lb)   SpO2 92%   BMI 31.38 kg/m²   Constitutional: Well developed, Well nourished, No acute distress.  HENT: Normocephalic, Atraumatic, Bilateral external ears " normal, Oropharynx is clear mucous membranes are moist. No oral exudates or nasal discharge.   Eyes: Pupils are equal round and reactive, EOMI, Conjunctiva normal, No discharge.   Neck: Normal range of motion, No tenderness, Supple, No stridor. No meningismus.  Lymphatic: No lymphadenopathy noted.   Cardiovascular: Regular rate and rhythm without murmur rub or gallop.  Thorax & Lungs: Clear breath sounds bilaterally without wheezes, rhonchi or rales. There is no chest wall tenderness.   Abdomen: Soft non-tender non-distended. There is no rebound or guarding. No organomegaly is appreciated. Bowel sounds are normal.  Skin: Normal without rash.   Back: No CVA or spinal tenderness.   Extremities: Intact distal pulses, No edema, No tenderness, No cyanosis, No clubbing. Capillary refill is less than 2 seconds.  Musculoskeletal: Good range of motion in all major joints. No tenderness to palpation or major deformities noted.   Neurologic: Alert & oriented x 3, Normal motor function, Normal sensory function, No focal deficits noted. Reflexes are normal.  Psychiatric: Despondent, actively suicidal. No patient reported hallucinations.     DIAGNOSTIC STUDIES / PROCEDURES    LABS  Labs Reviewed   URINE DRUG SCREEN - Abnormal; Notable for the following components:       Result Value    Cannabinoid Metab Positive (*)     All other components within normal limits   CBC WITH DIFFERENTIAL - Abnormal; Notable for the following components:    RBC 3.75 (*)     Hemoglobin 12.5 (*)     Hematocrit 35.2 (*)     MCH 33.3 (*)     MCHC 35.5 (*)     Platelet Count 93 (*)     Lymphocytes 18.30 (*)     All other components within normal limits   COMP METABOLIC PANEL - Abnormal; Notable for the following components:    Sodium 125 (*)     Chloride 90 (*)     AST(SGOT) 81 (*)     All other components within normal limits   ACETAMINOPHEN - Abnormal; Notable for the following components:    Acetaminophen -Tylenol <5 (*)     All other components within  normal limits   SALICYLATE - Abnormal; Notable for the following components:    Salicylates, Quant. <1 (*)     All other components within normal limits   POC BREATHALIZER - Abnormal; Notable for the following components:    POC Breathalizer 0.06 (*)     All other components within normal limits   LIPASE   ESTIMATED GFR      All labs reviewed by me.    COURSE & MEDICAL DECISION MAKING  Nursing notes, VS, PMSFHx reviewed in chart.    3:50 PM Patient seen and examined at bedside. Ordered for labs to evaluate. I contacted behavioral health.     4:43 PM Review of patient's discharge summary from October 28th shows that the patient was complaining of chest pain and was transferred to [facility] where he received a workup which was negative. Patient is likely to be held until we are able to transfer him to a psychiatric facility.     Patient had labs come back as hyponatremic at 125 but this should not be causing him significant psychiatric issues.  He is not weak clinically and I will instruct him to eat a saltier diet in the near future.  Aspirin and Tylenol level are unremarkable and lipase is normal and there is no evidence of metabolic acidosis    8:01 PM Should the patient still be in the department at the time of my sign out they will be assigned to one of my partners to assure that appropriate disposition to a psych facility is made. If there is additional need for medicine, nursing staff will speak with my partner regarding administration of medication.    Patient has had high blood pressure while in the emergency department, felt likely secondary to medical condition. Counseled patient to monitor blood pressure at home and follow up with primary care physician.      DISPOSITION:  Patient care will be transferred to a psychiatric facility when they have availability.  Currently he is not godwin for safety and has not yet been evaluated by life skills    FINAL IMPRESSION  1. Suicidal ideation    2. Intentional  drug overdose, initial encounter (HCC)    3. Hyponatremia          Nash PERSAUD (Scribe), am scribing for, and in the presence of, Stephen Harris M.D..    Electronically signed by: Nash Morrell (Delilah), 11/4/2020    Stephen PERSAUD M.D. personally performed the services described in this documentation, as scribed by Nash Morrell in my presence, and it is both accurate and complete. C    The note accurately reflects work and decisions made by me.  Stephen Harris M.D.  11/4/2020  9:25 PM

## 2020-11-05 ENCOUNTER — APPOINTMENT (OUTPATIENT)
Dept: RADIOLOGY | Facility: MEDICAL CENTER | Age: 70
End: 2020-11-05
Attending: EMERGENCY MEDICINE
Payer: MEDICARE

## 2020-11-05 VITALS
SYSTOLIC BLOOD PRESSURE: 140 MMHG | HEART RATE: 72 BPM | RESPIRATION RATE: 16 BRPM | HEIGHT: 71 IN | TEMPERATURE: 97.7 F | OXYGEN SATURATION: 93 % | BODY MASS INDEX: 31.5 KG/M2 | DIASTOLIC BLOOD PRESSURE: 84 MMHG | WEIGHT: 225 LBS

## 2020-11-05 LAB
ANION GAP SERPL CALC-SCNC: 13 MMOL/L (ref 7–16)
BUN SERPL-MCNC: 8 MG/DL (ref 8–22)
CALCIUM SERPL-MCNC: 8.8 MG/DL (ref 8.5–10.5)
CHLORIDE SERPL-SCNC: 93 MMOL/L (ref 96–112)
CO2 SERPL-SCNC: 24 MMOL/L (ref 20–33)
COVID ORDER STATUS COVID19: NORMAL
CREAT SERPL-MCNC: 0.42 MG/DL (ref 0.5–1.4)
D DIMER PPP IA.FEU-MCNC: 1.12 UG/ML (FEU) (ref 0–0.5)
EKG IMPRESSION: NORMAL
GLUCOSE SERPL-MCNC: 87 MG/DL (ref 65–99)
POTASSIUM SERPL-SCNC: 3.8 MMOL/L (ref 3.6–5.5)
SARS-COV-2 RNA RESP QL NAA+PROBE: NOTDETECTED
SODIUM SERPL-SCNC: 130 MMOL/L (ref 135–145)
SPECIMEN SOURCE: NORMAL
TROPONIN T SERPL-MCNC: 17 NG/L (ref 6–19)

## 2020-11-05 PROCEDURE — 36415 COLL VENOUS BLD VENIPUNCTURE: CPT

## 2020-11-05 PROCEDURE — 700102 HCHG RX REV CODE 250 W/ 637 OVERRIDE(OP): Performed by: EMERGENCY MEDICINE

## 2020-11-05 PROCEDURE — 85379 FIBRIN DEGRADATION QUANT: CPT

## 2020-11-05 PROCEDURE — 99285 EMERGENCY DEPT VISIT HI MDM: CPT | Performed by: PSYCHIATRY & NEUROLOGY

## 2020-11-05 PROCEDURE — 700117 HCHG RX CONTRAST REV CODE 255: Performed by: EMERGENCY MEDICINE

## 2020-11-05 PROCEDURE — C9803 HOPD COVID-19 SPEC COLLECT: HCPCS | Performed by: EMERGENCY MEDICINE

## 2020-11-05 PROCEDURE — 93005 ELECTROCARDIOGRAM TRACING: CPT

## 2020-11-05 PROCEDURE — A9270 NON-COVERED ITEM OR SERVICE: HCPCS | Performed by: EMERGENCY MEDICINE

## 2020-11-05 PROCEDURE — U0003 INFECTIOUS AGENT DETECTION BY NUCLEIC ACID (DNA OR RNA); SEVERE ACUTE RESPIRATORY SYNDROME CORONAVIRUS 2 (SARS-COV-2) (CORONAVIRUS DISEASE [COVID-19]), AMPLIFIED PROBE TECHNIQUE, MAKING USE OF HIGH THROUGHPUT TECHNOLOGIES AS DESCRIBED BY CMS-2020-01-R: HCPCS

## 2020-11-05 PROCEDURE — 84484 ASSAY OF TROPONIN QUANT: CPT

## 2020-11-05 PROCEDURE — 71275 CT ANGIOGRAPHY CHEST: CPT

## 2020-11-05 PROCEDURE — 80048 BASIC METABOLIC PNL TOTAL CA: CPT

## 2020-11-05 PROCEDURE — 71045 X-RAY EXAM CHEST 1 VIEW: CPT

## 2020-11-05 RX ORDER — LISINOPRIL 20 MG/1
20 TABLET ORAL DAILY
Status: DISCONTINUED | OUTPATIENT
Start: 2020-11-05 | End: 2020-11-05 | Stop reason: HOSPADM

## 2020-11-05 RX ORDER — DULOXETIN HYDROCHLORIDE 60 MG/1
60 CAPSULE, DELAYED RELEASE ORAL DAILY
Status: DISCONTINUED | OUTPATIENT
Start: 2020-11-05 | End: 2020-11-05

## 2020-11-05 RX ORDER — AMLODIPINE BESYLATE 5 MG/1
5 TABLET ORAL
Status: DISCONTINUED | OUTPATIENT
Start: 2020-11-05 | End: 2020-11-05 | Stop reason: HOSPADM

## 2020-11-05 RX ORDER — GABAPENTIN 400 MG/1
400 CAPSULE ORAL 3 TIMES DAILY
Status: DISCONTINUED | OUTPATIENT
Start: 2020-11-05 | End: 2020-11-05 | Stop reason: HOSPADM

## 2020-11-05 RX ORDER — QUETIAPINE FUMARATE 100 MG/1
300 TABLET, FILM COATED ORAL
Status: DISCONTINUED | OUTPATIENT
Start: 2020-11-05 | End: 2020-11-05

## 2020-11-05 RX ADMIN — IOHEXOL 60 ML: 350 INJECTION, SOLUTION INTRAVENOUS at 11:01

## 2020-11-05 RX ADMIN — DULOXETINE HYDROCHLORIDE 60 MG: 60 CAPSULE, DELAYED RELEASE ORAL at 10:03

## 2020-11-05 RX ADMIN — LISINOPRIL 20 MG: 20 TABLET ORAL at 10:03

## 2020-11-05 RX ADMIN — GABAPENTIN 400 MG: 400 CAPSULE ORAL at 10:03

## 2020-11-05 RX ADMIN — AMLODIPINE BESYLATE 5 MG: 5 TABLET ORAL at 10:03

## 2020-11-05 ASSESSMENT — ENCOUNTER SYMPTOMS
SHORTNESS OF BREATH: 1
DIZZINESS: 1
CHILLS: 0
VOMITING: 0
SORE THROAT: 0
INSOMNIA: 1
DIARRHEA: 1
FEVER: 0
NAUSEA: 1
BACK PAIN: 1
BLURRED VISION: 1
DEPRESSION: 1
COUGH: 0
ABDOMINAL PAIN: 0
HALLUCINATIONS: 0
TREMORS: 1
SENSORY CHANGE: 1

## 2020-11-05 ASSESSMENT — LIFESTYLE VARIABLES: SUBSTANCE_ABUSE: 1

## 2020-11-05 NOTE — CONSULTS
"[facility]   TRIAGE ASSESSMENT    Name: Chris Leggett  MRN: [id number]  : 1950  Age: 70 y.o.  Date of assessment: 2020  PCP: [place] Serna P.A.-C.  Persons in attendance: Patient    CHIEF COMPLAINT/PRESENTING ISSUE (as stated by patient): Patient is a 71 y/o male BIB EMS after endorsing suicidal Ideation. Called 911 himself. Reports he toke..1200 mg's of Seroquel and 2 6 packs of beer with intent to \"end my life\", \"I have nothing to live for\". Continues to endorse suicidal thoughts, hopelessness. Patient is not currently engaged in PMH outpatient and uncertain compliance with medication regime. EMR documents history of alcohol intoxication with various medical complaints and suicidal ideation dating back to . Patient is unsheltered with minimal social support. Patient is somnolent, oriented to self, place and situation, depressed mood, poor eye contact, one-two word responses, affect is blunted, Denies HI or AVH and does not display delusional or paranoid thought content. Patient will remain on legal hold and transfer to psychiatric facility for further stabilization.   Chief Complaint   Patient presents with   • Suicidal     Pt states he took 1200 mg seroquel with 2 6 packs beer today t \"try and kill myself.\"  \"I don't want to live, I have no reason to live anymore.\"        CURRENT LIVING SITUATION/SOCIAL SUPPORT: Patient is currently unsheltered and further reports no support system. SSI.       BEHAVIORAL HEALTH TREATMENT HISTORY  Does patient/parent report a history of prior behavioral health treatment for patient?   Yes:    Dates Level of Care Facilty/Provider Diagnosis/Problem Medications    10/2020 In[t  [facility] Seroquel, Trazadone    2020 inpt  [facility]   [facility]  uncertain medication compliance               11/2018 x1 month, 3/2019, 2019 inpt [facility] SI/etoh Went off meds after DC'd and didn't f/u                2018, 1/2019 x2, 2019, 2019 inpt [facility]           "       [facility]                                SAFETY ASSESSMENT - SELF  Does patient acknowledge current or past symptoms of dangerousness to self? yes  Does parent/significant other report patient has current or past symptoms of dangerousness to self? N\A  Does presenting problem suggest symptoms of dangerousness to self? Yes:     Past Current    Suicidal Thoughts: [x]  [x]    Suicidal Plans: [x]  [x]    Suicidal Intent: []  [x]    Suicide Attempts: [x]  [x]    Self-Injury []  []      For any boxes checked above, provide detail: Patient currently endorsing SI. Reports he toke 1200mg Seroquel medication. EMR documents past ER visits for SI, attempts have varied; OD and jumping off a bridge but fell and then required surgery.     History of suicide by family member: no  History of suicide by friend/significant other: no  Recent change in frequency/specificity/intensity of suicidal thoughts or self-harm behavior? yes -   Current access to firearms, medications, or other identified means of suicide/self-harm? no  If yes, willing to restrict access to means of suicide/self-harm? no  Protective factors present:  Willing to address in treatment    SAFETY ASSESSMENT - OTHERS  Does patient acknowledge current or past symptoms of aggressive behavior or risk to others? no  Does parent/significant other report patient has current or past symptoms of aggressive behavior or risk to others?  N\A  Does presenting problem suggest symptoms of dangerousness to others? No    Crisis Safety Plan completed and copy given to patient? no    ABUSE/NEGLECT SCREENING  Does patient report feeling “unsafe” in his/her home, or afraid of anyone?  no  Does patient report any history of physical, sexual, or emotional abuse?  no  Does parent or significant other report any of the above? N\A  Is there evidence of neglect by self?  yes  Is there evidence of neglect by a caregiver? no  Does the patient/parent report any history of  "CPS/APS/police involvement related to suspected abuse/neglect or domestic violence? no  Based on the information provided during the current assessment, is a mandated report of suspected abuse/neglect being made?  No    SUBSTANCE USE SCREENING  Yes:  Darnell all substances used in the past 30 days:      Last Use Amount   []   Alcohol     [x]   Marijuana \"daily\"    []   Heroin     []   Prescription Opioids  (used without prescription, for    recreation, or in excess of prescribed amount)     []   Other Prescription  (used without prescription, for    recreation, or in excess of prescribed amount)     []   Cocaine      []   Methamphetamine     []   \"\" drugs (ectasy, MDMA)     []   Other substances        UDS results: Positive for Cannabinoid  Breathalyzer results: 0.06    What consequences does the patient associate with any of the above substance use and or addictive behaviors? Health problems: , Monetary problems:     Risk factors for detox (check all that apply):  []  Seizures   []  Diaphoretic (sweating)   [x]  Tremors   []  Hallucinations   []  Increased blood pressure   []  Decreased blood pressure   []  Other   []  None      [] Patient education on risk factors for detoxification and instructed to return to ER as needed.      MENTAL STATUS   Participation: Limited verbal participation  Grooming: Disheveled  Orientation: Drowsy/Somnolent  Behavior: Calm  Eye contact: Poor  Mood: Depressed  Affect: Flat  Thought process: Logical  Thought content: Within normal limits  Speech: Hypotalkative and Latency of response  Perception: Within normal limits  Memory:  Poor memory for chronology of events  Insight: Poor  Judgment:  Poor  Other:    Collateral information:   Source:  [] Significant other present in person:   [] Significant other by telephone  [] [facility]   [x] [facility] Nursing Staff  [x] [facility]  [x] Other:  ERP    [] Unable to complete full assessment due to:  [] Acute " intoxication  [] Patient declined to participate/engage  [] Patient verbally unresponsive  [] Significant cognitive deficits  [] Significant perceptual distortions or behavioral disorganization  [] Other:      CLINICAL IMPRESSIONS:  Primary:  Suicidal ideation   Secondary:  Depression       IDENTIFIED NEEDS/PLAN:  [Trigger DISPOSITION list for any items marked]    [x]  Imminent safety risk - self [] Imminent safety risk - others   []  Acute substance withdrawal []  Psychosis/Impaired reality testing   [x]  Mood/anxiety [x]  Substance use/Addictive behavior   [x]  Maladaptive behaviro []  Parent/child conflict   []  Family/Couples conflict []  Biomedical   []  Housing [x]  Financial   []   Legal  Occupational/Educational   []  Domestic violence []  Other:     Recommended Plan of Care:  [facility]    Does patient express agreement with the above plan? yes    Referral appointment(s) scheduled? no    Alert team only:   I have discussed findings and recommendations with Dr. Harris who is in agreement with these recommendations.     Referral information sent to the following community providers :    If applicable : Referred  to : [facility] for legal hold follow up at (time): 22:35      Akshat Lennon R.N.  11/4/2020

## 2020-11-05 NOTE — ED NOTES
PT continues sleeping.  ERP cam by and signed legal hold paperwork.  Still awaiting Alert Team assessment.  Sitter in full view of pt at all times.

## 2020-11-05 NOTE — ED NOTES
Med rec complete via interview with pt at bedside. Pt was unsure of the strength of some of his medications, contacted pt home pharmacy for clarification. Per pharmacy pt has not filled medications there since July. Allergies reviewed. Pt denies antibiotic use in past 14 days.

## 2020-11-05 NOTE — ED NOTES
PT HELPED UP TO USE BSC FOR BOWEL MOVEMENT. C/O DIZZINESS, SEEMS UNSTABLE ON FEET. MODERATE, SOFT BM. BACK TO BED AT THIS TIME, CONNECTED TO MONITORING, VSS.

## 2020-11-05 NOTE — ED NOTES
"EKG COMPLETED REGARDING CP COMPLAINT. RAD AT BEDSIDE AT THIS TIME. WHILE IN ROOM PT STS \"I HOPE ITS A HEART ATTACK, THEN I WILL DIE.\" SITTER IN [place] FOR CONTINUOUS MONITORING.   "

## 2020-11-05 NOTE — ED NOTES
Patient's home medications have been reviewed by the pharmacy team.       Patient presented to ED for SI  Past Medical History:   Diagnosis Date   • Arthritis    • Gout    • Hypertension    • Psychiatric disorder        Patient's Medications   New Prescriptions    No medications on file   Previous Medications    ACETAMINOPHEN (TYLENOL) 325 MG TAB    Take 2 Tabs by mouth every 6 hours as needed (Mild Pain; (Pain scale 1-3); Temp greater than 100.5 F).    ACETAMINOPHEN (TYLENOL) 500 MG TAB    Take 1,000 mg by mouth every 6 hours as needed.    AMLODIPINE (NORVASC) 5 MG TAB    Take 1 Tab by mouth every day. 1 table once a day    DULOXETINE (CYMBALTA) 60 MG CAP DR PARTICLES DELAYED-RELEASE CAPSULE    Take 60 mg by mouth every day.    GABAPENTIN (NEURONTIN) 400 MG CAP    Take 1 Cap by mouth 3 times a day for 180 days.    LISINOPRIL (PRINIVIL) 20 MG TAB    Take 20 mg by mouth every day.    QUETIAPINE (SEROQUEL) 300 MG TABLET    Take 300 mg by mouth every bedtime. FOR SLEEP   Modified Medications    No medications on file   Discontinued Medications    No medications on file          A:  Medications do not appear to be contributing to current complaints.     P:    No recommendations at this time. Home medications have been reordered as appropriate.    Domenic Lamas, ZachD

## 2020-11-05 NOTE — ED NOTES
Sitter outside room in full view of pt.  Labs sent, urine sent.  Awaiting assessment by Alert Team.

## 2020-11-05 NOTE — ED NOTES
Pt lying in bad, sleeping but arousable.  When questioned, he continues to state he feels like ending his life.  He states he wants to be on a legal hold.  Awaiting for psych eval with Alert Team.  Sitter in view of patient at all times.

## 2020-11-05 NOTE — ED NOTES
PT SLEEPING  IN BED, ABLE TO REPOSITION SELF IN BED. RR EVEN AND UNLABORED, NADN. VSS. SITTER IN [place] FOR CONTINUOUS MONITORING

## 2020-11-05 NOTE — ED PROVIDER NOTES
ED Provider Note    I assumed care of this patient at shift change.  I was notified by nursing staff that the patient did have some concern for shortness of breath or difficulty taking a deep breath.  He reports no leg pain or leg swelling, but states he does have some mild chest discomfort with his symptoms.  I reviewed his prior medical record.  He was recently discharged from this facility after presenting as a transfer from [facility] for chest pain.  EKG was negative for ischemic changes, chest x-ray was negative.  He also had negative lab work including troponin.  He reports no risk factors for DVT or pulmonary embolism, no history of DVT or pulmonary embolism, though he has had a recent hospitalization.    EKG today demonstrated a heart rate of 59 with sinus rhythm, no ectopy, no ST elevations or depressions.  Chest x-ray is negative for acute process. Troponin is negative. Screening D-dimer is elevated.  He does have a recent hospitalization, which increases his risk of pulmonary embolism.  For this reason, CTA was ordered to evaluate for pulmonary embolism.  No evidence of pulmonary embolism seen on CTA, no acute abnormalities identified.  Old right rib fractures noted.    Patient remains comfortable, care will be turned over to oncoming physician at shift change with plan for transfer to psychiatric facility.  COVID-19 test was sent at the request of receiving facility, that result is pending at this time.    Gricel Ordonez MD  11/5/2020 1:11 PM

## 2020-11-05 NOTE — DISCHARGE PLANNING
Medical Social Work    Referral: Legal Hold    Intervention: Legal Hold Paperwork given to SW by [facility] RN Darnell     Legal Hold Initiated: Date: 11/4/2020 Time: 1938    Patient’s Insurance Listed on Face Sheet: Medicare and Medicaid FFS    Referrals sent to: [facility], [facility], [facility], [facility], and [facility]     This referral contains the following information:  1) Face sheet __x__  2) Page 1 and Page 2 of Legal Hold _x___  3) Alert Team Assessment/Psych Assessment _x___  4) Head to toe physical exam __x__  5) Urine Drug Screen _x___  6) Blood Alcohol __x__  7) Vital signs _x___  8) Pregnancy test when applicable _na__  9) Medications list ___x_    Plan: Patient will transfer to mental health facility once acceptance is obtained

## 2020-11-05 NOTE — ED NOTES
Pt resting on gurney, RR even and unlabored, no needs at this time. Pt sitter in place outside pt room

## 2020-11-05 NOTE — ED NOTES
PT c/o dizziness and nausea.  Placed on cardiac monitor.  No ectopy noted.  Reported to DARIAN Mariscal.  New orders received.

## 2020-11-05 NOTE — ED NOTES
PT RESTING IN BED, REPOSITIONING SELF IN BED. ROSALIE GOLDSMITH. SITTER IN [place] FOR CONTINUOUS MONITORING.

## 2020-11-05 NOTE — ED PROVIDER NOTES
ED Provider Note    Progress note:    Patient felt slightly lightheaded.  Has not been eating or drinking here in the emergency department.  I did review the patient's laboratory studies.  He does have hyponatremia though has a history of this and has a history of chronic alcohol abuse.  Likely related to beer potomania.  He was given IV fluid hydration and BMP will be rechecked.    Labs Reviewed   URINE DRUG SCREEN - Abnormal; Notable for the following components:       Result Value    Cannabinoid Metab Positive (*)     All other components within normal limits   CBC WITH DIFFERENTIAL - Abnormal; Notable for the following components:    RBC 3.75 (*)     Hemoglobin 12.5 (*)     Hematocrit 35.2 (*)     MCH 33.3 (*)     MCHC 35.5 (*)     Platelet Count 93 (*)     Lymphocytes 18.30 (*)     All other components within normal limits   COMP METABOLIC PANEL - Abnormal; Notable for the following components:    Sodium 125 (*)     Chloride 90 (*)     AST(SGOT) 81 (*)     All other components within normal limits   ACETAMINOPHEN - Abnormal; Notable for the following components:    Acetaminophen -Tylenol <5 (*)     All other components within normal limits   SALICYLATE - Abnormal; Notable for the following components:    Salicylates, Quant. <1 (*)     All other components within normal limits   BASIC METABOLIC PANEL - Abnormal; Notable for the following components:    Sodium 130 (*)     Chloride 93 (*)     Creatinine 0.42 (*)     All other components within normal limits   POC BREATHALIZER - Abnormal; Notable for the following components:    POC Breathalizer 0.06 (*)     All other components within normal limits   ACCU-CHEK GLUCOSE - Abnormal; Notable for the following components:    Glucose - Accu-Ck 110 (*)     All other components within normal limits   LIPASE   ESTIMATED GFR   ESTIMATED GFR     Sodium is improved after IV fluid hydration.  Suspected that sodium was low secondary to dehydration.      HYDRATION: Based on the  "patient's presentation of Dehydration the patient was given IV fluids. IV Hydration was used because oral hydration was not as rapid as required. Upon recheck following hydration, the patient was improved.      Patient is resting comfortably.  Awaiting transfer to the inpatient psychiatric facility for further management.  Legal hold is completed due to his suicidal ideation.    /82   Pulse 62   Temp 36.8 °C (98.2 °F) (Temporal)   Resp 15   Ht 1.803 m (5' 11\")   Wt 102.1 kg (225 lb)   SpO2 94%   BMI 31.38 kg/m²     1. Suicidal ideation    2. Intentional drug overdose, initial encounter ([facility])    3. Hyponatremia         "

## 2020-11-05 NOTE — DISCHARGE PLANNING
MSW spoke to [facility]-Pt is out of Medicare days.    MSW spoke to Renetta at [facility]. They need a COVID test on pt. MSW updated bedside RN.

## 2020-11-05 NOTE — ED NOTES
FLUIDS COMPLETED. BMP DRAWN AND SENT TO LAB. PT SLEEPING COMFORTABLY. GIVEN ADDITIONAL BLANKET PER REQUEST. SITTER IN [place] FOR CONTINUOUS MONITORING

## 2020-11-05 NOTE — DISCHARGE PLANNING
MSW spoke to [facility]. Pt is still pending COVID results. Will send when test is resulted.

## 2020-11-06 NOTE — ED NOTES
Report received from day shift RN. Patient resting in bed respirations even and unlabored, 1:1 sitter in place with patient in view. Legal hold verified.

## 2020-11-06 NOTE — ED NOTES
Report called to Lisa at [facility]. All questions answered. Patient transferred via [facility]. Patient ambulated out to ambulance bay with steady gait. Belongings given to [facility] and d/cd with patient.

## 2020-11-06 NOTE — CONSULTS
"BRIEF PSYCHIATRIC CONSULT NOTE: patient seen, full note to follow.  Assessment: Pt reported SA by OD at the [facility] in the context of alcohol intoxication.; He says he OD on seroquel, cymbalta and gabapentin. He continues to report active Si without method or plan. But also, contradicted himself stating that he wants help, and stated \"I don't really want to die\". At this time will extend legal hold for further psychiatric stabilization of mood.     Dx:    Alcohol use dso, severe  Alcohol induced depressive disorder  Adjustment disorder with depressed and anxious mood.       Plan:  1- Legal hold:extended  2- Psychotropic medications: discontinue cymbalta and Seroquel; avoid and antipsychotic or antidepressant due to OD. Monitor for NMS and SS.   3- Please transfer pt to inpatient psychiatric hospital when bed is available  4- Psychiatry will follow up    Thank you for the consult.                    "

## 2020-11-06 NOTE — DISCHARGE PLANNING
Medical Social Work     Pt has been accepted to [facility] by Dr. GORMAN.  Haja called [facility] and set up transport through [facility] for 2100.  Haja updated  bedside rn, and completed transfer packet.

## 2020-11-06 NOTE — CONSULTS
"PSYCHIATRIC INTAKE EVALUATION    *Reason for admission:    OD SA               *Reason for consult:  \"SI\"    *Requesting Physician/APN: Stephen Harris M.D.        Legal Hold status:  On hold        *Chief Complaint:: \"I tried to kill myself\"        *HPI (includes Psychiatric ROS):  Pt says he overdose on seroquel, cymbalta and gabapentin in the bathroom of a [facility]. He then started to have CP and SOB, and someone called EMS because he felt like he was having a heart attack. Pt OD with the intent of killing him self. HE does not know how many pills of seroquel he took, but stated taking 4-5 pills of Neo and 4 of cymbalta. Pt attempted suicide because he has nothing to live for, no family. He said \"I don;t really want to die, I want help\". Pt was intoxicated when he OD. HE says she continues to have SI, and will likely attempt suicide again if he is discharged. HE has no method or plan. Pt denied psychosis or maniac symptoms. Reported not sleeping for the past 3 days.       *Medical Review Of Symptoms (not dx conditions):   Review of Systems   Constitutional: Negative for chills and fever.   HENT: Negative for sore throat.    Eyes: Positive for blurred vision.   Respiratory: Positive for shortness of breath. Negative for cough.    Cardiovascular: Negative for chest pain.   Gastrointestinal: Positive for diarrhea and nausea. Negative for abdominal pain and vomiting.   Musculoskeletal: Positive for back pain.   Skin: Negative for rash.   Neurological: Positive for dizziness, tremors and sensory change.   Psychiatric/Behavioral: Positive for depression, substance abuse and suicidal ideas. Negative for hallucinations. The patient has insomnia.          *Psychiatric Examination:   Vitals:   Vitals:    11/05/20 0100 11/05/20 0400 11/05/20 1238 11/05/20 1516   BP: 131/82 138/88 137/84 140/88   Pulse: 62 60 64 (!) 59   Resp: 15 20 18 18   Temp:    37.1 °C (98.8 °F)   TempSrc:    Temporal   SpO2: 94% 95% 97%    Weight:     " "  Height:         Appearance: appears stated age, fair grooming and hygiene, calm, cooperative, avoidant eye contact  Abnormal movements: none  Gait/posture: normal  Speech: normal volume, tone and rhythm  Though process: linear and goal oriented  Associations: no loose associations  Thought content: denies AVH, no delusions or paranoia elicited, does not appear to be responding to internal stimuli, neither is internally preoccupied.   Judgement and Insight: fair/fair  Orientation:oriented to person, place, time and situation  Recent and Remote Memory: intact  Attention Span and Concentration: intact  Language: fluid   Fund of Knowledge: not tested   Mood and Affect:\"depressed\", depressed   SI/HI:+SI with intent, but no method or plan             MMSE score and/or clock drawing:not done     *PAST MEDICAL/PSYCH/FAMILY/SOCIAL(as reported by patient):       *medical hx:           Past Medical History:   Diagnosis Date   • Arthritis    • Gout    • Hypertension    • Psychiatric disorder      Past Surgical History:   Procedure Laterality Date   • SC DX BONE MARROW ASPIRATIONS Left 10/2/2019    Procedure: ASPIRATION, BONE MARROW - APARICIO;  Surgeon: Tamera Leos M.D.;  Location: [facility];  Service: Orthopedics   • SC DX BONE MARROW BIOPSIES  10/2/2019    Procedure: BIOPSY, BONE MARROW, USING NEEDLE OR TROCAR;  Surgeon: Tamera Leos M.D.;  Location: ENDOSCOPY [facility];  Service: Orthopedics   • CERVICAL DISK AND FUSION ANTERIOR  9/2/2018    Procedure: CERVICAL DISK AND FUSION ANTERIOR C5-C6;  Surgeon: Varghese Wilkins M.D.;  Location: SURGERY [facility];  Service: Neurosurgery   • OTHER ORTHOPEDIC SURGERY      fision with disc removal        *psychiatric hx:    SAs:hx of OD, hx of suicide threats, chronic SI  Hospitalizations:mulitple   On seroquel cymbalta, stacie  Hx of alcohol use dso, severe; MDD and meth use      *family Psych hx:  denies     *social hx: homeless, no family or social " support  Alcohol:daily  Drugs:cannabis      *MEDICAL HX: labs, MARS, medications, etc were reviewed. Only those findings of potential interest to psychiatry are noted below:    *Current Medical issues:   see below     *Allergies:  No Known Allergies   *Current Medications:    Current Facility-Administered Medications:   •  amLODIPine (NORVASC) tablet 5 mg, 5 mg, Oral, QDAY, Gricel Ordonez M.D., 5 mg at 11/05/20 1003  •  gabapentin (NEURONTIN) capsule 400 mg, 400 mg, Oral, TID, Gricel Ordonez M.D., 400 mg at 11/05/20 1003  •  lisinopril (PRINIVIL) tablet 20 mg, 20 mg, Oral, DAILY, Gricel Ordonez M.D., 20 mg at 11/05/20 1003    Current Outpatient Medications:   •  acetaminophen (TYLENOL) 500 MG Tab, Take 1,000 mg by mouth every 6 hours as needed., Disp: , Rfl:   •  amLODIPine (NORVASC) 5 MG Tab, Take 1 Tab by mouth every day. 1 table once a day, Disp: 30 Tab, Rfl: 0  •  acetaminophen (TYLENOL) 325 MG Tab, Take 2 Tabs by mouth every 6 hours as needed (Mild Pain; (Pain scale 1-3); Temp greater than 100.5 F). (Patient not taking: Reported on 11/4/2020), Disp: 30 Tab, Rfl: 0  •  lisinopril (PRINIVIL) 20 MG Tab, Take 20 mg by mouth every day., Disp: , Rfl:   •  DULoxetine (CYMBALTA) 60 MG Cap DR Particles delayed-release capsule, Take 60 mg by mouth every day., Disp: , Rfl:   •  quetiapine (SEROQUEL) 300 MG tablet, Take 300 mg by mouth every bedtime. FOR SLEEP, Disp: , Rfl:   •  gabapentin (NEURONTIN) 400 MG Cap, Take 1 Cap by mouth 3 times a day for 180 days., Disp: 270 Cap, Rfl: 1  *ECG: personally reviewed QTc  440  *Cranial Imaging: personally reviewed head CT feb 2020:1.  No acute intracranial findings.     2.  Diffuse atrophy and periventricular white matter changes consistent with chronic small vessel disease.    EEG:  n/a     *Labs:  Recent Results (from the past 72 hour(s))   Urine Drug Screen    Collection Time: 11/04/20  3:38 PM   Result Value Ref Range    Amphetamines Urine Negative Negative    Barbiturates  Negative Negative    Benzodiazepines Negative Negative    Cocaine Metabolite Negative Negative    Methadone Negative Negative    Opiates Negative Negative    Oxycodone Negative Negative    Phencyclidine -Pcp Negative Negative    Propoxyphene Negative Negative    Cannabinoid Metab Positive (A) Negative   POC BREATHALIZER    Collection Time: 11/04/20  3:42 PM   Result Value Ref Range    POC Breathalizer 0.06 (A) 0.00 - 0.01 Percent   CBC WITH DIFFERENTIAL    Collection Time: 11/04/20  4:32 PM   Result Value Ref Range    WBC 6.0 4.8 - 10.8 K/uL    RBC 3.75 (L) 4.70 - 6.10 M/uL    Hemoglobin 12.5 (L) 14.0 - 18.0 g/dL    Hematocrit 35.2 (L) 42.0 - 52.0 %    MCV 93.9 81.4 - 97.8 fL    MCH 33.3 (H) 27.0 - 33.0 pg    MCHC 35.5 (H) 33.7 - 35.3 g/dL    RDW 44.2 35.9 - 50.0 fL    Platelet Count 93 (L) 164 - 446 K/uL    MPV 10.9 9.0 - 12.9 fL    Neutrophils-Polys 69.40 44.00 - 72.00 %    Lymphocytes 18.30 (L) 22.00 - 41.00 %    Monocytes 8.60 0.00 - 13.40 %    Eosinophils 3.30 0.00 - 6.90 %    Basophils 0.20 0.00 - 1.80 %    Immature Granulocytes 0.20 0.00 - 0.90 %    Nucleated RBC 0.00 /100 WBC    Neutrophils (Absolute) 4.18 1.82 - 7.42 K/uL    Lymphs (Absolute) 1.10 1.00 - 4.80 K/uL    Monos (Absolute) 0.52 0.00 - 0.85 K/uL    Eos (Absolute) 0.20 0.00 - 0.51 K/uL    Baso (Absolute) 0.01 0.00 - 0.12 K/uL    Immature Granulocytes (abs) 0.01 0.00 - 0.11 K/uL    NRBC (Absolute) 0.00 K/uL   COMP METABOLIC PANEL    Collection Time: 11/04/20  4:32 PM   Result Value Ref Range    Sodium 125 (L) 135 - 145 mmol/L    Potassium 3.9 3.6 - 5.5 mmol/L    Chloride 90 (L) 96 - 112 mmol/L    Co2 22 20 - 33 mmol/L    Anion Gap 13.0 7.0 - 16.0    Glucose 82 65 - 99 mg/dL    Bun 8 8 - 22 mg/dL    Creatinine 0.60 0.50 - 1.40 mg/dL    Calcium 8.8 8.5 - 10.5 mg/dL    AST(SGOT) 81 (H) 12 - 45 U/L    ALT(SGPT) 34 2 - 50 U/L    Alkaline Phosphatase 64 30 - 99 U/L    Total Bilirubin 1.0 0.1 - 1.5 mg/dL    Albumin 4.2 3.2 - 4.9 g/dL    Total Protein 7.0  6.0 - 8.2 g/dL    Globulin 2.8 1.9 - 3.5 g/dL    A-G Ratio 1.5 g/dL   Acetaminophen Level    Collection Time: 20  4:32 PM   Result Value Ref Range    Acetaminophen -Tylenol <5 (L) 10 - 30 ug/mL   LIPASE    Collection Time: 20  4:32 PM   Result Value Ref Range    Lipase 20 11 - 82 U/L   SALICYLATE LEVEL    Collection Time: 20  4:32 PM   Result Value Ref Range    Salicylates, Quant. <1 (L) 15 - 25 mg/dL   ESTIMATED GFR    Collection Time: 20  4:32 PM   Result Value Ref Range    GFR If African American >60 >60 mL/min/1.73 m 2    GFR If Non African American >60 >60 mL/min/1.73 m 2   ACCU-CHEK GLUCOSE    Collection Time: 20 10:38 PM   Result Value Ref Range    Glucose - Accu-Ck 110 (H) 65 - 99 mg/dL   Basic Metabolic Panel    Collection Time: 20  1:35 AM   Result Value Ref Range    Sodium 130 (L) 135 - 145 mmol/L    Potassium 3.8 3.6 - 5.5 mmol/L    Chloride 93 (L) 96 - 112 mmol/L    Co2 24 20 - 33 mmol/L    Glucose 87 65 - 99 mg/dL    Bun 8 8 - 22 mg/dL    Creatinine 0.42 (L) 0.50 - 1.40 mg/dL    Calcium 8.8 8.5 - 10.5 mg/dL    Anion Gap 13.0 7.0 - 16.0   ESTIMATED GFR    Collection Time: 20  1:35 AM   Result Value Ref Range    GFR If African American >60 >60 mL/min/1.73 m 2    GFR If Non African American >60 >60 mL/min/1.73 m 2   EKG (NOW)    Collection Time: 20  7:02 AM   Result Value Ref Range    Report       [facility] Emergency [facility].    Test Date:  2020  Pt Name:    BRANDY HUTTON                 Department: ER  MRN:        [id number]                      Room:       [facility]  Gender:     Male                         Technician: [id number]  :        1950                   Requested By:ARACELI CASTRO  Order #:    [id number]                    Reading MD:    Measurements  Intervals                                Axis  Rate:       59                           P:          42  TN:         180                          QRS:        69  QRSD:       82                            T:          67  QT:         444  QTc:        440    Interpretive Statements  SINUS BRADYCARDIA  BORDERLINE LOW VOLTAGE IN FRONTAL LEADS  Compared to ECG 10/22/2020 22:56:07  ST (T wave) deviation no longer present     TROPONIN    Collection Time: 11/05/20  7:38 AM   Result Value Ref Range    Troponin T 17 6 - 19 ng/L   D-DIMER    Collection Time: 11/05/20  7:38 AM   Result Value Ref Range    D-Dimer Screen 1.12 (H) 0.00 - 0.50 ug/mL (FEU)   COVID/SARS CoV-2 PCR    Collection Time: 11/05/20  7:38 AM    Specimen: Nasopharyngeal; Respirate   Result Value Ref Range    COVID Order Status Received    SARS-CoV-2, PCR (In-House)    Collection Time: 11/05/20  7:38 AM   Result Value Ref Range    SARS-CoV-2 Source NP Swab     SARS-CoV-2 by PCR NotDetected        Recent Labs     11/04/20  1632   WBC 6.0   RBC 3.75*   HEMOGLOBIN 12.5*   HEMATOCRIT 35.2*   MCV 93.9   MCH 33.3*   RDW 44.2   PLATELETCT 93*   MPV 10.9   NEUTSPOLYS 69.40   LYMPHOCYTES 18.30*   MONOCYTES 8.60   EOSINOPHILS 3.30   BASOPHILS 0.20     Lab Results   Component Value Date/Time    SODIUM 130 (L) 11/05/2020 01:35 AM    POTASSIUM 3.8 11/05/2020 01:35 AM    CHLORIDE 93 (L) 11/05/2020 01:35 AM    CO2 24 11/05/2020 01:35 AM    GLUCOSE 87 11/05/2020 01:35 AM    BUN 8 11/05/2020 01:35 AM    CREATININE 0.42 (L) 11/05/2020 01:35 AM         Lab Results   Component Value Date/Time    BREATHALIZER 0.06 (A) 11/04/2020 1542     No components found for: BLOODALCOHOL   Lab Results   Component Value Date/Time    AMPHUR Negative 11/04/2020 1538    BARBSURINE Negative 11/04/2020 1538    BENZODIAZU Negative 11/04/2020 1538    COCAINEMET Negative 11/04/2020 1538    METHADONE Negative 11/04/2020 1538    OPIATES Negative 11/04/2020 1538    OXYCODN Negative 11/04/2020 1538    PCPURINE Negative 11/04/2020 1538    PROPOXY Negative 11/04/2020 1538    CANNABINOID Positive (A) 11/04/2020 1538     Lab Results   Component Value Date/Time    FREET4 1.15 11/05/2018 1220  "       Assessment: Pt reported SA by OD at the [facility] in the context of alcohol intoxication.; He says he OD on seroquel, cymbalta and gabapentin. He continues to report active Si without method or plan. But also, contradicted himself stating that he wants help, and stated \"I don't really want to die\". At this time will extend legal hold for further psychiatric stabilization of mood.     Dx:     Alcohol use dso, severe  Alcohol induced depressive disorder  Adjustment disorder with depressed and anxious mood.   Hx of MDD        Plan:  1- Legal hold:extended  2- Psychotropic medications: discontinue cymbalta and Seroquel; avoid and antipsychotic or antidepressant due to OD. Monitor for NMS and SS.   3- Please transfer pt to inpatient psychiatric hospital when bed is available  4- Psychiatry will follow up     Thank you for the consult.     "

## 2020-11-22 ENCOUNTER — HOSPITAL ENCOUNTER (OUTPATIENT)
Facility: MEDICAL CENTER | Age: 70
End: 2020-11-22
Payer: COMMERCIAL

## 2020-11-23 LAB
COVID ORDER STATUS COVID19: NORMAL
SARS-COV-2 RNA RESP QL NAA+PROBE: NOTDETECTED
SPECIMEN SOURCE: NORMAL

## 2020-12-04 ENCOUNTER — HOSPITAL ENCOUNTER (EMERGENCY)
Facility: MEDICAL CENTER | Age: 70
End: 2020-12-04
Attending: EMERGENCY MEDICINE | Admitting: EMERGENCY MEDICINE
Payer: MEDICARE

## 2020-12-04 ENCOUNTER — APPOINTMENT (OUTPATIENT)
Dept: RADIOLOGY | Facility: MEDICAL CENTER | Age: 70
End: 2020-12-04
Attending: EMERGENCY MEDICINE
Payer: MEDICARE

## 2020-12-04 VITALS
BODY MASS INDEX: 31.64 KG/M2 | WEIGHT: 226 LBS | OXYGEN SATURATION: 94 % | RESPIRATION RATE: 16 BRPM | SYSTOLIC BLOOD PRESSURE: 117 MMHG | HEART RATE: 57 BPM | DIASTOLIC BLOOD PRESSURE: 56 MMHG | HEIGHT: 71 IN | TEMPERATURE: 96.8 F

## 2020-12-04 DIAGNOSIS — R45.851 SUICIDAL IDEATION: ICD-10-CM

## 2020-12-04 DIAGNOSIS — I10 ESSENTIAL HYPERTENSION: ICD-10-CM

## 2020-12-04 DIAGNOSIS — F39 MOOD DISORDER (HCC): ICD-10-CM

## 2020-12-04 DIAGNOSIS — F10.929 ALCOHOLIC INTOXICATION WITH COMPLICATION (HCC): ICD-10-CM

## 2020-12-04 DIAGNOSIS — F32.A DEPRESSION, UNSPECIFIED DEPRESSION TYPE: ICD-10-CM

## 2020-12-04 DIAGNOSIS — T50.902A INTENTIONAL DRUG OVERDOSE, INITIAL ENCOUNTER (HCC): ICD-10-CM

## 2020-12-04 LAB
ALBUMIN SERPL BCP-MCNC: 4.2 G/DL (ref 3.2–4.9)
ALBUMIN/GLOB SERPL: 1.4 G/DL
ALP SERPL-CCNC: 76 U/L (ref 30–99)
ALT SERPL-CCNC: 46 U/L (ref 2–50)
AMPHET UR QL SCN: NEGATIVE
ANION GAP SERPL CALC-SCNC: 11 MMOL/L (ref 7–16)
APAP SERPL-MCNC: <5 UG/ML (ref 10–30)
AST SERPL-CCNC: 81 U/L (ref 12–45)
BARBITURATES UR QL SCN: NEGATIVE
BASOPHILS # BLD AUTO: 0.7 % (ref 0–1.8)
BASOPHILS # BLD: 0.03 K/UL (ref 0–0.12)
BENZODIAZ UR QL SCN: NEGATIVE
BILIRUB SERPL-MCNC: 0.4 MG/DL (ref 0.1–1.5)
BUN SERPL-MCNC: 8 MG/DL (ref 8–22)
BZE UR QL SCN: NEGATIVE
CALCIUM SERPL-MCNC: 9 MG/DL (ref 8.5–10.5)
CANNABINOIDS UR QL SCN: NEGATIVE
CHLORIDE SERPL-SCNC: 96 MMOL/L (ref 96–112)
CO2 SERPL-SCNC: 24 MMOL/L (ref 20–33)
CREAT SERPL-MCNC: 0.55 MG/DL (ref 0.5–1.4)
EKG IMPRESSION: NORMAL
EOSINOPHIL # BLD AUTO: 0.19 K/UL (ref 0–0.51)
EOSINOPHIL NFR BLD: 4.3 % (ref 0–6.9)
ERYTHROCYTE [DISTWIDTH] IN BLOOD BY AUTOMATED COUNT: 46.8 FL (ref 35.9–50)
ETHANOL BLD-MCNC: 110 MG/DL (ref 0–10)
GLOBULIN SER CALC-MCNC: 3.1 G/DL (ref 1.9–3.5)
GLUCOSE SERPL-MCNC: 92 MG/DL (ref 65–99)
HCT VFR BLD AUTO: 35.4 % (ref 42–52)
HGB BLD-MCNC: 11.8 G/DL (ref 14–18)
IMM GRANULOCYTES # BLD AUTO: 0.01 K/UL (ref 0–0.11)
IMM GRANULOCYTES NFR BLD AUTO: 0.2 % (ref 0–0.9)
LYMPHOCYTES # BLD AUTO: 0.93 K/UL (ref 1–4.8)
LYMPHOCYTES NFR BLD: 20.9 % (ref 22–41)
MCH RBC QN AUTO: 33.4 PG (ref 27–33)
MCHC RBC AUTO-ENTMCNC: 33.3 G/DL (ref 33.7–35.3)
MCV RBC AUTO: 100.3 FL (ref 81.4–97.8)
METHADONE UR QL SCN: NEGATIVE
MONOCYTES # BLD AUTO: 0.35 K/UL (ref 0–0.85)
MONOCYTES NFR BLD AUTO: 7.9 % (ref 0–13.4)
NEUTROPHILS # BLD AUTO: 2.94 K/UL (ref 1.82–7.42)
NEUTROPHILS NFR BLD: 66 % (ref 44–72)
NRBC # BLD AUTO: 0 K/UL
NRBC BLD-RTO: 0 /100 WBC
OPIATES UR QL SCN: NEGATIVE
OXYCODONE UR QL SCN: NEGATIVE
PCP UR QL SCN: NEGATIVE
PLATELET # BLD AUTO: 57 K/UL (ref 164–446)
PMV BLD AUTO: 11.7 FL (ref 9–12.9)
POC BREATHALIZER: 0.03 PERCENT (ref 0–0.01)
POC BREATHALIZER: 0.06 PERCENT (ref 0–0.01)
POTASSIUM SERPL-SCNC: 3.9 MMOL/L (ref 3.6–5.5)
PROPOXYPH UR QL SCN: NEGATIVE
PROT SERPL-MCNC: 7.3 G/DL (ref 6–8.2)
RBC # BLD AUTO: 3.53 M/UL (ref 4.7–6.1)
SALICYLATES SERPL-MCNC: <1 MG/DL (ref 15–25)
SODIUM SERPL-SCNC: 131 MMOL/L (ref 135–145)
WBC # BLD AUTO: 4.5 K/UL (ref 4.8–10.8)

## 2020-12-04 PROCEDURE — 90791 PSYCH DIAGNOSTIC EVALUATION: CPT

## 2020-12-04 PROCEDURE — 700101 HCHG RX REV CODE 250: Performed by: EMERGENCY MEDICINE

## 2020-12-04 PROCEDURE — 80307 DRUG TEST PRSMV CHEM ANLYZR: CPT

## 2020-12-04 PROCEDURE — 99285 EMERGENCY DEPT VISIT HI MDM: CPT

## 2020-12-04 PROCEDURE — 71045 X-RAY EXAM CHEST 1 VIEW: CPT

## 2020-12-04 PROCEDURE — 96366 THER/PROPH/DIAG IV INF ADDON: CPT

## 2020-12-04 PROCEDURE — 302970 POC BREATHALIZER: Performed by: EMERGENCY MEDICINE

## 2020-12-04 PROCEDURE — 93005 ELECTROCARDIOGRAM TRACING: CPT

## 2020-12-04 PROCEDURE — 96365 THER/PROPH/DIAG IV INF INIT: CPT

## 2020-12-04 PROCEDURE — 80053 COMPREHEN METABOLIC PANEL: CPT

## 2020-12-04 PROCEDURE — 85025 COMPLETE CBC W/AUTO DIFF WBC: CPT

## 2020-12-04 PROCEDURE — 93005 ELECTROCARDIOGRAM TRACING: CPT | Performed by: EMERGENCY MEDICINE

## 2020-12-04 PROCEDURE — 302970 POC BREATHALIZER

## 2020-12-04 RX ADMIN — THIAMINE HYDROCHLORIDE: 100 INJECTION, SOLUTION INTRAMUSCULAR; INTRAVENOUS at 02:29

## 2020-12-04 ASSESSMENT — FIBROSIS 4 INDEX: FIB4 SCORE: 10.31

## 2020-12-04 ASSESSMENT — LIFESTYLE VARIABLES: DO YOU DRINK ALCOHOL: YES

## 2020-12-04 NOTE — ED TRIAGE NOTES
"Chief Complaint   Patient presents with   • Suicidal Ideation   • Suicide Attempt     pt at [facility] where he reported to a  that he took \"a whole lot of trazadone and gabapentin.\" unable to elicit amount taken. +marijuana use, admits to ETOH tonight (per pt one 6 pack).    • Drug Overdose       Pt BIB EMS for above. . Pt has prescription pills with him. Gabapentin bottle full, trazadone has 18 pills left out of 30. Pt states he filled the prescriptions \"not too long ago.\" All potentially harmful items removed from room. All belongings removed and to be searched by security. Urine sample collected and sent. Protocol ordered   "

## 2020-12-04 NOTE — ED NOTES
Pt Given discharge instructions home care instructions, Pt verbalized understanding of instructions given, Pt refused final vital signs, pt ambulatory to ER lobby with escort by [facility] staff.

## 2020-12-04 NOTE — ED PROVIDER NOTES
7:49 AM    This is an unfortunate 70-year-old male who presents with concerns for suicide attempt.  Medically cleared on involuntary mental health hold.  Denies any complaints for me he is clinically sober he has no sites of pain and his vital signs are normal.  We will continue to keep him under continuous observation until a psychiatric hospital bed may be established.    9:42 AM    Repeat eval by mental health team member Selina. Patient not actively suicidal. Wants to get better. Not a threat to self or others. [facility] crisis stabilization unit bed available, patient wishes to go. Plan discharge, legal hold lifted/cancelled, return if any worse.    Electronically signed by Nba Man M.D. on 12/4/2020 at 9:42 AM.

## 2020-12-04 NOTE — DISCHARGE PLANNING
"ALERT team  note:  70 year old male admitted last night, legal hold SI, states ingested RX Trazodone and Gabapentin, pt medically clear and no s/s of an overdose noted; also with ETOH intoxication and current substance use includes ETOH daily up to 12 beers and THC weekly with last use states last night (UDS negative); pt homeless; current outpt MH providers at [facility] care coordinator with last appt 10/2020 and pt has missed 11/2020 and 12/2020 appts with his psychiatrist, therapist, and PCP; with previous [facility] housing last 10/2020, per [facility], pt \"did not follow house rules\"; Medicare (out of lifetime inpt psych days)  and Medicaid FFS insurance plans; writer RN reviewed with pt his is out of Medicare lifetime inpt psyc days  Today, pt calm cooperative; drowsy, oriented c 4; requesting assistance with MH and CD support; currently denies SI, HI, or self-harm ideation; Writer RN reviewed community MH and CD resources with pt, with written information given, including [facility], [facility], and [facility] ([facility]); pt verbalized understanding; writer RN to send pt referral to [facility]; writer RN reviewed current pt status with [facility]. Man; pt to DC to self today to [facility] with transport by [facility]  "

## 2020-12-04 NOTE — DISCHARGE PLANNING
Medical Social Work    Referral: Legal Hold    Intervention: Legal Hold Paperwork given to SW by Life Skills RN Yuki    Legal Hold Initiated: Date: 12/4/2020 Time: 0225    Patient’s Insurance Listed on Face Sheet: Medicare and Medicaid FFS    Referrals sent to: [facility], [facility], [place], [facility], [facility], and .     This referral contains the following information:  1) Face sheet _x___  2) Page 1 and Page 2 of Legal Hold __x__  3) Alert Team Assessment/Psych Assessment __x__  4) Head to toe physical exam __x__  5) Urine Drug Screen _x___  6) Blood Alcohol __x__  7) Vital signs __x__  8) Pregnancy test when applicable _na__  9) Medications list __x__    Plan: Patient will transfer to mental health facility once acceptance is obtained

## 2020-12-04 NOTE — ED NOTES
Pt's belongings searched by security and placed in locker #6. Home medications sealed and sent to pharmacy

## 2020-12-04 NOTE — ED NOTES
Assumed care of pt Pt rounded on, pt resting with eyes closed, sitter outside of room 1:1 in direct view.

## 2020-12-04 NOTE — DISCHARGE INSTRUCTIONS
You are being sent for mental health crisis stabilization, if you have any new or worse feelings return to the ER immediatley.
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
## 2020-12-04 NOTE — ED PROVIDER NOTES
"ED Provider Note      CHIEF COMPLAINT  Chief Complaint   Patient presents with   • Suicidal Ideation   • Suicide Attempt     pt at [facility] where he reported to a  that he took \"a whole lot of trazadone and gabapentin.\" unable to elicit amount taken. +marijuana use, admits to ETOH tonight (per pt one 6 pack).    • Drug Overdose     HPI  Chris Leggett is a 70 y.o. male who presents emergency room for feelings of depression and suicidality.  The patient was at the [facility] when he found a  and told him that he had taken several of his excess trazodone and gabapentin.  He also endorsed taking alcohol and that he was feeling like \"hanging\" himself.  He endorses homelessness, and is try to get back to his family in [place] feels like he does not have anyone here to help him.  He denies taking any other exogenous medications, was feeling like he was alone in the world and wanted to end his life acutely.  Review of the patient's belongings show that he has 2 prescriptions, gabapentin is full, trazodone prescription was filled 5 days ago, daily medication, 18 tabs remaining(this would go for a total of excess tabs of approximately 7 taken acutely).  Denies any chest pain, shortness of breath, reports some mild epigastric tenderness but denies any nausea, vomiting, diarrheal illness.    Review of the chart shows the patient was seen approximately a month ago for acute psychiatric complaints, suicidal ideation, alcohol intoxication    REVIEW OF SYSTEMS  Denies headache, fevers, chills, chest pain, shortness of breath, cough, nausea, vomiting, abdominal pain, leg swelling, leg pain or bleeding, bruising    All other systems are negative.     PAST MEDICAL HISTORY  Past Medical History:   Diagnosis Date   • Arthritis    • Gout    • Hypertension    • Psychiatric disorder        FAMILY HISTORY  Family History   Problem Relation Age of Onset   • Cancer Mother    • Alzheimer's Disease " "Father    • Cancer Father    • Heart Disease Sister    • Heart Disease Brother        SOCIAL HISTORY  Social History     Tobacco Use   • Smoking status: Current Every Day Smoker     Packs/day: 0.50     Years: 5.00     Pack years: 2.50     Types: Cigarettes     Last attempt to quit: 2019     Years since quittin.8   • Smokeless tobacco: Never Used   Substance Use Topics   • Alcohol use: Yes     Alcohol/week: 10.8 - 12.0 oz     Types: 18 - 20 Cans of beer per week     Frequency: 4 or more times a week     Drinks per session: 10 or more     Binge frequency: Daily or almost daily   • Drug use: Yes     Types: Inhaled     Comment: marijuana last dose tonight       SURGICAL HISTORY  Past Surgical History:   Procedure Laterality Date   • OK DX BONE MARROW ASPIRATIONS Left 10/2/2019    Procedure: ASPIRATION, BONE MARROW - APARICIO;  Surgeon: Tamera Leos M.D.;  Location: [facility];  Service: Orthopedics   • OK DX BONE MARROW BIOPSIES  10/2/2019    Procedure: BIOPSY, BONE MARROW, USING NEEDLE OR TROCAR;  Surgeon: Tamera Leos M.D.;  Location: ENDOSCOPY [facility];  Service: Orthopedics   • CERVICAL DISK AND FUSION ANTERIOR  2018    Procedure: CERVICAL DISK AND FUSION ANTERIOR C5-C6;  Surgeon: Varghese Wilkins M.D.;  Location: SURGERY [facility];  Service: Neurosurgery   • OTHER ORTHOPEDIC SURGERY      fision with disc removal       CURRENT MEDICATIONS  Home Medications    **Home medications have not yet been reviewed for this encounter**         ALLERGIES  No Known Allergies    PHYSICAL EXAM  VITAL SIGNS: /70   Pulse 65   Temp 36 °C (96.8 °F) (Temporal)   Resp 18   Ht 1.803 m (5' 11\")   Wt 102.5 kg (226 lb)   SpO2 96%   BMI 31.52 kg/m²   Constitutional: Awake and alert. Generally nontoxic  HENT: Normocephalic, Atraumatic, Bilateral external ears normal, Oropharynx moist, No oral exudates, Nose normal.   Eyes: PERRL, Conjunctiva normal, No discharge.   Neck: Normal range of " motion, No tenderness, Supple, No stridor.   Cardiovascular: Normal heart rate, Normal rhythm  Thorax & Lungs: Normal breath sounds, No respiratory distress, No wheezing, No chest tenderness.  Abdomen: Bowel sounds normal, Soft, No tenderness, No masses, No pulsatile masses.    Skin:  No pathologic rash.   Extremities: No clubbing, cyanosis, or edema there  Neuro/Psychiatric:  Orientation: person, place and time/date   Appearance: age appropriate  Behavior: nopsychomotor agitation   Speech: normal, no pressured speech   Mood: sad   Affect: mood congruent   Thought Process: forward thinking   Thought Content: suicidal   Delusions: none   Perceptions/Sensorium: does not appear to be responding to internal stimuli   Cognition: poor concentration   Language: no dysarthric features   Insight and judgement: poor based on recent behaviors     COURSE & MEDICAL DECISION MAKING    Labs Reviewed   DIAGNOSTIC ALCOHOL - Abnormal; Notable for the following components:       Result Value    Diagnostic Alcohol 110.0 (*)     All other components within normal limits   COMP METABOLIC PANEL - Abnormal; Notable for the following components:    Sodium 131 (*)     AST(SGOT) 81 (*)     All other components within normal limits   CBC WITH DIFFERENTIAL - Abnormal; Notable for the following components:    WBC 4.5 (*)     RBC 3.53 (*)     Hemoglobin 11.8 (*)     Hematocrit 35.4 (*)     .3 (*)     MCH 33.4 (*)     MCHC 33.3 (*)     Platelet Count 57 (*)     Lymphocytes 20.90 (*)     Lymphs (Absolute) 0.93 (*)     All other components within normal limits   ACETAMINOPHEN - Abnormal; Notable for the following components:    Acetaminophen -Tylenol <5 (*)     All other components within normal limits   SALICYLATE - Abnormal; Notable for the following components:    Salicylates, Quant. <1 (*)     All other components within normal limits   POC BREATHALIZER - Abnormal; Notable for the following components:    POC Breathalizer 0.06 (*)     All  other components within normal limits   URINE DRUG SCREEN   ESTIMATED GFR     Results for orders placed or performed during the hospital encounter of 20   EKG   Result Value Ref Range    Report       [facility] Emergency Dept.    Test Date:  2020  Pt Name:    BRANDY HUTTON                 Department: ER  MRN:        [id number]                      Room:       [facility]  Gender:     Male                         Technician: [id number]  :        1950                   Requested By:ER TRIAGE PROTOCOL  Order #:    [id number]                    Reading MD: Eliu Wagner MD    Measurements  Intervals                                Axis  Rate:       56                           P:          43  AK:         184                          QRS:        50  QRSD:       80                           T:          57  QT:         448  QTc:        433    Interpretive Statements  SINUS BRADYCARDIA  MULTIPLE PREMATURE COMPLEXES, VENT & SUPRAVEN  Compared to ECG 2020 07:02:06  No significant changes  Electronically Signed On 2020 2:08:35 PST by Eliu Wagner MD       MDM:  Due to the history of recent ingestion, prior severe depression, and current clinical presentation, I considered acute ingestion as a possible etiology.  He is acutely suicidal and the legal hold is initiated by myself.  I considered toxic ingestion and overdose causing anion gap or EKG changes, EtOH intoxication, tylenol overdose, aspirin overdose, and other drug intoxication. I independently visualized the EKG and there do not appear to be signs of acute overdose as evidenced by a normal appearing aVR and normal intervals. I independently reviewed the laboratory studies, which revealed an normal anion gap of 9. APAP and ASA levels were negative. EtOH level was 110. The poison control center were consulted and they recommend basic observation with admission only if extremely or inappropriately obtunded.     After continued observation  the patient remains alert, oriented and is not acutely requiring admission.  Pt is allowed to sober up and ED behavioral health consult was obtained.  I anticipate inpt transfer to psychiatric hospital for continued monitoring, evaluation of safety and management of his ongoing suicidality.  Signed out with oncoming physician with this current plan    FINAL IMPRESSION  Visit Diagnoses     ICD-10-CM   1. Suicidal ideation  R45.851   2. Depression, unspecified depression type  F32.9   3. Alcoholic intoxication with complication (HCC)  F10.929   4. Intentional drug overdose, initial encounter ([facility])  T50.902A       This dictation was created using voice recognition software. The accuracy of the dictation is limited to the abilities of the software. I expect there may be some errors of grammar and possibly content. The nursing notes were reviewed and certain aspects of this information were incorporated into this note.    Electronically signed by: Bernard Nowak M.D., 12/4/2020 2:07 AM

## 2020-12-04 NOTE — DISCHARGE PLANNING
MSW received call from Renetta from. [facility]. Pt has no Medicare days left.

## 2021-01-03 ENCOUNTER — HOSPITAL ENCOUNTER (EMERGENCY)
Facility: MEDICAL CENTER | Age: 71
End: 2021-01-04
Attending: EMERGENCY MEDICINE
Payer: MEDICARE

## 2021-01-03 DIAGNOSIS — R45.851 SUICIDAL IDEATION: ICD-10-CM

## 2021-01-03 DIAGNOSIS — T50.902A INTENTIONAL DRUG OVERDOSE, INITIAL ENCOUNTER (HCC): ICD-10-CM

## 2021-01-03 LAB
ALBUMIN SERPL BCP-MCNC: 4.1 G/DL (ref 3.2–4.9)
ALBUMIN/GLOB SERPL: 1.6 G/DL
ALP SERPL-CCNC: 74 U/L (ref 30–99)
ALT SERPL-CCNC: 23 U/L (ref 2–50)
AMPHET UR QL SCN: NEGATIVE
ANION GAP SERPL CALC-SCNC: 10 MMOL/L (ref 7–16)
APAP SERPL-MCNC: <5 UG/ML (ref 10–30)
AST SERPL-CCNC: 70 U/L (ref 12–45)
BARBITURATES UR QL SCN: NEGATIVE
BASOPHILS # BLD AUTO: 0.4 % (ref 0–1.8)
BASOPHILS # BLD: 0.02 K/UL (ref 0–0.12)
BENZODIAZ UR QL SCN: NEGATIVE
BILIRUB SERPL-MCNC: 0.8 MG/DL (ref 0.1–1.5)
BUN SERPL-MCNC: 5 MG/DL (ref 8–22)
BZE UR QL SCN: NEGATIVE
CALCIUM SERPL-MCNC: 8.5 MG/DL (ref 8.5–10.5)
CANNABINOIDS UR QL SCN: POSITIVE
CHLORIDE SERPL-SCNC: 94 MMOL/L (ref 96–112)
CO2 SERPL-SCNC: 21 MMOL/L (ref 20–33)
CREAT SERPL-MCNC: 0.43 MG/DL (ref 0.5–1.4)
EKG IMPRESSION: NORMAL
EOSINOPHIL # BLD AUTO: 0.25 K/UL (ref 0–0.51)
EOSINOPHIL NFR BLD: 4.5 % (ref 0–6.9)
ERYTHROCYTE [DISTWIDTH] IN BLOOD BY AUTOMATED COUNT: 44.8 FL (ref 35.9–50)
GLOBULIN SER CALC-MCNC: 2.6 G/DL (ref 1.9–3.5)
GLUCOSE SERPL-MCNC: 84 MG/DL (ref 65–99)
HCT VFR BLD AUTO: 35.6 % (ref 42–52)
HGB BLD-MCNC: 12.6 G/DL (ref 14–18)
IMM GRANULOCYTES # BLD AUTO: 0.02 K/UL (ref 0–0.11)
IMM GRANULOCYTES NFR BLD AUTO: 0.4 % (ref 0–0.9)
LYMPHOCYTES # BLD AUTO: 1.02 K/UL (ref 1–4.8)
LYMPHOCYTES NFR BLD: 18.5 % (ref 22–41)
MCH RBC QN AUTO: 33.1 PG (ref 27–33)
MCHC RBC AUTO-ENTMCNC: 35.4 G/DL (ref 33.7–35.3)
MCV RBC AUTO: 93.4 FL (ref 81.4–97.8)
METHADONE UR QL SCN: NEGATIVE
MONOCYTES # BLD AUTO: 0.38 K/UL (ref 0–0.85)
MONOCYTES NFR BLD AUTO: 6.9 % (ref 0–13.4)
NEUTROPHILS # BLD AUTO: 3.83 K/UL (ref 1.82–7.42)
NEUTROPHILS NFR BLD: 69.3 % (ref 44–72)
NRBC # BLD AUTO: 0 K/UL
NRBC BLD-RTO: 0 /100 WBC
OPIATES UR QL SCN: NEGATIVE
OXYCODONE UR QL SCN: NEGATIVE
PCP UR QL SCN: NEGATIVE
PLATELET # BLD AUTO: 101 K/UL (ref 164–446)
PMV BLD AUTO: 10.7 FL (ref 9–12.9)
POC BREATHALIZER: 0.07 PERCENT (ref 0–0.01)
POC BREATHALIZER: 0.1 PERCENT (ref 0–0.01)
POTASSIUM SERPL-SCNC: 4.1 MMOL/L (ref 3.6–5.5)
PROPOXYPH UR QL SCN: NEGATIVE
PROT SERPL-MCNC: 6.7 G/DL (ref 6–8.2)
RBC # BLD AUTO: 3.81 M/UL (ref 4.7–6.1)
SALICYLATES SERPL-MCNC: <1 MG/DL (ref 15–25)
SODIUM SERPL-SCNC: 125 MMOL/L (ref 135–145)
WBC # BLD AUTO: 5.5 K/UL (ref 4.8–10.8)

## 2021-01-03 PROCEDURE — 99285 EMERGENCY DEPT VISIT HI MDM: CPT

## 2021-01-03 PROCEDURE — 80179 DRUG ASSAY SALICYLATE: CPT

## 2021-01-03 PROCEDURE — 90791 PSYCH DIAGNOSTIC EVALUATION: CPT

## 2021-01-03 PROCEDURE — 93005 ELECTROCARDIOGRAM TRACING: CPT | Performed by: EMERGENCY MEDICINE

## 2021-01-03 PROCEDURE — 80143 DRUG ASSAY ACETAMINOPHEN: CPT

## 2021-01-03 PROCEDURE — 700102 HCHG RX REV CODE 250 W/ 637 OVERRIDE(OP): Performed by: EMERGENCY MEDICINE

## 2021-01-03 PROCEDURE — 302970 POC BREATHALIZER: Performed by: EMERGENCY MEDICINE

## 2021-01-03 PROCEDURE — 80053 COMPREHEN METABOLIC PANEL: CPT

## 2021-01-03 PROCEDURE — 93005 ELECTROCARDIOGRAM TRACING: CPT

## 2021-01-03 PROCEDURE — 80307 DRUG TEST PRSMV CHEM ANLYZR: CPT

## 2021-01-03 PROCEDURE — 85025 COMPLETE CBC W/AUTO DIFF WBC: CPT

## 2021-01-03 PROCEDURE — A9270 NON-COVERED ITEM OR SERVICE: HCPCS | Performed by: EMERGENCY MEDICINE

## 2021-01-03 PROCEDURE — 302970 POC BREATHALIZER

## 2021-01-03 RX ORDER — LISINOPRIL 20 MG/1
20 TABLET ORAL DAILY
Status: DISCONTINUED | OUTPATIENT
Start: 2021-01-03 | End: 2021-01-04 | Stop reason: HOSPADM

## 2021-01-03 RX ORDER — QUETIAPINE FUMARATE 100 MG/1
300 TABLET, FILM COATED ORAL
Status: DISCONTINUED | OUTPATIENT
Start: 2021-01-03 | End: 2021-01-04 | Stop reason: HOSPADM

## 2021-01-03 RX ORDER — AMLODIPINE BESYLATE 5 MG/1
5 TABLET ORAL
Status: DISCONTINUED | OUTPATIENT
Start: 2021-01-03 | End: 2021-01-04 | Stop reason: HOSPADM

## 2021-01-03 RX ORDER — DULOXETIN HYDROCHLORIDE 60 MG/1
60 CAPSULE, DELAYED RELEASE ORAL DAILY
Status: DISCONTINUED | OUTPATIENT
Start: 2021-01-03 | End: 2021-01-04 | Stop reason: HOSPADM

## 2021-01-03 RX ADMIN — LISINOPRIL 20 MG: 20 TABLET ORAL at 20:04

## 2021-01-03 RX ADMIN — AMLODIPINE BESYLATE 5 MG: 5 TABLET ORAL at 20:05

## 2021-01-03 RX ADMIN — QUETIAPINE FUMARATE 300 MG: 100 TABLET ORAL at 20:06

## 2021-01-03 RX ADMIN — DULOXETINE HYDROCHLORIDE 60 MG: 60 CAPSULE, DELAYED RELEASE ORAL at 20:04

## 2021-01-03 ASSESSMENT — FIBROSIS 4 INDEX: FIB4 SCORE: 14.46

## 2021-01-03 NOTE — ED NOTES
Pt belongings searched by security.  Two belonging bags placed in locker 8.  Pt had marijuana and a smoking pipe in his belongings that security confiscated.

## 2021-01-03 NOTE — DISCHARGE PLANNING
Medical Social Work    LSW received pt's legal hold paperwork form Alert Team RN; however, Psychiatry is not extending pt's legal hold at this time and Psychiatry would like the pt re-evaluated in the morning by Alert Team.

## 2021-01-03 NOTE — ED PROVIDER NOTES
ED Provider Note    Scribed for Darío Scott M.D. by Maylin Whitfield. 1/3/2021,  5:37 AM.    Means of Arrival: EMS  History obtained from: Patient  History limited by: None    CHIEF COMPLAINT  Chief Complaint   Patient presents with   • Suicide Attempt   • Drug Overdose       HPI  Chris Leggett is a 70 y.o. male who presents to the Emergency Department via EMS following a suicide attempt that occurred at around 4 AM. He was drinking alcohol and gambling at a casino prior to this event. He states he doesn't want to live, and attempted to overdose tonight on trazodone, Seroquel, and Zoloft; no other medications were taken. The patient reports symptoms of body aches and leg swelling, and denies fevers. He says he uses marijuana and has hypertension. He has attempted suicide before.    REVIEW OF SYSTEMS  CONSTITUTIONAL: Drug overdose. No fever.   MUSCULOSKELETAL:  Body aches.  CARDIOVASCULAR: Leg swelling.   PSYCH: Suicide attempt.     See HPI for further details.   All other systems are negative.     PAST MEDICAL HISTORY  Past Medical History:   Diagnosis Date   • Arthritis    • Gout    • Hypertension    • Psychiatric disorder        FAMILY HISTORY  Family History   Problem Relation Age of Onset   • Cancer Mother    • Alzheimer's Disease Father    • Cancer Father    • Heart Disease Sister    • Heart Disease Brother        SOCIAL HISTORY   reports that he has been smoking cigarettes. He has a 2.50 pack-year smoking history. He has never used smokeless tobacco. He reports current alcohol use of about 10.8 - 12.0 oz of alcohol per week. He reports current drug use. Drug: Inhaled.    SURGICAL HISTORY  Past Surgical History:   Procedure Laterality Date   • OH DX BONE MARROW ASPIRATIONS Left 10/2/2019    Procedure: ASPIRATION, BONE MARROW - APARICIO;  Surgeon: Tamera Leos M.D.;  Location: [facility];  Service: Orthopedics   • OH DX BONE MARROW BIOPSIES  10/2/2019    Procedure: BIOPSY, BONE MARROW, USING  "NEEDLE OR TROCAR;  Surgeon: Tamera Leos M.D.;  Location: ENDOSCOPY [facility];  Service: Orthopedics   • CERVICAL DISK AND FUSION ANTERIOR  2018    Procedure: CERVICAL DISK AND FUSION ANTERIOR C5-C6;  Surgeon: Varghese Wilkins M.D.;  Location: SURGERY [facility];  Service: Neurosurgery   • OTHER ORTHOPEDIC SURGERY      fision with disc removal       CURRENT MEDICATIONS  Current Outpatient Medications   Medication Instructions   • acetaminophen (TYLENOL) 650 mg, Oral, EVERY 6 HOURS PRN   • acetaminophen (TYLENOL) 1,000 mg, Oral, EVERY 6 HOURS PRN   • amLODIPine (NORVASC) 5 mg, Oral, EVERY DAY, 1 table once a day    • DULoxetine (CYMBALTA) 60 mg, Oral, DAILY   • lisinopril (PRINIVIL) 20 mg, Oral, DAILY   • quetiapine (SEROQUEL) 300 mg, Oral, EVERY BEDTIME, FOR SLEEP     ALLERGIES  No Known Allergies    PHYSICAL EXAM  VITAL SIGNS: /72   Pulse 74   Temp 36.8 °C (98.2 °F)   Resp 18   Ht 1.778 m (5' 10\")   Wt 102.1 kg (225 lb)   SpO2 95%   BMI 32.28 kg/m²    Gen: Alert, no acute distress  HEENT: ATNC  Eyes: PERRL, EOMI, normal conjunctiva.   Neck: trachea midline  Resp: Lungs clear to auscultation bilaterally. no respiratory distress  CV: Regular rate and rhythm, no murmurs. No JVD. Equal pulses. 1+ pitting edema in bilateral lower extremities.   Abd: soft, non-tender, non-distended  Ext: No deformities  Psych: Somnolent. Lifts to voice.   Neuro: speech fluent. No clonus or hyperreflexia.     DIAGNOSTIC STUDIES / PROCEDURES     EKG  Results for orders placed or performed during the hospital encounter of 21   EKG (NOW)   Result Value Ref Range    Report       [facility]    Test Date:  2021  Pt Name:    BRANDY HUTTON                 Department: ER  MRN:        [id number]                      Room:       [facility]  Gender:     Male                         Technician: [id number]  :        1950                   Requested By:ER TRIAGE PROTOCOL  Order #:    " [id number]                    Reading MD: Darío Scott    Measurements  Intervals                                Axis  Rate:       73                           P:          54  RI:         161                          QRS:        68  QRSD:       99                           T:          58  QT:         396  QTc:        437    Interpretive Statements  Sinus rhythm  Probable anteroseptal infarct, old  Compared to ECG 12/04/2020 01:56:31  Myocardial infarct finding now present  Sinus bradycardia no longer present  Electronically Signed On 1-3-2021 5:38:50 PST by Darío Scott          LABS  Labs Reviewed   CBC WITH DIFFERENTIAL - Abnormal; Notable for the following components:       Result Value    RBC 3.81 (*)     Hemoglobin 12.6 (*)     Hematocrit 35.6 (*)     MCH 33.1 (*)     MCHC 35.4 (*)     Platelet Count 101 (*)     Lymphocytes 18.50 (*)     All other components within normal limits   COMP METABOLIC PANEL - Abnormal; Notable for the following components:    Sodium 125 (*)     Chloride 94 (*)     Bun 5 (*)     Creatinine 0.43 (*)     AST(SGOT) 70 (*)     All other components within normal limits   ACETAMINOPHEN - Abnormal; Notable for the following components:    Acetaminophen -Tylenol <5 (*)     All other components within normal limits   SALICYLATE - Abnormal; Notable for the following components:    Salicylates, Quant. <1 (*)     All other components within normal limits   POC BREATHALIZER - Abnormal; Notable for the following components:    POC Breathalizer 0.101 (*)     All other components within normal limits   ESTIMATED GFR   URINE DRUG SCREEN     All labs reviewed by me.    COURSE & MEDICAL DECISION MAKING  Pertinent Labs & Imaging studies reviewed. (See chart for details)    PPE Note: I personally donned appropriate PPE for all patient encounters during this visit, including a surgical mask, gloves, and eye protection. Scribe remained outside the patient's room and did not have any contact with the patient for  the duration of patient encounter.      5:37 AM Patient seen and examined at bedside. Ordered for labs to evaluate.The patient was placed on a legal hold at this time. Chart review reveals that the patient was seen a month ago for a suicide attempt with the same presentation.     6:14 AM I discussed the patient's case and the above findings with poison control (case #: [id number]) who states that the patient should be cleared if his symptoms don't worsen 4-6 hours after ingestion, and recommended supportive care.  Patient should be medically cleared if he is stable at 8 AM.    Medical Decision Making:  Patient presents with intentional overdose as a suicide attempt.  He was placed on a legal hold.  Based on the medications he describes as taking, he does have some expected somnolence, however he is also intoxicated.  No evidence of QTC or QRS prolongation on EKG.  He does have mild hyponatremia, however this is chronic for this patient.  Salicylates acetaminophen negative.  The remainder of the patient's labs are reassuring.  He has a persistent mild elevation of AST based on his prior presentations.    Chart review demonstrates the patient has been hospitalized monthly for suicidal ideation and suicide attempts with similar presentations and similar medications.  No evidence of other self injury.    DISPOSITION:  Patient will be transferred to Dr. Olmos awaiting medical clearance.    FINAL IMPRESSION  1. Intentional drug overdose, initial encounter ([facility])    2. Suicidal ideation           Maylin PERSAUD (Scribedna), am scribing for, and in the presence of, Darío Scott M.D..    Electronically signed by: Maylin Whitfield (Scribe), 1/3/2021    Darío PERSAUD M.D. personally performed the services described in this documentation, as scribed by Maylin Whitfield in my presence, and it is both accurate and complete.    C    The note accurately reflects work and decisions made by me.  Darío Scott M.D.  1/3/2021  6:34  AM

## 2021-01-03 NOTE — ED PROVIDER NOTES
ED Provider Note    I assumed care of this patient at shift change.  He was observed in the emergency department for several hours in accordance with poison control recommendations.  He had no new or worsening symptoms.  Legal hold was completed, he was seen and evaluated by our behavioral health team.  Plan at this time is for transfer to inpatient psychiatric facility.    Gricel Ordonze MD  1/3/2021 10:05 AM

## 2021-01-03 NOTE — DISCHARGE INSTRUCTIONS
You were evaluated today for suicidal ideation. Your physical exam and labs were reassuring. You were medically cleared in the emergency department, had a psychiatric evaluation performed and you are being discharged from the emergency department. Please follow all the recommendations set by your psychiatrist.    If you have thoughts of suicide, please seek help. This may be a family member, friend, mental health professional, suicide hotline, or any emergency department.     National Suicide Prevention Lifeline: 1-217.459.9161  Available 24hours a day, 7 days a week    Please return to the ED or seek medical attention if you develop:  Fever, severe headache, vomiting, thoughts of suicide or other concerning findings

## 2021-01-03 NOTE — CONSULTS
BRIEF PSYCHIATRIC CONSULT NOTE: very well known to psychiatry. There are many evaluations on this pt. Please review for details. He is a chronic alcoholic and chronically SI. Last evaluation by psych was 11/5/2020. Pt needs to be referred for alcohol once again. He follows with [facility].  Tomorrow his clinic, if he is following through, will be open. He is again intoxicated.  In  addition he is hyponatremic. Alert team to see tomorrow, consider sending him to his clinic and if they determine he needs inpt, he can be returned to the ED. Recommend that he not relieve scripts from the ED, only through his clinic and only in minimal quantities at a time.

## 2021-01-03 NOTE — ED TRIAGE NOTES
"Chris Pruitt Oleksandr   70 y.o. male   Chief Complaint   Patient presents with   • Suicide Attempt   • Drug Overdose      The patient presents to the ED via EMS for evaluation of following a suicide attempt. Per report, the patient took zoloft, seroquel and trazodone. Unknown amounts. The patient reports that he was drinking alcohol and gambling this evening at a local casino when he made the choice to take the medications. The patient also reports that he sometimes hears voices.     The patient states, \"I just want this all to be over.\"    The patient denies knowingly being around anyone with suspected or confirmed COVID 19.    /72   Pulse 74   Temp 36.8 °C (98.2 °F)   Resp 18   Ht 1.778 m (5' 10\")   Wt 102.1 kg (225 lb)   SpO2 95%   BMI 32.28 kg/m²     "

## 2021-01-03 NOTE — CONSULTS
"[facility]   TRIAGE ASSESSMENT    Name: Chris Leggett  MRN: [id number]  : 1950  Age: 70 y.o.  Date of assessment: 1/3/2021  PCP: Boy Wadsworth N.P.  Persons in attendance: Patient    CHIEF COMPLAINT/PRESENTING ISSUE (as stated by patient): 71 Years old male brought to the ED by EMS after attemptng suicide by overdose. This occurred around 4 am, patient had been drinking and gambling at local [employer]. Overdosed on Seroquel, Tranzodone and Zoloft which are currently being RX by [facility]. Patient states that all of his family has , \"Brothers, Mother and Father\" and \"my friends.\" \" Everyone is dead and I want to die also.\"  Patient has history of multi attempts. States that he has no support system. Wife  10 years ago.   Chief Complaint   Patient presents with   • Suicide Attempt   • Drug Overdose        CURRENT LIVING SITUATION/SOCIAL SUPPORT: Currently is homeless. Receives $800 SSDI. Reports limited social supports in the area.     BEHAVIORAL HEALTH TREATMENT HISTORY  Does patient/parent report a history of prior behavioral health treatment for patient?     Dates Level of Care Facilty/Provider Diagnosis/Problem Medications   Currently Outpt [facility]  Depression/ SI  Zoloft, Trazodone (per patient)   [facility] ETO[facility] Unknown    2020 Inpt  [facility]. SI/ETOH Unknown    2020 Inpt   [facility]   SI/ETOH  Unknown    2020 inpt  [facility]   SI/etoh  uncertain medication compliance   11/2018 x1 month, 3/2019, 2019 inpt [facility] SI/etoh Went off meds after DC'd and didn't f/u    2018, 1/2019 x2, 2019, 2019 inpt  SI/etoh     2019 inpt [facility] SI/etoh         SAFETY ASSESSMENT - SELF  Does patient acknowledge current or past symptoms of dangerousness to self? yes  Does parent/significant other report patient has current or past symptoms of dangerousness to self? N\A  Does presenting problem suggest symptoms of dangerousness to self? Yes:     Past Current  " "  Suicidal Thoughts: [x]  [x]    Suicidal Plans: [x]  [x]    Suicidal Intent: [x]  [x]    Suicide Attempts: [x]  [x]    Self-Injury []  []      For any boxes checked above, provide detail: Patient was unable to give details about each SA but has at least 5 attempts in the past year and in 2019 appears that there were 4 attempts.  All combined with alcohol and depression.     History of suicide by family member: no  History of suicide by friend/significant other: yes - friend  Recent change in frequency/specificity/intensity of suicidal thoughts or self-harm behavior? yes - lonely \"all my family is dead.\"  Current access to firearms, medications, or other identified means of suicide/self-harm? yes - medications  If yes, willing to restrict access to means of suicide/self-harm? unclear if he has any medications left after overdose.   Protective factors present:  Willing to address in treatment    SAFETY ASSESSMENT - OTHERS  Does patient acknowledge current or past symptoms of aggressive behavior or risk to others? no  Does parent/significant other report patient has current or past symptoms of aggressive behavior or risk to others?  N\A  Does presenting problem suggest symptoms of dangerousness to others? No    Crisis Safety Plan completed and copy given to patient? N\A    ABUSE/NEGLECT SCREENING  Does patient report feeling “unsafe” in his/her home, or afraid of anyone?  no  Does patient report any history of physical, sexual, or emotional abuse?  no  Does parent or significant other report any of the above? N\A  Is there evidence of neglect by self?  no  Is there evidence of neglect by a caregiver? no  Does the patient/parent report any history of CPS/APS/police involvement related to suspected abuse/neglect or domestic violence? no  Based on the information provided during the current assessment, is a mandated report of suspected abuse/neglect being made?  No    SUBSTANCE USE SCREENING  Yes:  [place]ell all substances " "used in the past 30 days:      Last Use Amount   [x]   Alcohol 1/2/2021 unk   [x]   Marijuana 1/2/2021 \"smoked some\"   []   Heroin     []   Prescription Opioids  (used without prescription, for    recreation, or in excess of prescribed amount)     []   Other Prescription  (used without prescription, for    recreation, or in excess of prescribed amount)     []   Cocaine      [x]   Methamphetamine History  Denies recent use   []   \"\" drugs (ectasy, MDMA)     []   Other substances        UDS results: THC  Breathalyzer results: Admit .101, at time of assessment: .07    What consequences does the patient associate with any of the above substance use and or addictive behaviors? Other: All areas of his life.     Risk factors for detox (check all that apply):  []  Seizures   []  Diaphoretic (sweating)   [x]  Tremors   []  Hallucinations   [x]  Increased blood pressure   []  Decreased blood pressure   []  Other   []  None       MENTAL STATUS   Participation: Limited verbal participation  Grooming: Adequate  Orientation: Drowsy/Somnolent  Behavior: Agitated  Eye contact: Limited  Mood: Depressed and Irritable  Affect: Labile  Thought process: WNL for current state of drowsy and somnolent  Thought content: Preoccupation  Speech: Soft and Hypotalkative  Perception: Within normal limits  Memory:  Poor memory for chronology of events  Insight: Poor  Judgment:  Poor  Other:    Collateral information:   Source:  [] Significant other present in person:   [] Significant other by telephone  [] [facility]   [x] [facility]  [x] [facility]  [] Other:     CLINICAL IMPRESSIONS:  Primary: Depression with  Suicidal Attempt  Secondary:  Alcohol dependency        IDENTIFIED NEEDS/PLAN:  [Trigger DISPOSITION list for any items marked]    [x]  Imminent safety risk - self [] Imminent safety risk - others   []  Acute substance withdrawal []  Psychosis/Impaired reality testing   [x]  Mood/anxiety [x]  Substance " "use/Addictive behavior   []  Maladaptive behaviro []  Parent/child conflict   []  Family/Couples conflict []  Biomedical   [x]  Housing [x]  Financial   []   Legal  Occupational/Educational   []  Domestic violence []  Other:     Recommended Plan of Care: Patient currently endorsing SI and states that he \"just wants to be dead\". 1:1 sitter at door. Legal Hold to [facility].     Does patient express agreement with the above plan? yes    Referral appointment(s) scheduled? N\A    Alert team only:   I have discussed findings and recommendations with Dr. Ordonez who is in agreement with these recommendations.     Referral information sent to the following community providers :    If applicable : Referred  to : [facility] for legal hold follow up at 0930.      Marina Reed R.N.  1/3/2021              "

## 2021-01-04 VITALS
OXYGEN SATURATION: 96 % | WEIGHT: 225 LBS | TEMPERATURE: 97 F | SYSTOLIC BLOOD PRESSURE: 131 MMHG | BODY MASS INDEX: 32.21 KG/M2 | HEIGHT: 70 IN | HEART RATE: 74 BPM | DIASTOLIC BLOOD PRESSURE: 74 MMHG | RESPIRATION RATE: 14 BRPM

## 2021-01-04 PROCEDURE — A9270 NON-COVERED ITEM OR SERVICE: HCPCS | Performed by: EMERGENCY MEDICINE

## 2021-01-04 PROCEDURE — 700102 HCHG RX REV CODE 250 W/ 637 OVERRIDE(OP): Performed by: EMERGENCY MEDICINE

## 2021-01-04 RX ADMIN — DULOXETINE HYDROCHLORIDE 60 MG: 60 CAPSULE, DELAYED RELEASE ORAL at 05:37

## 2021-01-04 RX ADMIN — LISINOPRIL 20 MG: 20 TABLET ORAL at 05:36

## 2021-01-04 NOTE — ED NOTES
Patient sleeping, chest rise and fall. No needs at this time. 1:1 Sitter in direct view of patient.

## 2021-01-04 NOTE — DISCHARGE PLANNING
"Alert Team  Per Dr Johnson's request, pt reassessed for DC.  He appears at baseline; chronic hopelessness and vague SI d/t lack of housing he likes and mismanagement of funds.  He has no active plan.    I contacted the [facility] clinic, where pt is established for psych care and case mgmt.    Ciera at [facility] reported that pt was in one of their group homes til 12/28/2020, then went \"RANDY.\"  When I asked pt about this, he said he didn't like the group home, that \"there were 8 people living there and they all steal from you,\" so he left and didn't tell anyone.  Ciera reports pt can only do walk in therapy sessions because he has no call no showed so many times.  He has also missed psychiatry appointments.  They were willing to give him another opportunity for housing with them; offered to come get him, transport him to [facility] to set up f/u appts and then to a group home.  He refused.  I gave him some time to ponder it, meet with DARIAN Mg; he refused when I offered again.  I advised he can show up there anytime during business hours and they will continue to try and assist him.  He is also aware of the [facility] for detox, which is available 24/7 walk in when beds available.  Pt decided to DC to self and declined f/u services.    Psychiatry team, including myself, familiar with pt.  I did a full consult with him 10/7/2019 and did a chart review at the time.  I have included an updated version here:    Today is pt's 31st ER visit since he arrived in [place] 9/2018, 15 months ago.  Pt first presented to this ER September 2018.  He had come from [place] with his dying brother.  Pt was intoxicated during that trip and fell, fracturing his neck; inpt tx here for 3 days then sent to SNF.  He did not report any psych hx in his PMH.  Reports being a retired .  He is out of Medicare days for inpt psych care.  /S currently coverage.    Pt presented to ER again 10/18/18 with low back pain and neck pain.  Pt reported another " "intoxicated fall and subsequent pain; no injury though.  Pt was given resources, including cab voucher and some cash for phone calls from the ERP, and discharged.     18 ER for SI and several medical complaints; brought from [facility] after voicing SI to jump in front of train.  Sent to [facility].     18 ER to inpt medical unit for 9 days d/t back pain and vague SI, no legal hold.  Noted to complain about staff treatment of him and left AMA.  Refused to wait for prescriptions.     18 presented to ER after eloping from inpt unit a few hours earlier.  Reported to staff that his wife and child \" in my arms.\"  Vague, on and off SI, closely related to not having housing.  DC'd to self.     1/3/19 ER for etoh, SI.  Had just been released from [place], went to [facility] and drank heavily, then came to this ER.  He had been advised to go to homeless shelter upon DC from [place].  Noted to state \"I ain't going to the shelter.\"   had also arranged with [facility] to interview pt that day, and he was supposed to start their IOP program that day.  DC'd to self.     19 ER for SI, chronic pain.  ERP noted him to be manipulative for secondary gain of shelter.  He reported he had already spent his monthly SSI money.  Noted by ERP to also be a frequent utilizer of [facility] ER as well.  DC'd to self.     19 ER for SI and various medical complaints.  Sent to .     3/1/19 ER for etoh/SI.  Sent to [facility].     3/7/19 ER for etoh, SI.  Noted to threaten to kill self if DC'd.  DC'd to self per psychiatry 3/12/19.     19 ER for SI/etoh.  4 days on medical inpt unit for hyponatremia and back pain, thrombocytopenia.     19 ER for SI; sent to .  Noted to have used amphetamines \"occasionally.\"     19 ER for SI/etoh.  DC'd to self.     19 ER for SI and a fall.  DC'd to self 2 days later.     19 ER for SI/etoh/fall.  Went to [facility]'s first, dc'd, then came here.  DC'd to " "self.    9/26-10/5/19 ER for SI/etoh/pain; 9 days medical inpatient.    10/6/19: ER for SI/etoh, 16 hours after dc'd from medical inpt.  DC'd.    10/7/19: ER for SI/etoh.  Declined by [facility] hospitals d/t \"max therapeutic benefit.\"  DC'd from ER 10/10.    10/13-10/15/19: ER for etoh, weakness; 2 days medical inpt.    12/18/19 ER for constipation and urinary retention.    12/20/19 ER for diarrhea and catheter issues.    2/4-2/7/2020: ER for syncope; 3 days medical inpt for cervical fx.    2/7-2/17/2020: 10 days at [facility]hab post C[facility] fx.  Reported he quit drinking 6 months prior.  At [place], living with friend \"Anirudh.\"  Noted by Home Health to have poor f/u with medical appts after DC.  \"Reports not consistently taking Seroquel or Trazodone as he does not need them to sleep.\"    5/11-5/14/2020: 3 days medical inpt for duodenitis.    6/16/2020: Office visit in [place] to establish care; living in [facility].    9/14/2020: 10am Refill refusal for cymbalta by Elliot CALVO noted, as pt needed to have a f/u appt first.  The same day, pt came to [facility] ER with SI.  Sent to [facility].    9/18-9/21/2020: 3 days medical inpt for hyponatremia.  He had been DC'd from [facility] that same day; drank and returned to ER with SI.  Sent to  at [place].    10/22-10/28/2020: Sent from inpt at  for chest pain.  Psychiatry did not renew the hold; medical team did and sent him back to .    11/4/2020: ER for etoh, reported SA by taking 1200mg seroquel.  Sent to [facility].    12/4/2020: ER for etoh, reported SA by taking excess trazadone and gabapentin; no s/s of an overdose noted.  DC'd to [facility] with transport.        Pt has been generally noncompliant with f/u recommendations and plans made for him to f/u.    Dates Level of Care Facilty/Provider Diagnosis/Problem Medications   [facility]  Depression/ SI  Zoloft, Trazodone (per patient)   [facility] / [facility]H Unknown    11/4/2020  9/2020  3/2019   " 4/2019 11/2018 Inpt  [facility]. SI/ETOH Unknown    10/2020  9/21/2020  5/2019  4/2019  1/2019 x2  12/2018 Inpt   [facility]   SI/ETOH  Unknown    8/2020 inpt  [facility]  SI/etoh  uncertain medication compliance   11/4/2020 inpt [facility] SI/etoh     12/2020  [facility]/crisis stabilization    [facility]/etoh       Patient reports of past SA vary.  At time, pt was unable to give details about each SA but reported at least 5 attempts in the past year and in 4 in 2019; all combined with alcohol and depression.   At other times, he has denied past SA.

## 2021-01-04 NOTE — ED NOTES
Rounded on patient, patient continues to sleep, chest rise and fall. No needs at this time. 1:1 sitter in direct view of patient.

## 2021-01-04 NOTE — ED PROVIDER NOTES
ED Provider Note    8:41 AM patient was seen at the bedside.  He has no requests at this time.  He declines offer of return to [facility].  I spoken with Sindy from the alert team who has evaluated the patient.  This patient is well-known to their service.  He is also been evaluated by our psychiatrist, Dr. Johnson, who is also quite familiar with him.  Patient declines outpatient services at this time.  He is denying acute suicidal ideations.  He has been cleared medically.  Be discharged home in stable condition.

## 2021-01-04 NOTE — DISCHARGE PLANNING
Alert Team  Notified in report of pt to reassess in AM for possible DC.  PM Alert Team RN reported pt continued to voice hopelessness and SI d/t completing his frequent cycle of getting his SSI on the first then spending it all gambling and drinking.  She did not feel he was appropriate for DC overnight d/t reported SA by OD.  Transfer packet has not been sent by  d/t psychiatry requesting reevaluation in the AM.  Psychiatry has already expressed they will NOT be extending this pt's hold.  I will have pt eat some breakfast while I contact his outpt providers at [facility] to see if he can go door to door from here to either [facility] or [facility].  Once I have a safe DC plan and if ERP is agreeable, we can hopefully get this pt DC'd to immediate f/u option.  Will notify RN shortly of plan.

## 2021-01-04 NOTE — ED NOTES
Assumed care of patient, report given by SHARAN Barron. Patient sleeping, chest rise and fall. 1:1 sitter in direct view of patient.

## 2021-01-04 NOTE — ED NOTES
Report rec'd from Munira RN.  Pt resting with stable vs.  Sitter outside room in direct observation

## 2021-01-04 NOTE — ED NOTES
Med rec complete per Pt at bedside   Pt denies taking any prescription medications   Allergies verified   No ABX in last 14 days     Home [facility] @[phone]

## 2021-01-04 NOTE — ED NOTES
Patient ambulatory to the bathroom with RN assistance, patient is ambulatory with a steady gait. Patient back to bed, placed blankets over patient. 1:1 sitter in direct view of patient. Called alert team to let them know patient is awake.

## 2021-01-04 NOTE — ED NOTES
Pt food provided to them. Warmed up. Pt denies any other needs at this time. A/Ox4. Sitter present

## 2021-01-04 NOTE — ED NOTES
Patient sleeping on gurney, chest rise and fall. No needs at this time. 1:1 sitter in direct view of patient.

## 2021-01-04 NOTE — ED NOTES
Patient's home medications have been reviewed by the pharmacy team.     Patient with long standing hx of SI and ETOH abuse and well known to psych team    Past Medical History:   Diagnosis Date   • Arthritis    • Gout    • Hypertension    • Psychiatric disorder        Patient's Medications   New Prescriptions    No medications on file   Previous Medications    ACETAMINOPHEN (TYLENOL) 325 MG TAB    Take 2 Tabs by mouth every 6 hours as needed (Mild Pain; (Pain scale 1-3); Temp greater than 100.5 F).    ACETAMINOPHEN (TYLENOL) 500 MG TAB    Take 1,000 mg by mouth every 6 hours as needed.    AMLODIPINE (NORVASC) 5 MG TAB    Take 1 Tab by mouth every day. 1 table once a day    DULOXETINE (CYMBALTA) 60 MG CAP DR PARTICLES DELAYED-RELEASE CAPSULE    Take 60 mg by mouth every day.    LISINOPRIL (PRINIVIL) 20 MG TAB    Take 20 mg by mouth every day.    QUETIAPINE (SEROQUEL) 300 MG TABLET    Take 300 mg by mouth every bedtime. FOR SLEEP   Modified Medications    No medications on file   Discontinued Medications    No medications on file          A:  Medications do not appear to be contributing to current complaints.     P:    No recommendations at this time. Home medications have been reordered as appropriate. Psych consulted and plan for tx to inpatient psych facility.    Domenic Lamas, ZachD

## 2021-02-05 NOTE — CARE PLAN
Problem: Communication  Goal: The ability to communicate needs accurately and effectively will improve  Outcome: PROGRESSING AS EXPECTED     Problem: Safety  Goal: Will remain free from injury  Outcome: PROGRESSING AS EXPECTED     
  Problem: Infection  Goal: Will remain free from infection  Outcome: PROGRESSING AS EXPECTED  Note:   Pt educated on importance of hand hygiene and oral care. Standard precautions in place.        Problem: Knowledge Deficit  Goal: Knowledge of disease process/condition, treatment plan, diagnostic tests, and medications will improve  Outcome: PROGRESSING AS EXPECTED  Note:   Pt educated about disease process. Reason why medications are taken. And informed about treatment plan.      
  Problem: Knowledge Deficit  Goal: Knowledge of the prescribed therapeutic regimen will improve  Outcome: PROGRESSING AS EXPECTED  Intervention: Discuss information regarding therpeutic regimen and document in education  Note:   Pt educated about disease process. Reason why medications are taken. And informed about treatment plan.      Problem: Pain Management  Goal: Pain level will decrease to patient's comfort goal  Outcome: PROGRESSING AS EXPECTED  Intervention: Follow pain managment plan developed in collaboration with patient and Interdisciplinary Team  Note:   Medicated per MAR. Educated on pain scale. implemented non pharmacological methods: distraction, reposition, rest.      
  Problem: Safety  Goal: Will remain free from falls  Outcome: PROGRESSING AS EXPECTED  Note:   Pt mobility assessed at beginning of shift. Pt is max assist. Fall precautions in place. Non-slip socks on. Bed in lowest locked position. Bed alarm on. Call light within reach. Pt educated to call for assistance and verbalizes understanding.      Problem: Pain Management  Goal: Pain level will decrease to patient's comfort goal  Outcome: PROGRESSING AS EXPECTED  Note:   Patient is controlling pain by staying on top of it with PRN oxy 10. Denies dilaudid because he knows it is only temporary relief.      
  Problem: Safety  Goal: Will remain free from injury  Outcome: PROGRESSING AS EXPECTED  Note:   Pt mobility assessed at beginning of shift. Pt is max assist w/ hand held. Fall precautions in place. Non-slip socks on. Bed in lowest locked position. Bed alarm on. Call light within reach. Pt educated to call for assistance and verbalizes understanding.      Problem: Bowel/Gastric:  Goal: Will not experience complications related to bowel motility  Outcome: PROGRESSING SLOWER THAN EXPECTED  Note:   Patient has attempted to have a bowel movement but no success. Educated on constipation with narcotic use, understands. Patient will ambulate this afternoon.       
7.4

## 2021-03-03 DIAGNOSIS — Z23 NEED FOR VACCINATION: ICD-10-CM

## 2021-05-10 NOTE — ADDENDUM NOTE
Encounter addended by: Tanja Jimenez M.D. on: 5/9/2021 5:41 PM   Actions taken: Delete clinical note

## 2021-06-03 ENCOUNTER — APPOINTMENT (OUTPATIENT)
Dept: RADIOLOGY | Facility: MEDICAL CENTER | Age: 71
End: 2021-06-03
Attending: EMERGENCY MEDICINE
Payer: MEDICARE

## 2021-06-03 ENCOUNTER — HOSPITAL ENCOUNTER (EMERGENCY)
Facility: MEDICAL CENTER | Age: 71
End: 2021-06-04
Attending: EMERGENCY MEDICINE
Payer: MEDICARE

## 2021-06-03 DIAGNOSIS — Z78.9 ALCOHOL USE: ICD-10-CM

## 2021-06-03 DIAGNOSIS — R45.851 SUICIDAL IDEATION: ICD-10-CM

## 2021-06-03 DIAGNOSIS — R07.9 ACUTE CHEST PAIN: ICD-10-CM

## 2021-06-03 LAB — EKG IMPRESSION: NORMAL

## 2021-06-03 PROCEDURE — 82077 ASSAY SPEC XCP UR&BREATH IA: CPT

## 2021-06-03 PROCEDURE — 84484 ASSAY OF TROPONIN QUANT: CPT

## 2021-06-03 PROCEDURE — 99285 EMERGENCY DEPT VISIT HI MDM: CPT

## 2021-06-03 PROCEDURE — 71045 X-RAY EXAM CHEST 1 VIEW: CPT

## 2021-06-03 PROCEDURE — 93005 ELECTROCARDIOGRAM TRACING: CPT | Performed by: EMERGENCY MEDICINE

## 2021-06-03 PROCEDURE — 81003 URINALYSIS AUTO W/O SCOPE: CPT | Mod: XU

## 2021-06-03 PROCEDURE — 83690 ASSAY OF LIPASE: CPT

## 2021-06-03 PROCEDURE — 80307 DRUG TEST PRSMV CHEM ANLYZR: CPT

## 2021-06-03 PROCEDURE — 80053 COMPREHEN METABOLIC PANEL: CPT

## 2021-06-03 PROCEDURE — 85025 COMPLETE CBC W/AUTO DIFF WBC: CPT

## 2021-06-03 PROCEDURE — 93005 ELECTROCARDIOGRAM TRACING: CPT

## 2021-06-03 PROCEDURE — 84443 ASSAY THYROID STIM HORMONE: CPT

## 2021-06-03 ASSESSMENT — FIBROSIS 4 INDEX: FIB4 SCORE: 9.97

## 2021-06-04 ENCOUNTER — APPOINTMENT (OUTPATIENT)
Dept: RADIOLOGY | Facility: MEDICAL CENTER | Age: 71
End: 2021-06-04
Attending: EMERGENCY MEDICINE
Payer: MEDICARE

## 2021-06-04 VITALS
TEMPERATURE: 98.8 F | RESPIRATION RATE: 16 BRPM | HEIGHT: 71 IN | WEIGHT: 225 LBS | BODY MASS INDEX: 31.5 KG/M2 | DIASTOLIC BLOOD PRESSURE: 81 MMHG | OXYGEN SATURATION: 98 % | HEART RATE: 59 BPM | SYSTOLIC BLOOD PRESSURE: 151 MMHG

## 2021-06-04 LAB
ALBUMIN SERPL BCP-MCNC: 3.9 G/DL (ref 3.2–4.9)
ALBUMIN/GLOB SERPL: 1.3 G/DL
ALP SERPL-CCNC: 58 U/L (ref 30–99)
ALT SERPL-CCNC: 15 U/L (ref 2–50)
AMPHET UR QL SCN: NEGATIVE
ANION GAP SERPL CALC-SCNC: 12 MMOL/L (ref 7–16)
APPEARANCE UR: CLEAR
AST SERPL-CCNC: 54 U/L (ref 12–45)
BARBITURATES UR QL SCN: NEGATIVE
BASOPHILS # BLD AUTO: 0.5 % (ref 0–1.8)
BASOPHILS # BLD: 0.02 K/UL (ref 0–0.12)
BENZODIAZ UR QL SCN: NEGATIVE
BILIRUB SERPL-MCNC: 0.5 MG/DL (ref 0.1–1.5)
BILIRUB UR QL STRIP.AUTO: NEGATIVE
BUN SERPL-MCNC: 6 MG/DL (ref 8–22)
BZE UR QL SCN: NEGATIVE
CALCIUM SERPL-MCNC: 8.3 MG/DL (ref 8.5–10.5)
CANNABINOIDS UR QL SCN: POSITIVE
CHLORIDE SERPL-SCNC: 93 MMOL/L (ref 96–112)
CO2 SERPL-SCNC: 21 MMOL/L (ref 20–33)
COLOR UR: YELLOW
CREAT SERPL-MCNC: 0.61 MG/DL (ref 0.5–1.4)
D DIMER PPP IA.FEU-MCNC: 0.7 UG/ML (FEU) (ref 0–0.5)
EOSINOPHIL # BLD AUTO: 0.3 K/UL (ref 0–0.51)
EOSINOPHIL NFR BLD: 7.7 % (ref 0–6.9)
ERYTHROCYTE [DISTWIDTH] IN BLOOD BY AUTOMATED COUNT: 47.1 FL (ref 35.9–50)
ETHANOL BLD-MCNC: 203.1 MG/DL (ref 0–10)
GLOBULIN SER CALC-MCNC: 2.9 G/DL (ref 1.9–3.5)
GLUCOSE SERPL-MCNC: 84 MG/DL (ref 65–99)
GLUCOSE UR STRIP.AUTO-MCNC: NEGATIVE MG/DL
HCT VFR BLD AUTO: 34.5 % (ref 42–52)
HGB BLD-MCNC: 11.9 G/DL (ref 14–18)
IMM GRANULOCYTES # BLD AUTO: 0.01 K/UL (ref 0–0.11)
IMM GRANULOCYTES NFR BLD AUTO: 0.3 % (ref 0–0.9)
KETONES UR STRIP.AUTO-MCNC: NEGATIVE MG/DL
LEUKOCYTE ESTERASE UR QL STRIP.AUTO: NEGATIVE
LIPASE SERPL-CCNC: 30 U/L (ref 11–82)
LYMPHOCYTES # BLD AUTO: 1.22 K/UL (ref 1–4.8)
LYMPHOCYTES NFR BLD: 31.2 % (ref 22–41)
MCH RBC QN AUTO: 34 PG (ref 27–33)
MCHC RBC AUTO-ENTMCNC: 34.5 G/DL (ref 33.7–35.3)
MCV RBC AUTO: 98.6 FL (ref 81.4–97.8)
METHADONE UR QL SCN: NEGATIVE
MICRO URNS: NORMAL
MONOCYTES # BLD AUTO: 0.23 K/UL (ref 0–0.85)
MONOCYTES NFR BLD AUTO: 5.9 % (ref 0–13.4)
NEUTROPHILS # BLD AUTO: 2.13 K/UL (ref 1.82–7.42)
NEUTROPHILS NFR BLD: 54.4 % (ref 44–72)
NITRITE UR QL STRIP.AUTO: NEGATIVE
NRBC # BLD AUTO: 0 K/UL
NRBC BLD-RTO: 0 /100 WBC
OPIATES UR QL SCN: NEGATIVE
OXYCODONE UR QL SCN: NEGATIVE
PCP UR QL SCN: NEGATIVE
PH UR STRIP.AUTO: 5.5 [PH] (ref 5–8)
PLATELET # BLD AUTO: 68 K/UL (ref 164–446)
PMV BLD AUTO: 12.7 FL (ref 9–12.9)
POC BREATHALIZER: 0.06 PERCENT (ref 0–0.01)
POTASSIUM SERPL-SCNC: 3.6 MMOL/L (ref 3.6–5.5)
PROPOXYPH UR QL SCN: NEGATIVE
PROT SERPL-MCNC: 6.8 G/DL (ref 6–8.2)
PROT UR QL STRIP: NEGATIVE MG/DL
RBC # BLD AUTO: 3.5 M/UL (ref 4.7–6.1)
RBC UR QL AUTO: NEGATIVE
SODIUM SERPL-SCNC: 126 MMOL/L (ref 135–145)
SP GR UR STRIP.AUTO: 1.01
TROPONIN T SERPL-MCNC: 14 NG/L (ref 6–19)
TSH SERPL DL<=0.005 MIU/L-ACNC: 1.17 UIU/ML (ref 0.38–5.33)
UROBILINOGEN UR STRIP.AUTO-MCNC: 0.2 MG/DL
WBC # BLD AUTO: 3.9 K/UL (ref 4.8–10.8)

## 2021-06-04 PROCEDURE — 96374 THER/PROPH/DIAG INJ IV PUSH: CPT | Mod: XU

## 2021-06-04 PROCEDURE — 90791 PSYCH DIAGNOSTIC EVALUATION: CPT | Performed by: PSYCHOLOGIST

## 2021-06-04 PROCEDURE — 700111 HCHG RX REV CODE 636 W/ 250 OVERRIDE (IP): Performed by: EMERGENCY MEDICINE

## 2021-06-04 PROCEDURE — 302970 POC BREATHALIZER: Performed by: EMERGENCY MEDICINE

## 2021-06-04 PROCEDURE — 85379 FIBRIN DEGRADATION QUANT: CPT

## 2021-06-04 PROCEDURE — 700117 HCHG RX CONTRAST REV CODE 255: Performed by: EMERGENCY MEDICINE

## 2021-06-04 PROCEDURE — 71275 CT ANGIOGRAPHY CHEST: CPT | Mod: ME

## 2021-06-04 RX ORDER — ONDANSETRON 2 MG/ML
4 INJECTION INTRAMUSCULAR; INTRAVENOUS ONCE
Status: COMPLETED | OUTPATIENT
Start: 2021-06-04 | End: 2021-06-04

## 2021-06-04 RX ADMIN — ONDANSETRON 4 MG: 2 INJECTION INTRAMUSCULAR; INTRAVENOUS at 11:00

## 2021-06-04 RX ADMIN — IOHEXOL 70 ML: 350 INJECTION, SOLUTION INTRAVENOUS at 04:30

## 2021-06-04 NOTE — CONSULTS
"[facility]   TRIAGE ASSESSMENT    Name: Chris Leggett  MRN: [id number]  : 1950  Age: 70 y.o.  Date of assessment: 2021  PCP: Boy Wadsworth N.P.  Persons in attendance: Patient    CHIEF COMPLAINT/PRESENTING ISSUE (as stated by pt): \"I couldn't breathe.... My heart was pounding.\" Pt denies thoughts, intent or plan of suicide.  \"I see a psychiatrist.... at [facility], \"Every Tuesday.\"   Chief Complaint   Patient presents with   • Chest Pain   • Suicidal Ideation        CURRENT LIVING SITUATION/SOCIAL SUPPORT: \"I live in a board and care.... I rent a room.\"  4 months. In [place]. He needs a ride.  Lives on Social Security.     BEHAVIORAL HEALTH TREATMENT HISTORY  Does patient/parent report a history of prior behavioral health treatment for patient?   Yes:    Dates Level of Care Facilty/Provider Diagnosis/Problem Medications   ongoing Outpt [facility] [address]  Depression \"I don't have no family left.\"  Cymbalta--compliant and Trazodone and Seroquel and something for nightmares.                                                                         SAFETY ASSESSMENT - SELF  Does patient acknowledge current or past symptoms of dangerousness to self? Yes \"Off and on I thought about it but I never have acted on it.\"  Does parent/significant other report patient has current or past symptoms of dangerousness to self? N\A  Does presenting problem suggest symptoms of dangerousness to self? Yes:     Past Current    Suicidal Thoughts: [x]  []    Suicidal Plans: []  []    Suicidal Intent: []  []    Suicide Attempts: []  []    Self-Injury []  []      For any boxes checked above, provide detail: see above.     History of suicide by family member: no  History of suicide by friend/significant other: no  Recent change in frequency/specificity/intensity of suicidal thoughts or self-harm behavior? no  Current access to firearms, medications, or other identified means of " "suicide/self-harm? Not applicable  If yes, willing to restrict access to means of suicide/self-harm? Not applicable  Protective factors present:  \"I try to stay busy, walk and stufff.... I don't have the nerve to do it.\"     SAFETY ASSESSMENT - OTHERS  Does patient acknowledge current or past symptoms of aggressive behavior or risk to others? no  Does parent/significant other report patient has current or past symptoms of aggressive behavior or risk to others?  N\A  Does presenting problem suggest symptoms of dangerousness to others? No    Crisis Safety Plan completed and copy given to patient? no    ABUSE/NEGLECT SCREENING  Does patient report feeling “unsafe” in his/her home, or afraid of anyone?  No.   Does patient report any history of physical, sexual, or emotional abuse?  no  Does parent or significant other report any of the above? N\A  Is there evidence of neglect by self?  Yes. Has not pursued AA.  Is there evidence of neglect by a caregiver? no  Does the patient/parent report any history of CPS/APS/police involvement related to suspected abuse/neglect or domestic violence? no  Based on the information provided during the current assessment, is a mandated report of suspected abuse/neglect being made?  No    SUBSTANCE USE SCREENING  Yes:  Darnell all substances used in the past 30 days:      Last Use Amount   [x]   Alcohol yest 6-pack [place]   [x]   Marijuana yest 4-5  Cigarettes per day   []   Heroin     []   Prescription Opioids  (used without prescription, for    recreation, or in excess of prescribed amount)     []   Other Prescription  (used without prescription, for    recreation, or in excess of prescribed amount)     []   Cocaine      []   Methamphetamine     []   \"\" drugs (ectasy, MDMA)     []   Other substances        UDS results: Cannabis  Breathalyzer results: 0.063 at 0503    What consequences does the patient associate with any of the above substance use and or addictive behaviors? Legal: " DUI, Work problems or losses: , Relationship problems: , Health problems: , Monetary problems:     He drank yest for the first time in four months.    Risk factors for detox (check all that apply):  []  Seizures   []  Diaphoretic (sweating)   []  Tremors   []  Hallucinations   []  Increased blood pressure   []  Decreased blood pressure   []  Other   [x]  None      [x] Patient education on risk factors for detoxification and instructed to return to ER as needed.      MENTAL STATUS   Participation: Limited verbal participation, Attentive and Engaged  Grooming: Casual and Disheveled  Orientation: Alert and Fully Oriented  Behavior: Calm  Eye contact: Good  Mood: Depressed  Affect: Blunted  Thought process: Logical and Goal-directed  Thought content: Within normal limits  Speech: Rate within normal limits, Soft and Hypotalkative  Perception: Within normal limits  Memory:  No gross evidence of memory deficits  Insight: Limited  Judgment:  Limited  Other:    Collateral information:    Source:  [] Significant other present in person:   [] Significant other by telephone  [] [facility]   [x] [facility] Nursing Staff  [x] [facility]  [] Other:     [] Unable to complete full assessment due to:  [] Acute intoxication  [] Patient declined to participate/engage  [] Patient verbally unresponsive  [] Significant cognitive deficits  [] Significant perceptual distortions or behavioral disorganization  [] Other:      CLINICAL IMPRESSIONS:  Primary: Suicidal ideation, transient, while intoxicated, resolved.  Secondary:  Depressive Disorder unspecified.       IDENTIFIED NEEDS/PLAN:  [Trigger DISPOSITION list for any items marked]    []  Imminent safety risk - self [] Imminent safety risk - others   []  Acute substance withdrawal []  Psychosis/Impaired reality romie     [x]  Mood/anxiety [x]  Substance use/Addictive behavior   [x]  Maladaptive behaviro []  Parent/child conflict   []  Family/Couples conflict [x]  Biomedical    []  Housing [x]  Financial   []   Legal  Occupational/Educational   []  Domestic violence []  Other:     Recommended Plan of Care:  Actively being addressed by current resources.    Does patient express agreement with the above plan? yes    Referral appointment(s) scheduled? no and N\A    Alert team only:   I have discussed findings and recommendations with Dr. Mg who is in agreement with these recommendations.     Referral information sent to the following community providers :  PHIL    If applicable : Referred  to : [facility] for legal hold follow up at (time): [facility] Wadsworth, Ph.D.  6/4/2021

## 2021-06-04 NOTE — ED NOTES
"C/o palpitations and chest pain  SI-states \"I don't want to live anymore\"  Resting in bed, asleep, arouses easily to voice  NSR on monitor  96% RA  "

## 2021-06-04 NOTE — ED NOTES
Called taxi to confirm ride and there was no record of MTM call. Alert team notifie. MTM recalled.

## 2021-06-04 NOTE — PROGRESS NOTES
3:58 AM patient's care signed out from nighttime ERP.  Patient has been here few times recently with suicidal ideations.  Presents today with alcohol intoxication and also complaining of chest pain.  Per signout report.  Patient's chest pain work-up was unrevealing.  D-dimer was slightly elevated and currently, he is awaiting a CT chest.  If this is negative, patient will need to be seen by the alert team, once he is sober.    4:54 AM no evidence of the ER on CTA.  Await evaluation by the alert team.    11:40 AM patient was seen by the alert team, Ananth, patient has been given outpatient services.  He contracts for safety.  He has been removed from his hold.  Will be discharged home.  He is in stable condition.

## 2021-06-04 NOTE — ED PROVIDER NOTES
ED Provider Note    CHIEF COMPLAINT  Chief Complaint   Patient presents with   • Chest Pain   • Suicidal Ideation        [facility]    Primary care provider: Boy Wadsworth N.P.   History obtained from: Patient  History limited by: None     Chris Leggett is a 70 y.o. male who presents to the ED by EMS complaining of chest pain and suicidal ideation.  He reports midsternal chest pain described as numb and pounding that started 2 days ago while he was at rest and has been persistent since.  He denies injury/trauma and has not noticed anything that makes his pain better or worse.  He reports numbness and tingling in both arms and hands.  He states that he had a heart attack many years ago and also has had a blood clot and he also has high blood pressure.  He reports feeling dizzy at times.  There has been no diaphoresis.  He denies shortness of breath/difficulty breathing.  He has had some runny nose, cough and feeling like he has a fever as well as chills.  He denies sore throat.  No recent travels or known ill contacts.  He reports having about 1 episode of nausea and vomiting daily and about 3-4 episodes of diarrhea without gross blood noted.  He states that he sometimes has some discomfort with urination.  He has not noticed any rash.  He does have lower extremity swelling.  Patient states that he does not want to live anymore because he has no family.  He does not have any specific plans at this time and denies any actual acts of self-harm but reports that he has taken pills in past suicide attempts.  He denies any drug use except for occasional marijuana.  He states that he drinks alcohol occasionally and had a sixpack today.    REVIEW OF SYSTEMS  Please see HPI for pertinent positives/negatives.  All other systems reviewed and are negative.     PAST MEDICAL HISTORY  Past Medical History:   Diagnosis Date   • Arthritis    • Gout    • Hypertension    • Psychiatric disorder         SURGICAL HISTORY  Past  "Surgical History:   Procedure Laterality Date   • UT DX BONE MARROW ASPIRATIONS Left 10/2/2019    Procedure: ASPIRATION, BONE MARROW - APARICIO;  Surgeon: Tamera Leos M.D.;  Location: ENDOSCOPY [facility];  Service: Orthopedics   • UT DX BONE MARROW BIOPSIES  10/2/2019    Procedure: BIOPSY, BONE MARROW, USING NEEDLE OR TROCAR;  Surgeon: Tamera Leos M.D.;  Location: ENDOSCOPY [facility];  Service: Orthopedics   • CERVICAL DISK AND FUSION ANTERIOR  2018    Procedure: CERVICAL DISK AND FUSION ANTERIOR C5-C6;  Surgeon: Varghese Wilkins M.D.;  Location: SURGERY [facility];  Service: Neurosurgery   • OTHER ORTHOPEDIC SURGERY      fision with disc removal        SOCIAL HISTORY  Social History     Tobacco Use   • Smoking status: Current Every Day Smoker     Packs/day: 0.50     Years: 5.00     Pack years: 2.50     Types: Cigarettes     Last attempt to quit: 2019     Years since quittin.3   • Smokeless tobacco: Never Used   Vaping Use   • Vaping Use: Never used   Substance and Sexual Activity   • Alcohol use: Yes     Alcohol/week: 10.8 - 12.0 oz     Types: 18 - 20 Cans of beer per week   • Drug use: Yes     Types: Inhaled     Comment: marijuana last dose tonight   • Sexual activity: Not on file        FAMILY HISTORY  Family History   Problem Relation Age of Onset   • Cancer Mother    • Alzheimer's Disease Father    • Cancer Father    • Heart Disease Sister    • Heart Disease Brother         CURRENT MEDICATIONS  Home Medications    **Home medications have not yet been reviewed for this encounter**          ALLERGIES  No Known Allergies     PHYSICAL EXAM  VITAL SIGNS: /80   Pulse 61   Temp 37.1 °C (98.8 °F)   Resp 16   Ht 1.803 m (5' 11\")   Wt 102 kg (225 lb)   SpO2 96%   BMI 31.38 kg/m²  @KIRBY[[id number]::@     Pulse ox interpretation: 96% I interpret this pulse ox as normal     Cardiac monitor interpretation: Sinus rhythm with heart rate in the 60s as interpreted by me.  The patient " presented with chest pain and cardiac monitor was ordered to monitor for dysrhythmia.    Constitutional: Well developed, well nourished, alert in no apparent distress, nontoxic appearance    HENT: No external signs of trauma, normocephalic, oropharynx moist and clear, nose normal    Eyes: PERRL, conjunctiva without erythema, no discharge, no icterus    Neck: Soft and supple, trachea midline, no stridor, no tenderness, no LAD, no JVD, good ROM    Cardiovascular: Regular rate and rhythm, no murmurs/rubs/gallops, strong distal pulses and good perfusion    Thorax & Lungs: No respiratory distress, CTAB   Abdomen: Soft, nontender, nondistended, no guarding, no rebound, normal BS    Back: No CVAT    Extremities: No cyanosis, mild bilateral lower extremity edema, no gross deformity, good ROM, no tenderness, intact distal pulses with brisk cap refill    Skin: Warm, dry, no pallor/cyanosis, no rash noted    Lymphatic: No lymphadenopathy noted    Neuro: A/O times 3, no focal deficits noted    Psychiatric: Cooperative, reports suicidal ideation without specific plans at this time, denies homicidal ideation, no signs of active hallucinations or delusions      DIAGNOSTIC STUDIES / PROCEDURES    EKG  12 Lead EKG obtained at 2336 and interpreted by me:   Rate: 66   Rhythm: Sinus rhythm   Ectopy: PACs  Intervals: Borderline first-degree block  Axis: Normal   QRS: Normal   ST segments: Normal  T Waves: Normal    Clinical Impression: Sinus rhythm with PACs and borderline first-degree block without acute ischemic changes or other dysrhythmia  Compared to January 3, 2021 without significant change      LABS  All labs reviewed by me.     Results for orders placed or performed during the hospital encounter of 06/03/21   CBC with Differential   Result Value Ref Range    WBC 3.9 (L) 4.8 - 10.8 K/uL    RBC 3.50 (L) 4.70 - 6.10 M/uL    Hemoglobin 11.9 (L) 14.0 - 18.0 g/dL    Hematocrit 34.5 (L) 42.0 - 52.0 %    MCV 98.6 (H) 81.4 - 97.8 fL     MCH 34.0 (H) 27.0 - 33.0 pg    MCHC 34.5 33.7 - 35.3 g/dL    RDW 47.1 35.9 - 50.0 fL    Platelet Count 68 (L) 164 - 446 K/uL    MPV 12.7 9.0 - 12.9 fL    Neutrophils-Polys 54.40 44.00 - 72.00 %    Lymphocytes 31.20 22.00 - 41.00 %    Monocytes 5.90 0.00 - 13.40 %    Eosinophils 7.70 (H) 0.00 - 6.90 %    Basophils 0.50 0.00 - 1.80 %    Immature Granulocytes 0.30 0.00 - 0.90 %    Nucleated RBC 0.00 /100 WBC    Neutrophils (Absolute) 2.13 1.82 - 7.42 K/uL    Lymphs (Absolute) 1.22 1.00 - 4.80 K/uL    Monos (Absolute) 0.23 0.00 - 0.85 K/uL    Eos (Absolute) 0.30 0.00 - 0.51 K/uL    Baso (Absolute) 0.02 0.00 - 0.12 K/uL    Immature Granulocytes (abs) 0.01 0.00 - 0.11 K/uL    NRBC (Absolute) 0.00 K/uL   Complete Metabolic Panel (CMP)   Result Value Ref Range    Sodium 126 (L) 135 - 145 mmol/L    Potassium 3.6 3.6 - 5.5 mmol/L    Chloride 93 (L) 96 - 112 mmol/L    Co2 21 20 - 33 mmol/L    Anion Gap 12.0 7.0 - 16.0    Glucose 84 65 - 99 mg/dL    Bun 6 (L) 8 - 22 mg/dL    Creatinine 0.61 0.50 - 1.40 mg/dL    Calcium 8.3 (L) 8.5 - 10.5 mg/dL    AST(SGOT) 54 (H) 12 - 45 U/L    ALT(SGPT) 15 2 - 50 U/L    Alkaline Phosphatase 58 30 - 99 U/L    Total Bilirubin 0.5 0.1 - 1.5 mg/dL    Albumin 3.9 3.2 - 4.9 g/dL    Total Protein 6.8 6.0 - 8.2 g/dL    Globulin 2.9 1.9 - 3.5 g/dL    A-G Ratio 1.3 g/dL   Troponin   Result Value Ref Range    Troponin T 14 6 - 19 ng/L   LIPASE   Result Value Ref Range    Lipase 30 11 - 82 U/L   DIAGNOSTIC ALCOHOL   Result Value Ref Range    Diagnostic Alcohol 203.1 (H) 0.0 - 10.0 mg/dL   TSH   Result Value Ref Range    TSH 1.170 0.380 - 5.330 uIU/mL   URINE DRUG SCREEN   Result Value Ref Range    Amphetamines Urine Negative Negative    Barbiturates Negative Negative    Benzodiazepines Negative Negative    Cocaine Metabolite Negative Negative    Methadone Negative Negative    Opiates Negative Negative    Oxycodone Negative Negative    Phencyclidine -Pcp Negative Negative    Propoxyphene Negative Negative     Cannabinoid Metab Positive (A) Negative   D-DIMER   Result Value Ref Range    D-Dimer Screen 0.70 (H) 0.00 - 0.50 ug/mL (FEU)   URINALYSIS (UA)    Specimen: Urine, Clean Catch   Result Value Ref Range    Color Yellow     Character Clear     Specific Gravity 1.006 <1.035    Ph 5.5 5.0 - 8.0    Glucose Negative Negative mg/dL    Ketones Negative Negative mg/dL    Protein Negative Negative mg/dL    Bilirubin Negative Negative    Urobilinogen, Urine 0.2 Negative    Nitrite Negative Negative    Leukocyte Esterase Negative Negative    Occult Blood Negative Negative    Micro Urine Req see below    ESTIMATED GFR   Result Value Ref Range    GFR If African American >60 >60 mL/min/1.73 m 2    GFR If Non African American >60 >60 mL/min/1.73 m 2   POC BREATHALIZER   Result Value Ref Range    POC Breathalizer 0.063 (A) 0.00 - 0.01 Percent   EKG (NOW)   Result Value Ref Range    Report       [facility] Emergency Dept.    Test Date:  2021  Pt Name:    BRANDY HUTTON                 Department: ER  MRN:        [id number]                      Room:       [facility]  Gender:     Male                         Technician: [id number]  :        1950                   Requested By:ER TRIAGE PROTOCOL  Order #:    [id number]                    Reading MD:    Measurements  Intervals                                Axis  Rate:       66                           P:          52  ID:         [id number]                          QRS:        71  QRSD:       96                           T:          66  QT:         396  QTc:        415    Interpretive Statements  SINUS RHYTHM  MULTIPLE ATRIAL PREMATURE COMPLEXES  Compared to ECG 2021 05:08:39  Atrial premature complex(es) now present  Myocardial infarct finding no longer present          RADIOLOGY  The radiologist's interpretation of all radiological studies have been reviewed by me.     CT-CTA CHEST PULMONARY ARTERY W/ RECONS   Final Result      No evidence of pulmonary emboli or  other acute thoracic abnormality.            DX-CHEST-PORTABLE (1 VIEW)   Final Result      Right basilar opacities consistent with infiltrate and/or atelectasis. This likely involves basilar segments of the right lower lobe.             COURSE & MEDICAL DECISION MAKING  Nursing notes, VS PMSFHx reviewed in chart.     Review of past medical records shows the patient has had multiple ED visits for suicidal ideation with his last visit on January 3, 2021.  He was last admitted to this hospital October 22, 2020 for chest pain and discharged on October 28, 2020.      Differential diagnoses considered include but are not limited to: AMI, dissection, PE, pneumothorax, pneumomediastinum, CHF/pulm edema, pericardial effusion/tamponade, myocarditis, pericarditis, pneumonia, pleural effusion, pleurisy, costochondritis, mediastinitis, esophageal spasm, GERD, gastritis, PUD, hiatal hernia, pancreatitis, cholecystitis/ascending cholangitis, gallstone/biliary colic, neuropathy, suicidal ideation/attempt, anxiety, depression, personality disorder, intoxication/overdose, electrolyte abnormality, thyroid disorder      Pt risk-stratified as low risk for MACE in the next 6 weeks by HEART Score (0-3): 3    HISTORY  Highly suspicious +2  Moderately suspicious +1  Slightly suspicious 0    EKG  Significant ST depression +2  Nonspecific repolarization disturbance +1  Normal 0    AGE  ? 65 +2  45-65 +1  < 45 0    RISK FACTORS  Hypercholesterolemia, HTN, DM, Cigarette smoking, positive family history, obesity  ? 3 risk factors or history of atherosclerotic disease +2  1-2 risk factors +1  No risk factors known 0    TROPONIN  ? 3× normal limit +2  1-3× normal limit +1  ? normal limit 0      History and physical exam as above.  Chest pain work-up was initiated.  EKG without significant change and troponin was in normal range.  Unlikely to be ACS given 2 days of constant chest pain without elevated troponin.  D-dimer was elevated and patient  underwent CTPA without evidence for acute abnormality as above.  Anemia and thrombocytopenia are slightly worse compared to his chronically low levels and patient with slight neutropenia.  Hyponatremia also appears chronic and stable compared to his prior results.  Patient's initial alcohol level was 203.  He was closely monitored in the ED and remained clinically stable.  He is medically cleared for mental health evaluation.  He will be signed out to the oncoming ED physician for continued monitoring while awaiting mental health evaluation for his suicidal ideation.       The patient is referred to a primary physician for blood pressure management, diabetic screening, and for all other preventative health concerns.       FINAL IMPRESSION  1. Acute chest pain Acute   2. Suicidal ideation Active   3. Alcohol use Active          DISPOSITION  Patient will continue to be monitored in the ED while awaiting mental health evaluation      FOLLOW UP  No follow-up provider specified.       OUTPATIENT MEDICATIONS  New Prescriptions    No medications on file          Electronically signed by: Mg Jacobo D.O., 6/3/2021 11:21 PM      Portions of this record were made with voice recognition software.  Despite my review, spelling/grammar/context errors may still remain.  Interpretation of this chart should be taken in this context.

## 2021-06-04 NOTE — DISCHARGE PLANNING
Cab voucher provided to assist with getting Pt home ([id number]).  MT was contacted three times and was arrangements still were not made.

## 2021-11-24 ENCOUNTER — HOSPITAL ENCOUNTER (EMERGENCY)
Facility: MEDICAL CENTER | Age: 71
End: 2021-11-25
Attending: EMERGENCY MEDICINE
Payer: MEDICARE

## 2021-11-24 ENCOUNTER — APPOINTMENT (OUTPATIENT)
Dept: RADIOLOGY | Facility: MEDICAL CENTER | Age: 71
End: 2021-11-24
Attending: EMERGENCY MEDICINE
Payer: MEDICARE
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
## 2021-11-24 DIAGNOSIS — S00.81XA ABRASION OF FACE, INITIAL ENCOUNTER: ICD-10-CM

## 2021-11-24 DIAGNOSIS — S00.01XA ABRASION OF SCALP, INITIAL ENCOUNTER: ICD-10-CM

## 2021-11-24 DIAGNOSIS — F10.920 ALCOHOLIC INTOXICATION WITHOUT COMPLICATION (HCC): ICD-10-CM

## 2021-11-24 PROCEDURE — 72125 CT NECK SPINE W/O DYE: CPT | Mod: MF

## 2021-11-24 PROCEDURE — 99285 EMERGENCY DEPT VISIT HI MDM: CPT | Mod: 25

## 2021-11-24 PROCEDURE — 70450 CT HEAD/BRAIN W/O DYE: CPT | Mod: MG

## 2021-11-24 RX ORDER — ASPIRIN 81 MG/1
81 TABLET, CHEWABLE ORAL DAILY
Status: SHIPPED | COMMUNITY
End: 2021-11-29

## 2021-11-24 ASSESSMENT — PAIN DESCRIPTION - PAIN TYPE: TYPE: ACUTE PAIN

## 2021-11-24 ASSESSMENT — LIFESTYLE VARIABLES
TOTAL SCORE: 0
DO YOU DRINK ALCOHOL: YES
TOTAL SCORE: 0
HAVE YOU EVER FELT YOU SHOULD CUT DOWN ON YOUR DRINKING: NO
EVER HAD A DRINK FIRST THING IN THE MORNING TO STEADY YOUR NERVES TO GET RID OF A HANGOVER: NO
CONSUMPTION TOTAL: INCOMPLETE
EVER FELT BAD OR GUILTY ABOUT YOUR DRINKING: NO
TOTAL SCORE: 0
HAVE PEOPLE ANNOYED YOU BY CRITICIZING YOUR DRINKING: NO

## 2021-11-24 ASSESSMENT — FIBROSIS 4 INDEX: FIB4 SCORE: 14.35

## 2021-11-25 VITALS
SYSTOLIC BLOOD PRESSURE: 105 MMHG | WEIGHT: 230 LBS | TEMPERATURE: 98.7 F | DIASTOLIC BLOOD PRESSURE: 72 MMHG | HEIGHT: 71 IN | OXYGEN SATURATION: 94 % | HEART RATE: 56 BPM | BODY MASS INDEX: 32.2 KG/M2 | RESPIRATION RATE: 20 BRPM

## 2021-11-25 NOTE — ED PROVIDER NOTES
ED Provider Note    CHIEF COMPLAINT  Chief Complaint   Patient presents with   • T-5000 GLF     Code TBI       HPI  Chris Leggett is a 71 y.o. male who presents from the [facility] after a slip and fall.  He states that he went to use the restroom and slipped and fell and struck his head.  No known loss of consciousness.  He had been drinking about 4-5 beers earlier today.  He has slurred speech.  He notes that he has broken his neck several times.  He does have some neck discomfort as well.  No numbness or weakness.  No chest pain or trouble breathing.  Denies syncope.  Has multiple abrasions to the forehead and scalp.    Last Tdap in 2018.    REVIEW OF SYSTEMS  See HPI for further details. All other systems are negative.     PAST MEDICAL HISTORY   has a past medical history of Arthritis, Gout, Hypertension, and Psychiatric disorder.    SOCIAL HISTORY  Social History     Tobacco Use   • Smoking status: Current Every Day Smoker     Packs/day: 0.50     Years: 5.00     Pack years: 2.50     Types: Cigarettes     Last attempt to quit: 2019     Years since quittin.8   • Smokeless tobacco: Never Used   Vaping Use   • Vaping Use: Never used   Substance and Sexual Activity   • Alcohol use: Yes     Alcohol/week: 10.8 - 12.0 oz     Types: 18 - 20 Cans of beer per week   • Drug use: Yes     Types: Inhaled     Comment: marijuana last dose tonight   • Sexual activity: Not on file       SURGICAL HISTORY   has a past surgical history that includes cervical disk and fusion anterior (2018); other orthopedic surgery; dx bone marrow aspirations (Left, 10/2/2019); and dx bone marrow biopsies (10/2/2019).    CURRENT MEDICATIONS  Home Medications     Reviewed by Gricelda Colon R.N. (Registered Nurse) on 21 at 2033  Med List Status: Partial   Medication Last Dose Status   acetaminophen (TYLENOL) 325 MG Tab  Active   amLODIPine (NORVASC) 5 MG Tab  Active   aspirin (ASA) 81 MG Chew Tab chewable tablet  Active     "            ALLERGIES  No Known Allergies    PHYSICAL EXAM  VITAL SIGNS: /79   Pulse 68   Temp 35.9 °C (96.6 °F) (Temporal)   Resp (!) 32   Ht 1.803 m (5' 11\")   Wt 104 kg (230 lb)   SpO2 97%   BMI 32.08 kg/m²   Pulse ox interpretation: I interpret this pulse ox as normal.  Constitutional: Alert in no apparent distress.  HENT: Abrasions to the forehead and frontal scalp and over the nose, Bilateral external ears normal, Nose normal.   Eyes: Pupils are equal and reactive, Conjunctiva normal, Non-icteric.   Neck: Normal range of motion, mild diffuse nonlocalized tenderness, Supple, No stridor.   Cardiovascular: Regular rate and rhythm.   Thorax & Lungs: Normal breath sounds, No respiratory distress, No wheezing, No chest tenderness.   Abdomen: Soft, No tenderness, No masses, No pulsatile masses. No peritoneal signs.  Back: No bony tenderness, No CVA tenderness.   Extremities: Intact distal pulses, No edema, No tenderness, No cyanosis  Musculoskeletal: Good range of motion in all major joints. No major deformities noted.   Neurologic: Alert, slurred speech and odor of alcohol, normal motor function and gait, Normal sensory function, No focal deficits noted.       DIAGNOSTIC STUDIES / PROCEDURES      RADIOLOGY  CT-HEAD W/O   Final Result      1.  There is no acute intracranial hemorrhage or infarct.      2.  White matter lucencies most consistent with small vessel ischemic change versus demyelination or gliosis.      3.  There is cerebral atrophy.         CT-CSPINE WITHOUT PLUS RECONS   Final Result      1.  There is no acute fracture of the cervical spine.   2.  There is extensive multilevel degenerative change of the cervical spine.   3.  There is postoperative change at the C5-6 level which is stable in appearance.               COURSE & MEDICAL DECISION MAKING    Medications - No data to display    Pertinent Labs & Imaging studies reviewed. (See chart for details)  71 y.o. male presenting after a slip " [place] several alcoholic beverages.  He has multiple abrasions to the face and neck pain.  CT head and neck were performed which were unremarkable for acute injury.  Patient still remains slightly intoxicated and unsteady on his feet.  Will allow time for him to metabolize the alcohol in his system and will discharge home once he is steady on his feet.  He states that he lives independently and [place]    The patient was instructed to follow-up with primary care physician for further management.  To return immediately for any worsening symptoms or development of any other concerning signs or symptoms. The patient verbalizes understanding in their own words.    /72   Pulse (!) 56   Temp 37.1 °C (98.7 °F) (Temporal)   Resp 20   Ht 1.803 m (5' 11\")   Wt 104 kg (230 lb)   SpO2 94%   BMI 32.08 kg/m²     The patient was referred to primary care where they will receive further BP management.      [facility], Emergency Dept  [address] [place] [id number] [phone]    As needed, If symptoms worsen    Primary care doctor    Schedule an appointment as soon as possible for a visit         FINAL IMPRESSION  1. Fall, initial encounter    2. Abrasion of face, initial encounter    3. Abrasion of scalp, initial encounter    4. Alcoholic intoxication without complication (HCC)            Electronically signed by: Deonte Staples M.D., 11/24/2021 8:20 PM    "

## 2021-11-25 NOTE — ED TRIAGE NOTES
Chris Pruitt Oleksandr  71 y.o.  Chief Complaint   Patient presents with   • T-5000 GLF     Code TBI     BIB EMS for above. A & O x 4, GCS 15. Mask in place.    Patient was at the [facility] this evening. Drank 6-7 beers and went to use the restroom. Slipped and fell on the tile floor hitting his head. - LOC.    Patient is on ASA 81 mg PO daily.    Skin tear x 2 to top of head, skin tear x 1 to forehead, abrasion to nasal bridge.    Last Tdap unknown.

## 2021-11-25 NOTE — ED NOTES
RN tried to ambulate pt, but pt was unsteady and one person assist was needed. Pt unsafe for d/c at this time. Urinal, fresh blanket, and dinner tray provided. Will reasses at a later time for d/c. Pt has no complaints at this time, call light within reach.

## 2021-11-29 ENCOUNTER — APPOINTMENT (OUTPATIENT)
Dept: RADIOLOGY | Facility: MEDICAL CENTER | Age: 71
DRG: 493 | End: 2021-11-29
Attending: EMERGENCY MEDICINE
Payer: MEDICARE

## 2021-11-29 ENCOUNTER — APPOINTMENT (OUTPATIENT)
Dept: RADIOLOGY | Facility: MEDICAL CENTER | Age: 71
DRG: 493 | End: 2021-11-29
Attending: STUDENT IN AN ORGANIZED HEALTH CARE EDUCATION/TRAINING PROGRAM
Payer: MEDICARE

## 2021-11-29 ENCOUNTER — APPOINTMENT (OUTPATIENT)
Dept: RADIOLOGY | Facility: IMAGING CENTER | Age: 71
End: 2021-11-29
Attending: PHYSICIAN ASSISTANT
Payer: MEDICARE

## 2021-11-29 ENCOUNTER — HOSPITAL ENCOUNTER (INPATIENT)
Facility: MEDICAL CENTER | Age: 71
LOS: 9 days | DRG: 493 | End: 2021-12-08
Attending: EMERGENCY MEDICINE | Admitting: STUDENT IN AN ORGANIZED HEALTH CARE EDUCATION/TRAINING PROGRAM
Payer: MEDICARE

## 2021-11-29 ENCOUNTER — OFFICE VISIT (OUTPATIENT)
Dept: URGENT CARE | Facility: PHYSICIAN GROUP | Age: 71
End: 2021-11-29
Payer: MEDICARE

## 2021-11-29 VITALS
WEIGHT: 225 LBS | TEMPERATURE: 98.4 F | HEART RATE: 62 BPM | RESPIRATION RATE: 16 BRPM | BODY MASS INDEX: 31.5 KG/M2 | SYSTOLIC BLOOD PRESSURE: 130 MMHG | DIASTOLIC BLOOD PRESSURE: 86 MMHG | HEIGHT: 71 IN | OXYGEN SATURATION: 97 %

## 2021-11-29 DIAGNOSIS — S59.901A INJURY OF RIGHT ELBOW, INITIAL ENCOUNTER: ICD-10-CM

## 2021-11-29 DIAGNOSIS — J02.9 SORE THROAT: ICD-10-CM

## 2021-11-29 DIAGNOSIS — R45.1 AGITATION: ICD-10-CM

## 2021-11-29 DIAGNOSIS — J44.9 CHRONIC OBSTRUCTIVE PULMONARY DISEASE, UNSPECIFIED COPD TYPE (HCC): ICD-10-CM

## 2021-11-29 DIAGNOSIS — N40.0 BENIGN PROSTATIC HYPERPLASIA, UNSPECIFIED WHETHER LOWER URINARY TRACT SYMPTOMS PRESENT: ICD-10-CM

## 2021-11-29 DIAGNOSIS — S42.411A CLOSED SUPRACONDYLAR FRACTURE OF RIGHT HUMERUS, INITIAL ENCOUNTER: ICD-10-CM

## 2021-11-29 DIAGNOSIS — I10 ESSENTIAL HYPERTENSION: ICD-10-CM

## 2021-11-29 DIAGNOSIS — G62.9 NEUROPATHY: ICD-10-CM

## 2021-11-29 DIAGNOSIS — M10.9 GOUT, UNSPECIFIED CAUSE, UNSPECIFIED CHRONICITY, UNSPECIFIED SITE: ICD-10-CM

## 2021-11-29 DIAGNOSIS — Z72.0 TOBACCO ABUSE: ICD-10-CM

## 2021-11-29 DIAGNOSIS — F33.9 EPISODE OF RECURRENT MAJOR DEPRESSIVE DISORDER, UNSPECIFIED DEPRESSION EPISODE SEVERITY (HCC): ICD-10-CM

## 2021-11-29 DIAGNOSIS — R12 HEARTBURN: ICD-10-CM

## 2021-11-29 DIAGNOSIS — K59.00 CONSTIPATION, UNSPECIFIED CONSTIPATION TYPE: ICD-10-CM

## 2021-11-29 LAB
ALBUMIN SERPL BCP-MCNC: 4.5 G/DL (ref 3.2–4.9)
ALBUMIN/GLOB SERPL: 1.6 G/DL
ALP SERPL-CCNC: 66 U/L (ref 30–99)
ALT SERPL-CCNC: 19 U/L (ref 2–50)
ANION GAP SERPL CALC-SCNC: 9 MMOL/L (ref 7–16)
APTT PPP: 29.3 SEC (ref 24.7–36)
AST SERPL-CCNC: 55 U/L (ref 12–45)
BASOPHILS # BLD AUTO: 0.4 % (ref 0–1.8)
BASOPHILS # BLD: 0.02 K/UL (ref 0–0.12)
BILIRUB SERPL-MCNC: 1.1 MG/DL (ref 0.1–1.5)
BUN SERPL-MCNC: 9 MG/DL (ref 8–22)
CALCIUM SERPL-MCNC: 9.3 MG/DL (ref 8.5–10.5)
CHLORIDE SERPL-SCNC: 97 MMOL/L (ref 96–112)
CO2 SERPL-SCNC: 24 MMOL/L (ref 20–33)
CREAT SERPL-MCNC: 0.73 MG/DL (ref 0.5–1.4)
EKG IMPRESSION: NORMAL
EOSINOPHIL # BLD AUTO: 0.03 K/UL (ref 0–0.51)
EOSINOPHIL NFR BLD: 0.6 % (ref 0–6.9)
ERYTHROCYTE [DISTWIDTH] IN BLOOD BY AUTOMATED COUNT: 49.7 FL (ref 35.9–50)
GLOBULIN SER CALC-MCNC: 2.8 G/DL (ref 1.9–3.5)
GLUCOSE SERPL-MCNC: 94 MG/DL (ref 65–99)
HCT VFR BLD AUTO: 36.8 % (ref 42–52)
HGB BLD-MCNC: 12.9 G/DL (ref 14–18)
IMM GRANULOCYTES # BLD AUTO: 0.02 K/UL (ref 0–0.11)
IMM GRANULOCYTES NFR BLD AUTO: 0.4 % (ref 0–0.9)
INR PPP: 1.22 (ref 0.87–1.13)
LYMPHOCYTES # BLD AUTO: 0.6 K/UL (ref 1–4.8)
LYMPHOCYTES NFR BLD: 11.4 % (ref 22–41)
MAGNESIUM SERPL-MCNC: 1.8 MG/DL (ref 1.5–2.5)
MCH RBC QN AUTO: 35.1 PG (ref 27–33)
MCHC RBC AUTO-ENTMCNC: 35.1 G/DL (ref 33.7–35.3)
MCV RBC AUTO: 100.3 FL (ref 81.4–97.8)
MONOCYTES # BLD AUTO: 0.37 K/UL (ref 0–0.85)
MONOCYTES NFR BLD AUTO: 7 % (ref 0–13.4)
NEUTROPHILS # BLD AUTO: 4.23 K/UL (ref 1.82–7.42)
NEUTROPHILS NFR BLD: 80.2 % (ref 44–72)
NRBC # BLD AUTO: 0 K/UL
NRBC BLD-RTO: 0 /100 WBC
PLATELET # BLD AUTO: 74 K/UL (ref 164–446)
PMV BLD AUTO: 10.8 FL (ref 9–12.9)
POTASSIUM SERPL-SCNC: 4.5 MMOL/L (ref 3.6–5.5)
PROT SERPL-MCNC: 7.3 G/DL (ref 6–8.2)
PROTHROMBIN TIME: 15.1 SEC (ref 12–14.6)
RBC # BLD AUTO: 3.67 M/UL (ref 4.7–6.1)
SODIUM SERPL-SCNC: 130 MMOL/L (ref 135–145)
WBC # BLD AUTO: 5.3 K/UL (ref 4.8–10.8)

## 2021-11-29 PROCEDURE — 85610 PROTHROMBIN TIME: CPT

## 2021-11-29 PROCEDURE — 99285 EMERGENCY DEPT VISIT HI MDM: CPT

## 2021-11-29 PROCEDURE — 770020 HCHG ROOM/CARE - TELE (206)

## 2021-11-29 PROCEDURE — 73060 X-RAY EXAM OF HUMERUS: CPT | Mod: RT

## 2021-11-29 PROCEDURE — 85730 THROMBOPLASTIN TIME PARTIAL: CPT

## 2021-11-29 PROCEDURE — 99214 OFFICE O/P EST MOD 30 MIN: CPT | Performed by: PHYSICIAN ASSISTANT

## 2021-11-29 PROCEDURE — 73090 X-RAY EXAM OF FOREARM: CPT | Mod: RT

## 2021-11-29 PROCEDURE — 700105 HCHG RX REV CODE 258: Performed by: STUDENT IN AN ORGANIZED HEALTH CARE EDUCATION/TRAINING PROGRAM

## 2021-11-29 PROCEDURE — 85025 COMPLETE CBC W/AUTO DIFF WBC: CPT

## 2021-11-29 PROCEDURE — 700111 HCHG RX REV CODE 636 W/ 250 OVERRIDE (IP): Performed by: STUDENT IN AN ORGANIZED HEALTH CARE EDUCATION/TRAINING PROGRAM

## 2021-11-29 PROCEDURE — 93005 ELECTROCARDIOGRAM TRACING: CPT | Performed by: EMERGENCY MEDICINE

## 2021-11-29 PROCEDURE — 96376 TX/PRO/DX INJ SAME DRUG ADON: CPT

## 2021-11-29 PROCEDURE — 96374 THER/PROPH/DIAG INJ IV PUSH: CPT

## 2021-11-29 PROCEDURE — 80053 COMPREHEN METABOLIC PANEL: CPT

## 2021-11-29 PROCEDURE — 73080 X-RAY EXAM OF ELBOW: CPT | Mod: TC,RT | Performed by: PHYSICIAN ASSISTANT

## 2021-11-29 PROCEDURE — 96375 TX/PRO/DX INJ NEW DRUG ADDON: CPT

## 2021-11-29 PROCEDURE — 83735 ASSAY OF MAGNESIUM: CPT

## 2021-11-29 PROCEDURE — 700111 HCHG RX REV CODE 636 W/ 250 OVERRIDE (IP): Performed by: EMERGENCY MEDICINE

## 2021-11-29 PROCEDURE — 73200 CT UPPER EXTREMITY W/O DYE: CPT | Mod: RT,MG

## 2021-11-29 PROCEDURE — 99223 1ST HOSP IP/OBS HIGH 75: CPT | Mod: 57 | Performed by: STUDENT IN AN ORGANIZED HEALTH CARE EDUCATION/TRAINING PROGRAM

## 2021-11-29 PROCEDURE — 71045 X-RAY EXAM CHEST 1 VIEW: CPT

## 2021-11-29 RX ORDER — BISACODYL 10 MG
10 SUPPOSITORY, RECTAL RECTAL
Status: DISCONTINUED | OUTPATIENT
Start: 2021-11-29 | End: 2021-11-30

## 2021-11-29 RX ORDER — ACETAMINOPHEN 500 MG
1000 TABLET ORAL ONCE
Status: COMPLETED | OUTPATIENT
Start: 2021-11-29 | End: 2021-11-29

## 2021-11-29 RX ORDER — GABAPENTIN 600 MG/1
600 TABLET ORAL 4 TIMES DAILY
Status: ON HOLD | COMMUNITY
End: 2021-12-08 | Stop reason: SDUPTHER

## 2021-11-29 RX ORDER — LABETALOL HYDROCHLORIDE 5 MG/ML
10 INJECTION, SOLUTION INTRAVENOUS EVERY 4 HOURS PRN
Status: DISCONTINUED | OUTPATIENT
Start: 2021-11-29 | End: 2021-12-08 | Stop reason: HOSPADM

## 2021-11-29 RX ORDER — LISINOPRIL 30 MG/1
30 TABLET ORAL DAILY
Status: ON HOLD | COMMUNITY
End: 2021-12-08 | Stop reason: SDUPTHER

## 2021-11-29 RX ORDER — OXYCODONE HYDROCHLORIDE 5 MG/1
5 TABLET ORAL
Status: DISCONTINUED | OUTPATIENT
Start: 2021-11-29 | End: 2021-11-29

## 2021-11-29 RX ORDER — TRAZODONE HYDROCHLORIDE 150 MG/1
150 TABLET ORAL NIGHTLY
Status: ON HOLD | COMMUNITY
End: 2021-12-08 | Stop reason: SDUPTHER

## 2021-11-29 RX ORDER — ONDANSETRON 2 MG/ML
4 INJECTION INTRAMUSCULAR; INTRAVENOUS ONCE
Status: COMPLETED | OUTPATIENT
Start: 2021-11-29 | End: 2021-11-29

## 2021-11-29 RX ORDER — POLYETHYLENE GLYCOL 3350 17 G/17G
1 POWDER, FOR SOLUTION ORAL
Status: DISCONTINUED | OUTPATIENT
Start: 2021-11-29 | End: 2021-11-30

## 2021-11-29 RX ORDER — SODIUM CHLORIDE, SODIUM LACTATE, POTASSIUM CHLORIDE, CALCIUM CHLORIDE 600; 310; 30; 20 MG/100ML; MG/100ML; MG/100ML; MG/100ML
INJECTION, SOLUTION INTRAVENOUS CONTINUOUS
Status: DISCONTINUED | OUTPATIENT
Start: 2021-11-29 | End: 2021-12-05

## 2021-11-29 RX ORDER — ACETAMINOPHEN 325 MG/1
650 TABLET ORAL EVERY 6 HOURS PRN
Status: DISCONTINUED | OUTPATIENT
Start: 2021-11-29 | End: 2021-12-08 | Stop reason: HOSPADM

## 2021-11-29 RX ORDER — QUETIAPINE 400 MG/1
400 TABLET, FILM COATED, EXTENDED RELEASE ORAL NIGHTLY
Status: ON HOLD | COMMUNITY
End: 2021-11-30

## 2021-11-29 RX ORDER — OXYCODONE HYDROCHLORIDE 5 MG/1
2.5 TABLET ORAL
Status: DISCONTINUED | OUTPATIENT
Start: 2021-11-29 | End: 2021-11-29

## 2021-11-29 RX ORDER — ENALAPRILAT 1.25 MG/ML
1.25 INJECTION INTRAVENOUS EVERY 6 HOURS PRN
Status: DISCONTINUED | OUTPATIENT
Start: 2021-11-29 | End: 2021-12-08 | Stop reason: HOSPADM

## 2021-11-29 RX ORDER — MORPHINE SULFATE 4 MG/ML
2 INJECTION INTRAVENOUS
Status: DISCONTINUED | OUTPATIENT
Start: 2021-11-29 | End: 2021-12-08 | Stop reason: HOSPADM

## 2021-11-29 RX ORDER — GUAIFENESIN/DEXTROMETHORPHAN 100-10MG/5
10 SYRUP ORAL EVERY 6 HOURS PRN
Status: DISCONTINUED | OUTPATIENT
Start: 2021-11-29 | End: 2021-12-08 | Stop reason: HOSPADM

## 2021-11-29 RX ORDER — ONDANSETRON 4 MG/1
4 TABLET, ORALLY DISINTEGRATING ORAL EVERY 4 HOURS PRN
Status: DISCONTINUED | OUTPATIENT
Start: 2021-11-29 | End: 2021-12-08 | Stop reason: HOSPADM

## 2021-11-29 RX ORDER — ONDANSETRON 2 MG/ML
4 INJECTION INTRAMUSCULAR; INTRAVENOUS EVERY 4 HOURS PRN
Status: DISCONTINUED | OUTPATIENT
Start: 2021-11-29 | End: 2021-12-08 | Stop reason: HOSPADM

## 2021-11-29 RX ORDER — TAMSULOSIN HYDROCHLORIDE 0.4 MG/1
0.4 CAPSULE ORAL 2 TIMES DAILY
Status: ON HOLD | COMMUNITY
End: 2021-12-08 | Stop reason: SDUPTHER

## 2021-11-29 RX ORDER — AMOXICILLIN 250 MG
2 CAPSULE ORAL 2 TIMES DAILY
Status: DISCONTINUED | OUTPATIENT
Start: 2021-11-29 | End: 2021-11-30

## 2021-11-29 RX ORDER — HYDROMORPHONE HYDROCHLORIDE 1 MG/ML
0.25 INJECTION, SOLUTION INTRAMUSCULAR; INTRAVENOUS; SUBCUTANEOUS
Status: DISCONTINUED | OUTPATIENT
Start: 2021-11-29 | End: 2021-11-29

## 2021-11-29 RX ORDER — HYDROMORPHONE HYDROCHLORIDE 1 MG/ML
0.5 INJECTION, SOLUTION INTRAMUSCULAR; INTRAVENOUS; SUBCUTANEOUS
Status: DISCONTINUED | OUTPATIENT
Start: 2021-11-29 | End: 2021-11-29

## 2021-11-29 RX ADMIN — ONDANSETRON 4 MG: 2 INJECTION INTRAMUSCULAR; INTRAVENOUS at 20:08

## 2021-11-29 RX ADMIN — HYDROMORPHONE HYDROCHLORIDE 0.5 MG: 1 INJECTION, SOLUTION INTRAMUSCULAR; INTRAVENOUS; SUBCUTANEOUS at 20:08

## 2021-11-29 RX ADMIN — SODIUM CHLORIDE, POTASSIUM CHLORIDE, SODIUM LACTATE AND CALCIUM CHLORIDE: 600; 310; 30; 20 INJECTION, SOLUTION INTRAVENOUS at 23:57

## 2021-11-29 RX ADMIN — MORPHINE SULFATE 2 MG: 4 INJECTION INTRAVENOUS at 21:59

## 2021-11-29 RX ADMIN — MORPHINE SULFATE 2 MG: 4 INJECTION INTRAVENOUS at 23:53

## 2021-11-29 RX ADMIN — Medication 1000 MG: at 16:38

## 2021-11-29 ASSESSMENT — ENCOUNTER SYMPTOMS
EYE REDNESS: 0
COUGH: 0
NAUSEA: 0
VOMITING: 0
EYE DISCHARGE: 0
FEVER: 0
SHORTNESS OF BREATH: 0
SORE THROAT: 0
HEADACHES: 0

## 2021-11-29 ASSESSMENT — PAIN DESCRIPTION - PAIN TYPE: TYPE: ACUTE PAIN

## 2021-11-29 ASSESSMENT — FIBROSIS 4 INDEX
FIB4 SCORE: 14.35
FIB4 SCORE: 14.35

## 2021-11-30 ENCOUNTER — ANESTHESIA (OUTPATIENT)
Dept: SURGERY | Facility: MEDICAL CENTER | Age: 71
DRG: 493 | End: 2021-11-30
Payer: MEDICARE

## 2021-11-30 ENCOUNTER — ANESTHESIA EVENT (OUTPATIENT)
Dept: SURGERY | Facility: MEDICAL CENTER | Age: 71
DRG: 493 | End: 2021-11-30
Payer: MEDICARE

## 2021-11-30 ENCOUNTER — APPOINTMENT (OUTPATIENT)
Dept: RADIOLOGY | Facility: MEDICAL CENTER | Age: 71
DRG: 493 | End: 2021-11-30
Attending: ORTHOPAEDIC SURGERY
Payer: MEDICARE

## 2021-11-30 PROBLEM — J44.9 COPD (CHRONIC OBSTRUCTIVE PULMONARY DISEASE) (HCC): Status: ACTIVE | Noted: 2021-11-30

## 2021-11-30 PROBLEM — S42.409A HUMERAL DISTAL FRACTURE: Status: ACTIVE | Noted: 2021-11-30

## 2021-11-30 LAB
ALBUMIN SERPL BCP-MCNC: 3.7 G/DL (ref 3.2–4.9)
ALBUMIN/GLOB SERPL: 1.5 G/DL
ALP SERPL-CCNC: 64 U/L (ref 30–99)
ALT SERPL-CCNC: 18 U/L (ref 2–50)
ANION GAP SERPL CALC-SCNC: 9 MMOL/L (ref 7–16)
AST SERPL-CCNC: 52 U/L (ref 12–45)
BASOPHILS # BLD AUTO: 0.6 % (ref 0–1.8)
BASOPHILS # BLD: 0.02 K/UL (ref 0–0.12)
BILIRUB SERPL-MCNC: 1.3 MG/DL (ref 0.1–1.5)
BUN SERPL-MCNC: 9 MG/DL (ref 8–22)
CALCIUM SERPL-MCNC: 8.8 MG/DL (ref 8.5–10.5)
CHLORIDE SERPL-SCNC: 94 MMOL/L (ref 96–112)
CHOLEST SERPL-MCNC: 132 MG/DL (ref 100–199)
CO2 SERPL-SCNC: 25 MMOL/L (ref 20–33)
CREAT SERPL-MCNC: 0.61 MG/DL (ref 0.5–1.4)
EOSINOPHIL # BLD AUTO: 0.14 K/UL (ref 0–0.51)
EOSINOPHIL NFR BLD: 4.2 % (ref 0–6.9)
ERYTHROCYTE [DISTWIDTH] IN BLOOD BY AUTOMATED COUNT: 50.2 FL (ref 35.9–50)
EXTERNAL QUALITY CONTROL: NORMAL
GLOBULIN SER CALC-MCNC: 2.5 G/DL (ref 1.9–3.5)
GLUCOSE SERPL-MCNC: 77 MG/DL (ref 65–99)
HCT VFR BLD AUTO: 35.6 % (ref 42–52)
HDLC SERPL-MCNC: 60 MG/DL
HGB BLD-MCNC: 12.3 G/DL (ref 14–18)
IMM GRANULOCYTES # BLD AUTO: 0.01 K/UL (ref 0–0.11)
IMM GRANULOCYTES NFR BLD AUTO: 0.3 % (ref 0–0.9)
LDLC SERPL CALC-MCNC: 61 MG/DL
LYMPHOCYTES # BLD AUTO: 0.53 K/UL (ref 1–4.8)
LYMPHOCYTES NFR BLD: 15.8 % (ref 22–41)
MCH RBC QN AUTO: 34.9 PG (ref 27–33)
MCHC RBC AUTO-ENTMCNC: 34.6 G/DL (ref 33.7–35.3)
MCV RBC AUTO: 101.1 FL (ref 81.4–97.8)
MONOCYTES # BLD AUTO: 0.29 K/UL (ref 0–0.85)
MONOCYTES NFR BLD AUTO: 8.7 % (ref 0–13.4)
NEUTROPHILS # BLD AUTO: 2.36 K/UL (ref 1.82–7.42)
NEUTROPHILS NFR BLD: 70.4 % (ref 44–72)
NRBC # BLD AUTO: 0 K/UL
NRBC BLD-RTO: 0 /100 WBC
PLATELET # BLD AUTO: 70 K/UL (ref 164–446)
PMV BLD AUTO: 11.6 FL (ref 9–12.9)
POTASSIUM SERPL-SCNC: 3.8 MMOL/L (ref 3.6–5.5)
PROT SERPL-MCNC: 6.2 G/DL (ref 6–8.2)
RBC # BLD AUTO: 3.52 M/UL (ref 4.7–6.1)
SARS-COV+SARS-COV-2 AG RESP QL IA.RAPID: NEGATIVE
SODIUM SERPL-SCNC: 128 MMOL/L (ref 135–145)
TRIGL SERPL-MCNC: 53 MG/DL (ref 0–149)
WBC # BLD AUTO: 3.4 K/UL (ref 4.8–10.8)

## 2021-11-30 PROCEDURE — 99231 SBSQ HOSP IP/OBS SF/LOW 25: CPT | Performed by: INTERNAL MEDICINE

## 2021-11-30 PROCEDURE — 770001 HCHG ROOM/CARE - MED/SURG/GYN PRIV*

## 2021-11-30 PROCEDURE — 700102 HCHG RX REV CODE 250 W/ 637 OVERRIDE(OP): Performed by: INTERNAL MEDICINE

## 2021-11-30 PROCEDURE — C1713 ANCHOR/SCREW BN/BN,TIS/BN: HCPCS | Performed by: ORTHOPAEDIC SURGERY

## 2021-11-30 PROCEDURE — 160002 HCHG RECOVERY MINUTES (STAT): Performed by: ORTHOPAEDIC SURGERY

## 2021-11-30 PROCEDURE — 700111 HCHG RX REV CODE 636 W/ 250 OVERRIDE (IP): Performed by: ANESTHESIOLOGY

## 2021-11-30 PROCEDURE — 99222 1ST HOSP IP/OBS MODERATE 55: CPT | Mod: AI | Performed by: STUDENT IN AN ORGANIZED HEALTH CARE EDUCATION/TRAINING PROGRAM

## 2021-11-30 PROCEDURE — A6454 SELF-ADHER BAND W>=3" <5"/YD: HCPCS | Performed by: ORTHOPAEDIC SURGERY

## 2021-11-30 PROCEDURE — A9270 NON-COVERED ITEM OR SERVICE: HCPCS | Performed by: INTERNAL MEDICINE

## 2021-11-30 PROCEDURE — 500891 HCHG PACK, ORTHO MAJOR: Performed by: ORTHOPAEDIC SURGERY

## 2021-11-30 PROCEDURE — 502000 HCHG MISC OR IMPLANTS RC 0278: Performed by: ORTHOPAEDIC SURGERY

## 2021-11-30 PROCEDURE — 700102 HCHG RX REV CODE 250 W/ 637 OVERRIDE(OP): Performed by: ANESTHESIOLOGY

## 2021-11-30 PROCEDURE — 80053 COMPREHEN METABOLIC PANEL: CPT

## 2021-11-30 PROCEDURE — 700101 HCHG RX REV CODE 250: Performed by: ANESTHESIOLOGY

## 2021-11-30 PROCEDURE — 501838 HCHG SUTURE GENERAL: Performed by: ORTHOPAEDIC SURGERY

## 2021-11-30 PROCEDURE — 24546 OPTX HUM FX W/NTRCNDYLR XTN: CPT | Mod: 80ROC,RT | Performed by: STUDENT IN AN ORGANIZED HEALTH CARE EDUCATION/TRAINING PROGRAM

## 2021-11-30 PROCEDURE — 700101 HCHG RX REV CODE 250: Performed by: ORTHOPAEDIC SURGERY

## 2021-11-30 PROCEDURE — 160048 HCHG OR STATISTICAL LEVEL 1-5: Performed by: ORTHOPAEDIC SURGERY

## 2021-11-30 PROCEDURE — 73060 X-RAY EXAM OF HUMERUS: CPT | Mod: RT

## 2021-11-30 PROCEDURE — 160029 HCHG SURGERY MINUTES - 1ST 30 MINS LEVEL 4: Performed by: ORTHOPAEDIC SURGERY

## 2021-11-30 PROCEDURE — 0PSF04Z REPOSITION RIGHT HUMERAL SHAFT WITH INTERNAL FIXATION DEVICE, OPEN APPROACH: ICD-10-PCS | Performed by: ORTHOPAEDIC SURGERY

## 2021-11-30 PROCEDURE — 160036 HCHG PACU - EA ADDL 30 MINS PHASE I: Performed by: ORTHOPAEDIC SURGERY

## 2021-11-30 PROCEDURE — 160035 HCHG PACU - 1ST 60 MINS PHASE I: Performed by: ORTHOPAEDIC SURGERY

## 2021-11-30 PROCEDURE — 160009 HCHG ANES TIME/MIN: Performed by: ORTHOPAEDIC SURGERY

## 2021-11-30 PROCEDURE — 96376 TX/PRO/DX INJ SAME DRUG ADON: CPT

## 2021-11-30 PROCEDURE — 87426 SARSCOV CORONAVIRUS AG IA: CPT | Performed by: ORTHOPAEDIC SURGERY

## 2021-11-30 PROCEDURE — 700102 HCHG RX REV CODE 250 W/ 637 OVERRIDE(OP): Performed by: STUDENT IN AN ORGANIZED HEALTH CARE EDUCATION/TRAINING PROGRAM

## 2021-11-30 PROCEDURE — A9270 NON-COVERED ITEM OR SERVICE: HCPCS | Performed by: ANESTHESIOLOGY

## 2021-11-30 PROCEDURE — 85025 COMPLETE CBC W/AUTO DIFF WBC: CPT

## 2021-11-30 PROCEDURE — 160041 HCHG SURGERY MINUTES - EA ADDL 1 MIN LEVEL 4: Performed by: ORTHOPAEDIC SURGERY

## 2021-11-30 PROCEDURE — 24546 OPTX HUM FX W/NTRCNDYLR XTN: CPT | Mod: RT | Performed by: ORTHOPAEDIC SURGERY

## 2021-11-30 PROCEDURE — A9270 NON-COVERED ITEM OR SERVICE: HCPCS | Performed by: STUDENT IN AN ORGANIZED HEALTH CARE EDUCATION/TRAINING PROGRAM

## 2021-11-30 PROCEDURE — 700105 HCHG RX REV CODE 258: Performed by: STUDENT IN AN ORGANIZED HEALTH CARE EDUCATION/TRAINING PROGRAM

## 2021-11-30 PROCEDURE — 700111 HCHG RX REV CODE 636 W/ 250 OVERRIDE (IP): Performed by: STUDENT IN AN ORGANIZED HEALTH CARE EDUCATION/TRAINING PROGRAM

## 2021-11-30 PROCEDURE — A4565 SLINGS: HCPCS | Performed by: ORTHOPAEDIC SURGERY

## 2021-11-30 PROCEDURE — 80061 LIPID PANEL: CPT

## 2021-11-30 DEVICE — SCREW BONE T10 FULL THREAD L28 MM OD3.5 MM LOCK STARDRIVE NONSTERILE VARIAX FOOT PLATE SYSTEM: Type: IMPLANTABLE DEVICE | Site: ELBOW | Status: FUNCTIONAL

## 2021-11-30 DEVICE — IMPLANTABLE DEVICE: Type: IMPLANTABLE DEVICE | Site: ELBOW | Status: FUNCTIONAL

## 2021-11-30 DEVICE — SCREW BONE VARIAX T10 FULL THREAD L20 MM OD3.5 MM FOOT ANKLE LOCK STARDRIVE NONSTERILE: Type: IMPLANTABLE DEVICE | Site: ELBOW | Status: FUNCTIONAL

## 2021-11-30 DEVICE — SCREW BONE VARIAX T10 FULL THREAD L30 MM OD3.5 MM FOOT ANKLE STARDRIVE NONSTERILE: Type: IMPLANTABLE DEVICE | Site: ELBOW | Status: FUNCTIONAL

## 2021-11-30 DEVICE — SCREW BONE VARIAX T10 FULL THREAD L26 MM OD3.5 MM FOOT ANKLE STARDRIVE NONSTERILE: Type: IMPLANTABLE DEVICE | Site: ELBOW | Status: FUNCTIONAL

## 2021-11-30 DEVICE — WIRE FIXATION KIRSCHNER TROCAR POINT: Type: IMPLANTABLE DEVICE | Site: ELBOW | Status: FUNCTIONAL

## 2021-11-30 DEVICE — SCREW BONE VARIAX T10 FULL THREAD L22 MM OD3.5 MM FOOT ANKLE STARDRIVE NONSTERILE: Type: IMPLANTABLE DEVICE | Site: ELBOW | Status: FUNCTIONAL

## 2021-11-30 DEVICE — SCREW BONE VARIAX T10 FULL THREAD L18 MM OD3.5 MM FOOT ANKLE LOCK STARDRIVE NONSTERILE: Type: IMPLANTABLE DEVICE | Site: ELBOW | Status: FUNCTIONAL

## 2021-11-30 DEVICE — SCREW BONE VARIAX T10 FULL THREAD L24 MM OD3.5 MM FOOT ANKLE STARDRIVE NONSTERILE: Type: IMPLANTABLE DEVICE | Site: ELBOW | Status: FUNCTIONAL

## 2021-11-30 RX ORDER — QUETIAPINE FUMARATE 400 MG/1
400 TABLET, FILM COATED ORAL
Status: ON HOLD | COMMUNITY
End: 2021-12-08 | Stop reason: SDUPTHER

## 2021-11-30 RX ORDER — NICOTINE 21 MG/24HR
1 PATCH, TRANSDERMAL 24 HOURS TRANSDERMAL EVERY 24 HOURS
Status: ON HOLD | COMMUNITY
End: 2021-12-08 | Stop reason: SDUPTHER

## 2021-11-30 RX ORDER — PROPOFOL 10 MG/ML
INJECTION, EMULSION INTRAVENOUS PRN
Status: DISCONTINUED | OUTPATIENT
Start: 2021-11-30 | End: 2021-11-30 | Stop reason: SURG

## 2021-11-30 RX ORDER — HYDROMORPHONE HYDROCHLORIDE 2 MG/ML
INJECTION, SOLUTION INTRAMUSCULAR; INTRAVENOUS; SUBCUTANEOUS PRN
Status: DISCONTINUED | OUTPATIENT
Start: 2021-11-30 | End: 2021-11-30 | Stop reason: SURG

## 2021-11-30 RX ORDER — DULOXETIN HYDROCHLORIDE 30 MG/1
30 CAPSULE, DELAYED RELEASE ORAL DAILY
Status: DISCONTINUED | OUTPATIENT
Start: 2021-12-01 | End: 2021-12-08 | Stop reason: HOSPADM

## 2021-11-30 RX ORDER — BUDESONIDE AND FORMOTEROL FUMARATE DIHYDRATE 160; 4.5 UG/1; UG/1
2 AEROSOL RESPIRATORY (INHALATION)
Status: DISCONTINUED | OUTPATIENT
Start: 2021-11-30 | End: 2021-12-08 | Stop reason: HOSPADM

## 2021-11-30 RX ORDER — HYDROMORPHONE HYDROCHLORIDE 1 MG/ML
0.2 INJECTION, SOLUTION INTRAMUSCULAR; INTRAVENOUS; SUBCUTANEOUS
Status: DISCONTINUED | OUTPATIENT
Start: 2021-11-30 | End: 2021-11-30 | Stop reason: HOSPADM

## 2021-11-30 RX ORDER — ONDANSETRON 2 MG/ML
4 INJECTION INTRAMUSCULAR; INTRAVENOUS
Status: COMPLETED | OUTPATIENT
Start: 2021-11-30 | End: 2021-11-30

## 2021-11-30 RX ORDER — ALLOPURINOL 100 MG/1
100 TABLET ORAL DAILY
Status: ON HOLD | COMMUNITY
End: 2021-12-08 | Stop reason: SDUPTHER

## 2021-11-30 RX ORDER — ALLOPURINOL 100 MG/1
100 TABLET ORAL DAILY
Status: DISCONTINUED | OUTPATIENT
Start: 2021-12-01 | End: 2021-12-08 | Stop reason: HOSPADM

## 2021-11-30 RX ORDER — POLYETHYLENE GLYCOL 3350 17 G/17G
1 POWDER, FOR SOLUTION ORAL DAILY
Status: DISCONTINUED | OUTPATIENT
Start: 2021-12-01 | End: 2021-12-08 | Stop reason: HOSPADM

## 2021-11-30 RX ORDER — HALOPERIDOL 5 MG/ML
1 INJECTION INTRAMUSCULAR
Status: DISCONTINUED | OUTPATIENT
Start: 2021-11-30 | End: 2021-11-30 | Stop reason: HOSPADM

## 2021-11-30 RX ORDER — HYDRALAZINE HYDROCHLORIDE 20 MG/ML
5 INJECTION INTRAMUSCULAR; INTRAVENOUS
Status: DISCONTINUED | OUTPATIENT
Start: 2021-11-30 | End: 2021-11-30 | Stop reason: HOSPADM

## 2021-11-30 RX ORDER — HYDRALAZINE HYDROCHLORIDE 20 MG/ML
INJECTION INTRAMUSCULAR; INTRAVENOUS PRN
Status: DISCONTINUED | OUTPATIENT
Start: 2021-11-30 | End: 2021-11-30 | Stop reason: SURG

## 2021-11-30 RX ORDER — BISACODYL 10 MG
10 SUPPOSITORY, RECTAL RECTAL
Status: DISCONTINUED | OUTPATIENT
Start: 2021-11-30 | End: 2021-12-08 | Stop reason: HOSPADM

## 2021-11-30 RX ORDER — ALBUTEROL SULFATE 90 UG/1
2 AEROSOL, METERED RESPIRATORY (INHALATION) EVERY 4 HOURS PRN
Status: ON HOLD | COMMUNITY
End: 2021-12-08 | Stop reason: SDUPTHER

## 2021-11-30 RX ORDER — ONDANSETRON 2 MG/ML
INJECTION INTRAMUSCULAR; INTRAVENOUS PRN
Status: DISCONTINUED | OUTPATIENT
Start: 2021-11-30 | End: 2021-11-30 | Stop reason: SURG

## 2021-11-30 RX ORDER — DULOXETIN HYDROCHLORIDE 30 MG/1
30 CAPSULE, DELAYED RELEASE ORAL DAILY
Status: ON HOLD | COMMUNITY
End: 2021-12-08 | Stop reason: SDUPTHER

## 2021-11-30 RX ORDER — OXYCODONE HYDROCHLORIDE 5 MG/1
5 TABLET ORAL EVERY 4 HOURS PRN
Status: DISCONTINUED | OUTPATIENT
Start: 2021-11-30 | End: 2021-12-08 | Stop reason: HOSPADM

## 2021-11-30 RX ORDER — OMEPRAZOLE 20 MG/1
40 CAPSULE, DELAYED RELEASE ORAL DAILY
Status: DISCONTINUED | OUTPATIENT
Start: 2021-12-01 | End: 2021-12-08 | Stop reason: HOSPADM

## 2021-11-30 RX ORDER — PRAZOSIN HYDROCHLORIDE 2 MG/1
4 CAPSULE ORAL NIGHTLY
Status: ON HOLD | COMMUNITY
End: 2021-12-08 | Stop reason: SDUPTHER

## 2021-11-30 RX ORDER — MAGNESIUM HYDROXIDE 1200 MG/15ML
LIQUID ORAL
Status: COMPLETED | OUTPATIENT
Start: 2021-11-30 | End: 2021-11-30

## 2021-11-30 RX ORDER — DEXAMETHASONE SODIUM PHOSPHATE 4 MG/ML
INJECTION, SOLUTION INTRA-ARTICULAR; INTRALESIONAL; INTRAMUSCULAR; INTRAVENOUS; SOFT TISSUE PRN
Status: DISCONTINUED | OUTPATIENT
Start: 2021-11-30 | End: 2021-11-30 | Stop reason: SURG

## 2021-11-30 RX ORDER — SODIUM CHLORIDE, SODIUM LACTATE, POTASSIUM CHLORIDE, CALCIUM CHLORIDE 600; 310; 30; 20 MG/100ML; MG/100ML; MG/100ML; MG/100ML
INJECTION, SOLUTION INTRAVENOUS CONTINUOUS
Status: DISCONTINUED | OUTPATIENT
Start: 2021-11-30 | End: 2021-11-30 | Stop reason: HOSPADM

## 2021-11-30 RX ORDER — HYDROMORPHONE HYDROCHLORIDE 1 MG/ML
0.4 INJECTION, SOLUTION INTRAMUSCULAR; INTRAVENOUS; SUBCUTANEOUS
Status: DISCONTINUED | OUTPATIENT
Start: 2021-11-30 | End: 2021-11-30 | Stop reason: HOSPADM

## 2021-11-30 RX ORDER — OMEPRAZOLE 40 MG/1
40 CAPSULE, DELAYED RELEASE ORAL DAILY
Status: ON HOLD | COMMUNITY
End: 2021-12-08 | Stop reason: SDUPTHER

## 2021-11-30 RX ORDER — ALBUTEROL SULFATE 90 UG/1
2 AEROSOL, METERED RESPIRATORY (INHALATION) EVERY 4 HOURS PRN
Status: DISCONTINUED | OUTPATIENT
Start: 2021-11-30 | End: 2021-12-08 | Stop reason: HOSPADM

## 2021-11-30 RX ORDER — PRAZOSIN HYDROCHLORIDE 2 MG/1
4 CAPSULE ORAL NIGHTLY
Status: DISCONTINUED | OUTPATIENT
Start: 2021-11-30 | End: 2021-12-08 | Stop reason: HOSPADM

## 2021-11-30 RX ORDER — KETOROLAC TROMETHAMINE 30 MG/ML
INJECTION, SOLUTION INTRAMUSCULAR; INTRAVENOUS PRN
Status: DISCONTINUED | OUTPATIENT
Start: 2021-11-30 | End: 2021-11-30 | Stop reason: SURG

## 2021-11-30 RX ORDER — CEFAZOLIN SODIUM 1 G/3ML
INJECTION, POWDER, FOR SOLUTION INTRAMUSCULAR; INTRAVENOUS PRN
Status: DISCONTINUED | OUTPATIENT
Start: 2021-11-30 | End: 2021-11-30 | Stop reason: SURG

## 2021-11-30 RX ORDER — GABAPENTIN 600 MG/1
600 TABLET ORAL 4 TIMES DAILY
Status: DISCONTINUED | OUTPATIENT
Start: 2021-11-30 | End: 2021-11-30

## 2021-11-30 RX ORDER — LIDOCAINE HYDROCHLORIDE 20 MG/ML
INJECTION, SOLUTION EPIDURAL; INFILTRATION; INTRACAUDAL; PERINEURAL PRN
Status: DISCONTINUED | OUTPATIENT
Start: 2021-11-30 | End: 2021-11-30 | Stop reason: SURG

## 2021-11-30 RX ORDER — LISINOPRIL 20 MG/1
30 TABLET ORAL DAILY
Status: DISCONTINUED | OUTPATIENT
Start: 2021-12-01 | End: 2021-12-08 | Stop reason: HOSPADM

## 2021-11-30 RX ORDER — HYDROMORPHONE HYDROCHLORIDE 1 MG/ML
0.1 INJECTION, SOLUTION INTRAMUSCULAR; INTRAVENOUS; SUBCUTANEOUS
Status: DISCONTINUED | OUTPATIENT
Start: 2021-11-30 | End: 2021-11-30 | Stop reason: HOSPADM

## 2021-11-30 RX ORDER — TAMSULOSIN HYDROCHLORIDE 0.4 MG/1
0.4 CAPSULE ORAL 2 TIMES DAILY
Status: DISCONTINUED | OUTPATIENT
Start: 2021-11-30 | End: 2021-12-08 | Stop reason: HOSPADM

## 2021-11-30 RX ORDER — LABETALOL HYDROCHLORIDE 5 MG/ML
5 INJECTION, SOLUTION INTRAVENOUS
Status: DISCONTINUED | OUTPATIENT
Start: 2021-11-30 | End: 2021-11-30 | Stop reason: HOSPADM

## 2021-11-30 RX ORDER — AMOXICILLIN 250 MG
2 CAPSULE ORAL 2 TIMES DAILY
Status: DISCONTINUED | OUTPATIENT
Start: 2021-12-01 | End: 2021-12-08 | Stop reason: HOSPADM

## 2021-11-30 RX ORDER — QUETIAPINE FUMARATE 200 MG/1
400 TABLET, FILM COATED ORAL
Status: DISCONTINUED | OUTPATIENT
Start: 2021-11-30 | End: 2021-12-08 | Stop reason: HOSPADM

## 2021-11-30 RX ADMIN — LIDOCAINE HYDROCHLORIDE 100 MG: 20 INJECTION, SOLUTION EPIDURAL; INFILTRATION; INTRACAUDAL at 07:41

## 2021-11-30 RX ADMIN — MORPHINE SULFATE 2 MG: 4 INJECTION INTRAVENOUS at 20:24

## 2021-11-30 RX ADMIN — TAMSULOSIN HYDROCHLORIDE 0.4 MG: 0.4 CAPSULE ORAL at 18:30

## 2021-11-30 RX ADMIN — FENTANYL CITRATE 50 MCG: 50 INJECTION, SOLUTION INTRAMUSCULAR; INTRAVENOUS at 07:47

## 2021-11-30 RX ADMIN — CEFAZOLIN 2 G: 330 INJECTION, POWDER, FOR SOLUTION INTRAMUSCULAR; INTRAVENOUS at 07:46

## 2021-11-30 RX ADMIN — OXYCODONE 5 MG: 5 TABLET ORAL at 22:06

## 2021-11-30 RX ADMIN — KETOROLAC TROMETHAMINE 30 MG: 30 INJECTION, SOLUTION INTRAMUSCULAR at 08:58

## 2021-11-30 RX ADMIN — ONDANSETRON 4 MG: 2 INJECTION INTRAMUSCULAR; INTRAVENOUS at 08:40

## 2021-11-30 RX ADMIN — QUETIAPINE FUMARATE 400 MG: 200 TABLET ORAL at 20:24

## 2021-11-30 RX ADMIN — PRAZOSIN HYDROCHLORIDE 4 MG: 2 CAPSULE ORAL at 20:24

## 2021-11-30 RX ADMIN — FENTANYL CITRATE 50 MCG: 50 INJECTION, SOLUTION INTRAMUSCULAR; INTRAVENOUS at 07:41

## 2021-11-30 RX ADMIN — TRAZODONE HYDROCHLORIDE 150 MG: 100 TABLET ORAL at 20:28

## 2021-11-30 RX ADMIN — ONDANSETRON 4 MG: 2 INJECTION INTRAMUSCULAR; INTRAVENOUS at 10:06

## 2021-11-30 RX ADMIN — HYDROMORPHONE HYDROCHLORIDE 0.4 MG: 1 INJECTION, SOLUTION INTRAMUSCULAR; INTRAVENOUS; SUBCUTANEOUS at 10:04

## 2021-11-30 RX ADMIN — HYDROMORPHONE HYDROCHLORIDE 0.2 MG: 1 INJECTION, SOLUTION INTRAMUSCULAR; INTRAVENOUS; SUBCUTANEOUS at 10:30

## 2021-11-30 RX ADMIN — HYDRALAZINE HYDROCHLORIDE 10 MG: 20 INJECTION INTRAMUSCULAR; INTRAVENOUS at 08:33

## 2021-11-30 RX ADMIN — FENTANYL CITRATE 50 MCG: 50 INJECTION INTRAMUSCULAR; INTRAVENOUS at 09:35

## 2021-11-30 RX ADMIN — DOCUSATE SODIUM 50 MG AND SENNOSIDES 8.6 MG 2 TABLET: 8.6; 5 TABLET, FILM COATED ORAL at 17:10

## 2021-11-30 RX ADMIN — HYDROMORPHONE HYDROCHLORIDE 0.6 MG: 2 INJECTION, SOLUTION INTRAMUSCULAR; INTRAVENOUS; SUBCUTANEOUS at 08:02

## 2021-11-30 RX ADMIN — HYDROMORPHONE HYDROCHLORIDE 0.4 MG: 2 INJECTION, SOLUTION INTRAMUSCULAR; INTRAVENOUS; SUBCUTANEOUS at 08:12

## 2021-11-30 RX ADMIN — MORPHINE SULFATE 2 MG: 4 INJECTION INTRAVENOUS at 17:08

## 2021-11-30 RX ADMIN — MORPHINE SULFATE 2 MG: 4 INJECTION INTRAVENOUS at 11:06

## 2021-11-30 RX ADMIN — DEXAMETHASONE SODIUM PHOSPHATE 8 MG: 4 INJECTION, SOLUTION INTRA-ARTICULAR; INTRALESIONAL; INTRAMUSCULAR; INTRAVENOUS; SOFT TISSUE at 07:48

## 2021-11-30 RX ADMIN — MORPHINE SULFATE 2 MG: 4 INJECTION INTRAVENOUS at 14:13

## 2021-11-30 RX ADMIN — PROPOFOL 150 MG: 10 INJECTION, EMULSION INTRAVENOUS at 07:41

## 2021-11-30 RX ADMIN — FENTANYL CITRATE 50 MCG: 50 INJECTION INTRAMUSCULAR; INTRAVENOUS at 09:29

## 2021-11-30 RX ADMIN — HYDROCODONE BITARTRATE AND ACETAMINOPHEN 15 MG: 7.5; 325 SOLUTION ORAL at 10:04

## 2021-11-30 RX ADMIN — SODIUM CHLORIDE, POTASSIUM CHLORIDE, SODIUM LACTATE AND CALCIUM CHLORIDE: 600; 310; 30; 20 INJECTION, SOLUTION INTRAVENOUS at 07:35

## 2021-11-30 RX ADMIN — MORPHINE SULFATE 2 MG: 4 INJECTION INTRAVENOUS at 03:36

## 2021-11-30 ASSESSMENT — COGNITIVE AND FUNCTIONAL STATUS - GENERAL
DRESSING REGULAR UPPER BODY CLOTHING: A LOT
TURNING FROM BACK TO SIDE WHILE IN FLAT BAD: A LITTLE
MOVING TO AND FROM BED TO CHAIR: A LITTLE
PERSONAL GROOMING: A LOT
STANDING UP FROM CHAIR USING ARMS: A LOT
HELP NEEDED FOR BATHING: A LITTLE
CLIMB 3 TO 5 STEPS WITH RAILING: A LOT
WALKING IN HOSPITAL ROOM: A LOT
EATING MEALS: A LOT
MOBILITY SCORE: 15
MOVING FROM LYING ON BACK TO SITTING ON SIDE OF FLAT BED: A LITTLE
TOILETING: A LITTLE
SUGGESTED CMS G CODE MODIFIER DAILY ACTIVITY: CK
DAILY ACTIVITIY SCORE: 15
DRESSING REGULAR LOWER BODY CLOTHING: A LITTLE
SUGGESTED CMS G CODE MODIFIER MOBILITY: CK

## 2021-11-30 ASSESSMENT — PAIN DESCRIPTION - PAIN TYPE
TYPE: ACUTE PAIN
TYPE: ACUTE PAIN
TYPE: SURGICAL PAIN;ACUTE PAIN

## 2021-11-30 ASSESSMENT — PAIN SCALES - GENERAL: PAIN_LEVEL: 10

## 2021-11-30 ASSESSMENT — ENCOUNTER SYMPTOMS
FALLS: 1
BACK PAIN: 1

## 2021-11-30 ASSESSMENT — FIBROSIS 4 INDEX
FIB4 SCORE: 12.26
FIB4 SCORE: 12.26

## 2021-11-30 NOTE — ANESTHESIA PROCEDURE NOTES
Airway    Date/Time: 11/30/2021 7:42 AM  Performed by: Dayron Connolly M.D.  Authorized by: Dayron Connolly M.D.     Location:  OR  Urgency:  Elective  Indications for Airway Management:  Anesthesia      Spontaneous Ventilation: absent    Sedation Level:  Deep  Preoxygenated: Yes    Final Airway Type:  Supraglottic airway  Final Supraglottic Airway:  Standard LMA    SGA Size:  5  Number of Attempts at Approach:  1

## 2021-11-30 NOTE — ASSESSMENT & PLAN NOTE
"R. UE xray showing: Comminuted impacted supracondylar humeral fracture   R. Elbow xray showing: Supracondylar right distal humeral fracture with angulation   Dr. Yohan Ruano, orthopedic surgery, consulted in ED and plans to take patient to OR in AM\"  S/p Ortho surgery.  Pain control.\"  S/p ORIF   PT/OT recommends SNF/rehab  12/4. [facility] planned  "

## 2021-11-30 NOTE — ED PROVIDER NOTES
ED Provider Note    Scribed for Jack Del Cid M.D. by Kirby Akhtar. 2021  7:11 PM    Primary care provider: Pcp Not In Computer  Means of arrival: EMS  History obtained from: Jacob  History limited by: None    CHIEF COMPLAINT  Chief Complaint   Patient presents with    Arm Pain       HPI  Chris Leggett is a 71 y.o. male who presents to the Emergency Department via EMS for evaluation of moderate right arm pain onset today. He was on a walk this morning and was knocked down by dogs. He presented to Urgent Care, where he had an x-ray that shows a supracondylar distal humerus fracture with angulation. He did not hit his head or lose consciousness. He denies any neck pain. He has not had anything to eat today. He is not on any blood thinners.    REVIEW OF SYSTEMS  Pertinent positives include right arm pain. Pertinent negatives include no head pain, neck pain, or loss of consciousness.     PAST MEDICAL HISTORY   has a past medical history of Arthritis, Gout, Hypertension, and Psychiatric disorder.    SURGICAL HISTORY   has a past surgical history that includes cervical disk and fusion anterior (2018); other orthopedic surgery; dx bone marrow aspirations (Left, 10/2/2019); and dx bone marrow biopsies (10/2/2019).    SOCIAL HISTORY  Social History     Tobacco Use    Smoking status: Current Every Day Smoker     Packs/day: 0.50     Years: 5.00     Pack years: 2.50     Types: Cigarettes     Last attempt to quit: 2019     Years since quittin.8    Smokeless tobacco: Never Used   Vaping Use    Vaping Use: Never used   Substance Use Topics    Alcohol use: Yes     Alcohol/week: 10.8 - 12.0 oz     Types: 18 - 20 Cans of beer per week     Comment: weekly    Drug use: Yes     Types: Inhaled     Comment: daily THC      Social History     Substance and Sexual Activity   Drug Use Yes    Types: Inhaled    Comment: daily THC       FAMILY HISTORY  Family History   Problem Relation Age of Onset    Cancer  "Mother     Alzheimer's Disease Father     Cancer Father     Heart Disease Sister     Heart Disease Brother        CURRENT MEDICATIONS  Home Medications       Reviewed by Evonne Mcleod R.N. (Registered Nurse) on 11/29/21 at 1859  Med List Status: <None>     Medication Last Dose Status   acetaminophen (TYLENOL) 325 MG Tab  Active   amLODIPine (NORVASC) 5 MG Tab  Active   aspirin (ASA) 81 MG Chew Tab chewable tablet  Active                    ALLERGIES  No Known Allergies    PHYSICAL EXAM  VITAL SIGNS: /99   Pulse 82   Temp 36.9 °C (98.4 °F) (Oral)   Resp 20   Ht 1.803 m (5' 11\")   Wt 104 kg (230 lb)   SpO2 92%   BMI 32.08 kg/m²     Constitutional: Well developed, Well nourished, mild to moderate distress, Non-toxic appearance.   HENT: Normocephalic, Bilateral external ears normal, Oropharynx moist, No oral exudates. Old appearing ecchymosis to central forehead, old abrasions to right top of the head.  Eyes: PERRLA, EOMI, Conjunctiva normal, No discharge.   Neck: No C-Spine tenderness, Supple, No stridor.   Lymphatic: No lymphadenopathy noted.   Cardiovascular: Normal heart rate, Normal rhythm.   Thorax & Lungs: Clear to auscultation bilaterally, No respiratory distress, No wheezing, No crackles.   Abdomen: Soft, No tenderness, No masses, No pulsatile masses.   Skin: Warm, Dry, No erythema, No rash.   Extremities: Right arm in a sling with normal sensation distally. Slight tenderness to palpation throughout left ankle. Full range of motion.  Musculoskeletal: No deformities noted.  Intact distal pulses  Neurologic: Awake, alert. Moves all extremities spontaneously.  Psychiatric: Affect normal, Judgment normal, Mood normal.     RADIOLOGY   DX-CHEST-PORTABLE (1 VIEW)   Final Result         1.  Bilateral basilar atelectasis, no focal infiltrate      DX-FOREARM RIGHT   Final Result      No radiographic evidence of acute traumatic injury.      DX-HUMERUS 2+ RIGHT   Final Result         1.  Comminuted impacted " supracondylar humeral fracture      CT-ELBOW W/O PLUS RECONS RIGHT    (Results Pending)     The radiologist's interpretation of all radiological studies have been reviewed by me.      COURSE & MEDICAL DECISION MAKING  Pertinent Labs & Imaging studies reviewed. (See chart for details)    I reviewed the patient's medical records which showed an x-ray from Urgent Care that was taken today. It shows a supracondylar distal humerus fracture with angulation. Discussed plan of care with patient including plan for orthopedic consult. He is understandable and agreeable with the plan of care.    7:11 PM - Patient seen and examined at bedside.    7:15 PM - Paged Ortho.    7:24 PM - I discussed the patient's case and the above findings with Dr. Meadows (Ortho) who advises the patient be admitted to the hospitalist.    Decision Making:  Patient with a supracondylar fracture, discussed the case with Dr. Almanzar who recommends inpatient hospitalization will take the patient operating room tomorrow.  Give the patient pain medicines, discussed the case with the hospitalist for hospitalization.      FINAL IMPRESSION  1. Closed supracondylar fracture of right humerus, initial encounter          Kirby PERSAUD (Delilah), am scribing for, and in the presence of, Jack Del Cid M.D..    Electronically signed by: Kirby Akhtar (Delilah), 11/29/2021    IJack M.D. personally performed the services described in this documentation, as scribed by Kirby Akhtar in my presence, and it is both accurate and complete.    The note accurately reflects work and decisions made by me.  Jack Del Cid M.D.  11/29/2021  8:46 PM      E

## 2021-11-30 NOTE — CONSULTS
Ortho  Patient seen and examined, elbow is bruised, painful with any range of motion  NV intact, no other ortho issues noted  Fr EUA possible ORIF, history of alcohol  Meadows

## 2021-11-30 NOTE — ED TRIAGE NOTES
Tripped on a dog, fell, causing an injury to the right elbow and arm. Splinted at  and sent here for further evaluation.

## 2021-11-30 NOTE — PROGRESS NOTES
Patient transferred the floor on a zol monitor. Patient AxO 4, O2 2 L thru nasal cannula, VSS. Call light and personal belongings within reach. Tele monitor in place, monitor room notified. Hourly rounding in place.

## 2021-11-30 NOTE — OR SURGEON
Immediate Post OP Note    PreOp Diagnosis: Right extra-articular supracondylar elbow fracture closed traumatic      PostOp Diagnosis: Same      Procedure(s):  ORIF, FRACTURE, DISTAL HUMERUS - Wound Class: Clean    Surgeon(s):  RODOLFO Henning M.D.    Anesthesiologist/Type of Anesthesia:  Anesthesiologist: Dayron Connolly M.D./General    Surgical Staff:  Circulator: Freda Ramirez R.N.  Scrub Person: Alanis Alegre Assist: Kassi Medina  Radiology Technologist: Kailyn Valdivia    Specimens removed if any:  * No specimens in log *    Estimated Blood Loss: Minimal    Findings: As described    Complications: None        11/30/2021 8:48 AM Kumar Meadows M.D.

## 2021-11-30 NOTE — PROGRESS NOTES
4 Eyes Skin Assessment Completed by SHARAN Callahan and SHARAN Avina.    Head Scab, Bruising, Scratch and Redness  Ears WDL  Nose WDL  Mouth WDL  Neck WDL  Breast/Chest WDL  Shoulder Blades WDL  Spine WDL  (R) Arm/Elbow/Hand WDL  (L) Arm/Elbow/Hand WDL  Abdomen WDL  Groin WDL  Scrotum/Coccyx/Buttocks WDL  (R) Leg Scab and Bruising  (L) Leg Scab and Bruising  (R) Heel/Foot/Toe WDL  (L) Heel/Foot/Toe WDL    Skin is intact, abrasion to forehead, and abrasions to right and left knee.      Devices In Places Tele Box, Blood Pressure Cuff, Pulse Ox and SCD's      Interventions In Place Pressure Redistribution Mattress    Possible Skin Injury No    Pictures Uploaded Into Epic N/A  Wound Consult Placed N/A  RN Wound Prevention Protocol Ordered No

## 2021-11-30 NOTE — PROGRESS NOTES
Patient back to unit from PACU. Patient is A&Ox4, complains of 10/10 right humerus pain. VSS, no signs of distress. Tele monitor placed and verified by monitor room. All questions and concerns answered, bed in lowest and locked position, call light in reach, will continue to monitor.

## 2021-11-30 NOTE — OP REPORT
Tuesday, November 30, 2021    Operative report    Preoperative diagnosis extra-articular right supracondylar elbow fracture    Postop diagnosis same    Operation performed open reduction internal fixation the triceps splitting approach    Implants utilized Ezio precontoured patient specific medial and lateral supracondylar elbow plates lateral position beanbag armboard with intraoperative fluoroscopy    Complications none    Indications for the operation supracondylar elbow fracture in a 71-year-old patient lives in a retirement house history of alcoholism    Medications utilized Ancef    Anesthesia Block General    Operation    The patient brought the operating room awake alert placed the operative supine position and exam under anesthesia to better identify the stability of the fracture pattern is grossly unstable and therefore the patient was then rolled in the lateral position on the beanbag armboard was utilized fluoroscopy was brought in from the head so that we had excellent visualization both AP and lateral    Timeout the appropriate extremity identified no tourniquet was utilized    Straight incision from the tip of the olecranon proximally for about 8 inches subcutaneous tissue dissected down to the triceps which was split in the mid triceps tendon and then carried down to the tip of the olecranon just enough of the subperiosteally dissected off the triceps insertion for exposure.  The transverse fracture was easily identified.  Due to the relative simple nature of the fracture pattern the ulnar nerve was not dissected as we were well posterior to both the medial and lateral columns.  Retractors were placed giving easy access to the fracture interface    We copiously irrigated and anatomically reduced with a K wire and then simply placed a column screws medial and lateral and then neutralized with our patient specific precontoured [place] plates.    At the completion of fixation x-rays demonstrate X  alignment position of the reduction and the fixation there were no complications with the approach stabilization or plate fixation.    Wounds were copiously irrigated bleeding was controlled and then we closed the triceps with a #1 Vicryl 2 oh subcu skin staples posterior splint and the patient was then taken recovery room stable condition no intraoperative or postoperative complications patient tolerated procedure well

## 2021-11-30 NOTE — ANESTHESIA POSTPROCEDURE EVALUATION
Patient: Chris Leggett    Procedure Summary     Date: 11/30/21 Room / Location: [place] [id number] / SURGERY [facility]    Anesthesia Start: 0735 Anesthesia Stop: 0920    Procedure: ORIF, FRACTURE, DISTAL HUMERUS (Right Elbow) Diagnosis: (Right humerus elbow fracture)    Surgeons: Kumar Meadows M.D. Responsible Provider: Dayron Connolly M.D.    Anesthesia Type: general, peripheral nerve block ASA Status: 2          Final Anesthesia Type: general, peripheral nerve block  Last vitals  BP   Blood Pressure : 113/70    Temp   36.2 °C (97.2 °F)    Pulse   85   Resp   17    SpO2   91 %      Anesthesia Post Evaluation    Patient location during evaluation: PACU  Patient participation: complete - patient participated  Level of consciousness: awake and alert  Pain score: 10  Pt declined block in PACU, reported minimal pain to me.  Airway patency: patent  Anesthetic complications: no  Cardiovascular status: hemodynamically stable  Respiratory status: acceptable  Hydration status: euvolemic  Comments: Pt declined block in PACU    PONV: none          No complications documented.     Nurse Pain Score: 10 (NPRS)

## 2021-11-30 NOTE — H&P
Hospital Medicine History & Physical Note    Date of Service  11/30/2021    Primary Care Physician  Pcp Not In Computer    Consultants  Dr. Yohan Ruano, orthopedic surgery    Code Status  Full Code    Chief Complaint  Chief Complaint   Patient presents with   • Arm Pain       History of Presenting Illness  Chris Leggett is a 71 y.o. male with a past medical hx of HTN, lives in group home who presented 11/29/2021 after he states that he was attacked by 3 of his neighbors dogs.  Patient states that it was unprovoked and he fell into a small ditch.      R. UE xray showing: Comminuted impacted supracondylar humeral fracture     R. Elbow xray showing: Supracondylar right distal humeral fracture with angulation     /99, HR 82    Dr. Yohan Ruano, orthopedic surgery, consulted in ED and plans to take patient to OR in AM    I discussed the plan of care with patient.    Review of Systems  Review of Systems   Musculoskeletal: Positive for back pain, falls and joint pain.   All other systems reviewed and are negative.      Past Medical History   has a past medical history of Arthritis, Gout, Hypertension, and Psychiatric disorder.    Surgical History   has a past surgical history that includes cervical disk and fusion anterior (9/2/2018); other orthopedic surgery; pr dx bone marrow aspirations (Left, 10/2/2019); and pr dx bone marrow biopsies (10/2/2019).     Family History  family history includes Alzheimer's Disease in his father; Cancer in his father and mother; Heart Disease in his brother and sister.   Family history reviewed with patient. There is no family history that is pertinent to the chief complaint.     Social History   reports that he has been smoking cigarettes. He has a 2.50 pack-year smoking history. He has never used smokeless tobacco. He reports current alcohol use of about 10.8 - 12.0 oz of alcohol per week. He reports current drug use. Drug: Inhaled.    Allergies  No Known  Allergies    Medications  Prior to Admission Medications   Prescriptions Last Dose Informant Patient Reported? Taking?   LISINOPRIL PO 11/29/2021 at am Patient Yes Yes   Sig: Take 1 Tablet by mouth every day.   gabapentin (NEURONTIN) 400 MG Cap 11/29/2021 at am Patient Yes Yes   Sig: Take 400 mg by mouth 4 times a day.   quetiapine (SEROQUEL XR) 400 MG XR tablet 11/28/2021 at pm Patient Yes Yes   Sig: Take 400 mg by mouth every evening.   tamsulosin (FLOMAX) 0.4 MG capsule 11/29/2021 at am Patient Yes Yes   Sig: Take 0.4 mg by mouth 1/2 hour after breakfast.   traZODone (DESYREL) 150 MG Tab 11/28/2021 at pm Patient Yes Yes   Sig: Take 150 mg by mouth every evening.      Facility-Administered Medications: None       Physical Exam  Temp:  [36.9 °C (98.4 °F)] 36.9 °C (98.4 °F)  Pulse:  [57-82] 60  Resp:  [16-20] 20  BP: (120-146)/(73-99) 130/73  SpO2:  [92 %-97 %] 96 %  Blood Pressure : 130/73   Temperature: 36.9 °C (98.4 °F)   Pulse: 60   Respiration: 20   Pulse Oximetry: 96 %       Physical Exam     Constitutional: Resting comfortably in NAD   HENT: abrasion at parietal region and ecchymosis at right eye  Eyes: No scleral icterus. Normal conjunctiva   Neck: Comfortable movement without any obvious restriction in the range of motion.  Cardiovascular: Upon ascultation I appreciate a regular heart rhythm and a normal rate with no murmurs, rubs or gallops  Thorax & Lungs: No respiratory distress. No wheezing, rales or rhonchi heard on ausculation.  there is no obvious chest wall tenderness. I appreciate normal air movement throughout.   Abdomen: The abdomen is not visibly distended. Upon palpation, I find it to be without tenderness.  No mass appreciated.  Skin: The exposed portions of skin reveal no obvious rash or other abnormalities.  Extremities/Musculoskeletal: bandage in place at R. Upper extremity, all 4 distal pulses intact  Neurologic: Alert & oriented. No focal deficits observed.   Psychiatric: Normal affect  appropriate for the clinical situation.    Laboratory:  Recent Labs     11/29/21 2012   WBC 5.3   RBC 3.67*   HEMOGLOBIN 12.9*   HEMATOCRIT 36.8*   .3*   MCH 35.1*   MCHC 35.1   RDW 49.7   PLATELETCT 74*   MPV 10.8     Recent Labs     11/29/21 2012   SODIUM 130*   POTASSIUM 4.5   CHLORIDE 97   CO2 24   GLUCOSE 94   BUN 9   CREATININE 0.73   CALCIUM 9.3     Recent Labs     11/29/21 2012   ALTSGPT 19   ASTSGOT 55*   ALKPHOSPHAT 66   TBILIRUBIN 1.1   GLUCOSE 94     Recent Labs     11/29/21 2012   APTT 29.3   INR 1.22*     No results for input(s): NTPROBNP in the last 72 hours.      No results for input(s): TROPONINT in the last 72 hours.    Imaging:  CT-ELBOW W/O PLUS RECONS RIGHT   Final Result      Subacute favored over acute supracondylar intra-articular fracture with anterior displacement medially, mild fracture fragmentation with minute intra-articular loose bodies      Mild medial elbow osteoarthritis      DX-CHEST-PORTABLE (1 VIEW)   Final Result         1.  Bilateral basilar atelectasis, no focal infiltrate      DX-FOREARM RIGHT   Final Result      No radiographic evidence of acute traumatic injury.      DX-HUMERUS 2+ RIGHT   Final Result         1.  Comminuted impacted supracondylar humeral fracture            Assessment/Plan:  I anticipate this patient will require at least two midnights for appropriate medical management, necessitating inpatient admission.    Humeral distal fracture- (present on admission)  Assessment & Plan  R. UE xray showing: Comminuted impacted supracondylar humeral fracture     R. Elbow xray showing: Supracondylar right distal humeral fracture with angulation     Dr. Yohan Ruano, orthopedic surgery, consulted in ED and plans to take patient to OR in AM    Thrombocytopenia (HCC)- (present on admission)  Assessment & Plan  Platelets 74 on admission  Continue to monitor H & H    Essential hypertension- (present on admission)  Assessment & Plan  Continue home meds  Continue to  monitor      VTE prophylaxis: SCDs/TEDs

## 2021-11-30 NOTE — ED NOTES
Med Rec partial per Pt at bedside.  Allergies reviewed.  No oral ABX in the last 30 days.      Pt was able to report some of his medications but says there are a few more he was unable to identify.   Unknown Lisinopril strength.  Pt reports taking a medication HS for nightmares.     Pt's home pharmacy is currently closed  [facility]  [phone]

## 2021-11-30 NOTE — ANESTHESIA PREPROCEDURE EVALUATION
Case: [id number] Date/Time: 11/30/21 0715    Procedure: ORIF, FRACTURE, DISTAL HUMERUS (Right ) - [place]    Location: [place] [id number] / SURGERY [facility]    Surgeons: Kumar Meadows M.D.          Relevant Problems   NEURO   (positive) H/O cervical fracture      CARDIAC   (positive) Essential hypertension       Physical Exam    Airway   Mallampati: II  TM distance: >3 FB  Neck ROM: full       Cardiovascular - normal exam  Rhythm: regular  Rate: normal  (-) murmur     Dental - normal exam      Very poor dentition   Pulmonary - normal exam  Breath sounds clear to auscultation     Abdominal    Neurological - normal exam                 Anesthesia Plan    ASA 2       Plan - general and peripheral nerve block     Peripheral nerve block will be post-op pain control  Airway plan will be LMA          Induction: intravenous    Postoperative Plan: Postoperative administration of opioids is intended.    Pertinent diagnostic labs and testing reviewed    Informed Consent:    Anesthetic plan and risks discussed with patient.    Use of blood products discussed with: patient whom consented to blood products.

## 2021-11-30 NOTE — OR NURSING
0918: Pt arrived from OR post R arm ORIF under anesthesia. Pt is awake, easily reoriented. Sight dressing is CDI; arm is in a sling; pt can wiggle fingers; brisk cap refill on L fingers. Cardiac rhythm appears to be SR with frequent PVCs and junctional escape beats.     0945: Pt medicated for pain.    1000: Pt nauseous; medicated per MAR.     1019: Pt appears comfortable.     1043: Dressing is still CDI. Pt reports some pain, but also reports it is tolrable. Pt sleeping intermittently. Pt transport via bed to [place] [address]. Bedside report to SHARAN Callahan. Pt on 2L oxygen via NC; tank is 3/4 full. Pt  On a cardiac monitor for transport.

## 2021-11-30 NOTE — PROGRESS NOTES
Received report from NOC shift RN. Patient down to OR for humerus fracture repair. Monitor room notified.

## 2021-11-30 NOTE — CONSULTS
Orthopedic Surgery Consult Note        DATE OF SERVICE: 11/29     REASON FOR CONSULTATION:   Right elbow fracture     HISTORY:   Patient is a 71-year-old male history of hypertension who sustained a fall when he was tripped by his dog this morning around 10:30 AM.  He apparently fell onto his right side.  He noticed immediate onset of right elbow pain.  He denies any issues with this elbow prior to today.  He was seen in the emergency department where x-rays revealed a displaced distal humerus fracture.  He denies any numbness or tingling on exam today.     PAST MEDICAL HISTORY:   Past Medical History:   Diagnosis Date   • Arthritis    • Gout    • Hypertension    • Psychiatric disorder         PAST SURGICAL HISTORY: Unknown     CURRENT MEDICATIONS AND ALLERGIES:  All listed in the medical record.     PHYSICAL EXAMINATION:  GENERAL:  The patient is awake, alert, comfortable, afebrile.    EXTREMITIES:   Right upper extremity: Long-arm splint in place.  Sensation intact to light touch in radial ulnar median nerves.  AIN/PIN intact.  Hand is warm and well-perfused.     IMAGING DATA:   Right elbow: 3 views of the right elbow show displaced supracondylar distal humerus fracture.  Distal humerus fracture appears to be flexed with mild comminution.       LABORATORY DATA: Pending     ASSESSMENT: 71-year-old male who sustained a ground-level fall resulting in a right displaced distal humerus fracture.     PLAN:   Admit to medicine overnight for pain control  N.p.o. at midnight  OR tomorrow morning for right distal humerus fracture fixation  Continue with long-arm splint  Risk and benefits of surgery discussed at length with the patient including bleeding, infection, damage to surrounding structures, need for further surgery.  Patient understands and wishes to move forward with surgery.       Yohan Ruano MD  Orthopedic Trauma Fellow

## 2021-11-30 NOTE — ED TRIAGE NOTES
Pt sent here via EMS from Urgent Care after pt was knocked down by dogs today while on his walk. Pt c/o rt elbow pain. Per EMS pt dx: rt elbow fracture. Pt sent to ER for ortho consult et possible surgery. Pt states his whole body is sore now and they only gave him tylenol for the pain. Pt has long arm splint on RUE @ this time. CMS= L.

## 2021-11-30 NOTE — PROGRESS NOTES
Subjective     Chris Leggett is a 71 y.o. male who presents with Elbow Injury (R elbow injury/pain, constant pain, swelling, not able to move it, happend today, pt fell )            HPI  This is a new problem.   The patient presents to clinic complaining of pain to his right elbow following a mechanical ground-level fall x today.  The patient states he was walking when a dog unexpectedly ran up behind him and jumped up on him.  The patient states this caused him to fall into a small ditch.  The patient states he landed on his left side.  The patient states he sustained small abrasions to his left hand and left knee.  The patient states he injured his right elbow as a result of the fall.  The patient reports associated swelling to the right elbow.  He reports no associated bruising or redness.  The patient reports decreased range of motion of the right elbow secondary to pain.  The patient denies pain to the right shoulder and right wrist/hand.  The patient notes intermittent radiation of pain to the right upper arm and right forearm, which stems from the right elbow.  He reports no numbness, tingling, weakness.  The patient denies head injury.  No LOC.  The patient has not taken any OTC medications for his current symptoms.  The patient denies the use of anticoagulants, including aspirin.    PMH:  has a past medical history of Arthritis, Gout, Hypertension, and Psychiatric disorder. He also has no past medical history of Congestive heart failure (HCC).  MEDS:   Current Outpatient Medications:   •  aspirin (ASA) 81 MG Chew Tab chewable tablet, Chew 81 mg every day. (Patient not taking: Reported on 11/29/2021), Disp: , Rfl:   •  amLODIPine (NORVASC) 5 MG Tab, Take 1 Tab by mouth every day. 1 table once a day (Patient not taking: Reported on 1/3/2021), Disp: 30 Tab, Rfl: 0  •  acetaminophen (TYLENOL) 325 MG Tab, Take 2 Tabs by mouth every 6 hours as needed (Mild Pain; (Pain scale 1-3); Temp greater than 100.5 F).  "(Patient not taking: Reported on 1/3/2021), Disp: 30 Tab, Rfl: 0  ALLERGIES: No Known Allergies  SURGHX:   Past Surgical History:   Procedure Laterality Date   • MT DX BONE MARROW ASPIRATIONS Left 10/2/2019    Procedure: ASPIRATION, BONE MARROW - APARICIO;  Surgeon: Tamera Leos M.D.;  Location: ENDOSCOPY [facility];  Service: Orthopedics   • MT DX BONE MARROW BIOPSIES  10/2/2019    Procedure: BIOPSY, BONE MARROW, USING NEEDLE OR TROCAR;  Surgeon: Tamera Leos M.D.;  Location: ENDOSCOPY [facility];  Service: Orthopedics   • CERVICAL DISK AND FUSION ANTERIOR  9/2/2018    Procedure: CERVICAL DISK AND FUSION ANTERIOR C5-C6;  Surgeon: Varghese Wilkins M.D.;  Location: SURGERY [facility];  Service: Neurosurgery   • OTHER ORTHOPEDIC SURGERY      fision with disc removal     SOCHX:  reports that he has been smoking cigarettes. He has a 2.50 pack-year smoking history. He has never used smokeless tobacco. He reports current alcohol use of about 10.8 - 12.0 oz of alcohol per week. He reports current drug use. Drug: Inhaled.  FH: Family history was reviewed, no pertinent findings to report        Review of Systems   Constitutional: Negative for fever.   HENT: Negative for congestion, ear pain and sore throat.    Eyes: Negative for discharge and redness.   Respiratory: Negative for cough and shortness of breath.    Cardiovascular: Negative for chest pain and leg swelling.   Gastrointestinal: Negative for nausea and vomiting.   Musculoskeletal: Positive for joint pain (right elbow).   Skin: Negative for rash.   Neurological: Negative for headaches.              Objective     /86 (BP Location: Left arm, Patient Position: Sitting, BP Cuff Size: Adult)   Pulse 62   Temp 36.9 °C (98.4 °F) (Temporal)   Resp 16   Ht 1.803 m (5' 11\")   Wt 102 kg (225 lb)   SpO2 97%   BMI 31.38 kg/m²      Physical Exam  Constitutional:       General: He is not in acute distress.     Appearance: Normal appearance. He is " well-developed. He is not ill-appearing.   HENT:      Head: Normocephalic and atraumatic.      Right Ear: External ear normal.      Left Ear: External ear normal.   Eyes:      Extraocular Movements: Extraocular movements intact.      Conjunctiva/sclera: Conjunctivae normal.   Cardiovascular:      Rate and Rhythm: Normal rate.   Pulmonary:      Effort: Pulmonary effort is normal.   Musculoskeletal:      Cervical back: Normal range of motion and neck supple.      Comments:   Right Elbow:  Diffuse tenderness to the right elbow with associated swelling.  A localized area of ecchymosis is present to the antecubital fossa of the right elbow in various stages of healing, which is consistent with a previous blood draw.  No overlying erythema.  No increased warmth.  No tenderness to the right humerus.  No tenderness to the right forearm.  No tenderness to the right wrist/hand.  Decreased ROM -the patient demonstrates limited range of motion of the right elbow secondary pain  Neurovascular intact distally  Radial pulse 2+  Strength -not assessed due to pain   Skin:     General: Skin is warm and dry.      Comments:   The patient has small superficial abrasions to the palm of the left hand with scabbing in place.  No tenderness to palpation.  No swelling.  No ecchymosis.  No surrounding erythema.  No increased warmth.  No active bleeding.  No discharge/drainage.  No visible foreign bodies.  The patient has a moderate superficial abrasion to the left knee with scabbing in place. No tenderness to palpation.  No swelling.  No ecchymosis.  No surrounding erythema.  No increased warmth.  No active bleeding.  No discharge/drainage.  No visible foreign bodies.  The patient has old abrasions to his face and head from a previous fall x1 week ago.  The patient states he was seen in the ED following this fall.   Neurological:      Mental Status: He is alert and oriented to person, place, and time.            Progress:  Right Elbow  XR:  COMPARISON: None     FINDINGS:  There is a distal humeral fracture.     There is angulation.     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     IMPRESSION:  Supracondylar right distal humeral fracture with angulation    Tylenol 1000mg PO given in clinic for pain.      Reviewed x-ray with Dr. Schultz ([facility]) who recommends ED evaluation and Ortho consultation for possible ORIF.    Placed the patient in a posterior long-arm splint for his acute fracture.    [facility] ED for further evaluation and management of his acute fracture.    Spoke with the transfer center to inform the ED of the patient.           Assessment & Plan      1. Injury of right elbow, initial encounter  - DX-ELBOW-COMPLETE 3+ RIGHT; Future  - acetaminophen (TYLENOL) tablet 1,000 mg    2. Closed supracondylar fracture of right humerus, initial encounter    The patient's presenting symptoms and physical exam findings are consistent with an acute injury of the right elbow secondary to mechanical ground-level fall.  On physical exam, patient had diffuse tenderness to the right elbow with associated swelling.  A localized area of ecchymosis is present to the antecubital fossa of the right elbow in various stages of healing, which is consistent with the previous blood draw as noted by the patient.  The patient had no overlying erythema or increased warmth.  The patient had no tenderness to the proximal aspect of the right humerus.  The patient also had no tenderness to the right forearm or the right wrist/hand.  The patient demonstrates limited range of motion of the right elbow secondary to pain.  The patient is distally neurovascular intact.  The patient strength was not assessed due to his acute injury.  An x-ray of the patient's right elbow was obtained to further evaluate his acute injury.  The patient's right elbow x-ray  showed a supracondylar right distal humeral fracture with angulation.  Reviewed x-ray with Dr. Schultz ([facility]) who recommends ED evaluation and Ortho consultation for possible ORIF.  The patient was placed in a posterior long-arm splint for his acute fracture.  The patient does not have a dependable ride to the ED.  Therefore, EMS was called to transport the patient to the ED.  Advised the patient of the associated risks of not seeking further care for his current symptoms, and he verbalized understanding.    Plan:  Transfer patient to the [facility] ED for further evaluation management.    Please note that this dictation was created using voice recognition software. I have made every reasonable attempt to correct obvious errors, but I expect that there may be errors of grammar and possibly content that I did not discover before finalizing the note.     This note was electronically signed by Judie Mendoza PA-C

## 2021-11-30 NOTE — ASSESSMENT & PLAN NOTE
"Platelets 74 on admission  Continue to monitor H & H\"  Down to 60  Not on pharmacologic DVT prophylaxis because of this  Repeat PLTs 57, steady currently  Slowly dropping no active bleed  Ordered coagulation tests  With pancytopenia likely related to his alcoholic liver disease  If absolute nutrophils low will consider antibiotics and Hematology consultation  PLTs back up to 67  If >70 will consider pharmacologic DVT prophylaxis.  "

## 2021-12-01 LAB
ALBUMIN SERPL BCP-MCNC: 4 G/DL (ref 3.2–4.9)
BUN SERPL-MCNC: 7 MG/DL (ref 8–22)
CALCIUM SERPL-MCNC: 8.9 MG/DL (ref 8.5–10.5)
CHLORIDE SERPL-SCNC: 97 MMOL/L (ref 96–112)
CO2 SERPL-SCNC: 27 MMOL/L (ref 20–33)
CREAT SERPL-MCNC: 0.63 MG/DL (ref 0.5–1.4)
ERYTHROCYTE [DISTWIDTH] IN BLOOD BY AUTOMATED COUNT: 48.8 FL (ref 35.9–50)
GLUCOSE SERPL-MCNC: 102 MG/DL (ref 65–99)
HCT VFR BLD AUTO: 32.8 % (ref 42–52)
HGB BLD-MCNC: 11.5 G/DL (ref 14–18)
MCH RBC QN AUTO: 35 PG (ref 27–33)
MCHC RBC AUTO-ENTMCNC: 35.1 G/DL (ref 33.7–35.3)
MCV RBC AUTO: 99.7 FL (ref 81.4–97.8)
PHOSPHATE SERPL-MCNC: 3.3 MG/DL (ref 2.5–4.5)
PLATELET # BLD AUTO: 60 K/UL (ref 164–446)
PMV BLD AUTO: 10.9 FL (ref 9–12.9)
POTASSIUM SERPL-SCNC: 4.2 MMOL/L (ref 3.6–5.5)
RBC # BLD AUTO: 3.29 M/UL (ref 4.7–6.1)
SODIUM SERPL-SCNC: 132 MMOL/L (ref 135–145)
WBC # BLD AUTO: 3.6 K/UL (ref 4.8–10.8)

## 2021-12-01 PROCEDURE — 700102 HCHG RX REV CODE 250 W/ 637 OVERRIDE(OP): Performed by: INTERNAL MEDICINE

## 2021-12-01 PROCEDURE — 99232 SBSQ HOSP IP/OBS MODERATE 35: CPT | Performed by: INTERNAL MEDICINE

## 2021-12-01 PROCEDURE — 94664 DEMO&/EVAL PT USE INHALER: CPT

## 2021-12-01 PROCEDURE — 85027 COMPLETE CBC AUTOMATED: CPT

## 2021-12-01 PROCEDURE — 770001 HCHG ROOM/CARE - MED/SURG/GYN PRIV*

## 2021-12-01 PROCEDURE — 700111 HCHG RX REV CODE 636 W/ 250 OVERRIDE (IP): Performed by: STUDENT IN AN ORGANIZED HEALTH CARE EDUCATION/TRAINING PROGRAM

## 2021-12-01 PROCEDURE — 99406 BEHAV CHNG SMOKING 3-10 MIN: CPT

## 2021-12-01 PROCEDURE — A9270 NON-COVERED ITEM OR SERVICE: HCPCS | Performed by: INTERNAL MEDICINE

## 2021-12-01 PROCEDURE — 97161 PT EVAL LOW COMPLEX 20 MIN: CPT

## 2021-12-01 PROCEDURE — 36415 COLL VENOUS BLD VENIPUNCTURE: CPT

## 2021-12-01 PROCEDURE — 97166 OT EVAL MOD COMPLEX 45 MIN: CPT

## 2021-12-01 PROCEDURE — 700105 HCHG RX REV CODE 258: Performed by: INTERNAL MEDICINE

## 2021-12-01 PROCEDURE — 80069 RENAL FUNCTION PANEL: CPT

## 2021-12-01 RX ORDER — SODIUM CHLORIDE, SODIUM LACTATE, POTASSIUM CHLORIDE, AND CALCIUM CHLORIDE .6; .31; .03; .02 G/100ML; G/100ML; G/100ML; G/100ML
500 INJECTION, SOLUTION INTRAVENOUS ONCE
Status: COMPLETED | OUTPATIENT
Start: 2021-12-01 | End: 2021-12-01

## 2021-12-01 RX ADMIN — OXYCODONE 5 MG: 5 TABLET ORAL at 14:48

## 2021-12-01 RX ADMIN — MORPHINE SULFATE 2 MG: 4 INJECTION INTRAVENOUS at 10:46

## 2021-12-01 RX ADMIN — ALLOPURINOL 100 MG: 100 TABLET ORAL at 06:00

## 2021-12-01 RX ADMIN — OMEPRAZOLE 40 MG: 20 CAPSULE, DELAYED RELEASE ORAL at 06:00

## 2021-12-01 RX ADMIN — LISINOPRIL 30 MG: 20 TABLET ORAL at 06:00

## 2021-12-01 RX ADMIN — QUETIAPINE FUMARATE 400 MG: 200 TABLET ORAL at 20:13

## 2021-12-01 RX ADMIN — SODIUM CHLORIDE, POTASSIUM CHLORIDE, SODIUM LACTATE AND CALCIUM CHLORIDE 500 ML: 600; 310; 30; 20 INJECTION, SOLUTION INTRAVENOUS at 12:30

## 2021-12-01 RX ADMIN — DULOXETINE HYDROCHLORIDE 30 MG: 30 CAPSULE, DELAYED RELEASE ORAL at 06:00

## 2021-12-01 RX ADMIN — OXYCODONE 5 MG: 5 TABLET ORAL at 08:35

## 2021-12-01 RX ADMIN — TAMSULOSIN HYDROCHLORIDE 0.4 MG: 0.4 CAPSULE ORAL at 06:00

## 2021-12-01 RX ADMIN — DOCUSATE SODIUM 50 MG AND SENNOSIDES 8.6 MG 2 TABLET: 8.6; 5 TABLET, FILM COATED ORAL at 16:57

## 2021-12-01 RX ADMIN — TRAZODONE HYDROCHLORIDE 150 MG: 100 TABLET ORAL at 20:15

## 2021-12-01 RX ADMIN — PRAZOSIN HYDROCHLORIDE 4 MG: 2 CAPSULE ORAL at 20:13

## 2021-12-01 RX ADMIN — MORPHINE SULFATE 2 MG: 4 INJECTION INTRAVENOUS at 20:12

## 2021-12-01 RX ADMIN — MORPHINE SULFATE 2 MG: 4 INJECTION INTRAVENOUS at 06:00

## 2021-12-01 RX ADMIN — BUDESONIDE AND FORMOTEROL FUMARATE DIHYDRATE 2 PUFF: 160; 4.5 AEROSOL RESPIRATORY (INHALATION) at 08:30

## 2021-12-01 RX ADMIN — TAMSULOSIN HYDROCHLORIDE 0.4 MG: 0.4 CAPSULE ORAL at 16:57

## 2021-12-01 ASSESSMENT — COGNITIVE AND FUNCTIONAL STATUS - GENERAL
MOBILITY SCORE: 11
MOVING FROM LYING ON BACK TO SITTING ON SIDE OF FLAT BED: UNABLE
SUGGESTED CMS G CODE MODIFIER DAILY ACTIVITY: CK
TURNING FROM BACK TO SIDE WHILE IN FLAT BAD: UNABLE
DAILY ACTIVITIY SCORE: 15
MOVING TO AND FROM BED TO CHAIR: UNABLE
PERSONAL GROOMING: A LITTLE
WALKING IN HOSPITAL ROOM: A LITTLE
DRESSING REGULAR LOWER BODY CLOTHING: A LOT
TOILETING: A LOT
DRESSING REGULAR UPPER BODY CLOTHING: A LOT
CLIMB 3 TO 5 STEPS WITH RAILING: A LOT
HELP NEEDED FOR BATHING: A LOT
SUGGESTED CMS G CODE MODIFIER MOBILITY: CL
STANDING UP FROM CHAIR USING ARMS: A LITTLE

## 2021-12-01 ASSESSMENT — ENCOUNTER SYMPTOMS
HEMOPTYSIS: 0
DIARRHEA: 0
BLOOD IN STOOL: 0
ABDOMINAL PAIN: 0
FALLS: 0
COUGH: 0
NERVOUS/ANXIOUS: 0
HEADACHES: 0
SEIZURES: 0
CHILLS: 0
LOSS OF CONSCIOUSNESS: 0
WEAKNESS: 0
FOCAL WEAKNESS: 0
VOMITING: 0
EYE PAIN: 0
DIZZINESS: 0
MYALGIAS: 1
NAUSEA: 0
CONSTIPATION: 0
EYE REDNESS: 0
FEVER: 0
WHEEZING: 0
SHORTNESS OF BREATH: 0
INSOMNIA: 0
PALPITATIONS: 0
TREMORS: 0

## 2021-12-01 ASSESSMENT — GAIT ASSESSMENTS
DEVIATION: BRADYKINETIC;SHUFFLED GAIT
GAIT LEVEL OF ASSIST: MINIMAL ASSIST
ASSISTIVE DEVICE: NONE;HAND HELD ASSIST
DISTANCE (FEET): 25

## 2021-12-01 ASSESSMENT — PAIN DESCRIPTION - PAIN TYPE
TYPE: ACUTE PAIN
TYPE: ACUTE PAIN;SURGICAL PAIN
TYPE: ACUTE PAIN;SURGICAL PAIN
TYPE: ACUTE PAIN

## 2021-12-01 ASSESSMENT — COPD QUESTIONNAIRES
IN THE PAST 12 MONTHS DO YOU DO LESS THAN YOU USED TO BECAUSE OF YOUR BREATHING PROBLEMS: DISAGREE/UNSURE
COPD SCREENING SCORE: 5
DO YOU EVER COUGH UP ANY MUCUS OR PHLEGM?: NO/ONLY WITH OCCASIONAL COLDS OR INFECTIONS
HAVE YOU SMOKED AT LEAST 100 CIGARETTES IN YOUR ENTIRE LIFE: YES
DURING THE PAST 4 WEEKS HOW MUCH DID YOU FEEL SHORT OF BREATH: SOME OF THE TIME

## 2021-12-01 ASSESSMENT — ACTIVITIES OF DAILY LIVING (ADL): TOILETING: INDEPENDENT

## 2021-12-01 NOTE — PROGRESS NOTES
Hospital Medicine Daily Progress Note    Date of Service  11/30/2021    Chief Complaint  Chris Leggett is a 71 y.o. male admitted 11/29/2021 with Arm Pain        Hospital Course  Planned for orthopedic surgery on humerus fx.    Interval Problem Update  11/30/2021. I saw patient with Orthopedics. Currently has sling. In pain. Ortho to clear, felt one more day for pain control. No telemetry need. Redid med rec.    I have personally seen and examined the patient at bedside. I discussed the plan of care with patient, bedside RN and orthopedics.    Consultants/Specialty  orthopedics    Code Status  Full Code    Disposition  Patient is medically cleared pending Ortho clearance.   Anticipate discharge to D may need rehab, PT/OT ordered.  I have placed the appropriate orders for post-discharge needs.    Review of Systems  ROS     Physical Exam  Temp:  [35.9 °C (96.7 °F)-37.1 °C (98.8 °F)] 36.4 °C (97.5 °F)  Pulse:  [54-85] 69  Resp:  [16-34] 18  BP: (113-171)/(70-99) 124/76  SpO2:  [90 %-100 %] 96 %    Physical Exam    Fluids    Intake/Output Summary (Last 24 hours) at 11/30/2021 1816  Last data filed at 11/30/2021 1705  Gross per 24 hour   Intake 1160 ml   Output 675 ml   Net 485 ml       Laboratory  Recent Labs     11/29/21 2012 11/30/21  0336   WBC 5.3 3.4*   RBC 3.67* 3.52*   HEMOGLOBIN 12.9* 12.3*   HEMATOCRIT 36.8* 35.6*   .3* 101.1*   MCH 35.1* 34.9*   MCHC 35.1 34.6   RDW 49.7 50.2*   PLATELETCT 74* 70*   MPV 10.8 11.6     Recent Labs     11/29/21 2012 11/30/21  0336   SODIUM 130* 128*   POTASSIUM 4.5 3.8   CHLORIDE 97 94*   CO2 24 25   GLUCOSE 94 77   BUN 9 9   CREATININE 0.73 0.61   CALCIUM 9.3 8.8     Recent Labs     11/29/21 2012   APTT 29.3   INR 1.22*         Recent Labs     11/30/21  0336   TRIGLYCERIDE 53   HDL 60   LDL 61       Imaging  DX-PORTABLE FLUORO > 1 HOUR   Final Result      Portable fluoroscopy utilized for 18 seconds.         INTERPRETING LOCATION: [address] [place] [address], [place], [id number]     "  DX-HUMERUS 2+ RIGHT   Final Result         Excellent alignment of supracondylar fracture of the right humerus following ORIF.      CT-ELBOW W/O PLUS RECONS RIGHT   Final Result      Subacute favored over acute supracondylar intra-articular fracture with anterior displacement medially, mild fracture fragmentation with minute intra-articular loose bodies      Mild medial elbow osteoarthritis      DX-CHEST-PORTABLE (1 VIEW)   Final Result         1.  Bilateral basilar atelectasis, no focal infiltrate      DX-FOREARM RIGHT   Final Result      No radiographic evidence of acute traumatic injury.      DX-HUMERUS 2+ RIGHT   Final Result         1.  Comminuted impacted supracondylar humeral fracture           Assessment/Plan  * Humeral distal fracture- (present on admission)  Assessment & Plan  R. UE xray showing: Comminuted impacted supracondylar humeral fracture     R. Elbow xray showing: Supracondylar right distal humeral fracture with angulation     Dr. Yohan Ruano, orthopedic surgery, consulted in ED and plans to take patient to OR in AM\"    S/p Ortho surgery.  Pain control.    COPD (chronic obstructive pulmonary disease) ([facility])  Assessment & Plan  Currently not exacerbating  Restart inhalers.    Thrombocytopenia ([facility])- (present on admission)  Assessment & Plan  Platelets 74 on admission  Continue to monitor H & H    Essential hypertension- (present on admission)  Assessment & Plan  Continue home meds  Continue to monitor\"    Vitals:    11/30/21 1500   BP: 124/76   Pulse: 69   Resp: 18   Temp: 36.4 °C (97.5 °F)   SpO2: 96%     Did med rec myself       VTE prophylaxis: +SCDS has thrombocytopenia, moitor PLTS and start pharmacologic DVT prophylaxis when appropriate.    I have performed a physical exam and reviewed and updated ROS and Plan today (11/30/2021). In review of yesterday's note (11/29/2021), there are no changes except as documented above.      "

## 2021-12-01 NOTE — PROGRESS NOTES
Assumed care this pm from day shift RN. Patient sitting up in bed, stating that he is in pain. Patient AxO 4, O2 @ RA, VSS. Call bell and personal items within reach, bed locked in low position. Bed exit alarm on. Hourly rounding in place. Patient is medical, monitor room notified.

## 2021-12-01 NOTE — DOCUMENTATION QUERY
[facility]                                                                       Query Response Note      PATIENT:               BRANDY HUTTON  ACCT #:                  [id number]  MRN:                     [id number]  :                      1950  ADMIT DATE:       2021 6:54 PM  DISCH DATE:          RESPONDING  PROVIDER #:        [id number]           QUERY TEXT:    A sodium lab value of 128-130 is noted in the Medical Record.  Can a diagnosis be specified to support this finding?    NOTE:  If an appropriate response is not listed below, please respond with a new note.    The patient's Clinical Indicators include:  - Findings:  sodium 128-130 per lab results     - Treatments:  repeat labs. IVF     - Risk factors:  HTN, thrombocytopenia, right humerus fracture     Thank You,  Tiffanie Davis RN  Clinical Documentation   [email or web address]  Connect via [email or web address]  Options provided:   -- Hyponatremia   -- Findings are clinically insignificant   -- Unable to determine      Query created by: Tiffanie Davis on 2021 1:41 PM    RESPONSE TEXT:    Hyponatremia          Electronically signed by:  CAITLYN TATE MD 2021 7:31 AM

## 2021-12-01 NOTE — CARE PLAN
The patient is Stable - Low risk of patient condition declining or worsening    Shift Goals  Clinical Goals: Pain management  Patient Goals: pain control, rest    Progress made toward(s) clinical / shift goals:  Patient educated on pain rating scale, pain control management, nonpharmacologic methods of pain management and reaching a tolerable level. Pain medication provided PRN in addition to nonpharmacologic methods. Patient verbalizes understanding of teaching and has no additional questions at this time. Pt educated on safety precautions and precautions in place. Treaded socks on, bed locked and in lowest position, belongings and call light within reach. Bed alarm in place and on.         Problem: Pain - Standard  Goal: Alleviation of pain or a reduction in pain to the patient’s comfort goal  Outcome: Progressing     Problem: Knowledge Deficit - Standard  Goal: Patient and family/care givers will demonstrate understanding of plan of care, disease process/condition, diagnostic tests and medications  Outcome: Progressing    Problem: Fall Risk  Goal: Patient will remain free from falls  Outcome: Progressing

## 2021-12-01 NOTE — PROGRESS NOTES
Hospital Medicine Daily Progress Note    Date of Service  12/1/2021    Chief Complaint  Chris Leggett is a 71 y.o. male admitted 11/29/2021 with Arm Pain        Hospital Course  He lives in a group home. He is obese and has a history of hypertension. He presentes with arm injury after being attacked by 3 of his neighbor;s dogs.  At the ED, afebrile hemodynamically stable.  XRs showed RUE humeral fracture  Orthopedics consulted.  Underwent ORIF by Dr. Meadows on 11/30.    Interval Problem Update  11/30/2021. I saw patient with Orthopedics. Currently has sling. In pain. Ortho to clear, felt one more day for pain control. No telemetry need. Redid med rec.  12/1. PT.OT pending. No new issues or concerns.    I have personally seen and examined the patient at bedside. I discussed the plan of care with patient, bedside RN and orthopedics.    Consultants/Specialty  orthopedics    Code Status  Full Code    Disposition  Patient is medically cleared pending Ortho clearance.   Anticipate discharge to D may need rehab, PT/OT ordered.  I have placed the appropriate orders for post-discharge needs.    Review of Systems  Review of Systems   Constitutional: Negative for chills and fever.   HENT: Negative for congestion, hearing loss and nosebleeds.    Eyes: Negative for pain and redness.   Respiratory: Negative for cough, hemoptysis, shortness of breath and wheezing.    Cardiovascular: Negative for chest pain and palpitations.   Gastrointestinal: Negative for abdominal pain, blood in stool, constipation, diarrhea, nausea and vomiting.   Genitourinary: Negative for dysuria, frequency and hematuria.   Musculoskeletal: Positive for joint pain and myalgias. Negative for falls.   Skin: Negative for rash.   Neurological: Negative for dizziness, tremors, focal weakness, seizures, loss of consciousness, weakness and headaches.   Psychiatric/Behavioral: The patient is not nervous/anxious and does not have insomnia.    All other systems  reviewed and are negative.       Physical Exam  Temp:  [36.1 °C (97 °F)-37.7 °C (99.9 °F)] 37.7 °C (99.9 °F)  Pulse:  [65-89] 77  Resp:  [16-27] 18  BP: (104-160)/(66-82) 104/66  SpO2:  [90 %-96 %] 90 %    Physical Exam  Vitals and nursing note reviewed.   Constitutional:       Appearance: He is obese.   HENT:      Head: Normocephalic and atraumatic.      Right Ear: External ear normal.      Left Ear: External ear normal.      Nose: Nose normal.      Mouth/Throat:      Mouth: Mucous membranes are moist.   Eyes:      General: No scleral icterus.     Conjunctiva/sclera: Conjunctivae normal.   Cardiovascular:      Rate and Rhythm: Normal rate and regular rhythm.      Heart sounds: No murmur heard.  No friction rub. No gallop.    Pulmonary:      Effort: Pulmonary effort is normal.      Breath sounds: Normal breath sounds.   Abdominal:      General: Abdomen is flat. Bowel sounds are normal. There is no distension.      Palpations: Abdomen is soft.      Tenderness: There is no abdominal tenderness. There is no guarding.   Musculoskeletal:         General: Tenderness (RUE) and deformity (RUE) present. Normal range of motion.      Cervical back: Normal range of motion and neck supple.      Comments: Slibg and bandages in place   Skin:     General: Skin is warm.   Neurological:      Mental Status: He is alert and oriented to person, place, and time. Mental status is at baseline.   Psychiatric:         Mood and Affect: Mood normal.         Behavior: Behavior normal.         Thought Content: Thought content normal.         Judgment: Judgment normal.         Fluids    Intake/Output Summary (Last 24 hours) at 12/1/2021 1007  Last data filed at 11/30/2021 1705  Gross per 24 hour   Intake 360 ml   Output 600 ml   Net -240 ml       Laboratory  Recent Labs     11/29/21 2012 11/30/21  0336 12/01/21  0835   WBC 5.3 3.4* 3.6*   RBC 3.67* 3.52* 3.29*   HEMOGLOBIN 12.9* 12.3* 11.5*   HEMATOCRIT 36.8* 35.6* 32.8*   .3* 101.1*  "99.7*   MCH 35.1* 34.9* 35.0*   MCHC 35.1 34.6 35.1   RDW 49.7 50.2* 48.8   PLATELETCT 74* 70* 60*   MPV 10.8 11.6 10.9     Recent Labs     11/29/21 2012 11/30/21  0336 12/01/21  0835   SODIUM 130* 128* 132*   POTASSIUM 4.5 3.8 4.2   CHLORIDE 97 94* 97   CO2 24 25 27   GLUCOSE 94 77 102*   BUN 9 9 7*   CREATININE 0.73 0.61 0.63   CALCIUM 9.3 8.8 8.9     Recent Labs     11/29/21 2012   APTT 29.3   INR 1.22*         Recent Labs     11/30/21  0336   TRIGLYCERIDE 53   HDL 60   LDL 61       Imaging  DX-PORTABLE FLUORO > 1 HOUR   Final Result      Portable fluoroscopy utilized for 18 seconds.         INTERPRETING LOCATION: [address] [place] [address], [id number]      DX-HUMERUS 2+ RIGHT   Final Result         Excellent alignment of supracondylar fracture of the right humerus following ORIF.      CT-ELBOW W/O PLUS RECONS RIGHT   Final Result      Subacute favored over acute supracondylar intra-articular fracture with anterior displacement medially, mild fracture fragmentation with minute intra-articular loose bodies      Mild medial elbow osteoarthritis      DX-CHEST-PORTABLE (1 VIEW)   Final Result         1.  Bilateral basilar atelectasis, no focal infiltrate      DX-FOREARM RIGHT   Final Result      No radiographic evidence of acute traumatic injury.      DX-HUMERUS 2+ RIGHT   Final Result         1.  Comminuted impacted supracondylar humeral fracture           Assessment/Plan  * Humeral distal fracture- (present on admission)  Assessment & Plan  R. UE xray showing: Comminuted impacted supracondylar humeral fracture     R. Elbow xray showing: Supracondylar right distal humeral fracture with angulation     Dr. Yohan Ruano, orthopedic surgery, consulted in ED and plans to take patient to OR in AM\"    S/p Ortho surgery.  Pain control.\"    S/p ORIF   PT/OT recommends SNF/rehab    COPD (chronic obstructive pulmonary disease) (HCC)  Assessment & Plan  Currently not exacerbating  Restart inhalers.    Thrombocytopenia (HCC)- (present " "on admission)  Assessment & Plan  Platelets 74 on admission  Continue to monitor H & H\"    Down to 60  Not on pharmacologic DVT prophylaxis because of this    Essential hypertension- (present on admission)  Assessment & Plan  Continue home meds  Continue to monitor\"    Vitals:    12/01/21 1547   BP: 103/58   Pulse: 72   Resp: 18   Temp: 36.9 °C (98.4 °F)   SpO2: 91%     Did med rec myself       VTE prophylaxis: +SCDS has thrombocytopenia, moitor PLTS and start pharmacologic DVT prophylaxis when appropriate.  12/1 PLTs still dropping.    I have performed a physical exam and reviewed and updated ROS and Plan today (12/1/2021). In review of yesterday's note (11/30/2021), there are no changes except as documented above.      "

## 2021-12-01 NOTE — DISCHARGE PLANNING
Received Choice form at [phone]  Agency/Facility Name: [facility]   Referral sent per Choice form @ [phone]

## 2021-12-01 NOTE — THERAPY
Physical Therapy   Initial Evaluation     Patient Name: Chris Leggett  Age:  71 y.o., Sex:  male  Medical Record #: [id number]  Today's Date: 12/1/2021     Precautions  Precautions: Fall Risk;Non Weight Bearing Right Upper Extremity    Assessment  Patient is 71 y.o. male who sustained a GLF after being knocked over by the neighbor's dogs.  He lives in a group home/ half way house.  Hx of HTN, ETOH.  He needs min assist for all mobility today, including ambulating to/from the toilet.  He needs min assist plus the use of the grab bar in order to get on/off the toilet.  Gait is unsteady, in need of min assist.  He has significantly decreased activity tolerance.  PT will follow and address goals to improve fxl indep.  Plan    Recommend Physical Therapy 3 times per week until therapy goals are met for the following treatments:  Bed Mobility, Gait Training, Stair Training and Therapeutic Activities    DC Equipment Recommendations: Unable to determine at this time  Discharge Recommendations: Recommend post-acute placement for additional physical therapy services prior to discharge home         Objective       12/01/21 0908   Prior Living Situation   Housing / Facility 1 Story House   Steps Into Home 5   Steps In Home 0   Rail Both Rail (Steps into Home)   Equipment Owned Single Point Cane   Lives with - Patient's Self Care Capacity Unrelated Adult  (group home/half way house)   Prior Level of Functional Mobility   Bed Mobility Independent   Transfer Status Independent   Ambulation Independent   Assistive Devices Used Single Point Cane   Cognition    Level of Consciousness Alert   Balance Assessment   Sitting Balance (Static) Fair   Sitting Balance (Dynamic) Fair   Standing Balance (Static) Fair -   Standing Balance (Dynamic) Poor +   Weight Shift Sitting Fair   Weight Shift Standing Fair   Gait Analysis   Gait Level Of Assist Minimal Assist   Assistive Device None;Hand Held Assist   Distance (Feet) 25   Deviation  Bradykinetic;Shuffled Gait   Weight Bearing Status   (NWB RUE)   Bed Mobility    Supine to Sit Minimal Assist   Sit to Supine Minimal Assist   Functional Mobility   Sit to Stand Minimal Assist   Bed, Chair, Wheelchair Transfer Minimal Assist   Toilet Transfers Minimal Assist   Short Term Goals    Short Term Goal # 1 Pt to move supine to/from eob w/ spv in 6 visits to improve fxl indep   Short Term Goal # 2 Pt to move sit to/from stand w/ spv in 6 visits to improve fxl indep   Short Term Goal # 3 Pt to ambulate 150 ft w/ cane and spv in 6 visits to improve fxl indep   Short Term Goal # 4 Pt to move up/down 5 steps w/ spv in 6 visits to access his home   Anticipated Discharge Equipment and Recommendations   DC Equipment Recommendations Unable to determine at this time   Discharge Recommendations Recommend post-acute placement for additional physical therapy services prior to discharge home

## 2021-12-01 NOTE — DISCHARGE PLANNING
Anticipated Discharge Disposition: TBD     Action: pt pending medical clearance, pt currently on 0 liters O2, 6 clicks are 15/15. Awaiting PT/OT evals.      Barriers to Discharge: medical clearance     Plan: f/u with pt and medical team to discuss dc needs and barriers.

## 2021-12-01 NOTE — PROGRESS NOTES
Report received at bedside from NOC RN, pt care assumed, medical patient. Pt sleeping. Bed in lowest position, call light within reach, bed alarm plugged in and on.     Crisis charting COVID 19 surge in effect

## 2021-12-01 NOTE — THERAPY
"Occupational Therapy   Initial Evaluation     Patient Name: Chris Leggett  Age:  71 y.o., Sex:  male  Medical Record #: [id number]  Today's Date: 12/1/2021       Precautions: Fall Risk,Non Weight Bearing Right Upper Extremity,Sling Right Upper Extremity    Assessment  Patient is 71 y.o. male who sustained a ground-level fall resulting in a right displaced distal humerus fracture.  Pt is s/p ORIF & is NWB RUE & to remain in sling until post op visit.  Pt was pleasant & co-operative & receptive to new learning.   Pt needed Mod A for basic ADL's & Min A for ADL transfers.  Pt reports falls in home PTA.  Pt would benefit from Post Acute OT services prior to D/C home.    Plan    Recommend Occupational Therapy 3 times per week until therapy goals are met for the following treatments:  Adaptive Equipment, Neuro Re-Education / Balance, Self Care/Activities of Daily Living, Therapeutic Activities and Therapeutic Exercises.    DC Equipment Recommendations: Unable to determine at this time  Discharge Recommendations: Recommend post-acute placement for additional occupational therapy services prior to discharge home     Subjective    \"The floor in my bathroom is very slippery\"     Objective       12/01/21 1038   Prior Living Situation   Prior Services None   Housing / Facility 1 Story House   Steps Into Home 5   Bathroom Set up Walk In Shower  (pt reports janes in shower is very slippery)   Equipment Owned Single Point Cane   Lives with - Patient's Self Care Capacity Unrelated Adult   Comments Pt reports he lives with a number of unrelated adults & they all pitch in to mange the household but he needs to do his own cooking & laundry   Cognition    Comments pleasant, delayed responses & needs extra time to process, pt reports multiple falls PTA   Passive ROM Upper Body   Comments RUE in sling   Strength Upper Body   Comments LUE is WFL, RUE is in sling   Balance Assessment   Sitting Balance (Static) Fair   Sitting Balance " "(Dynamic) Fair -   Standing Balance (Static) Fair -   Standing Balance (Dynamic) Poor +   Weight Shift Sitting Fair   Weight Shift Standing Fair   Bed Mobility    Supine to Sit Minimal Assist   Sit to Supine   (pt left up in chair)   Scooting Supervised   Rolling Minimum Assist to Lt.   ADL Assessment   Eating Modified Independent   Grooming Minimal Assist;Standing   Bathing Moderate Assist   Upper Body Dressing Moderate Assist   Lower Body Dressing Moderate Assist   Toileting Minimal Assist   Functional Mobility   Sit to Stand Minimal Assist   Bed, Chair, Wheelchair Transfer Minimal Assist   Toilet Transfers Minimal Assist   Mobility Pt able to amb to bathroom with Min A HHA & did appear unsteady on his feet   Patient / Family Goals   Patient / Family Goal #1 \"I'm going to need some help before I go home\"   Short Term Goals   Short Term Goal # 1 Pt will be supervised for ADL transfers   Short Term Goal # 2 Pt will dress UE with supervision   Short Term Goal # 3 Pt will groom standing at the sink with supervision for 10 min with no LOB                 "

## 2021-12-01 NOTE — RESPIRATORY CARE
"COPD EDUCATION by COPD CLINICAL EDUCATOR  12/1/2021  at  1:35 PM by Jennifer Louise RRT     Patient interviewed by COPD education team.  Patient declined to participate in full program.  A short intervention has been conducted.  A comprehensive packet including information about COPD, types of treatments to manage their disease and safe home Oxygen usage was provided and reviewed with patient at the bedside.  Patient was given a spacer with instruction, and encouraged to establish with a PCP once done with rehab.      Smoking Cessation Intervention and education completed, 10 minutes spent on smoking cessation education with patient.    Provided smoking cessation packet with \"Tips to Quit\" and brochure for \"Free Smoking Cessation Classes\".     COPD Screen  COPD Risk Screening  Do you have a history of COPD?: Yes  Do you have a Pulmonologist?: No  COPD Population Screener  During the past 4 weeks, how much did you feel short of breath?: Some of the time  Do you ever cough up any mucus or phlegm?: No/only with occasional colds or infections  In the past 12 months, you do less than you used to because of your breathing problems: Disagree/unsure  Have you smoked at least 100 cigarettes in your entire life?: Yes  How old are you?: 60+  COPD Screening Score: 5  COPD Coordinator Recommended: Yes (seen)    COPD Assessment  COPD Clinical Specialists ONLY  COPD Education Initiated: Yes--Short Intervention (Declined full, given COPD and Smoking Cessation literature and spacer, discussed establishing with a PCP, seeing a pulmonologist and going to pulmonary rehab after IP Rehab)  DME Company: n/a  DME Equipment Type: none  Physician Name: none, Pt encouraged to establish with a PCP after rehab  Pulmonologist Name: given list of local pulmonary clinics  Referrals Initiated: Yes  Pulmonary Rehab: Yes (given information)  Smoking Cessation: Yes  $ Smoking Cessation 3-10 Minutes: Asymptomatic (10 min)  Palliative Care:  (encouraged Pt " "is have ACP docs done)  Hospice: N/A  Home Health Care: N/A (Pt pending d/c to IP Rehab)  [facility] Community Outreach: N/A  [facility]: N/A  [facility]: Yes (placed on the referral list)  Private In-Home Care Agency: N/A  Is this a COPD exacerbation patient?: No  $ Demo/Eval of SVN's, MDI's and Aerosols: Yes (given spacer)    Meds to Beds  Would the patient like to opt in for Bedside Medication Delivery at Discharge?: Yes, interested     MY COPD ACTION PLAN     It is recommended that patients and physicians /healthcare providers complete this action plan together. This plan should be discussed at each physician visit and updated as needed.    The green, yellow and red zones show groups of symptoms of COPD. This list of symptoms is not comprehensive, and you may experience other symptoms. In the \"Actions\" column, your healthcare provider has recommended actions for you to take based on your symptoms.    Patient Name: Chris Leggett   YOB: 1950   Last Updated on:     Green Zone:  I am doing well today Actions   •  Usual activitiy and exercise level •  Take daily medications   •  Usual amounts of cough and phlegm/mucus •  Use oxygen as prescribed   •  Sleep well at night •  Continue regular exercise/diet plan   •  Appetite is good •  At all times avoid cigarette smoke, inhaled irritants     Daily Medications (these medications are taken every day):   Fluticosone/Salmeterol (Advair) 1 Puff Twice daily     Additional Information:  Rinse mouth after taking    Yellow Zone:  I am having a bad day or a COPD flare Actions   •  More breathless than usual •  Continue daily medications   •  I have less energy for my daily activities •  Use quick relief inhaler as ordered   •  Increased or thicker phlegm/mucus •  Use oxygen as prescribed   •  Using quick relief inhaler/nebulizer more often •  Get plenty of rest   •  Swelling of ankles more than usual •  Use pursed lip breathing   •  More " "coughing than usual •  At all times avoid cigarette smoke, inhaled irritants   •  I feel like I have a \"chest cold\"   •  Poor sleep and my symptoms woke me up   •  My appetite is not good   •  My medicine is not helping    •  Call provider immediately if symptoms don’t improve     Continue daily medications, add rescue medications:   Albuterol 2 Puffs Every 4 hours PRN       Medications to be used during a flare up, (as Discussed with Provider):           Additional Information:  Take using the spacer    Red Zone:  I need urgent medical care Actions   •  Severe shortness of breath even at rest •  Call 911 or seek medical care immediately   •  Not able to do any activity because of breathing    •  Fever or shaking chills    •  Feeling confused or very drowsy     •  Chest pains    •  Coughing up blood              "

## 2021-12-02 ENCOUNTER — PATIENT OUTREACH (OUTPATIENT)
Dept: HEALTH INFORMATION MANAGEMENT | Facility: OTHER | Age: 71
End: 2021-12-02

## 2021-12-02 LAB
ALBUMIN SERPL BCP-MCNC: 3.7 G/DL (ref 3.2–4.9)
BUN SERPL-MCNC: 11 MG/DL (ref 8–22)
CALCIUM SERPL-MCNC: 8.8 MG/DL (ref 8.5–10.5)
CHLORIDE SERPL-SCNC: 96 MMOL/L (ref 96–112)
CO2 SERPL-SCNC: 25 MMOL/L (ref 20–33)
CREAT SERPL-MCNC: 0.67 MG/DL (ref 0.5–1.4)
ERYTHROCYTE [DISTWIDTH] IN BLOOD BY AUTOMATED COUNT: 49.8 FL (ref 35.9–50)
GLUCOSE SERPL-MCNC: 112 MG/DL (ref 65–99)
HCT VFR BLD AUTO: 32.4 % (ref 42–52)
HGB BLD-MCNC: 11.4 G/DL (ref 14–18)
MCH RBC QN AUTO: 35.6 PG (ref 27–33)
MCHC RBC AUTO-ENTMCNC: 35.2 G/DL (ref 33.7–35.3)
MCV RBC AUTO: 101.3 FL (ref 81.4–97.8)
PHOSPHATE SERPL-MCNC: 3 MG/DL (ref 2.5–4.5)
PLATELET # BLD AUTO: 57 K/UL (ref 164–446)
PMV BLD AUTO: 11.7 FL (ref 9–12.9)
POTASSIUM SERPL-SCNC: 4.3 MMOL/L (ref 3.6–5.5)
RBC # BLD AUTO: 3.2 M/UL (ref 4.7–6.1)
SODIUM SERPL-SCNC: 132 MMOL/L (ref 135–145)
WBC # BLD AUTO: 3.1 K/UL (ref 4.8–10.8)

## 2021-12-02 PROCEDURE — A9270 NON-COVERED ITEM OR SERVICE: HCPCS | Performed by: INTERNAL MEDICINE

## 2021-12-02 PROCEDURE — 700105 HCHG RX REV CODE 258: Performed by: STUDENT IN AN ORGANIZED HEALTH CARE EDUCATION/TRAINING PROGRAM

## 2021-12-02 PROCEDURE — 700102 HCHG RX REV CODE 250 W/ 637 OVERRIDE(OP): Performed by: INTERNAL MEDICINE

## 2021-12-02 PROCEDURE — 80069 RENAL FUNCTION PANEL: CPT

## 2021-12-02 PROCEDURE — 99232 SBSQ HOSP IP/OBS MODERATE 35: CPT | Performed by: INTERNAL MEDICINE

## 2021-12-02 PROCEDURE — 770001 HCHG ROOM/CARE - MED/SURG/GYN PRIV*

## 2021-12-02 PROCEDURE — 36415 COLL VENOUS BLD VENIPUNCTURE: CPT

## 2021-12-02 PROCEDURE — 700111 HCHG RX REV CODE 636 W/ 250 OVERRIDE (IP): Performed by: STUDENT IN AN ORGANIZED HEALTH CARE EDUCATION/TRAINING PROGRAM

## 2021-12-02 PROCEDURE — 85027 COMPLETE CBC AUTOMATED: CPT

## 2021-12-02 RX ORDER — OXYCODONE HYDROCHLORIDE 5 MG/1
5 TABLET ORAL EVERY 6 HOURS PRN
Qty: 12 TABLET | Refills: 0 | Status: SHIPPED | OUTPATIENT
Start: 2021-12-02 | End: 2021-12-08 | Stop reason: SDUPTHER

## 2021-12-02 RX ORDER — GUAIFENESIN/DEXTROMETHORPHAN 100-10MG/5
10 SYRUP ORAL EVERY 6 HOURS PRN
Qty: 840 ML | Status: SHIPPED
Start: 2021-12-02 | End: 2021-12-08 | Stop reason: SDUPTHER

## 2021-12-02 RX ORDER — POLYETHYLENE GLYCOL 3350 17 G/17G
17 POWDER, FOR SOLUTION ORAL DAILY
Refills: 3 | Status: SHIPPED
Start: 2021-12-03 | End: 2021-12-08 | Stop reason: SDUPTHER

## 2021-12-02 RX ORDER — AMOXICILLIN 250 MG
2 CAPSULE ORAL 2 TIMES DAILY
Qty: 30 TABLET | Refills: 0 | Status: SHIPPED
Start: 2021-12-02 | End: 2021-12-08 | Stop reason: SDUPTHER

## 2021-12-02 RX ORDER — ACETAMINOPHEN 325 MG/1
650 TABLET ORAL EVERY 6 HOURS PRN
Qty: 30 TABLET | Refills: 0 | Status: SHIPPED
Start: 2021-12-02 | End: 2021-12-08 | Stop reason: SDUPTHER

## 2021-12-02 RX ADMIN — OXYCODONE 5 MG: 5 TABLET ORAL at 05:54

## 2021-12-02 RX ADMIN — PRAZOSIN HYDROCHLORIDE 4 MG: 2 CAPSULE ORAL at 20:18

## 2021-12-02 RX ADMIN — LISINOPRIL 30 MG: 20 TABLET ORAL at 05:55

## 2021-12-02 RX ADMIN — MORPHINE SULFATE 2 MG: 4 INJECTION INTRAVENOUS at 07:47

## 2021-12-02 RX ADMIN — TAMSULOSIN HYDROCHLORIDE 0.4 MG: 0.4 CAPSULE ORAL at 17:04

## 2021-12-02 RX ADMIN — TRAZODONE HYDROCHLORIDE 150 MG: 100 TABLET ORAL at 20:18

## 2021-12-02 RX ADMIN — SODIUM CHLORIDE, POTASSIUM CHLORIDE, SODIUM LACTATE AND CALCIUM CHLORIDE: 600; 310; 30; 20 INJECTION, SOLUTION INTRAVENOUS at 03:25

## 2021-12-02 RX ADMIN — DULOXETINE HYDROCHLORIDE 30 MG: 30 CAPSULE, DELAYED RELEASE ORAL at 05:54

## 2021-12-02 RX ADMIN — ALLOPURINOL 100 MG: 100 TABLET ORAL at 05:55

## 2021-12-02 RX ADMIN — MORPHINE SULFATE 2 MG: 4 INJECTION INTRAVENOUS at 13:41

## 2021-12-02 RX ADMIN — OMEPRAZOLE 40 MG: 20 CAPSULE, DELAYED RELEASE ORAL at 05:54

## 2021-12-02 RX ADMIN — SODIUM CHLORIDE, POTASSIUM CHLORIDE, SODIUM LACTATE AND CALCIUM CHLORIDE: 600; 310; 30; 20 INJECTION, SOLUTION INTRAVENOUS at 17:45

## 2021-12-02 RX ADMIN — DOCUSATE SODIUM 50 MG AND SENNOSIDES 8.6 MG 2 TABLET: 8.6; 5 TABLET, FILM COATED ORAL at 17:04

## 2021-12-02 RX ADMIN — BUDESONIDE AND FORMOTEROL FUMARATE DIHYDRATE 2 PUFF: 160; 4.5 AEROSOL RESPIRATORY (INHALATION) at 07:47

## 2021-12-02 RX ADMIN — OXYCODONE 5 MG: 5 TABLET ORAL at 10:44

## 2021-12-02 RX ADMIN — OXYCODONE 5 MG: 5 TABLET ORAL at 16:07

## 2021-12-02 RX ADMIN — MORPHINE SULFATE 2 MG: 4 INJECTION INTRAVENOUS at 20:18

## 2021-12-02 RX ADMIN — TAMSULOSIN HYDROCHLORIDE 0.4 MG: 0.4 CAPSULE ORAL at 05:55

## 2021-12-02 RX ADMIN — QUETIAPINE FUMARATE 400 MG: 200 TABLET ORAL at 20:18

## 2021-12-02 ASSESSMENT — ENCOUNTER SYMPTOMS
NAUSEA: 0
HEMOPTYSIS: 0
FALLS: 0
EYE PAIN: 0
MYALGIAS: 1
INSOMNIA: 0
HEADACHES: 0
VOMITING: 0
NERVOUS/ANXIOUS: 0
TREMORS: 0
FOCAL WEAKNESS: 0
DIARRHEA: 0
CHILLS: 0
DIZZINESS: 0
BLOOD IN STOOL: 0
SEIZURES: 0
CONSTIPATION: 0
COUGH: 0
LOSS OF CONSCIOUSNESS: 0
EYE REDNESS: 0
PALPITATIONS: 0
ABDOMINAL PAIN: 0
WEAKNESS: 0
WHEEZING: 0
SHORTNESS OF BREATH: 0
FEVER: 0

## 2021-12-02 ASSESSMENT — PAIN DESCRIPTION - PAIN TYPE
TYPE: ACUTE PAIN

## 2021-12-02 ASSESSMENT — FIBROSIS 4 INDEX: FIB4 SCORE: 15.05

## 2021-12-02 NOTE — CARE PLAN
The patient is Stable - Low risk of patient condition declining or worsening    Shift Goals  Clinical Goals: Pain management  Patient Goals: pain control, rest    Progress made toward(s) clinical / shift goals:        Problem: Pain - Standard  Goal: Alleviation of pain or a reduction in pain to the patient’s comfort goal  Outcome: Progressing     Problem: Knowledge Deficit - Standard  Goal: Patient and family/care givers will demonstrate understanding of plan of care, disease process/condition, diagnostic tests and medications  Outcome: Progressing       Patient is not progressing towards the following goals:

## 2021-12-02 NOTE — PROGRESS NOTES
Assumed care of patient at 1915, received bedside report from day shift RN. Bed is locked and in lowest position with call light within reach. Treaded socks in place. Patient updated on plan of care.White board updated. Pt A&Ox4. Patient's breathing pattern is unlabored. Pt is medical.

## 2021-12-02 NOTE — DISCHARGE PLANNING
"Anticipated Discharge Disposition: SNF     Action: Spoke to patient bedside who confirmed he lives at [facility]. Pt provided this RN CM with a number to call and verify that he can return after SNF therapy (Lanette [phone]); left voicemail for return call.     Barriers to Discharge: medical clearance     Plan: Follow up with medical team.     UPDATE: Lanette returned phone call. Lanette verbalized Absolute acceptance back post skilled rehab. Lanette requested to be updated with where he will be going; RNCM to call Lanette once patient accepted at skilled rehab.    Late Entry for 12/2/21 @ 1500: RNCM contacted [employer] to check if claim from 6/1/2016 is closed as per [employer] Portal. Per [employer] they did not have a record of patient. Patient knew of claim (said he \"broke his neck 5 years ago\") and pt aware to call Medicare to get details on how to close claim.   "

## 2021-12-02 NOTE — PROGRESS NOTES
"   Orthopaedic Progress Note    Interval changes:  Patient doing well   Dressings CDI  Ace wrap adjusted distally   Cleared for DC by ortho pending medicine clearance     ROS - Patient denies any new issues.  Pain well controlled.    /77   Pulse 93   Temp 36.3 °C (97.4 °F) (Temporal)   Resp 18   Ht 1.803 m (5' 11\")   Wt 108 kg (237 lb 7 oz)   SpO2 92%       Patient seen and examined  No acute distress  Breathing non labored  RRR  RUE in long arm splint CDI, DNVI, moves all fingers, cap refill <2 sec    Recent Labs     11/29/21 2012 11/30/21  0336 12/01/21  0835   WBC 5.3 3.4* 3.6*   RBC 3.67* 3.52* 3.29*   HEMOGLOBIN 12.9* 12.3* 11.5*   HEMATOCRIT 36.8* 35.6* 32.8*   .3* 101.1* 99.7*   MCH 35.1* 34.9* 35.0*   MCHC 35.1 34.6 35.1   RDW 49.7 50.2* 48.8   PLATELETCT 74* 70* 60*   MPV 10.8 11.6 10.9       Active Hospital Problems    Diagnosis    • Thrombocytopenia ([facility]) [D69.6]      Priority: Medium   • Essential hypertension [I10]      Priority: Low   • Humeral distal fracture [S42.409A]    • COPD (chronic obstructive pulmonary disease) ([facility]) [J44.9]        Assessment/Plan:  Patient doing well   Dressings CDI  Ace wrap adjusted distally   Cleared for DC by ortho pending medicine clearance   POD#1 S/P distal humerus ORIF  Wt bearing status - NWB RUE  Wound care/Drains - dressing/splint left in place  Future Procedures - none planned   Sutures/Staples out- 14 days post operatively  PT/OT-initiated  Antibiotics: perioperative completed  DVT Prophylaxis- TEDS/SCDs/Foot pumps  Roca-none  Case Coordination for Discharge Planning - Disposition SNF  "

## 2021-12-02 NOTE — PROGRESS NOTES
Bedside report received and patient care assumed. Pt is resting in bed, A&O4, with no complaints of pain, and is on RA.  All fall precautions are in place, belongings at bedside table.  Pt was updated on POC, no questions or concerns. Pt educated on use of call light for assistance. Will continue to monitor.

## 2021-12-03 LAB
ALBUMIN SERPL BCP-MCNC: 3.2 G/DL (ref 3.2–4.9)
ALBUMIN/GLOB SERPL: 1.5 G/DL
ALP SERPL-CCNC: 54 U/L (ref 30–99)
ALT SERPL-CCNC: 15 U/L (ref 2–50)
ANION GAP SERPL CALC-SCNC: 8 MMOL/L (ref 7–16)
APTT PPP: 32.2 SEC (ref 24.7–36)
AST SERPL-CCNC: 44 U/L (ref 12–45)
BILIRUB SERPL-MCNC: 0.7 MG/DL (ref 0.1–1.5)
BUN SERPL-MCNC: 10 MG/DL (ref 8–22)
CALCIUM SERPL-MCNC: 8.4 MG/DL (ref 8.5–10.5)
CHLORIDE SERPL-SCNC: 97 MMOL/L (ref 96–112)
CO2 SERPL-SCNC: 27 MMOL/L (ref 20–33)
CREAT SERPL-MCNC: 0.56 MG/DL (ref 0.5–1.4)
ERYTHROCYTE [DISTWIDTH] IN BLOOD BY AUTOMATED COUNT: 48.1 FL (ref 35.9–50)
GLOBULIN SER CALC-MCNC: 2.2 G/DL (ref 1.9–3.5)
GLUCOSE SERPL-MCNC: 91 MG/DL (ref 65–99)
HCT VFR BLD AUTO: 28.2 % (ref 42–52)
HGB BLD-MCNC: 9.9 G/DL (ref 14–18)
INR PPP: 1.28 (ref 0.87–1.13)
MCH RBC QN AUTO: 35.2 PG (ref 27–33)
MCHC RBC AUTO-ENTMCNC: 35.1 G/DL (ref 33.7–35.3)
MCV RBC AUTO: 100.4 FL (ref 81.4–97.8)
PLATELET # BLD AUTO: 51 K/UL (ref 164–446)
PMV BLD AUTO: 11.6 FL (ref 9–12.9)
POTASSIUM SERPL-SCNC: 4.2 MMOL/L (ref 3.6–5.5)
PROT SERPL-MCNC: 5.4 G/DL (ref 6–8.2)
PROTHROMBIN TIME: 15.7 SEC (ref 12–14.6)
RBC # BLD AUTO: 2.81 M/UL (ref 4.7–6.1)
SODIUM SERPL-SCNC: 132 MMOL/L (ref 135–145)
WBC # BLD AUTO: 2.3 K/UL (ref 4.8–10.8)

## 2021-12-03 PROCEDURE — 700102 HCHG RX REV CODE 250 W/ 637 OVERRIDE(OP): Performed by: INTERNAL MEDICINE

## 2021-12-03 PROCEDURE — 97530 THERAPEUTIC ACTIVITIES: CPT

## 2021-12-03 PROCEDURE — A9270 NON-COVERED ITEM OR SERVICE: HCPCS | Performed by: INTERNAL MEDICINE

## 2021-12-03 PROCEDURE — 36415 COLL VENOUS BLD VENIPUNCTURE: CPT

## 2021-12-03 PROCEDURE — 85730 THROMBOPLASTIN TIME PARTIAL: CPT

## 2021-12-03 PROCEDURE — 85610 PROTHROMBIN TIME: CPT

## 2021-12-03 PROCEDURE — 80053 COMPREHEN METABOLIC PANEL: CPT

## 2021-12-03 PROCEDURE — 99232 SBSQ HOSP IP/OBS MODERATE 35: CPT | Performed by: INTERNAL MEDICINE

## 2021-12-03 PROCEDURE — 85027 COMPLETE CBC AUTOMATED: CPT

## 2021-12-03 PROCEDURE — 770001 HCHG ROOM/CARE - MED/SURG/GYN PRIV*

## 2021-12-03 PROCEDURE — 700111 HCHG RX REV CODE 636 W/ 250 OVERRIDE (IP): Performed by: STUDENT IN AN ORGANIZED HEALTH CARE EDUCATION/TRAINING PROGRAM

## 2021-12-03 RX ADMIN — MORPHINE SULFATE 2 MG: 4 INJECTION INTRAVENOUS at 11:13

## 2021-12-03 RX ADMIN — DOCUSATE SODIUM 50 MG AND SENNOSIDES 8.6 MG 2 TABLET: 8.6; 5 TABLET, FILM COATED ORAL at 06:47

## 2021-12-03 RX ADMIN — PRAZOSIN HYDROCHLORIDE 4 MG: 2 CAPSULE ORAL at 21:14

## 2021-12-03 RX ADMIN — ALLOPURINOL 100 MG: 100 TABLET ORAL at 06:47

## 2021-12-03 RX ADMIN — TRAZODONE HYDROCHLORIDE 150 MG: 100 TABLET ORAL at 21:14

## 2021-12-03 RX ADMIN — OXYCODONE 5 MG: 5 TABLET ORAL at 01:24

## 2021-12-03 RX ADMIN — TAMSULOSIN HYDROCHLORIDE 0.4 MG: 0.4 CAPSULE ORAL at 06:47

## 2021-12-03 RX ADMIN — OMEPRAZOLE 40 MG: 20 CAPSULE, DELAYED RELEASE ORAL at 06:47

## 2021-12-03 RX ADMIN — QUETIAPINE FUMARATE 400 MG: 200 TABLET ORAL at 21:15

## 2021-12-03 RX ADMIN — BUDESONIDE AND FORMOTEROL FUMARATE DIHYDRATE 2 PUFF: 160; 4.5 AEROSOL RESPIRATORY (INHALATION) at 08:41

## 2021-12-03 RX ADMIN — TAMSULOSIN HYDROCHLORIDE 0.4 MG: 0.4 CAPSULE ORAL at 17:40

## 2021-12-03 RX ADMIN — MORPHINE SULFATE 2 MG: 4 INJECTION INTRAVENOUS at 21:14

## 2021-12-03 RX ADMIN — LISINOPRIL 30 MG: 20 TABLET ORAL at 06:47

## 2021-12-03 RX ADMIN — BUDESONIDE AND FORMOTEROL FUMARATE DIHYDRATE 2 PUFF: 160; 4.5 AEROSOL RESPIRATORY (INHALATION) at 21:23

## 2021-12-03 RX ADMIN — DULOXETINE HYDROCHLORIDE 30 MG: 30 CAPSULE, DELAYED RELEASE ORAL at 06:47

## 2021-12-03 RX ADMIN — OXYCODONE 5 MG: 5 TABLET ORAL at 06:48

## 2021-12-03 RX ADMIN — MORPHINE SULFATE 2 MG: 4 INJECTION INTRAVENOUS at 15:54

## 2021-12-03 ASSESSMENT — ENCOUNTER SYMPTOMS
WHEEZING: 0
DIARRHEA: 0
NERVOUS/ANXIOUS: 0
LOSS OF CONSCIOUSNESS: 0
HEMOPTYSIS: 0
SEIZURES: 0
PALPITATIONS: 0
MYALGIAS: 1
TREMORS: 0
FALLS: 0
COUGH: 0
NAUSEA: 0
VOMITING: 0
EYE PAIN: 0
CHILLS: 0
WEAKNESS: 0
INSOMNIA: 0
FEVER: 0
CONSTIPATION: 0
FOCAL WEAKNESS: 0
HEADACHES: 0
ABDOMINAL PAIN: 0
SHORTNESS OF BREATH: 0
EYE REDNESS: 0
BLOOD IN STOOL: 0
DIZZINESS: 0

## 2021-12-03 ASSESSMENT — COGNITIVE AND FUNCTIONAL STATUS - GENERAL
TURNING FROM BACK TO SIDE WHILE IN FLAT BAD: A LOT
STANDING UP FROM CHAIR USING ARMS: A LITTLE
WALKING IN HOSPITAL ROOM: A LITTLE
MOVING TO AND FROM BED TO CHAIR: A LOT
SUGGESTED CMS G CODE MODIFIER MOBILITY: CL
MOBILITY SCORE: 13
CLIMB 3 TO 5 STEPS WITH RAILING: A LOT
MOVING FROM LYING ON BACK TO SITTING ON SIDE OF FLAT BED: UNABLE

## 2021-12-03 ASSESSMENT — PAIN DESCRIPTION - PAIN TYPE
TYPE: ACUTE PAIN
TYPE: ACUTE PAIN
TYPE: ACUTE PAIN;SURGICAL PAIN
TYPE: ACUTE PAIN;SURGICAL PAIN
TYPE: ACUTE PAIN

## 2021-12-03 ASSESSMENT — GAIT ASSESSMENTS
DISTANCE (FEET): 60
GAIT LEVEL OF ASSIST: MINIMAL ASSIST
ASSISTIVE DEVICE: HAND HELD ASSIST
DEVIATION: BRADYKINETIC;SHUFFLED GAIT

## 2021-12-03 ASSESSMENT — FIBROSIS 4 INDEX: FIB4 SCORE: 15.59

## 2021-12-03 NOTE — CARE PLAN
The patient is Stable - Low risk of patient condition declining or worsening    Shift Goals  Clinical Goals: Pain management  Patient Goals: pain control, rest    Progress made toward(s) clinical / shift goals:        Problem: Pain - Standard  Goal: Alleviation of pain or a reduction in pain to the patient’s comfort goal  Outcome: Progressing     Problem: Knowledge Deficit - Standard  Goal: Patient and family/care givers will demonstrate understanding of plan of care, disease process/condition, diagnostic tests and medications  Outcome: Progressing       Patient is not progressing towards the following goals: n/a

## 2021-12-03 NOTE — PROGRESS NOTES
Lab called with critical result of WBC of 2.3 at approximately 0445. Critical lab result read back to Lab.   Dr. Diaz notified of critical lab result at 0505. Critical lab result read back by Dr. Diaz.

## 2021-12-03 NOTE — PROGRESS NOTES
Assumed care of patient at 1915, received bedside report from day shift RN. Bed is locked and in lowest position with call light within reach. Treaded socks in place. Patient updated on plan of care.  White board updated. Pt A&Ox4. Patient's breathing pattern is unlabored. Pt is medical.

## 2021-12-03 NOTE — PROGRESS NOTES
"   Orthopaedic Progress Note    Interval changes:  Patient doing well   Dressings CDI  Cleared for DC by ortho pending medicine clearance     ROS - Patient denies any new issues.  Pain well controlled.    /64   Pulse 76   Temp 36.7 °C (98.1 °F) (Temporal)   Resp 14   Ht 1.803 m (5' 11\")   Wt 108 kg (237 lb 7 oz)   SpO2 91%       Patient seen and examined  No acute distress  Breathing non labored  RRR  RUE in long arm splint CDI, DNVI, moves all fingers, cap refill <2 sec    Recent Labs     11/30/21  0336 12/01/21  0835 12/02/21  1026   WBC 3.4* 3.6* 3.1*   RBC 3.52* 3.29* 3.20*   HEMOGLOBIN 12.3* 11.5* 11.4*   HEMATOCRIT 35.6* 32.8* 32.4*   .1* 99.7* 101.3*   MCH 34.9* 35.0* 35.6*   MCHC 34.6 35.1 35.2   RDW 50.2* 48.8 49.8   PLATELETCT 70* 60* 57*   MPV 11.6 10.9 11.7       Active Hospital Problems    Diagnosis    • Thrombocytopenia ([facility]) [D69.6]      Priority: Medium   • Essential hypertension [I10]      Priority: Low   • Humeral distal fracture [S42.409A]    • COPD (chronic obstructive pulmonary disease) ([facility]) [J44.9]        Assessment/Plan:  Patient doing well   Dressings CDI  Cleared for DC by ortho pending medicine clearance   POD#2 S/P distal humerus ORIF  Wt bearing status - NWB RUE  Wound care/Drains - dressing/splint left in place  Future Procedures - none planned   Sutures/Staples out- 14 days post operatively  PT/OT-initiated  Antibiotics: perioperative completed  DVT Prophylaxis- TEDS/SCDs/Foot pumps  Roca-none  Case Coordination for Discharge Planning - Disposition SNF  "

## 2021-12-03 NOTE — PROGRESS NOTES
Hospital Medicine Daily Progress Note    Date of Service  12/2/2021    Chief Complaint  Chris Leggett is a 71 y.o. male admitted 11/29/2021 with Arm Pain        Hospital Course  He lives in a group home. He is obese and has a history of hypertension. He presentes with arm injury after being attacked by 3 of his neighbor;s dogs.  At the ED, afebrile hemodynamically stable.  XRs showed RUE humeral fracture  Orthopedics consulted.  Underwent ORIF by Dr. Meadows on 11/30.    Interval Problem Update  11/30/2021. I saw patient with Orthopedics. Currently has sling. In pain. Ortho to clear, felt one more day for pain control. No telemetry need. Redid med rec.  12/1. PT/OT pending. No new issues or concerns.  12/2. No new concerns other than minimal pain. Awaiting SNF or rehab. [facility] is evaluating for tomorrow.    I have personally seen and examined the patient at bedside. I discussed the plan of care with patient, bedside RN and orthopedics.    Consultants/Specialty  orthopedics    Code Status  Full Code    Disposition  Patient is medically cleared pending Ortho clearance.   Anticipate discharge to [facility] may need rehab, PT/OT ordered.  I have placed the appropriate orders for post-discharge needs.    Review of Systems  Review of Systems   Constitutional: Negative for chills and fever.   HENT: Negative for congestion, hearing loss and nosebleeds.    Eyes: Negative for pain and redness.   Respiratory: Negative for cough, hemoptysis, shortness of breath and wheezing.    Cardiovascular: Negative for chest pain and palpitations.   Gastrointestinal: Negative for abdominal pain, blood in stool, constipation, diarrhea, nausea and vomiting.   Genitourinary: Negative for dysuria, frequency and hematuria.   Musculoskeletal: Positive for joint pain and myalgias. Negative for falls.   Skin: Negative for rash.   Neurological: Negative for dizziness, tremors, focal weakness, seizures, loss of consciousness, weakness and headaches.    Psychiatric/Behavioral: The patient is not nervous/anxious and does not have insomnia.    All other systems reviewed and are negative.       Physical Exam  Temp:  [36.1 °C (97 °F)-36.7 °C (98.1 °F)] 36.2 °C (97.2 °F)  Pulse:  [68-93] 68  Resp:  [14-18] 15  BP: (101-144)/(63-78) 115/68  SpO2:  [90 %-92 %] 90 %    Physical Exam  Vitals and nursing note reviewed.   Constitutional:       Appearance: He is obese.   HENT:      Head: Normocephalic and atraumatic.      Right Ear: External ear normal.      Left Ear: External ear normal.      Nose: Nose normal.      Mouth/Throat:      Mouth: Mucous membranes are moist.   Eyes:      General: No scleral icterus.     Conjunctiva/sclera: Conjunctivae normal.   Cardiovascular:      Rate and Rhythm: Normal rate and regular rhythm.      Heart sounds: No murmur heard.  No friction rub. No gallop.    Pulmonary:      Effort: Pulmonary effort is normal.      Breath sounds: Normal breath sounds.   Abdominal:      General: Abdomen is flat. Bowel sounds are normal. There is no distension.      Palpations: Abdomen is soft.      Tenderness: There is no abdominal tenderness. There is no guarding.   Musculoskeletal:         General: Tenderness (RUE, improved) and deformity (RUE) present. Normal range of motion.      Cervical back: Normal range of motion and neck supple.      Comments: Slibg and bandages in place   Skin:     General: Skin is warm.   Neurological:      Mental Status: He is alert and oriented to person, place, and time. Mental status is at baseline.   Psychiatric:         Mood and Affect: Mood normal.         Behavior: Behavior normal.         Thought Content: Thought content normal.         Judgment: Judgment normal.         Fluids    Intake/Output Summary (Last 24 hours) at 12/2/2021 1724  Last data filed at 12/2/2021 1706  Gross per 24 hour   Intake 860 ml   Output 1325 ml   Net -465 ml       Laboratory  Recent Labs     11/30/21  0336 12/01/21  0835 12/02/21  1026   WBC 3.4*  "3.6* 3.1*   RBC 3.52* 3.29* 3.20*   HEMOGLOBIN 12.3* 11.5* 11.4*   HEMATOCRIT 35.6* 32.8* 32.4*   .1* 99.7* 101.3*   MCH 34.9* 35.0* 35.6*   MCHC 34.6 35.1 35.2   RDW 50.2* 48.8 49.8   PLATELETCT 70* 60* 57*   MPV 11.6 10.9 11.7     Recent Labs     11/30/21  0336 12/01/21  0835 12/02/21  1026   SODIUM 128* 132* 132*   POTASSIUM 3.8 4.2 4.3   CHLORIDE 94* 97 96   CO2 25 27 25   GLUCOSE 77 102* 112*   BUN 9 7* 11   CREATININE 0.61 0.63 0.67   CALCIUM 8.8 8.9 8.8     Recent Labs     11/29/21 2012   APTT 29.3   INR 1.22*         Recent Labs     11/30/21  0336   TRIGLYCERIDE 53   HDL 60   LDL 61       Imaging  DX-PORTABLE FLUORO > 1 HOUR   Final Result      Portable fluoroscopy utilized for 18 seconds.         INTERPRETING LOCATION: [address] [place] [address], [id number]      DX-HUMERUS 2+ RIGHT   Final Result         Excellent alignment of supracondylar fracture of the right humerus following ORIF.      CT-ELBOW W/O PLUS RECONS RIGHT   Final Result      Subacute favored over acute supracondylar intra-articular fracture with anterior displacement medially, mild fracture fragmentation with minute intra-articular loose bodies      Mild medial elbow osteoarthritis      DX-CHEST-PORTABLE (1 VIEW)   Final Result         1.  Bilateral basilar atelectasis, no focal infiltrate      DX-FOREARM RIGHT   Final Result      No radiographic evidence of acute traumatic injury.      DX-HUMERUS 2+ RIGHT   Final Result         1.  Comminuted impacted supracondylar humeral fracture           Assessment/Plan  * Humeral distal fracture- (present on admission)  Assessment & Plan  R. UE xray showing: Comminuted impacted supracondylar humeral fracture     R. Elbow xray showing: Supracondylar right distal humeral fracture with angulation     Dr. Yohan Ruano, orthopedic surgery, consulted in ED and plans to take patient to OR in AM\"    S/p Ortho surgery.  Pain control.\"    S/p ORIF   PT/OT recommends SNF/rehab  [facility] planned tomorrow    COPD " "(chronic obstructive pulmonary disease) (HCC)  Assessment & Plan  Currently not exacerbating  Restart inhalers.    Thrombocytopenia (HCC)- (present on admission)  Assessment & Plan  Platelets 74 on admission  Continue to monitor H & H\"    Down to 60  Not on pharmacologic DVT prophylaxis because of this  Repeat PLTs 57, steady currently    Essential hypertension- (present on admission)  Assessment & Plan  Continue home meds  Continue to monitor\"    Vitals:    12/02/21 1642   BP: 115/68   Pulse: 68   Resp: 15   Temp: 36.2 °C (97.2 °F)   SpO2: 90%     Did med rec myself       VTE prophylaxis: +SCDS has thrombocytopenia, moitor PLTS and start pharmacologic DVT prophylaxis when appropriate.  12/1 PLTs still dropping.    I have performed a physical exam and reviewed and updated ROS and Plan today (12/2/2021). In review of yesterday's note (12/1/2021), there are no changes except as documented above.      "

## 2021-12-03 NOTE — PROGRESS NOTES
Hospital Medicine Daily Progress Note    Date of Service  12/3/2021    Chief Complaint  Chris Leggett is a 71 y.o. male admitted 11/29/2021 with Arm Pain        Hospital Course  He lives in a group home. He is obese and has a history of hypertension. He presentes with arm injury after being attacked by 3 of his neighbor;s dogs.  At the ED, afebrile hemodynamically stable.  XRs showed RUE humeral fracture  Orthopedics consulted.  Underwent ORIF by Dr. Meadows on 11/30.    Interval Problem Update  11/30/2021. I saw patient with Orthopedics. Currently has sling. In pain. Ortho to clear, felt one more day for pain control. No telemetry need. Redid med rec.  12/1. PT/OT pending. No new issues or concerns.  12/2. No new concerns other than minimal pain. Awaiting SNF or rehab. [facility] is evaluating for tomorrow.  12/3. Awaiting rehab. SW/CM mentions repeat PT/OT needed.  I have personally seen and examined the patient at bedside. I discussed the plan of care with patient, bedside RN and orthopedics.    Consultants/Specialty  orthopedics    Code Status  Full Code    Disposition  Patient is medically cleared.   Anticipate discharge to to skilled nursing facility.  I have placed the appropriate orders for post-discharge needs.    Review of Systems  Review of Systems   Constitutional: Negative for chills and fever.   HENT: Negative for congestion, hearing loss and nosebleeds.    Eyes: Negative for pain and redness.   Respiratory: Negative for cough, hemoptysis, shortness of breath and wheezing.    Cardiovascular: Negative for chest pain and palpitations.   Gastrointestinal: Negative for abdominal pain, blood in stool, constipation, diarrhea, nausea and vomiting.   Genitourinary: Negative for dysuria, frequency and hematuria.   Musculoskeletal: Positive for joint pain and myalgias. Negative for falls.   Skin: Negative for rash.   Neurological: Negative for dizziness, tremors, focal weakness, seizures, loss of consciousness,  weakness and headaches.   Psychiatric/Behavioral: The patient is not nervous/anxious and does not have insomnia.    All other systems reviewed and are negative.       Physical Exam  Temp:  [36.2 °C (97.2 °F)-36.7 °C (98 °F)] 36.3 °C (97.4 °F)  Pulse:  [64-68] 67  Resp:  [15-16] 15  BP: (106-115)/(67-73) 110/73  SpO2:  [90 %-92 %] 90 %    Physical Exam  Vitals and nursing note reviewed.   Constitutional:       Appearance: He is obese.   HENT:      Head: Normocephalic and atraumatic.      Right Ear: External ear normal.      Left Ear: External ear normal.      Nose: Nose normal.      Mouth/Throat:      Mouth: Mucous membranes are moist.   Eyes:      General: No scleral icterus.     Conjunctiva/sclera: Conjunctivae normal.   Cardiovascular:      Rate and Rhythm: Normal rate and regular rhythm.      Heart sounds: No murmur heard.  No friction rub. No gallop.    Pulmonary:      Effort: Pulmonary effort is normal.      Breath sounds: Normal breath sounds.   Abdominal:      General: Abdomen is flat. Bowel sounds are normal. There is no distension.      Palpations: Abdomen is soft.      Tenderness: There is no abdominal tenderness. There is no guarding.   Musculoskeletal:         General: Tenderness (RUE, improved) and deformity (RUE) present. Normal range of motion.      Cervical back: Normal range of motion and neck supple.      Comments: Slibg and bandages in place   Skin:     General: Skin is warm.   Neurological:      Mental Status: He is alert and oriented to person, place, and time. Mental status is at baseline.   Psychiatric:         Mood and Affect: Mood normal.         Behavior: Behavior normal.         Thought Content: Thought content normal.         Judgment: Judgment normal.         Fluids    Intake/Output Summary (Last 24 hours) at 12/3/2021 0933  Last data filed at 12/3/2021 0346  Gross per 24 hour   Intake 860 ml   Output 2200 ml   Net -1340 ml       Laboratory  Recent Labs     12/01/21  0835 12/02/21  1026  "12/03/21  0321   WBC 3.6* 3.1* 2.3*   RBC 3.29* 3.20* 2.81*   HEMOGLOBIN 11.5* 11.4* 9.9*   HEMATOCRIT 32.8* 32.4* 28.2*   MCV 99.7* 101.3* 100.4*   MCH 35.0* 35.6* 35.2*   MCHC 35.1 35.2 35.1   RDW 48.8 49.8 48.1   PLATELETCT 60* 57* 51*   MPV 10.9 11.7 11.6     Recent Labs     12/01/21  0835 12/02/21  1026 12/03/21  0321   SODIUM 132* 132* 132*   POTASSIUM 4.2 4.3 4.2   CHLORIDE 97 96 97   CO2 27 25 27   GLUCOSE 102* 112* 91   BUN 7* 11 10   CREATININE 0.63 0.67 0.56   CALCIUM 8.9 8.8 8.4*                   Imaging  DX-PORTABLE FLUORO > 1 HOUR   Final Result      Portable fluoroscopy utilized for 18 seconds.         INTERPRETING LOCATION: [address] [place] [address], [facility]      DX-HUMERUS 2+ RIGHT   Final Result         Excellent alignment of supracondylar fracture of the right humerus following ORIF.      CT-ELBOW W/O PLUS RECONS RIGHT   Final Result      Subacute favored over acute supracondylar intra-articular fracture with anterior displacement medially, mild fracture fragmentation with minute intra-articular loose bodies      Mild medial elbow osteoarthritis      DX-CHEST-PORTABLE (1 VIEW)   Final Result         1.  Bilateral basilar atelectasis, no focal infiltrate      DX-FOREARM RIGHT   Final Result      No radiographic evidence of acute traumatic injury.      DX-HUMERUS 2+ RIGHT   Final Result         1.  Comminuted impacted supracondylar humeral fracture           Assessment/Plan  * Humeral distal fracture- (present on admission)  Assessment & Plan  R. UE xray showing: Comminuted impacted supracondylar humeral fracture     R. Elbow xray showing: Supracondylar right distal humeral fracture with angulation     Dr. Yohan Ruano, orthopedic surgery, consulted in ED and plans to take patient to OR in AM\"    S/p Ortho surgery.  Pain control.\"    S/p ORIF   PT/OT recommends SNF/rehab  12/3. [facility] planned    COPD (chronic obstructive pulmonary disease) (HCC)  Assessment & Plan  Currently not exacerbating  Restart " "inhalers.    Thrombocytopenia (HCC)- (present on admission)  Assessment & Plan  Platelets 74 on admission  Continue to monitor H & H\"    Down to 60  Not on pharmacologic DVT prophylaxis because of this  Repeat PLTs 57, steady currently  Slowly dropping no active bleed  Ordered coagulation tests  With pancytopenia likely related to his alcoholic liver disease  If absolute nutrophils low will consider antibiotics and Hematology consultation    Essential hypertension- (present on admission)  Assessment & Plan  Continue home meds  Continue to monitor\"    Vitals:    12/03/21 1124   BP: 112/76   Pulse:    Resp:    Temp:    SpO2:      Did med rec myself       VTE prophylaxis: +SCDS has thrombocytopenia, moitor PLTS and start pharmacologic DVT prophylaxis when appropriate.  12/3 PLTs still dropping.    I have performed a physical exam and reviewed and updated ROS and Plan today (12/3/2021). In review of yesterday's note (12/2/2021), there are no changes except as documented above.      "

## 2021-12-04 LAB
ERYTHROCYTE [DISTWIDTH] IN BLOOD BY AUTOMATED COUNT: 48.8 FL (ref 35.9–50)
HCT VFR BLD AUTO: 29.8 % (ref 42–52)
HGB BLD-MCNC: 10.3 G/DL (ref 14–18)
MCH RBC QN AUTO: 34.8 PG (ref 27–33)
MCHC RBC AUTO-ENTMCNC: 34.6 G/DL (ref 33.7–35.3)
MCV RBC AUTO: 100.7 FL (ref 81.4–97.8)
PLATELET # BLD AUTO: 67 K/UL (ref 164–446)
PMV BLD AUTO: 11.5 FL (ref 9–12.9)
RBC # BLD AUTO: 2.96 M/UL (ref 4.7–6.1)
WBC # BLD AUTO: 2.3 K/UL (ref 4.8–10.8)

## 2021-12-04 PROCEDURE — 99232 SBSQ HOSP IP/OBS MODERATE 35: CPT | Performed by: INTERNAL MEDICINE

## 2021-12-04 PROCEDURE — 36415 COLL VENOUS BLD VENIPUNCTURE: CPT

## 2021-12-04 PROCEDURE — 85027 COMPLETE CBC AUTOMATED: CPT

## 2021-12-04 PROCEDURE — 700105 HCHG RX REV CODE 258: Performed by: STUDENT IN AN ORGANIZED HEALTH CARE EDUCATION/TRAINING PROGRAM

## 2021-12-04 PROCEDURE — 700102 HCHG RX REV CODE 250 W/ 637 OVERRIDE(OP): Performed by: INTERNAL MEDICINE

## 2021-12-04 PROCEDURE — 770001 HCHG ROOM/CARE - MED/SURG/GYN PRIV*

## 2021-12-04 PROCEDURE — 700111 HCHG RX REV CODE 636 W/ 250 OVERRIDE (IP): Performed by: STUDENT IN AN ORGANIZED HEALTH CARE EDUCATION/TRAINING PROGRAM

## 2021-12-04 PROCEDURE — A9270 NON-COVERED ITEM OR SERVICE: HCPCS | Performed by: INTERNAL MEDICINE

## 2021-12-04 RX ADMIN — LISINOPRIL 30 MG: 20 TABLET ORAL at 05:53

## 2021-12-04 RX ADMIN — SODIUM CHLORIDE, POTASSIUM CHLORIDE, SODIUM LACTATE AND CALCIUM CHLORIDE: 600; 310; 30; 20 INJECTION, SOLUTION INTRAVENOUS at 22:16

## 2021-12-04 RX ADMIN — TRAZODONE HYDROCHLORIDE 150 MG: 100 TABLET ORAL at 20:49

## 2021-12-04 RX ADMIN — BUDESONIDE AND FORMOTEROL FUMARATE DIHYDRATE 2 PUFF: 160; 4.5 AEROSOL RESPIRATORY (INHALATION) at 09:56

## 2021-12-04 RX ADMIN — OMEPRAZOLE 40 MG: 20 CAPSULE, DELAYED RELEASE ORAL at 05:54

## 2021-12-04 RX ADMIN — TAMSULOSIN HYDROCHLORIDE 0.4 MG: 0.4 CAPSULE ORAL at 05:53

## 2021-12-04 RX ADMIN — ALLOPURINOL 100 MG: 100 TABLET ORAL at 05:54

## 2021-12-04 RX ADMIN — MORPHINE SULFATE 2 MG: 4 INJECTION INTRAVENOUS at 20:53

## 2021-12-04 RX ADMIN — OXYCODONE 5 MG: 5 TABLET ORAL at 15:53

## 2021-12-04 RX ADMIN — QUETIAPINE FUMARATE 400 MG: 200 TABLET ORAL at 20:49

## 2021-12-04 RX ADMIN — OXYCODONE 5 MG: 5 TABLET ORAL at 05:56

## 2021-12-04 RX ADMIN — TAMSULOSIN HYDROCHLORIDE 0.4 MG: 0.4 CAPSULE ORAL at 18:00

## 2021-12-04 RX ADMIN — DULOXETINE HYDROCHLORIDE 30 MG: 30 CAPSULE, DELAYED RELEASE ORAL at 05:53

## 2021-12-04 RX ADMIN — BUDESONIDE AND FORMOTEROL FUMARATE DIHYDRATE 2 PUFF: 160; 4.5 AEROSOL RESPIRATORY (INHALATION) at 20:49

## 2021-12-04 RX ADMIN — PRAZOSIN HYDROCHLORIDE 4 MG: 2 CAPSULE ORAL at 20:49

## 2021-12-04 RX ADMIN — MORPHINE SULFATE 2 MG: 4 INJECTION INTRAVENOUS at 10:10

## 2021-12-04 ASSESSMENT — ENCOUNTER SYMPTOMS
SHORTNESS OF BREATH: 0
SEIZURES: 0
WHEEZING: 0
DIZZINESS: 0
COUGH: 0
CONSTIPATION: 0
FEVER: 0
PALPITATIONS: 0
ABDOMINAL PAIN: 0
HEADACHES: 0
NAUSEA: 0
EYE PAIN: 0
LOSS OF CONSCIOUSNESS: 0
INSOMNIA: 0
CHILLS: 0
WEAKNESS: 0
DIARRHEA: 0
EYE REDNESS: 0
HEMOPTYSIS: 0
MYALGIAS: 1
VOMITING: 0
FOCAL WEAKNESS: 0
BLOOD IN STOOL: 0
FALLS: 0
NERVOUS/ANXIOUS: 0
TREMORS: 0

## 2021-12-04 ASSESSMENT — FIBROSIS 4 INDEX: FIB4 SCORE: 11.87

## 2021-12-04 ASSESSMENT — PAIN DESCRIPTION - PAIN TYPE
TYPE: ACUTE PAIN

## 2021-12-04 NOTE — CARE PLAN
"The patient is Stable - Low risk of patient condition declining or worsening    Shift Goals  Clinical Goals: Pain management. Patient will verbalize POC by end of shift.  Patient Goals: \"Pain under control.\"  Family Goals: N/A    Progress made toward(s) clinical / shift goals:  Pt is able to verbalize pain on a scale of 1-10 and is able to verbalize comfort goal. Pain management plan followed and pt educated on nonpharmacologic and pharmacological comfort measures.      Patient is not progressing towards the following goals: N/A      "

## 2021-12-04 NOTE — PROGRESS NOTES
Received bedside report from SHARAN Salcido, pt care assumed. VS WDL, pt assessment complete. Pt A&Ox4, c/o 8/10 pain at this time. POC discussed with pt and verbalizes no questions. Pt denies any additional needs at this time. Bed locked and in lowest position, bed alarm on. Pt educated on fall risk and verbalized understanding, call light within reach, hourly rounding initiated.

## 2021-12-04 NOTE — THERAPY
Physical Therapy   Daily Treatment     Patient Name: Chris Leggett  Age:  71 y.o., Sex:  male  Medical Record #: [id number]  Today's Date: 12/3/2021     Precautions  Precautions: Fall Risk;Non Weight Bearing Right Upper Extremity;Sling Right Upper Extremity    Assessment    Patient progressing toward functional mobility goals.  Patient was able to ambulate ~60 ft x 2 with HHA and min A.  Patient initially with intermittent stumbles however no gross LOB, no assist to maintain balance.  Patient was able to perform multiple transfers with min A.  Educated patient to continue sitting up in chair for all meals and ambulate with nursing.  PT to continue to follow.    Plan    Continue current treatment plan.    DC Equipment Recommendations: Unable to determine at this time  Discharge Recommendations: Recommend post-acute placement for additional physical therapy services prior to discharge home       Objective     12/03/21 8059   Cognition    Cognition / Consciousness WDL   Level of Consciousness Alert   Comments Very pleasant & cooperative, delayed responses, intermittent word finding difficulty   Balance   Sitting Balance (Static) Fair   Sitting Balance (Dynamic) Fair -   Standing Balance (Static) Fair -   Standing Balance (Dynamic) Fair -   Weight Shift Sitting Fair   Weight Shift Standing Fair   Skilled Intervention Verbal Cuing;Sequencing   Comments w/ HHA   Gait Analysis   Gait Level Of Assist Minimal Assist   Assistive Device Hand Held Assist   Distance (Feet) 60   # of Times Distance was Traveled 2   Deviation Bradykinetic;Shuffled Gait   Skilled Intervention Verbal Cuing;Sequencing   Bed Mobility    Supine to Sit Supervised   Sit to Supine (NT, left up in chair)   Skilled Intervention Verbal Cuing   Comments HOB elevated   Functional Mobility   Sit to Stand Minimal Assist   Bed, Chair, Wheelchair Transfer Minimal Assist   Toilet Transfers Minimal Assist   Transfer Method Stand Step   Mobility bed mobility,  ambulation   Skilled Intervention Verbal Cuing;Sequencing;Postural Facilitation   Activity Tolerance   Sitting in Chair 10+ min (post session)   Sitting Edge of Bed 3 min   Standing 10 min   Short Term Goals    Short Term Goal # 1 Pt to move supine to/from eob w/ spv in 6 visits to improve fxl indep   Goal Outcome # 1 Progressing as expected   Short Term Goal # 2 Pt to move sit to/from stand w/ spv in 6 visits to improve fxl indep   Goal Outcome # 2 Progressing as expected   Short Term Goal # 3 Pt to ambulate 150 ft w/ cane and spv in 6 visits to improve fxl indep   Goal Outcome # 3 Goal not met   Short Term Goal # 4 Pt to move up/down 5 steps w/ spv in 6 visits to access his home   Goal Outcome # 4 Goal not met   Session Information   Date / Session Number  12/3 - 2 (2/3, 12/7)

## 2021-12-04 NOTE — DISCHARGE PLANNING
Agency/Facility Name: [facility]  Outcome: Left voice message for admissions regarding bed availability, requested a call back.    @1020  DPA spoke to Justin, will need claim on Medicare closed before they are able to accept.

## 2021-12-04 NOTE — PROGRESS NOTES
Hospital Medicine Daily Progress Note    Date of Service  12/4/2021    Chief Complaint  Chris Leggett is a 71 y.o. male admitted 11/29/2021 with Arm Pain        Hospital Course  He lives in a group home. He is obese and has a history of hypertension. He presentes with arm injury after being attacked by 3 of his neighbor;s dogs.  At the ED, afebrile hemodynamically stable.  XRs showed RUE humeral fracture  Orthopedics consulted.  Underwent ORIF by Dr. Meadows on 11/30.    Interval Problem Update  11/30/2021. I saw patient with Orthopedics. Currently has sling. In pain. Ortho to clear, felt one more day for pain control. No telemetry need. Redid med rec.  12/1. PT/OT pending. No new issues or concerns.  12/2. No new concerns other than minimal pain. Awaiting SNF or rehab. [facility] is evaluating for tomorrow.  12/3. Awaiting rehab. SW/CM mentions repeat PT/OT needed.  I have personally seen and examined the patient at bedside.  12/4. Awaiting rehab and insurance authorization.  I discussed the plan of care with patient, bedside RN and orthopedics.    Consultants/Specialty  orthopedics    Code Status  Full Code    Disposition  Patient is medically cleared.   Anticipate discharge to to skilled nursing facility.  I have placed the appropriate orders for post-discharge needs.    Review of Systems  Review of Systems   Constitutional: Negative for chills and fever.   HENT: Negative for congestion, hearing loss and nosebleeds.    Eyes: Negative for pain and redness.   Respiratory: Negative for cough, hemoptysis, shortness of breath and wheezing.    Cardiovascular: Negative for chest pain and palpitations.   Gastrointestinal: Negative for abdominal pain, blood in stool, constipation, diarrhea, nausea and vomiting.   Genitourinary: Negative for dysuria, frequency and hematuria.   Musculoskeletal: Positive for joint pain and myalgias. Negative for falls.   Skin: Negative for rash.   Neurological: Negative for dizziness, tremors,  focal weakness, seizures, loss of consciousness, weakness and headaches.   Psychiatric/Behavioral: The patient is not nervous/anxious and does not have insomnia.    All other systems reviewed and are negative.       Physical Exam  Temp:  [36.2 °C (97.2 °F)-37.2 °C (99 °F)] 37.2 °C (99 °F)  Pulse:  [66-86] 86  Resp:  [15-16] 15  BP: (111-137)/(73-84) 118/84  SpO2:  [91 %-96 %] 94 %    Physical Exam  Vitals and nursing note reviewed.   Constitutional:       Appearance: He is obese.   HENT:      Head: Normocephalic and atraumatic.      Right Ear: External ear normal.      Left Ear: External ear normal.      Nose: Nose normal.      Mouth/Throat:      Mouth: Mucous membranes are moist.   Eyes:      General: No scleral icterus.     Conjunctiva/sclera: Conjunctivae normal.   Cardiovascular:      Rate and Rhythm: Normal rate and regular rhythm.      Heart sounds: No murmur heard.  No friction rub. No gallop.    Pulmonary:      Effort: Pulmonary effort is normal.      Breath sounds: Normal breath sounds.   Abdominal:      General: Abdomen is flat. Bowel sounds are normal. There is no distension.      Palpations: Abdomen is soft.      Tenderness: There is no abdominal tenderness. There is no guarding.   Musculoskeletal:         General: Tenderness (RUE, improved) and deformity (RUE) present. Normal range of motion.      Cervical back: Normal range of motion and neck supple.      Comments: Slibg and bandages in place   Skin:     General: Skin is warm.   Neurological:      Mental Status: He is alert and oriented to person, place, and time. Mental status is at baseline.   Psychiatric:         Mood and Affect: Mood normal.         Behavior: Behavior normal.         Thought Content: Thought content normal.         Judgment: Judgment normal.      Comments: Calm         Fluids    Intake/Output Summary (Last 24 hours) at 12/4/2021 1518  Last data filed at 12/4/2021 0339  Gross per 24 hour   Intake --   Output 900 ml   Net -900 ml  "      Laboratory  Recent Labs     12/02/21  1026 12/03/21  0321 12/04/21  0320   WBC 3.1* 2.3* 2.3*   RBC 3.20* 2.81* 2.96*   HEMOGLOBIN 11.4* 9.9* 10.3*   HEMATOCRIT 32.4* 28.2* 29.8*   .3* 100.4* 100.7*   MCH 35.6* 35.2* 34.8*   MCHC 35.2 35.1 34.6   RDW 49.8 48.1 48.8   PLATELETCT 57* 51* 67*   MPV 11.7 11.6 11.5     Recent Labs     12/02/21  1026 12/03/21  0321   SODIUM 132* 132*   POTASSIUM 4.3 4.2   CHLORIDE 96 97   CO2 25 27   GLUCOSE 112* 91   BUN 11 10   CREATININE 0.67 0.56   CALCIUM 8.8 8.4*     Recent Labs     12/03/21  1630   APTT 32.2   INR 1.28*               Imaging  DX-PORTABLE FLUORO > 1 HOUR   Final Result      Portable fluoroscopy utilized for 18 seconds.         INTERPRETING LOCATION: [address] [place] [address], [address]      DX-HUMERUS 2+ RIGHT   Final Result         Excellent alignment of supracondylar fracture of the right humerus following ORIF.      CT-ELBOW W/O PLUS RECONS RIGHT   Final Result      Subacute favored over acute supracondylar intra-articular fracture with anterior displacement medially, mild fracture fragmentation with minute intra-articular loose bodies      Mild medial elbow osteoarthritis      DX-CHEST-PORTABLE (1 VIEW)   Final Result         1.  Bilateral basilar atelectasis, no focal infiltrate      DX-FOREARM RIGHT   Final Result      No radiographic evidence of acute traumatic injury.      DX-HUMERUS 2+ RIGHT   Final Result         1.  Comminuted impacted supracondylar humeral fracture           Assessment/Plan  * Humeral distal fracture- (present on admission)  Assessment & Plan  R. UE xray showing: Comminuted impacted supracondylar humeral fracture     R. Elbow xray showing: Supracondylar right distal humeral fracture with angulation     Dr. Yohan Ruano, orthopedic surgery, consulted in ED and plans to take patient to OR in AM\"    S/p Ortho surgery.  Pain control.\"    S/p ORIF   PT/OT recommends SNF/rehab  12/4. [place] planned    COPD (chronic obstructive pulmonary " "disease) (HCC)  Assessment & Plan  Currently not exacerbating  Restart inhalers.    Thrombocytopenia (HCC)- (present on admission)  Assessment & Plan  Platelets 74 on admission  Continue to monitor H & H\"    Down to 60  Not on pharmacologic DVT prophylaxis because of this  Repeat PLTs 57, steady currently  Slowly dropping no active bleed  Ordered coagulation tests  With pancytopenia likely related to his alcoholic liver disease  If absolute nutrophils low will consider antibiotics and Hematology consultation  PLTs back up to 67  If >70 will consider pharmacologic DVT prophylaxis.    Essential hypertension- (present on admission)  Assessment & Plan  Continue home meds  Continue to monitor\"    Vitals:    12/04/21 0744   BP: 118/84   Pulse: 86   Resp: 15   Temp: 37.2 °C (99 °F)   SpO2: 94%     Did med rec myself       VTE prophylaxis: +SCDS has thrombocytopenia, moitor PLTS and start pharmacologic DVT prophylaxis when appropriate.  12/3 PLTs still dropping.    I have performed a physical exam and reviewed and updated ROS and Plan today (12/4/2021). In review of yesterday's note (12/3/2021), there are no changes except as documented above.      "

## 2021-12-04 NOTE — PROGRESS NOTES
"   Orthopaedic Progress Note    Interval changes:  Patient doing well   Dressings CDI  Cleared for DC by ortho pending medicine clearance     ROS - Patient denies any new issues.  Pain well controlled.    /84   Pulse 86   Temp 37.2 °C (99 °F) (Temporal)   Resp 15   Ht 1.803 m (5' 11\")   Wt 109 kg (240 lb 1.3 oz)   SpO2 94%       Patient seen and examined  No acute distress  Breathing non labored  RRR  RUE in long arm splint CDI, DNVI, moves all fingers, cap refill <2 sec    Recent Labs     12/02/21  1026 12/03/21  0321 12/04/21  0320   WBC 3.1* 2.3* 2.3*   RBC 3.20* 2.81* 2.96*   HEMOGLOBIN 11.4* 9.9* 10.3*   HEMATOCRIT 32.4* 28.2* 29.8*   .3* 100.4* 100.7*   MCH 35.6* 35.2* 34.8*   MCHC 35.2 35.1 34.6   RDW 49.8 48.1 48.8   PLATELETCT 57* 51* 67*   MPV 11.7 11.6 11.5       Active Hospital Problems    Diagnosis    • Thrombocytopenia ([facility]) [D69.6]      Priority: Medium   • Essential hypertension [I10]      Priority: Low   • Humeral distal fracture [S42.409A]    • COPD (chronic obstructive pulmonary disease) ([facility]) [J44.9]        Assessment/Plan:  Patient doing well   Dressings CDI  Cleared for DC by ortho pending medicine clearance   POD#3 S/P distal humerus ORIF  Wt bearing status - NWB RUE  Wound care/Drains - dressing/splint left in place  Future Procedures - none planned   Sutures/Staples out- 14 days post operatively  PT/OT-initiated  Antibiotics: perioperative completed  DVT Prophylaxis- TEDS/SCDs/Foot pumps  Roca-none  Case Coordination for Discharge Planning - Disposition SNF  "

## 2021-12-05 LAB
ALBUMIN SERPL BCP-MCNC: 3.2 G/DL (ref 3.2–4.9)
BASOPHILS # BLD AUTO: 0.9 % (ref 0–1.8)
BASOPHILS # BLD: 0.02 K/UL (ref 0–0.12)
BUN SERPL-MCNC: 8 MG/DL (ref 8–22)
CALCIUM SERPL-MCNC: 8.5 MG/DL (ref 8.5–10.5)
CHLORIDE SERPL-SCNC: 98 MMOL/L (ref 96–112)
CO2 SERPL-SCNC: 23 MMOL/L (ref 20–33)
CREAT SERPL-MCNC: 0.56 MG/DL (ref 0.5–1.4)
EOSINOPHIL # BLD AUTO: 0.24 K/UL (ref 0–0.51)
EOSINOPHIL NFR BLD: 10.9 % (ref 0–6.9)
ERYTHROCYTE [DISTWIDTH] IN BLOOD BY AUTOMATED COUNT: 49.5 FL (ref 35.9–50)
GLUCOSE SERPL-MCNC: 96 MG/DL (ref 65–99)
HCT VFR BLD AUTO: 29.7 % (ref 42–52)
HGB BLD-MCNC: 10 G/DL (ref 14–18)
IMM GRANULOCYTES # BLD AUTO: 0.01 K/UL (ref 0–0.11)
IMM GRANULOCYTES NFR BLD AUTO: 0.5 % (ref 0–0.9)
LYMPHOCYTES # BLD AUTO: 0.53 K/UL (ref 1–4.8)
LYMPHOCYTES NFR BLD: 24 % (ref 22–41)
MCH RBC QN AUTO: 34.5 PG (ref 27–33)
MCHC RBC AUTO-ENTMCNC: 33.7 G/DL (ref 33.7–35.3)
MCV RBC AUTO: 102.4 FL (ref 81.4–97.8)
MONOCYTES # BLD AUTO: 0.24 K/UL (ref 0–0.85)
MONOCYTES NFR BLD AUTO: 10.9 % (ref 0–13.4)
NEUTROPHILS # BLD AUTO: 1.17 K/UL (ref 1.82–7.42)
NEUTROPHILS NFR BLD: 52.8 % (ref 44–72)
NRBC # BLD AUTO: 0 K/UL
NRBC BLD-RTO: 0 /100 WBC
PHOSPHATE SERPL-MCNC: 3.6 MG/DL (ref 2.5–4.5)
PLATELET # BLD AUTO: 55 K/UL (ref 164–446)
PMV BLD AUTO: 11.2 FL (ref 9–12.9)
POTASSIUM SERPL-SCNC: 4.2 MMOL/L (ref 3.6–5.5)
RBC # BLD AUTO: 2.9 M/UL (ref 4.7–6.1)
SODIUM SERPL-SCNC: 132 MMOL/L (ref 135–145)
WBC # BLD AUTO: 2.2 K/UL (ref 4.8–10.8)

## 2021-12-05 PROCEDURE — 85025 COMPLETE CBC W/AUTO DIFF WBC: CPT

## 2021-12-05 PROCEDURE — 700111 HCHG RX REV CODE 636 W/ 250 OVERRIDE (IP): Performed by: STUDENT IN AN ORGANIZED HEALTH CARE EDUCATION/TRAINING PROGRAM

## 2021-12-05 PROCEDURE — 36415 COLL VENOUS BLD VENIPUNCTURE: CPT

## 2021-12-05 PROCEDURE — 770001 HCHG ROOM/CARE - MED/SURG/GYN PRIV*

## 2021-12-05 PROCEDURE — A9270 NON-COVERED ITEM OR SERVICE: HCPCS | Performed by: INTERNAL MEDICINE

## 2021-12-05 PROCEDURE — 80069 RENAL FUNCTION PANEL: CPT

## 2021-12-05 PROCEDURE — 700102 HCHG RX REV CODE 250 W/ 637 OVERRIDE(OP): Performed by: INTERNAL MEDICINE

## 2021-12-05 PROCEDURE — 99231 SBSQ HOSP IP/OBS SF/LOW 25: CPT | Performed by: INTERNAL MEDICINE

## 2021-12-05 RX ADMIN — PRAZOSIN HYDROCHLORIDE 4 MG: 2 CAPSULE ORAL at 20:10

## 2021-12-05 RX ADMIN — MORPHINE SULFATE 2 MG: 4 INJECTION INTRAVENOUS at 20:09

## 2021-12-05 RX ADMIN — OXYCODONE 5 MG: 5 TABLET ORAL at 17:34

## 2021-12-05 RX ADMIN — DULOXETINE HYDROCHLORIDE 30 MG: 30 CAPSULE, DELAYED RELEASE ORAL at 05:08

## 2021-12-05 RX ADMIN — OXYCODONE 5 MG: 5 TABLET ORAL at 22:41

## 2021-12-05 RX ADMIN — LISINOPRIL 30 MG: 20 TABLET ORAL at 05:10

## 2021-12-05 RX ADMIN — ALLOPURINOL 100 MG: 100 TABLET ORAL at 05:08

## 2021-12-05 RX ADMIN — BUDESONIDE AND FORMOTEROL FUMARATE DIHYDRATE 2 PUFF: 160; 4.5 AEROSOL RESPIRATORY (INHALATION) at 20:10

## 2021-12-05 RX ADMIN — DOCUSATE SODIUM 50 MG AND SENNOSIDES 8.6 MG 2 TABLET: 8.6; 5 TABLET, FILM COATED ORAL at 05:07

## 2021-12-05 RX ADMIN — TRAZODONE HYDROCHLORIDE 150 MG: 100 TABLET ORAL at 20:10

## 2021-12-05 RX ADMIN — MORPHINE SULFATE 2 MG: 4 INJECTION INTRAVENOUS at 15:16

## 2021-12-05 RX ADMIN — MORPHINE SULFATE 2 MG: 4 INJECTION INTRAVENOUS at 08:29

## 2021-12-05 RX ADMIN — DOCUSATE SODIUM 50 MG AND SENNOSIDES 8.6 MG 2 TABLET: 8.6; 5 TABLET, FILM COATED ORAL at 16:51

## 2021-12-05 RX ADMIN — POLYETHYLENE GLYCOL 3350 1 PACKET: 17 POWDER, FOR SOLUTION ORAL at 05:08

## 2021-12-05 RX ADMIN — TAMSULOSIN HYDROCHLORIDE 0.4 MG: 0.4 CAPSULE ORAL at 16:51

## 2021-12-05 RX ADMIN — BUDESONIDE AND FORMOTEROL FUMARATE DIHYDRATE 2 PUFF: 160; 4.5 AEROSOL RESPIRATORY (INHALATION) at 08:29

## 2021-12-05 RX ADMIN — TAMSULOSIN HYDROCHLORIDE 0.4 MG: 0.4 CAPSULE ORAL at 05:08

## 2021-12-05 RX ADMIN — MORPHINE SULFATE 2 MG: 4 INJECTION INTRAVENOUS at 04:04

## 2021-12-05 RX ADMIN — QUETIAPINE FUMARATE 400 MG: 200 TABLET ORAL at 20:10

## 2021-12-05 RX ADMIN — OXYCODONE 5 MG: 5 TABLET ORAL at 12:52

## 2021-12-05 RX ADMIN — OMEPRAZOLE 40 MG: 20 CAPSULE, DELAYED RELEASE ORAL at 05:07

## 2021-12-05 ASSESSMENT — ENCOUNTER SYMPTOMS
COUGH: 0
MYALGIAS: 1
NAUSEA: 0
SEIZURES: 0
WHEEZING: 0
BLOOD IN STOOL: 0
HEADACHES: 0
SHORTNESS OF BREATH: 0
PALPITATIONS: 0
NERVOUS/ANXIOUS: 0
EYE PAIN: 0
FOCAL WEAKNESS: 0
CHILLS: 0
INSOMNIA: 0
EYE REDNESS: 0
VOMITING: 0
LOSS OF CONSCIOUSNESS: 0
ABDOMINAL PAIN: 0
TREMORS: 0
DIZZINESS: 0
HEMOPTYSIS: 0
CONSTIPATION: 0
FEVER: 0
DIARRHEA: 0
WEAKNESS: 0
FALLS: 0

## 2021-12-05 ASSESSMENT — PAIN DESCRIPTION - PAIN TYPE
TYPE: ACUTE PAIN

## 2021-12-05 ASSESSMENT — FIBROSIS 4 INDEX: FIB4 SCORE: 14.46

## 2021-12-05 NOTE — PROGRESS NOTES
Hospital Medicine Daily Progress Note    Date of Service  12/5/2021    Chief Complaint  Chris Leggett is a 71 y.o. male admitted 11/29/2021 with Arm Pain        Hospital Course  He lives in a group home. He is obese and has a history of hypertension. He presentes with arm injury after being attacked by 3 of his neighbor;s dogs.  At the ED, afebrile hemodynamically stable.  XRs showed RUE humeral fracture  Orthopedics consulted.  Underwent ORIF by Dr. Meadows on 11/30.    Interval Problem Update  11/30/2021. I saw patient with Orthopedics. Currently has sling. In pain. Ortho to clear, felt one more day for pain control. No telemetry need. Redid med rec.  12/1. PT/OT pending. No new issues or concerns.  12/2. No new concerns other than minimal pain. Awaiting SNF or rehab. Eri is evaluating for tomorrow.  12/3. Awaiting rehab. SW/CM mentions repeat PT/OT needed.  I have personally seen and examined the patient at bedside.  12/5. Awaiting rehab and insurance authorization.  I discussed the plan of care with patient, bedside RN and orthopedics.    Consultants/Specialty  orthopedics    Code Status  Full Code    Disposition  Patient is medically cleared.   Anticipate discharge to to skilled nursing facility.  I have placed the appropriate orders for post-discharge needs.    Review of Systems  Review of Systems   Constitutional: Negative for chills and fever.   HENT: Negative for congestion, hearing loss and nosebleeds.    Eyes: Negative for pain and redness.   Respiratory: Negative for cough, hemoptysis, shortness of breath and wheezing.    Cardiovascular: Negative for chest pain and palpitations.   Gastrointestinal: Negative for abdominal pain, blood in stool, constipation, diarrhea, nausea and vomiting.   Genitourinary: Negative for dysuria, frequency and hematuria.   Musculoskeletal: Positive for joint pain and myalgias. Negative for falls.   Skin: Negative for rash.   Neurological: Negative for dizziness, tremors,  focal weakness, seizures, loss of consciousness, weakness and headaches.   Psychiatric/Behavioral: The patient is not nervous/anxious and does not have insomnia.    All other systems reviewed and are negative.       Physical Exam  Temp:  [36.2 °C (97.1 °F)-36.8 °C (98.2 °F)] 36.3 °C (97.3 °F)  Pulse:  [57-66] 61  Resp:  [14-16] 15  BP: (108-130)/(65-74) 130/74  SpO2:  [93 %-95 %] 95 %    Physical Exam  Vitals and nursing note reviewed.   Constitutional:       Appearance: He is obese.   HENT:      Head: Normocephalic and atraumatic.      Right Ear: External ear normal.      Left Ear: External ear normal.      Nose: Nose normal.      Mouth/Throat:      Mouth: Mucous membranes are moist.   Eyes:      General: No scleral icterus.     Conjunctiva/sclera: Conjunctivae normal.   Cardiovascular:      Rate and Rhythm: Normal rate and regular rhythm.      Heart sounds: No murmur heard.  No friction rub. No gallop.    Pulmonary:      Effort: Pulmonary effort is normal.      Breath sounds: Normal breath sounds.   Abdominal:      General: Abdomen is flat. Bowel sounds are normal. There is no distension.      Palpations: Abdomen is soft.      Tenderness: There is no abdominal tenderness. There is no guarding.   Musculoskeletal:         General: Tenderness (RUE, improved) and deformity (RUE) present. Normal range of motion.      Cervical back: Normal range of motion and neck supple.      Comments: Slibg and bandages in place   Skin:     General: Skin is warm.   Neurological:      Mental Status: He is alert and oriented to person, place, and time. Mental status is at baseline.   Psychiatric:         Mood and Affect: Mood normal.         Behavior: Behavior normal.         Thought Content: Thought content normal.         Judgment: Judgment normal.      Comments: cooperative         Fluids    Intake/Output Summary (Last 24 hours) at 12/5/2021 1008  Last data filed at 12/5/2021 0727  Gross per 24 hour   Intake 6616.25 ml   Output 1200  "ml   Net 5416.25 ml       Laboratory  Recent Labs     12/03/21  0321 12/04/21  0320 12/05/21  0351   WBC 2.3* 2.3* 2.2*   RBC 2.81* 2.96* 2.90*   HEMOGLOBIN 9.9* 10.3* 10.0*   HEMATOCRIT 28.2* 29.8* 29.7*   .4* 100.7* 102.4*   MCH 35.2* 34.8* 34.5*   MCHC 35.1 34.6 33.7   RDW 48.1 48.8 49.5   PLATELETCT 51* 67* 55*   MPV 11.6 11.5 11.2     Recent Labs     12/02/21  1026 12/03/21  0321 12/05/21  0351   SODIUM 132* 132* 132*   POTASSIUM 4.3 4.2 4.2   CHLORIDE 96 97 98   CO2 25 27 23   GLUCOSE 112* 91 96   BUN 11 10 8   CREATININE 0.67 0.56 0.56   CALCIUM 8.8 8.4* 8.5     Recent Labs     12/03/21  1630   APTT 32.2   INR 1.28*               Imaging  DX-PORTABLE FLUORO > 1 HOUR   Final Result      Portable fluoroscopy utilized for 18 seconds.         INTERPRETING LOCATION: [address] [place] [address], [address]      DX-HUMERUS 2+ RIGHT   Final Result         Excellent alignment of supracondylar fracture of the right humerus following ORIF.      CT-ELBOW W/O PLUS RECONS RIGHT   Final Result      Subacute favored over acute supracondylar intra-articular fracture with anterior displacement medially, mild fracture fragmentation with minute intra-articular loose bodies      Mild medial elbow osteoarthritis      DX-CHEST-PORTABLE (1 VIEW)   Final Result         1.  Bilateral basilar atelectasis, no focal infiltrate      DX-FOREARM RIGHT   Final Result      No radiographic evidence of acute traumatic injury.      DX-HUMERUS 2+ RIGHT   Final Result         1.  Comminuted impacted supracondylar humeral fracture           Assessment/Plan  * Humeral distal fracture- (present on admission)  Assessment & Plan  R. UE xray showing: Comminuted impacted supracondylar humeral fracture     R. Elbow xray showing: Supracondylar right distal humeral fracture with angulation     Dr. Yohan Ruano, orthopedic surgery, consulted in ED and plans to take patient to OR in AM\"    S/p Ortho surgery.  Pain control.\"    S/p ORIF   PT/OT recommends " "SNF/rehab  12/4. [place] planned    COPD (chronic obstructive pulmonary disease) (HCC)  Assessment & Plan  Currently not exacerbating  Restart inhalers.    Thrombocytopenia (HCC)- (present on admission)  Assessment & Plan  Platelets 74 on admission  Continue to monitor H & H\"    Down to 60  Not on pharmacologic DVT prophylaxis because of this  Repeat PLTs 57, steady currently  Slowly dropping no active bleed  Ordered coagulation tests  With pancytopenia likely related to his alcoholic liver disease  If absolute nutrophils low will consider antibiotics and Hematology consultation  PLTs back up to 67  If >70 will consider pharmacologic DVT prophylaxis.    Essential hypertension- (present on admission)  Assessment & Plan  Continue home meds  Continue to monitor\"    Vitals:    12/04/21 0744   BP: 118/84   Pulse: 86   Resp: 15   Temp: 37.2 °C (99 °F)   SpO2: 94%     Did med rec myself       VTE prophylaxis: +SCDS has thrombocytopenia, moitor PLTS and start pharmacologic DVT prophylaxis when appropriate.  12/3 PLTs still dropping.    I have performed a physical exam and reviewed and updated ROS and Plan today (12/5/2021). In review of yesterday's note (12/4/2021), there are no changes except as documented above.      "

## 2021-12-05 NOTE — PROGRESS NOTES
Assumed care of PT A&O 4. Pt resting in bed with no signs of labored breathing. On 2L NC. Pt is medical. Call light within reach, bed in lowest position, upper bed rails up. Pt was updated on plan of care for the day . Will continue to monitor.

## 2021-12-05 NOTE — PROGRESS NOTES
"   Orthopaedic Progress Note    Interval changes:  Patient doing well   Dressings CDI  Cleared for DC by ortho pending medicine clearance     ROS - Patient denies any new issues.  Pain well controlled.    /69   Pulse 64   Temp 36.6 °C (97.8 °F) (Temporal)   Resp 16   Ht 1.803 m (5' 11\")   Wt 109 kg (240 lb 1.3 oz)   SpO2 93%       Patient seen and examined  No acute distress  Breathing non labored  RRR  RUE in long arm splint CDI, DNVI, moves all fingers, cap refill <2 sec    Recent Labs     12/02/21  1026 12/03/21  0321 12/04/21  0320   WBC 3.1* 2.3* 2.3*   RBC 3.20* 2.81* 2.96*   HEMOGLOBIN 11.4* 9.9* 10.3*   HEMATOCRIT 32.4* 28.2* 29.8*   .3* 100.4* 100.7*   MCH 35.6* 35.2* 34.8*   MCHC 35.2 35.1 34.6   RDW 49.8 48.1 48.8   PLATELETCT 57* 51* 67*   MPV 11.7 11.6 11.5       Active Hospital Problems    Diagnosis    • Thrombocytopenia ([facility]) [D69.6]      Priority: Medium   • Essential hypertension [I10]      Priority: Low   • Humeral distal fracture [S42.409A]    • COPD (chronic obstructive pulmonary disease) ([facility]) [J44.9]        Assessment/Plan:  Patient doing well   Dressings CDI  Cleared for DC by ortho pending medicine clearance   POD#4 S/P distal humerus ORIF  Wt bearing status - NWB RUE  Wound care/Drains - dressing/splint left in place  Future Procedures - none planned   Sutures/Staples out- 14 days post operatively  PT/OT-initiated  Antibiotics: perioperative completed  DVT Prophylaxis- TEDS/SCDs/Foot pumps  Roca-none  Case Coordination for Discharge Planning - Disposition SNF  "

## 2021-12-05 NOTE — CARE PLAN
"The patient is Watcher - Medium risk of patient condition declining or worsening    Shift Goals  Clinical Goals: Pain management. Patient will verbalize POC by end of shift.  Patient Goals: \"Pain under control.\"  Family Goals: N/A    Progress made toward(s) clinical / shift goals:    Problem: Pain - Standard  Goal: Alleviation of pain or a reduction in pain to the patient’s comfort goal  Outcome: Progressing Patient educated to pain scale system. Patient encouraged to verbalize discomfort. Patient taught about non-pharmacological pain management. Patient is comfortable at this time without statements of discomfort or pain.     Problem: Knowledge Deficit - Standard  Goal: Patient and family/care givers will demonstrate understanding of plan of care, disease process/condition, diagnostic tests and medications  Outcome: Progressing Patient is educated of disease process and condition. Treatment team has included patient in plan of care. All medications indications and side effects are explained. Patient is encouraged to ask questions. Patient indicates understanding.     Patient is not progressing towards the following goals:      "

## 2021-12-05 NOTE — CARE PLAN
Problem: Pain - Standard  Goal: Alleviation of pain or a reduction in pain to the patient’s comfort goal  Outcome: Progressing     Problem: Knowledge Deficit - Standard  Goal: Patient and family/care givers will demonstrate understanding of plan of care, disease process/condition, diagnostic tests and medications  Outcome: Progressing     Problem: Fall Risk  Goal: Patient will remain free from falls  Outcome: Progressing   The patient is Stable - Low risk of patient condition declining or worsening    Shift Goals  Clinical Goals: pain control, decrease infection  Patient Goals: pain control, decrease infection  Family Goals: N/A    Progress made toward(s) clinical / shift goals:  yes    Patient is not progressing towards the following goals:

## 2021-12-05 NOTE — PROGRESS NOTES
SUDHEER from Lab called with critical result of WBC 2.2 at 0446. Critical lab result read back to SUDHEER.   Dr. Diaz notified of critical lab result at 0500.  Critical lab result read back by Dr. Diaz.

## 2021-12-06 LAB
BASOPHILS # BLD AUTO: 0.5 % (ref 0–1.8)
BASOPHILS # BLD: 0.01 K/UL (ref 0–0.12)
EOSINOPHIL # BLD AUTO: 0.19 K/UL (ref 0–0.51)
EOSINOPHIL NFR BLD: 9 % (ref 0–6.9)
ERYTHROCYTE [DISTWIDTH] IN BLOOD BY AUTOMATED COUNT: 48.2 FL (ref 35.9–50)
HCT VFR BLD AUTO: 29.8 % (ref 42–52)
HGB BLD-MCNC: 10.4 G/DL (ref 14–18)
IMM GRANULOCYTES # BLD AUTO: 0.01 K/UL (ref 0–0.11)
IMM GRANULOCYTES NFR BLD AUTO: 0.5 % (ref 0–0.9)
LYMPHOCYTES # BLD AUTO: 0.5 K/UL (ref 1–4.8)
LYMPHOCYTES NFR BLD: 23.6 % (ref 22–41)
MCH RBC QN AUTO: 35.1 PG (ref 27–33)
MCHC RBC AUTO-ENTMCNC: 34.9 G/DL (ref 33.7–35.3)
MCV RBC AUTO: 100.7 FL (ref 81.4–97.8)
MONOCYTES # BLD AUTO: 0.22 K/UL (ref 0–0.85)
MONOCYTES NFR BLD AUTO: 10.4 % (ref 0–13.4)
NEUTROPHILS # BLD AUTO: 1.19 K/UL (ref 1.82–7.42)
NEUTROPHILS NFR BLD: 56 % (ref 44–72)
NRBC # BLD AUTO: 0 K/UL
NRBC BLD-RTO: 0 /100 WBC
PLATELET # BLD AUTO: 64 K/UL (ref 164–446)
PMV BLD AUTO: 11.4 FL (ref 9–12.9)
RBC # BLD AUTO: 2.96 M/UL (ref 4.7–6.1)
WBC # BLD AUTO: 2.1 K/UL (ref 4.8–10.8)

## 2021-12-06 PROCEDURE — A9270 NON-COVERED ITEM OR SERVICE: HCPCS | Performed by: INTERNAL MEDICINE

## 2021-12-06 PROCEDURE — 36415 COLL VENOUS BLD VENIPUNCTURE: CPT

## 2021-12-06 PROCEDURE — 97116 GAIT TRAINING THERAPY: CPT

## 2021-12-06 PROCEDURE — 700102 HCHG RX REV CODE 250 W/ 637 OVERRIDE(OP): Performed by: INTERNAL MEDICINE

## 2021-12-06 PROCEDURE — 700111 HCHG RX REV CODE 636 W/ 250 OVERRIDE (IP): Performed by: STUDENT IN AN ORGANIZED HEALTH CARE EDUCATION/TRAINING PROGRAM

## 2021-12-06 PROCEDURE — 85025 COMPLETE CBC W/AUTO DIFF WBC: CPT

## 2021-12-06 PROCEDURE — 99232 SBSQ HOSP IP/OBS MODERATE 35: CPT | Performed by: INTERNAL MEDICINE

## 2021-12-06 PROCEDURE — 770001 HCHG ROOM/CARE - MED/SURG/GYN PRIV*

## 2021-12-06 RX ORDER — HEPARIN SODIUM 5000 [USP'U]/ML
5000 INJECTION, SOLUTION INTRAVENOUS; SUBCUTANEOUS EVERY 12 HOURS
Status: DISCONTINUED | OUTPATIENT
Start: 2021-12-07 | End: 2021-12-08 | Stop reason: HOSPADM

## 2021-12-06 RX ADMIN — DULOXETINE HYDROCHLORIDE 30 MG: 30 CAPSULE, DELAYED RELEASE ORAL at 06:18

## 2021-12-06 RX ADMIN — TAMSULOSIN HYDROCHLORIDE 0.4 MG: 0.4 CAPSULE ORAL at 06:18

## 2021-12-06 RX ADMIN — QUETIAPINE FUMARATE 400 MG: 200 TABLET ORAL at 21:48

## 2021-12-06 RX ADMIN — PRAZOSIN HYDROCHLORIDE 4 MG: 2 CAPSULE ORAL at 21:48

## 2021-12-06 RX ADMIN — BUDESONIDE AND FORMOTEROL FUMARATE DIHYDRATE 2 PUFF: 160; 4.5 AEROSOL RESPIRATORY (INHALATION) at 19:05

## 2021-12-06 RX ADMIN — DOCUSATE SODIUM 50 MG AND SENNOSIDES 8.6 MG 2 TABLET: 8.6; 5 TABLET, FILM COATED ORAL at 06:18

## 2021-12-06 RX ADMIN — OXYCODONE 5 MG: 5 TABLET ORAL at 06:18

## 2021-12-06 RX ADMIN — TAMSULOSIN HYDROCHLORIDE 0.4 MG: 0.4 CAPSULE ORAL at 16:28

## 2021-12-06 RX ADMIN — TRAZODONE HYDROCHLORIDE 150 MG: 100 TABLET ORAL at 21:48

## 2021-12-06 RX ADMIN — OXYCODONE 5 MG: 5 TABLET ORAL at 12:27

## 2021-12-06 RX ADMIN — BUDESONIDE AND FORMOTEROL FUMARATE DIHYDRATE 2 PUFF: 160; 4.5 AEROSOL RESPIRATORY (INHALATION) at 08:58

## 2021-12-06 RX ADMIN — MORPHINE SULFATE 2 MG: 4 INJECTION INTRAVENOUS at 21:48

## 2021-12-06 RX ADMIN — POLYETHYLENE GLYCOL 3350 1 PACKET: 17 POWDER, FOR SOLUTION ORAL at 06:17

## 2021-12-06 RX ADMIN — OMEPRAZOLE 40 MG: 20 CAPSULE, DELAYED RELEASE ORAL at 06:19

## 2021-12-06 RX ADMIN — ALLOPURINOL 100 MG: 100 TABLET ORAL at 06:17

## 2021-12-06 RX ADMIN — LISINOPRIL 30 MG: 20 TABLET ORAL at 06:17

## 2021-12-06 RX ADMIN — MORPHINE SULFATE 2 MG: 4 INJECTION INTRAVENOUS at 08:57

## 2021-12-06 RX ADMIN — OXYCODONE 5 MG: 5 TABLET ORAL at 19:04

## 2021-12-06 RX ADMIN — MORPHINE SULFATE 2 MG: 4 INJECTION INTRAVENOUS at 16:28

## 2021-12-06 ASSESSMENT — ENCOUNTER SYMPTOMS
EYE PAIN: 0
BLOOD IN STOOL: 0
TREMORS: 0
LOSS OF CONSCIOUSNESS: 0
ABDOMINAL PAIN: 0
SHORTNESS OF BREATH: 0
COUGH: 0
EYE REDNESS: 0
NERVOUS/ANXIOUS: 0
WEAKNESS: 0
DIARRHEA: 0
DIZZINESS: 0
FOCAL WEAKNESS: 0
HEMOPTYSIS: 0
CHILLS: 0
CONSTIPATION: 0
VOMITING: 0
SEIZURES: 0
HEADACHES: 0
WHEEZING: 0
MYALGIAS: 1
FALLS: 0
PALPITATIONS: 0
FEVER: 0
INSOMNIA: 0
NAUSEA: 0

## 2021-12-06 ASSESSMENT — PAIN DESCRIPTION - PAIN TYPE
TYPE: ACUTE PAIN

## 2021-12-06 ASSESSMENT — COGNITIVE AND FUNCTIONAL STATUS - GENERAL
CLIMB 3 TO 5 STEPS WITH RAILING: A LITTLE
WALKING IN HOSPITAL ROOM: A LITTLE
SUGGESTED CMS G CODE MODIFIER MOBILITY: CL
MOVING FROM LYING ON BACK TO SITTING ON SIDE OF FLAT BED: UNABLE
MOVING TO AND FROM BED TO CHAIR: A LOT
STANDING UP FROM CHAIR USING ARMS: A LITTLE
MOBILITY SCORE: 14
TURNING FROM BACK TO SIDE WHILE IN FLAT BAD: A LOT

## 2021-12-06 ASSESSMENT — GAIT ASSESSMENTS
DEVIATION: BRADYKINETIC;ANTALGIC
DISTANCE (FEET): 100
GAIT LEVEL OF ASSIST: MINIMAL ASSIST

## 2021-12-06 NOTE — PROGRESS NOTES
"   Orthopaedic Progress Note    Interval changes:  Patient doing well   Dressings CDI  Cleared for DC by ortho pending medicine clearance     ROS - Patient denies any new issues.  Pain well controlled.    /66   Pulse 61   Temp 36.7 °C (98 °F) (Temporal)   Resp 16   Ht 1.803 m (5' 11\")   Wt 109 kg (240 lb 4.8 oz)   SpO2 91%       Patient seen and examined  No acute distress  Breathing non labored  RRR  RUE in long arm splint CDI, DNVI, moves all fingers, cap refill <2 sec    Recent Labs     12/04/21  0320 12/05/21  0351 12/06/21  0328   WBC 2.3* 2.2* 2.1*   RBC 2.96* 2.90* 2.96*   HEMOGLOBIN 10.3* 10.0* 10.4*   HEMATOCRIT 29.8* 29.7* 29.8*   .7* 102.4* 100.7*   MCH 34.8* 34.5* 35.1*   MCHC 34.6 33.7 34.9   RDW 48.8 49.5 48.2   PLATELETCT 67* 55* 64*   MPV 11.5 11.2 11.4       Active Hospital Problems    Diagnosis    • Thrombocytopenia ([facility]) [D69.6]      Priority: Medium   • Essential hypertension [I10]      Priority: Low   • Humeral distal fracture [S42.409A]    • COPD (chronic obstructive pulmonary disease) ([facility]) [J44.9]        Assessment/Plan:  Patient doing well   Dressings CDI  Cleared for DC by ortho pending medicine clearance   POD#5 S/P distal humerus ORIF  Wt bearing status - NWB RUE  Wound care/Drains - dressing/splint left in place  Future Procedures - none planned   Sutures/Staples out- 14 days post operatively  PT/OT-initiated  Antibiotics: perioperative completed  DVT Prophylaxis- TEDS/SCDs/Foot pumps  Roca-none  Case Coordination for Discharge Planning - Disposition SNF  "

## 2021-12-06 NOTE — CARE PLAN
Problem: Pain - Standard  Goal: Alleviation of pain or a reduction in pain to the patient’s comfort goal  Outcome: Progressing     Problem: Knowledge Deficit - Standard  Goal: Patient and family/care givers will demonstrate understanding of plan of care, disease process/condition, diagnostic tests and medications  Outcome: Progressing     Problem: Fall Risk  Goal: Patient will remain free from falls  Outcome: Progressing   The patient is Stable - Low risk of patient condition declining or worsening    Shift Goals  Clinical Goals: pain control, mobility  Patient Goals: pain control, mobility  Family Goals: N/A    Progress made toward(s) clinical / shift goals:  yes    Patient is not progressing towards the following goals:

## 2021-12-06 NOTE — DISCHARGE PLANNING
Anticipated Discharge Disposition: SNF     Action: Spoke to patient bedside who confirmed he lives at [facility]. Pt provided this RN CM with a number to call and verify that he can return after SNF therapy ([facility] [phone]).      RNCM spoke bedside to patient and to Jazzy (employee ID [id number]) at Medicare recovery dept ([phone]) on voalte speaker phone. Pt gave verbal auth for Jazzy to discuss old claim information with myself. Jazzy requested to hear from [facility] herself that they did not have record of Mr. Leggett. This RNCM called [employer] on speaker phone of [employer] so that Jazzy with Medicare could hear that [facility] has no record of patient in their data base. Jazzy with Medicare validated that she has removed the claim from [facility]. Jazzy stated she could not provide a faxed verification of this but provided a ref #: [id number] for validation if needed. RNCM placed follow-up call to Jessica Hwang ([phone]) to notify of all that occurred today; Jessica will speak with Justin and will call RNCM in the morning.    Barriers to Discharge: medical clearance    Plan: Follow up with medical team.

## 2021-12-06 NOTE — PROGRESS NOTES
Report received and assumed care of patient. Patient sitting up in bed with right arm elevated and no distress noted. Call bell in reach and safety measures in place.

## 2021-12-06 NOTE — PROGRESS NOTES
Hospital Medicine Daily Progress Note    Date of Service  12/6/2021    Chief Complaint  Chris Leggett is a 71 y.o. male admitted 11/29/2021 with Arm Pain        Hospital Course  He lives in a group home. He is obese and has a history of hypertension. He presentes with arm injury after being attacked by 3 of his neighbor;s dogs.  At the ED, afebrile hemodynamically stable.  XRs showed RUE humeral fracture  Orthopedics consulted.  Underwent ORIF by Dr. Meadows on 11/30.    Interval Problem Update  11/30/2021. I saw patient with Orthopedics. Currently has sling. In pain. Ortho to clear, felt one more day for pain control. No telemetry need. Redid med rec.  12/1. PT/OT pending. No new issues or concerns.  12/2. No new concerns other than minimal pain. Awaiting SNF or rehab. [facility] is evaluating for tomorrow.  12/3. Awaiting rehab. SW/CM mentions repeat PT/OT needed.  I have personally seen and examined the patient at bedside.  12/6. Awaiting rehab and insurance authorization.  I discussed the plan of care with patient, bedside RN and orthopedics.    Consultants/Specialty  orthopedics    Code Status  Full Code    Disposition  Patient is medically cleared.   Anticipate discharge to to skilled nursing facility.  I have placed the appropriate orders for post-discharge needs.    Review of Systems  Review of Systems   Constitutional: Negative for chills and fever.   HENT: Negative for congestion, hearing loss and nosebleeds.    Eyes: Negative for pain and redness.   Respiratory: Negative for cough, hemoptysis, shortness of breath and wheezing.    Cardiovascular: Negative for chest pain and palpitations.   Gastrointestinal: Negative for abdominal pain, blood in stool, constipation, diarrhea, nausea and vomiting.   Genitourinary: Negative for dysuria, frequency and hematuria.   Musculoskeletal: Positive for joint pain and myalgias. Negative for falls.   Skin: Negative for rash.   Neurological: Negative for dizziness, tremors,  focal weakness, seizures, loss of consciousness, weakness and headaches.   Psychiatric/Behavioral: The patient is not nervous/anxious and does not have insomnia.    All other systems reviewed and are negative.       Physical Exam  Temp:  [36.2 °C (97.1 °F)-36.7 °C (98 °F)] 36.7 °C (98 °F)  Pulse:  [61-73] 61  Resp:  [16] 16  BP: (106-138)/(62-78) 121/66  SpO2:  [91 %-94 %] 91 %    Physical Exam  Vitals and nursing note reviewed.   Constitutional:       Appearance: He is obese.   HENT:      Head: Normocephalic and atraumatic.      Right Ear: External ear normal.      Left Ear: External ear normal.      Nose: Nose normal.      Mouth/Throat:      Mouth: Mucous membranes are moist.   Eyes:      General: No scleral icterus.     Conjunctiva/sclera: Conjunctivae normal.   Cardiovascular:      Rate and Rhythm: Normal rate and regular rhythm.      Heart sounds: No murmur heard.  No friction rub. No gallop.    Pulmonary:      Effort: Pulmonary effort is normal.      Breath sounds: Normal breath sounds.   Abdominal:      General: Abdomen is flat. Bowel sounds are normal. There is no distension.      Palpations: Abdomen is soft.      Tenderness: There is no abdominal tenderness. There is no guarding.   Musculoskeletal:         General: Tenderness (RUE, improved) and deformity (RUE) present. Normal range of motion.      Cervical back: Normal range of motion and neck supple.      Comments: Slibg and bandages in place   Skin:     General: Skin is warm.   Neurological:      Mental Status: He is alert and oriented to person, place, and time. Mental status is at baseline.   Psychiatric:         Mood and Affect: Mood normal.         Behavior: Behavior normal.         Thought Content: Thought content normal.         Judgment: Judgment normal.      Comments: calm         Fluids    Intake/Output Summary (Last 24 hours) at 12/6/2021 0826  Last data filed at 12/6/2021 0600  Gross per 24 hour   Intake 120 ml   Output 1150 ml   Net -1030 ml  "      Laboratory  Recent Labs     12/04/21  0320 12/05/21  0351 12/06/21  0328   WBC 2.3* 2.2* 2.1*   RBC 2.96* 2.90* 2.96*   HEMOGLOBIN 10.3* 10.0* 10.4*   HEMATOCRIT 29.8* 29.7* 29.8*   .7* 102.4* 100.7*   MCH 34.8* 34.5* 35.1*   MCHC 34.6 33.7 34.9   RDW 48.8 49.5 48.2   PLATELETCT 67* 55* 64*   MPV 11.5 11.2 11.4     Recent Labs     12/05/21  0351   SODIUM 132*   POTASSIUM 4.2   CHLORIDE 98   CO2 23   GLUCOSE 96   BUN 8   CREATININE 0.56   CALCIUM 8.5     Recent Labs     12/03/21  1630   APTT 32.2   INR 1.28*               Imaging  DX-PORTABLE FLUORO > 1 HOUR   Final Result      Portable fluoroscopy utilized for 18 seconds.         INTERPRETING LOCATION: [address] [place] [address], [id number]      DX-HUMERUS 2+ RIGHT   Final Result         Excellent alignment of supracondylar fracture of the right humerus following ORIF.      CT-ELBOW W/O PLUS RECONS RIGHT   Final Result      Subacute favored over acute supracondylar intra-articular fracture with anterior displacement medially, mild fracture fragmentation with minute intra-articular loose bodies      Mild medial elbow osteoarthritis      DX-CHEST-PORTABLE (1 VIEW)   Final Result         1.  Bilateral basilar atelectasis, no focal infiltrate      DX-FOREARM RIGHT   Final Result      No radiographic evidence of acute traumatic injury.      DX-HUMERUS 2+ RIGHT   Final Result         1.  Comminuted impacted supracondylar humeral fracture           Assessment/Plan  * Humeral distal fracture- (present on admission)  Assessment & Plan  R. UE xray showing: Comminuted impacted supracondylar humeral fracture     R. Elbow xray showing: Supracondylar right distal humeral fracture with angulation     Dr. Yohan Ruano, orthopedic surgery, consulted in ED and plans to take patient to OR in AM\"    S/p Ortho surgery.  Pain control.\"    S/p ORIF   PT/OT recommends SNF/rehab  12/4. [facility] planned    COPD (chronic obstructive pulmonary disease) (HCC)  Assessment & Plan  Currently not " "exacerbating  Restart inhalers.    Thrombocytopenia (HCC)- (present on admission)  Assessment & Plan  Platelets 74 on admission  Continue to monitor H & H\"    Down to 60  Not on pharmacologic DVT prophylaxis because of this  Repeat PLTs 57, steady currently  Slowly dropping no active bleed  Ordered coagulation tests  With pancytopenia likely related to his alcoholic liver disease  If absolute nutrophils low will consider antibiotics and Hematology consultation  PLTs back up to 67  If >70 will consider pharmacologic DVT prophylaxis.    Essential hypertension- (present on admission)  Assessment & Plan  Continue home meds  Continue to monitor\"    Vitals:    12/05/21 0725   BP: 130/74   Pulse: 61   Resp: 15   Temp: 36.3 °C (97.3 °F)   SpO2: 95%     Did med rec myself       VTE prophylaxis: +SCDS has thrombocytopenia, moitor PLTS and start pharmacologic DVT prophylaxis when appropriate.  12/3 PLTs still dropping.  12/6. Discussed with Pharmacy. PLTs looks like averaging 50-60K. Since above 50K will start hepa ppx.    I have performed a physical exam and reviewed and updated ROS and Plan today (12/6/2021). In review of yesterday's note (12/5/2021), there are no changes except as documented above.      "

## 2021-12-06 NOTE — PROGRESS NOTES
Received bedside report from RN Ambreen, pt care assumed. VS WDL, pt assessment complete. Pt A&Ox4, c/o 7/10 pain at this time. POC discussed with pt and verbalizes no questions. Pt denies any additional needs at this time. Bed locked and in lowest position, bed alarm on. Pt educated on fall risk and verbalized understanding, call light within reach, hourly rounding initiated.

## 2021-12-07 LAB
ALBUMIN SERPL BCP-MCNC: 3.7 G/DL (ref 3.2–4.9)
BASOPHILS # BLD AUTO: 0.4 % (ref 0–1.8)
BASOPHILS # BLD: 0.01 K/UL (ref 0–0.12)
BUN SERPL-MCNC: 12 MG/DL (ref 8–22)
CALCIUM SERPL-MCNC: 8.6 MG/DL (ref 8.5–10.5)
CHLORIDE SERPL-SCNC: 97 MMOL/L (ref 96–112)
CO2 SERPL-SCNC: 26 MMOL/L (ref 20–33)
CREAT SERPL-MCNC: 0.64 MG/DL (ref 0.5–1.4)
EOSINOPHIL # BLD AUTO: 0.24 K/UL (ref 0–0.51)
EOSINOPHIL NFR BLD: 8.8 % (ref 0–6.9)
ERYTHROCYTE [DISTWIDTH] IN BLOOD BY AUTOMATED COUNT: 47.9 FL (ref 35.9–50)
GLUCOSE SERPL-MCNC: 100 MG/DL (ref 65–99)
HCT VFR BLD AUTO: 29 % (ref 42–52)
HGB BLD-MCNC: 10.2 G/DL (ref 14–18)
IMM GRANULOCYTES # BLD AUTO: 0.01 K/UL (ref 0–0.11)
IMM GRANULOCYTES NFR BLD AUTO: 0.4 % (ref 0–0.9)
LYMPHOCYTES # BLD AUTO: 0.62 K/UL (ref 1–4.8)
LYMPHOCYTES NFR BLD: 22.7 % (ref 22–41)
MCH RBC QN AUTO: 35.3 PG (ref 27–33)
MCHC RBC AUTO-ENTMCNC: 35.2 G/DL (ref 33.7–35.3)
MCV RBC AUTO: 100.3 FL (ref 81.4–97.8)
MONOCYTES # BLD AUTO: 0.31 K/UL (ref 0–0.85)
MONOCYTES NFR BLD AUTO: 11.4 % (ref 0–13.4)
NEUTROPHILS # BLD AUTO: 1.54 K/UL (ref 1.82–7.42)
NEUTROPHILS NFR BLD: 56.3 % (ref 44–72)
NRBC # BLD AUTO: 0 K/UL
NRBC BLD-RTO: 0 /100 WBC
PHOSPHATE SERPL-MCNC: 3.9 MG/DL (ref 2.5–4.5)
PLATELET # BLD AUTO: 65 K/UL (ref 164–446)
PMV BLD AUTO: 11.5 FL (ref 9–12.9)
POTASSIUM SERPL-SCNC: 4.3 MMOL/L (ref 3.6–5.5)
RBC # BLD AUTO: 2.89 M/UL (ref 4.7–6.1)
SODIUM SERPL-SCNC: 133 MMOL/L (ref 135–145)
WBC # BLD AUTO: 2.7 K/UL (ref 4.8–10.8)

## 2021-12-07 PROCEDURE — 700111 HCHG RX REV CODE 636 W/ 250 OVERRIDE (IP): Performed by: STUDENT IN AN ORGANIZED HEALTH CARE EDUCATION/TRAINING PROGRAM

## 2021-12-07 PROCEDURE — A9270 NON-COVERED ITEM OR SERVICE: HCPCS | Performed by: INTERNAL MEDICINE

## 2021-12-07 PROCEDURE — 700102 HCHG RX REV CODE 250 W/ 637 OVERRIDE(OP): Performed by: INTERNAL MEDICINE

## 2021-12-07 PROCEDURE — 36415 COLL VENOUS BLD VENIPUNCTURE: CPT

## 2021-12-07 PROCEDURE — 80069 RENAL FUNCTION PANEL: CPT

## 2021-12-07 PROCEDURE — 99231 SBSQ HOSP IP/OBS SF/LOW 25: CPT | Performed by: STUDENT IN AN ORGANIZED HEALTH CARE EDUCATION/TRAINING PROGRAM

## 2021-12-07 PROCEDURE — 700111 HCHG RX REV CODE 636 W/ 250 OVERRIDE (IP): Performed by: INTERNAL MEDICINE

## 2021-12-07 PROCEDURE — 770001 HCHG ROOM/CARE - MED/SURG/GYN PRIV*

## 2021-12-07 PROCEDURE — 85025 COMPLETE CBC W/AUTO DIFF WBC: CPT

## 2021-12-07 RX ADMIN — OXYCODONE 5 MG: 5 TABLET ORAL at 11:52

## 2021-12-07 RX ADMIN — DOCUSATE SODIUM 50 MG AND SENNOSIDES 8.6 MG 2 TABLET: 8.6; 5 TABLET, FILM COATED ORAL at 17:33

## 2021-12-07 RX ADMIN — OXYCODONE 5 MG: 5 TABLET ORAL at 05:25

## 2021-12-07 RX ADMIN — HEPARIN SODIUM 5000 UNITS: 5000 INJECTION, SOLUTION INTRAVENOUS; SUBCUTANEOUS at 17:33

## 2021-12-07 RX ADMIN — BUDESONIDE AND FORMOTEROL FUMARATE DIHYDRATE 2 PUFF: 160; 4.5 AEROSOL RESPIRATORY (INHALATION) at 21:20

## 2021-12-07 RX ADMIN — MORPHINE SULFATE 2 MG: 4 INJECTION INTRAVENOUS at 13:56

## 2021-12-07 RX ADMIN — ALLOPURINOL 100 MG: 100 TABLET ORAL at 05:25

## 2021-12-07 RX ADMIN — DULOXETINE HYDROCHLORIDE 30 MG: 30 CAPSULE, DELAYED RELEASE ORAL at 05:25

## 2021-12-07 RX ADMIN — TRAZODONE HYDROCHLORIDE 150 MG: 100 TABLET ORAL at 20:17

## 2021-12-07 RX ADMIN — MORPHINE SULFATE 2 MG: 4 INJECTION INTRAVENOUS at 08:37

## 2021-12-07 RX ADMIN — OMEPRAZOLE 40 MG: 20 CAPSULE, DELAYED RELEASE ORAL at 05:25

## 2021-12-07 RX ADMIN — TAMSULOSIN HYDROCHLORIDE 0.4 MG: 0.4 CAPSULE ORAL at 17:33

## 2021-12-07 RX ADMIN — QUETIAPINE FUMARATE 400 MG: 200 TABLET ORAL at 20:16

## 2021-12-07 RX ADMIN — LISINOPRIL 30 MG: 20 TABLET ORAL at 05:25

## 2021-12-07 RX ADMIN — BUDESONIDE AND FORMOTEROL FUMARATE DIHYDRATE 2 PUFF: 160; 4.5 AEROSOL RESPIRATORY (INHALATION) at 08:37

## 2021-12-07 RX ADMIN — PRAZOSIN HYDROCHLORIDE 4 MG: 2 CAPSULE ORAL at 20:16

## 2021-12-07 RX ADMIN — TAMSULOSIN HYDROCHLORIDE 0.4 MG: 0.4 CAPSULE ORAL at 05:25

## 2021-12-07 RX ADMIN — MORPHINE SULFATE 2 MG: 4 INJECTION INTRAVENOUS at 20:16

## 2021-12-07 RX ADMIN — OXYCODONE 5 MG: 5 TABLET ORAL at 17:33

## 2021-12-07 RX ADMIN — HEPARIN SODIUM 5000 UNITS: 5000 INJECTION, SOLUTION INTRAVENOUS; SUBCUTANEOUS at 05:24

## 2021-12-07 ASSESSMENT — ENCOUNTER SYMPTOMS
VOMITING: 0
COUGH: 0
HEADACHES: 0
CHILLS: 0
SHORTNESS OF BREATH: 0
ABDOMINAL PAIN: 0
NAUSEA: 0
FOCAL WEAKNESS: 0
MYALGIAS: 1
FEVER: 0
EYES NEGATIVE: 1

## 2021-12-07 ASSESSMENT — PAIN DESCRIPTION - PAIN TYPE
TYPE: ACUTE PAIN

## 2021-12-07 NOTE — PROGRESS NOTES
[facility] Medicine Daily Progress Note    Date of Service  12/7/2021    Chief Complaint  Chris Leggett is a 71 y.o. male admitted 11/29/2021 with arm pain.    Hospital Course  A 71-year-old man, group home resident, with h/o HTN presented with arm injury after being attacked by dogs.  On admission, afebrile hemodynamically stable. XR showed RUE humeral fracture. Orthopedics consulted.  Underwent ORIF by Dr. Meadows on 11/30.      Interval Problem Update  Afebrile, hemodynamically stable.  PT. OT recommended post-acute placement. Awaiting for SNF placement.  WBC 2.7 improving, Hb 10.2 stable. Plateletes 65 stable. Na 133 stable    I have personally seen and examined the patient at bedside. I discussed the plan of care with patient, bedside RN, charge RN,  and pharmacy.    Consultants/Specialty  orthopedics    Code Status  Full Code    Disposition  Patient is not medically cleared.   Anticipate discharge to to skilled nursing facility.  I have placed the appropriate orders for post-discharge needs.    Review of Systems  Review of Systems   Constitutional: Negative for chills, fever and malaise/fatigue.   HENT: Negative.    Eyes: Negative.    Respiratory: Negative for cough and shortness of breath.    Cardiovascular: Negative for chest pain.   Gastrointestinal: Negative for abdominal pain, nausea and vomiting.   Genitourinary: Negative for dysuria.   Musculoskeletal: Positive for joint pain and myalgias.   Skin: Negative for rash.   Neurological: Negative for focal weakness and headaches.        Physical Exam  Temp:  [36.4 °C (97.6 °F)-36.6 °C (97.9 °F)] 36.6 °C (97.8 °F)  Pulse:  [54-71] 65  Resp:  [16-18] 18  BP: (114-140)/(68-83) 140/83  SpO2:  [92 %-98 %] 98 %    Physical Exam  Vitals and nursing note reviewed.   Constitutional:       Appearance: He is obese. He is ill-appearing.   HENT:      Head: Normocephalic.      Mouth/Throat:      Mouth: Mucous membranes are moist.      Pharynx: Oropharynx is  clear.   Eyes:      Pupils: Pupils are equal, round, and reactive to light.   Cardiovascular:      Rate and Rhythm: Normal rate and regular rhythm.   Pulmonary:      Effort: Pulmonary effort is normal. No respiratory distress.      Breath sounds: No wheezing.   Abdominal:      General: Abdomen is flat. Bowel sounds are normal.   Musculoskeletal:         General: Tenderness (RUE) and deformity present.      Cervical back: Normal range of motion.   Skin:     General: Skin is warm.   Neurological:      General: No focal deficit present.      Mental Status: He is alert and oriented to person, place, and time.         Fluids    Intake/Output Summary (Last 24 hours) at 12/7/2021 1539  Last data filed at 12/7/2021 1153  Gross per 24 hour   Intake 1440 ml   Output 2650 ml   Net -1210 ml       Laboratory  Recent Labs     12/05/21  0351 12/06/21  0328 12/07/21  0224   WBC 2.2* 2.1* 2.7*   RBC 2.90* 2.96* 2.89*   HEMOGLOBIN 10.0* 10.4* 10.2*   HEMATOCRIT 29.7* 29.8* 29.0*   .4* 100.7* 100.3*   MCH 34.5* 35.1* 35.3*   MCHC 33.7 34.9 35.2   RDW 49.5 48.2 47.9   PLATELETCT 55* 64* 65*   MPV 11.2 11.4 11.5     Recent Labs     12/05/21  0351 12/07/21  0224   SODIUM 132* 133*   POTASSIUM 4.2 4.3   CHLORIDE 98 97   CO2 23 26   GLUCOSE 96 100*   BUN 8 12   CREATININE 0.56 0.64   CALCIUM 8.5 8.6                   Imaging  DX-PORTABLE FLUORO > 1 HOUR   Final Result      Portable fluoroscopy utilized for 18 seconds.         INTERPRETING LOCATION: [address] [place] [address], [address]      DX-HUMERUS 2+ RIGHT   Final Result         Excellent alignment of supracondylar fracture of the right humerus following ORIF.      CT-ELBOW W/O PLUS RECONS RIGHT   Final Result      Subacute favored over acute supracondylar intra-articular fracture with anterior displacement medially, mild fracture fragmentation with minute intra-articular loose bodies      Mild medial elbow osteoarthritis      DX-CHEST-PORTABLE (1 VIEW)   Final Result         1.   "Bilateral basilar atelectasis, no focal infiltrate      DX-FOREARM RIGHT   Final Result      No radiographic evidence of acute traumatic injury.      DX-HUMERUS 2+ RIGHT   Final Result         1.  Comminuted impacted supracondylar humeral fracture           Assessment/Plan  * Humeral distal fracture- (present on admission)  Assessment & Plan  R. UE xray showing: Comminuted impacted supracondylar humeral fracture   R. Elbow xray showing: Supracondylar right distal humeral fracture with angulation   Dr. Yohan Ruano, orthopedic surgery, consulted in ED and plans to take patient to OR in AM\"  S/p Ortho surgery.  Pain control.\"  S/p ORIF   PT/OT recommends SNF/rehab  12/4. [place] planned    COPD (chronic obstructive pulmonary disease) ([facility])  Assessment & Plan  Currently not exacerbating  Restart inhalers.    Thrombocytopenia (HCC)- (present on admission)  Assessment & Plan  Platelets 74 on admission  Continue to monitor H & H\"  Down to 60  Not on pharmacologic DVT prophylaxis because of this  Repeat PLTs 57, steady currently  Slowly dropping no active bleed  Ordered coagulation tests  With pancytopenia likely related to his alcoholic liver disease  If absolute nutrophils low will consider antibiotics and Hematology consultation  PLTs back up to 67  If >70 will consider pharmacologic DVT prophylaxis.    Essential hypertension- (present on admission)  Assessment & Plan  Continue home meds  Continue to monitor       VTE prophylaxis: heparin ppx    I have performed a physical exam and reviewed and updated ROS and Plan today (12/7/2021). In review of yesterday's note (12/6/2021), there are no changes except as documented above.      "

## 2021-12-07 NOTE — PROGRESS NOTES
Bedside report received and patient care assumed. Pt is resting in bed, A&Ox4, with  complaints of 8/10 right arm pain, and is on RA. All fall precautions are in place, belongings at bedside table.  Pt was updated on POC, no questions or concerns. Pt educated on use of call light for assistance.

## 2021-12-07 NOTE — PROGRESS NOTES
"   Orthopaedic Progress Note    Interval changes:  Patient doing well   Dressings CDI  Cleared for DC by ortho pending medicine clearance     ROS - Patient denies any new issues.  Pain well controlled.    /75   Pulse 68   Temp 36.8 °C (98.3 °F) (Temporal)   Resp 16   Ht 1.803 m (5' 11\")   Wt 109 kg (240 lb 4.8 oz)   SpO2 91%       Patient seen and examined  No acute distress  Breathing non labored  RRR  RUE in long arm splint CDI, DNVI, moves all fingers, cap refill <2 sec    Recent Labs     12/04/21  0320 12/05/21  0351 12/06/21  0328   WBC 2.3* 2.2* 2.1*   RBC 2.96* 2.90* 2.96*   HEMOGLOBIN 10.3* 10.0* 10.4*   HEMATOCRIT 29.8* 29.7* 29.8*   .7* 102.4* 100.7*   MCH 34.8* 34.5* 35.1*   MCHC 34.6 33.7 34.9   RDW 48.8 49.5 48.2   PLATELETCT 67* 55* 64*   MPV 11.5 11.2 11.4       Active Hospital Problems    Diagnosis    • Thrombocytopenia ([facility]) [D69.6]      Priority: Medium   • Essential hypertension [I10]      Priority: Low   • Humeral distal fracture [S42.409A]    • COPD (chronic obstructive pulmonary disease) ([facility]) [J44.9]        Assessment/Plan:  Patient doing well   Dressings CDI  Cleared for DC by ortho pending medicine clearance   POD#6 S/P distal humerus ORIF  Wt bearing status - NWB RUE  Wound care/Drains - dressing/splint left in place  Future Procedures - none planned   Sutures/Staples out- 14 days post operatively  PT/OT-initiated  Antibiotics: perioperative completed  DVT Prophylaxis- TEDS/SCDs/Foot pumps  Roca-none  Case Coordination for Discharge Planning - Disposition SNF  "

## 2021-12-07 NOTE — HOSPITAL COURSE
A 71-year-old man, group home resident, with h/o HTN presented with arm injury after being attacked by dogs.  On admission, afebrile hemodynamically stable. XR showed RUE humeral fracture. Orthopedics consulted.  Underwent ORIF by Dr. Meadows on 11/30.

## 2021-12-07 NOTE — CARE PLAN
Problem: Pain - Standard  Goal: Alleviation of pain or a reduction in pain to the patient’s comfort goal  Outcome: Progressing     Problem: Knowledge Deficit - Standard  Goal: Patient and family/care givers will demonstrate understanding of plan of care, disease process/condition, diagnostic tests and medications  Outcome: Progressing     Problem: Fall Risk  Goal: Patient will remain free from falls  Outcome: Progressing   The patient is Stable - Low risk of patient condition declining or worsening

## 2021-12-07 NOTE — DISCHARGE PLANNING
Anticipated Discharge Disposition: SNF    Action: LSW f/u with Jessica with [facility] who reports they are unable to take patient until open liability claim that was closed is removed from [facility].     LSW escalated patient's case to [facility] leadership to assist in finding SNF placement.     Barriers to Discharge: accepting SNF     Plan: LSW to f/u on referral

## 2021-12-07 NOTE — THERAPY
Physical Therapy   Daily Treatment     Patient Name: Chris Leggett  Age:  71 y.o., Sex:  male  Medical Record #: [id number]  Today's Date: 12/6/2021     Precautions  Precautions: Fall Risk;Non Weight Bearing Right Upper Extremity    Assessment    Patient progressing well toward acute PT goals.  Patient was able to increase ambulation distance today, ambulating ~100 ft x 2 with frequent short standing rest breaks.  Patient was able to negotiate 5 steps with single rail and min A.  Educated patient on descending stairs sideways to utilize L rail & mimic home setup.  Patient reported fatigue after session.  PT to continue to follow.    Plan    Continue current treatment plan.    DC Equipment Recommendations: Unable to determine at this time  Discharge Recommendations: Recommend post-acute placement for additional physical therapy services prior to discharge home     Objective     12/06/21 1605   Cognition    Cognition / Consciousness WDL   Level of Consciousness Alert   Comments Very pleasant & cooperative   Balance   Sitting Balance (Static) Fair   Sitting Balance (Dynamic) Fair -   Standing Balance (Static) Fair -   Standing Balance (Dynamic) Fair -   Weight Shift Sitting Fair   Weight Shift Standing Fair   Skilled Intervention Verbal Cuing;Sequencing;Compensatory Strategies   Comments no AD   Gait Analysis   Gait Level Of Assist Minimal Assist (CGA)   Assistive Device None   Distance (Feet) 100   # of Times Distance was Traveled 2   Deviation Bradykinetic;Antalgic (frequent standing rest breaks)   # of Stairs Climbed 5   Level of Assist with Stairs Minimal Assist   Weight Bearing Status NWB RUE   Skilled Intervention Compensatory Strategies;Postural Facilitation;Sequencing;Verbal Cuing   Comments Cues for sequencing stairs, education to descend stairs sideways to utilize rail as available at his group   Bed Mobility    Supine to Sit Supervised   Sit to Supine (NT, left up in chair)   Scooting Supervised (seated  EOB)   Skilled Intervention Verbal Cuing   Comments HOB elevated   Functional Mobility   Sit to Stand Minimal Assist   Bed, Chair, Wheelchair Transfer Minimal Assist   Transfer Method Stand Step   Mobility bed mobility, ambulation, stairs   Skilled Intervention Verbal Cuing;Sequencing;Postural Facilitation   Activity Tolerance   Sitting in Chair 10+ min (post session)   Sitting Edge of Bed 2 min   Standing 15 min   Short Term Goals    Short Term Goal # 1 Pt to move supine to/from eob w/ spv without bed features in 6 visits to improve fxl indep   Goal Outcome # 1 Progressing as expected (Modified to reflect flat bed)   Short Term Goal # 2 Pt to move sit to/from stand w/ spv in 6 visits to improve fxl indep   Goal Outcome # 2 Progressing as expected   Short Term Goal # 3 Pt to ambulate 150 ft w/ cane and spv in 6 visits to improve fxl indep   Goal Outcome # 3 Progressing as expected   Short Term Goal # 4 Pt to move up/down 5 steps w/ spv in 6 visits to access his home   Goal Outcome # 4 Progressing as expected   Session Information   Date / Session Number  12/6 - 3 (3/3, 12/7)

## 2021-12-07 NOTE — CARE PLAN
The patient is Stable - Low risk of patient condition declining or worsening    Shift Goals  Clinical Goals: Maintain / improve skin integrity, decrease risk of infection, manage pain, monitor VS, maximize respiratory status and collaborate with RT  Patient Goals: Comfort, rest, discharge  Family Goals: [place]. No family present.     Progress made toward(s) clinical / shift goals:    Problem: Pain - Standard  Goal: Alleviation of pain or a reduction in pain to the patient’s comfort goal  Outcome: Progressing     Problem: Knowledge Deficit - Standard  Goal: Patient and family/care givers will demonstrate understanding of plan of care, disease process/condition, diagnostic tests and medications  Outcome: Progressing     Problem: Fall Risk  Goal: Patient will remain free from falls  Outcome: Progressing       Patient is not progressing towards the following goals: NA

## 2021-12-08 VITALS
HEIGHT: 71 IN | WEIGHT: 240.3 LBS | OXYGEN SATURATION: 93 % | HEART RATE: 78 BPM | RESPIRATION RATE: 16 BRPM | DIASTOLIC BLOOD PRESSURE: 71 MMHG | SYSTOLIC BLOOD PRESSURE: 102 MMHG | BODY MASS INDEX: 33.64 KG/M2 | TEMPERATURE: 97.4 F

## 2021-12-08 LAB
BASOPHILS # BLD AUTO: 0.4 % (ref 0–1.8)
BASOPHILS # BLD: 0.01 K/UL (ref 0–0.12)
EOSINOPHIL # BLD AUTO: 0.18 K/UL (ref 0–0.51)
EOSINOPHIL NFR BLD: 7.5 % (ref 0–6.9)
ERYTHROCYTE [DISTWIDTH] IN BLOOD BY AUTOMATED COUNT: 46.4 FL (ref 35.9–50)
HCT VFR BLD AUTO: 28.8 % (ref 42–52)
HGB BLD-MCNC: 10.1 G/DL (ref 14–18)
IMM GRANULOCYTES # BLD AUTO: 0.01 K/UL (ref 0–0.11)
IMM GRANULOCYTES NFR BLD AUTO: 0.4 % (ref 0–0.9)
LYMPHOCYTES # BLD AUTO: 0.54 K/UL (ref 1–4.8)
LYMPHOCYTES NFR BLD: 22.4 % (ref 22–41)
MCH RBC QN AUTO: 34.9 PG (ref 27–33)
MCHC RBC AUTO-ENTMCNC: 35.1 G/DL (ref 33.7–35.3)
MCV RBC AUTO: 99.7 FL (ref 81.4–97.8)
MONOCYTES # BLD AUTO: 0.22 K/UL (ref 0–0.85)
MONOCYTES NFR BLD AUTO: 9.1 % (ref 0–13.4)
NEUTROPHILS # BLD AUTO: 1.45 K/UL (ref 1.82–7.42)
NEUTROPHILS NFR BLD: 60.2 % (ref 44–72)
NRBC # BLD AUTO: 0 K/UL
NRBC BLD-RTO: 0 /100 WBC
PLATELET # BLD AUTO: 71 K/UL (ref 164–446)
PMV BLD AUTO: 11.1 FL (ref 9–12.9)
RBC # BLD AUTO: 2.89 M/UL (ref 4.7–6.1)
WBC # BLD AUTO: 2.4 K/UL (ref 4.8–10.8)

## 2021-12-08 PROCEDURE — A9270 NON-COVERED ITEM OR SERVICE: HCPCS | Performed by: INTERNAL MEDICINE

## 2021-12-08 PROCEDURE — 99239 HOSP IP/OBS DSCHRG MGMT >30: CPT | Performed by: STUDENT IN AN ORGANIZED HEALTH CARE EDUCATION/TRAINING PROGRAM

## 2021-12-08 PROCEDURE — 700111 HCHG RX REV CODE 636 W/ 250 OVERRIDE (IP): Performed by: STUDENT IN AN ORGANIZED HEALTH CARE EDUCATION/TRAINING PROGRAM

## 2021-12-08 PROCEDURE — 97530 THERAPEUTIC ACTIVITIES: CPT

## 2021-12-08 PROCEDURE — 36415 COLL VENOUS BLD VENIPUNCTURE: CPT

## 2021-12-08 PROCEDURE — 700111 HCHG RX REV CODE 636 W/ 250 OVERRIDE (IP): Performed by: INTERNAL MEDICINE

## 2021-12-08 PROCEDURE — 700102 HCHG RX REV CODE 250 W/ 637 OVERRIDE(OP): Performed by: INTERNAL MEDICINE

## 2021-12-08 PROCEDURE — 85025 COMPLETE CBC W/AUTO DIFF WBC: CPT

## 2021-12-08 PROCEDURE — 97116 GAIT TRAINING THERAPY: CPT

## 2021-12-08 PROCEDURE — 97535 SELF CARE MNGMENT TRAINING: CPT

## 2021-12-08 PROCEDURE — 90471 IMMUNIZATION ADMIN: CPT

## 2021-12-08 PROCEDURE — 90662 IIV NO PRSV INCREASED AG IM: CPT | Performed by: STUDENT IN AN ORGANIZED HEALTH CARE EDUCATION/TRAINING PROGRAM

## 2021-12-08 RX ORDER — PRAZOSIN HYDROCHLORIDE 2 MG/1
4 CAPSULE ORAL EVERY EVENING
Qty: 30 CAPSULE | Refills: 3 | Status: SHIPPED
Start: 2021-12-08 | End: 2023-05-18

## 2021-12-08 RX ORDER — AMOXICILLIN 250 MG
2 CAPSULE ORAL 2 TIMES DAILY
Qty: 30 TABLET | Refills: 0 | Status: SHIPPED
Start: 2021-12-08 | End: 2022-03-28

## 2021-12-08 RX ORDER — ALBUTEROL SULFATE 90 UG/1
2 AEROSOL, METERED RESPIRATORY (INHALATION) EVERY 4 HOURS PRN
Qty: 8.5 G | Status: SHIPPED
Start: 2021-12-08 | End: 2023-05-18

## 2021-12-08 RX ORDER — OXYCODONE HYDROCHLORIDE 5 MG/1
5 TABLET ORAL EVERY 6 HOURS PRN
Qty: 12 TABLET | Refills: 0 | Status: SHIPPED | OUTPATIENT
Start: 2021-12-08 | End: 2021-12-11

## 2021-12-08 RX ORDER — OMEPRAZOLE 40 MG/1
40 CAPSULE, DELAYED RELEASE ORAL DAILY
Qty: 30 CAPSULE | Status: SHIPPED
Start: 2021-12-08 | End: 2023-05-18

## 2021-12-08 RX ORDER — POLYETHYLENE GLYCOL 3350 17 G/17G
17 POWDER, FOR SOLUTION ORAL DAILY
Refills: 0 | Status: SHIPPED
Start: 2021-12-08 | End: 2022-01-07

## 2021-12-08 RX ORDER — DULOXETIN HYDROCHLORIDE 30 MG/1
30 CAPSULE, DELAYED RELEASE ORAL DAILY
Qty: 30 CAPSULE | Status: SHIPPED
Start: 2021-12-08 | End: 2023-05-18

## 2021-12-08 RX ORDER — GABAPENTIN 600 MG/1
600 TABLET ORAL 4 TIMES DAILY
Qty: 90 TABLET | Status: SHIPPED
Start: 2021-12-08 | End: 2023-05-18

## 2021-12-08 RX ORDER — NICOTINE 21 MG/24HR
1 PATCH, TRANSDERMAL 24 HOURS TRANSDERMAL EVERY 24 HOURS
Qty: 30 PATCH | Status: SHIPPED
Start: 2021-12-08 | End: 2022-03-28

## 2021-12-08 RX ORDER — QUETIAPINE FUMARATE 400 MG/1
400 TABLET, FILM COATED ORAL
Qty: 60 TABLET | Refills: 3 | Status: SHIPPED
Start: 2021-12-08 | End: 2023-05-18

## 2021-12-08 RX ORDER — LISINOPRIL 30 MG/1
30 TABLET ORAL DAILY
Qty: 30 TABLET | Status: SHIPPED
Start: 2021-12-08 | End: 2023-05-18

## 2021-12-08 RX ORDER — TRAZODONE HYDROCHLORIDE 150 MG/1
150 TABLET ORAL NIGHTLY PRN
Qty: 30 TABLET | Refills: 3 | Status: SHIPPED
Start: 2021-12-08 | End: 2022-03-28

## 2021-12-08 RX ORDER — GUAIFENESIN/DEXTROMETHORPHAN 100-10MG/5
10 SYRUP ORAL EVERY 6 HOURS PRN
Qty: 840 ML | Status: SHIPPED
Start: 2021-12-08 | End: 2022-03-28

## 2021-12-08 RX ORDER — TAMSULOSIN HYDROCHLORIDE 0.4 MG/1
0.4 CAPSULE ORAL 2 TIMES DAILY
Qty: 30 CAPSULE | Status: SHIPPED
Start: 2021-12-08 | End: 2023-05-18

## 2021-12-08 RX ORDER — OXYCODONE HYDROCHLORIDE 5 MG/1
5 TABLET ORAL EVERY 6 HOURS PRN
Qty: 12 TABLET | Refills: 0 | Status: SHIPPED
Start: 2021-12-08 | End: 2021-12-08 | Stop reason: SDUPTHER

## 2021-12-08 RX ORDER — ALLOPURINOL 100 MG/1
100 TABLET ORAL DAILY
Qty: 30 TABLET | Status: SHIPPED
Start: 2021-12-08 | End: 2023-05-18

## 2021-12-08 RX ORDER — ACETAMINOPHEN 325 MG/1
650 TABLET ORAL EVERY 6 HOURS PRN
Qty: 30 TABLET | Refills: 0 | Status: SHIPPED
Start: 2021-12-08 | End: 2022-03-28

## 2021-12-08 RX ADMIN — OXYCODONE 5 MG: 5 TABLET ORAL at 15:06

## 2021-12-08 RX ADMIN — INFLUENZA A VIRUS A/VICTORIA/2570/2019 IVR-215 (H1N1) ANTIGEN (FORMALDEHYDE INACTIVATED), INFLUENZA A VIRUS A/TASMANIA/503/2020 IVR-221 (H3N2) ANTIGEN (FORMALDEHYDE INACTIVATED), INFLUENZA B VIRUS B/PHUKET/3073/2013 ANTIGEN (FORMALDEHYDE INACTIVATED), AND INFLUENZA B VIRUS B/WASHINGTON/02/2019 ANTIGEN (FORMALDEHYDE INACTIVATED) 0.7 ML: 60; 60; 60; 60 INJECTION, SUSPENSION INTRAMUSCULAR at 14:38

## 2021-12-08 RX ADMIN — OMEPRAZOLE 40 MG: 20 CAPSULE, DELAYED RELEASE ORAL at 06:00

## 2021-12-08 RX ADMIN — ALLOPURINOL 100 MG: 100 TABLET ORAL at 06:00

## 2021-12-08 RX ADMIN — MORPHINE SULFATE 2 MG: 4 INJECTION INTRAVENOUS at 12:08

## 2021-12-08 RX ADMIN — LISINOPRIL 30 MG: 20 TABLET ORAL at 05:59

## 2021-12-08 RX ADMIN — OXYCODONE 5 MG: 5 TABLET ORAL at 10:50

## 2021-12-08 RX ADMIN — DOCUSATE SODIUM 50 MG AND SENNOSIDES 8.6 MG 2 TABLET: 8.6; 5 TABLET, FILM COATED ORAL at 06:00

## 2021-12-08 RX ADMIN — OXYCODONE 5 MG: 5 TABLET ORAL at 05:59

## 2021-12-08 RX ADMIN — HEPARIN SODIUM 5000 UNITS: 5000 INJECTION, SOLUTION INTRAVENOUS; SUBCUTANEOUS at 06:00

## 2021-12-08 RX ADMIN — DULOXETINE HYDROCHLORIDE 30 MG: 30 CAPSULE, DELAYED RELEASE ORAL at 06:00

## 2021-12-08 RX ADMIN — MORPHINE SULFATE 2 MG: 4 INJECTION INTRAVENOUS at 02:13

## 2021-12-08 RX ADMIN — MORPHINE SULFATE 2 MG: 4 INJECTION INTRAVENOUS at 07:51

## 2021-12-08 RX ADMIN — TAMSULOSIN HYDROCHLORIDE 0.4 MG: 0.4 CAPSULE ORAL at 06:00

## 2021-12-08 ASSESSMENT — COGNITIVE AND FUNCTIONAL STATUS - GENERAL
DAILY ACTIVITIY SCORE: 17
SUGGESTED CMS G CODE MODIFIER DAILY ACTIVITY: CK
HELP NEEDED FOR BATHING: A LOT
DRESSING REGULAR UPPER BODY CLOTHING: A LOT
PERSONAL GROOMING: A LITTLE
DRESSING REGULAR LOWER BODY CLOTHING: A LITTLE
TOILETING: A LITTLE

## 2021-12-08 ASSESSMENT — PAIN DESCRIPTION - PAIN TYPE
TYPE: ACUTE PAIN

## 2021-12-08 ASSESSMENT — GAIT ASSESSMENTS
GAIT LEVEL OF ASSIST: SUPERVISED
DISTANCE (FEET): 200
DEVIATION: BRADYKINETIC

## 2021-12-08 NOTE — THERAPY
"Physical Therapy   Daily Treatment     Patient Name: Chris Leggett  Age:  71 y.o., Sex:  male  Medical Record #: [id number]  Today's Date: 12/8/2021          Assessment    Rec'd pt alert, agreeable to work w/ PT.  He is able to move in/out of bed w/o assist.  He is able to stand w/o assist and ambulate 200 ft.  Gait is exceptionally slow, but w/o loss of balance.  He is able to move up/down 4 steps, which he reports he needs to perform to access his group home.  He has maximized his potential in the acute care setting, however, he does report that mobility prior to his recent admit, had been as issue at his group home.  He also is required to perform \"chores\" at his group home.  Post acute placement may progress him further and help to insure a safer d/c.  PT will be available for d/c needs    DC Equipment Recommendations: None  Discharge Recommendations: Recommend post-acute placement for additional physical therapy services prior to discharge home       Objective       12/08/21 1043   Balance   Sitting Balance (Static) Fair +   Sitting Balance (Dynamic) Fair +   Standing Balance (Static) Fair   Standing Balance (Dynamic) Fair   Weight Shift Sitting Good   Weight Shift Standing Good   Gait Analysis   Gait Level Of Assist Supervised   Assistive Device None   Distance (Feet) 200   Deviation Bradykinetic   # of Stairs Climbed 4   Level of Assist with Stairs Supervised   Bed Mobility    Supine to Sit Supervised   Sit to Supine Supervised   Functional Mobility   Sit to Stand Supervised   Short Term Goals    Short Term Goal # 1 Pt to move supine to/from eob w/ spv without bed features in 6 visits to improve fxl indep   Goal Outcome # 1 Goal met   Short Term Goal # 2 Pt to move sit to/from stand w/ spv in 6 visits to improve fxl indep   Goal Outcome # 2 Goal met   Short Term Goal # 3 Pt to ambulate 150 ft w/ cane and spv in 6 visits to improve fxl indep   Goal Outcome # 3 Goal met   Short Term Goal # 4 Pt to move up/down " 5 steps w/ spv in 6 visits to access his home   Goal Outcome # 4 Goal met   Anticipated Discharge Equipment and Recommendations   DC Equipment Recommendations None   Discharge Recommendations Recommend post-acute placement for additional physical therapy services prior to discharge home

## 2021-12-08 NOTE — DISCHARGE PLANNING
DC Transport Scheduled    Received request at: 1013    Transport Company Scheduled:  [facility]  Spoke with Josselin at [facility] to schedule transport with T  [facility] Trip #: [id number]    Scheduled Date: 12/08/21  Scheduled Time: [id number]    Destination: [facility]    Notified care team of scheduled transport via [employer].     If there are any changes needed to the DC transportation scheduled, please contact [facility] at ext. [phone] between the hours of [phone] Mon-Fri. If outside those hours, contact the ED Case Manager at ext. [phone].

## 2021-12-08 NOTE — PROGRESS NOTES
Report called to [facility] nursing [facility] and spoke to SHARAN Moreno to give report. All questions answered.     Patient discharged with [facility] from [facility]. A&Ox4. IV's taken out, patient taken down via wheelchair and hospital escort. Patient belongings discharged with patient, including cell phone/ hat/ sweatshirt. Discharge summary done with patient. RN provided education regarding follow up care, appointments, and medications. RN also provided education regarding when to call doctor and when to call 911.

## 2021-12-08 NOTE — DISCHARGE PLANNING
Anticipated Discharge Disposition: [facility]    Action: LSW completed tx packet. LSW met with patient at bedside. Patient informed of transport time. Patient gave verbal consent for transfer. RN REJI notified Lanette @  of dc. Transport has been scheduled for 7225-1457 via [facility].     Packet left in chart. Bedside RN notified.     Barriers to Discharge: none    Plan: DC to [facility]    1200: Jessica with [facility] informed LSW that transport time is too late for [facility]. LSW and RN CM looking at alternative transport options for patient.

## 2021-12-08 NOTE — PROGRESS NOTES
"   Orthopaedic Progress Note    Interval changes:  Patient doing well   Dressings CDI  Clear for disposition (home/SNF/IRF) from Orthopaedic team standpoint.    ROS - Patient denies any new issues.  Pain well controlled.    /63   Pulse 69   Temp 36.2 °C (97.2 °F) (Temporal)   Resp 18   Ht 1.803 m (5' 11\")   Wt 109 kg (240 lb 4.8 oz)   SpO2 98%     Patient seen and examined  No acute distress  Breathing non labored  RRR  RUE in long arm splint CDI, DNVI, moves all fingers, cap refill <2 sec    Recent Labs     12/05/21  0351 12/06/21  0328 12/07/21  0224   WBC 2.2* 2.1* 2.7*   RBC 2.90* 2.96* 2.89*   HEMOGLOBIN 10.0* 10.4* 10.2*   HEMATOCRIT 29.7* 29.8* 29.0*   .4* 100.7* 100.3*   MCH 34.5* 35.1* 35.3*   MCHC 33.7 34.9 35.2   RDW 49.5 48.2 47.9   PLATELETCT 55* 64* 65*   MPV 11.2 11.4 11.5     Active Hospital Problems    Diagnosis    • Thrombocytopenia ([facility]) [D69.6]      Priority: Medium   • Essential hypertension [I10]      Priority: Low   • Humeral distal fracture [S42.409A]    • COPD (chronic obstructive pulmonary disease) ([facility]) [J44.9]      Assessment/Plan:  Patient doing well   Dressings CDI  Cleared for DC by ortho pending medicine clearance   POD#7 S/P distal humerus ORIF  Wt bearing status - NWB RUE  Wound care/Drains - dressing/splint left in place  Future Procedures - none planned   Sutures/Staples out- 14 days post operatively  PT/OT-initiated  Antibiotics: perioperative completed  DVT Prophylaxis- TEDS/SCDs/Foot pumps  Roca-none  Case Coordination for Discharge Planning - Disposition per Med  Follow-Up: needs appointment with Dr. Meadows at  [facility] at 10-14 days post-op for re-evaluation, staple removal and radiographs.    "

## 2021-12-08 NOTE — DISCHARGE SUMMARY
Discharge Summary    CHIEF COMPLAINT ON ADMISSION  Chief Complaint   Patient presents with   • Arm Pain       Reason for Admission  ems     CODE STATUS  Full Code    HPI & HOSPITAL COURSE    A 71-year-old man, group home resident, with h/o HTN presented with arm injury after being attacked by dogs.  On admission, afebrile hemodynamically stable. XR showed RUE humeral fracture. Orthopedics consulted.  Underwent ORIF by Dr. Meadows on 11/30.  Post operative period was uneventful. PT, OT recommended post-acute placement.   Ortho recommended: NWB RUE, dressing/splint in place; sutures/staples out 14 days post operatively.  Afebrile, hemodynamically stable. On room air.  WBC 2.4 stable. Hb 10.1 stable. Platelets 71 improving.  Patient was accepted to [facility].    Therefore, he is discharged in fair and stable condition to skilled nursing facility.    The patient met 2-midnight criteria for an inpatient stay at the time of discharge.      FOLLOW UP ITEMS POST DISCHARGE  12/08/2021.    DISCHARGE DIAGNOSES  Principal Problem:    Humeral distal fracture POA: Yes  Active Problems:    Essential hypertension POA: Yes    Thrombocytopenia (HCC) POA: Yes    COPD (chronic obstructive pulmonary disease) (HCC) POA: Unknown  Resolved Problems:    * No resolved hospital problems. *      FOLLOW UP  No future appointments.  [facility]  [phone]  Call  Please call [facility] to establish with a primary care provider for a hospital follow up. Thank you.     Kumar Meadows M.D.  [address] [facility]  [place] [address]  [phone]    Schedule an appointment as soon as possible for a visit in 1 week  For suture removal; EVALUATION OF RIGHT UPPER EXTREMITY.      MEDICATIONS ON DISCHARGE     Medication List      START taking these medications      Instructions   acetaminophen 325 MG Tabs  Commonly known as: Tylenol   Take 2 Tablets by mouth every 6 hours as needed.  Dose: 650 mg     benzocaine-menthol 15-3.6 MG Lozg  Commonly  known as: CEPACOL   Dissolve 1 Lozenge in the mouth every 2 hours as needed.  Dose: 1 Lozenge     guaiFENesin dextromethorphan 100-10 MG/5ML Syrp syrup  Commonly known as: ROBITUSSIN DM   Take 10 mL by mouth every 6 hours as needed for Cough.  Dose: 10 mL     oxyCODONE immediate-release 5 MG Tabs  Commonly known as: ROXICODONE   Take 1 Tablet by mouth every 6 hours as needed for up to 3 days.  Dose: 5 mg     polyethylene glycol/lytes 17 g Pack  Commonly known as: MIRALAX   Take 1 Packet by mouth every day for 30 days.  Dose: 17 g     senna-docusate 8.6-50 MG Tabs  Commonly known as: PERICOLACE or SENOKOT S   Take 2 Tablets by mouth 2 times a day.  Dose: 2 Tablet        CHANGE how you take these medications      Instructions   prazosin 2 MG Caps  What changed: when to take this  Commonly known as: MINIPRESS   Take 2 Capsules by mouth every evening.  Dose: 4 mg     traZODone 150 MG Tabs  What changed:   · when to take this  · reasons to take this  Commonly known as: DESYREL   Take 1 Tablet by mouth at bedtime as needed for Sleep.  Dose: 150 mg        CONTINUE taking these medications      Instructions   albuterol 108 (90 Base) MCG/ACT Aers inhalation aerosol   Inhale 2 Puffs every four hours as needed for Shortness of Breath.  Dose: 2 Puff     allopurinol 100 MG Tabs  Commonly known as: ZYLOPRIM   Take 1 Tablet by mouth every day.  Dose: 100 mg     DULoxetine 30 MG Cpep  Commonly known as: CYMBALTA   Take 1 Capsule by mouth every day.  Dose: 30 mg     fluticasone-salmeterol 250-50 MCG/DOSE Aepb  Commonly known as: Advair Diskus   Inhale 1 Puff every 12 hours.  Dose: 1 Puff     gabapentin 600 MG tablet  Commonly known as: NEURONTIN   Take 1 Tablet by mouth 4 times a day.  Dose: 600 mg     lisinopril 30 MG tablet  Commonly known as: PRINIVIL   Take 1 Tablet by mouth every day.  Dose: 30 mg     nicotine 21 MG/24HR Pt24  Commonly known as: NICODERM   Place 1 Patch on the skin every 24 hours.  Dose: 1 Patch     omeprazole  40 MG delayed-release capsule  Commonly known as: PRILOSEC   Take 1 Capsule by mouth every day.  Dose: 40 mg     quetiapine 400 MG tablet  Commonly known as: SEROquel   Take 1 Tablet by mouth at bedtime.  Dose: 400 mg     tamsulosin 0.4 MG capsule  Commonly known as: FLOMAX   Take 1 Capsule by mouth 2 times a day.  Dose: 0.4 mg            Allergies  No Known Allergies    DIET  Orders Placed This Encounter   Procedures   • Diet Order Diet: Regular     Standing Status:   Standing     Number of Occurrences:   1     Order Specific Question:   Diet:     Answer:   Regular [1]       ACTIVITY  As tolerated.  NWB RUE    LINES, DRAINS, AND WOUNDS  This is an automated list. Peripheral IVs will be removed prior to discharge.  Peripheral IV 12/03/21 20 G Anterior;Left Forearm (Active)   Site Assessment Clean;Dry;Intact 12/08/21 0745   Dressing Type Occlusive;Transparent 12/08/21 0745   Line Status Flushed;Scrubbed the hub prior to access;Saline locked 12/08/21 0745   Dressing Status Clean;Dry;Intact 12/08/21 0745   Dressing Intervention N/A 12/08/21 0745   Infiltration Grading ([facility]) 0 12/08/21 0745   Phlebitis Scale ([facility]) 0 12/08/21 0745       Wound 02/07/20 Other (comment) Coccyx Moisture fissure (Active)       Wound 02/07/20 Laceration Face Face lacerations (Active)       Wound 11/30/21 Incision Elbow Right adaptic, 4x4s, soft roll, plaster splint, ace wrap, arm sling (Active)   Site Assessment RAMONA 12/08/21 0745   Periwound Assessment RAMONA 12/08/21 0745   Margins RAMONA 12/08/21 0745   Closure RAMONA 12/07/21 1950   Drainage Amount None 12/06/21 2000   Treatments Offloading 12/08/21 0745   Dressing Options Ace Wrap 12/08/21 0745   Dressing Changed Observed 12/08/21 0745   Dressing Status Clean;Dry;Intact 12/08/21 0745       Peripheral IV 12/03/21 20 G Anterior;Left Forearm (Active)   Site Assessment Clean;Dry;Intact 12/08/21 0745   Dressing Type Occlusive;Transparent 12/08/21 0745   Line Status Flushed;Scrubbed the  hub prior to access;Saline locked 12/08/21 0745   Dressing Status Clean;Dry;Intact 12/08/21 0745   Dressing Intervention N/A 12/08/21 0745   Infiltration Grading ([facility]) 0 12/08/21 0745   Phlebitis Scale ([facility]) 0 12/08/21 0745               MENTAL STATUS ON TRANSFER   Awake, alert, oriented        CONSULTATIONS  Orthopedic surgery    PROCEDURES  1130/21: Open reduction, internal fixation the triceps splitting approach    LABORATORY  Lab Results   Component Value Date    SODIUM 133 (L) 12/07/2021    POTASSIUM 4.3 12/07/2021    CHLORIDE 97 12/07/2021    CO2 26 12/07/2021    GLUCOSE 100 (H) 12/07/2021    BUN 12 12/07/2021    CREATININE 0.64 12/07/2021        Lab Results   Component Value Date    WBC 2.4 (LL) 12/08/2021    HEMOGLOBIN 10.1 (L) 12/08/2021    HEMATOCRIT 28.8 (L) 12/08/2021    PLATELETCT 71 (L) 12/08/2021        Total time of the discharge process exceeds 35 minutes.

## 2021-12-08 NOTE — PROGRESS NOTES
Assumed care of PT A&O 4. Pt resting in bed with no signs of labored breathing. On RA. Pt is medical. Call light within reach, bed in lowest position, upper bed rails up. Pt was updated on plan of care for the day. Will continue to monitor.

## 2021-12-08 NOTE — PROGRESS NOTES
SUDHEER from Lab called with critical result of WBC 2.4 at 0219. Critical lab result read back to SUDHEER.   Dr. Warner notified of critical lab result at 0225.  Critical lab result read back by Dr. Warner. No new orders given.

## 2021-12-08 NOTE — DISCHARGE INSTRUCTIONS
Follow up or refer to Hematology in 1-2 weeks for thrombocytopenia.   Follow up with Dr. Meadows, Orthopedics 2 weeks after surgery- postop visit/ removal of staples    RIGHT UPPER ARM IS NON-WEIGHT BEARING AT THIS TIME.   DRESSING IS TO REMAIN IN PLACE FOR 10 DAYS POST PROCEDURE.     Humerus Fracture Treated With ORIF, Care After  This sheet gives you information about how to care for yourself after your procedure. Your health care provider may also give you more specific instructions. If you have problems or questions, contact your health care provider.  What can I expect after the procedure?  After the procedure, it is common to have:  · Pain.  · Swelling.  · Stiffness.  · Small amount of fluid from the incision.  Follow these instructions at home:  Bathing  · Do not take baths, swim, or use a hot tub until your health care provider approves. Ask your health care provider if you can take showers. You may only be allowed to take sponge baths.  · If your sling or immobilizer is not waterproof, cover it with a watertight covering when you take a bath or a shower.  · Keep the bandage (dressing) dry until your health care provider says it can be removed.  If you have a sling or a shoulder immobilizer:  · Wear the sling or immobilizer as told by your health care provider. Remove it only as told by your health care provider.  · Loosen the sling or immobilizer if your fingers tingle, become numb, or turn cold and blue.  · Keep the sling or immobilizer clean.  · If the sling or immobilizer is not waterproof:  ? Do not let it get wet.  ? Cover it with a watertight covering when you take a bath or a shower.  Incision care    · Follow instructions from your health care provider about how to take care of your incision. Make sure you:  ? Wash your hands with soap and water before you change your dressing. If soap and water are not available, use hand .  ? Change your dressing as told by your health care  provider.  ? Leave stitches (sutures), skin glue, or adhesive strips in place. These skin closures may need to stay in place for 2 weeks or longer. If adhesive strip edges start to loosen and curl up, you may trim the loose edges. Do not remove adhesive strips completely unless your health care provider tells you to do that.  · Check your incision area every day for signs of infection. Check for:  ? Redness.  ? More swelling or pain.  ? Blood or more fluid.  ? Warmth.  ? Pus or a bad smell.  Managing pain, stiffness, and swelling    · If directed, put ice on the affected area.  ? Put ice in a plastic bag or use the icing device (cold therapy unit) that you were given. Follow instructions from your health care provider about how to use the icing device.  ? Place a towel between your skin and the bag or icing device.  ? Leave the ice or icing device on for 20 minutes, 2-3 times a day.  · Move your fingers often to avoid stiffness and to lessen swelling.  · If told by your health care provider, raise (elevate) the injured area above the level of your heart while you are sitting or lying down. To do this, try putting a few pillows under your arm.  Driving  · Do not drive or use heavy machinery while taking prescription pain medicine.  · Ask your health care provider when it is safe to drive if you have a sling or immobilizer.  Activity  · Return to your normal activities as told by your health care provider. Ask your health care provider what activities are safe for you.  · Do exercises as told by your health care provider.  · Do not lift with your injured arm until your health care provider approves.  · Avoid pulling and pushing.  · Follow instructions from your health care provider about:  ? Whether you may gently use your hand and elbow immediately after surgery.  ? When to start doing gentle range of motion movements with your shoulder and elbow. It is important to do these movements to prevent loss of  motion.  General instructions  · Take over-the-counter and prescription medicines only as told by your health care provider.  · Do not use any products that contain nicotine or tobacco, such as cigarettes and e-cigarettes. These can delay bone healing. If you need help quitting, ask your health care provider.  · If you are taking prescription pain medicine, take actions to prevent or treat constipation. Your health care provider may recommend that you:  ? Drink enough fluid to keep your urine pale yellow.  ? Eat foods that are high in fiber, such as fresh fruits and vegetables, whole grains, and beans.  ? Limit foods that are high in fat and processed sugars, such as fried or sweet foods.  ? Take an over-the-counter or prescription medicine for constipation.  · Keep all follow-up visits as told by your health care provider. This is important.  Contact a health care provider if:  · You have pain that is not helped by medicine.  · You have a fever.  · You have redness around your incision.  · You have more swelling or pain around your incision.  · You have blood or more fluid coming from your incision.  · Your incision feels warm to the touch.  · You have pus or a bad smell coming from your incision or your dressing.  · You feel faint or light-headed.  · You develop a rash.  Get help right away if:  · The edges of your incision come apart after the stitches or staples have been taken out.  · You have trouble breathing.  · You have numbness or tingling in your hand or fingers.  Summary  · After this procedure, it is common to have some pain, swelling, or stiffness.  · If your sling or immobilizer is not waterproof, do not let it get wet.  · Contact your health care provider if you have blood or more fluid coming from your incision.  · Get medical help right away if you have numbness or tingling in your hands or fingers.  This information is not intended to replace advice given to you by your health care provider. Make  sure you discuss any questions you have with your health care provider.  Document Released: 07/07/2006 Document Revised: 11/30/2018 Document Reviewed: 10/03/2018  [employer] Patient Education © 2020 [employer]      Discharge Instructions    Discharged to other by medical transportation with escort. Discharged via wheelchair, hospital escort: Yes.  Special equipment needed: Not Applicable    Be sure to schedule a follow-up appointment with your primary care doctor or any specialists as instructed.     Discharge Plan:        I understand that a diet low in cholesterol, fat, and sodium is recommended for good health. Unless I have been given specific instructions below for another diet, I accept this instruction as my diet prescription.     Special Instructions: None    · Is patient discharged on Warfarin / Coumadin?   No     Depression / Suicide Risk    As you are discharged from this [facility] facility, it is important to learn how to keep safe from harming yourself.    Recognize the warning signs:  · Abrupt changes in personality, positive or negative- including increase in energy   · Giving away possessions  · Change in eating patterns- significant weight changes-  positive or negative  · Change in sleeping patterns- unable to sleep or sleeping all the time   · Unwillingness or inability to communicate  · Depression  · Unusual sadness, discouragement and loneliness  · Talk of wanting to die  · Neglect of personal appearance   · Rebelliousness- reckless behavior  · Withdrawal from people/activities they love  · Confusion- inability to concentrate     If you or a loved one observes any of these behaviors or has concerns about self-harm, here's what you can do:  · Talk about it- your feelings and reasons for harming yourself  · Remove any means that you might use to hurt yourself (examples: pills, rope, extension cords, firearm)  · Get professional help from the community (Mental Health, Substance Abuse,  psychological counseling)  · Do not be alone:Call your Safe Contact- someone whom you trust who will be there for you.  · Call your local CRISIS HOTLINE 443-6202 or 962-612-2195  · Call your local Children's Mobile Crisis Response Team [place] (045) 920-3482 or www.[employer]  · Call the toll free National Suicide Prevention Hotlines   · National Suicide Prevention Lifeline 698-216-HTUW (4740)  · National Hope Line Network 800-SUICIDE (784-1232)

## 2021-12-08 NOTE — CARE PLAN
Problem: Pain - Standard  Goal: Alleviation of pain or a reduction in pain to the patient’s comfort goal  Outcome: Not Progressing     Problem: Knowledge Deficit - Standard  Goal: Patient and family/care givers will demonstrate understanding of plan of care, disease process/condition, diagnostic tests and medications  Outcome: Progressing     Problem: Fall Risk  Goal: Patient will remain free from falls  Outcome: Progressing   The patient is Stable - Low risk of patient condition declining or worsening    Shift Goals  Clinical Goals: manage pain  Patient Goals: comfort, sleep  Family Goals: NA      Problem: Pain - Standard  Goal: Alleviation of pain or a reduction in pain to the patient’s comfort goal  Outcome: Not Progressing

## 2021-12-08 NOTE — DISCHARGE PLANNING
Anticipated Discharge Disposition: [facility]     Action: RNCM spoke with Jessica from [facility]; patients old claim has closed on [facility] and [facility] can accept patient for today.   RNCM placed call to Lanette ([phone]) at [facility] to update; no answer, LM requested call back.  RNCM received message from floor staff to call patients  Jud ([phone]); updated Jud of discharge plan to [facility] for continued rehab.     Barriers to Discharge: Transportation requested from ride-line     Plan: Follow up with medical team.     Justin with [facility] called this RNCM stating [facility] can provide transportation today; will call back momentarily with ETA. Bedside RN notified.     Justin stated transportation will be 1530; bedside team notified.

## 2021-12-08 NOTE — THERAPY
"Occupational Therapy  Daily Treatment     Patient Name: Chris Leggett  Age:  71 y.o., Sex:  male  Medical Record #: [id number]  Today's Date: 12/8/2021       Precautions: Fall Risk,Non Weight Bearing Right Upper Extremity    Assessment    Pt seen for OT tx.  Pt is making good progress but main limitation is impaired independence with ADL's due to limited use RUE.  Pt is highly motivated to regain his independence & return to his group home.  Pt will benefit from Post Acute services.    Plan    Continue current treatment plan.    DC Equipment Recommendations: Tub / Shower Seat  Discharge Recommendations: Recommend post-acute placement for additional occupational therapy services prior to discharge home    Subjective    \"My arm still hurts quite a bit\"     Objective       12/08/21 1408   Cognition    Level of Consciousness Alert   Comments pleasant & co-oeprative   Active ROM Upper Body   Comments pt encouraged to perform AROM to his right wrist & hand & keep elevated to reduce edema   Other Treatments   Other Treatments Provided Pt able to stand at sink to perform grooming tasks with supervision & extra time.  Pt did have 1 LOB during grooming that required therapist assist to correct   Balance   Sitting Balance (Static) Fair +   Sitting Balance (Dynamic) Fair +   Standing Balance (Static) Fair   Standing Balance (Dynamic) Fair   Weight Shift Sitting Good   Weight Shift Standing Good   Activities of Daily Living   Eating Modified Independent   Grooming Supervision;Standing   Bathing Moderate Assist   Upper Body Dressing Moderate Assist   Lower Body Dressing Minimal Assist   Toileting Minimal Assist   Comments Pt has difficulty with ADL's due to limited use of RUE   Functional Mobility   Sit to Stand Supervised   Bed, Chair, Wheelchair Transfer Supervised   Toilet Transfers Supervised   Patient / Family Goals   Patient / Family Goal #1 \"I'm going to need some help before I go home\"   Goal #1 Outcome Progressing as " expected   Short Term Goals   Short Term Goal # 1 Pt will be supervised for ADL transfers   Goal Outcome # 1 Progressing as expected   Short Term Goal # 2 Pt will dress UE with supervision   Goal Outcome # 2 Goal not met   Short Term Goal # 3 Pt will groom standing at the sink with supervision for 10 min with no LOB   Goal Outcome # 3 Progressing as expected

## 2021-12-08 NOTE — PROGRESS NOTES
Bedside report received and patient care assumed. Pt is resting in bed, A&O4, with ongoing RUE pain, and is on RA.  All fall precautions are in place, belongings at bedside table.  Pt was updated on POC, no questions or concerns. Pt educated on use of call light for assistance. Will continue to monitor.

## 2021-12-08 NOTE — DISCHARGE PLANNING
[facility] was unable to transport PT before SNF intake hours. [facility] sent their transport to  PT.  Transport was cancelled with MTM/WMT

## 2021-12-08 NOTE — CARE PLAN
The patient is Stable - Low risk of patient condition declining or worsening    Shift Goals  Clinical Goals: manage pain  Patient Goals: comfort, sleep  Family Goals: NA    Progress made toward(s) clinical / shift goals:    Problem: Pain - Standard  Goal: Alleviation of pain or a reduction in pain to the patient’s comfort goal  Outcome: Progressing     Problem: Knowledge Deficit - Standard  Goal: Patient and family/care givers will demonstrate understanding of plan of care, disease process/condition, diagnostic tests and medications  Outcome: Progressing     Problem: Fall Risk  Goal: Patient will remain free from falls  Outcome: Progressing

## 2022-03-27 ENCOUNTER — HOSPITAL ENCOUNTER (EMERGENCY)
Facility: MEDICAL CENTER | Age: 72
End: 2022-03-28
Attending: EMERGENCY MEDICINE
Payer: MEDICARE

## 2022-03-27 DIAGNOSIS — R45.851 SUICIDAL IDEATION: ICD-10-CM

## 2022-03-27 DIAGNOSIS — F10.920 ALCOHOLIC INTOXICATION WITHOUT COMPLICATION (HCC): ICD-10-CM

## 2022-03-27 DIAGNOSIS — F32.A DEPRESSION, UNSPECIFIED DEPRESSION TYPE: ICD-10-CM

## 2022-03-27 LAB
ANION GAP SERPL CALC-SCNC: 13 MMOL/L (ref 7–16)
BASOPHILS # BLD AUTO: 0.8 % (ref 0–1.8)
BASOPHILS # BLD: 0.02 K/UL (ref 0–0.12)
BUN SERPL-MCNC: 5 MG/DL (ref 8–22)
CALCIUM SERPL-MCNC: 8.4 MG/DL (ref 8.5–10.5)
CHLORIDE SERPL-SCNC: 101 MMOL/L (ref 96–112)
CO2 SERPL-SCNC: 22 MMOL/L (ref 20–33)
CREAT SERPL-MCNC: 0.72 MG/DL (ref 0.5–1.4)
EOSINOPHIL # BLD AUTO: 0.14 K/UL (ref 0–0.51)
EOSINOPHIL NFR BLD: 5.3 % (ref 0–6.9)
ERYTHROCYTE [DISTWIDTH] IN BLOOD BY AUTOMATED COUNT: 49 FL (ref 35.9–50)
ETHANOL BLD-MCNC: 274.5 MG/DL (ref 0–10)
GFR SERPLBLD CREATININE-BSD FMLA CKD-EPI: 98 ML/MIN/1.73 M 2
GLUCOSE SERPL-MCNC: 97 MG/DL (ref 65–99)
HCT VFR BLD AUTO: 37 % (ref 42–52)
HGB BLD-MCNC: 12.4 G/DL (ref 14–18)
IMM GRANULOCYTES # BLD AUTO: 0.01 K/UL (ref 0–0.11)
IMM GRANULOCYTES NFR BLD AUTO: 0.4 % (ref 0–0.9)
LYMPHOCYTES # BLD AUTO: 0.69 K/UL (ref 1–4.8)
LYMPHOCYTES NFR BLD: 25.9 % (ref 22–41)
MCH RBC QN AUTO: 32.1 PG (ref 27–33)
MCHC RBC AUTO-ENTMCNC: 33.5 G/DL (ref 33.7–35.3)
MCV RBC AUTO: 95.9 FL (ref 81.4–97.8)
MONOCYTES # BLD AUTO: 0.19 K/UL (ref 0–0.85)
MONOCYTES NFR BLD AUTO: 7.1 % (ref 0–13.4)
NEUTROPHILS # BLD AUTO: 1.61 K/UL (ref 1.82–7.42)
NEUTROPHILS NFR BLD: 60.5 % (ref 44–72)
NRBC # BLD AUTO: 0 K/UL
NRBC BLD-RTO: 0 /100 WBC
PLATELET # BLD AUTO: 63 K/UL (ref 164–446)
PMV BLD AUTO: 12.1 FL (ref 9–12.9)
POTASSIUM SERPL-SCNC: 3.9 MMOL/L (ref 3.6–5.5)
RBC # BLD AUTO: 3.86 M/UL (ref 4.7–6.1)
SODIUM SERPL-SCNC: 136 MMOL/L (ref 135–145)
WBC # BLD AUTO: 2.7 K/UL (ref 4.8–10.8)

## 2022-03-27 PROCEDURE — 80048 BASIC METABOLIC PNL TOTAL CA: CPT

## 2022-03-27 PROCEDURE — 85025 COMPLETE CBC W/AUTO DIFF WBC: CPT

## 2022-03-27 PROCEDURE — 36415 COLL VENOUS BLD VENIPUNCTURE: CPT

## 2022-03-27 PROCEDURE — 99285 EMERGENCY DEPT VISIT HI MDM: CPT

## 2022-03-27 PROCEDURE — 82077 ASSAY SPEC XCP UR&BREATH IA: CPT

## 2022-03-27 ASSESSMENT — LIFESTYLE VARIABLES
TOTAL SCORE: 2
HAVE PEOPLE ANNOYED YOU BY CRITICIZING YOUR DRINKING: NO
HAVE YOU EVER FELT YOU SHOULD CUT DOWN ON YOUR DRINKING: YES
DO YOU DRINK ALCOHOL: YES
CONSUMPTION TOTAL: INCOMPLETE
EVER HAD A DRINK FIRST THING IN THE MORNING TO STEADY YOUR NERVES TO GET RID OF A HANGOVER: YES
TOTAL SCORE: 2
TOTAL SCORE: 2
EVER FELT BAD OR GUILTY ABOUT YOUR DRINKING: NO

## 2022-03-27 ASSESSMENT — FIBROSIS 4 INDEX: FIB4 SCORE: 11.2

## 2022-03-28 VITALS
OXYGEN SATURATION: 91 % | TEMPERATURE: 97.3 F | SYSTOLIC BLOOD PRESSURE: 171 MMHG | HEIGHT: 71 IN | BODY MASS INDEX: 30.8 KG/M2 | HEART RATE: 67 BPM | WEIGHT: 220 LBS | RESPIRATION RATE: 14 BRPM | DIASTOLIC BLOOD PRESSURE: 102 MMHG

## 2022-03-28 LAB
AMPHET UR QL SCN: NEGATIVE
BARBITURATES UR QL SCN: NEGATIVE
BENZODIAZ UR QL SCN: NEGATIVE
BZE UR QL SCN: NEGATIVE
CANNABINOIDS UR QL SCN: POSITIVE
METHADONE UR QL SCN: NEGATIVE
OPIATES UR QL SCN: NEGATIVE
OXYCODONE UR QL SCN: NEGATIVE
PCP UR QL SCN: NEGATIVE
PROPOXYPH UR QL SCN: NEGATIVE

## 2022-03-28 PROCEDURE — 80307 DRUG TEST PRSMV CHEM ANLYZR: CPT

## 2022-03-28 PROCEDURE — A9270 NON-COVERED ITEM OR SERVICE: HCPCS | Performed by: EMERGENCY MEDICINE

## 2022-03-28 PROCEDURE — 700102 HCHG RX REV CODE 250 W/ 637 OVERRIDE(OP): Performed by: EMERGENCY MEDICINE

## 2022-03-28 PROCEDURE — 90791 PSYCH DIAGNOSTIC EVALUATION: CPT

## 2022-03-28 RX ORDER — OMEPRAZOLE 20 MG/1
40 CAPSULE, DELAYED RELEASE ORAL DAILY
Status: DISCONTINUED | OUTPATIENT
Start: 2022-03-28 | End: 2022-03-28 | Stop reason: HOSPADM

## 2022-03-28 RX ORDER — TRAZODONE HYDROCHLORIDE 50 MG/1
150 TABLET ORAL NIGHTLY
Status: DISCONTINUED | OUTPATIENT
Start: 2022-03-28 | End: 2022-03-28 | Stop reason: HOSPADM

## 2022-03-28 RX ORDER — QUETIAPINE FUMARATE 200 MG/1
400 TABLET, FILM COATED ORAL
Status: DISCONTINUED | OUTPATIENT
Start: 2022-03-28 | End: 2022-03-28 | Stop reason: HOSPADM

## 2022-03-28 RX ORDER — DULOXETIN HYDROCHLORIDE 30 MG/1
30 CAPSULE, DELAYED RELEASE ORAL DAILY
Status: DISCONTINUED | OUTPATIENT
Start: 2022-03-28 | End: 2022-03-28 | Stop reason: HOSPADM

## 2022-03-28 RX ORDER — PRAZOSIN HYDROCHLORIDE 2 MG/1
4 CAPSULE ORAL EVERY EVENING
Status: DISCONTINUED | OUTPATIENT
Start: 2022-03-28 | End: 2022-03-28 | Stop reason: HOSPADM

## 2022-03-28 RX ORDER — TRAZODONE HYDROCHLORIDE 150 MG/1
150 TABLET ORAL NIGHTLY
Status: SHIPPED | COMMUNITY
End: 2023-05-18

## 2022-03-28 RX ORDER — TAMSULOSIN HYDROCHLORIDE 0.4 MG/1
0.4 CAPSULE ORAL 2 TIMES DAILY
Status: DISCONTINUED | OUTPATIENT
Start: 2022-03-28 | End: 2022-03-28 | Stop reason: HOSPADM

## 2022-03-28 RX ORDER — LISINOPRIL 10 MG/1
30 TABLET ORAL DAILY
Status: DISCONTINUED | OUTPATIENT
Start: 2022-03-28 | End: 2022-03-28 | Stop reason: HOSPADM

## 2022-03-28 RX ORDER — GABAPENTIN 300 MG/1
600 CAPSULE ORAL 4 TIMES DAILY
Status: DISCONTINUED | OUTPATIENT
Start: 2022-03-28 | End: 2022-03-28 | Stop reason: HOSPADM

## 2022-03-28 RX ORDER — ALBUTEROL SULFATE 90 UG/1
2 AEROSOL, METERED RESPIRATORY (INHALATION) EVERY 4 HOURS PRN
Status: DISCONTINUED | OUTPATIENT
Start: 2022-03-28 | End: 2022-03-28 | Stop reason: HOSPADM

## 2022-03-28 RX ORDER — ALLOPURINOL 100 MG/1
100 TABLET ORAL DAILY
Status: DISCONTINUED | OUTPATIENT
Start: 2022-03-29 | End: 2022-03-28 | Stop reason: HOSPADM

## 2022-03-28 RX ADMIN — DULOXETINE HYDROCHLORIDE 30 MG: 30 CAPSULE, DELAYED RELEASE ORAL at 13:56

## 2022-03-28 RX ADMIN — OMEPRAZOLE 40 MG: 20 CAPSULE, DELAYED RELEASE ORAL at 13:55

## 2022-03-28 RX ADMIN — LISINOPRIL 30 MG: 10 TABLET ORAL at 13:56

## 2022-03-28 RX ADMIN — GABAPENTIN 600 MG: 300 CAPSULE ORAL at 13:56

## 2022-03-28 NOTE — ED NOTES
Alert team made this RN aware of pts transfer to [facility] after 2pm.

## 2022-03-28 NOTE — ED NOTES
Patient resting on gurney. Additional warm blankets provided. No acute distress. Active chest rise noted. No agitation noted. No behavioral pain indicators. One to one sitter outside of room in direct view of patient.

## 2022-03-28 NOTE — ED NOTES
Patient resting on gurney. No acute distress. Active chest rise noted. No agitation noted. No behavioral pain indicators. One to one sitter outside of room in direct view of patient.

## 2022-03-28 NOTE — ED NOTES
Pt resting quietly with occasional outbursts saying he wants to kill himself. He then falls back to sleep.

## 2022-03-28 NOTE — ED PROVIDER NOTES
"ED Provider Note    CHIEF COMPLAINT  Chief Complaint   Patient presents with   • Suicidal Ideation     Pt bib [facility] for SI. They picked him up from [facility]. He endorses ETOH use today, a few beers. He says, \"I don't wanna live no more. I ain't got no family. I'm scared. I'm scared of living.\" Pt says his plan would be to OD with trazodone and Seroquel \"If I have enough.\" Has not attempted suicide. Pt not answering questions directly regarding his intentions to act on his plans but keeps repeating he doesn't want to live anymore.        HPI  Chris Leggett is a 71 y.o. male who presents to the emergency department with acute on chronic depression with suicidal ideations. Past medical history as documented below. He explains that he has been compliant with his chronic medications. Recently evicted from his group home as of today. Now homeless. This is further exacerbated his depression. He states that he drank whiskey today which she is not used to as he typically drinks beer. This is since made him more depressed. Again stating now that he does not want a live. No active attempt.    REVIEW OF SYSTEMS  See HPI for further details. All other systems are negative.     PAST MEDICAL HISTORY   has a past medical history of Arthritis, Gout, Hypertension, and Psychiatric disorder.    SOCIAL HISTORY  Social History     Tobacco Use   • Smoking status: Current Every Day Smoker     Packs/day: 0.50     Years: 5.00     Pack years: 2.50     Types: Cigarettes     Last attempt to quit: 1/18/2019     Years since quitting: 3.1   • Smokeless tobacco: Never Used   Vaping Use   • Vaping Use: Never used   Substance and Sexual Activity   • Alcohol use: Yes     Alcohol/week: 10.8 - 12.0 oz     Types: 18 - 20 Cans of beer per week     Comment: weekly   • Drug use: Yes     Types: Inhaled     Comment: daily THC   • Sexual activity: Not on file       SURGICAL HISTORY   has a past surgical history that includes cervical disk and fusion " "anterior (9/2/2018); other orthopedic surgery; dx bone marrow aspirations (Left, 10/2/2019); dx bone marrow biopsies (10/2/2019); and orif, fracture, humerus (Right, 11/30/2021).    CURRENT MEDICATIONS  Home Medications    **Home medications have not yet been reviewed for this encounter**         ALLERGIES  No Known Allergies    PHYSICAL EXAM  VITAL SIGNS: /76   Pulse 77   Temp 36.6 °C (97.9 °F) (Temporal)   Resp 16   Ht 1.803 m (5' 11\")   Wt 99.8 kg (220 lb)   SpO2 94%   BMI 30.68 kg/m²  @KIRBY[[id number]::@  Pulse ox interpretation: I interpret this pulse ox as normal.  Constitutional: Alert in no apparent distress.  HENT: Normocephalic, Atraumatic, Bilateral external ears normal. Nose normal.   Eyes: Pupils are equal and reactive.   Heart: Regular rate and rythm, no murmurs.    Lungs: Clear to auscultation bilaterally.  Skin: Warm, Dry, No erythema, No rash.   Neurologic: Alert, Grossly non-focal.   Psychiatric: slurred speech with depression and suicidal ideation.      Results for orders placed or performed during the hospital encounter of 03/27/22   CBC WITH DIFFERENTIAL   Result Value Ref Range    WBC 2.7 (L) 4.8 - 10.8 K/uL    RBC 3.86 (L) 4.70 - 6.10 M/uL    Hemoglobin 12.4 (L) 14.0 - 18.0 g/dL    Hematocrit 37.0 (L) 42.0 - 52.0 %    MCV 95.9 81.4 - 97.8 fL    MCH 32.1 27.0 - 33.0 pg    MCHC 33.5 (L) 33.7 - 35.3 g/dL    RDW 49.0 35.9 - 50.0 fL    Platelet Count 63 (L) 164 - 446 K/uL    MPV 12.1 9.0 - 12.9 fL    Neutrophils-Polys 60.50 44.00 - 72.00 %    Lymphocytes 25.90 22.00 - 41.00 %    Monocytes 7.10 0.00 - 13.40 %    Eosinophils 5.30 0.00 - 6.90 %    Basophils 0.80 0.00 - 1.80 %    Immature Granulocytes 0.40 0.00 - 0.90 %    Nucleated RBC 0.00 /100 WBC    Neutrophils (Absolute) 1.61 (L) 1.82 - 7.42 K/uL    Lymphs (Absolute) 0.69 (L) 1.00 - 4.80 K/uL    Monos (Absolute) 0.19 0.00 - 0.85 K/uL    Eos (Absolute) 0.14 0.00 - 0.51 K/uL    Baso (Absolute) 0.02 0.00 - 0.12 K/uL    Immature Granulocytes " (abs) 0.01 0.00 - 0.11 K/uL    NRBC (Absolute) 0.00 K/uL   BASIC METABOLIC PANEL   Result Value Ref Range    Sodium 136 135 - 145 mmol/L    Potassium 3.9 3.6 - 5.5 mmol/L    Chloride 101 96 - 112 mmol/L    Co2 22 20 - 33 mmol/L    Glucose 97 65 - 99 mg/dL    Bun 5 (L) 8 - 22 mg/dL    Creatinine 0.72 0.50 - 1.40 mg/dL    Calcium 8.4 (L) 8.5 - 10.5 mg/dL    Anion Gap 13.0 7.0 - 16.0   DIAGNOSTIC ALCOHOL   Result Value Ref Range    Diagnostic Alcohol 274.5 (H) 0.0 - 10.0 mg/dL   ESTIMATED GFR   Result Value Ref Range    GFR (CKD-EPI) 98 >60 mL/min/1.73 m 2       COURSE & MEDICAL DECISION MAKING  Pertinent Labs & Imaging studies reviewed. (See chart for details)  ED Observation:   Start: 2300  FH: non contributory to presenting illness.     71-year-old male presented to the emergency department with acute on chronic depression with suicidal ideations. Additionally alcohol intoxication. He admits that he was displaced from his primary residence and is now homeless. This cause increased drinking today. Now with worsening depression with suicidal ideations. Will await further clinical sobriety for further psychiatric evaluation. Final disposition will be clinically indicated once this further assessment has been reached.    Throughout the ER observation the patient has had no further complaints or changes in thought process. Again he remains intoxicated at this point and he will continue ongoing ER observation until further appropriate sober conversation can be had by ER staff including physician and alert team.      FINAL IMPRESSION  1. Alcoholic intoxication without complication (HCC)    2. Depression, unspecified depression type    3. Suicidal ideation               Electronically signed by: Juan Carlos Neves M.D., 3/28/2022 1:11 AM

## 2022-03-28 NOTE — ED NOTES
Patient's home medications have been reviewed by the pharmacy team.     Past Medical History:   Diagnosis Date   • Arthritis    • Gout    • Hypertension    • Psychiatric disorder        Patient's Medications   New Prescriptions    No medications on file   Previous Medications    ALBUTEROL 108 (90 BASE) MCG/ACT AERO SOLN INHALATION AEROSOL    Inhale 2 Puffs every four hours as needed for Shortness of Breath.    ALLOPURINOL (ZYLOPRIM) 100 MG TAB    Take 1 Tablet by mouth every day.    DULOXETINE (CYMBALTA) 30 MG CAP DR PARTICLES    Take 1 Capsule by mouth every day.    GABAPENTIN (NEURONTIN) 600 MG TABLET    Take 1 Tablet by mouth 4 times a day.    LISINOPRIL (PRINIVIL) 30 MG TABLET    Take 1 Tablet by mouth every day.    OMEPRAZOLE (PRILOSEC) 40 MG DELAYED-RELEASE CAPSULE    Take 1 Capsule by mouth every day.    PRAZOSIN (MINIPRESS) 2 MG CAP    Take 2 Capsules by mouth every evening.    QUETIAPINE (SEROQUEL) 400 MG TABLET    Take 1 Tablet by mouth at bedtime.    TAMSULOSIN (FLOMAX) 0.4 MG CAPSULE    Take 1 Capsule by mouth 2 times a day.    TRAZODONE (DESYREL) 150 MG TAB    Take 150 mg by mouth every evening.   Modified Medications    No medications on file   Discontinued Medications    ACETAMINOPHEN (TYLENOL) 325 MG TAB    Take 2 Tablets by mouth every 6 hours as needed.    BENZOCAINE-MENTHOL (CEPACOL) 15-3.6 MG LOZENGE    Dissolve 1 Lozenge in the mouth every 2 hours as needed.    FLUTICASONE-SALMETEROL (ADVAIR DISKUS) 250-50 MCG/DOSE AEROSOL POWDER, BREATH ACTIVATED    Inhale 1 Puff every 12 hours.    GUAIFENESIN DEXTROMETHORPHAN (ROBITUSSIN DM) 100-10 MG/5ML SYRUP SYRUP    Take 10 mL by mouth every 6 hours as needed for Cough.    NICOTINE (NICODERM) 21 MG/24HR PATCH 24 HR    Place 1 Patch on the skin every 24 hours.    SENNA-DOCUSATE (PERICOLACE OR SENOKOT S) 8.6-50 MG TAB    Take 2 Tablets by mouth 2 times a day.    TRAZODONE (DESYREL) 150 MG TAB    Take 1 Tablet by mouth at bedtime as needed for Sleep.           A:  Medications do not appear to be contributing to current complaints.       P:    No recommendations at this time.   Home medications have been reordered as appropriate per provider        Keisha Goins, PharmD

## 2022-03-28 NOTE — DISCHARGE PLANNING
[facility] ALERT TEAM DISCHARGE PLANNING NOTE    Date:  3/28/22  Patient Name:  Chris Leggett - 71 y.o. - Discharge Planning  MRN:  [id number]   YOB: 1950  ADMISSION DATE:  3/27/2022      Writer forwarded transfer packet for inpatient psychiatric care to the following community providers:  [facility]   Items  included in the transfer packet:   __x___Face Sheet   __x___Pages 1 and 2 of completed legal hold   __x___Alert Team/Psych Assessment   __x___H&P   __x___UDS   __x___Blood Alcohol   __x___Vital signs   __n/a___Pregnancy Test (if applicable)   _____Medications List   _____Covid Screen

## 2022-03-28 NOTE — PROGRESS NOTES
"ED Provider Progress Note    ED Observation Progress Note    Date of Service: 03/28/22    Interval History  Patient reevaluated upon sobriety.  Clinically legally sober and still suicidal, depressed.  Plans to overdose on his medications.  Cannot contract for safety..  Legal hold was completed.  Waiting transfer to psychiatric facility when a bed is available.  Please refer to initial note for complete details.  No concerns overnight, sobered without difficulty.  Patient ambulates to the bathroom independently and tolerated a meal.  Medication reconciliation has been reviewed.    Physical Exam  /91   Pulse 63   Temp 36.6 °C (97.9 °F) (Temporal)   Resp 18   Ht 1.803 m (5' 11\")   Wt 99.8 kg (220 lb)   SpO2 93%   BMI 30.68 kg/m² .    Constitutional: Awake and alert. Nontoxic  HENT:  Grossly normal  Eyes: Grossly normal  Neck: Normal range of motion  Cardiovascular: Normal peripheral perfusion  Thorax & Lungs: Nonlabored respirations  Skin: Warm, dry  Extremities: Moves 4 extremity spontaneously  Psychiatric: Odd affect    Labs  Results for orders placed or performed during the hospital encounter of 03/27/22   CBC WITH DIFFERENTIAL   Result Value Ref Range    WBC 2.7 (L) 4.8 - 10.8 K/uL    RBC 3.86 (L) 4.70 - 6.10 M/uL    Hemoglobin 12.4 (L) 14.0 - 18.0 g/dL    Hematocrit 37.0 (L) 42.0 - 52.0 %    MCV 95.9 81.4 - 97.8 fL    MCH 32.1 27.0 - 33.0 pg    MCHC 33.5 (L) 33.7 - 35.3 g/dL    RDW 49.0 35.9 - 50.0 fL    Platelet Count 63 (L) 164 - 446 K/uL    MPV 12.1 9.0 - 12.9 fL    Neutrophils-Polys 60.50 44.00 - 72.00 %    Lymphocytes 25.90 22.00 - 41.00 %    Monocytes 7.10 0.00 - 13.40 %    Eosinophils 5.30 0.00 - 6.90 %    Basophils 0.80 0.00 - 1.80 %    Immature Granulocytes 0.40 0.00 - 0.90 %    Nucleated RBC 0.00 /100 WBC    Neutrophils (Absolute) 1.61 (L) 1.82 - 7.42 K/uL    Lymphs (Absolute) 0.69 (L) 1.00 - 4.80 K/uL    Monos (Absolute) 0.19 0.00 - 0.85 K/uL    Eos (Absolute) 0.14 0.00 - 0.51 K/uL    Francisco " (Absolute) 0.02 0.00 - 0.12 K/uL    Immature Granulocytes (abs) 0.01 0.00 - 0.11 K/uL    NRBC (Absolute) 0.00 K/uL   BASIC METABOLIC PANEL   Result Value Ref Range    Sodium 136 135 - 145 mmol/L    Potassium 3.9 3.6 - 5.5 mmol/L    Chloride 101 96 - 112 mmol/L    Co2 22 20 - 33 mmol/L    Glucose 97 65 - 99 mg/dL    Bun 5 (L) 8 - 22 mg/dL    Creatinine 0.72 0.50 - 1.40 mg/dL    Calcium 8.4 (L) 8.5 - 10.5 mg/dL    Anion Gap 13.0 7.0 - 16.0   DIAGNOSTIC ALCOHOL   Result Value Ref Range    Diagnostic Alcohol 274.5 (H) 0.0 - 10.0 mg/dL   ESTIMATED GFR   Result Value Ref Range    GFR (CKD-EPI) 98 >60 mL/min/1.73 m 2   Urine Drug Screen   Result Value Ref Range    Amphetamines Urine Negative Negative    Barbiturates Negative Negative    Benzodiazepines Negative Negative    Cocaine Metabolite Negative Negative    Methadone Negative Negative    Opiates Negative Negative    Oxycodone Negative Negative    Phencyclidine -Pcp Negative Negative    Propoxyphene Negative Negative    Cannabinoid Metab Positive (A) Negative       Radiology  No orders to display       Problem List  1. Depression, suicidal ideation -awaiting transfer to psychiatric facility when a bed is available.      Electronically signed by: Audra Freeman D.O., 3/28/2022 12:03 PM

## 2022-03-28 NOTE — ED NOTES
Med Rec completed per patient and home pharmacy ([facility])   Allergies reviewed  No ORAL antibiotics in last 30 days

## 2022-03-28 NOTE — ED NOTES
"Report received from SHARAN Claudio. Assumed care of patient. Pt assessed, AAO x 4.     Breathalyzer 0.06, patient still stating SI, stating \"my brother  a few days ago, I don't want to live any more. I just want to take a bunch of pills and not wake up. I need help.\" Dr. Freeman notified. Charge RN notified. Sitter placed outside of patient room. All medically non necessary equipment removed from room. Room safety checklist in use.  "

## 2022-03-28 NOTE — ED NOTES
Medicated per MAR. Patient denies further needs at this time. Sitter in view outside of patient room.

## 2022-03-28 NOTE — DISCHARGE PLANNING
Legal Hold Transfer     Referral: Legal hold transfer to Mental Health Facility     Intervention: Notified by Alert Team SHARAN Mirza that pt has been accepted to [facility].     Pt's accepting physcian is Dr. Chavez    Spoke to Tiffanie at [facility]     Transport arranged through [facility]     The pt will be picked up at 1515      Notified Bedside SHARAN Smith, Alert Team SHARAN Mirza and LAURA Reynolds of the departure time as well as accepting facility.      Transfer packet created with original legal hold and placed on chart.      Plan: Pt will be transferring to [facility] today at 1515 via [facility].

## 2022-03-28 NOTE — ED NOTES
Phone report to Haresh TSANG at [facility]. EMS is at bedside and transporting patient to [facility]. Patient provided discharge instructions. Patient verbalized understanding. Patient leaving ER in stable condition. Patient ambulatory with steady gait.  IV removed.

## 2022-03-28 NOTE — ED NOTES
Patient ambulated to bathroom. Meal tray provided. Patient denies further needs. Sitter in view of patient.

## 2022-03-28 NOTE — CONSULTS
"[facility]   TRIAGE ASSESSMENT    Name: Chris Leggett  MRN: [id number]  : 1950  Age: 71 y.o.  Date of assessment: 3/28/2022  PCP: Pcp Not In Computer  Persons in attendance: Patient  Patient Location: [facility]    CHIEF COMPLAINT/PRESENTING ISSUE (as stated by patient): Pt is a 72 y/o male BIB EMS reporting SI w/ plan to overdose on psychiatric medications. Diagnostic alcohol on arrival to ED was 274.5 last night. Pt assessed by this writer for behavioral health consult once sober and pt still endorsing SI. Denies HI/hallucinations. Pt has a chronic SI hx, hx of multiple suicide attempts. Reports having nightmares about his mother. Previous psych diagnoses include MDD and alcohol use disorder. Pt is not currently engaged in outpatient psychiatry/therapy; poor follow-up with [facility] for appointments; non-compliance with medication regimen reported. Reports occasional marijuana use; UDS +thc. Pt states he has been residing at [facility]; reports it was taken over by [facility] over the last week and the rules are \"more strict.\" Pt states he was kicked out for drinking alcohol and stayed at [facility] last night. Reports all his family has passed away except his nephew in [place] who he has been unable to get ahold of. Receives $800 SSDI. Reports limited social supports in the area. Legal hold initiated. Findings discussed with ERP who agrees pt needs to transfer to psychiatric facility for further evaluation and stabilization. High risk on [place] scale; 1:1 sitter required. Medicare/Medicaid FFS insurance plan. Inpatient psychiatric referrals to be sent to [facility] and [facility]. Outpatient referral faxed to [facility].   Chief Complaint   Patient presents with   • Suicidal Ideation     Pt bib [facility] for SI. They picked him up from [facility]. He endorses ETOH use today, a few beers. He says, \"I don't wanna live no more. I ain't got no " "family. I'm scared. I'm scared of living.\" Pt says his plan would be to OD with trazodone and Seroquel \"If I have enough.\" Has not attempted suicide. Pt not answering questions directly regarding his intentions to act on his plans but keeps repeating he doesn't want to live anymore.         CURRENT LIVING SITUATION/SOCIAL SUPPORT/FINANCIAL RESOURCES: Pt states he has been residing at [facility]; reports it was taken over by [facility] over the last week and the rules are \"more strict.\" Pt states he was kicked out for drinking alcohol and stayed at [email or web address] last night. Reports all his family has passed away except his nephew in [place] who he has been unable to get ahold of. Receives $800 SSDI. Reports limited social supports in the area.     BEHAVIORAL HEALTH/SUBSTANCE USE TREATMENT HISTORY  Does patient/parent report a history of prior behavioral health/substance use treatment for patient?   Yes:    Dates Level of Care Facilty/Provider Diagnosis/Problem Medications    10/2020 Inpt  [facility] SI/etoh Seroquel, Trazadone    8/2020 inpt  [facility]   SI/etoh  uncertain medication compliance               11/2018 x1 month, 3/2019, 4/2019 inpt [facility] SI/etoh Went off meds after DC'd and didn't f/u                12/2018, 1/2019 x2, 4/2019, 5/2019 inpt [facility] SI/et[facility]                 [facility]                  Current Outpatient [facility] SI/Depression  ETOH Not following up with appointments           SAFETY ASSESSMENT - SELF  Does patient acknowledge current or past symptoms of dangerousness to self or is previous history noted? yes  Does parent/significant other report patient has current or past symptoms of dangerousness to self? N\A  Does presenting problem suggest symptoms of dangerousness to self? Yes:     Past Current    Suicidal Thoughts: [x]  [x]    Suicidal Plans: [x]  [x]    Suicidal Intent: [x]  []    Suicide Attempts: [x]  []    Self-Injury []  []      For any boxes checked above, provide detail: Pt " is reporting SI w/ plan to overdose on psychiatric medications. Hx of SI; hx of multiple suicide attempts.     History of suicide by family member: no  History of suicide by friend/significant other: yes - friend  Recent change in frequency/specificity/intensity of suicidal thoughts or self-harm behavior? yes - increasing over the last 24 hours  Current access to firearms, medications, or other identified means of suicide/self-harm? yes - pt has access to his medications  If yes, willing to restrict access to means of suicide/self-harm? yes - placed on legal hold and belongings secured; awaiting transfer to inpatient psychiatric facility.   Protective factors present:  Actively engaged in treatment and Willing to address in treatment    SAFETY ASSESSMENT - OTHERS  Does patient acknowledge current or past symptoms of aggressive behavior or risk to others or is previous history noted? no  Does parent/significant other report patient has current or past symptoms of aggressive behavior or risk to others?  N\A  Does presenting problem suggest symptoms of dangerousness to others? No; Denies HI.    LEGAL HISTORY  Does patient acknowledge history of arrest/long-term/skilled nursing or is previous history noted? no    Crisis Safety Plan completed and copy given to patient? N\A    ABUSE/NEGLECT SCREENING  Does patient report feeling “unsafe” in his/her home, or afraid of anyone?  no  Does patient report any history of physical, sexual, or emotional abuse?  no  Does parent or significant other report any of the above? N\A  Is there evidence of neglect by self?  no  Is there evidence of neglect by a caregiver? N/A  Does the patient/parent report any history of CPS/APS/police involvement related to suspected abuse/neglect or domestic violence? no  Based on the information provided during the current assessment, is a mandated report of suspected abuse/neglect being made?  No    SUBSTANCE USE SCREENING  Yes:  Darnell all substances used in the  "past 30 days:      Last Use Amount   [x]   Alcohol 03/27/2022 Not specified   []   Marijuana     []   Heroin     []   Prescription Opioids  (used without prescription, for    recreation, or in excess of prescribed amount)     []   Other Prescription  (used without prescription, for    recreation, or in excess of prescribed amount)     []   Cocaine      []   Methamphetamine     []   \"\" drugs (ectasy, MDMA)     []   Other substances        UDS results: +thc  Breathalyzer results: 274.5 @ 2154 on 03/27/2022    What consequences does the patient associate with any of the above substance use and or addictive behaviors? Relationship problems, Family problems, Health problems, Monetary problems.    Risk factors for detox (check all that apply):  []  Seizures   [x]  Diaphoretic (sweating)   [x]  Tremors   []  Hallucinations   []  Increased blood pressure   []  Decreased blood pressure   []  Other   []  None      [x] Patient education on risk factors for detoxification and instructed to return to ER as needed.      MENTAL STATUS   Participation: Active verbal participation, Attentive, Engaged and Open to feedback  Grooming: Casual  Orientation: Alert and Fully Oriented  Behavior: Calm  Eye contact: Limited  Mood: Depressed  Affect: Flat, Sad and Tearful  Thought process: Logical and Goal-directed  Thought content: Within normal limits  Speech: Soft  Perception: Within normal limits  Memory:  No gross evidence of memory deficits  Insight: Limited  Judgment:  Limited  Other:    Collateral information:   Source:  [] Significant other present in person:   [] Significant other by telephone  [] [facility]   [x] [facility] Nursing Staff  [x] [facility]  [x] Other: ERP    [] Unable to complete full assessment due to:  [] Acute intoxication  [] Patient declined to participate/engage  [] Patient verbally unresponsive  [] Significant cognitive deficits  [] Significant perceptual distortions or behavioral " disorganization  [] Other:      CLINICAL IMPRESSIONS:  Primary:  Suicidal Ideation  Secondary:  Depression       IDENTIFIED NEEDS/PLAN:  [Trigger DISPOSITION list for any items marked]    [x]  Imminent safety risk - self [] Imminent safety risk - others   []  Acute substance withdrawal []  Psychosis/Impaired reality testing   [x]  Mood/anxiety [x]  Substance use/Addictive behavior   [x]  Maladaptive behaviro []  Parent/child conflict   []  Family/Couples conflict []  Biomedical   [x]  Housing [x]  Financial   []   Legal  Occupational/Educational   []  Domestic violence []  Other:     Recommended Plan of Care:  Actively being [facility] by [facility], Refer to inpatient psychiatric facilities and 1:1 Observation. Pt is reporting SI w/ plan to overdose on psychiatric medications. Denies HI/hallucinations. Findings discussed with ERP who agrees pt needs to transfer to psychiatric facility for further evaluation and stabilization. High risk on [place] scale; 1:1 sitter required. Medicare/Medicaid FFS insurance plan. Inpatient psychiatric referrals to be sent to [facility] and [facility]. Outpatient referral faxed to [facility].   *Telesitter may not be utilized for moderate or high risk patients    Has the Recommended Plan of Care/Level of Observation been reviewed with the patient's assigned nurse? Yes; high risk on [place] scale; 1:1 required; Isidro TSANG    Does patient/parent or guardian express agreement with the above plan? yes      Referral appointment(s) scheduled? N\A    Alert team only:   I have discussed findings and recommendations with Dr. Freeman who is in agreement with these recommendations.     Referral information sent to the following outpatient community providers : [facility]     Referral information sent to the following inpatient community providers : [facility], [facility]    If applicable : Referred  to  Alert Team for legal hold follow  up at (time): 7740      Yuki Robledo R.N.  3/28/2022

## 2022-03-28 NOTE — DISCHARGE PLANNING
Alert Team:    Pt has been accepted by Dr. Chavez at [facility]; requesting transport after 2pm. Contacted discharge coordinator to arrange transport. Updated ERP and RN.

## 2022-03-28 NOTE — ED NOTES
IV started, labs drawn. Pt given warm blanket and urinal. Denies any further needs at this time. ROSALIE. RUPAL.

## 2022-03-28 NOTE — ED TRIAGE NOTES
"Chief Complaint   Patient presents with   • Suicidal Ideation     Pt pastor WILSON for SI. They picked him up from [facility] "[employer] He endorses ETOH use today, a few beers. He says, \"I don't wanna live no more. I ain't got no family. I'm scared. I'm scared of living.\" Pt says his plan would be to OD with trazodone and Seroquel \"If I have enough.\" Has not attempted suicide. Pt not answering questions directly regarding his intentions to act on his plans but keeps repeating he doesn't want to live anymore.      "

## 2022-11-01 NOTE — ED PROVIDER NOTES
ED Provider Note    Scribed for Patrick Pina M.D. by Lefty Mendez. 5/21/2019, 11:02 PM.    Primary care provider: Pcp Pt States None  Means of arrival: EMS  History obtained from: Patient  History limited by: None    CHIEF COMPLAINT  Chief Complaint   Patient presents with   • Suicidal Ideation   • Fall       HPI  Chris Leggett is a 68 y.o. undomiciled male who presents to the Emergency Department for suicidal ideation.  He has felt suicidal recently and today had a plan to run in front of a truck however, he fell to the ground prior to running in front of the vehicle.  When he fell he did strike his head but denies any loss of consciousness. The patient reports an associated headache at this time. The patient last tried to commit suicide 1 month ago after which she was hospitalized at [facility] and started on medication which he has not been able to obtain. Additionally, the patient hasn't had any sleep in the last 2-3 days. He drank 4-5 beers today but does not drink alcohol on a daily basis and has no history of withdrawal seizures. He last used marijuana 1 month ago. The patient smokes a pack of cigarettes a week. He denies any other substance abuse. The patient does not reports any paraesthesias, weakness, abdominal pain, shortness of breath, chest pain, or any other pain.    REVIEW OF SYSTEMS  Pertinent positives include suicidal ideation and headache. Pertinent negatives include no loss of consciousness, paraesthesias, weakness, abdominal pain, shortness of breath, chest pain, or any other pain. As above, all other systems reviewed and are negative.   See HPI for further details.     PAST MEDICAL HISTORY   has a past medical history of Gout and Hypertension.    SURGICAL HISTORY   has a past surgical history that includes cervical disk and fusion anterior (9/2/2018) and other orthopedic surgery.    SOCIAL HISTORY  Social History   Substance Use Topics   • Smoking status: Former Smoker     Packs/day:  "0.50     Years: 5.00     Types: Cigarettes     Quit date: 1/18/2019   • Smokeless tobacco: Never Used   • Alcohol use Yes      History   Drug Use   • Types: Inhaled     Comment: marijuana       FAMILY HISTORY  Family History   Problem Relation Age of Onset   • Cancer Mother    • Alzheimer's Disease Father    • Cancer Father    • Heart Disease Sister    • Heart Disease Brother        CURRENT MEDICATIONS  Home Medications    **Home medications have not yet been reviewed for this encounter**         ALLERGIES  No Known Allergies    PHYSICAL EXAM  VITAL SIGNS: BP (!) 166/94   Pulse 71   Temp 36.8 °C (98.3 °F) (Temporal)   Resp 17   Ht 1.803 m (5' 11\")   Wt 90.7 kg (200 lb)   BMI 27.89 kg/m²   Vitals reviewed.  Constitutional: Alert in no apparent distress.  HENT: No signs of trauma, Bilateral external ears normal, Nose normal. Moist mucous membranes.  Eyes: Pupils are equal and reactive, Conjunctiva normal, Non-icteric.   Neck: Normal range of motion, No tenderness, Supple, No stridor.   Lymphatic: No lymphadenopathy noted.   Cardiovascular: Regular rate and rhythm, no murmurs.   Thorax & Lungs: Normal breath sounds, No respiratory distress, No wheezing, No chest tenderness.   Abdomen: Bowel sounds normal, Soft, No tenderness, No peritoneal signs, No masses, No pulsatile masses.   Skin: Warm, Dry, No erythema, No rash.   Back: Normal alignment.   Extremities: Intact distal pulses, No edema, No tenderness, No cyanosis  Musculoskeletal: Good range of motion in all major joints. No major deformities noted.   Neurologic: Alert, Normal motor function, Normal sensory function, No focal deficits noted.   Psychiatric: Affect normal, Judgment normal, Mood normal.     DIAGNOSTIC STUDIES / PROCEDURES    LABS  Labs Reviewed   POC BREATHALIZER - Normal   URINE DRUG SCREEN      All labs reviewed by me.    RADIOLOGY  No orders to display     The radiologist's interpretation of all radiological studies have been reviewed by " me.    COURSE & MEDICAL DECISION MAKING  Nursing notes, VS, PMSFHx reviewed in chart.  Differential diagnoses include but not limited to: Suicidal ideation, malingering    11:02 PM Patient seen and examined at bedside. Patient arrives hypertensive but afebrile with normal vital signs. Patient appears well hydrated and non-toxic. The physical exam is unremarkable.  No signs of trauma on the head.  Patient is alert and oriented.  Full strength and sensation in upper and lower extremities.  Does not appear intoxicated.  Is currently suicidal with a plan to run in front of a vehicle.  Cannot contract for safety.  Does have a history of suicidal ideation and attempt and was most recently admitted to [facility].  He is supposed to be taking medication but has not been able to obtain this because of money issues as well as his homelessness.  Ordered for urine drug screen and POC Brethalizer to evaluate.       11:24 PM Patient will be treated with 600 mg ibuprofen for headache.    Patient seen by behavioral skills who will assist in getting the patient to an inpatient psychiatric facility.    12:08 AM Upon reevaluation, patient reports his headache is not improved with ibuprofen.  Tylenol was given.    Patient is medically clear for transport to an inpatient psychiatric facility.    FINAL IMPRESSION  1. Suicidal ideation          Lefty PERSAUD (Shilpiibedna), am scribing for, and in the presence of, Patrick Pina M.D..    Electronically signed by: Lefty Mendez (Delilah), 5/21/2019    Patrick PERSAUD M.D. personally performed the services described in this documentation, as scribed by Lefty Mendez in my presence, and it is both accurate and complete.    The note accurately reflects work and decisions made by me.  Patrick Pina  5/22/2019  1:23 AM    C   Post-Care Instructions: Patient instructed to apply ice to reduce swelling.

## 2022-11-04 ENCOUNTER — PATIENT MESSAGE (OUTPATIENT)
Dept: HEALTH INFORMATION MANAGEMENT | Facility: OTHER | Age: 72
End: 2022-11-04

## 2023-05-18 ENCOUNTER — HOSPITAL ENCOUNTER (EMERGENCY)
Facility: MEDICAL CENTER | Age: 73
End: 2023-05-19
Attending: STUDENT IN AN ORGANIZED HEALTH CARE EDUCATION/TRAINING PROGRAM
Payer: MEDICARE

## 2023-05-18 DIAGNOSIS — R45.851 SUICIDAL IDEATION: ICD-10-CM

## 2023-05-18 DIAGNOSIS — T50.902A INTENTIONAL DRUG OVERDOSE, INITIAL ENCOUNTER (HCC): ICD-10-CM

## 2023-05-18 LAB
ALBUMIN SERPL BCP-MCNC: 3.9 G/DL (ref 3.2–4.9)
ALBUMIN/GLOB SERPL: 1.5 G/DL
ALP SERPL-CCNC: 74 U/L (ref 30–99)
ALT SERPL-CCNC: 34 U/L (ref 2–50)
AMPHET UR QL SCN: NEGATIVE
ANION GAP SERPL CALC-SCNC: 11 MMOL/L (ref 7–16)
APAP SERPL-MCNC: <5 UG/ML (ref 10–30)
AST SERPL-CCNC: 74 U/L (ref 12–45)
BARBITURATES UR QL SCN: NEGATIVE
BASOPHILS # BLD AUTO: 1 % (ref 0–1.8)
BASOPHILS # BLD: 0.03 K/UL (ref 0–0.12)
BENZODIAZ UR QL SCN: NEGATIVE
BILIRUB SERPL-MCNC: 0.4 MG/DL (ref 0.1–1.5)
BUN SERPL-MCNC: 7 MG/DL (ref 8–22)
BZE UR QL SCN: NEGATIVE
CALCIUM ALBUM COR SERPL-MCNC: 8.2 MG/DL (ref 8.5–10.5)
CALCIUM SERPL-MCNC: 8.1 MG/DL (ref 8.5–10.5)
CANNABINOIDS UR QL SCN: POSITIVE
CHLORIDE SERPL-SCNC: 97 MMOL/L (ref 96–112)
CO2 SERPL-SCNC: 23 MMOL/L (ref 20–33)
CREAT SERPL-MCNC: 0.58 MG/DL (ref 0.5–1.4)
EKG IMPRESSION: NORMAL
EOSINOPHIL # BLD AUTO: 0.13 K/UL (ref 0–0.51)
EOSINOPHIL NFR BLD: 4.1 % (ref 0–6.9)
ERYTHROCYTE [DISTWIDTH] IN BLOOD BY AUTOMATED COUNT: 48.4 FL (ref 35.9–50)
FENTANYL UR QL: NEGATIVE
FLUAV RNA SPEC QL NAA+PROBE: NEGATIVE
FLUBV RNA SPEC QL NAA+PROBE: NEGATIVE
GFR SERPLBLD CREATININE-BSD FMLA CKD-EPI: 104 ML/MIN/1.73 M 2
GLOBULIN SER CALC-MCNC: 2.6 G/DL (ref 1.9–3.5)
GLUCOSE SERPL-MCNC: 78 MG/DL (ref 65–99)
HCT VFR BLD AUTO: 33 % (ref 42–52)
HGB BLD-MCNC: 11.4 G/DL (ref 14–18)
IMM GRANULOCYTES # BLD AUTO: 0.01 K/UL (ref 0–0.11)
IMM GRANULOCYTES NFR BLD AUTO: 0.3 % (ref 0–0.9)
LYMPHOCYTES # BLD AUTO: 0.72 K/UL (ref 1–4.8)
LYMPHOCYTES NFR BLD: 22.9 % (ref 22–41)
MCH RBC QN AUTO: 33.3 PG (ref 27–33)
MCHC RBC AUTO-ENTMCNC: 34.5 G/DL (ref 33.7–35.3)
MCV RBC AUTO: 96.5 FL (ref 81.4–97.8)
METHADONE UR QL SCN: NEGATIVE
MONOCYTES # BLD AUTO: 0.24 K/UL (ref 0–0.85)
MONOCYTES NFR BLD AUTO: 7.6 % (ref 0–13.4)
NEUTROPHILS # BLD AUTO: 2.02 K/UL (ref 1.82–7.42)
NEUTROPHILS NFR BLD: 64.1 % (ref 44–72)
NRBC # BLD AUTO: 0 K/UL
NRBC BLD-RTO: 0 /100 WBC
OPIATES UR QL SCN: NEGATIVE
OXYCODONE UR QL SCN: NEGATIVE
PCP UR QL SCN: NEGATIVE
PLATELET # BLD AUTO: 67 K/UL (ref 164–446)
PMV BLD AUTO: 10.6 FL (ref 9–12.9)
POC BREATHALIZER: 0.12 PERCENT (ref 0–0.01)
POTASSIUM SERPL-SCNC: 4.3 MMOL/L (ref 3.6–5.5)
PROPOXYPH UR QL SCN: NEGATIVE
PROT SERPL-MCNC: 6.5 G/DL (ref 6–8.2)
RBC # BLD AUTO: 3.42 M/UL (ref 4.7–6.1)
RSV RNA SPEC QL NAA+PROBE: NEGATIVE
SALICYLATES SERPL-MCNC: <1 MG/DL (ref 15–25)
SARS-COV-2 RNA RESP QL NAA+PROBE: NOTDETECTED
SODIUM SERPL-SCNC: 131 MMOL/L (ref 135–145)
SPECIMEN SOURCE: NORMAL
WBC # BLD AUTO: 3.2 K/UL (ref 4.8–10.8)

## 2023-05-18 PROCEDURE — 0241U HCHG SARS-COV-2 COVID-19 NFCT DS RESP RNA 4 TRGT MIC: CPT

## 2023-05-18 PROCEDURE — 80307 DRUG TEST PRSMV CHEM ANLYZR: CPT

## 2023-05-18 PROCEDURE — 80053 COMPREHEN METABOLIC PANEL: CPT

## 2023-05-18 PROCEDURE — 80143 DRUG ASSAY ACETAMINOPHEN: CPT

## 2023-05-18 PROCEDURE — 90791 PSYCH DIAGNOSTIC EVALUATION: CPT

## 2023-05-18 PROCEDURE — 96374 THER/PROPH/DIAG INJ IV PUSH: CPT

## 2023-05-18 PROCEDURE — 700111 HCHG RX REV CODE 636 W/ 250 OVERRIDE (IP): Performed by: EMERGENCY MEDICINE

## 2023-05-18 PROCEDURE — 93005 ELECTROCARDIOGRAM TRACING: CPT | Performed by: STUDENT IN AN ORGANIZED HEALTH CARE EDUCATION/TRAINING PROGRAM

## 2023-05-18 PROCEDURE — A9270 NON-COVERED ITEM OR SERVICE: HCPCS

## 2023-05-18 PROCEDURE — A9270 NON-COVERED ITEM OR SERVICE: HCPCS | Performed by: STUDENT IN AN ORGANIZED HEALTH CARE EDUCATION/TRAINING PROGRAM

## 2023-05-18 PROCEDURE — C9803 HOPD COVID-19 SPEC COLLECT: HCPCS | Performed by: EMERGENCY MEDICINE

## 2023-05-18 PROCEDURE — 80179 DRUG ASSAY SALICYLATE: CPT

## 2023-05-18 PROCEDURE — A9270 NON-COVERED ITEM OR SERVICE: HCPCS | Performed by: EMERGENCY MEDICINE

## 2023-05-18 PROCEDURE — 700102 HCHG RX REV CODE 250 W/ 637 OVERRIDE(OP)

## 2023-05-18 PROCEDURE — 99285 EMERGENCY DEPT VISIT HI MDM: CPT

## 2023-05-18 PROCEDURE — 85025 COMPLETE CBC W/AUTO DIFF WBC: CPT

## 2023-05-18 PROCEDURE — 302970 POC BREATHALIZER: Performed by: STUDENT IN AN ORGANIZED HEALTH CARE EDUCATION/TRAINING PROGRAM

## 2023-05-18 PROCEDURE — 36415 COLL VENOUS BLD VENIPUNCTURE: CPT

## 2023-05-18 PROCEDURE — 700105 HCHG RX REV CODE 258: Performed by: STUDENT IN AN ORGANIZED HEALTH CARE EDUCATION/TRAINING PROGRAM

## 2023-05-18 PROCEDURE — 700102 HCHG RX REV CODE 250 W/ 637 OVERRIDE(OP): Performed by: EMERGENCY MEDICINE

## 2023-05-18 PROCEDURE — 700102 HCHG RX REV CODE 250 W/ 637 OVERRIDE(OP): Performed by: STUDENT IN AN ORGANIZED HEALTH CARE EDUCATION/TRAINING PROGRAM

## 2023-05-18 RX ORDER — TRAZODONE HYDROCHLORIDE 150 MG/1
150 TABLET ORAL EVERY EVENING
COMMUNITY

## 2023-05-18 RX ORDER — ACETAMINOPHEN 325 MG/1
650 TABLET ORAL ONCE
Status: COMPLETED | OUTPATIENT
Start: 2023-05-18 | End: 2023-05-18

## 2023-05-18 RX ORDER — QUETIAPINE FUMARATE 400 MG/1
400 TABLET, FILM COATED ORAL EVERY EVENING
Status: ON HOLD | COMMUNITY
End: 2023-09-22

## 2023-05-18 RX ORDER — BISACODYL 5 MG
10 TABLET, DELAYED RELEASE (ENTERIC COATED) ORAL
Status: SHIPPED | COMMUNITY
End: 2023-09-15

## 2023-05-18 RX ORDER — GABAPENTIN 600 MG/1
600 TABLET ORAL 3 TIMES DAILY
COMMUNITY

## 2023-05-18 RX ORDER — MIRTAZAPINE 15 MG/1
15 TABLET, FILM COATED ORAL EVERY EVENING
Status: ON HOLD | COMMUNITY
End: 2023-09-22

## 2023-05-18 RX ORDER — ACETAMINOPHEN 325 MG/1
650 TABLET ORAL EVERY 4 HOURS PRN
Status: DISCONTINUED | OUTPATIENT
Start: 2023-05-18 | End: 2023-05-19 | Stop reason: HOSPADM

## 2023-05-18 RX ORDER — IBUPROFEN 200 MG
400 TABLET ORAL EVERY 6 HOURS PRN
Status: SHIPPED | COMMUNITY
End: 2023-09-15

## 2023-05-18 RX ORDER — TAMSULOSIN HYDROCHLORIDE 0.4 MG/1
0.4 CAPSULE ORAL DAILY
Status: DISCONTINUED | OUTPATIENT
Start: 2023-05-19 | End: 2023-05-19 | Stop reason: HOSPADM

## 2023-05-18 RX ORDER — ACETAMINOPHEN 325 MG/1
650 TABLET ORAL EVERY 4 HOURS PRN
Status: SHIPPED | COMMUNITY
End: 2023-09-15

## 2023-05-18 RX ORDER — ALLOPURINOL 100 MG/1
100 TABLET ORAL DAILY
COMMUNITY

## 2023-05-18 RX ORDER — DOCUSATE SODIUM 100 MG/1
100 CAPSULE, LIQUID FILLED ORAL 2 TIMES DAILY
Status: DISCONTINUED | OUTPATIENT
Start: 2023-05-18 | End: 2023-05-19 | Stop reason: HOSPADM

## 2023-05-18 RX ORDER — PRAZOSIN HYDROCHLORIDE 2 MG/1
2 CAPSULE ORAL EVERY EVENING
Status: ON HOLD | COMMUNITY
End: 2023-09-22

## 2023-05-18 RX ORDER — TAMSULOSIN HYDROCHLORIDE 0.4 MG/1
0.4 CAPSULE ORAL DAILY
COMMUNITY

## 2023-05-18 RX ORDER — PRAZOSIN HYDROCHLORIDE 1 MG/1
2 CAPSULE ORAL EVERY EVENING
Status: DISCONTINUED | OUTPATIENT
Start: 2023-05-18 | End: 2023-05-19 | Stop reason: HOSPADM

## 2023-05-18 RX ORDER — DULOXETIN HYDROCHLORIDE 30 MG/1
30 CAPSULE, DELAYED RELEASE ORAL
COMMUNITY

## 2023-05-18 RX ORDER — ONDANSETRON 2 MG/ML
4 INJECTION INTRAMUSCULAR; INTRAVENOUS ONCE
Status: COMPLETED | OUTPATIENT
Start: 2023-05-18 | End: 2023-05-18

## 2023-05-18 RX ORDER — HYDROXYZINE PAMOATE 25 MG/1
25 CAPSULE ORAL 3 TIMES DAILY PRN
Status: SHIPPED | COMMUNITY
End: 2023-09-15

## 2023-05-18 RX ORDER — PSEUDOEPHEDRINE HCL 30 MG
100 TABLET ORAL 2 TIMES DAILY
Status: SHIPPED | COMMUNITY
End: 2023-09-15

## 2023-05-18 RX ORDER — DULOXETIN HYDROCHLORIDE 30 MG/1
30 CAPSULE, DELAYED RELEASE ORAL DAILY
Status: DISCONTINUED | OUTPATIENT
Start: 2023-05-18 | End: 2023-05-19 | Stop reason: HOSPADM

## 2023-05-18 RX ORDER — PHENOL 1.4 %
10 AEROSOL, SPRAY (ML) MUCOUS MEMBRANE EVERY EVENING
Status: SHIPPED | COMMUNITY
End: 2023-09-15

## 2023-05-18 RX ORDER — TRAZODONE HYDROCHLORIDE 50 MG/1
100 TABLET ORAL
Status: DISCONTINUED | OUTPATIENT
Start: 2023-05-18 | End: 2023-05-19 | Stop reason: HOSPADM

## 2023-05-18 RX ORDER — ALLOPURINOL 100 MG/1
100 TABLET ORAL DAILY
Status: DISCONTINUED | OUTPATIENT
Start: 2023-05-19 | End: 2023-05-19 | Stop reason: HOSPADM

## 2023-05-18 RX ORDER — GABAPENTIN 300 MG/1
600 CAPSULE ORAL 3 TIMES DAILY
Status: DISCONTINUED | OUTPATIENT
Start: 2023-05-18 | End: 2023-05-19 | Stop reason: HOSPADM

## 2023-05-18 RX ORDER — SODIUM CHLORIDE 9 MG/ML
1000 INJECTION, SOLUTION INTRAVENOUS ONCE
Status: COMPLETED | OUTPATIENT
Start: 2023-05-18 | End: 2023-05-18

## 2023-05-18 RX ADMIN — ACETAMINOPHEN 650 MG: 325 TABLET, FILM COATED ORAL at 20:04

## 2023-05-18 RX ADMIN — ACETAMINOPHEN 650 MG: 325 TABLET, FILM COATED ORAL at 12:52

## 2023-05-18 RX ADMIN — ONDANSETRON 4 MG: 2 INJECTION INTRAMUSCULAR; INTRAVENOUS at 07:54

## 2023-05-18 RX ADMIN — SODIUM CHLORIDE 1000 ML: 9 INJECTION, SOLUTION INTRAVENOUS at 05:44

## 2023-05-18 RX ADMIN — TRAZODONE HYDROCHLORIDE 100 MG: 50 TABLET ORAL at 21:42

## 2023-05-18 RX ADMIN — ACETAMINOPHEN 650 MG: 325 TABLET, FILM COATED ORAL at 05:44

## 2023-05-18 RX ADMIN — DULOXETINE HYDROCHLORIDE 30 MG: 30 CAPSULE, DELAYED RELEASE ORAL at 11:39

## 2023-05-18 RX ADMIN — GABAPENTIN 600 MG: 300 CAPSULE ORAL at 19:05

## 2023-05-18 RX ADMIN — PRAZOSIN HYDROCHLORIDE 2 MG: 1 CAPSULE ORAL at 19:05

## 2023-05-18 RX ADMIN — DOCUSATE SODIUM 100 MG: 100 CAPSULE, LIQUID FILLED ORAL at 19:05

## 2023-05-18 ASSESSMENT — PAIN DESCRIPTION - PAIN TYPE: TYPE: ACUTE PAIN

## 2023-05-18 ASSESSMENT — FIBROSIS 4 INDEX: FIB4 SCORE: 11.62

## 2023-05-18 NOTE — ED NOTES
Bedside report received. Pt updated to poc.   Voiding by urinal given additional warm blanket. Sitter remains at kwong.

## 2023-05-18 NOTE — DISCHARGE PLANNING
Alert Team/Behavioral Health   Note:        - [facility]: Needs RAPID Covid test. If RAPID Covid test result is negative, they are interested in accepting patient @ 2230.     Also, [facility] infection form needs to be filled out & faxed and nurse-to-nurse report needs to be called in at [phone].     Notified Attending Provider (Samson CALVO), Bedside RN (Akshat DALTON), Alert Team RN (Selina TOBIN), and Alert Team DPA (Martha CRAVEN) via Voalte message.

## 2023-05-18 NOTE — CONSULTS
[facility]   TRIAGE ASSESSMENT    Name: Chris Leggett  MRN: [id number]  : 1950  Age: 72 y.o.  Date of assessment: 2023  PCP: Pcp Not In Computer  Persons in attendance: Patient  Patient Location: [facility]    CHIEF COMPLAINT/PRESENTING ISSUE (as stated by patient):  Pt is a 71 y/o male BIB [facility] reporting SI and states he attempted to end his life by ingesting 9 seroquel 400mg pills and 4 trazodone 150mg pills tonight. Pt states he also attempted to get some fentanyl earlier tonight. Pt denies HI/hallucinations. Hx of chronic SI and multiple suicide attempts. Hx of depression and alcohol abuse d/o. Pt is not currently engaged in outpatient psychiatry/therapy; poor follow-up with [facility] for appointments; non-compliance with medication regimen reported. Reports occasional marijuana use; UDS +thc. Breathalyzer on arrival to ED was 0.120. Pt states he lives at a group home called [facility]. Reports all his family has passed away except his nephew in [place] who he has been unable to get ahold of. Receives $800 SSDI. Reports limited social supports in the area. Unable to contract for safety and keep stating he wants to die. Findings discussed with ERP who agrees pt needs to transfer to an inpatient psychiatric facility for further evaluation and stabilization. Medicare insurance plan-out of lifetime psych days. Inpatient psychiatric referrals faxed to [facility], [facility], [facility]. Outpatient psychiatric referral faxed to [facility]. No phone calls, visitors, or personal belongings. Psychiatry consult ordered.   Chief Complaint   Patient presents with    Suicidal Ideation        CURRENT LIVING SITUATION/SOCIAL SUPPORT/FINANCIAL RESOURCES: Pt states he lives at a group home called [facility]. Reports all his family has passed away except his nephew in [place] who he has been unable to get ahold of. Receives  $800 SSDI. Reports limited social supports in the area.     BEHAVIORAL HEALTH/SUBSTANCE USE TREATMENT HISTORY  Does patient/parent report a history of prior behavioral health/substance use treatment for patient?   Yes:    Dates Level of Care Facilty/Provider Diagnosis/Problem Medications    10/2020 Inpt  [facility] SI/etoh Seroquel, Trazadone    8/2020 inpt  [facility]  SI/etoh  uncertain medication compliance               11/2018 x1 month, 3/2019, 4/2010,  03/2022 inpt [facility] SI/etoh Went off meds after DC'd and didn't f/u                12/2018, 1/2019 x2, 4/2019, 5/2019 inpt [facility] SI/etoh                 4/2019 inpt [facility] SI/etoh                  Current Outpatient [facility] SI/Depression  ETOH Not following up with appointments       SAFETY ASSESSMENT - SELF  Does patient acknowledge current or past symptoms of dangerousness to self or is previous history noted? yes  Does parent/significant other report patient has current or past symptoms of dangerousness to self? N\A  Does presenting problem suggest symptoms of dangerousness to self? Yes:     Past Current    Suicidal Thoughts: [x]  [x]    Suicidal Plans: [x]  [x]    Suicidal Intent: [x]  []    Suicide Attempts: [x]  [x]    Self-Injury []  []      For any boxes checked above, provide detail: Pt reporting SI and states he attempted to end his life by ingesting 9 seroquel 400mg pills and 4 trazodone 150mg pills tonight. Pt states he also attempted to get some fentanyl earlier tonight. Hx of chronic SI; hx of multiple suicide attempts.     History of suicide by family member: no  History of suicide by friend/significant other: yes - friend  Recent change in frequency/specificity/intensity of suicidal thoughts or self-harm behavior? No; chronic  Current access to firearms, medications, or other identified means of suicide/self-harm? yes - if not hospitalized  If yes, willing to  "restrict access to means of suicide/self-harm? yes - placed on legal hold and belongings secured; awaiting transfer to inpatient psychiatric facility for further evaluation and stabilization  Protective factors present:  Actively engaged in treatment and Willing to address in treatment    SAFETY ASSESSMENT - OTHERS  Does patient acknowledge current or past symptoms of aggressive behavior or risk to others or is previous history noted? no  Does parent/significant other report patient has current or past symptoms of aggressive behavior or risk to others?  N\A  Does presenting problem suggest symptoms of dangerousness to others? No; denies HI.    LEGAL HISTORY  Does patient acknowledge history of arrest/half-way/[facility] or is previous history noted? no    Crisis Safety Plan completed and copy given to patient? N\A    ABUSE/NEGLECT SCREENING  Does patient report feeling “unsafe” in his/her home, or afraid of anyone?  no  Does patient report any history of physical, sexual, or emotional abuse?  no  Does parent or significant other report any of the above? N\A  Is there evidence of neglect by self?  no  Is there evidence of neglect by a caregiver? N/A  Does the patient/parent report any history of CPS/APS/police involvement related to suspected abuse/neglect or domestic violence? no  Based on the information provided during the current assessment, is a mandated report of suspected abuse/neglect being made?  No    SUBSTANCE USE SCREENING  Yes:  Darnell all substances used in the past 30 days:      Last Use Amount   [x]   Alcohol 05/18/2023 Not specified   [x]   Marijuana Not specified Not specified   []   Heroin     []   Prescription Opioids  (used without prescription, for    recreation, or in excess of prescribed amount)     []   Other Prescription  (used without prescription, for    recreation, or in excess of prescribed amount)     []   Cocaine      []   Methamphetamine     []   \"\" drugs (ectasy, MDMA)     []   Other " substances        UDS results: +thc  Breathalyzer results: 0.120 on arrival to ED    What consequences does the patient associate with any of the above substance use and or addictive behaviors? Health problems    Risk factors for detox (check all that apply):  []  Seizures   [x]  Diaphoretic (sweating)   [x]  Tremors   []  Hallucinations   []  Increased blood pressure   []  Decreased blood pressure   []  Other   []  None      [x] Patient education on risk factors for detoxification and instructed to return to ER as needed.      MENTAL STATUS   Participation: Active verbal participation, Attentive, Engaged, and Open to feedback  Grooming: Casual  Orientation: Alert and Fully Oriented  Behavior: Calm  Eye contact: Limited  Mood: Depressed  Affect: Sad  Thought process: Logical and Goal-directed  Thought content: Within normal limits  Speech: Rate within normal limits and Volume within normal limits  Perception: Within normal limits  Memory:  No gross evidence of memory deficits  Insight: Poor  Judgment:  Poor  Other:    Collateral information:   Source:  [] Significant other present in person:   [] Significant other by telephone  [] [facility]   [x] [facility]  [x] [facility]  [x] Other: ERP    [] Unable to complete full assessment due to:  [] Acute intoxication  [] Patient declined to participate/engage  [] Patient verbally unresponsive  [] Significant cognitive deficits  [] Significant perceptual distortions or behavioral disorganization  [x] Other: N/A     CLINICAL IMPRESSIONS:  Primary:  Suicide Attempt  Secondary:  Suicidal Ideation       IDENTIFIED NEEDS/PLAN:  [Trigger DISPOSITION list for any items marked]    [x]  Imminent safety risk - self [] Imminent safety risk - others   []  Acute substance withdrawal []  Psychosis/Impaired reality testing   [x]  Mood/anxiety [x]  Substance use/Addictive behavior   [x]  Maladaptive behaviro []  Parent/child conflict   []  Family/Couples  conflict []  Biomedical   []  Housing []  Financial   []   Legal  Occupational/Educational   []  Domestic violence []  Other:     Recommended Plan of Care:  Actively being addressed by [facility] Hold and [facility], Refer to inpatient psychiatric facilities, and 1:1 Observation. Pt reporting SI and states he attempted to end his life by ingesting 9 seroquel 400mg pills and 4 trazodone 150mg pills tonight. Pt states he also attempted to get some fentanyl earlier tonight. Pt denies HI/hallucinations. Unable to contract for safety and keep stating he wants to die. Findings discussed with ERP who agrees pt needs to transfer to an inpatient psychiatric facility for further evaluation and stabilization. Medicare insurance plan-out of lifetime psych days. Inpatient psychiatric referrals faxed to [facility], [facility], [facility]. Outpatient psychiatric referral faxed to [facility]. No phone calls, visitors, or personal belongings. Psychiatry consult ordered.       Has the Recommended Plan of Care/Level of Observation been reviewed with the patient's assigned nurse? Yes; high risk on [place] scale; 1:1 sitter required    Does patient/parent or guardian express agreement with the above plan? yes      Referral appointment(s) scheduled? N\A    Alert team only:   I have discussed findings and recommendations with Dr. Pate who is in agreement with these recommendations.     Referral information sent to the following outpatient community providers : [facility]    Referral information sent to the following inpatient community providers : [facility], [facility], [facility]    If applicable : Referred  to  Alert Team for legal hold follow up at (time): 05/18/2023 @ 0300      Yuki Robledo R.N.  5/18/2023

## 2023-05-18 NOTE — ED PROVIDER NOTES
ED Provider Note    CHIEF COMPLAINT  Chief Complaint   Patient presents with    Suicidal Ideation       EXTERNAL RECORDS REVIEWED  Was here in the ER on  for alcohol use, suicidal thoughts and overdose on trazodone and Seroquel    HPI/ROS  LIMITATION TO HISTORY   None  OUTSIDE HISTORIAN(S):  None    Chris Leggett is a 72 y.o. male who presents with suicidal ideation.  Patient states that he has chronic depression and today he is particularly depressed because his brother  a year ago.  He says that he has a plan to overdose on fentanyl.  He did take some fentanyl earlier today but denies any other coingestion. He did drink alcohol.   He denies any thoughts of hurting anyone else.  He does endorse occasional hallucinations.   He has no acute medical complaints, no recent illness.  He is compliant with all his psychiatric medications.  He was recently hospitalized at [facility] for the same.     PAST MEDICAL HISTORY   has a past medical history of Arthritis, Gout, Hypertension, and Psychiatric disorder.    SURGICAL HISTORY   has a past surgical history that includes cervical disk and fusion anterior (2018); other orthopedic surgery; dx bone marrow aspirations (Left, 10/2/2019); dx bone marrow biopsies (10/2/2019); and orif, fracture, humerus (Right, 2021).    FAMILY HISTORY  Family History   Problem Relation Age of Onset    Cancer Mother     Alzheimer's Disease Father     Cancer Father     Heart Disease Sister     Heart Disease Brother        SOCIAL HISTORY  Social History     Tobacco Use    Smoking status: Every Day     Packs/day: 0.50     Years: 5.00     Pack years: 2.50     Types: Cigarettes     Last attempt to quit: 2019     Years since quittin.3    Smokeless tobacco: Never   Vaping Use    Vaping Use: Never used   Substance and Sexual Activity    Alcohol use: Yes     Alcohol/week: 10.8 - 12.0 oz     Types: 18 - 20 Cans of beer per week     Comment: weekly    Drug use: Yes      "Types: Inhaled     Comment: daily THC    Sexual activity: Not on file       CURRENT MEDICATIONS  Home Medications       Reviewed by Laurence Thacker R.N. (Registered Nurse) on 05/18/23 at 0221  Med List Status: Not Addressed     Medication Last Dose Status   albuterol 108 (90 Base) MCG/ACT Aero Soln inhalation aerosol  Active   allopurinol (ZYLOPRIM) 100 MG Tab  Active   DULoxetine (CYMBALTA) 30 MG Cap DR Particles  Active   gabapentin (NEURONTIN) 600 MG tablet  Active   lisinopril (PRINIVIL) 30 MG tablet  Active   omeprazole (PRILOSEC) 40 MG delayed-release capsule  Active   prazosin (MINIPRESS) 2 MG Cap  Active   quetiapine (SEROQUEL) 400 MG tablet  Active   tamsulosin (FLOMAX) 0.4 MG capsule  Active   traZODone (DESYREL) 150 MG Tab  Active                    ALLERGIES  No Known Allergies    PHYSICAL EXAM  VITAL SIGNS: /71   Pulse 67   Temp 36.5 °C (97.7 °F) (Temporal)   Resp 16   Ht 1.803 m (5' 11\")   Wt 99.8 kg (220 lb)   SpO2 96%   BMI 30.68 kg/m²    Constitutional: Awake and alert. Nontoxic  HENT:  Grossly normal  Eyes: Grossly normal  Neck: Normal range of motion  Cardiovascular: Normal heart rate   Thorax & Lungs: No respiratory distress  Abdomen: Nontender  Skin:  No pathologic rash.   Extremities: Well perfused  Neuro: A&o x 4, no focal deficit  Psychiatric: +ve SI      DIAGNOSTIC STUDIES / PROCEDURES    EKG  Normal rate, normal rhythm. Intervals within normal limits. No ST wave elevations or depressions.  My interpretation is normal EKG. No evidence of AV block      LABS  Results for orders placed or performed during the hospital encounter of 05/18/23   Urine Drug Screen   Result Value Ref Range    Amphetamines Urine Negative Negative    Barbiturates Negative Negative    Benzodiazepines Negative Negative    Cocaine Metabolite Negative Negative    Fentanyl, Urine Negative Negative    Methadone Negative Negative    Opiates Negative Negative    Oxycodone Negative Negative    Phencyclidine -Pcp " Negative Negative    Propoxyphene Negative Negative    Cannabinoid Metab Positive (A) Negative   POC BREATHALIZER   Result Value Ref Range    POC Breathalizer 0.120 (A) 0.00 - 0.01 Percent   EKG   Result Value Ref Range    Report       [facility] Emergency Dept.    Test Date:  2023  Pt Name:    BRANDY HUTTON                 Department: ER  MRN:        [id number]                      Room:       [facility]  Gender:     Male                         Technician: [id number]  :        1950                   Requested By:LAITH COKER  Order #:    [id number]                    Reading MD:    Measurements  Intervals                                Axis  Rate:       65                           P:          60  AL:                                      QRS:        60  QRSD:       101                          T:          54  QT:         437  QTc:        455    Interpretive Statements  AV block, complete (third degree)  Borderline low voltage, extremity leads  Compared to ECG 2021 20:00:34  Sinus rhythm no longer present  Ventricular premature complex(es) no longer present  Myocardial infarct finding no longer present           COURSE & MEDICAL DECISION MAKING    ED Observation Status? No; Patient does not meet criteria for ED Observation.     INITIAL ASSESSMENT, COURSE AND PLAN  Care Narrative: Patient is 72 y.o. male , who presents to the Emergency Department with active suicidal ideation, but without physical complaints.     Patient placed with 1:1 sitter for patient's safety. Patient arrived stable and afebrile. Physical examination detailed above. ETOH elevated. Urine drug screen positive for cannabinoids but no opiates or fentanyl. Given no physical complaints or signs of trauma or toxidrome, I saw no reason to order any additional labs or imaging.   Presentation not consistent with acute organic causes to include CNS infection, electrolyte derangement, intracranial process or endocrinopathy.      He is medically cleared and on a hold.    Behavioral Health was consulted and evaluated the patient. I discussed with Elise (Behavioral [facility]) and she agreed with initiation of the hold.    5.30AM  Patient initially denied any coingestion to me but later told behavioral health that he did actually take 9 x 400mg seroquel and 4 x 150mg trazadone.  Other than a headache he does not appear to be symptomatic.  No signs of serotonin syndrome.  EKG with normal intervals.  I therefore will order lab work and he will need to be observed to ensure medical clearance prior to transfer to psychiatric facility. He will need to be observed here for an additional two hours on cardiac monitor per poison control.  Patient endorsed to Dr. Davies who will follow-up if any abnormalities.       HYDRATION: Based on the patient's presentation of Dehydration the patient was given IV fluids. IV Hydration was used because oral hydration was not adequate alone. Upon recheck following hydration, the patient was improved.        DISPOSITION AND DISCUSSIONS  I have discussed management of the patient with the following physicians and AUGUST's:  Dr. Davies    Discussion of management with other [facility] or appropriate source(s): Behavioral Health see above      Escalation of care considered, and ultimately not performed:diagnostic imaging      FINAL DIAGNOSIS  1. Suicidal ideation Acute   2. Intentional drug overdose, initial encounter (HCC) Acute          Electronically signed by: Keisha Pate M.D., 5/18/2023 3:52 AM

## 2023-05-18 NOTE — DISCHARGE PLANNING
Alert Team/Behavioral Health   Note:      Mental Health Transfer    Referral: transfer to Mental Health Facility    Intervention: [facility] called to accept patient.    Patient's accepting provider is Nan CALVO.    Transport arranged through [facility] ambulance.    The patient will be picked up @ 2230.    Notified Attending Provider (Steve CALVO), Bedside RN (Akshat DALTON), and Alert Team RN (Selina TOBIN) via Voalte message of the departure time as well as accepting facility.    [facility] P[facility] form scanned in .    Transfer packet to be created and placed in chart with filled out COBRA form.    Plan: transfer to [facility] via [facility] ambulance for acute inpatient psychiatric care.

## 2023-05-18 NOTE — ED TRIAGE NOTES
"Chief Complaint   Patient presents with    Suicidal Ideation     Pt BIB EMS for above complaint. Pt states he has no family left, is depressed, and \"wants to die\". Pt states his plan is to OD on fentanyl but denies access to fentanyl. Per EMS pt was discharged from [facility] for same earlier today. +ETOH, pt reports drinking 6-8 beers.     SI protocol ordered, pt changed into gown and all personal belongings removed from room.     Chart up for ERP.   "

## 2023-05-18 NOTE — ED NOTES
Pt initially denied taking any medications to this RN and ERP. Pt endorsed taking 9 400mg Seroquel and 4 150mg trazodone tablets between 5745-0901 on 5/17 to Yuki from alert team.     PIV placed blood drawn and sent to lab. NS infusing, pt medicated for pain per MAR.     VS monitoring initiated.

## 2023-05-18 NOTE — ED NOTES
Med rec completed per patient at bedside, patient's pharmacy [facility] on [address] ([phone]), and MAR from [facility], where patient was admitted 4/26/2023 - 5/17/2023.  Allergies reviewed with patient. ANALI.  No outpatient antibiotics within the last 30 days.    [facility] states that patient has not picked up any of the medications he was prescribed upon discharge from [facility], and patient denies having used any medications since discharge aside from the quetiapine and trazodone he reported that he ingested. Times of last doses on med rec updated per MAR from [facility].

## 2023-05-18 NOTE — PROGRESS NOTES
"ED Observation Progress Note    Date of Service: 05/18/23    Interval History and Interventions  The patient continues to hold in the emergency department awaiting placement in inpatient psychiatric facility.  He is being considered for renal behavioral health.  They have requested a COVID test.  This has been ordered.  Possible acceptance at 2230 this evening but awaiting final approval and final disposition.    Physical Exam  /81   Pulse 66   Temp 36.5 °C (97.7 °F) (Temporal)   Resp 18   Ht 1.803 m (5' 11\")   Wt 99.8 kg (220 lb)   SpO2 95%   BMI 30.68 kg/m² .    Constitutional: Awake and alert. Nontoxic  HENT:  Grossly normal  Eyes: Grossly normal  Neck: Normal range of motion  Cardiovascular: Normal heart rate   Thorax & Lungs: No respiratory distress  Abdomen: Nontender  Skin:  No pathologic rash.   Extremities: Well perfused  Psychiatric: Depressed mood.  Flat affect.  Labs  Results for orders placed or performed during the hospital encounter of 05/18/23   Urine Drug Screen   Result Value Ref Range    Amphetamines Urine Negative Negative    Barbiturates Negative Negative    Benzodiazepines Negative Negative    Cocaine Metabolite Negative Negative    Fentanyl, Urine Negative Negative    Methadone Negative Negative    Opiates Negative Negative    Oxycodone Negative Negative    Phencyclidine -Pcp Negative Negative    Propoxyphene Negative Negative    Cannabinoid Metab Positive (A) Negative   CBC WITH DIFFERENTIAL   Result Value Ref Range    WBC 3.2 (L) 4.8 - 10.8 K/uL    RBC 3.42 (L) 4.70 - 6.10 M/uL    Hemoglobin 11.4 (L) 14.0 - 18.0 g/dL    Hematocrit 33.0 (L) 42.0 - 52.0 %    MCV 96.5 81.4 - 97.8 fL    MCH 33.3 (H) 27.0 - 33.0 pg    MCHC 34.5 33.7 - 35.3 g/dL    RDW 48.4 35.9 - 50.0 fL    Platelet Count 67 (L) 164 - 446 K/uL    MPV 10.6 9.0 - 12.9 fL    Neutrophils-Polys 64.10 44.00 - 72.00 %    Lymphocytes 22.90 22.00 - 41.00 %    Monocytes 7.60 0.00 - 13.40 %    Eosinophils 4.10 0.00 - 6.90 %    " Basophils 1.00 0.00 - 1.80 %    Immature Granulocytes 0.30 0.00 - 0.90 %    Nucleated RBC 0.00 /100 WBC    Neutrophils (Absolute) 2.02 1.82 - 7.42 K/uL    Lymphs (Absolute) 0.72 (L) 1.00 - 4.80 K/uL    Monos (Absolute) 0.24 0.00 - 0.85 K/uL    Eos (Absolute) 0.13 0.00 - 0.51 K/uL    Baso (Absolute) 0.03 0.00 - 0.12 K/uL    Immature Granulocytes (abs) 0.01 0.00 - 0.11 K/uL    NRBC (Absolute) 0.00 K/uL   COMP METABOLIC PANEL   Result Value Ref Range    Sodium 131 (L) 135 - 145 mmol/L    Potassium 4.3 3.6 - 5.5 mmol/L    Chloride 97 96 - 112 mmol/L    Co2 23 20 - 33 mmol/L    Anion Gap 11.0 7.0 - 16.0    Glucose 78 65 - 99 mg/dL    Bun 7 (L) 8 - 22 mg/dL    Creatinine 0.58 0.50 - 1.40 mg/dL    Calcium 8.1 (L) 8.5 - 10.5 mg/dL    AST(SGOT) 74 (H) 12 - 45 U/L    ALT(SGPT) 34 2 - 50 U/L    Alkaline Phosphatase 74 30 - 99 U/L    Total Bilirubin 0.4 0.1 - 1.5 mg/dL    Albumin 3.9 3.2 - 4.9 g/dL    Total Protein 6.5 6.0 - 8.2 g/dL    Globulin 2.6 1.9 - 3.5 g/dL    A-G Ratio 1.5 g/dL   Salicylate   Result Value Ref Range    Salicylates, Quant. <1.0 (L) 15.0 - 25.0 mg/dL   ACETAMINOPHEN   Result Value Ref Range    Acetaminophen -Tylenol <5.0 (L) 10.0 - 30.0 ug/mL   ESTIMATED GFR   Result Value Ref Range    GFR (CKD-EPI) 104 >60 mL/min/1.73 m 2   CORRECTED CALCIUM   Result Value Ref Range    Correct Calcium 8.2 (L) 8.5 - 10.5 mg/dL   POC BREATHALIZER   Result Value Ref Range    POC Breathalizer 0.120 (A) 0.00 - 0.01 Percent   EKG   Result Value Ref Range    Report       [facility] Emergency Dept.    Test Date:  2023  Pt Name:    BRANDY HUTTON                 Department: ER  MRN:        [id number]                      Room:        37  Gender:     Male                         Technician: [id number]  :        1950                   Requested By:LAITH COKER  Order #:    [id number]                    Reading MD: KE EVANS MD    Measurements  Intervals                                Axis  Rate:        65                           P:          60  KY:                                      QRS:        60  QRSD:       101                          T:          54  QT:         437  QTc:        455    Interpretive Statements  AV block, complete (third degree)  Borderline low voltage, extremity leads  Compared to ECG 11/29/2021 20:00:34  Sinus rhythm no longer present  Ventricular premature complex(es) no longer present  Myocardial infarct finding no longer present  Electronically Signed On 5- 7:41:41 PDT by RASHEED EVANS MD         Radiology  No orders to display       Problem List  1. Suicidal ideation Acute   2. Intentional drug overdose, initial encounter (HCC) Acute         Electronically signed by: Rasheed Evans M.D., 5/18/2023 1:59 PM

## 2023-05-18 NOTE — CONSULTS
"PSYCHIATRIC CONSULTATION - INTAKE    DOS: 23     Reason for Admission: Presents with suicidal ideation.  Patient states that he has chronic depression and today he is particularly depressed because his brother  a year ago.  He says that he has a plan to overdose on fentanyl.    Reason for Consult: legal hold evaluation  Requesting LIP: Keisha Pate MD  Psychiatric Consultant: NORMA Alston    Legal Status: on legal hold    Chart(s) Review: active problem list, medication list, allergies, family history, social history, health maintenance, notes from last encounter, lab results    Chief Complaint:   Chief Complaint   Patient presents with    Suicidal Ideation         HPI:    Patient is a 72 year old male with Hx of depression, anxiety presenting following overdose on seroquel and trazodone.     Patient reports increasing depression for 2 months. Recently released from [facility] for similar presentation, states \"I thought I was ready.\" States was feeling hopeless, perseverating on recent losses and lack of family. Reported he overdosed on his prescriptions and then called 911.     Admits drinking alcohol, not daily use, drinks until blackout., denies Hx of SZ, DT. Admits infrequently smoking marijuana, last use was 3 weeks ago, smoking up to 1 joint.     Supporting documentation from hospital shows UDS positive for marijuana.     Reports Sx including decreased mood, energy, sleep. No change to appetite, concentration. Admits SI with plan to overdose. Patient denies thoughts of self-harm, denies HI, A/VH. Patient denies symptoms indicative of psychosis, betty/hypomania, PTSD, OCD, or ADHD.   ?    Meds Current:  No current facility-administered medications for this encounter.     Allergies: No Known Allergies         Psychiatric Exam (MSE):  /81   Pulse 67   Temp 36.5 °C (97.7 °F) (Temporal)   Resp 19   Ht 1.803 m (5' 11\")   Wt 99.8 kg (220 lb)   SpO2 94%     Constitutional: as noted " "above  General Appearance/Behavior: 72 y.o. appears stated age, intermittent eye contact cooperative, No behavioral disturbances  Abnormal Movements: none, no PMA/PMR or tremor observed.  Gait and Posture: not observed  Musculoskeletal: as noted above  Mood: \"Bad\"  Affect: Blunt   Speech: normal rate, normal rhythm, normal tone, normal volume, and normal fluency  Language:  spontaneous, comprehends spoken commands, and fluent   Thought Process: Goal Directed, Future Oriented  Thought Content: Admits SI. Denies HI. Denies A/VH, no e/o delusions, PI, or internal preoccupation.   Insight/Judgement:  fair  Alert/Orientation: alert, oriented to person, place and time  Attn/Concentration: normal  Fund of Knowledge: Average  Memory recent/remote: No gross evidence of memory deficits  MMSE: deferred this visit         Medical ROS:  Review of systems (+10 systems) unremarkable except for concerns noted by patient or items listed.  Please see HPI for additional ROS.  Pain: No  Head Injury: Denies    Past Psychiatric Hx:   Dx: Depression, anxiety   SI/SAs: Multiple, overdose, most recently 2 years ago  Hospitalizations: Multiple, [facility], [facility], [facility], [facility],   Medication Trials: Cymbalta, Trazodone, seroquel, gabapentin, prazosin   Violence/HI: Denies  Weapons: Denies access to weapons      Substance Hx:  Marijuana use: see HPI  Alcohol use: see HPI    Social History     Substance and Sexual Activity   Drug Use Yes    Types: Inhaled    Comment: daily THC     Substance Tx:   Multiple IP rehab, most recently 2 years ago    Family Psych Hx:  Family History   Problem Relation Age of Onset    Cancer Mother     Alzheimer's Disease Father     Cancer Father     Heart Disease Sister     Heart Disease Brother         Social Hx:  Social History     Tobacco Use    Smoking status: Every Day     Packs/day: 0.50     Years: 5.00     Pack years: 2.50     Types: Cigarettes     Last attempt to quit: 1/18/2019     Years since " quittin.3    Smokeless tobacco: Never   Vaping Use    Vaping Use: Never used   Substance Use Topics    Alcohol use: Yes     Alcohol/week: 10.8 - 12.0 oz     Types: 18 - 20 Cans of beer per week     Comment: weekly    Drug use: Yes     Types: Inhaled     Comment: daily THC      Housing: has housing, group home  Financial: Retires, SSI  Support: Lacks support  Education: Graduated HS  Abuse: Denies    Legal Hx:  Denies    =======================================================================================================  Past Medical Hx:  Past Medical History:   Diagnosis Date    Arthritis     Gout     Hypertension     Psychiatric disorder       Patient Active Problem List    Diagnosis Date Noted    Humeral distal fracture 2021    COPD (chronic obstructive pulmonary disease) ([facility]) 2021    Alcohol withdrawal ([facility]) 10/24/2020    Major depressive disorder 10/23/2020    Primary insomnia 2020    Traumatic rhabdomyolysis ([facility]) 2020    Tobacco abuse 2020    Generalized weakness 10/13/2019    Abdominal pain 2019    Pancytopenia ([facility]) 2019    Diarrhea of presumed infectious origin 2019    Gout 2019    Thrombocytopenia ([facility]) 2019    Localized swelling of lower extremity 2019    H/O cervical fracture 2019    Lumbar back pain with radiculopathy affecting lower extremity 2019    Alcoholism ([facility]) 2019    Essential hypertension 2018    Mood disorder ([facility]) 2018    Lumbar radiculopathy 2018    Suicidal ideation 2018    Forehead laceration 2018    Trauma 2018    Normocytic anemia 2018    Coagulopathy ([facility]) 2018       Labs:  Reviewed:   Lab Results   Component Value Date/Time    AMPHUR Negative 2023 0241    BARBSURINE Negative 2023 0241    BENZODIAZU Negative 2023 0241    COCAINEMET Negative 2023 0241    METHADONE Negative 2023 0241    OPIATES Negative 2023 0241     OXYCODN Negative 2023 0241    PCPURINE Negative 2023 0241    PROPOXY Negative 2023 0241    CANNABINOID Positive (A) 2023 024     Recent Labs     23  0553   WBC 3.2*   RBC 3.42*   HEMOGLOBIN 11.4*   HEMATOCRIT 33.0*   MCV 96.5   MCH 33.3*   RDW 48.4   PLATELETCT 67*   MPV 10.6   NEUTSPOLYS 64.10   LYMPHOCYTES 22.90   MONOCYTES 7.60   EOSINOPHILS 4.10   BASOPHILS 1.00     Recent Labs     23  0553   SODIUM 131*   POTASSIUM 4.3   CHLORIDE 97   CO2 23   GLUCOSE 78   BUN 7*     Recent Labs     23  0553   ASTSGOT 74*   ALTSGPT 34   TBILIRUBIN 0.4   GLOBULIN 2.6         EKG:   Results for orders placed or performed during the hospital encounter of 23   EKG   Result Value Ref Range    Report       [facility] Emergency Dept.    Test Date:  2023  Pt Name:    BRANDY HUTTON                 Department: ER  MRN:        [id number]                      Room:       [facility]  Gender:     Male                         Technician: [id number]  :        1950                   Requested By:LAITH COKER  Order #:    [id number]                    Reading MD: KE EVANS MD    Measurements  Intervals                                Axis  Rate:       65                           P:          60  IN:                                      QRS:        60  QRSD:       101                          T:          54  QT:         437  QTc:        455    Interpretive Statements  AV block, complete (third degree)  Borderline low voltage, extremity leads  Compared to ECG 2021 20:00:34  Sinus rhythm no longer present  Ventricular premature complex(es) no longer present  Myocardial infarct finding no longer present  Electronically Signed On 2023 7:41:41 PDT by KE EVANS MD            =======================================================================================================          Assessment:   Patient presents as cooperative, depressed mood. Admits  "SI with plan to overdose. Recently released from IP , states he was not ready to d/c. Vocalizing feelings of hopelessness, states he doesn't have family or support and \"I don't want to kill myself, but I don't want to live anymore.\" Denies HI, A/VH, no psychosis/betty reported or observed. Has housing, financially stable, has OP in place. Hx medication/Tx non-adherence, no OP therapy. Presents an acute danger to self based on SI with planning and intent. Would benefit from IP  hospitalization for stabilization.     Diagnosis:  1. Suicidal ideation Acute   2. Intentional drug overdose, initial encounter (HCC) Acute        Psychiatric:   Depression    Medical: as noted by the medical treatment team.   HTN  MI        Recommendations:  Legal Status: on legal hold  Continue legal hold Disposition per primary medical team    Please transfer patient to inpatient psychiatric hospital when medically cleared and bed is available.    Observation status:   Line of site with sitter    Visitors: No   Personal belongings: No     Discussed/voalted: SOHA Davies MD    Medication Recommendations: Final orders as per Treatment Team  Restart Cymbalta 30mg PO daily; Trazodone 100mg PO qHS; prazosin 2mg PO qHS  Hold Seroquel, s/p o/d  Risks/benefits/side effects discussed, patient verbalized understanding  Medication reconciliation was completed.    Reviewed safety plan: 911, ER, PCM, MHC, suicide crisis line, nursing staff while inpatient    Will Continue to Follow                                     "

## 2023-05-18 NOTE — ED NOTES
Poison control called on behalf of ERP. This RN spoke with [place], case number #[id number].  Per poison control, pt needs to monitored for 2 additional hours (6 hours total) with frequent VS and cardiac monitoring.   Poison control requires call back if acetaminophen or salicylate levels are detectable.

## 2023-05-18 NOTE — DISCHARGE PLANNING
Alert Team/Behavioral Health   Note:      Re-sent referral as facilities did not receive it: Sterling @ [facility], Nanci @ [facility], & Jenn @ [facility]      [facility] ALERT TEAM DISCHARGE PLANNING NOTE    Date:  5/18/23  Patient Name:  Chris Leggett - 72 y.o. - Discharge Planning  MRN:  [id number]   YOB: 1950  ADMISSION DATE:  5/18/2023     Writer forwarded referral packet for inpatient psychiatric care to the following community providers:  [facility], [facility], [facility], [facility]    Items included in the referral packet:   _x____Face Sheet   _x____Pages 1 and 2 of completed legal hold   _x____Alert Team/Psych Assessment   _____H&P   _x____UDS   _x____Blood Alcohol   _x____Vital signs   _n/a____Pregnancy Test (if applicable)   _x____Medications List   _____Covid Screen

## 2023-05-18 NOTE — ED NOTES
Patient's home medications have been reviewed by the pharmacy team.     Past Medical History:   Diagnosis Date    Arthritis     Gout     Hypertension     Psychiatric disorder        Patient's Medications   New Prescriptions    No medications on file   Previous Medications    ACETAMINOPHEN (TYLENOL) 325 MG TAB    Take 650 mg by mouth every four hours as needed for Mild Pain or Fever. 2 tablets = 650 mg.    ALLOPURINOL (ZYLOPRIM) 100 MG TAB    Take 100 mg by mouth every day.    BISACODYL EC (DULCOLAX) 5 MG TABLET DELAYED RESPONSE    Take 10 mg by mouth 1 time a day as needed for Constipation. 2 tablets = 10 mg.    DOCUSATE SODIUM 100 MG CAP    Take 100 mg by mouth 2 times a day.    DULOXETINE (CYMBALTA) 30 MG CAP DR PARTICLES    Take 30 mg by mouth with dinner.    GABAPENTIN (NEURONTIN) 300 MG CAP    Take 600 mg by mouth 3 times a day. 2 capsules = 600 mg.    HYDROCORTISONE 2.5 % CREAM TOPICAL CREAM    Apply 1 Application. topically 2 times a day as needed (Dermatitis). Apply to legs.    HYDROXYZINE PAMOATE (VISTARIL) 25 MG CAP    Take 25 mg by mouth 3 times a day as needed for Anxiety.    IBUPROFEN (MOTRIN) 200 MG TAB    Take 400 mg by mouth every 6 hours as needed for Mild Pain or Moderate Pain. 2 tablets = 400 mg.    MELATONIN 10 MG TAB    Take 10 mg by mouth every evening.    MIRTAZAPINE (REMERON) 30 MG TAB TABLET    Take 15 mg by mouth every evening. 1/2 tablet = 15 mg.    MULTIVITAMIN (THERAPEUTIC MULTIVITAMIN) TAB    Take 1 Tablet by mouth every day.    PRAZOSIN (MINIPRESS) 2 MG CAP    Take 2 mg by mouth every evening.    QUETIAPINE (SEROQUEL) 400 MG TABLET    Take 400 mg by mouth every evening.    TAMSULOSIN (FLOMAX) 0.4 MG CAPSULE    Take 0.4 mg by mouth every day.    TRAZODONE (DESYREL) 150 MG TAB    Take 150 mg by mouth every evening.   Modified Medications    No medications on file   Discontinued Medications    ALBUTEROL 108 (90 BASE) MCG/ACT AERO SOLN INHALATION AEROSOL    Inhale 2 Puffs every four hours  as needed for Shortness of Breath.    ALLOPURINOL (ZYLOPRIM) 100 MG TAB    Take 1 Tablet by mouth every day.    DULOXETINE (CYMBALTA) 30 MG CAP DR PARTICLES    Take 1 Capsule by mouth every day.    GABAPENTIN (NEURONTIN) 600 MG TABLET    Take 1 Tablet by mouth 4 times a day.    LISINOPRIL (PRINIVIL) 30 MG TABLET    Take 1 Tablet by mouth every day.    OMEPRAZOLE (PRILOSEC) 40 MG DELAYED-RELEASE CAPSULE    Take 1 Capsule by mouth every day.    PRAZOSIN (MINIPRESS) 2 MG CAP    Take 2 Capsules by mouth every evening.    QUETIAPINE (SEROQUEL) 400 MG TABLET    Take 1 Tablet by mouth at bedtime.    TAMSULOSIN (FLOMAX) 0.4 MG CAPSULE    Take 1 Capsule by mouth 2 times a day.    TRAZODONE (DESYREL) 150 MG TAB    Take 150 mg by mouth every evening.     A:  Patient reports intentionally overdosing on fentanyl and taking 9 x 400mg seroquel and 4 x 150mg trazadone.    P:    Follow psychiatry recommendations and hold quetiapine initially due to report of taking 3600mg. Resume non-psych meds.    Redd Ortiz, ZachD, BCPS

## 2023-05-18 NOTE — DISCHARGE PLANNING
Alert Team/Behavioral Health   Note:    Sent negative Covid test result (resulted @ 1522) to [facility].

## 2023-05-19 VITALS
BODY MASS INDEX: 30.8 KG/M2 | RESPIRATION RATE: 16 BRPM | TEMPERATURE: 98.1 F | OXYGEN SATURATION: 94 % | HEIGHT: 71 IN | WEIGHT: 220 LBS | SYSTOLIC BLOOD PRESSURE: 135 MMHG | DIASTOLIC BLOOD PRESSURE: 85 MMHG | HEART RATE: 63 BPM

## 2023-05-19 NOTE — ED NOTES
Bedside report received from SHARAN Oden. Pt resting comfortably with even chest rise and fall, reports no needs at this time. Sitter at bedside.

## 2023-05-19 NOTE — DISCHARGE PLANNING
[facility] ED Behavioral Health Fax Referral      [facility] ED Behavioral Health Alert Team:  [phone]    Referral: Legal Hold    Intervention: Patient referral to [facility] inpatient  facillity    Legal Hold Initiated: Date: 05/18/23 Time: 0300    Patient’s Insurance Listed on Face Sheet: Medicare    Referrals sent to:     Referrals faxed by Abilio Kaur RN    This referral contains the following information:    Infection Control    Plan: Patient will transfer to mental health facility once acceptance is obtained

## 2023-05-19 NOTE — ED NOTES
Pt medicated per MAR. Pt resting with even chest rise and fall, reports no needs at this time, call light available and in reach.

## 2023-05-19 NOTE — ED NOTES
Pt alert and speaking to staff, pt walked out with EMS with steady gait. Pt to be transferred to [place].

## 2023-09-15 ENCOUNTER — APPOINTMENT (OUTPATIENT)
Dept: RADIOLOGY | Facility: MEDICAL CENTER | Age: 73
DRG: 101 | End: 2023-09-15
Attending: EMERGENCY MEDICINE
Payer: MEDICARE

## 2023-09-15 ENCOUNTER — HOSPITAL ENCOUNTER (OUTPATIENT)
Facility: MEDICAL CENTER | Age: 73
DRG: 101 | End: 2023-09-17
Attending: EMERGENCY MEDICINE | Admitting: INTERNAL MEDICINE
Payer: MEDICARE

## 2023-09-15 DIAGNOSIS — R56.9 SEIZURE (HCC): ICD-10-CM

## 2023-09-15 DIAGNOSIS — I10 ESSENTIAL HYPERTENSION: ICD-10-CM

## 2023-09-15 DIAGNOSIS — D61.818 PANCYTOPENIA (HCC): ICD-10-CM

## 2023-09-15 DIAGNOSIS — R42 DIZZINESS: ICD-10-CM

## 2023-09-15 PROBLEM — Z78.9 FULL CODE STATUS: Status: ACTIVE | Noted: 2023-09-15

## 2023-09-15 LAB
AMMONIA PLAS-SCNC: 15 UMOL/L (ref 11–45)
ANION GAP SERPL CALC-SCNC: 12 MMOL/L (ref 7–16)
BASOPHILS # BLD AUTO: 0.7 % (ref 0–1.8)
BASOPHILS # BLD: 0.01 K/UL (ref 0–0.12)
BUN SERPL-MCNC: 5 MG/DL (ref 8–22)
CALCIUM SERPL-MCNC: 8.7 MG/DL (ref 8.5–10.5)
CHLORIDE SERPL-SCNC: 101 MMOL/L (ref 96–112)
CK SERPL-CCNC: 105 U/L (ref 0–154)
CO2 SERPL-SCNC: 23 MMOL/L (ref 20–33)
CREAT SERPL-MCNC: 0.75 MG/DL (ref 0.5–1.4)
CRP SERPL HS-MCNC: <0.3 MG/DL (ref 0–0.75)
EKG IMPRESSION: NORMAL
EOSINOPHIL # BLD AUTO: 0.11 K/UL (ref 0–0.51)
EOSINOPHIL NFR BLD: 7.7 % (ref 0–6.9)
ERYTHROCYTE [DISTWIDTH] IN BLOOD BY AUTOMATED COUNT: 52.1 FL (ref 35.9–50)
ETHANOL BLD-MCNC: <10.1 MG/DL
GFR SERPLBLD CREATININE-BSD FMLA CKD-EPI: 96 ML/MIN/1.73 M 2
GLUCOSE SERPL-MCNC: 103 MG/DL (ref 65–99)
HCT VFR BLD AUTO: 38.1 % (ref 42–52)
HGB BLD-MCNC: 12.5 G/DL (ref 14–18)
IMM GRANULOCYTES # BLD AUTO: 0.01 K/UL (ref 0–0.11)
IMM GRANULOCYTES NFR BLD AUTO: 0.7 % (ref 0–0.9)
INR PPP: 1.29 (ref 0.87–1.13)
LACTATE SERPL-SCNC: 1.8 MMOL/L (ref 0.5–2)
LYMPHOCYTES # BLD AUTO: 0.46 K/UL (ref 1–4.8)
LYMPHOCYTES NFR BLD: 32.4 % (ref 22–41)
MAGNESIUM SERPL-MCNC: 1.9 MG/DL (ref 1.5–2.5)
MCH RBC QN AUTO: 31.8 PG (ref 27–33)
MCHC RBC AUTO-ENTMCNC: 32.8 G/DL (ref 32.3–36.5)
MCV RBC AUTO: 96.9 FL (ref 81.4–97.8)
MONOCYTES # BLD AUTO: 0.11 K/UL (ref 0–0.85)
MONOCYTES NFR BLD AUTO: 7.7 % (ref 0–13.4)
NEUTROPHILS # BLD AUTO: 0.72 K/UL (ref 1.82–7.42)
NEUTROPHILS NFR BLD: 50.8 % (ref 44–72)
NRBC # BLD AUTO: 0 K/UL
NRBC BLD-RTO: 0 /100 WBC (ref 0–0.2)
PLATELET # BLD AUTO: 34 K/UL (ref 164–446)
PLATELETS.RETICULATED NFR BLD AUTO: 5.9 % (ref 0.6–13.1)
PMV BLD AUTO: 10.5 FL (ref 9–12.9)
POTASSIUM SERPL-SCNC: 4 MMOL/L (ref 3.6–5.5)
PROTHROMBIN TIME: 16.2 SEC (ref 12–14.6)
RBC # BLD AUTO: 3.93 M/UL (ref 4.7–6.1)
SODIUM SERPL-SCNC: 136 MMOL/L (ref 135–145)
TSH SERPL DL<=0.005 MIU/L-ACNC: 0.72 UIU/ML (ref 0.38–5.33)
VIT B12 SERPL-MCNC: 594 PG/ML (ref 211–911)
WBC # BLD AUTO: 1.4 K/UL (ref 4.8–10.8)

## 2023-09-15 PROCEDURE — 36415 COLL VENOUS BLD VENIPUNCTURE: CPT

## 2023-09-15 PROCEDURE — 99285 EMERGENCY DEPT VISIT HI MDM: CPT

## 2023-09-15 PROCEDURE — 82140 ASSAY OF AMMONIA: CPT

## 2023-09-15 PROCEDURE — 85055 RETICULATED PLATELET ASSAY: CPT

## 2023-09-15 PROCEDURE — 82607 VITAMIN B-12: CPT

## 2023-09-15 PROCEDURE — 82077 ASSAY SPEC XCP UR&BREATH IA: CPT

## 2023-09-15 PROCEDURE — 86140 C-REACTIVE PROTEIN: CPT

## 2023-09-15 PROCEDURE — 96374 THER/PROPH/DIAG INJ IV PUSH: CPT

## 2023-09-15 PROCEDURE — 95816 EEG AWAKE AND DROWSY: CPT | Mod: 26 | Performed by: PSYCHIATRY & NEUROLOGY

## 2023-09-15 PROCEDURE — 82550 ASSAY OF CK (CPK): CPT

## 2023-09-15 PROCEDURE — 95816 EEG AWAKE AND DROWSY: CPT | Performed by: PSYCHIATRY & NEUROLOGY

## 2023-09-15 PROCEDURE — G0378 HOSPITAL OBSERVATION PER HR: HCPCS

## 2023-09-15 PROCEDURE — 70450 CT HEAD/BRAIN W/O DYE: CPT

## 2023-09-15 PROCEDURE — 86038 ANTINUCLEAR ANTIBODIES: CPT

## 2023-09-15 PROCEDURE — 80048 BASIC METABOLIC PNL TOTAL CA: CPT

## 2023-09-15 PROCEDURE — 84155 ASSAY OF PROTEIN SERUM: CPT

## 2023-09-15 PROCEDURE — 85610 PROTHROMBIN TIME: CPT

## 2023-09-15 PROCEDURE — 83735 ASSAY OF MAGNESIUM: CPT

## 2023-09-15 PROCEDURE — 84443 ASSAY THYROID STIM HORMONE: CPT

## 2023-09-15 PROCEDURE — 93005 ELECTROCARDIOGRAM TRACING: CPT

## 2023-09-15 PROCEDURE — 700102 HCHG RX REV CODE 250 W/ 637 OVERRIDE(OP): Performed by: INTERNAL MEDICINE

## 2023-09-15 PROCEDURE — A9270 NON-COVERED ITEM OR SERVICE: HCPCS | Performed by: INTERNAL MEDICINE

## 2023-09-15 PROCEDURE — 84207 ASSAY OF VITAMIN B-6: CPT

## 2023-09-15 PROCEDURE — 99222 1ST HOSP IP/OBS MODERATE 55: CPT | Mod: 25 | Performed by: PSYCHIATRY & NEUROLOGY

## 2023-09-15 PROCEDURE — 84165 PROTEIN E-PHORESIS SERUM: CPT

## 2023-09-15 PROCEDURE — 700105 HCHG RX REV CODE 258: Performed by: EMERGENCY MEDICINE

## 2023-09-15 PROCEDURE — 83605 ASSAY OF LACTIC ACID: CPT

## 2023-09-15 PROCEDURE — 700111 HCHG RX REV CODE 636 W/ 250 OVERRIDE (IP)

## 2023-09-15 PROCEDURE — 99223 1ST HOSP IP/OBS HIGH 75: CPT | Performed by: INTERNAL MEDICINE

## 2023-09-15 PROCEDURE — 85025 COMPLETE CBC W/AUTO DIFF WBC: CPT

## 2023-09-15 RX ORDER — TAMSULOSIN HYDROCHLORIDE 0.4 MG/1
0.4 CAPSULE ORAL DAILY
Status: DISCONTINUED | OUTPATIENT
Start: 2023-09-16 | End: 2023-09-17 | Stop reason: HOSPADM

## 2023-09-15 RX ORDER — SODIUM CHLORIDE 9 MG/ML
1000 INJECTION, SOLUTION INTRAVENOUS ONCE
Status: COMPLETED | OUTPATIENT
Start: 2023-09-15 | End: 2023-09-15

## 2023-09-15 RX ORDER — LORAZEPAM 2 MG/ML
4 INJECTION INTRAMUSCULAR ONCE
Status: COMPLETED | OUTPATIENT
Start: 2023-09-15 | End: 2023-09-15

## 2023-09-15 RX ORDER — LORAZEPAM 2 MG/ML
2 INJECTION INTRAMUSCULAR EVERY 4 HOURS PRN
Status: DISCONTINUED | OUTPATIENT
Start: 2023-09-15 | End: 2023-09-17 | Stop reason: HOSPADM

## 2023-09-15 RX ORDER — TRAZODONE HYDROCHLORIDE 100 MG/1
150 TABLET ORAL EVERY EVENING
Status: DISCONTINUED | OUTPATIENT
Start: 2023-09-15 | End: 2023-09-17 | Stop reason: HOSPADM

## 2023-09-15 RX ORDER — BISACODYL 10 MG
10 SUPPOSITORY, RECTAL RECTAL
Status: DISCONTINUED | OUTPATIENT
Start: 2023-09-15 | End: 2023-09-17 | Stop reason: HOSPADM

## 2023-09-15 RX ORDER — DULOXETIN HYDROCHLORIDE 30 MG/1
30 CAPSULE, DELAYED RELEASE ORAL
Status: DISCONTINUED | OUTPATIENT
Start: 2023-09-15 | End: 2023-09-17 | Stop reason: HOSPADM

## 2023-09-15 RX ORDER — POLYETHYLENE GLYCOL 3350 17 G/17G
1 POWDER, FOR SOLUTION ORAL
Status: DISCONTINUED | OUTPATIENT
Start: 2023-09-15 | End: 2023-09-17 | Stop reason: HOSPADM

## 2023-09-15 RX ORDER — NICOTINE 21 MG/24HR
14 PATCH, TRANSDERMAL 24 HOURS TRANSDERMAL
Status: DISCONTINUED | OUTPATIENT
Start: 2023-09-15 | End: 2023-09-17 | Stop reason: HOSPADM

## 2023-09-15 RX ORDER — ACETAMINOPHEN 325 MG/1
650 TABLET ORAL EVERY 6 HOURS PRN
Status: DISCONTINUED | OUTPATIENT
Start: 2023-09-15 | End: 2023-09-17 | Stop reason: HOSPADM

## 2023-09-15 RX ORDER — GABAPENTIN 300 MG/1
600 CAPSULE ORAL 3 TIMES DAILY
Status: DISCONTINUED | OUTPATIENT
Start: 2023-09-15 | End: 2023-09-17 | Stop reason: HOSPADM

## 2023-09-15 RX ORDER — ALLOPURINOL 100 MG/1
100 TABLET ORAL DAILY
Status: DISCONTINUED | OUTPATIENT
Start: 2023-09-16 | End: 2023-09-17 | Stop reason: HOSPADM

## 2023-09-15 RX ORDER — AMOXICILLIN 250 MG
2 CAPSULE ORAL 2 TIMES DAILY
Status: DISCONTINUED | OUTPATIENT
Start: 2023-09-15 | End: 2023-09-17 | Stop reason: HOSPADM

## 2023-09-15 RX ORDER — PRAZOSIN HYDROCHLORIDE 2 MG/1
2 CAPSULE ORAL EVERY EVENING
Status: DISCONTINUED | OUTPATIENT
Start: 2023-09-15 | End: 2023-09-17 | Stop reason: HOSPADM

## 2023-09-15 RX ORDER — LORAZEPAM 2 MG/ML
INJECTION INTRAMUSCULAR
Status: COMPLETED
Start: 2023-09-15 | End: 2023-09-15

## 2023-09-15 RX ORDER — MIRTAZAPINE 15 MG/1
15 TABLET, FILM COATED ORAL EVERY EVENING
Status: DISCONTINUED | OUTPATIENT
Start: 2023-09-15 | End: 2023-09-17 | Stop reason: HOSPADM

## 2023-09-15 RX ORDER — QUETIAPINE FUMARATE 200 MG/1
400 TABLET, FILM COATED ORAL EVERY EVENING
Status: DISCONTINUED | OUTPATIENT
Start: 2023-09-15 | End: 2023-09-17 | Stop reason: HOSPADM

## 2023-09-15 RX ORDER — HYDRALAZINE HYDROCHLORIDE 20 MG/ML
10 INJECTION INTRAMUSCULAR; INTRAVENOUS EVERY 4 HOURS PRN
Status: DISCONTINUED | OUTPATIENT
Start: 2023-09-15 | End: 2023-09-17 | Stop reason: HOSPADM

## 2023-09-15 RX ADMIN — PRAZOSIN HYDROCHLORIDE 2 MG: 2 CAPSULE ORAL at 17:21

## 2023-09-15 RX ADMIN — DULOXETINE HYDROCHLORIDE 30 MG: 30 CAPSULE, DELAYED RELEASE ORAL at 17:23

## 2023-09-15 RX ADMIN — GABAPENTIN 600 MG: 300 CAPSULE ORAL at 17:20

## 2023-09-15 RX ADMIN — SODIUM CHLORIDE 1000 ML: 9 INJECTION, SOLUTION INTRAVENOUS at 09:54

## 2023-09-15 RX ADMIN — ACETAMINOPHEN 650 MG: 325 TABLET, FILM COATED ORAL at 16:13

## 2023-09-15 RX ADMIN — TRAZODONE HYDROCHLORIDE 150 MG: 100 TABLET ORAL at 17:25

## 2023-09-15 RX ADMIN — QUETIAPINE FUMARATE 400 MG: 200 TABLET ORAL at 19:55

## 2023-09-15 RX ADMIN — LORAZEPAM 1 MG: 2 INJECTION INTRAMUSCULAR at 12:14

## 2023-09-15 RX ADMIN — MIRTAZAPINE 15 MG: 15 TABLET, FILM COATED ORAL at 17:25

## 2023-09-15 RX ADMIN — LORAZEPAM 1 MG: 2 INJECTION INTRAMUSCULAR; INTRAVENOUS at 12:14

## 2023-09-15 RX ADMIN — SENNOSIDES AND DOCUSATE SODIUM 2 TABLET: 50; 8.6 TABLET ORAL at 17:21

## 2023-09-15 ASSESSMENT — ENCOUNTER SYMPTOMS
SPEECH CHANGE: 0
LOSS OF CONSCIOUSNESS: 1
DOUBLE VISION: 0
BACK PAIN: 0
ABDOMINAL PAIN: 0
HALLUCINATIONS: 0
DIARRHEA: 0
COUGH: 0
PALPITATIONS: 0
NAUSEA: 0
NECK PAIN: 0
SEIZURES: 1
TREMORS: 0
ORTHOPNEA: 0
PHOTOPHOBIA: 0
FEVER: 0
SPUTUM PRODUCTION: 0
FOCAL WEAKNESS: 0
MYALGIAS: 0
HEADACHES: 0
SENSORY CHANGE: 0
CONSTIPATION: 0
DIZZINESS: 0
CHILLS: 0
WEIGHT LOSS: 0
SHORTNESS OF BREATH: 0
TINGLING: 0
EYE PAIN: 0
BLURRED VISION: 0
VOMITING: 0

## 2023-09-15 ASSESSMENT — COGNITIVE AND FUNCTIONAL STATUS - GENERAL
MOBILITY SCORE: 16
MOVING FROM LYING ON BACK TO SITTING ON SIDE OF FLAT BED: A LOT
DRESSING REGULAR UPPER BODY CLOTHING: A LOT
TOILETING: A LOT
CLIMB 3 TO 5 STEPS WITH RAILING: A LOT
SUGGESTED CMS G CODE MODIFIER MOBILITY: CK
DRESSING REGULAR LOWER BODY CLOTHING: A LOT
PERSONAL GROOMING: A LITTLE
SUGGESTED CMS G CODE MODIFIER DAILY ACTIVITY: CK
WALKING IN HOSPITAL ROOM: A LITTLE
EATING MEALS: A LITTLE
STANDING UP FROM CHAIR USING ARMS: A LITTLE
TURNING FROM BACK TO SIDE WHILE IN FLAT BAD: A LOT
HELP NEEDED FOR BATHING: A LOT
DAILY ACTIVITIY SCORE: 14

## 2023-09-15 ASSESSMENT — FIBROSIS 4 INDEX
FIB4 SCORE: 35.05
FIB4 SCORE: 35.05
FIB4 SCORE: 29.79

## 2023-09-15 ASSESSMENT — PAIN DESCRIPTION - PAIN TYPE
TYPE: ACUTE PAIN

## 2023-09-15 ASSESSMENT — LIFESTYLE VARIABLES: SUBSTANCE_ABUSE: 0

## 2023-09-15 NOTE — ED NOTES
Bedside report received from Prashant TSANG. Pt on RA. A&Ox4. IVF running. Pt given urinal to provide sample.

## 2023-09-15 NOTE — PROGRESS NOTES
4 Eyes Skin Assessment Completed by SHARAN Johnson and SHARAN Frankel.    Head Scab and Redness  Ears Redness and Blanching  Nose Scab  Mouth Redness and Bleeding  Neck WDL  Breast/Chest WDL  Shoulder Blades WDL  Spine WDL  (R) Arm/Elbow/Hand Redness, Blanching, Bruising, and Scab  (L) Arm/Elbow/Hand Redness, Blanching, and Bruising  Abdomen WDL  Groin WDL  Scrotum/Coccyx/Buttocks Redness and Blanching  (R) Leg Redness, Blanching, Scab, and Abrasion, bruising   (L) Leg Redness, Blanching, Scab, Bruising, and Abrasion  (R) Heel/Foot/Toe Redness, Bruising, and Scab  (L) Heel/Foot/Toe Redness, Bruising, and Scab          Devices In Places Pulse Ox, SCD's, and Nasal Cannula      Interventions In Place Gray Ear Foams, Pillows, Heels Loaded W/Pillows, and Pressure Redistribution Mattress    Possible Skin Injury No    Pictures Uploaded Into Epic Yes  Wound Consult Placed N/A  RN Wound Prevention Protocol Ordered Yes

## 2023-09-15 NOTE — ASSESSMENT & PLAN NOTE
Patient admitted with seizure activity and he found to have tenderness addition and report of urinary incontinence but    Appreciate neurology assistance  Follow-up on pending MRI of the brain and pending labs B6 protein electrophoresis and   Discussed importance to completely abstain from alcohol long-term  Thiamine supplements

## 2023-09-15 NOTE — ASSESSMENT & PLAN NOTE
Patient counseled on cessation  Continue nicotine replacement therapy    Total time spent counseling patient on cessation was 5 minutes

## 2023-09-15 NOTE — ASSESSMENT & PLAN NOTE
I discussed CODE STATUS with patient and he would like to be full code.  As per patient wishes I placed full code orders.

## 2023-09-15 NOTE — DISCHARGE INSTRUCTIONS
Please schedule an appointment with the [facility] neurology [place] seizure clinic for MRI and EEG.  Do not drive until cleared by neurology.  Return to the ER for second seizure or prolonged seizure.  Also call Dr. Shrestha for a hematology follow-up for your very low blood cell counts.  You will probably need another bone marrow biopsy.    Discharge Instructions    Discharged to group home by taxi with self. Discharged via wheelchair, hospital escort: Yes.  Special equipment needed: Not Applicable    Be sure to schedule a follow-up appointment with your primary care doctor or any specialists as instructed.     Discharge Plan:   Diet Plan: Discussed  Activity Level: Discussed  Confirmed Follow up Appointment: Patient to Call and Schedule Appointment  Confirmed Symptoms Management: Discussed  Medication Reconciliation Updated: Yes    I understand that a diet low in cholesterol, fat, and sodium is recommended for good health. Unless I have been given specific instructions below for another diet, I accept this instruction as my diet prescription.   Other diet: Regular Diet     Special Instructions: None    -Is this patient being discharged with medication to prevent blood clots?  No    Is patient discharged on Warfarin / Coumadin?   No

## 2023-09-15 NOTE — ED PROVIDER NOTES
ED Provider Note    CHIEF COMPLAINT  Chief Complaint   Patient presents with    Seizure       LIMITATION TO HISTORY   None    HPI    Chris Leggett is a 73 y.o. male who presents to the Emergency Department by ambulance from a assisted house he just moved into for suspected seizure.  Paramedics note that he was postictal and he apparently bit his tongue or his lip and has dried blood on his face.  Patient states he was in the bathroom and felt dizzy and went down.  He said he has been told he has had a seizure in the past but takes no medication.  There is no record of seizure disorder in his old chart.  He does occasionally drink alcohol but denies recent alcohol use.  He does not think he injured his head.    OUTSIDE HISTORIAN(S):  Medics provided some of the history about him being postictal via the nurse    EXTERNAL RECORDS REVIEWED  Psychiatry consult note reviewed from May 18.  Patient had suicidal ideation and chronic depression.  History of anxiety.  The patient overdosed on Seroquel and trazodone and called 911.  He drinks alcohol.    CT head for trauma eval from 2020 room reviewed and there are small vessel ischemic change and atrophy.    REVIEW OF SYSTEMS  Pertinent positives include: Dizziness, fall, seizure, tongue or lip laceration.  Pertinent negatives include: Dehydration, vomiting, diarrhea.      PAST MEDICAL HISTORY  Past Medical History:   Diagnosis Date    Arthritis     Gout     Hypertension     Psychiatric disorder        FAMILY HISTORY  Family History   Problem Relation Age of Onset    Cancer Mother     Alzheimer's Disease Father     Cancer Father     Heart Disease Sister     Heart Disease Brother        SOCIAL HISTORY  Social History     Tobacco Use    Smoking status: Every Day     Current packs/day: 0.00     Average packs/day: 0.5 packs/day for 5.0 years (2.5 ttl pk-yrs)     Types: Cigarettes     Start date: 2014     Last attempt to quit: 2019     Years since quittin.6     Smokeless tobacco: Never   Vaping Use    Vaping Use: Never used   Substance Use Topics    Alcohol use: Yes     Alcohol/week: 10.8 - 12.0 oz     Types: 18 - 20 Cans of beer per week     Comment: weekly    Drug use: Yes     Types: Inhaled     Comment: daily THC     Social History     Substance and Sexual Activity   Drug Use Yes    Types: Inhaled    Comment: daily THC       SURGICAL HISTORY  Past Surgical History:   Procedure Laterality Date    ORIF, FRACTURE, HUMERUS Right 11/30/2021    Procedure: ORIF, FRACTURE, DISTAL HUMERUS;  Surgeon: Kumar Meadows M.D.;  Location: SURGERY [facility];  Service: Orthopedics    PA DX BONE MARROW ASPIRATIONS Left 10/2/2019    Procedure: ASPIRATION, BONE MARROW - APARICOI;  Surgeon: Tamera Leos M.D.;  Location: ENDOSCOPY [facility];  Service: Orthopedics    PA DX BONE MARROW BIOPSIES  10/2/2019    Procedure: BIOPSY, BONE MARROW, USING NEEDLE OR TROCAR;  Surgeon: Tamera Leos M.D.;  Location: ENDOSCOPY [facility];  Service: Orthopedics    CERVICAL DISK AND FUSION ANTERIOR  9/2/2018    Procedure: CERVICAL DISK AND FUSION ANTERIOR C5-C6;  Surgeon: Varghese Wilkins M.D.;  Location: SURGERY [facility];  Service: Neurosurgery    OTHER ORTHOPEDIC SURGERY      fision with disc removal       CURRENT MEDICATIONS  No current facility-administered medications for this encounter.    Current Outpatient Medications:     gabapentin (NEURONTIN) 300 MG Cap, Take 600 mg by mouth 3 times a day. 2 capsules = 600 mg., Disp: , Rfl:     DULoxetine (CYMBALTA) 30 MG Cap DR Particles, Take 30 mg by mouth with dinner., Disp: , Rfl:     docusate sodium 100 MG Cap, Take 100 mg by mouth 2 times a day., Disp: , Rfl:     hydrOXYzine pamoate (VISTARIL) 25 MG Cap, Take 25 mg by mouth 3 times a day as needed for Anxiety., Disp: , Rfl:     Melatonin 10 MG Tab, Take 10 mg by mouth every evening., Disp: , Rfl:     mirtazapine (REMERON) 30 MG Tab tablet, Take 15 mg by mouth every evening. 1/2 tablet = 15  "mg., Disp: , Rfl:     multivitamin (THERAPEUTIC MULTIVITAMIN) Tab, Take 1 Tablet by mouth every day., Disp: , Rfl:     prazosin (MINIPRESS) 2 MG Cap, Take 2 mg by mouth every evening., Disp: , Rfl:     quetiapine (SEROQUEL) 400 MG tablet, Take 400 mg by mouth every evening., Disp: , Rfl:     tamsulosin (FLOMAX) 0.4 MG capsule, Take 0.4 mg by mouth every day., Disp: , Rfl:     traZODone (DESYREL) 150 MG Tab, Take 150 mg by mouth every evening., Disp: , Rfl:     hydrocortisone 2.5 % Cream topical cream, Apply 1 Application. topically 2 times a day as needed (Dermatitis). Apply to legs., Disp: , Rfl:     acetaminophen (TYLENOL) 325 MG Tab, Take 650 mg by mouth every four hours as needed for Mild Pain or Fever. 2 tablets = 650 mg., Disp: , Rfl:     ibuprofen (MOTRIN) 200 MG Tab, Take 400 mg by mouth every 6 hours as needed for Mild Pain or Moderate Pain. 2 tablets = 400 mg., Disp: , Rfl:     allopurinol (ZYLOPRIM) 100 MG Tab, Take 100 mg by mouth every day., Disp: , Rfl:     bisacodyl EC (DULCOLAX) 5 MG Tablet Delayed Response, Take 10 mg by mouth 1 time a day as needed for Constipation. 2 tablets = 10 mg., Disp: , Rfl:     ALLERGIES  No Known Allergies    PHYSICAL EXAM  VITAL SIGNS: BP (!) 150/90   Pulse 76   Temp 36 °C (96.8 °F) (Temporal)   Resp 15   Ht 1.803 m (5' 11\")   Wt 99.8 kg (220 lb 0.3 oz)   SpO2 92%   BMI 30.69 kg/m²   Reviewed and elevated blood pressure, afebrile  Constitutional: Well developed, Well nourished, appearing.  HENT: Normocephalic, dried blood on his lips, small nonbleeding wound on his tongue, parched mucous membranes, bilateral external ears normal, No intraoral erythema, edema, exudate  Eyes: PERRLA, conjunctiva pink, no scleral icterus.  Extraocular movements intact  Cardiovascular: Regular rate and rhythm. No murmurs, rubs or gallops.  No dependent edema or calf tenderness  Respiratory: Lungs clear to auscultation bilaterally. No wheezes, rales, or rhonchi.  Abdominal:  Abdomen " soft, non-tender, non distended. No rebound, or guarding.    Skin: No erythema, no rash. No wounds or bruising.  Ink marks over the extensor surface of both knees.  Genitourinary: No costovertebral angle tenderness.   Musculoskeletal: no deformities.  No spinal tenderness.  Arms and legs range without discomfort  Neurologic: GCS 15 alert & oriented x 3, cranial nerves 2-12 intact, grasp, biceps, extensor houses longus and ankle plantarflexion symmetric.  Moves 4 limbs with symmetric strength.  Psychiatric: Affect normal, Judgment normal, Mood normal.     LABS Ordered and Reviewed by Me:  Results for orders placed or performed during the hospital encounter of 09/15/23   CBC WITH DIFFERENTIAL   Result Value Ref Range    WBC 1.4 (LL) 4.8 - 10.8 K/uL    RBC 3.93 (L) 4.70 - 6.10 M/uL    Hemoglobin 12.5 (L) 14.0 - 18.0 g/dL    Hematocrit 38.1 (L) 42.0 - 52.0 %    MCV 96.9 81.4 - 97.8 fL    MCH 31.8 27.0 - 33.0 pg    MCHC 32.8 32.3 - 36.5 g/dL    RDW 52.1 (H) 35.9 - 50.0 fL    Platelet Count 34 (L) 164 - 446 K/uL    MPV 10.5 9.0 - 12.9 fL    Neutrophils-Polys 50.80 44.00 - 72.00 %    Lymphocytes 32.40 22.00 - 41.00 %    Monocytes 7.70 0.00 - 13.40 %    Eosinophils 7.70 (H) 0.00 - 6.90 %    Basophils 0.70 0.00 - 1.80 %    Immature Granulocytes 0.70 0.00 - 0.90 %    Nucleated RBC 0.00 0.00 - 0.20 /100 WBC    Neutrophils (Absolute) 0.72 (L) 1.82 - 7.42 K/uL    Lymphs (Absolute) 0.46 (L) 1.00 - 4.80 K/uL    Monos (Absolute) 0.11 0.00 - 0.85 K/uL    Eos (Absolute) 0.11 0.00 - 0.51 K/uL    Baso (Absolute) 0.01 0.00 - 0.12 K/uL    Immature Granulocytes (abs) 0.01 0.00 - 0.11 K/uL    NRBC (Absolute) 0.00 K/uL   Basic Metabolic Panel   Result Value Ref Range    Sodium 136 135 - 145 mmol/L    Potassium 4.0 3.6 - 5.5 mmol/L    Chloride 101 96 - 112 mmol/L    Co2 23 20 - 33 mmol/L    Glucose 103 (H) 65 - 99 mg/dL    Bun 5 (L) 8 - 22 mg/dL    Creatinine 0.75 0.50 - 1.40 mg/dL    Calcium 8.7 8.5 - 10.5 mg/dL    Anion Gap 12.0 7.0 - 16.0    Prothrombin Time   Result Value Ref Range    PT 16.2 (H) 12.0 - 14.6 sec    INR 1.29 (H) 0.87 - 1.13   DIAGNOSTIC ALCOHOL   Result Value Ref Range    Diagnostic Alcohol <10.1 <10.1 mg/dL           EKG Interpretation by me    Indication: Dizziness    Rhythm: normal sinus with some baseline artifact  Rate: Normal at 65  Axis: normal  Ectopy: none  Conduction: normal  ST/T Waves: no acute change  Q Waves: none  R Wave progression: normal  Hypertrophy changes: Absent    Clinical Impression: Normal sinus rhythm    RADIOLOGY  I have independently interpreted the CT associated with this visit demonstrating no hemorrhage.  I am awaiting the final reading from the radiologist.     Final Radiology Report  CT-HEAD W/O   Final Result      1.  Cerebral atrophy.      2.  White matter lucencies most consistent with small vessel ischemic change versus demyelination or gliosis.      3.  Otherwise, Head CT without contrast with no acute findings. No evidence of acute cerebral infarction, hemorrhage or mass lesion.      4. Radiopaque subcutaneous foreign body noted in the right frontal region. There        Radiologist interpretation have been reviewed by me.     ED COURSE:    ED Observation Status? Yes; Patient placed in observation status at 9:39 AM 09/15/23     Observation plan is as follows: Assessment and treatment for seizure    INTERVENTIONS BY ME:  Medications   NS infusion 1,000 mL (0 mL Intravenous Stopped 9/15/23 1153)   LORazepam (Ativan) injection 4 mg (1 mg Intravenous Given 9/15/23 1214)       11:05 - Patient reassessed patient updated about results.  At this time he denied drinking alcohol.  We discussed follow-up with neurology for seizure and follow-up with hematology for pancytopenia.  I filled out a DMV seizure form.    I have discussed management of the patient with the following physicians and sources:   Dr. Valentine who will admit the patient    MEDICAL DECISION MAKING:  PROBLEMS EVALUATED THIS VISIT:  This  patient presents with dizziness fall and a seizure.  It is unclear whether has had a seizure before.  There is no evidence of cranial hemorrhage despite severe thrombocytopenia.  There is no arrhythmia or clinical symptoms suggestive of PE.  There is no subarachnoid hemorrhage.  Patient denies alcohol use.  It is unclear whether he had an alcohol-related seizure or truly has epilepsy.  Initially I felt he needed outpatient work-up with neurology with EEG and MRI but then he had a second seizure here in the department for which we gave lorazepam.  Given his recurrent seizures and possibility of status epilepticus will need to admit him here for observation and neurology consultation.  Patient also has pancytopenia with a negative bone marrow biopsy in 2019.  This condition was initially treated alcohol use or his psych meds but he stopped drinking.  Hematology felt that if the patient's pancytopenia continue to worsen he may need another bone marrow biopsy and I think we are at that point now.    RISK:  High given need for escalation of care to include admission       PLAN:  Neurology consultation, [place] hematology consultation, antiepileptics, MRI and EEG when feasible    CONDITION: Fair.     FINAL IMPRESSION  1. Seizure (HCC), recurrent   2. Dizziness    3. Pancytopenia (HCC)         Electronically signed by: Darnell Henriquez M.D., 9/15/2023 9:39 AM

## 2023-09-15 NOTE — CONSULTS
Neurology Initial Consult H&P  Neurohospitalist Service, [facility]    Referring Physician: REYES Fierro*    Chief Complaint   Patient presents with    Seizure       HPI: Chris Leggett is a 73 y.o. man presenting for whom neurology has been consulted for 2 seizures today, 1 outside of the hospital and 1 when in the emergency room.  Mr. Leggett reports being dizzy, by which I think he means lightheaded (he says empty headed) and that he has trouble standing up because of this.  And has fallen down because of this.  It happens even if he just lifts his head up off the pillow.  When queried about his platelets he says they have been low for a long time, he was told it was due to his drinking and he reports 30,000 is a common # for his platelets.  He has a little bit of a right-sided headache.    Hospitalist HPI: Chris Leggett is a 73 y.o. male with past medical history of hypertension, psychiatric disorder and pancytopenia who presented to the hospital on 9/15/2023 with seizure-like activity.  Patient reported that he had a fall this morning and result of that fall he betahistine.  He expressed that he had similar type of fall few weeks ago and he was admitted to [facility].  As per chart review and discussing it with ER physician patient had witnessed seizure this morning and he had a tongue bite and he also has urinary incontinence.  Currently he is living at custodial housing.  He expressed that he used to drink heavily and his last drink was approximately 2 months ago and he used to smoke marijuana occasionally and last time use was 2 months ago as well.  He smokes and his last use of cigarettes was last night.    From ER Physician:   Psychiatry consult note reviewed from May 18.  Patient had suicidal ideation and chronic depression.  History of anxiety.  The patient overdosed on Seroquel and trazodone and called 911.  He drinks alcohol.     Review of systems: In  addition to what is detailed in the HPI above, all other systems reviewed and are negative.    Past Medical History:    has a past medical history of Arthritis, Gout, Hypertension, and Psychiatric disorder.    He has no past medical history of Congestive heart failure (HCC).    Surgical History   has a past surgical history that includes cervical disk and fusion anterior (9/2/2018); other orthopedic surgery; pr dx bone marrow aspirations (Left, 10/2/2019); pr dx bone marrow biopsies (10/2/2019); and orif, fracture, humerus (Right, 11/30/2021).     FHx:  family history includes Alzheimer's Disease in his father; Cancer in his father and mother; Heart Disease in his brother and sister.    SHx:   reports that he has been smoking cigarettes. He started smoking about 9 years ago. He has a 2.5 pack-year smoking history. He has never used smokeless tobacco. He reports that he does not currently use alcohol after a past usage of about 10.8 - 12.0 oz of alcohol per week. He reports current drug use. Drug: Inhaled.    Allergies:  No Known Allergies    Medications:    Current Facility-Administered Medications:     senna-docusate (Pericolace Or Senokot S) 8.6-50 MG per tablet 2 Tablet, 2 Tablet, Oral, BID **AND** polyethylene glycol/lytes (Miralax) PACKET 1 Packet, 1 Packet, Oral, QDAY PRN **AND** magnesium hydroxide (Milk Of Magnesia) suspension 30 mL, 30 mL, Oral, QDAY PRN **AND** bisacodyl (Dulcolax) suppository 10 mg, 10 mg, Rectal, QDAY PRN, Wendie Cooper M.D.    acetaminophen (Tylenol) tablet 650 mg, 650 mg, Oral, Q6HRS PRN, Wendie Cooper M.D.    hydrALAZINE (Apresoline) injection 10 mg, 10 mg, Intravenous, Q4HRS PRN, Wendie Cooper M.D.    LORazepam (Ativan) injection 2 mg, 2 mg, Intravenous, Q4HRS PRN, Wendie Cooper M.D.    [START ON 9/16/2023] allopurinol (Zyloprim) tablet 100 mg, 100 mg, Oral, DAILY, Wendie Cooper M.D.    DULoxetine (Cymbalta) capsule 30 mg, 30 mg, Oral, PM  "MEAL, Wendie Cooper M.D.    gabapentin (Neurontin) capsule 600 mg, 600 mg, Oral, TID, Wendie Cooper M.D.    mirtazapine (Remeron) tablet 15 mg, 15 mg, Oral, Q EVENING, Wendie Cooper M.D.    prazosin (Minipress) capsule 2 mg, 2 mg, Oral, Q EVENING, Wendie Cooper M.D.    QUEtiapine (SEROquel) tablet 400 mg, 400 mg, Oral, Q EVENING, Wendie Cooper M.D.    [START ON 9/16/2023] tamsulosin (Flomax) capsule 0.4 mg, 0.4 mg, Oral, DAILY, Wendie Cooper M.D.    traZODone (Desyrel) tablet 150 mg, 150 mg, Oral, Q EVENING, Wendie Cooper M.D.    nicotine (Nicoderm) 14 MG/24HR 14 mg, 14 mg, Transdermal, Daily-0600 **AND** Nicotine Replacement Patient Education Materials, , , Once **AND** nicotine polacrilex (Nicorette) 2 MG piece 2 mg, 2 mg, Oral, Q HOUR PRN, Wendie Cooper M.D.    Physical Examination:     General: Patient is awake and in no acute distress  Eye: optic disks are sharp.  Neck: There is normal range of motion  Extremities:  clear, dry, intact, without peripheral edema    NEUROLOGICAL EXAM:     /87   Pulse (!) 51   Temp 36.4 °C (97.5 °F) (Temporal)   Resp 16   Ht 1.803 m (5' 11\")   Wt 101 kg (222 lb 0.1 oz)   SpO2 95%   BMI 30.96 kg/m²       Mental status: Awake, alert and fully oriented  Speech and language: Speech is slow, he makes paraphasic errors, he is able to name thumb and thumbnail (because my thumbnail with toenail). The patient is able to  follow commands  Cranial nerve exam: Pupils are equal about 5 mm, round and reactive to light bilaterally. Visual fields are full. There is no nystagmus. Extraocular muscles are intact. Face is symmetric, there is a bit of ptosis on the left.  Palate elevates symmetrically. Tongue is midline, has a thick stripe of dark old blood down the middle, though bite marks are not clearly seen.  Motor exam: There is sustained antigravity with marked asterixis in all limbs. Tone is normal.   Sensory " exam: Reacts to tactile in all 4 extremities.  Deep tendon reflexes:  2+ biceps and patella absent at the Achilles. Toes upgoing on the right, equivocal on the left.  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  Gait: Deferred due to patient preference, as he wanted to eat lunch and is very orthostatic.    Objective Data:    Labs:  Lab Results   Component Value Date/Time    PROTHROMBTM 16.2 (H) 09/15/2023 09:55 AM    INR 1.29 (H) 09/15/2023 09:55 AM      Lab Results   Component Value Date/Time    WBC 1.4 (LL) 09/15/2023 09:55 AM    RBC 3.93 (L) 09/15/2023 09:55 AM    HEMOGLOBIN 12.5 (L) 09/15/2023 09:55 AM    HEMATOCRIT 38.1 (L) 09/15/2023 09:55 AM    MCV 96.9 09/15/2023 09:55 AM    MCH 31.8 09/15/2023 09:55 AM    MCHC 32.8 09/15/2023 09:55 AM    MPV 10.5 09/15/2023 09:55 AM    NEUTSPOLYS 50.80 09/15/2023 09:55 AM    LYMPHOCYTES 32.40 09/15/2023 09:55 AM    MONOCYTES 7.70 09/15/2023 09:55 AM    EOSINOPHILS 7.70 (H) 09/15/2023 09:55 AM    EOSINOPHILS 1 02/06/2020 02:20 PM    BASOPHILS 0.70 09/15/2023 09:55 AM      Lab Results   Component Value Date/Time    SODIUM 136 09/15/2023 09:55 AM    POTASSIUM 4.0 09/15/2023 09:55 AM    CHLORIDE 101 09/15/2023 09:55 AM    CO2 23 09/15/2023 09:55 AM    GLUCOSE 103 (H) 09/15/2023 09:55 AM    BUN 5 (L) 09/15/2023 09:55 AM    CREATININE 0.75 09/15/2023 09:55 AM    BUNCREATRAT 14.3 08/15/2023 04:18 AM      Lab Results   Component Value Date/Time    CHOLSTRLTOT 132 11/30/2021 03:36 AM    LDL 61 11/30/2021 03:36 AM    HDL 60 11/30/2021 03:36 AM    TRIGLYCERIDE 53 11/30/2021 03:36 AM       Lab Results   Component Value Date/Time    ALKPHOSPHAT 80 08/14/2023 04:45 AM    ALKPHOSPHAT 74 05/18/2023 05:53 AM    ASTSGOT 86 (H) 08/14/2023 04:45 AM    ASTSGOT 74 (H) 05/18/2023 05:53 AM    ALTSGPT 27  08/14/2023 04:45 AM    ALTSGPT 34 05/18/2023 05:53 AM    TBILIRUBIN 0.9 08/14/2023 04:45 AM    TBILIRUBIN 0.4 05/18/2023 05:53 AM       Latest Reference Range & Units Most Recent   PT 12.0 - 14.6 sec 16.2 (H)  9/15/23 09:55   (H): Data is abnormally high    Imaging/Testing:    I interpreted and/or reviewed the patient's neuroimaging    CT-HEAD W/O   Final Result      1.  Cerebral atrophy.      2.  White matter lucencies most consistent with small vessel ischemic change versus demyelination or gliosis.      3.  Otherwise, Head CT without contrast with no acute findings. No evidence of acute cerebral infarction, hemorrhage or mass lesion.      4. Radiopaque subcutaneous foreign body noted in the right frontal region. There      MR-BRAIN-WITH & W/O    (Results Pending)       Impression and Recommendations: Chris Leggett is a 73 y.o. presenting for whom neurology has been consulted for 2 seizures today.  Regarding the seizures I think holding off on starting an anticonvulsant at this point is appropriate especially as many of them have been implicated in provoking asterixis.  It is not clear that he has had seizures other than today, and it is possible that was syncope due to his orthostatic symptoms and with his asterixis could be confused for a seizure.  We should obtain an MRI of his brain with and without contrast, and he has already had an EEG which we are awaiting.    Regarding his asterixis this could be related to liver disease, hypomagnesemia (he does not appear to be hypoglycemic or hypokalemic) we should check a magnesium and an ammonia level.    Regarding his orthostatic symptoms he should have orthostatic vital signs (pulse and blood pressure after lying down for at least 5 minutes, and then pulse and vital signs after standing at 1 minute, 2 minutes and 3 minutes) every shift.  Due to poor orthostatic intolerance he should sleep with the head of the bed elevated and should not really lie flat at all.   He may have a autonomic neuropathy related to his alcohol use or another cause.  He should have a B1, B6, B12, folate, TSH,  panel, C-reactive protein, TSH, serum protein electrophoresis, and MAXIMO.    He is certainly at risk for alcohol withdrawal.  And should probably be given thiamine empirically.    Manuelito Felix III, DO, MPH  Neurohospitalist, Acute Care Services      The evaluation of the patient, and recommended management, was discussed with Dr. Cooper, 55 min total time spent.      Please note that this dictation was created using voice recognition software.  I have made every reasonable attempt to correct obvious errors, but I expect that there are errors of grammar and possibly content that I did not discover before finalizing the note.

## 2023-09-15 NOTE — ASSESSMENT & PLAN NOTE
Patient has chronic pancytopenia.  Patient underwent bone marrow biopsy October 2, 2019 and I reviewed bone marrow biopsy results.  Negative for any primary bone marrow disorder    WBC improved  We will need outpatient monitoring  Discussed abstaining from alcohol long-term

## 2023-09-15 NOTE — H&P
Hospital Medicine History & Physical Note    Date of Service  9/15/2023    Primary Care Physician  Pcp Not In Computer    Consultants  Neurology    Specialist Names: Dr. Felix    Code Status  Full Code    Chief Complaint  Chief Complaint   Patient presents with    Seizure       History of Presenting Illness    Chris Leggett is a 73 y.o. male with past medical history of hypertension, psychiatric disorder and pancytopenia who presented to the hospital on 9/15/2023 with seizure-like activity.  Patient reported that he had a fall this morning and result of that fall he betahistine.  He expressed that he had similar type of fall few weeks ago and he was admitted to [facility].  As per chart review and discussing it with ER physician patient had witnessed seizure this morning and he had a tongue bite and he also has urinary incontinence.  Currently he is living at [facility] housing.  He expressed that he used to drink heavily and his last drink was approximately 2 months ago and he used to smoke marijuana occasionally and last time use was 2 months ago as well.  He smokes and his last use of cigarettes was last night.  He denies any deprivation of sleep.  He expressed that he takes medication to sleep at night.  At the time of evaluation he denies any symptoms of acute pain.  He denies any other acute complaints.    Patient is a poor historian I was unable to obtain detailed information from him.    ER course.  Patient found to have tongue laceration and there is a concern for postictal phase and he admitted to the hospital for EEG and MRI.  I requested consultation with neurology.    I discussed about this admission with the ER physician Dr. Henriquez.    I discussed the plan of care with patient.    Review of Systems  Review of Systems   Constitutional:  Negative for chills, fever and weight loss.   HENT:  Negative for hearing loss and tinnitus.    Eyes:  Negative for blurred vision, double vision,  photophobia and pain.   Respiratory:  Negative for cough, sputum production and shortness of breath.    Cardiovascular:  Negative for chest pain, palpitations, orthopnea and leg swelling.   Gastrointestinal:  Negative for abdominal pain, constipation, diarrhea, nausea and vomiting.   Genitourinary:  Negative for dysuria, frequency and urgency.   Musculoskeletal:  Negative for back pain, joint pain, myalgias and neck pain.   Skin:  Negative for rash.   Neurological:  Positive for seizures and loss of consciousness. Negative for dizziness, tingling, tremors, sensory change, speech change, focal weakness and headaches.   Psychiatric/Behavioral:  Negative for hallucinations and substance abuse.    All other systems reviewed and are negative.      Past Medical History   has a past medical history of Arthritis, Gout, Hypertension, and Psychiatric disorder.    Surgical History   has a past surgical history that includes cervical disk and fusion anterior (9/2/2018); other orthopedic surgery; pr dx bone marrow aspirations (Left, 10/2/2019); pr dx bone marrow biopsies (10/2/2019); and orif, fracture, humerus (Right, 11/30/2021).     Family History  family history includes Alzheimer's Disease in his father; Cancer in his father and mother; Heart Disease in his brother and sister.   Family history reviewed with patient. There is no family history that is pertinent to the chief complaint.     Social History   reports that he has been smoking cigarettes. He started smoking about 9 years ago. He has a 2.5 pack-year smoking history. He has never used smokeless tobacco. He reports that he does not currently use alcohol after a past usage of about 10.8 - 12.0 oz of alcohol per week. He reports current drug use. Drug: Inhaled.    Allergies  No Known Allergies    Medications  Prior to Admission Medications   Prescriptions Last Dose Informant Patient Reported? Taking?   DULoxetine (CYMBALTA) 30 MG Cap DR Particles UNK at [facility] Patient's  Home Pharmacy Yes No   Sig: Take 30 mg by mouth with dinner.   allopurinol (ZYLOPRIM) 100 MG Tab UNK at [facility] Patient's Home Pharmacy Yes No   Sig: Take 100 mg by mouth every day.   gabapentin (NEURONTIN) 600 MG tablet UNK at [facility] Patient's Home Pharmacy Yes No   Sig: Take 600 mg by mouth 3 times a day.   mirtazapine (REMERON) 15 MG Tab UNK at [facility] Patient's Home Pharmacy Yes No   Sig: Take 15 mg by mouth every evening.   prazosin (MINIPRESS) 2 MG Cap UNK at [facility] Patient's Home Pharmacy Yes No   Sig: Take 2 mg by mouth every evening.   quetiapine (SEROQUEL) 400 MG tablet UNK at [facility] Patient's Home Pharmacy Yes No   Sig: Take 400 mg by mouth every evening.   tamsulosin (FLOMAX) 0.4 MG capsule UNK at [facility] Patient's Home Pharmacy Yes No   Sig: Take 0.4 mg by mouth every day.   traZODone (DESYREL) 150 MG Tab UNK at [facility] Patient's Home Pharmacy Yes No   Sig: Take 150 mg by mouth every evening.      Facility-Administered Medications: None       Physical Exam  Temp:  [36 °C (96.8 °F)-36.1 °C (97 °F)] 36.1 °C (97 °F)  Pulse:  [51-76] 68  Resp:  [13-16] 13  BP: (131-155)/(83-90) 155/88  SpO2:  [92 %-95 %] 94 %  Blood Pressure : (!) 155/88   Temperature: 36.1 °C (97 °F)   Pulse: 68   Respiration: 13   Pulse Oximetry: 94 %       Physical Exam  Vitals reviewed.   Constitutional:       General: He is not in acute distress.     Appearance: He is ill-appearing.   HENT:      Head: Normocephalic and atraumatic. No contusion.      Right Ear: External ear normal.      Left Ear: External ear normal.      Nose: Nose normal.      Mouth/Throat:      Pharynx: No oropharyngeal exudate.      Comments: Blood present around oral cavity with tongue laceration  Eyes:      General:         Right eye: No discharge.         Left eye: No discharge.      Pupils: Pupils are equal, round, and reactive to light.   Cardiovascular:      Rate and Rhythm: Normal rate and regular rhythm.      Heart sounds: No murmur heard.     No friction rub. No gallop.  "  Pulmonary:      Effort: Pulmonary effort is normal.      Breath sounds: No wheezing or rhonchi.   Abdominal:      General: Bowel sounds are normal. There is no distension.      Palpations: Abdomen is soft.      Tenderness: There is no abdominal tenderness. There is no rebound.   Musculoskeletal:         General: No swelling or tenderness. Normal range of motion.      Cervical back: No rigidity. No muscular tenderness.   Skin:     General: Skin is warm and dry.      Coloration: Skin is not jaundiced.   Neurological:      General: No focal deficit present.      Mental Status: He is alert and oriented to person, place, and time.      Cranial Nerves: No cranial nerve deficit.      Sensory: No sensory deficit.      Comments: He is following commands and moving all his extremities   Psychiatric:         Mood and Affect: Mood normal.         Laboratory:  Recent Labs     09/15/23  0955   WBC 1.4*   RBC 3.93*   HEMOGLOBIN 12.5*   HEMATOCRIT 38.1*   MCV 96.9   MCH 31.8   MCHC 32.8   RDW 52.1*   PLATELETCT 34*   MPV 10.5     Recent Labs     09/15/23  0955   SODIUM 136   POTASSIUM 4.0   CHLORIDE 101   CO2 23   GLUCOSE 103*   BUN 5*   CREATININE 0.75   CALCIUM 8.7     Recent Labs     09/15/23  0955   GLUCOSE 103*     Recent Labs     09/15/23  0955   INR 1.29*     No results for input(s): \"NTPROBNP\" in the last 72 hours.      No results for input(s): \"TROPONINT\" in the last 72 hours.    Imaging:  CT-HEAD W/O   Final Result      1.  Cerebral atrophy.      2.  White matter lucencies most consistent with small vessel ischemic change versus demyelination or gliosis.      3.  Otherwise, Head CT without contrast with no acute findings. No evidence of acute cerebral infarction, hemorrhage or mass lesion.      4. Radiopaque subcutaneous foreign body noted in the right frontal region. There      MR-BRAIN-WITH & W/O    (Results Pending)         Assessment/Plan:  Justification for Admission Status  I anticipate this patient is appropriate " for observation status at this time because due to seizure activity that would require further evaluation with EEG and MRI brain with and without contrast.    Patient will need a Med/Surg bed on NEUROLOGY service .  The need is secondary to seizure.    * Seizure (HCC)- (present on admission)  Assessment & Plan  Patient admitted with seizure activity and he found to have tenderness addition and report of urinary incontinence but  He reported he was admitted to [facility] few weeks ago when he had similar episode.  I reviewed [facility] record and it did not indicate seizure activity and it showed syncopal event.  I started him on Ativan 2 mg IV as needed as needed for seizure.    I ordered lactic acid level and CPK.  Seizure precautions.  I discussed plan of care with neurologist Dr. Felix and requested consultation  Patient may need AED    Full code status  Assessment & Plan  I discussed CODE STATUS with patient and he would like to be full code.  As per patient wishes I placed full code orders.    COPD (chronic obstructive pulmonary disease) (HCC)- (present on admission)  Assessment & Plan  No signs of acute COPD exacerbation.    Primary insomnia- (present on admission)  Assessment & Plan  Continue outpatient trazodone    Tobacco abuse- (present on admission)  Assessment & Plan  Counseling when appropriate  Started him on nicotine replacement therapy.    Pancytopenia (HCC)- (present on admission)  Assessment & Plan  Patient has history of pancytopenia.  If patient continues to have low cell count he may need further evaluation with hematologist or may need another bone marrow biopsy.  I ordered CBC for tomorrow.    Thrombocytopenia (HCC)- (present on admission)  Assessment & Plan  Patient has chronic pancytopenia.  Patient underwent bone marrow biopsy October 2, 2019 and I reviewed bone marrow biopsy results.  I am not giving him DVT prophylaxis due to thrombocytopenia and blood present around his oral  cavity.  I ordered CBC for tomorrow.    Gout- (present on admission)  Assessment & Plan  Continue outpatient allopurinol    Mood disorder (HCC)- (present on admission)  Assessment & Plan  Continue outpatient medications.      I reviewed outside medical record from [facility] hyperlipidemia in epic that showed that patient was admitted for syncopal episode and as per the documented.  From August 15, 2023 patient found to have sepsis and syncopal effusion and he was discharged.  He underwent CTA pulmonary at that time that did not show pulmonary embolism.    VTE prophylaxis: SCDs/TEDs

## 2023-09-15 NOTE — HOSPITAL COURSE
Chris Leggett is a 73 y.o. male with past medical history of hypertension, psychiatric disorder and pancytopenia who presented to the hospital on 9/15/2023 with seizure-like activity.  Patient reported that he had a fall this morning.  He expressed that he had similar type of fall few weeks ago and he was admitted to [facility].  As per chart review and discussing it with ER physician patient had witnessed seizure this morning and he had a tongue bite and he also has urinary incontinence.  Currently he is living at [facility].  He expressed that he used to drink heavily and his last drink was approximately 2 months ago and he used to smoke marijuana occasionally and last time use was 2 months ago as well.  He smokes and his last use of cigarettes was last night.  He denies any deprivation of sleep.  He expressed that he takes medication to sleep at night.  At the time of evaluation he denies any symptoms of acute pain.  He denies any other acute complaints.

## 2023-09-15 NOTE — ASSESSMENT & PLAN NOTE
Patient has history of pancytopenia with negative bone marrow biopsy  Leukocytosis improved  Outpatient follow-up

## 2023-09-15 NOTE — ED NOTES
Med rec completed per pt's home pharmacy ([facility])     Unknown last doses of medications taken   Pt unable to participate in an interview at this time

## 2023-09-15 NOTE — ED NOTES
Pt began seizing when getting dressed to discharge. ERP at bedside. New PIV placed. 1mg ativan given. Oral trauma noted.

## 2023-09-15 NOTE — ED TRIAGE NOTES
BIB EMS with   Chief Complaint   Patient presents with    Seizure     [facility]ed [facility] second [facility] this [facility]. Per EMS, pt was post ictal initially but has improved. Arrives 92% on RA. + oral trauma to left lower lip. Small amount of urinary incontinence. EMS states pt has a hx of seizures and not taking medications, however pt denies known hx and there are no seizure medications in chart. ERP at bedside. Received orders for care.

## 2023-09-15 NOTE — ED NOTES
Lab called with critical result of WBC 1.4 at 1026 Critical lab result read back.   Dr. Henriquez notified of critical lab result at 1027.

## 2023-09-15 NOTE — PROCEDURES
[facility]    Inpatient Standard Video Electroencephalogram Report      Patient Name: Chris Leggett  MRN: [id number]  #: [id number]  Date of Service: 09/15/23  Total Recording Time: 0 hours and 23 minutes.  Referring Provider: REYES Fierro*    INDICATION:  Chris Leggett 73 y.o. male. This standard, inpatient, EEG was requested to evaluate for seizure(s).    CURRENT ANTI-SEIZURE AND OTHER PERTINENT MEDICATIONS:     Current Facility-Administered Medications:     senna-docusate **AND** polyethylene glycol/lytes **AND** magnesium hydroxide **AND** bisacodyl    acetaminophen    hydrALAZINE    LORazepam    [START ON 9/16/2023] allopurinol    DULoxetine    gabapentin    mirtazapine    prazosin    QUEtiapine    [START ON 9/16/2023] tamsulosin    traZODone    nicotine **AND** Nicotine Replacement Patient Education Materials **AND** nicotine polacrilex    TECHNIQUE: Standard inpatient video EEG was set up by a Neurodiagnostic technologist who performed education to the patient and staff. A minimum of 23 electrodes and 23 channel recording was setup and performed by Neurodiagnostic technologist, in accordance with the international 10-20 system. Impedence, electrode integrity, and technical impressions were documented. The study was reviewed in bipolar and referential montages. The recording examined the patient in the awake and drowsy state(s). There was at times significant muscle/electrical artifact, obscuring parts of this study.     DESCRIPTION OF THE RECORD:  EEG background: During maximal wakefulness, the background was continuous, symmetrical, and showed a 8 Hz posterior dominant rhythm, with a mixture of predominantly theta, rare delta, and some overriding faster activity.  Reactivity  was seen. State changes were seen.  During drowsiness, a loss of myogenic artifact  and theta/delta frequencies were seen.     N2 sleep architecture was not seen.    ACTIVATION PROCEDURES:   Intermittent Photic  stimulation was performed in a stepwise fashion from 1 to 30 Hz and did not elicited any abnormalities on EEG.     ICTAL AND INTERICTAL FINDINGS:   No focal or generalized epileptiform activity noted.     No persistent regional slowing was seen during this routine study.      No electroclinical or electrographic seizures were reported or recorded during the study.     EKG: Sampling of the EKG recording did not demonstrate any abnormalities    EVENTS:  No clinical events recorded or reported    INTERPRETATION:  This was, within the above noted limitations, an abnormal video EEG recording in the awake and drowsy state(s):  Diffuse slowing of the background, suggestive of diffuse and/or multifocal cerebral dysfunction.  This finding might be seen in a myriad of encephalopathies and in itself is a nonspecific finding.  2.  Epileptiform discharges: No definite epileptiform discharges or other epileptiform phenomena seen.   3.  No seizures. Clinical correlation is recommended.    As always, an absence of seizures/definite epileptiform discharges does not exclude the diagnosis of seizures/epilepsy. If clinical suspicion persists, a prolonged EEG monitoring might be considered.     Maxx Ibrahim MD  Neurology Attending, Epilepsy Program  [facility]

## 2023-09-16 ENCOUNTER — APPOINTMENT (OUTPATIENT)
Dept: RADIOLOGY | Facility: MEDICAL CENTER | Age: 73
DRG: 101 | End: 2023-09-16
Attending: HOSPITALIST
Payer: MEDICARE

## 2023-09-16 LAB
ALBUMIN SERPL BCP-MCNC: 3.5 G/DL (ref 3.2–4.9)
ALBUMIN/GLOB SERPL: 1.4 G/DL
ALP SERPL-CCNC: 63 U/L (ref 30–99)
ALT SERPL-CCNC: 14 U/L (ref 2–50)
ANION GAP SERPL CALC-SCNC: 7 MMOL/L (ref 7–16)
AST SERPL-CCNC: 42 U/L (ref 12–45)
BASOPHILS # BLD AUTO: 0.5 % (ref 0–1.8)
BASOPHILS # BLD: 0.01 K/UL (ref 0–0.12)
BILIRUB SERPL-MCNC: 0.4 MG/DL (ref 0.1–1.5)
BUN SERPL-MCNC: 6 MG/DL (ref 8–22)
CALCIUM ALBUM COR SERPL-MCNC: 8.8 MG/DL (ref 8.5–10.5)
CALCIUM SERPL-MCNC: 8.4 MG/DL (ref 8.5–10.5)
CHLORIDE SERPL-SCNC: 104 MMOL/L (ref 96–112)
CO2 SERPL-SCNC: 25 MMOL/L (ref 20–33)
CREAT SERPL-MCNC: 0.65 MG/DL (ref 0.5–1.4)
EOSINOPHIL # BLD AUTO: 0.1 K/UL (ref 0–0.51)
EOSINOPHIL NFR BLD: 4.6 % (ref 0–6.9)
ERYTHROCYTE [DISTWIDTH] IN BLOOD BY AUTOMATED COUNT: 52.2 FL (ref 35.9–50)
GFR SERPLBLD CREATININE-BSD FMLA CKD-EPI: 100 ML/MIN/1.73 M 2
GLOBULIN SER CALC-MCNC: 2.5 G/DL (ref 1.9–3.5)
GLUCOSE SERPL-MCNC: 93 MG/DL (ref 65–99)
HCT VFR BLD AUTO: 34.2 % (ref 42–52)
HGB BLD-MCNC: 11.5 G/DL (ref 14–18)
IMM GRANULOCYTES # BLD AUTO: 0 K/UL (ref 0–0.11)
IMM GRANULOCYTES NFR BLD AUTO: 0 % (ref 0–0.9)
LYMPHOCYTES # BLD AUTO: 0.51 K/UL (ref 1–4.8)
LYMPHOCYTES NFR BLD: 23.5 % (ref 22–41)
MCH RBC QN AUTO: 32.8 PG (ref 27–33)
MCHC RBC AUTO-ENTMCNC: 33.6 G/DL (ref 32.3–36.5)
MCV RBC AUTO: 97.4 FL (ref 81.4–97.8)
MONOCYTES # BLD AUTO: 0.14 K/UL (ref 0–0.85)
MONOCYTES NFR BLD AUTO: 6.5 % (ref 0–13.4)
NEUTROPHILS # BLD AUTO: 1.41 K/UL (ref 1.82–7.42)
NEUTROPHILS NFR BLD: 64.9 % (ref 44–72)
NRBC # BLD AUTO: 0 K/UL
NRBC BLD-RTO: 0 /100 WBC (ref 0–0.2)
PLATELET # BLD AUTO: 40 K/UL (ref 164–446)
PLATELETS.RETICULATED NFR BLD AUTO: 7 % (ref 0.6–13.1)
PMV BLD AUTO: 12.5 FL (ref 9–12.9)
POTASSIUM SERPL-SCNC: 4.6 MMOL/L (ref 3.6–5.5)
PROT SERPL-MCNC: 6 G/DL (ref 6–8.2)
RBC # BLD AUTO: 3.51 M/UL (ref 4.7–6.1)
SODIUM SERPL-SCNC: 136 MMOL/L (ref 135–145)
WBC # BLD AUTO: 2.2 K/UL (ref 4.8–10.8)

## 2023-09-16 PROCEDURE — 99232 SBSQ HOSP IP/OBS MODERATE 35: CPT | Mod: 25 | Performed by: HOSPITALIST

## 2023-09-16 PROCEDURE — 700102 HCHG RX REV CODE 250 W/ 637 OVERRIDE(OP): Performed by: HOSPITALIST

## 2023-09-16 PROCEDURE — 85055 RETICULATED PLATELET ASSAY: CPT

## 2023-09-16 PROCEDURE — 85025 COMPLETE CBC W/AUTO DIFF WBC: CPT

## 2023-09-16 PROCEDURE — 700117 HCHG RX CONTRAST REV CODE 255: Performed by: HOSPITALIST

## 2023-09-16 PROCEDURE — A9579 GAD-BASE MR CONTRAST NOS,1ML: HCPCS | Performed by: HOSPITALIST

## 2023-09-16 PROCEDURE — 99232 SBSQ HOSP IP/OBS MODERATE 35: CPT | Performed by: PSYCHIATRY & NEUROLOGY

## 2023-09-16 PROCEDURE — 70553 MRI BRAIN STEM W/O & W/DYE: CPT

## 2023-09-16 PROCEDURE — 80053 COMPREHEN METABOLIC PANEL: CPT

## 2023-09-16 PROCEDURE — 99406 BEHAV CHNG SMOKING 3-10 MIN: CPT | Performed by: HOSPITALIST

## 2023-09-16 PROCEDURE — A9270 NON-COVERED ITEM OR SERVICE: HCPCS | Performed by: HOSPITALIST

## 2023-09-16 PROCEDURE — G0378 HOSPITAL OBSERVATION PER HR: HCPCS

## 2023-09-16 PROCEDURE — 700102 HCHG RX REV CODE 250 W/ 637 OVERRIDE(OP): Performed by: INTERNAL MEDICINE

## 2023-09-16 PROCEDURE — A9270 NON-COVERED ITEM OR SERVICE: HCPCS | Performed by: INTERNAL MEDICINE

## 2023-09-16 PROCEDURE — 36415 COLL VENOUS BLD VENIPUNCTURE: CPT

## 2023-09-16 RX ORDER — GAUZE BANDAGE 2" X 2"
100 BANDAGE TOPICAL DAILY
Status: DISCONTINUED | OUTPATIENT
Start: 2023-09-16 | End: 2023-09-17 | Stop reason: HOSPADM

## 2023-09-16 RX ADMIN — ALLOPURINOL 100 MG: 100 TABLET ORAL at 05:09

## 2023-09-16 RX ADMIN — Medication 100 MG: at 17:25

## 2023-09-16 RX ADMIN — PRAZOSIN HYDROCHLORIDE 2 MG: 2 CAPSULE ORAL at 17:23

## 2023-09-16 RX ADMIN — ACETAMINOPHEN 650 MG: 325 TABLET, FILM COATED ORAL at 10:18

## 2023-09-16 RX ADMIN — SENNOSIDES AND DOCUSATE SODIUM 2 TABLET: 50; 8.6 TABLET ORAL at 17:24

## 2023-09-16 RX ADMIN — GADOTERIDOL 20 ML: 279.3 INJECTION, SOLUTION INTRAVENOUS at 16:30

## 2023-09-16 RX ADMIN — GABAPENTIN 600 MG: 300 CAPSULE ORAL at 05:09

## 2023-09-16 RX ADMIN — DULOXETINE HYDROCHLORIDE 30 MG: 30 CAPSULE, DELAYED RELEASE ORAL at 17:27

## 2023-09-16 RX ADMIN — MIRTAZAPINE 15 MG: 15 TABLET, FILM COATED ORAL at 17:25

## 2023-09-16 RX ADMIN — GABAPENTIN 600 MG: 300 CAPSULE ORAL at 12:09

## 2023-09-16 RX ADMIN — GABAPENTIN 600 MG: 300 CAPSULE ORAL at 17:24

## 2023-09-16 RX ADMIN — TRAZODONE HYDROCHLORIDE 150 MG: 100 TABLET ORAL at 17:25

## 2023-09-16 RX ADMIN — SENNOSIDES AND DOCUSATE SODIUM 2 TABLET: 50; 8.6 TABLET ORAL at 05:09

## 2023-09-16 RX ADMIN — TAMSULOSIN HYDROCHLORIDE 0.4 MG: 0.4 CAPSULE ORAL at 05:09

## 2023-09-16 RX ADMIN — QUETIAPINE FUMARATE 400 MG: 200 TABLET ORAL at 17:24

## 2023-09-16 ASSESSMENT — ENCOUNTER SYMPTOMS
FEVER: 0
SHORTNESS OF BREATH: 0

## 2023-09-16 ASSESSMENT — FIBROSIS 4 INDEX: FIB4 SCORE: 20.2

## 2023-09-16 ASSESSMENT — PAIN DESCRIPTION - PAIN TYPE
TYPE: ACUTE PAIN

## 2023-09-16 NOTE — PROGRESS NOTES
[facility] Medicine Daily Progress Note    Date of Service  9/16/2023    Chief Complaint  Chris Leggett is a 73 y.o. male admitted 9/15/2023 with possible seizures    Hospital Course  Chris Leggett is a 73 y.o. male with past medical history of hypertension, psychiatric disorder and pancytopenia who presented to the hospital on 9/15/2023 with seizure-like activity.  Patient reported that he had a fall this morning.  He expressed that he had similar type of fall few weeks ago and he was admitted to [facility].  As per chart review and discussing it with ER physician patient had witnessed seizure this morning and he had a tongue bite and he also has urinary incontinence.  Currently he is living at [facility].  He expressed that he used to drink heavily and his last drink was approximately 2 months ago and he used to smoke marijuana occasionally and last time use was 2 months ago as well.  He smokes and his last use of cigarettes was last night.  He denies any deprivation of sleep.  He expressed that he takes medication to sleep at night.  At the time of evaluation he denies any symptoms of acute pain.  He denies any other acute complaints.    Interval Problem Update    Patient is alert oriented x3  No recurrent seizures or loss of consciousness  No orthostatic drop in blood pressure  MRI pending  I reviewed CMP electrolytes normal  I reviewed CBC WBC 2.2 hemoglobin 11.5 platelet counts 40  TSH normal B12 normal CRP normal ammonia normal lactic acid 1.8         I have discussed this patient's plan of care and discharge plan at IDT rounds today with Case Management, Nursing, Nursing leadership, and other members of the IDT team.    Consultants/Specialty  neurology    Code Status  Full Code    Disposition  The patient is not medically cleared for discharge to home or a post-acute facility.  Anticipate discharge to: home with close outpatient follow-up    I have placed the appropriate orders for  post-discharge needs.    Review of Systems  Review of Systems   Constitutional:  Positive for malaise/fatigue. Negative for fever.   Respiratory:  Negative for shortness of breath.    Cardiovascular:  Negative for chest pain.   All other systems reviewed and are negative.       Physical Exam  Temp:  [36.5 °C (97.7 °F)-36.7 °C (98.1 °F)] 36.7 °C (98.1 °F)  Pulse:  [51-59] 53  Resp:  [] 17  BP: (102-138)/(66-81) 106/69  SpO2:  [94 %-97 %] 94 %    Physical Exam  Vitals and nursing note reviewed.   Constitutional:       Appearance: He is well-developed. He is not diaphoretic.   HENT:      Head: Normocephalic and atraumatic.      Mouth/Throat:      Pharynx: No oropharyngeal exudate.   Eyes:      General:         Right eye: No discharge.         Left eye: No discharge.      Conjunctiva/sclera: Conjunctivae normal.      Pupils: Pupils are equal, round, and reactive to light.   Neck:      Vascular: No JVD.      Trachea: No tracheal deviation.   Cardiovascular:      Rate and Rhythm: Normal rate and regular rhythm.      Heart sounds: No murmur heard.     No friction rub. No gallop.   Pulmonary:      Effort: Pulmonary effort is normal. No respiratory distress.      Breath sounds: Normal breath sounds. No stridor. No wheezing.   Chest:      Chest wall: No tenderness.   Abdominal:      General: Bowel sounds are normal. There is no distension.      Palpations: Abdomen is soft.      Tenderness: There is no abdominal tenderness. There is no rebound.   Musculoskeletal:         General: No tenderness.      Cervical back: Neck supple.   Skin:     General: Skin is warm and dry.      Nails: There is no clubbing.   Neurological:      General: No focal deficit present.      Mental Status: He is alert and oriented to person, place, and time.      Cranial Nerves: No cranial nerve deficit.      Motor: Weakness present. No abnormal muscle tone.   Psychiatric:         Behavior: Behavior normal.         Fluids    Intake/Output Summary  (Last 24 hours) at 9/16/2023 1528  Last data filed at 9/16/2023 0530  Gross per 24 hour   Intake --   Output 1000 ml   Net -1000 ml       Laboratory  Recent Labs     09/15/23  0955 09/16/23  0519   WBC 1.4* 2.2*   RBC 3.93* 3.51*   HEMOGLOBIN 12.5* 11.5*   HEMATOCRIT 38.1* 34.2*   MCV 96.9 97.4   MCH 31.8 32.8   MCHC 32.8 33.6   RDW 52.1* 52.2*   PLATELETCT 34* 40*   MPV 10.5 12.5     Recent Labs     09/15/23  0955 09/16/23 0519   SODIUM 136 136   POTASSIUM 4.0 4.6   CHLORIDE 101 104   CO2 23 25   GLUCOSE 103* 93   BUN 5* 6*   CREATININE 0.75 0.65   CALCIUM 8.7 8.4*     Recent Labs     09/15/23  0955   INR 1.29*               Imaging  CT-HEAD W/O   Final Result      1.  Cerebral atrophy.      2.  White matter lucencies most consistent with small vessel ischemic change versus demyelination or gliosis.      3.  Otherwise, Head CT without contrast with no acute findings. No evidence of acute cerebral infarction, hemorrhage or mass lesion.      4. Radiopaque subcutaneous foreign body noted in the right frontal region. There      MR-BRAIN-WITH & W/O    (Results Pending)        Assessment/Plan  * Seizure (HCC)- (present on admission)  Assessment & Plan  Patient admitted with seizure activity and he found to have tenderness addition and report of urinary incontinence but    Appreciate neurology assistance  Follow-up on pending MRI of the brain and pending labs B6 protein electrophoresis and   Discussed importance to completely abstain from alcohol long-term  Thiamine supplements    Full code status  Assessment & Plan  I discussed CODE STATUS with patient and he would like to be full code.  As per patient wishes I placed full code orders.    COPD (chronic obstructive pulmonary disease) (HCC)- (present on admission)  Assessment & Plan  No signs of acute COPD exacerbation.  Patient weaned off oxygen to room air    Primary insomnia- (present on admission)  Assessment & Plan  Continue outpatient trazodone    Tobacco abuse-  (present on admission)  Assessment & Plan  Patient counseled on cessation  Continue nicotine replacement therapy    Total time spent counseling patient on cessation was 5 minutes    Pancytopenia (HCC)- (present on admission)  Assessment & Plan  Patient has history of pancytopenia with negative bone marrow biopsy  Leukocytosis improved  Outpatient follow-up    Thrombocytopenia (HCC)- (present on admission)  Assessment & Plan  Patient has chronic pancytopenia.  Patient underwent bone marrow biopsy October 2, 2019 and I reviewed bone marrow biopsy results.  Negative for any primary bone marrow disorder    WBC improved  We will need outpatient monitoring  Discussed abstaining from alcohol long-term    Gout- (present on admission)  Assessment & Plan  Continue outpatient allopurinol    Mood disorder (HCC)- (present on admission)  Assessment & Plan  Continue outpatient medications.         VTE prophylaxis:   SCDs/TEDs      I have performed a physical exam and reviewed and updated ROS and Plan today (9/16/2023). In review of yesterday's note (9/15/2023), there are no changes except as documented above.

## 2023-09-16 NOTE — PROGRESS NOTES
Neurology Progress Note  Neurohospitalist Service, [facility]    Referring Physician: Shaggy Martinez M.D.      Interval History: No acute events overnight.  No more seizures no headaches no neck pain, still dizzy has not really gotten out of bed.  He reiterates he has not had alcohol in months.  He thinks his orthostatic dizziness is related to his medications (certainly he is on a multitude of medications which can worsen this).    Review of systems: In addition to what is detailed in the HPI and/or updated in the interval history, all other systems reviewed and are negative.    Past Medical History, Past Surgical History and Social History reviewed and unchanged from prior    Medications:    Current Facility-Administered Medications:     senna-docusate (Pericolace Or Senokot S) 8.6-50 MG per tablet 2 Tablet, 2 Tablet, Oral, BID, 2 Tablet at 09/16/23 0509 **AND** polyethylene glycol/lytes (Miralax) PACKET 1 Packet, 1 Packet, Oral, QDAY PRN **AND** magnesium hydroxide (Milk Of Magnesia) suspension 30 mL, 30 mL, Oral, QDAY PRN **AND** bisacodyl (Dulcolax) suppository 10 mg, 10 mg, Rectal, QDAY PRN, Wendie Cooper M.D.    acetaminophen (Tylenol) tablet 650 mg, 650 mg, Oral, Q6HRS PRN, Wendie Cooper M.D., 650 mg at 09/16/23 1018    hydrALAZINE (Apresoline) injection 10 mg, 10 mg, Intravenous, Q4HRS PRN, Wendie Cooper M.D.    LORazepam (Ativan) injection 2 mg, 2 mg, Intravenous, Q4HRS PRN, Wendie Cooper M.D.    allopurinol (Zyloprim) tablet 100 mg, 100 mg, Oral, DAILY, Wendie Cooper M.D., 100 mg at 09/16/23 0509    DULoxetine (Cymbalta) capsule 30 mg, 30 mg, Oral, PM MEAL, Wendie Cooper M.D., 30 mg at 09/15/23 1723    gabapentin (Neurontin) capsule 600 mg, 600 mg, Oral, TID, Wendie Cooper M.D., 600 mg at 09/16/23 1209    mirtazapine (Remeron) tablet 15 mg, 15 mg, Oral, Q EVENING, Wendie Cooper M.D., 15 mg at 09/15/23  "1725    prazosin (Minipress) capsule 2 mg, 2 mg, Oral, Q EVENING, Wendie Cooper M.D., 2 mg at 09/15/23 1721    QUEtiapine (SEROquel) tablet 400 mg, 400 mg, Oral, Q EVENING, Wendie Cooper M.D., 400 mg at 09/15/23 1955    tamsulosin (Flomax) capsule 0.4 mg, 0.4 mg, Oral, DAILY, Wendie Cooper M.D., 0.4 mg at 09/16/23 0509    traZODone (Desyrel) tablet 150 mg, 150 mg, Oral, Q EVENING, Wendie Cooper M.D., 150 mg at 09/15/23 1725    nicotine (Nicoderm) 14 MG/24HR 14 mg, 14 mg, Transdermal, Daily-0600 **AND** Nicotine Replacement Patient Education Materials, , , Once **AND** nicotine polacrilex (Nicorette) 2 MG piece 2 mg, 2 mg, Oral, Q HOUR PRN, Wendie Cooper M.D.    Physical Examination:   /69   Pulse (!) 53   Temp 36.7 °C (98.1 °F) (Temporal)   Resp 17   Ht 1.803 m (5' 11\")   Wt 101 kg (221 lb 12.5 oz)   SpO2 94%   BMI 30.93 kg/m²       General: Patient is awake and in no acute distress, he looks markedly better thanks to the good nursing care he has received.  Extremities:  Warm, dry, and intact, without peripheral lower extremity edema    NEUROLOGICAL EXAM:     Mental status: Awake, alert and fully oriented, follows commands  Speech and language: Speech is clear and fluent. TCranial nerve exam: Pupils are equal, round.   Visual fields are full. There is no nystagm Palate elevates symmetrically. Tongue is midline, today I can see that he bit the tip of his tongue and that were the blood came from, I cannot really see his bite marks on the side of his tongue but he says it is a bit sore.  Motor exam: There is sustained antigravity with no downward drift in bilateral arms and legs.  Asterixis are much less prominent today on the arms it is just a flap at the wrist.  Tone is normal.   Sensory exam: He cannot feel vibration 128 Hz tuning fork to the knees.    Objective Data:    Labs:  Lab Results   Component Value Date/Time    PROTHROMBTM 16.2 (H) 09/15/2023 09:55 " AM    INR 1.29 (H) 09/15/2023 09:55 AM      Lab Results   Component Value Date/Time    WBC 2.2 (L) 09/16/2023 05:19 AM    RBC 3.51 (L) 09/16/2023 05:19 AM    HEMOGLOBIN 11.5 (L) 09/16/2023 05:19 AM    HEMATOCRIT 34.2 (L) 09/16/2023 05:19 AM    MCV 97.4 09/16/2023 05:19 AM    MCH 32.8 09/16/2023 05:19 AM    MCHC 33.6 09/16/2023 05:19 AM    MPV 12.5 09/16/2023 05:19 AM    NEUTSPOLYS 64.90 09/16/2023 05:19 AM    LYMPHOCYTES 23.50 09/16/2023 05:19 AM    MONOCYTES 6.50 09/16/2023 05:19 AM    EOSINOPHILS 4.60 09/16/2023 05:19 AM    EOSINOPHILS 1 02/06/2020 02:20 PM    BASOPHILS 0.50 09/16/2023 05:19 AM       Latest Reference Range & Units Most Recent   Platelet Count 164 - 446 K/uL 40 (L)  9/16/23 05:19   (L): Data is abnormally low   Latest Reference Range & Units Most Recent   Sed Rate Westergren 0 - 20 mm/hour 25 (H)  5/11/20 17:56   (H): Data is abnormally high   Latest Reference Range & Units Most Recent   Stat C-Reactive Protein 0.00 - 0.75 mg/dL <0.30  9/15/23 20:23     Lab Results   Component Value Date/Time    SODIUM 136 09/16/2023 05:19 AM    POTASSIUM 4.6 09/16/2023 05:19 AM    CHLORIDE 104 09/16/2023 05:19 AM    CO2 25 09/16/2023 05:19 AM    GLUCOSE 93 09/16/2023 05:19 AM    BUN 6 (L) 09/16/2023 05:19 AM    CREATININE 0.65 09/16/2023 05:19 AM    BUNCREATRAT 14.3 08/15/2023 04:18 AM       Latest Reference Range & Units Most Recent   Magnesium 1.5 - 2.5 mg/dL 1.9  9/15/23 09:55      Latest Reference Range & Units Most Recent   Ammonia 11 - 45 umol/L 15  9/15/23 18:53      Latest Reference Range & Units Most Recent   Vitamin B12 -True Cobalamin 211 - 911 pg/mL 594  9/15/23 20:23      Latest Reference Range & Units Most Recent   TSH 0.380 - 5.330 uIU/mL 0.720  9/15/23 20:23     Lab Results   Component Value Date/Time    CHOLSTRLTOT 132 11/30/2021 03:36 AM    LDL 61 11/30/2021 03:36 AM    HDL 60 11/30/2021 03:36 AM    TRIGLYCERIDE 53 11/30/2021 03:36 AM       Lab Results   Component Value Date/Time    ALKPHOSPHAT  63 09/16/2023 05:19 AM    ASTSGOT 42 09/16/2023 05:19 AM    ALTSGPT 14 09/16/2023 05:19 AM    TBILIRUBIN 0.4 09/16/2023 05:19 AM        Imaging/Testing:    I interpreted and/or reviewed the patient's neuroimaging    CT-HEAD W/O   Final Result      1.  Cerebral atrophy.      2.  White matter lucencies most consistent with small vessel ischemic change versus demyelination or gliosis.      3.  Otherwise, Head CT without contrast with no acute findings. No evidence of acute cerebral infarction, hemorrhage or mass lesion.      4. Radiopaque subcutaneous foreign body noted in the right frontal region. There      MR-BRAIN-WITH & W/O    (Results Pending)   EEG Date of Service: 09/15/23  Total Recording Time: 0 hours and 23 minutes.  EEG background: During maximal wakefulness, the background was continuous, symmetrical, and showed a 8 Hz posterior dominant rhythm, with a mixture of predominantly theta, rare delta, and some overriding faster activity.  Reactivity  was seen. State changes were seen.  During drowsiness, a loss of myogenic artifact and theta/delta frequencies were seen.   No focal or generalized epileptiform activity noted.   No persistent regional slowing was seen during this routine study.    No electroclinical or electrographic seizures were reported or recorded during the study.    Assessment and Plan:    Chris Leggett is a 73 y.o. presenting for whom neurology has been consulted for 2 seizures yesterday.  Regarding the seizures I think holding off on starting an anticonvulsant at this point is appropriate especially as many of them have been implicated in provoking asterixis.  It is not clear that he has had seizures other than today, and it is possible that was syncope due to his orthostatic symptoms and with his asterixis could be confused for a seizure.  We should obtain an MRI of his brain with and without contrast, and he has already had an EEG without focality or epileptiform discharges.  Given  all of this Mr. Leggett and I have discussed starting an antiseizure medicine and we both prefer at this time not to.  The 2 seizures today by [employer] epilepsy agreement should be counted as a first seizure event.  If his MRI scan shows an epileptogenic lesion that would change the risk balance and he should start on a seizure medicine if it does not, it is quite reasonable not to start an antiseizure medicine.     Regarding his asterixis the movement is far less pronounced today, his ammonia and magnesium were normal, could be related to one of his medicines.     Regarding his orthostatic symptoms he should have orthostatic vital signs (pulse and blood pressure after lying down for at least 5 minutes, and then pulse and vital signs after standing at 1 minute, 2 minutes and 3 minutes) every shift.  Due to poor orthostatic intolerance he should sleep with the head of the bed elevated and should not really lie flat at all.  He may have a autonomic neuropathy related to his alcohol use or another cause.  He should have a B1, B6, B12, folate, TSH,  panel, C-reactive protein, TSH, serum protein electrophoresis, and MAXIMO.  Additionally his medications should be reviewed by pharmacy regarding risk for orthostatic hypotension and consider discontinuing medicines that are not really essential.     He is certainly at risk for alcohol withdrawal    Will defer on the pancytopenia and elevated pro time to the primary team.    I will check back after the MRI is done, otherwise please recall neurology if we can be of assistance.    35 minutes total time spent.    Manuelito Felix III, DO, MPH  Neurohospitalist, Acute Care Services

## 2023-09-17 ENCOUNTER — PHARMACY VISIT (OUTPATIENT)
Dept: PHARMACY | Facility: MEDICAL CENTER | Age: 73
End: 2023-09-17
Payer: MEDICARE

## 2023-09-17 VITALS
DIASTOLIC BLOOD PRESSURE: 99 MMHG | TEMPERATURE: 98.1 F | SYSTOLIC BLOOD PRESSURE: 155 MMHG | HEART RATE: 65 BPM | WEIGHT: 223.33 LBS | RESPIRATION RATE: 16 BRPM | BODY MASS INDEX: 31.27 KG/M2 | HEIGHT: 71 IN | OXYGEN SATURATION: 93 %

## 2023-09-17 PROCEDURE — 700102 HCHG RX REV CODE 250 W/ 637 OVERRIDE(OP): Performed by: INTERNAL MEDICINE

## 2023-09-17 PROCEDURE — 97597 DBRDMT OPN WND 1ST 20 CM/<: CPT

## 2023-09-17 PROCEDURE — 700102 HCHG RX REV CODE 250 W/ 637 OVERRIDE(OP): Performed by: HOSPITALIST

## 2023-09-17 PROCEDURE — G0378 HOSPITAL OBSERVATION PER HR: HCPCS

## 2023-09-17 PROCEDURE — 99214 OFFICE O/P EST MOD 30 MIN: CPT | Performed by: PSYCHIATRY & NEUROLOGY

## 2023-09-17 PROCEDURE — A9270 NON-COVERED ITEM OR SERVICE: HCPCS | Performed by: HOSPITALIST

## 2023-09-17 PROCEDURE — 99239 HOSP IP/OBS DSCHRG MGMT >30: CPT | Performed by: HOSPITALIST

## 2023-09-17 PROCEDURE — A9270 NON-COVERED ITEM OR SERVICE: HCPCS | Performed by: INTERNAL MEDICINE

## 2023-09-17 PROCEDURE — RXMED WILLOW AMBULATORY MEDICATION CHARGE: Performed by: HOSPITALIST

## 2023-09-17 RX ORDER — AMLODIPINE BESYLATE 2.5 MG/1
2.5 TABLET ORAL
Status: DISCONTINUED | OUTPATIENT
Start: 2023-09-17 | End: 2023-09-17 | Stop reason: HOSPADM

## 2023-09-17 RX ORDER — AMLODIPINE BESYLATE 2.5 MG/1
2.5 TABLET ORAL DAILY
Qty: 30 TABLET | Refills: 0 | Status: SHIPPED | OUTPATIENT
Start: 2023-09-17

## 2023-09-17 RX ADMIN — Medication 100 MG: at 06:14

## 2023-09-17 RX ADMIN — SENNOSIDES AND DOCUSATE SODIUM 2 TABLET: 50; 8.6 TABLET ORAL at 06:14

## 2023-09-17 RX ADMIN — ALLOPURINOL 100 MG: 100 TABLET ORAL at 06:14

## 2023-09-17 RX ADMIN — GABAPENTIN 600 MG: 300 CAPSULE ORAL at 12:21

## 2023-09-17 RX ADMIN — AMLODIPINE BESYLATE 2.5 MG: 2.5 TABLET ORAL at 09:32

## 2023-09-17 RX ADMIN — ACETAMINOPHEN 650 MG: 325 TABLET, FILM COATED ORAL at 07:56

## 2023-09-17 RX ADMIN — GABAPENTIN 600 MG: 300 CAPSULE ORAL at 16:26

## 2023-09-17 RX ADMIN — PRAZOSIN HYDROCHLORIDE 2 MG: 2 CAPSULE ORAL at 16:29

## 2023-09-17 RX ADMIN — MIRTAZAPINE 15 MG: 15 TABLET, FILM COATED ORAL at 16:26

## 2023-09-17 RX ADMIN — TAMSULOSIN HYDROCHLORIDE 0.4 MG: 0.4 CAPSULE ORAL at 06:14

## 2023-09-17 RX ADMIN — GABAPENTIN 600 MG: 300 CAPSULE ORAL at 06:14

## 2023-09-17 RX ADMIN — TRAZODONE HYDROCHLORIDE 150 MG: 100 TABLET ORAL at 16:29

## 2023-09-17 RX ADMIN — QUETIAPINE FUMARATE 400 MG: 200 TABLET ORAL at 16:29

## 2023-09-17 ASSESSMENT — PAIN DESCRIPTION - PAIN TYPE
TYPE: ACUTE PAIN
TYPE: ACUTE PAIN

## 2023-09-17 NOTE — PROGRESS NOTES
Neurology Progress Note  Neurohospitalist Service, [facility]    Referring Physician: Shaggy Martinez M.D.      Interval History: No acute events overnight.  Mr. Leggett reports that he has been up walking around with no problem and has even gone outside.    Review of systems: In addition to what is detailed in the HPI and/or updated in the interval history, all other systems reviewed and are negative.    Past Medical History, Past Surgical History and Social History reviewed and unchanged from prior    Medications:    Current Facility-Administered Medications:     amLODIPine (Norvasc) tablet 2.5 mg, 2.5 mg, Oral, Q DAY, Shaggy Martinez M.D., 2.5 mg at 09/17/23 0932    thiamine (Vitamin B-1) tablet 100 mg, 100 mg, Oral, DAILY, Shaggy Martinez M.D., 100 mg at 09/17/23 0614    senna-docusate (Pericolace Or Senokot S) 8.6-50 MG per tablet 2 Tablet, 2 Tablet, Oral, BID, 2 Tablet at 09/17/23 0614 **AND** polyethylene glycol/lytes (Miralax) PACKET 1 Packet, 1 Packet, Oral, QDAY PRN **AND** magnesium hydroxide (Milk Of Magnesia) suspension 30 mL, 30 mL, Oral, QDAY PRN **AND** bisacodyl (Dulcolax) suppository 10 mg, 10 mg, Rectal, QDAY PRN, Wendie Cooper M.D.    acetaminophen (Tylenol) tablet 650 mg, 650 mg, Oral, Q6HRS PRN, Wendie Cooper M.D., 650 mg at 09/17/23 0756    hydrALAZINE (Apresoline) injection 10 mg, 10 mg, Intravenous, Q4HRS PRN, Wendie Cooper M.D.    LORazepam (Ativan) injection 2 mg, 2 mg, Intravenous, Q4HRS PRN, Wendie Cooper M.D.    allopurinol (Zyloprim) tablet 100 mg, 100 mg, Oral, DAILY, Wendie Cooper M.D., 100 mg at 09/17/23 0614    DULoxetine (Cymbalta) capsule 30 mg, 30 mg, Oral, PM MEAL, Wendie Cooper M.D., 30 mg at 09/16/23 1727    gabapentin (Neurontin) capsule 600 mg, 600 mg, Oral, TID, Wendie Cooper M.D., 600 mg at 09/17/23 0614    mirtazapine (Remeron) tablet 15 mg, 15 mg, Oral, Q EVENING,  "Wendie Cooper M.D., 15 mg at 09/16/23 1725    prazosin (Minipress) capsule 2 mg, 2 mg, Oral, Q EVENING, Wendie Cooper M.D., 2 mg at 09/16/23 1723    QUEtiapine (SEROquel) tablet 400 mg, 400 mg, Oral, Q EVENING, Wendie Cooper M.D., 400 mg at 09/16/23 1724    tamsulosin (Flomax) capsule 0.4 mg, 0.4 mg, Oral, DAILY, Wendie Cooper M.D., 0.4 mg at 09/17/23 0614    traZODone (Desyrel) tablet 150 mg, 150 mg, Oral, Q EVENING, Wendie Cooper M.D., 150 mg at 09/16/23 1725    nicotine (Nicoderm) 14 MG/24HR 14 mg, 14 mg, Transdermal, Daily-0600 **AND** Nicotine Replacement Patient Education Materials, , , Once **AND** nicotine polacrilex (Nicorette) 2 MG piece 2 mg, 2 mg, Oral, Q HOUR PRN, Wendie Cooper M.D.    Physical Examination:   /88   Pulse (!) 105   Temp 36.7 °C (98.1 °F) (Temporal)   Resp 16   Ht 1.803 m (5' 11\")   Wt 101 kg (223 lb 5.2 oz)   SpO2 93%   BMI 31.15 kg/m²       General: Patient is awake and in no acute distress  Neck: There is normal range of motion     Extremities:  Warm, dry, and intact, without peripheral lower extremity edema    NEUROLOGICAL EXAM:     Mental status: Awake, alert and fully oriented, follows commands  Speech and language: Speech is clear and fluent. TCranial nerve exam: Pupils are equal, round. Visual fields are full. There is no nystagmus. Extraocular muscles are intact. Face is symmetric.  Tongue is midline.    Objective Data:    Labs:  Lab Results   Component Value Date/Time    PROTHROMBTM 16.2 (H) 09/15/2023 09:55 AM    INR 1.29 (H) 09/15/2023 09:55 AM      Lab Results   Component Value Date/Time    WBC 2.2 (L) 09/16/2023 05:19 AM    RBC 3.51 (L) 09/16/2023 05:19 AM    HEMOGLOBIN 11.5 (L) 09/16/2023 05:19 AM    HEMATOCRIT 34.2 (L) 09/16/2023 05:19 AM    MCV 97.4 09/16/2023 05:19 AM    MCH 32.8 09/16/2023 05:19 AM    MCHC 33.6 09/16/2023 05:19 AM    MPV 12.5 09/16/2023 05:19 AM    NEUTSPOLYS 64.90 09/16/2023 05:19 AM    " LYMPHOCYTES 23.50 09/16/2023 05:19 AM    MONOCYTES 6.50 09/16/2023 05:19 AM    EOSINOPHILS 4.60 09/16/2023 05:19 AM    EOSINOPHILS 1 02/06/2020 02:20 PM    BASOPHILS 0.50 09/16/2023 05:19 AM      Lab Results   Component Value Date/Time    SODIUM 136 09/16/2023 05:19 AM    POTASSIUM 4.6 09/16/2023 05:19 AM    CHLORIDE 104 09/16/2023 05:19 AM    CO2 25 09/16/2023 05:19 AM    GLUCOSE 93 09/16/2023 05:19 AM    BUN 6 (L) 09/16/2023 05:19 AM    CREATININE 0.65 09/16/2023 05:19 AM    BUNCREATRAT 14.3 08/15/2023 04:18 AM      Lab Results   Component Value Date/Time    CHOLSTRLTOT 132 11/30/2021 03:36 AM    LDL 61 11/30/2021 03:36 AM    HDL 60 11/30/2021 03:36 AM    TRIGLYCERIDE 53 11/30/2021 03:36 AM       Lab Results   Component Value Date/Time    ALKPHOSPHAT 63 09/16/2023 05:19 AM    ASTSGOT 42 09/16/2023 05:19 AM    ALTSGPT 14 09/16/2023 05:19 AM    TBILIRUBIN 0.4 09/16/2023 05:19 AM        Imaging/Testing:    I interpreted and/or reviewed the patient's neuroimaging    MR-BRAIN-WITH & W/O   Final Result      MRI of the brain without and with contrast within normal limits for age with atrophy and white matter changes.      CT-HEAD W/O   Final Result      1.  Cerebral atrophy.      2.  White matter lucencies most consistent with small vessel ischemic change versus demyelination or gliosis.      3.  Otherwise, Head CT without contrast with no acute findings. No evidence of acute cerebral infarction, hemorrhage or mass lesion.      4. Radiopaque subcutaneous foreign body noted in the right frontal region. There        EEG Date of Service: 09/15/23  Total Recording Time: 0 hours and 23 minutes.  EEG background: During maximal wakefulness, the background was continuous, symmetrical, and showed a 8 Hz posterior dominant rhythm, with a mixture of predominantly theta, rare delta, and some overriding faster activity.  Reactivity  was seen. State changes were seen.  During drowsiness, a loss of myogenic artifact and theta/delta  frequencies were seen.   No focal or generalized epileptiform activity noted.   No persistent regional slowing was seen during this routine study.    No electroclinical or electrographic seizures were reported or recorded during the study.     Assessment and Plan:    Chris Leggett is a 73 y.o. presenting for whom neurology has been consulted for 2 seizures in the same 24-hour period, so by International league against epilepsy convention this should be counted as 1 seizure event and his first seizure event, giving his EEG and MRI which were unremarkable both Mr. Leggett and I agree that he probably should not be started on a seizure medicine at this point.    Mr. Leggett does not drive or have a 's license.  We talked about safety issues not driving, avoiding heights like ladders, stepstools etc. we talked about avoiding water deep enough to drown did not like bathtubs, hot tubs, swimming pools, rivers and lakes.  We also talked about avoiding power tools and firearms.  We discussed that he has a 10 to 26% chance of having a seizure in the following year.  If he has another seizure we should definitely start him on an antiseizure medicine.  We talked about avoiding sleep deprivation, alcohol use and methamphetamine.    The case was discussed with Dr. Chace Martinez, 20 minutes total time was spent    Manuelito Felix III, DO, MPH  Neurohospitalist, Acute Care Services

## 2023-09-17 NOTE — DISCHARGE SUMMARY
Discharge Summary    CHIEF COMPLAINT ON ADMISSION  Chief Complaint   Patient presents with    Seizure       Reason for Admission  ems     Admission Date  9/15/2023    CODE STATUS  Full Code    HPI & HOSPITAL COURSE    Chris Leggett is a 73 y.o. male with past medical history of hypertension, psychiatric disorder and pancytopenia who presented to the hospital on 9/15/2023 with seizure-like activity.  Patient reported that he had a fall this morning.  He expressed that he had similar type of fall few weeks ago and he was admitted to [facility].  As per chart review and discussing it with ER physician patient had witnessed seizure this morning and he had a tongue bite and he also has urinary incontinence.   He expressed that he used to drink heavily and his last drink was approximately 2 months ago and he used to smoke marijuana occasionally and last time use was 2 months ago as well.  He smokes and his last use of cigarettes was last night.  He denies any deprivation of sleep.  He expressed that he takes medication to sleep at night.  At the time of evaluation he denies any symptoms of acute pain.  He denies any other acute complaints.    The patient was admitted and monitored clinically.  He did not have any recurrence of his seizures.  He had an MRI of the brain which was negative for acute pathology or any other structural lesions accounting for his seizures he also had an EEG which was negative for seizures and was evaluated by neurology.  On evaluation this morning he has been weaned off oxygen he feels back to his baseline.  I discussed with neurology recommendation is to monitor him off AED.  Reviewed seizure precautions with the patient.  He is currently residing at a group home and he does not drive.  His blood pressure was elevated and I have started him on low-dose amlodipine and discussed following up with PCP for blood pressure recheck.  He is established at [facility]    Therefore, he is  discharged in good and stable condition to home with close outpatient follow-up.         Discharge Date  9/17/2023    FOLLOW UP ITEMS POST DISCHARGE  Follow-up with PCP for blood pressure recheck and repeat CBC and follow-up on chronic pancytopenia      DISCHARGE DIAGNOSES  Principal Problem:    Seizure (HCC) (POA: Yes)  Active Problems:    Mood disorder (HCC) (POA: Yes)    Gout (POA: Yes)    Thrombocytopenia (HCC) (POA: Yes)    Pancytopenia (HCC) (POA: Yes)    Tobacco abuse (POA: Yes)    Primary insomnia (POA: Yes)    COPD (chronic obstructive pulmonary disease) (HCC) (POA: Yes)    Full code status (POA: Unknown)  Resolved Problems:    * No resolved hospital problems. *      FOLLOW UP  No future appointments.  Chandler Shrestha M.D.  [address] [facility]   [address]  [facility] [address]  [phone]    Schedule an appointment as soon as possible for a visit   for evaluation of blood counts    [address]  [facility] [address] [phone]  Schedule an appointment as soon as possible for a visit   for seizure workup      MEDICATIONS ON DISCHARGE     Medication List        START taking these medications        Instructions   amLODIPine 2.5 MG Tabs  Commonly known as: Norvasc   Take 1 Tablet by mouth every day.  Dose: 2.5 mg            CONTINUE taking these medications        Instructions   allopurinol 100 MG Tabs  Commonly known as: Zyloprim   Take 100 mg by mouth every day.  Dose: 100 mg     DULoxetine 30 MG Cpep  Commonly known as: Cymbalta   Take 30 mg by mouth with dinner.  Dose: 30 mg     gabapentin 600 MG tablet  Commonly known as: Neurontin   Take 600 mg by mouth 3 times a day.  Dose: 600 mg     mirtazapine 15 MG Tabs  Commonly known as: Remeron   Take 15 mg by mouth every evening.  Dose: 15 mg     prazosin 2 MG Caps  Commonly known as: Minipress   Take 2 mg by mouth every evening.  Dose: 2 mg     quetiapine 400 MG tablet  Commonly known as: SEROquel   Take 400 mg by mouth every  evening.  Dose: 400 mg     tamsulosin 0.4 MG capsule  Commonly known as: Flomax   Take 0.4 mg by mouth every day.  Dose: 0.4 mg     traZODone 150 MG Tabs  Commonly known as: Desyrel   Take 150 mg by mouth every evening.  Dose: 150 mg              Allergies  No Known Allergies    DIET  Orders Placed This Encounter   Procedures    Diet Order Diet: Low Fiber(GI Soft)     Standing Status:   Standing     Number of Occurrences:   1     Order Specific Question:   Diet:     Answer:   Low Fiber(GI Soft) [2]       ACTIVITY  As tolerated.  Weight bearing as tolerated    CONSULTATIONS  Neurology    PROCEDURES  EEG    LABORATORY  Lab Results   Component Value Date    SODIUM 136 09/16/2023    POTASSIUM 4.6 09/16/2023    CHLORIDE 104 09/16/2023    CO2 25 09/16/2023    GLUCOSE 93 09/16/2023    BUN 6 (L) 09/16/2023    CREATININE 0.65 09/16/2023        Lab Results   Component Value Date    WBC 2.2 (L) 09/16/2023    HEMOGLOBIN 11.5 (L) 09/16/2023    HEMATOCRIT 34.2 (L) 09/16/2023    PLATELETCT 40 (L) 09/16/2023        Total time of the discharge process exceeds 35 minutes.

## 2023-09-17 NOTE — THERAPY
Physical Therapy Contact Note    Patient Name: Chris Leggett  Age:  73 y.o., Sex:  male  Medical Record #: [id number]  Today's Date: 9/16/2023    Attempted x 2, with RN staff then off floor to MRI.    Lisa CHAMBERS, PT,  [phone]

## 2023-09-17 NOTE — DISCHARGE PLANNING
Anticipated Discharge Disposition: [facility] to [facility] [address] [facility]     Action: Spoke with the patient at the bedside about returning to his current arrangements. The  does not recall his address or phone number for it. Spoke with Khang who is a  and he stated that the patient can come back at any time before 10 pm.        Barriers to Discharge: medical clearance     Plan: Follow up with medical team.

## 2023-09-17 NOTE — THERAPY
Physical Therapy Contact Note    Patient Name: Chris Leggett  Age:  73 y.o., Sex:  male  Medical Record #: [id number]  Today's Date: 9/17/2023    PT consult received. Discussed with Dr. Chace Armstrong, reports no PT eval required at this time, pt is ambulating well with nursing. Order completed.

## 2023-09-17 NOTE — WOUND TEAM
[facility]  Inpatient Services  Wound and Skin Care Brief Evaluation    Admission Date: 9/15/2023     Last order of IP CONSULT TO WOUND CARE was found on 9/15/2023 from Hospital Encounter on 9/15/2023     HPI, PMH, SH: Reviewed    Chief Complaint   Patient presents with    Seizure     Diagnosis: Seizure (HCC) [R56.9]    Unit where seen by Wound Team: S198/02     Wound consult placed regarding Right great toe. Chart and images reviewed. This discussed with bedside RN. This clinician in to assess patient. Patient pleasant and agreeable. Non-selectively debrided with Wound cleanser.  Calloused area easily removed with curette.  Area intact.        No pressure injuries or advanced wound care needs identified. Wound consult completed. No further follow up unless indicated and consulted.                        PREVENTATIVE INTERVENTIONS:    Q shift [place] - performed per nursing policy  Q shift pressure point assessments - performed per nursing policy    Surface/Positioning  Standard/trauma mattress - Currently in Place    Offloading/Redistribution  Heel offloading dressing (Silicone dressing) - Applied this Visit      Mobilization      Ambulating at Baseline

## 2023-09-17 NOTE — PROGRESS NOTES
Pt given discharge instructions and education. IV's removed, pt has all of his belongings including taxi voucher, and [id number] .Pt escorted down to taxi via wheelchair by nursing staff.

## 2023-09-18 ENCOUNTER — APPOINTMENT (OUTPATIENT)
Dept: RADIOLOGY | Facility: MEDICAL CENTER | Age: 73
DRG: 101 | End: 2023-09-18
Attending: STUDENT IN AN ORGANIZED HEALTH CARE EDUCATION/TRAINING PROGRAM
Payer: MEDICARE

## 2023-09-18 ENCOUNTER — HOSPITAL ENCOUNTER (INPATIENT)
Facility: MEDICAL CENTER | Age: 73
LOS: 4 days | DRG: 101 | End: 2023-09-22
Attending: STUDENT IN AN ORGANIZED HEALTH CARE EDUCATION/TRAINING PROGRAM | Admitting: STUDENT IN AN ORGANIZED HEALTH CARE EDUCATION/TRAINING PROGRAM
Payer: MEDICARE

## 2023-09-18 DIAGNOSIS — F10.930 ALCOHOL WITHDRAWAL SYNDROME WITHOUT COMPLICATION (HCC): ICD-10-CM

## 2023-09-18 DIAGNOSIS — R56.9 SEIZURES (HCC): ICD-10-CM

## 2023-09-18 DIAGNOSIS — K59.00 CONSTIPATION, UNSPECIFIED CONSTIPATION TYPE: ICD-10-CM

## 2023-09-18 DIAGNOSIS — K30 INDIGESTION: ICD-10-CM

## 2023-09-18 DIAGNOSIS — R56.9 SEIZURE (HCC): ICD-10-CM

## 2023-09-18 PROBLEM — J44.9 COPD (CHRONIC OBSTRUCTIVE PULMONARY DISEASE) (HCC): Status: RESOLVED | Noted: 2021-11-30 | Resolved: 2023-09-18

## 2023-09-18 LAB
ALBUMIN SERPL BCP-MCNC: 4 G/DL (ref 3.2–4.9)
ALBUMIN/GLOB SERPL: 1.5 G/DL
ALP SERPL-CCNC: 68 U/L (ref 30–99)
ALT SERPL-CCNC: 15 U/L (ref 2–50)
ANION GAP SERPL CALC-SCNC: 19 MMOL/L (ref 7–16)
APPEARANCE UR: CLEAR
AST SERPL-CCNC: 48 U/L (ref 12–45)
BACTERIA #/AREA URNS HPF: NEGATIVE /HPF
BASOPHILS # BLD AUTO: 0.3 % (ref 0–1.8)
BASOPHILS # BLD: 0.01 K/UL (ref 0–0.12)
BILIRUB SERPL-MCNC: 0.7 MG/DL (ref 0.1–1.5)
BILIRUB UR QL STRIP.AUTO: NEGATIVE
BUN SERPL-MCNC: 6 MG/DL (ref 8–22)
CALCIUM ALBUM COR SERPL-MCNC: 8.8 MG/DL (ref 8.5–10.5)
CALCIUM SERPL-MCNC: 8.8 MG/DL (ref 8.5–10.5)
CHLORIDE SERPL-SCNC: 100 MMOL/L (ref 96–112)
CO2 SERPL-SCNC: 16 MMOL/L (ref 20–33)
COLOR UR: YELLOW
CREAT SERPL-MCNC: 0.81 MG/DL (ref 0.5–1.4)
EOSINOPHIL # BLD AUTO: 0.05 K/UL (ref 0–0.51)
EOSINOPHIL NFR BLD: 1.4 % (ref 0–6.9)
EPI CELLS #/AREA URNS HPF: NEGATIVE /HPF
ERYTHROCYTE [DISTWIDTH] IN BLOOD BY AUTOMATED COUNT: 50.4 FL (ref 35.9–50)
GFR SERPLBLD CREATININE-BSD FMLA CKD-EPI: 94 ML/MIN/1.73 M 2
GLOBULIN SER CALC-MCNC: 2.7 G/DL (ref 1.9–3.5)
GLUCOSE SERPL-MCNC: 114 MG/DL (ref 65–99)
GLUCOSE UR STRIP.AUTO-MCNC: NEGATIVE MG/DL
HCT VFR BLD AUTO: 33.2 % (ref 42–52)
HGB BLD-MCNC: 11.5 G/DL (ref 14–18)
HYALINE CASTS #/AREA URNS LPF: ABNORMAL /LPF
IMM GRANULOCYTES # BLD AUTO: 0.01 K/UL (ref 0–0.11)
IMM GRANULOCYTES NFR BLD AUTO: 0.3 % (ref 0–0.9)
KETONES UR STRIP.AUTO-MCNC: ABNORMAL MG/DL
LEUKOCYTE ESTERASE UR QL STRIP.AUTO: NEGATIVE
LYMPHOCYTES # BLD AUTO: 0.32 K/UL (ref 1–4.8)
LYMPHOCYTES NFR BLD: 9 % (ref 22–41)
MCH RBC QN AUTO: 33.2 PG (ref 27–33)
MCHC RBC AUTO-ENTMCNC: 34.6 G/DL (ref 32.3–36.5)
MCV RBC AUTO: 96 FL (ref 81.4–97.8)
MICRO URNS: ABNORMAL
MONOCYTES # BLD AUTO: 0.19 K/UL (ref 0–0.85)
MONOCYTES NFR BLD AUTO: 5.3 % (ref 0–13.4)
NEUTROPHILS # BLD AUTO: 2.98 K/UL (ref 1.82–7.42)
NEUTROPHILS NFR BLD: 83.7 % (ref 44–72)
NITRITE UR QL STRIP.AUTO: NEGATIVE
NRBC # BLD AUTO: 0 K/UL
NRBC BLD-RTO: 0 /100 WBC (ref 0–0.2)
NUCLEAR IGG SER QL IA: NORMAL
PH UR STRIP.AUTO: 6 [PH] (ref 5–8)
PLATELET # BLD AUTO: 40 K/UL (ref 164–446)
PLATELETS.RETICULATED NFR BLD AUTO: 7.8 % (ref 0.6–13.1)
PMV BLD AUTO: 10.9 FL (ref 9–12.9)
POTASSIUM SERPL-SCNC: 3.4 MMOL/L (ref 3.6–5.5)
PROT SERPL-MCNC: 6.7 G/DL (ref 6–8.2)
PROT UR QL STRIP: 30 MG/DL
RBC # BLD AUTO: 3.46 M/UL (ref 4.7–6.1)
RBC # URNS HPF: ABNORMAL /HPF
RBC UR QL AUTO: NEGATIVE
SODIUM SERPL-SCNC: 135 MMOL/L (ref 135–145)
SP GR UR STRIP.AUTO: 1.01
TROPONIN T SERPL-MCNC: 18 NG/L (ref 6–19)
UROBILINOGEN UR STRIP.AUTO-MCNC: 0.2 MG/DL
WBC # BLD AUTO: 3.6 K/UL (ref 4.8–10.8)
WBC #/AREA URNS HPF: ABNORMAL /HPF

## 2023-09-18 PROCEDURE — 700111 HCHG RX REV CODE 636 W/ 250 OVERRIDE (IP): Mod: UD

## 2023-09-18 PROCEDURE — 770001 HCHG ROOM/CARE - MED/SURG/GYN PRIV*

## 2023-09-18 PROCEDURE — 84484 ASSAY OF TROPONIN QUANT: CPT

## 2023-09-18 PROCEDURE — 93005 ELECTROCARDIOGRAM TRACING: CPT | Performed by: STUDENT IN AN ORGANIZED HEALTH CARE EDUCATION/TRAINING PROGRAM

## 2023-09-18 PROCEDURE — 70450 CT HEAD/BRAIN W/O DYE: CPT

## 2023-09-18 PROCEDURE — 96374 THER/PROPH/DIAG INJ IV PUSH: CPT

## 2023-09-18 PROCEDURE — 93005 ELECTROCARDIOGRAM TRACING: CPT

## 2023-09-18 PROCEDURE — 99285 EMERGENCY DEPT VISIT HI MDM: CPT

## 2023-09-18 PROCEDURE — 85055 RETICULATED PLATELET ASSAY: CPT

## 2023-09-18 PROCEDURE — 36415 COLL VENOUS BLD VENIPUNCTURE: CPT

## 2023-09-18 PROCEDURE — 81001 URINALYSIS AUTO W/SCOPE: CPT

## 2023-09-18 PROCEDURE — 700101 HCHG RX REV CODE 250: Performed by: STUDENT IN AN ORGANIZED HEALTH CARE EDUCATION/TRAINING PROGRAM

## 2023-09-18 PROCEDURE — 80053 COMPREHEN METABOLIC PANEL: CPT

## 2023-09-18 PROCEDURE — 85025 COMPLETE CBC W/AUTO DIFF WBC: CPT

## 2023-09-18 PROCEDURE — 83735 ASSAY OF MAGNESIUM: CPT

## 2023-09-18 PROCEDURE — 99223 1ST HOSP IP/OBS HIGH 75: CPT | Mod: AI | Performed by: STUDENT IN AN ORGANIZED HEALTH CARE EDUCATION/TRAINING PROGRAM

## 2023-09-18 PROCEDURE — 700111 HCHG RX REV CODE 636 W/ 250 OVERRIDE (IP): Mod: JZ | Performed by: STUDENT IN AN ORGANIZED HEALTH CARE EDUCATION/TRAINING PROGRAM

## 2023-09-18 RX ORDER — GABAPENTIN 300 MG/1
600 CAPSULE ORAL 3 TIMES DAILY
Status: DISCONTINUED | OUTPATIENT
Start: 2023-09-19 | End: 2023-09-22 | Stop reason: HOSPADM

## 2023-09-18 RX ORDER — LEVETIRACETAM 500 MG/1
500 TABLET ORAL 2 TIMES DAILY
Status: DISCONTINUED | OUTPATIENT
Start: 2023-09-19 | End: 2023-09-19

## 2023-09-18 RX ORDER — SODIUM CHLORIDE AND POTASSIUM CHLORIDE 150; 900 MG/100ML; MG/100ML
INJECTION, SOLUTION INTRAVENOUS CONTINUOUS
Status: DISCONTINUED | OUTPATIENT
Start: 2023-09-18 | End: 2023-09-21

## 2023-09-18 RX ORDER — ALLOPURINOL 100 MG/1
100 TABLET ORAL DAILY
Status: DISCONTINUED | OUTPATIENT
Start: 2023-09-19 | End: 2023-09-22 | Stop reason: HOSPADM

## 2023-09-18 RX ORDER — LORAZEPAM 2 MG/ML
INJECTION INTRAMUSCULAR
Status: COMPLETED
Start: 2023-09-18 | End: 2023-09-18

## 2023-09-18 RX ORDER — TAMSULOSIN HYDROCHLORIDE 0.4 MG/1
0.4 CAPSULE ORAL DAILY
Status: DISCONTINUED | OUTPATIENT
Start: 2023-09-19 | End: 2023-09-22 | Stop reason: HOSPADM

## 2023-09-18 RX ORDER — LABETALOL HYDROCHLORIDE 5 MG/ML
10 INJECTION, SOLUTION INTRAVENOUS EVERY 4 HOURS PRN
Status: DISCONTINUED | OUTPATIENT
Start: 2023-09-18 | End: 2023-09-22 | Stop reason: HOSPADM

## 2023-09-18 RX ORDER — LEVETIRACETAM 500 MG/5ML
1500 INJECTION, SOLUTION, CONCENTRATE INTRAVENOUS ONCE
Status: COMPLETED | OUTPATIENT
Start: 2023-09-18 | End: 2023-09-18

## 2023-09-18 RX ORDER — ENOXAPARIN SODIUM 100 MG/ML
40 INJECTION SUBCUTANEOUS DAILY
Status: DISCONTINUED | OUTPATIENT
Start: 2023-09-18 | End: 2023-09-18

## 2023-09-18 RX ORDER — BISACODYL 10 MG
10 SUPPOSITORY, RECTAL RECTAL
Status: DISCONTINUED | OUTPATIENT
Start: 2023-09-18 | End: 2023-09-22 | Stop reason: HOSPADM

## 2023-09-18 RX ORDER — DULOXETIN HYDROCHLORIDE 30 MG/1
30 CAPSULE, DELAYED RELEASE ORAL
Status: DISCONTINUED | OUTPATIENT
Start: 2023-09-19 | End: 2023-09-22 | Stop reason: HOSPADM

## 2023-09-18 RX ORDER — AMOXICILLIN 250 MG
2 CAPSULE ORAL 2 TIMES DAILY
Status: DISCONTINUED | OUTPATIENT
Start: 2023-09-18 | End: 2023-09-22 | Stop reason: HOSPADM

## 2023-09-18 RX ORDER — AMLODIPINE BESYLATE 2.5 MG/1
2.5 TABLET ORAL DAILY
Status: DISCONTINUED | OUTPATIENT
Start: 2023-09-19 | End: 2023-09-22 | Stop reason: HOSPADM

## 2023-09-18 RX ORDER — POLYETHYLENE GLYCOL 3350 17 G/17G
1 POWDER, FOR SOLUTION ORAL
Status: DISCONTINUED | OUTPATIENT
Start: 2023-09-18 | End: 2023-09-22 | Stop reason: HOSPADM

## 2023-09-18 RX ADMIN — POTASSIUM CHLORIDE AND SODIUM CHLORIDE: 900; 150 INJECTION, SOLUTION INTRAVENOUS at 22:14

## 2023-09-18 RX ADMIN — LORAZEPAM 1 MG: 2 INJECTION INTRAMUSCULAR; INTRAVENOUS at 21:05

## 2023-09-18 RX ADMIN — LEVETIRACETAM 1500 MG: 100 INJECTION, SOLUTION, CONCENTRATE INTRAVENOUS at 21:38

## 2023-09-18 ASSESSMENT — PAIN DESCRIPTION - PAIN TYPE
TYPE: ACUTE PAIN
TYPE: ACUTE PAIN

## 2023-09-18 ASSESSMENT — FIBROSIS 4 INDEX: FIB4 SCORE: 20.2

## 2023-09-18 ASSESSMENT — ENCOUNTER SYMPTOMS: SEIZURES: 1

## 2023-09-19 LAB
EKG IMPRESSION: NORMAL
MAGNESIUM SERPL-MCNC: 1.5 MG/DL (ref 1.5–2.5)

## 2023-09-19 PROCEDURE — 700111 HCHG RX REV CODE 636 W/ 250 OVERRIDE (IP): Mod: JZ | Performed by: STUDENT IN AN ORGANIZED HEALTH CARE EDUCATION/TRAINING PROGRAM

## 2023-09-19 PROCEDURE — 700111 HCHG RX REV CODE 636 W/ 250 OVERRIDE (IP): Performed by: INTERNAL MEDICINE

## 2023-09-19 PROCEDURE — 700102 HCHG RX REV CODE 250 W/ 637 OVERRIDE(OP): Performed by: STUDENT IN AN ORGANIZED HEALTH CARE EDUCATION/TRAINING PROGRAM

## 2023-09-19 PROCEDURE — A9270 NON-COVERED ITEM OR SERVICE: HCPCS | Performed by: STUDENT IN AN ORGANIZED HEALTH CARE EDUCATION/TRAINING PROGRAM

## 2023-09-19 PROCEDURE — C9113 INJ PANTOPRAZOLE SODIUM, VIA: HCPCS

## 2023-09-19 PROCEDURE — 700111 HCHG RX REV CODE 636 W/ 250 OVERRIDE (IP)

## 2023-09-19 PROCEDURE — 4A10X4Z MONITORING OF CENTRAL NERVOUS ELECTRICAL ACTIVITY, EXTERNAL APPROACH: ICD-10-PCS | Performed by: STUDENT IN AN ORGANIZED HEALTH CARE EDUCATION/TRAINING PROGRAM

## 2023-09-19 PROCEDURE — 700101 HCHG RX REV CODE 250: Performed by: INTERNAL MEDICINE

## 2023-09-19 PROCEDURE — 99233 SBSQ HOSP IP/OBS HIGH 50: CPT | Performed by: INTERNAL MEDICINE

## 2023-09-19 PROCEDURE — 95816 EEG AWAKE AND DROWSY: CPT | Performed by: STUDENT IN AN ORGANIZED HEALTH CARE EDUCATION/TRAINING PROGRAM

## 2023-09-19 PROCEDURE — 95816 EEG AWAKE AND DROWSY: CPT | Mod: 26 | Performed by: STUDENT IN AN ORGANIZED HEALTH CARE EDUCATION/TRAINING PROGRAM

## 2023-09-19 PROCEDURE — 700101 HCHG RX REV CODE 250: Performed by: STUDENT IN AN ORGANIZED HEALTH CARE EDUCATION/TRAINING PROGRAM

## 2023-09-19 PROCEDURE — 770001 HCHG ROOM/CARE - MED/SURG/GYN PRIV*

## 2023-09-19 PROCEDURE — A9270 NON-COVERED ITEM OR SERVICE: HCPCS | Performed by: INTERNAL MEDICINE

## 2023-09-19 PROCEDURE — 700102 HCHG RX REV CODE 250 W/ 637 OVERRIDE(OP): Performed by: INTERNAL MEDICINE

## 2023-09-19 PROCEDURE — 700105 HCHG RX REV CODE 258: Performed by: INTERNAL MEDICINE

## 2023-09-19 RX ORDER — FOLIC ACID 1 MG/1
1 TABLET ORAL DAILY
Status: DISCONTINUED | OUTPATIENT
Start: 2023-09-20 | End: 2023-09-22 | Stop reason: HOSPADM

## 2023-09-19 RX ORDER — PANTOPRAZOLE SODIUM 40 MG/10ML
40 INJECTION, POWDER, LYOPHILIZED, FOR SOLUTION INTRAVENOUS 2 TIMES DAILY
Status: DISCONTINUED | OUTPATIENT
Start: 2023-09-19 | End: 2023-09-22 | Stop reason: HOSPADM

## 2023-09-19 RX ORDER — ONDANSETRON 2 MG/ML
4 INJECTION INTRAMUSCULAR; INTRAVENOUS EVERY 4 HOURS PRN
Status: DISCONTINUED | OUTPATIENT
Start: 2023-09-19 | End: 2023-09-22 | Stop reason: HOSPADM

## 2023-09-19 RX ORDER — LORAZEPAM 2 MG/ML
2 INJECTION INTRAMUSCULAR
Status: DISCONTINUED | OUTPATIENT
Start: 2023-09-19 | End: 2023-09-22 | Stop reason: HOSPADM

## 2023-09-19 RX ORDER — LORAZEPAM 2 MG/ML
0.5 INJECTION INTRAMUSCULAR EVERY 4 HOURS PRN
Status: DISCONTINUED | OUTPATIENT
Start: 2023-09-19 | End: 2023-09-22 | Stop reason: HOSPADM

## 2023-09-19 RX ORDER — GAUZE BANDAGE 2" X 2"
100 BANDAGE TOPICAL DAILY
Status: DISCONTINUED | OUTPATIENT
Start: 2023-09-20 | End: 2023-09-22 | Stop reason: HOSPADM

## 2023-09-19 RX ORDER — LEVETIRACETAM 500 MG/5ML
500 INJECTION, SOLUTION, CONCENTRATE INTRAVENOUS EVERY 12 HOURS
Status: DISCONTINUED | OUTPATIENT
Start: 2023-09-19 | End: 2023-09-20

## 2023-09-19 RX ORDER — ALPRAZOLAM 0.25 MG/1
0.25 TABLET ORAL 2 TIMES DAILY PRN
Status: DISCONTINUED | OUTPATIENT
Start: 2023-09-19 | End: 2023-09-22 | Stop reason: HOSPADM

## 2023-09-19 RX ORDER — GAUZE BANDAGE 2" X 2"
100 BANDAGE TOPICAL DAILY
Status: DISCONTINUED | OUTPATIENT
Start: 2023-09-19 | End: 2023-09-19

## 2023-09-19 RX ORDER — LORAZEPAM 2 MG/ML
0.5 INJECTION INTRAMUSCULAR ONCE
Status: COMPLETED | OUTPATIENT
Start: 2023-09-19 | End: 2023-09-19

## 2023-09-19 RX ADMIN — ALPRAZOLAM 0.25 MG: 0.25 TABLET ORAL at 14:39

## 2023-09-19 RX ADMIN — PANTOPRAZOLE SODIUM 40 MG: 40 INJECTION, POWDER, FOR SOLUTION INTRAVENOUS at 16:43

## 2023-09-19 RX ADMIN — ONDANSETRON 4 MG: 2 INJECTION INTRAMUSCULAR; INTRAVENOUS at 16:23

## 2023-09-19 RX ADMIN — FOLIC ACID: 5 INJECTION, SOLUTION INTRAMUSCULAR; INTRAVENOUS; SUBCUTANEOUS at 15:26

## 2023-09-19 RX ADMIN — LEVETIRACETAM 500 MG: 100 INJECTION, SOLUTION, CONCENTRATE INTRAVENOUS at 16:43

## 2023-09-19 RX ADMIN — ONDANSETRON 4 MG: 2 INJECTION INTRAMUSCULAR; INTRAVENOUS at 10:36

## 2023-09-19 RX ADMIN — LEVETIRACETAM 500 MG: 100 INJECTION, SOLUTION, CONCENTRATE INTRAVENOUS at 07:52

## 2023-09-19 RX ADMIN — GABAPENTIN 600 MG: 300 CAPSULE ORAL at 11:48

## 2023-09-19 RX ADMIN — DULOXETINE HYDROCHLORIDE 30 MG: 30 CAPSULE, DELAYED RELEASE ORAL at 17:40

## 2023-09-19 RX ADMIN — POTASSIUM CHLORIDE AND SODIUM CHLORIDE: 900; 150 INJECTION, SOLUTION INTRAVENOUS at 10:41

## 2023-09-19 RX ADMIN — GABAPENTIN 600 MG: 300 CAPSULE ORAL at 17:40

## 2023-09-19 RX ADMIN — LORAZEPAM 0.5 MG: 2 INJECTION INTRAMUSCULAR; INTRAVENOUS at 16:03

## 2023-09-19 RX ADMIN — LORAZEPAM 0.5 MG: 2 INJECTION INTRAMUSCULAR; INTRAVENOUS at 03:11

## 2023-09-19 ASSESSMENT — PAIN DESCRIPTION - PAIN TYPE
TYPE: ACUTE PAIN

## 2023-09-19 NOTE — ASSESSMENT & PLAN NOTE
"Monitor\"    Recent Labs     09/18/23  1834 09/20/23  0239 09/21/23  0322   WBC 3.6* 6.3 3.9*   RBC 3.46* 3.85* 4.00*   HEMOGLOBIN 11.5* 12.4* 12.8*   HEMATOCRIT 33.2* 36.6* 40.4*   MCV 96.0 95.1 101.0*   MCH 33.2* 32.2 32.0   RDW 50.4* 50.0 56.5*   PLATELETCT 40* 64* 56*   MPV 10.9 11.8 11.4   NEUTSPOLYS 83.70*  --   --    LYMPHOCYTES 9.00*  --   --    MONOCYTES 5.30  --   --    EOSINOPHILS 1.40  --   --    BASOPHILS 0.30  --   --    No active bleeding    "

## 2023-09-19 NOTE — ED NOTES
Pt yelling for assistance with urinal. Having witnessed upper body jerking movements, still alert and tracking appropriately. Pt begins having generalized seizure-activity lasting approximately 45 seconds. ERP paged and at bedside.

## 2023-09-19 NOTE — ED NOTES
Bedside report from Mariola TSANG. Pt on gurney in lowest, locked position, on room air, and continuous cardiac monitoring. Fall precautions in place, including fall risk sign, wrist band, and bed alarm. Pt updated on plan of care. No needs at this time. Call light within reach.

## 2023-09-19 NOTE — ED PROVIDER NOTES
"ED Provider Note    CHIEF COMPLAINT  Chief Complaint   Patient presents with    Seizure     EMS was called by group home roommate who stated pt was on the ground with ALOC. EMS arrived, pt was loaded into the rig and then had a witness tonic clonic seizure for 2 mins approx. EMS gave 5mg versed IV.        EXTERNAL RECORDS REVIEWED  Outpatient Notes patient was discharged from the hospital yesterday after a 3-day admission in the setting of seizure-like activity witnessed prior to arrival.  He was admitted for an MRI and an EEG both of which were unremarkable and the patient was discharged off of antiepileptic therapy.  At that time patient with a noted history of mood disorder, gout, thrombocytopenia, alcohol abuse disorder in remission, COPD    HPI/ROS  LIMITATION TO HISTORY   Select: : None  OUTSIDE HISTORIAN(S):  EMS who report that they witnessed a generalized tonic-clonic seizure while transporting the patient to the emergency department that lasted 2 minutes and broke after administration of IV Versed with a postictal period that improved steadily.    Chris Leggett is a 73 y.o. male who presents to the emergency department today for evaluation of a episode of altered mental status and seizure-like activity.  Patient currently reports that he is symptom-free\" would like to go home\".  He states \"I guess so\" when asked if he had seizures today.  He reports trauma to his tongue with a small amount of bleeding but no additional pain.  He denies any headache, neck pain, back pain, chest pain, abdominal pain, nausea, vomiting, diarrhea, urinary symptoms, fever, cough or any additional symptoms.    PAST MEDICAL HISTORY   has a past medical history of Arthritis, Gout, Hypertension, and Psychiatric disorder.    SURGICAL HISTORY   has a past surgical history that includes cervical disk and fusion anterior (9/2/2018); other orthopedic surgery; dx bone marrow aspirations (Left, 10/2/2019); dx bone marrow biopsies " "(10/2/2019); and orif, fracture, humerus (Right, 2021).    FAMILY HISTORY  Family History   Problem Relation Age of Onset    Cancer Mother     Alzheimer's Disease Father     Cancer Father     Heart Disease Sister     Heart Disease Brother        SOCIAL HISTORY  Social History     Tobacco Use    Smoking status: Every Day     Current packs/day: 0.00     Average packs/day: 0.5 packs/day for 5.0 years (2.5 ttl pk-yrs)     Types: Cigarettes     Start date: 2014     Last attempt to quit: 2019     Years since quittin.6    Smokeless tobacco: Never   Vaping Use    Vaping Use: Never used   Substance and Sexual Activity    Alcohol use: Not Currently     Alcohol/week: 10.8 - 12.0 oz     Types: 18 - 20 Cans of beer per week     Comment: weekly    Drug use: Yes     Types: Inhaled     Comment: daily THC    Sexual activity: Not on file       CURRENT MEDICATIONS  Home Medications       Reviewed by Yusra Armstrong (Pharmacy Tech) on 23 at 2154  Med List Status: Complete     Medication Last Dose Status   allopurinol (ZYLOPRIM) 100 MG Tab UNK Active   amLODIPine (NORVASC) 2.5 MG Tab UNK Active   DULoxetine (CYMBALTA) 30 MG Cap DR Particles UNK Active   gabapentin (NEURONTIN) 600 MG tablet UNK Active   mirtazapine (REMERON) 15 MG Tab UNK Active   prazosin (MINIPRESS) 2 MG Cap UNK Active   quetiapine (SEROQUEL) 400 MG tablet UNK Active   tamsulosin (FLOMAX) 0.4 MG capsule UNK Active   traZODone (DESYREL) 150 MG Tab UNK Active                    ALLERGIES  No Known Allergies    PHYSICAL EXAM  VITAL SIGNS: BP (!) 153/92   Pulse 65   Temp 36.7 °C (98 °F) (Temporal)   Resp 19   Ht 1.803 m (5' 11\")   Wt 101 kg (223 lb)   SpO2 95%   BMI 31.10 kg/m²    Constitutional: No acute distress  HEENT: Scattered ecchymoses to the occipital scalp, no palpable hematoma or bony step-off, normocephalic, pupils are equal round reactive to light, nose normal, superficial laceration to the right side of the tongue with " associated small amount of bleeding.  Neck: Supple, no JVD, no tracheal deviation, nontender  Cardiovascular: Tachycardic with a regular rhythm, no murmur, rub or gallop, 2+ pulses peripherally x4  Thorax & Lungs: No respiratory distress, no wheezes, rales or rhonchi, no chest wall tenderness.  GI: Soft, non-distended, non-tender, no rebound  Skin: Warm, dry, no acute rash or lesion  Musculoskeletal: Moving all extremities, no acute deformity, trace bilateral lower extremity edema, no tenderness  Neurologic: A&Ox3, at baseline mentation, cranial nerves II through XII are grossly intact, no sensory deficit, no ataxia  Psychiatric: Appropriate affect for situation at this time      DIAGNOSTIC STUDIES / PROCEDURES  EKG  I have independently interpreted this EKG  Results for orders placed or performed during the hospital encounter of 23   EKG   Result Value Ref Range    Report       [facility] Emergency Dept.    Test Date:  2023  Pt Name:    BRANDY HUTTON                 Department: ER  MRN:        [id number]                      Room:       S186  Gender:     Male                         Technician:  :        1950                   Requested By:ER TRIAGE PROTOCOL  Order #:    [id number]                    Reading MD: Florin Solorzano    Measurements  Intervals                                Axis  Rate:       110                          P:          57  SC:         184                          QRS:        50  QRSD:       82                           T:          267  QT:         274  QTc:        371    Interpretive Statements  EKG interpretation: Sinus tachycardia rate of 110, normal axis, first-degree  AV block, prolonged QT interval, no ST elevation or depression, no T wave  inversion, diffuse T wave flattening.  No significant change from prior EKG.  Electronically Signed On 2023 02:43:42 PDT by Florin Solorzano           LABS  Labs Reviewed   CBC WITH DIFFERENTIAL - Abnormal;  Notable for the following components:       Result Value    WBC 3.6 (*)     RBC 3.46 (*)     Hemoglobin 11.5 (*)     Hematocrit 33.2 (*)     MCH 33.2 (*)     RDW 50.4 (*)     Platelet Count 40 (*)     Neutrophils-Polys 83.70 (*)     Lymphocytes 9.00 (*)     Lymphs (Absolute) 0.32 (*)     All other components within normal limits    Narrative:     Biotin intake of greater than 5 mg per day may interfere with  troponin levels, causing false low values.   COMP METABOLIC PANEL - Abnormal; Notable for the following components:    Potassium 3.4 (*)     Co2 16 (*)     Anion Gap 19.0 (*)     Glucose 114 (*)     Bun 6 (*)     AST(SGOT) 48 (*)     All other components within normal limits    Narrative:     Biotin intake of greater than 5 mg per day may interfere with  troponin levels, causing false low values.   URINALYSIS,CULTURE IF INDICATED - Abnormal; Notable for the following components:    Ketones Trace (*)     Protein 30 (*)     All other components within normal limits    Narrative:     Indication for culture:->Patient WITHOUT an indwelling Roca  catheter in place with new onset of Dysuria, Frequency,  Urgency, and/or Suprapubic pain  Release to patient->Immediate   URINE MICROSCOPIC (W/UA) - Abnormal; Notable for the following components:    WBC 0-2 (*)     RBC 0-2 (*)     All other components within normal limits    Narrative:     Indication for culture:->Patient WITHOUT an indwelling Roca  catheter in place with new onset of Dysuria, Frequency,  Urgency, and/or Suprapubic pain  Release to patient->Immediate   TROPONIN    Narrative:     Biotin intake of greater than 5 mg per day may interfere with  troponin levels, causing false low values.   IMMATURE PLT FRACTION    Narrative:     Biotin intake of greater than 5 mg per day may interfere with  troponin levels, causing false low values.   ESTIMATED GFR    Narrative:     Biotin intake of greater than 5 mg per day may interfere with  troponin levels, causing false  low values.         RADIOLOGY  I have independently interpreted the diagnostic imaging associated with this visit and am waiting the final reading from the radiologist.   My preliminary interpretation is as follows: CT head with no intracranial hemorrhage    Radiologist interpretation:   CT-HEAD W/O   Final Result      1.  There is no acute intracranial hemorrhage or infarct.      2.  White matter lucencies most consistent with small vessel ischemic change versus demyelination or gliosis.      3.  There is cerebral atrophy.               COURSE & MEDICAL DECISION MAKING    ED Observation Status? Yes; I am placing the patient in to an observation status due to a diagnostic uncertainty as well as therapeutic intensity. Patient placed in observation status at 6:43 PM, 9/18/2023.     Observation plan is as follows: Monitoring for ongoing seizure activity, CT head, blood work and reassessment following this.     Upon Reevaluation, the patient's condition has: not improved; and will be escalated to hospitalization.    Patient discharged from ED Observation status at 9:40 PM (Time) 9/19/2023 (Date).     INITIAL ASSESSMENT, COURSE AND PLAN  Care Narrative:     Patient arrives emergency department for evaluation status post 2 witnessed generalized tonic-clonic seizures that occurred this afternoon prior to arrival.  At the time of my evaluation patient is back to baseline mentation, GCS 15 with no ongoing neurologic deficits or symptoms, requesting to go home.  Labs normal.  No symptoms or history suggestive of infection, dehydration or additional predisposition to lowered seizure threshold.  Patient recently admitted in the setting of seizures with negative MRI and EEG, discharged without initiation of antiepileptic drugs.  Repeated work-up undertaken to evaluate for potential predisposing cause of seizure activity including CBC to assess for anemia, CMP to assess for electrolyte abnormality, kidney or liver injury.   Troponin to assess for occult ACS or cardiac ischemia though EKG is nonischemic, urinalysis to evaluate for occult infection.  Patient denies any cough, shortness of breath therefore chest x-ray withheld.  CT head obtained to evaluate for intracranial hemorrhage given patient's age and evidence of head trauma on examination.    Laboratory work-up with no significant leukocytosis, anemia at patient's baseline, slight hypokalemia which I suspect is secondary to potassium shifts in the setting of acidosis.  Anion gap metabolic acidosis likely secondary to lactate elevation from recent seizure.  Otherwise no additional significant electrolyte abnormality, kidney or liver injury.  Troponin normal.  Urinalysis with no infection.  CT head with no intracranial hemorrhage.    Case was discussed with on-call neurologist Dr. Bernstein regarding subsequent seizure-like activity.  He agrees that it is time to initiate antiepileptic therapy and recommend starting 500 mg of oral Keppra twice daily.    In route back to the patient's room to discuss this plan I was called acutely to the patient's room by nursing staff who report that patient is having subsequent seizure activity.  1 mg of IV lorazepam was administered with resultant resolution of seizure activity.  Patient with persistent postictal state following this.  Given 3 generalized tonic-clonic seizures in a short timeframe I felt patient would be best served by admission to the hospital for optimization of antiepileptic therapy and insurance of seizure resolution.    Case was discussed with Dr. Ugalde, hospitalist will admit the patient.    CRITICAL CARE  The very real possibilty of a deterioration of this patient's condition required the highest level of my preparedness for sudden, emergent intervention.  I provided critical care services, which included medication orders, frequent reevaluations of the patient's condition and response to treatment, ordering and reviewing  test results, and discussing the case with various consultants.  The critical care time associated with the care of the patient was 30 minutes. Review chart for interventions. This time is exclusive of any other billable procedures.       ADDITIONAL PROBLEM LIST  Anion gap metabolic acidosis, anemia    DISPOSITION AND DISCUSSIONS  I have discussed management of the patient with the following physicians and AUGUST's:  Dr. Bernstein, neurology and Dr. Ugalde, hospitalist    Discussion of management with other [facility] or appropriate source(s): None       FINAL DIAGNOSIS  1. Seizure (HCC)    2.  Critical care time 30 minutes       Electronically signed by: Florin Solorzano M.D., 9/18/2023 6:43 PM

## 2023-09-19 NOTE — ED TRIAGE NOTES
"Chief Complaint   Patient presents with    Seizure     EMS was called by group home roommate who stated pt was on the ground with ALOC. EMS arrived, pt was loaded into the rig and then had a witness tonic clonic seizure for 2 mins approx. EMS gave 5mg versed IV.        Pt BIBA from home with the above complaint. Pt arrives GCS 15, slightly lethargic. EMS gave 4mg IV zofran and 500mL NS. Oral trauma noted, no other obvious trauma.     BP (!) 167/93   Pulse (!) 108   Temp 36.5 °C (97.7 °F) (Oral)   Resp 18   Ht 1.803 m (5' 11\")   Wt 101 kg (223 lb)   SpO2 93%   BMI 31.10 kg/m²       "

## 2023-09-19 NOTE — ASSESSMENT & PLAN NOTE
"Resume home medications  IV labetalol as needed\"    Vitals:    09/21/23 0819   BP: (!) 169/91   Pulse: (!) 59   Resp: 18   Temp: 37.2 °C (98.9 °F)   SpO2: 97%     Stable.  "

## 2023-09-19 NOTE — CARE PLAN
The patient is Watcher - Medium risk of patient condition declining or worsening    Shift Goals  Clinical Goals: Monitor for seizure, swallow eval  Patient Goals: Rest  Family Goals: RAMONA    Progress made toward(s) clinical / shift goals:    Problem: Fall Risk  Goal: Patient will remain free from falls  Description: Target End Date:  Prior to discharge or change in level of care    Document interventions on the [facility] Fall Risk Assessment    1.  Assess for fall risk factors  2.  Implement fall precautions  9/19/2023 1409 by Kaitlynn Cuevas R.N.  Outcome: Progressing  9/19/2023 1407 by Kaitlynn Cuevas R.N.  Outcome: Progressing     Problem: Risk for Aspiration  Goal: Patient's risk for aspiration will be absent or decrease  Description: Target End Date:  Prior to discharge or change in level of care    1.   Complete dysphagia screening on admission  2.   NPO until dysphagia screening complete or medically cleared  3.   Collaborate with Speech Therapy, Clinical Dietitian and interdisciplinary team  4.   Implement aspiration precautions  5.   Assist patient up to chair for meals  6.   Elevate head of bed 90 degrees if patient is unable to get out of bed  7.   Encourage small bites  8.   Ensure foods/liquids are of appropriate consistency  9.   Assess for any signs/symptoms of aspiration  10. Assess breath sounds and vital signs after oral intake  9/19/2023 1409 by Kaitlynn Cuevas, R.N.  Outcome: Progressing  9/19/2023 1407 by Kaitlynn Cuevas, R.N.  Outcome: Progressing       Patient is not progressing towards the following goals:      Problem: Self Care  Goal: Patient will have the ability to perform ADLs independently or with assistance (bathe, groom, dress, toilet and feed)  Description: Target End Date:  Prior to discharge or change in level of care    Document on ADL flowsheet    1.  Assess the capability and level of deficiency to perform ADLs  2.  Encourage family/care giver involvement  3.   Provide assistive devices  4.  Consider PT/OT evaluations  5.  Maintain support, give positive feedback, encourage self-care allowing extra time and verbal cuing as needed  6.  Avoid doing something for patients they can do themselves, but provide assistance as needed  7.  Assist in anticipating/planning individual needs  8.  Collaborate with Case Management and  to meet discharge needs  9/19/2023 1409 by Kaitlynn Cuevas, R.N.  Outcome: Not Progressing  9/19/2023 1407 by Kaitlynn Cuevas, R.N.  Outcome: Progressing

## 2023-09-19 NOTE — ED NOTES
Pt resting comfortably on gurney, laying supine. Continuous monitoring in place. Call light within reach.

## 2023-09-19 NOTE — PROGRESS NOTES
NOC HOSPITALIST CROSS COVER    Notified by RN regarding patient vomiting a small amount of bright red blood.  Per nursing report, the patient did bite his tongue during an earlier seizure, but she stated that she looked in the patient's mouth and his tongue does not appear to be bleeding at this time.      Vitals:    09/19/23 0309   BP: (!) 150/84   Pulse: 72   Resp: 18   Temp: 37.3 °C (99.1 °F)   SpO2: 97%          Plan:  #Hematemesis  -Unclear if it is from a tongue bite or upper intestinal bleeding  -Protonix 40 mg IV twice daily out of abundance of caution -can de-escalate if no further instances of hematemesis  -Occult stool ordered and pending        -----------------------------------------------------------------------------------------------------------    Electronically signed by:  Darryn Luz, VEE, APRN, ISAKP-BC  Hospitalist Services

## 2023-09-19 NOTE — PROGRESS NOTES
4 Eyes Skin Assessment Completed by SHARAN Agrawal and SHARAN Haines.    Head Eye socket bruising, scrapes  Ears Redness and Blanching  Nose WDL  Mouth Bleeding  Neck WDL  Breast/Chest WDL  Shoulder Blades Lump on R shoulder  Spine WDL  (R) Arm/Elbow/Hand Redness, Abrasion, and Scab  (L) Arm/Elbow/Hand Redness, Bruising, Abrasion, and Scab  Abdomen WDL  Groin WDL  Scrotum/Coccyx/Buttocks Discoloration  (R) Leg Redness, Blanching, Scar, Scab, and Abrasion  (L) Leg Redness, Scar, Scab, and Abrasion  (R) Heel/Foot/Toe Redness, Blanching, Boggy, Bruising, Scar, and Scab, bleeding  (L) Heel/Foot/Toe Redness, Blanching, Boggy, Bruising, and Scab, bleeding          Devices In Places Blood Pressure Cuff and Pulse Ox, oxygen      Interventions In Place Gray Ear Foams, Waffle Overlay, Pillows, and Low Air Loss Mattress    Possible Skin Injury No    Pictures Uploaded Into Epic N/A  Wound Consult Placed N/A  RN Wound Prevention Protocol Ordered No

## 2023-09-19 NOTE — ASSESSMENT & PLAN NOTE
"Frequent neurochecks  Seizure precautions  Loaded with Keppra, continue Keppra 500 mg twice daily  EEG  Neurology consultation in\"    COntinue seizure meds.  SPoke with Dr. Roberts, Neurology  I ordered detox bag with thiamine.  Mild hypokalemia. Replete with detox bag ordered Mg level  Recent Labs     09/18/23  1834 09/20/23  0239 09/21/23  0322   SODIUM 135 139 142   POTASSIUM 3.4* 3.9 4.4   CHLORIDE 100 104 106   CO2 16* 23 27   GLUCOSE 114* 99 92   BUN 6* 4* 10   CREATININE 0.81 0.56 0.64     K now stable.  Currently no further seizure episodes.  "

## 2023-09-19 NOTE — H&P
Hospital Medicine History & Physical Note    Date of Service  9/18/2023    Primary Care Physician  Pcp Not In Computer    Consultants  Neuro    Code Status  Full Code    Chief Complaint  Chief Complaint   Patient presents with    Seizure     EMS was called by group home roommate who stated pt was on the ground with ALOC. EMS arrived, pt was loaded into the rig and then had a witness tonic clonic seizure for 2 mins approx. EMS gave 5mg versed IV.        History of Presenting Illness  Chris Leggett is a 73 y.o. male past medical hypertension, psychiatric disorder who presented 9/18/2023 with witnessed seizures.  History obtained chart review as patient is postictal.  Patient witnessed seizures in ER.  Neurology was consulted recommended Keppra load.  He will require admission to the hospital for repeat EEG as during last admission his EEG was negative.    I discussed the plan of care with  ER physician .    Review of Systems  Review of Systems   Neurological:  Positive for seizures.       Past Medical History   has a past medical history of Arthritis, Gout, Hypertension, and Psychiatric disorder.    Surgical History   has a past surgical history that includes cervical disk and fusion anterior (9/2/2018); other orthopedic surgery; pr dx bone marrow aspirations (Left, 10/2/2019); pr dx bone marrow biopsies (10/2/2019); and orif, fracture, humerus (Right, 11/30/2021).     Family History  family history includes Alzheimer's Disease in his father; Cancer in his father and mother; Heart Disease in his brother and sister.   Family history reviewed with patient. There is no family history that is pertinent to the chief complaint.     Social History   reports that he has been smoking cigarettes. He started smoking about 9 years ago. He has a 2.5 pack-year smoking history. He has never used smokeless tobacco. He reports that he does not currently use alcohol after a past usage of about 10.8 - 12.0 oz of alcohol per week. He  reports current drug use. Drug: Inhaled.    Allergies  No Known Allergies    Medications  Prior to Admission Medications   Prescriptions Last Dose Informant Patient Reported? Taking?   DULoxetine (CYMBALTA) 30 MG Cap DR Particles UNK at [facility] Patient's Home Pharmacy Yes No   Sig: Take 30 mg by mouth with dinner.   allopurinol (ZYLOPRIM) 100 MG Tab UNK at [facility] Patient's Home Pharmacy Yes No   Sig: Take 100 mg by mouth every day.   amLODIPine (NORVASC) 2.5 MG Tab UNK at [facility] Patient's Home Pharmacy No No   Sig: Take 1 Tablet by mouth every day.   gabapentin (NEURONTIN) 600 MG tablet UNK at [facility] Patient's Home Pharmacy Yes No   Sig: Take 600 mg by mouth 3 times a day.   mirtazapine (REMERON) 15 MG Tab UNK at [facility] Patient's Home Pharmacy Yes No   Sig: Take 15 mg by mouth every evening.   prazosin (MINIPRESS) 2 MG Cap UNK at [facility] Patient's Home Pharmacy Yes No   Sig: Take 2 mg by mouth every evening.   quetiapine (SEROQUEL) 400 MG tablet UNK at [facility] Patient's Home Pharmacy Yes No   Sig: Take 400 mg by mouth every evening.   tamsulosin (FLOMAX) 0.4 MG capsule UNK at [facility] Patient's Home Pharmacy Yes No   Sig: Take 0.4 mg by mouth every day.   traZODone (DESYREL) 150 MG Tab K Patient's Home Pharmacy Yes No   Sig: Take 150 mg by mouth every evening.      Facility-Administered Medications: None       Physical Exam  Temp:  [36.5 °C (97.7 °F)] 36.5 °C (97.7 °F)  Pulse:  [] 80  Resp:  [15-23] 15  BP: (137-187)/() 148/92  SpO2:  [88 %-96 %] 95 %  Blood Pressure : (!) 148/92   Temperature: 36.5 °C (97.7 °F)   Pulse: 80   Respiration: 15   Pulse Oximetry: 95 %       Physical Exam  Vitals and nursing note reviewed.   Constitutional:       Appearance: Normal appearance.   HENT:      Head: Normocephalic and atraumatic.      Right Ear: Tympanic membrane normal.      Left Ear: Tympanic membrane normal.      Nose: Nose normal.      Mouth/Throat:      Comments: Dried blood noted around lips  Eyes:      Extraocular Movements:  "Extraocular movements intact.      Pupils: Pupils are equal, round, and reactive to light.   Cardiovascular:      Rate and Rhythm: Normal rate and regular rhythm.      Pulses: Normal pulses.      Heart sounds: Normal heart sounds.   Pulmonary:      Effort: Pulmonary effort is normal.      Breath sounds: Normal breath sounds.   Abdominal:      General: Bowel sounds are normal. There is no distension.      Palpations: Abdomen is soft. There is no mass.   Musculoskeletal:         General: Normal range of motion.      Cervical back: Neck supple.   Skin:     General: Skin is warm.      Capillary Refill: Capillary refill takes less than 2 seconds.   Neurological:      Mental Status: He is alert. He is disoriented.   Psychiatric:         Mood and Affect: Mood normal.         Behavior: Behavior normal.         Laboratory:  Recent Labs     09/16/23  0519 09/18/23  1834   WBC 2.2* 3.6*   RBC 3.51* 3.46*   HEMOGLOBIN 11.5* 11.5*   HEMATOCRIT 34.2* 33.2*   MCV 97.4 96.0   MCH 32.8 33.2*   MCHC 33.6 34.6   RDW 52.2* 50.4*   PLATELETCT 40* 40*   MPV 12.5 10.9     Recent Labs     09/16/23  0519 09/18/23  1834   SODIUM 136 135   POTASSIUM 4.6 3.4*   CHLORIDE 104 100   CO2 25 16*   GLUCOSE 93 114*   BUN 6* 6*   CREATININE 0.65 0.81   CALCIUM 8.4* 8.8     Recent Labs     09/16/23  0519 09/18/23  1834   ALTSGPT 14 15   ASTSGOT 42 48*   ALKPHOSPHAT 63 68   TBILIRUBIN 0.4 0.7   GLUCOSE 93 114*         No results for input(s): \"NTPROBNP\" in the last 72 hours.      Recent Labs     09/18/23  1834   TROPONINT 18       Imaging:  CT-HEAD W/O   Final Result      1.  There is no acute intracranial hemorrhage or infarct.      2.  White matter lucencies most consistent with small vessel ischemic change versus demyelination or gliosis.      3.  There is cerebral atrophy.             no X-Ray or EKG requiring interpretation    Assessment/Plan:  Justification for Admission Status  I anticipate this patient will require at least two midnights for " appropriate medical management, necessitating inpatient admission because seizures requiring EEG and initiation of AED's.    Patient will need a Med/Surg bed on NEUROLOGY service .  The need is secondary to see above.    * Seizures (HCC)- (present on admission)  Assessment & Plan  Frequent neurochecks  Seizure precautions  Loaded with Keppra, continue Keppra 500 mg twice daily  EEG  Neurology consultation in    Thrombocytopenia (HCC)- (present on admission)  Assessment & Plan  Monitor    Essential hypertension- (present on admission)  Assessment & Plan  Resume home medications  IV labetalol as needed    Coagulopathy (HCC)- (present on admission)  Assessment & Plan  Monitor  Avoid anticoagulants        VTE prophylaxis: SCDs/TEDs

## 2023-09-19 NOTE — ED NOTES
Pt upstairs with ED RN. Pt on [facility], 2 LPM via nasal cannula, IV maintenance fluids. Pt's belongings, chart transported with pt.

## 2023-09-19 NOTE — PROCEDURES
INPATIENT ROUTINE VIDEO ELECTROENCEPHALOGRAM REPORT    REFERRING PROVIDER: Michael Franco M.d.  DOS: 9/19/2023  STUDY DURATION: 0 hours and 24 minutes of total recording time.     INDICATION:  Chris Leggett 73 y.o. male presenting with seizure(s)    RELEVANT MEDICATIONS/TREATMENTS:   Scheduled Medications   Medication Dose Frequency    pantoprazole  40 mg BID    levETIRAcetam (Keppra) IV  500 mg Q12HRS    [START ON 9/20/2023] thiamine  100 mg DAILY    And    [START ON 9/20/2023] multivitamin  1 Tablet DAILY    And    [START ON 9/20/2023] folic acid  1 mg DAILY    senna-docusate  2 Tablet BID    allopurinol  100 mg DAILY    amLODIPine  2.5 mg DAILY    DULoxetine  30 mg PM MEAL    gabapentin  600 mg TID    tamsulosin  0.4 mg DAILY       TECHNIQUE:   Routine VEEG was set up by a Neurodiagnostic technologist who performed education to the patient and staff. A minimum of 23 electrodes and 23 channel recording was setup and performed by Neurodiagnostic technologist, in accordance with the international 10-20 system. The study was reviewed in bipolar and referential montages. The recording examined the patient in the  awake and drowsy state(s).     DESCRIPTION OF THE RECORD:  During wakefulness, the background was continuous and showed a 6 Hz posterior dominant rhythm.  The anterior to posterior gradient was poorly formed. There was reactivity to eye closure/opening.  During drowsiness, theta/delta frequencies were seen.    EEG Sleep: N2 sleep architecture was not seen.    ICTAL AND INTERICTAL FINDINGS:   1) Occasional right temporal sharp waves, maximal T6, with field to right and left temporal leads.     2) No regional slowing or persistent focal asymmetries were seen.    3) No seizures.     ACTIVATION PROCEDURES:   NA    EKG: Single lead EKG regular.     EVENTS:  None    INTERPRETATION:   Abnormal routine inpatient video EEG recording in the awake and drowsy state(s):  1) Occasional right temporal sharp waves, a  finding that suggests a risk for seizures arise from this region. In addition, it may indicate the presence of a structural lesion, or may be a post-ictal finding. Clinical and radiologic correlation is recommended.   2) No seizures.       Liat Jauregui MD  Department of Neurology at [facility]  General Neurologist and Epileptologist   of Clinical Neurology, [facility].

## 2023-09-20 LAB
ALBUMIN SERPL BCP-MCNC: 4 G/DL (ref 3.2–4.9)
ALBUMIN SERPL ELPH-MCNC: 3.33 G/DL (ref 3.75–5.01)
ALPHA1 GLOB SERPL ELPH-MCNC: 0.23 G/DL (ref 0.19–0.46)
ALPHA2 GLOB SERPL ELPH-MCNC: 0.66 G/DL (ref 0.48–1.05)
B-GLOBULIN SERPL ELPH-MCNC: 0.7 G/DL (ref 0.48–1.1)
BUN SERPL-MCNC: 4 MG/DL (ref 8–22)
CALCIUM ALBUM COR SERPL-MCNC: 8.7 MG/DL (ref 8.5–10.5)
CALCIUM SERPL-MCNC: 8.7 MG/DL (ref 8.5–10.5)
CHLORIDE SERPL-SCNC: 104 MMOL/L (ref 96–112)
CO2 SERPL-SCNC: 23 MMOL/L (ref 20–33)
CREAT SERPL-MCNC: 0.56 MG/DL (ref 0.5–1.4)
EER PROT ELECT SER Q1092: ABNORMAL
ERYTHROCYTE [DISTWIDTH] IN BLOOD BY AUTOMATED COUNT: 50 FL (ref 35.9–50)
GAMMA GLOB SERPL ELPH-MCNC: 0.98 G/DL (ref 0.62–1.51)
GFR SERPLBLD CREATININE-BSD FMLA CKD-EPI: 105 ML/MIN/1.73 M 2
GLUCOSE SERPL-MCNC: 99 MG/DL (ref 65–99)
HCT VFR BLD AUTO: 36.6 % (ref 42–52)
HEMOCCULT STL QL: NEGATIVE
HGB BLD-MCNC: 12.4 G/DL (ref 14–18)
INTERPRETATION SERPL IFE-IMP: ABNORMAL
MCH RBC QN AUTO: 32.2 PG (ref 27–33)
MCHC RBC AUTO-ENTMCNC: 33.9 G/DL (ref 32.3–36.5)
MCV RBC AUTO: 95.1 FL (ref 81.4–97.8)
MONOCLONAL PROTEIN NL11656: ABNORMAL G/DL
PHOSPHATE SERPL-MCNC: 2.2 MG/DL (ref 2.5–4.5)
PLATELET # BLD AUTO: 64 K/UL (ref 164–446)
PLATELETS.RETICULATED NFR BLD AUTO: 6.7 % (ref 0.6–13.1)
PMV BLD AUTO: 11.8 FL (ref 9–12.9)
POTASSIUM SERPL-SCNC: 3.9 MMOL/L (ref 3.6–5.5)
PROT SERPL-MCNC: 5.9 G/DL (ref 6.3–8.2)
RBC # BLD AUTO: 3.85 M/UL (ref 4.7–6.1)
SODIUM SERPL-SCNC: 139 MMOL/L (ref 135–145)
WBC # BLD AUTO: 6.3 K/UL (ref 4.8–10.8)

## 2023-09-20 PROCEDURE — 700102 HCHG RX REV CODE 250 W/ 637 OVERRIDE(OP): Performed by: INTERNAL MEDICINE

## 2023-09-20 PROCEDURE — 85027 COMPLETE CBC AUTOMATED: CPT

## 2023-09-20 PROCEDURE — 700111 HCHG RX REV CODE 636 W/ 250 OVERRIDE (IP)

## 2023-09-20 PROCEDURE — 700101 HCHG RX REV CODE 250: Performed by: STUDENT IN AN ORGANIZED HEALTH CARE EDUCATION/TRAINING PROGRAM

## 2023-09-20 PROCEDURE — 82272 OCCULT BLD FECES 1-3 TESTS: CPT

## 2023-09-20 PROCEDURE — 770001 HCHG ROOM/CARE - MED/SURG/GYN PRIV*

## 2023-09-20 PROCEDURE — 700102 HCHG RX REV CODE 250 W/ 637 OVERRIDE(OP): Performed by: STUDENT IN AN ORGANIZED HEALTH CARE EDUCATION/TRAINING PROGRAM

## 2023-09-20 PROCEDURE — A9270 NON-COVERED ITEM OR SERVICE: HCPCS | Performed by: STUDENT IN AN ORGANIZED HEALTH CARE EDUCATION/TRAINING PROGRAM

## 2023-09-20 PROCEDURE — C9113 INJ PANTOPRAZOLE SODIUM, VIA: HCPCS

## 2023-09-20 PROCEDURE — 700111 HCHG RX REV CODE 636 W/ 250 OVERRIDE (IP): Mod: JZ | Performed by: STUDENT IN AN ORGANIZED HEALTH CARE EDUCATION/TRAINING PROGRAM

## 2023-09-20 PROCEDURE — 99233 SBSQ HOSP IP/OBS HIGH 50: CPT | Performed by: INTERNAL MEDICINE

## 2023-09-20 PROCEDURE — A9270 NON-COVERED ITEM OR SERVICE: HCPCS | Performed by: INTERNAL MEDICINE

## 2023-09-20 PROCEDURE — 80069 RENAL FUNCTION PANEL: CPT

## 2023-09-20 PROCEDURE — 36415 COLL VENOUS BLD VENIPUNCTURE: CPT

## 2023-09-20 PROCEDURE — 85055 RETICULATED PLATELET ASSAY: CPT

## 2023-09-20 RX ORDER — LEVETIRACETAM 500 MG/1
500 TABLET ORAL 2 TIMES DAILY
Status: DISCONTINUED | OUTPATIENT
Start: 2023-09-20 | End: 2023-09-22 | Stop reason: HOSPADM

## 2023-09-20 RX ADMIN — TAMSULOSIN HYDROCHLORIDE 0.4 MG: 0.4 CAPSULE ORAL at 05:53

## 2023-09-20 RX ADMIN — FOLIC ACID 1 MG: 1 TABLET ORAL at 05:52

## 2023-09-20 RX ADMIN — ALPRAZOLAM 0.25 MG: 0.25 TABLET ORAL at 22:17

## 2023-09-20 RX ADMIN — PANTOPRAZOLE SODIUM 40 MG: 40 INJECTION, POWDER, FOR SOLUTION INTRAVENOUS at 17:08

## 2023-09-20 RX ADMIN — LEVETIRACETAM 500 MG: 100 INJECTION, SOLUTION, CONCENTRATE INTRAVENOUS at 05:52

## 2023-09-20 RX ADMIN — LEVETIRACETAM 500 MG: 500 TABLET, FILM COATED ORAL at 17:08

## 2023-09-20 RX ADMIN — POTASSIUM CHLORIDE AND SODIUM CHLORIDE: 900; 150 INJECTION, SOLUTION INTRAVENOUS at 22:21

## 2023-09-20 RX ADMIN — ALLOPURINOL 100 MG: 100 TABLET ORAL at 05:52

## 2023-09-20 RX ADMIN — Medication 100 MG: at 05:53

## 2023-09-20 RX ADMIN — SENNOSIDES AND DOCUSATE SODIUM 2 TABLET: 50; 8.6 TABLET ORAL at 05:53

## 2023-09-20 RX ADMIN — THERA TABS 1 TABLET: TAB at 05:52

## 2023-09-20 RX ADMIN — POTASSIUM CHLORIDE AND SODIUM CHLORIDE: 900; 150 INJECTION, SOLUTION INTRAVENOUS at 00:40

## 2023-09-20 RX ADMIN — GABAPENTIN 600 MG: 300 CAPSULE ORAL at 12:27

## 2023-09-20 RX ADMIN — GABAPENTIN 600 MG: 300 CAPSULE ORAL at 05:52

## 2023-09-20 RX ADMIN — DULOXETINE HYDROCHLORIDE 30 MG: 30 CAPSULE, DELAYED RELEASE ORAL at 17:08

## 2023-09-20 RX ADMIN — PANTOPRAZOLE SODIUM 40 MG: 40 INJECTION, POWDER, FOR SOLUTION INTRAVENOUS at 05:53

## 2023-09-20 RX ADMIN — GABAPENTIN 600 MG: 300 CAPSULE ORAL at 17:08

## 2023-09-20 RX ADMIN — AMLODIPINE BESYLATE 2.5 MG: 2.5 TABLET ORAL at 05:53

## 2023-09-20 RX ADMIN — POTASSIUM CHLORIDE AND SODIUM CHLORIDE: 900; 150 INJECTION, SOLUTION INTRAVENOUS at 10:29

## 2023-09-20 ASSESSMENT — PAIN DESCRIPTION - PAIN TYPE
TYPE: ACUTE PAIN

## 2023-09-20 NOTE — CARE PLAN
The patient is Stable - Low risk of patient condition declining or worsening    Shift Goals  Clinical Goals: Monitor neuro status overnight  Patient Goals: rest  Family Goals: rest    Progress made toward(s) clinical / shift goals:  Patient is alert but noticeably more confuse during early morning. Safety measures applied.    Patient is not progressing towards the following goals:

## 2023-09-21 ENCOUNTER — PATIENT OUTREACH (OUTPATIENT)
Dept: SCHEDULING | Facility: IMAGING CENTER | Age: 73
End: 2023-09-21
Payer: MEDICARE

## 2023-09-21 LAB
ALBUMIN SERPL BCP-MCNC: 3.8 G/DL (ref 3.2–4.9)
BUN SERPL-MCNC: 10 MG/DL (ref 8–22)
CALCIUM ALBUM COR SERPL-MCNC: 8.7 MG/DL (ref 8.5–10.5)
CALCIUM SERPL-MCNC: 8.5 MG/DL (ref 8.5–10.5)
CHLORIDE SERPL-SCNC: 106 MMOL/L (ref 96–112)
CO2 SERPL-SCNC: 27 MMOL/L (ref 20–33)
CREAT SERPL-MCNC: 0.64 MG/DL (ref 0.5–1.4)
ERYTHROCYTE [DISTWIDTH] IN BLOOD BY AUTOMATED COUNT: 56.5 FL (ref 35.9–50)
GFR SERPLBLD CREATININE-BSD FMLA CKD-EPI: 100 ML/MIN/1.73 M 2
GLUCOSE SERPL-MCNC: 92 MG/DL (ref 65–99)
HCT VFR BLD AUTO: 40.4 % (ref 42–52)
HGB BLD-MCNC: 12.8 G/DL (ref 14–18)
MCH RBC QN AUTO: 32 PG (ref 27–33)
MCHC RBC AUTO-ENTMCNC: 31.7 G/DL (ref 32.3–36.5)
MCV RBC AUTO: 101 FL (ref 81.4–97.8)
PHOSPHATE SERPL-MCNC: 2.3 MG/DL (ref 2.5–4.5)
PLATELET # BLD AUTO: 56 K/UL (ref 164–446)
PLATELETS.RETICULATED NFR BLD AUTO: 3.4 % (ref 0.6–13.1)
PMV BLD AUTO: 11.4 FL (ref 9–12.9)
POTASSIUM SERPL-SCNC: 4.4 MMOL/L (ref 3.6–5.5)
RBC # BLD AUTO: 4 M/UL (ref 4.7–6.1)
SODIUM SERPL-SCNC: 142 MMOL/L (ref 135–145)
WBC # BLD AUTO: 3.9 K/UL (ref 4.8–10.8)

## 2023-09-21 PROCEDURE — 99232 SBSQ HOSP IP/OBS MODERATE 35: CPT | Performed by: INTERNAL MEDICINE

## 2023-09-21 PROCEDURE — A9270 NON-COVERED ITEM OR SERVICE: HCPCS | Performed by: INTERNAL MEDICINE

## 2023-09-21 PROCEDURE — 80069 RENAL FUNCTION PANEL: CPT

## 2023-09-21 PROCEDURE — C9113 INJ PANTOPRAZOLE SODIUM, VIA: HCPCS

## 2023-09-21 PROCEDURE — 85027 COMPLETE CBC AUTOMATED: CPT

## 2023-09-21 PROCEDURE — 770001 HCHG ROOM/CARE - MED/SURG/GYN PRIV*

## 2023-09-21 PROCEDURE — 700102 HCHG RX REV CODE 250 W/ 637 OVERRIDE(OP): Performed by: STUDENT IN AN ORGANIZED HEALTH CARE EDUCATION/TRAINING PROGRAM

## 2023-09-21 PROCEDURE — 85055 RETICULATED PLATELET ASSAY: CPT

## 2023-09-21 PROCEDURE — 700111 HCHG RX REV CODE 636 W/ 250 OVERRIDE (IP)

## 2023-09-21 PROCEDURE — A9270 NON-COVERED ITEM OR SERVICE: HCPCS | Performed by: STUDENT IN AN ORGANIZED HEALTH CARE EDUCATION/TRAINING PROGRAM

## 2023-09-21 PROCEDURE — 97163 PT EVAL HIGH COMPLEX 45 MIN: CPT

## 2023-09-21 PROCEDURE — 700102 HCHG RX REV CODE 250 W/ 637 OVERRIDE(OP): Performed by: INTERNAL MEDICINE

## 2023-09-21 PROCEDURE — 97166 OT EVAL MOD COMPLEX 45 MIN: CPT

## 2023-09-21 RX ADMIN — GABAPENTIN 600 MG: 300 CAPSULE ORAL at 12:03

## 2023-09-21 RX ADMIN — LEVETIRACETAM 500 MG: 500 TABLET, FILM COATED ORAL at 16:31

## 2023-09-21 RX ADMIN — GABAPENTIN 600 MG: 300 CAPSULE ORAL at 16:31

## 2023-09-21 RX ADMIN — TAMSULOSIN HYDROCHLORIDE 0.4 MG: 0.4 CAPSULE ORAL at 05:12

## 2023-09-21 RX ADMIN — LEVETIRACETAM 500 MG: 500 TABLET, FILM COATED ORAL at 05:12

## 2023-09-21 RX ADMIN — FOLIC ACID 1 MG: 1 TABLET ORAL at 05:13

## 2023-09-21 RX ADMIN — PANTOPRAZOLE SODIUM 40 MG: 40 INJECTION, POWDER, FOR SOLUTION INTRAVENOUS at 05:12

## 2023-09-21 RX ADMIN — DULOXETINE HYDROCHLORIDE 30 MG: 30 CAPSULE, DELAYED RELEASE ORAL at 16:31

## 2023-09-21 RX ADMIN — AMLODIPINE BESYLATE 2.5 MG: 2.5 TABLET ORAL at 05:13

## 2023-09-21 RX ADMIN — THERA TABS 1 TABLET: TAB at 05:13

## 2023-09-21 RX ADMIN — PANTOPRAZOLE SODIUM 40 MG: 40 INJECTION, POWDER, FOR SOLUTION INTRAVENOUS at 16:31

## 2023-09-21 RX ADMIN — GABAPENTIN 600 MG: 300 CAPSULE ORAL at 05:12

## 2023-09-21 RX ADMIN — ALPRAZOLAM 0.25 MG: 0.25 TABLET ORAL at 23:30

## 2023-09-21 RX ADMIN — ALLOPURINOL 100 MG: 100 TABLET ORAL at 05:12

## 2023-09-21 RX ADMIN — Medication 100 MG: at 05:12

## 2023-09-21 ASSESSMENT — COGNITIVE AND FUNCTIONAL STATUS - GENERAL
SUGGESTED CMS G CODE MODIFIER DAILY ACTIVITY: CK
MOBILITY SCORE: 20
SUGGESTED CMS G CODE MODIFIER DAILY ACTIVITY: CJ
TOILETING: A LITTLE
CLIMB 3 TO 5 STEPS WITH RAILING: A LITTLE
MOVING FROM LYING ON BACK TO SITTING ON SIDE OF FLAT BED: A LITTLE
DAILY ACTIVITIY SCORE: 18
DRESSING REGULAR UPPER BODY CLOTHING: A LITTLE
PERSONAL GROOMING: A LITTLE
DAILY ACTIVITIY SCORE: 21
SUGGESTED CMS G CODE MODIFIER MOBILITY: CJ
HELP NEEDED FOR BATHING: A LITTLE
HELP NEEDED FOR BATHING: A LITTLE
STANDING UP FROM CHAIR USING ARMS: A LITTLE
DRESSING REGULAR LOWER BODY CLOTHING: A LITTLE
WALKING IN HOSPITAL ROOM: A LITTLE
TOILETING: A LITTLE
DRESSING REGULAR LOWER BODY CLOTHING: A LITTLE
EATING MEALS: A LITTLE

## 2023-09-21 ASSESSMENT — GAIT ASSESSMENTS
GAIT LEVEL OF ASSIST: STANDBY ASSIST
DEVIATION: BRADYKINETIC;DECREASED BASE OF SUPPORT
ASSISTIVE DEVICE: HAND HELD ASSIST
DISTANCE (FEET): 50

## 2023-09-21 ASSESSMENT — PAIN DESCRIPTION - PAIN TYPE
TYPE: ACUTE PAIN

## 2023-09-21 ASSESSMENT — FIBROSIS 4 INDEX: FIB4 SCORE: 15.93

## 2023-09-21 ASSESSMENT — ACTIVITIES OF DAILY LIVING (ADL): TOILETING: INDEPENDENT

## 2023-09-21 NOTE — THERAPY
Physical Therapy Contact Note    Patient Name: Chris Leggett  Age:  73 y.o., Sex:  male  Medical Record #: [id number]  Today's Date: 9/21/2023     PT consult received, pt with active bedrest order; will need to be removed prior to PT evaluation;    Lisa CHAMBERS, PT,  [phone]

## 2023-09-21 NOTE — PROGRESS NOTES
Hospital Medicine Daily Progress Note    Date of Service  9/21/2023    Chief Complaint  Chris Leggett is a 73 y.o. male admitted 9/18/2023 with Seizure (EMS was called by group home roommate who stated pt was on the ground with ALOC. EMS arrived, pt was loaded into the rig and then had a witness tonic clonic seizure for 2 mins approx. EMS gave 5mg versed IV. )    Hospital Course  No notes on file  Working up and managing seizures.   Interval Problem Update  9/19. I reviewed the chart along with vitals, labs, imaging, test (both pending and resulted) and recommendations from specialists and interdisciplinary team.  He has an alcohol history but he doesn't seem very reliable.  I spoke with Dr. Roberts that I ordered Detox bag with thiamine  9/20. Working with PT  Still generally deconditioned  9/21. Walking with assistance may have poor ADLS  Reviewed neurology recs they have cleared him. Patient says he was at group [facility] and had [facility]    I have discussed this patient's plan of care and discharge plan at IDT rounds today with Case Management, Nursing, Nursing leadership, and other members of the IDT team.    Consultants/Specialty  neurology    Code Status  Full Code    Disposition  The patient is medically cleared for discharge to home or a post-acute facility.    Group home pending PT/OT  I have placed the appropriate orders for post-discharge needs.    Review of Systems  Review of Systems   Unable to perform ROS: Other   Unreliable.    Physical Exam  Temp:  [36.1 °C (97 °F)-37.2 °C (98.9 °F)] 37.2 °C (98.9 °F)  Pulse:  [52-69] 59  Resp:  [18] 18  BP: (146-169)/(87-93) 169/91  SpO2:  [94 %-97 %] 97 %    Physical Exam  Vitals and nursing note reviewed.   HENT:      Head: Normocephalic and atraumatic.      Right Ear: External ear normal.      Left Ear: External ear normal.      Nose: Nose normal.      Mouth/Throat:      Mouth: Mucous membranes are moist.   Eyes:      General: No scleral icterus.      Conjunctiva/sclera: Conjunctivae normal.   Cardiovascular:      Rate and Rhythm: Normal rate and regular rhythm.      Heart sounds: No murmur heard.     No friction rub. No gallop.   Pulmonary:      Effort: Pulmonary effort is normal.      Breath sounds: Normal breath sounds.   Abdominal:      General: Abdomen is flat. Bowel sounds are normal. There is no distension.      Palpations: Abdomen is soft.      Tenderness: There is no abdominal tenderness. There is no guarding.   Musculoskeletal:         General: Normal range of motion.      Cervical back: Normal range of motion and neck supple.   Skin:     General: Skin is warm.   Neurological:      Mental Status: He is alert and oriented to person, place, and time. Mental status is at baseline.      Coordination: Coordination abnormal (mild tremors).      Comments: Poor insight at times   Psychiatric:         Mood and Affect: Mood normal.         Behavior: Behavior normal.         Thought Content: Thought content normal.         Judgment: Judgment normal.         Fluids    Intake/Output Summary (Last 24 hours) at 9/21/2023 1612  Last data filed at 9/21/2023 0800  Gross per 24 hour   Intake 240 ml   Output 0 ml   Net 240 ml         Laboratory  Recent Labs     09/18/23 1834 09/20/23 0239 09/21/23  0322   WBC 3.6* 6.3 3.9*   RBC 3.46* 3.85* 4.00*   HEMOGLOBIN 11.5* 12.4* 12.8*   HEMATOCRIT 33.2* 36.6* 40.4*   MCV 96.0 95.1 101.0*   MCH 33.2* 32.2 32.0   MCHC 34.6 33.9 31.7*   RDW 50.4* 50.0 56.5*   PLATELETCT 40* 64* 56*   MPV 10.9 11.8 11.4       Recent Labs     09/18/23 1834 09/20/23 0239 09/21/23  0322   SODIUM 135 139 142   POTASSIUM 3.4* 3.9 4.4   CHLORIDE 100 104 106   CO2 16* 23 27   GLUCOSE 114* 99 92   BUN 6* 4* 10   CREATININE 0.81 0.56 0.64   CALCIUM 8.8 8.7 8.5                     Imaging  CT-HEAD W/O   Final Result      1.  There is no acute intracranial hemorrhage or infarct.      2.  White matter lucencies most consistent with small vessel ischemic  "change versus demyelination or gliosis.      3.  There is cerebral atrophy.                Assessment/Plan  * Seizures (HCC)- (present on admission)  Assessment & Plan  Frequent neurochecks  Seizure precautions  Loaded with Keppra, continue Keppra 500 mg twice daily  EEG  Neurology consultation in\"    COntinue seizure meds.  SPoke with Dr. Roberts, Neurology  I ordered detox bag with thiamine.  Mild hypokalemia. Replete with detox bag ordered Mg level  Recent Labs     09/18/23  1834 09/20/23  0239 09/21/23  0322   SODIUM 135 139 142   POTASSIUM 3.4* 3.9 4.4   CHLORIDE 100 104 106   CO2 16* 23 27   GLUCOSE 114* 99 92   BUN 6* 4* 10   CREATININE 0.81 0.56 0.64     K now stable.  Currently no further seizure episodes.    Thrombocytopenia (HCC)- (present on admission)  Assessment & Plan  Monitor\"    Recent Labs     09/18/23 1834 09/20/23  0239 09/21/23  0322   WBC 3.6* 6.3 3.9*   RBC 3.46* 3.85* 4.00*   HEMOGLOBIN 11.5* 12.4* 12.8*   HEMATOCRIT 33.2* 36.6* 40.4*   MCV 96.0 95.1 101.0*   MCH 33.2* 32.2 32.0   RDW 50.4* 50.0 56.5*   PLATELETCT 40* 64* 56*   MPV 10.9 11.8 11.4   NEUTSPOLYS 83.70*  --   --    LYMPHOCYTES 9.00*  --   --    MONOCYTES 5.30  --   --    EOSINOPHILS 1.40  --   --    BASOPHILS 0.30  --   --    No active bleeding      Essential hypertension- (present on admission)  Assessment & Plan  Resume home medications  IV labetalol as needed\"    Vitals:    09/21/23 0819   BP: (!) 169/91   Pulse: (!) 59   Resp: 18   Temp: 37.2 °C (98.9 °F)   SpO2: 97%     Stable.    Coagulopathy (HCC)- (present on admission)  Assessment & Plan  Monitor  Avoid anticoagulants         VTE prophylaxis: SCD  I have performed a physical exam and reviewed and updated ROS and Plan today (9/21/2023). In review of yesterday's note (9/20/2023), there are no changes except as documented above.        "

## 2023-09-21 NOTE — THERAPY
Physical Therapy   Initial Evaluation     Patient Name: Chris Leggett  Age:  73 y.o., Sex:  male  Medical Record #: [id number]  Today's Date: 9/21/2023     Precautions  Precautions: Fall Risk  Comments: seizure precautions    Assessment  Pt presents with impaired activity tolerance, dynamic balance and cognition associated with chief complaint of seizure and GLF in setting of EtOH, SI, multiple falls and seizures; d/c'd off antiepileptics and readmitted with seizures, pt does not recall events. Pt is most limited by recent memory of events, otherwise is conversationally intact. Pt has a significant fall history given chart review and is likely at most recent baseline functionally; declining to use assistive device and reports some behaviors at home that will increase falls; does report a roommate that does not assist with care and 'other people around but do not help'; pt's needs are more medical and OT in nature, from a PT perspective could return home with home health PT to ensure home safety set up and confirmation of home environment; will follow to optimize safety of dc      Plan    Physical Therapy Initial Treatment Plan   Treatment Plan : Equipment, Bed Mobility, Neuro Re-Education / Balance, Self Care / Home Evaluation, Therapeutic Activities, Stair Training, Therapeutic Exercise, Gait Training, Manual Therapy  Treatment Frequency: 4 Times per Week  Duration: Until Therapy Goals Met    DC Equipment Recommendations: None (declining use of FWW, has SPC)   Discharge Recommendations: Other - defer to OT/speech for functional cognition concerns; from a PT perspective once medically stable could dc home with home health PT; pt will remain a fall risk given extensive history and seizure history.        Abridged Subjective/Objective     09/21/23 1225   Prior Living Situation   Prior Services Home-Independent;Skilled Home Health Services  (reporting getting PT)   Housing / Facility Other (Comments)  ('half way  house/group home')   Steps Into Home 4   Equipment Owned Single Point Cane   Lives with - Patient's Self Care Capacity Other (Comments)   Comments pt poor historian for admitting events and a bit terse with home set up information; does not recall seizures, does ask if drinking can cause it; reports roommate is not a source of assist; reports he does not leave or drive, reports a sedentary lifestyle at home   Prior Level of Functional Mobility   Bed Mobility Independent   Transfer Status Independent   Ambulation Independent   Ambulation Distance houehold   Assistive Devices Used None   Cognition    Cognition / Consciousness X   Level of Consciousness Alert   Ability To Follow Commands 1 Step   Safety Awareness Impaired   New Learning Impaired   Attention Impaired   Sequencing Impaired   Initiation Impaired   Comments pleasant and cooperative however poor historian with limited engagement; able to verbalize medications for night time; appropriate during conversation   Passive ROM Upper Body   Passive ROM Upper Body WDL   Passive ROM Lower Body   Passive ROM Lower Body WDL   Strength Lower Body   Lower Body Strength  WDL   Sensation Lower Body   Lower Extremity Sensation   WDL   Balance Assessment   Sitting Balance (Static) Fair +   Sitting Balance (Dynamic) Fair +   Standing Balance (Static) Fair   Standing Balance (Dynamic) Fair -   Weight Shift Sitting Good   Weight Shift Standing Fair   Comments no UE support in sitting/standing; no loss of balance room distances   Bed Mobility    Supine to Sit Modified Independent   Sit to Supine   (NT, in chair post)   Gait Analysis   Gait Level Of Assist Standby Assist   Assistive Device Hand Held Assist  (intermittent HHA)   Distance (Feet) 50   # of Times Distance was Traveled 2   Deviation Bradykinetic;Decreased Base Of Support   Weight Bearing Status full   Vision Deficits Impacting Mobility denies   Comments distance limited by therapist; no symptoms throughout    Functional Mobility   Sit to Stand Supervised   Bed, Chair, Wheelchair Transfer Supervised   Edema / Skin Assessment   Edema / Skin  X   Comments pt with right facial bruising, multiple knee scabs and bruising on bilateral knees (oh that was already there)   Patient / Family Goals    Patient / Family Goal #1 to improve activity tolerance   Short Term Goals    Short Term Goal # 1 Pt will perform supine<>sit from flat HOB/no railing with supervision within 6 visits to ensure independent mobility at home.   Short Term Goal # 2 Pt will perform sit<>stand with SPC and supervision within 6 visits to ensure return to baseline.   Short Term Goal # 3 Pt will ambulate x 150ft with SPC and supervision within 6 visits to ensure independnet mobility at home.   Short Term Goal # 4 Pt will ascend/descend 4 stairs with unilateral support and supervision within 6 visits to ensure independent mobility at home.   Education Group   Role of Physical Therapist Patient Response Patient;Acceptance;Demonstration;Explanation;Action Demonstration;Verbal Demonstration   Use of Assistive Device Patient Response Patient;Nonacceptance;Explanation;Demonstration;Action Demonstration;Verbal Demonstration   Additional Comments protein in diet; home vs rehab

## 2023-09-21 NOTE — PROGRESS NOTES
Hospital Medicine Daily Progress Note    Date of Service  9/20/2023    Chief Complaint  Chris Leggett is a 73 y.o. male admitted 9/18/2023 with Seizure (EMS was called by group home roommate who stated pt was on the ground with ALOC. EMS arrived, pt was loaded into the rig and then had a witness tonic clonic seizure for 2 mins approx. EMS gave 5mg versed IV. )    Hospital Course  No notes on file  Working up and managing seizures.   Interval Problem Update  9/19. I reviewed the chart along with vitals, labs, imaging, test (both pending and resulted) and recommendations from specialists and interdisciplinary team.  He has an alcohol history but he doesn't seem very reliable.  I spoke with Dr. Roberts that I ordered Detox bag with thiamine  9/20. Working with PT  Still generally deconditioned    I have discussed this patient's plan of care and discharge plan at IDT rounds today with Case Management, Nursing, Nursing leadership, and other members of the IDT team.    Consultants/Specialty  neurology    Code Status  Full Code    Disposition  The patient is not medically cleared for discharge to home or a post-acute facility.      I have placed the appropriate orders for post-discharge needs.    Review of Systems  Review of Systems   Unable to perform ROS: Other   Unreliable.    Physical Exam  Temp:  [36 °C (96.8 °F)-37.4 °C (99.4 °F)] 36.1 °C (97 °F)  Pulse:  [52-58] 52  Resp:  [14-18] 18  BP: (146-168)/(87-94) 146/87  SpO2:  [95 %-97 %] 97 %    Physical Exam  Vitals and nursing note reviewed.   HENT:      Head: Normocephalic and atraumatic.      Right Ear: External ear normal.      Left Ear: External ear normal.      Nose: Nose normal.      Mouth/Throat:      Mouth: Mucous membranes are moist.   Eyes:      General: No scleral icterus.     Conjunctiva/sclera: Conjunctivae normal.   Cardiovascular:      Rate and Rhythm: Normal rate and regular rhythm.      Heart sounds: No murmur heard.     No friction rub. No gallop.    Pulmonary:      Effort: Pulmonary effort is normal.      Breath sounds: Normal breath sounds.   Abdominal:      General: Abdomen is flat. Bowel sounds are normal. There is no distension.      Palpations: Abdomen is soft.      Tenderness: There is no abdominal tenderness. There is no guarding.   Musculoskeletal:         General: Normal range of motion.      Cervical back: Normal range of motion and neck supple.   Skin:     General: Skin is warm.   Neurological:      Mental Status: He is alert and oriented to person, place, and time. Mental status is at baseline.      Coordination: Coordination abnormal (mild tremors).      Comments: Poor insight   Psychiatric:         Mood and Affect: Mood normal.         Behavior: Behavior normal.         Thought Content: Thought content normal.         Judgment: Judgment normal.         Fluids    Intake/Output Summary (Last 24 hours) at 9/20/2023 2028  Last data filed at 9/20/2023 1029  Gross per 24 hour   Intake 600 ml   Output 650 ml   Net -50 ml         Laboratory  Recent Labs     09/18/23  1834 09/20/23  0239   WBC 3.6* 6.3   RBC 3.46* 3.85*   HEMOGLOBIN 11.5* 12.4*   HEMATOCRIT 33.2* 36.6*   MCV 96.0 95.1   MCH 33.2* 32.2   MCHC 34.6 33.9   RDW 50.4* 50.0   PLATELETCT 40* 64*   MPV 10.9 11.8       Recent Labs     09/18/23  1834 09/20/23  0239   SODIUM 135 139   POTASSIUM 3.4* 3.9   CHLORIDE 100 104   CO2 16* 23   GLUCOSE 114* 99   BUN 6* 4*   CREATININE 0.81 0.56   CALCIUM 8.8 8.7                     Imaging  CT-HEAD W/O   Final Result      1.  There is no acute intracranial hemorrhage or infarct.      2.  White matter lucencies most consistent with small vessel ischemic change versus demyelination or gliosis.      3.  There is cerebral atrophy.                Assessment/Plan  * Seizures (HCC)- (present on admission)  Assessment & Plan  Frequent neurochecks  Seizure precautions  Loaded with Keppra, continue Keppra 500 mg twice daily  EEG  Neurology consultation  "in\"    COntinue seizure meds.  SPoke with Dr. Roberts, Neurology  I ordered detox bag with thiamine.  Mild hypokalemia. Replete with detox bag ordered Mg level  Recent Labs     09/18/23 1834 09/20/23  0239   SODIUM 135 139   POTASSIUM 3.4* 3.9   CHLORIDE 100 104   CO2 16* 23   GLUCOSE 114* 99   BUN 6* 4*   CREATININE 0.81 0.56     K now stable.  Currently no further seizure episodes.    Thrombocytopenia (HCC)- (present on admission)  Assessment & Plan  Monitor\"    Recent Labs     09/18/23  1834 09/20/23  0239   WBC 3.6* 6.3   RBC 3.46* 3.85*   HEMOGLOBIN 11.5* 12.4*   HEMATOCRIT 33.2* 36.6*   MCV 96.0 95.1   MCH 33.2* 32.2   RDW 50.4* 50.0   PLATELETCT 40* 64*   MPV 10.9 11.8   NEUTSPOLYS 83.70*  --    LYMPHOCYTES 9.00*  --    MONOCYTES 5.30  --    EOSINOPHILS 1.40  --    BASOPHILS 0.30  --    No active bleeding      Essential hypertension- (present on admission)  Assessment & Plan  Resume home medications  IV labetalol as needed\"    Vitals:    09/20/23 1854   BP: (!) 146/87   Pulse: (!) 52   Resp: 18   Temp: 36.1 °C (97 °F)   SpO2: 97%     Stable.    Coagulopathy (HCC)- (present on admission)  Assessment & Plan  Monitor  Avoid anticoagulants         VTE prophylaxis: SCD  I have performed a physical exam and reviewed and updated ROS and Plan today (9/20/2023). In review of yesterday's note (9/19/2023), there are no changes except as documented above.        "

## 2023-09-21 NOTE — CARE PLAN
The patient is Stable - Low risk of patient condition declining or worsening    Shift Goals  Clinical Goals: Stable neuro status, ambulate  Patient Goals: Sleep  Family Goals: RAMONA    Progress made toward(s) clinical / shift goals:    Problem: Neuro Status  Goal: Neuro status will remain stable or improve  Description: Target End Date:  Prior to discharge or change in level of care    Document on Neuro assessment in the Assessment flowsheet    1.  Assess and monitor neurologic status per provider order/protocol/unit policy  2.  Assess level of consciousness and orientation  3.  Assess for speech, dysarthria, dysphagia, facial symmetry  4.  Assess visual field, eye movements, gaze preference, pupil reaction and size  5.  Assess muscle strength and motor response in all four extremities  6.  Assess for sensation (numbness and tingling)  7.  Assess basic neuro reflexes (cough, gag, corneal)  8.  Identify changes in neuro status and report to provider for testing/treatment orders  Outcome: Progressing     Problem: Self Care  Goal: Patient will have the ability to perform ADLs independently or with assistance (bathe, groom, dress, toilet and feed)  Description: Target End Date:  Prior to discharge or change in level of care    Document on ADL flowsheet    1.  Assess the capability and level of deficiency to perform ADLs  2.  Encourage family/care giver involvement  3.  Provide assistive devices  4.  Consider PT/OT evaluations  5.  Maintain support, give positive feedback, encourage self-care allowing extra time and verbal cuing as needed  6.  Avoid doing something for patients they can do themselves, but provide assistance as needed  7.  Assist in anticipating/planning individual needs  8.  Collaborate with Case Management and  to meet discharge needs  Outcome: Progressing       Patient is not progressing towards the following goals:

## 2023-09-21 NOTE — DISCHARGE PLANNING
"Case discussed in IDT rounds this morning:  Per Dr. Franco, medical workup completed; PT/OT eval pending for DC recommendations.  CM to f/u.      **1600 Hrs - PT/OT recommendations noted as follows:  OT - Post-acute placement  PT - Home Health PT    Per notes from recent admission, patient resides @ \"[facility]"; CM called residence & spoke to owner/landlordDagmar #[phone], who stated that patient was previously active with [facility] however, residence is \"independent shared living\" which means that owner/staff does not provide any assistance with ADLs/meals etc and if something should happen the only option will be to call 911.    Dr. Franco informed of above.   CM to discuss SNF placement with patient in AM.  "

## 2023-09-21 NOTE — CARE PLAN
The patient is Stable - Low risk of patient condition declining or worsening    Shift Goals  Clinical Goals: Monitor Neuro status  Patient Goals: rest/safety  Family Goals: rest    Progress made toward(s) clinical / shift goals:  No acute changes noted neuro wise. Patient is able to use urinal for voiding. Safety measures applied.    Patient is not progressing towards the following goals:

## 2023-09-21 NOTE — THERAPY
Occupational Therapy   Initial Evaluation     Patient Name: Chris Leggett  Age:  73 y.o., Sex:  male  Medical Record #: [id number]  Today's Date: 9/21/2023     Precautions  Precautions: Fall Risk  Comments: seizure precautions    Assessment    Patient is 73 y.o. male admitted from group home due to patient being found down by roommate, had seizure like episode on the way to the hospital. Pt states normally independent with functional mobility and ADLs living in a group home where they assist with IADLs. Pt required CGA for functional mobility and ADLs, due to impulsiveness and sequencing of tasks (washing hands after bathroom, taking time with mobility). Pt would benefit from continued skilled therapy while admitted as well as recommend post-acute placement.     Plan    Occupational Therapy Initial Treatment Plan   Treatment Interventions: (P) Self Care / Activities of Daily Living, Adaptive Equipment, Manual Therapy Techniques, Neuro Re-Education / Balance, Therapeutic Exercises, Therapeutic Activity  Treatment Frequency: (P) 3 Times per Week  Duration: (P) Until Therapy Goals Met    DC Equipment Recommendations: (P) Unable to determine at this time  Discharge Recommendations: (P) Recommend post-acute placement for additional occupational therapy services prior to discharge home     Objective       09/21/23 1156   Prior Living Situation   Prior Services Home-Independent;Skilled Home Health Services   Housing / Facility Group Home   Steps Into Home 4   Bathroom Set up Walk In Shower   Equipment Owned Single Point Cane   Lives with - Patient's Self Care Capacity Attendant / Paid Care Giver   Comments Poor historian, unable to provide clear PLOF/history   Prior Level of ADL Function   Self Feeding Independent   Grooming / Hygiene Independent   Bathing Independent   Dressing Independent   Toileting Independent   Prior Level of IADL Function   Medication Management Requires Assist   Laundry Requires Assist   Kitchen  Mobility Requires Assist   Finances Requires Assist   Home Management Requires Assist   Shopping Requires Assist   Prior Level Of Mobility Independent Without Device in Home   Driving / Transportation Relatives / Others Provide Transportation   Occupation (Pre-Hospital Vocational) Not Employed   History of Falls   History of Falls Yes   Date of Last Fall   (found down)   Precautions   Precautions Fall Risk   Comments seizure precautions   Pain 0 - 10 Group   Therapist Pain Assessment Post Activity Pain Same as Prior to Activity;Nurse Notified   Cognition    Cognition / Consciousness X   Level of Consciousness Alert   Ability To Follow Commands 1 Step   Safety Awareness Impaired   New Learning Impaired   Attention Impaired   Sequencing Impaired   Initiation Impaired   Comments Pleasant, cooperative, limited insight into deficts   Active ROM Upper Body   Active ROM Upper Body  WDL   Strength Upper Body   Upper Body Strength  WDL   Sensation Upper Body   Upper Extremity Sensation  WDL   Upper Body Muscle Tone   Upper Body Muscle Tone  WDL   Neurological Concerns   Neurological Concerns No   Coordination Upper Body   Coordination WDL   Balance Assessment   Sitting Balance (Static) Fair +   Sitting Balance (Dynamic) Fair +   Standing Balance (Static) Fair   Standing Balance (Dynamic) Fair -   Weight Shift Sitting Good   Weight Shift Standing Fair   Bed Mobility    Supine to Sit Supervised   Scooting Supervised   ADL Assessment   Grooming Supervision;Standing   Upper Body Dressing Supervision   Lower Body Dressing Minimal Assist   Toileting Minimal Assist   How much help from another person does the patient currently need...   Putting on and taking off regular lower body clothing? 3   Bathing (including washing, rinsing, and drying)? 3   Toileting, which includes using a toilet, bedpan, or urinal? 3   Putting on and taking off regular upper body clothing? 3   Taking care of personal grooming such as brushing teeth? 3    Eating meals? 3   6 Clicks Daily Activity Score 18   Functional Mobility   Sit to Stand Contact Guard Assist   Bed, Chair, Wheelchair Transfer Contact Guard Assist   Transfer Method Stand Step   Mobility bed mobility, bathroom, up to sink, up to chair   Comments w/ FWW   Activity Tolerance   Sitting in Chair left seated in chair   Sitting Edge of Bed 5 min   Standing 5 min   Short Term Goals   Short Term Goal # 1 Pt will complete ADL transfers with supervision   Short Term Goal # 2 Pt will complete LB dressing with supervision   Short Term Goal # 3 Pt will complete standing G/H with supervision   Education Group   Education Provided Role of Occupational Therapist   Role of Occupational Therapist Patient Response Patient;Acceptance;Explanation   Occupational Therapy Initial Treatment Plan    Treatment Interventions Self Care / Activities of Daily Living;Adaptive Equipment;Manual Therapy Techniques;Neuro Re-Education / Balance;Therapeutic Exercises;Therapeutic Activity   Treatment Frequency 3 Times per Week   Duration Until Therapy Goals Met   Problem List   Problem List Decreased Active Daily Living Skills;Decreased Homemaking Skills;Decreased Functional Mobility;Decreased Activity Tolerance;Safety Awareness Deficits / Cognition;Impaired Postural Control / Balance   Anticipated Discharge Equipment and Recommendations   DC Equipment Recommendations Unable to determine at this time   Discharge Recommendations Recommend post-acute placement for additional occupational therapy services prior to discharge home   Interdisciplinary Plan of Care Collaboration   IDT Collaboration with  Nursing   Patient Position at End of Therapy Seated;Chair Alarm On;Call Light within Reach;Tray Table within Reach;Phone within Reach   Collaboration Comments RN updated

## 2023-09-22 ENCOUNTER — PHARMACY VISIT (OUTPATIENT)
Dept: PHARMACY | Facility: MEDICAL CENTER | Age: 73
End: 2023-09-22
Payer: MEDICARE

## 2023-09-22 VITALS
DIASTOLIC BLOOD PRESSURE: 83 MMHG | TEMPERATURE: 97.5 F | OXYGEN SATURATION: 97 % | SYSTOLIC BLOOD PRESSURE: 159 MMHG | WEIGHT: 228.18 LBS | HEART RATE: 79 BPM | BODY MASS INDEX: 31.94 KG/M2 | RESPIRATION RATE: 18 BRPM | HEIGHT: 71 IN

## 2023-09-22 PROCEDURE — 99222 1ST HOSP IP/OBS MODERATE 55: CPT | Mod: GC | Performed by: PSYCHIATRY & NEUROLOGY

## 2023-09-22 PROCEDURE — A9270 NON-COVERED ITEM OR SERVICE: HCPCS | Performed by: STUDENT IN AN ORGANIZED HEALTH CARE EDUCATION/TRAINING PROGRAM

## 2023-09-22 PROCEDURE — RXMED WILLOW AMBULATORY MEDICATION CHARGE: Performed by: INTERNAL MEDICINE

## 2023-09-22 PROCEDURE — C9113 INJ PANTOPRAZOLE SODIUM, VIA: HCPCS

## 2023-09-22 PROCEDURE — 700111 HCHG RX REV CODE 636 W/ 250 OVERRIDE (IP)

## 2023-09-22 PROCEDURE — 700102 HCHG RX REV CODE 250 W/ 637 OVERRIDE(OP): Performed by: INTERNAL MEDICINE

## 2023-09-22 PROCEDURE — 99239 HOSP IP/OBS DSCHRG MGMT >30: CPT | Performed by: INTERNAL MEDICINE

## 2023-09-22 PROCEDURE — A9270 NON-COVERED ITEM OR SERVICE: HCPCS | Performed by: INTERNAL MEDICINE

## 2023-09-22 PROCEDURE — 700102 HCHG RX REV CODE 250 W/ 637 OVERRIDE(OP): Performed by: STUDENT IN AN ORGANIZED HEALTH CARE EDUCATION/TRAINING PROGRAM

## 2023-09-22 RX ORDER — LANOLIN ALCOHOL/MO/W.PET/CERES
100 CREAM (GRAM) TOPICAL DAILY
COMMUNITY
Start: 2023-09-23

## 2023-09-22 RX ORDER — FOLIC ACID 1 MG/1
1 TABLET ORAL DAILY
Qty: 30 TABLET | COMMUNITY
Start: 2023-09-23

## 2023-09-22 RX ORDER — POLYETHYLENE GLYCOL 3350 17 G/17G
17 POWDER, FOR SOLUTION ORAL
Refills: 3 | COMMUNITY
Start: 2023-09-22

## 2023-09-22 RX ORDER — OMEPRAZOLE 40 MG/1
40 CAPSULE, DELAYED RELEASE ORAL DAILY
Qty: 30 CAPSULE | Refills: 0 | Status: SHIPPED | OUTPATIENT
Start: 2023-09-22

## 2023-09-22 RX ORDER — LEVETIRACETAM 500 MG/1
500 TABLET ORAL 2 TIMES DAILY
Qty: 60 TABLET | Refills: 0 | Status: SHIPPED | OUTPATIENT
Start: 2023-09-22 | End: 2023-09-22 | Stop reason: SDUPTHER

## 2023-09-22 RX ORDER — LEVETIRACETAM 500 MG/1
500 TABLET ORAL 2 TIMES DAILY
Qty: 60 TABLET | Refills: 0 | Status: SHIPPED | OUTPATIENT
Start: 2023-09-22

## 2023-09-22 RX ORDER — AMOXICILLIN 250 MG
2 CAPSULE ORAL 2 TIMES DAILY
Qty: 30 TABLET | Refills: 0 | COMMUNITY
Start: 2023-09-22

## 2023-09-22 RX ADMIN — AMLODIPINE BESYLATE 2.5 MG: 2.5 TABLET ORAL at 04:52

## 2023-09-22 RX ADMIN — LEVETIRACETAM 500 MG: 500 TABLET, FILM COATED ORAL at 04:52

## 2023-09-22 RX ADMIN — SENNOSIDES AND DOCUSATE SODIUM 2 TABLET: 50; 8.6 TABLET ORAL at 04:52

## 2023-09-22 RX ADMIN — GABAPENTIN 600 MG: 300 CAPSULE ORAL at 04:53

## 2023-09-22 RX ADMIN — ALLOPURINOL 100 MG: 100 TABLET ORAL at 04:52

## 2023-09-22 RX ADMIN — PANTOPRAZOLE SODIUM 40 MG: 40 INJECTION, POWDER, FOR SOLUTION INTRAVENOUS at 04:51

## 2023-09-22 RX ADMIN — TAMSULOSIN HYDROCHLORIDE 0.4 MG: 0.4 CAPSULE ORAL at 04:52

## 2023-09-22 RX ADMIN — FOLIC ACID 1 MG: 1 TABLET ORAL at 04:53

## 2023-09-22 RX ADMIN — THERA TABS 1 TABLET: TAB at 04:51

## 2023-09-22 RX ADMIN — Medication 100 MG: at 04:52

## 2023-09-22 RX ADMIN — GABAPENTIN 600 MG: 300 CAPSULE ORAL at 11:52

## 2023-09-22 ASSESSMENT — ENCOUNTER SYMPTOMS
TINGLING: 0
TREMORS: 0
MUSCULOSKELETAL NEGATIVE: 1
HEADACHES: 0
RESPIRATORY NEGATIVE: 1
CHILLS: 0
GASTROINTESTINAL NEGATIVE: 1
SPEECH CHANGE: 0
WEAKNESS: 0
EYES NEGATIVE: 1
LOSS OF CONSCIOUSNESS: 0
SENSORY CHANGE: 0
SEIZURES: 0
FOCAL WEAKNESS: 0
FEVER: 0
DIZZINESS: 1
CARDIOVASCULAR NEGATIVE: 1

## 2023-09-22 ASSESSMENT — PAIN DESCRIPTION - PAIN TYPE: TYPE: ACUTE PAIN

## 2023-09-22 NOTE — DISCHARGE PLANNING
"Case Management Discharge Planning    Admission Date: 9/18/2023  GMLOS: 2.6  ALOS: 4    6-Clicks ADL Score: 21  6-Clicks Mobility Score: 20      Anticipated Discharge Dispo: Discharge Disposition: D/T to home under [facility]HA care in anticipation of covered skilled care (06)  Discharge Address: [facility] [address], [place] [address]    DME Needed: No    Action(s) Taken: CM met at bedside with patient to complete assessment and discuss DCP.  Patient, AAOx4 at time of interview, confirmed residing at \"[facility] - independent shared living\" and plans to return to same residence upon discharge.  Prior to admission patient was independent with ADLs, ambulating w/o assistive devices, and active with [facility].  Only DME owned is a cane which he is not currently using.   Patient is retired, collecting SS benefits and is also a SNAP recipient.   Hx of ETOH (last drink 5 months ago) and methamphetamine (not used for at least 20 years) abuse disclosed by patient who declined list of community resources.    DCP to SNF discussed with patient who declined SNF placement and opted to return home with resumption of HHC with [facility].  CM informed Dr. Franco of the latter.    HH choice form faxed to [facility].    Patient stated that he will need assistance with return transportation to home & does not know the address; CM called home ownerDagmar #[phone], who provided address as follows:  [facility] [address], [place] [address].    CM to issue taxi voucher as patient does not have non-emergent transportation benefits.    Escalations Completed:  HH order requested from Dr. Franco; [facility] to submit HH referral    Medically Clear: No; Pending Neuro clearance/recs (Per Dr. Franco)    Next Steps: CM will f/u on medical clearance & HH referral    Barriers to Discharge: Medical clearance    Is the patient up for discharge tomorrow:   Potential DC today pending Neurology clearance/recommendations      **1507 Hrs - Patient medically cleared for DC " Home with [facility] and accepted by [facility]  for resumption of services.  Taxi voucher to home issued by  & handed to Charge Nurse, Jostin.      Care Transition Team Assessment    Information Source  Orientation Level: Oriented X4  Information Given By: Patient  Who is responsible for making decisions for patient? : Patient         Elopement Risk  Legal Hold: No  Ambulatory or Self Mobile in Wheelchair: No-Not an Elopement Risk  Disoriented: No  Psychiatric Symptoms: None  History of Wandering: No  Elopement this Admit: No  Vocalizing Wanting to Leave: No  Displays Behaviors, Body Language Wanting to Leave: No-Not at Risk for Elopement  Elopement Risk: Not at Risk for Elopement  Personal Belongings: Hospital Clothing Only    Interdisciplinary Discharge Planning  Lives with - Patient's Self Care Capacity: Other (Comments)  Housing / Facility: Other (Comments) ('half way house/group home')  Prior Services: Home-Independent, Skilled Home Health Services (reporting getting PT)    Discharge Preparedness  What is your plan after discharge?: Home health care  Prior Functional Level: Ambulatory, Independent with Activities of Daily Living    Functional Assesment  Prior Functional Level: Ambulatory, Independent with Activities of Daily Living    Finances  Financial Barriers to Discharge: No  Prescription Coverage: Yes     Domestic Abuse  Have you ever been the victim of abuse or violence?: No    Psychological Assessment  History of Substance Abuse: Alcohol, Methamphetamine (Marijuana use)  Date Last Used - Alcohol: 5 months ago  Date Last Used - Methamphetamine: 20 years ago  History of Psychiatric Problems: Yes  Non-compliant with Treatment: No  Newly Diagnosed Illness: No    Discharge Risks or Barriers  Discharge risks or barriers?: Complex medical needs  Patient risk factors: Complex medical needs, Readmission    Anticipated Discharge Information  Discharge Disposition: D/T to home under [facility] care in anticipation of covered  skilled care (06)  Discharge Address: [address], [place] [address]

## 2023-09-22 NOTE — CARE PLAN
The patient is Stable - Low risk of patient condition declining or worsening    Shift Goals  Clinical Goals: monitor neuro status, safety, plan for discahrge  Patient Goals: rest, comfort  Family Goals: not present    Progress made toward(s) clinical / shift goals:    Problem: Knowledge Deficit - Standard  Goal: Patient and family/care givers will demonstrate understanding of plan of care, disease process/condition, diagnostic tests and medications  Outcome: Progressing  Note: Patient involved in plan of care, updated about discharge, asks questions and verbalizes understanding.      Problem: Fall Risk  Goal: Patient will remain free from falls  Outcome: Progressing  Note: Safety precautions in place, bed low and locked, call light within reach, hourly rounding in place.      Problem: Neuro Status  Goal: Neuro status will remain stable or improve  Note: Neuro status within normal limits, patient A&Ox4, adequate strength both upper and lower extremities.      Problem: Self Care  Goal: Patient will have the ability to perform ADLs independently or with assistance (bathe, groom, dress, toilet and feed)  Note: Patient active in ADLs with little stand by assistance required.      Problem: Urinary Elimination  Goal: Establish and maintain regular urinary output  Outcome: Progressing     Problem: Bowel Elimination  Goal: Establish and maintain regular bowel function  Outcome: Progressing     Problem: Respiratory  Goal: Patient will achieve/maintain optimum respiratory ventilation and gas exchange  Outcome: Progressing  Note: Maintaining O2 saturation on room air.      Problem: Mobility  Goal: Patient's capacity to carry out activities will improve  Outcome: Progressing  Note: Patient ambulates with steady gait and stand by assistance.  FWW available if necessary.       Patient is not progressing towards the following goals: n/a

## 2023-09-22 NOTE — CARE PLAN
The patient is Stable - Low risk of patient condition declining or worsening    Shift Goals  Clinical Goals: Monitor neuro status  Patient Goals: rest  Family Goals: rest    Progress made toward(s) clinical / shift goals:  Patient is oriented and coherent. He is more involve in self care.    Patient is not progressing towards the following goals:

## 2023-09-22 NOTE — DISCHARGE SUMMARY
Discharge Summary    CHIEF COMPLAINT ON ADMISSION  Chief Complaint   Patient presents with    Seizure     EMS was called by group home roommate who stated pt was on the ground with ALOC. EMS arrived, pt was loaded into the rig and then had a witness tonic clonic seizure for 2 mins approx. EMS gave 5mg versed IV.        Reason for Admission  EMS     Admission Date  9/18/2023    CODE STATUS  Full Code    HPI & HOSPITAL COURSE  This is a 73 y.o. male here with Seizure (EMS was called by group home roommate who stated pt was on the ground with ALOC. EMS arrived, pt was loaded into the rig and then had a witness tonic clonic seizure for 2 mins approx. EMS gave 5mg versed IV. )  Please review Dr. Jerry Ugalde M.D. notes for further details of history of present illness, past medical/social/family histories, allergies and medications. Please review Dr. Roberts, Neurology consultation notes.  He smokes cigarettes and is obese. He has a history of hypertension, psychiatric disorder, alcohol/meth/illicit substance use. He was just discharged 9/17 for episode of first seizure. At that time EEG nonspecific and Neurology was consulted. He was discharged with no medications for seziures given it was his first episode. He was readmitted again for witnessed seizures.  At the ED, afebrile, hemodynamically stable.   Mild pancytopenia. Though he denied alcohol we put him on a detox bad and he will be on thiamine and vitamins. He was started on Keppra. EEG showed occasional right temporal sharp waves, a finding that suggests a risk for seizures arise from this region. Neurology consulted agreed to him on Keppra. Initially he was planned for skilled but he declined and went back to his group home/special residence with home health care resumed. According to notes he does not have a 's license.    He is advised no more smoking, alcohol or illicit drugs.   His blood pressures are stable.    At discharge date, Chris Leggett  afebrile and hemodynamically stable.  Chris Leggett wanted to be discharged today.        Imaging  CT-HEAD W/O   Final Result      1.  There is no acute intracranial hemorrhage or infarct.      2.  White matter lucencies most consistent with small vessel ischemic change versus demyelination or gliosis.      3.  There is cerebral atrophy.                   Therefore, he is discharged in fair and stable condition to home with organized home healthcare and close outpatient follow-up.    The patient met 2-midnight criteria for an inpatient stay at the time of discharge.    Discharge Date  9/22/2023    FOLLOW UP ITEMS POST DISCHARGE      DISCHARGE DIAGNOSES  Principal Problem:    Seizures (HCC) (POA: Yes)  Active Problems:    Coagulopathy (HCC) (POA: Yes)      Overview: Trend labs.      9/3 INR 1.23      Essential hypertension (POA: Yes)    Thrombocytopenia (HCC) (POA: Yes)    COPD (chronic obstructive pulmonary disease) (HCC) (POA: Yes)      FOLLOW UP  No future appointments.  [address] [place]  [facility] [address]  [phone]  Follow up  Please call [facility] to establish with a Primary Care Provider.  Assign and follow up with primary provider or discharge clinic physician in 1 week  Follow up with Neurology in 1 week  NURSING provide resources/pamphlets for  Seizures, polysubstance abuse (No more smoking, illicit substances, meth or alcohol), obesity (Recommended weight loss advised, 5% through reduced calorie, low carb diet and 150 mins of exercise a week once better  Recommend bariatric surgery evaluation if morbidly obese  Educated on the increase of morbidity and mortality associated with excess weight including DM, Heart Disease, HTN, stroke, and sleep apnea. Recommended outpatient monitoring  of blood sugars, lipid panel, sleep study evaluation for metabolic syndrome).    MEDICATIONS ON DISCHARGE     Medication List        START taking these medications         Instructions   folic acid 1 MG Tabs  Start taking on: September 23, 2023  Commonly known as: Folvite   Take 1 Tablet by mouth every day.  Dose: 1 mg     levETIRAcetam 500 MG Tabs  Commonly known as: Keppra   Take 1 Tablet by mouth 2 times a day.  Dose: 500 mg     multivitamin Tabs  Start taking on: September 23, 2023   Take 1 Tablet by mouth every day.  Dose: 1 Tablet     omeprazole 40 MG delayed-release capsule  Commonly known as: PriLOSEC   Take 1 Capsule by mouth every day.  Dose: 40 mg     polyethylene glycol/lytes 17 g Pack  Commonly known as: Miralax   Take 1 Packet by mouth 1 time a day as needed (if sennosides and docusate ineffective after 24 hours).  Dose: 17 g     senna-docusate 8.6-50 MG Tabs  Commonly known as: Pericolace Or Senokot S   Take 2 Tablets by mouth 2 times a day.  Dose: 2 Tablet     thiamine 100 MG tablet  Start taking on: September 23, 2023  Commonly known as: Thiamine   Take 1 Tablet by mouth every day.  Dose: 100 mg            CONTINUE taking these medications        Instructions   allopurinol 100 MG Tabs  Commonly known as: Zyloprim   Take 100 mg by mouth every day.  Dose: 100 mg     amLODIPine 2.5 MG Tabs  Commonly known as: Norvasc   Take 1 Tablet by mouth every day.  Dose: 2.5 mg     DULoxetine 30 MG Cpep  Commonly known as: Cymbalta   Take 30 mg by mouth with dinner.  Dose: 30 mg     gabapentin 600 MG tablet  Commonly known as: Neurontin   Take 600 mg by mouth 3 times a day.  Dose: 600 mg     tamsulosin 0.4 MG capsule  Commonly known as: Flomax   Take 0.4 mg by mouth every day.  Dose: 0.4 mg     traZODone 150 MG Tabs  Commonly known as: Desyrel   Take 150 mg by mouth every evening.  Dose: 150 mg            STOP taking these medications      mirtazapine 15 MG Tabs  Commonly known as: Remeron     prazosin 2 MG Caps  Commonly known as: Minipress     quetiapine 400 MG tablet  Commonly known as: SEROquel              Allergies  No Known Allergies    DIET  Orders Placed This Encounter    Procedures    Diet Order Diet: Cardiac     Standing Status:   Standing     Number of Occurrences:   1     Order Specific Question:   Diet:     Answer:   Cardiac [6]       ACTIVITY  As per [facility]  CONSULTATIONS  Neurology    PROCEDURES      LABORATORY  Lab Results   Component Value Date    SODIUM 142 09/21/2023    POTASSIUM 4.4 09/21/2023    CHLORIDE 106 09/21/2023    CO2 27 09/21/2023    GLUCOSE 92 09/21/2023    BUN 10 09/21/2023    CREATININE 0.64 09/21/2023        Lab Results   Component Value Date    WBC 3.9 (L) 09/21/2023    HEMOGLOBIN 12.8 (L) 09/21/2023    HEMATOCRIT 40.4 (L) 09/21/2023    PLATELETCT 56 (L) 09/21/2023        Total time of the discharge process exceeds 35 minutes.

## 2023-09-22 NOTE — FACE TO FACE
Face to Face Supporting Documentation - Home Health    The encounter with this patient was in whole or in part the primary reason for home health admission.    Date of encounter:   Patient:                    MRN:                       YOB: 2022  Chris Leggett  [id number]  1950     Home health to see patient for:  Skilled Nursing care for assessment, interventions & education, Physical Therapy evaluation and treatment, and Occupational therapy evaluation and treatment    Skilled need for:  Comment: resumption of services    Skilled nursing interventions to include:  Comment: resume services    Homebound status evidenced by:  Needs the assistance of another person in order to leave the home. Leaving home requires a considerable and taxing effort. There is a normal inability to leave the home.    Community Physician to provide follow up care: Pcp Not In Computer     Optional Interventions? No      I certify the face to face encounter for this home health care referral meets the CMS requirements and the encounter/clinical assessment with the patient was, in whole, or in part, for the medical condition(s) listed above, which is the primary reason for home health care. Based on my clinical findings: the service(s) are medically necessary, support the need for home health care, and the homebound criteria are met.  I certify that this patient has had a face to face encounter by myself.  Michael Franco M.D. - NPI: [id number]

## 2023-09-22 NOTE — CONSULTS
"Neurology Initial Consult Note    Date of Service  9/22/2023    Chief Complaint  73 y.o. male admitted 9/18/2023 with recurrent seizures.    Hospital Course  Mr. Leggett is a 73-year-old male patient with past medical history of alcohol use disorder, hypertension, and pancytopenia, who presented to the ED on 9/18/2023 after he was found down by someone at group home followed by a tonic-clonic seizure that lasted 2 minutes and was witnessed by EMS.  Received 5 mg IV Versed before transfer to  [facility] [place].    Of note, he was recently admitted at [facility] 9/15 to 9/17 for first episode of seizure with tongue laceration and urinary incontinence.  He was seen by Dr. Felix who was on-call on the neurohospitalist service that week, whose last note from 9/17/2023 noted the following A&P:  \"Chris Leggett is a 73 y.o. presenting for whom neurology has been consulted for 2 seizures in the same 24-hour period, so by International league against epilepsy convention this should be counted as 1 seizure event and his first seizure event, giving his EEG and MRI which were unremarkable both Mr. Leggett and I agree that he probably should not be started on a seizure medicine at this point.     Mr. Leggett does not drive or have a 's license.  We talked about safety issues not driving, avoiding heights like ladders, stepstools etc. we talked about avoiding water deep enough to drown did not like bathtubs, hot tubs, swimming pools, rivers and lakes.  We also talked about avoiding power tools and firearms.  We discussed that he has a 10 to 26% chance of having a seizure in the following year.  If he has another seizure we should definitely start him on an antiseizure medicine.  We talked about avoiding sleep deprivation, alcohol use and methamphetamine.\"    It is unclear what is causing his recurrent seizure episodes.  He denies any recent infections, new medications, or sleep deprivation.    He does report having multiple falls at the " group home.  States it is because of balance related issues.  Does have ongoing home health services from last hospitalization.    Interval Problem Update  -No acute overnight  neurological events or changes reported.  -CT head without contrast from 9/18/2023 shows no acute intracranial hemorrhage or infarct, white matter lucencies most consistent with small vessel ischemic changes versus demyelination versus gliosis, and cerebral atrophy noted.  -Most recent MRI brain with and without contrast from 9/16/2023 was within normal limits for age with atrophy and white matter changes.    -Started on Keppra 5 3 5 g twice daily by hospitalist.  Patient is already on gabapentin 600 mg 3 times daily.  -EEG this admission shows the following:  Occasional right temporal sharp waves of finding that suggested risk for seizures arising from this region.    Code Status  Full Code    Review of Systems  Review of Systems   Constitutional:  Positive for malaise/fatigue. Negative for chills and fever.   HENT: Negative.     Eyes: Negative.    Respiratory: Negative.     Cardiovascular: Negative.    Gastrointestinal: Negative.    Genitourinary: Negative.    Musculoskeletal: Negative.    Neurological:  Positive for dizziness (Usually orthostatic). Negative for tingling, tremors, sensory change, speech change, focal weakness, seizures (Not since admission), loss of consciousness (Note since admission), weakness and headaches.   All other systems reviewed and are negative.       Physical Exam  Temp:  [36.5 °C (97.7 °F)-37.2 °C (98.9 °F)] 36.5 °C (97.7 °F)  Pulse:  [54-62] 54  Resp:  [17-18] 17  BP: (153-157)/(86-90) 155/86  SpO2:  [95 %-97 %] 95 %    Neurologic Exam     Mental Status   Oriented to person, place, and time.   Level of consciousness: alert  No obvious language/speech deficits noted.  Patient is amnestic to the episode of seizure prior to admission.  Normal attention and concentration.     Cranial Nerves   Cranial nerves II  through XII intact.     Motor Exam   Muscle bulk: normal  Overall muscle tone: normalStrength 4/5 throughout.     Sensory Exam   Normal sensory exam     Gait, Coordination, and Reflexes     Gait  Gait: (Gait not examined)    Reflexes   Right brachioradialis: 1+  Left brachioradialis: 1+  Right biceps: 1+  Left biceps: 1+  Right triceps: 1+  Left triceps: 1+  Right patellar: 1+  Left patellar: 1+  Right achilles: 1+  Left achilles: 1+  Right : 1+  Left : 1+  Right plantar: equivocal  Left plantar: equivocalSlightly ataxic on coordination test.       Fluids    Intake/Output Summary (Last 24 hours) at 9/22/2023 1358  Last data filed at 9/22/2023 1100  Gross per 24 hour   Intake --   Output 1200 ml   Net -1200 ml       Laboratory  Recent Labs     09/20/23 0239 09/21/23  0322   WBC 6.3 3.9*   RBC 3.85* 4.00*   HEMOGLOBIN 12.4* 12.8*   HEMATOCRIT 36.6* 40.4*   MCV 95.1 101.0*   MCH 32.2 32.0   MCHC 33.9 31.7*   RDW 50.0 56.5*   PLATELETCT 64* 56*   MPV 11.8 11.4     Recent Labs     09/20/23  0239 09/21/23  0322   SODIUM 139 142   POTASSIUM 3.9 4.4   CHLORIDE 104 106   CO2 23 27   GLUCOSE 99 92   BUN 4* 10   CREATININE 0.56 0.64   CALCIUM 8.7 8.5                   Imaging  CT-HEAD W/O   Final Result      1.  There is no acute intracranial hemorrhage or infarct.      2.  White matter lucencies most consistent with small vessel ischemic change versus demyelination or gliosis.      3.  There is cerebral atrophy.              Assessment/Plan    Summary: 73-year-old male patient  was admitted with history of recurrent falls and  recent onset recurrent seizure-like activity.   EEG this admission is abnormal and showed some right temporal sharp waves suggestive of increased risk of seizures from this region.      There are no it is unclear what is causing his seizures at this time.  There are no structural changes noted on CT head without contrast this admission and recent MRI brain with and without contrast last  week.    However, given recurrent seizure-like spells it is reasonable to start him on antiepileptic drugs at  this time.  He will need close outpatient neurology follow-up.    Plan/recommendations:    -Increase dose of Keppra to 750 mg twice daily.  -Continue gabapentin 600 mg 3 times daily.  This has been prescribed mainly for pain but also has antiseizure activity.  -Seizure precautions   - avoid drinking alcohol, smoking, or use of any other recreational drugs    Thank you for the consult.     Dr. Jennie Negro MD   [facility] IM PGY 2 Resident    Please refer to Dr. Roberts's attestation for additional comments/recommendations.

## 2023-09-22 NOTE — PROGRESS NOTES
Patient is being discharged home from the discharge lounge. Patient is A&Ox4. IV removed. Discharge instructions provided to patient and reviewed. Patient verbalized understanding and all questions and concerns were addressed. Patient awaiting taxi in [employer].

## 2023-09-22 NOTE — DISCHARGE PLANNING
Received Choice Form @: 814  Agency/ Facility Name: [facility]  Referral Sent per Choice Form @: Not sent as there are no orders       Received Choice Form @: 812  Agency/ Facility Name: [facility]  Referral Sent per Choice Form @: 0826

## 2023-10-05 ENCOUNTER — TELEPHONE (OUTPATIENT)
Dept: HEALTH INFORMATION MANAGEMENT | Facility: OTHER | Age: 73
End: 2023-10-05
Payer: MEDICARE

## 2023-12-14 NOTE — PROGRESS NOTES
Hospital Medicine Daily Progress Note      Chief Complaint:  Hypertension  Pancytopenia    Interval History:  No significant events or changes since last visit    Review of Systems  Review of Systems   Constitutional: Negative for chills and fever.   Respiratory: Negative for shortness of breath.    Cardiovascular: Negative for chest pain.   Gastrointestinal: Negative for abdominal pain, diarrhea, nausea and vomiting.   Psychiatric/Behavioral: The patient is not nervous/anxious.         Physical Exam  Temp:  [36.3 °C (97.3 °F)-36.8 °C (98.3 °F)] 36.3 °C (97.3 °F)  Pulse:  [67-86] 67  Resp:  [16-20] 16  BP: (116-127)/(74-88) 116/82  SpO2:  [96 %] 96 %    Physical Exam  Vitals signs and nursing note reviewed.   Constitutional:       Appearance: Normal appearance.   HENT:      Head: Atraumatic.   Eyes:      Conjunctiva/sclera: Conjunctivae normal.      Pupils: Pupils are equal, round, and reactive to light.   Neck:      Musculoskeletal: Normal range of motion and neck supple.      Comments: Has C-collar  Cardiovascular:      Rate and Rhythm: Normal rate and regular rhythm.   Pulmonary:      Effort: Pulmonary effort is normal.      Breath sounds: Normal breath sounds.   Abdominal:      General: Bowel sounds are normal.      Palpations: Abdomen is soft.   Musculoskeletal:      Right lower leg: No edema.      Left lower leg: No edema.   Skin:     General: Skin is warm and dry.   Neurological:      Mental Status: He is alert and oriented to person, place, and time.   Psychiatric:         Mood and Affect: Mood normal.         Behavior: Behavior normal.         Fluids    Intake/Output Summary (Last 24 hours) at 2/14/2020 0921  Last data filed at 2/14/2020 0608  Gross per 24 hour   Intake 570 ml   Output 1600 ml   Net -1030 ml       Laboratory  Recent Labs     02/14/20  0610   WBC 2.2*   RBC 3.62*   HEMOGLOBIN 11.8*   HEMATOCRIT 35.8*   MCV 98.9*   MCH 32.6   MCHC 33.0*   RDW 47.9   PLATELETCT 73*   MPV 10.7                    Occupational Therapy    OT Treatment    Patient Name: Sydney Kidd  MRN: [id number]  Today's Date: 12/14/2023     Time Calculation  Start Time: 1350  Stop Time: 1417  Time Calculation (min): 27 min      Subjective   General:   Pt reporting she would like today to be her last visit. Pt reporting she has other medical issues going on that requires more appointments. Pt stating she has been unable to do exercises secondary to pain in LLE.      Pain:  Pain Assessment  Pain Assessment: 0-10  Pain Score: 4  Pt reporting pain is located mostly in L hip and knee.    Objective       Therapy/Activity:   Exercises: Reps:   Wrist ROM- 5 position ROM    Strength- Therapy Putty - Yellow             MH with passive stretch     Nerve glides     Pinch clips:        Hand gripper      Wrist PREs:                                              Sup curls 1X10 reps 2#; 1X10 reps 3#                                              Pron curls 1X10 reps 2#;                                            Hammer sup/pron 1X10 reps                                           Hammer RD/UD 1X10 reps                                        Wrist Roll ups 4 ways    Theraband exercises:    Bicep curls    Horizontal ABD    Activities:    Rubber Band Bord    Velcro Loop Board        Reviewed HEP on 12/14/2023 Reviewed strengthening exercises for wrist PREs.    Modalities:                    Manual:    [facility]                Functional review:     Completed on: 12/07/23 OT discharge completed this date.      Measurements:  Wrist Measurements:  WFL unless documented below   Flexion  Extension Radial Dev Ulnar Dev Supination Pronation   Right 45 50 25 40 65 85   Left           Evaluation measurements:   Flexion  Extension Radial Dev Ulnar Dev Supination Pronation   Right 30 45 15 20 70 90   Left 30 40 20 30 85         85     :  Average taken from best 3 measurements.   Trials Average   RUE 24, 25, 26, 25 25   LUE 27, 29, 30, 30 30     Quickdash Scores:    Assessment/Plan  * Cervical spine fracture (HCC)- (present on admission)  Assessment & Plan  2nd to GLF from syncopal episode  Sustained acute C7 fracture -- non-surgical mangement  Cervical collar  Note: has prior hx of cervical fusion    Syncope- (present on admission)  Assessment & Plan  Work-up negative per [facility] [place] by Dr. Miner    Pain and swelling of right lower extremity- (present on admission)  Assessment & Plan  Right knee traumatic hemarthrosis  2nd to GLF  S/P joint aspiration with removal of blood    Mood disorder (HCC)- (present on admission)  Assessment & Plan  On Seroquel    Pancytopenia (HCC)- (present on admission)  Assessment & Plan  WBC's: 1.9 (2.9) --> Granix --> 7.5 (2/10) --> 2.2 (2/14)  H&H: mild stable  Platelets stable at 66 (2/10)  Appears chronic and stable since at least September 2018  Vit B12, Folate, and TSH levels all normal  S/P Granix (2/9)  Will give Granix (2/14)  D/W CM -- ordered a Hematology consult for out patient  Monitor levels    Gout- (present on admission)  Assessment & Plan  On no meds  Monitor for acute flare-up    Hypertension- (present on admission)  Assessment & Plan  BP ok  Off Lisinopril: 20 mg daily --> 10 mg daily (2/10) --> 5 mg daily (2/11) --> d/c's (last dose 2/12)  Cont to monitor     22.73%    OP EDUCATION:  Education  Individual(s) Educated: Patient  Education Provided: Diagnosis & Precautions  Home Program: [facility], Strengthening  Diagnosis and Precautions: See IE  Patient Response to Education: Patient/Caregiver Verbalized Understanding of Information    Assessment:   Occupational Therapy discharge completed this date. Patient reporting they are able to complete ADL and IADL tasks with increased independence, continues to struggle with lifting heavy objects and opening containers. Pt IND with HEP and demos good understanding, HEP reviewed this date. Functional assessments completed showing progress with wrist ROM and  strength. Pt instructed to call with questions/concerns. Will d/c from OT services with continued HEP.     Plan:   Occupational therapy discharge completed this date.      Goals:  Resolved       OT Goals       OT Goal 1 (Adequate for Discharge)       Start:  10/26/23    Expected End:  01/18/24       LTG - Patient will indicate/ demonstrate the ability to improve efficiency of ADLs and IADLs without significant limits secondary to decreased ROM, decreased strength and/or pain as indicated by Quickdash score of less than 20%.           OT Goal 2 (Met)       Start:  10/26/23    Expected End:  01/18/24    Resolved:  12/14/23    Develop and issue HEP to help maximize ROM, strength and tolerance to help maximize return to all pre-onset activities.          OT Goal 3 (Met)       Start:  10/26/23    Expected End:  01/18/24    Resolved:  12/14/23    Pain to be less than or equal to 2/10 with greater than or equal to 45 minutes activity.          OT Goal 4 (Adequate for Discharge)       Start:  10/26/23    Expected End:  01/18/24       Patient will demonstrate a progressive increase in ROM as appropriate with bilateral  to be greater than or equal to 30# to help patient improve efficiency of ADL/IADL function.            OT Goal 5 (Met)       Start:  10/26/23    Expected End:   01/18/24    Resolved:  12/14/23    Patient will demonstrate a progressive increase in wrist ROM to help improve efficiency with ADL/IADL function, as evidenced by improved QuickDash score of less than 20%                  There are no Wet Read(s) to document.

## 2024-05-16 NOTE — ASSESSMENT & PLAN NOTE
-No sign of gross bleeding    
Appears to be orthostatic per history, consistent with hypovolemia  -Resulting in significant trauma  -Patient currently complaining of headache, neck pain and right knee pain  -  CT head:  no acute intracranial abnormality but did note atrophy and chronic changes of ischemia  -Transthoracic echo is normal  -Telemetry negative for arrhythmia or significant bradycardia  Suspect polypharmacy plays a role.  Dose of lisinopril reduced, amlodipine held, dose of trazodone reduced  He received sufficient IV hydration  Repeat orthostatic  
Blood pressure is soft.  I held amlodipine and reduce dose of lisinopril from 30 to 20 mg once a day  -PRN enalapril  -Adjust as needed  
Chronic, likely secondary to alcoholic liver cirrhosis  -No sign of gross bleeding    
History of.  Denies recent alcohol intake.  We will start B1 supplementation  
Mild  Secondary to fall and injury  IV normal saline hydration  Monitor and replace electrolytes  
Patient has prior history of fracture in C4, C5 spinous processes, status post anterior fusion C5-C6 by Dr. Wilkins  -Post syncopal episode  -Neurosurgery was consulted, patient is currently in [place] J collar  -Recommended conservative treatment, follow-up with neurosurgery after discharge with repeat x-ray  Pain control  Inpatient rehab is planned  
Patient received smoking cessation counseling on admission  
well-groomed/no distress

## 2025-04-30 NOTE — ED PROVIDER NOTES
"ED Provider Note    Patient was turned over to me awaiting psychiatric transfer.  The patient is feeling \"a little\" better this morning than he did last night.  He is still feeling very sad.  He is looking forward to breakfast.BP (!) 163/82   Pulse 85   Temp 36.8 °C (98.2 °F)   Resp 16   Wt 93.9 kg (207 lb)   SpO2 96%   BMI 28.87 kg/m²    Blood pressure is noted.  It is mildly elevated.  He tells me he has been on lisinopril in the past.  For now we will keep an eye on it.  I recommended he keep a journal of his blood pressure taking this with him to his PCP appointment.  We await psychiatry evaluation/transfer.    " delayed awakening

## 2025-07-22 NOTE — ED NOTES
Patient resting comfortably. Chest rise and fall noted. Patient in direct observation of sitter. Will Continue to monitor.      [Today's Date] : [unfilled] [Name] : Name: [unfilled] [] : : ~~ [Today's Date:] : [unfilled] [Dear  ___:] : Dear Dr. [unfilled]: [Consult] : I had the pleasure of evaluating your patient, [unfilled]. My full evaluation follows. [Consult - Single Provider] : Thank you very much for allowing me to participate in the care of this patient. If you have any questions, please do not hesitate to contact me. [Sincerely,] : Sincerely, [FreeTextEntry4] : ELIAS SANFORD MD [de-identified] : Emilia Menjivar MD, FAAP, FACC  Pediatric Cardiologist  of Pediatrics [facility]

## (undated) DEVICE — DRAPE SURG STERI-DRAPE 7X11OD - (40EA/CA)

## (undated) DEVICE — MASK, LARYNGEAL AIRWAY #4

## (undated) DEVICE — TOURNIQUET, STERILE 18 (RED)

## (undated) DEVICE — SUCTION INSTRUMENT YANKAUER BULBOUS TIP W/O VENT (50EA/CA)

## (undated) DEVICE — SODIUM CHL IRRIGATION 0.9% 1000ML (12EA/CA)

## (undated) DEVICE — NEPTUNE 4 PORT MANIFOLD - (20/PK)

## (undated) DEVICE — DRAIN BLAKE 10FR 3/4 FLUTED SILICONE CLOSED WOUND SUCTION CHANNEL  - (10/CA)

## (undated) DEVICE — GLOVE BIOGEL ECLIPSE  PF LATEX SIZE 6.5 (50PR/BX)

## (undated) DEVICE — CHLORAPREP 26 ML APPLICATOR - ORANGE TINT(25/CA)

## (undated) DEVICE — PADDING CAST 4 IN STERILE - 4 X 4 YDS (24/CA)

## (undated) DEVICE — HEAD HOLDER JUNIOR/ADULT

## (undated) DEVICE — SET EXTENSION WITH 2 PORTS (48EA/CA) ***PART #2C8610 IS A SUBSTITUTE*****

## (undated) DEVICE — SENSOR SPO2 NEO LNCS ADHESIVE (20/BX) SEE USER NOTES

## (undated) DEVICE — DRAPE MAYO STAND - (30/CA)

## (undated) DEVICE — PROTECTOR ULNA NERVE - (36PR/CA)

## (undated) DEVICE — BASIN EMESIS DISP. - (250/CA)

## (undated) DEVICE — MASK ANESTHESIA ADULT  - (100/CA)

## (undated) DEVICE — TUBE CONNECT SUCTION CLEAR 120 X 1/4" (50EA/CA)"

## (undated) DEVICE — GOWN SURGEONS X-LARGE - DISP. (30/CA)

## (undated) DEVICE — SYRINGE ASEPTO - (50EA/CA

## (undated) DEVICE — GLOVE BIOGEL PI ORTHO SZ 8.5 PF LF (40/BX)

## (undated) DEVICE — PACK NEURO - (2EA/CA)

## (undated) DEVICE — CANISTER SUCTION 3000ML MECHANICAL FILTER AUTO SHUTOFF MEDI-VAC NONSTERILE LF DISP  (40EA/CA)

## (undated) DEVICE — DRAPE SURGICAL U 77X120 - (10/CA)

## (undated) DEVICE — GLOVE BIOGEL PI INDICATOR SZ 6.5 SURGICAL PF LF - (50/BX 4BX/CA)

## (undated) DEVICE — PACK MAJOR ORTHO - (2EA/CA)

## (undated) DEVICE — SUTURE 3-0 VICRYL PLUS RB-1 - 8 X 18 INCH (12/BX)

## (undated) DEVICE — GLOVE BIOGEL SZ 6.5 SURGICAL PF LTX (50PR/BX 4BX/CA)

## (undated) DEVICE — GOWN WARMING STANDARD FLEX - (30/CA)

## (undated) DEVICE — SLEEVE, VASO, THIGH, MED

## (undated) DEVICE — RESERVOIR SUCTION 100 CC - SILICONE (20EA/CA)

## (undated) DEVICE — SUCTION INSTRUMENT YANKAUER OPEN TIP W/O VENT (50EA/CA)

## (undated) DEVICE — GLOVE BIOGEL ECLIPSE PF LATEX SIZE 7.5

## (undated) DEVICE — BOVIE BLADE COATED &INSULATED - 25/PK

## (undated) DEVICE — TUBING C&T SET FLYING LEADS DRAIN TUBING (10EA/BX)

## (undated) DEVICE — GLOVE SZ 6 BIOGEL PI MICRO - PF LF (50PR/BX 4BX/CA)

## (undated) DEVICE — PENCIL ELECTSURG 10FT BTN SWH - (50/CA)

## (undated) DEVICE — HEMOSTAT SURG ABSORBABLE - 4 X 8 IN SURGICEL (24EA/CA)

## (undated) DEVICE — LIGHT SOURCE MIS 12FT

## (undated) DEVICE — DETERGENT RENUZYME PLUS 10 OZ PACKET (50/BX)

## (undated) DEVICE — SUTURE GENERAL

## (undated) DEVICE — KIT ANESTHESIA W/CIRCUIT & 3/LT BAG W/FILTER (20EA/CA)

## (undated) DEVICE — CLIP MED INTNL HRZN TI ESCP - (25/BX)

## (undated) DEVICE — GLOVE BIOGEL SZ 7.5 SURGICAL PF LTX - (50PR/BX 4BX/CA)

## (undated) DEVICE — SUTURE 0 VICRYL PLUS CT-1 - 8 X 18 INCH (12/BX)

## (undated) DEVICE — CLIP SM INTNL HRZN TI ESCP LGT - (24EA/PK 25PK/BX)

## (undated) DEVICE — SHEET PEDIATRIC LAPAROTOMY - (10/CA)

## (undated) DEVICE — LACTATED RINGERS INJ 1000 ML - (14EA/CA 60CA/PF)

## (undated) DEVICE — SYRINGE 6 CC 20 GA X 1 1/2 - NDL SAFETY  (50/BX)

## (undated) DEVICE — BLANKET WARMING LOWER BODY (10EA/CA)

## (undated) DEVICE — SET LEADWIRE 5 LEAD BEDSIDE DISPOSABLE ECG (1SET OF 5/EA)

## (undated) DEVICE — ELECTRODE 850 FOAM ADHESIVE - HYDROGEL RADIOTRNSPRNT (50/PK)

## (undated) DEVICE — DRAPE IOBAN II 23 IN X 33 IN - (10/BX)

## (undated) DEVICE — TUBING CLEARLINK DUO-VENT - C-FLO (48EA/CA)

## (undated) DEVICE — SURGIFOAM (SIZE 100) - (6EA/CA)

## (undated) DEVICE — GOWN SURGICAL X-LARGE ULTRA - FILM-REINFORCED (20/CA)

## (undated) DEVICE — TOWEL STOP TIMEOUT SAFETY FLAG (40EA/CA)

## (undated) DEVICE — SUTURE 4-0 MONOCRYL PLUS PS-2 - 27 INCH (36/BX)

## (undated) DEVICE — TUBE E-T HI-LO CUFF 8.0MM (10EA/PK)

## (undated) DEVICE — KIT ROOM DECONTAMINATION

## (undated) DEVICE — GLOVE BIOGEL INDICATOR SZ 7.5 SURGICAL PF LTX - (50PR/BX 4BX/CA)

## (undated) DEVICE — BLADE SURGICAL #15 - (50/BX 3BX/CA)

## (undated) DEVICE — TOOL DISSECT MATCH HEAD

## (undated) DEVICE — DRAPE STRLE REG TOWEL 18X24 - (10/BX 4BX/CA)"

## (undated) DEVICE — SUTURE 2-0 VICRYL PLUS CT-1 - 8 X 18 INCH(12/BX)

## (undated) DEVICE — NEEDLE NON SAFETY HYPO 22 GA X 1 1/2 IN (100/BX)

## (undated) DEVICE — DRAPE LARGE 3 QUARTER - (20/CA)

## (undated) DEVICE — KIT SURGIFLO W/OUT THROMBIN - (6EA/CA)

## (undated) DEVICE — CLEANER ELECTRO-SURGICAL TIP - (25/BX 4BX/CA)

## (undated) DEVICE — DRAPE 36X28IN RAD CARM BND BG - (25/CA) O

## (undated) DEVICE — GLOVE BIOGEL PI INDICATOR SZ 8.0 SURGICAL PF LF -(50/BX 4BX/CA)

## (undated) DEVICE — SYRINGE 3 CC 22 GA X 1-1/2 - NDL SAFETY (50/BX 8BX/CA)

## (undated) DEVICE — NEEDLE SPINAL NON-SAFETY 18 GA X 3 IN (25EA/BX)

## (undated) DEVICE — WRAP COBAN SELF-ADHERENT 6 IN X  5YDS STERILE TAN (12/CA)

## (undated) DEVICE — LACTATED RINGERS INJ. 500 ML - (24EA/CA)

## (undated) DEVICE — CON SEDATION/>5 YR 1ST 15 MIN

## (undated) DEVICE — ELECTRODE DUAL RETURN W/ CORD - (50/PK)

## (undated) DEVICE — SOD. CHL 10CC SYRINGE PREFILL - W/10 CC (30/BX)

## (undated) DEVICE — SCREW DISTRACTION 14MM YELLOW - STERILE (10EA/BX) (5TX4=20)

## (undated) DEVICE — BANDAGE ELASTIC 4 HONEYCOMB - 4"X5YD LF (20/CA)"

## (undated) DEVICE — PADDING CAST 6 IN STERILE - 6 X 4 YDS (24/CA)

## (undated) DEVICE — COVER LIGHT HANDLE FLEXIBLE - SOFT (2EA/PK 80PK/CA)

## (undated) DEVICE — CANNULA W/ SUPPLY TUBING O2 - (50/CA)

## (undated) DEVICE — SLING ORTH UNV TIETX VLFM ARM

## (undated) DEVICE — DRAPE U ORTHOPEDIC - (10/BX)